# Patient Record
Sex: FEMALE | Race: AMERICAN INDIAN OR ALASKA NATIVE | Employment: OTHER | URBAN - NONMETROPOLITAN AREA
[De-identification: names, ages, dates, MRNs, and addresses within clinical notes are randomized per-mention and may not be internally consistent; named-entity substitution may affect disease eponyms.]

---

## 2017-01-05 ENCOUNTER — HOSPITAL ENCOUNTER (INPATIENT)
Facility: HOSPITAL | Age: 27
LOS: 3 days | Discharge: HOME | DRG: 194 | End: 2017-01-08
Attending: PHYSICIAN ASSISTANT | Admitting: INTERNAL MEDICINE
Payer: MEDICAID

## 2017-01-05 DIAGNOSIS — K82.8 THICKENING OF WALL OF GALLBLADDER: Primary | ICD-10-CM

## 2017-01-05 DIAGNOSIS — J18.9 PNEUMONIA OF RIGHT LOWER LOBE DUE TO INFECTIOUS ORGANISM: ICD-10-CM

## 2017-01-05 PROBLEM — N12 PYELONEPHRITIS: Status: ACTIVE | Noted: 2017-01-05

## 2017-01-05 LAB
ALBUMIN SERPL-MCNC: 2.8 G/DL (ref 3.4–5)
ALBUMIN UR-MCNC: 30 MG/DL
ALP SERPL-CCNC: 134 U/L (ref 40–150)
ALT SERPL W P-5'-P-CCNC: 28 U/L (ref 0–50)
AMPHETAMINES UR QL SCN: ABNORMAL
ANION GAP SERPL CALCULATED.3IONS-SCNC: 11 MMOL/L (ref 3–14)
APPEARANCE UR: ABNORMAL
AST SERPL W P-5'-P-CCNC: 29 U/L (ref 0–45)
BACTERIA #/AREA URNS HPF: ABNORMAL /HPF
BARBITURATES UR QL: ABNORMAL
BASOPHILS # BLD AUTO: 0 10E9/L (ref 0–0.2)
BASOPHILS NFR BLD AUTO: 0.2 %
BENZODIAZ UR QL: ABNORMAL
BILIRUB SERPL-MCNC: 0.7 MG/DL (ref 0.2–1.3)
BILIRUB UR QL STRIP: NEGATIVE
BUN SERPL-MCNC: 10 MG/DL (ref 7–30)
C DIFF TOX B STL QL: NORMAL
CALCIUM SERPL-MCNC: 9.2 MG/DL (ref 8.5–10.1)
CANNABINOIDS UR QL SCN: ABNORMAL
CHLORIDE SERPL-SCNC: 105 MMOL/L (ref 94–109)
CO2 SERPL-SCNC: 22 MMOL/L (ref 20–32)
COCAINE UR QL: ABNORMAL
COLOR UR AUTO: YELLOW
CREAT SERPL-MCNC: 0.51 MG/DL (ref 0.52–1.04)
CRP SERPL-MCNC: 119 MG/L (ref 0–8)
DIFFERENTIAL METHOD BLD: ABNORMAL
EOSINOPHIL # BLD AUTO: 0.2 10E9/L (ref 0–0.7)
EOSINOPHIL NFR BLD AUTO: 1.7 %
ERYTHROCYTE [DISTWIDTH] IN BLOOD BY AUTOMATED COUNT: 14.6 % (ref 10–15)
ETHANOL SERPL-MCNC: <0.01 G/DL
GFR SERPL CREATININE-BSD FRML MDRD: ABNORMAL ML/MIN/1.7M2
GLUCOSE SERPL-MCNC: 90 MG/DL (ref 70–99)
GLUCOSE UR STRIP-MCNC: NEGATIVE MG/DL
HCT VFR BLD AUTO: 40 % (ref 35–47)
HEMOCCULT SP1 STL QL: NEGATIVE
HGB BLD-MCNC: 14.4 G/DL (ref 11.7–15.7)
HGB UR QL STRIP: NEGATIVE
IMM GRANULOCYTES # BLD: 0.1 10E9/L (ref 0–0.4)
IMM GRANULOCYTES NFR BLD: 0.8 %
KETONES UR STRIP-MCNC: 10 MG/DL
LEUKOCYTE ESTERASE UR QL STRIP: ABNORMAL
LIPASE SERPL-CCNC: 91 U/L (ref 73–393)
LYMPHOCYTES # BLD AUTO: 2.2 10E9/L (ref 0.8–5.3)
LYMPHOCYTES NFR BLD AUTO: 18.1 %
MCH RBC QN AUTO: 33 PG (ref 26.5–33)
MCHC RBC AUTO-ENTMCNC: 36 G/DL (ref 31.5–36.5)
MCV RBC AUTO: 92 FL (ref 78–100)
METHADONE UR QL SCN: ABNORMAL
MONOCYTES # BLD AUTO: 0.8 10E9/L (ref 0–1.3)
MONOCYTES NFR BLD AUTO: 6.6 %
MUCOUS THREADS #/AREA URNS LPF: PRESENT /LPF
NEUTROPHILS # BLD AUTO: 8.6 10E9/L (ref 1.6–8.3)
NEUTROPHILS NFR BLD AUTO: 72.6 %
NITRATE UR QL: NEGATIVE
NRBC # BLD AUTO: 0 10*3/UL
NRBC BLD AUTO-RTO: 0 /100
OPIATES UR QL SCN: ABNORMAL
PCP UR QL SCN: ABNORMAL
PH UR STRIP: 7 PH (ref 4.7–8)
PLATELET # BLD AUTO: 363 10E9/L (ref 150–450)
POTASSIUM SERPL-SCNC: 3 MMOL/L (ref 3.4–5.3)
PROT SERPL-MCNC: 6.8 G/DL (ref 6.8–8.8)
RBC # BLD AUTO: 4.36 10E12/L (ref 3.8–5.2)
RBC #/AREA URNS AUTO: 4 /HPF (ref 0–2)
SODIUM SERPL-SCNC: 138 MMOL/L (ref 133–144)
SP GR UR STRIP: 1.02 (ref 1–1.03)
SPECIMEN SOURCE: NORMAL
SQUAMOUS #/AREA URNS AUTO: 6 /HPF (ref 0–1)
URN SPEC COLLECT METH UR: ABNORMAL
UROBILINOGEN UR STRIP-MCNC: NORMAL MG/DL (ref 0–2)
WBC # BLD AUTO: 11.9 10E9/L (ref 4–11)
WBC #/AREA URNS AUTO: 124 /HPF (ref 0–2)
WBC CLUMPS #/AREA URNS HPF: PRESENT /HPF

## 2017-01-05 PROCEDURE — 87899 AGENT NOS ASSAY W/OPTIC: CPT | Performed by: PHYSICIAN ASSISTANT

## 2017-01-05 PROCEDURE — 85025 COMPLETE CBC W/AUTO DIFF WBC: CPT | Performed by: PHYSICIAN ASSISTANT

## 2017-01-05 PROCEDURE — 80053 COMPREHEN METABOLIC PANEL: CPT | Performed by: PHYSICIAN ASSISTANT

## 2017-01-05 PROCEDURE — 87046 STOOL CULTR AEROBIC BACT EA: CPT | Performed by: PHYSICIAN ASSISTANT

## 2017-01-05 PROCEDURE — 25000125 ZZHC RX 250: Performed by: PHYSICIAN ASSISTANT

## 2017-01-05 PROCEDURE — 25000128 H RX IP 250 OP 636: Performed by: PHYSICIAN ASSISTANT

## 2017-01-05 PROCEDURE — 87045 FECES CULTURE AEROBIC BACT: CPT | Performed by: PHYSICIAN ASSISTANT

## 2017-01-05 PROCEDURE — 83735 ASSAY OF MAGNESIUM: CPT | Performed by: HOSPITALIST

## 2017-01-05 PROCEDURE — 83690 ASSAY OF LIPASE: CPT | Performed by: PHYSICIAN ASSISTANT

## 2017-01-05 PROCEDURE — 36415 COLL VENOUS BLD VENIPUNCTURE: CPT | Performed by: PHYSICIAN ASSISTANT

## 2017-01-05 PROCEDURE — 25000125 ZZHC RX 250: Performed by: HOSPITALIST

## 2017-01-05 PROCEDURE — 74022 RADEX COMPL AQT ABD SERIES: CPT | Mod: TC

## 2017-01-05 PROCEDURE — 99285 EMERGENCY DEPT VISIT HI MDM: CPT | Mod: 25

## 2017-01-05 PROCEDURE — 25000132 ZZH RX MED GY IP 250 OP 250 PS 637: Performed by: HOSPITALIST

## 2017-01-05 PROCEDURE — 96365 THER/PROPH/DIAG IV INF INIT: CPT

## 2017-01-05 PROCEDURE — 80320 DRUG SCREEN QUANTALCOHOLS: CPT | Performed by: PHYSICIAN ASSISTANT

## 2017-01-05 PROCEDURE — 96375 TX/PRO/DX INJ NEW DRUG ADDON: CPT

## 2017-01-05 PROCEDURE — 81001 URINALYSIS AUTO W/SCOPE: CPT | Performed by: PHYSICIAN ASSISTANT

## 2017-01-05 PROCEDURE — 87040 BLOOD CULTURE FOR BACTERIA: CPT | Performed by: PHYSICIAN ASSISTANT

## 2017-01-05 PROCEDURE — 82274 ASSAY TEST FOR BLOOD FECAL: CPT | Performed by: PHYSICIAN ASSISTANT

## 2017-01-05 PROCEDURE — 87086 URINE CULTURE/COLONY COUNT: CPT | Performed by: PHYSICIAN ASSISTANT

## 2017-01-05 PROCEDURE — 99285 EMERGENCY DEPT VISIT HI MDM: CPT | Performed by: PHYSICIAN ASSISTANT

## 2017-01-05 PROCEDURE — S0028 INJECTION, FAMOTIDINE, 20 MG: HCPCS | Performed by: PHYSICIAN ASSISTANT

## 2017-01-05 PROCEDURE — 80307 DRUG TEST PRSMV CHEM ANLYZR: CPT | Performed by: PHYSICIAN ASSISTANT

## 2017-01-05 PROCEDURE — 25800025 ZZH RX 258: Performed by: HOSPITALIST

## 2017-01-05 PROCEDURE — 87493 C DIFF AMPLIFIED PROBE: CPT | Performed by: PHYSICIAN ASSISTANT

## 2017-01-05 PROCEDURE — 87015 SPECIMEN INFECT AGNT CONCNTJ: CPT | Performed by: PHYSICIAN ASSISTANT

## 2017-01-05 PROCEDURE — 86140 C-REACTIVE PROTEIN: CPT | Performed by: PHYSICIAN ASSISTANT

## 2017-01-05 PROCEDURE — 96361 HYDRATE IV INFUSION ADD-ON: CPT

## 2017-01-05 PROCEDURE — 12000000 ZZH R&B MED SURG/OB

## 2017-01-05 RX ORDER — CEFTRIAXONE SODIUM 1 G/50ML
1 INJECTION, SOLUTION INTRAVENOUS EVERY 24 HOURS
Status: DISCONTINUED | OUTPATIENT
Start: 2017-01-06 | End: 2017-01-08 | Stop reason: HOSPADM

## 2017-01-05 RX ORDER — HYDROMORPHONE HYDROCHLORIDE 1 MG/ML
0.2 INJECTION, SOLUTION INTRAMUSCULAR; INTRAVENOUS; SUBCUTANEOUS
Status: DISCONTINUED | OUTPATIENT
Start: 2017-01-05 | End: 2017-01-05

## 2017-01-05 RX ORDER — ONDANSETRON 4 MG/1
4 TABLET, ORALLY DISINTEGRATING ORAL EVERY 6 HOURS PRN
Status: DISCONTINUED | OUTPATIENT
Start: 2017-01-05 | End: 2017-01-06

## 2017-01-05 RX ORDER — ONDANSETRON 2 MG/ML
8 INJECTION INTRAMUSCULAR; INTRAVENOUS ONCE
Status: COMPLETED | OUTPATIENT
Start: 2017-01-05 | End: 2017-01-05

## 2017-01-05 RX ORDER — POTASSIUM CHLORIDE 1500 MG/1
20-40 TABLET, EXTENDED RELEASE ORAL
Status: DISCONTINUED | OUTPATIENT
Start: 2017-01-05 | End: 2017-01-08 | Stop reason: HOSPADM

## 2017-01-05 RX ORDER — POTASSIUM CHLORIDE 7.45 MG/ML
10 INJECTION INTRAVENOUS
Status: DISCONTINUED | OUTPATIENT
Start: 2017-01-05 | End: 2017-01-08 | Stop reason: HOSPADM

## 2017-01-05 RX ORDER — DEXTROSE MONOHYDRATE, SODIUM CHLORIDE, AND POTASSIUM CHLORIDE 50; 1.49; 4.5 G/1000ML; G/1000ML; G/1000ML
INJECTION, SOLUTION INTRAVENOUS CONTINUOUS
Status: DISCONTINUED | OUTPATIENT
Start: 2017-01-05 | End: 2017-01-08 | Stop reason: HOSPADM

## 2017-01-05 RX ORDER — ONDANSETRON 2 MG/ML
4 INJECTION INTRAMUSCULAR; INTRAVENOUS EVERY 6 HOURS PRN
Status: DISCONTINUED | OUTPATIENT
Start: 2017-01-05 | End: 2017-01-06

## 2017-01-05 RX ORDER — HYDROMORPHONE HYDROCHLORIDE 1 MG/ML
.3-.5 INJECTION, SOLUTION INTRAMUSCULAR; INTRAVENOUS; SUBCUTANEOUS
Status: DISCONTINUED | OUTPATIENT
Start: 2017-01-05 | End: 2017-01-06

## 2017-01-05 RX ORDER — LIDOCAINE 40 MG/G
CREAM TOPICAL
Status: DISCONTINUED | OUTPATIENT
Start: 2017-01-05 | End: 2017-01-08 | Stop reason: HOSPADM

## 2017-01-05 RX ORDER — CEFTRIAXONE SODIUM 1 G/50ML
1 INJECTION, SOLUTION INTRAVENOUS ONCE
Status: DISCONTINUED | OUTPATIENT
Start: 2017-01-06 | End: 2017-01-05

## 2017-01-05 RX ORDER — MAGNESIUM SULFATE HEPTAHYDRATE 40 MG/ML
4 INJECTION, SOLUTION INTRAVENOUS EVERY 4 HOURS PRN
Status: DISCONTINUED | OUTPATIENT
Start: 2017-01-05 | End: 2017-01-08 | Stop reason: HOSPADM

## 2017-01-05 RX ORDER — DIPHENHYDRAMINE HYDROCHLORIDE 50 MG/ML
50 INJECTION INTRAMUSCULAR; INTRAVENOUS ONCE
Status: COMPLETED | OUTPATIENT
Start: 2017-01-05 | End: 2017-01-05

## 2017-01-05 RX ORDER — CEFTRIAXONE SODIUM 1 G/50ML
1 INJECTION, SOLUTION INTRAVENOUS ONCE
Status: COMPLETED | OUTPATIENT
Start: 2017-01-05 | End: 2017-01-05

## 2017-01-05 RX ORDER — POLYETHYLENE GLYCOL 3350 17 G/17G
17 POWDER, FOR SOLUTION ORAL DAILY PRN
Status: DISCONTINUED | OUTPATIENT
Start: 2017-01-05 | End: 2017-01-08 | Stop reason: HOSPADM

## 2017-01-05 RX ORDER — NALOXONE HYDROCHLORIDE 0.4 MG/ML
.1-.4 INJECTION, SOLUTION INTRAMUSCULAR; INTRAVENOUS; SUBCUTANEOUS
Status: DISCONTINUED | OUTPATIENT
Start: 2017-01-05 | End: 2017-01-08 | Stop reason: HOSPADM

## 2017-01-05 RX ORDER — POTASSIUM CHLORIDE 1.5 G/1.58G
20-40 POWDER, FOR SOLUTION ORAL
Status: DISCONTINUED | OUTPATIENT
Start: 2017-01-05 | End: 2017-01-08 | Stop reason: HOSPADM

## 2017-01-05 RX ORDER — MORPHINE SULFATE 2 MG/ML
4 INJECTION, SOLUTION INTRAMUSCULAR; INTRAVENOUS ONCE
Status: COMPLETED | OUTPATIENT
Start: 2017-01-05 | End: 2017-01-05

## 2017-01-05 RX ORDER — ACETAMINOPHEN 325 MG/1
650 TABLET ORAL EVERY 4 HOURS PRN
Status: DISCONTINUED | OUTPATIENT
Start: 2017-01-05 | End: 2017-01-06

## 2017-01-05 RX ADMIN — MORPHINE SULFATE 4 MG: 2 INJECTION, SOLUTION INTRAMUSCULAR; INTRAVENOUS at 12:24

## 2017-01-05 RX ADMIN — POTASSIUM CHLORIDE 10 MEQ: 14.9 INJECTION, SOLUTION, CONCENTRATE PARENTERAL at 21:11

## 2017-01-05 RX ADMIN — CEFTRIAXONE SODIUM 1 G: 1 INJECTION, SOLUTION INTRAVENOUS at 15:20

## 2017-01-05 RX ADMIN — FAMOTIDINE 20 MG: 10 INJECTION, SOLUTION INTRAVENOUS at 13:42

## 2017-01-05 RX ADMIN — ONDANSETRON 8 MG: 2 INJECTION, SOLUTION INTRAMUSCULAR; INTRAVENOUS at 12:24

## 2017-01-05 RX ADMIN — HYDROMORPHONE HYDROCHLORIDE 0.5 MG: 1 INJECTION, SOLUTION INTRAMUSCULAR; INTRAVENOUS; SUBCUTANEOUS at 23:39

## 2017-01-05 RX ADMIN — ONDANSETRON 4 MG: 2 INJECTION, SOLUTION INTRAMUSCULAR; INTRAVENOUS at 18:22

## 2017-01-05 RX ADMIN — POTASSIUM CHLORIDE 10 MEQ: 14.9 INJECTION, SOLUTION, CONCENTRATE PARENTERAL at 19:55

## 2017-01-05 RX ADMIN — ACETAMINOPHEN 650 MG: 325 TABLET, FILM COATED ORAL at 22:23

## 2017-01-05 RX ADMIN — SODIUM CHLORIDE 1000 ML: 9 INJECTION, SOLUTION INTRAVENOUS at 13:42

## 2017-01-05 RX ADMIN — HYDROMORPHONE HYDROCHLORIDE 0.3 MG: 1 INJECTION, SOLUTION INTRAMUSCULAR; INTRAVENOUS; SUBCUTANEOUS at 18:22

## 2017-01-05 RX ADMIN — AZITHROMYCIN MONOHYDRATE 500 MG: 500 INJECTION, POWDER, LYOPHILIZED, FOR SOLUTION INTRAVENOUS at 16:45

## 2017-01-05 RX ADMIN — POTASSIUM CHLORIDE, DEXTROSE MONOHYDRATE AND SODIUM CHLORIDE: 150; 5; 450 INJECTION, SOLUTION INTRAVENOUS at 19:55

## 2017-01-05 RX ADMIN — ONDANSETRON 4 MG: 2 INJECTION, SOLUTION INTRAMUSCULAR; INTRAVENOUS at 23:41

## 2017-01-05 RX ADMIN — HYDROMORPHONE HYDROCHLORIDE 1 MG: 1 INJECTION, SOLUTION INTRAMUSCULAR; INTRAVENOUS; SUBCUTANEOUS at 15:42

## 2017-01-05 RX ADMIN — POTASSIUM CHLORIDE 10 MEQ: 14.9 INJECTION, SOLUTION, CONCENTRATE PARENTERAL at 22:17

## 2017-01-05 RX ADMIN — POTASSIUM CHLORIDE 10 MEQ: 14.9 INJECTION, SOLUTION, CONCENTRATE PARENTERAL at 18:45

## 2017-01-05 RX ADMIN — SODIUM CHLORIDE 1000 ML: 9 INJECTION, SOLUTION INTRAVENOUS at 12:22

## 2017-01-05 RX ADMIN — DIPHENHYDRAMINE HYDROCHLORIDE 50 MG: 50 INJECTION, SOLUTION INTRAMUSCULAR; INTRAVENOUS at 12:37

## 2017-01-05 RX ADMIN — HYDROMORPHONE HYDROCHLORIDE 0.5 MG: 1 INJECTION, SOLUTION INTRAMUSCULAR; INTRAVENOUS; SUBCUTANEOUS at 20:46

## 2017-01-05 ASSESSMENT — PAIN DESCRIPTION - DESCRIPTORS: DESCRIPTORS: CONSTANT

## 2017-01-05 NOTE — ED NOTES
Up to bathroom to have a bowel movement.  Patient to bathroom via WC.  Back to room.  No new complaints at this time.

## 2017-01-05 NOTE — ED NOTES
"Pt has been medicated with norphine and zofran, mom now states pt may be allergice to morphine \"gets anxiety and agitated\", sats 100% on room air after morphine, family at bedside.  "

## 2017-01-05 NOTE — ED PROVIDER NOTES
History     Chief Complaint   Patient presents with     Nausea & Vomiting     Cough     Abdominal Pain     HPI  Maggie Case is a 26 year old female who presents with abdominal pain, bilateral flank pain, cough, nausea, and vomiting x 4 days. Also, states blood in her stool for the past month. No fevers. Was too weak to drive, so came in by ambulance. She denies fevers.     I have reviewed the Medications, Allergies, Past Medical and Surgical History, and Social History in the Epic system.    Review of Systems   All other systems reviewed and are negative.     .kkpm    Past Medical History:   Past Medical History   Diagnosis Date     Anemia      Vitamin B12 deficiency      Chronic diarrhea      Lactose intolerance      Myalgia      Anxiety      Pulmonary embolism (H) 05/06/16       Past Surgical History   Procedure Laterality Date     Closed reduction, percutaneous pinning lower extremity, combined Right 4/6/2016     Procedure: COMBINED CLOSED REDUCTION, PERCUTANEOUS PINNING LOWER EXTREMITY;  Surgeon: Dexter Jones MD;  Location: [facility]       Social History     Social History     Marital Status: Single     Spouse Name: N/A     Number of Children: N/A     Years of Education: N/A     Occupational History     Not on file.     Social History Main Topics     Smoking status: Current Every Day Smoker -- 0.25 packs/day for 7 years     Types: Cigarettes     Smokeless tobacco: Never Used     Alcohol Use: 0.0 oz/week     0 Standard drinks or equivalent per week      Comment: 1-2/week ,      Drug Use: Yes     Special: Marijuana      Comment: adderall, yest     Sexual Activity:     Partners: Male     Other Topics Concern     Not on file     Social History Narrative       Patient's Medications   New Prescriptions    No medications on file   Previous Medications    No medications on file   Modified Medications    No medications on file   Discontinued Medications    CYANOCOBALAMIN PO    Take 1,000 mcg by mouth daily     ESCITALOPRAM OXALATE PO    Take 10 mg by mouth daily    HYDROCODONE-ACETAMINOPHEN (NORCO) 5-325 MG PER TABLET    Take 1 tablet by mouth every 6 hours as needed for moderate to severe pain    HYDROXYZINE PAMOATE (VISTARIL PO)    Take 25 mg by mouth 3 times daily as needed for itching    MEDROXYPROGESTERONE ACETATE (DEPO-PROVERA IM)    Inject 150 mg into the muscle every 3 months    RIVAROXABAN ANTICOAGULANT (XARELTO) 20 MG TABS TABLET    Take 1 tablet (20 mg) by mouth daily    THIAMINE HCL PO    Take 100 mg by mouth daily    VITAMIN D (ERGOCALCIFEROL) 91980 UNIT CAPSULE    Take 50,000 Units by mouth once a week    VITAMIN D, CHOLECALCIFEROL, PO    Take 1,000 Units by mouth daily    VITAMIN D, CHOLECALCIFEROL, PO    Take 2,000 Units by mouth daily       Allergies: Amoxicillin; Lactose; Naproxen; Nickel; Tramadol; and Sulindac      Physical Exam   BP: 111/75 mmHg  Heart Rate: 76  Temp: 98  F (36.7  C)  Resp: 16  SpO2: 100 % (Simultaneous filing. User may not have seen previous data.)  Physical Exam   Constitutional: She is oriented to person, place, and time. Vital signs are normal. She appears well-developed and well-nourished.  Non-toxic appearance. She does not have a sickly appearance. She does not appear ill. She appears distressed.   HENT:   Head: Normocephalic and atraumatic.   Right Ear: External ear normal.   Left Ear: External ear normal.   Nose: Nose normal.   Mouth/Throat: Oropharynx is clear and moist. No oropharyngeal exudate.   Eyes: Conjunctivae are normal. Pupils are equal, round, and reactive to light. Right eye exhibits no discharge. Left eye exhibits no discharge. No scleral icterus.   Neck: Normal range of motion. Neck supple.   Cardiovascular: Normal rate, regular rhythm, normal heart sounds and intact distal pulses.  Exam reveals no gallop and no friction rub.    No murmur heard.  Pulmonary/Chest: Effort normal and breath sounds normal. No respiratory distress. She has no wheezes. She has no  rales. She exhibits no tenderness.   Abdominal: Soft. Bowel sounds are normal. She exhibits no distension and no mass. There is generalized tenderness. There is CVA tenderness. There is no rebound and no guarding.   Musculoskeletal: Normal range of motion. She exhibits no edema.   Lymphadenopathy:     She has no cervical adenopathy.   Neurological: She is alert and oriented to person, place, and time. No cranial nerve deficit.   Skin: Skin is warm and dry. No rash noted. She is not diaphoretic. No erythema. No pallor.   Psychiatric: Her behavior is normal. Judgment and thought content normal. Her mood appears anxious.   Nursing note and vitals reviewed.      ED Course   Procedures               Labs Ordered and Resulted from Time of ED Arrival Up to the Time of Departure from the ED   CBC WITH PLATELETS DIFFERENTIAL - Abnormal; Notable for the following:     WBC 11.9 (*)     Absolute Neutrophil 8.6 (*)     All other components within normal limits   COMPREHENSIVE METABOLIC PANEL - Abnormal; Notable for the following:     Potassium 3.0 (*)     Creatinine 0.51 (*)     Albumin 2.8 (*)     All other components within normal limits   CRP INFLAMMATION - Abnormal; Notable for the following:     CRP Inflammation 119.0 (*)     All other components within normal limits   UA MACROSCOPIC WITH REFLEX TO MICRO AND CULTURE - Abnormal; Notable for the following:     Ketones Urine 10 (*)     Protein Albumin Urine 30 (*)     Leukocyte Esterase Urine Large (*)     RBC Urine 4 (*)     WBC Urine 124 (*)     WBC Clumps Present (*)     Bacteria Urine Few (*)     Squamous Epithelial /HPF Urine 6 (*)     Mucous Urine Present (*)     All other components within normal limits   DRUG SCREEN URINE (RANGE) - Abnormal; Notable for the following:     Opiates Qualitative Urine   (*)     Value: Positive   Cutoff for a positive opiate is greater than 300 ng/mL. This is an unconfirmed   screening result to be used for medical purposes only.      All  other components within normal limits   LIPASE   ALCOHOL ETHYL   IMMUNOS OCCULT BLOOD   PERIPHERAL IV CATHETER   CLOSTRIDIUM DIFFICILE TOXIN B   STOOL CULTURE SSCE   URINE CULTURE AEROBIC BACTERIAL   BLOOD CULTURE   BLOOD CULTURE       Assessments & Plan (with Medical Decision Making)   Maggie has a dramatic presentation today coming in by ambulance from home with n/v/d, abd pain, and cough x 4 days. She has generalized abd tenderness as well as CVA tenderness. Stated bloody stools x 1 month but stool was negative for blood today and hemoglobin at baseline 14, so doubt this. Labs unremarkable other than slightly increased white count at 11.9 and elevated CRP at 119. UA is positive for infection with large leuk esterase and 124 WBC's. She does have CVA tenderness, so will cover for pyelo with Rocephin. The CXR shows RLL infiltrate consistent with early pneumonia as well. Azithromycin 500mg IV given following blood cultures x 2. She does not appear septic at this time. She has continued to vomit despite anti-emetics given here and would not be able to keep oral antibiotics down at home. She was kindly accepted by Dr. Barba for further care.     I have reviewed the nursing notes.    I have reviewed the findings, diagnosis, plan and need for follow up with the patient.    New Prescriptions    No medications on file       Final diagnoses:   Acute pyelonephritis   Pneumonia of right lower lobe due to infectious organism       1/5/2017   [facility]      Hali Glover PA-C  01/05/17 4387

## 2017-01-05 NOTE — ED NOTES
Pt has been up to br to void, voided scant dark vonnie urine, back to room. States that her pain didn't get better after pepcid. No emesis here.

## 2017-01-05 NOTE — ED NOTES
Nausea vomiting for 4 days unable to keep fluids food down.  Denies fever, reports abdomen is sore. Diarrhea and body aches also reported. Reports SOB and epigastric pains. Reports that she now has some blood in emesis and stool

## 2017-01-05 NOTE — ED NOTES
IV was flushed prior to next bolus starting; patient c/o pain at the site; small area of redness noted; blood return in hub.  Warm pack to IV site, will continue to monitor.

## 2017-01-05 NOTE — IP AVS SNAPSHOT
[facility]    [address] [place] [id number]    Phone:  [phone]    Fax:  [phone]                                       After Visit Summary   1/5/2017    Maggie Case    MRN: [id number]           After Visit Summary Signature Page     I have received my discharge instructions, and my questions have been answered. I have discussed any challenges I see with this plan with the nurse or doctor.    ..........................................................................................................................................  Patient/Patient Representative Signature      ..........................................................................................................................................  Patient Representative Print Name and Relationship to Patient    ..................................................               ................................................  Date                                            Time    ..........................................................................................................................................  Reviewed by Signature/Title    ...................................................              ..............................................  Date                                                            Time

## 2017-01-06 LAB
ANION GAP SERPL CALCULATED.3IONS-SCNC: 10 MMOL/L (ref 3–14)
BASOPHILS # BLD AUTO: 0 10E9/L (ref 0–0.2)
BASOPHILS NFR BLD AUTO: 0.1 %
BUN SERPL-MCNC: 6 MG/DL (ref 7–30)
CALCIUM SERPL-MCNC: 7.4 MG/DL (ref 8.5–10.1)
CHLORIDE SERPL-SCNC: 112 MMOL/L (ref 94–109)
CO2 SERPL-SCNC: 20 MMOL/L (ref 20–32)
CREAT SERPL-MCNC: 0.54 MG/DL (ref 0.52–1.04)
DIFFERENTIAL METHOD BLD: ABNORMAL
EOSINOPHIL # BLD AUTO: 0.4 10E9/L (ref 0–0.7)
EOSINOPHIL NFR BLD AUTO: 3.6 %
ERYTHROCYTE [DISTWIDTH] IN BLOOD BY AUTOMATED COUNT: 14.8 % (ref 10–15)
GFR SERPL CREATININE-BSD FRML MDRD: ABNORMAL ML/MIN/1.7M2
GLUCOSE SERPL-MCNC: 86 MG/DL (ref 70–99)
HCT VFR BLD AUTO: 36.3 % (ref 35–47)
HGB BLD-MCNC: 11.9 G/DL (ref 11.7–15.7)
IMM GRANULOCYTES # BLD: 0.1 10E9/L (ref 0–0.4)
IMM GRANULOCYTES NFR BLD: 0.5 %
LYMPHOCYTES # BLD AUTO: 1.9 10E9/L (ref 0.8–5.3)
LYMPHOCYTES NFR BLD AUTO: 20 %
MAGNESIUM SERPL-MCNC: 2 MG/DL (ref 1.6–2.3)
MCH RBC QN AUTO: 32.6 PG (ref 26.5–33)
MCHC RBC AUTO-ENTMCNC: 32.8 G/DL (ref 31.5–36.5)
MCV RBC AUTO: 100 FL (ref 78–100)
MONOCYTES # BLD AUTO: 1.1 10E9/L (ref 0–1.3)
MONOCYTES NFR BLD AUTO: 11 %
NEUTROPHILS # BLD AUTO: 6.2 10E9/L (ref 1.6–8.3)
NEUTROPHILS NFR BLD AUTO: 64.8 %
NRBC # BLD AUTO: 0 10*3/UL
NRBC BLD AUTO-RTO: 0 /100
PLATELET # BLD AUTO: 292 10E9/L (ref 150–450)
POTASSIUM SERPL-SCNC: 3.5 MMOL/L (ref 3.4–5.3)
POTASSIUM SERPL-SCNC: 3.6 MMOL/L (ref 3.4–5.3)
RBC # BLD AUTO: 3.65 10E12/L (ref 3.8–5.2)
SODIUM SERPL-SCNC: 142 MMOL/L (ref 133–144)
WBC # BLD AUTO: 9.6 10E9/L (ref 4–11)

## 2017-01-06 PROCEDURE — 25000130 H RX MED GY IP 250 OP 259 PS 637: Performed by: INTERNAL MEDICINE

## 2017-01-06 PROCEDURE — 83735 ASSAY OF MAGNESIUM: CPT | Performed by: HOSPITALIST

## 2017-01-06 PROCEDURE — 25000128 H RX IP 250 OP 636: Performed by: INTERNAL MEDICINE

## 2017-01-06 PROCEDURE — 25000132 ZZH RX MED GY IP 250 OP 250 PS 637: Performed by: INTERNAL MEDICINE

## 2017-01-06 PROCEDURE — 85025 COMPLETE CBC W/AUTO DIFF WBC: CPT | Performed by: INTERNAL MEDICINE

## 2017-01-06 PROCEDURE — 25000132 ZZH RX MED GY IP 250 OP 250 PS 637: Performed by: HOSPITALIST

## 2017-01-06 PROCEDURE — 80048 BASIC METABOLIC PNL TOTAL CA: CPT | Performed by: INTERNAL MEDICINE

## 2017-01-06 PROCEDURE — 36415 COLL VENOUS BLD VENIPUNCTURE: CPT | Performed by: HOSPITALIST

## 2017-01-06 PROCEDURE — 25000125 ZZHC RX 250: Performed by: INTERNAL MEDICINE

## 2017-01-06 PROCEDURE — 84132 ASSAY OF SERUM POTASSIUM: CPT | Performed by: HOSPITALIST

## 2017-01-06 PROCEDURE — 25000125 ZZHC RX 250: Performed by: HOSPITALIST

## 2017-01-06 PROCEDURE — 25800025 ZZH RX 258: Performed by: HOSPITALIST

## 2017-01-06 PROCEDURE — 99222 1ST HOSP IP/OBS MODERATE 55: CPT | Performed by: INTERNAL MEDICINE

## 2017-01-06 PROCEDURE — 12000000 ZZH R&B MED SURG/OB

## 2017-01-06 PROCEDURE — 71020 ZZHC CHEST TWO VIEWS, FRONT/LAT: CPT | Mod: TC

## 2017-01-06 RX ORDER — HYDROMORPHONE HYDROCHLORIDE 1 MG/ML
.1-.5 INJECTION, SOLUTION INTRAMUSCULAR; INTRAVENOUS; SUBCUTANEOUS
Status: DISCONTINUED | OUTPATIENT
Start: 2017-01-06 | End: 2017-01-08

## 2017-01-06 RX ORDER — ONDANSETRON 4 MG/1
8 TABLET, ORALLY DISINTEGRATING ORAL EVERY 6 HOURS PRN
Status: DISCONTINUED | OUTPATIENT
Start: 2017-01-06 | End: 2017-01-08 | Stop reason: HOSPADM

## 2017-01-06 RX ORDER — HYDROMORPHONE HYDROCHLORIDE 1 MG/ML
.1-.5 INJECTION, SOLUTION INTRAMUSCULAR; INTRAVENOUS; SUBCUTANEOUS
Status: DISCONTINUED | OUTPATIENT
Start: 2017-01-06 | End: 2017-01-06

## 2017-01-06 RX ORDER — DIPHENHYDRAMINE HCL 25 MG
25 CAPSULE ORAL EVERY 4 HOURS PRN
Status: DISCONTINUED | OUTPATIENT
Start: 2017-01-06 | End: 2017-01-08 | Stop reason: HOSPADM

## 2017-01-06 RX ORDER — ONDANSETRON 2 MG/ML
4 INJECTION INTRAMUSCULAR; INTRAVENOUS EVERY 6 HOURS PRN
Status: DISCONTINUED | OUTPATIENT
Start: 2017-01-06 | End: 2017-01-08 | Stop reason: HOSPADM

## 2017-01-06 RX ORDER — ACETAMINOPHEN 325 MG/1
975 TABLET ORAL EVERY 6 HOURS
Status: DISCONTINUED | OUTPATIENT
Start: 2017-01-06 | End: 2017-01-07

## 2017-01-06 RX ADMIN — Medication 2.5 MG: at 10:46

## 2017-01-06 RX ADMIN — DIPHENHYDRAMINE HYDROCHLORIDE 25 MG: 25 CAPSULE ORAL at 17:17

## 2017-01-06 RX ADMIN — Medication 5 MG: at 15:51

## 2017-01-06 RX ADMIN — POTASSIUM CHLORIDE, DEXTROSE MONOHYDRATE AND SODIUM CHLORIDE: 150; 5; 450 INJECTION, SOLUTION INTRAVENOUS at 18:02

## 2017-01-06 RX ADMIN — Medication 5 MG: at 20:12

## 2017-01-06 RX ADMIN — ACETAMINOPHEN 975 MG: 325 TABLET, FILM COATED ORAL at 11:44

## 2017-01-06 RX ADMIN — ACETAMINOPHEN 650 MG: 325 TABLET, FILM COATED ORAL at 04:04

## 2017-01-06 RX ADMIN — DIPHENHYDRAMINE HYDROCHLORIDE 25 MG: 25 CAPSULE ORAL at 02:01

## 2017-01-06 RX ADMIN — HYDROMORPHONE HYDROCHLORIDE 0.5 MG: 1 INJECTION, SOLUTION INTRAMUSCULAR; INTRAVENOUS; SUBCUTANEOUS at 06:16

## 2017-01-06 RX ADMIN — CEFTRIAXONE SODIUM 1 G: 1 INJECTION, SOLUTION INTRAVENOUS at 15:28

## 2017-01-06 RX ADMIN — AZITHROMYCIN MONOHYDRATE 250 MG: 500 INJECTION, POWDER, LYOPHILIZED, FOR SOLUTION INTRAVENOUS at 17:56

## 2017-01-06 RX ADMIN — HYDROMORPHONE HYDROCHLORIDE 0.5 MG: 1 INJECTION, SOLUTION INTRAMUSCULAR; INTRAVENOUS; SUBCUTANEOUS at 01:37

## 2017-01-06 RX ADMIN — ACETAMINOPHEN 650 MG: 325 TABLET, FILM COATED ORAL at 08:30

## 2017-01-06 RX ADMIN — POTASSIUM CHLORIDE, DEXTROSE MONOHYDRATE AND SODIUM CHLORIDE: 150; 5; 450 INJECTION, SOLUTION INTRAVENOUS at 08:33

## 2017-01-06 RX ADMIN — Medication 2.5 MG: at 11:45

## 2017-01-06 RX ADMIN — ONDANSETRON 4 MG: 2 INJECTION, SOLUTION INTRAMUSCULAR; INTRAVENOUS at 17:17

## 2017-01-06 RX ADMIN — ONDANSETRON 4 MG: 2 INJECTION, SOLUTION INTRAMUSCULAR; INTRAVENOUS at 06:16

## 2017-01-06 RX ADMIN — HYDROMORPHONE HYDROCHLORIDE 0.5 MG: 1 INJECTION, SOLUTION INTRAMUSCULAR; INTRAVENOUS; SUBCUTANEOUS at 08:31

## 2017-01-06 RX ADMIN — ACETAMINOPHEN 975 MG: 325 TABLET, FILM COATED ORAL at 17:59

## 2017-01-06 RX ADMIN — HYDROMORPHONE HYDROCHLORIDE 0.5 MG: 1 INJECTION, SOLUTION INTRAMUSCULAR; INTRAVENOUS; SUBCUTANEOUS at 04:04

## 2017-01-06 ASSESSMENT — PAIN DESCRIPTION - DESCRIPTORS
DESCRIPTORS: CONSTANT

## 2017-01-06 NOTE — PLAN OF CARE
Problem: Goal Outcome Summary  Goal: Goal Outcome Summary  Outcome: No Change  Pt alert and orientated.  Tolerated sips of fluid and few bites food.  zofran given before eating.  Rates pain  9/10-  Dilaudid given.  Voiding ok,  IV infusing .  Pt receiving potassium placement- K+3.0. No emesis/ diarrhea.  Uses call light appropriately.

## 2017-01-06 NOTE — PLAN OF CARE
Problem: Goal Outcome Summary  Goal: Goal Outcome Summary  Outcome: No Change  Pt alert and orientated. Ate fair for meals.   Started oral pain meds.  Rates pain 7/10.     Sleeping now.  Voiding ok.  IV infusing. Up ad shannan.  Uses call light appropriately.    Problem: Pneumonia (Adult)  Goal: Signs and Symptoms of Listed Potential Problems Will be Absent or Manageable (Pneumonia)  Signs and symptoms of listed potential problems will be absent or manageable by discharge/transition of care (reference Pneumonia (Adult) CPG).   Outcome: No Change  On antibiotics. Frequent cough.  Lungs clear. sats 98% ra

## 2017-01-06 NOTE — PLAN OF CARE
Face to face report given with opportunity to observe patient.    Report given TAMMIE Julien   1/6/2017  7:17 AM

## 2017-01-06 NOTE — PLAN OF CARE
[facility] Inpatient Admission Note:    Patient admitted to 3104/3104-1 at approximately 1618 via cart accompanied by transport tech from emergency room . Report received from Nae prescott from ED  in SBAR format at 1607 via telephone. Patient ambulated to bed via self.. Patient is alert and oriented X 3, reports pain; rates at 7 on 0-10 scale.  Patient oriented to room, unit, hourly rounding, and plan of care. Explained admission packet and patient handbook with patient bill of rights brochure. Will continue to monitor and document as needed.     Inpatient Nursing criteria listed below was met:      Health care directives status obtained and documented: Yes      Care Everywhere authorization obtained No      MRSA swab completed for patient 65 years and older: N/A      Patient identifies a surrogate decision maker: Yes If yes, who:mother- jae Contact Information:see facesheet      Core Measure diagnosis present:No. If yes, state diagnosis: na       If initial lactic acid >2.0, repeat lactic acid drawn within one hour of arrival to unit: NA. If no, state reason: na      Vaccination assessment and education completed: Yes   Vaccinations received prior to admission: Pneumovax no  Influenza(seasonal)  NO   Vaccination(s) ordered: patient declines      Clergy visit ordered if patient requests: N/A      Skin issues/needs documented: Yes      Isolation Patient: no Education given, correct sign in place and documentation row added to PCS:  No      Fall Prevention No: Care plan updated, education given and documented, sticker and magnet in place: N/A      Care Plan initiated: Yes      Education Documented (including assessment): Yes      Patient has discharge needs : No If yes, please explain:na

## 2017-01-06 NOTE — PLAN OF CARE
Problem: Patient Goal: Social Work Focus  Goal: 1. Patient Goal: Social Work Focus  Assessment as per flow sheet.     Met with Maggie. At the beginning of this visit, Maggie just woke from a nap and states confused on time of day. As visit progressed, Maggie became clearer.  She is pleasant and cooperative.   She is here with n/v/d, abdominal pain. Cough. She lives in an apartment in [place] with her 8 year old daughter Sharri. Sharri is with her Dad now. Thursday through Sunday is his visitation. She does receive child support, about 372.00 which is divided out in the month. Works at the [employer], is trying to leave this occupation. Hours have been cut (21hours at most). 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 Prefers the school age so she can  PCA hours while the kids are in school.. Does drive, is independent with meals, house keeping, finances. PCP is Dr. Flores. Went to Dr. Cabrera's office off and on for dental care (he has not retired). No eye dr, denies concerns. No HCD, not interested at this time. No , community services/programs. Utilizes [facility]. Is not a [place], does not receive benefits. Feels safe at home. Sleep-no real concerns. Work schedule does affect sleep at times. Katelyn is [employer]. Support system is mom, dad, aunt. Has 4 brothers and 3 sisters. Has a brother and cousin staying at apartment since no one is there. Waiting for PCA paperwork in the mail. States her bud (a male friend) brought in clothes earlier today.   States has court since her landlord states she is late in rent, Maggie states she paid her rent.  Patient Financial met with Maggie earlier today and has HPE. She is interested in financial planning/balancing finances. Does use tobacco 1/2 pack per week. Does drink alcohol on the weekend when Sharri is with her Dad. Denies drug use, (past/current).     Talked about home  care, Maggie is independent     Plan: home when ready. Has transportation home.

## 2017-01-06 NOTE — PLAN OF CARE
Face to face report given with opportunity to observe patient.    Report given to Brandy Staley   1/6/2017  3:49 PM

## 2017-01-06 NOTE — PLAN OF CARE
Problem: Goal Outcome Summary  Goal: Goal Outcome Summary  Outcome: No Change  S/o 7-9/10 pain in and across abdomen.  Using dilaudid q 2 hrs IV.  Intermit nausea. zofran x 2  C/o anxiety from hospitals requested and received benadryl  Scratched herself on her abdomen. Abrasion, bandaid applied.  VSS.        Problem: Pneumonia (Adult)  Goal: Signs and Symptoms of Listed Potential Problems Will be Absent or Manageable (Pneumonia)  Signs and symptoms of listed potential problems will be absent or manageable by discharge/transition of care (reference Pneumonia (Adult) CPG).  Outcome: No Change    01/06/17 0550   Pneumonia   Problems Assessed (Pneumonia) all   Problems Present (Pneumonia) none

## 2017-01-06 NOTE — H&P
Patient seen and evaluated by Dr. Montiel on patient's admission to hospital, but document delayed by physician's illness. This document filed in substitution.    [facility]    History and Physical  Hospitalist       Date of Admission:  1/5/2017  Date of Service (when I saw the patient): 01/06/2017    Assessment and Plan  Maggie Case is a 26 year old woman who presentresented to emergency department yesterday evening with approximately 4 days of nausea and subsequent emesis.  She not been able to keep any liquids down for at least several days and presented to emergency department.  She had diffuse abdominal discomfort.  Evaluation demonstrated slight leukocytosis with WBC 11.9.  She did not demonstrate a fever, however.  She improved symptomatically with intravenous fluid in the emergency department.  History is otherwise significant for pulmonary embolism 12 2015, treated with thrombolysis.    1.  Nausea and vomiting.  Most suspicious of a urinary tract infection rather severe infectious cystitis or pyelonephritis is not absolutely certain. Abdominal imaging nonspecific. She did have marked symptoms of pain and discomfort and mild leukocytosis.  However, in other respects, not as many systemic findings as might be expected in the event of pyelonephritis.  Her nausea and vomiting and has responded quite promptly, more rapidly than would be expected from an affect of antibiotics alone and cannot eliminate a nonspecific gastroenteritis as the source of her symptoms.  In any event, she is responded with usual symptomatic treatment by the evening of hospitalization.  She was able to tolerate small amount of oral intake with some residual nausea.  She has continued to need oral  Zofran.  She is also using relatively frequent hydromorphone.  Will add scheduled acetaminophen and have strongly encouraged oral medication for pain control.  If absolutely necessary, a small prescription for oxycodone or  "hydrocodone could be considered, although  Would prefer pain be under adequate control by the time of discharge that she does not require opiates.  In any event, it appears her symptoms are responding to supportive management.  2.  Urinary tract infection.  Would consider infectious cystitis versus pyelonephritis. Pain and discomfort is out of proportion to her cystitis, although the pain is most referrable to her lower abdomen anteriorly suggesting bladder inflammation.  She does have some back pain, although at least on the morning following admission is quite symmetrical and does not clearly relate to a unilateral kidney inflammation.  In any event, we will continue with antimicrobials.  Most recent urinary tract infection showed relatively pansensitive Escherichia coli.  Given considerations as well for a lower respiratory tract infection, continuing treatment with an oral quinolone would be quite reasonable.  3. Lower respiratory tract infection.  Airspace infiltrate posterior subsegment, right lower lobe. Certainly consistent with a community-acquired pneumonia.  For the time being, continue azithromycin, although appearance does not suggest highly a \"atypical\" organism.  Currently treatment with ceftriaxone reasonable treatment.  Transition to a quinolone for both possible urinary as well as respiratory tract infection would be reasonable time of discharge.  5.  History of pulmonary embolus.  December 2015, she developed pulmonary emboli with relatively large clot burden.  This prompted transfer in treatment at [facility] with thrombolysis.  Source, perhaps related to her use of Depo Provera in the context of tobacco abuse.  She has not undergone evaluation for any thrombophilia, although I am not certain that is mandated.  Since her pulmonary embolism.  Her treatment was some what inconsistent, although she does recall use of enoxaparin and warfarin relatively regularly.  She was admitted here  " "approximately 6 months after her initial diagnosis and treatment and because of concerns with consistent use of warfarin and heparin arrangements prorate for treatment with rivarobaxan.  She recalls that at least on several occasions she had difficulties in keeping appointments and did not get refills of this. She was evaluated by Dr. Donaldson of hematology several months after her initial pulmonary embolism.  He was concerned with her large clot burden and recommended lifelong anticoagulation.  However, she has had difficulties with consistent use either of warfarin or rivarobaxan, which was recommended at the time of admission here in June 2016. Because of this, I would not favor resuming treatment at the present time given her difficulties in consistent use of anticoagulant medication.  There is no strong evidence of any recurrent or persistent thrombosis. In June 2016 a follow-up CT scan of the chest.\"  Angiogram\" protocol did not demonstrate convincing clot burden.  She has not had recurrent symptoms strongly suggesting any anatomic or physiologic abnormality, although intermittently she has noted some dyspnea which has been relatively inconsistent. Certainly her symptoms, however, are not great enough to consider either recurrent acute or chronic pulmonary embolism. Inconsistent use may in fact exacerbate thrombophilia.  Difficult to recommend any follow-up imaging as neither presence or absence of anatomic abnormality would be unlikely to change any decision-making.  Her use of injectable contraceptive in the context of smoking is likely a adequate explanation for her initial clot.    DVT Prophylaxis: Intermittent pneumatic compression  Code Status: Full Code    Disposition: Expected discharge in another 12-24 hours.    Emeterio Garrison    Primary Care Physician  Chapo Flores    Chief Complaint  Nausea and vomiting for 4 days.    History is obtained from the patient    History of Present Illness  Maggie CORMIER" Evie is a 26 year old woman who presented 1/5 afternoon to [facility] with 4 days of nausea and vomiting.  She notes that she first developed nausea and subsequently vomiting to the extent she has not been able to tolerate even a small amount of liquid intake.  She has not noted fevers or chills.  She has noted some lower abdominal pain.  Evaluation in emergency department demonstrated modest pyuria.  She had a scant leukocytosis.  No fever was noted.  Abdominal plain films did not demonstrate obstruction.  Question of mild bowel wall thickening.  She was admitted for further evaluation and management.  She does not recall any other prodromal symptoms.  No sick contacts.  No recent travel. She notes occasional dyspnea and not clearly related to exertion.  She has not had any cough or headache.  No dysuria. No unilateral weakness or paresthesias.  No hemoptysis or hematemesis. No chest discomfort.    Past Medical History   I have reviewed this patient's medical history and updated it with pertinent information if needed.   Past Medical History   Diagnosis Date     Anemia      Vitamin B12 deficiency      Chronic diarrhea      Lactose intolerance      Myalgia      Anxiety      Pulmonary embolism ([facility]) 05/06/16       Past Surgical History  I have reviewed this patient's surgical history and updated it with pertinent information if needed.  Past Surgical History   Procedure Laterality Date     Closed reduction, percutaneous pinning lower extremity, combined Right 4/6/2016     Procedure: COMBINED CLOSED REDUCTION, PERCUTANEOUS PINNING LOWER EXTREMITY;  Surgeon: Dexter Jones MD;  Location: [facility]       Prior to Admission Medications  None     Allergies  Allergies   Allergen Reactions     Amoxicillin Other (See Comments)     Headaches     Lactose      Naproxen Other (See Comments)     Reaction: Headaches     Nickel      Tramadol      Sulindac Rash       Social History  I have reviewed this patient's  social history and updated it with pertinent information if needed. Maggie Case  reports that she has been smoking Cigarettes.  She has a 1.75 pack-year smoking history. She has never used smokeless tobacco. She reports that she drinks alcohol. She reports that she uses illicit drugs (Marijuana).    Family History  I have reviewed this patient's family history and updated it with pertinent information if needed.   No family history on file.    Review of Systems  The 10 point Review of Systems is negative other than noted in the HPI.    Physical Exam  Temp: 98.3  F (36.8  C) Temp src: Tympanic BP: 100/61 mmHg Pulse: 80 Heart Rate: 82 Resp: 18 SpO2: 100 % O2 Device: None (Room air)    Vital Signs with Ranges  Temp:  [98  F (36.7  C)-98.9  F (37.2  C)] 98.3  F (36.8  C)  Pulse:  [65-80] 80  Heart Rate:  [61-82] 82  Resp:  [16-20] 18  BP: ()/(45-75) 100/61 mmHg  SpO2:  [97 %-100 %] 100 %  135 lbs 5.8 oz      Awake, alert, mildly fatigued woman lying in bed on medical wards.  Speech is clear.  Oriented ×3.  Pleasant and interactive.  HEENT: Pupils equal, conjugate. No icterus or nystagmus. Oral mucosa moist. No facial asymmetry.   Neck: Supple, jugular veins 1-2 cmH2O. Trachea midline   Chest: No chest wall movement asymmetry. Aeration preserve to bases. Accessory muscles not in use. Expiratory time not increased. No tidal wheezes. Few rhonchi right lower lobe.  No dullness to percussion.  No egophony.. No discrete crackles.   Cardiac: PMI not displaced. S1, S2 unremarkable. No S3, S4. P2 not accentuated. No murmurs. Carotid upstroke preserved. Carotid amplitude preserved.   Abdomen: Soft. Mild palpation tenderness to lower abdomen centrally.  Percussion tenderness posteriorly at the lumbar spine bilaterally and somewhat diffuse. No anterior abdominal percussion tenderness. No distention. Active bowel sounds in all quadrants. Liver and spleen not increased in size. No bruits, masses, or pulsations.    Extremities: No lower extremity edema. Extremities warm distally. No eccymoses, clubbing.   Neurologic: Mental state above. Motor 5/5 and bilaterally equal. Tone preserved. No fasiculations or tremors. Sensation intact to light touch. DTR 2/4 and bilaterally equal.   Data  Data reviewed today:  I personally reviewed abdominal radiographs an abdominal series obtained in emergency department reviewed.   Nonspecific pattern without suggestion of obstruction. No pneumotosis.  PMI lateral of the chest obtained the morning following admission.  Good inflation.  Posterior RLL airspace infiltrate.  TNo effusion.  Heart size within expected limits.     Recent Labs  Lab 01/06/17  0145 01/05/17  1220   WBC 9.6 11.9*   HGB 11.9 14.4    92    363    138   POTASSIUM 3.5  3.6 3.0*   CHLORIDE 112* 105   CO2 20 22   BUN 6* 10   CR 0.54 0.51*   ANIONGAP 10 11   AVINASH 7.4* 9.2   GLC 86 90   ALBUMIN  --  2.8*   PROTTOTAL  --  6.8   BILITOTAL  --  0.7   ALKPHOS  --  134   ALT  --  28   AST  --  29   LIPASE  --  91       LACTIC ACID   Date Value Ref Range Status   05/06/2016 1.3 0.4 - 2.0 mmol/L Final   12/28/2015 1.3 0.4 - 2.0 mmol/L Final   08/29/2015 0.6 0.4 - 2.0 mmol/L Final       Recent Results (from the past 24 hour(s))   XR Abdomen Series    Narrative    CHEST    FINDINGS:  There is abnormal increase in density in the right lung,  suspicious for pneumonia.  Left lung is clear.  Heart is normal in  size.    IMPRESSION:  RIGHT LUNG OPACITY, SUSPICIOUS FOR PNEUMONIA.  Exam Date: Jan 05, 2017 01:30:00 PM  Author: GEETHA JACOBS  This report is final and signed

## 2017-01-06 NOTE — PLAN OF CARE
Face to face report given with opportunity to observe patient.    Report given to Kellen Staley   1/5/2017  11:16 PM

## 2017-01-07 ENCOUNTER — ANESTHESIA (OUTPATIENT)
Dept: MEDSURG UNIT | Facility: HOSPITAL | Age: 27
DRG: 194 | End: 2017-01-07
Payer: MEDICAID

## 2017-01-07 ENCOUNTER — ANESTHESIA EVENT (OUTPATIENT)
Dept: MEDSURG UNIT | Facility: HOSPITAL | Age: 27
DRG: 194 | End: 2017-01-07
Payer: MEDICAID

## 2017-01-07 LAB
ALBUMIN SERPL-MCNC: 2 G/DL (ref 3.4–5)
ALP SERPL-CCNC: 106 U/L (ref 40–150)
ALT SERPL W P-5'-P-CCNC: 23 U/L (ref 0–50)
ANION GAP SERPL CALCULATED.3IONS-SCNC: 6 MMOL/L (ref 3–14)
AST SERPL W P-5'-P-CCNC: 32 U/L (ref 0–45)
BACTERIA SPEC CULT: NORMAL
BASOPHILS # BLD AUTO: 0 10E9/L (ref 0–0.2)
BASOPHILS NFR BLD AUTO: 0.1 %
BILIRUB SERPL-MCNC: 0.2 MG/DL (ref 0.2–1.3)
BUN SERPL-MCNC: 3 MG/DL (ref 7–30)
CALCIUM SERPL-MCNC: 7.8 MG/DL (ref 8.5–10.1)
CHLORIDE SERPL-SCNC: 112 MMOL/L (ref 94–109)
CO2 SERPL-SCNC: 25 MMOL/L (ref 20–32)
CREAT SERPL-MCNC: 0.54 MG/DL (ref 0.52–1.04)
D DIMER PPP DDU-MCNC: <200 NG/ML D-DU (ref 0–300)
DIFFERENTIAL METHOD BLD: ABNORMAL
E COLI SXT1+2 STL IA: NORMAL
EOSINOPHIL # BLD AUTO: 0.3 10E9/L (ref 0–0.7)
EOSINOPHIL NFR BLD AUTO: 4.2 %
ERYTHROCYTE [DISTWIDTH] IN BLOOD BY AUTOMATED COUNT: 14.4 % (ref 10–15)
GFR SERPL CREATININE-BSD FRML MDRD: ABNORMAL ML/MIN/1.7M2
GLUCOSE SERPL-MCNC: 101 MG/DL (ref 70–99)
HCT VFR BLD AUTO: 31.9 % (ref 35–47)
HGB BLD-MCNC: 10.6 G/DL (ref 11.7–15.7)
IMM GRANULOCYTES # BLD: 0.1 10E9/L (ref 0–0.4)
IMM GRANULOCYTES NFR BLD: 1 %
LYMPHOCYTES # BLD AUTO: 1.6 10E9/L (ref 0.8–5.3)
LYMPHOCYTES NFR BLD AUTO: 23.4 %
MCH RBC QN AUTO: 32.7 PG (ref 26.5–33)
MCHC RBC AUTO-ENTMCNC: 33.2 G/DL (ref 31.5–36.5)
MCV RBC AUTO: 99 FL (ref 78–100)
MICRO REPORT STATUS: NORMAL
MONOCYTES # BLD AUTO: 0.6 10E9/L (ref 0–1.3)
MONOCYTES NFR BLD AUTO: 8.2 %
NEUTROPHILS # BLD AUTO: 4.3 10E9/L (ref 1.6–8.3)
NEUTROPHILS NFR BLD AUTO: 63.1 %
NRBC # BLD AUTO: 0 10*3/UL
NRBC BLD AUTO-RTO: 0 /100
PLATELET # BLD AUTO: 245 10E9/L (ref 150–450)
POTASSIUM SERPL-SCNC: 4 MMOL/L (ref 3.4–5.3)
PROT SERPL-MCNC: 4.9 G/DL (ref 6.8–8.8)
RBC # BLD AUTO: 3.24 10E12/L (ref 3.8–5.2)
SODIUM SERPL-SCNC: 143 MMOL/L (ref 133–144)
SPECIMEN SOURCE: NORMAL
WBC # BLD AUTO: 6.9 10E9/L (ref 4–11)

## 2017-01-07 PROCEDURE — 80053 COMPREHEN METABOLIC PANEL: CPT | Performed by: INTERNAL MEDICINE

## 2017-01-07 PROCEDURE — 85025 COMPLETE CBC W/AUTO DIFF WBC: CPT | Performed by: INTERNAL MEDICINE

## 2017-01-07 PROCEDURE — 85379 FIBRIN DEGRADATION QUANT: CPT | Performed by: INTERNAL MEDICINE

## 2017-01-07 PROCEDURE — 36415 COLL VENOUS BLD VENIPUNCTURE: CPT | Performed by: INTERNAL MEDICINE

## 2017-01-07 PROCEDURE — 12000000 ZZH R&B MED SURG/OB

## 2017-01-07 PROCEDURE — 99232 SBSQ HOSP IP/OBS MODERATE 35: CPT | Performed by: INTERNAL MEDICINE

## 2017-01-07 PROCEDURE — 25800025 ZZH RX 258: Performed by: HOSPITALIST

## 2017-01-07 PROCEDURE — 25000125 ZZHC RX 250: Performed by: INTERNAL MEDICINE

## 2017-01-07 PROCEDURE — 37000011 ZZH ANESTHESIA WARD SERVICE: Performed by: NURSE ANESTHETIST, CERTIFIED REGISTERED

## 2017-01-07 PROCEDURE — 25000130 H RX MED GY IP 250 OP 259 PS 637: Performed by: INTERNAL MEDICINE

## 2017-01-07 PROCEDURE — 25000128 H RX IP 250 OP 636: Performed by: INTERNAL MEDICINE

## 2017-01-07 PROCEDURE — 25000132 ZZH RX MED GY IP 250 OP 250 PS 637: Performed by: INTERNAL MEDICINE

## 2017-01-07 PROCEDURE — 40000275 ZZH STATISTIC RCP TIME EA 10 MIN

## 2017-01-07 PROCEDURE — 25000125 ZZHC RX 250: Performed by: HOSPITALIST

## 2017-01-07 RX ORDER — ACETAMINOPHEN 325 MG/1
650 TABLET ORAL EVERY 6 HOURS
Status: COMPLETED | OUTPATIENT
Start: 2017-01-07 | End: 2017-01-07

## 2017-01-07 RX ORDER — LORAZEPAM 0.5 MG/1
0.5 TABLET ORAL 3 TIMES DAILY PRN
Status: DISCONTINUED | OUTPATIENT
Start: 2017-01-07 | End: 2017-01-08

## 2017-01-07 RX ORDER — GUAIFENESIN/DEXTROMETHORPHAN 100-10MG/5
10 SYRUP ORAL EVERY 4 HOURS PRN
Status: DISCONTINUED | OUTPATIENT
Start: 2017-01-07 | End: 2017-01-08 | Stop reason: HOSPADM

## 2017-01-07 RX ORDER — BENZONATATE 100 MG/1
200 CAPSULE ORAL 3 TIMES DAILY PRN
Status: DISCONTINUED | OUTPATIENT
Start: 2017-01-07 | End: 2017-01-08 | Stop reason: HOSPADM

## 2017-01-07 RX ADMIN — HYDROMORPHONE HYDROCHLORIDE 0.5 MG: 1 INJECTION, SOLUTION INTRAMUSCULAR; INTRAVENOUS; SUBCUTANEOUS at 17:58

## 2017-01-07 RX ADMIN — POTASSIUM CHLORIDE, DEXTROSE MONOHYDRATE AND SODIUM CHLORIDE: 150; 5; 450 INJECTION, SOLUTION INTRAVENOUS at 22:48

## 2017-01-07 RX ADMIN — ONDANSETRON 4 MG: 2 INJECTION, SOLUTION INTRAMUSCULAR; INTRAVENOUS at 12:28

## 2017-01-07 RX ADMIN — ENOXAPARIN SODIUM 40 MG: 40 INJECTION SUBCUTANEOUS at 08:42

## 2017-01-07 RX ADMIN — ONDANSETRON 4 MG: 2 INJECTION, SOLUTION INTRAMUSCULAR; INTRAVENOUS at 05:54

## 2017-01-07 RX ADMIN — DIPHENHYDRAMINE HYDROCHLORIDE 25 MG: 25 CAPSULE ORAL at 00:07

## 2017-01-07 RX ADMIN — ACETAMINOPHEN 650 MG: 325 TABLET, FILM COATED ORAL at 19:41

## 2017-01-07 RX ADMIN — GUAIFENESIN AND DEXTROMETHORPHAN 10 ML: 100; 10 SYRUP ORAL at 04:37

## 2017-01-07 RX ADMIN — BENZONATATE 200 MG: 100 CAPSULE, LIQUID FILLED ORAL at 05:54

## 2017-01-07 RX ADMIN — Medication 5 MG: at 08:40

## 2017-01-07 RX ADMIN — POTASSIUM CHLORIDE, DEXTROSE MONOHYDRATE AND SODIUM CHLORIDE: 150; 5; 450 INJECTION, SOLUTION INTRAVENOUS at 05:01

## 2017-01-07 RX ADMIN — ACETAMINOPHEN 650 MG: 325 TABLET, FILM COATED ORAL at 08:40

## 2017-01-07 RX ADMIN — Medication 5 MG: at 00:07

## 2017-01-07 RX ADMIN — LORAZEPAM 0.5 MG: 0.5 TABLET ORAL at 13:31

## 2017-01-07 RX ADMIN — DIPHENHYDRAMINE HYDROCHLORIDE 25 MG: 25 CAPSULE ORAL at 04:37

## 2017-01-07 RX ADMIN — Medication 5 MG: at 16:54

## 2017-01-07 RX ADMIN — HYDROMORPHONE HYDROCHLORIDE 0.5 MG: 1 INJECTION, SOLUTION INTRAMUSCULAR; INTRAVENOUS; SUBCUTANEOUS at 14:15

## 2017-01-07 RX ADMIN — AZITHROMYCIN MONOHYDRATE 250 MG: 500 INJECTION, POWDER, LYOPHILIZED, FOR SOLUTION INTRAVENOUS at 15:00

## 2017-01-07 RX ADMIN — LORAZEPAM 0.5 MG: 0.5 TABLET ORAL at 19:50

## 2017-01-07 RX ADMIN — CEFTRIAXONE SODIUM 1 G: 1 INJECTION, SOLUTION INTRAVENOUS at 14:23

## 2017-01-07 RX ADMIN — BENZONATATE 200 MG: 100 CAPSULE, LIQUID FILLED ORAL at 21:04

## 2017-01-07 RX ADMIN — ONDANSETRON 4 MG: 2 INJECTION, SOLUTION INTRAMUSCULAR; INTRAVENOUS at 00:05

## 2017-01-07 RX ADMIN — HYDROMORPHONE HYDROCHLORIDE 0.5 MG: 1 INJECTION, SOLUTION INTRAMUSCULAR; INTRAVENOUS; SUBCUTANEOUS at 21:10

## 2017-01-07 RX ADMIN — ACETAMINOPHEN 975 MG: 325 TABLET, FILM COATED ORAL at 00:06

## 2017-01-07 RX ADMIN — Medication 5 MG: at 04:37

## 2017-01-07 RX ADMIN — ACETAMINOPHEN 650 MG: 325 TABLET, FILM COATED ORAL at 13:31

## 2017-01-07 RX ADMIN — Medication 5 MG: at 12:49

## 2017-01-07 ASSESSMENT — PAIN DESCRIPTION - DESCRIPTORS
DESCRIPTORS: SHARP;CONSTANT
DESCRIPTORS: SHARP
DESCRIPTORS: SHARP;CONSTANT;ACHING

## 2017-01-07 ASSESSMENT — ACTIVITIES OF DAILY LIVING (ADL)
FALL_HISTORY_WITHIN_LAST_SIX_MONTHS: NO
RETIRED_EATING: 0-->INDEPENDENT
COGNITION: 0 - NO COGNITION ISSUES REPORTED
DRESS: 0-->INDEPENDENT
RETIRED_COMMUNICATION: 0-->UNDERSTANDS/COMMUNICATES WITHOUT DIFFICULTY
BATHING: 0-->INDEPENDENT
SWALLOWING: 0-->SWALLOWS FOODS/LIQUIDS WITHOUT DIFFICULTY
TOILETING: 0-->INDEPENDENT
AMBULATION: 0-->INDEPENDENT
TRANSFERRING: 0-->INDEPENDENT

## 2017-01-07 NOTE — PLAN OF CARE
Problem: Goal Outcome Summary  Goal: Goal Outcome Summary  Outcome: No Change  A&O. VSS, afebrile. C/o pain in the lungs and mid abdomin throughout this shift, see mar for prn pain medication record with no change until IV dilaudid was given with reduction to 3/10. C/o anxiety with report that benadryl was not affective, order for ativan received and given with relief. BS active x4 with some tenderness on palpation. LS with fine crackles in the bases. Pt using IS as ordered. Lovenox started with education given. Appetite fair with Pt report of emesis with breakfast, unwitnessed by nursing staff, Zofran administered with relief. Anesthia to restart IV. IVF continue with IV Rocephin and Zithromax given this shift. Potassium and magnesium WNL, with no replacement needed. NO falls or injuries this shift, Pt steady on feet and IND in room, call light within reach.        Face to face report given with opportunity to observe patient.    Report given to RITESH Duff   1/7/2017  3:27 PM

## 2017-01-07 NOTE — PLAN OF CARE
IV looks a little rosa - did flush well and pt. Was stated it felt fine - will continue to monitor.

## 2017-01-07 NOTE — PROVIDER NOTIFICATION
Paged Dr. Quan - pt. Has scheduled Tylenol 975 mg ordered for every 6 hours - receiving notice that she is exceeding max dose of 4 grams in 24 hour period - wanting to know if ok to give.

## 2017-01-07 NOTE — PROVIDER NOTIFICATION
MD notified of Pt request for another medication for anxiety as she reports benadryl is nt helping, only making her sleepy. MD to place new order.

## 2017-01-07 NOTE — PLAN OF CARE
Problem: Goal Outcome Summary  Goal: Goal Outcome Summary  Outcome: No Change  Pt. Running a low grade temp at the start of the shift (did have a heated blanket on), afebrile with recheck, VS as charted.  She does rate her abdominal pain/discomfort 8-9/10 and has received angelica x 2, she also received her scheduled Tylenol x 2 - she does state that her pain medications brings her pain down to 3/10.  She did receive Zofran x 2 - states a little nausea, but is tolerating a regular diet.  Her O2 sats are in the upper 90's on RA, lung sounds are clear, she does have a frequent dry NPC - received an order for Robitussin.  She does continue to have IV fluids running at 100 mL/hr, and does continue to receive IV antibiotics.  She has remained free of falls through the night, call light within reach - does make her needs known, up independently in room.

## 2017-01-07 NOTE — PLAN OF CARE
Problem: Goal Outcome Summary  Goal: Goal Outcome Summary  Outcome: No Change  No falls or injuries this shift. Showered this evening with minimal assist. IVF and IV Abtx. Appetite fair. Mod X2 with po eleazar for pain.       Face to face report given with opportunity to observe patient.    Report given to Pooja Livingston   1/6/2017  11:30 PM

## 2017-01-07 NOTE — ANESTHESIA PREPROCEDURE EVALUATION
Anesthesia Plan  Procedure only, no anesthetic delivered             Postoperative Care      Consents                          .

## 2017-01-07 NOTE — PLAN OF CARE
Problem: Patient Goal: Rt Focus  Goal: 1. Patient Goal: RT Focus  [facility] - Respiratory Clinical Assessment    Current Patient History:    Respiratory History: pneumonia    Smoking History: none    Oxygen dependency: No,    Oxygen prescribed: none    1/7/2017 9:09 AM Patient Initial Assessment:     Level of Consciousness: alert , guarded    Skin color: pink    Lung sounds:Clear    Respirations:  Normal with easy respirations and no use of accessory muscles to breathe    Respiratory symptoms: non-productive cough    Cough/Sputum:  dry    Current oxygenation status: 100% on room air

## 2017-01-07 NOTE — PROGRESS NOTES
[facility]  Hospitalist Progress Note          Assessment and Plan:   Maggie Case is a 26 year old woman who presented to the emergency department with approximately 4 days of nausea and subsequent emesis.  History is otherwise significant for pulmonary embolism 12 2015, treated with thrombolysis.    1.  GI:  Nausea and vomiting.  Unclear as to the etiology still to this point.  Appear N/V may have been secondary to other potential illnesses such a UTI, CAP or even Pyelonephritis.  Based upon her recent/repeat CXR it would appear she does have underlying pneumonia and it is quite likely she at least has a UTI.  However, N/V has improved per patient report this morning.      2.  Urinary tract infection vs Pyelonephritis.    Would consider infectious cystitis versus pyelonephritis. Abdominal, and back pain persists.  Urine culture still pending.  Most recent urinary tract infection showed relatively pansensitive Escherichia coli.      3. CAP  RLL PNA evident of CXR.  Will continue with antibiotics including ceftriaxone and azithromycin.  Incentive spirometry also ordered.       4.  History of pulmonary embolus.  December 2015, she developed pulmonary emboli with relatively large clot burden.  This prompted transfer in treatment at [facility] with thrombolysis.  Source, perhaps related to her use of Depo Provera in the context of tobacco abuse.  She has not undergone evaluation for any thrombophilia. Since her pulmonary embolism her treatment was some what inconsistent, although she does recall use of enoxaparin and warfarin relatively regularly.  She was admitted here  approximately 6 months after her initial diagnosis and treatment and because of concerns with consistent use of warfarin and heparin arrangements prorate for treatment with rivarobaxan.  She recalls that at least on several occasions she had difficulties in keeping appointments and did not get refills of this. She was evaluated  by Dr. Donaldson of hematology several months after her initial pulmonary embolism.  He was concerned with her large clot burden and recommended lifelong anticoagulation.  However, she has had difficulties with consistent use either of warfarin or rivarobaxan, which was recommended at the time of admission here in 2016. Because of this, I would not favor resuming treatment at the present time given her difficulties in consistent use of anticoagulant medication.  There is no strong evidence of any recurrent or persistent thrombosis.    DVT Prophylaxis: SQ Lovenox.      Code Status: Full Code    Disposition: Expected discharge in 1-2 days.                Interval History:   Patient continues to describe diffuse abdominal, chest and back pain.  It appears N/V has improved to this point.  No recent significant fevers noted.                Medications:       acetaminophen  650 mg Oral Q6H     azithromycin  250 mg Intravenous Q24H     sodium chloride (PF)  3 mL Intracatheter Q8H     cefTRIAXone  1 g Intravenous Q24H                  Physical Exam:     Filed Vitals:    17 1540 17 2012 17 0007 17 0437   BP: 97/66  133/80 118/72   Pulse: 67      Temp: 98.6  F (37  C) 98.5  F (36.9  C) 99.2  F (37.3  C) 97.2  F (36.2  C)   TempSrc: Tympanic Tympanic Tympanic Tympanic   Resp: 18  18 16   Height:       Weight:       SpO2: 98%  97% 98%         Vital Sign Ranges  Temperature Temp  Av.2  F (36.8  C)  Min: 97.2  F (36.2  C)  Max: 99.2  F (37.3  C)   Blood pressure Systolic (24hrs), Av mmHg, Min:97 mmHg, Max:133 mmHg       Diastolic (24hrs), Av mmHg, Min:58 mmHg, Max:80 mmHg      Pulse Pulse  Av  Min: 67  Max: 67   Respirations Resp  Av.5  Min: 16  Max: 18   Pulse oximetry SpO2  Av.7 %  Min: 97 %  Max: 98 %         Intake/Output Summary (Last 24 hours) at 17 0745  Last data filed at 17 0501   Gross per 24 hour   Intake   2130 ml   Output      0 ml   Net   2130 ml        General:  Alert and Orientated.  NAD but mildly anxious.    Heart:  RRR, S1 S2, No murmurs, no rubs, no gallops.  Resp: CTA in upper fields, diminished in bases bilaterally.    Abd: Soft/NT/ND/Positve BS.  Ext: No edema noted in either lower extremity  Neuro:  No focal Neurologic deficits noted.    Peripheral IV 01/05/17 Right Upper forearm (Active)   Site Assessment WDL 1/7/2017 12:07 AM   Line Status Infusing;Checked every 1-2 hour 1/7/2017 12:07 AM   Phlebitis Scale 0-->no symptoms 1/7/2017 12:07 AM   Infiltration Scale 0 1/7/2017 12:07 AM   Extravasation? No 1/5/2017  1:49 PM   Number of days:2     No line/device             Data:     Lab Results   Component Value Date    WBC 6.9 01/07/2017    HGB 10.6* 01/07/2017    HCT 31.9* 01/07/2017     01/07/2017     01/07/2017    POTASSIUM 4.0 01/07/2017    CHLORIDE 112* 01/07/2017    CO2 25 01/07/2017    BUN 3* 01/07/2017    CR 0.54 01/07/2017    * 01/07/2017    DIMER <200 05/06/2016    NTBNPI 51 05/06/2016    TROPI  05/07/2016     <0.015  The 99th percentile for upper reference range is 0.045 ug/L.  Troponin values in   the range of 0.045 - 0.120 ug/L may be associated with risks of adverse   clinical events.      AST 32 01/07/2017    ALT 23 01/07/2017    ALKPHOS 106 01/07/2017    BILITOTAL 0.2 01/07/2017    INR 1.00 05/08/2016     LACTIC ACID   Date Value Ref Range Status   05/06/2016 1.3 0.4 - 2.0 mmol/L Final   12/28/2015 1.3 0.4 - 2.0 mmol/L Final   08/29/2015 0.6 0.4 - 2.0 mmol/L Final       Joey Quan, DO

## 2017-01-07 NOTE — PLAN OF CARE
Face to face report given with opportunity to observe patient.    Report given to Shivani Livingston   1/7/2017  8:03 AM

## 2017-01-07 NOTE — PROGRESS NOTES
"Pt reports feeling very upset as she stated that \"I heard two nurses talking about me and if they have a problem I wish they'd just tell me to my face.\" She also reported that room service did not deliver her meal this morning and when she called back they stated that the forgot it in their cart. The Pt then stated \"Did those two nurses tell them not to deliver my food to me?\" The Pt was reassured that room service just made a mistake and to reorder as she wanted. Active listening was used to help ensure that Pt felt more comfortable at this time.  "

## 2017-01-07 NOTE — PLAN OF CARE
This morning while giving report to oncoming nurse - pt. Through that we were laughing and making fun of her - requested to talk to supervisor. We were just outside her door - she was in the bathroom. Her IV had gone bad and we were requesting anaesthesia to start (made a comment that she was one out of 3 that would need an IV start, and giggles a little) by no means were we making fun of anyone.  She then would not talk with oncoming nurse and/or myself - supervisor sent to room to talk with pt.

## 2017-01-08 VITALS
SYSTOLIC BLOOD PRESSURE: 135 MMHG | HEART RATE: 76 BPM | OXYGEN SATURATION: 98 % | HEIGHT: 64 IN | TEMPERATURE: 98.1 F | BODY MASS INDEX: 23.11 KG/M2 | DIASTOLIC BLOOD PRESSURE: 88 MMHG | RESPIRATION RATE: 20 BRPM | WEIGHT: 135.36 LBS

## 2017-01-08 LAB
ALBUMIN SERPL-MCNC: 2.3 G/DL (ref 3.4–5)
ALP SERPL-CCNC: 120 U/L (ref 40–150)
ALT SERPL W P-5'-P-CCNC: 24 U/L (ref 0–50)
ANION GAP SERPL CALCULATED.3IONS-SCNC: 8 MMOL/L (ref 3–14)
AST SERPL W P-5'-P-CCNC: 26 U/L (ref 0–45)
BACTERIA SPEC CULT: ABNORMAL
BILIRUB SERPL-MCNC: 0.1 MG/DL (ref 0.2–1.3)
BUN SERPL-MCNC: 4 MG/DL (ref 7–30)
CALCIUM SERPL-MCNC: 8.6 MG/DL (ref 8.5–10.1)
CHLORIDE SERPL-SCNC: 107 MMOL/L (ref 94–109)
CO2 SERPL-SCNC: 27 MMOL/L (ref 20–32)
CREAT SERPL-MCNC: 0.59 MG/DL (ref 0.52–1.04)
GFR SERPL CREATININE-BSD FRML MDRD: ABNORMAL ML/MIN/1.7M2
GLUCOSE SERPL-MCNC: 83 MG/DL (ref 70–99)
HCG UR QL: NEGATIVE
LIPASE SERPL-CCNC: 121 U/L (ref 73–393)
MICRO REPORT STATUS: ABNORMAL
POTASSIUM SERPL-SCNC: 4.2 MMOL/L (ref 3.4–5.3)
PROT SERPL-MCNC: 5.7 G/DL (ref 6.8–8.8)
SODIUM SERPL-SCNC: 142 MMOL/L (ref 133–144)
SPECIMEN SOURCE: ABNORMAL

## 2017-01-08 PROCEDURE — 25000132 ZZH RX MED GY IP 250 OP 250 PS 637: Performed by: INTERNAL MEDICINE

## 2017-01-08 PROCEDURE — 25000125 ZZHC RX 250: Performed by: INTERNAL MEDICINE

## 2017-01-08 PROCEDURE — 83690 ASSAY OF LIPASE: CPT | Performed by: INTERNAL MEDICINE

## 2017-01-08 PROCEDURE — 25800025 ZZH RX 258: Performed by: HOSPITALIST

## 2017-01-08 PROCEDURE — 74177 CT ABD & PELVIS W/CONTRAST: CPT | Mod: TC

## 2017-01-08 PROCEDURE — 36415 COLL VENOUS BLD VENIPUNCTURE: CPT | Performed by: INTERNAL MEDICINE

## 2017-01-08 PROCEDURE — 99238 HOSP IP/OBS DSCHRG MGMT 30/<: CPT | Performed by: INTERNAL MEDICINE

## 2017-01-08 PROCEDURE — 80053 COMPREHEN METABOLIC PANEL: CPT | Performed by: INTERNAL MEDICINE

## 2017-01-08 PROCEDURE — 81025 URINE PREGNANCY TEST: CPT | Performed by: INTERNAL MEDICINE

## 2017-01-08 PROCEDURE — 40000275 ZZH STATISTIC RCP TIME EA 10 MIN

## 2017-01-08 RX ORDER — IOPAMIDOL 612 MG/ML
100 INJECTION, SOLUTION INTRAVASCULAR ONCE
Status: COMPLETED | OUTPATIENT
Start: 2017-01-08 | End: 2017-01-08

## 2017-01-08 RX ORDER — IPRATROPIUM BROMIDE AND ALBUTEROL SULFATE 2.5; .5 MG/3ML; MG/3ML
1 SOLUTION RESPIRATORY (INHALATION) EVERY 6 HOURS PRN
Qty: 360 ML | Refills: 1 | Status: SHIPPED | OUTPATIENT
Start: 2017-01-08 | End: 2017-03-19

## 2017-01-08 RX ORDER — LEVOFLOXACIN 750 MG/1
750 TABLET, FILM COATED ORAL DAILY
Qty: 7 TABLET | Refills: 0 | Status: SHIPPED | OUTPATIENT
Start: 2017-01-08 | End: 2017-03-19

## 2017-01-08 RX ORDER — PROMETHAZINE HYDROCHLORIDE 12.5 MG/1
12.5-25 TABLET ORAL EVERY 6 HOURS PRN
Status: DISCONTINUED | OUTPATIENT
Start: 2017-01-08 | End: 2017-01-08 | Stop reason: HOSPADM

## 2017-01-08 RX ORDER — PROMETHAZINE HYDROCHLORIDE 25 MG/ML
12.5-25 INJECTION, SOLUTION INTRAMUSCULAR; INTRAVENOUS EVERY 6 HOURS PRN
Status: DISCONTINUED | OUTPATIENT
Start: 2017-01-08 | End: 2017-01-08 | Stop reason: HOSPADM

## 2017-01-08 RX ORDER — OXYCODONE AND ACETAMINOPHEN 7.5; 325 MG/1; MG/1
1 TABLET ORAL EVERY 4 HOURS PRN
Status: DISCONTINUED | OUTPATIENT
Start: 2017-01-08 | End: 2017-01-08 | Stop reason: HOSPADM

## 2017-01-08 RX ORDER — OXYCODONE AND ACETAMINOPHEN 7.5; 325 MG/1; MG/1
1 TABLET ORAL EVERY 4 HOURS PRN
Qty: 6 TABLET | Refills: 0 | Status: SHIPPED | OUTPATIENT
Start: 2017-01-08 | End: 2017-01-16

## 2017-01-08 RX ORDER — ONDANSETRON 4 MG/1
4 TABLET, FILM COATED ORAL EVERY 8 HOURS PRN
Qty: 20 TABLET | Refills: 0 | Status: SHIPPED | OUTPATIENT
Start: 2017-01-08 | End: 2017-03-19

## 2017-01-08 RX ADMIN — IOPAMIDOL 100 ML: 612 INJECTION, SOLUTION INTRAVASCULAR at 08:56

## 2017-01-08 RX ADMIN — OXYCODONE HYDROCHLORIDE AND ACETAMINOPHEN 1 TABLET: 7.5; 325 TABLET ORAL at 14:11

## 2017-01-08 RX ADMIN — HYDROMORPHONE HYDROCHLORIDE 0.5 MG: 1 INJECTION, SOLUTION INTRAMUSCULAR; INTRAVENOUS; SUBCUTANEOUS at 04:20

## 2017-01-08 RX ADMIN — Medication 5 MG: at 06:23

## 2017-01-08 RX ADMIN — OXYCODONE HYDROCHLORIDE AND ACETAMINOPHEN 1 TABLET: 7.5; 325 TABLET ORAL at 10:10

## 2017-01-08 RX ADMIN — PROMETHAZINE HYDROCHLORIDE 25 MG: 25 INJECTION INTRAMUSCULAR; INTRAVENOUS at 10:07

## 2017-01-08 RX ADMIN — HYDROMORPHONE HYDROCHLORIDE 0.5 MG: 1 INJECTION, SOLUTION INTRAMUSCULAR; INTRAVENOUS; SUBCUTANEOUS at 00:59

## 2017-01-08 RX ADMIN — POTASSIUM CHLORIDE, DEXTROSE MONOHYDRATE AND SODIUM CHLORIDE: 150; 5; 450 INJECTION, SOLUTION INTRAVENOUS at 07:59

## 2017-01-08 RX ADMIN — ONDANSETRON 4 MG: 2 INJECTION, SOLUTION INTRAMUSCULAR; INTRAVENOUS at 04:20

## 2017-01-08 RX ADMIN — BENZONATATE 200 MG: 100 CAPSULE, LIQUID FILLED ORAL at 00:58

## 2017-01-08 RX ADMIN — ENOXAPARIN SODIUM 40 MG: 40 INJECTION SUBCUTANEOUS at 07:55

## 2017-01-08 RX ADMIN — LORAZEPAM 0.5 MG: 0.5 TABLET ORAL at 06:27

## 2017-01-08 ASSESSMENT — PAIN DESCRIPTION - DESCRIPTORS: DESCRIPTORS: HEADACHE;SHARP

## 2017-01-08 NOTE — PLAN OF CARE
Problem: Goal Outcome Summary  Goal: Goal Outcome Summary  Outcome: No Change  A&O. VSS, afebrile. C/o pain and nausea this shift, administer new ordered Percocet and phenergan with relief and then reoccurrence of pain this afternoon, administered percocet. CT scan preformed this AM. During CT tech reported Pt had emesis , MD aware. LS dim in bases, Pt using IS and encouraged to ambulate in hallway with decline. Ultrasound ordered but Pt was not NPO for 6hrs prior.Order for Pt to have out Pt ultrasound as Pt is being discharged. 1500 IV Zithromax and Rocephin not administered with upcoming discharge and oral ABX ordered for PT.        Face to face report given with opportunity to observe patient.    Report given to RITESH Duff   1/8/2017  3:28 PM

## 2017-01-08 NOTE — PLAN OF CARE
Problem: Goal Outcome Summary  Goal: Goal Outcome Summary  Outcome: No Change  Continues to report pain frequently, in chest, upper abdomen, and low back at times.  Unrelieved by oxycodone, but decreased with dilaudid.  Using her own heating pad on abdomen much of evening.    Face to face report given with opportunity to observe patient.    Report given to tSephany MARTINEZ   1/7/2017  11:25 PM

## 2017-01-08 NOTE — PLAN OF CARE
"Problem: Goal Outcome Summary  Goal: Goal Outcome Summary  Outcome: No Change  /71 mmHg  Pulse 62  Temp(Src) 98.9  F (37.2  C) (Tympanic)  Resp 14  Ht 1.626 m (5' 4\")  Wt 61.4 kg (135 lb 5.8 oz)  BMI 23.22 kg/m2  SpO2 98%     VSS, lungs are clear/diminished.  Encouraged patient to use IS, only able to reach 800, use is fair.  UA collected and  CT left fluids for pt to drink at bedside.  Labs for comprehensive metabolic panel and lipase still pending.        Problem: Pneumonia (Adult)  Goal: Signs and Symptoms of Listed Potential Problems Will be Absent or Manageable (Pneumonia)  Signs and symptoms of listed potential problems will be absent or manageable by discharge/transition of care (reference Pneumonia (Adult) CPG).   Outcome: No Change    01/08/17 0612   Pneumonia   Problems Assessed (Pneumonia) all   Problems Present (Pneumonia) other (see comments)     Patient reports 8-9/10 back/abd/stomach pain, refused to take oxycodone, says \"It doesn't help at all.\"  Gave Dilaudid q 2-3 hours and educated patient on importance of switching to oral pain medications.      Face to face report given with opportunity to observe patient.    Report given to Anne Ruff   1/8/2017  7:00 AM          "

## 2017-01-08 NOTE — DISCHARGE SUMMARY
PRIMARY CARE PHYSICIAN:  Dr. Chapo Flores      DATE OF ADMISSION:  01/05/2017       DATE OF DISCHARGE:  01/08/2017       ADMISSION DIAGNOSIS:  Nausea and vomiting.      DISCHARGE DIAGNOSES:   1.  Right lower lobe pneumonia.   2.  History of pulmonary embolism; however, the patient had a negative D-dimer during this hospital stay.   3.  Nausea and vomiting, resolved.   4.  Right-sided abdominal flank and back pain, likely related to right lower lobe pneumonia.   5.  Possible urinary tract infection; however, this was not supported by urine culture.   6.  Possible gallbladder wall thickening.   7.  Possible right-sided ovarian cyst.   8.  Anxiety.        PENDING TESTS AT TIME OF DISCHARGE:  The patient did have a CT scan today and the official report is still pending; however, I discussed the report directly with the radiologist.  The patient will also have an upcoming abdominal right upper quadrant ultrasound which has been ordered at time of discharge.      HISTORY OF PRESENT ILLNESS:  Please see admission H&P.      PAST MEDICAL HISTORY:  Please see admission H&P.      HOSPITAL COURSE:  Ms. Maggie Case is a 26-year-old female with a history remarkable for pulmonary embolism who presented to the Emergency Department on 01/05/2017 with abdominal discomfort, nausea and vomiting.  Subsequently, she did have imaging and testing done which did reveal some mild leukocytosis and a right lower lobe infiltrate.  She was treated accordingly; however, there was also some question as to a urinary tract infection and pyelonephritis.  Urine culture was sent.  However, at this time the urine culture has returned essentially negative with mixed beatriz.  Over the course of her hospitalization, she was treated with ceftriaxone and azithromycin.  The patient complained of ongoing pain in the abdomen and epigastric region, right flank and back along with the lower chest throughout this hospitalization and based upon the fact that she  had a previous pulmonary embolism and is no longer on anticoagulation, I did discuss the case with the primary care provider.  In doing so, the decision was made to obtain a D-dimer which was negative.  Hence no CT of the chest was necessary.  Nevertheless, the patient continued to have the complaints as stated, hence a CT scan of the abdomen and pelvis with oral and IV contrast was completed.  This CT did reveal the right lower lobe infiltrate.  There was some questionable gallbladder wall thickening with an additional possibility of a small right ovarian cyst rupture.  However, these additional findings were quite questionable in my discussion with the radiologist.      Throughout the course of her hospital stay, the patient remained hemodynamically stable.  The patient remains hemodynamically stable at this time and has not had any fevers to note.  As stated, culture results have been negative to date and her C. diff was also negative.      At this time, the patient is clinically stable and hemodynamically stable.  Labs, cultures and tests show no acute or catastrophic concerns, hence the patient will be discharged home.  In my conversation with the patient at discharge, I informed her that she did have a right lower lobe pneumonia, which would need continued treatment.  She will be prescribed Levaquin at the time of discharge.  In addition, a DuoNeb machine and DuoNeb solution was given to the patient.  Regarding her pain management, a small dose of 6 Percocet were dispensed.  However, I informed the patient I did not feel comfortable dispensing any additional pain medication.  In regard to her anxiety, she did receive some doses of Ativan here; however, I informed the patient that this would have to be discussed with her primary care physician and no additional benzodiazepines would be prescribed at time of discharge.      As stated above, the patient will have an upcoming abdominal ultrasound to look at the  gallbladder.  Additional investigation as to the cyst on the right ovary may be pursued; however, as stated in the discussion with the radiologist, this finding was not terribly significant and was questionable as to whether or not this is related to her pain.      PHYSICAL EXAMINATION AT TIME OF DISCHARGE:   VITAL SIGNS:  Blood pressure is 135/88, respirations 20, O2 sat 99% on room air, pulse 76, temperature 98.1.   GENERAL:  The patient is alert and oriented, somewhat anxious.   HEART:  Regular rate and rhythm.   LUNGS:  Generally clear to auscultation.  She did have some slightly diminished breath sounds in the right lower lobe, possibly secondary to some splinting.   ABDOMEN:  Generally soft.  She did have positive bowel sounds; however, with palpation she did elicit some epigastric discomfort.   EXTREMITIES:  Without any edema.   NEUROLOGIC:  No focal neurologic deficits were noted.      DISCHARGE MEDICATIONS:   1.  Levaquin 750 mg p.o. daily for 7 additional days, which will complete a course of approximately 10 days for this noted pneumonia.   2.  DuoNeb machine with DuoNeb solution.   3.  Percocet 7.5/325 one p.o. q.6 h. p.r.n. pain, 6 were dispensed today.      Incentive spirometry was encouraged at time of discharge q.1 h. while awake.      ADDITIONAL DISCHARGE INSTRUCTIONS:   1.  CODE STATUS:  Full.   2.  Diet:  Regular.   3.  Activity:  As tolerated.      FOLLOWUP APPOINTMENTS AND REFERRALS:   1.  The patient was informed to follow up with her primary care physician early this upcoming week to discuss her symptoms and hospitalization.   2.  The patient should continue the antibiotic as prescribed for 7 additional days.   3.  The patient does have an abdominal ultrasound scheduled for the right upper quadrant to evaluate the questionable gallbladder wall thickening.         SON MCCLOUD DO             D: 01/08/2017 14:24   T: 01/08/2017 15:13   MT: [email or web address]      Name:     RADHANADIR   MRN:       [id number]        Account:        [id number]   :      1990           Admit Date:                                       Discharge Date:       Document: [id number]       cc: Chapo Flores MD

## 2017-01-08 NOTE — PROVIDER NOTIFICATION
MD updated on CT complete and Pt requests on continued N/V and want to try a alternate pain medication as current options not effective

## 2017-01-08 NOTE — PROGRESS NOTES
[facility]  Hospitalist Progress Note          Assessment and Plan:   Maggie Case is a 26 year old woman who presented to the emergency department with approximately 4 days of nausea and subsequent emesis.  History is otherwise significant for pulmonary embolism 12 2015, treated with thrombolysis.    1.  GI:  Nausea and vomiting.  Unclear as to the etiology still to this point.  Appear N/V may have been secondary to other potential illnesses such a UTI, CAP or even Pyelonephritis.  Based upon her recent/repeat CXR it would appear she does have underlying pneumonia and likely UTI.    Based upon her hx of chest abdominal pain yesterday a d-dimer was ordered which was normal.  However this morning ongoing and severe abdominal pain was the nidus to obtain an abdominal CT. This CT report was discussed with the radiologist.  She does have a RLL infiltrate, however there is also question as to some GB wall thickening along with possibility of ruptured ovarian cyst on the right.  We will move forward with an Abdominal/RUQ US as discussed with radiology.        2.  Urinary tract infection vs Pyelonephritis.    Would consider infectious cystitis versus pyelonephritis. Abdominal, and back pain persists.  Urine culture still pending.  Most recent urinary tract infection showed relatively pansensitive Escherichia coli.     3. CAP  RLL PNA evident of CXR and CT.  Will continue with antibiotics including ceftriaxone and azithromycin.  Incentive spirometry also ordered.        4.  History of pulmonary embolus.  December 2015, she developed pulmonary emboli with relatively large clot burden.  This prompted transfer in treatment at [facility] with thrombolysis.  Based upon a normal D-dimer I feel a recurrent PE is quite unlikely at this time.  Will continue with SQ Lovenox for DVT prophylaxis.    DVT Prophylaxis: SQ Lovenox.       Code Status: Full Code    Disposition: Expected discharge in 1-2 days.                 Interval History:   Patient continues to have abdominal, mid epigastric and right flank and back pain.  No profound fevers.  States her pain in 9/10 and reports being intolerant of most PO intake.                Medications:       diatrizoate meglumine-sodium  30 mL Oral Once     enoxaparin  40 mg Subcutaneous Q24H     azithromycin  250 mg Intravenous Q24H     sodium chloride (PF)  3 mL Intracatheter Q8H     cefTRIAXone  1 g Intravenous Q24H                  Physical Exam:     Filed Vitals:    17 1648 17 2100 17 0039 17 0739   BP: 96/54 116/72 122/71 135/88   Pulse:    76   Temp: 99.3  F (37.4  C) 98.9  F (37.2  C) 98.9  F (37.2  C) 98.1  F (36.7  C)   TempSrc: Oral Oral Tympanic Tympanic   Resp: 24  14 20   Height:       Weight:       SpO2: 96%  98% 99%         Vital Sign Ranges  Temperature Temp  Av.8  F (37.1  C)  Min: 98.1  F (36.7  C)  Max: 99.3  F (37.4  C)   Blood pressure Systolic (24hrs), Av mmHg, Min:96 mmHg, Max:135 mmHg       Diastolic (24hrs), Av mmHg, Min:54 mmHg, Max:88 mmHg      Pulse Pulse  Av  Min: 76  Max: 76   Respirations Resp  Av.5  Min: 14  Max: 24   Pulse oximetry SpO2  Av.3 %  Min: 96 %  Max: 100 %         Intake/Output Summary (Last 24 hours) at 17 0949  Last data filed at 17 0935   Gross per 24 hour   Intake   2763 ml   Output    500 ml   Net   2263 ml       General:  Alert and Orientated.  Moderate distress.    Heart:  RRR, S1 S2, No murmurs, no rubs, no gallops.  Resp: CTA bilaterally.  No wheezes, no rhonchi, no crackles.  Abd: Soft, but tender in the epigastrium.  No rebound noted.  BS are present.  + flank and CVA tenderness on right also.    Ext: No edema noted in either lower extremity  Neuro:  No focal Neurologic deficits noted.    Peripheral IV 17 Left Lower forearm (Active)   Site Assessment WDL 2017  7:47 AM   Line Status Infusing;Checked every 1-2 hour 2017  7:47 AM   Phlebitis Scale 0-->no  symptoms 1/8/2017  7:47 AM   Infiltration Scale 0 1/8/2017  7:47 AM   Number of days:1     No line/device             Data:     Lab Results   Component Value Date    WBC 6.9 01/07/2017    HGB 10.6* 01/07/2017    HCT 31.9* 01/07/2017     01/07/2017     01/08/2017    POTASSIUM 4.2 01/08/2017    CHLORIDE 107 01/08/2017    CO2 27 01/08/2017    BUN 4* 01/08/2017    CR 0.59 01/08/2017    GLC 83 01/08/2017    DIMER <200 01/07/2017    NTBNPI 51 05/06/2016    TROPI  05/07/2016     <0.015  The 99th percentile for upper reference range is 0.045 ug/L.  Troponin values in   the range of 0.045 - 0.120 ug/L may be associated with risks of adverse   clinical events.      AST 26 01/08/2017    ALT 24 01/08/2017    ALKPHOS 120 01/08/2017    BILITOTAL 0.1* 01/08/2017    INR 1.00 05/08/2016     LACTIC ACID   Date Value Ref Range Status   05/06/2016 1.3 0.4 - 2.0 mmol/L Final   12/28/2015 1.3 0.4 - 2.0 mmol/L Final   08/29/2015 0.6 0.4 - 2.0 mmol/L Final       Joey Quan, DO

## 2017-01-08 NOTE — DISCHARGE INSTRUCTIONS
Please schedule a follow up appointment with Dr Flores this week at [facility]-  [phone].  Imaging will contact you to schedule an ultrasound, if they do not contact you please call [phone].

## 2017-01-09 NOTE — PLAN OF CARE
Problem: Goal Outcome Summary  Goal: Goal Outcome Summary  Outcome: Adequate for Discharge Date Met:  01/08/17  Patient discharged at 4:30 PM via wheel chair accompanied by significant other and staff. Prescriptions sent to patients preferred pharmacy. All belongings sent with patient.     Discharge instructions reviewed with patient. Listed belongings gathered and returned to patient. done    Patient discharged to home.   Report called to NA    Core Measures and Vaccines  Core Measures applicable during stay: No. If yes, state diagnosis: NA  Pneumonia and Influenza given prior to discharge, if indicated: N/A    Surgical Patient   Surgical Procedures during stay: NA  Did patient receive discharge instruction on wound care and recognition of infection symptoms? N/A    MISC  Follow up appointment made:  No  Home and hospital aquired medications returned to patient: N/A  Patient reports pain was well managed at discharge: decreased today

## 2017-01-10 NOTE — PROGRESS NOTES
Attempted to make follow up phone call with patient. Unable to reach patient at this time. First attempt. Will attempt again.

## 2017-01-11 NOTE — PROGRESS NOTES
Made follow up call to Maggie, she did answer this call. Explained that I am calling to see if she had any questions following her discharge. She states she was very dissatisfied with her care and would like to make a compliant. R/t admission dx and dc dx.     Forwarded this information to Acute Care Manager for further direction.

## 2017-01-12 LAB
BACTERIA SPEC CULT: NORMAL
BACTERIA SPEC CULT: NORMAL
MICRO REPORT STATUS: NORMAL
MICRO REPORT STATUS: NORMAL
SPECIMEN SOURCE: NORMAL
SPECIMEN SOURCE: NORMAL

## 2017-01-16 ENCOUNTER — HOSPITAL ENCOUNTER (EMERGENCY)
Facility: HOSPITAL | Age: 27
Discharge: HOME | End: 2017-01-16
Attending: FAMILY MEDICINE | Admitting: FAMILY MEDICINE
Payer: MEDICAID

## 2017-01-16 VITALS
HEART RATE: 76 BPM | DIASTOLIC BLOOD PRESSURE: 78 MMHG | RESPIRATION RATE: 16 BRPM | OXYGEN SATURATION: 96 % | TEMPERATURE: 99.3 F | SYSTOLIC BLOOD PRESSURE: 123 MMHG

## 2017-01-16 DIAGNOSIS — T50.905A MEDICATION REACTION, INITIAL ENCOUNTER: ICD-10-CM

## 2017-01-16 LAB
ALBUMIN SERPL-MCNC: 3.3 G/DL (ref 3.4–5)
ALP SERPL-CCNC: 130 U/L (ref 40–150)
ALT SERPL W P-5'-P-CCNC: 23 U/L (ref 0–50)
ANION GAP SERPL CALCULATED.3IONS-SCNC: 11 MMOL/L (ref 3–14)
AST SERPL W P-5'-P-CCNC: 31 U/L (ref 0–45)
BASOPHILS # BLD AUTO: 0 10E9/L (ref 0–0.2)
BASOPHILS NFR BLD AUTO: 0.1 %
BILIRUB SERPL-MCNC: 0.5 MG/DL (ref 0.2–1.3)
BUN SERPL-MCNC: 9 MG/DL (ref 7–30)
CALCIUM SERPL-MCNC: 8.9 MG/DL (ref 8.5–10.1)
CHLORIDE SERPL-SCNC: 105 MMOL/L (ref 94–109)
CO2 SERPL-SCNC: 25 MMOL/L (ref 20–32)
CREAT SERPL-MCNC: 0.66 MG/DL (ref 0.52–1.04)
DEPRECATED S PYO AG THROAT QL EIA: NORMAL
DIFFERENTIAL METHOD BLD: ABNORMAL
EOSINOPHIL # BLD AUTO: 0.6 10E9/L (ref 0–0.7)
EOSINOPHIL NFR BLD AUTO: 5.7 %
ERYTHROCYTE [DISTWIDTH] IN BLOOD BY AUTOMATED COUNT: 14.9 % (ref 10–15)
FLUAV+FLUBV AG SPEC QL: NEGATIVE
FLUAV+FLUBV AG SPEC QL: NORMAL
GFR SERPL CREATININE-BSD FRML MDRD: ABNORMAL ML/MIN/1.7M2
GLUCOSE SERPL-MCNC: 96 MG/DL (ref 70–99)
HCT VFR BLD AUTO: 44.9 % (ref 35–47)
HETEROPH AB SER QL: NEGATIVE
HGB BLD-MCNC: 15.1 G/DL (ref 11.7–15.7)
IMM GRANULOCYTES # BLD: 0 10E9/L (ref 0–0.4)
IMM GRANULOCYTES NFR BLD: 0.4 %
LYMPHOCYTES # BLD AUTO: 1 10E9/L (ref 0.8–5.3)
LYMPHOCYTES NFR BLD AUTO: 9.1 %
MCH RBC QN AUTO: 32.3 PG (ref 26.5–33)
MCHC RBC AUTO-ENTMCNC: 33.6 G/DL (ref 31.5–36.5)
MCV RBC AUTO: 96 FL (ref 78–100)
MICRO REPORT STATUS: NORMAL
MONOCYTES # BLD AUTO: 0.5 10E9/L (ref 0–1.3)
MONOCYTES NFR BLD AUTO: 4.2 %
NEUTROPHILS # BLD AUTO: 8.6 10E9/L (ref 1.6–8.3)
NEUTROPHILS NFR BLD AUTO: 80.5 %
NRBC # BLD AUTO: 0 10*3/UL
NRBC BLD AUTO-RTO: 0 /100
PLATELET # BLD AUTO: 687 10E9/L (ref 150–450)
POTASSIUM SERPL-SCNC: 3.7 MMOL/L (ref 3.4–5.3)
PROT SERPL-MCNC: 7 G/DL (ref 6.8–8.8)
RBC # BLD AUTO: 4.68 10E12/L (ref 3.8–5.2)
SODIUM SERPL-SCNC: 141 MMOL/L (ref 133–144)
SPECIMEN SOURCE: NORMAL
SPECIMEN SOURCE: NORMAL
WBC # BLD AUTO: 10.7 10E9/L (ref 4–11)

## 2017-01-16 PROCEDURE — 87880 STREP A ASSAY W/OPTIC: CPT | Performed by: FAMILY MEDICINE

## 2017-01-16 PROCEDURE — 99285 EMERGENCY DEPT VISIT HI MDM: CPT | Performed by: FAMILY MEDICINE

## 2017-01-16 PROCEDURE — 96376 TX/PRO/DX INJ SAME DRUG ADON: CPT | Mod: 59

## 2017-01-16 PROCEDURE — 96374 THER/PROPH/DIAG INJ IV PUSH: CPT | Mod: 59

## 2017-01-16 PROCEDURE — 86308 HETEROPHILE ANTIBODY SCREEN: CPT | Performed by: FAMILY MEDICINE

## 2017-01-16 PROCEDURE — 96375 TX/PRO/DX INJ NEW DRUG ADDON: CPT | Mod: 59

## 2017-01-16 PROCEDURE — 25000125 ZZHC RX 250: Performed by: FAMILY MEDICINE

## 2017-01-16 PROCEDURE — 87804 INFLUENZA ASSAY W/OPTIC: CPT | Performed by: FAMILY MEDICINE

## 2017-01-16 PROCEDURE — 80053 COMPREHEN METABOLIC PANEL: CPT | Performed by: FAMILY MEDICINE

## 2017-01-16 PROCEDURE — 36415 COLL VENOUS BLD VENIPUNCTURE: CPT | Performed by: FAMILY MEDICINE

## 2017-01-16 PROCEDURE — 85025 COMPLETE CBC W/AUTO DIFF WBC: CPT | Performed by: FAMILY MEDICINE

## 2017-01-16 PROCEDURE — 70491 CT SOFT TISSUE NECK W/DYE: CPT | Mod: TC

## 2017-01-16 PROCEDURE — 99285 EMERGENCY DEPT VISIT HI MDM: CPT | Mod: 25

## 2017-01-16 PROCEDURE — 87081 CULTURE SCREEN ONLY: CPT | Performed by: FAMILY MEDICINE

## 2017-01-16 RX ORDER — OXYCODONE HYDROCHLORIDE AND ACETAMINOPHEN 5; 325 MG/1; MG/1
TABLET ORAL
Status: DISCONTINUED
Start: 2017-01-16 | End: 2017-01-17 | Stop reason: HOSPADM

## 2017-01-16 RX ORDER — DIPHENHYDRAMINE HYDROCHLORIDE 50 MG/ML
25 INJECTION INTRAMUSCULAR; INTRAVENOUS ONCE
Status: COMPLETED | OUTPATIENT
Start: 2017-01-16 | End: 2017-01-16

## 2017-01-16 RX ORDER — DIPHENHYDRAMINE HCL 25 MG
CAPSULE ORAL
Status: DISCONTINUED
Start: 2017-01-16 | End: 2017-01-17 | Stop reason: HOSPADM

## 2017-01-16 RX ORDER — IOPAMIDOL 612 MG/ML
100 INJECTION, SOLUTION INTRAVASCULAR ONCE
Status: COMPLETED | OUTPATIENT
Start: 2017-01-16 | End: 2017-01-16

## 2017-01-16 RX ORDER — METHYLPREDNISOLONE SODIUM SUCCINATE 125 MG/2ML
125 INJECTION, POWDER, LYOPHILIZED, FOR SOLUTION INTRAMUSCULAR; INTRAVENOUS ONCE
Status: COMPLETED | OUTPATIENT
Start: 2017-01-16 | End: 2017-01-16

## 2017-01-16 RX ORDER — LORAZEPAM 1 MG/1
TABLET ORAL
Status: DISCONTINUED
Start: 2017-01-16 | End: 2017-01-17 | Stop reason: HOSPADM

## 2017-01-16 RX ORDER — OXYCODONE AND ACETAMINOPHEN 5; 325 MG/1; MG/1
1-2 TABLET ORAL EVERY 4 HOURS PRN
Qty: 15 TABLET | Refills: 0 | Status: SHIPPED | OUTPATIENT
Start: 2017-01-16 | End: 2017-01-19

## 2017-01-16 RX ORDER — HYDROMORPHONE HYDROCHLORIDE 1 MG/ML
0.5 INJECTION, SOLUTION INTRAMUSCULAR; INTRAVENOUS; SUBCUTANEOUS
Status: DISCONTINUED | OUTPATIENT
Start: 2017-01-16 | End: 2017-01-17 | Stop reason: HOSPADM

## 2017-01-16 RX ORDER — LORAZEPAM 1 MG/1
1 TABLET ORAL EVERY 6 HOURS PRN
Qty: 3 TABLET | Refills: 0 | Status: SHIPPED | OUTPATIENT
Start: 2017-01-16 | End: 2017-03-19

## 2017-01-16 RX ADMIN — HYDROMORPHONE HYDROCHLORIDE 0.5 MG: 1 INJECTION, SOLUTION INTRAMUSCULAR; INTRAVENOUS; SUBCUTANEOUS at 20:45

## 2017-01-16 RX ADMIN — DIPHENHYDRAMINE HYDROCHLORIDE 25 MG: 50 INJECTION, SOLUTION INTRAMUSCULAR; INTRAVENOUS at 18:10

## 2017-01-16 RX ADMIN — IOPAMIDOL 100 ML: 612 INJECTION, SOLUTION INTRAVASCULAR at 20:05

## 2017-01-16 RX ADMIN — HYDROMORPHONE HYDROCHLORIDE 0.5 MG: 1 INJECTION, SOLUTION INTRAMUSCULAR; INTRAVENOUS; SUBCUTANEOUS at 18:13

## 2017-01-16 RX ADMIN — METHYLPREDNISOLONE SODIUM SUCCINATE 125 MG: 125 INJECTION, POWDER, FOR SOLUTION INTRAMUSCULAR; INTRAVENOUS at 18:16

## 2017-01-16 NOTE — ED AVS SNAPSHOT
[facility]    [address]    [place] [address]    Phone:  [phone]                                       Maggie Case   MRN: [id number]    Department:  [facility]   Date of Visit:  1/16/2017           After Visit Summary Signature Page     I have received my discharge instructions, and my questions have been answered. I have discussed any challenges I see with this plan with the nurse or doctor.    ..........................................................................................................................................  Patient/Patient Representative Signature      ..........................................................................................................................................  Patient Representative Print Name and Relationship to Patient    ..................................................               ................................................  Date                                            Time    ..........................................................................................................................................  Reviewed by Signature/Title    ...................................................              ..............................................  Date                                                            Time

## 2017-01-16 NOTE — ED AVS SNAPSHOT
[facility]    [address] [place] [address]    Phone:  [phone]                                       Maggie Case   MRN: [id number]    Department:  [facility]   Date of Visit:  1/16/2017           Patient Information     Date Of Birth          1990        Your diagnoses for this visit were:     Medication reaction, initial encounter        You were seen by Estephania Shanks MD.        Discharge Instructions       What to expect when you have contrast    During your exam, we will inject  contrast  into your vein or artery. (Contrast is a clear liquid with iodine in it. It shows up on X-rays.)    You may feel warm or hot. You may have a metal taste in your mouth and a slight upset stomach. You may also feel pressure near the kidneys and bladder. These effects will last about 1 to 3 minutes.    Please tell us if you have:    Sneezing     Itching    Hives     Swelling in the face    A hoarse voice    Breathing problems    Other new symptoms    Serious problems are rare.  They may include:    Irregular heartbeat     Seizures    Kidney failure              Tissue damage    Shock      Death    If you have any problems during the exam, we  will treat them right away.    When you get home    Call your hospital if you have any new symptoms in the next 2 days, like hives or swelling. (Phone numbers are at the bottom of this page.) Or call your family doctor.     If you have wheezing or trouble breathing, call 911.    Self-care  -Drink at least 4 extra glasses of water today.   This reduces the stress on your kidneys.  -Keep taking your regular medicines.    The contrast will pass out of your body in your  Urine(pee). This will happen in the next 24 hours. You  will not feel this. Your urine will not  change color.    If you have kidney problems or take metformin    Drink 4 to 8 large glasses of water for the next  2 days, if you are not on a fluid restriction.    ?If you  take metformin (Glucophage or Glucovance) for diabetes, keep taking it.      ?Your kidney function tests are abnormal.  If you take Metformin, do not take it for 48 hours. Please go to your clinic for a blood test within 3 days after your exam before the restarting this medicine.     (Note to provider:please give patient prescription for lab tests.)    ?Special instructions: Drink at least 4 extra glasses of water today.   This reduces the stress on your kidneys.    I have read and understand the above information.    Patient Sign Here:______________________________________Date:________Time:______    Staff Sign Here:________________________________________Date:_______Time:______      Radiology Departments:     ?[facility]: [phone] ?[facility]: [phone]     ?[place]: [phone] ?[facility]:[phone]      ?[place]: [phone]  ?[facility]: [phone]  ?[facility]:[phone]    ?[facility]:[phone]  ?[facility]:[phone]       Review of your medicines      START taking        Dose / Directions Last dose taken    LORazepam 1 MG tablet   Commonly known as:  ATIVAN   Dose:  1 mg   Quantity:  3 tablet        Take 1 tablet (1 mg) by mouth every 6 hours as needed for anxiety   Refills:  0        oxyCODONE-acetaminophen 5-325 MG per tablet   Commonly known as:  PERCOCET   Dose:  1-2 tablet   Quantity:  15 tablet   Replaces:  oxyCODONE-acetaminophen 7.5-325 MG per tablet        Take 1-2 tablets by mouth every 4 hours as needed for pain   Refills:  0          Our records show that you are taking the medicines listed below. If these are incorrect, please call your family doctor or clinic.        Dose / Directions Last dose taken    ipratropium - albuterol 0.5 mg/2.5 mg/3 mL 0.5-2.5 (3) MG/3ML neb solution   Commonly known as:  DUONEB   Dose:  1 vial   Quantity:  360 mL        Take 1 vial (3 mLs) by nebulization every 6 hours as needed for shortness of breath / dyspnea or wheezing   Refills:  1         levofloxacin 750 MG tablet   Commonly known as:  LEVAQUIN   Dose:  750 mg   Quantity:  7 tablet        Take 1 tablet (750 mg) by mouth daily   Refills:  0        ondansetron 4 MG tablet   Commonly known as:  ZOFRAN   Dose:  4 mg   Quantity:  20 tablet        Take 1 tablet (4 mg) by mouth every 8 hours as needed for nausea   Refills:  0        order for DME   Quantity:  1 Device        Equipment being ordered: DuoNeb Machine with supplies   Refills:  0          STOP taking        Dose Reason for stopping Comments    oxyCODONE-acetaminophen 7.5-325 MG per tablet   Commonly known as:  PERCOCET   Replaced by:  oxyCODONE-acetaminophen 5-325 MG per tablet                      Prescriptions were sent or printed at these locations (2 Prescriptions)                   Other Prescriptions                Printed at Department/Unit printer (2 of 2)         oxyCODONE-acetaminophen (PERCOCET) 5-325 MG per tablet               LORazepam (ATIVAN) 1 MG tablet                Procedures and tests performed during your visit     Beta strep group A culture    CBC with platelets differential    CT Soft Tissue Neck w Contrast    Comprehensive metabolic panel    Influenza A/B antigen    Mononucleosis screen    Rapid strep screen      Orders Needing Specimen Collection     None      Pending Results     Date and Time Order Name Status Description    1/16/2017 2145 Beta strep group A culture In process     1/16/2017 1933 CT Soft Tissue Neck w Contrast In process             Pending Culture Results     Date and Time Order Name Status Description    1/16/2017 2145 Beta strep group A culture In process             Thank you for choosing [facility]       Thank you for choosing [facility] for your care. Our goal is always to provide you with excellent care. Hearing back from our patients is one way we can continue to improve our services. Please take a few minutes to complete the written survey that you may receive in the mail after you visit with us.  "Thank you!        [employer]" Information     [employer] lets you send messages to your doctor, view your test results, renew your prescriptions, schedule appointments and more. To sign up, go to [email or web address] . Click on \"Log in\" on the left side of the screen, which will take you to the Welcome page. Then click on \"Sign up Now\" on the right side of the page.     You will be asked to enter the access code listed below, as well as some personal information. Please follow the directions to create your username and password.     Your access code is: [email or web address]  Expires: 2017  2:47 PM     Your access code will  in 90 days. If you need help or a new code, please call your [facility] clinic or [phone].        Care EveryWhere ID     This is your Care EveryWhere ID. This could be used by other organizations to access your [facility] medical records  [id number]        After Visit Summary       This is your record. Keep this with you and show to your community pharmacist(s) and doctor(s) at your next visit.                  "

## 2017-01-16 NOTE — ED NOTES
To  6. Ambulated independently. VSS. Face swollen/red/itch. States started this am after taking new abx (Levaquin) hospitalized 1 week ago for pneumonia. Call light in reach. IV placed/labs drawn. Call light in reach.

## 2017-01-17 NOTE — ED NOTES
Pt reports face feels on fire and pain is returning. O2 sats at 97%.  Pt has hives on bilateral thighs onset 15 minutes ago.

## 2017-01-17 NOTE — ED PROVIDER NOTES
eMERGENCY dEPARTMENT eNCOUnter        CHIEF COMPLAINT    Chief Complaint   Patient presents with     Oral Swelling     tight throat, swollen throat, facial swelling       HPI    Maggie Case is a 26 year old female who presents with facial swelling, burning and hives which started today.  She was recently started on levaquin for a pneumonia.  She took the first dose yesterday.  Today she started with the hives.  They have been worsening throughout the day and she comes in for evaluation.  No sob, no airway compromise.      REVIEW OF SYSTEMS    Cardiac:  No syncope  Respiratory: no SOB,  no chest tightness  ENT: complains of  throat swelling, no tongue swelling, no voice changes or stridor  GI: No Vomiting  General: No Fever  SKIN: diffuse urticarial rash  All other systems reviewed and are negative.    PAST MEDICAL & SURGICAL HISTORY    Past Medical History   Diagnosis Date     Anemia      Vitamin B12 deficiency      Chronic diarrhea      Lactose intolerance      Myalgia      Anxiety      Pulmonary embolism (H) 05/06/16     Past Surgical History   Procedure Laterality Date     Closed reduction, percutaneous pinning lower extremity, combined Right 4/6/2016     Procedure: COMBINED CLOSED REDUCTION, PERCUTANEOUS PINNING LOWER EXTREMITY;  Surgeon: Dexter Jones MD;  Location: [facility]       CURRENT MEDICATIONS    Current Outpatient Rx   Name  Route  Sig  Dispense  Refill     oxyCODONE-acetaminophen (PERCOCET) 5-325 MG per tablet    Oral    Take 1-2 tablets by mouth every 4 hours as needed for pain    15 tablet    0       LORazepam (ATIVAN) 1 MG tablet    Oral    Take 1 tablet (1 mg) by mouth every 6 hours as needed for anxiety    3 tablet    0       order for DME        Equipment being ordered: DuoNeb Machine with supplies    1 Device    0       ipratropium - albuterol 0.5 mg/2.5 mg/3 mL (DUONEB) 0.5-2.5 (3) MG/3ML neb solution    Nebulization    Take 1 vial (3 mLs) by nebulization every 6 hours as needed for shortness  of breath / dyspnea or wheezing    360 mL    1       levofloxacin (LEVAQUIN) 750 MG tablet    Oral    Take 1 tablet (750 mg) by mouth daily    7 tablet    0       ondansetron (ZOFRAN) 4 MG tablet    Oral    Take 1 tablet (4 mg) by mouth every 8 hours as needed for nausea    20 tablet    0         ALLERGIES    Allergies   Allergen Reactions     Amoxicillin Other (See Comments)     Headaches     Lactose      Levaquin [Levofloxacin] Swelling     Naproxen Other (See Comments)     Reaction: Headaches     Nickel      Tramadol      Sulindac Rash       SOCIAL & FAMILY HISTORY    Social History     Social History     Marital Status: Single     Spouse Name: N/A     Number of Children: N/A     Years of Education: N/A     Social History Main Topics     Smoking status: Current Every Day Smoker -- 0.25 packs/day for 7 years     Types: Cigarettes     Smokeless tobacco: Never Used     Alcohol Use: 0.0 oz/week     0 Standard drinks or equivalent per week      Comment: 1-2/week ,      Drug Use: Yes     Special: Marijuana      Comment: adderall, yest     Sexual Activity:     Partners: Male     Other Topics Concern     Not on file     Social History Narrative     No family history on file.    PHYSICAL EXAM    VITAL SIGNS: /78 mmHg  Pulse 76  Temp(Src) 99.3  F (37.4  C) (Oral)  Resp 16  SpO2 96%   Constitutional:  Well developed, well nourished, no acute distress but tearful.     HENT:  Atraumatic, tenderness of the throat throat, no tongue swelling, no lip swelling  Neck: supple, no JVD, no neck swelling  Respiratory:  Lungs clear, no retractions   Cardiovascular:  regular rate, no murmurs  GI:  Soft, nontender, normal bowel sounds  Musculoskeletal:  No edema, no acute deformities  Integument:  Skin is warm and dry, diffuse urticarial rash   Neurologic:  Alert & oriented, no slurred speech  Psych: Pleasant affect, slightly anxious        RADIOLOGY/PROCEDURES    CT neck negative for soft tissue swelling, no lymph nodes, no  abscess.    ED COURSE & MEDICAL DECISION MAKING      See chart for details of medications given during the ED stay.    Filed Vitals:    01/16/17 1721 01/16/17 1858 01/16/17 1901 01/16/17 2232   BP: 132/83   123/78   Pulse: 105   76   Temp: 99.8  F (37.7  C)  99.3  F (37.4  C)    TempSrc: Tympanic  Oral    Resp: 18   16   SpO2: 100% 97%  96%         FINAL IMPRESSION    1. Medication reaction, initial encounter        PLAN  Have her stop the levaquin.  She is given solumedrol and benadryl here.  No airway compromise.  She continues to complain mostly of burning and itching, particularly in her face.  Discharge on percocet and ativan for sleep, continue on the benadryl.  Return here if any change in her symptoms.    Estephania Shanks MD  01/16/17 0578

## 2017-01-17 NOTE — DISCHARGE INSTRUCTIONS
What to expect when you have contrast    During your exam, we will inject  contrast  into your vein or artery. (Contrast is a clear liquid with iodine in it. It shows up on X-rays.)    You may feel warm or hot. You may have a metal taste in your mouth and a slight upset stomach. You may also feel pressure near the kidneys and bladder. These effects will last about 1 to 3 minutes.    Please tell us if you have:    Sneezing     Itching    Hives     Swelling in the face    A hoarse voice    Breathing problems    Other new symptoms    Serious problems are rare.  They may include:    Irregular heartbeat     Seizures    Kidney failure              Tissue damage    Shock      Death    If you have any problems during the exam, we  will treat them right away.    When you get home    Call your hospital if you have any new symptoms in the next 2 days, like hives or swelling. (Phone numbers are at the bottom of this page.) Or call your family doctor.     If you have wheezing or trouble breathing, call 911.    Self-care  -Drink at least 4 extra glasses of water today.   This reduces the stress on your kidneys.  -Keep taking your regular medicines.    The contrast will pass out of your body in your  Urine(pee). This will happen in the next 24 hours. You  will not feel this. Your urine will not  change color.    If you have kidney problems or take metformin    Drink 4 to 8 large glasses of water for the next  2 days, if you are not on a fluid restriction.    ?If you take metformin (Glucophage or Glucovance) for diabetes, keep taking it.      ?Your kidney function tests are abnormal.  If you take Metformin, do not take it for 48 hours. Please go to your clinic for a blood test within 3 days after your exam before the restarting this medicine.     (Note to provider:please give patient prescription for lab tests.)    ?Special instructions: Drink at least 4 extra glasses of water today.   This reduces the stress on your kidneys.    I  have read and understand the above information.    Patient Sign Here:______________________________________Date:________Time:______    Staff Sign Here:________________________________________Date:_______Time:______      Radiology Departments:     ?[facility]: [phone] ?[facility]: [phone]     ?[facility]: [phone] ?[facility]:[phone]      ?[facility]: [phone]  ?[facility]: [phone]  ?[place]:[phone]    ?[facility]:[phone]  ?[facility]:[phone]

## 2017-01-17 NOTE — ED NOTES
Pt exams done testing done home with re referrals and instructions. Pt given a take home pack of percocet and ativan.

## 2017-01-18 NOTE — PROGRESS NOTES
Quick Note:    CT scan of neck. Impression: patchy areas of increased density in the right apex. Both scarring as well as early pneumonia could produce this appearance. Report faxed to Dr. NOLAN Flores. Follow-up as directed.  ______

## 2017-01-19 ENCOUNTER — HOSPITAL ENCOUNTER (EMERGENCY)
Facility: HOSPITAL | Age: 27
Discharge: HOME | End: 2017-01-19
Attending: NURSE PRACTITIONER | Admitting: NURSE PRACTITIONER
Payer: MEDICAID

## 2017-01-19 VITALS
TEMPERATURE: 98.5 F | SYSTOLIC BLOOD PRESSURE: 118 MMHG | OXYGEN SATURATION: 99 % | DIASTOLIC BLOOD PRESSURE: 80 MMHG | HEART RATE: 85 BPM | RESPIRATION RATE: 18 BRPM

## 2017-01-19 DIAGNOSIS — B37.0 THRUSH: ICD-10-CM

## 2017-01-19 LAB
BACTERIA SPEC CULT: NORMAL
MICRO REPORT STATUS: NORMAL
SPECIMEN SOURCE: NORMAL

## 2017-01-19 PROCEDURE — 99213 OFFICE O/P EST LOW 20 MIN: CPT

## 2017-01-19 PROCEDURE — 99213 OFFICE O/P EST LOW 20 MIN: CPT | Performed by: NURSE PRACTITIONER

## 2017-01-19 RX ORDER — OXYCODONE AND ACETAMINOPHEN 5; 325 MG/1; MG/1
1 TABLET ORAL EVERY 6 HOURS PRN
Qty: 6 TABLET | Refills: 0 | Status: SHIPPED | OUTPATIENT
Start: 2017-01-19 | End: 2017-03-19

## 2017-01-19 RX ORDER — NYSTATIN 100000/ML
500000 SUSPENSION, ORAL (FINAL DOSE FORM) ORAL 4 TIMES DAILY
Qty: 140 ML | Refills: 0 | Status: SHIPPED | OUTPATIENT
Start: 2017-01-19 | End: 2017-01-26

## 2017-01-19 ASSESSMENT — ENCOUNTER SYMPTOMS
ACTIVITY CHANGE: 0
CHILLS: 1
RHINORRHEA: 1
WHEEZING: 0
COUGH: 0
FEVER: 1
STRIDOR: 0
VOMITING: 0
NAUSEA: 0
TROUBLE SWALLOWING: 0
FACIAL SWELLING: 1
SORE THROAT: 1
WOUND: 0
DYSURIA: 0
PSYCHIATRIC NEGATIVE: 1
SHORTNESS OF BREATH: 0
APPETITE CHANGE: 1
SINUS PRESSURE: 0

## 2017-01-19 NOTE — ED PROVIDER NOTES
History     Chief Complaint   Patient presents with     Mouth Lesions     The history is provided by the patient. No  was used.     Maggie Case is a 26 year old female who presents with sores and redness to her mouth. She was started on Levaquin for pneumonia 1-14-17 after a 4 day hospitalization. She had a reaction of facial swelling and was seen in the ER on 1-. She has taken benadryl, ativan, and percocet with relief. She hasn't been able to sleep. The swelling is better since she was seen in the ER, but now she has mouth sores.  Positive for fever, chills, or night sweats. Drinking well. Decreased appetite with mouth pain. Bowel and bladder are working well. She is a tobacco user of 0.5 PPD of cigarettes.       I have reviewed the Medications, Allergies, Past Medical and Surgical History, and Social History in the Epic system.    Review of Systems   Constitutional: Positive for fever, chills and appetite change. Negative for activity change.   HENT: Positive for ear pain, facial swelling, mouth sores, postnasal drip, rhinorrhea and sore throat. Negative for congestion, ear discharge, sinus pressure and trouble swallowing.    Respiratory: Negative for cough, shortness of breath, wheezing and stridor.    Gastrointestinal: Negative for nausea and vomiting.   Genitourinary: Negative for dysuria.   Skin: Positive for rash. Negative for wound.   Psychiatric/Behavioral: Negative.        Physical Exam   BP: 118/80 mmHg  Pulse: 85  Temp: 98.5  F (36.9  C)  Resp: 18  SpO2: 99 %  Physical Exam   Constitutional: She is oriented to person, place, and time. She appears well-developed and well-nourished. No distress.   HENT:   Right Ear: External ear normal.   Left Ear: External ear normal.   Mouth/Throat: No oropharyngeal exudate.   Tongue with redness. White papules to sides of tongue and posterior oropharynx. No uvula swelling. No swelling to lips. Reddened abraised area to posterior side of  lips.     Eyes: Conjunctivae and EOM are normal. Pupils are equal, round, and reactive to light. Right eye exhibits no discharge. Left eye exhibits no discharge.   Neck: Normal range of motion. Neck supple.   Cardiovascular: Normal rate, regular rhythm, normal heart sounds and intact distal pulses.    Pulmonary/Chest: Effort normal. No respiratory distress. She has no wheezes. She has no rales.   Abdominal: Soft. She exhibits no distension.   Musculoskeletal: Normal range of motion.   Lymphadenopathy:     She has no cervical adenopathy.   Neurological: She is alert and oriented to person, place, and time.   Skin: Skin is warm and dry. No rash noted. She is not diaphoretic.   Abrasions to anterior thighs. No open areas or drainage.   Psychiatric: She has a normal mood and affect. Her behavior is normal.   Nursing note and vitals reviewed.      ED Course   Procedures      Assessments & Plan (with Medical Decision Making)     Discussed plan of care. Her airway is patent. No swelling to lips, oropharynx, or uvula. Speech is clear. She is requesting a couple more Percocet for pain until she follows up with PCP. She verbalized understanding on plan of care and will follow up sooner with any increase in symptoms or concerns.     I have reviewed the nursing notes.    I have reviewed the findings, diagnosis, plan and need for follow up with the patient.  Discharged in stable condition.     Discharge Medication List as of 1/19/2017  5:29 PM      START taking these medications    Details   nystatin (MYCOSTATIN) 163412 UNIT/ML suspension Take 5 mLs (500,000 Units) by mouth 4 times daily for 7 daysDisp-140 mL, T-[id number]             Final diagnoses:   Thrush     Use Nystatin swish and swallow 4 times a day. Try to swish in mouth as long as able.   Use tylenol and or ibuprofen for pain control. Use Percocet if tylenol and or ibuprofen is not effective.   Continue benadryl if you are itching.   Try to increase fluid intake.    Follow up with PCP next week.  Return to urgent care or emergency department with any increase in symptoms or concerns.     MARK Pastrana  4:39 PM  January 19, 2017        Rebekah Stanton NP  01/22/17 4834

## 2017-01-19 NOTE — ED NOTES
Pt presents with c/o mouth lesions from an allergic reaction to Levaquin that she was seen for here earlier in the week.

## 2017-01-19 NOTE — ED NOTES
Pt presents with c/o blisters, swelling in the face, and cuts in her mouth. States she is having an allergic reaction to the Levaquin she was taking. Last dose of Levaquin was Juanito 1/15.

## 2017-01-19 NOTE — ED AVS SNAPSHOT
[facility]    [address]    [place] [address]    Phone:  [phone]                                       Maggie Case   MRN: [id number]    Department:  [facility]   Date of Visit:  1/19/2017           Patient Information     Date Of Birth          1990        Your diagnoses for this visit were:     Thrush        You were seen by Rebekah Stanton NP.      Follow-up Information     Follow up with Chapo Flores MD In 1 week.    Specialty:  Family Practice    Why:  For re-evaluation.     Contact information:    [facility] [place]  [address]  [place] [address] [phone]          Follow up with [facility].    Specialty:  EMERGENCY MEDICINE    Why:  As needed, If symptoms worsen    Contact information:    [address]  [place] [address] [phone]    Additional information:    From [facility]: Take [address]. Turn left at [address]. Turn left at the first stoplight on [address]. At the first stop sign, take a right onto [address]. Take a left into the parking lot and continue through until you reach the North enterance of the building.       From [place]: Take [address]. Take the [address] ramp towards [place]. Turn left onto [address]. Take a slight right onto [address]. Turn left at the first stoplight on [address]. At the first stop sign, take a right onto [address]. Take a left into the parking lot and continue through until you reach the North enterance of the building.       From [place]: Take [address]. Take a right at [address]. At the first stop sign, take a right onto [address]. Take a left into the parking lot and continue through until you reach the North enterance of the building.         Discharge Instructions       Use Nystatin swish and swallow 4 times a day. Try to swish in mouth as long as able.   Use  tylenol and or ibuprofen for pain control. Use Percocet if tylenol and or ibuprofen is not effective.   Continue benadryl is you care itching.   Try to increase fluid intake.   Follow up with PCP next week.  Return to urgent care or emergency department with any increase in symptoms or concerns.     Discharge References/Attachments     CANDIDA INFECTION: THRUSH (ENGLISH)         Review of your medicines      START taking        Dose / Directions Last dose taken    nystatin 212756 UNIT/ML suspension   Commonly known as:  MYCOSTATIN   Dose:  390827 Units   Quantity:  140 mL        Take 5 mLs (500,000 Units) by mouth 4 times daily for 7 days   Refills:  0          CONTINUE these medicines which may have CHANGED, or have new prescriptions. If we are uncertain of the size of tablets/capsules you have at home, strength may be listed as something that might have changed.        Dose / Directions Last dose taken    oxyCODONE-acetaminophen 5-325 MG per tablet   Commonly known as:  PERCOCET   Dose:  1 tablet   What changed:    - how much to take  - when to take this   Quantity:  6 tablet        Take 1 tablet by mouth every 6 hours as needed for pain   Refills:  0          Our records show that you are taking the medicines listed below. If these are incorrect, please call your family doctor or clinic.        Dose / Directions Last dose taken    ipratropium - albuterol 0.5 mg/2.5 mg/3 mL 0.5-2.5 (3) MG/3ML neb solution   Commonly known as:  DUONEB   Dose:  1 vial   Quantity:  360 mL        Take 1 vial (3 mLs) by nebulization every 6 hours as needed for shortness of breath / dyspnea or wheezing   Refills:  1        levofloxacin 750 MG tablet   Commonly known as:  LEVAQUIN   Dose:  750 mg   Quantity:  7 tablet        Take 1 tablet (750 mg) by mouth daily   Refills:  0        LORazepam 1 MG tablet   Commonly known as:  ATIVAN   Dose:  1 mg   Quantity:  3 tablet        Take 1 tablet (1 mg) by mouth every 6 hours as needed for  "anxiety   Refills:  0        ondansetron 4 MG tablet   Commonly known as:  ZOFRAN   Dose:  4 mg   Quantity:  20 tablet        Take 1 tablet (4 mg) by mouth every 8 hours as needed for nausea   Refills:  0        order for DME   Quantity:  1 Device        Equipment being ordered: DuoNeb Machine with supplies   Refills:  0                Prescriptions were sent or printed at these locations (2 Prescriptions)                   [id number] - [place], [place] - [address]   [address] , [place] [address]    Telephone:  [phone]   Fax:  [phone]   Hours:                  E-Prescribed (1 of 2)         nystatin (MYCOSTATIN) [id number] UNIT/ML suspension                 Printed at Department/Unit printer (1 of 2)         oxyCODONE-acetaminophen (PERCOCET) 5-325 MG per tablet                Orders Needing Specimen Collection     None      Pending Results     No orders found from 1/18/2017 to 1/20/2017.            Pending Culture Results     No orders found from 1/18/2017 to 1/20/2017.            Thank you for choosing [facility]       Thank you for choosing [facility] for your care. Our goal is always to provide you with excellent care. Hearing back from our patients is one way we can continue to improve our services. Please take a few minutes to complete the written survey that you may receive in the mail after you visit with us. Thank you!        Well Information     Well lets you send messages to your doctor, view your test results, renew your prescriptions, schedule appointments and more. To sign up, go to [email or web address] . Click on \"Log in\" on the left side of the screen, which will take you to the Welcome page. Then click on \"Sign up Now\" on the right side of the page.     You will be asked to enter the access code listed below, as well as some personal information. Please follow the directions to create your username and password.     Your access code is: [email or web address]  Expires: 4/8/2017  2:47 " PM     Your access code will  in 90 days. If you need help or a new code, please call your [place] clinic or [phone].        Care EveryWhere ID     This is your Care EveryWhere ID. This could be used by other organizations to access your [place] medical records  [id number]        After Visit Summary       This is your record. Keep this with you and show to your community pharmacist(s) and doctor(s) at your next visit.

## 2017-01-19 NOTE — ED AVS SNAPSHOT
[facility]    [address]    [place] [address]    Phone:  [phone]                                       Maggie Case   MRN: [id number]    Department:  [facility]   Date of Visit:  1/19/2017           After Visit Summary Signature Page     I have received my discharge instructions, and my questions have been answered. I have discussed any challenges I see with this plan with the nurse or doctor.    ..........................................................................................................................................  Patient/Patient Representative Signature      ..........................................................................................................................................  Patient Representative Print Name and Relationship to Patient    ..................................................               ................................................  Date                                            Time    ..........................................................................................................................................  Reviewed by Signature/Title    ...................................................              ..............................................  Date                                                            Time

## 2017-01-23 NOTE — DISCHARGE INSTRUCTIONS
Use Nystatin swish and swallow 4 times a day. Try to swish in mouth as long as able.   Use tylenol and or ibuprofen for pain control. Use Percocet if tylenol and or ibuprofen is not effective.   Continue benadryl if you care itching.   Try to increase fluid intake.   Follow up with PCP next week.  Return to urgent care or emergency department with any increase in symptoms or concerns.

## 2017-03-19 ENCOUNTER — HOSPITAL ENCOUNTER (EMERGENCY)
Facility: HOSPITAL | Age: 27
Discharge: HOME | End: 2017-03-19
Attending: EMERGENCY MEDICINE | Admitting: EMERGENCY MEDICINE
Payer: MEDICAID

## 2017-03-19 VITALS
DIASTOLIC BLOOD PRESSURE: 59 MMHG | RESPIRATION RATE: 18 BRPM | HEART RATE: 110 BPM | OXYGEN SATURATION: 97 % | TEMPERATURE: 99.6 F | SYSTOLIC BLOOD PRESSURE: 106 MMHG

## 2017-03-19 DIAGNOSIS — R11.10 VOMITING AND DIARRHEA: ICD-10-CM

## 2017-03-19 DIAGNOSIS — A08.4 VIRAL GASTROENTERITIS: ICD-10-CM

## 2017-03-19 DIAGNOSIS — E87.6 HYPOKALEMIA: ICD-10-CM

## 2017-03-19 DIAGNOSIS — R19.7 VOMITING AND DIARRHEA: ICD-10-CM

## 2017-03-19 LAB
ALBUMIN SERPL-MCNC: 3 G/DL (ref 3.4–5)
ALBUMIN UR-MCNC: 30 MG/DL
ALP SERPL-CCNC: 121 U/L (ref 40–150)
ALT SERPL W P-5'-P-CCNC: 47 U/L (ref 0–50)
ANION GAP SERPL CALCULATED.3IONS-SCNC: 10 MMOL/L (ref 3–14)
APPEARANCE UR: ABNORMAL
AST SERPL W P-5'-P-CCNC: 84 U/L (ref 0–45)
BACTERIA #/AREA URNS HPF: ABNORMAL /HPF
BASOPHILS # BLD AUTO: 0 10E9/L (ref 0–0.2)
BASOPHILS NFR BLD AUTO: 0.1 %
BILIRUB SERPL-MCNC: 0.5 MG/DL (ref 0.2–1.3)
BILIRUB UR QL STRIP: NEGATIVE
BUN SERPL-MCNC: 4 MG/DL (ref 7–30)
CALCIUM SERPL-MCNC: 8.3 MG/DL (ref 8.5–10.1)
CHLORIDE SERPL-SCNC: 111 MMOL/L (ref 94–109)
CK SERPL-CCNC: 96 U/L (ref 30–225)
CO2 SERPL-SCNC: 24 MMOL/L (ref 20–32)
COLOR UR AUTO: YELLOW
CREAT SERPL-MCNC: 0.61 MG/DL (ref 0.52–1.04)
CRP SERPL-MCNC: 20.1 MG/L (ref 0–8)
DIFFERENTIAL METHOD BLD: ABNORMAL
EOSINOPHIL # BLD AUTO: 0.2 10E9/L (ref 0–0.7)
EOSINOPHIL NFR BLD AUTO: 2 %
ERYTHROCYTE [DISTWIDTH] IN BLOOD BY AUTOMATED COUNT: 19.1 % (ref 10–15)
ERYTHROCYTE [SEDIMENTATION RATE] IN BLOOD BY WESTERGREN METHOD: 14 MM/H (ref 0–20)
FLUAV+FLUBV AG SPEC QL: NEGATIVE
FLUAV+FLUBV AG SPEC QL: NORMAL
GFR SERPL CREATININE-BSD FRML MDRD: ABNORMAL ML/MIN/1.7M2
GLUCOSE SERPL-MCNC: 106 MG/DL (ref 70–99)
GLUCOSE UR STRIP-MCNC: NEGATIVE MG/DL
HCT VFR BLD AUTO: 38.9 % (ref 35–47)
HGB BLD-MCNC: 13.6 G/DL (ref 11.7–15.7)
HGB UR QL STRIP: ABNORMAL
IMM GRANULOCYTES # BLD: 0.1 10E9/L (ref 0–0.4)
IMM GRANULOCYTES NFR BLD: 0.6 %
KETONES UR STRIP-MCNC: 10 MG/DL
LACTATE SERPL-SCNC: 1.3 MMOL/L (ref 0.4–2)
LEUKOCYTE ESTERASE UR QL STRIP: ABNORMAL
LIPASE SERPL-CCNC: 116 U/L (ref 73–393)
LYMPHOCYTES # BLD AUTO: 0.5 10E9/L (ref 0.8–5.3)
LYMPHOCYTES NFR BLD AUTO: 4.6 %
MAGNESIUM SERPL-MCNC: 1.8 MG/DL (ref 1.6–2.3)
MCH RBC QN AUTO: 34.6 PG (ref 26.5–33)
MCHC RBC AUTO-ENTMCNC: 35 G/DL (ref 31.5–36.5)
MCV RBC AUTO: 99 FL (ref 78–100)
MONOCYTES # BLD AUTO: 0.7 10E9/L (ref 0–1.3)
MONOCYTES NFR BLD AUTO: 7.1 %
MUCOUS THREADS #/AREA URNS LPF: PRESENT /LPF
NEUTROPHILS # BLD AUTO: 8.7 10E9/L (ref 1.6–8.3)
NEUTROPHILS NFR BLD AUTO: 85.6 %
NITRATE UR QL: NEGATIVE
NRBC # BLD AUTO: 0 10*3/UL
NRBC BLD AUTO-RTO: 0 /100
PH UR STRIP: 6 PH (ref 4.7–8)
PLATELET # BLD AUTO: 402 10E9/L (ref 150–450)
POTASSIUM SERPL-SCNC: 3 MMOL/L (ref 3.4–5.3)
PROT SERPL-MCNC: 6.3 G/DL (ref 6.8–8.8)
RBC # BLD AUTO: 3.93 10E12/L (ref 3.8–5.2)
RBC #/AREA URNS AUTO: 3 /HPF (ref 0–2)
SODIUM SERPL-SCNC: 145 MMOL/L (ref 133–144)
SP GR UR STRIP: 1.02 (ref 1–1.03)
SPECIMEN SOURCE: NORMAL
SQUAMOUS #/AREA URNS AUTO: 5 /HPF (ref 0–1)
URN SPEC COLLECT METH UR: ABNORMAL
UROBILINOGEN UR STRIP-MCNC: NORMAL MG/DL (ref 0–2)
WBC # BLD AUTO: 10.1 10E9/L (ref 4–11)
WBC #/AREA URNS AUTO: 21 /HPF (ref 0–2)

## 2017-03-19 PROCEDURE — 85025 COMPLETE CBC W/AUTO DIFF WBC: CPT | Performed by: FAMILY MEDICINE

## 2017-03-19 PROCEDURE — 87040 BLOOD CULTURE FOR BACTERIA: CPT | Performed by: EMERGENCY MEDICINE

## 2017-03-19 PROCEDURE — 83605 ASSAY OF LACTIC ACID: CPT | Performed by: FAMILY MEDICINE

## 2017-03-19 PROCEDURE — 96375 TX/PRO/DX INJ NEW DRUG ADDON: CPT

## 2017-03-19 PROCEDURE — 80053 COMPREHEN METABOLIC PANEL: CPT | Performed by: FAMILY MEDICINE

## 2017-03-19 PROCEDURE — 25000128 H RX IP 250 OP 636: Performed by: FAMILY MEDICINE

## 2017-03-19 PROCEDURE — 25000125 ZZHC RX 250: Performed by: EMERGENCY MEDICINE

## 2017-03-19 PROCEDURE — 25000128 H RX IP 250 OP 636: Performed by: EMERGENCY MEDICINE

## 2017-03-19 PROCEDURE — 82550 ASSAY OF CK (CPK): CPT | Performed by: EMERGENCY MEDICINE

## 2017-03-19 PROCEDURE — 85652 RBC SED RATE AUTOMATED: CPT | Performed by: EMERGENCY MEDICINE

## 2017-03-19 PROCEDURE — 83735 ASSAY OF MAGNESIUM: CPT | Performed by: EMERGENCY MEDICINE

## 2017-03-19 PROCEDURE — 99284 EMERGENCY DEPT VISIT MOD MDM: CPT | Mod: 25

## 2017-03-19 PROCEDURE — 71101 X-RAY EXAM UNILAT RIBS/CHEST: CPT | Mod: TC,LT

## 2017-03-19 PROCEDURE — 86140 C-REACTIVE PROTEIN: CPT | Performed by: EMERGENCY MEDICINE

## 2017-03-19 PROCEDURE — 87804 INFLUENZA ASSAY W/OPTIC: CPT | Performed by: EMERGENCY MEDICINE

## 2017-03-19 PROCEDURE — 83690 ASSAY OF LIPASE: CPT | Performed by: EMERGENCY MEDICINE

## 2017-03-19 PROCEDURE — 96374 THER/PROPH/DIAG INJ IV PUSH: CPT

## 2017-03-19 PROCEDURE — 81001 URINALYSIS AUTO W/SCOPE: CPT | Performed by: EMERGENCY MEDICINE

## 2017-03-19 PROCEDURE — 96361 HYDRATE IV INFUSION ADD-ON: CPT

## 2017-03-19 PROCEDURE — 99284 EMERGENCY DEPT VISIT MOD MDM: CPT | Performed by: EMERGENCY MEDICINE

## 2017-03-19 PROCEDURE — 36415 COLL VENOUS BLD VENIPUNCTURE: CPT | Performed by: FAMILY MEDICINE

## 2017-03-19 RX ORDER — METOCLOPRAMIDE HYDROCHLORIDE 5 MG/ML
10 INJECTION INTRAMUSCULAR; INTRAVENOUS ONCE
Status: COMPLETED | OUTPATIENT
Start: 2017-03-19 | End: 2017-03-19

## 2017-03-19 RX ORDER — HYDROMORPHONE HYDROCHLORIDE 1 MG/ML
0.5 INJECTION, SOLUTION INTRAMUSCULAR; INTRAVENOUS; SUBCUTANEOUS
Status: COMPLETED | OUTPATIENT
Start: 2017-03-19 | End: 2017-03-19

## 2017-03-19 RX ORDER — LIDOCAINE 40 MG/G
CREAM TOPICAL
Status: DISCONTINUED | OUTPATIENT
Start: 2017-03-19 | End: 2017-03-19 | Stop reason: HOSPADM

## 2017-03-19 RX ORDER — DIPHENOXYLATE HCL/ATROPINE 2.5-.025MG
1 TABLET ORAL 4 TIMES DAILY PRN
Qty: 12 TABLET | Refills: 0 | Status: SHIPPED | OUTPATIENT
Start: 2017-03-19 | End: 2017-04-28

## 2017-03-19 RX ORDER — ONDANSETRON 4 MG/1
4 TABLET, ORALLY DISINTEGRATING ORAL EVERY 8 HOURS PRN
Qty: 20 TABLET | Refills: 0 | Status: SHIPPED | OUTPATIENT
Start: 2017-03-19 | End: 2017-03-22

## 2017-03-19 RX ORDER — SODIUM CHLORIDE 9 MG/ML
1000 INJECTION, SOLUTION INTRAVENOUS CONTINUOUS
Status: DISCONTINUED | OUTPATIENT
Start: 2017-03-19 | End: 2017-03-19 | Stop reason: HOSPADM

## 2017-03-19 RX ORDER — DIPHENHYDRAMINE HYDROCHLORIDE 50 MG/ML
25 INJECTION INTRAMUSCULAR; INTRAVENOUS ONCE
Status: COMPLETED | OUTPATIENT
Start: 2017-03-19 | End: 2017-03-19

## 2017-03-19 RX ADMIN — METOCLOPRAMIDE 10 MG: 5 INJECTION, SOLUTION INTRAMUSCULAR; INTRAVENOUS at 15:10

## 2017-03-19 RX ADMIN — POTASSIUM CHLORIDE 10 MEQ: 14.9 INJECTION, SOLUTION, CONCENTRATE PARENTERAL at 15:37

## 2017-03-19 RX ADMIN — HYDROMORPHONE HYDROCHLORIDE 0.5 MG: 1 INJECTION, SOLUTION INTRAMUSCULAR; INTRAVENOUS; SUBCUTANEOUS at 15:14

## 2017-03-19 RX ADMIN — DIPHENHYDRAMINE HYDROCHLORIDE 25 MG: 50 INJECTION, SOLUTION INTRAMUSCULAR; INTRAVENOUS at 15:11

## 2017-03-19 RX ADMIN — POTASSIUM CHLORIDE 10 MEQ: 14.9 INJECTION, SOLUTION, CONCENTRATE PARENTERAL at 16:46

## 2017-03-19 RX ADMIN — SODIUM CHLORIDE 1000 ML: 9 INJECTION, SOLUTION INTRAVENOUS at 14:46

## 2017-03-19 RX ADMIN — SODIUM CHLORIDE 1000 ML: 9 INJECTION, SOLUTION INTRAVENOUS at 16:45

## 2017-03-19 ASSESSMENT — ENCOUNTER SYMPTOMS
MYALGIAS: 1
LIGHT-HEADEDNESS: 1
WEAKNESS: 1
APPETITE CHANGE: 1
FATIGUE: 1
CHILLS: 1
ACTIVITY CHANGE: 1
NAUSEA: 1
SORE THROAT: 1
DIARRHEA: 1
ARTHRALGIAS: 1
COUGH: 1
HEADACHES: 1
CHEST TIGHTNESS: 1
RHINORRHEA: 1
ABDOMINAL PAIN: 1
BACK PAIN: 1
VOICE CHANGE: 1
FEVER: 1

## 2017-03-19 NOTE — ED AVS SNAPSHOT
[facility]    [address]    [place] [address]    Phone:  [phone]                                       Maggie Case   MRN: [id number]    Department:  [facility]   Date of Visit:  3/19/2017           After Visit Summary Signature Page     I have received my discharge instructions, and my questions have been answered. I have discussed any challenges I see with this plan with the nurse or doctor.    ..........................................................................................................................................  Patient/Patient Representative Signature      ..........................................................................................................................................  Patient Representative Print Name and Relationship to Patient    ..................................................               ................................................  Date                                            Time    ..........................................................................................................................................  Reviewed by Signature/Title    ...................................................              ..............................................  Date                                                            Time

## 2017-03-19 NOTE — ED PROVIDER NOTES
History     Chief Complaint   Patient presents with     Nausea, Vomiting, & Diarrhea     X 1 week     HPI  Maggie Case is a 27 year old female with symptoms noted above .  Her whole family had flu symptoms like this but she has not been able to shake the recurring episodes of nausea, abdominal cramping, retching and vomiting, and diarrhea.  Has had no bleeding.  Has been trying to keep down fluids.  Has developed some intense left chest wall pain over her left CVA area seemingly related to the spasmodic cough she has had all week as well.  She continues to have malaise, myalgias, fever, chills, and fatigue.      I have reviewed the Medications, Allergies, Past Medical and Surgical History, and Social History in the Epic system.    Review of Systems   Constitutional: Positive for activity change, appetite change, chills, fatigue and fever.   HENT: Positive for congestion, postnasal drip, rhinorrhea, sore throat and voice change.    Respiratory: Positive for cough and chest tightness.    Cardiovascular: Positive for chest pain.   Gastrointestinal: Positive for abdominal pain, diarrhea and nausea.   Genitourinary: Negative.    Musculoskeletal: Positive for arthralgias, back pain and myalgias.   Skin: Negative.    Neurological: Positive for weakness, light-headedness and headaches.       Physical Exam   BP: 118/78  Pulse: (!) 127  Heart Rate: 95  Temp: 100.4  F (38  C)  Resp: (!) 28  SpO2: 97 %  Physical Exam   Constitutional: She is oriented to person, place, and time. She appears well-developed and well-nourished. She appears distressed.   HENT:   Head: Normocephalic and atraumatic.   Right Ear: External ear normal.   Left Ear: External ear normal.   Mouth/Throat: Oropharynx is clear and moist.   Eyes: Conjunctivae and EOM are normal. Pupils are equal, round, and reactive to light.   Neck: Normal range of motion. Neck supple. No tracheal deviation present. No thyromegaly present.   Cardiovascular: Normal heart  sounds.    tachycardia   Pulmonary/Chest: Effort normal and breath sounds normal. No respiratory distress. She has no wheezes. She has no rales. She exhibits tenderness.   Tender over left flank CVA    Abdominal: Soft. Bowel sounds are normal. She exhibits no distension. There is tenderness. There is no rebound and no guarding.   Deep tenderness in all quadrants with minimal guarding but no rebound.    Musculoskeletal: Normal range of motion. She exhibits tenderness. She exhibits no edema or deformity.   Myalgias especially in low back    Neurological: She is alert and oriented to person, place, and time. She has normal reflexes. She displays normal reflexes. No cranial nerve deficit. Coordination normal.   Skin: Skin is warm and dry. No rash noted. She is not diaphoretic. No erythema.     ED Course     ED Course     Procedures  Critical Care time:  none    Labs Ordered and Resulted from Time of ED Arrival Up to the Time of Departure from the ED   COMPREHENSIVE METABOLIC PANEL - Abnormal; Notable for the following:        Result Value    Sodium 145 (*)     Potassium 3.0 (*)     Chloride 111 (*)     Glucose 106 (*)     Urea Nitrogen 4 (*)     Calcium 8.3 (*)     Albumin 3.0 (*)     Protein Total 6.3 (*)     AST 84 (*)     All other components within normal limits   CBC WITH PLATELETS DIFFERENTIAL - Abnormal; Notable for the following:     MCH 34.6 (*)     RDW 19.1 (*)     Absolute Neutrophil 8.7 (*)     Absolute Lymphocytes 0.5 (*)     All other components within normal limits   CRP INFLAMMATION - Abnormal; Notable for the following:     CRP Inflammation 20.1 (*)     All other components within normal limits   ROUTINE UA WITH MICROSCOPIC - Abnormal; Notable for the following:     Ketones Urine 10 (*)     Blood Urine Trace (*)     Protein Albumin Urine 30 (*)     Leukocyte Esterase Urine Large (*)     WBC Urine 21 (*)     RBC Urine 3 (*)     Bacteria Urine Few (*)     Squamous Epithelial /HPF Urine 5 (*)     Mucous  Urine Present (*)     All other components within normal limits   LACTIC ACID   ERYTHROCYTE SEDIMENTATION RATE AUTO   MAGNESIUM   LIPASE   CK TOTAL   OCCULT BLOOD FECAL HGB IMMUNO   FECAL LACTOFERRIN   PERIPHERAL IV CATHETER   PULSE OXIMETRY NURSING   PERIPHERAL IV CATHETER   ORTHOSTATIC BLOOD PRESSURE AND PULSE   CARDIAC CONTINUOUS MONITORING   NURSING DRAW AND HOLD   NURSING DRAW AND HOLD   NURSING DRAW AND HOLD   INFLUENZA A/B ANTIGEN   BLOOD CULTURE   BLOOD CULTURE   STOOL CULTURE SSCE   CLOSTRIDIUM DIFFICILE TOXIN B     Assessments & Plan (with Medical Decision Making)   Maggie has what seems like classic influenza with prominent persistent gi symptoms and spasmodic cough with various systemic symptoms especially myalgias and fever.   In spite of active wretching, she did not appear dehydrated or toxic.  Labs were drawn and IV placed.   1+L NS bolus/infusion given with dilaudid 0.5mg IV for pain, caqhel15/benadryl 25mg IV for nausea given with fair results.  Labs revealed normal WBC, slightly elevated CRP of 20.1 and contaminated urine.  K+ of 3.0 was replaced with 20meQ K rider.  Rib detail and CXR were negative for infiltrate or fracture.  Given Rx below for symptoms.   I have reviewed the nursing notes.    I have reviewed the findings, diagnosis, plan and need for follow up with the patient.    New Prescriptions    DIPHENOXYLATE-ATROPINE (LOMOTIL) 2.5-0.025 MG PER TABLET    Take 1 tablet by mouth 4 times daily as needed for diarrhea    ONDANSETRON (ZOFRAN ODT) 4 MG ODT TAB    Take 1 tablet (4 mg) by mouth every 8 hours as needed       Final diagnoses:   Viral gastroenteritis   Vomiting and diarrhea   Hypokalemia       3/19/2017   [facility]     Gabriel Whitley MD  03/19/17 8530

## 2017-03-19 NOTE — ED AVS SNAPSHOT
[facility]    [address] [address]    Phone:  [phone]                                       Maggie Case   MRN: [id number]    Department:  [facility]   Date of Visit:  3/19/2017           Patient Information     Date Of Birth          1990        Your diagnoses for this visit were:     Viral gastroenteritis     Vomiting and diarrhea     Hypokalemia        You were seen by Gabriel Whitley MD.      Follow-up Information     Follow up with Chapo Flores MD.    Specialty:  Family Practice    Why:  As needed    Contact information:    [facility]  [address] [place] [address]  [phone]          Follow up with Chapo Flores MD In 3 days.    Specialty:  Family Practice    Contact information:    [facility]  [address] [place] [address]  [phone]          Discharge Instructions         Diet for Vomiting or Diarrhea (Adult)    If your symptoms return or get worse after eating certain foods listed below, you should stop eating them until your symptoms ease and you feel better.  Once the vomiting stops, then follow the steps below.   During the first 12 to 24 hours  During the first 12 to 24 hours, follow this diet:    Beverages. Plain water, sport drinks like electrolyte solutions, soft drinks without caffeine, mineral water (plain or flavored), clear fruit juices, and decaffeinated tea and coffee.    Soups. Clear broth, consommé, and bouillon.    Desserts. Plain gelatin, popsicles, and fruit juice bars. As you feel better, you may add 6 to 8 ounces of yogurt per day. If you have diarrhea, don't have foods or beverages that contain sugar, high-fructose corn syrup, or sugar alcohols.  During the next 24 hours  During the next 24 hours you may add the following to the above:    Hot cereal, plain toast, bread, rolls, and crackers    Plain noodles, rice, mashed potatoes, and chicken noodle or rice soup    Unsweetened  canned fruit (but not pineapple) and bananas  Don't have more than 15 grams of fat a day. Do this by staying away from margarine, butter, oils, mayonnaise, sauces, gravies, fried foods, peanut butter, meat, poultry, and fish.  Don't eat much fiber. Stay away from raw or cooked vegetables, fresh fruits (except bananas), and bran cereals.  Limit how much caffeine and chocolate you have. Do not use any spices or seasonings except salt.  During the next 24 hours  Gradually go back to your normal diet, as you feel better and your symptoms ease.    2104-7430 [employer]. [address] [place] [address], [place] [address]. All rights reserved. This information is not intended as a substitute for professional medical care. Always follow your healthcare professional's instructions.          Treating Diarrhea  Diarrhea occurs when you have loose, watery, or frequent bowel movements. It is a common problem with many causes. Most cases of diarrhea clear up on their own. But certain cases may need treatment. Be sure to see your health care provider if your symptoms do not improve within a few days.    Getting Relief  Treatment of diarrhea depends on its cause. Diarrhea caused by bacterial or parasite infection is often treated with antibiotics. Diarrhea caused by other factors, such as a stomach virus, often improves with simple home treatment. The tips below may also help relieve your symptoms.    Drink plenty of fluids. This helps prevent too much fluid loss (dehydration). Water, clear soups, and electrolyte solutions are good choices. Avoid alcohol, coffee, tea, and milk. These can make symptoms worse.    Suck on ice chips if drinking makes you queasy.    Return to your normal diet slowly. You may want to eat bland foods at first, such as rice and toast. Also, you may need to avoid certain foods for a while, such as dairy products. These can make symptoms worse. Ask your health care provider if there are any other foods  "you should avoid.    If you were prescribed antibiotics, take them as directed.    Do not take anti-diarrhea medications without asking your health care provider first.  Call Your Health Care Provider If You Have:    Fever of 102 F (38.0 C) or higher    Severe pain    Worsening diarrhea or diarrhea for more than 2 days    Bloody vomit or stool    Signs of dehydration (dizziness, dry mouth and tongue, rapid pulse, dark urine)    9856-5330 The [employer]. [address] [place] [address], [place] [address]. All rights reserved. This information is not intended as a substitute for professional medical care. Always follow your healthcare professional's instructions.      Breana,    You seem to have been \"blessed\" in a more powerful way by the influenza-like virus with its primarily vomiting and diarrhea gi effects and your spasmodic cough causing your left flank chest wall pain.  Other than a low potassium which we tried to replace IV along with the 2 liters of IV fluids, your lab tests were overall reassuring.  Hopefully, this will all burn itself off in the next few days. In the meantime, you can use the meds for nausea and vomiting and diarrhea to help out while your system clears the virus.  Get rest and take care of yourself.   Check in with Dr. Flores for a potassium recheck or if you don't seem to be thriving in the next week.  Good luck!       Review of your medicines      START taking        Dose / Directions Last dose taken    diphenoxylate-atropine 2.5-0.025 MG per tablet   Commonly known as:  LOMOTIL   Dose:  1 tablet   Quantity:  12 tablet        Take 1 tablet by mouth 4 times daily as needed for diarrhea   Refills:  0        ondansetron 4 MG ODT tab   Commonly known as:  ZOFRAN ODT   Dose:  4 mg   Quantity:  20 tablet        Take 1 tablet (4 mg) by mouth every 8 hours as needed   Refills:  0                Prescriptions were sent or printed at these locations (2 Prescriptions)                   [id number] - [place], [place] - [address]    [id number] , [place] [address]    Telephone:  [phone]   Fax:  [phone]   Hours:                  Printed at Department/Unit printer (2 of 2)         ondansetron (ZOFRAN ODT) 4 MG ODT tab               diphenoxylate-atropine (LOMOTIL) 2.5-0.025 MG per tablet                Procedures and tests performed during your visit     Procedure/Test Number of Times Performed    Blood culture 2    CBC with platelets differential 1    CK total 1    CRP inflammation 1    Comprehensive metabolic panel 1    Erythrocyte sedimentation rate auto 1    Influenza A/B antigen 1    Lactic acid 1    Lipase 1    Magnesium 1    Orthostatic blood pressure and pulse 1    Peripheral IV catheter 1    Peripheral IV: Standard 1    Pulse oximetry nursing 1    UA with Microscopic 1    XR Ribs & Chest Lt 3v 1      Orders Needing Specimen Collection     Ordered          03/19/17 1458  Occult blood fecal HGB immuno - STAT, Prio: STAT, Needs to be Collected     Scheduled Task Status   03/19/17 1458 Collect Occult blood fecal HGB immuno Open   Order Class:  PCU Collect                03/19/17 1458  Stool culture SSCE - STAT, Prio: STAT, Needs to be Collected     Scheduled Task Status   03/19/17 1458 Collect Stool culture SSCE Open   Order Class:  PCU Collect                03/19/17 1458  Fecal Lactoferrin - STAT, Prio: STAT, Needs to be Collected     Scheduled Task Status   03/19/17 1458 Collect Fecal Lactoferrin Open   Order Class:  PCU Collect                03/19/17 1458  Clostridium difficile toxin B PCR - ROUTINE, Prio: Routine, Needs to be Collected     Scheduled Task Status   03/19/17 1458 Collect Clostridium difficile toxin B PCR Open   Order Class:  PCU Collect                  Pending Results     Date and Time Order Name Status Description    3/19/2017 1511 XR Ribs & Chest Lt 3v In process     3/19/2017 1504 Blood culture In process     3/19/2017 1504 Blood culture In process            "  Pending Culture Results     Date and Time Order Name Status Description    3/19/2017 1504 Blood culture In process     3/19/2017 1504 Blood culture In process             Thank you for choosing [facility]       Thank you for choosing [facility] for your care. Our goal is always to provide you with excellent care. Hearing back from our patients is one way we can continue to improve our services. Please take a few minutes to complete the written survey that you may receive in the mail after you visit with us. Thank you!        [employer] Information     [employer] lets you send messages to your doctor, view your test results, renew your prescriptions, schedule appointments and more. To sign up, go to [email or web address] . Click on \"Log in\" on the left side of the screen, which will take you to the Welcome page. Then click on \"Sign up Now\" on the right side of the page.     You will be asked to enter the access code listed below, as well as some personal information. Please follow the directions to create your username and password.     Your access code is: [email or web address]  Expires: 2017  3:47 PM     Your access code will  in 90 days. If you need help or a new code, please call your [facility] clinic or [phone].        Care EveryWhere ID     This is your Care EveryWhere ID. This could be used by other organizations to access your [facility] medical records  [id number]        After Visit Summary       This is your record. Keep this with you and show to your community pharmacist(s) and doctor(s) at your next visit.                  "

## 2017-03-19 NOTE — DISCHARGE INSTRUCTIONS
Diet for Vomiting or Diarrhea (Adult)    If your symptoms return or get worse after eating certain foods listed below, you should stop eating them until your symptoms ease and you feel better.  Once the vomiting stops, then follow the steps below.   During the first 12 to 24 hours  During the first 12 to 24 hours, follow this diet:    Beverages. Plain water, sport drinks like electrolyte solutions, soft drinks without caffeine, mineral water (plain or flavored), clear fruit juices, and decaffeinated tea and coffee.    Soups. Clear broth, consommé, and bouillon.    Desserts. Plain gelatin, popsicles, and fruit juice bars. As you feel better, you may add 6 to 8 ounces of yogurt per day. If you have diarrhea, don't have foods or beverages that contain sugar, high-fructose corn syrup, or sugar alcohols.  During the next 24 hours  During the next 24 hours you may add the following to the above:    Hot cereal, plain toast, bread, rolls, and crackers    Plain noodles, rice, mashed potatoes, and chicken noodle or rice soup    Unsweetened canned fruit (but not pineapple) and bananas  Don't have more than 15 grams of fat a day. Do this by staying away from margarine, butter, oils, mayonnaise, sauces, gravies, fried foods, peanut butter, meat, poultry, and fish.  Don't eat much fiber. Stay away from raw or cooked vegetables, fresh fruits (except bananas), and bran cereals.  Limit how much caffeine and chocolate you have. Do not use any spices or seasonings except salt.  During the next 24 hours  Gradually go back to your normal diet, as you feel better and your symptoms ease.    2256-3521 The [employer]. [address] [place] [address], [place] [address]. All rights reserved. This information is not intended as a substitute for professional medical care. Always follow your healthcare professional's instructions.          Treating Diarrhea  Diarrhea occurs when you have loose, watery, or frequent bowel movements. It is a  "common problem with many causes. Most cases of diarrhea clear up on their own. But certain cases may need treatment. Be sure to see your health care provider if your symptoms do not improve within a few days.    Getting Relief  Treatment of diarrhea depends on its cause. Diarrhea caused by bacterial or parasite infection is often treated with antibiotics. Diarrhea caused by other factors, such as a stomach virus, often improves with simple home treatment. The tips below may also help relieve your symptoms.    Drink plenty of fluids. This helps prevent too much fluid loss (dehydration). Water, clear soups, and electrolyte solutions are good choices. Avoid alcohol, coffee, tea, and milk. These can make symptoms worse.    Suck on ice chips if drinking makes you queasy.    Return to your normal diet slowly. You may want to eat bland foods at first, such as rice and toast. Also, you may need to avoid certain foods for a while, such as dairy products. These can make symptoms worse. Ask your health care provider if there are any other foods you should avoid.    If you were prescribed antibiotics, take them as directed.    Do not take anti-diarrhea medications without asking your health care provider first.  Call Your Health Care Provider If You Have:    Fever of 102 F (38.0 C) or higher    Severe pain    Worsening diarrhea or diarrhea for more than 2 days    Bloody vomit or stool    Signs of dehydration (dizziness, dry mouth and tongue, rapid pulse, dark urine)    5567-7852 The [employer]. [address] [place], [place] [address]. All rights reserved. This information is not intended as a substitute for professional medical care. Always follow your healthcare professional's instructions.      Breana,    You seem to have been \"blessed\" in a more powerful way by the influenza-like virus with its primarily vomiting and diarrhea gi effects and your spasmodic cough causing your left flank chest wall pain.  Other than " a low potassium which we tried to replace IV along with the 2 liters of IV fluids, your lab tests were overall reassuring.  Hopefully, this will all burn itself off in the next few days. In the meantime, you can use the meds for nausea and vomiting and diarrhea to help out while your system clears the virus.  Get rest and take care of yourself.   Check in with Dr. Flores for a potassium recheck or if you don't seem to be thriving in the next week.  Good luck!

## 2017-03-20 NOTE — PROGRESS NOTES
Chest and rib xray. IMPRESSION: several questionable bilateral lung nodules have increased. Consider f/u CT of chest. Pt advised to f/u. Results faxed to PCP Dr. Flores

## 2017-03-25 LAB
BACTERIA SPEC CULT: NORMAL
BACTERIA SPEC CULT: NORMAL
Lab: NORMAL
Lab: NORMAL
MICRO REPORT STATUS: NORMAL
MICRO REPORT STATUS: NORMAL
SPECIMEN SOURCE: NORMAL
SPECIMEN SOURCE: NORMAL

## 2017-04-28 ENCOUNTER — HOSPITAL ENCOUNTER (INPATIENT)
Facility: HOSPITAL | Age: 27
LOS: 3 days | Discharge: HOME | DRG: 871 | End: 2017-05-01
Admitting: HOSPITALIST
Payer: MEDICAID

## 2017-04-28 DIAGNOSIS — J18.9 COMMUNITY ACQUIRED PNEUMONIA: ICD-10-CM

## 2017-04-28 DIAGNOSIS — R91.8 OPACITY OF LUNG ON IMAGING STUDY: ICD-10-CM

## 2017-04-28 DIAGNOSIS — R07.9 ACUTE CHEST PAIN: Primary | ICD-10-CM

## 2017-04-28 DIAGNOSIS — R06.02 SOB (SHORTNESS OF BREATH): ICD-10-CM

## 2017-04-28 DIAGNOSIS — F17.200 TOBACCO USE DISORDER: ICD-10-CM

## 2017-04-28 DIAGNOSIS — A04.72 C. DIFFICILE COLITIS: ICD-10-CM

## 2017-04-28 DIAGNOSIS — E87.6 HYPOKALEMIA: ICD-10-CM

## 2017-04-28 DIAGNOSIS — F41.1 ANXIETY STATE: ICD-10-CM

## 2017-04-28 DIAGNOSIS — R91.8 LUNG MASS: ICD-10-CM

## 2017-04-28 DIAGNOSIS — J10.1 INFLUENZA B: ICD-10-CM

## 2017-04-28 DIAGNOSIS — M79.10 MUSCLE PAIN: ICD-10-CM

## 2017-04-28 PROBLEM — A41.9 SEPSIS (H): Status: ACTIVE | Noted: 2017-04-28

## 2017-04-28 LAB
ALBUMIN SERPL-MCNC: 2.8 G/DL (ref 3.4–5)
ALBUMIN UR-MCNC: 30 MG/DL
ALP SERPL-CCNC: 101 U/L (ref 40–150)
ALT SERPL W P-5'-P-CCNC: 17 U/L (ref 0–50)
ANION GAP SERPL CALCULATED.3IONS-SCNC: 11 MMOL/L (ref 3–14)
APPEARANCE UR: ABNORMAL
AST SERPL W P-5'-P-CCNC: 22 U/L (ref 0–45)
BACTERIA #/AREA URNS HPF: ABNORMAL /HPF
BASOPHILS # BLD AUTO: 0 10E9/L (ref 0–0.2)
BASOPHILS NFR BLD AUTO: 0.1 %
BILIRUB SERPL-MCNC: 0.3 MG/DL (ref 0.2–1.3)
BILIRUB UR QL STRIP: NEGATIVE
BUN SERPL-MCNC: 7 MG/DL (ref 7–30)
CALCIUM SERPL-MCNC: 8.3 MG/DL (ref 8.5–10.1)
CHLORIDE SERPL-SCNC: 102 MMOL/L (ref 94–109)
CO2 SERPL-SCNC: 26 MMOL/L (ref 20–32)
COLOR UR AUTO: YELLOW
CREAT SERPL-MCNC: 0.49 MG/DL (ref 0.52–1.04)
D DIMER PPP DDU-MCNC: 205 NG/ML D-DU (ref 0–300)
DIFFERENTIAL METHOD BLD: ABNORMAL
EOSINOPHIL # BLD AUTO: 0 10E9/L (ref 0–0.7)
EOSINOPHIL NFR BLD AUTO: 0.5 %
ERYTHROCYTE [DISTWIDTH] IN BLOOD BY AUTOMATED COUNT: 16.1 % (ref 10–15)
FLUAV+FLUBV AG SPEC QL: ABNORMAL
FLUAV+FLUBV AG SPEC QL: NEGATIVE
GFR SERPL CREATININE-BSD FRML MDRD: ABNORMAL ML/MIN/1.7M2
GLUCOSE SERPL-MCNC: 98 MG/DL (ref 70–99)
GLUCOSE UR STRIP-MCNC: NEGATIVE MG/DL
HCG UR QL: NEGATIVE
HCT VFR BLD AUTO: 39.3 % (ref 35–47)
HGB BLD-MCNC: 13.6 G/DL (ref 11.7–15.7)
HGB UR QL STRIP: ABNORMAL
IMM GRANULOCYTES # BLD: 0 10E9/L (ref 0–0.4)
IMM GRANULOCYTES NFR BLD: 0.5 %
KETONES UR STRIP-MCNC: 40 MG/DL
LEUKOCYTE ESTERASE UR QL STRIP: ABNORMAL
LYMPHOCYTES # BLD AUTO: 1.1 10E9/L (ref 0.8–5.3)
LYMPHOCYTES NFR BLD AUTO: 14.5 %
MCH RBC QN AUTO: 32.4 PG (ref 26.5–33)
MCHC RBC AUTO-ENTMCNC: 34.6 G/DL (ref 31.5–36.5)
MCV RBC AUTO: 94 FL (ref 78–100)
MONOCYTES # BLD AUTO: 0.5 10E9/L (ref 0–1.3)
MONOCYTES NFR BLD AUTO: 6.5 %
MUCOUS THREADS #/AREA URNS LPF: PRESENT /LPF
NEUTROPHILS # BLD AUTO: 6.1 10E9/L (ref 1.6–8.3)
NEUTROPHILS NFR BLD AUTO: 77.9 %
NITRATE UR QL: NEGATIVE
NRBC # BLD AUTO: 0 10*3/UL
NRBC BLD AUTO-RTO: 0 /100
PH UR STRIP: 6.5 PH (ref 4.7–8)
PHOSPHATE SERPL-MCNC: 2.6 MG/DL (ref 2.5–4.5)
PLATELET # BLD AUTO: 250 10E9/L (ref 150–450)
POTASSIUM SERPL-SCNC: 2.6 MMOL/L (ref 3.4–5.3)
PROT SERPL-MCNC: 7.3 G/DL (ref 6.8–8.8)
RBC # BLD AUTO: 4.2 10E12/L (ref 3.8–5.2)
RBC #/AREA URNS AUTO: 6 /HPF (ref 0–2)
SODIUM SERPL-SCNC: 139 MMOL/L (ref 133–144)
SP GR UR STRIP: 1.02 (ref 1–1.03)
SPECIMEN SOURCE: ABNORMAL
SQUAMOUS #/AREA URNS AUTO: 2 /HPF (ref 0–1)
URN SPEC COLLECT METH UR: ABNORMAL
UROBILINOGEN UR STRIP-MCNC: NORMAL MG/DL (ref 0–2)
WBC # BLD AUTO: 7.9 10E9/L (ref 4–11)
WBC #/AREA URNS AUTO: 13 /HPF (ref 0–2)

## 2017-04-28 PROCEDURE — 25000132 ZZH RX MED GY IP 250 OP 250 PS 637

## 2017-04-28 PROCEDURE — 87493 C DIFF AMPLIFIED PROBE: CPT | Performed by: HOSPITALIST

## 2017-04-28 PROCEDURE — 96361 HYDRATE IV INFUSION ADD-ON: CPT

## 2017-04-28 PROCEDURE — 81001 URINALYSIS AUTO W/SCOPE: CPT

## 2017-04-28 PROCEDURE — 25000132 ZZH RX MED GY IP 250 OP 250 PS 637: Performed by: HOSPITALIST

## 2017-04-28 PROCEDURE — 25000128 H RX IP 250 OP 636: Performed by: HOSPITALIST

## 2017-04-28 PROCEDURE — 96375 TX/PRO/DX INJ NEW DRUG ADDON: CPT

## 2017-04-28 PROCEDURE — 87086 URINE CULTURE/COLONY COUNT: CPT

## 2017-04-28 PROCEDURE — 25000125 ZZHC RX 250

## 2017-04-28 PROCEDURE — G0378 HOSPITAL OBSERVATION PER HR: HCPCS

## 2017-04-28 PROCEDURE — 99284 EMERGENCY DEPT VISIT MOD MDM: CPT

## 2017-04-28 PROCEDURE — 99222 1ST HOSP IP/OBS MODERATE 55: CPT | Performed by: HOSPITALIST

## 2017-04-28 PROCEDURE — 87040 BLOOD CULTURE FOR BACTERIA: CPT | Performed by: HOSPITALIST

## 2017-04-28 PROCEDURE — 96365 THER/PROPH/DIAG IV INF INIT: CPT

## 2017-04-28 PROCEDURE — 12000000 ZZH R&B MED SURG/OB

## 2017-04-28 PROCEDURE — 25000128 H RX IP 250 OP 636

## 2017-04-28 PROCEDURE — 71020 ZZHC CHEST TWO VIEWS, FRONT/LAT: CPT | Mod: TC

## 2017-04-28 PROCEDURE — 84100 ASSAY OF PHOSPHORUS: CPT | Performed by: HOSPITALIST

## 2017-04-28 PROCEDURE — 80053 COMPREHEN METABOLIC PANEL: CPT

## 2017-04-28 PROCEDURE — 94640 AIRWAY INHALATION TREATMENT: CPT

## 2017-04-28 PROCEDURE — 36415 COLL VENOUS BLD VENIPUNCTURE: CPT | Performed by: HOSPITALIST

## 2017-04-28 PROCEDURE — 87804 INFLUENZA ASSAY W/OPTIC: CPT

## 2017-04-28 PROCEDURE — 85025 COMPLETE CBC W/AUTO DIFF WBC: CPT

## 2017-04-28 PROCEDURE — 81025 URINE PREGNANCY TEST: CPT

## 2017-04-28 PROCEDURE — 85379 FIBRIN DEGRADATION QUANT: CPT

## 2017-04-28 PROCEDURE — 99285 EMERGENCY DEPT VISIT HI MDM: CPT | Mod: 25

## 2017-04-28 PROCEDURE — 36415 COLL VENOUS BLD VENIPUNCTURE: CPT

## 2017-04-28 RX ORDER — NALOXONE HYDROCHLORIDE 0.4 MG/ML
.1-.4 INJECTION, SOLUTION INTRAMUSCULAR; INTRAVENOUS; SUBCUTANEOUS
Status: DISCONTINUED | OUTPATIENT
Start: 2017-04-28 | End: 2017-05-01 | Stop reason: HOSPADM

## 2017-04-28 RX ORDER — ONDANSETRON 2 MG/ML
4 INJECTION INTRAMUSCULAR; INTRAVENOUS EVERY 30 MIN PRN
Status: DISCONTINUED | OUTPATIENT
Start: 2017-04-28 | End: 2017-05-01 | Stop reason: HOSPADM

## 2017-04-28 RX ORDER — MAGNESIUM SULFATE HEPTAHYDRATE 40 MG/ML
4 INJECTION, SOLUTION INTRAVENOUS EVERY 4 HOURS PRN
Status: DISCONTINUED | OUTPATIENT
Start: 2017-04-28 | End: 2017-05-01 | Stop reason: HOSPADM

## 2017-04-28 RX ORDER — LIDOCAINE 40 MG/G
CREAM TOPICAL
Status: DISCONTINUED | OUTPATIENT
Start: 2017-04-28 | End: 2017-05-01 | Stop reason: HOSPADM

## 2017-04-28 RX ORDER — ACETAMINOPHEN 325 MG/1
650 TABLET ORAL EVERY 4 HOURS PRN
Status: DISCONTINUED | OUTPATIENT
Start: 2017-04-28 | End: 2017-05-01 | Stop reason: HOSPADM

## 2017-04-28 RX ORDER — LORAZEPAM 2 MG/ML
1 INJECTION INTRAMUSCULAR EVERY 4 HOURS PRN
Status: DISCONTINUED | OUTPATIENT
Start: 2017-04-28 | End: 2017-04-30

## 2017-04-28 RX ORDER — KETOROLAC TROMETHAMINE 30 MG/ML
30 INJECTION, SOLUTION INTRAMUSCULAR; INTRAVENOUS ONCE
Status: COMPLETED | OUTPATIENT
Start: 2017-04-28 | End: 2017-04-28

## 2017-04-28 RX ORDER — POTASSIUM CHLORIDE 7.45 MG/ML
10 INJECTION INTRAVENOUS
Status: DISCONTINUED | OUTPATIENT
Start: 2017-04-28 | End: 2017-05-01 | Stop reason: HOSPADM

## 2017-04-28 RX ORDER — ONDANSETRON 4 MG/1
4 TABLET, ORALLY DISINTEGRATING ORAL
Status: ON HOLD | COMMUNITY
Start: 2017-03-19 | End: 2017-04-28

## 2017-04-28 RX ORDER — POTASSIUM CHLORIDE 1500 MG/1
20-40 TABLET, EXTENDED RELEASE ORAL
Status: DISCONTINUED | OUTPATIENT
Start: 2017-04-28 | End: 2017-05-01 | Stop reason: HOSPADM

## 2017-04-28 RX ORDER — CEFTRIAXONE SODIUM 1 G/50ML
1 INJECTION, SOLUTION INTRAVENOUS EVERY 24 HOURS
Status: DISCONTINUED | OUTPATIENT
Start: 2017-04-28 | End: 2017-05-01 | Stop reason: HOSPADM

## 2017-04-28 RX ORDER — PROCHLORPERAZINE MALEATE 5 MG
5-10 TABLET ORAL EVERY 6 HOURS PRN
Status: DISCONTINUED | OUTPATIENT
Start: 2017-04-28 | End: 2017-05-01 | Stop reason: HOSPADM

## 2017-04-28 RX ORDER — DEXTROSE MONOHYDRATE, SODIUM CHLORIDE, AND POTASSIUM CHLORIDE 50; 1.49; 4.5 G/1000ML; G/1000ML; G/1000ML
INJECTION, SOLUTION INTRAVENOUS CONTINUOUS
Status: DISCONTINUED | OUTPATIENT
Start: 2017-04-28 | End: 2017-05-01

## 2017-04-28 RX ORDER — POLYETHYLENE GLYCOL 3350 17 G/17G
17 POWDER, FOR SOLUTION ORAL DAILY PRN
Status: DISCONTINUED | OUTPATIENT
Start: 2017-04-28 | End: 2017-04-28

## 2017-04-28 RX ORDER — HYDROMORPHONE HYDROCHLORIDE 1 MG/ML
.3-.5 INJECTION, SOLUTION INTRAMUSCULAR; INTRAVENOUS; SUBCUTANEOUS
Status: DISCONTINUED | OUTPATIENT
Start: 2017-04-28 | End: 2017-04-29

## 2017-04-28 RX ORDER — LORAZEPAM 2 MG/ML
1 INJECTION INTRAMUSCULAR ONCE
Status: COMPLETED | OUTPATIENT
Start: 2017-04-28 | End: 2017-04-28

## 2017-04-28 RX ORDER — IPRATROPIUM BROMIDE AND ALBUTEROL SULFATE 2.5; .5 MG/3ML; MG/3ML
3 SOLUTION RESPIRATORY (INHALATION) ONCE
Status: COMPLETED | OUTPATIENT
Start: 2017-04-28 | End: 2017-04-28

## 2017-04-28 RX ORDER — POTASSIUM CL/LIDO/0.9 % NACL 10MEQ/0.1L
10 INTRAVENOUS SOLUTION, PIGGYBACK (ML) INTRAVENOUS
Status: DISCONTINUED | OUTPATIENT
Start: 2017-04-28 | End: 2017-05-01 | Stop reason: HOSPADM

## 2017-04-28 RX ORDER — POTASSIUM CHLORIDE 1.5 G/1.58G
20-40 POWDER, FOR SOLUTION ORAL
Status: DISCONTINUED | OUTPATIENT
Start: 2017-04-28 | End: 2017-05-01 | Stop reason: HOSPADM

## 2017-04-28 RX ORDER — ONDANSETRON 2 MG/ML
4 INJECTION INTRAMUSCULAR; INTRAVENOUS EVERY 6 HOURS PRN
Status: DISCONTINUED | OUTPATIENT
Start: 2017-04-28 | End: 2017-05-01 | Stop reason: HOSPADM

## 2017-04-28 RX ORDER — IPRATROPIUM BROMIDE AND ALBUTEROL SULFATE 2.5; .5 MG/3ML; MG/3ML
3 SOLUTION RESPIRATORY (INHALATION)
COMMUNITY
Start: 2017-01-11 | End: 2018-02-17

## 2017-04-28 RX ORDER — MEDROXYPROGESTERONE ACETATE 150 MG/ML
150 INJECTION, SUSPENSION INTRAMUSCULAR
COMMUNITY
Start: 2016-05-16 | End: 2017-08-14

## 2017-04-28 RX ORDER — SODIUM CHLORIDE 9 MG/ML
1000 INJECTION, SOLUTION INTRAVENOUS CONTINUOUS
Status: DISCONTINUED | OUTPATIENT
Start: 2017-04-28 | End: 2017-04-30 | Stop reason: ALTCHOICE

## 2017-04-28 RX ORDER — ONDANSETRON 4 MG/1
4 TABLET, ORALLY DISINTEGRATING ORAL EVERY 6 HOURS PRN
Status: DISCONTINUED | OUTPATIENT
Start: 2017-04-28 | End: 2017-05-01 | Stop reason: HOSPADM

## 2017-04-28 RX ORDER — OSELTAMIVIR PHOSPHATE 75 MG/1
75 CAPSULE ORAL 2 TIMES DAILY
Status: DISCONTINUED | OUTPATIENT
Start: 2017-04-28 | End: 2017-05-01 | Stop reason: HOSPADM

## 2017-04-28 RX ORDER — PROCHLORPERAZINE 25 MG
25 SUPPOSITORY, RECTAL RECTAL EVERY 12 HOURS PRN
Status: DISCONTINUED | OUTPATIENT
Start: 2017-04-28 | End: 2017-05-01 | Stop reason: HOSPADM

## 2017-04-28 RX ADMIN — SODIUM CHLORIDE 1000 ML: 9 INJECTION, SOLUTION INTRAVENOUS at 19:32

## 2017-04-28 RX ADMIN — KETOROLAC TROMETHAMINE 30 MG: 30 INJECTION, SOLUTION INTRAMUSCULAR; INTRAVENOUS at 20:26

## 2017-04-28 RX ADMIN — POTASSIUM CHLORIDE 10 MEQ: 14.9 INJECTION, SOLUTION, CONCENTRATE PARENTERAL at 23:58

## 2017-04-28 RX ADMIN — IPRATROPIUM BROMIDE AND ALBUTEROL SULFATE 3 ML: .5; 3 SOLUTION RESPIRATORY (INHALATION) at 19:08

## 2017-04-28 RX ADMIN — OSELTAMIVIR PHOSPHATE 75 MG: 75 CAPSULE ORAL at 23:50

## 2017-04-28 RX ADMIN — HYDROMORPHONE HYDROCHLORIDE 0.5 MG: 1 INJECTION, SOLUTION INTRAMUSCULAR; INTRAVENOUS; SUBCUTANEOUS at 23:50

## 2017-04-28 RX ADMIN — ONDANSETRON 4 MG: 2 INJECTION INTRAMUSCULAR; INTRAVENOUS at 19:33

## 2017-04-28 RX ADMIN — Medication: at 19:29

## 2017-04-28 RX ADMIN — POTASSIUM CHLORIDE 10 MEQ: 14.9 INJECTION, SOLUTION, CONCENTRATE PARENTERAL at 20:51

## 2017-04-28 RX ADMIN — POTASSIUM CHLORIDE 10 MEQ: 14.9 INJECTION, SOLUTION, CONCENTRATE PARENTERAL at 22:36

## 2017-04-28 RX ADMIN — LORAZEPAM 1 MG: 2 INJECTION, SOLUTION INTRAMUSCULAR; INTRAVENOUS at 21:31

## 2017-04-28 ASSESSMENT — ENCOUNTER SYMPTOMS
WHEEZING: 1
DIARRHEA: 1
COUGH: 1
FEVER: 0
MYALGIAS: 1
DIFFICULTY URINATING: 0
ACTIVITY CHANGE: 1
COLOR CHANGE: 0
RHINORRHEA: 1
NECK STIFFNESS: 0
SHORTNESS OF BREATH: 1
SINUS PRESSURE: 1
APPETITE CHANGE: 1
NAUSEA: 1
ABDOMINAL PAIN: 0
VOMITING: 1
EYE REDNESS: 0
CONFUSION: 0
ARTHRALGIAS: 1
FATIGUE: 1
HEADACHES: 0

## 2017-04-28 ASSESSMENT — ACTIVITIES OF DAILY LIVING (ADL)
FALL_HISTORY_WITHIN_LAST_SIX_MONTHS: NO
TRANSFERRING: 0-->INDEPENDENT
AMBULATION: 0-->INDEPENDENT
BATHING: 0-->INDEPENDENT
TOILETING: 0-->INDEPENDENT
RETIRED_EATING: 0-->INDEPENDENT
COGNITION: 0 - NO COGNITION ISSUES REPORTED
RETIRED_COMMUNICATION: 0-->UNDERSTANDS/COMMUNICATES WITHOUT DIFFICULTY
SWALLOWING: 0-->SWALLOWS FOODS/LIQUIDS WITHOUT DIFFICULTY
DRESS: 0-->INDEPENDENT

## 2017-04-28 ASSESSMENT — PAIN DESCRIPTION - DESCRIPTORS: DESCRIPTORS: STABBING;SHOOTING

## 2017-04-28 NOTE — IP AVS SNAPSHOT
[facility]    [address] [place] [address]    Phone:  [phone]    Fax:  [phone]                                       After Visit Summary   4/28/2017    Maggie Case    MRN: [id number]           After Visit Summary Signature Page     I have received my discharge instructions, and my questions have been answered. I have discussed any challenges I see with this plan with the nurse or doctor.    ..........................................................................................................................................  Patient/Patient Representative Signature      ..........................................................................................................................................  Patient Representative Print Name and Relationship to Patient    ..................................................               ................................................  Date                                            Time    ..........................................................................................................................................  Reviewed by Signature/Title    ...................................................              ..............................................  Date                                                            Time

## 2017-04-28 NOTE — IP AVS SNAPSHOT
MRN:[id number]                      After Visit Summary   4/28/2017    Maggie Case    MRN: [id number]           Thank you!     Thank you for choosing [facility] for your care. Our goal is always to provide you with excellent care. Hearing back from our patients is one way we can continue to improve our services. Please take a few minutes to complete the written survey that you may receive in the mail after you visit with us. Thank you!        Patient Information     Date Of Birth          1990        Designated Caregiver       Most Recent Value    Caregiver    Will someone help with your care after discharge? no      About your hospital stay     You were admitted on:  April 28, 2017 You last received care in the:  [facility]    You were discharged on:  May 1, 2017        Reason for your hospital stay       C-diff colitis, Influenza B infection, community acquired pneumonia                  Who to Call     For medical emergencies, please call 911.  For non-urgent questions about your medical care, please call your primary care provider or clinic, [phone]          Attending Provider     Provider Specialty    Yelitza Hampton APRN FNBRENDON Nurse Practitioner - Family    Chester Cool MD Internal Medicine    Kareem Verduzco MD --    Gail Shea MD Internal Medicine    Delmi Nicole, NP Nurse Practitioner       Primary Care Provider Office Phone # Fax #    Chapo SOSA MD Mark [phone]       [address] [address]         When to contact your care team       Call your primary doctor if you have any of the following: worsening diarrhea, nausea/vomiting, blood in stool, shortness of breath, fever.                  After Care Instructions     Activity       Your activity upon discharge: activity as tolerated            Diet       Follow this diet upon discharge: Orders Placed This Encounter      Regular Diet Adult                   Follow-up Appointments     Follow-up and recommended labs and tests        Follow up with primary care provider, Chapo Flores, within 5 days for hospital follow- up.  No follow up labs or test are needed.                  Additional Services     PULMONARY MEDICINE REFERRAL       Your provider has referred you to: [facility] for persistent bilateral pulmonary opacities.    Please be aware that coverage of these services is subject to the terms and limitations of your health insurance plan.  Call member services at your health plan with any benefit or coverage questions.      Please bring the following with you to your appointment:    (1) Any X-Rays, CTs or MRIs which have been performed.  Contact the facility where they were done to arrange for  prior to your scheduled appointment.    (2) List of current medications   (3) This referral request   (4) Any documents/labs given to you for this referral                  Further instructions from your care team       What to expect when you have contrast    During your exam, we will inject  contrast  into your vein or artery. (Contrast is a clear liquid with iodine in it. It shows up on X-rays.)    You may feel warm or hot. You may have a metal taste in your mouth and a slight upset stomach. You may also feel pressure near the kidneys and bladder. These effects will last about 1 to 3 minutes.    Please tell us if you have:    Sneezing     Itching    Hives     Swelling in the face    A hoarse voice    Breathing problems                YOU HAVE A FOLLOW UP APPOINTMENT WITH DR FLORES ON Wednesday MAY 3RD AT NOON AT THE [facility] [phone]        YOU HAVE AN APPOINTMENT WITH DR EVANS AT [facility] ON June 1ST AT 10AM--[phone]      OR  [phone]  LOCATED AT [address]    Other new symptoms    Serious problems are rare.  They may include:     Irregular heartbeat     Seizures    Kidney failure              Tissue damage    Shock      Death    If you have any problems during the exam, we  will treat them right away.    When you get home    Call your hospital if you have any new symptoms in the next 2 days, like hives or swelling. (Phone numbers are at the bottom of this page.) Or call your family doctor.     If you have wheezing or trouble breathing, call 911.    Self-care  -Drink at least 4 extra glasses of water today.   This reduces the stress on your kidneys.  -Keep taking your regular medicines.    The contrast will pass out of your body in your  Urine(pee). This will happen in the next 24 hours. You  will not feel this. Your urine will not  change color.    If you have kidney problems or take metformin    Drink 4 to 8 large glasses of water for the next  2 days, if you are not on a fluid restriction.    ?If you take metformin (Glucophage or Glucovance) for diabetes, keep taking it.      ?Your kidney function tests are abnormal.  If you take Metformin, do not take it for 48 hours. Please go to your clinic for a blood test within 3 days after your exam before the restarting this medicine.     (Note to provider:please give patient prescription for lab tests.)    ?Special instructions: Drink an extra 4 glasses of water to flush out the contrast.     I have read and understand the above information.    Patient Sign Here:______________________________________Date:________Time:______    Staff Sign Here:________________________________________Date:_______Time:______      Radiology Departments:     ?[facility]: [phone] ?[facility]: [phone]     ?[facility]: [phone] ?[facility]:[phone]      ?[facility]: [phone]  ?[facility]: [phone]  ?[facility]:[phone]    ?[facility]:[phone]  ?[facility]:[phone]    Pending Results     Date and Time Order Name Status Description    4/29/2017 1014 Send outs misc test In process      "2017 0939 XR Chest 2 Views Preliminary     2017 2259 Blood culture Preliminary     2017 2259 Blood culture Preliminary     2017 2255 CT Abdomen Pelvis w Contrast Preliminary     2017 1857 XR Chest 2 Views Preliminary             Statement of Approval     Ordered          17 0904  I have reviewed and agree with all the recommendations and orders detailed in this document.  EFFECTIVE NOW     Approved and electronically signed by:  eDlmi Nicole NP             Admission Information     Date & Time Provider Department Dept. Phone    2017 Delmi Nicole NP [facility] [phone]      Your Vitals Were     Blood Pressure Pulse Temperature Respirations Height Weight    118/70 (BP Location: Left arm) 90 98.7  F (37.1  C) (Tympanic) 18 1.626 m (5' 4\") 63.5 kg (139 lb 15.9 oz)    Pulse Oximetry BMI (Body Mass Index)                100% 24.03 kg/m2          MyChart Information     [employer] lets you send messages to your doctor, view your test results, renew your prescriptions, schedule appointments and more. To sign up, go to [email or web address] . Click on \"Log in\" on the left side of the screen, which will take you to the Welcome page. Then click on \"Sign up Now\" on the right side of the page.     You will be asked to enter the access code listed below, as well as some personal information. Please follow the directions to create your username and password.     Your access code is: [email or web address]  Expires: 2017 10:21 PM     Your access code will  in 90 days. If you need help or a new code, please call your [facility] clinic or [phone].        Care EveryWhere ID     This is your Care EveryWhere ID. This could be used by other organizations to access your [facility] medical records  [id number]           Review of your medicines      START taking        Dose / Directions    azithromycin 250 MG tablet   Commonly known as:  ZITHROMAX   Indication:  Community " Acquired Pneumonia        Dose:  250 mg   Start taking on:  5/2/2017   Take 1 tablet (250 mg) by mouth daily for 3 days   Quantity:  3 tablet   Refills:  0       guaiFENesin-dextromethorphan 100-10 MG/5ML syrup   Commonly known as:  ROBITUSSIN DM        Dose:  5 mL   Take 5 mLs by mouth every 4 hours as needed for cough   Quantity:  560 mL   Refills:  0       LORazepam 1 MG tablet   Commonly known as:  ATIVAN   Used for:  Anxiety state        Dose:  1 mg   Take 1 tablet (1 mg) by mouth every 8 hours as needed for anxiety   Quantity:  15 tablet   Refills:  0       nicotine 14 MG/24HR 24 hr patch   Commonly known as:  NICODERM CQ   Used for:  Tobacco use disorder        Dose:  1 patch   Place 1 patch onto the skin daily   Quantity:  14 patch   Refills:  0       oseltamivir 75 MG capsule   Commonly known as:  TAMIFLU   Indication:  Flu        Dose:  75 mg   Take 1 capsule (75 mg) by mouth 2 times daily for 4 doses   Quantity:  4 capsule   Refills:  0       oxyCODONE 10 MG IR tablet   Commonly known as:  ROXICODONE   Used for:  Acute chest pain, SOB (shortness of breath), Anxiety state, Muscle pain        Dose:  10 mg   Take 1 tablet (10 mg) by mouth every 8 hours as needed for moderate to severe pain   Quantity:  10 tablet   Refills:  0       saccharomyces boulardii 250 MG capsule   Commonly known as:  FLORASTOR        Dose:  250 mg   Take 1 capsule (250 mg) by mouth 2 times daily for 14 days   Quantity:  28 capsule   Refills:  0       vancomycin 50 mg/mL Liqd solution   Commonly known as:  VANOCIN   Indication:  Clostridium difficile Bacteria        Dose:  125 mg   Take 2.5 mLs (125 mg) by mouth 4 times daily for 10 days   Quantity:  100 mL   Refills:  0         CONTINUE these medicines which have NOT CHANGED        Dose / Directions    ipratropium - albuterol 0.5 mg/2.5 mg/3 mL 0.5-2.5 (3) MG/3ML neb solution   Commonly known as:  DUONEB        Dose:  3 mL   Inhale 3 mLs into the lungs   Refills:  0        medroxyPROGESTERone 150 MG/ML injection   Commonly known as:  DEPO-PROVERA        Dose:  150 mg   Inject 150 mg into the muscle   Refills:  0            Where to get your medicines      These medications were sent to [facility] - [place], [place] - [address] [place] [address] AT [facility]  [address] [place] [address] [place] [address]     Phone:  [phone]     azithromycin 250 MG tablet    guaiFENesin-dextromethorphan 100-10 MG/5ML syrup    nicotine 14 MG/24HR 24 hr patch    oseltamivir 75 MG capsule    saccharomyces boulardii 250 MG capsule    vancomycin 50 mg/mL Liqd solution         Some of these will need a paper prescription and others can be bought over the counter. Ask your nurse if you have questions.     Bring a paper prescription for each of these medications     LORazepam 1 MG tablet    oxyCODONE 10 MG IR tablet                Protect others around you: Learn how to safely use, store and throw away your medicines at www.disposemymeds.org.             Medication List: This is a list of all your medications and when to take them. Check marks below indicate your daily home schedule. Keep this list as a reference.      Medications           Morning Afternoon Evening Bedtime As Needed    azithromycin 250 MG tablet   Commonly known as:  ZITHROMAX   Take 1 tablet (250 mg) by mouth daily for 3 days   Start taking on:  5/2/2017   Last time this was given:  250 mg on 5/1/2017 10:27 AM                                guaiFENesin-dextromethorphan 100-10 MG/5ML syrup   Commonly known as:  ROBITUSSIN DM   Take 5 mLs by mouth every 4 hours as needed for cough   Last time this was given:  5 mLs on 5/1/2017  3:32 AM                                ipratropium - albuterol 0.5 mg/2.5 mg/3 mL 0.5-2.5 (3) MG/3ML neb solution   Commonly known as:  DUONEB   Inhale 3 mLs into the lungs   Last time this was given:  3 mLs on 4/28/2017  7:08 PM                                LORazepam 1 MG tablet   Commonly known  as:  ATIVAN   Take 1 tablet (1 mg) by mouth every 8 hours as needed for anxiety   Last time this was given:  1 mg on 5/1/2017  9:10 AM                                medroxyPROGESTERone 150 MG/ML injection   Commonly known as:  DEPO-PROVERA   Inject 150 mg into the muscle                                nicotine 14 MG/24HR 24 hr patch   Commonly known as:  NICODERM CQ   Place 1 patch onto the skin daily   Last time this was given:  1 patch on 4/30/2017  5:00 PM                                oseltamivir 75 MG capsule   Commonly known as:  TAMIFLU   Take 1 capsule (75 mg) by mouth 2 times daily for 4 doses   Last time this was given:  75 mg on 5/1/2017  9:11 AM                                oxyCODONE 10 MG IR tablet   Commonly known as:  ROXICODONE   Take 1 tablet (10 mg) by mouth every 8 hours as needed for moderate to severe pain   Last time this was given:  10 mg on 5/1/2017 10:27 AM                                saccharomyces boulardii 250 MG capsule   Commonly known as:  FLORASTOR   Take 1 capsule (250 mg) by mouth 2 times daily for 14 days   Last time this was given:  250 mg on 5/1/2017  9:11 AM                                vancomycin 50 mg/mL Liqd solution   Commonly known as:  VANOCIN   Take 2.5 mLs (125 mg) by mouth 4 times daily for 10 days   Last time this was given:  125 mg on 5/1/2017  9:12 AM                                          More Information        Influenza (Adult)    Influenza is also called the flu. It is a viral illness that affects the air passages of your lungs. It is different from the common cold. The flu can easily be passed from one to person to another. It may be spread through the air by coughing and sneezing. Or it can be spread by touching the sick person and then touching your own eyes, nose, or mouth.  The flu starts 1 to 3 days after you are exposed to the flu virus. It may last for 1 to 2 weeks. You usually don t need to take antibiotics unless you have a complication. This  might be an ear or sinus infection or pneumonia.  Symptoms of the flu may be mild or severe. They can include extreme tiredness (wanting to stay in bed all day), chills, fevers, muscle aches, soreness with eye movement, headache, and a dry, hacking cough.  Home care  Follow these guidelines when caring for yourself at home:    Avoid being around cigarette smoke, whether yours or other people s.    Acetaminophen or ibuprofen will help ease your fever, muscle aches, and headache. Don t give aspirin to anyone younger than 18 who has the flu. Aspirin can harm the liver.    Nausea and loss of appetite are common with the flu. Eat light meals. Drink 6 to 8 glasses of liquids every day. Good choices are water, sport drinks, soft drinks without caffeine, juices, tea, and soup. Extra fluids will also help loosen secretions in your nose and lungs.    Over-the-counter cold medicines will not make the flu go away faster. But the medicines may help with coughing, sore throat, and congestion in your nose and sinuses. Don t use a decongestant if you have high blood pressure.    Stay home until your fever has been gone for at least 24 hours without using medicine to reduce fever.  Follow-up care  Follow up with your health care provider, or as advised, if you are not getting better over the next week.  If you are 65 or older, talk with your provider about getting a pneumococcal vaccine every 5 years. You should also get this vaccine if you have chronic asthma or COPD. All adults should get a flu vaccine every fall. Ask your provider about this.  When to seek medical advice  Call your health care provider right away if any of these occur:    Cough with lots of colored sputum (mucus) or blood in your sputum    Chest pain, shortness of breath, wheezing, or difficulty breathing    Severe headache, or face, neck, or ear pain    New rash with fever    Fever of 101 F (38 C) oral or higher that doesn t get better with fever  medicine    Confusion, behavior change, or seizure    Severe weakness or dizziness    You get a fever or cough after getting better for a few days       1625-6668 [employer]. [address] [place] [address], [place] [address]. All rights reserved. This information is not intended as a substitute for professional medical care. Always follow your healthcare professional's instructions.                Patient Education    Vancomycin Hydrochloride Oral capsule    Vancomycin Hydrochloride Oral solution    Vancomycin Hydrochloride Solution for injection    Vancomycin Hydrochloride, Dextrose Solution for injection  Vancomycin Hydrochloride Oral solution  What is this medicine?  VANCOMYCIN (van koe MYE sin) is a glycopeptide antibiotic. It is used to treat certain kinds of bacterial infections in the bowel. It will not work for colds, flu, or other viral infections.  This medicine may be used for other purposes; ask your health care provider or pharmacist if you have questions.  What should I tell my health care provider before I take this medicine?  They need to know if you have any of these conditions:    dehydration    inflammatory bowel disease    kidney disease    other chronic illness    an unusual or allergic reaction to vancomycin, other medicines, foods, dyes, or preservatives    pregnant or trying to get pregnant    breast-feeding  How should I use this medicine?  Take this medicine by mouth. Follow the directions on the prescription label. Shake well before using. Use a specially marked spoon or dropper to measure each dose. Ask your pharmacist if you do not have one. Household spoons are not accurate. Take your medicine at regular intervals. Do not take your medicine more often than directed. Take all of your medicine as directed even if you think you are better. Do not skip doses or stop your medicine early.  Talk to your pediatrician regarding the use of this medicine in children. Special care may be  needed.  Overdosage: If you think you have taken too much of this medicine contact a poison control center or emergency room at once.  NOTE: This medicine is only for you. Do not share this medicine with others.  What if I miss a dose?  If you miss a dose, take it as soon as you can. If it is almost time for your next dose, take only that dose. Do not take double or extra doses.  What may interact with this medicine?    birth control pills    cholestyramine    colestipol    vancomycin injection  This list may not describe all possible interactions. Give your health care provider a list of all the medicines, herbs, non-prescription drugs, or dietary supplements you use. Also tell them if you smoke, drink alcohol, or use illegal drugs. Some items may interact with your medicine.  What should I watch for while using this medicine?  Tell your doctor or health care professional if your symptoms do not improve or if you get new symptoms.  Avoid taking this medicine within 3 or 4 hours of taking cholestyramine or colestipol.  What side effects may I notice from receiving this medicine?  Side effects that you should report to your doctor or health care professional as soon as possible:    allergic reactions like skin rash, itching or hives, swelling of the face, lips, or tongue    breathing difficulty    change in amount, color of urine    change in hearing    dizziness    fever, infection    redness, blistering, peeling or loosening of the skin, including inside the mouth    unusual bleeding or bruising    unusually weak or tired  Side effects that usually do not require medical attention (report to your doctor or health care professional if they continue or are bothersome):    nausea, vomiting    stomach cramps  This list may not describe all possible side effects. Call your doctor for medical advice about side effects. You may report side effects to FDA at 4-460-FDA-6940.  Where should I keep my medicine?  Keep out of  the reach of children.  After this medicine is mixed by your pharmacist, store it in a refrigerator between 2 and 8 degrees C (36 and 46 degrees F). Do not freeze. Throw away any unused medicine after 14 days.  NOTE:This sheet is a summary. It may not cover all possible information. If you have questions about this medicine, talk to your doctor, pharmacist, or health care provider. Copyright  2016 [employer]                Patient Education    Oseltamivir Phosphate Oral capsule    Oseltamivir Phosphate Oral suspension  Oseltamivir Phosphate Oral capsule  What is this medicine?  OSELTAMIVIR (os el MAC i vir) is an antiviral medicine. It is used to prevent and to treat some kinds of influenza or the flu. It will not work for colds or other viral infections.  This medicine may be used for other purposes; ask your health care provider or pharmacist if you have questions.  What should I tell my health care provider before I take this medicine?  They need to know if you have any of the following conditions:    heart disease    immune system problems    kidney disease    liver disease    lung disease    an unusual or allergic reaction to oseltamivir, other medicines, foods, dyes, or preservatives    pregnant or trying to get pregnant    breast-feeding  How should I use this medicine?  Take this medicine by mouth with a glass of water. Follow the directions on the prescription label. Start this medicine at the first sign of flu symptoms. You can take it with or without food. If it upsets your stomach, take it with food. Take your medicine at regular intervals. Do not take your medicine more often than directed. Take all of your medicine as directed even if you think you are better. Do not skip doses or stop your medicine early.  Talk to your pediatrician regarding the use of this medicine in children. While this drug may be prescribed for children as young as 14 days for selected conditions, precautions do  apply.  Overdosage: If you think you have taken too much of this medicine contact a poison control center or emergency room at once.  NOTE: This medicine is only for you. Do not share this medicine with others.  What if I miss a dose?  If you miss a dose, take it as soon as you remember. If it is almost time for your next dose (within 2 hours), take only that dose. Do not take double or extra doses.  What may interact with this medicine?  Interactions are not expected.  This list may not describe all possible interactions. Give your health care provider a list of all the medicines, herbs, non-prescription drugs, or dietary supplements you use. Also tell them if you smoke, drink alcohol, or use illegal drugs. Some items may interact with your medicine.  What should I watch for while using this medicine?  Visit your doctor or health care professional for regular check ups. Tell your doctor if your symptoms do not start to get better or if they get worse.  If you have the flu, you may be at an increased risk of developing seizures, confusion, or abnormal behavior. This occurs early in the illness, and more frequently in children and teens. These events are not common, but may result in accidental injury to the patient. Families and caregivers of patients should watch for signs of unusual behavior and contact a doctor or health care professional right away if the patient shows signs of unusual behavior.  This medicine is not a substitute for the flu shot. Talk to your doctor each year about an annual flu shot.  What side effects may I notice from receiving this medicine?  Side effects that you should report to your doctor or health care professional as soon as possible:    allergic reactions like skin rash, itching or hives, swelling of the face, lips, or tongue    anxiety, confusion, unusual behavior    breathing problems    hallucination, loss of contact with reality    redness, blistering, peeling or loosening of the  skin, including inside the mouth    seizures  Side effects that usually do not require medical attention (report to your doctor or health care professional if they continue or are bothersome):    cough    diarrhea    dizziness    headache    nausea, vomiting    stomach pain  This list may not describe all possible side effects. Call your doctor for medical advice about side effects. You may report side effects to FDA at 3-609-QLQ-1088.  Where should I keep my medicine?  Keep out of the reach of children.  Store at room temperature between 15 and 30 degrees C (59 and 86 degrees F). Throw away any unused medicine after the expiration date.  NOTE:This sheet is a summary. It may not cover all possible information. If you have questions about this medicine, talk to your doctor, pharmacist, or health care provider. Copyright  2016 [employer]                Patient Education    Lorazepam Oral solution    Lorazepam Oral tablet    Lorazepam Solution for injection  Lorazepam Oral tablet  What is this medicine?  LORAZEPAM (juni A ze franny) is a benzodiazepine. It is used to treat anxiety.  This medicine may be used for other purposes; ask your health care provider or pharmacist if you have questions.  What should I tell my health care provider before I take this medicine?  They need to know if you have any of these conditions:    alcohol or drug abuse problem    bipolar disorder, depression, psychosis or other mental health condition    glaucoma    kidney or liver disease    lung disease or breathing difficulties    myasthenia gravis    Parkinson's disease    seizures or a history of seizures    suicidal thoughts    an unusual or allergic reaction to lorazepam, other benzodiazepines, foods, dyes, or preservatives    pregnant or trying to get pregnant    breast-feeding  How should I use this medicine?  Take this medicine by mouth with a glass of water. Follow the directions on the prescription label. If it upsets your stomach,  take it with food or milk. Take your medicine at regular intervals. Do not take it more often than directed. Do not stop taking except on the advice of your doctor or health care professional.  Talk to your pediatrician regarding the use of this medicine in children. Special care may be needed.  Overdosage: If you think you have taken too much of this medicine contact a poison control center or emergency room at once.  NOTE: This medicine is only for you. Do not share this medicine with others.  What if I miss a dose?  If you miss a dose, take it as soon as you can. If it is almost time for your next dose, take only that dose. Do not take double or extra doses.  What may interact with this medicine?    barbiturate medicines for inducing sleep or treating seizures, like phenobarbital    clozapine    medicines for depression, mental problems or psychiatric disturbances    medicines for sleep    phenytoin    probenecid    theophylline    valproic acid  This list may not describe all possible interactions. Give your health care provider a list of all the medicines, herbs, non-prescription drugs, or dietary supplements you use. Also tell them if you smoke, drink alcohol, or use illegal drugs. Some items may interact with your medicine.  What should I watch for while using this medicine?  Visit your doctor or health care professional for regular checks on your progress. Your body may become dependent on this medicine, ask your doctor or health care professional if you still need to take it. However, if you have been taking this medicine regularly for some time, do not suddenly stop taking it. You must gradually reduce the dose or you may get severe side effects. Ask your doctor or health care professional for advice before increasing or decreasing the dose. Even after you stop taking this medicine it can still affect your body for several days.  You may get drowsy or dizzy. Do not drive, use machinery, or do anything that  needs mental alertness until you know how this medicine affects you. To reduce the risk of dizzy and fainting spells, do not stand or sit up quickly, especially if you are an older patient. Alcohol may increase dizziness and drowsiness. Avoid alcoholic drinks.  Do not treat yourself for coughs, colds or allergies without asking your doctor or health care professional for advice. Some ingredients can increase possible side effects.  What side effects may I notice from receiving this medicine?  Side effects that you should report to your doctor or health care professional as soon as possible:    changes in vision    confusion    depression    mood changes, excitability or aggressive behavior    movement difficulty, staggering or jerky movements    muscle cramps    restlessness    weakness or tiredness  Side effects that usually do not require medical attention (report to your doctor or health care professional if they continue or are bothersome):    constipation or diarrhea    difficulty sleeping, nightmares    dizziness, drowsiness    headache    nausea, vomiting  This list may not describe all possible side effects. Call your doctor for medical advice about side effects. You may report side effects to FDA at 1-903-FDA-5430.  Where should I keep my medicine?  Keep out of the reach of children. This medicine can be abused. Keep your medicine in a safe place to protect it from theft. Do not share this medicine with anyone. Selling or giving away this medicine is dangerous and against the law.  Store at room temperature between 20 and 25 degrees C (68 and 77 degrees F). Protect from light. Keep container tightly closed. Throw away any unused medicine after the expiration date.  NOTE:This sheet is a summary. It may not cover all possible information. If you have questions about this medicine, talk to your doctor, pharmacist, or health care provider. Copyright  2016 [employer]                Patient  Education    Oxycodone Hydrochloride Oral capsule    Oxycodone Hydrochloride Oral solution    Oxycodone Hydrochloride Oral tablet    Oxycodone Hydrochloride Oral tablet [Abuse Deterrent]    Oxycodone Hydrochloride Oral tablet, extended-release  Oxycodone Hydrochloride Oral tablet  What is this medicine?  OXYCODONE (ox i KOE done) is a pain reliever. It is used to treat moderate to severe pain.  This medicine may be used for other purposes; ask your health care provider or pharmacist if you have questions.  What should I tell my health care provider before I take this medicine?  They need to know if you have any of these conditions:    Chris's disease    brain tumor    drug abuse or addiction    head injury    heart disease    if you frequently drink alcohol containing drinks    kidney disease or problems going to the bathroom    liver disease    lung disease, asthma, or breathing problems    mental problems    an unusual or allergic reaction to oxycodone, codeine, hydrocodone, morphine, other medicines, foods, dyes, or preservatives    pregnant or trying to get pregnant    breast-feeding  How should I use this medicine?  Take this medicine by mouth with a glass of water. Follow the directions on the prescription label. You can take it with or without food. If it upsets your stomach, take it with food. Take your medicine at regular intervals. Do not take it more often than directed. Do not stop taking except on your doctor's advice.  Some brands of this medicine, like Oxecta, have special instructions. Ask your doctor or pharmacist if these directions are for you: Do not cut, crush or chew this medicine. Swallow only one tablet at a time. Do not wet, soak, or lick the tablet before you take it.  Talk to your pediatrician regarding the use of this medicine in children. Special care may be needed.  Overdosage: If you think you have taken too much of this medicine contact a poison control center or emergency room at  once.  NOTE: This medicine is only for you. Do not share this medicine with others.  What if I miss a dose?  If you miss a dose, take it as soon as you can. If it is almost time for your next dose, take only that dose. Do not take double or extra doses.  What may interact with this medicine?    alcohol    antihistamines    certain medicines used for nausea like chlorpromazine, droperidol    erythromycin    ketoconazole    medicines for depression, anxiety, or psychotic disturbances    medicines for sleep    muscle relaxants    naloxone    naltrexone    narcotic medicines (opiates) for pain    nilotinib    phenobarbital    phenytoin    rifampin    ritonavir    voriconazole  This list may not describe all possible interactions. Give your health care provider a list of all the medicines, herbs, non-prescription drugs, or dietary supplements you use. Also tell them if you smoke, drink alcohol, or use illegal drugs. Some items may interact with your medicine.  What should I watch for while using this medicine?  Tell your doctor or health care professional if your pain does not go away, if it gets worse, or if you have new or a different type of pain. You may develop tolerance to the medicine. Tolerance means that you will need a higher dose of the medicine for pain relief. Tolerance is normal and is expected if you take this medicine for a long time.  Do not suddenly stop taking your medicine because you may develop a severe reaction. Your body becomes used to the medicine. This does NOT mean you are addicted. Addiction is a behavior related to getting and using a drug for a non-medical reason. If you have pain, you have a medical reason to take pain medicine. Your doctor will tell you how much medicine to take. If your doctor wants you to stop the medicine, the dose will be slowly lowered over time to avoid any side effects.  You may get drowsy or dizzy when you first start taking this medicine or change doses. Do not  drive, use machinery, or do anything that may be dangerous until you know how the medicine affects you. Stand or sit up slowly.  There are different types of narcotic medicines (opiates) for pain. If you take more than one type at the same time, you may have more side effects. Give your health care provider a list of all medicines you use. Your doctor will tell you how much medicine to take. Do not take more medicine than directed. Call emergency for help if you have problems breathing.  This medicine will cause constipation. Try to have a bowel movement at least every 2 to 3 days. If you do not have a bowel movement for 3 days, call your doctor or health care professional.  Your mouth may get dry. Drinking water, chewing sugarless gum, or sucking on hard candy may help. See your dentist every 6 months.  What side effects may I notice from receiving this medicine?  Side effects that you should report to your doctor or health care professional as soon as possible:    allergic reactions like skin rash, itching or hives, swelling of the face, lips, or tongue    breathing problems    confusion    feeling faint or lightheaded, falls    trouble passing urine or change in the amount of urine    unusually weak or tired  Side effects that usually do not require medical attention (report to your doctor or health care professional if they continue or are bothersome):    constipation    dry mouth    itching    nausea, vomiting    upset stomach  This list may not describe all possible side effects. Call your doctor for medical advice about side effects. You may report side effects to FDA at 8-171-FDA-0160.  Where should I keep my medicine?  Keep out of the reach of children. This medicine can be abused. Keep your medicine in a safe place to protect it from theft. Do not share this medicine with anyone. Selling or giving away this medicine is dangerous and against the law.  Store at room temperature between 15 and 30 degrees C  (59 and 86 degrees F). Protect from light. Keep container tightly closed.  This medicine may cause accidental overdose and death if it is taken by other adults, children, or pets. Flush any unused medicine down the toilet to reduce the chance of harm. Do not use the medicine after the expiration date.  NOTE: This sheet is a summary. It may not cover all possible information. If you have questions about this medicine, talk to your doctor, pharmacist, or health care provider.  NOTE:This sheet is a summary. It may not cover all possible information. If you have questions about this medicine, talk to your doctor, pharmacist, or health care provider. Copyright  2016 [employer]                Patient Education    Lactobacillus Acidophilus Oral capsule    Lactobacillus Acidophilus Oral capsule, modified-release    Lactobacillus Acidophilus Oral tablet    Lactobacillus Acidophilus, Lactobacillus Sp. Oral granules    Lactobacillus Acidophilus, Lactobacillus Sporogenes Oral tablet  Lactobacillus Acidophilus Oral tablet  What is this medicine?  LACTOBACILLUS (carolina spragueh MARK uhs) is a supplement. It is used to help the normal balance of bacteria in the colon. This may treat or prevent diarrhea caused by an infection or by antibiotics. The FDA has not approved this supplement for any medical use.  This supplement may be used for other purposes; ask your health care provider or pharmacist if you have questions.  What should I tell my health care provider before I take this medicine?  They need to know if you have any of these conditions:    chronic disease    immune system problems    prosthetic heart valve or valvular heart disease    an unusual or allergic reaction to Lactobacillus, any medicines, lactose or milk, other foods, dyes, or preservatives    pregnant or trying to get pregnant    breast-feeding  How should I use this medicine?  Take this medicine by mouth with a small amount of milk, fruit juice, or water. Follow  the directions on the package labeling, or take as directed by your health care professional. This medicine can be taken with cereal or other food. Do not take this medicine more often than directed.  Contact your pediatrician regarding the use of this medicine in children. Special care may be needed. This medicine is not recommended for children under 3 years old unless prescribed by a doctor.  Overdosage: If you think you have taken too much of this medicine contact a poison control center or emergency room at once.  NOTE: This medicine is only for you. Do not share this medicine with others.  What if I miss a dose?  If you miss a dose, take it as soon as you can. If it is almost time for your next dose, take only that dose. Do not take double or extra doses.  What may interact with this medicine?  Interactions are not expected.  This list may not describe all possible interactions. Give your health care provider a list of all the medicines, herbs, non-prescription drugs, or dietary supplements you use. Also tell them if you smoke, drink alcohol, or use illegal drugs. Some items may interact with your medicine.  What should I watch for while using this medicine?  See your doctor if your symptoms do not get better or if they get worse. Do not take this supplement for more than 2 days or if you have a fever unless your doctor tells you to.  If you have allergies to milk or you are sensitive to lactose, do not use this supplement.  What side effects may I notice from receiving this medicine?  Side effects that you should report to your doctor or health care professional as soon as possible:    allergic reactions like skin rash, itching or hives, swelling of the face, lips, or tongue    breathing problems    severe nausea, vomiting    unusually weak or tired  Side effects that usually do not require medical attention (report to your doctor or health care professional if they continue or are  bothersome):    hiccups    stomach gas  This list may not describe all possible side effects. Call your doctor for medical advice about side effects. You may report side effects to FDA at 9-952-UUC-0929.  Where should I keep my medicine?  Keep out of the reach of children.  Store in the refrigerator or as directed on the package label. Do not freeze. Throw away any unused medicine after the expiration date.  NOTE:This sheet is a summary. It may not cover all possible information. If you have questions about this medicine, talk to your doctor, pharmacist, or health care provider. Copyright  2016 [employer]                Kicking the Smoking Habit  If you smoke, quitting is one of the best changes you can make for your heart. Your risk of heart attack goes down within one day of putting out that last cigarette. As you go longer without smoking, your risk goes down even more. Quitting isn t easy, but millions of people have done it. You can, too. It s never too late to quit.  Getting started  Boost your chances of success by deciding on your  quit plan.  Your health care provider and cardiac rehab team can help you develop this plan. Even if you ve already quit, it s easy to slip back into smoking.  Your plan can help you avoid and recover from relapse.  In any case, start by setting a date to quit within a month, and do it.    Keys to your quit plan    Talk to your health care provider about prescription medications and nicotine replacement products that help stop the urge to smoke.     Join a support group or quit smoking program. Talking with others about the challenges of quitting can help you get through them.    Ask other smokers in your household to quit with you.  Track your triggers  What gives you that  U-xuxo-r-cigarette  feeling? List all the situations that make you want a cigarette. Then think of other ways to deal with these situations. Here are some examples:  Situation How I'll handle it   Finishing  a meal Get up from the table and take a walk   Having an argument Find a quiet place and breathe deeply   Feeling lonely or bored Call a friend to talk   Tips for quitting successfully    List the benefits of quitting such as reducing heart risks and saving money. Keep this list and review it whenever you feel like smoking.    Get support. Let your friends know you may call them to chat when you have an urge to smoke.    If you ve tried to quit before without success, this time avoid the triggers that may cause the relapse.    Make the most of slip-ups. Try to learn from them, and then get back on track.  For family and friends    Be supportive and patient. Quitting smoking can be difficult and stressful.    If you smoke, now s a great time to quit. Even if you don t quit, never smoke around your loved one. Secondhand smoke is dangerous to his or her heart.    [id number] [employer]. [address] [place] [address]. All rights reserved. This information is not intended as a substitute for professional medical care. Always follow your healthcare professional's instructions.              About C. diff Infection  For Patients, Family and Visitors  What is C. diff (clostridium difficile)?  C. diff are germs (bacteria). These germs can live in the guts of healthy people. Antibiotic medicine can change the balance of germs in the gut, causing C. diff infection and diarrhea (dye-sharon-Taylor).  You can also get C. diff infection during a hospital stay, after surgery, or if you have a weak immune system or IBD (inflammatory bowel disease).  What are the symptoms?  If you have these symptoms, your doctor will ask for a stool (poop) sample for testing:    Diarrhea (loose, watery stools)    Belly pain, tenderness and cramping    Fever  How does it spread?  C. diff leaves your body through your stool. You can get infected when :  1. You touch a surface that has this germ, then  2. You touch food or  something else that you put in your mouth.  Here's how you, your family and visitors can help prevent the infection from spreading:     Wash hands often, especially in the hospital, after using the bathroom and before you eat.    Use antibiotics only when you need them. Don't ask for them if your doctor says you have a virus.    If you take antibiotics, follow the directions. Finish all the pills, even if you feel better.    If you have C. diff infection, try to use a separate restroom until you are well.    At home, clean countertops, sinks, faucets, bathroom doorknobs and toilets often. Use warm water with soap or cleaning products with bleach. (Don't use pure bleach. It's too strong.)    In the hospital, your care team should wear gloves and gowns. They should clean their hands before touching you and before leaving the room. If they don't, please remind them.  Hand washing  For this illness, soap and water works better than hand .    Wash hands with warm water and plenty of soap. Wash for 15 to 20 seconds.    Clean under nails, between fingers and up the wrists.    Rinse hands, letting water run down your fingers.    Dry hands well. Use a paper towel to turn off the faucet and open the door.   How is it treated?  Your doctor may change your antibiotics and give you medicine for diarrhea. Don't take other anti-diarrhea medicines. They will make things worse.  You may get extra fluids through an IV (small tube in the arm or hand).  Sometimes, the infection will come back. If you have symptoms, please call your doctor.   For informational purposes only. Not to replace the advice of your health care provider. Copyright   2014 [employer]. All rights reserved. [employer] [id number] - REV 05/16.            Patient Education    Azithromycin Ophthalmic drops, solution    Azithromycin Oral suspension    Azithromycin Oral suspension, extended release    Azithromycin Oral tablet    Azithromycin Solution  for injection  Azithromycin Oral tablet  What is this medicine?  AZITHROMYCIN (az ith kaitlin MYE sin) is a macrolide antibiotic. It is used to treat or prevent certain kinds of bacterial infections. It will not work for colds, flu, or other viral infections.  This medicine may be used for other purposes; ask your health care provider or pharmacist if you have questions.  What should I tell my health care provider before I take this medicine?  They need to know if you have any of these conditions:    kidney disease    liver disease    irregular heartbeat or heart disease    an unusual or allergic reaction to azithromycin, erythromycin, other macrolide antibiotics, foods, dyes, or preservatives    pregnant or trying to get pregnant    breast-feeding  How should I use this medicine?  Take this medicine by mouth with a full glass of water. Follow the directions on the prescription label. The tablets can be taken with food or on an empty stomach. If the medicine upsets your stomach, take it with food. Take your medicine at regular intervals. Do not take your medicine more often than directed. Take all of your medicine as directed even if you think your are better. Do not skip doses or stop your medicine early.  Talk to your pediatrician regarding the use of this medicine in children. Special care may be needed.  Overdosage: If you think you have taken too much of this medicine contact a poison control center or emergency room at once.  NOTE: This medicine is only for you. Do not share this medicine with others.  What if I miss a dose?  If you miss a dose, take it as soon as you can. If it is almost time for your next dose, take only that dose. Do not take double or extra doses.  What may interact with this medicine?  Do not take this medicine with any of the following medications:    lincomycin  This medicine may also interact with the following medications:    amiodarone    antacids    birth control  pills    cyclosporine    digoxin    magnesium    nelfinavir    phenytoin    warfarin  This list may not describe all possible interactions. Give your health care provider a list of all the medicines, herbs, non-prescription drugs, or dietary supplements you use. Also tell them if you smoke, drink alcohol, or use illegal drugs. Some items may interact with your medicine.  What should I watch for while using this medicine?  Tell your doctor or health care professional if your symptoms do not improve.  Do not treat diarrhea with over the counter products. Contact your doctor if you have diarrhea that lasts more than 2 days or if it is severe and watery.  This medicine can make you more sensitive to the sun. Keep out of the sun. If you cannot avoid being in the sun, wear protective clothing and use sunscreen. Do not use sun lamps or tanning beds/booths.  What side effects may I notice from receiving this medicine?  Side effects that you should report to your doctor or health care professional as soon as possible:    allergic reactions like skin rash, itching or hives, swelling of the face, lips, or tongue    confusion, nightmares or hallucinations    dark urine    difficulty breathing    hearing loss    irregular heartbeat or chest pain    pain or difficulty passing urine    redness, blistering, peeling or loosening of the skin, including inside the mouth    white patches or sores in the mouth    yellowing of the eyes or skin  Side effects that usually do not require medical attention (report to your doctor or health care professional if they continue or are bothersome):    diarrhea    dizziness, drowsiness    headache    stomach upset or vomiting    tooth discoloration    vaginal irritation  This list may not describe all possible side effects. Call your doctor for medical advice about side effects. You may report side effects to FDA at 7-415-FDA-5182.  Where should I keep my medicine?  Keep out of the reach of  children.  Store at room temperature between 15 and 30 degrees C (59 and 86 degrees F). Throw away any unused medicine after the expiration date.  NOTE:This sheet is a summary. It may not cover all possible information. If you have questions about this medicine, talk to your doctor, pharmacist, or health care provider. Copyright  2016 [employer]                Patient Education    Dextromethorphan Hydrobromide, Guaifenesin, Phenylephrine Hydrochloride Oral capsule    Dextromethorphan Hydrobromide, Guaifenesin, Phenylephrine Hydrochloride Oral drops, solution    Dextromethorphan Hydrobromide, Guaifenesin, Phenylephrine Hydrochloride Oral solution    Dextromethorphan Hydrobromide, Guaifenesin, Phenylephrine Hydrochloride Oral syrup    Dextromethorphan Hydrobromide, Guaifenesin, Phenylephrine Hydrochloride Oral tablet    Dextromethorphan Hydrobromide, Guaifenesin, Phenylephrine Hydrochloride Oral tablet, biphasic release    Dextromethorphan Hydrobromide, Guaifenesin, Phenylephrine Hydrochloride Oral tablet, extended-release    Dextromethorphan Hydrobromide, Guaifenesin, Phenylephrine Hydrochloride, Tannic Acid Oral suspension    Dextromethorphan Hydrobromide, Guaifenesin, Phenylephrine Tannate Oral tablet, extended-release  Dextromethorphan Hydrobromide, Guaifenesin, Phenylephrine Hydrochloride Oral syrup  What is this medicine?  DEXTROMETHORPHAN, GUAIFENESIN, and PHENYLEPHRINE (dex troe meth OR fan, gwye FEN e sin, fen il EF rin) is a combination of a cough suppressant, an expectorant, and a decongestant. It is used to treat cough and congestion. This medicine will not treat an infection.  This medicine may be used for other purposes; ask your health care provider or pharmacist if you have questions.  What should I tell my health care provider before I take this medicine?  They need to know if you have any of these conditions:    diabetes    enlarged prostrate    glaucoma    heart disease    high blood  pressure    if you have taken an MAOI like Carbex, Eldepryl, Marplan, Nardil, or Parnate in last 14 days    lung or breathing disease, like asthma    thyroid disease    an unusual or allergic reaction to dextromethorphan, guaifenesin, phenylephrine, other medicines, foods, dyes, or preservatives    pregnant or trying to get pregnant    breast-feeding  How should I use this medicine?  Take this medicine by mouth with a full glass of water. Follow the directions on the prescription label. Take with food or milk. Use a specially marked spoon or container to measure your dose. Household spoons are not accurate. Take your medicine at regular intervals. Do not take it more often than directed.  Talk to your pediatrician regarding the use of this medicine in children. While this drug may be prescribed for children as young as 4 years old for selected conditions, precautions do apply.  Patients over 60 years old may have a stronger reaction and need a smaller dose.  Overdosage: If you think you have taken too much of this medicine contact a poison control center or emergency room at once.  NOTE: This medicine is only for you. Do not share this medicine with others.  What if I miss a dose?  If you miss a dose, take it as soon as you can. If it is almost time for your next dose, take only that dose. Do not take double or extra doses.  What may interact with this medicine?  Do not take this medicine with any of the following medications:    ergot alkaloids like dihydroergotamine    MAOIs like Carbex, Eldepryl, Marplan, Nardil, and Parnate    stimulant medicines for attention disorders, weight loss, or to stay awake  This medicine may also interact with the following medications:    alcohol    barbiturates like phenobarbital    certain heart medicines like metoprolol, digoxin, and methyldopa    diuretics    doxazosin    medicines for depression, anxiety, or psychotic disturbances    medicines for sleep    medicines to treat  blood pressure    muscle relaxers    other medicines for cold, cough, or allergy    some medicines used for sleep during surgery    tamsulosin  This list may not describe all possible interactions. Give your health care provider a list of all the medicines, herbs, non-prescription drugs, or dietary supplements you use. Also tell them if you smoke, drink alcohol, or use illegal drugs. Some items may interact with your medicine.  What should I watch for while using this medicine?  Talk to your doctor if your cough lasts for more than 1 week. If you have a high fever, skin rash, lasting headache, or sore throat, see your doctor.  Drink several glasses of water each day. This will help loosen mucus.  You may get drowsy or dizzy. Do not drive, use machinery, or do anything that needs mental alertness until you know how this medicine affects you. Do not stand or sit up quickly, especially if you are an older patient. This reduces the risk of dizzy or fainting spells. Alcohol may interfere with the effect of this medicine. Avoid alcoholic drinks.  What side effects may I notice from receiving this medicine?  Side effects that you should report to your doctor or health care professional as soon as possible:    allergic reactions like skin rash, itching or hives, swelling of the face, lips, or tongue    anxiety, irritable, or excited    breathing problems    confusion    fast, irregular heartbeat    seizures    tremor    unusually weak or tired  Side effects that usually do not require medical attention (report to your doctor or health care professional if they continue or are bothersome):    diarrhea    dizziness    headache    nausea, vomiting    stomach upset    tiredness    trouble sleeping  This list may not describe all possible side effects. Call your doctor for medical advice about side effects. You may report side effects to FDA at 7-558-FDA-2770.  Where should I keep my medicine?  Keep out of the reach of  children.  Store at room temperature between 15 and 30 degrees C (59 and 86 degrees F). Keep bottle tightly closed. Protect from light. Throw away any unused medicine after the expiration date.  NOTE:This sheet is a summary. It may not cover all possible information. If you have questions about this medicine, talk to your doctor, pharmacist, or health care provider. Copyright  2016 [employer]                Patient Education    Nicotine Inhalation vapour, liquid    Nicotine Nasal spray, solution    Nicotine Polacrilex Chewing-gum, medicated    Nicotine Polacrilex Oral lozenge    Nicotine Transdermal patch - 16 hour    Nicotine Transdermal patch - 24 hour  Nicotine Transdermal patch - 24 hour  What is this medicine?  NICOTINE (BRYAN [place] teen) helps people stop smoking. The patches replace the nicotine found in cigarettes and help to decrease withdrawal effects. They are most effective when used in combination with a stop-smoking program.  This medicine may be used for other purposes; ask your health care provider or pharmacist if you have questions.  What should I tell my health care provider before I take this medicine?  They need to know if you have any of these conditions:    diabetes    heart disease, angina, irregular heartbeat or previous heart attack    high blood pressure    lung disease, including asthma    overactive thyroid    pheochromocytoma    seizures or a history of seizures    skin problems, like eczema    stomach problems or ulcers    an unusual or allergic reaction to nicotine, adhesives, other medicines, foods, dyes, or preservatives    pregnant or trying to get pregnant    breast-feeding  How should I use this medicine?  This medicine is for use on the skin. Follow the directions that come with the patches. Find an area of skin on your upper arm, chest, or back that is clean, dry, greaseless, undamaged and hairless. Wash hands with plain soap and water. Do not use anything that contains aloe,  lanolin or glycerin as these may prevent the patch from sticking. Dry thoroughly. Remove the patch from the sealed pouch. Do not try to cut or trim the patch. Using your palm, press the patch firmly in place for 10 seconds to make sure that there is good contact with your skin. After applying the patch, wash your hands. Change the patch every day, keeping to a regular schedule. When you apply a new patch, use a new area of skin. Wait at least 1 week before using the same area again.  Talk to your pediatrician regarding the use of this medicine in children. Special care may be needed.  Overdosage: If you think you have taken too much of this medicine contact a poison control center or emergency room at once.  NOTE: This medicine is only for you. Do not share this medicine with others.  What if I miss a dose?  If you forget to replace a patch, use it as soon as you can. Only use one patch at a time and do not leave on the skin for longer than directed. If a patch falls off, you can replace it, but keep to your schedule and remove the patch at the right time.  What may interact with this medicine?    medicines for asthma    medicines for blood pressure    medicines for mental depression  This list may not describe all possible interactions. Give your health care provider a list of all the medicines, herbs, non-prescription drugs, or dietary supplements you use. Also tell them if you smoke, drink alcohol, or use illegal drugs. Some items may interact with your medicine.  What should I watch for while using this medicine?  You should begin using the nicotine patch the day you stop smoking. It is okay if you do not succeed at your attempt to quit and have a cigarette. You can still continue your quit attempt and keep using the product as directed. Just throw away your cigarettes and get back to your quit plan.  You can keep the patch in place during swimming, bathing, and showering. If your patch falls off during these  activities, replace it.  When you first apply the patch, your skin may itch or burn. This should go away soon. When you remove a patch, the skin may look red, but this should only last for a few days. Call your doctor or health care professional if skin redness does not go away after 4 days, if your skin swells, or if you get a rash.  If you are a diabetic and you quit smoking, the effects of insulin may be increased and you may need to reduce your insulin dose. Check with your doctor or health care professional about how you should adjust your insulin dose.  If you are going to have a magnetic resonance imaging (MRI) procedure, tell your MRI technician if you have this patch on your body. It must be removed before a MRI.  What side effects may I notice from receiving this medicine?  Side effects that you should report to your doctor or health care professional as soon as possible:    allergic reactions like skin rash, itching or hives, swelling of the face, lips, or tongue    breathing problems    changes in hearing    changes in vision    chest pain    cold sweats    confusion    fast, irregular heartbeat    feeling faint or lightheaded, falls    headache    increased saliva    skin redness that lasts more than 4 days    stomach pain    signs and symptoms of nicotine overdose like nausea; vomiting; dizziness; weakness; and rapid heartbeat  Side effects that usually do not require medical attention (report to your doctor or health care professional if they continue or are bothersome):    diarrhea    dry mouth    hiccups    irritability    nervousness or restlessness    trouble sleeping or vivid dreams  This list may not describe all possible side effects. Call your doctor for medical advice about side effects. You may report side effects to FDA at 0-701-FDA-2772.  Where should I keep my medicine?  Keep out of the reach of children.  Store at room temperature between 20 and 25 degrees C (68 and 77 degrees F).  Protect from heat and light. Store in manufacturers packaging until ready to use. Throw away unused medicine after the expiration date. When you remove a patch, fold with sticky sides together; put in an empty opened pouch and throw away.  NOTE:This sheet is a summary. It may not cover all possible information. If you have questions about this medicine, talk to your doctor, pharmacist, or health care provider. Copyright  2016 [employer]                What is Pneumonia?  Pneumonia is a serious lung infection. Many cases of pneumonia are caused by bacteria or viruses. Other less common causes include    Fungi    Chemicals    Gases    You may also get pneumonia after another illness, such as a cold, flu, or bronchitis. Those most at risk include the elderly and people with chronic health problems.  Healthy Lungs    Air travels in and out of the lungs through tubes called airways.    The tubes branch into smaller passages called bronchioles. These end in balloonlike sacs called alveoli.    Blood vessels surrounding the alveoli take oxygen into the bloodstream. At the same time, the alveoli remove carbon dioxide (a waste gas) from the blood. The carbon dioxide is then exhaled.  When You Have Pneumonia      Pneumonia causes the bronchioles and the alveoli to fill with excess mucus and become inflamed.    Your body s response may be to cough. This can help clear out the fluid.    The fluid (or mucus) you cough up may appear green or dark yellow.    The excess mucus may make you feel short of breath.    The inflammation and infection may give you a fever.  What are the Symptoms?  Symptoms of pneumonia can come without warning. At first, you may think you have a cold or flu. But symptoms may get worse quickly, turning into pneumonia. Symyptoms can be different for bacterial and viral pneumonia. Common symptoms may include the following:    Severe cough with green or yellow mucus that doesn't improve or gets  worse    Fever and chills    Nausea, vomiting or diarrhea    Shortness of breath with normal daily activities    Increased heart rate    Chest pain or discomfort when breathing in or coughing    Headache    Excessive sweating and clammy skin    5736-4058 [employer]. [address] [place] [address], [place] [address]. All rights reserved. This information is not intended as a substitute for professional medical care. Always follow your healthcare professional's instructions.                Treating Pneumonia  Pneumonia is an infection of one or both of the lungs. Pneumonia:    Is usually caused by a virus    Can be very serious, especially in infants, young children, and older adults. And also in those with other long-term health problems or weakened immune systems    Is sometimes treated at home and sometimes in the hospital    Antibiotic Medications  Antibiotics may be prescribed or administered for pneumonia caused by bacteria. They may be pills or oral medications, or shots or injections. Or they may be given intravenously (IV) or into the vein. If you are taking oral medications at home:    Fill your prescription and start taking your medication as soon as you can.    You will likely start to feel better in a day or two, but don t stop taking the antibiotic.    Use a pill organizer to help you remember to take your medication.    Let your health care provider know if you have side effects.    Take your medication exactly as directed on the label. Talk to your pharmacist if you have any questions.  Antiviral Medications  Antiviral medication may be prescribed or given for pneumonia cause by a virus. For example, antiviral medication may be prescribed for pneumonia caused by the flu virus. Antibiotics do not work against viruses. If you are taking antiviral medication at home:    Fill your prescription and start taking your medication as soon as you can.    Talk with your provider or pharmacist about  possible side effects.    Take the medication exactly as instructed.  To Relieve Symptoms  There are many medications that can help relieve symptoms of pneumonia. Some are prescription and some are over-the-counter.  Your health care provider may recommend:    Acetaminophen or ibuprofen to lower your fever and to lessen headache or other pain    Cough medication to loosen mucus or to reduce coughing  Make sure you check with your health care provider or pharmacist before taking any over-the-counter medications.  Special Treatments  If you are hospitalized for pneumonia, you may have other therapies, including:    Inhaled medications to help with breathing or chest congestion    Supplemental oxygen to increase low oxygen levels  Drink Fluids and Eat Healthy  You should eat healthy to help your body fight the infection and drink a lot of fluids to replace fluids lost from fever and to help loosen mucus in the chest.    Diet. Make health food choices, including fruits and vegetables, lean meats and other proteins, 100% whole grain and low- or no-fat dairy products.    Fluids. Drinks at least 6-8 tall glasses a day. Water and 100% fruit or vegetable juice are best.  Get Plenty of Rest and Sleep  You may be more tired than usual for a while. It is important to get enough sleep at night. And, it's important to rest during the day. Talk with your health care provider if coughing or other symptoms are interfering with your sleep.  Preventing the Spread of Germs  The best thing you can do to prevent spreading germs is to wash your hands often. You should:    Rub your hand with soap and water for 20 to 30 seconds.    Clean in between your fingers, the backs of your hands, and around your nails.    Dry your hands on a separate towel or use paper towels.  You should also:    Keep alcohol-based hand  nearby.    Make sure you also clean surfaces that you touch. Use a product that kills all types of germs.    Stay away  from others until you are feeling better.  When to Call Your Health Care Provider  Call your health care provider if you have any of these:    Symptoms get worse    Fever continues    Shortness of breath gets worse    Increased mucus or mucus that is darker in color    Coughing gets worse    Lips or fingers are bluish in color    Side effects from your medication     3067-5448 [employer]. [address] [place] [address], [place] [address]. All rights reserved. This information is not intended as a substitute for professional medical care. Always follow your healthcare professional's instructions.                Discharge Instructions for Pneumonia  You have been diagnosed with pneumonia, a serious lung infection. Most cases of pneumonia are caused by bacteria. Pneumonia most often occurs in older adults, young children, and people with chronic health problems.  Home care    Take your medication exactly as directed. Don t skip doses. Continue taking your antibiotics as directed until they are all gone -- even if you start to feel better. This will prevent the pneumonia from coming back.    Drink at least 8 glasses of water daily, unless directed otherwise. This helps to loosen and thin secretions so that you can cough them up.    Use a cool-mist humidifier in your bedroom. Be sure to clean the humidifier daily.    Coughing up mucus is normal. Don t use medications to suppress your cough unless your cough is dry, painful, or interferes with your sleep. You may use an expectorant if ordered by your doctor.    Warm compresses or a heating pad on the lowest setting can be used to relieve chest discomfort. Use several times a day for 15 to 20 minutes at a time. (To prevent injuring your skin, be sure the temperature of the compress or heating pad is warm, not hot.)    Get plenty of rest until your fever, shortness of breath, and chest pain go away.    Plan to get a flu shot every year.    Ask your doctor about  pneumonia vaccinations.     Follow-up care  Make a follow-up appointment as directed by our staff.     When to seek medical care  Call 911 right away if you have any of the following:    Chest pain    Trouble breathing    Blue lips or fingernails  Otherwise, call your doctor if you have any of the following:    Fever above 101.5 F  (38.6 C)    Yellow, green, bloody, or smelly sputum    More than normal mucus production    Vomiting     [id number] [email or web address]. [address] [place], [place] [id number]. All rights reserved. This information is not intended as a substitute for professional medical care. Always follow your healthcare professional's instructions.

## 2017-04-28 NOTE — ED PROVIDER NOTES
"  History     Chief Complaint   Patient presents with     Cough     Since Monday     Nausea, Vomiting, & Diarrhea     Since Monday, \"can't keep fluids down.\"     HPI  Maggie Case is a 27 year old female who presents to ED via private car for evaluation of cough, congestion, nausea, vomiting, diarrhea. Onset of cough and fever 5 days ago. Reports to increasing fatigue, myalgias. N/v/d over the past 2-3 days. Reports multiple liquid stools for the past 1 day. Low grade fever max 100F today. Non productive cough with wheezing. Has been using otc and home nebulizer with minimal improvement.     Past Medical History:   Diagnosis Date     Anemia      Anxiety      Chronic diarrhea      Lactose intolerance      Myalgia      Pulmonary embolism (H) 05/06/16     Vitamin B12 deficiency      Past Surgical History:   Procedure Laterality Date     CLOSED REDUCTION, PERCUTANEOUS PINNING LOWER EXTREMITY, COMBINED Right 4/6/2016    Procedure: COMBINED CLOSED REDUCTION, PERCUTANEOUS PINNING LOWER EXTREMITY;  Surgeon: Dexter Jones MD;  Location: [facility]       Review of Systems   Constitutional: Positive for activity change, appetite change and fatigue. Negative for fever.   HENT: Positive for congestion, rhinorrhea and sinus pressure.    Eyes: Negative for redness.   Respiratory: Positive for cough, shortness of breath and wheezing.    Cardiovascular: Negative for chest pain.   Gastrointestinal: Positive for diarrhea, nausea and vomiting. Negative for abdominal pain.   Genitourinary: Negative for difficulty urinating.   Musculoskeletal: Positive for arthralgias and myalgias. Negative for neck stiffness.   Skin: Negative for color change.   Neurological: Negative for headaches.   Psychiatric/Behavioral: Negative for confusion.       Physical Exam   BP: 118/66  Pulse: 104  Temp: 100.1  F (37.8  C)  Resp: 18  SpO2: 100 %  Physical Exam   Constitutional: She is oriented to person, place, and time. No distress.   HENT:   Head: " Normocephalic and atraumatic.   Eyes: Conjunctivae are normal. Pupils are equal, round, and reactive to light.   Neck: Normal range of motion. Neck supple.   Cardiovascular: Normal rate and regular rhythm.    Pulmonary/Chest: Effort normal. She has wheezes in the right middle field and the left middle field. She has rhonchi in the right middle field and the left middle field.   Abdominal: Soft. Bowel sounds are normal. There is no hepatosplenomegaly. There is generalized tenderness. There is no CVA tenderness.   Musculoskeletal: Normal range of motion.   Neurological: She is alert and oriented to person, place, and time.   Skin: Skin is warm and dry. She is not diaphoretic.       ED Course     Procedures  Labs Ordered and Resulted from Time of ED Arrival Up to the Time of Departure from the ED   UA MACROSCOPIC WITH REFLEX TO MICRO AND CULTURE - Abnormal; Notable for the following:        Result Value    Ketones Urine 40 (*)     Blood Urine Trace (*)     Protein Albumin Urine 30 (*)     Leukocyte Esterase Urine Large (*)     RBC Urine 6 (*)     WBC Urine 13 (*)     Bacteria Urine None (*)     Squamous Epithelial /HPF Urine 2 (*)     Mucous Urine Present (*)     All other components within normal limits   CBC WITH PLATELETS DIFFERENTIAL - Abnormal; Notable for the following:     RDW 16.1 (*)     All other components within normal limits   COMPREHENSIVE METABOLIC PANEL - Abnormal; Notable for the following:     Potassium 2.6 (*)     Creatinine 0.49 (*)     Calcium 8.3 (*)     Albumin 2.8 (*)     All other components within normal limits   INFLUENZA A/B ANTIGEN - Abnormal; Notable for the following:     Influenza B   (*)     Value: Positive   Critical Value called to and read back by  Adolfo Melgar at 1930 on 4/28/17 by NMJ  Test results must be correlated with clinical data. If necessary, results should   be confirmed by a molecular assay or viral culture.      All other components within normal limits   HCG QUALITATIVE  URINE   D-DIMER ([facility])   PHOSPHORUS     PA AND LATERAL RADIOGRAPHS OF CHEST     INDICATION: Cough.     COMPARISON: Today's study is compared to a prior examination which is  dated March 19, 2017 and January 18, 2016.     FINDINGS: The cardiomediastinal silhouette is normal, unchanged. The  abnormal appearance of the lungs persists, with patchy reticular  nodular opacities seen in the lateral upper right lung and left  suprahilar region. The interstitial markings are diffusely coarsened.  No focal consolidation or pneumothorax is seen.     IMPRESSION: MULTIFOCAL CHRONIC PULMONARY OPACITIES RAISE THE  POSSIBILITY OF ATYPICAL INFECTION SUCH AS BLASTOMYCOSIS OR  COCCIDIOMYCOSIS VERSUS INFLAMMATORY CONDITIONS OF THE LUNG. CONSIDER  HIGH RESOLUTION CT OF CHEST. CONSIDER PULMONOLOGY CONSULTATION.  Exam Date: Apr 28, 2017 07:27:41 PM  Author: VIPIN DICK    Assessments & Plan (with Medical Decision Making)   Pt presents with acute on chronic cough, n/v/d 1 wk. T: 100.1 HR: 104. Exam as above.   Duoneb given upon arrival with mild improvement of sx. IV fluids initiated.   Labs obtained, noted as above. K+ 2.6, influenza B positive.   Consulted with Dr. Montiel, will admit for IV hydration, K+ repletion. Pt verbalizes understanding and agrees with plan.  Pt transferred to floor in stable condition via cart.   I have reviewed the nursing notes.    I have reviewed the findings, diagnosis, plan and need for follow up with the patient.      Final diagnoses:   Influenza B   Hypokalemia       4/28/2017   [facility]     Yelitza Hampton APRN [facility]  05/03/17 5947

## 2017-04-29 LAB
ANION GAP SERPL CALCULATED.3IONS-SCNC: 10 MMOL/L (ref 3–14)
BUN SERPL-MCNC: 6 MG/DL (ref 7–30)
C DIFF TOX B STL QL: ABNORMAL
CALCIUM SERPL-MCNC: 7.5 MG/DL (ref 8.5–10.1)
CHLORIDE SERPL-SCNC: 107 MMOL/L (ref 94–109)
CO2 SERPL-SCNC: 23 MMOL/L (ref 20–32)
CREAT SERPL-MCNC: 0.47 MG/DL (ref 0.52–1.04)
ERYTHROCYTE [DISTWIDTH] IN BLOOD BY AUTOMATED COUNT: 16.3 % (ref 10–15)
GFR SERPL CREATININE-BSD FRML MDRD: ABNORMAL ML/MIN/1.7M2
GLUCOSE SERPL-MCNC: 91 MG/DL (ref 70–99)
HCT VFR BLD AUTO: 32.9 % (ref 35–47)
HGB BLD-MCNC: 11 G/DL (ref 11.7–15.7)
LACTATE SERPL-SCNC: 0.7 MMOL/L (ref 0.4–2)
MAGNESIUM SERPL-MCNC: 1.5 MG/DL (ref 1.6–2.3)
MAGNESIUM SERPL-MCNC: 3 MG/DL (ref 1.6–2.3)
MCH RBC QN AUTO: 32.4 PG (ref 26.5–33)
MCHC RBC AUTO-ENTMCNC: 33.4 G/DL (ref 31.5–36.5)
MCV RBC AUTO: 97 FL (ref 78–100)
PLATELET # BLD AUTO: 195 10E9/L (ref 150–450)
POTASSIUM SERPL-SCNC: 3.5 MMOL/L (ref 3.4–5.3)
RBC # BLD AUTO: 3.4 10E12/L (ref 3.8–5.2)
SODIUM SERPL-SCNC: 140 MMOL/L (ref 133–144)
SPECIMEN SOURCE: ABNORMAL
WBC # BLD AUTO: 5 10E9/L (ref 4–11)

## 2017-04-29 PROCEDURE — 25000125 ZZHC RX 250

## 2017-04-29 PROCEDURE — 25000128 H RX IP 250 OP 636: Performed by: HOSPITALIST

## 2017-04-29 PROCEDURE — 74177 CT ABD & PELVIS W/CONTRAST: CPT | Mod: TC

## 2017-04-29 PROCEDURE — 80048 BASIC METABOLIC PNL TOTAL CA: CPT | Performed by: HOSPITALIST

## 2017-04-29 PROCEDURE — 83735 ASSAY OF MAGNESIUM: CPT | Performed by: HOSPITALIST

## 2017-04-29 PROCEDURE — 99232 SBSQ HOSP IP/OBS MODERATE 35: CPT | Performed by: HOSPITALIST

## 2017-04-29 PROCEDURE — 87040 BLOOD CULTURE FOR BACTERIA: CPT | Performed by: HOSPITALIST

## 2017-04-29 PROCEDURE — 85027 COMPLETE CBC AUTOMATED: CPT | Performed by: HOSPITALIST

## 2017-04-29 PROCEDURE — 12000000 ZZH R&B MED SURG/OB

## 2017-04-29 PROCEDURE — 25800025 ZZH RX 258: Performed by: HOSPITALIST

## 2017-04-29 PROCEDURE — 40000275 ZZH STATISTIC RCP TIME EA 10 MIN

## 2017-04-29 PROCEDURE — 25000132 ZZH RX MED GY IP 250 OP 250 PS 637: Performed by: HOSPITALIST

## 2017-04-29 PROCEDURE — 71020 ZZHC CHEST TWO VIEWS, FRONT/LAT: CPT | Mod: TC

## 2017-04-29 PROCEDURE — 36415 COLL VENOUS BLD VENIPUNCTURE: CPT | Performed by: HOSPITALIST

## 2017-04-29 PROCEDURE — 25000125 ZZHC RX 250: Performed by: HOSPITALIST

## 2017-04-29 PROCEDURE — 83605 ASSAY OF LACTIC ACID: CPT | Performed by: HOSPITALIST

## 2017-04-29 RX ORDER — IOPAMIDOL 612 MG/ML
100 INJECTION, SOLUTION INTRAVASCULAR ONCE
Status: COMPLETED | OUTPATIENT
Start: 2017-04-29 | End: 2017-04-29

## 2017-04-29 RX ORDER — IPRATROPIUM BROMIDE AND ALBUTEROL SULFATE 2.5; .5 MG/3ML; MG/3ML
3 SOLUTION RESPIRATORY (INHALATION) EVERY 4 HOURS PRN
Status: DISCONTINUED | OUTPATIENT
Start: 2017-04-29 | End: 2017-05-01 | Stop reason: HOSPADM

## 2017-04-29 RX ORDER — RISPERIDONE 0.25 MG/1
0.25 TABLET ORAL ONCE
Status: COMPLETED | OUTPATIENT
Start: 2017-04-29 | End: 2017-04-29

## 2017-04-29 RX ORDER — SACCHAROMYCES BOULARDII 250 MG
250 CAPSULE ORAL 2 TIMES DAILY
Status: DISCONTINUED | OUTPATIENT
Start: 2017-04-29 | End: 2017-05-01 | Stop reason: HOSPADM

## 2017-04-29 RX ORDER — GUAIFENESIN/DEXTROMETHORPHAN 100-10MG/5
5 SYRUP ORAL EVERY 4 HOURS PRN
Status: DISCONTINUED | OUTPATIENT
Start: 2017-04-29 | End: 2017-05-01 | Stop reason: HOSPADM

## 2017-04-29 RX ORDER — OXYCODONE HYDROCHLORIDE 5 MG/1
10 TABLET ORAL EVERY 4 HOURS PRN
Status: DISCONTINUED | OUTPATIENT
Start: 2017-04-29 | End: 2017-05-01 | Stop reason: HOSPADM

## 2017-04-29 RX ORDER — OXYCODONE HYDROCHLORIDE 5 MG/1
5 TABLET ORAL EVERY 4 HOURS PRN
Status: DISCONTINUED | OUTPATIENT
Start: 2017-04-29 | End: 2017-04-29

## 2017-04-29 RX ORDER — CEFTRIAXONE SODIUM 1 G/50ML
INJECTION, SOLUTION INTRAVENOUS
Status: DISPENSED
Start: 2017-04-29 | End: 2017-04-29

## 2017-04-29 RX ADMIN — HYDROMORPHONE HYDROCHLORIDE 0.5 MG: 1 INJECTION, SOLUTION INTRAMUSCULAR; INTRAVENOUS; SUBCUTANEOUS at 19:56

## 2017-04-29 RX ADMIN — POTASSIUM CHLORIDE, DEXTROSE MONOHYDRATE AND SODIUM CHLORIDE: 150; 5; 450 INJECTION, SOLUTION INTRAVENOUS at 14:24

## 2017-04-29 RX ADMIN — LORAZEPAM 1 MG: 2 INJECTION, SOLUTION INTRAMUSCULAR; INTRAVENOUS at 23:48

## 2017-04-29 RX ADMIN — HYDROMORPHONE HYDROCHLORIDE 0.5 MG: 1 INJECTION, SOLUTION INTRAMUSCULAR; INTRAVENOUS; SUBCUTANEOUS at 08:14

## 2017-04-29 RX ADMIN — ONDANSETRON 4 MG: 4 TABLET, ORALLY DISINTEGRATING ORAL at 12:28

## 2017-04-29 RX ADMIN — HYDROMORPHONE HYDROCHLORIDE 0.5 MG: 1 INJECTION, SOLUTION INTRAMUSCULAR; INTRAVENOUS; SUBCUTANEOUS at 16:54

## 2017-04-29 RX ADMIN — ACETAMINOPHEN 650 MG: 325 TABLET, FILM COATED ORAL at 08:42

## 2017-04-29 RX ADMIN — MAGNESIUM SULFATE IN WATER 4 G: 40 INJECTION, SOLUTION INTRAVENOUS at 13:12

## 2017-04-29 RX ADMIN — POTASSIUM CHLORIDE 10 MEQ: 14.9 INJECTION, SOLUTION, CONCENTRATE PARENTERAL at 01:10

## 2017-04-29 RX ADMIN — IOPAMIDOL 100 ML: 612 INJECTION, SOLUTION INTRAVASCULAR at 00:37

## 2017-04-29 RX ADMIN — LORAZEPAM 1 MG: 2 INJECTION, SOLUTION INTRAMUSCULAR; INTRAVENOUS at 06:36

## 2017-04-29 RX ADMIN — HYDROMORPHONE HYDROCHLORIDE 0.5 MG: 1 INJECTION, SOLUTION INTRAMUSCULAR; INTRAVENOUS; SUBCUTANEOUS at 01:10

## 2017-04-29 RX ADMIN — VANCOMYCIN HYDROCHLORIDE 125 MG: 1 INJECTION, POWDER, LYOPHILIZED, FOR SOLUTION INTRAVENOUS at 08:42

## 2017-04-29 RX ADMIN — VANCOMYCIN HYDROCHLORIDE 125 MG: 1 INJECTION, POWDER, LYOPHILIZED, FOR SOLUTION INTRAVENOUS at 21:26

## 2017-04-29 RX ADMIN — VANCOMYCIN HYDROCHLORIDE 125 MG: 1 INJECTION, POWDER, LYOPHILIZED, FOR SOLUTION INTRAVENOUS at 16:36

## 2017-04-29 RX ADMIN — CEFTRIAXONE SODIUM 1 G: 1 INJECTION, SOLUTION INTRAVENOUS at 23:47

## 2017-04-29 RX ADMIN — HYDROMORPHONE HYDROCHLORIDE 0.5 MG: 1 INJECTION, SOLUTION INTRAMUSCULAR; INTRAVENOUS; SUBCUTANEOUS at 04:05

## 2017-04-29 RX ADMIN — ENOXAPARIN SODIUM 40 MG: 40 INJECTION SUBCUTANEOUS at 11:13

## 2017-04-29 RX ADMIN — LORAZEPAM 1 MG: 2 INJECTION, SOLUTION INTRAMUSCULAR; INTRAVENOUS at 16:53

## 2017-04-29 RX ADMIN — RISPERIDONE 0.25 MG: 0.25 TABLET ORAL at 21:27

## 2017-04-29 RX ADMIN — VANCOMYCIN HYDROCHLORIDE 125 MG: 1 INJECTION, POWDER, LYOPHILIZED, FOR SOLUTION INTRAVENOUS at 13:10

## 2017-04-29 RX ADMIN — CALCIUM GLUCONATE 1 G: 94 INJECTION, SOLUTION INTRAVENOUS at 11:16

## 2017-04-29 RX ADMIN — POTASSIUM CHLORIDE, DEXTROSE MONOHYDRATE AND SODIUM CHLORIDE: 150; 5; 450 INJECTION, SOLUTION INTRAVENOUS at 00:13

## 2017-04-29 RX ADMIN — Medication 250 MG: at 10:18

## 2017-04-29 RX ADMIN — LORAZEPAM 1 MG: 2 INJECTION, SOLUTION INTRAMUSCULAR; INTRAVENOUS at 02:41

## 2017-04-29 RX ADMIN — ONDANSETRON 4 MG: 2 INJECTION, SOLUTION INTRAMUSCULAR; INTRAVENOUS at 05:34

## 2017-04-29 RX ADMIN — HYDROMORPHONE HYDROCHLORIDE 0.5 MG: 1 INJECTION, SOLUTION INTRAMUSCULAR; INTRAVENOUS; SUBCUTANEOUS at 10:18

## 2017-04-29 RX ADMIN — Medication 250 MG: at 21:27

## 2017-04-29 RX ADMIN — OXYCODONE HYDROCHLORIDE 5 MG: 5 TABLET ORAL at 23:48

## 2017-04-29 RX ADMIN — HYDROMORPHONE HYDROCHLORIDE 0.5 MG: 1 INJECTION, SOLUTION INTRAMUSCULAR; INTRAVENOUS; SUBCUTANEOUS at 05:34

## 2017-04-29 RX ADMIN — POTASSIUM CHLORIDE 10 MEQ: 14.9 INJECTION, SOLUTION, CONCENTRATE PARENTERAL at 02:30

## 2017-04-29 RX ADMIN — VANCOMYCIN HYDROCHLORIDE 125 MG: 1 INJECTION, POWDER, LYOPHILIZED, FOR SOLUTION INTRAVENOUS at 03:56

## 2017-04-29 RX ADMIN — POTASSIUM CHLORIDE 10 MEQ: 14.9 INJECTION, SOLUTION, CONCENTRATE PARENTERAL at 03:54

## 2017-04-29 RX ADMIN — GUAIFENESIN AND DEXTROMETHORPHAN 5 ML: 100; 10 SYRUP ORAL at 22:11

## 2017-04-29 RX ADMIN — CEFTRIAXONE SODIUM 1 G: 1 INJECTION, SOLUTION INTRAVENOUS at 01:48

## 2017-04-29 RX ADMIN — OSELTAMIVIR PHOSPHATE 75 MG: 75 CAPSULE ORAL at 21:27

## 2017-04-29 RX ADMIN — HYDROMORPHONE HYDROCHLORIDE 0.5 MG: 1 INJECTION, SOLUTION INTRAMUSCULAR; INTRAVENOUS; SUBCUTANEOUS at 14:22

## 2017-04-29 RX ADMIN — OXYCODONE HYDROCHLORIDE 5 MG: 5 TABLET ORAL at 21:26

## 2017-04-29 RX ADMIN — OSELTAMIVIR PHOSPHATE 75 MG: 75 CAPSULE ORAL at 08:14

## 2017-04-29 ASSESSMENT — PAIN DESCRIPTION - DESCRIPTORS
DESCRIPTORS: SHARP
DESCRIPTORS: ACHING
DESCRIPTORS: ACHING
DESCRIPTORS: SHARP
DESCRIPTORS: ACHING
DESCRIPTORS: ACHING
DESCRIPTORS: ACHING;SHARP;CONSTANT

## 2017-04-29 NOTE — H&P
[facility]    History and Physical  Hospitalist       Date of Admission:  4/28/2017  Date of Service (when I saw the patient):4/28/2017      Assessment & Plan   1. Sepsis (Temp 100.3F; tachycardia), + influenza B  2. Gastroenteritis   3. Hypokalemia  4. Hx of Chronic Diarrhea  5. Hx of B12 def/WHIT  6. Acute abdominal pain   7. Possible UTI    Plan  -admit to inpatient  -CT AP  -tamilfu  -potassium replacement protocol  -clear liquid diet; adat  -lactic acid; blood cx; UCx  -ceftriaxone  -AM labs: CBC, BMP    DVT Prophylaxis: Pneumatic Compression Devices for now pending results of CT AP  Code Status: Full Code    Disposition: Expected discharge in 1-3 days pending workup of abdominal pain    Chester Cool    Primary Care Physician *Chapo Flores    Chief Complaint   vomiting    History is obtained from the patient    History of Present Illness   Maggie Case is a 27 year old female with past medical history noted below. The patient has a chronic history of diarrhea. She has a history of Anxiety, chronic diarrhea, B12 deficiency/Iron deficiency anemia, PE  However starting on Monday the patient developed nausea, vomiting, and worsening of her baseline diarrhea; with multiple liquid stools per day. She has had generalized abdominal pain, cough, weakness and fatigue. She presented to ED today where notable workup included potassium of 2.6 and tested positive for influenza B. She continues to have abdominal pain which she states has worsened since arrival to ED.     Past Medical History    I have reviewed this patient's medical history and updated it with pertinent information if needed.   Past Medical History:   Diagnosis Date     Anemia      Anxiety      Chronic diarrhea      Lactose intolerance      Myalgia      Pulmonary embolism (H) 05/06/16     Vitamin B12 deficiency        Past Surgical History   I have reviewed this patient's surgical history and updated it with pertinent information if  needed.  Past Surgical History:   Procedure Laterality Date     CLOSED REDUCTION, PERCUTANEOUS PINNING LOWER EXTREMITY, COMBINED Right 4/6/2016    Procedure: COMBINED CLOSED REDUCTION, PERCUTANEOUS PINNING LOWER EXTREMITY;  Surgeon: Dexter Jones MD;  Location: [facility]       Prior to Admission Medications   Prior to Admission Medications   Prescriptions Last Dose Informant Patient Reported? Taking?   ipratropium - albuterol 0.5 mg/2.5 mg/3 mL (DUONEB) 0.5-2.5 (3) MG/3ML neb solution 4/28/2017 at Unknown time  Yes Yes   Sig: Inhale 3 mLs into the lungs   medroxyPROGESTERone (DEPO-PROVERA) 150 MG/ML injection Past Week at Unknown time  Yes Yes   Sig: Inject 150 mg into the muscle      Facility-Administered Medications: None     Allergies   Allergies   Allergen Reactions     Amoxicillin Other (See Comments)     Headaches     Lactose      Levaquin [Levofloxacin] Swelling     Naproxen Other (See Comments)     Reaction: Headaches     Nickel      Tramadol      Sulindac Rash       Social History   I have reviewed this patient's social history and updated it with pertinent information if needed. Maggie Case  reports that she has been smoking Cigarettes.  She has a 1.75 pack-year smoking history. She has never used smokeless tobacco. She reports that she drinks alcohol. She reports that she uses illicit drugs, including Marijuana.    Family History   I have reviewed this patient's family history and updated it with pertinent information if needed.   No family history on file.    Review of Systems   10 point ROS is negative except as noted in HPI    Physical Exam   Temp: 100.3  F (37.9  C) Temp src: Tympanic BP: 107/61 Pulse: 90 Heart Rate: 84 Resp: 16 SpO2: 98 % O2 Device: None (Room air)    Vital Signs with Ranges  Temp:  [98.6  F (37  C)-100.3  F (37.9  C)] 100.3  F (37.9  C)  Pulse:  [] 90  Heart Rate:  [] 84  Resp:  [16-20] 16  BP: ()/(57-87) 107/61  SpO2:  [96 %-100 %] 98 %  139 lbs 15.87  oz    Constitutional: appears uncomfortable; tearful  Eyes: EOMI  HEENT: NCAT.   Respiratory: CTAB  Cardiovascular: tachycardic Normal s1. s2  GI: generalized tenderness in all four quadrants no rebound or rigidity  Skin: warm, dry, no rash on face  Musculoskeletal: age appropriate muscle mass  Neurologic: AOX3 cranial nerves intact  Psychiatric: anxious    Data   Data reviewed today:  I personally reviewed today's labs    Recent Labs  Lab 04/28/17  1940   WBC 7.9   HGB 13.6   MCV 94         POTASSIUM 2.6*   CHLORIDE 102   CO2 26   BUN 7   CR 0.49*   ANIONGAP 11   AVINASH 8.3*   GLC 98   ALBUMIN 2.8*   PROTTOTAL 7.3   BILITOTAL 0.3   ALKPHOS 101   ALT 17   AST 22       No results found for this or any previous visit (from the past 24 hour(s)).   Chester Cool MD

## 2017-04-29 NOTE — PLAN OF CARE
"[facility] Observation Note:  Patient is registered to observation and is in 3112/3112-1 at approximately 2220 via cart accompanied by other:staff from emergency room . Report received from Chasity in SBAR format at 2150 via telephone. Patient ambulated to bed via self.. Patient is alert and oriented X 4, reports pain; rates at 7 on 0-10 scale.  Patient oriented to room, unit, hourly rounding, and plan of care. Explained the admission packet including \"What is Observation Status\" and patient handbook with patient bill of rights brochure. Will continue to monitor and document as needed.  Nursing Observation criteria listed below was met:    Health care directives status obtained and documented: Yes    Care Everywhere authorization completed No      MRSA swab completed for patient 65 years and older: N/A      If initial lactic acid >2.0, repeat lactic acid drawn within one hour of arrival to unit: No. If no, state reason: NA      Patient identifies a surrogate decision maker: No If yes, who:NA Contact Information:NA    Vaccination assessment and education completed: Yes   Vaccinations received prior to hospitalization: Pneumovax no  Influenza(seasonal)  NO   Vaccination(s) ordered: patient declines      Skin issues/needs documented:No    Isolation Patient: yes Education given and documented, correct sign in place and documentation row added to PCS:  Yes      Fall Prevention: Observation fall risk completed:  No Education given and documented, sticker and magnet in place: No and N/A      General Care Plan initiated with observation goal(s): Yes    Education (including assessment) Documented: Yes    New medication information given to patient and documented: No    Patient elects to use own medications from home during hospitalization:  No If yes, a MD order was obtained to use Medications from Home:  No and home medications were sent to Pharmacy for verification for use during hospitalization: No    Patient has " discharge needs (If yes, please explain): No    Face to face report given with opportunity to observe patient.    Report given to RITESH Gavin   4/28/2017  11:18 PM

## 2017-04-29 NOTE — PLAN OF CARE
Problem: Goal Outcome Summary  Goal: Goal Outcome Summary  Outcome: No Change  Pt has been medicated with pain medication 3x and Ativan IV x1 this shift. Has denied nausea, no emesis. Passed small amounts liquid stool with cdiff positive- pt made aware and explained to her what thst is. Has taken oral vancomycin x1 and rocephin x1. Received all K+ riders with lab ordered for 0600. Enjoyed and tolerated chicken broth x2 and is presently working on her 3rd. See notes.

## 2017-04-29 NOTE — PHARMACY
[facility]    Pharmacy      Antimicrobial Stewardship Note     Current antimicrobial therapy:  Anti-infectives (Future)    Start     Dose/Rate Route Frequency Ordered Stop    04/29/17 0045  vancomycin (VANOCIN) solution 125 mg      125 mg Oral 4 TIMES DAILY 04/29/17 0037      04/28/17 2315  cefTRIAXone in d5w (ROCEPHIN) intermittent infusion 1 g      1 g  over 30 Minutes Intravenous EVERY 24 HOURS 04/28/17 2310      04/28/17 2300  oseltamivir (TAMIFLU) capsule 75 mg      75 mg Oral 2 TIMES DAILY 04/28/17 2258            Indication: C. Dif, influenza, UTI     Pertinent labs:  Creatinine   Creatinine   Date Value Ref [facility] Status   04/29/2017 0.47 (L) 0.52 - 1.04 mg/dL Final   04/28/2017 0.49 (L) 0.52 - 1.04 mg/dL Final   03/19/2017 0.61 0.52 - 1.04 mg/dL Final     WBC   WBC   Date Value Ref [facility] Status   04/29/2017 5.0 4.0 - 11.0 10e9/L Final   04/28/2017 7.9 4.0 - 11.0 10e9/L Final   03/19/2017 10.1 4.0 - 11.0 10e9/L Final     ANC No components found for: ANC     Culture Results: pending     Recommendations/Interventions:  1. None. Antibiotics appropriate at this time.    Estephania Mosher MARY  April 29, 2017

## 2017-04-29 NOTE — PLAN OF CARE
Problem: Patient Goal: Rt Focus  Goal: 3. Patient Goal: RT Focus  [facility] - Respiratory Clinical Assessment     Current Patient History:    Respiratory History: pneumonia    Smoking History: years of smoking <20    Oxygen dependency: No,    Oxygen prescribed: none     4/29/2017 12:31 PM Patient Initial Assessment:     Level of Consciousness: groggy, cooperative and pleasant    Skin color: pink    Lung sounds :clear    Respirations:  Normal with easy respirations and no use of accessory muscles to breathe    Respiratory symptoms: non-productive cough    Cough/Sputum:  nonproductive    Current oxygenation status: 96% on r/a

## 2017-04-29 NOTE — PLAN OF CARE
Face to face report given with opportunity to observe patient.  Report given to RITESH Tello.    Latonya Etienne  4/29/2017, 7:26 AM

## 2017-04-29 NOTE — PLAN OF CARE
Number 3 rider of Potassium IV up at this this time along with oral Tamiflu and Dilaudid 0.5mg IV c/o pain to abdomen.

## 2017-04-29 NOTE — PLAN OF CARE
Problem: Patient Goal: Social Work Focus  Goal: 1. Patient Goal: Social Work Focus  Assessment completed via flow sheet     GUSTABO reviewed, level: Average     Maggie is sitting up in bed. Her PCP is Dr Flores, her dentist is Dr Cabrera, she does not have an eye doctor. She does not have a POA or healthcare directive but would like information. She has MA, but now other services through [facility] and thinks she has a  by the name of Susan. She uses [facility]. She is not a .     She lives in an apartment with her daughter 8 year old daughter Sharri, states she feels safe and that her home is well maintained. She performs her own self cares, manages her medication and appointment schedule. She has fallen recently and states she is very clumsy. She does her own shopping and food prep, she drives and has a reliable vehicle. She is a PCA at [employer].     She does not have trouble sleeping and does not have any mood concerns. She had seen a counselor at a very young age. She smokes 1/2 pk of cigarettes daily, and does not want to quit. Consumes 8 alcoholic beverages a week. Her kenny is important to her. She worries about finances. She enjoys spending time with family and friends, bowling, game night, movies and shopping. No needs identified at this time, will remain available for discharge planning.

## 2017-04-29 NOTE — ED NOTES
DATE:  4/28/2017   TIME OF RECEIPT FROM LAB:  2020  LAB TEST: Potassium  LAB VALUE:  2.6  RESULTS GIVEN WITH READ-BACK TO (PROVIDER): Yelitza Hampton NP  TIME LAB VALUE REPORTED TO PROVIDER:   2021

## 2017-04-29 NOTE — PLAN OF CARE
Face to face report given with opportunity to observe patient.    Report given to RITESH Metz   4/29/2017  3:24 PM

## 2017-04-29 NOTE — PROVIDER NOTIFICATION
Dr. Cool notified that pt had an episode of emesis s/p administration of oral Vanco. Pt requesting to have in IV, informed her that she needs to have orally to it can go through her gut. Pt insists that she would prefer IV. MD also made aware that pt is requesting to have a regular diet. She continues to have diarrhea however is doing OK with clears.     9:16 AM  04/29/2017   Rebekah Castorena

## 2017-04-29 NOTE — PLAN OF CARE
Problem: Infection, Risk/Actual (Adult)  Goal: Identify Related Risk Factors and Signs and Symptoms  Related risk factors and signs and symptoms are identified upon initiation of Human Response Clinical Practice Guideline (CPG)   Outcome: Improving  Pt c/o right upper quadrant abdominal pain. Medicated with PRN IV Dilaudid & rest promotion. S/P administration of dilaudid pt was very lethargic.Pt did have nausea with emesis x3 this shift (relieved with PRN Zofran). Pt had x1 loose brown stool. Her diet was advanced from clears to regular. Pt up OOB with SBA, tolerates activity well. Calcium and Mg replaced. Mg re-draw scheduled for 1715 replace per protocol.

## 2017-04-29 NOTE — ED NOTES
Patient aware of plan of care with no questions or concerns. Potassium infusion completed. Patient to be on telemetry. Vitals stable prior to transfer.

## 2017-04-29 NOTE — ED NOTES
Pt presents by self with complaint of abdomina pain, nausea/vomiting, and cough. Pt reports symptoms started about one week ago. Today the patient has vomited x3 and reports vomiting anything she tries to eat. Also states she has had diarrhea approx 8-10 times a day for the past several days. Patient states she had blood in her vomit this past Wednesday. Patient has tried a variety of medications for her cough including Allegra. Patient reports feeling fatigued throughout. Patient also reports abdominal pain throughout all four quadrants, currently rating the pain 8/10. Placed in gown. Call light within reach.

## 2017-04-30 LAB
ANION GAP SERPL CALCULATED.3IONS-SCNC: 9 MMOL/L (ref 3–14)
BACTERIA SPEC CULT: ABNORMAL
BUN SERPL-MCNC: 3 MG/DL (ref 7–30)
CALCIUM SERPL-MCNC: 7.9 MG/DL (ref 8.5–10.1)
CHLORIDE SERPL-SCNC: 109 MMOL/L (ref 94–109)
CO2 SERPL-SCNC: 24 MMOL/L (ref 20–32)
CREAT SERPL-MCNC: 0.44 MG/DL (ref 0.52–1.04)
ERYTHROCYTE [DISTWIDTH] IN BLOOD BY AUTOMATED COUNT: 16.2 % (ref 10–15)
GFR SERPL CREATININE-BSD FRML MDRD: ABNORMAL ML/MIN/1.7M2
GLUCOSE SERPL-MCNC: 86 MG/DL (ref 70–99)
HCT VFR BLD AUTO: 30.1 % (ref 35–47)
HGB BLD-MCNC: 10.1 G/DL (ref 11.7–15.7)
MAGNESIUM SERPL-MCNC: 2.4 MG/DL (ref 1.6–2.3)
MCH RBC QN AUTO: 32.7 PG (ref 26.5–33)
MCHC RBC AUTO-ENTMCNC: 33.6 G/DL (ref 31.5–36.5)
MCV RBC AUTO: 97 FL (ref 78–100)
MICRO REPORT STATUS: ABNORMAL
PLATELET # BLD AUTO: 181 10E9/L (ref 150–450)
POTASSIUM SERPL-SCNC: 3.8 MMOL/L (ref 3.4–5.3)
RBC # BLD AUTO: 3.09 10E12/L (ref 3.8–5.2)
SODIUM SERPL-SCNC: 142 MMOL/L (ref 133–144)
SPECIMEN SOURCE: ABNORMAL
WBC # BLD AUTO: 4.4 10E9/L (ref 4–11)

## 2017-04-30 PROCEDURE — 99232 SBSQ HOSP IP/OBS MODERATE 35: CPT | Performed by: HOSPITALIST

## 2017-04-30 PROCEDURE — 83735 ASSAY OF MAGNESIUM: CPT | Performed by: HOSPITALIST

## 2017-04-30 PROCEDURE — 12000000 ZZH R&B MED SURG/OB

## 2017-04-30 PROCEDURE — 25800025 ZZH RX 258: Performed by: HOSPITALIST

## 2017-04-30 PROCEDURE — 25000132 ZZH RX MED GY IP 250 OP 250 PS 637: Performed by: HOSPITALIST

## 2017-04-30 PROCEDURE — 40000275 ZZH STATISTIC RCP TIME EA 10 MIN

## 2017-04-30 PROCEDURE — 25000128 H RX IP 250 OP 636: Performed by: HOSPITALIST

## 2017-04-30 PROCEDURE — 85027 COMPLETE CBC AUTOMATED: CPT | Performed by: HOSPITALIST

## 2017-04-30 PROCEDURE — 80048 BASIC METABOLIC PNL TOTAL CA: CPT | Performed by: HOSPITALIST

## 2017-04-30 PROCEDURE — 36415 COLL VENOUS BLD VENIPUNCTURE: CPT | Performed by: HOSPITALIST

## 2017-04-30 RX ORDER — PANTOPRAZOLE SODIUM 40 MG/1
40 TABLET, DELAYED RELEASE ORAL EVERY MORNING
Status: DISCONTINUED | OUTPATIENT
Start: 2017-04-30 | End: 2017-05-01 | Stop reason: HOSPADM

## 2017-04-30 RX ORDER — NICOTINE 21 MG/24HR
1 PATCH, TRANSDERMAL 24 HOURS TRANSDERMAL DAILY
Status: DISCONTINUED | OUTPATIENT
Start: 2017-04-30 | End: 2017-05-01 | Stop reason: HOSPADM

## 2017-04-30 RX ORDER — RISPERIDONE 0.25 MG/1
0.25 TABLET ORAL ONCE
Status: COMPLETED | OUTPATIENT
Start: 2017-04-30 | End: 2017-04-30

## 2017-04-30 RX ORDER — LORAZEPAM 1 MG/1
1 TABLET ORAL EVERY 4 HOURS PRN
Status: DISCONTINUED | OUTPATIENT
Start: 2017-04-30 | End: 2017-05-01 | Stop reason: HOSPADM

## 2017-04-30 RX ADMIN — OXYCODONE HYDROCHLORIDE 10 MG: 5 TABLET ORAL at 20:36

## 2017-04-30 RX ADMIN — ONDANSETRON 4 MG: 2 INJECTION, SOLUTION INTRAMUSCULAR; INTRAVENOUS at 15:57

## 2017-04-30 RX ADMIN — RISPERIDONE 0.25 MG: 0.25 TABLET ORAL at 15:57

## 2017-04-30 RX ADMIN — AZITHROMYCIN MONOHYDRATE 500 MG: 500 INJECTION, POWDER, LYOPHILIZED, FOR SOLUTION INTRAVENOUS at 10:59

## 2017-04-30 RX ADMIN — Medication 250 MG: at 09:26

## 2017-04-30 RX ADMIN — LORAZEPAM 1 MG: 1 TABLET ORAL at 20:45

## 2017-04-30 RX ADMIN — GUAIFENESIN AND DEXTROMETHORPHAN 5 ML: 100; 10 SYRUP ORAL at 03:51

## 2017-04-30 RX ADMIN — POTASSIUM CHLORIDE, DEXTROSE MONOHYDRATE AND SODIUM CHLORIDE: 150; 5; 450 INJECTION, SOLUTION INTRAVENOUS at 12:15

## 2017-04-30 RX ADMIN — VANCOMYCIN HYDROCHLORIDE 125 MG: 1 INJECTION, POWDER, LYOPHILIZED, FOR SOLUTION INTRAVENOUS at 09:27

## 2017-04-30 RX ADMIN — LORAZEPAM 1 MG: 1 TABLET ORAL at 09:25

## 2017-04-30 RX ADMIN — VANCOMYCIN HYDROCHLORIDE 125 MG: 1 INJECTION, POWDER, LYOPHILIZED, FOR SOLUTION INTRAVENOUS at 17:00

## 2017-04-30 RX ADMIN — POTASSIUM CHLORIDE, DEXTROSE MONOHYDRATE AND SODIUM CHLORIDE: 150; 5; 450 INJECTION, SOLUTION INTRAVENOUS at 22:27

## 2017-04-30 RX ADMIN — POTASSIUM CHLORIDE, DEXTROSE MONOHYDRATE AND SODIUM CHLORIDE: 150; 5; 450 INJECTION, SOLUTION INTRAVENOUS at 01:58

## 2017-04-30 RX ADMIN — PANTOPRAZOLE SODIUM 40 MG: 40 TABLET, DELAYED RELEASE ORAL at 09:26

## 2017-04-30 RX ADMIN — OSELTAMIVIR PHOSPHATE 75 MG: 75 CAPSULE ORAL at 09:26

## 2017-04-30 RX ADMIN — LORAZEPAM 1 MG: 2 INJECTION, SOLUTION INTRAMUSCULAR; INTRAVENOUS at 05:52

## 2017-04-30 RX ADMIN — OSELTAMIVIR PHOSPHATE 75 MG: 75 CAPSULE ORAL at 20:36

## 2017-04-30 RX ADMIN — VANCOMYCIN HYDROCHLORIDE 125 MG: 1 INJECTION, POWDER, LYOPHILIZED, FOR SOLUTION INTRAVENOUS at 14:21

## 2017-04-30 RX ADMIN — OXYCODONE HYDROCHLORIDE 10 MG: 5 TABLET ORAL at 15:57

## 2017-04-30 RX ADMIN — OXYCODONE HYDROCHLORIDE 10 MG: 5 TABLET ORAL at 03:51

## 2017-04-30 RX ADMIN — ACETAMINOPHEN 650 MG: 325 TABLET, FILM COATED ORAL at 09:24

## 2017-04-30 RX ADMIN — VANCOMYCIN HYDROCHLORIDE 125 MG: 1 INJECTION, POWDER, LYOPHILIZED, FOR SOLUTION INTRAVENOUS at 21:59

## 2017-04-30 RX ADMIN — Medication 250 MG: at 20:36

## 2017-04-30 RX ADMIN — ENOXAPARIN SODIUM 40 MG: 40 INJECTION SUBCUTANEOUS at 11:15

## 2017-04-30 RX ADMIN — LORAZEPAM 1 MG: 1 TABLET ORAL at 14:26

## 2017-04-30 RX ADMIN — NICOTINE 1 PATCH: 14 PATCH, EXTENDED RELEASE TRANSDERMAL at 17:00

## 2017-04-30 RX ADMIN — OXYCODONE HYDROCHLORIDE 10 MG: 5 TABLET ORAL at 09:25

## 2017-04-30 ASSESSMENT — PAIN DESCRIPTION - DESCRIPTORS: DESCRIPTORS: BURNING;SHARP

## 2017-04-30 NOTE — PLAN OF CARE
Patient visitor brought in subway for the patient. She appears to eating it with no difficulty or issues. She has no complaints at this time.

## 2017-04-30 NOTE — PROGRESS NOTES
[facility]    Hospitalist Progress Note    Date of Service (when I saw the patient): 04/30/2017    Assessment & Plan   Maggie Case is a 27 year old female who was admitted on 4/28/2017 with nausea vomiting, worsening of her baseline diarrhea was found to have influenza B and C. Difficile colitis.  Her issues are the following:    C. Difficile colitis: The patient is being treated with vancomycin orally.  She continues to have diarrhea.  She will need at least a 10 day course of vancomycin today is day 2/10.  Continue to hydrate with IV fluids.the patient does have chronic diarrhea at baseline.CT scan of the abdomen consistent with diffused colitis.    Influenza B with possible pneumonia: The patient is on Tamiflu date 2/ 5.  She also has atypical infiltrates.  She was started on Rocephin.  I will add azithromycin to cover for atypical superinfection.    Sepsis: Due to C. Difficile and influenza B.  Resolved now.    Hallucinations: Contributed by IV Dilaudid and Ativan given last night.  Hallucinations have resolved with 1 dose of risperidone last night.  Have discontinued IV Dilaudid and IV Ativan.    Hypokalemia: Improved.  Continue on replacement protocol    Anxiety: The patient asks for Ativan.  I have switched it to by mouth.  Discontinue IV Ativan as the patient was hallucinating.    Hypocalcemia/hypomagnesemia: Continue replacement.  Recheck in a.m.    Macrocytic anemia: History of B12 deficiency and iron deficiency anemia.  Restart supplements upon discharge.     DVT Prophylaxis: Enoxaparin (Lovenox) SQ, PPI for GI prophylaxis  Code Status: Full Code    Disposition: Expected discharge in 2-3 days once diarrhea improves.    Discussed with the patient.  Total time spent in patient care was 20 minutes.    Kareem Verduzco    Interval History   The patient continues to have diarrhea 3 times a day.  She also reports diffuse abdominal pain for which she was given IV Dilaudid and IV Ativan for her anxiety.   After taking all this she started hallucinating last night but responded well to risperidone.  This morning she is not hallucinating but looks very anxious.  She also reports off and on retching with vomiting.    -Data reviewed today: I reviewed all new labs and imaging results over the last 24 hours. I personally reviewed today's labs  Physical Exam   Temp: 97.5  F (36.4  C) Temp src: Tympanic BP: 97/65   Heart Rate: 71 Resp: 16 SpO2: 100 % O2 Device: None (Room air)    Vitals:    04/28/17 2232   Weight: 63.5 kg (139 lb 15.9 oz)     Vital Signs with Ranges  Temp:  [97.3  F (36.3  C)-97.6  F (36.4  C)] 97.5  F (36.4  C)  Heart Rate:  [64-77] 71  Resp:  [16-18] 16  BP: ()/(58-67) 97/65  SpO2:  [96 %-100 %] 100 %  I/O last 3 completed shifts:  In: 3511 [P.O.:990; I.V.:2521]  Out: 201 [Urine:100; Emesis/NG output:1; Stool:100]    Peripheral IV 04/28/17 Right Hand (Active)   Site Assessment [facility] 4/30/2017  8:58 AM   Line Status Saline locked;Checked every 1-2 hour 4/30/2017  8:58 AM   Phlebitis Scale 0-->no symptoms 4/30/2017  8:58 AM   Infiltration Scale 0 4/30/2017  8:58 AM   Infiltration Site Treatment Method  None 4/29/2017  8:28 AM   Extravasation? No 4/29/2017  8:28 AM   Number of days:2       Peripheral IV 04/29/17 Left Lower forearm (Active)   Site Assessment [facility] 4/30/2017  8:58 AM   Line Status Infusing;Checked every 1-2 hour 4/30/2017  8:58 AM   Phlebitis Scale 0-->no symptoms 4/30/2017  8:58 AM   Infiltration Scale 0 4/30/2017  8:58 AM   Extravasation? No 4/29/2017  5:03 PM   Number of days:1            Constitutional:alert oriented ×3, looks very anxious  Eyes: EOMI  HEENT: NCAT.   Respiratory: some scattered crackles at the bases  Cardiovascular: RRR Normal s1. s2  GI: generalized tenderness in all four quadrants no rebound or rigidity  Skin: warm, dry, no rash on face  Musculoskeletal: age appropriate muscle mass  Neurologic: AOX3 cranial nerves intact  Psychiatric: anxious    Medications     NaCl        dextrose 5% and 0.45% NaCl + KCl 20 mEq/L 100 mL/hr at 04/30/17 0158       pantoprazole  40 mg Oral QAM     azithromycin  500 mg Intravenous Q24H     vancomycin  125 mg Oral 4x Daily     saccharomyces boulardii  250 mg Oral BID     enoxaparin  40 mg Subcutaneous Q24H     sodium chloride (PF)  3 mL Intracatheter Q8H     oseltamivir  75 mg Oral BID     cefTRIAXone  1 g Intravenous Q24H       Data     Recent Labs  Lab 04/30/17  0527 04/29/17  0618 04/28/17  1940   WBC 4.4 5.0 7.9   HGB 10.1* 11.0* 13.6   MCV 97 97 94    195 250    140 139   POTASSIUM 3.8 3.5 2.6*   CHLORIDE 109 107 102   CO2 24 23 26   BUN 3* 6* 7   CR 0.44* 0.47* 0.49*   ANIONGAP 9 10 11   AVINASH 7.9* 7.5* 8.3*   GLC 86 91 98   ALBUMIN  --   --  2.8*   PROTTOTAL  --   --  7.3   BILITOTAL  --   --  0.3   ALKPHOS  --   --  101   ALT  --   --  17   AST  --   --  22       No results found for this or any previous visit (from the past 24 hour(s)).

## 2017-04-30 NOTE — PLAN OF CARE
"Pt had visitors and one visitor was a child. Pt was informed that due to her dx it may not be a good idea for her to have a child visitor at this time since she does still have a cough and patient informed staff \" It will be fine, she will wear a mask and wont be here for long.\" Instructed child and her parent to wear masks upon entering the room and to wash their hands upon exiting. Assisted visitors with mask placement.   "

## 2017-04-30 NOTE — PLAN OF CARE
"Pt sitting upright in bed with TV on. Food from dinner in front of pt- states she's \" picking at it.\" Falls asleep and nods head back while talking. Slurring her words - slow to answer questions. No hallucinations  "

## 2017-04-30 NOTE — PROVIDER NOTIFICATION
"Notified MD of patient request to \"go outside for a smoke\" MD to place order for nicotine patch. Will continue to monitor.   "

## 2017-04-30 NOTE — PLAN OF CARE
Problem: Goal Outcome Summary  Goal: Goal Outcome Summary  Outcome: No Change  Pt continues to request pain medication on a regular basis. Instructed that Dr ordered only oral pain medication in which she may have q4hr. Tearful with initial assessment but slept somewhat after Ativan administered. Very sleepy and occssionally repeats questions. Nods off when talking. Received robitussiun per request - cough minimal. IVF infusing via LFA lock at 100cc/hr. Denies nausea. No loose stool passed as of yet. Tolerating small bites of food- tolerating liquids well.

## 2017-04-30 NOTE — PLAN OF CARE
Face to face report given with opportunity to observe patient.    Report given to Latonya Harris RN   4/29/2017  11:44 PM

## 2017-04-30 NOTE — PLAN OF CARE
"Problem: Goal Outcome Summary  Goal: Goal Outcome Summary  Outcome: No Change  Pt has been alert and oriented during shift however did have c/o auditory and visual hallucinations during shift ( see previous note ) MD notified. VSS last set of vitals as follows /66  Pulse 90  Temp 97.5  F (36.4  C) (Tympanic)  Resp 16  Ht 1.626 m (5' 4\")  Wt 63.5 kg (139 lb 15.9 oz)  SpO2 100%  BMI 24.03 kg/m2. Isolation precautions continue. She continues to have a cough which she states is productive however there was no sputum assessed by this writer during shift. She has been independent during shift except for after IV pain medication given, at that time bed alarm was alarmed and activated. IVF continues per MAR. She has taken all medications per MAR without incident or issue. She was given multiple pieces of education information on different medications that she has been taking during her hospital stay. ( See education documentation. ) She uses the call light to make her needs known. Pain has been treated with PRN dilaudid per MAR which did provide her with decrease in pain however due to her c/o hallucinations and quickly rebounding pain it was discussed with the patient the possibility of transitioning over to the oral PRN pain medication  That she has ordered. She was in agreement and has stated that the oral roxicodone given at 2126 has decreased her pain. She has been attributing the pain in her abdomen and back to her cough.  She was also given PRN robutussin per MAR for c/o cough. Pt did not receive any electrolyte replacement this shift as order parameters not met. She did not have any c/o nausea during shift. She ate a few bites of her supper and stated that \" it was not good and she couldn't eat it.\" she has been drinking and eating without issue. She has remained free from all falls and/or injuries during shift. Last set of vitals as follows /66  Pulse 90  Temp 97.5  F (36.4  C) (Tympanic)  " "Resp 16  Ht 1.626 m (5' 4\")  Wt 63.5 kg (139 lb 15.9 oz)  SpO2 100%  BMI 24.03 kg/m2.              "

## 2017-04-30 NOTE — PLAN OF CARE
Problem: Patient Goal: Rt Focus  Goal: 3. Patient Goal: RT Focus  Pt declined any neb treatments.  BS clear, sats 96% on room air.

## 2017-04-30 NOTE — PROVIDER NOTIFICATION
"Notified MD that patient is c/o auditory and visual hallucinations after dilaudid administration at 1956. Pt states that she is \" seeing her brother and her daughter in her room and having conversations with them even though they are not there.\" MD to place orders accordingly. Will continue to monitor.   "

## 2017-04-30 NOTE — PLAN OF CARE
Pt requested and received roxicodone 10 mg po and robitussin 5ml for very minimal dry, weak cough.

## 2017-04-30 NOTE — PROVIDER NOTIFICATION
Notified MD of patients c/o cough that is causing her pain in abdomen and back and also keeping her up at night. This cough has been witnessed and MD will place orders accordingly.

## 2017-04-30 NOTE — PLAN OF CARE
"Pt's Mom Shannon called due to Pt's family members and friends calling Sahwna and reporting that the Pt is calling them stating that she is hallucinating. Shawna states that \"the Pt is an alcoholic and seeks pain medications when she is in the hospital.\" Shawna also states that their is a family history of bipolar.  "

## 2017-04-30 NOTE — PLAN OF CARE
Problem: Patient Goal: Social Work Focus  Goal: 1. Patient Goal: Social Work Focus  Met briefly with Maggie,  No needs identified, will remain available for discharge planning.

## 2017-04-30 NOTE — PLAN OF CARE
Spoke with Dr Verduzco regarding pain issue. Dilaudid dc'd with Roxicodone dose increased to 10mg q4hr. Opted to take 1 at this time and wait for regular dose of 10mg when its time. Very tearful and anxious. Sister at bedside. Requested and received Ativan 1mg IV also.

## 2017-04-30 NOTE — PLAN OF CARE
Patient requesting to speak with writer this a.m.  Stopped into room a few times today- patient sleeping soundly. Will be available for any concerns.

## 2017-04-30 NOTE — PLAN OF CARE
Discussed with patient the importance of letting staff know if she has en emesis or BM and not to dispose of either one as staff will need to assess them. Pt in agreement.

## 2017-04-30 NOTE — PLAN OF CARE
Problem: Patient Goal: Rt Focus  Goal: 3. Patient Goal: RT Focus  Sp02 98% on r/a. BS clear. No nebs given this shift. NPC

## 2017-04-30 NOTE — PLAN OF CARE
Face to face report given with opportunity to observe patient.  Report given to Anne AWAD.    Latonya Etienne  4/30/2017, 7:28 AM

## 2017-04-30 NOTE — PLAN OF CARE
Medicated with Ativan 1mg IV per request. Irritated about having bed alarm on- insisted it be turned off. fall alarm presently off. Upset lab just came in and poked her without explanation. Pt reassured at this time. Seems less anxious

## 2017-04-30 NOTE — PLAN OF CARE
Problem: Goal Outcome Summary  Goal: Goal Outcome Summary  Outcome: No Change  A&O. At times Pt statements are illogical and is disoriented to her plan of care. VSS. Afebrile. C/o pain upper abdomin, administered oral Jasmin x1 and Pt requested oral ativan x2 doses this shift with pain decrease. LS diminished with request for Pt to take deep breaths to assess with continued shallow breaths. Infrequent dry cough noted  BS hyperactive. Denies nausea this shift. Requested Pt to notify staff when has BMs with non reported this shift. Large meal orders placed with Pt snacking throughout the shift. IVF continue with IV Zithromcin and oral Vancomycin administered. Pt refuses to have bed alarm on and transfers independently in room. No falls or injuries this shift.        Face to face report given with opportunity to observe patient.     Report given to RITESH Mulligan   4/30/2017  4:44 PM

## 2017-04-30 NOTE — PHARMACY
[facility]    Pharmacy      Antimicrobial Stewardship Note     Current antimicrobial therapy:  Anti-infectives (Future)    Start     Dose/Rate Route Frequency Ordered Stop    04/30/17 1045  azithromycin (ZITHROMAX) 500 mg in NaCl 0.9 % 250 mL intermittent infusion      500 mg  275 mL/hr over 1 Hours Intravenous EVERY 24 HOURS 04/30/17 1015      04/29/17 0045  vancomycin (VANOCIN) solution 125 mg      125 mg Oral 4 TIMES DAILY 04/29/17 0037      04/28/17 2315  cefTRIAXone in d5w (ROCEPHIN) intermittent infusion 1 g      1 g  over 30 Minutes Intravenous EVERY 24 HOURS 04/28/17 2310      04/28/17 2300  oseltamivir (TAMIFLU) capsule 75 mg      75 mg Oral 2 TIMES DAILY 04/28/17 2258            Indication: CAP, UTI, influenza, C.Dif     Pertinent labs:  Creatinine   Creatinine   Date Value Ref Range Status   04/30/2017 0.44 (L) 0.52 - 1.04 mg/dL Final   04/29/2017 0.47 (L) 0.52 - 1.04 mg/dL Final   04/28/2017 0.49 (L) 0.52 - 1.04 mg/dL Final     WBC   WBC   Date Value Ref Range Status   04/30/2017 4.4 4.0 - 11.0 10e9/L Final   04/29/2017 5.0 4.0 - 11.0 10e9/L Final   04/28/2017 7.9 4.0 - 11.0 10e9/L Final     ANC No components found for: ANC     Culture Results: positive influenza and c. diff     Recommendations/Interventions:  1. None      Estephania Mosher RPH  April 30, 2017

## 2017-05-01 VITALS
WEIGHT: 139.99 LBS | SYSTOLIC BLOOD PRESSURE: 118 MMHG | HEART RATE: 90 BPM | OXYGEN SATURATION: 100 % | DIASTOLIC BLOOD PRESSURE: 70 MMHG | BODY MASS INDEX: 23.9 KG/M2 | RESPIRATION RATE: 18 BRPM | HEIGHT: 64 IN | TEMPERATURE: 98.7 F

## 2017-05-01 PROBLEM — A04.72 C. DIFFICILE COLITIS: Status: ACTIVE | Noted: 2017-05-01

## 2017-05-01 PROBLEM — R91.8 OPACITY OF LUNG ON IMAGING STUDY: Chronic | Status: ACTIVE | Noted: 2017-05-01

## 2017-05-01 PROBLEM — J18.9 COMMUNITY ACQUIRED PNEUMONIA: Status: ACTIVE | Noted: 2017-05-01

## 2017-05-01 PROBLEM — J10.1 INFLUENZA B: Status: ACTIVE | Noted: 2017-05-01

## 2017-05-01 LAB
ANION GAP SERPL CALCULATED.3IONS-SCNC: 10 MMOL/L (ref 3–14)
BASOPHILS # BLD AUTO: 0 10E9/L (ref 0–0.2)
BASOPHILS NFR BLD AUTO: 0 %
BUN SERPL-MCNC: <1 MG/DL (ref 7–30)
CALCIUM SERPL-MCNC: 8 MG/DL (ref 8.5–10.1)
CHLORIDE SERPL-SCNC: 109 MMOL/L (ref 94–109)
CO2 SERPL-SCNC: 23 MMOL/L (ref 20–32)
CREAT SERPL-MCNC: 0.45 MG/DL (ref 0.52–1.04)
DIFFERENTIAL METHOD BLD: ABNORMAL
EOSINOPHIL # BLD AUTO: 0.2 10E9/L (ref 0–0.7)
EOSINOPHIL NFR BLD AUTO: 4.8 %
ERYTHROCYTE [DISTWIDTH] IN BLOOD BY AUTOMATED COUNT: 16 % (ref 10–15)
GFR SERPL CREATININE-BSD FRML MDRD: ABNORMAL ML/MIN/1.7M2
GLUCOSE SERPL-MCNC: 89 MG/DL (ref 70–99)
HCT VFR BLD AUTO: 31.9 % (ref 35–47)
HGB BLD-MCNC: 10.9 G/DL (ref 11.7–15.7)
IMM GRANULOCYTES # BLD: 0 10E9/L (ref 0–0.4)
IMM GRANULOCYTES NFR BLD: 0.9 %
LYMPHOCYTES # BLD AUTO: 1.1 10E9/L (ref 0.8–5.3)
LYMPHOCYTES NFR BLD AUTO: 24.1 %
MAGNESIUM SERPL-MCNC: 2 MG/DL (ref 1.6–2.3)
MCH RBC QN AUTO: 32.4 PG (ref 26.5–33)
MCHC RBC AUTO-ENTMCNC: 34.2 G/DL (ref 31.5–36.5)
MCV RBC AUTO: 95 FL (ref 78–100)
MONOCYTES # BLD AUTO: 0.4 10E9/L (ref 0–1.3)
MONOCYTES NFR BLD AUTO: 7.6 %
NEUTROPHILS # BLD AUTO: 2.9 10E9/L (ref 1.6–8.3)
NEUTROPHILS NFR BLD AUTO: 62.6 %
NRBC # BLD AUTO: 0 10*3/UL
NRBC BLD AUTO-RTO: 0 /100
PLATELET # BLD AUTO: 243 10E9/L (ref 150–450)
POTASSIUM SERPL-SCNC: 3.8 MMOL/L (ref 3.4–5.3)
RBC # BLD AUTO: 3.36 10E12/L (ref 3.8–5.2)
SODIUM SERPL-SCNC: 142 MMOL/L (ref 133–144)
WBC # BLD AUTO: 4.6 10E9/L (ref 4–11)

## 2017-05-01 PROCEDURE — 83735 ASSAY OF MAGNESIUM: CPT | Performed by: HOSPITALIST

## 2017-05-01 PROCEDURE — 40000275 ZZH STATISTIC RCP TIME EA 10 MIN

## 2017-05-01 PROCEDURE — 25000128 H RX IP 250 OP 636: Performed by: HOSPITALIST

## 2017-05-01 PROCEDURE — 90732 PPSV23 VACC 2 YRS+ SUBQ/IM: CPT | Performed by: NURSE PRACTITIONER

## 2017-05-01 PROCEDURE — 25000132 ZZH RX MED GY IP 250 OP 250 PS 637: Performed by: HOSPITALIST

## 2017-05-01 PROCEDURE — 36415 COLL VENOUS BLD VENIPUNCTURE: CPT | Performed by: HOSPITALIST

## 2017-05-01 PROCEDURE — 25000128 H RX IP 250 OP 636: Performed by: NURSE PRACTITIONER

## 2017-05-01 PROCEDURE — 25000132 ZZH RX MED GY IP 250 OP 250 PS 637: Performed by: NURSE PRACTITIONER

## 2017-05-01 PROCEDURE — 85025 COMPLETE CBC W/AUTO DIFF WBC: CPT | Performed by: HOSPITALIST

## 2017-05-01 PROCEDURE — 87449 NOS EACH ORGANISM AG IA: CPT | Performed by: NURSE PRACTITIONER

## 2017-05-01 PROCEDURE — 80048 BASIC METABOLIC PNL TOTAL CA: CPT | Performed by: HOSPITALIST

## 2017-05-01 PROCEDURE — 99239 HOSP IP/OBS DSCHRG MGMT >30: CPT | Performed by: NURSE PRACTITIONER

## 2017-05-01 PROCEDURE — 87385 HISTOPLASMA CAPSUL AG IA: CPT | Performed by: NURSE PRACTITIONER

## 2017-05-01 RX ORDER — GUAIFENESIN/DEXTROMETHORPHAN 100-10MG/5
5 SYRUP ORAL EVERY 4 HOURS PRN
Qty: 560 ML | Refills: 0 | Status: SHIPPED | OUTPATIENT
Start: 2017-05-01 | End: 2018-02-17

## 2017-05-01 RX ORDER — NICOTINE 21 MG/24HR
1 PATCH, TRANSDERMAL 24 HOURS TRANSDERMAL DAILY
Qty: 14 PATCH | Refills: 0 | Status: ON HOLD | OUTPATIENT
Start: 2017-05-01 | End: 2018-05-26

## 2017-05-01 RX ORDER — AZITHROMYCIN 250 MG/1
250 TABLET, FILM COATED ORAL DAILY
Status: DISCONTINUED | OUTPATIENT
Start: 2017-05-01 | End: 2017-05-01 | Stop reason: HOSPADM

## 2017-05-01 RX ORDER — AZITHROMYCIN 250 MG/1
250 TABLET, FILM COATED ORAL DAILY
Qty: 3 TABLET | Refills: 0 | Status: SHIPPED | OUTPATIENT
Start: 2017-05-02 | End: 2017-05-05

## 2017-05-01 RX ORDER — OXYCODONE HYDROCHLORIDE 10 MG/1
10 TABLET ORAL EVERY 8 HOURS PRN
Qty: 10 TABLET | Refills: 0 | Status: SHIPPED | OUTPATIENT
Start: 2017-05-01 | End: 2018-02-17

## 2017-05-01 RX ORDER — LORAZEPAM 1 MG/1
1 TABLET ORAL EVERY 8 HOURS PRN
Qty: 15 TABLET | Refills: 0 | Status: ON HOLD | OUTPATIENT
Start: 2017-05-01 | End: 2018-03-01

## 2017-05-01 RX ORDER — OXYCODONE HYDROCHLORIDE 10 MG/1
10 TABLET ORAL EVERY 8 HOURS PRN
Qty: 10 TABLET | Refills: 0 | Status: SHIPPED | OUTPATIENT
Start: 2017-05-01 | End: 2017-05-01

## 2017-05-01 RX ORDER — OSELTAMIVIR PHOSPHATE 75 MG/1
75 CAPSULE ORAL 2 TIMES DAILY
Qty: 4 CAPSULE | Refills: 0 | Status: SHIPPED | OUTPATIENT
Start: 2017-05-01 | End: 2017-05-03

## 2017-05-01 RX ORDER — SACCHAROMYCES BOULARDII 250 MG
250 CAPSULE ORAL 2 TIMES DAILY
Qty: 28 CAPSULE | Refills: 0 | Status: SHIPPED | OUTPATIENT
Start: 2017-05-01 | End: 2017-05-15

## 2017-05-01 RX ADMIN — OXYCODONE HYDROCHLORIDE 10 MG: 5 TABLET ORAL at 05:42

## 2017-05-01 RX ADMIN — OXYCODONE HYDROCHLORIDE 10 MG: 5 TABLET ORAL at 10:27

## 2017-05-01 RX ADMIN — OXYCODONE HYDROCHLORIDE 10 MG: 5 TABLET ORAL at 00:36

## 2017-05-01 RX ADMIN — AZITHROMYCIN 250 MG: 250 TABLET, FILM COATED ORAL at 10:27

## 2017-05-01 RX ADMIN — LORAZEPAM 1 MG: 1 TABLET ORAL at 00:36

## 2017-05-01 RX ADMIN — Medication 250 MG: at 09:11

## 2017-05-01 RX ADMIN — LORAZEPAM 1 MG: 1 TABLET ORAL at 09:10

## 2017-05-01 RX ADMIN — GUAIFENESIN AND DEXTROMETHORPHAN 5 ML: 100; 10 SYRUP ORAL at 03:32

## 2017-05-01 RX ADMIN — PNEUMOCOCCAL VACCINE POLYVALENT 0.5 ML
25; 25; 25; 25; 25; 25; 25; 25; 25; 25; 25; 25; 25; 25; 25; 25; 25; 25; 25; 25; 25; 25; 25 INJECTION, SOLUTION INTRAMUSCULAR; SUBCUTANEOUS at 10:45

## 2017-05-01 RX ADMIN — VANCOMYCIN HYDROCHLORIDE 125 MG: 1 INJECTION, POWDER, LYOPHILIZED, FOR SOLUTION INTRAVENOUS at 09:12

## 2017-05-01 RX ADMIN — CEFTRIAXONE SODIUM 1 G: 1 INJECTION, SOLUTION INTRAVENOUS at 00:36

## 2017-05-01 RX ADMIN — PANTOPRAZOLE SODIUM 40 MG: 40 TABLET, DELAYED RELEASE ORAL at 09:10

## 2017-05-01 RX ADMIN — OSELTAMIVIR PHOSPHATE 75 MG: 75 CAPSULE ORAL at 09:11

## 2017-05-01 NOTE — DISCHARGE SUMMARY
[facility]    Discharge Summary  Hospitalist    Date of Admission:  4/28/2017  Date of Discharge:  5/1/2017  Discharging Provider: Delmi Nicole NP  Date of Service (when I saw the patient): 05/01/17    Discharge Diagnoses   Principal Problem:    C. 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 The patient has a chronic history of diarrhea. She has a history of Anxiety, chronic diarrhea, B12 deficiency/Iron deficiency anemia, PE  However starting on Monday the patient developed nausea, vomiting, and worsening of her baseline diarrhea; with multiple liquid stools per day. She has had generalized abdominal pain, cough, weakness and fatigue. She presented to ED today where notable workup included potassium of 2.6 and tested positive for influenza B. She continues to have abdominal pain which she states has worsened since arrival to ED.     Hospital Course   Maggie Case was admitted on 4/28/2017.  The following problems were addressed during her hospitalization:      C. difficile colitis: Presented with abdominal pain and severe diarrhea. She was positive for C-diff and treatment was started with oral vancomycin. She was also given IVF and antiemetics PRN. She has not had a stool for the lat 16 hours and has not required any anti-emetics during that time either. She reports that overall her abdominal pain has improved though still has some pain across the upper abdomen. From her description and the high location of her pain I think this is likely more muscular pain from coughing than true abdominal pain. She does not have any pain with palpation this morning and bowel sounds are normal, neither hypo  or hyperactive. She is prescribed a further 10 days of oral vancomycin.        Influenza B: Positive for influenza B with severe cough with chest pain with cough and deep breathing. She continues to have pain in her chest with coughing and deep breaths and has been using Oxycodone for this with improvement as well as cold packs. She has not been hypoxic during her stay but has been receiving PRN nebs as she takes these at home as well and feels that they have been helping.       Possible Sepsis (H): Initially presented with fever, mild tachycardia that resolved within several hours. Lactic acid in AM normal, WBC remained normal, no further fevers during her stay.       Community acquired pneumonia: CXR shows developing increased opacity left base on top of previously noted chronic opacities. Possible due to influenza, no sputum culture available.  Started on ceftriaxone and azithromycin was added 4/30. Will discharge to finish zithromax course to cover atypicals. No hypoxia, sats have remained % throughout her stay.       Opacity of lung on imaging study: CT scan shows persistent bilateral opacities as far back at May 2016. 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 Repeat CT scan 5/2016 showed near resolution of all clots but new airspace opacities        Anxiety state: Has been using ativan while here in the hospital and states she has previously been on that at home for severe anxiety but was recently taken off of it. I did give her a short prescription for ativan for home use until she can follow-up with her PCP, Dr. Chapo Flores and she will discuss it with her at that time.        Chest pain,noncardiac: Worse with deep breaths/cough. Cold packs helped minimally. She initially was given IV dilaudid which caused hallucinations that resolved over the next 24 hours. She was then switched to oxycodone with good relief. I gave her a short prescription for this as  well, she states she primarily needs it for bedtime as it hurts worse when laying down and coughing at night.       Tobacco use: Started on nicotine patch. Expresses desire to remain smoke-free, she is given prescription for nicotine patch.       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 Resolved.       Delmi Nicole, CNP    Code Status   Full Code       Primary Care Physician   Chapo Flores    Discharge Disposition   Discharged to home  Condition at discharge: Stable    Consultations This Hospital Stay   None    Time Spent on this Encounter   I, Delmi Nicole, personally saw the patient today and spent greater than 30 minutes discharging this patient.    Discharge Orders     PULMONARY MEDICINE REFERRAL     Reason for your hospital stay   C-diff colitis, Influenza B infection, community acquired pneumonia     Follow-up and recommended labs and tests    Follow up with primary care provider, Chapo Flores, within 5 days for hospital follow- up.  No follow up labs or test are needed.     Activity   Your activity upon discharge: activity as tolerated     When to contact your care team   Call your primary doctor if you have any of the following: worsening diarrhea, nausea/vomiting, blood in stool, shortness of breath, fever.     Full Code     Diet   Follow this diet upon discharge: Orders Placed This Encounter     Regular Diet Adult       Discharge Medications   Current Discharge Medication List      START taking these medications    Details   LORazepam (ATIVAN) 1 MG tablet Take 1 tablet (1 mg) by mouth every 8 hours as needed for anxiety  Qty: 15 tablet, Refills: 0    Associated Diagnoses: Anxiety state      saccharomyces boulardii (FLORASTOR) 250 MG capsule Take 1 capsule (250 mg) by mouth 2 times daily for 14 days  Qty: 28 capsule, Refills: 0    Associated Diagnoses: C. difficile colitis      oseltamivir (TAMIFLU) 75 MG  capsule Take 1 capsule (75 mg) by mouth 2 times daily for 4 doses  Qty: 4 capsule, Refills: 0    Associated Diagnoses: Influenza B      guaiFENesin-dextromethorphan (ROBITUSSIN DM) 100-10 MG/5ML syrup Take 5 mLs by mouth every 4 hours as needed for cough  Qty: 560 mL, Refills: 0    Associated Diagnoses: Influenza B      azithromycin (ZITHROMAX) 250 MG tablet Take 1 tablet (250 mg) by mouth daily for 3 days  Qty: 3 tablet, Refills: 0    Associated Diagnoses: Community acquired pneumonia      vancomycin (VANOCIN) 50 mg/mL LIQD solution Take 2.5 mLs (125 mg) by mouth 4 times daily for 10 days  Qty: 100 mL, Refills: 0    Associated Diagnoses: C. difficile colitis      nicotine (NICODERM CQ) 14 MG/24HR 24 hr patch Place 1 patch onto the skin daily  Qty: 14 patch, Refills: 0    Associated Diagnoses: Tobacco use disorder      oxyCODONE (ROXICODONE) 10 MG IR tablet Take 1 tablet (10 mg) by mouth every 8 hours as needed for moderate to severe pain  Qty: 10 tablet, Refills: 0    Associated Diagnoses: Influenza B; Acute chest pain; SOB (shortness of breath); Anxiety state; Muscle pain         CONTINUE these medications which have NOT CHANGED    Details   ipratropium - albuterol 0.5 mg/2.5 mg/3 mL (DUONEB) 0.5-2.5 (3) MG/3ML neb solution Inhale 3 mLs into the lungs      medroxyPROGESTERone (DEPO-PROVERA) 150 MG/ML injection Inject 150 mg into the muscle           Allergies   Allergies   Allergen Reactions     Amoxicillin Other (See Comments)     Headaches     Lactose      Levaquin [Levofloxacin] Swelling     Naproxen Other (See Comments)     Reaction: Headaches     Nickel      Tramadol      Sulindac Rash     Data   Most Recent 3 CBC's:  Recent Labs   Lab Test  05/01/17   0530  04/30/17   0527  04/29/17   0618   WBC  4.6  4.4  5.0   HGB  10.9*  10.1*  11.0*   MCV  95  97  97   PLT  243  181  195      Most Recent 3 BMP's:  Recent Labs   Lab Test  05/01/17   0530  04/30/17   0527  04/29/17   0618   NA  142  142  140   POTASSIUM   3.8  3.8  3.5   CHLORIDE  109  109  107   CO2  23  24  23   BUN  <1*  3*  6*   CR  0.45*  0.44*  0.47*   ANIONGAP  10  9  10   AVINASH  8.0*  7.9*  7.5*   GLC  89  86  91     Most Recent 2 LFT's:  Recent Labs   Lab Test  04/28/17   1940  03/19/17   1442   AST  22  84*   ALT  17  47   ALKPHOS  101  121   BILITOTAL  0.3  0.5     Most Recent INR's and Anticoagulation Dosing History:  Anticoagulation Dose History     Recent Dosing and Labs Latest Ref Rng & Units 6/17/2013 7/6/2015 8/29/2015 1/4/2016 1/25/2016 5/6/2016 5/8/2016    INR 0.80 - 1.20 1.01 1.11 0.97 1.93(H) 1.10 0.97 1.00        Most Recent 3 Troponin's:  Recent Labs   Lab Test  05/07/16   0439  05/06/16   2109  01/25/16   1230   TROPI  <0.015  The 99th percentile for upper reference range is 0.045 ug/L.  Troponin values in   the range of 0.045 - 0.120 ug/L may be associated with risks of adverse   clinical events.    <0.015  The 99th percentile for upper reference range is 0.045 ug/L.  Troponin values in   the range of 0.045 - 0.120 ug/L may be associated with risks of adverse   clinical events.    <0.015  The 99th percentile for upper reference range is 0.045 ug/L.  Troponin values in   the range of 0.045 - 0.120 ug/L may be associated with risks of adverse   clinical events.       Most Recent Cholesterol Panel:No lab results found.  Most Recent 6 Bacteria Isolates From Any Culture (See EPIC Reports for Culture Details):  Recent Labs   Lab Test  04/29/17   0010  04/28/17   2355  04/28/17   2135  03/19/17   1525  03/19/17   1515  01/16/17   2145   CULT  No growth after 2 days  No growth after 2 days  10,000 to 50,000 colonies/mL Mixed bacterial beatriz No further identification or   sensitivity done  *  No growth after 6 days  No growth after 6 days  No beta hemolytic Streptococcus Group A isolated     Most Recent TSH, T4 and A1c Labs:  Recent Labs   Lab Test  12/26/15   1108   TSH  0.68     Results for orders placed or performed during the hospital encounter of  04/28/17   XR Chest 2 Views    Narrative    PA AND LATERAL RADIOGRAPHS OF CHEST    INDICATION:  Cough.    COMPARISON:  Today's study is compared to a prior examination which is  dated March 19, 2017 and January 18, 2016.    FINDINGS:  The cardiomediastinal silhouette is normal, unchanged.  The  abnormal appearance of the lungs persists, with patchy reticular  nodular opacities seen in the lateral upper right lung and left  suprahilar region.  The interstitial markings are diffusely coarsened.  No focal consolidation or pneumothorax is seen.    IMPRESSION:  MULTIFOCAL CHRONIC PULMONARY OPACITIES RAISE THE  POSSIBILITY OF ATYPICAL INFECTION SUCH AS BLASTOMYCOSIS OR  COCCIDIOMYCOSIS VERSUS INFLAMMATORY CONDITIONS OF THE LUNG.  CONSIDER  HIGH RESOLUTION CT OF CHEST.  CONSIDER PULMONOLOGY CONSULTATION.  Exam Date: Apr 28, 2017 07:27:41 PM  Author: VIPIN DICK  This report is preliminary and transcribed     CT Abdomen Pelvis w Contrast    Narrative    CT ABDOMEN AND PELVIS WITHOUT CONTRAST    INDICATION:  Acute epigastric pain.    COMPARISON:  Today's study is compared to a prior examination which is  dated January 8, 2017.    FINDINGS:  There is patchy ill-defined consolidation with  endobronchial thickening, tree-in-bud opacities, and more nodular  opacities at both lung bases.  A single area of focal bronchiectasis  is seen in the medial right lung base.  The heart size is normal.  No  pericardial or pleural effusion is seen.    There is mild prominence of mucosal fat in the gastric wall, which can  be assocaited with chronic inflammation.   There is loss of normal  haustral markings and increase of mucosal fat throughout the colon,  most severely affecting the right colon.  This process also involves  the rectum and sigmoid colon.  No free air is present.  No free fluid  is seen.    The liver, pancreas, and spleen are within normal limits in  appearance.  No adrenal mass is present.  A single tiny gallstone  is  present.  The appendix is normal in caliber.  A low-density lesion in  the anterior right renal cortex is unchanged in size, but measures  higher than fluid in attenuation.  This previously was fluid-like in  attenuation.  Both interval hemorrhage into a cyst as well as a more  concerning renal lesion are in the differential.   No suspicious  osseous lesion is identified.    IMPRESSION:  1.  NEW EXTENSIVE NODULAR BIBASILAR PULMONARY OPACITIES.  THE PATIENT  HAS HAD MULTIPLE EPISODES OF FOCAL PULMONARY OPACITY OVER THE LAST  YEAR, UNUSUAL IN A YOUNG PATIENT.  AGAIN, ATYPICAL INFECTIOUS  ETIOLOGIES AS WELL AS INFLAMMATORY PULMONARY DISEASE ARE IN THE  DIFFERENTIAL.  CONSIDER PULMONARY CONSULTATION.    Continued on Page 2    IMPRESSION (continued):    2.  FEATURELESS APPEARANCE OF MULTIPLE SEGMENTS OF THE COLON WITH  INCREASED SUBMUCOSAL FAT AND INCREASED INTRACOLONIC FLUID IS  COMPATIBLE WITH COLITIS.  INFECTIOUS AS WELL AS INFLAMMATORY  (ULCERATIVE OR CROHN'S TYPE) COLITIDES ARE IN THE DIFFERENTIAL.  STRONGLY CONSIDER COLONOSCOPY FOR FURTHER ASSESSMENT.    3.  A RIGHT RENAL CORTICAL LESION CURRENTLY MEASURES HIGHER THAN FLUID  IN ATTENUATION, POTENTIALLY DUE TO MOTION ARTIFACT OR INTERVAL  LESIONAL HEMORRHAGE.  CONSIDER SIX MONTH FOLLOW-UP MR OF KIDNEYS.    4.  SINGLE TINY PROBABLE GALLSTONE.  Exam Date: Apr 29, 2017 12:42:33 AM  Author: VIPIN DICK  This report is preliminary and transcribed     XR Chest 2 Views    Narrative    CHEST PA AND LATERAL VIEWS    HISTORY:  Possible pneumonia.    COMPARISON:  Today's study is compared to a prior examination which is  dated April 28, 2017.    FINDINGS:  The cardiomediastinal silhouette remains within normal  limits.  The complex appearance of the lungs persists with unchanged  right lateral upper lobe and left suprahilar opacity.  Ill-defined  opacity at the left lung base has slightly progressed when compared to  the immediate radiographs, correlating to the  findings on the recent  abdomen and pelvis CT.  No effusion or pneumothorax is seen.    IMPRESSION:  COMPLEX APPEARANCE OF THE LUNGS WITH MULTIPLE CHRONIC  OPACITIES.  PATCHY OPACITY AT THE LEFT BASE HAS WORSENED OVER THE PAST  COUPLE OF DAYS, SUGGESTING A POSSIBLE ACUTE INFECTION SUPERIMPOSED ON  THE PATIENT'S UNCERTAIN CHRONIC PULMONARY PROCESS.  AGAIN, FOLLOW UP  WITH PULMONOLOGY IS WARRANTED.  Exam Date: Apr 29, 2017 11:37:28 AM  Author: VIPIN DICK  This report is preliminary and transcribed

## 2017-05-01 NOTE — PLAN OF CARE
Problem: Patient Goal: Rt Focus  Goal: 3. Patient Goal: RT Focus  Pt on room air sats 100%.  BS clear and diminished throughout, no nebs given.

## 2017-05-01 NOTE — PLAN OF CARE
Face to face report given with opportunity to observe patient.  Report given to Jennifer DOWD RN.    Latonya Etienne  5/1/2017, 7:54 AM

## 2017-05-01 NOTE — PLAN OF CARE
Medicated with Ativan 1mg po and roxicodone 10mg po per pt request. Has c/o 9/10 abdomen pain with statement of anxiety. Pt appears very relaxed with flat affect. No anxiety seen, no tearfulness . Denies hallucinations- states they were very scary. States pain is in abdomen but does not flinch with abdomen assessment and palpation with stethoscope.

## 2017-05-01 NOTE — PLAN OF CARE
Problem: Goal Outcome Summary  Goal: Goal Outcome Summary  Outcome: No Change  Pt continues to complain of pain to abdomen with relief from Roxicodone. No further coughing with administration of Robitussin. Continues to whisper when speaking. No BM throughout night. IVF infusing at 100cc/hr. Anxiety controlled with Ativan. VSS. Afebrile. Remains on isolation. Tolerating oral intake.

## 2017-05-01 NOTE — PLAN OF CARE
Medicated again with Roxicodone 10 mg po c/o 8/10 abdomen pain. Up ambulating in room per self. Less whispering done. Seemed a bit more cheerful and talkative. No loose BM

## 2017-05-01 NOTE — PHARMACY
[facility]    Pharmacy    Antimicrobial Stewardship Note     Current antimicrobial therapy:  Anti-infectives (Future)    Start     Dose/Rate Route Frequency Ordered Stop    05/01/17 1000  azithromycin (ZITHROMAX) tablet 250 mg      250 mg Oral DAILY 05/01/17 0748      04/29/17 0045  vancomycin (VANOCIN) solution 125 mg      125 mg Oral 4 TIMES DAILY 04/29/17 0037      04/28/17 2315  cefTRIAXone in d5w (ROCEPHIN) intermittent infusion 1 g      1 g  over 30 Minutes Intravenous EVERY 24 HOURS 04/28/17 2310 04/28/17 2300  oseltamivir (TAMIFLU) capsule 75 mg      75 mg Oral 2 TIMES DAILY 04/28/17 2258            Indication: CAP, C diff, UTI, influenza     Pertinent labs:  Creatinine   Creatinine   Date Value Ref Range Status   05/01/2017 0.45 (L) 0.52 - 1.04 mg/dL Final   04/30/2017 0.44 (L) 0.52 - 1.04 mg/dL Final   04/29/2017 0.47 (L) 0.52 - 1.04 mg/dL Final     WBC   WBC   Date Value Ref Range Status   05/01/2017 4.6 4.0 - 11.0 10e9/L Final   04/30/2017 4.4 4.0 - 11.0 10e9/L Final   04/29/2017 5.0 4.0 - 11.0 10e9/L Final     ANC No components found for: ANC     Culture Results:      Blood culture [[id number]] Collected: 04/28/17 2355       Order Status: Completed Specimen: Blood Updated: 05/01/17 0732        Specimen Description Blood        Special Requests Left Arm        Culture Micro No growth after 2 days        Micro Report Status Pending       Blood culture [[id number]] Collected: 04/29/17 0010       Order Status: Completed Specimen: Blood Updated: 05/01/17 0732        Specimen Description Blood        Special Requests Left Arm        Culture Micro No growth after 2 days        Micro Report Status Pending       Urine Culture Aerobic Bacterial [[id number]] (Abnormal) Collected: 04/28/17 2135       Order Status: Completed Specimen: Urine Updated: 04/30/17 0949        Specimen Description Midstream Urine        Culture Micro -- (A)        10,000 to 50,000 colonies/mL Mixed bacterial beatriz No further  identification or    sensitivity done           Micro Report Status FINAL 04/30/2017       Clostridium difficile toxin B PCR [[id number]] (Abnormal) Collected: 04/28/17 2300       Order Status: Completed Specimen: Stool Updated: 04/29/17 0031        Specimen Description Feces        C Diff Toxin B PCR -- (A)        Positive   Positive: Toxin producing Clostridium difficile target DNA sequences detected,    presumed positive for Clostridium difficile toxin B.    Clostridium difficile (Requires Enteric Isolation)    FDA approved assay performed using [employer] GeneXpert real-time PCR.    Critical Value called to and read back by   BRYLEE STARCH AT 0030 ON 4/29/2017 BY ERW          Influenza A/B antigen [[id number]] (Abnormal) Collected: 04/28/17 1858       Order Status: Completed Specimen: Nasopharyngeal Updated: 04/28/17 1930        Influenza A/B Agn Specimen Nares        Influenza A Negative        Influenza B -- (A)        Positive    Critical Value called to and read back by   Adolfo Melgar at 1930 on 4/28/17 by NMJ   Test results must be correlated with clinical data. If necessary, results should    be confirmed by a molecular assay or viral culture.         Recommendations/Interventions:  1. None at this time    Miranda Parkinson, Pharmacy Intern  May 1, 2017

## 2017-05-01 NOTE — DISCHARGE INSTRUCTIONS
What to expect when you have contrast    During your exam, we will inject  contrast  into your vein or artery. (Contrast is a clear liquid with iodine in it. It shows up on X-rays.)    You may feel warm or hot. You may have a metal taste in your mouth and a slight upset stomach. You may also feel pressure near the kidneys and bladder. These effects will last about 1 to 3 minutes.    Please tell us if you have:    Sneezing     Itching    Hives     Swelling in the face    A hoarse voice    Breathing problems                YOU HAVE A FOLLOW UP APPOINTMENT WITH DR BONILLA ON Wednesday MAY 3RD AT NOON AT THE [facility] [phone]        YOU HAVE AN APPOINTMENT WITH DR EVANS AT [facility] PULMONOLOGY ON June 1ST AT 10AM--[phone]      OR  [phone]  LOCATED AT [address] BU[address]   [address] [facility]    Other new symptoms    Serious problems are rare.  They may include:    Irregular heartbeat     Seizures    Kidney failure              Tissue damage    Shock      Death    If you have any problems during the exam, we  will treat them right away.    When you get home    Call your hospital if you have any new symptoms in the next 2 days, like hives or swelling. (Phone numbers are at the bottom of this page.) Or call your family doctor.     If you have wheezing or trouble breathing, call 911.    Self-care  -Drink at least 4 extra glasses of water today.   This reduces the stress on your kidneys.  -Keep taking your regular medicines.    The contrast will pass out of your body in your  Urine(pee). This will happen in the next 24 hours. You  will not feel this. Your urine will not  change color.    If you have kidney problems or take metformin    Drink 4 to 8 large glasses of water for the next  2 days, if you are not on a fluid restriction.    ?If you take metformin (Glucophage or Glucovance) for diabetes, keep taking it.      ?Your kidney function tests are  abnormal.  If you take Metformin, do not take it for 48 hours. Please go to your clinic for a blood test within 3 days after your exam before the restarting this medicine.     (Note to provider:please give patient prescription for lab tests.)    ?Special instructions: Drink an extra 4 glasses of water to flush out the contrast.     I have read and understand the above information.    Patient Sign Here:______________________________________Date:________Time:______    Staff Sign Here:________________________________________Date:_______Time:______      Radiology Departments:     ?[facility]: [phone] ?[facility]: [phone]     ?[place]: [phone] ?[facility]:[phone]      ?[facility]: [phone]  ?[facility]: [phone]  ?[place]:[phone]    ?[facility]:[phone]  ?[facility]:[phone]

## 2017-05-01 NOTE — PLAN OF CARE
"Pt given PRN anxiety per request and c/o anxiety. Pt states \" no more hallucinations either visual or auditory \"  "

## 2017-05-01 NOTE — PLAN OF CARE
Face to face report given with opportunity to observe patient.    Report given to Latonya Harris RN  4/30/2017  11:48 PM

## 2017-05-01 NOTE — PROGRESS NOTES
Name: Maggie Case    MRN#: [id number]    Reason for Hospitalization: Hypokalemia [E87.6]  Influenza B [J10.1]    Discharge Date: 5/1/2017    Patient / Family response to discharge plan: in agreement    Follow-Up Appt: No future appointments.    Other Providers (Care Coordinator, County Services, PCA services etc): No    Discharge Disposition: home    Nelly Youssef

## 2017-05-01 NOTE — PLAN OF CARE
"Problem: Goal Outcome Summary  Goal: Goal Outcome Summary  Outcome: No Change  Pt has been alert and oriented during shift. Although she did have some confusion about where she was initially but did correct herself and has not demonstrated any confusion since. She has been picking at food throughout the shift and has not had any c/o nausea or episodes of vomiting. She did receive a PRN dose of IV zofran at the beginning of the shift for c/o nausea but has had no c/o since. She ambulated the ashby during shift without incident or issue. She Has had no observed or reported BMs this shift. VSS. She has had no c/o SOB or difficulty breathing. She has had no c/o hallucinations during shift either. IVF continues per MAR. She has had c/o pain 5-8/10 treated with PRN roxicodone per MAR. Medications do provide her with a decrease in pain. She was given a warm pack to hold on her abdomen which also provided her with a decrease in pain. LS were diminished but clear. BS active. She continues to be independent in her room. Has had call light within reach at all times and uses it appropriately to make her needs known. She has remained free from all falls and/or injuries during shift. Will continue to monitor.     Ice packs in use to treat pain which did provide her with more relief than the hot pack. She ate about 1/3 of a footlong sub and continued to \"pick\" at it throughout the shift. Continues to ambulate throughout her room without difficulty, incident or issue.   "

## 2017-05-01 NOTE — PLAN OF CARE
Patient discharged at 11:30 PM via ambulation accompanied by other:self and staff. Prescriptions sent to patients preferred pharmacy. All belongings sent with patient.     Discharge instructions reviewed with pt. Listed belongings gathered and returned to patient. yes    Patient discharged to home.   Report called to n/a    Core Measures and Vaccines  Core Measures applicable during stay: Yes. If yes, state diagnosis: pneumonia  Pneumonia and Influenza given prior to discharge, if indicated: Yes-pneumo    Surgical Patient   Surgical Procedures during stay: no  Did patient receive discharge instruction on wound care and recognition of infection symptoms? N/A    MISC  Follow up appointment made:  Yes  Home and hospital aquired medications returned to patient: N/A  Patient reports pain was well managed at discharge: Yes-per this shift.

## 2017-05-01 NOTE — PLAN OF CARE
Pt requested and received Robitussin 5mL po . Minimal dry cough heard. Pt whispering at this time. States she can't talk loud.

## 2017-05-01 NOTE — PLAN OF CARE
Problem: Goal Outcome Summary  Goal: Goal Outcome Summary  Outcome: Adequate for Discharge Date Met:  05/01/17  VSS, afebrile, sats on RA 99%, no complaints of MALIK, pain 8/10 to mid abdominal area-roxicodone 10 mg rec'd with pain down to 5/10. Ativan 1 mg rec'd for anxiety as well. Up in shower independantly with no increased work of breathing.   Denies having loose stools. Bowels active x 4 quads, denies nausea-appetite fair   Urine collected before discharge for lab.   Discharge instructions discussed in depth including: f/u appt, medications, when to seek medical attn. IV's out with no redness, swelling, or discharge.

## 2017-05-01 NOTE — PLAN OF CARE
"Pt given warm pack to hold on abdm as she did not want to use an icepack at this time. Will continue to monitor and reasses. She also continues to \" pick \" at her subway and L bag of chips that she has at this time stating \" I pick and will keep picking. \" she has had no c/o n&v. Will continue to monitor.   "

## 2017-05-02 ENCOUNTER — TELEPHONE (OUTPATIENT)
Dept: CASE MANAGEMENT | Facility: HOSPITAL | Age: 27
End: 2017-05-02

## 2017-05-02 NOTE — TELEPHONE ENCOUNTER
Maggie Case, was discharged to home on  5/1/17 from [facility]. I spoke today with  Maggie regarding  Her  discharge.   She  indicates she receive(d) sufficient information upon discharge. Medications were reviewed in full on discharge, including: Medications to be started; medications to be continued from preadmission and any side effects. Prescriptions were sent to preferred pharmacy  to be filled. Medications are being taken as prescribed.   She  indicates she has  an appointment scheduled for May 3  and has  transportation available. She was  able to confirm her  appointment time and has  it written down.  She asked if she could drive when taking her ativan and oxycodone. She said she was given no restrictions on driving. I instructed her to take the medication as prescribed , which she said she was. If she was experiencing any side effects as listed on the prescription bottle she should contact her PCP or pharmacist .   Per their request, the following employee (s) can be recognized for their outstanding services delivered:  Everyone did a good job.  Suggestions to improve service: nothing indicated.  She  was informed she  may receive a survey in the mail from the hospital. Asked if she  would kindly complete the survey in order for [facility] to know if services fully met patient needs.

## 2017-05-08 LAB
LAB SCANNED RESULT: NORMAL
LAB SCANNED RESULT: NORMAL

## 2017-05-15 LAB
LOCATION PERFORMED: NORMAL
RESULT: NORMAL
SEND OUTS MISC TEST CODE: NORMAL
SEND OUTS MISC TEST SPECIMEN: NORMAL
TEST NAME: NORMAL

## 2018-01-30 ENCOUNTER — HOSPITAL ENCOUNTER (EMERGENCY)
Facility: HOSPITAL | Age: 28
Discharge: HOME | End: 2018-01-30
Attending: FAMILY MEDICINE | Admitting: FAMILY MEDICINE
Payer: MEDICAID

## 2018-01-30 VITALS
TEMPERATURE: 98.4 F | DIASTOLIC BLOOD PRESSURE: 84 MMHG | WEIGHT: 135 LBS | HEART RATE: 84 BPM | OXYGEN SATURATION: 99 % | RESPIRATION RATE: 16 BRPM | BODY MASS INDEX: 23.17 KG/M2 | SYSTOLIC BLOOD PRESSURE: 113 MMHG

## 2018-01-30 DIAGNOSIS — A08.4 VIRAL GASTROENTERITIS: ICD-10-CM

## 2018-01-30 DIAGNOSIS — E87.6 HYPOKALEMIA: ICD-10-CM

## 2018-01-30 LAB
ALBUMIN SERPL-MCNC: 3.4 G/DL (ref 3.4–5)
ALP SERPL-CCNC: 122 U/L (ref 40–150)
ALT SERPL W P-5'-P-CCNC: 44 U/L (ref 0–50)
ANION GAP SERPL CALCULATED.3IONS-SCNC: 8 MMOL/L (ref 3–14)
AST SERPL W P-5'-P-CCNC: 34 U/L (ref 0–45)
BASOPHILS # BLD AUTO: 0 10E9/L (ref 0–0.2)
BASOPHILS NFR BLD AUTO: 0.1 %
BILIRUB SERPL-MCNC: 0.5 MG/DL (ref 0.2–1.3)
BUN SERPL-MCNC: 10 MG/DL (ref 7–30)
C DIFF TOX B STL QL: NEGATIVE
CALCIUM SERPL-MCNC: 8.6 MG/DL (ref 8.5–10.1)
CHLORIDE SERPL-SCNC: 105 MMOL/L (ref 94–109)
CO2 SERPL-SCNC: 27 MMOL/L (ref 20–32)
CREAT SERPL-MCNC: 0.53 MG/DL (ref 0.52–1.04)
DIFFERENTIAL METHOD BLD: ABNORMAL
EOSINOPHIL # BLD AUTO: 0.6 10E9/L (ref 0–0.7)
EOSINOPHIL NFR BLD AUTO: 5.7 %
ERYTHROCYTE [DISTWIDTH] IN BLOOD BY AUTOMATED COUNT: 19.5 % (ref 10–15)
FLUAV+FLUBV AG SPEC QL: NEGATIVE
FLUAV+FLUBV AG SPEC QL: NEGATIVE
GFR SERPL CREATININE-BSD FRML MDRD: >90 ML/MIN/1.7M2
GLUCOSE SERPL-MCNC: 111 MG/DL (ref 70–99)
HCT VFR BLD AUTO: 40.8 % (ref 35–47)
HGB BLD-MCNC: 13.5 G/DL (ref 11.7–15.7)
IMM GRANULOCYTES # BLD: 0.1 10E9/L (ref 0–0.4)
IMM GRANULOCYTES NFR BLD: 0.8 %
LYMPHOCYTES # BLD AUTO: 2.1 10E9/L (ref 0.8–5.3)
LYMPHOCYTES NFR BLD AUTO: 19.6 %
MCH RBC QN AUTO: 30.1 PG (ref 26.5–33)
MCHC RBC AUTO-ENTMCNC: 33.1 G/DL (ref 31.5–36.5)
MCV RBC AUTO: 91 FL (ref 78–100)
MONOCYTES # BLD AUTO: 0.8 10E9/L (ref 0–1.3)
MONOCYTES NFR BLD AUTO: 8 %
NEUTROPHILS # BLD AUTO: 6.9 10E9/L (ref 1.6–8.3)
NEUTROPHILS NFR BLD AUTO: 65.8 %
NRBC # BLD AUTO: 0 10*3/UL
NRBC BLD AUTO-RTO: 0 /100
PLATELET # BLD AUTO: 397 10E9/L (ref 150–450)
POTASSIUM SERPL-SCNC: 2.9 MMOL/L (ref 3.4–5.3)
PROT SERPL-MCNC: 7.1 G/DL (ref 6.8–8.8)
RBC # BLD AUTO: 4.49 10E12/L (ref 3.8–5.2)
SODIUM SERPL-SCNC: 140 MMOL/L (ref 133–144)
SPECIMEN SOURCE: NORMAL
SPECIMEN SOURCE: NORMAL
WBC # BLD AUTO: 10.5 10E9/L (ref 4–11)

## 2018-01-30 PROCEDURE — 96361 HYDRATE IV INFUSION ADD-ON: CPT

## 2018-01-30 PROCEDURE — 87046 STOOL CULTR AEROBIC BACT EA: CPT | Performed by: FAMILY MEDICINE

## 2018-01-30 PROCEDURE — 99284 EMERGENCY DEPT VISIT MOD MDM: CPT | Mod: 25

## 2018-01-30 PROCEDURE — 87804 INFLUENZA ASSAY W/OPTIC: CPT | Performed by: FAMILY MEDICINE

## 2018-01-30 PROCEDURE — 85025 COMPLETE CBC W/AUTO DIFF WBC: CPT | Performed by: FAMILY MEDICINE

## 2018-01-30 PROCEDURE — 25000128 H RX IP 250 OP 636

## 2018-01-30 PROCEDURE — 25000132 ZZH RX MED GY IP 250 OP 250 PS 637: Performed by: FAMILY MEDICINE

## 2018-01-30 PROCEDURE — 87045 FECES CULTURE AEROBIC BACT: CPT | Performed by: FAMILY MEDICINE

## 2018-01-30 PROCEDURE — 25000128 H RX IP 250 OP 636: Performed by: FAMILY MEDICINE

## 2018-01-30 PROCEDURE — 96375 TX/PRO/DX INJ NEW DRUG ADDON: CPT

## 2018-01-30 PROCEDURE — 99284 EMERGENCY DEPT VISIT MOD MDM: CPT | Performed by: FAMILY MEDICINE

## 2018-01-30 PROCEDURE — 87493 C DIFF AMPLIFIED PROBE: CPT | Performed by: FAMILY MEDICINE

## 2018-01-30 PROCEDURE — 80053 COMPREHEN METABOLIC PANEL: CPT | Performed by: FAMILY MEDICINE

## 2018-01-30 PROCEDURE — 96374 THER/PROPH/DIAG INJ IV PUSH: CPT

## 2018-01-30 PROCEDURE — 36415 COLL VENOUS BLD VENIPUNCTURE: CPT | Performed by: FAMILY MEDICINE

## 2018-01-30 RX ORDER — POTASSIUM CHLORIDE 1500 MG/1
20 TABLET, EXTENDED RELEASE ORAL DAILY
Qty: 10 TABLET | Refills: 1 | Status: SHIPPED | OUTPATIENT
Start: 2018-01-30 | End: 2018-02-17

## 2018-01-30 RX ORDER — PROMETHAZINE HYDROCHLORIDE 25 MG/ML
25 INJECTION, SOLUTION INTRAMUSCULAR; INTRAVENOUS ONCE
Status: COMPLETED | OUTPATIENT
Start: 2018-01-30 | End: 2018-01-30

## 2018-01-30 RX ORDER — ONDANSETRON 2 MG/ML
8 INJECTION INTRAMUSCULAR; INTRAVENOUS ONCE
Status: COMPLETED | OUTPATIENT
Start: 2018-01-30 | End: 2018-01-30

## 2018-01-30 RX ORDER — PROMETHAZINE HYDROCHLORIDE 25 MG/ML
INJECTION, SOLUTION INTRAMUSCULAR; INTRAVENOUS
Status: COMPLETED
Start: 2018-01-30 | End: 2018-01-30

## 2018-01-30 RX ORDER — SODIUM CHLORIDE 9 MG/ML
1000 INJECTION, SOLUTION INTRAVENOUS CONTINUOUS
Status: DISCONTINUED | OUTPATIENT
Start: 2018-01-30 | End: 2018-01-30 | Stop reason: HOSPADM

## 2018-01-30 RX ORDER — PROMETHAZINE HYDROCHLORIDE 25 MG/1
25 TABLET ORAL EVERY 6 HOURS PRN
Qty: 20 TABLET | Refills: 1 | Status: ON HOLD | OUTPATIENT
Start: 2018-01-30 | End: 2018-03-01

## 2018-01-30 RX ORDER — POTASSIUM CHLORIDE 20MEQ/15ML
20 LIQUID (ML) ORAL ONCE
Status: COMPLETED | OUTPATIENT
Start: 2018-01-30 | End: 2018-01-30

## 2018-01-30 RX ADMIN — SODIUM CHLORIDE 1000 ML: 9 INJECTION, SOLUTION INTRAVENOUS at 10:18

## 2018-01-30 RX ADMIN — SODIUM CHLORIDE 1000 ML: 9 INJECTION, SOLUTION INTRAVENOUS at 09:27

## 2018-01-30 RX ADMIN — PROMETHAZINE HYDROCHLORIDE 25 MG: 25 INJECTION INTRAMUSCULAR; INTRAVENOUS at 10:21

## 2018-01-30 RX ADMIN — PROMETHAZINE HYDROCHLORIDE 25 MG: 25 INJECTION, SOLUTION INTRAMUSCULAR; INTRAVENOUS at 10:21

## 2018-01-30 RX ADMIN — POTASSIUM CHLORIDE 20 MEQ: 20 SOLUTION ORAL at 11:20

## 2018-01-30 RX ADMIN — ONDANSETRON 8 MG: 2 INJECTION INTRAMUSCULAR; INTRAVENOUS at 09:27

## 2018-01-30 NOTE — ED NOTES
DATE:  1/30/2018   TIME OF RECEIPT FROM LAB:  0941  LAB TEST:  potassium  LAB VALUE:  2.9  RESULTS GIVEN WITH READ-BACK TO (PROVIDER):  Dr. Shanks    TIME LAB VALUE REPORTED TO PROVIDER:   0918

## 2018-01-30 NOTE — ED PROVIDER NOTES
"eMERGENCY dEPARTMENT eNCOUnter      CHIEF COMPLAINT    Chief Complaint   Patient presents with     Nausea, Vomiting, & Diarrhea     x 2 days     URI     Generalized Weakness       \[facility]\""    Maggie Case is a 27 year old female with anxiety and OCD who presents with a 3 day history of vomiting diarrhea and nausea.  She states she is \"not able to keep anything down\" her chest feels sore from coughing she has not had any fevers.  She does have a history of C. difficile colitis in the past. And has chronic diarrhea.       REVIEW OF SYSTEMS    GI: Patient complains of abdominal pain, 3-4 episodes of  diarrhea  Cardiac: No chest pain or syncope  Pulmonary: No difficulty breathing, she is complaining of a cough  General: No fevers or chills  : No hematuria or dysuria  See HPI for further details.   All other systems reviewed and are negative.    PAST MEDICAL & SURGICAL HISTORY    Past Medical History:   Diagnosis Date     Anemia      Anxiety      Chronic diarrhea      Lactose intolerance      Myalgia      Pulmonary embolism (H) 05/06/16     Vitamin B12 deficiency      Past Surgical History:   Procedure Laterality Date     CLOSED REDUCTION, PERCUTANEOUS PINNING LOWER EXTREMITY, COMBINED Right 4/6/2016    Procedure: COMBINED CLOSED REDUCTION, PERCUTANEOUS PINNING LOWER EXTREMITY;  Surgeon: Dexter Jones MD;  Location: [facility]       CURRENT MEDICATIONS    Current Outpatient Rx   Medication Sig Dispense Refill     promethazine (PHENERGAN) 25 MG tablet Take 1 tablet (25 mg) by mouth every 6 hours as needed for nausea 20 tablet 1     potassium chloride SA (KLOR-CON) 20 MEQ CR tablet Take 1 tablet (20 mEq) by mouth daily 10 tablet 1     LORazepam (ATIVAN) 1 MG tablet Take 1 tablet (1 mg) by mouth every 8 hours as needed for anxiety 15 tablet 0     guaiFENesin-dextromethorphan (ROBITUSSIN DM) 100-10 MG/5ML syrup Take 5 mLs by mouth every 4 hours as needed for cough 560 mL 0     nicotine (NICODERM CQ) 14 MG/24HR 24 hr patch Place " 1 patch onto the skin daily 14 patch 0     oxyCODONE (ROXICODONE) 10 MG IR tablet Take 1 tablet (10 mg) by mouth every 8 hours as needed for moderate to severe pain 10 tablet 0     ipratropium - albuterol 0.5 mg/2.5 mg/3 mL (DUONEB) 0.5-2.5 (3) MG/3ML neb solution Inhale 3 mLs into the lungs         ALLERGIES    Allergies   Allergen Reactions     Amoxicillin Other (See Comments)     Headaches     Lactose      Levaquin [Levofloxacin] Swelling     Naproxen Other (See Comments)     Reaction: Headaches     Nickel      Tramadol      Sulindac Rash       SOCIAL AND FAMILY HISTORY    Social History     Social History     Marital status: Single     Spouse name: N/A     Number of children: N/A     Years of education: N/A     Social History Main Topics     Smoking status: Current Every Day Smoker     Packs/day: 0.25     Years: 7.00     Types: Cigarettes     Smokeless tobacco: Never Used     Alcohol use 0.0 oz/week     0 Standard drinks or equivalent per week      Comment: 1-2/week ,      Drug use: Yes     Special: Marijuana      Comment: adderall, yest     Sexual activity: Yes     Partners: Male     Other Topics Concern     Not on file     Social History Narrative     No family history on file.    PHYSICAL EXAM    VITAL SIGNS: /84  Pulse 84  Temp 98.4  F (36.9  C) (Oral)  Resp 16  Wt 61.2 kg (135 lb)  SpO2 99%  BMI 23.17 kg/m2  Constitutional:  Well developed, well nourished  Eyes:  Sclera nonicteric, conjunctiva moist  HENT:  Atraumatic, nose normal  Neck: Supple, no JVD  Respiratory:  No retractions, no accessory muscle use, normal breath sounds   Cardiovascular:  regular rate, normal rhythm, no murmurs  GI:  Soft, no abdominal tenderness, no guarding, bowel sounds present, no audible bruits or palpable pulsatile masses.  Musculoskeletal:  No edema, no acute deformity   Vascular: DP pulses 2+ equal bilaterally  Integument: No rash, dry skin  Neurologic:  Alert & oriented, no slurred speech  Psychiatric:  Cooperative, pleasant affect    RADIOLOGY/PROCEDURES        ED COURSE & MEDICAL DECISION MAKING    Pertinent Labs & Imaging studies reviewed and interpreted. (See chart for details)     See chart for details of medications given during the ED stay.    Vitals:    01/30/18 0850 01/30/18 1300 01/30/18 1303   BP: 147/92 113/84 113/84   Pulse: 105  84   Resp: 18  16   Temp: 98.5  F (36.9  C)  98.4  F (36.9  C)   TempSrc: Tympanic  Oral   SpO2: 98% 99% 99%   Weight: 61.2 kg (135 lb)             FINAL IMPRESSION    1. Viral gastroenteritis    2. Hypokalemia        PLAN  Recommend symptomatic care with fluids.  She has Zofran at home.  Return here as needed.    (Please note that this note was completed with a voice recognition program.  Every attempt was made to edit the dictations, but inevitably there remain words that are mis-transcribed.)       Estephania Shanks MD  01/30/18 2012

## 2018-01-30 NOTE — ED AVS SNAPSHOT
[facility]    [address]    [place] [address]    Phone:  [phone]                                       Maggie Case   MRN: [id number]    Department:  [facility]   Date of Visit:  1/30/2018           After Visit Summary Signature Page     I have received my discharge instructions, and my questions have been answered. I have discussed any challenges I see with this plan with the nurse or doctor.    ..........................................................................................................................................  Patient/Patient Representative Signature      ..........................................................................................................................................  Patient Representative Print Name and Relationship to Patient    ..................................................               ................................................  Date                                            Time    ..........................................................................................................................................  Reviewed by Signature/Title    ...................................................              ..............................................  Date                                                            Time

## 2018-01-30 NOTE — ED AVS SNAPSHOT
[facility]    [address]    [place] [address]    Phone:  [phone]                                       Maggie Case   MRN: [id number]    Department:  [facility]   Date of Visit:  1/30/2018           Patient Information     Date Of Birth          1990        Your diagnoses for this visit were:     Viral gastroenteritis     Hypokalemia        You were seen by Estephania Shanks MD.         Review of your medicines      START taking        Dose / Directions Last dose taken    potassium chloride SA 20 MEQ CR tablet   Commonly known as:  KLOR-CON   Dose:  20 mEq   Quantity:  10 tablet        Take 1 tablet (20 mEq) by mouth daily   Refills:  1        promethazine 25 MG tablet   Commonly known as:  PHENERGAN   Dose:  25 mg   Quantity:  20 tablet        Take 1 tablet (25 mg) by mouth every 6 hours as needed for nausea   Refills:  1          Our records show that you are taking the medicines listed below. If these are incorrect, please call your family doctor or clinic.        Dose / Directions Last dose taken    guaiFENesin-dextromethorphan 100-10 MG/5ML syrup   Commonly known as:  ROBITUSSIN DM   Dose:  5 mL   Quantity:  560 mL        Take 5 mLs by mouth every 4 hours as needed for cough   Refills:  0        ipratropium - albuterol 0.5 mg/2.5 mg/3 mL 0.5-2.5 (3) MG/3ML neb solution   Commonly known as:  DUONEB   Dose:  3 mL        Inhale 3 mLs into the lungs   Refills:  0        LORazepam 1 MG tablet   Commonly known as:  ATIVAN   Dose:  1 mg   Quantity:  15 tablet        Take 1 tablet (1 mg) by mouth every 8 hours as needed for anxiety   Refills:  0        nicotine 14 MG/24HR 24 hr patch   Commonly known as:  NICODERM CQ   Dose:  1 patch   Quantity:  14 patch        Place 1 patch onto the skin daily   Refills:  0        oxyCODONE IR 10 MG tablet   Commonly known as:  ROXICODONE   Dose:  10 mg   Quantity:  10 tablet        Take 1 tablet (10 mg) by mouth every 8 hours as  "needed for moderate to severe pain   Refills:  0                Prescriptions were sent or printed at these locations (2 Prescriptions)                   [facility] [address] AT [facility]   [address] [place], [place] [address] [address]    Telephone:  [phone]   Fax:  [phone]   Hours:                  E-Prescribed (2 of 2)         promethazine (PHENERGAN) 25 MG tablet               potassium chloride SA (KLOR-CON) 20 MEQ CR tablet                Procedures and tests performed during your visit     CBC with platelets differential    Clostridium difficile toxin B PCR    Comprehensive metabolic panel    Influenza A/B antigen    Stool culture [facility]      Orders Needing Specimen Collection     None      Pending Results     Date and Time Order Name Status Description    1/30/2018 0859 Stool culture [facility] In process             Pending Culture Results     Date and Time Order Name Status Description    1/30/2018 0859 Stool culture [facility] In process             Thank you for choosing [facility]       Thank you for choosing [facility] for your care. Our goal is always to provide you with excellent care. Hearing back from our patients is one way we can continue to improve our services. Please take a few minutes to complete the written survey that you may receive in the mail after you visit with us. Thank you!        [employer] Information     [employer] lets you send messages to your doctor, view your test results, renew your prescriptions, schedule appointments and more. To sign up, go to [email or web address] . Click on \"Log in\" on the left side of the screen, which will take you to the Welcome page. Then click on \"Sign up Now\" on the right side of the page.     You will be asked to enter the access code listed below, as well as some personal information. Please follow the directions to create your username and password.     Your access code is: [email or web address]  Expires: " 2018 12:54 PM     Your access code will  in 90 days. If you need help or a new code, please call your [facility] clinic or [phone].        Care EveryWhere ID     This is your Care EveryWhere ID. This could be used by other organizations to access your [facility] medical records  [id number]        Equal Access to Services     MYLES FLAHERTY : Griselda Mcarthur, chapin brown, lyndsey benoit, justin peres . So [facility] [phone].    ATENCIÓN: Si habla español, tiene a montalvo disposición servicios gratuitos de asistencia lingüística. Llame al [phone].    We comply with applicable federal civil rights laws and [place] laws. We do not discriminate on the basis of race, color, national origin, age, disability, sex, sexual orientation, or gender identity.            After Visit Summary       This is your record. Keep this with you and show to your community pharmacist(s) and doctor(s) at your next visit.

## 2018-02-02 LAB
BACTERIA SPEC CULT: NORMAL
BACTERIA SPEC CULT: NORMAL
E COLI SXT1+2 STL IA: NORMAL
SPECIMEN SOURCE: NORMAL

## 2018-02-03 ENCOUNTER — HOSPITAL ENCOUNTER (EMERGENCY)
Facility: HOSPITAL | Age: 28
End: 2018-02-03

## 2018-02-17 ENCOUNTER — HOSPITAL ENCOUNTER (EMERGENCY)
Facility: HOSPITAL | Age: 28
Discharge: ANOTHER HOSPITAL | End: 2018-02-17
Attending: FAMILY MEDICINE | Admitting: FAMILY MEDICINE
Payer: MEDICAID

## 2018-02-17 ENCOUNTER — APPOINTMENT (OUTPATIENT)
Dept: CT IMAGING | Facility: HOSPITAL | Age: 28
End: 2018-02-17
Attending: FAMILY MEDICINE
Payer: MEDICAID

## 2018-02-17 ENCOUNTER — APPOINTMENT (OUTPATIENT)
Dept: GENERAL RADIOLOGY | Facility: CLINIC | Age: 28
End: 2018-02-17
Attending: PSYCHIATRY & NEUROLOGY
Payer: MEDICAID

## 2018-02-17 ENCOUNTER — HOSPITAL ENCOUNTER (INPATIENT)
Facility: CLINIC | Age: 28
LOS: 12 days | Discharge: NURSING, REHAB OR LONG-TERM CARE | End: 2018-03-01
Attending: PSYCHIATRY & NEUROLOGY | Admitting: INTERNAL MEDICINE
Payer: MEDICAID

## 2018-02-17 ENCOUNTER — APPOINTMENT (OUTPATIENT)
Dept: CT IMAGING | Facility: CLINIC | Age: 28
End: 2018-02-17
Attending: STUDENT IN AN ORGANIZED HEALTH CARE EDUCATION/TRAINING PROGRAM
Payer: MEDICAID

## 2018-02-17 VITALS
OXYGEN SATURATION: 98 % | DIASTOLIC BLOOD PRESSURE: 84 MMHG | RESPIRATION RATE: 18 BRPM | SYSTOLIC BLOOD PRESSURE: 117 MMHG | TEMPERATURE: 98 F | HEART RATE: 71 BPM

## 2018-02-17 DIAGNOSIS — F41.1 ANXIETY STATE: ICD-10-CM

## 2018-02-17 DIAGNOSIS — G47.00 INSOMNIA, UNSPECIFIED TYPE: ICD-10-CM

## 2018-02-17 DIAGNOSIS — D72.829 LEUKOCYTOSIS, UNSPECIFIED TYPE: ICD-10-CM

## 2018-02-17 DIAGNOSIS — I63.411 CEREBRAL INFARCTION DUE TO EMBOLISM OF RIGHT MIDDLE CEREBRAL ARTERY (H): ICD-10-CM

## 2018-02-17 DIAGNOSIS — I63.511 CEREBROVASCULAR ACCIDENT (CVA) DUE TO OCCLUSION OF RIGHT MIDDLE CEREBRAL ARTERY (H): ICD-10-CM

## 2018-02-17 DIAGNOSIS — M79.10 MUSCLE PAIN: Primary | ICD-10-CM

## 2018-02-17 DIAGNOSIS — E87.6 HYPOKALEMIA: ICD-10-CM

## 2018-02-17 DIAGNOSIS — I63.9 ACUTE ISCHEMIC STROKE (H): ICD-10-CM

## 2018-02-17 DIAGNOSIS — R59.0 HILAR LYMPHADENOPATHY: ICD-10-CM

## 2018-02-17 DIAGNOSIS — E56.9 VITAMIN DEFICIENCY: ICD-10-CM

## 2018-02-17 DIAGNOSIS — Z86.711 HISTORY OF PULMONARY EMBOLISM: ICD-10-CM

## 2018-02-17 DIAGNOSIS — Q21.12 PFO (PATENT FORAMEN OVALE): ICD-10-CM

## 2018-02-17 DIAGNOSIS — R11.0 NAUSEA: ICD-10-CM

## 2018-02-17 LAB
ALBUMIN SERPL-MCNC: 3.5 G/DL (ref 3.4–5)
ALP SERPL-CCNC: 120 U/L (ref 40–150)
ALT SERPL W P-5'-P-CCNC: 36 U/L (ref 0–50)
AMPHETAMINES UR QL SCN: NEGATIVE
ANION GAP SERPL CALCULATED.3IONS-SCNC: 13 MMOL/L (ref 3–14)
ANION GAP SERPL CALCULATED.3IONS-SCNC: 15 MMOL/L (ref 3–14)
APTT PPP: 22 SEC (ref 24–37)
AST SERPL W P-5'-P-CCNC: 49 U/L (ref 0–45)
B-HCG SERPL-ACNC: <1 IU/L (ref 0–5)
BARBITURATES UR QL: NEGATIVE
BASOPHILS # BLD AUTO: 0 10E9/L (ref 0–0.2)
BASOPHILS NFR BLD AUTO: 0.2 %
BENZODIAZ UR QL: NEGATIVE
BILIRUB SERPL-MCNC: 0.4 MG/DL (ref 0.2–1.3)
BUN SERPL-MCNC: 7 MG/DL (ref 7–30)
BUN SERPL-MCNC: 8 MG/DL (ref 7–30)
CALCIUM SERPL-MCNC: 8.6 MG/DL (ref 8.5–10.1)
CALCIUM SERPL-MCNC: 9 MG/DL (ref 8.5–10.1)
CANNABINOIDS UR QL SCN: NEGATIVE
CHLORIDE SERPL-SCNC: 102 MMOL/L (ref 94–109)
CHLORIDE SERPL-SCNC: 106 MMOL/L (ref 94–109)
CHOLEST SERPL-MCNC: 98 MG/DL
CO2 SERPL-SCNC: 22 MMOL/L (ref 20–32)
CO2 SERPL-SCNC: 24 MMOL/L (ref 20–32)
COCAINE UR QL: NEGATIVE
CREAT SERPL-MCNC: 0.58 MG/DL (ref 0.52–1.04)
CREAT SERPL-MCNC: 0.7 MG/DL (ref 0.52–1.04)
DIFFERENTIAL METHOD BLD: ABNORMAL
EOSINOPHIL # BLD AUTO: 0 10E9/L (ref 0–0.7)
EOSINOPHIL NFR BLD AUTO: 0.2 %
ERYTHROCYTE [DISTWIDTH] IN BLOOD BY AUTOMATED COUNT: 17.7 % (ref 10–15)
ERYTHROCYTE [DISTWIDTH] IN BLOOD BY AUTOMATED COUNT: 17.9 % (ref 10–15)
ETHANOL SERPL-MCNC: <0.01 G/DL
GFR SERPL CREATININE-BSD FRML MDRD: >90 ML/MIN/1.7M2
GFR SERPL CREATININE-BSD FRML MDRD: >90 ML/MIN/1.7M2
GLUCOSE SERPL-MCNC: 107 MG/DL (ref 70–99)
GLUCOSE SERPL-MCNC: 155 MG/DL (ref 70–99)
HBA1C MFR BLD: 5.8 % (ref 4.3–6)
HCT VFR BLD AUTO: 41.2 % (ref 35–47)
HCT VFR BLD AUTO: 42.4 % (ref 35–47)
HDLC SERPL-MCNC: 38 MG/DL
HGB BLD-MCNC: 13 G/DL (ref 11.7–15.7)
HGB BLD-MCNC: 14.4 G/DL (ref 11.7–15.7)
IMM GRANULOCYTES # BLD: 0.2 10E9/L (ref 0–0.4)
IMM GRANULOCYTES NFR BLD: 1 %
INR PPP: 1.18 (ref 0.8–1.2)
LDLC SERPL CALC-MCNC: 39 MG/DL
LYMPHOCYTES # BLD AUTO: 1.8 10E9/L (ref 0.8–5.3)
LYMPHOCYTES NFR BLD AUTO: 8.4 %
MCH RBC QN AUTO: 29.3 PG (ref 26.5–33)
MCH RBC QN AUTO: 29.9 PG (ref 26.5–33)
MCHC RBC AUTO-ENTMCNC: 31.6 G/DL (ref 31.5–36.5)
MCHC RBC AUTO-ENTMCNC: 34 G/DL (ref 31.5–36.5)
MCV RBC AUTO: 88 FL (ref 78–100)
MCV RBC AUTO: 93 FL (ref 78–100)
METHADONE UR QL SCN: NEGATIVE
MONOCYTES # BLD AUTO: 1.4 10E9/L (ref 0–1.3)
MONOCYTES NFR BLD AUTO: 6.5 %
MRSA DNA SPEC QL NAA+PROBE: NEGATIVE
NEUTROPHILS # BLD AUTO: 17.7 10E9/L (ref 1.6–8.3)
NEUTROPHILS NFR BLD AUTO: 83.7 %
NONHDLC SERPL-MCNC: 60 MG/DL
NRBC # BLD AUTO: 0 10*3/UL
NRBC BLD AUTO-RTO: 0 /100
OPIATES UR QL SCN: NEGATIVE
PCP UR QL SCN: NEGATIVE
PLATELET # BLD AUTO: 645 10E9/L (ref 150–450)
PLATELET # BLD AUTO: 779 10E9/L (ref 150–450)
POTASSIUM SERPL-SCNC: 2.4 MMOL/L (ref 3.4–5.3)
POTASSIUM SERPL-SCNC: 2.7 MMOL/L (ref 3.4–5.3)
PROT SERPL-MCNC: 8.4 G/DL (ref 6.8–8.8)
RBC # BLD AUTO: 4.43 10E12/L (ref 3.8–5.2)
RBC # BLD AUTO: 4.82 10E12/L (ref 3.8–5.2)
SODIUM SERPL-SCNC: 139 MMOL/L (ref 133–144)
SODIUM SERPL-SCNC: 142 MMOL/L (ref 133–144)
SPECIMEN SOURCE: NORMAL
TRIGL SERPL-MCNC: 105 MG/DL
TROPONIN I SERPL-MCNC: <0.015 UG/L (ref 0–0.04)
WBC # BLD AUTO: 17.5 10E9/L (ref 4–11)
WBC # BLD AUTO: 21.2 10E9/L (ref 4–11)

## 2018-02-17 PROCEDURE — 70450 CT HEAD/BRAIN W/O DYE: CPT

## 2018-02-17 PROCEDURE — 36415 COLL VENOUS BLD VENIPUNCTURE: CPT | Performed by: FAMILY MEDICINE

## 2018-02-17 PROCEDURE — 70496 CT ANGIOGRAPHY HEAD: CPT | Mod: TC

## 2018-02-17 PROCEDURE — 70450 CT HEAD/BRAIN W/O DYE: CPT | Mod: TC

## 2018-02-17 PROCEDURE — 20000004 ZZH R&B ICU UMMC

## 2018-02-17 PROCEDURE — 25000132 ZZH RX MED GY IP 250 OP 250 PS 637: Performed by: STUDENT IN AN ORGANIZED HEALTH CARE EDUCATION/TRAINING PROGRAM

## 2018-02-17 PROCEDURE — 80048 BASIC METABOLIC PNL TOTAL CA: CPT | Performed by: INTERNAL MEDICINE

## 2018-02-17 PROCEDURE — 71045 X-RAY EXAM CHEST 1 VIEW: CPT

## 2018-02-17 PROCEDURE — 40000275 ZZH STATISTIC RCP TIME EA 10 MIN

## 2018-02-17 PROCEDURE — 84702 CHORIONIC GONADOTROPIN TEST: CPT | Performed by: FAMILY MEDICINE

## 2018-02-17 PROCEDURE — 99291 CRITICAL CARE FIRST HOUR: CPT | Mod: 25

## 2018-02-17 PROCEDURE — 80320 DRUG SCREEN QUANTALCOHOLS: CPT | Performed by: FAMILY MEDICINE

## 2018-02-17 PROCEDURE — 25000125 ZZHC RX 250: Performed by: STUDENT IN AN ORGANIZED HEALTH CARE EDUCATION/TRAINING PROGRAM

## 2018-02-17 PROCEDURE — 85610 PROTHROMBIN TIME: CPT | Performed by: FAMILY MEDICINE

## 2018-02-17 PROCEDURE — 25000128 H RX IP 250 OP 636: Performed by: FAMILY MEDICINE

## 2018-02-17 PROCEDURE — 87641 MR-STAPH DNA AMP PROBE: CPT | Performed by: PSYCHIATRY & NEUROLOGY

## 2018-02-17 PROCEDURE — 80053 COMPREHEN METABOLIC PANEL: CPT | Performed by: FAMILY MEDICINE

## 2018-02-17 PROCEDURE — G0390 TRAUMA RESPONS W/HOSP CRITI: HCPCS

## 2018-02-17 PROCEDURE — 85027 COMPLETE CBC AUTOMATED: CPT | Performed by: INTERNAL MEDICINE

## 2018-02-17 PROCEDURE — 99292 CRITICAL CARE ADDL 30 MIN: CPT

## 2018-02-17 PROCEDURE — 36415 COLL VENOUS BLD VENIPUNCTURE: CPT | Performed by: STUDENT IN AN ORGANIZED HEALTH CARE EDUCATION/TRAINING PROGRAM

## 2018-02-17 PROCEDURE — 90471 IMMUNIZATION ADMIN: CPT

## 2018-02-17 PROCEDURE — 40000986 XR CHEST 1 VW

## 2018-02-17 PROCEDURE — 99285 EMERGENCY DEPT VISIT HI MDM: CPT | Performed by: FAMILY MEDICINE

## 2018-02-17 PROCEDURE — 25000128 H RX IP 250 OP 636: Performed by: RADIOLOGY

## 2018-02-17 PROCEDURE — 85730 THROMBOPLASTIN TIME PARTIAL: CPT | Performed by: FAMILY MEDICINE

## 2018-02-17 PROCEDURE — 87640 STAPH A DNA AMP PROBE: CPT | Performed by: PSYCHIATRY & NEUROLOGY

## 2018-02-17 PROCEDURE — 72125 CT NECK SPINE W/O DYE: CPT | Mod: TC

## 2018-02-17 PROCEDURE — 85610 PROTHROMBIN TIME: CPT | Performed by: INTERNAL MEDICINE

## 2018-02-17 PROCEDURE — 93005 ELECTROCARDIOGRAM TRACING: CPT

## 2018-02-17 PROCEDURE — 85730 THROMBOPLASTIN TIME PARTIAL: CPT | Performed by: INTERNAL MEDICINE

## 2018-02-17 PROCEDURE — 25000128 H RX IP 250 OP 636: Performed by: STUDENT IN AN ORGANIZED HEALTH CARE EDUCATION/TRAINING PROGRAM

## 2018-02-17 PROCEDURE — 00000146 ZZHCL STATISTIC GLUCOSE BY METER IP

## 2018-02-17 PROCEDURE — 85025 COMPLETE CBC W/AUTO DIFF WBC: CPT | Performed by: FAMILY MEDICINE

## 2018-02-17 PROCEDURE — 90714 TD VACC NO PRESV 7 YRS+ IM: CPT | Performed by: FAMILY MEDICINE

## 2018-02-17 PROCEDURE — 93010 ELECTROCARDIOGRAM REPORT: CPT | Performed by: INTERNAL MEDICINE

## 2018-02-17 PROCEDURE — 83036 HEMOGLOBIN GLYCOSYLATED A1C: CPT | Performed by: INTERNAL MEDICINE

## 2018-02-17 PROCEDURE — 80061 LIPID PANEL: CPT | Performed by: INTERNAL MEDICINE

## 2018-02-17 PROCEDURE — 70486 CT MAXILLOFACIAL W/O DYE: CPT | Mod: TC

## 2018-02-17 PROCEDURE — 80307 DRUG TEST PRSMV CHEM ANLYZR: CPT | Performed by: FAMILY MEDICINE

## 2018-02-17 PROCEDURE — 74177 CT ABD & PELVIS W/CONTRAST: CPT | Mod: TC

## 2018-02-17 PROCEDURE — 84484 ASSAY OF TROPONIN QUANT: CPT | Performed by: FAMILY MEDICINE

## 2018-02-17 PROCEDURE — 87040 BLOOD CULTURE FOR BACTERIA: CPT | Performed by: STUDENT IN AN ORGANIZED HEALTH CARE EDUCATION/TRAINING PROGRAM

## 2018-02-17 RX ORDER — MAGNESIUM SULFATE HEPTAHYDRATE 40 MG/ML
4 INJECTION, SOLUTION INTRAVENOUS EVERY 4 HOURS PRN
Status: DISCONTINUED | OUTPATIENT
Start: 2018-02-17 | End: 2018-03-01 | Stop reason: HOSPADM

## 2018-02-17 RX ORDER — POTASSIUM CL/LIDO/0.9 % NACL 10MEQ/0.1L
10 INTRAVENOUS SOLUTION, PIGGYBACK (ML) INTRAVENOUS
Status: DISCONTINUED | OUTPATIENT
Start: 2018-02-17 | End: 2018-03-01 | Stop reason: HOSPADM

## 2018-02-17 RX ORDER — ONDANSETRON 2 MG/ML
4 INJECTION INTRAMUSCULAR; INTRAVENOUS EVERY 6 HOURS PRN
Status: DISCONTINUED | OUTPATIENT
Start: 2018-02-17 | End: 2018-03-01 | Stop reason: HOSPADM

## 2018-02-17 RX ORDER — 3% SODIUM CHLORIDE 3 G/100ML
INJECTION, SOLUTION INTRAVENOUS CONTINUOUS
Status: DISCONTINUED | OUTPATIENT
Start: 2018-02-17 | End: 2018-02-19

## 2018-02-17 RX ORDER — FOLIC ACID 1 MG/1
1 TABLET ORAL DAILY
Status: DISCONTINUED | OUTPATIENT
Start: 2018-02-18 | End: 2018-03-01 | Stop reason: HOSPADM

## 2018-02-17 RX ORDER — IOPAMIDOL 755 MG/ML
75 INJECTION, SOLUTION INTRAVASCULAR ONCE
Status: COMPLETED | OUTPATIENT
Start: 2018-02-17 | End: 2018-02-17

## 2018-02-17 RX ORDER — POTASSIUM CHLORIDE 29.8 MG/ML
20 INJECTION INTRAVENOUS
Status: DISCONTINUED | OUTPATIENT
Start: 2018-02-17 | End: 2018-03-01 | Stop reason: HOSPADM

## 2018-02-17 RX ORDER — TETANUS AND DIPHTHERIA TOXOIDS ADSORBED 2; 2 [LF]/.5ML; [LF]/.5ML
0.5 INJECTION INTRAMUSCULAR ONCE
Status: COMPLETED | OUTPATIENT
Start: 2018-02-17 | End: 2018-02-17

## 2018-02-17 RX ORDER — ASPIRIN 81 MG/1
81 TABLET, CHEWABLE ORAL DAILY
Status: DISCONTINUED | OUTPATIENT
Start: 2018-02-17 | End: 2018-02-27

## 2018-02-17 RX ORDER — LABETALOL HYDROCHLORIDE 5 MG/ML
5-10 INJECTION, SOLUTION INTRAVENOUS EVERY 10 MIN PRN
Status: DISCONTINUED | OUTPATIENT
Start: 2018-02-17 | End: 2018-02-19

## 2018-02-17 RX ORDER — HYDRALAZINE HYDROCHLORIDE 20 MG/ML
10-20 INJECTION INTRAMUSCULAR; INTRAVENOUS
Status: DISCONTINUED | OUTPATIENT
Start: 2018-02-17 | End: 2018-02-19

## 2018-02-17 RX ORDER — POTASSIUM CHLORIDE 7.45 MG/ML
10 INJECTION INTRAVENOUS CONTINUOUS
Status: DISCONTINUED | OUTPATIENT
Start: 2018-02-17 | End: 2018-02-17 | Stop reason: HOSPADM

## 2018-02-17 RX ORDER — POTASSIUM CHLORIDE 750 MG/1
20-40 TABLET, EXTENDED RELEASE ORAL
Status: DISCONTINUED | OUTPATIENT
Start: 2018-02-17 | End: 2018-03-01 | Stop reason: HOSPADM

## 2018-02-17 RX ORDER — LANOLIN ALCOHOL/MO/W.PET/CERES
100 CREAM (GRAM) TOPICAL DAILY
Status: DISCONTINUED | OUTPATIENT
Start: 2018-02-18 | End: 2018-03-01 | Stop reason: HOSPADM

## 2018-02-17 RX ORDER — NALOXONE HYDROCHLORIDE 0.4 MG/ML
.1-.4 INJECTION, SOLUTION INTRAMUSCULAR; INTRAVENOUS; SUBCUTANEOUS
Status: DISCONTINUED | OUTPATIENT
Start: 2018-02-17 | End: 2018-03-01 | Stop reason: HOSPADM

## 2018-02-17 RX ORDER — LANOLIN ALCOHOL/MO/W.PET/CERES
100 CREAM (GRAM) TOPICAL DAILY
Status: DISCONTINUED | OUTPATIENT
Start: 2018-02-18 | End: 2018-02-17

## 2018-02-17 RX ORDER — POTASSIUM CHLORIDE 7.45 MG/ML
10 INJECTION INTRAVENOUS
Status: DISCONTINUED | OUTPATIENT
Start: 2018-02-17 | End: 2018-03-01 | Stop reason: HOSPADM

## 2018-02-17 RX ORDER — IOPAMIDOL 612 MG/ML
100 INJECTION, SOLUTION INTRAVASCULAR ONCE
Status: COMPLETED | OUTPATIENT
Start: 2018-02-17 | End: 2018-02-17

## 2018-02-17 RX ORDER — THIAMINE HYDROCHLORIDE 100 MG/ML
100 INJECTION, SOLUTION INTRAMUSCULAR; INTRAVENOUS ONCE
Status: COMPLETED | OUTPATIENT
Start: 2018-02-17 | End: 2018-02-17

## 2018-02-17 RX ORDER — POTASSIUM CHLORIDE 1.5 G/1.58G
20-40 POWDER, FOR SOLUTION ORAL
Status: DISCONTINUED | OUTPATIENT
Start: 2018-02-17 | End: 2018-03-01 | Stop reason: HOSPADM

## 2018-02-17 RX ORDER — HYDROMORPHONE HYDROCHLORIDE 2 MG/ML
INJECTION, SOLUTION INTRAMUSCULAR; INTRAVENOUS; SUBCUTANEOUS
Status: DISCONTINUED
Start: 2018-02-17 | End: 2018-02-17 | Stop reason: HOSPADM

## 2018-02-17 RX ORDER — SODIUM CHLORIDE 9 MG/ML
INJECTION, SOLUTION INTRAVENOUS CONTINUOUS
Status: DISCONTINUED | OUTPATIENT
Start: 2018-02-17 | End: 2018-02-23

## 2018-02-17 RX ADMIN — PANTOPRAZOLE SODIUM 40 MG: 40 INJECTION, POWDER, FOR SOLUTION INTRAVENOUS at 17:31

## 2018-02-17 RX ADMIN — THIAMINE HYDROCHLORIDE 100 MG: 100 INJECTION, SOLUTION INTRAMUSCULAR; INTRAVENOUS at 18:44

## 2018-02-17 RX ADMIN — ASPIRIN 81 MG CHEWABLE TABLET 81 MG: 81 TABLET CHEWABLE at 18:44

## 2018-02-17 RX ADMIN — SODIUM CHLORIDE: 9 INJECTION, SOLUTION INTRAVENOUS at 17:31

## 2018-02-17 RX ADMIN — IOPAMIDOL 100 ML: 612 INJECTION, SOLUTION INTRAVENOUS at 11:52

## 2018-02-17 RX ADMIN — IOPAMIDOL 75 ML: 755 INJECTION, SOLUTION INTRAVENOUS at 12:29

## 2018-02-17 RX ADMIN — SODIUM CHLORIDE: 3 INJECTION, SOLUTION INTRAVENOUS at 22:39

## 2018-02-17 RX ADMIN — ONDANSETRON 4 MG: 2 INJECTION INTRAMUSCULAR; INTRAVENOUS at 20:31

## 2018-02-17 RX ADMIN — ACETAMINOPHEN 650 MG: 325 SOLUTION ORAL at 23:08

## 2018-02-17 RX ADMIN — TETANUS AND DIPHTHERIA TOXOIDS ADSORBED 0.5 ML: 2; 2 INJECTION INTRAMUSCULAR at 12:36

## 2018-02-17 RX ADMIN — POTASSIUM CHLORIDE 40 MEQ: 1.5 POWDER, FOR SOLUTION ORAL at 18:44

## 2018-02-17 RX ADMIN — PROCHLORPERAZINE EDISYLATE 5 MG: 5 INJECTION INTRAMUSCULAR; INTRAVENOUS at 22:08

## 2018-02-17 RX ADMIN — POTASSIUM CHLORIDE 40 MEQ: 1.5 POWDER, FOR SOLUTION ORAL at 20:46

## 2018-02-17 ASSESSMENT — PAIN DESCRIPTION - DESCRIPTORS
DESCRIPTORS: HEADACHE
DESCRIPTORS: HEADACHE

## 2018-02-17 NOTE — IP AVS SNAPSHOT
` ` Patient Information     Patient Name Sex     Maggie Case ([id number]) Female 1990       Room Bed    6210 6210-02      Patient Demographics     Address Phone    [address] [facility] [place] [address] [phone] (Home)  [phone] (Mobile) *Preferred*      Patient Ethnicity & Race     Ethnic Group Patient Race    American  or       Emergency Contact(s)     Name Relation Home Work Mobile    Pawan Case Mother [phone]    Dandre Case Father [phone]      Documents on File        Status Date Received Description       Documents for the Patient    Consent for Services - Hospital/Clinic-ESign Received () 13     [facility] Privacy Notice-ESign Received 13     Insurance Card Not Received      Patient ID Received () 13 [place] DL    Consent for EHR Access-Received-ESign Received 13     Consent for EHR Access-Decline-ESign       Consent for Services - Hospital/Clinic-ESign Received () 14     Consent for Services - Hospital/Clinic-ESign Received () 08/29/15     HIM ARANZA Authorization - File Only  () 12/29/15 AUTHORIZATION FOR RELEASE OF PHI    Consent for Services/Privacy Notice - Hospital/Clinic-Esign Received () 16     Privacy Notice - [place] Received 16     Consent for EHR Access       External Medication Information Consent       [facility] Specified Other       Consent for Services/Privacy Notice - Hospital/Clinic Received 17     Patient ID Received 16 [place] DL    HIM ARANZA Authorization  16 SFR    HIM ARANZA Authorization  16 SFR    HIM ARANZA Authorization  17     HIM ARANZA Authorization - File Only  17 AUTHORIZATION FOR RELEASE OF PROTECTED HEALTH INFORMATION    HIM ARANZA Authorization  17     Care Everywhere Prospective Auth Received 18        Documents for the Encounter    CMS IM for Patient Signature       EMS/Ambulance  Record  02/19/18 [place]      Admission Information     Attending Provider Admitting Provider Admission Type Admission Date/Time    Jose Amaya MD Reshi, Aminah Mariano MD Emergency 02/17/18  1433    Discharge Date Hospital Service Auth/Cert Status Service Area     Neuro ICU Incomplete [facility]    Unit Room/Bed Admission Status       UU U6A 6210/6210-02 Admission (Confirmed)       Admission     Complaint    Acute Stroke , Acute ischemic stroke (H), Right MCA Stroke      Hospital Account     Name Acct ID Class Status Primary Coverage    Maggie Case [id number] Inpatient Open MEDICAID [facility] HEALTH CARE            Guarantor Account (for Hospital Account #[id number])     Name Relation to Pt Service Area Active? Acct Type    Maggie Case Self FCS Yes Personal/Family    Address Phone          [address] [place] [address] [phone](H)              Coverage Information (for Hospital Account #[id number])     F/O Payor/Plan Precert #    MEDICAID [facility] HEALTH CARE     Subscriber Subscriber #    Maggie Case [id number]    Address Phone    [address] [address]  [place] [address] [phone]

## 2018-02-17 NOTE — DISCHARGE INSTRUCTIONS
What to expect when you have contrast    During your exam, we will inject  contrast  into your vein or artery. (Contrast is a clear liquid with iodine in it. It shows up on X-rays.)    You may feel warm or hot. You may have a metal taste in your mouth and a slight upset stomach. You may also feel pressure near the kidneys and bladder. These effects will last about 1 to 3 minutes.    Please tell us if you have:    Sneezing     Itching    Hives     Swelling in the face    A hoarse voice    Breathing problems    Other new symptoms    Serious problems are rare.  They may include:    Irregular heartbeat     Seizures    Kidney failure              Tissue damage    Shock      Death    If you have any problems during the exam, we  will treat them right away.    When you get home    Call your hospital if you have any new symptoms in the next 2 days, like hives or swelling. (Phone numbers are at the bottom of this page.) Or call your family doctor.     If you have wheezing or trouble breathing, call 911.    Self-care  -Drink at least 4 extra glasses of water today.   This reduces the stress on your kidneys.  -Keep taking your regular medicines.    The contrast will pass out of your body in your  Urine(pee). This will happen in the next 24 hours. You  will not feel this. Your urine will not  change color.    If you have kidney problems or take metformin    Drink 4 to 8 large glasses of water for the next  2 days, if you are not on a fluid restriction.    ?If you take metformin (Glucophage or Glucovance) for diabetes, keep taking it.      ?Your kidney function tests are abnormal.  If you take Metformin, do not take it for 48 hours. Please go to your clinic for a blood test within 3 days after your exam before the restarting this medicine.     (Note to provider:please give patient prescription for lab tests.)    ?Special instructions: -    I have read and understand the above information.    Patient Sign  Here:______________________________________Date:________Time:______    Staff Sign Here:________________________________________Date:_______Time:______      Radiology Departments:     ?[facility]: [phone] ?[place]: [phone]     ?[place]: [phone] ?[place]:[phone]      ?[place]: [phone]  ?[place]: [phone]  ?[place]:[phone]    ?[facility]:[phone]  ?[facility]:[phone]

## 2018-02-17 NOTE — IP AVS SNAPSHOT
"    UNIT [address] [facility]: [phone]                                              INTERAGENCY TRANSFER FORM - PHYSICIAN ORDERS   2018                    Hospital Admission Date: 2018  NADIR GARCIA   : 1990  Sex: Female        Attending Provider: Jose Amaya MD     Allergies:  Amoxicillin, Levaquin [Levofloxacin], Naproxen, Nickel, Tramadol, Sulindac    Infection:  None   Service:  NEURO ICU    Ht:  1.626 m (5' 4\")   Wt:  68.6 kg (151 lb 3.8 oz)   Admission Wt:  65.8 kg (145 lb 1 oz)    BMI:  25.96 kg/m 2   BSA:  1.76 m 2            Patient PCP Information     Provider PCP Type    Chapo Flores MD General      ED Clinical Impression     Diagnosis Description Comment Added By Time Added    Muscle pain [M79.1] Muscle pain [M79.1]  Yeison Crawley MD 3/1/2018  7:40 AM    Nausea [R11.0] Nausea [R11.0]  Yeison Crawley MD 3/1/2018  7:45 AM    Acute ischemic stroke (H) [I63.9] Acute ischemic stroke (H) [I63.9]  Yeison Crawley MD 3/1/2018  7:47 AM    Anxiety state [F41.1] Anxiety state [F41.1]  Yeison Crawley MD 3/1/2018  7:47 AM    Insomnia, unspecified type [G47.00] Insomnia, unspecified type [G47.00]  Yeison Crawley MD 3/1/2018  7:50 AM    Leukocytosis, unspecified type [D72.829] Leukocytosis, unspecified type [D72.829]  Yeison Crawley MD 3/1/2018 12:06 PM    PFO (patent foramen ovale) [Q21.1] PFO (patent foramen ovale) [Q21.1]  Yeison Crawley MD 3/1/2018 12:07 PM    Hilar lymphadenopathy [R59.0] Hilar lymphadenopathy [R59.0]  Yeison Crawley MD 3/1/2018 12:08 PM    Vitamin deficiency [E56.9] Vitamin deficiency [E56.9]  Yeison Crawley MD 3/1/2018  1:05 PM    Cerebral infarction due to embolism of right middle cerebral artery (H) [I63.411] Cerebral infarction due to embolism of right middle cerebral artery (H) [I63.411]  Yeison Crawley MD 3/1/2018  1:06 PM    History of pulmonary embolism [Z86.711] History of pulmonary embolism [Z86.711]  Misbah, " MD Yeison 3/1/2018  1:08 PM      Hospital Problems as of 3/1/2018              Priority Class Noted POA    Acute ischemic stroke (H) Medium  2/17/2018 Yes      Non-Hospital Problems as of 3/1/2018              Priority Class Noted    Diarrhea Medium  4/17/2008    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    Pain in joint, lower leg Medium  5/1/2009    Cervicalgia Medium  6/16/2009    Low back pain Medium  6/16/2009    Muscle pain Medium  7/30/2009    Anxiety state Medium  3/25/2013    Acute upper GI bleed Medium  6/18/2013    Hypokalemia Medium  6/18/2013    Acute cystitis Medium  6/18/2013    Acute chest pain Medium  5/6/2016    Leukocytosis Medium  5/6/2016    Lung mass Medium  5/6/2016    SOB (shortness of breath) Medium  5/6/2016    Pyelonephritis Medium  1/5/2017    Sepsis (H) Medium  4/28/2017    Influenza B Medium  5/1/2017    Opacity of lung on imaging study Medium  5/1/2017    Community acquired pneumonia Medium  5/1/2017    C. difficile colitis Medium  5/1/2017      Code Status History     Date Active Date Inactive Code Status Order ID Comments User Context    3/1/2018 12:04 PM  Full Code [id number]  Yeison Crawley MD Outpatient    2/17/2018  3:20 PM 3/1/2018 12:04 PM Full Code [id number]  Reinier Shepherd MD Inpatient    5/1/2017  9:02 AM 2/17/2018  3:20 PM Full Code [id number]  Delmi Nicole NP Outpatient    4/28/2017 10:21 PM 5/1/2017  9:02 AM Full Code [id number]  Chester Cool MD Inpatient    1/8/2017  2:15 PM 4/28/2017 10:21 PM Full Code [id number]  Joey Quan DO Outpatient    1/5/2017  5:08 PM 1/8/2017  2:15 PM Full Code [id number]  Chester Cool MD Inpatient    5/8/2016  8:22 AM 1/5/2017  5:08 PM Full Code [id number]  Eleuterio Anderson MD Outpatient    5/6/2016 11:58 PM 5/8/2016  8:22 AM Full Code [id number]  Boo Méndez MD Inpatient    6/17/2013  8:27 PM 6/19/2013 12:05 AM Full Code [id number]  Brandy Livingston RN  Inpatient         Medication Review      START taking        Dose / Directions Comments    acetaminophen 32 mg/mL solution   Commonly known as:  TYLENOL   Used for:  Muscle pain        Dose:  650 mg   20.3 mLs (650 mg) by Per G Tube route every 4 hours as needed for mild pain or fever   Quantity:  400 mL   Refills:  0        enoxaparin 80 MG/0.8ML injection   Commonly known as:  LOVENOX        Dose:  1 mg/kg   Inject 0.69 mLs (69 mg) Subcutaneous every 12 hours   Refills:  0        FLUoxetine 20 MG/5ML solution   Commonly known as:  PROzac        Dose:  20 mg   5 mLs (20 mg) by Per G Tube route daily   Quantity:  150 mL   Refills:  0        folic acid 1 MG tablet   Commonly known as:  FOLVITE   Used for:  Vitamin deficiency        Dose:  1 mg   1 tablet (1 mg) by Per G Tube route daily   Quantity:  30 tablet   Refills:  0        lidocaine 4 % kit   Commonly known as:  LMX4   Used for:  Muscle pain        Apply topically every hour as needed for pain (with VAD insertion or accessing implanted port.)   Refills:  0        multivitamins with minerals Liqd liquid        Dose:  15 mL   15 mLs by Per Feeding Tube route daily   Refills:  0        ondansetron 4 MG tablet   Commonly known as:  ZOFRAN   Used for:  Nausea        Dose:  4-8 mg   1-2 tablets (4-8 mg) by Per Feeding Tube route every 8 hours as needed for nausea   Quantity:  18 tablet   Refills:  0        oxyCODONE 5 MG/5ML solution   Commonly known as:  ROXICODONE   Used for:  Muscle pain        Dose:  5-10 mg   5-10 mLs (5-10 mg) by Per G Tube route every 4 hours as needed for moderate to severe pain   Quantity:  90 mL   Refills:  0        ramelteon 8 MG tablet   Commonly known as:  ROZEREM   Used for:  Insomnia, unspecified type        Dose:  8 mg   1 tablet (8 mg) by Per G Tube route nightly as needed for sleep   Refills:  0        thiamine 100 MG tablet   Used for:  Vitamin deficiency        Dose:  100 mg   1 tablet (100 mg) by Per NG tube route daily    Refills:  0        * Warfarin Therapy Reminder        Dose:  1 each   1 each continuous prn   Refills:  0        * warfarin 5 MG tablet   Commonly known as:  COUMADIN   Used for:  History of pulmonary embolism, Cerebral infarction due to embolism of right middle cerebral artery (H)        Dose:  5 mg   Take 1 tablet (5 mg) by mouth daily   Quantity:  30 tablet   Refills:  0        * Notice:  This list has 2 medication(s) that are the same as other medications prescribed for you. Read the directions carefully, and ask your doctor or other care provider to review them with you.      CONTINUE these medications which may have CHANGED, or have new prescriptions. If we are uncertain of the size of tablets/capsules you have at home, strength may be listed as something that might have changed.        Dose / Directions Comments    promethazine 25 MG tablet   Commonly known as:  PHENERGAN   This may have changed:  how to take this   Used for:  Nausea        Dose:  25 mg   1 tablet (25 mg) by Per G Tube route every 6 hours as needed for nausea   Quantity:  20 tablet   Refills:  1          CONTINUE these medications which have NOT CHANGED        Dose / Directions Comments    BENADRYL PO        Take by mouth At Bedtime   Refills:  0        nicotine 14 MG/24HR 24 hr patch   Commonly known as:  NICODERM CQ   Used for:  Tobacco use disorder        Dose:  1 patch   Place 1 patch onto the skin daily   Quantity:  14 patch   Refills:  0          STOP taking     LORazepam 1 MG tablet   Commonly known as:  ATIVAN                   Summary of Visit     Reason for your hospital stay       Ischemic Stroke             After Care     Activity - Up with nursing assistance       Whenever OOB, must wear helmet.       Advance Diet as Tolerated       Follow this diet upon discharge:      Calorie Counts      Snacks/Supplements Adult: Magic Cup; With Meals      Adult Formula Drip Feeding: Continuous Isosource 1.5; Gastrostomy; Goal Rate: 40;  mL/hr; Medication - Tube Feeding Flush Frequency: At least 15-30 mL water before + after medication administration and with tube clogging; Aspiration precautions      DD 2 Mech Altered Nectar Thickened Liquids (pre-thickened or use instant food thickener)       Discharge Instructions       If questions or problems arise regarding tube function (e.g. leaking, dislodges, etc.) Contact Interventional Radiology department 24 hours a day.    For procedures that were done at the [facility] sites,   8:00-4:30 PM Monday through Friday    Contact:[phone].    For afterhours and weekends call the [facility] main phone line [phone] and ask for the [facility] IR on call physician number.    If DIRECTED by the RADIOLOGIST, related to specific problems with the tube functioning,  go to the Emergency Department.       Discharge Instructions       Patient to make a follow up appointment in IR clinic in 10-14 days for the removal of the retention sutures at the G or GJ tube site. Phone number is [phone] if needed.       Fall precautions           General info for SNF       Length of Stay Estimate: Short Term Care: Estimated # of Days 31-90  Condition at Discharge: Stable  Level of care:skilled   Rehabilitation Potential: Fair  Admission H&P remains valid and up-to-date: Yes  Recent Chemotherapy: N/A  Use Nursing Home Standing Orders: Yes       Mantoux instructions       Give two-step Mantoux (PPD) Per Facility Policy Yes       Wound care (specify)       Site:   PEG tube  Instructions:  Replace surround gauze daily, continue PEG cares per facility protocol.    Site: cranioplasty s/p staples removal;  Instructions: follow for e/o infection (redness, drainage, tenderness)             Referrals     Cardiology Eval Adult Referral       Preferred location:  [facility] [phone]   [email or web address]    Please be aware that coverage of these  services is subject to the terms and limitations of your health insurance plan.  Call member services at your health plan with any benefit or coverage questions.      Please bring the following to your appointment:  Any x-rays, CTs or MRIs which have been performed. Contact the facility where they were done to arrange for  prior to your scheduled appointment.    List of current medications  This referral request   Any documents/labs given to you for this referral       Nutrition Services Adult IP Consult       Reason:  Continued management of tube feedings and assessment of caloric intake / requirement       Occupational Therapy Adult Consult       Evaluate and treat as clinically indicated.    Reason:  Assess and treat. 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  Call member services at your health plan with any benefit or coverage questions.      Please bring the following with you to your appointment:    (1) Any X-Rays, CTs or MRIs which have been performed.  Contact the facility where they were done to arrange for  prior to your scheduled appointment.    (2) List of current medications   (3) This referral request   (4) Any documents/labs given to you for this referral       Physical Therapy Adult Consult       Evaluate and treat as clinically indicated.    Reason:  Assess and treat. 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  Call member services at your health plan with any benefit or coverage questions.      Please bring the following to your appointment:    >>   List of current medications   >>   This referral request   >>   Any documents/labs given to you for this referral           Speech Language Path Adult Consult       Evaluate and treat as clinically indicated.    Reason:  Assess and treat. Hx of RM1 stroke w/ significant L sided weakness, sensory loss, neglect, dysphagia.             Supplies     Pneumatic Compression Device        Bilateral calf. Remove 30 mins BID.             Follow-Up Appointment Instructions     Future Labs/Procedures    Follow Up and recommended labs and tests     Comments:    1. Follow up with NH/ARU physician.  The following labs/tests are recommended: INR/PT + CBC for continued warfarin management. Follow up with PCP as needed / available. Follow up with Facility pharmacy regarding continued titration/warfarin management (INR Goal 2-3).    2. Follow up w/ Pulmonology following a repeat CT chest (~3/26 or after ARU d/c). Appointment related to hx of PEs, b/l lymphadenopathy.    3. Follow up with Hematology following ARU d/c or as soon as possible regarding hypercoagulability and myeloproliferative work up.     4. Follow up CTH on ~3/12, forward images to [facility] and cc Dr. Mesa. Follow up w/. Cranioplasty, tentatively planned for 3/18.    Follow-up with Stroke Clinic in 3 months.   5. Follow up with stroke clinic. You will be contacted to schedule a Stroke Clinic appointment. If you have not been contacted to schedule a stroke follow-up appointment within 7 days of discharge, please contact Stroke Neurology Clinic at [phone], pick option #1.   If you have other stroke related questions, please call [phone], pick option #3, and ask to speak to Bashir Carnes RN  Stroke/Endovascular Care Coordinator.  If you have any urgent stroke related questions after hours, please contact the hospital  at [phone] and ask to be connected to a stroke provider.    6. Follow up with Cardiology following ARU d/c.      Follow-Up Appointment Instructions     Follow Up and recommended labs and tests       1. Follow up with NH/ARU physician.  The following labs/tests are recommended: INR/PT + CBC for continued warfarin management. Follow up with PCP as needed / available. Follow up with Facility pharmacy regarding continued titration/warfarin management (INR Goal 2-3).    2. Follow up w/ Pulmonology following a repeat CT chest (~3/26 or after ARU d/c). Appointment related to hx of PEs, b/l lymphadenopathy.    3. Follow up with Hematology following ARU d/c or as soon as possible regarding hypercoagulability and myeloproliferative work up.     4. Follow up CTH on ~3/12, forward images to [facility] and cc Dr. Mesa. Follow up w/. Cranioplasty, tentatively planned for 3/18.    Follow-up with Stroke Clinic in 3 months.   5. Follow up with stroke clinic. You will be contacted to schedule a Stroke Clinic appointment. If you have not been contacted to schedule a stroke follow-up appointment within 7 days of discharge, please contact Stroke Neurology Clinic at [phone], pick option #1.   If you have other stroke related questions, please call [phone], pick option #3, and ask to speak to Bashir Carnes RN Stroke/Endovascular Care Coordinator.  If you have any urgent stroke related questions after hours, please contact the hospital  at [phone] and ask to be connected to a stroke provider.    6. Follow up with Cardiology following ARU d/c.             Statement of Approval     Ordered          03/01/18 1211  I have reviewed and agree with all the recommendations and orders detailed in this document.  EFFECTIVE NOW     Approved and electronically signed by:  Misbah  MD Yeison

## 2018-02-17 NOTE — H&P
[facility], [place]      Neurology Stroke ICU Admission    Patient Name: Maggie Case  : 1990 MRN#: [id number]    STROKE DATA    Stroke Code:  Stroke code not activated.  Time patient seen:  2018 1530  Last known normal (pt's baseline): unknown    TPA treatment:  Not given due to outside the time window.      Stroke Scales      National Institutes of Health Stroke Scale (at presentation)   NIHSS done at:  time patient seen      Score    Level of consciousness:  (1)   Not alert; arousable w/ minor stim to obey/answer/respond    LOC questions:  (0)   Answers both questions correctly    LOC commands:  (0)   Performs both tasks correctly    Best gaze:  (1)   Partial gaze palsy    Visual:  (2)   Complete hemianopia    Facial palsy:  (2)   Partial paralysis (total/near total of lower face)    Motor arm (left):  (4)   No movement    Motor arm (right):  (0)   No drift    Motor leg (left):  (4)   No movement    Motor leg (right):  (0)   No drift    Limb ataxia:  (0)   Absent    Sensory:  (1)   Mild to moderate sensory loss    Best language:  (0)   Normal- no aphasia    Dysarthria:  (1)   Mild to moderate dysarthria    Extinction and inattention:  (2)   Profound javi-inattention / extinction > one modality        NIHSS Total Score:  18          Dysphagia Screen  Time of screenin2018 1530  Screening results: Failed screening - strict NPO pending SLP evaluation     ASSESSMENT & PLAN BY PROBLEM     #Acute ischemic stroke: R M1 occlusion with well established RMCA infarct.  Likely embolic in etiology. Given age and history of PE concern for underlying coagulopathy.  No prior history of echo so unclear if there is evidence of PFO.  Given size of infarct there is concern for RMCA syndrome. She would be a good candidate given previous level of function.  Will discuss with NSG team.      Acute Ischemic Stroke (without tPA) Plan  - Admit to Neuro ICU  - NSG consulted for evaluation  of decompressive hemicraniectomy  - Neurochecks per protocol  - Permissive HTN; labetalol PRN for SBP > 220  - Euthermia, Euglycemia  - HOB elevated to 45 degrees  - NG tube placement   - Daily aspirin for secondary stroke prevention  - Repeat CT head at 2000  - TTE with Bubble Study  - Telemetry  - Bedside Glucose Monitoring  - A1c, Lipid Panel  - PT/OT/SLP  - PM&R  - Stroke Education  - Depression Screen  - Apnea Screen    During initial physical assessment, the plan of care was discussed and developed with patient.  Plan of care includes: admission to Neuro ICU..    Patient was admitted via transfer from [facility].    The patient will be admitted to the Neuro Critical Care/Stroke team.    Other Medical Problems:    #History of PE: Large clot burden diagnosed in December of 2015. CT chest today at OSH showed no evidence of PE. Previously on xarelto, lovenox, and warfarin however all of which were discontinued due to noncompliance    #History of alcohol abuse: thiamine     #Anxiety: holding PTA ativan    #Leukocytosis: no clear evidence of pneumonia on CT chest.  Patient is afebrile.  Will send sputum culture and UA. Continue to monitor.    #Hypokalemia: noted to be 2.4 at OSH. No potassium given at OSH.   - Electrolyte replacement protocol    Fluids/Electrolytes/Nutrition  0.9% sodium chloride @ 100 mL/hr  Avoid hypotonic fluids.    Nutrition:   Active Diet Order      NPO for Medical/Clinical Reasons Except for: No Exceptions    Prophylaxis            For VTE Prevention:  - pneumatic compression device    For Acid Suppression:  - pantoprazole    Code Status  Full Code    HPI  Maggie Case is a 27 year old female with a history of PE not on AC and alcohol use who was transferred from [facility] with concern for malignant MCA syndrome.  Per chart review the patient was drinking with friends last night and woke up with left sided weakness. She was then brought in by ambulance to OSH.  On arrival BP was noted to be 130/100.  "Head CT was concerning with evidence of dense right MCA sign and well established infarct in the right MCA territory.  Patient transferred to [facility] for further cares.  Patient denied any alcohol or drug use and stated \"I woke up this way.\"  Per mother patient has been struggling with alcoholism for years and has been distant from the family for the last year. Per chart review the patient has a history of PE over a year ago and was previously on AC however the patient was not taking the medication.    Pertinent Past Medical/Surgical History  Past Medical History:   Diagnosis Date     Anemia      Anxiety      Chronic diarrhea      Lactose intolerance      Myalgia      Pulmonary embolism (H) 05/06/16     Vitamin B12 deficiency        Past Surgical History:   Procedure Laterality Date     CLOSED REDUCTION, PERCUTANEOUS PINNING LOWER EXTREMITY, COMBINED Right 4/6/2016    Procedure: COMBINED CLOSED REDUCTION, PERCUTANEOUS PINNING LOWER EXTREMITY;  Surgeon: Dexter Jones MD;  Location: [facility]       Medications: I have reviewed this patient's current medications.    Allergies: All allergies reviewed and addressed.    Family History: I have reviewed this patient's family history.    Social History: Patient denied alcohol or drug use.    Tobacco use: Patient denied tobacco use    ROS:  The 10 point Review of Systems is negative other than noted in the HPI or here.     PHYSICAL EXAMINATION  Vital Signs:  B/P: 130/94,  T: 97.4,  P: Data Unavailable,  R: 18    General:  patient lying in bed without any acute distress    HEENT:  normocephalic/atraumatic  Cardio:  RRR  Pulmonary:  no respiratory distress  Abdomen:  soft  Extremities:  no edema  Skin:  intact, warm/dry     Neurologic  Mental Status:  Drowsy but opening eyes spontaneously. Oriented, following commands, no aphasia. Severe hemineglect noted.  Cranial Nerves:  PERRL, no blink to threat on the left. Eyes deviated to the right. Facial sensation intact and symmetric, " Left facial droop, hearing not formally tested but intact to conversation, moderate dysarthria noted  Motor:  Strength intact in RUE and RLE. No movement in LUE and LLE  Reflexes:  2+ on the right, hyporeflexic on the left, no clonus, toes downgoing  Sensory:  Diminished to light touch in LUE and LLE,   Coordination:  FNF and HS intact without dysmetria on the right  Station/Gait:  unable to test    Labs  Labs and Imaging reviewed and used in developing the plan; pertinent results included.     Lab Results   Component Value Date     (H) 02/17/2018     The patient was discussed with the fellow, Dr. Russell.  The staff is Dr. Chilel.    Reinier Shepherd MD   Neurology PGY2  Pager: [phone]

## 2018-02-17 NOTE — ED NOTES
Patient out to helicopter with this writer and [facility] without complication.  Warm blankets applied. Patient's mother, father, daughter, and several siblings present. All family members aware of patient status and will be traveling to [place] this evening. Phone number and address provided to family. Report called to Philipp at [facility]. Patient's belongings sent with patient's sister and mother.

## 2018-02-17 NOTE — ED PROVIDER NOTES
"eMERGENCY dEPARTMENT eNCOUnter        CHIEF COMPLAINT    Chief Complaint   Patient presents with     Fall     Extremity Weakness       HPI    Maggie Case is a 27 year old female who presents via EMS after she was found on the floor at a friend's house this morning.  History is obtained from EMS, her friend and to some extent the patient.    EMS reports she had been out drinking with friends last night and stopped at another friend's house.  His girlfriend was out of town she asked if she could \"crash there\" she fell asleep in an upstairs bedroom and that is the last time she was seen.  This morning the friend woke up to some thumping noises from upstairs.  He thought it was due to his ferret or his large.  He went upstairs and found the patient lying on the floor by the door.  He called 911.  When EMS arrived the patient was awake.  She was noted to have right eye deviation and was not moving her left side.  She told them she had gotten up to use the bathroom and tripped on a rug and fell to the floor.  She was noted to have bruising to her left cheek and left elbow.  She did not voluntarily use her left side.  The patient has had a difficult past few years with alleged significant drug and alcohol use.  Parents have not seen her in a year.  Her father is a retired [employer] .  She has a 7-year-old child that she has also not seen recently.  The child is in the care of her dad.  The patient had a pulmonary embolism about 18 months ago.  There was noncompliance with Lovenox and Xarelto so that was discontinued about a year ago.  The etiology of that is not immediately clear.    On arrival in the emergency department the patient is C-collared and backboarded.  She is awake with eyes open although has a right eye deviation.  Her gaze does occasionally go to midline but not past that.  Vitals are 130/100, temp 97 8, heart rate 95 normal sinus respiration 16 oxygen saturation 98%.  She complains of pain " in the back of her head.  She can state her name where she is, she is able to describe getting up and falling.  No respiratory compromise    The patient was made a level 2 trauma activation.    REVIEW OF SYSTEMS    Neurologic: Unknown LOC, No hearing loss  Cardiac: No Chest Pain, unknown syncope  Respiratory: No cough or difficulty breathing  GI: No Bloody Stool or Diarrhea  : No Dysuria or Hematuria  General: No Fever  All other systems reviewed and are negative.    PAST MEDICAL & SURGICAL HISTORY    Past Medical History:   Diagnosis Date     Anemia      Anxiety      Chronic diarrhea      Lactose intolerance      Myalgia      Pulmonary embolism (H) 05/06/16     Vitamin B12 deficiency      Past Surgical History:   Procedure Laterality Date     CLOSED REDUCTION, PERCUTANEOUS PINNING LOWER EXTREMITY, COMBINED Right 4/6/2016    Procedure: COMBINED CLOSED REDUCTION, PERCUTANEOUS PINNING LOWER EXTREMITY;  Surgeon: Dexter Jones MD;  Location: [facility]       CURRENT MEDICATIONS    Current Outpatient Rx   Medication Sig Dispense Refill     DiphenhydrAMINE HCl (BENADRYL PO) Take by mouth At Bedtime       promethazine (PHENERGAN) 25 MG tablet Take 1 tablet (25 mg) by mouth every 6 hours as needed for nausea 20 tablet 1     LORazepam (ATIVAN) 1 MG tablet Take 1 tablet (1 mg) by mouth every 8 hours as needed for anxiety 15 tablet 0     nicotine (NICODERM CQ) 14 MG/24HR 24 hr patch Place 1 patch onto the skin daily 14 patch 0       ALLERGIES    Allergies   Allergen Reactions     Amoxicillin Other (See Comments)     Headaches     Lactose      Levaquin [Levofloxacin] Swelling     Naproxen Other (See Comments)     Reaction: Headaches     Nickel      Tramadol      Sulindac Rash       SOCIAL & FAMILY HISTORY    Social History     Social History     Marital status: Single     Spouse name: N/A     Number of children: N/A     Years of education: N/A     Social History Main Topics     Smoking status: Current Every Day Smoker      Packs/day: 0.25     Years: 7.00     Types: Cigarettes     Smokeless tobacco: Never Used     Alcohol use 0.0 oz/week     0 Standard drinks or equivalent per week      Comment: 1-2/week ,      Drug use: Yes     Special: Marijuana      Comment: adderall, yest     Sexual activity: Yes     Partners: Male     Other Topics Concern     Not on file     Social History Narrative     No family history on file.    PHYSICAL EXAM    VITAL SIGNS: /93  Pulse 95  Temp 97.8  F (36.6  C) (Tympanic)  Resp 16  SpO2 95%   Primary survey  Airway is patent breath sounds present bilaterally  Circulation no abdominal pain no active bleeding disability  She has flexion of her left arm, withdrawal to pain of her left leg.  Right eye deviation  Exposure the patient is logrolled there is no deformity step-off or tenderness except some mild tenderness the upper thoracic, not over the vertebral column    Secondary survey:  Constitutional:  Well developed, well nourished,  Eyes: Pupils equally round and react to light, sclera nonicteric, does not cooperate with extraocular movement testing  HENT:  Atrauma she has a fresh bruise over her left cheek, no trismus opens mouth on command no dental malocclusion  Neck: supple, no JVD, noposterior neck tenderness  Respiratory:  Lungs Clear, no retractions   Cardiovascular: Regular rate, no murmurs  GI:  Soft, nontender, normal bowel sounds  Musculoskeletal:  No edema, bruise over her left elbow, flexed or posturing of the left arm left leg withdraws to pain she has clonus of her left ankle left toe is downgoing  Vascular: All pulses are 2+ equal bilaterally  Integument:  Well hydrated, no petechiae   Neurologic:      National Institutes of Health Stroke Scale  Exam Interval: Baseline   Score    Level of consciousness: (0)   Alert, keenly responsive    LOC questions: (0)   Answers both questions correctly    LOC commands: (2)   Performs neither task correctly    Best gaze: (2)   Forced deviation     Visual: (0)   No visual loss    Facial palsy: (1)   Minor paralysis (flat nasolabial fold, smile asymmetry)    Motor arm (left): (4)   No movement    Motor arm (right): (0)   No drift    Motor leg (left): (3)   No effort against gravity    Motor leg (right): (0)   No drift    Limb ataxia: (1)   Present in one limb    Sensory: (2)   Severe to total sensory loss    Best language: (0)   Normal- no aphasia    Dysarthria: (1)   Mild to moderate dysarthria    Extinction and inattention: (2)   Profound javi-inattention / extinction > one modality        Total Score:  18         EKG    QT prlongation at 610    RADIOLOGY  (read by the radiologist)  Results for orders placed or performed during the hospital encounter of 02/17/18   CT Head w/o Contrast    Narrative    EXAM:CT HEAD W/O CONTRAST     CLINICAL HISTORY: Patient Age  27 years Additional clinical info:  trauma;     COMPARISON: 6/18/2016      TECHNIQUE: Axial images acquired without contrast    CONTRAST: None    FINDINGS: Large area of low attenuation consistent with edema in the  right cerebral hemisphere, predominantly in the right middle cerebral  artery distribution but also extending into the posterior aspect of  the frontal lobe, anterior occipital lobe and the temporal lobe.  Increased density of the right middle cerebral artery. Findings are  consistent with right middle cerebral artery thrombosis. There is  effacement of the sulci and gyri of the right cerebral hemisphere.  Partial effacement of the right lateral ventricle. No midline shift.  There is also slightly increased density of the slice portions of the  left middle cerebral artery when compared with 6/18/2016 and the left  MCA is diminutive which could be due to vasospasm. No definite areas  of decreased density in the left cerebral hemisphere.    No fracture is identified. Paranasal sinusitis with fluid and membrane  thickening in all of the paranasal sinuses.           Impression    IMPRESSION:    1. Hyperdense right middle cerebral artery consistent with thrombosis  with a large acute appearing right MCA infarct. Right hemispheric  sulci with effacement but no midline shift and no parenchymal  hemorrhage identified.  2. Paranasal sinusitis.    Findings were discussed with Dr. Shanks at 1152 hours on 9/17/2018    JUSTICE LEPE MD   CT Cervical Spine w/o Contrast    Narrative    EXAM:CT CERVICAL SPINE W/O CONTRAST     CLINICAL HISTORY: Patient Age  27 years Additional clinical info:  trauma;     COMPARISON: CT cervical spine 6/18/2016      TECHNIQUE: Axial images acquired without contrast with coronal and  sagittal two-dimensional reformatted images    CONTRAST: None    FINDINGS: No appreciable change. No fracture nor dislocation  identified in the cervical spine. No appreciable soft tissue swelling  and no hematoma is identified. Severe arthritic changes at the C1-C2  articulation anteriorly resulting in bilateral ankylosis of the  occiput and the anterior aspects of C1 and C2. This could be on a  congenital basis or related to old trauma. Degenerative arthritis in  the C2-C3 facets on the left. Prominent left anterior endplate  osteophyte formation at C4-C5.           Impression    IMPRESSION:   1. No acute findings in the cervical spine.  2. 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 Contrast dose: isovue  300; 100 ml    FINDINGS:    CHEST: New irregular nodules identified in the posterior right lung  apex. Patchy nodular infiltrates in both lower lungs. Some of the  infiltrate was present in the right upper lobe on the prior exam that  is worse today  the infiltrates in the lung bases are improved today  compared with the abdomen CT of 4/29/2017. Improvement in the  bronchial wall thickening since 420 9/2/2017. Stable 1.3 cm area of  cavitation in the right lower lobe posteromedially. Prominent  bilateral hilar lymph nodes increased since 6/18/2016. Pulmonary  findings have an appearance most consistent with acute on chronic  inflammatory etiology and could be due to atypical infection. The  pulmonary arteries are well-opacified and there is no evidence of a  pulmonary embolism..    Pancreatico hepatobiliary: Tiny gallstone. Mild fatty infiltration  liver. The liver, gallbladder, bile ducts, and spleen otherwise appear  normal.    Genitourinary:  Benign cyst right kidney. Otherwise normal Appearance  of the adrenal glands, kidneys, ureters, and bladder. Uterus and  adnexa appear normal.    Gastrointestinal:  Stable low density thickening of the wall of the  stomach and of portions of the bowel including the cecum and lower  descending colon and rectum. Some of the thickening has an appearance  consistent with fatty infiltration, likely due to chronic  inflammation. There is no evidence of obstruction in the esophagus,  stomach, and bowel otherwise appear normal.    Lymph nodes:  No lymphadenopathy.    Vasculature:  Vasculature of the chest, abdomen, and pelvis appear  normal.    Musculoskeletal: No worrisome bone lesions are identified. No fracture  identified.      Impression    IMPRESSION:   1. Patchy nodular infiltrates in both lungs, slightly improved in the  lower lungs since previous abdomen CT 4/29/2017   2. Bilateral hilar adenopathy.  3. Fatty infiltration and wall thickening of the stomach and portions  of the large bowel, likely due to chronic inflammation.  4. Fatty infiltration of the liver.  5. Cholelithiasis.    JUSTICE LEPE MD   CT Maxillofacial w/o Contrast    Narrative    EXAM:CT MAXILLOFACIAL W/O CONTRAST     CLINICAL HISTORY: Patient Age  27  years Additional clinical info:  trauma;     COMPARISON: CT head and CT cervical spine 2/17/2018    TECHNIQUE: Axial images acquired without contrast with coronal and  sagittal two-dimensional reformatted images.    CONTRAST: None    FINDINGS: Please see report for CT head done today. Right hemispheric  infarct with hyperdense right MCA consistent with thrombosis.    Severe paranasal sinusitis with fluid and membrane thickening in all  the paranasal sinuses, most marked involving the maxillary sinuses.  Bilateral infundibula are obstructed. No associated bone destruction.  Ankylosis of the anterior occiput through C2, as demonstrated on CT  cervical spine.           Impression    IMPRESSION: Severe paranasal sinusitis.    JUSTICE LEPE MD   CTA Angiogram Head Neck    Narrative    EXAM:CTA ANGIOGRAM HEAD NECK     CLINICAL HISTORY: Patient Age  27 years Additional clinical info:  Trauma;     COMPARISON: Head CT without contrast 2/17/2018      TECHNIQUE: CT angiographic technique including angiographic and 5  minute delayed images    CONTRAST: isovue 370; 75 ml    FINDINGS: The early angiographic phase shows nonopacification of the  right middle cerebral artery consistent with thrombosis. The delayed  images do show some contrast in the left middle cerebral artery and  more distal MCA distribution which could be due to collateral flow.  Again seen is a acute hemispheric infarct of the right middle cerebral  artery distribution with a large area of low attenuation in the right  MCA distribution and associated sulcal effacement and effacement of  the right lateral ventricle. No midline shift. No hemorrhage  identified. No mass identified.    The left posterior cerebral circulation appears intact.    In the neck, there is a long segment of a filling defect on the medial  side of the right carotid bulb and right internal carotid artery which  results in at least a 50% diameter reduction. This is best seen on the  coronal  reformatted images, images #103 through 109. This involves a  segment measuring approximately 2 cm in length.    The bilateral common carotid arteries, vertebral arteries, left  internal and external carotid arteries, and right external carotid  artery are patent. No arterial beading identified. No aneurysm  identified.    Marked paranasal sinusitis. Ankylosis of the occiput through C2.           Impression    IMPRESSION:   1. Occluded right middle cerebral artery at its origin consistent with  thrombosis.  2. At least 50% diameter reduction of the right carotid bulb and 2 cm  of the proximal right internal carotid artery caused by thrombosis or  large irregular plaque.  3. Large acute appearing right middle cerebral artery infarct, as  described on the unenhanced CT.   4. Other incidental findings as described.    Findings were discussed with Dr. Shanks at 1240 hours on 2/17/2018    JUSTICE LEPE MD         ED COURSE & MEDICAL DECISION MAKING    Pertinent Labs & Imaging studies reviewed and interpreted. (See chart for details)    See chart for details of medications given during the ED stay.    Vitals:    02/17/18 1115 02/17/18 1137 02/17/18 1138   BP: 130/100 125/93    Pulse: 95     Resp: 16     Temp: 97.8  F (36.6  C)     TempSrc: Tympanic     SpO2: 98%  95%           FINAL IMPRESSION    1. Cerebrovascular accident (CVA) due to occlusion of right middle cerebral artery (H)    2. Hypokalemia        PLAN  Our initial concern with this patient with evidence of fall and bruising was that she had a traumatic head bleed.  CT returned positive for findings of ischemic MCA infarct.  CT chest abdomen pelvis and C-spine were negative.  Given that finding I contacted Dr. Martines of the [facility] stroke service.  We did not have a last known well time beyond the patient is going to bed last night.  There was also evidence of an extensive infarct that was already well established and we felt TPA not indicated.   The patient has findings of cerebral edema, is obviously a very young age and etiology of stroke is unclear.  Transfer via air medical due to the patient's unstable condition was recommended aeromedical was activated and while waiting for their arrival she was sent for a CT angiogram.  This confirmed right MCA occlusion.  She also had plaque at the carotid bulb.\  The patient can continue to maintain her airway normally.  She had a low potassium which she has had before and this was replaced.  Law enforcement had contacted her family who is here and met with her.        (Please note that this note was completed with a voice recognition program.  Every attempt was made to edit the dictations, but inevitably there remain words that are mis-transcribed.)       Estephania Shanks MD  02/17/18 [id number]

## 2018-02-17 NOTE — LETTER
"Transition Communication Hand-off for Care Transitions to Next Level of Care Provider    Name: Maggie Case  MRN #: [id number]  Primary Care Provider: Chapo Flores     Primary Clinic: [facility] [address] [place] [id number]     Reason for Hospitalization:  Acute ischemic right MCA stroke (H) [I63.511]  Acute ischemic stroke (H) [I63.9]  Admit Date/Time: 2/17/2018  2:33 PM  Discharge Date: 3/1/18  Payor Source: Pay[place]: [place] / [place] / Product Type: Medicaid /              Reason for Communication Hand-off Referral:     Discharge Plan:     Social Work Services Discharge Note     Patient Name:  Maggie Case     Anticipated Discharge Date:  3/1/18     Discharge Disposition:   [facility] ([phone])     Following MD:  Facility Assignment     Pre-Admission Screening (PAS) online form has been completed.  The Level of Care (LOC) is:  Determined  Confirmation Code is:  Not required as pt is being admitted to ARU.        Additional Services/Equipment Arranged:  Dr. Crawley has confirmed readiness for discharge.  Admissions (Miriam and Yadira) have confirmed acceptance for admit to [facility] and receipt of insurance authorization.   has arranged for [employer] ([place] [phone]) to provide stretcher transport at 1pm.   has completed a \"Physicians Certification for Transportation\" form.       Patient / Family response to discharge plan:  Pt and mother voice understanding of the discharge plan and agreement with the discharge plan.     Persons notified of above discharge plan:  Pt, mother, Dr. Crawley and 6A nursing.     Staff Discharge Instructions:  Please fax discharge orders and signed hard scripts for any controlled substances (SW will complete this task).  Please print a packet and send with patient.      CTS Handoff completed:  YES     Medicare Notice of Rights provided to the patient/family:  NO, as per Admissions Facesheet, pt is " not on medicare.        RE Butler  Social Work, 6A  Phone:  [phone]  Pager:  [phone]  3/1/2018

## 2018-02-17 NOTE — CONSULTS
"[facility]    NEUROSURGERY CONSULTATION NOTE    This consultation was requested by Dr. Chilel from the Neurocritical Care service.    Reason for Consultation: Right MCA stroke    HPI: Maggie Case is a 27 year old female with a history of  anxiety, anemia, alcoholism and a pulmonary embolism (2015 Discontinued anti-coagulation 1/2017 after inconsistent use and lack of persistent thrombosis seen on CT). She presents to the [facility] ICU as a direct transfer from [facility] after sustaining a right middle cerebral artery stroke. She was found down this morning by her friends after \"crashing\" at their house last night after a night of drinking. CT/CTA demonstrates a large right MCA stroke.     PAST MEDICAL HISTORY:   Past Medical History:   Diagnosis Date     Anemia      Anxiety      Chronic diarrhea      Lactose intolerance      Myalgia      Pulmonary embolism (H) 05/06/16     Vitamin B12 deficiency      PAST SURGICAL HISTORY:   Past Surgical History:   Procedure Laterality Date     CLOSED REDUCTION, PERCUTANEOUS PINNING LOWER EXTREMITY, COMBINED Right 4/6/2016    Procedure: COMBINED CLOSED REDUCTION, PERCUTANEOUS PINNING LOWER EXTREMITY;  Surgeon: Dexter Jones MD;  Location: [facility]     FAMILY HISTORY: No family history on file.    SOCIAL HISTORY:   Social History   Substance Use Topics     Smoking status: Current Every Day Smoker     Packs/day: 0.25     Years: 7.00     Types: Cigarettes     Smokeless tobacco: Never Used     Alcohol use 0.0 oz/week     0 Standard drinks or equivalent per week      Comment: 1-2/week ,      Allergies:  Allergies   Allergen Reactions     Amoxicillin Other (See Comments)     Headaches     Lactose      Levaquin [Levofloxacin] Swelling     Naproxen Other (See Comments)     Reaction: Headaches     Nickel      Tramadol      Sulindac Rash     PE:  Blood pressure (!) 131/95, temperature 97.6  F (36.4  C), temperature source Oral, resp. rate 18, weight " 65.8 kg (145 lb 1 oz), SpO2 99 %.    NEUROLOGIC:  -- Drowsy but will open eyes and answer questions;oriented x 3  -- Follows commands on the right hemibody.   -- Speech dysarthric but not aphasic.  -- Left hemineglect.  Cranial Nerves:  -- PERRL 3-2mm bilat and brisk, extraocular movements intact  -- Left facial droop.   -- hearing grossly intact bilat    Motor:  Normal bulk / tone     Delt Bi Tri FE IP Quad Hamst TibAnt EHL Gastroc    C5 C6 C7 C8/T1 L2 L3 L4-S1 L4 L5 S1   R 5 5 5 5 5 5 5 5 5 5   L 0 0 0 0 0 0 0 0 0 0     Sensory:  No sensation to light touch on left hemibody.     ASSESSMENT: 27 year old female with an acute stroke with concern for possible impending malignant right MCA syndrome.     The following conditions are also present, complicating the patient's current management, as described in the Assessment and Plan:   brain compression and stroke, further characterized by CT findings    RECOMMENDATIONS:  - No neurosurgical intervention indicated at this time   - Requires ICU level observation at this time. Frequent neurochecks  - Repeat head CT   - Avoid sedating medications that would alter a neurological examination  - NPO  - Needs central venous access    The patient was discussed with the neurosurgery chief resident and Dr. Walden, neurosurgery attending, who agrees with the above outlined plan.    Contact the neurosurgery resident on call with questions.    Dial * * *[phone]: Enter [phone] when prompted.   Awais Torres MD, PhD  Neurosurgery PGY-3

## 2018-02-17 NOTE — IP AVS SNAPSHOT
"    UNIT [address]A [facility] BANK: [phone]                                              INTERAGENCY TRANSFER FORM - LAB / IMAGING / EKG / EMG RESULTS   2018                    Hospital Admission Date: 2018  NADIR GARCIA   : 1990  Sex: Female        Attending Provider: Jose Amaya MD     Allergies:  Amoxicillin, Levaquin [Levofloxacin], Naproxen, Nickel, Tramadol, Sulindac    Infection:  None   Service:  NEURO ICU    Ht:  1.626 m (5' 4\")   Wt:  68.6 kg (151 lb 3.8 oz)   Admission Wt:  65.8 kg (145 lb 1 oz)    BMI:  25.96 kg/m 2   BSA:  1.76 m 2            Patient PCP Information     Provider PCP Type    Chapo Flores MD General         Lab Results - 3 Days      INR [[id number]]  Resulted: 18 1119, Result status: Final result    Ordering provider: Jose Amaya MD  18 0922 Resulting lab: [facility]    Specimen Information    Type Source Collected On   Blood  18 1051          Components       Value Reference Range Flag Lab   INR 0.96 0.86 - 1.14  51            CBC with platelets [[id number]] (Abnormal)  Resulted: 18 0830, Result status: Final result    Ordering provider: Yeison Crawley MD  18 0724 Resulting lab: [facility]    Specimen Information    Type Source Collected On   Blood  18 0805          Components       Value Reference Range Flag Lab   WBC 16.2 4.0 - 11.0 10e9/L H 51   RBC Count 3.09 3.8 - 5.2 10e12/L L 51   Hemoglobin 9.0 11.7 - 15.7 g/dL L 51   Hematocrit 29.1 35.0 - 47.0 % L 51   MCV 94 78 - 100 fl  51   MCH 29.1 26.5 - 33.0 pg  51   MCHC 30.9 31.5 - 36.5 g/dL L 51   RDW 16.9 10.0 - 15.0 % H 51   Platelet Count 672 150 - 450 10e9/L H 51            Heparin Xa (10a) Level [[id number]]  Resulted: 18 0720, Result status: Final result    Ordering provider: Jose Amaya MD  18 0000 Resulting lab: [facility] [place]    Specimen Information    Type Source Collected On   Blood  18 0626          Components       Value Reference Range Flag Lab   Heparin 10A Level 0.18 IU/mL  51   Comment:         Therapeutic Range:    UFH:   0.15-0.35 IU/mL for low             intensity dosing           0.30-0.70 IU/mL for high             intensity dosing for             DVT and PE    Enoxaparin: If administered             once daily with dose             1.5mg/k.0-2.0 IU/mL                If administered             twice daily with dose             1mg/k.60-1.0 IU/mL              Heparin Xa (10a) Level [[id number]]  Resulted: 18 1650, Result status: Final result    Ordering provider: Jose Amaya MD  18 1043 Resulting lab: [facility]    Specimen Information    Type Source Collected On   Blood  18 1611          Components       Value Reference Range Flag Lab   Heparin 10A Level 0.16 IU/mL  51   Comment:         Therapeutic Range:    UFH:   0.15-0.35 IU/mL for low             intensity dosing           0.30-0.70 IU/mL for high             intensity dosing for             DVT and PE    Enoxaparin: If administered             once daily with dose             1.5mg/k.0-2.0 IU/mL                If administered             twice daily with dose             1mg/k.60-1.0 IU/mL              Histoplasma Capsulatum Agn Non Blood [[id number]]  Resulted: 18 1031, Result status: Final result    Ordering provider: Reinier Shepherd MD  18 1826 Resulting lab: [facility]    Specimen Information    Type Source Collected On   Urine  18 1354          Components       Value Reference Range Flag Lab   Lab Scanned Result HISTOPLASMA AGN NONBLD-Scanned               Blastomyces Agn Quant EIA Non Blood [[id number]]  Resulted: 18 1030, Result status: Final result    Ordering provider: Reinier Shepherd MD  1833 Resulting lab: [facility]     Specimen Information    Type Source Collected On   Urine  02/23/18 1354          Components       Value Reference Range Flag Lab   Lab Scanned Result BLASTOMYCES AG NON BL-Scanned               CBC with platelets [[id number]] (Abnormal)  Resulted: 02/28/18 0854, Result status: Final result    Ordering provider: Jud Carrasco APRN CNP  02/28/18 0818 Resulting lab: [facility]    Specimen Information    Type Source Collected On   Blood  02/28/18 0828          Components       Value Reference Range Flag Lab   WBC 13.4 4.0 - 11.0 10e9/L H 51   RBC Count 2.97 3.8 - 5.2 10e12/L L 51   Hemoglobin 8.7 11.7 - 15.7 g/dL L 51   Hematocrit 28.2 35.0 - 47.0 % L 51   MCV 95 78 - 100 fl  51   MCH 29.3 26.5 - 33.0 pg  51   MCHC 30.9 31.5 - 36.5 g/dL L 51   RDW 17.0 10.0 - 15.0 % H 51   Platelet Count 562 150 - 450 10e9/L H 51            Glucose by meter [[id number]] (Abnormal)  Resulted: 02/28/18 0813, Result status: Final result    Ordering provider: Estephania Shanks MD  02/17/18 1103 Resulting lab: POINT OF CARE TEST, GLUCOSE    Specimen Information    Type Source Collected On     02/17/18 1103          Components       Value Reference Range Flag Lab   Glucose 150 70 - 99 mg/dL H 170            Factor 2 and 5 mutation analysis [[id number]]  Resulted: 02/27/18 1534, Result status: Final result    Ordering provider: Ramona Shepherd MD  02/19/18 1437 Resulting lab: [facility]    Specimen Information    Type Source Collected On   Blood  02/19/18 1722          Components       Value Reference Range Flag Lab   Copath Report --      Result:         Patient Name: NADIR GARCIA  MR#: [id number]  Specimen #: [id number]  Collected: 2/19/2018 17:22  Received: 2/20/2018 09:07  Reported: 2/27/2018 15:32  Ordering Phy(s): RAMONA SHEPHERD  [facility](s): [facility]    For improved result formatting, select 'View Enhanced Report  Format' under   Linked Documents section.  _________________________________________    TEST(S) REQUESTED:  Factor 5 Leiden and Factor 2 by PCR    SPECIMEN DESCRIPTION:  Blood    METHODOLOGY:   The regions of genomic DNA containing the L1864A Factor 5   gene mutation (Factor V Leiden) and  the Factor 2(Prothrombin X31189R) gene mutation were simultaneously   amplified using the polymerase chain  reaction.  The amplified products were digested with restriction   endonuclease TaqI and products were analyzed  by gel electrophoresis.    RESULTS:    Factor V 1691G>A (Leiden)  RESULTS:  Mutation analyzed:     1691G>A  Factor V 1691G>A (Leiden)  Interpretatio n:      ABSENT  Factor V 1691G>A (Leiden) mutation  genotype:      G/G    FACTOR 2/PROTHROMBIN RESULTS:  Mutation analyzed:     91995Y>A  Factor 2 Mutation Interpretation:      ABSENT  Factor 2 Mutation genotype:      G/G    INTERPRETATION:  The patient is negative for the Factor V 1691G>A (Leiden) and negative for   the Factor 2 mutation.    COMMENTS:  If a patient is the recipient of an allogeneic bone marrow transplant,   this test must be done on a  pre-transplant sample or buccal swab.  A previous allogeneic bone marrow   transplant will interfere with test  results.  Call the [facility]([phone]) for   instructions on sample collection for these  patients.    This test was developed and its performance characteristics determined by   the [facility],  [facility]. It has not been cleared or   approved by the FDA. The laboratory is  regulated under CLIA as qualified to perform high-complexity testing. This   test  is used for clinical purposes.  It should not be regarded as investigational or for research.    A resident/fellow in an accredited training program was involved in the   selection of testing, review of  laboratory data, and/or interpretation of this case.  I, as the senior   physician,  attest that I: (i) confirmed  appropriate testing, (ii) examined the relevant raw data for the   specimen(s); and (iii) rendered or confirmed  the interpretation(s).    Electronically Signed Out By:  Reji Skinner MD    CPT Codes:  A: 17345-D7RRRG, 48754-G3YKKQ, -OCHTXM(2)    TESTING LAB LOCATION:  [address] [address] [phone]    COLLECTION SITE:  Client:  [facility]  Location:  [facility] (B)              Creatinine [[id number]] (Abnormal)  Resulted: 02/27/18 0829, Result status: Final result    Ordering provider: Jose Amaya MD  02/26/18 2200 Resulting lab: [facility]    Specimen Information    Type Source Collected On   Blood  02/27/18 0743          Components       Value Reference Range Flag Lab   Creatinine 0.42 0.52 - 1.04 mg/dL L 51   GFR Estimate >90 >60 mL/min/1.7m2  51   Comment:  Non  GFR Calc   GFR Estimate If Black >90 >60 mL/min/1.7m2  51   Comment:   GFR Calc            CBC with platelets [[id number]] (Abnormal)  Resulted: 02/27/18 0805, Result status: Final result    Ordering provider: Jose Amaya MD  02/27/18 0743 Resulting lab: [facility]    Specimen Information    Type Source Collected On     02/27/18 0743          Components       Value Reference Range Flag Lab   WBC 14.6 4.0 - 11.0 10e9/L H 51   RBC Count 3.04 3.8 - 5.2 10e12/L L 51   Hemoglobin 8.8 11.7 - 15.7 g/dL L 51   Hematocrit 28.5 35.0 - 47.0 % L 51   MCV 94 78 - 100 fl  51   MCH 28.9 26.5 - 33.0 pg  51   MCHC 30.9 31.5 - 36.5 g/dL L 51   RDW 17.6 10.0 - 15.0 % H 51   Platelet Count 566 150 - 450 10e9/L H 51            Bone Culture Aerobic Bacterial [[id number]]  Resulted: 02/27/18 0702, Result status: Final result    Ordering provider: Emeterio Mesa MD  02/18/18 6793  Resulting lab: [facility]    Specimen Information    Type Source Collected On   Bone Head 02/18/18 2104          Components       Value Reference Range Flag Lab   Specimen Description Bone RIGHT SKULL FLAP      Culture Micro No growth   225            Next Generation Sequencing Oncology: Myeloproliferative Neoplasm Panel [[id number]]  Resulted: 02/26/18 1314, Result status: In process    Ordering provider: Chintan Patrick MD  02/21/18 1803 Resulting lab: [facility]    Specimen Information    Type Source Collected On   Blood  02/23/18 1639            CBC with platelets differential [[id number]] (Abnormal)  Resulted: 02/26/18 1137, Result status: Final result    Ordering provider: Rebekah Russell MD  02/26/18 0000 Resulting lab: [facility]    Specimen Information    Type Source Collected On   Blood  02/26/18 1037          Components       Value Reference Range Flag Lab   WBC 20.6 4.0 - 11.0 10e9/L H 51   RBC Count 3.09 3.8 - 5.2 10e12/L L 51   Hemoglobin 9.1 11.7 - 15.7 g/dL L 51   Hematocrit 28.8 35.0 - 47.0 % L 51   MCV 93 78 - 100 fl  51   MCH 29.4 26.5 - 33.0 pg  51   MCHC 31.6 31.5 - 36.5 g/dL  51   RDW 17.8 10.0 - 15.0 % H 51   Platelet Count 495 150 - 450 10e9/L H 51   Diff Method Manual Differential   51   % Neutrophils 83.5 %  51   % Lymphocytes 8.7 %  51   % Monocytes 2.6 %  51   % Eosinophils 4.3 %  51   % Basophils 0.0 %  51   % Metamyelocytes 0.9 %  51   Absolute Neutrophil 17.2 1.6 - 8.3 10e9/L H 51   Absolute Lymphocytes 1.8 0.8 - 5.3 10e9/L  51   Absolute Monocytes 0.5 0.0 - 1.3 10e9/L  51   Absolute Eosinophils 0.9 0.0 - 0.7 10e9/L H 51   Absolute Basophils 0.0 0.0 - 0.2 10e9/L  51   Absolute Metamyelocytes 0.2 0 10e9/L H 51   Anisocytosis Slight   51   Polychromasia Slight   51   Microcytes Present   51   Platelet Estimate Confirming automated cell count   51            CRP inflammation [[id number]] (Abnormal)  Resulted: 02/26/18 1108,  Result status: Final result    Ordering provider: Jose Amaya MD  02/26/18 0000 Resulting lab: [facility]    Specimen Information    Type Source Collected On   Blood  02/26/18 1037          Components       Value Reference Range Flag Lab   CRP Inflammation 140.0 0.0 - 8.0 mg/L H 51            Basic metabolic panel [[id number]] (Abnormal)  Resulted: 02/26/18 1108, Result status: Final result    Ordering provider: Rebekah Russell MD  02/26/18 0000 Resulting lab: [facility]    Specimen Information    Type Source Collected On   Blood  02/26/18 1037          Components       Value Reference Range Flag Lab   Sodium 140 133 - 144 mmol/L  51   Potassium 4.0 3.4 - 5.3 mmol/L  51   Chloride 107 94 - 109 mmol/L  51   Carbon Dioxide 24 20 - 32 mmol/L  51   Anion Gap 9 3 - 14 mmol/L  51   Glucose 85 70 - 99 mg/dL  51   Urea Nitrogen 9 7 - 30 mg/dL  51   Creatinine 0.35 0.52 - 1.04 mg/dL L 51   GFR Estimate >90 >60 mL/min/1.7m2  51   Comment:  Non  GFR Calc   GFR Estimate If Black >90 >60 mL/min/1.7m2  51   Comment:  African American GFR Calc   Calcium 8.3 8.5 - 10.1 mg/dL L 51            HCG quantitative pregnancy [[id number]]  Resulted: 02/26/18 1108, Result status: Final result    Ordering provider: Jose Amaya MD  02/26/18 1037 Resulting lab: [facility]    Specimen Information    Type Source Collected On     02/26/18 1037          Components       Value Reference Range Flag Lab   HCG Quantitative Serum <1 0 - 5 IU/L  51            Lactic acid level STAT for sepsis protocol [[id number]]  Resulted: 02/26/18 1053, Result status: Final result    Ordering provider: Jose Amaya MD  02/26/18 1002 Resulting lab: [facility]    Specimen Information    Type Source Collected On   Blood  02/26/18 1037          Components       Value Reference Range  Flag Lab   Lactate for Sepsis Protocol 0.6 0.4 - 1.9 mmol/L  51            HCG quantitative pregnancy [[id number]]  Resulted: 02/26/18 1045, Result status: In process    Ordering provider: Jorden Khan PA-C  02/26/18 1003 Resulting lab: [facility]    Specimen Information    Type Source Collected On   Blood  02/26/18 1037            Beta 2 Glycoprotein 1 Antibody IgG [[id number]]  Resulted: 02/26/18 1019, Result status: Final result    Ordering provider: Reinier Shepherd MD  02/19/18 1437 Resulting lab: [facility]    Specimen Information    Type Source Collected On   Blood  02/19/18 1722          Components       Value Reference Range Flag Lab   Beta 2 Glycoprotein 1 Antibody IgG <0.6 <7 U/mL  51   Comment:  Negative            Beta 2 Glycoprotein 1 Antibody IgM [[id number]]  Resulted: 02/26/18 1019, Result status: Final result    Ordering provider: Reinier Shepherd MD  02/19/18 1437 Resulting lab: [facility]    Specimen Information    Type Source Collected On   Blood  02/19/18 1722          Components       Value Reference Range Flag Lab   Beta 2 Glycoprotein 1 Antibody IgM <0.9 <7 U/mL  51   Comment:  Negative            Testing Performed By     Lab - Abbreviation Name Director Address Valid Date Range    45 - HOQ276 [facility] Unknown Unknown 01/28/02 0000 - Present    51 - Unknown [facility] Unknown [address] [address] 12/31/14 1010 - Present    88 - Unknown [facility] Unknown Unknown 10/30/02 0000 - Present    170 - Unknown POINT OF CARE TEST, GLUCOSE Unknown Unknown 10/31/11 1114 - Present    225 - Unknown [facility] Unknown [address] [facility] [address] 12/19/14 0954 - Present            Unresulted Labs (24h ago through future)    Start       Ordered    03/02/18 0600  INR  (warfarin (COUMADIN) Pharmacy Consult-INITIAL ORDER)  DAILY,    "Routine      03/01/18 0917    02/27/18 0400  Creatinine  (Pharmacological Prophylaxis - enoxaparin (LOVENOX) *Use only if creatinine clearance is greater than 30 mL/min)  EVERY THREE DAYS,   Routine     Comments:  Last Lab Result: Creatinine (mg/dL)       Date                     Value                 02/21/2018               0.37 (L)         ----------    02/24/18 2220 02/27/18 0400  Platelet count  (Pharmacological Prophylaxis - enoxaparin (LOVENOX) *Use only if creatinine clearance is greater than 30 mL/min)  EVERY THREE DAYS,   Routine     Comments:  If no result is listed, this lab has not been done the past 365 days. LATEST LAB RESULT: Platelet Count (10e9/L)       Date                     Value                 02/22/2018               294              ----------    02/24/18 2220 02/26/18 0600  CRP inflammation  EVERY MONDAY,   Routine     Comments:  Every Monday while on enteral tube feeding.    02/24/18 2220    Unscheduled  Magnesium  (Magnesium Replacement -  Adult - \"Standard\" - Replacement for all levels less than 1.6 mg/dL )  CONDITIONAL (SPECIFY),   Routine     Comments:  Obtain Magnesium Level for these conditions:  *IF no magnesium result within 24 hrs before initiation of order set, draw magnesium level with next lab collect.    *2 HOURS AFTER last magnesium replacement dose when magnesium replacement given for level less than 1.6   *Next morning after magnesium dose.     Repeat Magnesium Replacement if necessary.    02/24/18 2220    Unscheduled  Phosphorus  (POTASSIUM Phosphate - \"Standard\" - Replacement for levels less than or equal to 2.4 mg/dL )  CONDITIONAL (SPECIFY),   Routine     Comments:  Obtain Phosphorus Level for these conditions:  *IF no phosphorus result within 24 hrs before initiation of order set, draw phosphorus level with next lab collect.    *2 HOURS AFTER last phosphorus replacement dose for levels less than 2.0.  *Next morning after phosphorus dose.     Repeat Phosphorus " "Replacement if necessary.    02/24/18 2220    Unscheduled  Potassium  (Potassium Replacement - \"Standard\" - For K levels less than 3.4 mmol/L - UU,UR,UA,RH,SH,PH,WY )  CONDITIONAL (SPECIFY),   Routine     Comments:  Obtain Potassium Level for these conditions:  *IF no potassium result within 24 hours before initiation of order set, draw potassium level with next lab collect.    *2 HOURS AFTER last IV potassium replacement dose and 4 hours after an oral replacement dose.  *Next morning after potassium dose.     Repeat Potassium Replacement if necessary.    02/24/18 2220         Imaging Results - 3 Days      CT Head w/o Contrast [[id number]]  Resulted: 03/01/18 0742, Result status: Final result    Ordering provider: Reinier Shepherd MD  03/01/18 0008 Resulted by: Awais Fontenot MD Rivard, Marcel Helorian, MD    Performed: 03/01/18 0542 - 03/01/18 0554 Resulting lab: RADIOLOGY RESULTS    Narrative:       CT HEAD W/O CONTRAST 3/1/2018 5:54 AM    Provided History: Follow-up large infarct.    Comparison: MRI from 2/26/2018, CT 2/28/2018, 2/22/2018..    Technique: Using multidetector thin collimation helical acquisition  technique, axial, coronal and sagittal CT images from the skull base  to the vertex were obtained without intravenous contrast.     Findings:    There are postsurgical changes of right hemicraniectomy.  Redemonstration of constellation of findings secondary to the known  right MCA and LISSETTE infarcts. Stable severe cerebral edema throughout  the right frontal, parietal, and temporal lobes. The edematous right  cerebral parenchyma continues to herniate through the craniectomy  defect. No significant change in the small volume of extra-axial  hemorrhage underlying dura as well as the small to moderate amount of  subarachnoid hemorrhage layering within the sulci of the right frontal  lobe. There is currently no midline shift. Stable effacement of the  right lateral ventricle. Stable trace " scattered pneumocephalus. The  left cerebral hemisphere is within normal limits. No acute loss of  gray-white differentiation on the left. Basilar cisterns are patent.  Posterior fossa is within normal limits.    Stable scattered paranasal sinus mucosal thickening. Mastoid air cells  are clear.       Impression:       Impression:   1. No significant change in the severe cerebral edema associated with  the large distribution right MCA and LISSETTE infarcts.  2. Stable associated subdural and subarachnoid hemorrhage underlying  the craniectomy site.    I have personally reviewed the examination and initial interpretation  and I agree with the findings.    AWAIS FONTENOT MD      CT Head w/o Contrast [[id number]]  Resulted: 02/28/18 0748, Result status: Final result    Ordering provider: Saroj Russell MD  02/28/18 0008 Resulted by: Awais Fontenot MD Rivard, Marcel Helorian, MD    Performed: 02/28/18 0608 - 02/28/18 0622 Resulting lab: RADIOLOGY RESULTS    Narrative:       CT HEAD W/O CONTRAST 2/28/2018 6:22 AM    Provided History: Rule out hemorrhagic transformation;     Comparison: MRI from 2/26/2018, CT 2/22/2018..    Technique: Using multidetector thin collimation helical acquisition  technique, axial, coronal and sagittal CT images from the skull base  to the vertex were obtained without intravenous contrast.     Findings:    There are postsurgical changes of right hemicraniectomy. There is  severe cerebral edema throughout the right frontal, parietal, and  temporal lobes secondary to the known large right MCA infarct. Small  amount of extra-axial hemorrhage underlying dura. Additionally, there  is a small amount of subarachnoid hemorrhage layering within the sulci  of the right frontal lobe. There has been interval resolution of the  midline shift. There is persistent effacement of the right lateral  ventricle. Resolution of the right subfalcine herniation. The left  cerebral hemisphere is within normal limits. No  acute loss of  gray-white differentiation on the left. Basilar cisterns are patent.  Posterior fossa is within normal limits.    Right maxillary sinus, right sphenoid sinus, right frontal sinus, and  ethmoid air cell mucosal thickening. Mastoid air cells and left  frontal sinuses are clear.       Impression:       Impression:   1. Continued evolution of the large right cerebral hemisphere  infarction. There is a small amount of extra-axial hemorrhage  underlying the craniectomy site, which is not clearly demonstrated on  the prior examination.  2. Resolution in the right to left midline shift and subfalcine  herniation.  3. Improvement in the right lateral ventricular effacement.    I have personally reviewed the examination and initial interpretation  and I agree with the findings.    AWAIS FONTENOT MD      MR Brain w/o & w Contrast [[id number]]  Resulted: 02/26/18 1542, Result status: Final result    Ordering provider: Awais Torres MD  02/26/18 0553 Resulted by: wAais Fontenot MD Robinson, John Michael, MD    Performed: 02/26/18 1344 - 02/26/18 1418 Resulting lab: RADIOLOGY RESULTS    Narrative:       Brain MRI without and with contrast    History: Febrile with leukocytosis 1 week following hemicrani; .    Comparison: CT 2/22/2018    Technique: Axial FLAIR,  T1-weighted, and susceptibility images were  obtained without intravenous contrast. Following intravenous  gadolinium-based contrast administration, axial T2-weighted,  diffusion, FLAIR, and axial and coronal T1-weighted images were  obtained.     Dose: 7.5 ml Gadavist injected    Findings:   Postsurgical changes of right hemicraniectomy. Redemonstration of  large right MCA infarct with restricted diffusion in the right  frontal, parietal, and temporal lobes, with associated moderate  cortical, subcortical, and intravascular enhancement. Notably, there  is no leptomeningeal enhancement in other areas of the brain away from  the infarct.    There is  restricted diffusion in the splenic corpus callosum,  presumably a reversible splenial lesion.    No intra or extra-axial fluid collections. Minimal leftward midline  shift. Mucosal thickening in the right maxillary sinus. Mucosal  thickening of the ethmoid air cells and sphenoid sinus. Effusion in  the right mastoid air cells.      Impression:       Impression:  Expected evolution of large right cerebral hemisphere infarct, with  extensive enhancement in the infarcted territory, with decreased mass  effect, midline shift, and uncal herniation. Although cerebritis could  have a similar appearance, this is felt most likely related to infarct  rather than infection.    I have personally reviewed the examination and initial interpretation  and I agree with the findings.    LACY LERMA MD      IR Gastrostomy Tube Percutaneous Plcmnt [[id number]]  Resulted: 02/26/18 1112, Result status: Final result    Ordering provider: Jorden Khan PA-C  02/23/18 0756 Resulted by: Ori Vega MD Hakkola, Jacob M, MD    Performed: 02/26/18 0815 - 02/26/18 0917 Resulting lab: RADIOLOGY RESULTS    Narrative:       Procedures 2/26/2018:  Image guided gastrostomy tube placement.    History: Stroke, dysphasia. Percutaneous gastrostomy tube requested.    Comparison: CT abdomen pelvis 2/17/2018    Staff: Ori Vega MD    Resident: Richmond Kapoor MD    Medications:   1. 100 mcg Fentanyl  2. 2 mg Versed  3. 1 mg Glucagon    Moderate sedation administered by the IR nurse at the supervision of  the attending. Vital signs and oxygenation continuously monitored. The  patient remained stable throughout the procedure.    Sedation time: 30 minutes    Fluoroscopy time: 2.9 minutes    Contrast: No IV contrast was given for this procedure.    Findings/procedure:     Prior to the procedure, both verbal and written informed consent  obtained from the patient. Patient placed supine on the fluoroscopy  table. Nasogastric tube  placed by IR nurse.     The left upper quadrant prepped and draped. Time out performed.  Limited abdominal ultrasound performed to evaluate the liver margins.  1% lidocaine was used for local anesthesia. Stomach inflated with air  through the nasogastric tube. Gastropexy performed with two T-tacs.  Gastrostomy made between the T-tacs with a needle. Needle removed over  guidewire. Tract dilated with the telescopic dilator and 22F peel-away  sheath advanced over guidewire. 18F gastrostomy tube placed through  the peel-away sheath over the guidewire into the stomach. Gastrostomy  balloon inflated. Position documented with contrast. Guidewire  removed. T-tacs and gastrostomy tube secured. Sterile dressing  applied. Gastrostomy tube to gravity drainage. No immediate  complication.      Impression:       Impression:  1. Uncomplicated image guided placement of percutaneous gastrostomy  tube.     Plan:  Nothing by mouth and gravity drainage for 4 hours. T-tac removal in 10  days.    I, MYLES SIMEON MD, attest that I was present in the procedure room  for the entire procedure.         I have personally reviewed the examination and initial interpretation  and I agree with the findings.    MYLES SIMEON MD      Testing Performed By     Lab - Abbreviation Name Director Address Valid Date Range    104 - Rad Rslts RADIOLOGY RESULTS Unknown Unknown 02/16/05 1553 - Present               ECG/EMG Results      Echo Complete Bubble [[id number]]  Resulted: 02/18/18 0924, Result status: Edited Result - FINAL    Ordering provider: Ramona Shepherd MD  02/17/18 1535 Resulted by: Ab Salinas MD    Performed: 02/18/18 1011 - 02/18/18 1011 Resulting lab: RADIOLOGY RESULTS    Narrative:       [id number]  [facility]  [id number]  [id number]^JOSEPH^RAMONA^VIPIN           [facility],[place]  Echocardiography Laboratory  [address] [place] [id number]     Name: NADIR GARCIA  MRN:  [id number]  : 1990  Study Date: 2018 09:24 AM  Age: 27 yrs  Gender: Female  Patient Location: [facility]  Reason For Study: Cerebrovascular Incident  Ordering Physician: ARMONA RUIZ  Performed By: Isabelle Paulino RDCS     BSA: 1.7 m2  Height: 64 in  Weight: 145 lb  BP: 130/94 mmHg  _____________________________________________________________________________  __        Procedure  Bubble Echocardiogram with two-dimensional, color and spectral Doppler  performed.  _____________________________________________________________________________  __        Interpretation Summary  Left ventricular function, chamber size, wall motion, and wall thickness are  normal.The EF is 55-60%.  Right ventricular function, chamber size, wall motion, and thickness are  normal.  A patent foramen ovale was demonstrated by agitated saline bubble study .  The inferior vena cava was normal in size with preserved respiratory  variability.  No pericardial effusion is present.  _____________________________________________________________________________  __        Left Ventricle  Left ventricular function, chamber size, wall motion, and wall thickness are  normal.The EF is 55-60%. Left ventricular diastolic function is normal.     Right Ventricle  Right ventricular function, chamber size, wall motion, and thickness are  normal.     Atria  Both atria appear normal. The patent foramen ovale was demonstrated by  agitated saline bubble study .        Mitral Valve  The mitral valve is normal.     Aortic Valve  Aortic valve is normal in structure and function.     Tricuspid Valve  The tricuspid valve is normal. The peak velocity of the tricuspid regurgitant  jet is not obtainable.     Pulmonic Valve  The pulmonic valve is normal.     Vessels  The thoracic aorta is normal. The inferior vena cava was normal in size with  preserved respiratory variability.     Pericardium  No pericardial effusion is present.        Compared to Previous  Study  Previous study not available for comparison.  _____________________________________________________________________________  __     MMode/2D Measurements & Calculations  IVSd: 0.65 cm  LVIDd: 4.7 cm  LVIDs: 2.9 cm  LVPWd: 0.71 cm  FS: 38.5 %  EDV(Teich): 102.4 ml  ESV(Teich): 31.9 ml  LV mass(C)d: 99.6 grams  LV mass(C)dI: 58.4 grams/m2  asc Aorta Diam: 2.8 cm  LVOT diam: 2.1 cm  LVOT area: 3.5 cm2  LA Volume (BP): 41.3 ml  LA Volume Index (BP): 24.2 ml/m2  RWT: 0.30           Doppler Measurements & Calculations  MV E max aleksandr: 76.4 cm/sec  MV A max aleksandr: 65.8 cm/sec  MV E/A: 1.2  MV dec slope: 404.0 cm/sec2  E/E' av.2  Lateral E/e': 5.6  Medial E/e': 6.7     _____________________________________________________________________________  __           Report approved by: Amber Carter 2018 11:50 AM       1    Type Source Collected On     18 0924          View Image (below)        Echocardiogram Complete [[id number]]  Resulted: 18 1011, Result status: In process    Ordering provider: Reinier Shepherd MD  18 1535 Performed: 18 1011 - 18 1011    Resulting lab: [facility]                   Encounter-Level Documents:     There are no encounter-level documents.      Order-Level Documents:     There are no order-level documents.

## 2018-02-17 NOTE — IP AVS SNAPSHOT
` `     UNIT [address] [facility]: [phone]                 INTERAGENCY TRANSFER FORM - NOTES (H&P, Discharge Summary, Consults, Procedures, Therapies)   2018                    Hospital Admission Date: 2018  NADIR GARCIA   : 1990  Sex: Female        Patient PCP Information     Provider PCP Type    Chapo Flores MD General         History & Physicals      H&P by Reinier Shepherd MD at 2018  3:50 PM     Author:  Reinier Shepherd MD Service:  Neuro ICU Author Type:  Resident    Filed:  2018  4:39 PM Date of Service:  2018  3:50 PM Creation Time:  2018  3:50 PM    Status:  Attested :  Reinier Shepherd MD (Resident)    Cosigner:  Aminah Chilel MD at 2018  6:30 PM        Attestation signed by Aminah Chilel MD at 2018  6:30 PM        Attending Attestation:                                                   Date Patient seen: 2018    This is a young female who was with her boyfriend yesterday (16th night) but was noted to not move her left side and brought in to hospital. CT confirms clinical suspiction of large right MCA stroke.   Patient is outside of tPA window or embolectomy.   Patient has history of PE's and was supposed to be on anticoagulation that she was not taking.   We can not pursue anticoagulation at this point as there is significant risk of he hemorrhage.   - workup in progress per stroke team.   - Would argue for early DC at this point ---- in conversation with NSG.     Total time spent in direct patient care at the bedside was 55. Over 50% of the time was spend in coordination of the care.     Patient was discussed during our bedside rounds.   I reviewed patients labs, Xrays, Scans.   I examined the patient with the team and plan of care, as documented above, was developed on our Bedside rounds.  Patients family was updated.   Collaborating teams were updated.    García Chilel MD  Neuro Critical  Care  [phone]                                  [facility], [place]      Neurology Stroke[JR1.1] ICU[JR1.2] Admission    Patient Name: Maggie Case  : 1990 MRN#: [id number]    STROKE DATA    Stroke Code:  Stroke code not activated.  Time patient seen:  2018 1530  Last known normal (pt's baseline): unknown    TPA treatment:  Not given due to outside the time window.      Stroke Scales      National Institutes of Health Stroke Scale (at presentation)   NIHSS done at:  time patient seen      Score    Level of consciousness:  (1)   Not alert; arousable w/ minor stim to obey/answer/respond    LOC questions:  (0)   Answers both questions correctly    LOC commands:  (0)   Performs both tasks correctly    Best gaze:  (1)   Partial gaze palsy    Visual:  (2)   Complete hemianopia    Facial palsy:  (2)   Partial paralysis (total/near total of lower face)    Motor arm (left):  (4)   No movement    Motor arm (right):  (0)   No drift    Motor leg (left):  (4)   No movement    Motor leg (right):  (0)   No drift    Limb ataxia:  (0)   Absent    Sensory:  (1)   Mild to moderate sensory loss    Best language:  (0)   Normal- no aphasia    Dysarthria:  (1)   Mild to moderate dysarthria    Extinction and inattention:  (2)   Profound javi-inattention / extinction > one modality        NIHSS Total Score:  18          Dysphagia Screen  Time of screening:[JR1.1] 2018[JR1.3] 1530  Screening results: Failed screening - strict NPO pending SLP evaluation     ASSESSMENT & PLAN BY PROBLEM     #Acute ischemic stroke: R M1 occlusion with well established[JR1.1] RMCA infarct.  Likely embolic in etiology. Given age and history of PE concern for underlying coagulopathy.  No prior history of echo so unclear if there is evidence of PFO.  Given size of infarct there is concern for RMCA syndrome. She would be a good candidate given previous level of function.  Will discuss with NSG team.[JR1.2]       Acute Ischemic Stroke (without tPA) Plan  - Admit to Neuro ICU  - NSG consulted for evaluation of decompressive hemicraniectomy  - Neurochecks per protocol  - Permissive HTN; labetalol PRN for SBP > 220  - Euthermia, Euglycemia  - HOB elevated to 45 degrees  - NG tube placement   - Daily aspirin for secondary stroke prevention  - Repeat CT head at 2000  - TTE with Bubble Study  - Telemetry  - Bedside Glucose Monitoring  - A1c, Lipid Panel  - PT/OT/SLP  - PM&R  - Stroke Education  - Depression Screen  - Apnea Screen    During initial physical assessment, the plan of care was discussed and developed with patient.  Plan of care includes: admission to Neuro ICU..    Patient was admitted via transfer from [place].    The patient will be admitted to the Neuro Critical Care/Stroke team.    Other Medical Problems:[JR1.1]    #History of PE: Large clot burden diagnosed in December of 2015. CT chest today at OSH showed no evidence of PE. Previously on xarelto, lovenox, and warfarin however all of which were discontinued due to noncompliance    #History of alcohol abuse: thiamine     #Anxiety: holding PTA ativan    #Leukocytosis: no clear evidence of pneumonia on CT chest.  Patient is afebrile.  Will send sputum culture and UA. Continue to monitor.    #Hypokalemia: noted to be 2.4 at OSH. No potassium given at OSH.   - Electrolyte replacement protocol[JR1.2]    Fluids/Electrolytes/Nutrition  0.9% sodium chloride @ 100 mL/hr  Avoid hypotonic fluids.    Nutrition:   Active Diet Order      NPO for Medical/Clinical Reasons Except for: No Exceptions    Prophylaxis            For VTE Prevention:  - pneumatic compression device    For Acid Suppression:  - pantoprazole    Code Status  Full Code    HPI  Maggie Case is a 27 year old female with a history of PE not on AC and alcohol use who was transferred from [facility] with concern for malignant MCA syndrome.  Per chart review the patient was drinking with friends last night and woke up  "with left sided weakness. She was then brought in by ambulance to OSH.  On arrival BP was noted to be 130/100. Head CT was concerning with evidence of dense right MCA sign and well established infarct in the right MCA territory.  Patient transferred to [facility] for further cares.  Patient denied any alcohol or drug use and stated \"I woke up this way.\"  Per mother patient has been struggling with alcoholism for years and has been distant from the family for the last year. Per chart review the patient has a history of PE over a year ago and was previously on AC however the patient was not taking the medication.    Pertinent Past Medical/Surgical History  Past Medical History:   Diagnosis Date     Anemia      Anxiety      Chronic diarrhea      Lactose intolerance      Myalgia      Pulmonary embolism ([facility]) 05/06/16     Vitamin B12 deficiency        Past Surgical History:   Procedure Laterality Date     CLOSED REDUCTION, PERCUTANEOUS PINNING LOWER EXTREMITY, COMBINED Right 4/6/2016    Procedure: COMBINED CLOSED REDUCTION, PERCUTANEOUS PINNING LOWER EXTREMITY;  Surgeon: Dexter Jones MD;  Location: [facility]       Medications: I have reviewed this patient's current medications.    Allergies: All allergies reviewed and addressed.    Family History: I have reviewed this patient's family history.    Social History: Patient denied alcohol or drug use.    Tobacco use: Patient denied tobacco use    ROS:  The 10 point Review of Systems is negative other than noted in the HPI or here.     PHYSICAL EXAMINATION  Vital Signs:  B/P: 130/94,  T: 97.4,  P: Data Unavailable,  R: 18    General:  patient lying in bed without any acute distress    HEENT:  normocephalic/atraumatic  Cardio:  RRR  Pulmonary:  no respiratory distress  Abdomen:  soft  Extremities:  no edema  Skin:  intact, warm/dry     Neurologic  Mental Status:  Drowsy but opening eyes spontaneously. Oriented, following commands, no aphasia. Severe hemineglect noted.  Cranial Nerves: "  PERRL, no blink to threat on the left. Eyes deviated to the right. Facial sensation intact and symmetric, Left facial droop, hearing not formally tested but intact to conversation, moderate dysarthria noted  Motor:  Strength intact in RUE and RLE. No movement in LUE and LLE  Reflexes:  2+ on the right, hyporeflexic on the left, no clonus, toes downgoing  Sensory:  Diminished to light touch in LUE and LLE,   Coordination:  FNF and HS intact without dysmetria on the right  Station/Gait:  unable to test    Labs  Labs and Imaging reviewed and used in developing the plan; pertinent results included.     Lab Results   Component Value Date     (H) 02/17/2018     The patient was discussed with the fellow, Dr. Russell.  The staff is Dr. Chilel.    Reinier Shepherd MD   Neurology PGY2  Pager: [phone][JR1.1]       Revision History        User Key Date/Time User Provider Type Action    > JR1.2 2/17/2018  4:39 PM Reinier Shepherd MD Resident Sign     JR1.3 2/17/2018  3:51 PM Reinier Shepherd MD Resident      JR1.1 2/17/2018  3:50 PM Reinier Shepherd MD Resident                      Discharge Summaries      Discharge Summaries by Yeison Crawley MD at 3/1/2018 11:26 AM     Author:  Yeison Crawley MD Service:  Neuro ICU Author Type:  Resident    Filed:  3/1/2018 12:42 PM Date of Service:  3/1/2018 11:26 AM Creation Time:  2/27/2018 10:31 PM    Status:  Cosign Needed :  Yeison Crawley MD (Resident)    Cosign Required:  Yes             [facility], [place]    Neurology Stroke Discharge Summary    Date of Admission: 2/17/2018  Date of Discharge:[KN1.1] 03/01/2018[KN1.2]    Disposition: Discharged to ARU  Primary Care Physician: Chaop Flores      Admission Diagnosis:   L sided weakness    Discharge Diagnosis:   Ischemic Stroke due to undetermined etiology, thought to be 2/2 hypercoagulopath[KN1.1]ic state[KN1.3]    Problem Leading to  "Hospitalization (from [facility]):   Per H&P 2/17:  Maggie Case is a 27 year old female with a history of PE not on AC and alcohol use who was transferred from OSH with concern for malignant MCA syndrome.  Per chart review the patient was drinking with friends last night and woke up with left sided weakness. She was then brought in by ambulance to OSH.  On arrival BP was noted to be 130/100. Head CT was concerning with evidence of dense right MCA sign and well established infarct in the right MCA territory.  Patient transferred to [facility] for further cares.  Patient denied any alcohol or drug use and stated \"I woke up this way.\"  Per mother patient has been struggling with alcoholism for years and has been distant from the family for the last year. Per chart review the patient has a history of PE over a year ago and was previously on AC however the patient was not taking the medication.    Please see H&P dated 2/17/2018 for further details about presentation.    Brief Hospital Course:   Patient presented from OSH w/ malignant MCA syndrome.  Found to have RM[KN1.1]1[KN1.4] occlusion.  IV tPA was not given due to being outside the time window.  S/p[KN1.1] decompressive javi[KN1.4]craniectomy[KN1.1] 2/18. F/u MRI on 2/26 w/ expected evolutional changes. Started on ASA 81mg upon admission, then transitioned to[KN1.4] heparin until therapeutic and upon unchanged head ct, changed to lovenox + warfarin[KN1.5] prior to d/c. Started on Fluoxetine as well. Patient has had persistent L hemiplegia, hemisensory loss and neglect[KN1.4], field cut[KN1.5].[KN1.4]     Work-up as stated below under Pertinent Investigations.    Etiology is thought to be 2/2 hypercoagulable state in the setting of previous miscarriages, PE + PFO.[KN1.1]  No DVT found in US of upper or lower extremity. Portions of Hypercoag w/u pending.[KN1.4]    Rehab evaluation:[KN1.1] OT, PT and SLP[KN1.4].     Smoking Cessation: patient is not a smoker    BP Long-term " Goal: 140/90 or less    Antithrombotic/Anticoagulant Agent: started on aspirin 81mg qday initially, followed by initiation of heparin gtt 2/27, transitioned to enoxaparin[KN1.1]  + warfarin[KN1.5] prior to d/c[KN1.1], Goal INR 2-3[KN1.2].     Statins: Statin contraindicated because LDL 39       Hgb A1C Goal: < 7.0    Complications: Pneumonia, UTI, b/l pulmonary hilar lymphadenopathy/RLL cavitation, acute blood loss anemia[KN1.1] following craniectomy[KN1.4],[KN1.1] persistent leukocytosis,[KN1.4] dysphagia s/p PEG 2/26 + ongoing tube feeds, iatrogenic hypernatremia.[KN1.1]     P[KN1.4]roblems addressed during this hospitalization:[KN1.1]  #RM1 occlusion with malignant MCA syndrome s/p decompressive hemicraniectomy POD#9  Likely Embolic given her history PE, setting of PFO. Concern for hypercoag and Myeloproliferative (JAK2, CALR, MPL), workup pendin[KN1.4],[KN1.2]g  Heme consulted, appreciate[KN1.4]d[KN1.2] recs. Repeat MRI 2/26 w/ expected evolution, less likely e/o infection.[KN1.4]  - started on aspirin, transitioned to heparin, became therapeutic w/ stable head CT; transitioned to lovenox + warfarin on day of d/c.  Goal INR 2-3.[KN1.2]   - Staples[KN1.4] removed[KN1.5]. Planning for cranioplasty ~3/18. Follow up in clinic with[KN1.4] a head ct and  preferably followed by an appointment w/[KN1.2] Dr. Mesa[KN1.4] (per [facility], appt may not required  if images can be forwarded). 1 week[KN1.2] week prior to cranioplasty. Head CT prior to appointment.   - Fluoxetine 20mg daily  - Hypercoag pending[KN1.4] (N[KN1.5]e[KN1.2]xt Gen, F2 + F5 mutation analysis)[KN1.5]  - SBP <200  - PT/OT/SLP   - SHAKIRA and PHQ9 screen[KN1.4]ed (see below)[KN1.5]  - f/u w/ Stroke Clinic, Cardiology + Hematology[KN1.2]      #RLL cavitation  #Bilateral hilar lymphadenopathy: differential includes infectious vs inflammatory etiology. Pulmonology consulted, appreciate recs; possibility for sarcoid vs. pna, tx'd for PNA w/ MSSA + H.flu s/p  ceftriaxone day 7/7.   HIV nonreactive.[KN1.4] Stable, w/o respiratory distress on room air.[KN1.2]  - Histo and blasto ab pending  - Repeat CT chest in 4 weeks (~ 3/17). Will assess at that time for BAL/lymph node biopsy.      #Pneuomia: Haemopholus influenza and MSSA. Repeat 2/25 procal 0.23. Completed 7/7d Ceftriaxone on 2/26.[KN1.4]  #Mild Thrombocytosis[KN1.6]  #Leukocytosis:   Variable leukocytosis on this admission, trending down from 2/16 20 ->14[KN1.4] back to 16 at day of d/c[KN1.5].[KN1.4] Mildly elevated platelets, asx.[KN1.2] UA wnl, CRP elevated but lactate wnl + hds / afebrile. Discussed w/ Pulm + Hem/Onc, possibly related to underlying process (myeloprolif / sarcoid) vs.[KN1.4] +/- t[KN1.6]reated pna/UTI + stress leukocytosis. Repeat MRI w/ expected evolution of R cerebral hemisphere infarct rather than infection.   - Completed Ceftriaxone 7 day course on 2/26  -[KN1.4] F/u w/ pulmonology + hematology following d/c[KN1.5]      #Acute blood loss anemia: stable ~8[KN1.4]+[KN1.5]. Acute drop post surgery[KN1.4] r[KN1.6]equired 2 units of pRBCs[KN1.4], stable since[KN1.5].  - Transfuse Hgb <7      #Concern for hypercoagulability[KN1.4]  #Concern for myeloproliferative disorder  Persistent leukocytosis (discussed above), mild platelet elevation[KN1.5]  - Work-up pending[KN1.4] (Factor 5 + 2 mut analysis/finalization, Next Gen sequencing)[KN1.6]  - Hem/onc consulted[KN1.4], to f/u as outpatient[KN1.6]     [KN1.4]  #PFO: found to have on echocardiogram  - F/u w/ cardiology regarding further management[KN1.2]    #UTI: culture positive for E coli. Sensitive to ceftriaxone.   - Completed ceftriaxone course (day 7 of 7, 2/26)   - Repeat UA unremarkable      #Dysphagia[KN1.4], s/p PEG 2/26[KN1.6]: tf.[KN1.4] DD[KN1.6]2 w/ thickened liquids  #Diet:[KN1.2]   - Daily speech/swallow evals[KN1.4]   Per Dietary recs:[KN1.2]    [KN1.4]- Transition pt to standard regimen with fiber in preparation for d/c/long-term EN  needs. Recommend continuous regimen until PO intake improves.   - Initiate Isosource 1.5 @ goal 40 ml/hr (960 ml/day) to provide 1440 kcals (26 kcal/kg/day), 65 g PRO (1.2 g/kg/day), 730 ml free H2O, 169 g CHO and 14 g Fiber daily.  - Adjust accordingly to PO intakes. Reorder kaylyn counts (should pt remain inpatient)[KN1.2]    #Hypernatremia, resolved - Iatrogenic from hypertonic saline.       #History of[KN1.4] possible[KN1.6] alcohol abuse:   - continue thiamine and folate.      PPX:    DVT prophylaxis: SCDs, lovenox daily[KN1.4], warfarin[KN1.6]    GI: not indicated    Diet: TF[KN1.4], dd[KN1.6]2[KN1.2] w/[KN1.6] thickened liquids[KN1.2]    Nausea: compazine + zofran prn[KN1.4] while inpatient, continue zofran and pta phenergan[KN1.6] at d/c[KN1.2]    Pain: oxycodone[KN1.4] + tylenol[KN1.6] prn  Code Status: Full  Lines: PEG,[KN1.4] PIV to be removed[KN1.6]    PERTINENT INVESTIGATIONS    Labs  Lipid Panel:   Recent Labs   Lab Test  02/17/18   1752   CHOL  98   HDL  38*   LDL  39   TRIG  105     A1C:   Lab Results   Component Value Date    A1C 5.8 02/17/2018     INR:[KN1.1]     Recent Labs  Lab 03/01/18  1051   INR 0.96[KN1.2]      Coag Panel / Hypercoag Workup:[KN1.1] Labs sent and pending[KN1.4]  Pending test results:[KN1.1]    - Next gen sequencing[KN1.4], F 5 + 2 mut sequencing[KN1.2]    Echo:[KN1.1] TTE  Left ventricular function, chamber size, wall motion, and wall thickness are  normal.The EF is 55-60%.  Right ventricular function, chamber size, wall motion, and thickness are  normal.  A patent foramen ovale was demonstrated by agitated saline bubble study .  The inferior vena cava was normal in size with preserved respiratory  variability.  No pericardial effusion is present.[KN1.4]    Imaging:[KN1.1]   Results for orders placed or performed during the hospital encounter of 02/17/18   CT Head w/o Contrast    Addendum: 2/18/2018    There is loss of gray-white of the parasagittal superior right frontal  region  which likely is a component of the right anterior cerebral  artery territory.    EL SCHROEDER MD      Narrative    CT HEAD W/O CONTRAST 2/17/2018 8:17 PM    Provided History: R MCA stroke;     Comparison: CT 2/17/2018.    Technique: Using multidetector thin collimation helical acquisition  technique, axial, coronal and sagittal CT images from the skull base  to the vertex were obtained without intravenous contrast.     Findings:    Remonstration of findings of right MCA stroke with loss of gray-white  differentiation in the right frontal, parietal, and temporal lobes.  Edema in the right cerebral hemisphere with sulci effacement. The  right uncus is medially deviated and has mild mass effect on the right  cerebral peduncle. There is mild right to left midline shift at 2 mm.  No intracranial hemorrhage. The ventricles are proportionate to the  cerebral sulci. The basal cisterns are patent.    Mucosal thickening and fluid levels in the maxillary sinuses, ethmoid  air cells, sphenoid sinuses, and frontal sinuses . The mastoid air  cells are clear.       Impression    Impression:  Further evolution of right MCA stroke with loss of gray-white  differentiation and effacement of sulci in the right frontal,  parietal, and temporal lobes. Mild right-to-left midline shift at 2  mm. Medial deviation of the right uncus without rachel herniation..    I have personally reviewed the examination and initial interpretation  and I agree with the findings.    EL SCHROEDER MD   XR Chest Port 1 View    Narrative    XR CHEST PORT 1 VW  2/17/2018 6:45 PM      HISTORY: Central line confirmation and NG placement;     COMPARISON: Earlier 2/17/2018    FINDINGS: Enteric tube sidehole projects over the body of the stomach.  Contrast within the renal collecting systems. No pneumothorax or  pleural effusion. Cardiac silhouette is not enlarged. Left upper  extremity Central venous catheter tip projects over the mid SVC. No  focal airspace  opacities. No acute osseous abnormalities.      Impression    IMPRESSION:   1. Left upper extremity approach central venous catheter tip projects  over the mid SVC.  2. Enteric tube sidehole projects over the body of the stomach.  3. Contrast within the renal collecting systems, concerning for  delayed nephrogram/renal dysfunction.    I have personally reviewed the examination and initial interpretation  and I agree with the findings.    JOSE RIOS MD   XR Chest 1 View    Narrative    XR CHEST 1 VW  2/17/2018 10:38 PM      HISTORY: Line placement;     COMPARISON: Chest x-ray and CT from earlier today.    FINDINGS: Left subclavian central line tip at the low SVC. Gastric  tube tip and sidehole project over the stomach. Cardiac silhouette is  within normal limits. No pneumothorax or pneumothorax. Low lung  volumes. Subtle nodular opacities in the right lung apex and bilateral  lung bases, as seen on 2/17/2018 CT.       Impression    IMPRESSION:  1. Left subclavian central line tip at the lower SVC.   2. Subtle bibasilar and right apical opacities, as demonstrated on  2/17/2018 CT.    I have personally reviewed the examination and initial interpretation  and I agree with the findings.    GEORGIANA LUO MD   CT Head w/o Contrast    Addendum: 2/18/2018    There is hypodense involvement of the parasagittal superior right  frontal region which likely is a component of the right anterior  cerebral artery territory.    EL SCHROEDER MD      Narrative    CT HEAD W/O CONTRAST 2/18/2018 6:41 AM    Provided History: R MCA stroke;     Comparison: CT 2/17/2018.    Technique: Using multidetector thin collimation helical acquisition  technique, axial, coronal and sagittal CT images from the skull base  to the vertex were obtained without intravenous contrast.     Findings:    Further evolution of right MCA stroke with loss of gray-white  differentiation in the right frontal, parietal, and temporal lobes.  Edema in the right  cerebral hemisphere with sulcal effacement which is  increased. The right uncus is medially deviated and has mild mass  effect on the right cerebral peduncle. There is mild right to left  midline shift at 2 mm. No intracranial hemorrhage. The ventricles are  proportionate to the cerebral sulci. The basal cisterns are patent.    Mucosal thickening and fluid levels in the maxillary sinuses, ethmoid  air cells, sphenoid sinuses, and frontal sinuses . The mastoid air  cells are clear.       Impression    Impression:  Further evolution of right MCA infarct in the right frontal, parietal,  and temporal lobes and right insula. Mild right-to-left midline shift  at 2 mm. Medial deviation of the right uncus without rachel herniation.    I have personally reviewed the examination and initial interpretation  and I agree with the findings.    EL SCHROEDER MD   CT Head w/o Contrast    Narrative    CT HEAD W/O CONTRAST 2/18/2018 5:13 PM    Provided History: patient with large R MCA stroke and decreased level  of arousal; evaluate for interval change in edema;     Comparison: 2/18/2018.    Technique: Using multidetector thin collimation helical acquisition  technique, axial, coronal and sagittal CT images from the skull base  to the vertex were obtained without intravenous contrast.     Findings:  Relatively stable findings of right MCA stroke with loss of  gray-white differentiation in the right frontal, parietal, temporal  lobes. Stable hypodensity in the parasagittal right frontal lobe which  likely is the anterior cerebral artery territory. Edema in the right  cerebral hemisphere continues to cause sulcal effacement and  compression of the right lateral ventricles. Minimal 2 mm right to  left midline shift is similar to prior. No intracranial hemorrhage.  The basal cisterns remain patent. There is medial deviation of the  right uncus without rachel herniation.    Near-total opacification of bilateral maxillary and sphenoid  sinuses  and ethmoid air cells. Increased layering fluid in the frontal  sinuses. The mastoid air cells are clear.       Impression    Impression:   Stable findings of right MCA and likely component of right LISSETTE stroke  involving the right cerebral hemisphere with adjacent edema. Stable  minimal 2 mm right-to-left midline shift.    I have personally reviewed the examination and initial interpretation  and I agree with the findings.    EL SCHROEDER MD   US Lower Extremity Venous Duplex Bilateral    Narrative    EXAMINATION: DOPPLER VENOUS ULTRASOUND OF BILATERAL LOWER EXTREMITIES,  2/19/2018 9:05 AM     COMPARISON: None.    History: History for DVT in patient with PFO and large stroke, history  of PE.     TECHNIQUE:  Gray-scale evaluation with compression, spectral flow and  color Doppler assessment of the deep venous system of both legs from  groin to knee, and then at the ankles.    FINDINGS:  In both lower extremities, the common femoral, femoral, popliteal and  posterior tibial veins demonstrate normal compressibility and blood  flow.      Impression    IMPRESSION:  No evidence of deep venous thrombosis in either lower extremity.    I have personally reviewed the examination and initial interpretation  and I agree with the findings.    NGUYEN PATINO MD   US Upper Extremity Venous Duplex Bilateral Port    Narrative    EXAMINATION: DOPPLER VENOUS ULTRASOUND OF BILATERAL UPPER EXTREMTIES,  2/19/2018 9:05 AM     COMPARISON: None.    History: evaluate for DVT in patient with PFO and large stroke and  history of PE     TECHNIQUE:  Gray-scale evaluation with compression, spectral flow, and  color Doppler assessment of the deep venous system of both upper  extremities.    FINDINGS:  The left cephalic vein near a peripheral line in the region of the  antecubital fossa demonstrates no compressibility. The right cephalic  vein near peripheral line in the region of the antecubital fossa  demonstrates no  compressibility.    Normal blood flow and waveforms are demonstrated in the internal  jugular, innominate, subclavian, and axillary veins bilaterally. There  is normal compressibility of the brachial and basilic veins  bilaterally.      Impression    IMPRESSION:  1.  No evidence of deep venous thrombosis in either upper extremity.  2.  Peripheral venous thrombus in the left and right cephalic veins in  the region of the antecubital fossa bilaterally.    I have personally reviewed the examination and initial interpretation  and I agree with the findings.    NGUYEN PATINO MD   CT Head w/o Contrast    Narrative    CT HEAD W/O CONTRAST 2/19/2018 3:45 AM    History: R stroke s/p hemicraniectomy;     Comparison: CT head dated 2/18/2018.    Technique: Using multidetector thin collimation helical acquisition  technique, axial, coronal and sagittal CT images from the skull base  to the vertex were obtained without intravenous contrast.     Findings:    Postoperative changes of right hemicraniectomy with overlying  subcutaneous emphysema and skin staples. Relatively stable right MCA  stroke with hypodensity in the right frontal, parietal and temporal  lobes. No significant change in hypoattenuation in the right superior  frontal gyrus. No intracranial hemorrhage or midline shift. Persistent  right lateral ventricle effacement. The basal cisterns are patent.    Continued opacification of paranasal sinuses. The mastoid air cells  are clear.       Impression    Impression:  1.  Postoperative changes of right hemicraniectomy with overlying  subcutaneous emphysema and skin staples.   2.  Relatively stable large hypoattenuating area involving right  frontal, parietal and temporal lobes status post right MCA stroke.  Unchanged right anterior cerebral artery territory infarction. No  evidence for hemorrhagic transformation.    I have personally reviewed the examination and initial interpretation  and I agree with the  findings.    EL SCHROEDER MD   CT Head w/o Contrast    Narrative    CT HEAD W/O CONTRAST 2/20/2018 4:58 AM    History: F/U Right MCA Territory Infarction; F/U Cerebral Edema;     Comparison: CT head dated 2/19/2018.    Technique: Using multidetector thin collimation helical acquisition  technique, axial, coronal and sagittal CT images from the skull base  to the vertex were obtained without intravenous contrast.     Findings:    Postoperative changes of right hemicraniectomy with overlying  subcutaneous emphysema in the skin staples. Redemonstration of large  hypoattenuating area involving the right frontal, parietal and  temporal lobes. There is a slightly increased anterior horn effacement  of the left lateral ventricle. There is right to left subfalcine  herniation and medial deviation of the right uncus. No new infarction  or hemorrhagic transformation identified. The basal cisterns are  patent.    Continued opacification of the paranasal sinuses. The mastoid air  cells are clear.       Impression    Impression:   1.  Stable postoperative changes of right hemicraniectomy with  improved subcutaneous emphysema.  2.  2. Persistent mass effect with right to left subfalcine herniation  and medial deviation of the right uncus.    I have personally reviewed the examination and initial interpretation  and I agree with the findings.    EL SCHROEDER MD   XR Chest Port 1 View    Narrative    Exam:  Chest X-ray 2/21/2018 11:11 AM    History: pneumonia;     Comparison: Chest x-ray 2/17/2018, CT 2/17/2018    Findings: Portable AP radiograph of the chest. Left upper extremity  PICC with tip projecting over the mid SVC. Cardiac silhouette is  normal in size. Pulmonary vasculature is distinct. No pleural  effusions or pneumothorax. Subtle bibasilar and right upper apical  opacities, as demonstrated on prior radiograph and CT 2/17/2018. The  visualized upper abdomen appears unremarkable.       Impression    Impression:    Subtle bibasilar and right apical opacities, as demonstrated on CT  2/17/2018. Findings may represent infection.    I have personally reviewed the examination and initial interpretation  and I agree with the findings.    ARRON LACEY MD   CT Head w/o Contrast    Narrative    CT HEAD W/O CONTRAST 2/22/2018 4:21 AM    History: interval scan     Comparison: CT head dated 2/20/2018.    Technique: Using multidetector thin collimation helical acquisition  technique, axial, coronal and sagittal CT images from the skull base  to the vertex were obtained without intravenous contrast.     Findings:    Postoperative changes of right hemicraniectomy with slightly decreased  overlying subcutaneous emphysema in the scalp. Increased large  confluent region of hypoattenuation in the right frontal, parietal and  temporal lobes. Slight increase in effacement of the anterior horn of  the left lateral ventricle. Unchanged 5 mm of right-to-left midline  shift. No change in mild left subfalcine herniation. Unchanged medial  deviation of the right uncus. No new infarct or hemorrhagic  transformation identified. The basal cisterns are patent.    Continued opacification of the paranasal sinuses. The mastoid air  cells are clear.       Impression    Impression:   1.  Expected interval evolution of right LISSETTE and MCA distribution  infarcts with worsening edema. Unchanged subfalcine herniation and  medial deviation of the right uncus.  2.  Postoperative changes of right hemicraniectomy.        I have personally reviewed the examination and initial interpretation  and I agree with the findings.    ANGELA GOODSON MD   IR Gastrostomy Tube Percutaneous Plcmnt    Narrative    Procedures 2/26/2018:  Image guided gastrostomy tube placement.    History: Stroke, dysphasia. Percutaneous gastrostomy tube requested.    Comparison: CT abdomen pelvis 2/17/2018    Staff: Ori Vega MD    Resident: Richmond Kapoor MD    Medications:   1. 100 mcg  Fentanyl  2. 2 mg Versed  3. 1 mg Glucagon    Moderate sedation administered by the IR nurse at the supervision of  the attending. Vital signs and oxygenation continuously monitored. The  patient remained stable throughout the procedure.    Sedation time: 30 minutes    Fluoroscopy time: 2.9 minutes    Contrast: No IV contrast was given for this procedure.    Findings/procedure:     Prior to the procedure, both verbal and written informed consent  obtained from the patient. Patient placed supine on the fluoroscopy  table. Nasogastric tube placed by IR nurse.     The left upper quadrant prepped and draped. Time out performed.  Limited abdominal ultrasound performed to evaluate the liver margins.  1% lidocaine was used for local anesthesia. Stomach inflated with air  through the nasogastric tube. Gastropexy performed with two T-tacs.  Gastrostomy made between the T-tacs with a needle. Needle removed over  guidewire. Tract dilated with the telescopic dilator and 22F peel-away  sheath advanced over guidewire. 18F gastrostomy tube placed through  the peel-away sheath over the guidewire into the stomach. Gastrostomy  balloon inflated. Position documented with contrast. Guidewire  removed. T-tacs and gastrostomy tube secured. Sterile dressing  applied. Gastrostomy tube to gravity drainage. No immediate  complication.      Impression    Impression:  1. Uncomplicated image guided placement of percutaneous gastrostomy  tube.     Plan:  Nothing by mouth and gravity drainage for 4 hours. T-tac removal in 10  days.    IMYLES MD, attest that I was present in the procedure room  for the entire procedure.         I have personally reviewed the examination and initial interpretation  and I agree with the findings.    MYLES SIMEON MD   XR Chest Port 1 View    Narrative    Exam:  Chest X-ray 2/25/2018 1:30 PM    History: dysphagia with impaired swallow and up trending leukocytosis;  known h. influenzae in sputum.  Evaluating for worsening PNA;     Comparison: Chest x-ray 2/20/2018    Findings: Portable AP upright radiograph the chest. NG/OG tube with  sidehole projecting over the stomach, and the tip projecting beyond  the field-of-view. Interval removal of the left upper extremity PICC.  Trachea is midline. The cardiomediastinal silhouette is normal in  size. The pulmonary vasculature is distinct. No pleural effusion or  pneumothorax. Again noted subtle right upper/apical opacities and  subtle bibasilar opacities, unchanged. The upper abdomen appears  unremarkable.       Impression    Impression:   1. NG/OG with the sidehole projecting over the stomach, and the tip  projecting beyond the field-of-view.  2. Stable subtle right upper/apical bibasilar opacities better  characterized on CT exam 2/17/2018, could be related to infection.    I have personally reviewed the examination and initial interpretation  and I agree with the findings.    ROBERT ZIMMER MD   XR Abdomen Port 1 View    Narrative    XR ABDOMEN PORT 1 VW  2/25/2018 6:44 PM    History:  confirm feeding tube placement; .     Comparison: CT cap dated 2/17/2018    Findings:   Portable supine AP chest radiograph. Gastric tube with its tip and  side-port projects in the stomach. Nonobstructive bowel gas pattern.  No acute osseous abnormality.      Impression    IMPRESSION:  Gastric tube with the tip and the sidehole projects in the stomach.    I have personally reviewed the examination and initial interpretation  and I agree with the findings.    MELY AG MD   MR Brain w/o & w Contrast    Narrative    Brain MRI without and with contrast    History: Febrile with leukocytosis 1 week following hemicrani; .    Comparison: CT 2/22/2018    Technique: Axial FLAIR,  T1-weighted, and susceptibility images were  obtained without intravenous contrast. Following intravenous  gadolinium-based contrast administration, axial T2-weighted,  diffusion, FLAIR, and axial  and coronal T1-weighted images were  obtained.     Dose: 7.5 ml Gadavist injected    Findings:   Postsurgical changes of right hemicraniectomy. Redemonstration of  large right MCA infarct with restricted diffusion in the right  frontal, parietal, and temporal lobes, with associated moderate  cortical, subcortical, and intravascular enhancement. Notably, there  is no leptomeningeal enhancement in other areas of the brain away from  the infarct.    There is restricted diffusion in the splenic corpus callosum,  presumably a reversible splenial lesion.    No intra or extra-axial fluid collections. Minimal leftward midline  shift. Mucosal thickening in the right maxillary sinus. Mucosal  thickening of the ethmoid air cells and sphenoid sinus. Effusion in  the right mastoid air cells.      Impression    Impression:  Expected evolution of large right cerebral hemisphere infarct, with  extensive enhancement in the infarcted territory, with decreased mass  effect, midline shift, and uncal herniation. Although cerebritis could  have a similar appearance, this is felt most likely related to infarct  rather than infection.    I have personally reviewed the examination and initial interpretation  and I agree with the findings.    LACY LERMA MD[KN1.7]       Endovascular procedure:[KN1.1] None[KN1.4]     Cardiac Monitoring: Patient had > 24 hrs of cardiac monitor while in hospital.    Findings:[KN1.1] No atrial fibrillatio[KN1.4]n found[KN1.8].[KN1.4]     Sleep Apnea Screen:[KN1.1]   Questions/Answers      1. Prior to your stroke, have you been told that you snore? Yes.    2. Prior to your stroke, have you been told that you struggle to breath while you are sleeping? Yes.    3. Prior to your stroke, do you feel tired and sleepy even after getting a normal night of sleep? Yes.[KN1.9]    Sleep Apnea Screen Findings:[KN1.1] Patient has 2 or more symptoms of sleep apnea.  Outpatient sleep study is recommended.[KN1.9]    PHQ-9  Depression Screen Score:[KN1.1] 6[KN1.9]    Education discussed with:[KN1.1] patient and family[KN1.4] on[KN1.1] medical management, smoking cessation plan and follow-up recommendations/plan[KN1.4].    During daily rounds, the plan of care[KN1.1] was discussed and developed with patient and family.  Plan of care includes:    Recommendations:  1. Nutrition: Isosource 1.5 @ goal 45 ml/hr (1080 ml/day) to provide 1620 kcals (29 kcal/kg/day), 73 g PRO (1.3 g/kg/day), 821 ml free H2O, 190 g CHO and 16 g Fiber daily. Magic cup w/ meals. Nicole Hernandez ([id number])    2. Pulmonology: repeat CT Chest in 4wks (after out of acute rehab); if LAD still present, outpatient  Pulmonary clinic f/u to discuss BAL/biopsy.[KN1.4]    3. Follow up with Hematology[KN1.9]/Oncology[KN1.2] as soon as possible regarding hypercoagulability and myeloproliferative work up.[KN1.9]     4. NSU: Planning for cranioplasty ~3/18. Follow up[KN1.4] w/ head CT 1 week[KN1.2] prior to[KN1.4] tentative[KN1.2] cranioplasty[KN1.4] (aka ~3/12) prior to cranioplasty[KN1.2] ([KN1.4]preferable/if able to meet[KN1.2] in clinic with Dr. Mesa[KN1.4] after head CT, not necessarily required as long as images can be pushed forward and as long as she come for her scheduled operation, per NSU)[KN1.2]  . Head CT prior to appointment.    5. OT / PT : ARU[KN1.4], continued therapies[KN1.2]    6. SLP: Dysphagia Diet 2 +[KN1.4] thickened/nectar liquids    7.  Follow up w/ cardiology  Regarding PFO in the setting of ischemic stroke.    8. Follow up with Neurology/Stroke Clinic at next possible appointment.[KN1.2]    PHYSICAL EXAMINATION  Vital Signs:  B/P: 107/62, T: 98.8, P: 82, R: 18    General:[KN1.1]  patient lying in bed without any acute distress[KN1.9]     HEENT:[KN1.1]  S/p cranioplasty, staples removed[KN1.9]  Cardio:[KN1.1]  RRR[KN1.9]  Pulmonary:[KN1.1]  no respiratory distress, CTAB[KN1.9]  Abdomen:[KN1.1]  soft, non-tender, non-distended, bowel sounds present, PEG  tube present (surrounding skin healthy)[KN1.9]  Extremities:[KN1.1]  no edema[KN1.9]  Skin:[KN1.1]  intact, warm/dry[KN1.9]     Neurologic  Mental Status:[KN1.1]  fully alert, attentive and oriented[KN1.9] (thought it was Feb instead of March 1st)[KN1.5], follows commands, speech clear[KN1.9]  Cranial Nerves:[KN1.1]  L visual field cut[KN1.9] in[KN1.5] b/l[KN1.9] eyes[KN1.5], PERRL, R sided gaze preference,[KN1.9] mild disconjugate gaze,[KN1.5] able to abduct/cross midline/EOMI upon examination, facial sensation[KN1.9] reportedly intact / neglects L side on b/l testing,[KN1.5] L facial droop, not able to be fully overcome w/ testing.  Hearing not formally tested but intact to conversation, minimal dysar[KN1.9]t[KN1.5]hria[KN1.9],[KN1.5] shoulder shrug 0/5 L[KN1.9], 4+ to[KN1.5] 5/5 R, tongue protrusion mildly L deviated.[KN1.9]  Motor:[KN1.1]  0/5 in ANAY+LE. 4+/5 on RU+LE. Rigidity on LUE. Increased tone in LUE.[KN1.5]  Reflexes:[KN1.1] : 2/5 in R: P, A, brisk in R B + BR; hyperreflexic at Left B, BR, P, A, Left babinski upgoing, clonus at L > R ankle.[KN1.5]  Sensory:[KN1.1]  L javi neglect / hemisensory loss; acknowledges noxious stimuli in L upper + lower extremity but does not w/d. Intact to light touch on R upper + lower extremity.[KN1.5]  Coordination:[KN1.1] FNF intact to RUE, able to perform R HS; unable to assess on L side[KN1.5]  Station/Gait:[KN1.1]  unable to test[KN1.5]    National Institutes of Health Stroke Scale (on day of discharge)  NIHSS Total Score:[KN1.1] 18     Score    Level of consciousness: (0)   Alert, keenly responsive    LOC questions: (1)   Answers one question correctly    LOC commands: (0)   Performs both tasks correctly    Best gaze: (1)   Partial gaze palsy    Visual: (2)   Complete hemianopia    Facial palsy: (2)   Partial paralysis (total/near total of lower face)    Motor arm (left): (4)   No movement    Motor arm (right): (0)   No drift    Motor leg (left): (4)   No movement     Motor leg (right): (0)   No movement    Limb ataxia: (0)   Absent    Sensory: (1)   Mild to moderate sensory loss    Best language: (0)   Normal- no aphasia    Dysarthria: (1)   Mild to moderate dysarthria    Extinction and inattention: (2)   Profound javi-inattention / extinction > one modality        Total Score:  18[KN1.5]       Modified Walsh Scale (on day of discharge):[KN1.1] 4-Moderately severe disability; unable to walk without assistance and unable to attend to own bodily[KN1.3]    Medications[KN1.1]    Current Discharge Medication List      START taking these medications    Details   oxyCODONE (ROXICODONE) 5 MG/5ML solution 5-10 mLs (5-10 mg) by Per G Tube route every 4 hours as needed for moderate to severe pain  Qty: 90 mL, Refills: 0    Associated Diagnoses: Muscle pain      acetaminophen (TYLENOL) 32 mg/mL solution 20.3 mLs (650 mg) by Per G Tube route every 4 hours as needed for mild pain or fever  Qty: 400 mL    Associated Diagnoses: Muscle pain      FLUoxetine (PROZAC) 20 MG/5ML solution 5 mLs (20 mg) by Per G Tube route daily  Qty: 150 mL    Associated Diagnoses: Acute ischemic stroke (H)      lidocaine (LMX4) 4 % kit Apply topically every hour as needed for pain (with VAD insertion or accessing implanted port.)    Associated Diagnoses: Muscle pain      folic acid (FOLVITE) 1 MG tablet 1 tablet (1 mg) by Per G Tube route daily  Qty: 30 tablet    Associated Diagnoses: Acute ischemic stroke (H)      ramelteon (ROZEREM) 8 MG tablet 1 tablet (8 mg) by Per G Tube route nightly as needed for sleep    Associated Diagnoses: Insomnia, unspecified type      multivitamins with minerals (CERTAVITE/CEROVITE) LIQD liquid 15 mLs by Per Feeding Tube route daily    Associated Diagnoses: Acute ischemic stroke (H)      thiamine 100 MG tablet 1 tablet (100 mg) by Per NG tube route daily    Associated Diagnoses: Acute ischemic stroke (H)      ondansetron (ZOFRAN) 4 MG tablet 1-2 tablets (4-8 mg) by Per Feeding Tube  route every 8 hours as needed for nausea  Qty: 18 tablet    Associated Diagnoses: Nausea      enoxaparin (LOVENOX) 80 MG/0.8ML injection Inject 0.69 mLs (69 mg) Subcutaneous every 12 hours    Associated Diagnoses: Acute ischemic stroke (H)      warfarin (COUMADIN) 5 MG tablet Take 1 tablet (5 mg) by mouth daily  Qty: 30 tablet    Associated Diagnoses: Acute ischemic stroke (H)      Warfarin Therapy Reminder 1 each continuous prn    Associated Diagnoses: Acute ischemic stroke (H)         CONTINUE these medications which have CHANGED    Details   promethazine (PHENERGAN) 25 MG tablet 1 tablet (25 mg) by Per G Tube route every 6 hours as needed for nausea  Qty: 20 tablet, Refills: 1    Associated Diagnoses: Nausea      LORazepam (ATIVAN) 1 MG tablet 1 tablet (1 mg) by Per OG Tube route every 8 hours as needed for anxiety  Qty: 15 tablet, Refills: 0    Associated Diagnoses: Anxiety state         CONTINUE these medications which have NOT CHANGED    Details   DiphenhydrAMINE HCl (BENADRYL PO) Take by mouth At Bedtime      nicotine (NICODERM CQ) 14 MG/24HR 24 hr patch Place 1 patch onto the skin daily  Qty: 14 patch, Refills: 0    Associated Diagnoses: Tobacco use disorder[KN1.2]             Additional recommendations and follow up:[KN1.1]      Onc/Heme Adult Referral     Cardiology Eval Adult Referral     PULMONARY MEDICINE REFERRAL     SLEEP EVALUATION & MANAGEMENT REFERRAL - ADULT -[facility]  [phone] (Age 2 and up)     Discharge Instructions   If questions or problems arise regarding tube function (e.g. leaking, dislodges, etc.) Contact Interventional Radiology department 24 hours a day.    For procedures that were done at the [facility] sites,   8:00-4:30 PM Monday through Friday    Contact:[phone].    For afterhours and weekends call the [facility] main phone line [phone] and ask for the [facility] IR on call physician number.    If  DIRECTED by the RADIOLOGIST, related to specific problems with the tube functioning,  go to the Emergency Department.     Discharge Instructions   Patient to make a follow up appointment in IR clinic in 10-14 days for the removal of the retention sutures at the G or GJ tube site. Phone number is [phone] if needed.     General info for SNF   Length of Stay Estimate: Short Term Care: Estimated # of Days 31-90  Condition at Discharge: Stable  Level of care:skilled   Rehabilitation Potential: Fair  Admission H&P remains valid and up-to-date: Yes  Recent Chemotherapy: N/A  Use Nursing Home Standing Orders: Yes     Mantoux instructions   Give two-step Mantoux (PPD) Per Facility Policy Yes     Reason for your hospital stay   Ischemic Stroke     Wound care (specify)   Site:   PEG tube  Instructions:  Replace surround gauze daily, continue PEG cares per facility protocol.    Site: cranioplasty s/p staples removal;  Instructions: follow for e/o infection (redness, drainage, tenderness)     Follow Up and recommended labs and tests   1. Follow up with NH/ARU physician.  The following labs/tests are recommended: INR/PT + CBC for continued warfarin management. Follow up with PCP as needed / available. Follow up with Facility pharmacy regarding continued titration/warfarin management (INR Goal 2-3).    2. Follow up w/ Pulmonology in ~3wks following a repeat CT chest (~3/26). Appointment related to hx of PEs, b/l lymphadenopathy.    3. Follow up with Hematology regarding in 2 weeks or as soon as possible regarding hypercoagulability and myeloproliferative work up.     4. Follow up with Neurosurgery (Dr. Mesa) ~3/12 or roughly one week prior to planned cranioplasty (tentatively planned for 3/18). Please schedule head CT prior to Neurosurgery clinic appointment with Dr. Mesa.    Follow-up with Stroke Clinic in 3 months.   5. Follow up with stroke clinic. You will be contacted to schedule a Stroke Clinic appointment. If you  have not been contacted to schedule a stroke follow-up appointment within 7 days of discharge, please contact Stroke Neurology Clinic at [phone], pick option #1.   If you have other stroke related questions, please call [phone], pick option #3, and ask to speak to Bashir Carnes RN Stroke/Endovascular Care Coordinator.  If you have any urgent stroke related questions after hours, please contact the hospital  at [phone] and ask to be connected to a stroke provider.    6. Follow up with Cardiology regarding PFO.      Activity - Up with nursing assistance   Whenever OOB, must wear helmet.     Full Code     Nutrition Services Adult IP Consult   Reason:  Continued management of tube feedings and assessment of caloric intake / requirement     Physical Therapy Adult Consult   Evaluate and treat as clinically indicated.    Reason:  Assess and treat. Hx of RM1 stroke w/ significant L sided weakness, sensory loss, neglect     Occupational Therapy Adult Consult   Evaluate and treat as clinically indicated.    Reason:  Assess and treat. Hx of RM1 stroke w/ significant L sided weakness, sensory loss, neglect     Speech Language Path Adult Consult   Evaluate and treat as clinically indicated.    Reason:  Assess and treat. Hx of RM1 stroke w/ significant L sided weakness, sensory loss, neglect, dysphagia.     Fall precautions     Pneumatic Compression Device    Bilateral calf. Remove 30 mins BID.     Advance Diet as Tolerated   Follow this diet upon discharge:     Calorie Counts     Snacks/Supplements Adult: Magic Cup; With Meals     Adult Formula Drip Feeding: Continuous Isosource 1.5; Gastrostomy; Goal Rate: 40; mL/hr; Medication - Tube Feeding Flush Frequency: At least 15-30 mL water before + after medication administration and with tube clogging; Aspiration precautions     DD 2 Mech Altered Nectar Thickened Liquids (pre-thickened or use instant food thickener)[KN1.2]       Patient was seen and discussed  with the Attending, Dr. Annette Evans.    Yeison Crawley  Pager: [phone][KN1.1]       Revision History        User Key Date/Time User Provider Type Action    > KN1.8 3/1/2018 12:42 PM Yeison Crawley MD Resident Sign     [N/A] 3/1/2018 12:36 PM Yeison Crawley MD Resident Incomplete Revision     KN1.2 3/1/2018 12:35 PM Yeison Crawley MD Resident Sign     KN1.6 3/1/2018 11:37 AM Yeison Crawley MD Resident Share     KN1.5 3/1/2018 11:22 AM Yeison Crawley MD Resident Share     KN1.9 3/1/2018 11:02 AM Yeison Crawley MD Resident Share     [N/A] 3/1/2018  9:01 AM Yeison Crawley MD Resident Share     KN1.3 3/1/2018  8:54 AM Yeison Crawley MD Resident Share     KN1.7 2/27/2018 11:05 PM Yeison Crawley MD Resident      KN1.4 2/27/2018 10:46 PM Yeison Crawley MD Resident      KN1.1 2/27/2018 10:31 PM Yeison Crawley MD Resident                      Consult Notes      Consults by Jorden Khan PA-C at 2/22/2018  3:50 PM     Author:  Jorden Khan PA-C Service:  Interventional Radiology Author Type:  Physician Assistant    Filed:  2/23/2018  7:52 AM Date of Service:  2/22/2018  3:50 PM Creation Time:  2/22/2018  3:40 PM    Status:  Signed :  Jorden Khan PA-C (Physician Assistant)     Consult Orders:    1. Interventional Radiology Adult/Peds IP Consult: Patient to be seen: Routine within 24 hours; Call back #: [phone]; PEG tube placement. Planning for 2/26 if possible. [[id number]] ordered by Reinier Shepherd MD at 02/22/18 1520                  A collaboration between [facility] and [facility]  Experts in minimally invasive, targeted treatments performed using imaging guidance    Interventional Radiology Consult Service Note    Patient Name:  Maggie Case   YOB: 1990  Medical Record Number (MRN):  [id number]  Age:  27 year old female  Patient location / Unit:   [id number]    Requested IR consult:  Interventional Radiology Adult/Peds IP Consult: Patient to be seen: Routine within 24 hours; Call back #: [phone]; PEG tube placement. Planning for 2/26 if possible. [[id number]]   Authorizing Provider Encounter Provider   Reinier Shepherd MD - [id number]      Order Questions   Question Answer Comment   Patient to be seen Routine within 24 hours    Call back # [phone]    Reason for Consult PEG tube placement. Planning for 2/26 if possible.    Consultant may enter orders Yes    Is the patient on an anticoagulant ? No    Is the patient currently NPO ? No    Which provider care team? INTERVENTIONAL RADIOLOGY ([facility])      Indication / Associated Diagnosis:[CG1.1]  Hx stroke; dysphagia[CG1.2]    Complete Blood Count:  Lab Results   Component Value Date     02/22/2018       Coagulation:  Lab Results   Component Value Date    INR 1.13 02/22/2018       -----    Associated Imaging:  CT 2/17/18        -----    PLAN:     Request, patient data, and imaging reviewed.[CG1.1]     Patient will be placed on the IR schedule Monday 2/26/18 for a Placement of a percutaneous gastrostomy tube.    Pre-procedure hematology and coagulation labs per IR protocols are WNL for procedure.    Pre-procedure for NPO status, skin site cleansing scrubs and Pre-op IV antibiotics have been entered.    Medications to be held include: Lovenox    Consent will be done prior to procedure.    You may contact the IR charge RN at [phone] for an estimated time of procedure for this inpatient.       [phone] (IR RN control desk)  [phone] ([facility] IR call pager)    MANINDER Devries, PA-C  Physician Assistant - Certified  Interventional Radiology[CG1.2]    -----    Patient Data:[CG1.1]    Past Medical History:   Diagnosis Date     Anemia      Anxiety      Chronic diarrhea      Lactose intolerance      Myalgia      Pulmonary embolism (H) 05/06/16     Vitamin B12 deficiency           Patient Active Problem List    Diagnosis Date Noted     Acute ischemic stroke (H) 02/17/2018     Priority: Medium     Influenza B 05/01/2017     Priority: Medium     Opacity of lung on imaging study 05/01/2017     Priority: Medium     Community acquired pneumonia 05/01/2017     Priority: Medium     C. difficile colitis 05/01/2017     Priority: Medium     Sepsis (H) 04/28/2017     Priority: Medium     Pyelonephritis 01/05/2017     Priority: Medium     Acute chest pain 05/06/2016     Priority: Medium     Leukocytosis 05/06/2016     Priority: Medium     Lung mass 05/06/2016     Priority: Medium     SOB (shortness of breath) 05/06/2016     Priority: Medium     Acute upper GI bleed 06/18/2013     Priority: Medium     Hypokalemia 06/18/2013     Priority: Medium     Acute cystitis 06/18/2013     Priority: Medium     Anxiety state 03/25/2013     Priority: Medium     Muscle pain 07/30/2009     Priority: Medium     Overview:   IMO Update 10/11       Cervicalgia 06/16/2009     Priority: Medium     Overview:   IMO Update 10/11       Low back pain 06/16/2009     Priority: Medium     Overview:   IMO Update 10/11       Pain in joint, lower leg 05/01/2009     Priority: Medium     Diarrhea 04/17/2008     Priority: Medium     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     Priority: Medium         Prescription Medications as of 2/22/2018             DiphenhydrAMINE HCl (BENADRYL PO) Take by mouth At Bedtime    promethazine (PHENERGAN) 25 MG tablet Take 1 tablet (25 mg) by mouth every 6 hours as needed for nausea    LORazepam (ATIVAN) 1 MG tablet Take 1 tablet (1 mg) by mouth every 8 hours as needed for anxiety    nicotine (NICODERM CQ) 14 MG/24HR 24 hr patch Place 1 patch onto the skin daily      Facility Administered Medications as of 2/22/2018             FLUoxetine (PROzac) solution 20 mg 5 mLs (20 mg) by Oral or Feeding Tube route daily    hydrALAZINE (APRESOLINE) injection 10-20 mg  Inject 0.5-1 mLs (10-20 mg) into the vein every hour as needed for high blood pressure (SBP >200)    labetalol (NORMODYNE/TRANDATE) injection 5-10 mg Inject 1-2 mLs (5-10 mg) into the vein every 10 minutes as needed for high blood pressure (SBP goal < 200)    sodium chloride 3% injection Inject into the vein continuous    enoxaparin (LOVENOX) injection 40 mg Inject 0.4 mLs (40 mg) Subcutaneous every 24 hours    vancomycin (VANCOCIN) 1,500 mg in sodium chloride 0.9 % 250 mL intermittent infusion (Discontinued) Inject 1,500 mg into the vein every 12 hours    acetaminophen (TYLENOL) tablet 650 mg 2 tablets (650 mg) by Per NG tube route every 4 hours as needed for mild pain or fever    cefTRIAXone (ROCEPHIN) 2 g in 20 mL SWFI Premix Syringe Inject 2 g into the vein every 24 hours    oxyCODONE IR (ROXICODONE) tablet 5-10 mg Take 1-2 tablets (5-10 mg) by mouth every 4 hours as needed for moderate to severe pain    dextrose 10 % 1,000 mL infusion Inject into the vein continuous prn (Hypoglycemia prevention)    multivitamins with minerals (CERTAVITE/CEROVITE) liquid 15 mL 15 mLs by Per Feeding Tube route daily    hydrALAZINE (APRESOLINE) injection 10-20 mg (Discontinued) Inject 0.5-1 mLs (10-20 mg) into the vein every hour as needed for high blood pressure (SBP goal < 140)    labetalol (NORMODYNE/TRANDATE) injection 5-10 mg (Discontinued) Inject 1-2 mLs (5-10 mg) into the vein every 10 minutes as needed for high blood pressure (SBP goal < 140)    sodium chloride 0.9% infusion Inject into the vein continuous    naloxone (NARCAN) injection 0.1-0.4 mg Inject 0.25-1 mLs (0.1-0.4 mg) into the vein every 2 minutes as needed for opioid reversal    potassium chloride SA (K-DUR/KLOR-CON M) CR tablet 20-40 mEq Take 2-4 tablets (20-40 mEq) by mouth every 2 hours as needed for potassium supplementation    potassium chloride (KLOR-CON) Packet 20-40 mEq 20-40 mEq by Oral or Feeding Tube route every 2 hours as needed for potassium  supplementation    potassium chloride 10 mEq in 100 mL sterile water intermittent infusion (premix) Inject 100 mLs (10 mEq) into the vein every hour as needed for potassium supplementation    potassium chloride 10 mEq in 100 mL intermittent infusion with 10 mg lidocaine Inject 100 mLs (10 mEq) into the vein every hour as needed for potassium supplementation    potassium chloride 20 mEq in 50 mL intermittent infusion Inject 50 mLs (20 mEq) into the vein every hour as needed for potassium supplementation    magnesium sulfate 4 g in 100 mL sterile water (premade) Inject 100 mLs (4 g) into the vein every 4 hours as needed for magnesium supplementation    potassium phosphate 15 mmol in sodium chloride 0.9 % 250 mL intermittent infusion Inject 15 mmol into the vein daily as needed for phosphorous supplementation    potassium phosphate 20 mmol in sodium chloride 0.9 % 500 mL intermittent infusion Inject 20 mmol into the vein every 6 hours as needed for phosphorous supplementation    potassium phosphate 20 mmol in sodium chloride 0.9 % 250 mL intermittent infusion Inject 20 mmol into the vein every 6 hours as needed for phosphorous supplementation    potassium phosphate 25 mmol in sodium chloride 0.9 % 500 mL intermittent infusion Inject 25 mmol into the vein every 8 hours as needed for phosphorous supplementation    aspirin chewable tablet 81 mg 1 tablet (81 mg) by Per G Tube route daily    ondansetron (ZOFRAN) injection 4 mg Inject 2 mLs (4 mg) into the vein every 6 hours as needed for nausea or vomiting    folic acid (FOLVITE) tablet 1 mg 1 tablet (1 mg) by Per NG tube route daily    thiamine tablet 100 mg 1 tablet (100 mg) by Per NG tube route daily            Allergies   Allergen Reactions     Amoxicillin Other (See Comments)     Headaches     Lactose      Levaquin [Levofloxacin] Swelling     Naproxen Other (See Comments)     Reaction: Headaches     Nickel      Tramadol      Sulindac Rash[CG1.3]         Complete Blood  "Count:  Lab Results   Component Value Date     02/22/2018       Coagulation:  Lab Results   Component Value Date    INR 1.13 02/22/2018       Vital Signs:[CG1.1]  /75  Temp 98.7  F (37.1  C) (Oral)  Resp 16  Ht 1.626 m (5' 4\")  Wt 70.7 kg (155 lb 13.8 oz)  SpO2 98%  BMI 26.75 kg/m2[CG1.3]       Revision History        User Key Date/Time User Provider Type Action    > CG1.2 2/23/2018  7:52 AM Jorden Khan PA-C Physician Assistant Sign     CG1.3 2/22/2018  3:41 PM Jorden Khan PA-C Physician Assistant      CG1.1 2/22/2018  3:40 PM Jorden Khan PA-C Physician Assistant             Consults by Ibis Pop MD at 2/21/2018 12:48 PM     Author:  Ibis Pop MD Service:  Pulmonology Author Type:  Fellow    Filed:  2/21/2018  6:11 PM Date of Service:  2/21/2018 12:48 PM Creation Time:  2/21/2018 12:48 PM    Status:  Attested :  Ibis Pop MD (Fellow)    Cosigner:  Hardik Palacios MD at 2/22/2018  6:28 AM         Consult Orders:    1. Pulmonary General Adult IP Consult: Patient to be seen: Routine within 24 hrs; Call back #: [phone]; findings on CT chest and possible risk factors for subacute pulmonary disease.; Consultant may enter orders: No [[id number]] ordered by Jud Carrasco APRN CNP at 02/21/18 1222           Attestation signed by Hardik Palacios MD at 2/22/2018  6:28 AM        Attestation:  Physician Attestation   IHardik, saw this patient with the resident and agree with the resident s findings and plan of care as documented in the resident s note.      I personally reviewed vital signs, medications, labs and imaging.    Key findings:   Feeling ill with the \"flu\" 3-4 weeks prior to presentation  History of large PE  Recently discovered PFO on ECHO    Chest CT was reviewed, along with older films.  She has slightly increased bilateral hilar lymphadenopathy. Right larger than left. She has a small, " stable appearing cavitary lesion in RLL.  There are mild ground glass and tree in bud changes scattered around lungs.      The question is whether this stroke and lymphadenopathy could be explained by a systemic illness.      Sarcoidosis seems like a very likely reason for her lymphadenopathy.  She has a family history of it, and it has been present for 2 years at least.  It would be rare for sarcoid to cause a vasculitis with a resultant stroke. After speaking with the primary team, it sound like the leading thought is that this is an embolic event from a clot traveling through a PFO.     I think if we diagnosed her with sarcoid limited to her lungs, treatment would not be warranted at this time. She has a lot of rehab after her stroke and steroids would not help with that process. She will need follow up imaging with potential bronchoscopy in the future as mentioned below.      Will check for fungal serologies to round out differential. Please check ANCA      Hardik Palacios  Date of Service (when I saw the patient): 2/21/18                               [facility]  Pulmonary Consult     Patient:  Maggie Case, Date of birth 1990, Medical record number [id number]  Date of Visit:  02/21/2018    Reason for Consult: Abnormal Chest CT         Assessment and Recommendations:     Ms. Case is a 26 yo female with history of anxiety and PE (in 2015, discontinued anticoagulation 1/2017) who presented to [facility] ICU as a direct transfer from [facility] after being found down and found to have a R MCA stroke with malignant MCA syndrome on 2/17/18 s/p R hemicraniotomy on 2/18. Pulmonary consulted for abnormal Chest CT.    CT Chest on admission shows bilateral hilar adenopathy and bilateral patchy nodular infiltrates. In comparison to[JW1.1] CT chest from 6/2016, bilateral hilar lymph nodes have increased in size. Differential for bilateral hilar adenopathy with patchy infiltrates  include sarcoidosis, fungal infection, or resolving viral infection given recent URI symptoms. Low suspicion for Tb given lack of risk factors and pattern of infiltrates on CT. Patient is currently afebrile and saturating well on room air and there is no urgent need for definitive diagnosis. At this point, feel that patient's rehabilitation from her stroke is more important and would like to avoid further invasive procedures (ie bronchoscopy) in setting of a hemicraniotomy.[JW1.2]    Do note that patient is growing staph and Hflu in her sputum culture from ET tube on 2/19. Unsure if this was acquired after intubation vs present on admission when CT Chest was obtained (and how this relates to infiltrates/LAD). Agree with current course of antibiotics.[JW1.3]    --Recommend obtaining fungal antibodies looking for histoplasmosis/blastomyces  --Obtain HIV if not recently sent  --Will monitor patient's clinical course. If continues to improve, recommend repeat CT Chest in 4 weeks to assess for interval change of infiltrates/lymph nodes and decide need for BAL/lymph node biopsy, this can be done as an outpatient.[JW1.2]    Thank you very much for this consultation. Pulmonary will[JW1.1] follow peripherally.[JW1.2]    Patient staffed with [JW1.1] Suzanne.[JW1.2]    Ibis Pop  Pulmonary and Critical Care Fellow  [id number]        History of Present Illness     Ms. Case is a 28 yo female with history of anxiety and PE (in 2015, discontinued anticoagulation 1/2017) who presented to [facility] ICU as a direct transfer from [facility] after being found down and found to have a R MCA stroke with malignant MCA syndrome on 2/17/18. Pulmonary consulted for abnormal Chest CT.[JW1.1] History obtained from patient's family due to patient somnolence.[JW1.2]    Patient underwent R hemicraniotomy on 2/18/18. Etiology of stroke not entirely clear, embolic in setting of PFO vs thrombotic, undergoing antiphospholipid antibody syndrome  workup under direction of heme/onc. Hospital course complicated by anemia of unclear etiology without evidence of active bleed or hemolysis. On admission, as part of a trauma workup, CT CAP was completed that showed bilateral hilar adenopathy and bilateral patchy infiltrates.[JW1.1]     Per family, patient has had asthma since childhood. Over the last 4 weeks, had complained of emesis, diarrhea, fever, cough, and congestion. Aunt saw patient last week and symptoms were improving.[JW1.2]    Review of Systems:[JW1.1]  Unable to complete due to patient's mental status.[JW1.2]    Past Medical History:   Diagnosis Date     Anemia      Anxiety      Chronic diarrhea      Lactose intolerance      Myalgia      Pulmonary embolism (H) 05/06/16     Vitamin B12 deficiency        Past Surgical History:   Procedure Laterality Date     CLOSED REDUCTION, PERCUTANEOUS PINNING LOWER EXTREMITY, COMBINED Right 4/6/2016    Procedure: COMBINED CLOSED REDUCTION, PERCUTANEOUS PINNING LOWER EXTREMITY;  Surgeon: Dexter Jones MD;  Location: [facility]     CRANIECTOMY Right 2/18/2018    Procedure: CRANIECTOMY;  RIGHT HEMICRANIECTOMY;  Surgeon: Emeterio Mesa MD;  Location:  OR     Family History: Mother with[JW1.1] cardiac[JW1.2] sarcoidosis, uncle with Crohn's disease.[JW1.1]    Social History: Per family, patient is an active smoker. Unsure amount of alcohol use. Previously worked as PCA and . Born in [place]. No travel outside of [place].[JW1.2]         Current Medications & Allergies:       vancomycin (VANCOCIN) IV  1,500 mg Intravenous Q12H     cefTRIAXone  2 g Intravenous Q24H     multivitamins with minerals  15 mL Per Feeding Tube Daily     aspirin  81 mg Per G Tube Daily     folic acid  1 mg Per NG tube Daily     vitamin  B-1  100 mg Per NG tube Daily       Infusions/Drips:    sodium chloride 3%       IV fluid REPLACEMENT ONLY       sodium chloride 100 mL/hr at 02/21/18 0800       Allergies   Allergen Reactions     Amoxicillin  Other (See Comments)     Headaches     Lactose      Levaquin [Levofloxacin] Swelling     Naproxen Other (See Comments)     Reaction: Headaches     Nickel      Tramadol      Sulindac Rash            Physical Exam:   Ranges for vital signs:  Temp:  [98.7  F (37.1  C)-99.1  F (37.3  C)] 98.7  F (37.1  C)  Heart Rate:  [60-86] 69  Resp:  [16] 16  BP: (101-135)/(69-96) 122/74  SpO2:  [99 %-100 %] 100 %  Vitals:    02/17/18 1433 02/20/18 0200 02/21/18 0600   Weight: 65.8 kg (145 lb 1 oz) 71.2 kg (156 lb 15.5 oz) 70.7 kg (155 lb 13.8 oz)       Physical Examination:  GENERAL:[JW1.1]  Sitting up in bed, NAD[JW1.2]  HEAD:[JW1.1]  Head in soft helmet.[JW1.2]  EYES:  Eyes[JW1.1] closed[JW1.2]  LUNGS:  Clear to auscultation bilaterally.   CARDIOVASCULAR:  Regular rate and rhythm   ABDOMEN:  Soft, nontender, nondistended  NEUROLOGIC:[JW1.1]  Somnolent, does not arouse to voice (just received sedating medication)[JW1.2]         Laboratory Data:     Metabolic Studies       Recent Labs   Lab Test  02/21/18   0958  02/21/18   0538  02/21/18   0330   02/20/18   0556   02/19/18   0948   02/17/18   1752   04/29/17   0618   03/19/17   1442   11/06/16   0046   NA  154*   --   158*   < >  158*   < >  164*   < >  142   < >  140   < >  145*   < >  145*   POTASSIUM  4.2  3.3*   --    < >  2.9*   --   3.5   < >  2.7*   < >  3.5   < >  3.0*   < >  3.2*   CHLORIDE   --    --    --    --   132*   --   135*   < >  106   < >  107   < >  111*   < >  111*   CO2   --    --    --    --   20   --   21   < >  22   < >  23   < >  24   < >  24   ANIONGAP   --    --    --    --   7   --   8   < >  15*   < >  10   < >  10   < >  10   BUN   --    --    --    --   11   --   10   < >  7   < >  6*   < >  4*   < >  5*   CR   --    --    --    --   0.52   --   0.59   < >  0.58   < >  0.47*   < >  0.61   < >  0.69   GFRESTIMATED   --    --    --    --   >90   --   >90   < >  >90   < >  >90  Non  GFR Calc     < >  >90  Non  GFR Calc      < >  >90  Non  GFR Calc     GLC   --    --    --    --   137*   --   123*   < >  107*   < >  91   < >  106*   < >  100*   A1C   --    --    --    --    --    --    --    --   5.8   --    --    --    --    --    --    AVINASH   --    --    --    --   7.6*   --   7.3*   < >  8.6   < >  7.5*   < >  8.3*   < >  7.7*   PHOS   --    --   3.7   < >   --    < >   --    < >   --    --    --    < >   --    --    --    MAG   --    --   1.9   < >   --    < >   --    < >   --    < >   --    < >  1.8   < >   --    LACT   --    --    --    --    --    --    --    --    --    --   0.7   --   1.3   --    --    CKT   --    --    --    --    --    --    --    --    --    --    --    --   96   --   85    < > = values in this interval not displayed.     Hematology Studies      Recent Labs   Lab Test  02/21/18   0330  02/20/18 2024 02/20/18   1143  02/20/18   0556  02/20/18   0150   02/19/18   1715   02/17/18   1107   WBC  15.9*  16.8*  17.9*  17.6*  18.3*   --   16.6*   < >  21.2*   ANEU   --    --   13.0*   --    --    --    --    --   17.7*   ALYM   --    --   1.9   --    --    --    --    --   1.8   JOHNNY   --    --   1.8*   --    --    --    --    --   1.4*   AEOS   --    --   0.0   --    --    --    --    --   0.0   HGB  8.7*  8.9*  9.5*  9.6*  9.6*   < >  5.7*   < >  14.4   HCT  26.9*  27.5*  29.5*  30.3*  30.1*   < >  18.7*   < >  42.4   PLT  265  274  286  286  307   --   411   < >  779*    < > = values in this interval not displayed.       Clotting Studies    Recent Labs   Lab Test  02/20/18   1143  02/17/18   1752  02/17/18   1107  05/08/16   0435   INR  1.20*  1.01  1.18  1.00   PTT  30  <20*  22*   --      Microbiology:  Last 6 Culture results with specimen source  Culture Micro   Date Value Ref Range Status   02/19/2018 Light growth  Normal beatriz    Preliminary   02/19/2018 (A)  Preliminary    Heavy growth  Staphylococcus aureus  Susceptibility testing in progress     02/19/2018 (A)  Preliminary    Heavy  growth  Haemophilus influenzae  Beta lactamase negative  Beta-lactamase negative Haemophilus influenzae are usually susceptible to ampicillin,   amoxacillin/clavulanic acid, levofloxacin, and 3rd generation cephalosporins, such as   ceftriaxone.     02/19/2018 No growth after 2 days  Preliminary   02/19/2018 No growth after 2 days  Preliminary   02/19/2018 >100,000 colonies/mL  Escherichia coli   (A)  Final    Specimen Description   Date Value Ref Range Status   02/19/2018 Sputum Endotracheal  Final   02/19/2018 Sputum Endotracheal  Final   02/19/2018 Blood Right Hand  Final   02/19/2018 Blood Blue port  Final   02/19/2018 Catheterized Urine  Final   02/18/2018 Bone RIGHT SKULL FLAP  Final        Imaging:  Recent Results (from the past 48 hour(s))   CT Head w/o Contrast    Narrative    CT HEAD W/O CONTRAST 2/20/2018 4:58 AM    History: F/U Right MCA Territory Infarction; F/U Cerebral Edema;     Comparison: CT head dated 2/19/2018.    Technique: Using multidetector thin collimation helical acquisition  technique, axial, coronal and sagittal CT images from the skull base  to the vertex were obtained without intravenous contrast.     Findings:    Postoperative changes of right hemicraniectomy with overlying  subcutaneous emphysema in the skin staples. Redemonstration of large  hypoattenuating area involving the right frontal, parietal and  temporal lobes. There is a slightly increased anterior horn effacement  of the left lateral ventricle. There is right to left subfalcine  herniation and medial deviation of the right uncus. No new infarction  or hemorrhagic transformation identified. The basal cisterns are  patent.    Continued opacification of the paranasal sinuses. The mastoid air  cells are clear.       Impression    Impression:   1.  Stable postoperative changes of right hemicraniectomy with  improved subcutaneous emphysema.  2.  2. Persistent mass effect with right to left subfalcine herniation  and medial  deviation of the right uncus.    I have personally reviewed the examination and initial interpretation  and I agree with the findings.    EL SCHROEDER MD[JW1.1]          Revision History        User Key Date/Time User Provider Type Action    > JW1.3 2/21/2018  6:11 PM Ibis Pop MD Fellow Sign     JW1.2 2/21/2018  5:35 PM Ibis Pop MD Fellow      JW1.1 2/21/2018 12:48 PM Ibis Pop MD Fellow             Consults by Nora Blue MD at 2/20/2018  9:58 AM     Author:  Nora Blue MD Service:  Physical Medicine and Rehabilitation Author Type:  Physician    Filed:  2/20/2018  3:44 PM Date of Service:  2/20/2018  9:58 AM Creation Time:  2/20/2018  9:58 AM    Status:  Signed :  Nora Blue MD (Physician)         [facility]   PM&R CONSULT    Consulting Provider:[LM1.1] Dr Covarrubias[FI1.1]   Reason for Consult: Assessment of rehabilitation   Location of Patient:[LM1.1] 4 ICU[FI1.2]   Date of Encounter: 2/20/2018   Date of Admission: 2/17/2018        ASSESSMENT/PLAN:    Ms. Maggie Case is a Right handed  27 year old yo female PMH anxiety, anemia, pulmonary embolism (in 2015 dc'ed anticoagulation 1/2017), alcohol use disorder who presents with a right MCA stroke with resultant deficits in mobility, LUE/LLE strength, decreased endurance, fatigue, impaired processing, impaired LUE/LLE function cognition, impaired LUE/LLE coordination, impaired mobility, impaired ADL/IADLs.   Patient's rehabilitation potential is high. Her recovery and prognosis continues to be good given age and etiology of stroke. We anticipate with the resolution of the cerebral edema that she will have better awareness and motor recovery. We will continue to follow, but preliminarily we believe she is an appropriate acute inpatient rehabilitation candidate.[LM1.1]      Attending's note   Thank you for allowing us to participate in the care of this patient.    We were asked to see this patient by Dr Covarrubias to evaluate rehabilitation needs.   Patient has been seen, examined and evaluated by me independently. I reviewed today's vitals signs, lab values, imaging, medications, and current issues including medical, functional and social history significant to this admission.      I agree with the above note which reflects my direct input and interpretation of progress.     Primary issues/problems today are as follows[FI1.1]  Maggie Case[FI1.3] is a young[FI1.1] 27 year old[FI1.3] female, right handed who presents with left sided weakness, LUE flaccid and LLE with antigravity strength, hemineglect, left facial drrop, dysarthria, NG tube Maria catheter, secondary to right MCA stroke with RM1 occlusion with malignant MCA syndrome s/p decompressive hemicraniectomy, likely Embolic given her history of PE in the setting of PFO.   She is sitting in the chair today during my encounter, with a soft naheed.   She is able to communicate despite the dysarthria   She tires easily.   She follows commands consistently   Per my review of the chart, she is starting to be more awake and alert    While she has support in her mother and sister, she has been staying with various friends. 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 She has 9 years old daughter.   Not clear about her discharge disposition.     Given her exam and trajectory likely will be appropriate for a ARU setting on discharge. We will continue to follow along.        My floor time today for this patient;  70 minutes, more than 50% of which was spent in coordination of care and counseling.     Nora Blue MD[FI1.1]          HPI:[LM1.1]    Maggie Case 27 year old female[LM1.2] with a PMH anxiety, anemia, pulmonary embolism (2015 discontinued anticoagulation 1/2017 after inconsistent use and lack of thrombus on CT)   Presented to the [facility] ICU as adirect transfer from [facility] after sustaining a right MCA stroke 2/17. This  stroke occurred after alcohol consumption the prior night.  She underwent right hemicraniectomy on 2/18. Imaging has revealed a evolving right MCA infarct; 2/18 TTE showed a PFO on bubble study; 2/19 CTH showed decompressive hemicraniectomy with right lateral ventricle filling and follow 2/20 CTH showed stable interval changes.      PREVIOUS LEVEL OF FUNCTION   Previously independent for all ADL/IADLs, mobility and cognition.      CURRENT FUNCTION   OT: Requiring mod Assit for log rolling, total depedence for transfer requiring ceiling lift, requiring max assist. Requires max assist of 1-2 for supine to sit transfer. Total assist for donning helmet and socks.  PT: Total assist for transfer, total assist for gait, decreased endurance.  SLP: pending evaluation, currently receiving nutrition through tubefeeds, impaired cognition and processing.    LIVING SITUATION/SUPPORT  Patient has a 10 y/o daughter and has shared custody. Pt is noted to have a strained relationship with her family and ex-boyfriend (daughter's father). Living situation is uncertain whether with family or alone in mobile home. Noted to have alcohol dependence.    SOCIAL HISTORY  Social History     Social History     Marital status: Single     Spouse name: N/A     Number of children: N/A     Years of education: N/A     Social History Main Topics     Smoking status: Current Every Day Smoker     Packs/day: 0.25     Years: 7.00     Types: Cigarettes     Smokeless tobacco: Never Used     Alcohol use 0.0 oz/week     0 Standard drinks or equivalent per week      Comment: 1-2/week ,      Drug use: Yes     Special: Marijuana      Comment: adderall, yest     Sexual activity: Yes     Partners: Male     Other Topics Concern     Not on file     Social History Narrative       Past Medical History:[LM1.1]  Past Medical History:   Diagnosis Date     Anemia      Anxiety      Chronic diarrhea      Lactose intolerance      Myalgia      Pulmonary embolism (H) 05/06/16      Vitamin B12 deficiency[LM1.2]            Current Medications:[LM1.1]  Current Facility-Administered Medications   Medication     lidocaine (PF) (XYLOCAINE) 1 % injection 100 mg     oxyCODONE IR (ROXICODONE) tablet 5-10 mg     hydrALAZINE (APRESOLINE) injection 10-20 mg     labetalol (NORMODYNE/TRANDATE) injection 5-10 mg     sulfamethoxazole-trimethoprim (BACTRIM DS/SEPTRA DS) 800-160 MG per tablet 1 tablet     dextrose 10 % 1,000 mL infusion     multivitamins with minerals (CERTAVITE/CEROVITE) liquid 15 mL     sodium chloride 0.9% infusion     naloxone (NARCAN) injection 0.1-0.4 mg     potassium chloride SA (K-DUR/KLOR-CON M) CR tablet 20-40 mEq     potassium chloride (KLOR-CON) Packet 20-40 mEq     potassium chloride 10 mEq in 100 mL sterile water intermittent infusion (premix)     potassium chloride 10 mEq in 100 mL intermittent infusion with 10 mg lidocaine     potassium chloride 20 mEq in 50 mL intermittent infusion     magnesium sulfate 4 g in 100 mL sterile water (premade)     potassium phosphate 15 mmol in sodium chloride 0.9 % 250 mL intermittent infusion     potassium phosphate 20 mmol in sodium chloride 0.9 % 500 mL intermittent infusion     potassium phosphate 20 mmol in sodium chloride 0.9 % 250 mL intermittent infusion     potassium phosphate 25 mmol in sodium chloride 0.9 % 500 mL intermittent infusion     aspirin chewable tablet 81 mg     ondansetron (ZOFRAN) injection 4 mg     folic acid (FOLVITE) tablet 1 mg     thiamine tablet 100 mg     acetaminophen (TYLENOL) solution 650 mg[LM1.2]         Review of Systems:  Patient is unable to answer review of systems questions during examination.          Labs[LM1.1]   Lab Results   Component Value Date    WBC 17.6 (H) 02/20/2018    HGB 9.6 (L) 02/20/2018    HCT 30.3 (L) 02/20/2018    MCV 92 02/20/2018     02/20/2018     Lab Results   Component Value Date     (H) 02/20/2018    POTASSIUM 2.9 (L) 02/20/2018    CHLORIDE 132 (H) 02/20/2018     CO2 20 02/20/2018     (H) 02/20/2018     Lab Results   Component Value Date    GFRESTIMATED >90 02/20/2018    GFRESTBLACK >90 02/20/2018     Lab Results   Component Value Date    AST 49 (H) 02/17/2018    ALT 36 02/17/2018    ALKPHOS 120 02/17/2018    BILITOTAL 0.4 02/17/2018     Lab Results   Component Value Date    INR 1.01 02/17/2018     Lab Results   Component Value Date    BUN 11 02/20/2018    CR 0.52 02/20/2018[LM1.2]         ON EXAMINATION:[LM1.1]  Vitals:    02/20/18 0600 02/20/18 0700 02/20/18 0800 02/20/18 0900   BP: 130/88 127/85 133/85    BP Location: Left arm Left arm     Resp:   16    Temp:   97.4  F (36.3  C)    TempSrc:   Axillary    SpO2: 99% 99% 100% 99%   Weight:[LM1.2]           Physical Exam:[LM1.1]  Blood pressure 133/85, temperature 97.4  F (36.3  C), temperature source Axillary, resp. rate 16, weight 71.2 kg (156 lb 15.5 oz), SpO2 99 %.[LM1.2]    GEN: seated comfortably in chair, tired  Speech is one word, hypophonic, using right hand to signal yes an no  Comprehension is variable depending on alertness  HEENT: NCAT  RESPIRATORY: nonlabored breathing on room air  CARDIAC: perfusing all extremities  MSK: full active and passive ROM at all major joints of the bilaterally upper and lower extremities    ABD: soft, non tender  NEURO:   CRANIAL NERVES: left facial droop, right gaze preference, PERRL, dysarthria,    Sensation: intact on right side   Strength: 4/5 RUE/RLE, 3/5 LLE, 0/5 LUE, (tested in elbow flexion/extension, wrist extension, long finger flexion, finger abduction, hip flexion, knee extension, ankle dorsiflexion, plantarflexion)   Gait: deferred   Cognition: delayed processing, answers yes/no questions  SKIN: no rashes or lesions noted.  Patient has central line.   EXT: edema bilaterally, no ulcers.   PSYCH: flat affect.      Thank you for the consult. Please call for any questions. Pager number [phone]     Javi Franco      Total of 70 min spent in this encounter, more  than 50% in counseling and coordination.   Patient discussed with attending Dr. Blue.[LM1.1]     Revision History        User Key Date/Time User Provider Type Action    > FI1.3 2018  3:44 PM Nora Blue MD Physician Sign     FI1.1 2018  3:34 PM Nora Blue MD Physician Incomplete Revision     FI1.2 2018  3:32 PM Nora Blue MD Physician      LM1.2 2018 10:27 AM Javi Franco MD Resident Sign     LM1.1 2018  9:58 AM Javi Franco MD Resident             Consults by Keely Maloney MSW at 2018  2:27 PM     Author:  Keely Maloney MSW Service:  Social Work Author Type:      Filed:  2018  3:03 PM Date of Service:  2018  2:27 PM Creation Time:  2018  2:27 PM    Status:  Signed :  Keely Maloney MSW ()     Consult Orders:    1. Social Work IP Consult [[id number]] ordered by Aminah Chilel MD at 18 0640                Social Work: Assessment with Discharge Plan    Patient Name:  Maggie Case  :  1990  Age:  27 year old  MRN:  [id number]  Risk/Complexity Score:  Filed Complexity Screen Score: 6  Completed assessment with:  Pt's mother, sister, and aunt; chart review    Presenting Information   Reason for Referral:  Stroke consult  Date of Intake:  2018  Referral Source:  Physician  Decision Maker:  Pt, at baseline  Alternate Decision Maker:  Per [facility] policy, Pt's mother and father are surrogate decision-makers  Health Care Directive:  Provided education  Living Situation:  Pt has been staying with various friends and does not have her own home. Pt's 8 y/o daughter has been with Pt, but also has care from her father and Pt's parents.  Previous Functional Status:  Independent; Pt has been independent with ambulation and self-care. 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 Per family, Pt is an alcoholic and uses medications inappropriately  for many years.  Patient and family understanding of hospitalization:  Pt's family is aware of Pt's stroke and is concerned about her recovery. Pt's family is a bit overwhelmed with information. Writer provided support and encouraged them to take their time to process everything and ask for information as needed.   Cultural/Language/Spiritual Considerations:  Pt's primary language is English.  Adjustment to Illness:  Pt is starting to be more alert but is not yet fully aware of her situation.    Physical Health  Reason for Admission:  No diagnosis found.  Services Needed/Recommended:  Other:  Rehab will likely be required, pending further workup.    Mental Health/Chemical Dependency  Diagnosis:  Pt has a history of substance use disorder and major depression.  Support/Services in Place:  Per mother, Pt's PCP diagnosed depression some years ago, but Pt refused the recommended medications. She has also refused recommendations for CD treatment.  Services Needed/Recommended:  CD treatment, counseling, medications, support.    Support System  Significant relationship at present time:  Daughter, parents, siblings, aunt  Family of origin is available for support:  Pt has strained relationships with her family, however they are supportive and at bedside at this time.  Other support available:  Limited  Gaps in support system:  Pt has been living with friends whom her family describes as alcoholics and states that they encourage Pt's habits.  Patient is caregiver to:  Child:  Pt is the primary  of her 10 y/o daughter, Ruby.     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  Provided education on discharge plan:  YES  Patient agreeable to discharge plan:  Pt's family is overwhelmed at this time with Pt's current situation, but understands that she will likely require some level of rehab.  A list of Medicare Certified Facilities was provided to the patient and/or family to encourage patient choice. Patient's choices for facility are:  N/A; level of rehab not determined.  Will NH provide Skilled rehabilitation or complex medical:  N/A  General information regarding anticipated insurance coverage and possible out of pocket cost was discussed. Patient and patient's family are aware patient may incur the cost of transportation to the facility, pending insurance payment: Pt's family is not certain if Pt has insurance, but her aunt thought she had [employer]. Writer spoke to Registration who confirmed that Pt does have a policy and will update the record.  Barriers to discharge:  Medical clearance, discharge disposition.    Discharge Recommendations   Anticipated Disposition:  pending further workup; will likely require rehab.  Transportation Needs:  Other:  pending needs at time of transport  Name of Transportation Company and Phone:  N/A    Additional comments   Writer attempted to meet with Pt at bedside, however she was sleeping. Pt's mother, Pawan, sister, Fernando, and aunt, Maryjo, were at bedside. Per family, Pt had been more alert earlier in the day but seemed tired after her OT session. Writer accompanied Pt's family to the visitor lounge while Pt was being moved back to bed by the RN. Pt's family was emotional as they discussed Pt's history. They stated that Pt had stopped talking to her parents one year ago when she got angry with them, which is a pattern she has repeated through her life. Per family, Pt has been a heavy drinker for many years and also takes prescription  medications inappropriately, often seeking medications for a high.     Pt had moved out of her parents house, with her daughter, one year ago and has not had stable housing since. Per family, she has been living with a couple of friends who are also alcoholic and has isolated herself from family and other friends. Pt's daughter is in school and spends weekends with her father. She also spends time with Pt's parents. Per family, Pt's daughter's father has a good relationship with Pt's family and Pt's family is supportive of his desire to obtain custody of Pt's daughter. They do not wish to pursue legal action at this time, as Pt's daughter is doing well and they do not wish to cause Pt to panic, especially while she is sick. Pt has been agreeable to having her daughter's father and other family in her daughter's life so far.     At this time, the family is hoping that Pt will recover to participate in rehab and that she will then agree to CD treatment. Pt's mother is worried that Pt will return to her former habits, but is trying to stay positive and supportive. SW offered to remain available and informed family that SW and CCRN will be present on each unit to work with Pt and family toward discharge planning.      Keely Maloney [facility]  ICU   Pager: [phone][KB1.1]     Revision History        User Key Date/Time User Provider Type Action    > KB1.1 2/20/2018  3:03 PM Keely Maloney, MSW  Sign            Consults by Anay Rico MD at 2/19/2018  3:15 PM     Author:  Anay Rico MD Service:  Hem/Onc Author Type:  Physician    Filed:  2/19/2018 11:20 PM Date of Service:  2/19/2018  3:15 PM Creation Time:  2/19/2018  6:41 PM    Status:  Signed :  Anay Rico MD (Physician)     Consult Orders:    1. Hematology IP Consult: Patient to be seen: Routine - within 24 hours; Call back #: [phone]; History of PE and now admtted for R MCA stroke at young age;  Consultant may enter orders: Yes [[id number]] ordered by Reinier Shepherd MD at 02/19/18 1438                   [facility] Hematology-Oncology New Consult          Maggie Case MRN# [id number]   Age: 27 year old YOB: 1990   Date of Admission: 2/17/2018     Reason for consult:[AK1.1] Hx of PE and new thrombotic Stroke, Thrombophilia work up  Requested by:[AK1.2] Reinier Shepherd MD[AK1.3]         Assessment and Recommendations:     Assessment:[AK1.1]  #Hx of unprovoked PE[AK1.3] not on AC[AK1.4]  #Rt MCA stroke from s/p Craniotomy POD#2  #PFO with no DVT[AK1.3]  #Normocytic Anemia  -Acute    27-year-old female with a history of unprovoked PE in December 2015 not on anticoagulation since June 2016, presented with new onset right MCA stroke.  CT scan showing occlusion of the right middle cerebral artery and rt right anterior cerebral artery territory infarction consistent with a thrombotic stroke, and 50% stenosis of the proximal right internal carotid artery.  She is now status post right-sided javi-craniectomy. Stroke workup revealing PFO, with negative Doppler of all 4 extremities.  Urine toxicology was negative. she has no family history of clots and no history of pregnancy related complications including recurrent abortions or preeclampsia.  The distribution of the stroke is more consistent with a thrombotic etiology and is less likely to be embolic origin from VTE (through PFO).    Antiphospholipid antibody syndrome would probably be at the top of differential diagnosis in this young patient with a history of unprovoked PE and a new stroke.  Other risk factors for clotting include active smoking. Ultimately the patient will require lifelong anticoagulation when safe to start.  Her admission Hb was 13 and last Hb was 7 today. EBL in surgery per notes was 500 ml. Unclear why her Hb dropped 6 gm, some amount of hemodilution may also be responsible. No evidence of active  bleeding.[AK1.4]       Recommendations:[AK1.1]  -Start DVT prophylaxis with Lovenox 40 mg daily when safe  -[AK1.3]Send[AK1.4] Antiphospholipid Antibody syndrome screening[AK1.3] labs[AK1.4]- Anti-lupus anticoagulant, Anti-B2 glycoprotein and Anti-Cardiolipin Abs (IgG and IgM)  -Do not recommend testing for thrombophilia (Factor 5, Prothrombin gene mut, Protein C def, Protein S def, AT III-as these are risk factors for VTE and not arterial clots[AK1.3])  -Continue ASA and general stroke management[AK1.4]        Pt seen and discussed with [AK1.1] Jacky[AK1.3] who agrees with the plan.    Please don't hesitate to call us with any questions    Chintan Patrick MD  Hematology Oncology fellow  [phone][AK1.1]    Attending Note:  I have reviewed the patient chart, and interviewed and examined the patient.  I agree with the assessment and plan. The appearance on Ct angiogram are of thrombosis or large plaque at the R carotid bulb a dn 2 cm proximal proxima R internal carotid artery. This makes embolic phenomenon less likely. In a young person with h/o PE and also a stroke, antiphospholipid is the first thing that comes to mind. This is important to assess for, because would warrant anticoagulation rather than aspirin, and also possible immunosuppression. It is also interesting that on the CAP CT she had on presentation to [facility] ED, there are cavitary lung lesions. She may have a chronic inflammatory state that is placing her at risk for thrombosis. When she is much more stable, evaluation of this should be pursued.   Anay Rico MD  Hematology[YD1.1]        HPI:[AK1.1]     Maggie Case is a 27 year old female with a Hx of PE (?unprovoked) with reported poor compliance of Coumadin and self discontinuation[AK1.2].[YD1.1]  Histo[AK1.2]ry of[YD1.1] alcohol[AK1.2] abuse[YD1.1] who was transferred from[AK1.2] [facility][YD1.1] with concern for malignant MCA syndrome.  Per chart review the patient was drinking with  friends[AK1.2] Friday night and was flund lying on the floor and apparent[AK1.4] with left sided weakness. She was then brought in by ambulance to[AK1.2] [facility][AK1.4].[AK1.2]  Initial[AK1.4]  Head CT was concerning with evidence of dense right MCA territory[AK1.2] stroke[AK1.4].  Patient transferred to [facility] for further cares.[AK1.2] Most Hx obtained from chart review and from family.[AK1.4]  Per mother patient has been struggling with alcoholism for years and has been distant from the family for the last year.[AK1.2]   Relevant Hematology Hx as below:[AK1.4]  12/25-Large PE on rt PA and lt lingular aretery,[AK1.1] unclear if[AK1.4] hypercoaguable work up[AK1.1] done (in [place])[AK1.4]; started on Lovenox and bridged with coumadin, poor compliance with coumadin.  05/2016- CT angio for chest pain and SOB showed near resolution of rt sided clot; due to poor compliance was asked to switch to Rivaroxiban  06/2016-[AK1.1] Not sure if she ever started[AK1.4] Rivaroxiban[AK1.1]     The family denies any hx of abortions, preeclampsia during the pregnancy. Mother does endorse some easy skin bruising. She is also an active smoker with 1/2 to 1 PPD for last 10-12 yrs. She lives with roommate, has a 9 yr child with ex boyfriend. She used to work as .    Hospital course: Pt was admitted Saturday. She underwent rt hemicraniectomy on Sunday due to worsening edema and midline shift. She was on hypertonics for a while. Stroke work up done showed PFO and no DVT in all 4 extremities. Normal LDL, normal Urine tox screen. Ct angio showed thrombosis in anterior circulation with involvement of rt MCA and LISSETTE territory.[AK1.4]       Review of system:[AK1.1] Could not be obtained as pt was lethargic[AK1.4]         Past Medical History:[AK1.1]     Past Medical History:   Diagnosis Date     Anemia      Anxiety      Chronic diarrhea      Lactose intolerance      Myalgia      Pulmonary embolism (H) 05/06/16     Vitamin B12  deficiency[AK1.5]              Past Surgical History:[AK1.1]     Past Surgical History:   Procedure Laterality Date     CLOSED REDUCTION, PERCUTANEOUS PINNING LOWER EXTREMITY, COMBINED Right 4/6/2016    Procedure: COMBINED CLOSED REDUCTION, PERCUTANEOUS PINNING LOWER EXTREMITY;  Surgeon: Dexter Jones MD;  Location: [facility]     CRANIECTOMY Right 2/18/2018    Procedure: CRANIECTOMY;  RIGHT HEMICRANIECTOMY;  Surgeon: Emeterio Mesa MD;  Location: [facility] OR[AK1.5]             Social History:[AK1.1]     Social History     Social History     Marital status: Single     Spouse name: N/A     Number of children: N/A     Years of education: N/A     Occupational History     Not on file.     Social History Main Topics     Smoking status: Current Every Day Smoker     Packs/day: 0.25     Years: 7.00     Types: Cigarettes     Smokeless tobacco: Never Used     Alcohol use 0.0 oz/week     0 Standard drinks or equivalent per week      Comment: 1-2/week ,      Drug use: Yes     Special: Marijuana      Comment: adderall, yest     Sexual activity: Yes     Partners: Male     Other Topics Concern     Not on file     Social History Narrative[AK1.5]   Per family: previously heavy smoker, cut down slightly, 0.5-1 PPD for last 12-15 yrs  Heavy drinker previously, alcoholic,2-3 drinks per day minimum, trying to cut back recently    Has 9 yr old kid, seperated form [AK1.2]          Family History:[AK1.1]   No family history on file.[AK1.5]   Mom: CAD and MV replacement  Maternal Aunt: stroke  GM: Diabetes  No Hx of cancer or VTE in family[AK1.2]          Allergies:   .[AK1.1]   Allergies   Allergen Reactions     Amoxicillin Other (See Comments)     Headaches     Lactose      Levaquin [Levofloxacin] Swelling     Naproxen Other (See Comments)     Reaction: Headaches     Nickel      Tramadol      Sulindac Rash[AK1.5]             Medications:[AK1.1]     Current Facility-Administered Medications   Medication     HYDROmorphone (PF)  "(DILAUDID) injection 0.2-0.4 mg     oxyCODONE IR (ROXICODONE) tablet 5-10 mg     hydrALAZINE (APRESOLINE) injection 10-20 mg     labetalol (NORMODYNE/TRANDATE) injection 5-10 mg     sulfamethoxazole-trimethoprim (BACTRIM DS/SEPTRA DS) 800-160 MG per tablet 1 tablet     aspirin chewable tablet 81 mg     ondansetron (ZOFRAN) injection 4 mg     folic acid (FOLVITE) tablet 1 mg     thiamine tablet 100 mg     acetaminophen (TYLENOL) solution 650 mg[AK1.5]           Vital signs:[AK1.1]  Temp: 99.8  F (37.7  C) Temp src: Axillary BP: 120/80   Heart Rate: 89 Resp: 18 SpO2: 100 % O2 Device: None (Room air) Oxygen Delivery: 2 LPM   Weight: 65.8 kg (145 lb 1 oz)  Estimated body mass index is 24.9 kg/(m^2) as calculated from the following:    Height as of 4/28/17: 1.626 m (5' 4\").    Weight as of this encounter: 65.8 kg (145 lb 1 oz).[AK1.5]    Physical exam:    Gen:[AK1.1] Pt is lethargic and not responds to any commands.[AK1.4]  HEENT: Mucous Membrane Moist, no oral lesions, no pallor, icterus[AK1.1]. Large bandage on head, with drain, scant serosanguinous fluid.[YD1.1]   Neck: supple,   Lymph Nodes: no cervical,LAD   CVS: Regular Rate Rhythm, no murmurs  Resp: CTA, no added sounds  GI: Soft, non-tender, no Hepatospleenomegaly  Extremities: no edema[AK1.1]   Skin: has bruises in the lt leg and thighs, old[AK1.4]  Neuro:[AK1.1] Moves rt extremity spontaneously, but no purposeful movements. PEERLA[AK1.4]         Data:   The labs and imaging were reviewed.      .[AK1.1]  Recent Results (from the past 24 hour(s))[AK1.5]       Lab Results   Component Value Date    WBC 17.4 02/19/2018     Lab Results   Component Value Date    RBC 2.36 02/19/2018     Lab Results   Component Value Date    HGB 7.0 02/19/2018     Lab Results   Component Value Date    HCT 22.8 02/19/2018     No components found for: MCT  Lab Results   Component Value Date    MCV 97 02/19/2018     Lab Results   Component Value Date    MCH 29.7 02/19/2018     Lab Results "   Component Value Date    MCHC 30.7 02/19/2018     Lab Results   Component Value Date    RDW 19.6 02/19/2018     Lab Results   Component Value Date     02/19/2018         Last Basic Metabolic Panel:  Lab Results   Component Value Date     02/19/2018      Lab Results   Component Value Date    POTASSIUM 3.5 02/19/2018     Lab Results   Component Value Date    CHLORIDE 135 02/19/2018     Lab Results   Component Value Date    AVINASH 7.3 02/19/2018     Lab Results   Component Value Date    CO2 21 02/19/2018     Lab Results   Component Value Date    BUN 10 02/19/2018     Lab Results   Component Value Date    CR 0.59 02/19/2018     Lab Results   Component Value Date     02/19/2018[AK1.1]   EXAM:CTA ANGIOGRAM HEAD NECK      CLINICAL HISTORY: Patient Age  27 years Additional clinical info:  Trauma;      COMPARISON: Head CT without contrast 2/17/2018       TECHNIQUE: CT angiographic technique including angiographic and 5  minute delayed images     CONTRAST: isovue 370; 75 ml     FINDINGS: The early angiographic phase shows nonopacification of the  right middle cerebral artery consistent with thrombosis. The delayed  images do show some contrast in the left middle cerebral artery and  more distal MCA distribution which could be due to collateral flow.  Again seen is a acute hemispheric infarct of the right middle cerebral  artery distribution with a large area of low attenuation in the right  MCA distribution and associated sulcal effacement and effacement of  the right lateral ventricle. No midline shift. No hemorrhage  identified. No mass identified.     The left posterior cerebral circulation appears intact.     In the neck, there is a long segment of a filling defect on the medial  side of the right carotid bulb and right internal carotid artery which  results in at least a 50% diameter reduction. This is best seen on the  coronal reformatted images, images #103 through 109. This involves a  segment  measuring approximately 2 cm in length.     The bilateral common carotid arteries, vertebral arteries, left  internal and external carotid arteries, and right external carotid  artery are patent. No arterial beading identified. No aneurysm  identified.     Marked paranasal sinusitis. Ankylosis of the occiput through C2.             IMPRESSION:   1. Occluded right middle cerebral artery at its origin consistent with  thrombosis.  2. At least 50% diameter reduction of the right carotid bulb and 2 cm  of the proximal right internal carotid artery caused by thrombosis or  large irregular plaque.  3. Large acute appearing right middle cerebral artery infarct, as  described on the unenhanced CT.   4. Other incidental findings as described.     Findings were discussed with Dr. Shanks at 1240 hours on 2/17/2018     JUSTICE LEPE MD[YD1.1]      Results for orders placed or performed during the hospital encounter of 02/17/18   CT Head w/o Contrast    Addendum: 2/18/2018    There is loss of gray-white of the parasagittal superior right frontal  region which likely is a component of the right anterior cerebral  artery territory.    EL SCHROEDER MD      Narrative    CT HEAD W/O CONTRAST 2/17/2018 8:17 PM    Provided History: R MCA stroke;     Comparison: CT 2/17/2018.    Technique: Using multidetector thin collimation helical acquisition  technique, axial, coronal and sagittal CT images from the skull base  to the vertex were obtained without intravenous contrast.     Findings:    Remonstration of findings of right MCA stroke with loss of gray-white  differentiation in the right frontal, parietal, and temporal lobes.  Edema in the right cerebral hemisphere with sulci effacement. The  right uncus is medially deviated and has mild mass effect on the right  cerebral peduncle. There is mild right to left midline shift at 2 mm.  No intracranial hemorrhage. The ventricles are proportionate to the  cerebral sulci. The basal cisterns  are patent.    Mucosal thickening and fluid levels in the maxillary sinuses, ethmoid  air cells, sphenoid sinuses, and frontal sinuses . The mastoid air  cells are clear.       Impression    Impression:  Further evolution of right MCA stroke with loss of gray-white  differentiation and effacement of sulci in the right frontal,  parietal, and temporal lobes. Mild right-to-left midline shift at 2  mm. Medial deviation of the right uncus without rachel herniation..    I have personally reviewed the examination and initial interpretation  and I agree with the findings.    EL SCHROEDER MD   XR Chest Port 1 View    Narrative    XR CHEST PORT 1 VW  2/17/2018 6:45 PM      HISTORY: Central line confirmation and NG placement;     COMPARISON: Earlier 2/17/2018    FINDINGS: Enteric tube sidehole projects over the body of the stomach.  Contrast within the renal collecting systems. No pneumothorax or  pleural effusion. Cardiac silhouette is not enlarged. Left upper  extremity Central venous catheter tip projects over the mid SVC. No  focal airspace opacities. No acute osseous abnormalities.      Impression    IMPRESSION:   1. Left upper extremity approach central venous catheter tip projects  over the mid SVC.  2. Enteric tube sidehole projects over the body of the stomach.  3. Contrast within the renal collecting systems, concerning for  delayed nephrogram/renal dysfunction.    I have personally reviewed the examination and initial interpretation  and I agree with the findings.    JOSE RIOS MD   XR Chest 1 View    Narrative    XR CHEST 1 VW  2/17/2018 10:38 PM      HISTORY: Line placement;     COMPARISON: Chest x-ray and CT from earlier today.    FINDINGS: Left subclavian central line tip at the low SVC. Gastric  tube tip and sidehole project over the stomach. Cardiac silhouette is  within normal limits. No pneumothorax or pneumothorax. Low lung  volumes. Subtle nodular opacities in the right lung apex and bilateral  lung  bases, as seen on 2/17/2018 CT.       Impression    IMPRESSION:  1. Left subclavian central line tip at the lower SVC.   2. Subtle bibasilar and right apical opacities, as demonstrated on  2/17/2018 CT.    I have personally reviewed the examination and initial interpretation  and I agree with the findings.    GEORGIANA LUO MD   CT Head w/o Contrast    Addendum: 2/18/2018    There is hypodense involvement of the parasagittal superior right  frontal region which likely is a component of the right anterior  cerebral artery territory.    EL SCHROEDER MD      Narrative    CT HEAD W/O CONTRAST 2/18/2018 6:41 AM    Provided History: R MCA stroke;     Comparison: CT 2/17/2018.    Technique: Using multidetector thin collimation helical acquisition  technique, axial, coronal and sagittal CT images from the skull base  to the vertex were obtained without intravenous contrast.     Findings:    Further evolution of right MCA stroke with loss of gray-white  differentiation in the right frontal, parietal, and temporal lobes.  Edema in the right cerebral hemisphere with sulcal effacement which is  increased. The right uncus is medially deviated and has mild mass  effect on the right cerebral peduncle. There is mild right to left  midline shift at 2 mm. No intracranial hemorrhage. The ventricles are  proportionate to the cerebral sulci. The basal cisterns are patent.    Mucosal thickening and fluid levels in the maxillary sinuses, ethmoid  air cells, sphenoid sinuses, and frontal sinuses . The mastoid air  cells are clear.       Impression    Impression:  Further evolution of right MCA infarct in the right frontal, parietal,  and temporal lobes and right insula. Mild right-to-left midline shift  at 2 mm. Medial deviation of the right uncus without rachel herniation.    I have personally reviewed the examination and initial interpretation  and I agree with the findings.    EL SCHROEDER MD   CT Head w/o Contrast    Narrative     CT HEAD W/O CONTRAST 2/18/2018 5:13 PM    Provided History: patient with large R MCA stroke and decreased level  of arousal; evaluate for interval change in edema;     Comparison: 2/18/2018.    Technique: Using multidetector thin collimation helical acquisition  technique, axial, coronal and sagittal CT images from the skull base  to the vertex were obtained without intravenous contrast.     Findings:  Relatively stable findings of right MCA stroke with loss of  gray-white differentiation in the right frontal, parietal, temporal  lobes. Stable hypodensity in the parasagittal right frontal lobe which  likely is the anterior cerebral artery territory. Edema in the right  cerebral hemisphere continues to cause sulcal effacement and  compression of the right lateral ventricles. Minimal 2 mm right to  left midline shift is similar to prior. No intracranial hemorrhage.  The basal cisterns remain patent. There is medial deviation of the  right uncus without rachel herniation.    Near-total opacification of bilateral maxillary and sphenoid sinuses  and ethmoid air cells. Increased layering fluid in the frontal  sinuses. The mastoid air cells are clear.       Impression    Impression:   Stable findings of right MCA and likely component of right LISSETTE stroke  involving the right cerebral hemisphere with adjacent edema. Stable  minimal 2 mm right-to-left midline shift.    I have personally reviewed the examination and initial interpretation  and I agree with the findings.    EL SCHROEDER MD   US Lower Extremity Venous Duplex Bilateral    Narrative    EXAMINATION: DOPPLER VENOUS ULTRASOUND OF BILATERAL LOWER EXTREMITIES,  2/19/2018 9:05 AM     COMPARISON: None.    History: History for DVT in patient with PFO and large stroke, history  of PE.     TECHNIQUE:  Gray-scale evaluation with compression, spectral flow and  color Doppler assessment of the deep venous system of both legs from  groin to knee, and then at the  ankles.    FINDINGS:  In both lower extremities, the common femoral, femoral, popliteal and  posterior tibial veins demonstrate normal compressibility and blood  flow.      Impression    IMPRESSION:  No evidence of deep venous thrombosis in either lower extremity.    I have personally reviewed the examination and initial interpretation  and I agree with the findings.    NGUYEN PATINO MD   US Upper Extremity Venous Duplex Bilateral Port    Narrative    EXAMINATION: DOPPLER VENOUS ULTRASOUND OF BILATERAL UPPER EXTREMTIES,  2/19/2018 9:05 AM     COMPARISON: None.    History: evaluate for DVT in patient with PFO and large stroke and  history of PE     TECHNIQUE:  Gray-scale evaluation with compression, spectral flow, and  color Doppler assessment of the deep venous system of both upper  extremities.    FINDINGS:  The left cephalic vein near a peripheral line in the region of the  antecubital fossa demonstrates no compressibility. The right cephalic  vein near peripheral line in the region of the antecubital fossa  demonstrates no compressibility.    Normal blood flow and waveforms are demonstrated in the internal  jugular, innominate, subclavian, and axillary veins bilaterally. There  is normal compressibility of the brachial and basilic veins  bilaterally.      Impression    IMPRESSION:  1.  No evidence of deep venous thrombosis in either upper extremity.  2.  Peripheral venous thrombus in the left and right cephalic veins in  the region of the antecubital fossa bilaterally.    I have personally reviewed the examination and initial interpretation  and I agree with the findings.    NGUYEN PATINO MD   CT Head w/o Contrast    Narrative    CT HEAD W/O CONTRAST 2/19/2018 3:45 AM    History: R stroke s/p hemicraniectomy;     Comparison: CT head dated 2/18/2018.    Technique: Using multidetector thin collimation helical acquisition  technique, axial, coronal and sagittal CT images from the skull base  to the  vertex were obtained without intravenous contrast.     Findings:    Postoperative changes of right hemicraniectomy with overlying  subcutaneous emphysema and skin staples. Relatively stable right MCA  stroke with hypodensity in the right frontal, parietal and temporal  lobes. No significant change in hypoattenuation in the right superior  frontal gyrus. No intracranial hemorrhage or midline shift. Persistent  right lateral ventricle effacement. The basal cisterns are patent.    Continued opacification of paranasal sinuses. The mastoid air cells  are clear.       Impression    Impression:  1.  Postoperative changes of right hemicraniectomy with overlying  subcutaneous emphysema and skin staples.   2.  Relatively stable large hypoattenuating area involving right  frontal, parietal and temporal lobes status post right MCA stroke.  Unchanged right anterior cerebral artery territory infarction. No  evidence for hemorrhagic transformation.    I have personally reviewed the examination and initial interpretation  and I agree with the findings.    EL SCHROEDER MD[AK1.1]         Chintan Patrick MD[AK1.5]          Revision History        User Key Date/Time User Provider Type Action    > YD1.1 2/19/2018 11:20 PM Anay Rico MD Physician Sign     AK1.4 2/19/2018  7:51 PM Chintan Patrick MD Fellow Sign     AK1.3 2/19/2018  6:41 PM Chintan Patrick MD Fellow      AK1.2 2/19/2018  4:38 PM Chintan Patrick MD Fellow      AK1.5 2/19/2018  3:15 PM Chintan Patrick MD Fellow      AK1.1 2/19/2018  3:14 PM Chintan Patrick MD Fellow             Consults by Nora Blue MD at 2/19/2018  8:31 AM     Author:  Nora Blue MD Service:  Physical Medicine and Rehabilitation Author Type:  Physician    Filed:  2/19/2018  8:31 AM Date of Service:  2/19/2018  8:31 AM Creation Time:  2/19/2018  8:27 AM    Status:  Signed :  Nora Blue MD (Physician)             Chart  "reviewed[FI1.1]   Maggie Case[FI1.2] is a 24 years old who presents with RMCA leading to left hemiparesis, left facial weakness, and dysarthria who is s/p a craniotomy yesterday.   Diagnosed with PFO     She is currently somnolent but arousal.   Initiate PT/OT   She is NPO for now   PM&R will follow along   Thank you for consulting the PM&R Department.   For any questions, please feel free to page me at [phone]       Nora Blue MD   Department of PM&R[FI1.1]         Revision History        User Key Date/Time User Provider Type Action    > FI1.2 2/19/2018  8:31 AM Nora Blue MD Physician Sign     FI1.1 2/19/2018  8:27 AM Nora Blue MD Physician             Consults by Awais Torres MD at 2/17/2018  4:58 PM     Author:  Awais Torres MD Service:  Neurosurgery Author Type:  Resident    Filed:  2/17/2018 10:51 PM Date of Service:  2/17/2018  4:58 PM Creation Time:  2/17/2018  4:57 PM    Status:  Cosign Needed :  Awais Torres MD (Resident)    Cosign Required:  Yes             [facility]    NEUROSURGERY CONSULTATION NOTE    This consultation was requested by Dr. Chilel from the Neurocritical Care service.    Reason for Consultation: Right MCA stroke    HPI: Maggie Case is a 27 year old female with a history of  anxiety, anemia, alcoholism and a pulmonary embolism (2015 Discontinued anti-coagulation 1/2017 after inconsistent use and lack of persistent thrombosis seen on CT). 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 She was found down this morning by her friends after \"crashing\" at their house last night after a night of drinking. CT/CTA demonstrates a large right MCA stroke.     PAST MEDICAL HISTORY:[DF1.1]   Past Medical History:   Diagnosis Date     Anemia      Anxiety      Chronic diarrhea      Lactose intolerance      Myalgia  "     Pulmonary embolism (H) 05/06/16     Vitamin B12 deficiency[DF1.2]      PAST SURGICAL HISTORY:[DF1.1]   Past Surgical History:   Procedure Laterality Date     CLOSED REDUCTION, PERCUTANEOUS PINNING LOWER EXTREMITY, COMBINED Right 4/6/2016    Procedure: COMBINED CLOSED REDUCTION, PERCUTANEOUS PINNING LOWER EXTREMITY;  Surgeon: Dexter Jones MD;  Location: [facility][DF1.2]     FAMILY HISTORY:[DF1.1] No family history on file.[DF1.2]    SOCIAL HISTORY:[DF1.1]   Social History   Substance Use Topics     Smoking status: Current Every Day Smoker     Packs/day: 0.25     Years: 7.00     Types: Cigarettes     Smokeless tobacco: Never Used     Alcohol use 0.0 oz/week     0 Standard drinks or equivalent per week      Comment: 1-2/week ,[DF1.2]      Allergies:[DF1.1]  Allergies   Allergen Reactions     Amoxicillin Other (See Comments)     Headaches     Lactose      Levaquin [Levofloxacin] Swelling     Naproxen Other (See Comments)     Reaction: Headaches     Nickel      Tramadol      Sulindac Rash[DF1.2]     PE:[DF1.1]  Blood pressure (!) 131/95, temperature 97.6  F (36.4  C), temperature source Oral, resp. rate 18, weight 65.8 kg (145 lb 1 oz), SpO2 99 %.[DF1.2]    NEUROLOGIC:  -- Drowsy but will open eyes and answer questions;oriented x 3  -- Follows commands on the right hemibody.   -- Speech dysarthric but not aphasic.  --[DF1.1] Left[DF1.3] hemineglect.  Cranial Nerves:  -- PERRL 3-2mm bilat and brisk, extraocular movements intact  -- Left facial droop.   -- hearing grossly intact bilat    Motor:  Normal bulk / tone     Delt Bi Tri FE IP Quad Hamst TibAnt EHL Gastroc    C5 C6 C7 C8/T1 L2 L3 L4-S1 L4 L5 S1   R 5 5 5 5 5 5 5 5 5 5   L 0 0 0 0 0 0 0 0 0 0     Sensory:  No sensation to light touch on left hemibody.     ASSESSMENT: 27 year old female with an acute stroke with concern for possible impending malignant right MCA syndrome.     The following conditions are also present, complicating the patient's current management,  as described in the Assessment and Plan:   brain compression and stroke, further characterized by CT findings    RECOMMENDATIONS:  - No neurosurgical intervention indicated at this time   - Requires ICU level observation at this time. Frequent neurochecks  - Repeat head CT   - Avoid sedating medications that would alter a neurological examination  - NPO  - Needs central venous access    The patient was discussed with the neurosurgery chief resident and Dr. Walden, neurosurgery attending, who agrees with the above outlined plan.    Contact the neurosurgery resident on call with questions.    Dial * * *[phone]: Enter [phone] when prompted.   Awais Torres MD, PhD  Neurosurgery PGY-3[DF1.1]                                                       Revision History        User Key Date/Time User Provider Type Action    > DF1.3 2/17/2018 10:51 PM Awais Torres MD Resident Sign     DF1.2 2/17/2018  5:51 PM Awais Torres MD Resident Sign     DF1.1 2/17/2018  4:57 PM Awais Torres MD Resident                      Progress Notes - Physician (Notes from 02/26/18 through 03/01/18)      Progress Notes by Rachel Tavarez BSW at 3/1/2018  9:53 AM     Author:  Rachel Tavarez BSW Service:  (none) Author Type:      Filed:  3/1/2018  9:58 AM Date of Service:  3/1/2018  9:53 AM Creation Time:  3/1/2018  9:53 AM    Status:  Signed :  Rachel Tavarez BSW ()         Social Work Services Discharge Note      Patient Name:  Maggie Case     Anticipated Discharge Date:  3/1/18    Discharge Disposition:   [facility] ([phone])    Following MD:  Facility Assignment     Pre-Admission Screening (PAS) online form has been completed.  The Level of Care (LOC) is:  Determined  Confirmation Code is:  Not required as pt is being admitted to ARU.       Additional Services/Equipment Arranged:  Dr. Crawley has confirmed readiness for discharge.  Admissions (Miriam and Yadira) have  "confirmed acceptance for admit to [facility] and receipt of insurance authorization.  HERNAN has arranged for [employer] (Rebekah [phone]) to provide stretcher transport at 1pm.  HERNAN has completed a \"Physicians Certification for Transportation\" form.       Patient / Family response to discharge plan:  Pt and mother voice understanding of the discharge plan and agreement with the discharge plan.     Persons notified of above discharge plan:  Pt, mother, Dr. Crawley and 6A nursing.    Staff Discharge Instructions:  Please fax discharge orders and signed hard scripts for any controlled substances (HERNAN will complete this task).  Please print a packet and send with patient.     CTS Handoff completed:  YES    Medicare Notice of Rights provided to the patient/family:  NO, as per Admissions Facesheet, pt is not on medicare.      RE Butler  Social Work, 6A  Phone:  [phone]  Pager:  [phone]  3/1/2018[TK1.1]           Revision History        User Key Date/Time User Provider Type Action    > TK1.1 3/1/2018  9:58 AM Rachel Tavarez BSW  Sign            Progress Notes by Florinda Parmar RD at 3/1/2018  9:13 AM     Author:  Florinda Parmar RD Service:  Nutrition Author Type:  Registered Dietitian    Filed:  3/1/2018  9:23 AM Date of Service:  3/1/2018  9:13 AM Creation Time:  3/1/2018  9:13 AM    Status:  Addendum :  Florinda Parmar RD (Registered Dietitian)         CLINICAL NUTRITION SERVICES - BRIEF NOTE    Nutrition Prescription    RECOMMENDATIONS FOR MDs/PROVIDERS TO ORDER:  - Total daily fluids/adjustments per MD. TF regimen alone does not meet fluid needs.[TS1.1] Total fluids with current flushes and TF free water = 910 ml/day. Pt requires additional ~500 ml/day to meet low end fluid needs either by flush or oral PO intakes.[TS1.2]    Recommendations already ordered by Registered Dietitian (RD):  - Transition pt to standard regimen with fiber in preparation for " d/c/long-term EN needs. Recommend continuous regimen until PO intake improves.   - Initiate Isosource 1.5 @ goal 40 ml/hr (960 ml/day) to provide 1440 kcals (26 kcal/kg/day), 65 g PRO (1.2 g/kg/day), 730 ml free H2O, 169 g CHO and 14 g Fiber daily.  - Adjust accordingly to PO intakes. Reorder kaylyn counts (should pt remain inpatient)    Future/Additional Recommendations:  - PO/supp adequacy and EN adjustments (kaylyn counts ordered 3/1)  - tolerance to new regimen      Nutrition Progress Note - f/u for progress towards previous nutrition POC (see previous 2/26 reassessment for details)     Florinda Parmar RD, LD, [facility]  Neuro ICU  Pager: [phone][TS1.1]       Revision History        User Key Date/Time User Provider Type Action    > TS1.2 3/1/2018  9:23 AM Florinda Parmar RD Registered Dietitian Addend     TS1.1 3/1/2018  9:19 AM Florinda Parmar RD Registered Dietitian Sign            Progress Notes by Maria G Mills at 3/1/2018  8:00 AM     Author:  Maria G Mills Service:  Nutrition Author Type:  Nutrition Associate    Filed:  3/1/2018  8:01 AM Date of Service:  3/1/2018  8:00 AM Creation Time:  3/1/2018  8:00 AM    Status:  Signed :  Maria G Mills (Nutrition Associate)         Calorie Counts  Intake recorded for: 2/28  Kcals: 234  Protein: 6g  # Meals Recorded: 100% orange juice, 25% scrambled eggs with cheese, apple juice   # Supplements Recorded: 0[LJ1.1]     Revision History        User Key Date/Time User Provider Type Action    > LJ1.1 3/1/2018  8:01 AM Maria G Mills Nutrition Associate Sign            Progress Notes by Yeison Crawley MD at 2/28/2018  6:05 AM     Author:  Yeison Crawley MD Service:  Neuro ICU Author Type:  Resident    Filed:  2/28/2018  3:36 PM Date of Service:  2/28/2018  6:05 AM Creation Time:  2/28/2018  6:05 AM    Status:  Attested :  Yeison Crawley MD (Resident)    Cosigner:  Annette Evans MD at 2/28/2018  4:59 PM        Attestation signed  by Annette Evans MD at 2/28/2018  4:59 PM        Attestation:  Physician Attestation   I, Annette Evans, saw this patient with the resident and agree with the resident s findings and plan of care as documented in the resident s note.      I personally reviewed vital signs, medications, labs and imaging.    Key findings: Will start low dose no bolus heparin gtt. CTH in the AM after being therapeutic.     Annette Evans  Date of Service (when I saw the patient): 02/28/18                               Stroke Progress Note[KN1.1]  02/28/2018[KN1.2]    ON events:[KN1.1] ON lethargic, HA, abd pain improved w/ PRN tylenol, oxy + ice packs. Incontinent of urine. Diet changed to DD3 w/ Near thick.[KN1.3] Continued head pain headache, nausea. Starting heparin gtt today.    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 difficile colitis     Acute ischemic stroke (H)[KN1.2]       Current Medications:[KN1.1]    influenza quadrivalent (PF) vacc age 3 yrs and older  0.5 mL Intramuscular Prior to discharge     sodium chloride (PF)  3 mL Intracatheter Q8H     FLUoxetine  20 mg Oral or Feeding Tube Daily     multivitamins with minerals  15 mL Per Feeding Tube Daily     folic acid  1 mg Per NG tube Daily     vitamin  B-1  100 mg Per NG tube Daily[KN1.2]       PRN Medications:[KN1.1]  - MEDICATION INSTRUCTIONS -, HOLD MEDICATION, midazolam, prochlorperazine, ramelteon, oxyCODONE, acetaminophen, lidocaine (buffered or not buffered), lidocaine 4%, sodium chloride (PF),  "hydrALAZINE, labetalol, IV fluid REPLACEMENT ONLY, naloxone, potassium chloride, potassium chloride, potassium chloride, potassium chloride with lidocaine, potassium chloride, magnesium sulfate, potassium phosphate (KPHOS) in D5W IV, potassium phosphate (KPHOS) in D5W IV, potassium phosphate (KPHOS) in D5W IV, potassium phosphate (KPHOS) in D5W IV, ondansetron[KN1.2]    Infusions:[KN1.1]    HEParin 850 Units/hr (02/28/18 1013)     - MEDICATION INSTRUCTIONS -       IV fluid REPLACEMENT ONLY[KN1.2]         Objective findings:[KN1.1]  /70  Pulse 82  Temp 98.4  F (36.9  C) (Oral)  Resp 16  Ht 1.626 m (5' 4\")  Wt 70 kg (154 lb 5.2 oz)  SpO2 98%  BMI 26.49 kg/m2[KN1.2]    Intake/Output:    Intake/Output Summary (Last 24 hours) at 02/28/18 1459  Last data filed at 02/28/18 1400   Gross per 24 hour   Intake             1550 ml   Output                0 ml   Net             1550 ml       Physical Examination:   Vitals:  B/P: 104/70, T: 98.4, P: 82, R: 16  General:  Flat affect.  HEENT:  NC/AT, no icterus, op pink and moist, no ear or nose drainage.   Cardiac:  RRR, no m/r/g  Chest:  Breathing unlabored b/l  Abdomen:  S/NT/ND, PEG w/o significant surrounding erythema or purulent drainage  Extremities:  No LE swelling.    Skin:  No rash or lesion.      Neuro:[KN1.1]  Mental status: awake, alert, oriented to person, place, time. Follows commands on R side. No e/o aphasia.  Cranial nerves: PERRL, R sided gaze preference, EOMI/able to cross midline. L lower facial droop. Hearing intact to conversation. Dysarthric.  Motor: 0/5 in ANAY+LE. 4+/5 on RU+LE. Rigidity on[KN1.3] L[KN1.1]UE.   Reflexes: 2/5 in R: B,BR, P, A; hyperreflexic at[KN1.3] Left[KN1.1] B, BR, P, A,[KN1.3] Left[KN1.1] babinski upgoing, clonus at[KN1.3] L >[KN1.1] R ankle[KN1.3].[KN1.1]  Sensory: L javi neglect / hemisensory loss; acknowledges noxious stimuli in[KN1.3] L[KN1.1] upper + lower extremity[KN1.3] but does not w/d[KN1.1]. Intact to light touch " on R upper + lower extremity.  Coordination: FNF intact to RUE, unable to assess on L[KN1.3]    Labs/Studies:  Recent Labs   Lab Test  02/28/18   0828  02/27/18   0743  02/26/18   1037  02/25/18   0758   02/24/18   0542   02/22/18   0344   02/20/18   0556   NA   --    --   140  143   --   144   < >  153*   < >  158*   POTASSIUM   --    --   4.0  3.5   --    --    --   3.5   < >  2.9*   CHLORIDE   --    --   107  111*   --    --    --    --    --   132*   CO2   --    --   24  25   --    --    --    --    --   20   ANIONGAP   --    --   9  7   --    --    --    --    --   7   GLC   --    --   85  100*   --    --    --    --    --   137*   BUN   --    --   9  13   --    --    --    --    --   11   CR   --   0.42*  0.35*  0.35*   --   0.35*   --    --    < >  0.52   AVINASH   --    --   8.3*  8.1*   --    --    --    --    --   7.6*   WBC  13.4*  14.6*  20.6*  19.9*   < >   --    < >   --    < >  17.6*   RBC  2.97*  3.04*  3.09*  2.83*   < >   --    < >   --    < >  3.28*   HGB  8.7*  8.8*  9.1*  8.4*   < >   --    < >   --    < >  9.6*   PLT  562*  566*  495*  382   < >  332   < >   --    < >  286    < > = values in this interval not displayed.       Recent Labs   Lab Test  02/22/18   1027  02/20/18   1143  02/17/18   1752  02/17/18   1107   INR  1.13  1.20*  Canceled, Test credited  1.18   PTT   --   30  Canceled, Test credited  22*[KN1.1]       No lab results found in last 7 days.[KN1.2]     I: CT[KN1.3] w/ evolution of Large R cerebral hemisphere infarction, resolution of R-L midline shift + subfalcine herniation    ==========================================================[KN1.1]    ASSESSMENT/PLAN:   Maggie Case is a 27 year old w/ h/o PE (noncompl w.AC), previous miscarriages and alcohol abuse who was admitted 2/17/2018 with RM1 occlusion with malignant MCA syndrome s/p decompressive hemicraniectomy POD#[KN1.3]10[KN1.1], s/p PEG 2/26.      Plan:  #RM1 occlusion with malignant MCA syndrome s/p decompressive  hemicraniectomy POD#9  Likely Embolic given her history PE, setting of PFO. Concern for hypercoag and Myeloproliferative (JAK2, CALR, MPL), workup pending  Heme consulted, appreciate recs. Repeat MRI 2/26 w/ expected evolution, less likely e/o infection.  -[KN1.3] d/c'd ASA, started heparin gtt w/ plan to recheck head CT when therapeutic, followed by transitioning to lovenox + warfarin  - St[KN1.1]aples[KN1.3] removed per NSU[KN1.1]. Planning for cranioplasty ~3/18. Follow up in clinic with Dr. Mesa 1 week prior to cranioplasty. Head CT prior to appointment.  - Fluoxetine 20mg daily  - Hypercoag pending[KN1.3] (Factor 2 Prothrombin mut analysis, FVL mut analysis)[KN1.1]  - SBP <200  - PT/OT/SLP   - SHAKIRA and PHQ9 screen      #RLL cavitation  #Bilateral hilar lymphadenopathy: differential includes infectious vs inflammatory etiology. Pulmonology consulted, appreciate recs; possibility for sarcoid vs. pna, tx'd for PNA w/ MSSA + H.flu s/p ceftriaxone day 7/7.   HIV nonreactive.   - Histo and blasto ab pending  - Repeat CT chest in 4 weeks (~ 3/17). Will assess at that time for BAL/lymph node biopsy.      #Pneuomia: Haemopholus influenza and MSSA. Repeat 2/25 procal 0.23. Completed 7/7d Ceftriaxone on 2/26.[KN1.3]  #Mildly elevated platelets[KN1.1]  #Leukocytosis: Variable leukocytosis on this admission, trending down from 2/[KN1.3]26[KN1.1] 20 ->[KN1.3] 2/27 14 -> 13 2/28 . U[KN1.1]A wnl, CRP elevated but lactate wnl + hds / afebrile. Discussed w/ Pulm + Hem/Onc, possibly related to underlying process (myeloprolif / sarcoid) vs. Treated pna/UTI + stress leukocytosis. Repeat MRI w/ expected evolution of R cerebral hemisphere infarct rather than infection.   - Completed Ceftriaxone 7 day course on 2/26  - Sputum culture + gram stain, pending order  - Continue to trend leukocytosis[KN1.3] while Inpatient[KN1.1]    #Acute blood loss anemia: stable ~8. Acute drop post surgery. Required 2 units of pRBCs[KN1.3] post-op,  stable since[KN1.1].  - Transfuse Hgb <7      #Concern for hypercoagulability  -[KN1.3] Complete[KN1.1] Work-up pending  - Hem/onc consulted       #UTI: culture positive for E coli. Sensitive to ceftriaxone.   - Completed ceftriaxone course (day 7 of 7, 2/26)   - Repeat UA unremarkable      #Dysphagia[KN1.3]:[KN1.1] tf[KN1.3], DD3 w/ nectar thick liquids + nutrition support.[KN1.1] s/p PEG 2/26, Dietician following, appreciat[KN1.3]e[KN1.1] recs.   - Daily speech/swallow evals   - s/p PEG 2/26, continuing TF      #Hypernatremia, resolved - Iatrogenic from hypertonic saline.       #History of alcohol abuse:   - continue thiamine and folate.      PPX:    DVT prophylaxis: SCDs, lovenox daily    GI: not indicated    Diet: TF[KN1.3], DD3 w/ nectar thick[KN1.1]    Renal: Electrolyte replacement    Nausea: compazine + zofran prn    Pain: oxycodone[KN1.3] + tylenol[KN1.1] prn  Code Status: Full  Lines: PEG, PIV  D/c: pending placement     =========================================================     This patient was seen & discussed with my attending, Dr. Annette Evans who agrees with my assessment and plan.      Dylan Crawley   Neurology  [phone][KN1.3]           Revision History        User Key Date/Time User Provider Type Action    > KN1.2 2/28/2018  3:36 PM Yeison Crawley MD Resident Sign     KN1.1 2/28/2018  2:51 PM Yeison Crawley MD Resident      KN1.3 2/28/2018  6:05 AM Yeison Crawley MD Resident             Progress Notes by Rachel Tavarez BSW at 2/28/2018  3:08 PM     Author:  Rachel Tavarez BSW Service:  (none) Author Type:      Filed:  2/28/2018  3:13 PM Date of Service:  2/28/2018  3:08 PM Creation Time:  2/28/2018  3:08 PM    Status:  Signed :  Rachel Tavarez BSW ()         Social Work Services Progress Note    Hospital Day: 12  Date of Initial Social Work Evaluation:  2/20/18  Collaborated with:  Admissions (Miriam) at [facility] Jason Brenner, pt, parents  and Dr. Crawley    Data:  Discharge is anticipated tomorrow if pt's INR is therapeutic (per Dr. Crawley).  It is anticipated that pt will have a cranioplasty on 3.18/18 and pt must wear her naheed when she is up.  Per Dr. Crawley, it is likely that pt will be medically ready for discharge tomorrow.    Intervention:  Spoke with Dr. Crawley.  Relayed above information to Brittaney Gomez at [facility] admissions (Miriam).  Per Miriam, pt is appropriate for admit and she anticipates bed availability on 3/1/18.  Miriam will seek insurance authorization.  SW updated pt and parents.    Assessment:  Pt engaged in conversation.   Pt and parents voice understanding of the discharge plan and agreement with the discharge plan.    Plan:    Anticipated Disposition:  Acute Rehab placement    Barriers to d/c plan:  Awaiting therapeutic INR    Follow Up:  SW will continue to follow for discharge planning.    RE Butler  Social Work, 6A  Phone:  [phone]  Pager:  [phone]  2/28/2018[TK1.1]           Revision History        User Key Date/Time User Provider Type Action    > TK1.1 2/28/2018  3:13 PM Rachel Tavarez BSW  Sign            Progress Notes by Florinda Parmar RD at 2/28/2018  8:53 AM     Author:  Florinda Parmar RD Service:  Nutrition Author Type:  Registered Dietitian    Filed:  2/28/2018  8:55 AM Date of Service:  2/28/2018  8:53 AM Creation Time:  2/28/2018  8:53 AM    Status:  Signed :  Florinda Parmar RD (Registered Dietitian)         CLINICAL NUTRITION SERVICES - BRIEF NOTE    Nutrition Prescription    RECOMMENDATIONS FOR MDs/PROVIDERS TO ORDER:  - continue EN in conjunction with oral intakes; pending intakes, can consider nocturnal regimen vs bolus with oral PO    Recommendations already ordered by Registered Dietitian (RD):  - ordered Magic Cup TID with meals   - Calorie counts ordered by provider--will follow.     Future/Additional Recommendations:  - PO/supp adequacy (kaylyn  counts ordered 2/28)     Nutrition Progress Note - f/u for progress towards previous nutrition POC (see previous 2/26 reassessment for details)     Florinda Parmar RD, LD, [facility]  Neuro ICU  Pager: [phone][TS1.1]       Revision History        User Key Date/Time User Provider Type Action    > TS1.1 2/28/2018  8:55 AM Florinda Parmar RD Registered Dietitian Sign            Progress Notes by Yeison Crawley MD at 2/27/2018  7:02 AM     Author:  Yeison Crawley MD Service:  Neuro ICU Author Type:  Resident    Filed:  2/27/2018 10:46 PM Date of Service:  2/27/2018  7:02 AM Creation Time:  2/27/2018  7:02 AM    Status:  Attested :  Yeison Crawley MD (Resident)    Cosigner:  Annette Evans MD at 2/28/2018  7:55 AM        Attestation signed by Annette Evans MD at 2/28/2018  7:55 AM        Attestation:  Physician Attestation   I, Annette Evans, saw this patient with the resident and agree with the resident s findings and plan of care as documented in the resident s note.      I personally reviewed vital signs, medications, labs and imaging.    Key findings: Rt MCA stroke with M1 occlusion, s/p craniectomy. MRI showed Rt MCA/LISSETTE territory infarct. Etiology of her infarct is presumed embolic in setting of previous PEs, pending hypercoagulable work up. Will start on low dose low goal heparin gtt. Will obtain CTH once therapeutic and transition to warfarin.    Annette Evans  Date of Service (when I saw the patient): 2/27/18                               Stroke Progress Note  02/27/2018    ON events: NAEO, incontinent b/b but also using bedpan[KN1.1] - says this is due to incontinence and not being able to get the bed pan in time. Reports continued headache / head pain at craniotomy site, similar to previous. 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 difficile colitis     Acute ischemic stroke (H)       Current Medications:    clindamycin  900 mg Intravenous Pre-Op/Pre-procedure x 1 dose     iohexol  50 mL Oral Once     influenza quadrivalent (PF) vacc age 3 yrs and older  0.5 mL Intramuscular Prior to discharge     sodium chloride (PF)  3 mL Intracatheter Q8H     FLUoxetine  20 mg Oral or Feeding Tube Daily     enoxaparin  40 mg Subcutaneous Q24H     multivitamins with minerals  15 mL Per Feeding Tube Daily     aspirin  81 mg Per G Tube Daily     folic acid  1 mg Per NG tube Daily     vitamin  B-1  100 mg Per NG tube Daily       PRN Medications:  - MEDICATION INSTRUCTIONS -, HOLD MEDICATION, midazolam, HOLD MEDICATION, prochlorperazine, ramelteon, oxyCODONE, acetaminophen, lidocaine (buffered or not buffered), lidocaine 4%, sodium chloride (PF), hydrALAZINE, labetalol, IV fluid REPLACEMENT ONLY, naloxone, potassium chloride, potassium chloride, potassium chloride, potassium chloride with lidocaine, potassium chloride, magnesium sulfate, potassium phosphate (KPHOS) in D5W IV, potassium phosphate (KPHOS) in D5W IV, potassium phosphate (KPHOS) in D5W IV, potassium phosphate (KPHOS) in D5W IV, ondansetron    Infusions:    - MEDICATION INSTRUCTIONS -       IV fluid REPLACEMENT ONLY         Objective findings:  /65 (BP Location: Right arm)  Pulse 78  Temp 98.7  F (37.1  C) (Oral)  Resp 16  Ht 1.626 m (5' 4\")  Wt 70 kg (154 lb 5.2 oz)  SpO2 98%  BMI 26.49 kg/m2    Intake/Output:    Intake/Output Summary (Last 24 hours) at 02/27/18 0702  Last data filed at 02/27/18 0600   Gross per 24 hour "   Intake              880 ml   Output               20 ml   Net              860 ml       Physical Examination:   Vitals:  B/P: 104/65, T: 98.7, P: 78, R: 16 --> avg  General:[KN1.1]  Sad, flat[KN1.2]  HEENT:[KN1.1]  S/p R craniotomy[KN1.2], no icterus, op pink and moist, no ear or nose drainage.   Cardiac:  RRR, no m/r/g  Chest:[KN1.1]  Diminished b/l[KN1.2]  Abdomen:[KN1.1]  PEG in place[KN1.2]  Extremities:  No LE swelling.    Skin:  No rash or lesion.      Neuro Exam:[KN1.1]  Mental status: awake, alert, oriented to person, place, time. Follows commands on R side. No e/o aphasia.  Cranial nerves: PERRL, R sided gaze preference, EOMI/able to cross midline. L lower facial droop. Hearing intact to conversation. Dysarthric.0/5 L shoulder shrug.   Motor: 0/5 in ANAY+LE. 4+ to 5/5 on RU+LE. Does not w/d to pain but acknowledges noxious stimuli. Rigidity on RUE.   Reflexes: 2/5 in R: B,BR, P, A; hyperreflexic at R B, BR, P, A, R babinski upgoing, clonus at R ankle  Sensory: L javi neglect / hemisensory loss; acknowledges noxious stimuli in R upper + lower extremity. Intact to light touch on R upper + lower extremity.  Coordination: FNF intact to RUE, unable to assess on L[KN1.2]    Labs/Studies:  Recent Labs   Lab Test  02/26/18   1037  02/25/18   0758  02/24/18   0544  02/24/18   0542   02/22/18   0344   02/20/18   0556   NA  140  143   --   144   < >  153*   < >  158*   POTASSIUM  4.0  3.5   --    --    --   3.5   < >  2.9*   CHLORIDE  107  111*   --    --    --    --    --   132*   CO2  24  25   --    --    --    --    --   20   ANIONGAP  9  7   --    --    --    --    --   7   GLC  85  100*   --    --    --    --    --   137*   BUN  9  13   --    --    --    --    --   11   CR  0.35*  0.35*   --   0.35*   --    --    < >  0.52   AVINASH  8.3*  8.1*   --    --    --    --    --   7.6*   WBC  20.6*  19.9*  18.6*   --    < >   --    < >  17.6*   RBC  3.09*  2.83*  2.92*   --    < >   --    < >  3.28*   HGB  9.1*  8.4*  8.7*    --    < >   --    < >  9.6*   PLT  495*  382  340  332   < >   --    < >  286    < > = values in this interval not displayed.       Recent Labs   Lab Test  02/22/18   1027  02/20/18   1143  02/17/18   1752  02/17/18   1107   INR  1.13  1.20*  Canceled, Test credited  1.18   PTT   --   30  Canceled, Test credited  22*[KN1.1]     WBC: 20.6 -> 14.6  Hgb 8.8  2/26 LActate 0.6[KN1.2]    ==========================================================[KN1.1]    ASSESSMENT/PLAN:   Maggie Case is a 27 year old w/ h/o PE (noncompl w.AC), previous miscarriages and alcohol abuse [KN1.2]who was[KN1.3] admitted 2/17/2018 with RM1 occlusion with malignant MCA syndrome s/p decompressive hemicraniectomy POD#[KN1.2]9[KN1.4], s/p PEG 2/26.      Plan:  #RM1 occlusion with malignant MCA syndrome s/p decompressive hemicraniectomy POD#9  Likely Embolic given her history PE, setting of PFO. Concern for hypercoag and Myeloproliferative (JAK2, CALR, MPL), workup pending  Heme consulted, appreciate recs. Repeat MRI 2/26 w/ expected evolution, less likely e/o infection.  - Aspirin for stroke prophylaxis for now, will[KN1.2] discuss AC today[KN1.3]  - Staples to remain in for 2 weeks (3/4). Planning for cranioplasty ~3/18. Follow up in clinic with Dr. Mesa 1 week prior to cranioplasty. Head CT prior to appointment.  - Fluoxetine 20mg daily  - Hypercoag pending  - SBP <200  - PT/OT/SLP   - SHAKIRA and PHQ9 screen      #RLL cavitation  #Bilateral hilar lymphadenopathy: differential includes infectious vs inflammatory etiology. Pulmonology consulted, appreciate recs; possibility for sarcoid vs. pna, tx'd for PNA w/ MSSA + H.flu s/p ceftriaxone day 7/7.   HIV nonreactive.   - Histo and blasto ab pending  - Repeat CT chest in 4 weeks (~ 3/17). Will assess at that time for BAL/lymph node biopsy.     #Pneuomia: Haemopholus influenza and MSSA. Repeat 2/25 procal 0.23. Completed 7/7d Ceftriaxone on 2/26.  #Leukocytosis: Variable leukocytosis on this  admission, trending down from 2/16 20 ->14 . UA wnl, CRP elevated but lactate wnl + hds / afebrile. Discussed w/ Pulm + Hem/Onc, possibly related to underlying process (myeloprolif / sarcoid) vs. Treated pna/UTI + stress leukocytosis. Repeat MRI w/ expected evolution of R cerebral hemisphere infarct rather than infection.   -[KN1.2] Completed[KN1.3] Ceftriaxone[KN1.2] 7 day course on[KN1.3] 2/26  - Sputum culture + gram stain,[KN1.2] pending order[KN1.3]  -[KN1.2] C[KN1.3]ontinue to trend leukocytosis      #Acute blood loss anemia: stable ~8. Acute drop post surgery. Required 2 units of pRBCs.  - Transfuse Hgb <7     #Concern for hypercoagulability  - Work-up pending  - Hem/onc consulted      #UTI: culture positive for E coli. Sensitive to ceftriaxone.   - Completed ceftriaxone[KN1.2] course[KN1.3] (day 7 of 7, 2/26)   - Repeat UA unremarkable     #Dysphagia: NPO currently, tf.   #Diet: s/p PEG 2/26, Dietician following, appreciat recs.   - Daily speech/swallow evals   - s/p PEG 2/26, continuing TF     #Hypernatremia, resolved - Iatrogenic from hypertonic saline.      #History of alcohol abuse:   - continue thiamine and folate.      PPX:    DVT prophylaxis: SCDs, lovenox daily    GI: not indicated    Diet: TF    Renal: Electrolyte replacement    Nausea: compazine + zofran prn    Pain: oxycodone prn  Code Status: Full  Lines: PEG, PIV[KN1.2]  D/c: pending placement[KN1.3]    =========================================================    This patient was seen & discussed with my attending, [KN1.1] Annette Evans[KN1.2] who agrees with my assessment and plan.     Dylan Crawley   Neurology  [phone][KN1.1]           Revision History        User Key Date/Time User Provider Type Action    > KN1.4 2/27/2018 10:46 PM Yeison Crawley MD Resident Sign     KN1.3 2/27/2018  1:01 PM Yeison Crawley MD Resident Sign     KN1.2 2/27/2018 10:25 AM Yeison Crawley MD Resident      KN1.1 2/27/2018  7:02 AM Yeison Crawley,  MD Resident             Progress Notes by Rachel Tavarez BSW at 2/27/2018  2:50 PM     Author:  Rachel Tavarez BSW Service:  (none) Author Type:      Filed:  2/27/2018  3:01 PM Date of Service:  2/27/2018  2:50 PM Creation Time:  2/27/2018  2:50 PM    Status:  Signed :  Rachel Tavarez BSW ()         Social Work Services Progress Note    Hospital Day: 11  Date of Initial Social Work Evaluation:  2/20/18  Collaborated with:  Pt's parents, ARU Admissions Coordinator[TK1.1], Dr. Crawley[TK1.2]    Data:  Per Neurology,[TK1.1]  Pt is medically ready for discharge.  An acute rehab stay is recommended.  Pt was referred to [facility] on 2/26/18.[TK1.2]    Intervention:[TK1.1]  Spoke with Neurology (Dr. Crawley).   Met with pt and parents (pt did not offer communication) to discuss discharge planning.  Parents are aware of ARU recommendation.   Per discussion, [facility] is not their first choice.  Parents preferences include (listed in order of preference):  1.  [facility] [place]  [facility].  [facility] [place]  3.  [facility] [place]  Per discussion, parents indicate that pt will discharge to their home upon completion of a ARU stay.  HERNAN phoned Admissions at [facility] (Latasha [phone]).  Per Latasha, she will not know until tomorrow whether or not they will have openings on 2/28/18.  Latasha will assess for both [place] and [place] locations.  HERNAN phoned [facility] ([phone]) and left a message for Admissions to call.  HERNAN updated pt's parents.[TK1.2]    Assessment:[TK1.1]  Pt's parents support pursuit of ARU placement.   Pt's parents are visiting pt today at the hospital[TK1.2]    Plan:    Anticipated Disposition:[TK1.1]  Acute rehab placement[TK1.2]    Barriers to d/c plan:[TK1.1]  Secure placement[TK1.2]    Follow Up:[TK1.1]  HERNAN will follow for discharge planning.    RE Butler  Social Work, 6A  Phone:  [phone]  Pager:   [phone]  2/27/2018[TK1.2]           Revision History        User Key Date/Time User Provider Type Action    > TK1.2 2/27/2018  3:01 PM Rachel Tavarez BSW  Sign     TK1.1 2/27/2018  2:50 PM Rachel Tavarez BSW              Progress Notes by Yeison Crawley MD at 2/26/2018 11:46 AM     Author:  Yeison Crawley MD Service:  Neuro ICU Author Type:  Resident    Filed:  2/26/2018  7:18 PM Date of Service:  2/26/2018 11:46 AM Creation Time:  2/26/2018 11:46 AM    Status:  Attested :  Yeison Crawley MD (Resident)    Cosigner:  Annette Evans MD at 2/26/2018 11:06 PM        Attestation signed by Annette Evans MD at 2/26/2018 11:06 PM        Attestation:  Physician Attestation   I, Annette Evans, saw this patient with the resident and agree with the resident s findings and plan of care as documented in the resident s note.      I personally reviewed vital signs, medications, labs and imaging.    Key findings: Rt MCA stroke with Rt M1 occlusion s/p decompressive crani. PEG tube placed today. Will ask hematology to comment on persistent leukocytosis. Continue Aspirin. Pending ARU    Annette Evans  Date of Service (when I saw the patient): 02/26/18                               Stroke Progress Note[KN1.1]  02/26/2018[KN1.2]    ON event[KN1.1]s: Sepsis protocol activated, normal lactate/procal, hds, unchanged/persistently elevated WBC. s/p PEG placement this morning, NG removed. Later this morning, reports head pain at sight of craniotomy, mild bilateral frontal headache. 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 difficile colitis     Acute ischemic stroke (H)[KN1.2]       Current Medications:[KN1.1]    clindamycin  900 mg Intravenous Pre-Op/Pre-procedure x 1 dose     iohexol  50 mL Oral Once     [START ON 2/27/2018] influenza quadrivalent (PF) vacc age 3 yrs and older  0.5 mL Intramuscular Prior to discharge     sodium chloride (PF)  3 mL Intracatheter Q8H     FLUoxetine  20 mg Oral or Feeding Tube Daily     enoxaparin  40 mg Subcutaneous Q24H     cefTRIAXone  2 g Intravenous Q24H     multivitamins with minerals  15 mL Per Feeding Tube Daily     aspirin  81 mg Per G Tube Daily     folic acid  1 mg Per NG tube Daily     vitamin  B-1  100 mg Per NG tube Daily[KN1.2]       PRN Medications:[KN1.1]  - MEDICATION INSTRUCTIONS -, HOLD MEDICATION, midazolam, flumazenil, fentaNYL, naloxone, HOLD MEDICATION, acetaminophen, oxyCODONE, lidocaine (buffered or not buffered), lidocaine 4%, sodium chloride (PF), hydrALAZINE, labetalol, oxyCODONE IR, IV fluid REPLACEMENT ONLY, naloxone, potassium chloride, potassium chloride, potassium chloride, potassium chloride with lidocaine, potassium chloride, magnesium sulfate, potassium phosphate (KPHOS) in D5W IV, potassium phosphate (KPHOS) in D5W IV, potassium phosphate (KPHOS) in D5W IV, potassium phosphate (KPHOS) in D5W IV, ondansetron[KN1.2]    Infusions:[KN1.1]    - MEDICATION INSTRUCTIONS -       IV fluid REPLACEMENT ONLY[KN1.2]         Objective findings:[KN1.1]  /72  Pulse 75  Temp 98.6  F (37  C) (Oral)  Resp 18  Ht 1.626 m (5' 4\")  Wt 70 kg (154 lb 5.2 oz)  SpO2 98%  BMI 26.49 kg/m2[KN1.2]    Intake/Output:    Intake/Output Summary (Last 24 hours) at 02/26/18 1147  Last data filed at 02/25/18 2100   Gross per 24 hour   Intake              790 ml   Output                0 ml   Net              790 ml       Physical Examination: "   Vitals:  B/P: 114/72, T: 98.6, P: 75, R: 18  General:[KN1.1]  Lethargic, depressed/ somnolent, decreased participation.[KN1.3]   HEENT:  NC/AT, no icterus, op pink and moist, no ear or nose drainage.   Cardiac:  RRR, no m/r/g  Chest:[KN1.1]  Breathing not labored, diminished b/l[KN1.3]  Abdomen:  S/NT/ND  Extremities:  No LE swelling.    Skin:  No rash or lesion.      Neuro Exam:  Mental status:[KN1.1] awake, alert, oriented to person, place, time. Follows commands on R side. No e/o aphasia but decreased participation in exam.[KN1.3]   Cranial nerves:[KN1.1] PERRL, EOMI. L facial droop. Hearing intact to conversation. Dysarthric.[KN1.3]   Motor:[KN1.1] 0/5 in ANAY+LE. 4+ to 5/5 on RU+LE. Does not w/d to pain but acknowledges noxious stimuli. Rigidity on RUE.[KN1.3]   Reflexes:[KN1.1] 2/5 at R B,BR, P, A; hyperreflexic at R B, BR, P, A, R babinski upgoing, clonus at R ankle[KN1.3]  Sensory:[KN1.1] L javi neglect / hemisensory loss; acknowledges noxious stimuli in R upper + lower extremity. Intact to light touch on R upper + lower extremity.[KN1.3]  Coordination:[KN1.1] FNF intact to RUE, unable to assess on L[KN1.3]    Labs/Studies:  Recent Labs   Lab Test  02/26/18   1037  02/25/18   0758  02/24/18   0544  02/24/18   0542   02/22/18   0344   02/20/18   0556   NA  140  143   --   144   < >  153*   < >  158*   POTASSIUM  4.0  3.5   --    --    --   3.5   < >  2.9*   CHLORIDE  107  111*   --    --    --    --    --   132*   CO2  24  25   --    --    --    --    --   20   ANIONGAP  9  7   --    --    --    --    --   7   GLC  85  100*   --    --    --    --    --   137*   BUN  9  13   --    --    --    --    --   11   CR  0.35*  0.35*   --   0.35*   --    --    < >  0.52   AVINASH  8.3*  8.1*   --    --    --    --    --   7.6*   WBC  20.6*  19.9*  18.6*   --    < >   --    < >  17.6*   RBC  3.09*  2.83*  2.92*   --    < >   --    < >  3.28*   HGB  9.1*  8.4*  8.7*   --    < >   --    < >  9.6*   PLT  495*  382  340  593    < >   --    < >  286    < > = values in this interval not displayed.       Recent Labs   Lab Test  02/22/18   1027  02/20/18   1143  02/17/18   1752  02/17/18   1107   INR  1.13  1.20*  Canceled, Test credited  1.18   PTT   --   30  Canceled, Test credited  22*[KN1.1]       No lab results found in last 7 days.[KN1.2]    ==========================================================    ASSESSMENT/PLAN:[KN1.1]   Maggie Case is a 27 year old h/o PE and alcohol abuse admitted 2/17/2018 with RM1 occlusion with malignant MCA syndrome s/p decompressive hemicraniectomy POD#8, s/p PEG 2/26.      Plan:  #RM1 occlusion with malignant MCA syndrome s/p decompressive hemicraniectomy POD#8  Likely Embolic given her history PE in the setting of PFO. Hypercoag and Myeloproliferative (JAK2, CALR, MPL) workup pending  Heme consulted, appreciate recs. MRI 2/26 w/ expected evolution, less likely e/o infection.  - Aspirin for stroke prophylaxis for now, will eventually need to be anticoagulated  - Staples to remain in for 2 weeks. Planning for cranioplasty ~3/18. Follow up in clinic with Dr. Mesa 1 week prior to cranioplasty. Head CT prior to appointment.  - Fluoxetine 20mg daily  - Discontinued hypertonic saline 2/23  - SBP <200  - PT/OT/SLP   - SHAKIRA and PHQ9 screen     #RLL cavitation  #Bilateral hilar lymphadenopathy: differential includes infectious vs inflammatory etiology. Pulmonology consulted, appreciate recs; possibility for sarcoid vs. pna.  HIV nonreactive.   - Histo and blasto ab pending  - Repeat CT chest in 4 weeks. Will assess at that time for BAL/lymph node biopsy.    #Pneuomia: Haemopholus influenza and MSSA. Repeat 2/25 procal 0.23.  #Leukocytosis: Variable leukocytosis on this admission with trend up/stable in the past few days. UA wnl, CRP elevated but lactate wnl + hds / afebrile. Discussed w/ Pulm + Hem/Onc, possibly related to underlying process (myeloprolif / sarcoid) vs. Treated pna/UTI + stress leukocytosis.  Repeat MRI w/ expected evolution of R cerebral hemisphere infarct rather than infection.   - Ceftriaxone, 7/7 today  - Sputum culture + gram stain  - CBC tomorrow, trend leukocytosis     #Acute blood loss anemia: stable ~8. Acute drop post surgery. Required 2 units of pRBCs.  - Transfuse Hgb <7    #Concern for hypercoagulability  - Work-up pending, see above  - Hem/onc consulted     #UTI: culture positive for E coli. Sensitive to ceftriaxone.   - Continue ceftriaxone (day 7 of 7)   - Repeat UA unremarkable    #Dysphagia:   - Daily speech/swallow evals   - s/p PEG 2/26, continuing TF    #Hypernatremia, resolved - Iatrogenic from hypertonic saline.     #History of alcohol abuse:   - continue thiamine and folate.      PPX:    DVT prophylaxis: SCDs, lovenox daily    GI: not indicated    Renal: Electrolyte replacement    Nausea: compazine + zofran prn    Pain: oxycodone prn  Code Status: Full  Lines: PEG, PIV[KN1.3]    =========================================================    This patient was seen & discussed with my attending, [KN1.1] Annette Evans[KN1.3], who agrees with my assessment and plan.     Dylan Misbah   Neurology  [phone][KN1.1]           Revision History        User Key Date/Time User Provider Type Action    > KN1.3 2/26/2018  7:18 PM Yeison Crawley MD Resident Sign     KN1.2 2/26/2018 11:47 AM Yeison Crawley MD Resident      KN1.1 2/26/2018 11:46 AM Yeison Crawley MD Resident             Progress Notes by Caroline Lizama RN at 2/26/2018  3:34 PM     Author:  Caroline Lizama RN Service:  Care Coordinator Author Type:  Care Coordinator    Filed:  2/26/2018  4:15 PM Date of Service:  2/26/2018  3:34 PM Creation Time:  2/26/2018  3:34 PM    Status:  Signed :  Caroline Lizama RN (Care Coordinator)           Care Coordinator Progress Note     Admission Date/Time:  2/17/2018  Attending MD:  Dr Jose Amaya     Data  Chart reviewed. Discussed pt with interdisciplinary team.  "Per 6A Discharge Rounds report plan is g-tube placement today; MRI; may be ready for DC on Wed, 2-28. Pt transferred from ICU to  on 2-25.   Speech Therapy recommending NPO with tube feeding; may need g-tube.   PT recommending ARU on 2-24; very fatigued; following 50% commands.    OT recommending ARU on 2-25.     Currently on IV Ceftriaxone every 24 hour for pneumonia; Lovenox subq every 24 hours.    Consulting Services:  Neurosurgery; Heme/Onc; PM&R; Pulmonary.     Patient was admitted for:  Large right MCA stroke.   Pt was found down at home; brought by EMS to [facility] ER. Found to have CVA due to occlusion of right middle cerebral artery. Transferred to [facility] for further evaluation & treatment.     Per Neuro Stroke progress note on 2-25-18:   \"Problem List  1. 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 History of PE  3. History of alcohol abuse  4. Pneumonia  5. RLL cavitation  6. Bilateral hilar lymphadenopathy\"  ______________________________________________     Procedure 2-18-18:  Right decompressive hemicraniectomy. (malignant cerebral edema due to right MCA infarction).   Procedure 2-26-18: G-tube placed.     Past history includes:  PE; alcohol abuse; anemia; anxiety; chronic diarrhea; lactose intolerance; myalgia; vitamin B12 deficiency.    Coordination of Care and Referrals  Chart reviewed. Discussed with PT; they weren't able to see pt today; she was at procedures; they will see her tomorrow. Discussed possible barriers: pt's limited participation, home situation and need to address caregiver support.       Assessment  Pt s/p stroke; will need inpt rehab after discharge.       Plan  Anticipated Discharge Date:  When medically stable.   Anticipated Discharge Plan:   SW will follow for inpatient rehab placement.          Mirian Lizama RN Care Coordinator  Unit [address], [facility][SJ1.1]               Revision History        User Key Date/Time User Provider Type Action    " > SJ1.1 2/26/2018  4:15 PM Caroline Lizmaa RN Care Coordinator Sign            Progress Notes by Carmen Mitchell MSW at 2/26/2018  1:25 PM     Author:  Carmen Mitchell MSW Service:  (none) Author Type:      Filed:  2/26/2018  4:00 PM Date of Service:  2/26/2018  1:25 PM Creation Time:  2/26/2018  1:25 PM    Status:  Signed :  Carmen Mitchell MSW ()         Social Work Services Progress Note[BZ1.1]    Hospital Day: 10[BZ1.2]  Date of Initial Social Work Evaluation:    Collaborated with:[BZ1.1]  [facility][BZ1[facility]    Data:[BZ1.1]  SW spoke with  ARU liaison today and ensured patient is on the list for admission, anticipate medical clearance for DC on Wednesday.[BZ1.3]     Mom Channon called and asked questions about ARU, stating she has no transportation[BZ1.1] in the [facility], and would not be comfortable driving anyhow, she also states she[BZ1.3] will also need to stay at a hotel[BZ1.1] in [facility] while Maggie is in ARU. She is OK with this plan, SW addressed her many questions and assured her of SW continuation at ARU for further planning from ARU to potential TCU or TBD. She was appreciative, sw provided 6A SW number incase further questions arise.[BZ1.3]     Plan:    Anticipated Disposition:[BZ1.1]  Facility:   ARU[BZ1.3]    Barriers to d/c plan:[BZ1.1]  Medical clearance[BZ1.3]    Follow Up:[BZ1.1]  sw continuing to follow[BZ1.3]    KYARA Khan  5B  (Medical/Surgical)  Phone: [phone]  Pager: [phone][BZ1.1]        Revision History        User Key Date/Time User Provider Type Action    > BZ1.3 2/26/2018  4:00 PM Carmen Mitchell MSW  Sign     BZ1.2 2/26/2018  1:29 PM Carmen Mitchell MSW       BZ1.1 2/26/2018  1:25 PM Carmen Mitchell MSW              Progress Notes by Nicole Hernandez RD at 2/26/2018 11:19 AM     Author:  Nicole Hernandez RD Service:  Nutrition Author Type:  Registered  Dietitian    Filed:  2/26/2018 11:45 AM Date of Service:  2/26/2018 11:19 AM Creation Time:  2/26/2018 11:19 AM    Status:  Signed :  Nicole Hernandez RD (Registered Dietitian)         CLINICAL NUTRITION SERVICES - REASSESSMENT NOTE     Nutrition Prescription    RECOMMENDATIONS FOR MDs/PROVIDERS TO ORDER:  None at this time    Malnutrition Status:    Patient does not meet criteria necessary for diagnosing malnutrition    Recommendations already ordered by Registered Dietitian (RD):  -Modify TF order to reflect new enteral access    Future/Additional Recommendations:  1. Once diet advanced >CLD, re-order Magic Cup with meals    2. Once diet advanced >FLD, order calorie counts to assess ability to wean EN    3. Continue to monitor CRP and medical status for ability to switch pt to maintenance formula. Once appropriate, would recommend: Isosource 1.5 @ goal 45 ml/hr (1080 ml/day) to provide 1620 kcals (29 kcal/kg/day), 73 g PRO (1.3 g/kg/day), 821 ml free H2O, 190 g CHO and 16 g Fiber daily.     EVALUATION OF THE PROGRESS TOWARD GOALS   Diet: NPO for procedure but previously on nectar thickened full liquid diet    Nutrition Support: 2/19 - ___ : Impact Peptide @ goal 40 ml/hr (960 ml/day) to provide 1440 kcals (26 kcal/kg/day), 90 g PRO (1.6 g/kg/day), 739 ml free H2O, 61 g Fat (50% from MCTs), 134 g CHO and no Fiber daily.    Intake:   -EN: 6 day TF average infusion = 800 mL, 83% of goal rate to provide 1195 kcal (21 kcal/kg) and 75 g PRO (1.3 g/kg)  -PO: Pt has had minimal PO intake.     NEW FINDINGS   GI: PEG placed today. Pt with minimal PO intake over the past week. Once diet re-advanced, will restart Magic Cup supplements. Pt's last BM on 2/23    Labs: CRP (2/26) = 140 (H)    Weight: Today's weight of 70 kg is up from lowest admit wt of 65.8 kg on 2/17    MALNUTRITION  % Intake: Decreased intake does not meet criteria  % Weight Loss: None noted  Subcutaneous Fat Loss: None observed  Muscle Loss: None  observed  Fluid Accumulation/Edema: None noted  Malnutrition Diagnosis: Patient does not meet two of the above criteria necessary for diagnosing malnutrition    Previous Goals   Total avg nutritional intake to meet a minimum of 25 kcal/kg and 1.2 g PRO/kg daily (per dosing wt 56 kg).  Evaluation: Met for PRO not for kcal    Previous Nutrition Diagnosis  Inadequate oral intake related to dysphagia as evidenced by need to start enteral feeds today (2/19) per SLP recommendations.   Evaluation: No change    CURRENT NUTRITION DIAGNOSIS  Inadequate oral intake related to dysphagia and poor appetite as evidenced by minimal PO intake over the past week on nectar thick FLD, dependent on TF to meet estimated energy and protein needs      INTERVENTIONS  Implementation  Collaboration with other providers - Neuro rounds  Enteral Nutrition - Modify order to reflect new enteral access    Goals  Total avg nutritional intake to meet a minimum of 25 kcal/kg and 1.2 g PRO/kg daily (per dosing wt 56 kg).    Monitoring/Evaluation  Progress toward goals will be monitored and evaluated per protocol.    Nicole Hernandez RDN, KHADIJAH  Pgr: [id number][CM1.1]     Revision History        User Key Date/Time User Provider Type Action    > CM1.1 2/26/2018 11:45 AM Nicole Hernandez RD Registered Dietitian Sign            Progress Notes by Silver Cervantes RN at 2/26/2018  9:06 AM     Author:  Silver Cervantes RN Service:  (none) Author Type:  Registered Nurse    Filed:  2/26/2018  9:08 AM Date of Service:  2/26/2018  9:06 AM Creation Time:  2/26/2018  9:06 AM    Status:  Signed :  Silver Cervantes RN (Registered Nurse)         Patient Name: Maggie Case  Medical Record Number: [id number]  Today's Date: 2/26/2018    Procedure: Gastrostomy tube placement  Proceduralist: Dr. Vega    Sedation start time: 0840  Sedation end time: 0910  Sedation medications administered: 2 mg Versed, 100 mcg Fent   Total sedation time: 30 min    Procedure start time:  0840  Puncture time: 0845  Procedure end time: 0910    Report given to: Angy AWAD      Other Notes: Pt arrived to IR room 1 from . Pt denies any questions or concerns regarding procedure. Pt positioned supine and monitored per protocol. Pt tolerated procedure without any noted complications. Pt transferred back to .[JH1.1]     Revision History        User Key Date/Time User Provider Type Action    > JH1.1 2/26/2018  9:08 AM Silver Cervantes, RN Registered Nurse Sign            Progress Notes by Silver Kapoor MD at 2/26/2018  8:31 AM     Author:  Silver Kapoor MD Service:  Interventional Radiology Author Type:  Resident    Filed:  2/26/2018  8:32 AM Date of Service:  2/26/2018  8:31 AM Creation Time:  2/26/2018  8:31 AM    Status:  Signed :  Silver Kapoor MD (Resident)         Interventional Radiology Pre-Procedure Sedation Assessment   Time of Assessment: 8:31 AM    Expected Level: Moderate Sedation    Indication: Sedation is required for the following type of Procedure: G tube    Sedation and procedural consent: Risks, benefits and alternatives were discussed with Patient    PO Intake: Appropriately NPO for procedure    ASA Class: Class 3 - SEVERE SYSTEMIC DISEASE, DEFINITE FUNCTIONAL LIMITATIONS.    Mallampati: Grade 3:  Soft palate visible, posterior pharyngeal wall not visible    Lungs: Lungs Clear with good breath sounds bilaterally    Heart: Normal heart sounds and rate    History and physical reviewed and no updates needed. I have reviewed the lab findings, diagnostic data, medications, and the plan for sedation. I have determined this patient to be an appropriate candidate for the planned sedation and procedure and have reassessed the patient IMMEDIATELY PRIOR to sedation and procedure.    Silver Kapoor MD[JH1.1]         Revision History        User Key Date/Time User Provider Type Action    > JH1.1 2/26/2018  8:32 AM Silver Kapoor MD Resident Sign            Progress Notes by  Awais Torres MD at 2/26/2018  5:53 AM     Author:  Awais Torres MD Service:  Neurosurgery Author Type:  Resident    Filed:  2/26/2018  5:54 AM Date of Service:  2/26/2018  5:53 AM Creation Time:  2/26/2018  5:53 AM    Status:  Signed :  Awais Torres MD (Resident)         Brief Neurosurgery Note:     Ordered MRI brain with and without contrast given continued leukocytosis and febrile episodes with recent surgery.     Neurosurgery team will follow-up on imaging results.     Contact the neurosurgery resident on call with questions.    Dial * * *[phone]: Enter [phone] when prompted.   Awais Torres MD, PhD  Neurosurgery PGY-3[DF1.1]         Revision History        User Key Date/Time User Provider Type Action    > DF1.1 2/26/2018  5:54 AM Awais Torres MD Resident Sign                     Procedure Notes      Procedures by Silver Kapoor MD at 2/26/2018  9:30 AM     Author:  Silver Kapoor MD Service:  Interventional Radiology Author Type:  Resident    Filed:  2/26/2018  9:31 AM Date of Service:  2/26/2018  9:30 AM Creation Time:  2/26/2018  9:27 AM    Status:  Signed :  Silver Kapoor MD (Resident)         Interventional Radiology Brief Post Procedure Note    Procedure: IR GASTROSTOMY TUBE PERCUTANEOUS PLCMNT    Proceduralist: Ori Vega MD    Assistant: Radiology Resident Physician, Richmond Kapoor MD    Time Out: Prior to the start of the procedure and with procedural staff participation, I verbally confirmed the patient s identity using two indicators, relevant allergies, that the procedure was appropriate and matched the consent or emergent situation, and that the correct equipment/implants were available. Immediately prior to starting the procedure I conducted the Time Out with the procedural staff and re-confirmed the patient s name, procedure, and site/side. (The Joint Commission universal protocol was followed.)  Yes    Medications   Medication Event Details Admin User Admin  Time   iodixanol (VISIPAQUE 320) injection 150 mL Medication Given Dose: 15 mL; Route: Tube; Scheduled Time:  8:30 AM José Luis Ratliff, BOLA 2/26/2018  9:18 AM       Sedation: IR Nurse Monitored Care   Post Procedure Summary:  Prior to the start of the procedure and with procedural staff participation, I verbally confirmed the patient s identity using two indicators, relevant allergies, that the procedure was appropriate and matched the consent or emergent situation, and that the correct equipment/implants were available. Immediately prior to starting the procedure I conducted the Time Out with the procedural staff and re-confirmed the patient s name, procedure, and site/side. (The Joint Commission universal protocol was followed.)  Yes       Sedatives: Fentanyl and Midazolam (Versed)    Vital signs, airway and pulse oximetry were monitored and remained stable throughout the procedure and sedation was maintained until the procedure was complete.  The patient was monitored by staff until sedation discharge criteria were met.    Patient tolerance: Patient tolerated the procedure well with no immediate complications.    Time of sedation in minutes: 30 Minutes minutes from beginning to end of physician one to one monitoring.          Findings: 18 F G tube placed.    Estimated Blood Loss: Minimal    Fluoroscopy Time: 2.9 minute(s)    SPECIMENS: None    Complications: 1. None     Condition: Stable    Plan: NPO and tube to gravity drainage for 4 hours.    Comments: See dictated procedure note for full details.    Silver Kapoor MD[JH1.1]         Revision History        User Key Date/Time User Provider Type Action    > JH1.1 2/26/2018  9:31 AM Silver Kapoor MD Resident Sign            Procedures by Awais Torres MD at 2/17/2018  5:52 PM     Author:  Awais Torres MD Service:  Neurosurgery Author Type:  Resident    Filed:  2/17/2018  5:53 PM Date of Service:  2/17/2018  5:52 PM Creation Time:  2/17/2018   5:52 PM    Status:  Signed :  Awais Torres MD (Resident)         [facility]  Neurosurgery Procedure Note    Name of Procedure: Left-Sided Subclavian Central Line Placement    Date of Procedure: February 17, 2018    Description of Procedure  Consent for the procedure was obtained from the patient and her mother over the phone after its risks and benefits were thoroughly explained. The patient was placed in the supine position with her head in neutral position. A shoulder roll was placed vertically between her shoulder blades and the bed was positioned in slight Trendelenburg. The site for insertion of the central line catheter was marked 1 cm caudad to the clavicle at roughly two-thirds of its length lateral from the sternal notch. SHe was then prepped and draped in the usual sterile fashion and 5 mL of 1% Lidocaine was infiltrated into skin and subcutaneous tissue.    An 18-gauge needle, attached to a 5 mL syringe, was then penetrated at the marking site and advanced through the patient's skin and subcutaneous tissue while being directed medially towards the sternal notch. The syringe was continuously aspirated as the needle was advanced until entry into the vein was marked by a flash of blood. Using the Seldinger technique, a guidewire was advanced throughout the needle/syringe until ectopy was observed on the cardiac monitor. The guidewire was then secured with a fingertip at the skin as the needle and syringe were removed over it. Using a scalpel, a small incision was then made in the skin along the guidewire at a point just adjacent to the entry site. A silastic dilator was then passed over the guidewire and advanced fully through the subcutaneous tissue. The dilator was subsequently removed and a triple-lumen catheter was threaded over the guidewire. The guidewire was then removed.    At this point, all three ports of the central line catheter were drawing well and  flushing easily with sterile saline. A Biopatch was placed at the skin entry site and the triple-lumen catheter was secured in place using a 3-0 silk suture, after which a sterile dressing was applied. There were not any immediate complications.    A post-procedure chest x-ray was obtained and it showed that the catheter tip was in the correct position and that there was not a pneumothorax on the side of the central line placement.    Contact the neurosurgery resident on call with questions.    Dial * * *140: Enter 1057 when prompted.   Awais Torres MD, PhD  Neurosurgery PGY-3[DF1.1]           Revision History        User Key Date/Time User Provider Type Action    > DF1.1 2/17/2018  5:53 PM Awais Torres MD Resident Sign                  Progress Notes - Therapies (Notes from 02/26/18 through 03/01/18)     No notes of this type exist for this encounter.

## 2018-02-17 NOTE — PROGRESS NOTES
Called to ED for Level II trauma. Sp02 98% on r/a. Placed pt on 02 @ 2 lpm per protocol. RR 16. Pt vomiting upon arrival. Pt was orally sxn's for small amount of clear secretions. No further resp interventions required at this time. Excused by RN.

## 2018-02-17 NOTE — PROCEDURES
[facility]  Neurosurgery Procedure Note    Name of Procedure: Left-Sided Subclavian Central Line Placement    Date of Procedure: February 17, 2018    Description of Procedure  Consent for the procedure was obtained from the patient and her mother over the phone after its risks and benefits were thoroughly explained. The patient was placed in the supine position with her head in neutral position. A shoulder roll was placed vertically between her shoulder blades and the bed was positioned in slight Trendelenburg. The site for insertion of the central line catheter was marked 1 cm caudad to the clavicle at roughly two-thirds of its length lateral from the sternal notch. SHe was then prepped and draped in the usual sterile fashion and 5 mL of 1% Lidocaine was infiltrated into skin and subcutaneous tissue.    An 18-gauge needle, attached to a 5 mL syringe, was then penetrated at the marking site and advanced through the patient's skin and subcutaneous tissue while being directed medially towards the sternal notch. The syringe was continuously aspirated as the needle was advanced until entry into the vein was marked by a flash of blood. Using the Seldinger technique, a guidewire was advanced throughout the needle/syringe until ectopy was observed on the cardiac monitor. The guidewire was then secured with a fingertip at the skin as the needle and syringe were removed over it. Using a scalpel, a small incision was then made in the skin along the guidewire at a point just adjacent to the entry site. A silastic dilator was then passed over the guidewire and advanced fully through the subcutaneous tissue. The dilator was subsequently removed and a triple-lumen catheter was threaded over the guidewire. The guidewire was then removed.    At this point, all three ports of the central line catheter were drawing well and flushing easily with sterile saline. A Biopatch was placed at the skin entry site  and the triple-lumen catheter was secured in place using a 3-0 silk suture, after which a sterile dressing was applied. There were not any immediate complications.    A post-procedure chest x-ray was obtained and it showed that the catheter tip was in the correct position and that there was not a pneumothorax on the side of the central line placement.    Contact the neurosurgery resident on call with questions.    Dial * * *807: Enter [id number] when prompted.   Awais Torres MD, PhD  Neurosurgery PGY-3

## 2018-02-17 NOTE — ED NOTES
This writer and ROSEANNA Cooper down to CT for CTA Head. Patient attached to  monitor. Remains A&O x4, following commands.

## 2018-02-17 NOTE — ED NOTES
2mg of Dilaudid sent with Anne from [facility] per Dr. Shanks. Written prescription given to Anne. This writer pulled 2mg syringe from [facility].

## 2018-02-17 NOTE — ED NOTES
Bed: ED09a  Expected date: 2/17/18  Expected time:   Means of arrival: Ambulance  Comments:  Trauma

## 2018-02-17 NOTE — ED NOTES
Departure delayed several minutes, awaiting patient's 9 year old daughter to arrive. OK'd by Anne with flight crew.

## 2018-02-17 NOTE — IP AVS SNAPSHOT
` `     [address] [facility] BANK: [phone]            Medication Administration Report for Maggie Case as of 03/01/18 1308   Legend:    Given Hold Not Given Due Canceled Entry Other Actions    Time Time (Time) Time  Time-Action       Inactive    Active    Linked        Medications 02/23/18 02/24/18 02/25/18 02/26/18 02/27/18 02/28/18 03/01/18    acetaminophen (TYLENOL) solution 650 mg  Dose: 650 mg  Freq: EVERY 4 HOURS PRN Route: PER NG TUBE  PRN Reasons: mild pain,fever  Start: 02/25/18 0058   Admin Instructions: .  Maximum acetaminophen dose from all sources= 75 mg/kg/day not to exceed 4 grams/day.       0612 (650 mg)-Given       1035 (650 mg)-Given       1446 (650 mg)-Given        1447 (650 mg)-Given       1830 (650 mg)-Given       2241 (650 mg)-Given        0355 (650 mg)-Given       0804 (650 mg)-Given       1128 (650 mg)-Given       1651 (650 mg)-Given       2124 (650 mg)-Given        0238 (650 mg)-Given       0632 (650 mg)-Given       1020 (650 mg)-Given       1413 (650 mg)-Given       1801 (650 mg)-Given       2208 (650 mg)-Given        0410 (650 mg)-Given       0814 (650 mg)-Given           dextrose 10 % 1,000 mL infusion  Freq: CONTINUOUS PRN Route: IV  PRN Comment: Hypoglycemia prevention  Start: 02/19/18 1419   Admin Instructions: For Hypoglycemia Prevention for patients on long-acting subcutaneous basal insulin (Glargine, Detemir, NPH) or continuous insulin infusion. Whenever nutrition support is held or interrupted:   1) Infuse IV D10W at nutrition support rate  2) Notify provider for further instructions               enoxaparin (LOVENOX) injection 70 mg  Dose: 1 mg/kg  Weight Dosing Info: 68.6 kg  Freq: EVERY 12 HOURS Route: SC  Start: 03/01/18 1130          1238 (70 mg)-Given       [ ] 2330           FLUoxetine (PROzac) solution 20 mg  Dose: 20 mg  Freq: DAILY Route: ORAL OR FEED  Start: 02/22/18 1400    0840 (20 mg)-Given        0930 (20 mg)-Given        0834 (20 mg)-Given        3531 (20  mg)-Given        0804 (20 mg)-Given        0816 (20 mg)-Given        0814 (20 mg)-Given           folic acid (FOLVITE) tablet 1 mg  Dose: 1 mg  Freq: DAILY Route: PER NG TUBE  Start: 02/18/18 0800    0834 (1 mg)-Given        0929 (1 mg)-Given        0834 (1 mg)-Given        1448 (1 mg)-Given        0805 (1 mg)-Given        0816 (1 mg)-Given        0813 (1 mg)-Given           HOLD: enoxaparin (LOVENOX) pre-procedure INPATIENT  Freq: HOLD Route: XX  Start: 02/26/18 1003   Admin Instructions: Q12H and Q24H Dosing:  Hold enoxaparin (LOVENOX) dose the AM of procedure    In the event a Q12H enoxaparin (LOVENOX) patient is given a dose the evening before the procedure, you may proceed with intervention provided the patient is currently an inpatient AND a radiologist has given approval    Order specific questions:  Medication(s) to hold: enoxaparin (LOVENOX)  Parameter for hold (doses,days,conditions) : Hold  before Procedure or Test  Hours or days to hold med before/after procedure/surgery See Admin Instructions                hydrALAZINE (APRESOLINE) injection 10-20 mg  Dose: 10-20 mg  Freq: EVERY 1 HOUR PRN Route: IV  PRN Reason: high blood pressure  PRN Comment: SBP >200  Start: 02/22/18 1514   Admin Instructions: <br>If heart rate greater than or equal to 60 bpm use labetalol first, if heart rate is less than 60 bpm use hydralazine first<br><br>  For ordered doses up to 40 mg, give IV Push undiluted over 1 minute.               influenza quadrivalent (PF) vacc age 3 yrs and older (FLUZONE or Flulaval) injection 0.5 mL  Dose: 0.5 mL  Freq: PRIOR TO DISCHARGE Route: IM  Start: 02/27/18 1000   Admin Instructions: Administer when afebrile (less than 100.4 F) x 24 hours, or Prior to Discharge. If not administering when scheduled , change the due time by following the instructions in the reference link below. If patient refuses vaccine, chart as Vaccine Refused.          (1020)-Vaccine Refused            labetalol  "(NORMODYNE/TRANDATE) injection 5-10 mg  Dose: 5-10 mg  Freq: EVERY 10 MIN PRN Route: IV  PRN Reason: high blood pressure  PRN Comment: SBP goal < 200  Start: 02/22/18 1514   Admin Instructions:   If heart rate greater than or equal to 60 bpm use labetalol first, if heart rate is less than 60 bpm use hydralazine first      For ordered doses up to 80 mg, give IV Push undiluted. Give each 20 mg over 2 minutes.               lidocaine (LMX4) kit  Freq: EVERY 1 HOUR PRN Route: Top  PRN Reason: pain  PRN Comment: with VAD insertion or accessing implanted port.  Start: 02/23/18 2031   Admin Instructions: Do NOT give if patient has a history of allergy to any local anesthetic or any \"korey\" product.   Apply 30 minutes prior to VAD insertion or port access.  MAX Dose:  2.5 g (  of 5 g tube)               lidocaine 1 % 1 mL  Dose: 1 mL  Freq: EVERY 1 HOUR PRN Route: OTHER  PRN Comment: mild pain with VAD insertion or accessing implanted port  Start: 02/23/18 2031   Admin Instructions: Do NOT give if patient has a history of allergy to any local anesthetic or any \"korey\" product. MAX dose 1 mL subcutaneous OR intradermal in divided doses.               magnesium sulfate 4 g in 100 mL sterile water (premade)  Dose: 4 g  Freq: EVERY 4 HOURS PRN Route: IV  PRN Reason: magnesium supplementation  Start: 02/17/18 1517   Admin Instructions: For serum Mg++ less than 1.6 mg/dL  Give 4 g and recheck magnesium level 2 hours after dose, and next AM.               May take regular AM medications except those listed below  Freq: CONTINUOUS PRN Route: XX  Start: 02/26/18 1003   Admin Instructions: May take regular AM medications except those listed below               midazolam (VERSED) injection 0.5-1 mg  Dose: 0.5-1 mg  Freq: EVERY 4 MIN PRN Route: IV  PRN Reason: sedation  PRN Comment: If inadequate response may repeat 0.5 mg IV slowly every 4 minutes PRN sedation until desired response; when verbally requested by provider.  Start: " 02/26/18 0831   Admin Instructions: Doses can be exceeded under direct oversight of patient by physician.  For ordered doses up to 2.5 mg give IV Push slowly titrated over a minimum of 2 minutes. Dilute each 1mg in 4mL of NS.        0930 (2 mg)-Given              multivitamins with minerals (CERTAVITE/CEROVITE) liquid 15 mL  Dose: 15 mL  Freq: DAILY Route: PER FEEDING   Start: 02/19/18 1430    0833 (15 mL)-Given        0929 (15 mL)-Given        0834 (15 mL)-Given        1448 (15 mL)-Given        0804 (15 mL)-Given        0816 (15 mL)-Given        0814 (15 mL)-Given           naloxone (NARCAN) injection 0.1-0.4 mg  Dose: 0.1-0.4 mg  Freq: EVERY 2 MIN PRN Route: IV  PRN Reason: opioid reversal  Start: 02/17/18 1516   Admin Instructions: For respiratory rate LESS than or EQUAL to 8.  Partial reversal dose:  0.1 mg titrated q 2 minutes for Analgesia Side Effects Monitoring Sedation Level of 3 (frequently drowsy, arousable, drifts to sleep during conversation).Full reversal dose:  0.4 mg bolus for Analgesia Side Effects Monitoring Sedation Level of 4 (somnolent, minimal or no response to stimulation).  For ordered doses up to 2mg give IVP. Give each 0.4mg over 15 seconds in emergency situations. For non-emergent situations further dilute in 9mL of NS to facilitate titration of response.               ondansetron (ZOFRAN) injection 4 mg  Dose: 4 mg  Freq: EVERY 6 HOURS PRN Route: IV  PRN Reasons: nausea,vomiting  Start: 02/17/18 2023   Admin Instructions: Irritant. For ordered doses up to 4 mg, give IV Push undiluted over 2-5 minutes.        1147 (4 mg)-Given        0819 (4 mg)-Given        0623 (4 mg)-Given        0414 (4 mg)-Given           oxyCODONE (ROXICODONE) solution 5-10 mg  Dose: 5-10 mg  Freq: EVERY 4 HOURS PRN Route: PO  PRN Reason: moderate to severe pain  Start: 02/26/18 2119       2241 (10 mg)-Given        0355 (10 mg)-Given       0804 (5 mg)-Given       1128 (5 mg)-Given       1419 (5 mg)-Given       1829  (5 mg)-Given       2242 (5 mg)-Given        0238 (5 mg)-Given       0632 (5 mg)-Given       1020 (5 mg)-Given       1413 (5 mg)-Given       1801 (5 mg)-Given       2209 (5 mg)-Given        0410 (5 mg)-Given       0814 (5 mg)-Given       1234 (10 mg)-Given           potassium chloride (KLOR-CON) Packet 20-40 mEq  Dose: 20-40 mEq  Freq: EVERY 2 HOURS PRN Route: ORAL OR FEED  PRN Reason: potassium supplementation  Start: 02/17/18 1517   Admin Instructions: Use if unable to tolerate tablets.  If Serum K+ 3.0-3.3, dose = 60 mEq po total dose (40 mEq x1 followed in 2 hours by 20 mEq x1). Recheck K+ level 4 hours after dose and the next AM.  If Serum K+ 2.5-2.9, dose = 80 mEq po total dose (40 mEq Q2H x2). Recheck K+ level 4 hours after dose and the next AM.  If Serum K+ less than 2.5, See IV order.  Dissolve packet contents in 4-8 ounces of cold water or juice.               potassium chloride 10 mEq in 100 mL intermittent infusion with 10 mg lidocaine  Dose: 10 mEq  Freq: EVERY 1 HOUR PRN Route: IV  PRN Reason: potassium supplementation  Start: 02/17/18 1517   Admin Instructions: Infuse via PERIPHERAL LINE. Use potassium with lidocaine for pain with peripheral administration.  If Serum K+ 3.0-3.3, dose = 10 mEq/hr x4 doses (40 mEq IV total dose). Recheck K+ level 2 hours after dose and the next AM.  If Serum K+ less than 3.0, dose = 10 mEq/hr x6 doses (60 mEq IV total dose). Recheck K+ level 2 hours after dose and the next AM.               potassium chloride 10 mEq in 100 mL sterile water intermittent infusion (premix)  Dose: 10 mEq  Freq: EVERY 1 HOUR PRN Route: IV  PRN Reason: potassium supplementation  Start: 02/17/18 1517   Admin Instructions: Infuse via PERIPHERAL LINE or CENTRAL LINE. Use for central line replacement if patient weight less than 65 kg, if patient is on TPN with high potassium content or if unit does not stock 20 mEq bags.   If Serum K+ 3.0-3.3, dose = 10 mEq/hr x4 doses (40 mEq IV total dose).  Recheck K+ level 2 hours after dose and the next AM.   If Serum K+ less than 3.0, dose = 10 mEq/hr x6 doses (60 mEq IV total dose). Recheck K+ level 2 hours after dose and the next AM.               potassium chloride 20 mEq in 50 mL intermittent infusion  Dose: 20 mEq  Freq: EVERY 1 HOUR PRN Route: IV  PRN Reason: potassium supplementation  Start: 02/17/18 1517   Admin Instructions: Infuse via CENTRAL LINE Only. May need EKG if less than 65 kg or on TPN - Max rate is 0.3 mEq/kg/hr for patients not on EKG monitoring.   If Serum K+ 3.0-3.3, dose = 20 mEq/hr x2 doses (40 mEq IV total dose). Recheck K+ level 2 hours after dose and the next AM.  If Serum K+ less than 3.0, dose = 20 mEq/hr x3 doses (60 mEq IV total dose). Recheck K+ level 2 hours after dose and the next AM.               potassium chloride SA (K-DUR/KLOR-CON M) CR tablet 20-40 mEq  Dose: 20-40 mEq  Freq: EVERY 2 HOURS PRN Route: PO  PRN Reason: potassium supplementation  Start: 02/17/18 1517   Admin Instructions: Use if able to take PO.   If Serum K+ 3.0-3.3, dose = 60 mEq po total dose (40 mEq x1 followed in 2 hours by 20 mEq x1). Recheck K+ level 4 hours after dose and the next AM.  If Serum K+ 2.5-2.9, dose = 80 mEq po total dose (40 mEq Q2H x2). Recheck K+ level 4 hours after dose and the next AM.  If Serum K+ less than 2.5, See IV order.  DO NOT CRUSH.               potassium phosphate 15 mmol in sodium chloride 0.9 % 250 mL intermittent infusion  Dose: 15 mmol  Freq: DAILY PRN Route: IV  PRN Reason: phosphorous supplementation  Start: 02/17/18 1517   Admin Instructions: For serum phosphorus level 2-2.4  Do not infuse Phosphorus in the same line as TPN.   Give 15 mmol and recheck phosphorus level next AM.               potassium phosphate 20 mmol in sodium chloride 0.9 % 250 mL intermittent infusion  Dose: 20 mmol  Freq: EVERY 6 HOURS PRN Route: IV  PRN Reason: phosphorous supplementation  Start: 02/17/18 1517   Admin Instructions: For serum  phosphorus level 1.1-1.9  For CENTRAL Line ONLY  Do not infuse Phosphorus in the same line as TPN.   Give 20 mmol and recheck phosphorus level 2 hours after last dose and next AM.               potassium phosphate 20 mmol in sodium chloride 0.9 % 500 mL intermittent infusion  Dose: 20 mmol  Freq: EVERY 6 HOURS PRN Route: IV  PRN Reason: phosphorous supplementation  Start: 02/17/18 1517   Admin Instructions: For serum phosphorus level 1.1-1.9  For Peripheral Line  Do not infuse Phosphorus in the same line as TPN.   Give 20 mmol and recheck phosphorus level 2 hours after last dose and next AM.               potassium phosphate 25 mmol in sodium chloride 0.9 % 500 mL intermittent infusion  Dose: 25 mmol  Freq: EVERY 8 HOURS PRN Route: IV  PRN Reason: phosphorous supplementation  Start: 02/17/18 1517   Admin Instructions: For serum phosphorus level less than 1.1  Do not infuse Phosphorus in the same line as TPN.   Give 25 mmol and recheck phosphorus level 2 hours after last dose and next AM.               prochlorperazine (COMPAZINE) injection 5-10 mg  Dose: 5-10 mg  Freq: EVERY 6 HOURS PRN Route: IV  PRN Reasons: nausea,vomiting  Start: 02/26/18 1151   Admin Instructions: For ordered doses up to 10 mg, give IV Push undiluted. Each 5mg over 1 minute.           0942 (5 mg)-Given           ramelteon (ROZEREM) tablet 8 mg  Dose: 8 mg  Freq: AT BEDTIME PRN Route: PO  PRN Reason: sleep  Start: 02/26/18 2119        2246 (8 mg)-Given             Reason statin medication order not selected  Freq: DOES NOT GO TO MAR Route: OTHER  PRN Reason: other  Start: 03/01/18 1210   Admin Instructions: LDL below target range    Order specific questions:  Reason not prescribed: documented in admin instructions                sodium chloride (PF) 0.9% PF flush 3 mL  Dose: 3 mL  Freq: EVERY 8 HOURS Route: IK  Start: 02/23/18 2045   Admin Instructions: And Q1H PRN, to lock peripheral IV dormant line.             0450 (3 mL)-Given       1242 (3  mL)-Given       2058 (3 mL)-Given        0614 (3 mL)-Given       (1400)-Not Given       2055 (3 mL)-Given        0025 (3 mL)-Given       1449 (3 mL)-Given       1546 (3 mL)-Given        0036 (3 mL)-Given       0805 (3 mL)-Given       1651 (3 mL)-Given        0245 (3 mL)-Given       0820 (3 mL)-Given       (1802)-Not Given        (0040)-Not Given       (0805)-Not Given       [ ] 1600           sodium chloride (PF) 0.9% PF flush 3 mL  Dose: 3 mL  Freq: EVERY 1 HOUR PRN Route: IK  PRN Reason: line flush  PRN Comment: for peripheral IV flush post IV meds  Start: 02/23/18 2031              thiamine tablet 100 mg  Dose: 100 mg  Freq: DAILY Route: PER NG TUBE  Start: 02/18/18 0800    0834 (100 mg)-Given        0930 (100 mg)-Given        0834 (100 mg)-Given        1448 (100 mg)-Given        0805 (100 mg)-Given        0816 (100 mg)-Given        0814 (100 mg)-Given           Warfarin Therapy Reminder (Check START DATE - warfarin may be starting in the FUTURE)  Dose: 1 each  Freq: CONTINUOUS PRN Route: XX  Start: 03/01/18 0916   Admin Instructions: *Note to reorder warfarin daily*  Pharmacy Warfarin Dosing Service  Patient is on Warfarin Therapy - check for daily order              Future Medications  Medications 02/23/18 02/24/18 02/25/18 02/26/18 02/27/18 02/28/18 03/01/18       warfarin (COUMADIN) tablet 5 mg  Dose: 5 mg  Freq: ONCE AT 6PM Route: PO  Start: 03/01/18 1800          [ ] 1800          Completed Medications  Medications 02/23/18 02/24/18 02/25/18 02/26/18 02/27/18 02/28/18 03/01/18         Dose: 2 g  Freq: EVERY 24 HOURS Route: IV  Indications of Use: COMMUNITY ACQUIRED PNEUMONIA,HEALTHCARE-ASSOCIATED PNEUMONIA  Start: 02/20/18 1345   End: 02/26/18 1716   Admin Instructions: Give IVP over 3 minutes     1352 (2 g)-Given        1459 (2 g)-Given        1706 (2 g)-Given        1713 (2 g)-Given                Dose: 7.5 mL  Freq: ONCE Route: IV  Start: 02/26/18 1430   End: 02/26/18 1420   Admin Instructions: Supplied  by, and administered by MRI.        1420 (7.5 mL)-Given             Discontinued Medications  Medications 02/23/18 02/24/18 02/25/18 02/26/18 02/27/18 02/28/18 03/01/18         Dose: 81 mg  Freq: DAILY Route: PER G TUBE  Start: 02/17/18 1545   End: 02/27/18 1426    0834 (81 mg)-Given        0929 (81 mg)-Given        0834 (81 mg)-Given        1448 (81 mg)-Given        0805 (81 mg)-Given       1426-Med Discontinued           Dose: 900 mg  Freq: PRE-OP/PRE-PROCEDURE Route: IV  Indications of Use: PERIOPERATIVE PHARMACOPROPHYLAXIS  Start: 02/26/18 1003   End: 02/27/18 1037   Admin Instructions: Give dose within 1 hour PRIOR to procedure.         1037-Med Discontinued           Dose: 40 mg  Freq: EVERY 24 HOURS Route: SC  Start: 02/21/18 1430   End: 02/28/18 0828   Admin Instructions: HOLD if platelet count falls below 50% of baseline or less than 100,000/ L and notify provider.     1347 (40 mg)-Given        1459 (40 mg)-Given        1439 (40 mg)-Given        1448 (40 mg)-Given        1419 (40 mg)-Given        0828-Med Discontinued          Rate: 0-35 mL/hr Dose: 0-3500 Units/hr  Freq: CONTINUOUS Route: IV  Last Dose: Stopped (03/01/18 0920)  Start: 02/28/18 0830   End: 03/01/18 0906   Admin Instructions: Starting infusion Rate = 850 Units/hr (actual weight) (12 units/kg/hr).  Low Intensity Heparin Treatment.  GOAL: Heparin Xa (10a) LEVEL = 0.15-0.35 (PTT = 45-65 seconds).  Max 1000 units/hr if patient getting TNK and greater than 70 kg.  Beginning with the Heparin Xa (10a) Level collected 6 hours after starting heparin, adjust heparin according to guidelines.    Release Heparin Xa (10a) Level order for blood draw 6 hours after start of infusion and 6 hours after any dose change.    If Xa (10a) less than 0.1 see bolus orders and INCREASE by 300 units/hr     If Xa (10a) 0.1 - 0.14 see bolus orders and INCREASE by 150 units/hr     If Xa (10a) 0.15 - 0.35 then  NO CHANGE in rate    if Xa (10a) 0.36 - 0.48 then REDUCE by 150  units/hr     If Xa (10a) 0.49 - 0.66 then HOLD 30 mins and REDUCE by 200 units/hr     If Xa (10a) 0.67 - 1.31 then HOLD 60 mins and REDUCE by 300 units/hr     If Xa (10a) greater than 1.31 then HOLD 60 mins and REDUCE by 350 units/hr          1013 (850 Units/hr)-New Bag       1713 (850 Units/hr)-Rate/Dose Verify [C]        0040 (850 Units/hr)-Rate/Dose Verify       0906-Med Discontinued  0920-Stopped             Rate: 0-35 mL/hr Dose: 0-3500 Units/hr  Freq: CONTINUOUS Route: IV  Start: 02/27/18 1430   End: 02/27/18 1426   Admin Instructions: Starting infusion Rate = 850 Units/hr (actual weight) (12 units/kg/hr).  Low Intensity Heparin Treatment.  GOAL: Heparin Xa (10a) LEVEL = 0.15-0.35 (PTT = 45-65 seconds).  Max 1000 units/hr if patient getting TNK and greater than 70 kg.  Beginning with the Heparin Xa (10a) Level collected 6 hours after starting heparin, adjust heparin according to guidelines.    Release Heparin Xa (10a) Level order for blood draw 6 hours after start of infusion and 6 hours after any dose change.    If Xa (10a) less than 0.1 see bolus orders and INCREASE by 300 units/hr     If Xa (10a) 0.1 - 0.14 see bolus orders and INCREASE by 150 units/hr     If Xa (10a) 0.15 - 0.35 then  NO CHANGE in rate    if Xa (10a) 0.36 - 0.48 then REDUCE by 150 units/hr     If Xa (10a) 0.49 - 0.66 then HOLD 30 mins and REDUCE by 200 units/hr     If Xa (10a) 0.67 - 1.31 then HOLD 60 mins and REDUCE by 300 units/hr     If Xa (10a) greater than 1.31 then HOLD 60 mins and REDUCE by 350 units/hr         1426-Med Discontinued           Rate: 0-35 mL/hr Dose: 0-3500 Units/hr  Freq: CONTINUOUS Route: IV  Start: 02/27/18 1430   End: 02/27/18 1426   Admin Instructions: Starting infusion Rate = 850 Units/hr (actual weight) (12 units/kg/hr).  Low Intensity Heparin Treatment.  GOAL: Heparin Xa (10a) LEVEL = 0.15-0.35 (PTT = 45-65 seconds).  Max 1000 units/hr if patient getting TNK and greater than 70 kg.  Beginning with the Heparin  Xa (10a) Level collected 6 hours after starting heparin, adjust heparin according to guidelines.    Release Heparin Xa (10a) Level order for blood draw 6 hours after start of infusion and 6 hours after any dose change.    If Xa (10a) less than 0.1 see bolus orders and INCREASE by 300 units/hr     If Xa (10a) 0.1 - 0.14 see bolus orders and INCREASE by 150 units/hr     If Xa (10a) 0.15 - 0.35 then  NO CHANGE in rate    if Xa (10a) 0.36 - 0.48 then REDUCE by 150 units/hr     If Xa (10a) 0.49 - 0.66 then HOLD 30 mins and REDUCE by 200 units/hr     If Xa (10a) 0.67 - 1.31 then HOLD 60 mins and REDUCE by 300 units/hr     If Xa (10a) greater than 1.31 then HOLD 60 mins and REDUCE by 350 units/hr         1426-Med Discontinued           Freq: EVERY 6 HOURS PRN Route: IV  PRN Comment: to reach target Heparin Xa (10a) goal. GOAL: Heparin Xa (10a) LEVEL = 0.15-0.35 (PTT = 45-65 seconds).  Start: 02/27/18 1424   End: 02/27/18 1426   Admin Instructions: Beginning with the Heparin Xa (10a) level collected 6 hours AFTER starting heparin:  If Heparin Xa (10a) less than 0.1, then bolus dose = 3,500 Units (actual weight) (50 Units/kg x 70 kg (actual weight))    If Heparin Xa (10a) 0.1 to 0.14, then bolus dose = 2,100 Units (actual weight) (30 Units/kg x 70 kg (actual weight)).  Nurse to administer dose from existing infusion. If no infusion bag or syringe for this order is available, contact pharmacist to re-enter medication order.         1426-Med Discontinued           Freq: EVERY 6 HOURS PRN Route: IV  PRN Comment: to reach target Heparin Xa (10a) goal. GOAL: Heparin Xa (10a) LEVEL = 0.15-0.35 (PTT = 45-65 seconds).  Start: 02/27/18 1416   End: 02/27/18 1426   Admin Instructions: Beginning with the Heparin Xa (10a) level collected 6 hours AFTER starting heparin:  If Heparin Xa (10a) less than 0.1, then bolus dose = 3,500 Units (actual weight) (50 Units/kg x 70 kg (actual weight))    If Heparin Xa (10a) 0.1 to 0.14, then bolus dose  = 2,100 Units (actual weight) (30 Units/kg x 70 kg (actual weight)).  Nurse to administer dose from existing infusion. If no infusion bag or syringe for this order is available, contact pharmacist to re-enter medication order.         1426-Med Discontinued           Freq: HOLD Route: XX  Start: 02/26/18 1002   End: 02/28/18 1001   Admin Instructions: Hold metfomin (GLUCOPHAGE, GLUMETZA, FORTAMET, RIOMET) and metformin containing medications: alogliptin/metformin (KAZANO), glipizide/metformin (METAGLIP), glyburide/metformin (GLUCOVANCE), rosiglitatzone/metformin (AVANDAMET), dapagliflozin/metformin (XIGDUO XR), sitagliptin/metformin (JANUMET, JANUMET XR), linagliptin/metformin (JENTADUETO), repaglinidine/metformin (PRANDIMET), saxagliptin/metformin (KOMBIGLYZE XR), canagliflozin/metformin (INVOKAMET), pioglitazone/metformin (ACTOPLUS MET, ACTOPLUS MET XR).  If patient was on one of these medications pre-procedure, continue to HOLD for 48 hours post-procedure. Reinstitute metformin and metformin containing medications only after renal function has been re-evaluated and found to be normal.    Order specific questions:  Medication(s) to hold: Hold Metformin and metformin containing medications if patient received IV contrast  Parameter for hold (doses,days,conditions) : Other (see admin instructions)  Hours or days to hold med before/after procedure/surgery OTHER (for 48 hours post procedure)           1001-Med Discontinued          Dose: 50 mL  Freq: ONCE Route: PO  Start: 02/26/18 1015   End: 02/27/18 2325   Admin Instructions: Mix 50 mL of oral iohexol (OMNIPAQUE) with 600 mL of water.  Give evening before the procedure.  IF the oral contrast is not available, please obtain from CT at [phone].                2325-Med Discontinued           Dose: 5-10 mg  Freq: EVERY 4 HOURS PRN Route: PO  PRN Reason: moderate to severe pain  Start: 02/25/18 0141   End: 02/26/18 2116      0612 (5 mg)-Given       1035 (5  mg)-Given       1446 (5 mg)-Given       1900 (5 mg)-Given        0010 (5 mg)-Given       1447 (5 mg)-Given       1830 (10 mg)-Given       2116-Med Discontinued            Dose: 5-10 mg  Freq: EVERY 4 HOURS PRN Route: PO  PRN Reason: moderate to severe pain  Start: 02/19/18 0139   End: 02/26/18 2119    0207 (5 mg)-Given       0550 (5 mg)-Given       1007 (5 mg)-Given       1257 (5 mg)-Given       1627 (5 mg)-Given       1735 (5 mg)-Given       2037 (5 mg)-Given        0211 (5 mg)-Given       0933 (10 mg)-Given       1242 (10 mg)-Given       1845 (5 mg)-Given       2312 (5 mg)-Given        0114 (5 mg)-Given        2119-Med Discontinued       Medications 02/23/18 02/24/18 02/25/18 02/26/18 02/27/18 02/28/18 03/01/18

## 2018-02-17 NOTE — ED NOTES
[facility] at bedside to assume care. Dr. Shanks at bedside and updating RITESH Rodríguez with flight crew.

## 2018-02-17 NOTE — ED NOTES
DATE:  2/17/2018   TIME OF RECEIPT FROM LAB:  1159  LAB TEST:  Potassium   LAB VALUE:  2.4  RESULTS GIVEN WITH READ-BACK TO (PROVIDER):  Estephania Shanks MD  TIME LAB VALUE REPORTED TO PROVIDER:   2837

## 2018-02-18 ENCOUNTER — APPOINTMENT (OUTPATIENT)
Dept: CARDIOLOGY | Facility: CLINIC | Age: 28
End: 2018-02-18
Attending: STUDENT IN AN ORGANIZED HEALTH CARE EDUCATION/TRAINING PROGRAM
Payer: MEDICAID

## 2018-02-18 ENCOUNTER — APPOINTMENT (OUTPATIENT)
Dept: CT IMAGING | Facility: CLINIC | Age: 28
End: 2018-02-18
Attending: PSYCHIATRY & NEUROLOGY
Payer: MEDICAID

## 2018-02-18 ENCOUNTER — ANESTHESIA EVENT (OUTPATIENT)
Dept: SURGERY | Facility: CLINIC | Age: 28
End: 2018-02-18
Payer: MEDICAID

## 2018-02-18 ENCOUNTER — APPOINTMENT (OUTPATIENT)
Dept: CT IMAGING | Facility: CLINIC | Age: 28
End: 2018-02-18
Attending: STUDENT IN AN ORGANIZED HEALTH CARE EDUCATION/TRAINING PROGRAM
Payer: MEDICAID

## 2018-02-18 ENCOUNTER — ANESTHESIA (OUTPATIENT)
Dept: SURGERY | Facility: CLINIC | Age: 28
End: 2018-02-18
Payer: MEDICAID

## 2018-02-18 ENCOUNTER — APPOINTMENT (OUTPATIENT)
Dept: SPEECH THERAPY | Facility: CLINIC | Age: 28
End: 2018-02-18
Attending: INTERNAL MEDICINE
Payer: MEDICAID

## 2018-02-18 LAB
ANION GAP SERPL CALCULATED.3IONS-SCNC: 8 MMOL/L (ref 3–14)
ANION GAP SERPL CALCULATED.3IONS-SCNC: NORMAL MMOL/L (ref 6–17)
BASE DEFICIT BLDA-SCNC: 4.8 MMOL/L
BASE DEFICIT BLDA-SCNC: 5.9 MMOL/L
BUN SERPL-MCNC: 9 MG/DL (ref 7–30)
BUN SERPL-MCNC: NORMAL MG/DL (ref 7–30)
CA-I BLD-MCNC: 4.7 MG/DL (ref 4.4–5.2)
CA-I BLD-MCNC: 4.7 MG/DL (ref 4.4–5.2)
CALCIUM SERPL-MCNC: 8.2 MG/DL (ref 8.5–10.1)
CALCIUM SERPL-MCNC: NORMAL MG/DL (ref 8.5–10.1)
CHLORIDE SERPL-SCNC: 123 MMOL/L (ref 94–109)
CHLORIDE SERPL-SCNC: NORMAL MMOL/L (ref 94–109)
CO2 SERPL-SCNC: 23 MMOL/L (ref 20–32)
CO2 SERPL-SCNC: NORMAL MMOL/L (ref 20–32)
CREAT SERPL-MCNC: 0.63 MG/DL (ref 0.52–1.04)
CREAT SERPL-MCNC: NORMAL MG/DL (ref 0.52–1.04)
ERYTHROCYTE [DISTWIDTH] IN BLOOD BY AUTOMATED COUNT: 18.4 % (ref 10–15)
ERYTHROCYTE [DISTWIDTH] IN BLOOD BY AUTOMATED COUNT: NORMAL % (ref 10–15)
GFR SERPL CREATININE-BSD FRML MDRD: >90 ML/MIN/1.7M2
GFR SERPL CREATININE-BSD FRML MDRD: NORMAL ML/MIN/1.7M2
GLUCOSE BLD-MCNC: 112 MG/DL (ref 70–99)
GLUCOSE BLD-MCNC: 113 MG/DL (ref 70–99)
GLUCOSE SERPL-MCNC: 115 MG/DL (ref 70–99)
GLUCOSE SERPL-MCNC: NORMAL MG/DL (ref 70–99)
HCO3 BLD-SCNC: 19 MMOL/L (ref 21–28)
HCO3 BLD-SCNC: 19 MMOL/L (ref 21–28)
HCT VFR BLD AUTO: 33.7 % (ref 35–47)
HCT VFR BLD AUTO: NORMAL % (ref 35–47)
HGB BLD-MCNC: 10.4 G/DL (ref 11.7–15.7)
HGB BLD-MCNC: 8.7 G/DL (ref 11.7–15.7)
HGB BLD-MCNC: 9.3 G/DL (ref 11.7–15.7)
HGB BLD-MCNC: NORMAL G/DL (ref 11.7–15.7)
MCH RBC QN AUTO: 29.1 PG (ref 26.5–33)
MCH RBC QN AUTO: NORMAL PG (ref 26.5–33)
MCHC RBC AUTO-ENTMCNC: 30.9 G/DL (ref 31.5–36.5)
MCHC RBC AUTO-ENTMCNC: NORMAL G/DL (ref 31.5–36.5)
MCV RBC AUTO: 94 FL (ref 78–100)
MCV RBC AUTO: NORMAL FL (ref 78–100)
O2/TOTAL GAS SETTING VFR VENT: ABNORMAL %
O2/TOTAL GAS SETTING VFR VENT: ABNORMAL %
PCO2 BLD: 30 MM HG (ref 35–45)
PCO2 BLD: 33 MM HG (ref 35–45)
PH BLD: 7.37 PH (ref 7.35–7.45)
PH BLD: 7.41 PH (ref 7.35–7.45)
PLATELET # BLD AUTO: 413 10E9/L (ref 150–450)
PLATELET # BLD AUTO: NORMAL 10E9/L (ref 150–450)
PO2 BLD: 216 MM HG (ref 80–105)
PO2 BLD: 230 MM HG (ref 80–105)
POTASSIUM BLD-SCNC: 3 MMOL/L (ref 3.4–5.3)
POTASSIUM BLD-SCNC: 3.2 MMOL/L (ref 3.4–5.3)
POTASSIUM SERPL-SCNC: 3 MMOL/L (ref 3.4–5.3)
POTASSIUM SERPL-SCNC: 3.9 MMOL/L (ref 3.4–5.3)
POTASSIUM SERPL-SCNC: NORMAL MMOL/L (ref 3.4–5.3)
RBC # BLD AUTO: 3.57 10E12/L (ref 3.8–5.2)
RBC # BLD AUTO: NORMAL 10E12/L (ref 3.8–5.2)
SODIUM BLD-SCNC: 157 MMOL/L (ref 133–144)
SODIUM BLD-SCNC: 158 MMOL/L (ref 133–144)
SODIUM SERPL-SCNC: 154 MMOL/L (ref 133–144)
SODIUM SERPL-SCNC: 154 MMOL/L (ref 133–144)
SODIUM SERPL-SCNC: 158 MMOL/L (ref 133–144)
SODIUM SERPL-SCNC: NORMAL MMOL/L (ref 133–144)
WBC # BLD AUTO: 12 10E9/L (ref 4–11)
WBC # BLD AUTO: NORMAL 10E9/L (ref 4–11)

## 2018-02-18 PROCEDURE — 27210794 ZZH OR GENERAL SUPPLY STERILE: Performed by: NEUROLOGICAL SURGERY

## 2018-02-18 PROCEDURE — 25000128 H RX IP 250 OP 636: Performed by: STUDENT IN AN ORGANIZED HEALTH CARE EDUCATION/TRAINING PROGRAM

## 2018-02-18 PROCEDURE — 82330 ASSAY OF CALCIUM: CPT | Performed by: INTERNAL MEDICINE

## 2018-02-18 PROCEDURE — 71000016 ZZH RECOVERY PHASE 1 LEVEL 3 FIRST HR: Performed by: NEUROLOGICAL SURGERY

## 2018-02-18 PROCEDURE — 25000128 H RX IP 250 OP 636: Performed by: NURSE ANESTHETIST, CERTIFIED REGISTERED

## 2018-02-18 PROCEDURE — 25000132 ZZH RX MED GY IP 250 OP 250 PS 637: Performed by: STUDENT IN AN ORGANIZED HEALTH CARE EDUCATION/TRAINING PROGRAM

## 2018-02-18 PROCEDURE — 25000565 ZZH ISOFLURANE, EA 15 MIN: Performed by: NEUROLOGICAL SURGERY

## 2018-02-18 PROCEDURE — 25000125 ZZHC RX 250: Performed by: NURSE ANESTHETIST, CERTIFIED REGISTERED

## 2018-02-18 PROCEDURE — 20000004 ZZH R&B ICU UMMC

## 2018-02-18 PROCEDURE — 36000068 ZZH SURGERY LEVEL 5 1ST 30 MIN - UMMC: Performed by: NEUROLOGICAL SURGERY

## 2018-02-18 PROCEDURE — 92526 ORAL FUNCTION THERAPY: CPT | Mod: GN

## 2018-02-18 PROCEDURE — 80048 BASIC METABOLIC PNL TOTAL CA: CPT | Performed by: NEUROLOGICAL SURGERY

## 2018-02-18 PROCEDURE — 36000070 ZZH SURGERY LEVEL 5 EA 15 ADDTL MIN - UMMC: Performed by: NEUROLOGICAL SURGERY

## 2018-02-18 PROCEDURE — 93306 TTE W/DOPPLER COMPLETE: CPT | Mod: 26 | Performed by: INTERNAL MEDICINE

## 2018-02-18 PROCEDURE — 37000008 ZZH ANESTHESIA TECHNICAL FEE, 1ST 30 MIN: Performed by: NEUROLOGICAL SURGERY

## 2018-02-18 PROCEDURE — 82803 BLOOD GASES ANY COMBINATION: CPT | Performed by: INTERNAL MEDICINE

## 2018-02-18 PROCEDURE — 92610 EVALUATE SWALLOWING FUNCTION: CPT | Mod: GN

## 2018-02-18 PROCEDURE — 27210995 ZZH RX 272: Performed by: NEUROLOGICAL SURGERY

## 2018-02-18 PROCEDURE — 25000125 ZZHC RX 250: Performed by: NEUROLOGICAL SURGERY

## 2018-02-18 PROCEDURE — 70450 CT HEAD/BRAIN W/O DYE: CPT | Mod: 77

## 2018-02-18 PROCEDURE — 85027 COMPLETE CBC AUTOMATED: CPT | Performed by: NEUROLOGICAL SURGERY

## 2018-02-18 PROCEDURE — C1762 CONN TISS, HUMAN(INC FASCIA): HCPCS | Performed by: NEUROLOGICAL SURGERY

## 2018-02-18 PROCEDURE — 25000125 ZZHC RX 250: Performed by: STUDENT IN AN ORGANIZED HEALTH CARE EDUCATION/TRAINING PROGRAM

## 2018-02-18 PROCEDURE — C1713 ANCHOR/SCREW BN/BN,TIS/BN: HCPCS | Performed by: NEUROLOGICAL SURGERY

## 2018-02-18 PROCEDURE — 40000225 ZZH STATISTIC SLP WARD VISIT

## 2018-02-18 PROCEDURE — 40000264 ECHO COMPLETE BUBBLE

## 2018-02-18 PROCEDURE — 25000132 ZZH RX MED GY IP 250 OP 250 PS 637: Performed by: NURSE ANESTHETIST, CERTIFIED REGISTERED

## 2018-02-18 PROCEDURE — 82947 ASSAY GLUCOSE BLOOD QUANT: CPT | Performed by: INTERNAL MEDICINE

## 2018-02-18 PROCEDURE — 00N00ZZ RELEASE BRAIN, OPEN APPROACH: ICD-10-PCS | Performed by: NEUROLOGICAL SURGERY

## 2018-02-18 PROCEDURE — C9399 UNCLASSIFIED DRUGS OR BIOLOG: HCPCS | Performed by: NURSE ANESTHETIST, CERTIFIED REGISTERED

## 2018-02-18 PROCEDURE — 71000017 ZZH RECOVERY PHASE 1 LEVEL 3 EA ADDTL HR: Performed by: NEUROLOGICAL SURGERY

## 2018-02-18 PROCEDURE — 84132 ASSAY OF SERUM POTASSIUM: CPT | Performed by: INTERNAL MEDICINE

## 2018-02-18 PROCEDURE — 84295 ASSAY OF SERUM SODIUM: CPT | Performed by: INTERNAL MEDICINE

## 2018-02-18 PROCEDURE — 37000009 ZZH ANESTHESIA TECHNICAL FEE, EACH ADDTL 15 MIN: Performed by: NEUROLOGICAL SURGERY

## 2018-02-18 PROCEDURE — 87070 CULTURE OTHR SPECIMN AEROBIC: CPT | Performed by: NEUROLOGICAL SURGERY

## 2018-02-18 PROCEDURE — 70450 CT HEAD/BRAIN W/O DYE: CPT

## 2018-02-18 DEVICE — IMP PLATE SYN BOX LOW PROFILE 04H TI 421.511: Type: IMPLANTABLE DEVICE | Site: SKULL | Status: FUNCTIONAL

## 2018-02-18 DEVICE — GRAFT DUREPAIR DURA MATRIX 12.5X10CM (4X5") 62110: Type: IMPLANTABLE DEVICE | Site: BRAIN | Status: FUNCTIONAL

## 2018-02-18 DEVICE — IMP SCR SYN MATRIX LOW PRO 1.5X04MM SELF DRILL 04.503.104.01: Type: IMPLANTABLE DEVICE | Site: SKULL | Status: FUNCTIONAL

## 2018-02-18 RX ORDER — GLYCOPYRROLATE 0.2 MG/ML
INJECTION, SOLUTION INTRAMUSCULAR; INTRAVENOUS PRN
Status: DISCONTINUED | OUTPATIENT
Start: 2018-02-18 | End: 2018-02-18

## 2018-02-18 RX ORDER — LABETALOL HYDROCHLORIDE 5 MG/ML
INJECTION, SOLUTION INTRAVENOUS PRN
Status: DISCONTINUED | OUTPATIENT
Start: 2018-02-18 | End: 2018-02-18

## 2018-02-18 RX ORDER — SODIUM CHLORIDE, SODIUM LACTATE, POTASSIUM CHLORIDE, CALCIUM CHLORIDE 600; 310; 30; 20 MG/100ML; MG/100ML; MG/100ML; MG/100ML
INJECTION, SOLUTION INTRAVENOUS CONTINUOUS
Status: DISCONTINUED | OUTPATIENT
Start: 2018-02-19 | End: 2018-02-19 | Stop reason: HOSPADM

## 2018-02-18 RX ORDER — ONDANSETRON 2 MG/ML
4 INJECTION INTRAMUSCULAR; INTRAVENOUS EVERY 30 MIN PRN
Status: DISCONTINUED | OUTPATIENT
Start: 2018-02-18 | End: 2018-02-19 | Stop reason: HOSPADM

## 2018-02-18 RX ORDER — BUPIVACAINE HYDROCHLORIDE AND EPINEPHRINE 2.5; 5 MG/ML; UG/ML
INJECTION, SOLUTION EPIDURAL; INFILTRATION; INTRACAUDAL; PERINEURAL PRN
Status: DISCONTINUED | OUTPATIENT
Start: 2018-02-18 | End: 2018-02-19 | Stop reason: HOSPADM

## 2018-02-18 RX ORDER — ONDANSETRON 4 MG/1
4 TABLET, ORALLY DISINTEGRATING ORAL EVERY 30 MIN PRN
Status: DISCONTINUED | OUTPATIENT
Start: 2018-02-18 | End: 2018-02-19 | Stop reason: HOSPADM

## 2018-02-18 RX ORDER — MANNITOL 20 G/100ML
65 INJECTION, SOLUTION INTRAVENOUS ONCE
Status: DISCONTINUED | OUTPATIENT
Start: 2018-02-18 | End: 2018-02-18

## 2018-02-18 RX ORDER — DEXAMETHASONE SODIUM PHOSPHATE 4 MG/ML
INJECTION, SOLUTION INTRA-ARTICULAR; INTRALESIONAL; INTRAMUSCULAR; INTRAVENOUS; SOFT TISSUE PRN
Status: DISCONTINUED | OUTPATIENT
Start: 2018-02-18 | End: 2018-02-18

## 2018-02-18 RX ORDER — SODIUM CHLORIDE, SODIUM LACTATE, POTASSIUM CHLORIDE, CALCIUM CHLORIDE 600; 310; 30; 20 MG/100ML; MG/100ML; MG/100ML; MG/100ML
INJECTION, SOLUTION INTRAVENOUS CONTINUOUS PRN
Status: DISCONTINUED | OUTPATIENT
Start: 2018-02-18 | End: 2018-02-18

## 2018-02-18 RX ORDER — SODIUM CHLORIDE 9 MG/ML
INJECTION, SOLUTION INTRAVENOUS CONTINUOUS PRN
Status: DISCONTINUED | OUTPATIENT
Start: 2018-02-18 | End: 2018-02-18

## 2018-02-18 RX ORDER — LIDOCAINE HYDROCHLORIDE 20 MG/ML
INJECTION, SOLUTION INFILTRATION; PERINEURAL PRN
Status: DISCONTINUED | OUTPATIENT
Start: 2018-02-18 | End: 2018-02-18

## 2018-02-18 RX ORDER — FENTANYL CITRATE 50 UG/ML
INJECTION, SOLUTION INTRAMUSCULAR; INTRAVENOUS PRN
Status: DISCONTINUED | OUTPATIENT
Start: 2018-02-18 | End: 2018-02-18

## 2018-02-18 RX ORDER — PROPOFOL 10 MG/ML
INJECTION, EMULSION INTRAVENOUS PRN
Status: DISCONTINUED | OUTPATIENT
Start: 2018-02-18 | End: 2018-02-18

## 2018-02-18 RX ORDER — CLINDAMYCIN PHOSPHATE 900 MG/50ML
900 INJECTION, SOLUTION INTRAVENOUS
Status: DISCONTINUED | OUTPATIENT
Start: 2018-02-18 | End: 2018-02-18 | Stop reason: HOSPADM

## 2018-02-18 RX ORDER — MEPERIDINE HYDROCHLORIDE 50 MG/ML
12.5 INJECTION INTRAMUSCULAR; INTRAVENOUS; SUBCUTANEOUS EVERY 5 MIN PRN
Status: DISCONTINUED | OUTPATIENT
Start: 2018-02-18 | End: 2018-02-19 | Stop reason: HOSPADM

## 2018-02-18 RX ORDER — ONDANSETRON 2 MG/ML
INJECTION INTRAMUSCULAR; INTRAVENOUS PRN
Status: DISCONTINUED | OUTPATIENT
Start: 2018-02-18 | End: 2018-02-18

## 2018-02-18 RX ORDER — FENTANYL CITRATE 50 UG/ML
25-50 INJECTION, SOLUTION INTRAMUSCULAR; INTRAVENOUS
Status: DISCONTINUED | OUTPATIENT
Start: 2018-02-18 | End: 2018-02-19 | Stop reason: HOSPADM

## 2018-02-18 RX ORDER — HEPARIN SODIUM 5000 [USP'U]/.5ML
5000 INJECTION, SOLUTION INTRAVENOUS; SUBCUTANEOUS EVERY 8 HOURS
Status: DISCONTINUED | OUTPATIENT
Start: 2018-02-18 | End: 2018-02-19

## 2018-02-18 RX ORDER — CLINDAMYCIN PHOSPHATE 900 MG/50ML
900 INJECTION, SOLUTION INTRAVENOUS SEE ADMIN INSTRUCTIONS
Status: DISCONTINUED | OUTPATIENT
Start: 2018-02-18 | End: 2018-02-18 | Stop reason: HOSPADM

## 2018-02-18 RX ADMIN — DEXAMETHASONE SODIUM PHOSPHATE 10 MG: 4 INJECTION, SOLUTION INTRA-ARTICULAR; INTRALESIONAL; INTRAMUSCULAR; INTRAVENOUS; SOFT TISSUE at 20:33

## 2018-02-18 RX ADMIN — PANTOPRAZOLE SODIUM 40 MG: 40 INJECTION, POWDER, FOR SOLUTION INTRAVENOUS at 07:48

## 2018-02-18 RX ADMIN — ROCURONIUM BROMIDE 70 MG: 10 INJECTION INTRAVENOUS at 20:04

## 2018-02-18 RX ADMIN — LABETALOL HYDROCHLORIDE 5 MG: 5 INJECTION INTRAVENOUS at 22:32

## 2018-02-18 RX ADMIN — ACETAMINOPHEN 650 MG: 325 SOLUTION ORAL at 15:40

## 2018-02-18 RX ADMIN — ONDANSETRON 4 MG: 2 INJECTION INTRAMUSCULAR; INTRAVENOUS at 03:09

## 2018-02-18 RX ADMIN — CLINDAMYCIN PHOSPHATE 900 MG: 18 INJECTION, SOLUTION INTRAVENOUS at 20:10

## 2018-02-18 RX ADMIN — Medication 100 MG: at 07:49

## 2018-02-18 RX ADMIN — ACETAMINOPHEN 650 MG: 325 SOLUTION ORAL at 03:09

## 2018-02-18 RX ADMIN — FENTANYL CITRATE 75 MCG: 50 INJECTION, SOLUTION INTRAMUSCULAR; INTRAVENOUS at 20:02

## 2018-02-18 RX ADMIN — ONDANSETRON 4 MG: 2 INJECTION INTRAMUSCULAR; INTRAVENOUS at 22:03

## 2018-02-18 RX ADMIN — POTASSIUM CHLORIDE 20 MEQ: 1.5 POWDER, FOR SOLUTION ORAL at 04:35

## 2018-02-18 RX ADMIN — FENTANYL CITRATE 75 MCG: 50 INJECTION, SOLUTION INTRAMUSCULAR; INTRAVENOUS at 20:19

## 2018-02-18 RX ADMIN — LABETALOL HYDROCHLORIDE 10 MG: 5 INJECTION INTRAVENOUS at 23:04

## 2018-02-18 RX ADMIN — PROPOFOL 40 MG: 10 INJECTION, EMULSION INTRAVENOUS at 20:28

## 2018-02-18 RX ADMIN — ASPIRIN 81 MG CHEWABLE TABLET 81 MG: 81 TABLET CHEWABLE at 07:49

## 2018-02-18 RX ADMIN — PHENYLEPHRINE HYDROCHLORIDE 50 MCG: 10 INJECTION, SOLUTION INTRAMUSCULAR; INTRAVENOUS; SUBCUTANEOUS at 21:46

## 2018-02-18 RX ADMIN — PROPOFOL 30 MG: 10 INJECTION, EMULSION INTRAVENOUS at 22:41

## 2018-02-18 RX ADMIN — ROCURONIUM BROMIDE 20 MG: 10 INJECTION INTRAVENOUS at 20:40

## 2018-02-18 RX ADMIN — ACETAMINOPHEN 650 MG: 325 SOLUTION ORAL at 09:09

## 2018-02-18 RX ADMIN — FOLIC ACID 1 MG: 1 TABLET ORAL at 07:49

## 2018-02-18 RX ADMIN — PROPOFOL 50 MG: 10 INJECTION, EMULSION INTRAVENOUS at 20:05

## 2018-02-18 RX ADMIN — HEPARIN SODIUM 5000 UNITS: 5000 INJECTION, SOLUTION INTRAVENOUS; SUBCUTANEOUS at 12:44

## 2018-02-18 RX ADMIN — SODIUM CHLORIDE: 9 INJECTION, SOLUTION INTRAVENOUS at 19:45

## 2018-02-18 RX ADMIN — GLYCOPYRROLATE 0.3 MG: 0.2 INJECTION, SOLUTION INTRAMUSCULAR; INTRAVENOUS at 20:03

## 2018-02-18 RX ADMIN — SUGAMMADEX 140 MG: 100 INJECTION, SOLUTION INTRAVENOUS at 22:52

## 2018-02-18 RX ADMIN — LIDOCAINE HYDROCHLORIDE 80 MG: 20 INJECTION, SOLUTION INFILTRATION; PERINEURAL at 20:04

## 2018-02-18 RX ADMIN — SODIUM CHLORIDE, POTASSIUM CHLORIDE, SODIUM LACTATE AND CALCIUM CHLORIDE: 600; 310; 30; 20 INJECTION, SOLUTION INTRAVENOUS at 19:45

## 2018-02-18 RX ADMIN — FENTANYL CITRATE 100 MCG: 50 INJECTION, SOLUTION INTRAMUSCULAR; INTRAVENOUS at 20:47

## 2018-02-18 RX ADMIN — PROPOFOL 50 MG: 10 INJECTION, EMULSION INTRAVENOUS at 20:20

## 2018-02-18 RX ADMIN — LABETALOL HYDROCHLORIDE 10 MG: 5 INJECTION INTRAVENOUS at 22:56

## 2018-02-18 RX ADMIN — PROPOFOL 150 MG: 10 INJECTION, EMULSION INTRAVENOUS at 20:04

## 2018-02-18 RX ADMIN — ONDANSETRON 4 MG: 2 INJECTION INTRAMUSCULAR; INTRAVENOUS at 09:09

## 2018-02-18 RX ADMIN — POLYETHYLENE GLYCOL 400 AND PROPYLENE GLYCOL 2 DROP: 4; 3 SOLUTION/ DROPS OPHTHALMIC at 20:07

## 2018-02-18 RX ADMIN — POTASSIUM CHLORIDE 40 MEQ: 1.5 POWDER, FOR SOLUTION ORAL at 02:38

## 2018-02-18 RX ADMIN — ONDANSETRON 4 MG: 2 INJECTION INTRAMUSCULAR; INTRAVENOUS at 17:38

## 2018-02-18 RX ADMIN — SODIUM CHLORIDE: 9 INJECTION, SOLUTION INTRAVENOUS at 00:32

## 2018-02-18 ASSESSMENT — PAIN DESCRIPTION - DESCRIPTORS
DESCRIPTORS: HEADACHE

## 2018-02-18 NOTE — PLAN OF CARE
Problem: Patient Care Overview  Goal: Plan of Care/Patient Progress Review  Discharge Planner SLP   Patient plan for discharge: patient didn't state  Current status: Clinical dysphagia examination completed per MD order. The patient currently presents with severe oropharyngeal dysphagia marked by significant left sided weakness, reduced/incomplete laryngeal elevation, and positive signs/symptoms of aspiration with minimal PO trials. The patient was somewhat lethargic during this exam but was oriented and followed simple commands. Recommend NPO with TF placement. SLP will follow daily with the goal of PO readiness.  Barriers to return to prior living situation: severe dysphagia with inability to safely swallow PO diet, left weakness  Recommendations for discharge: ARU  Rationale for recommendations: will require extensive SLP intervention as swallowing function is well below baseline. Will likely also require speech-language intervention.       Entered by: Yojana Sampson 02/18/2018 8:53 AM

## 2018-02-18 NOTE — PROGRESS NOTES
"   02/18/18 0800   General Information   Onset Date 02/17/18   Start of Care Date 02/18/18   Referring Physician Reinier Shepherd MD   Patient Profile Review/OT: Additional Occupational Profile Info See Profile for full history and prior level of function   Patient/Family Goals Statement patient hoping to drink h20   Swallowing Evaluation Bedside swallow evaluation   Behaviorial Observations Lethargic;Other (Comment)  (oriented)   Mode of current nutrition NPO   Comments Maggie Case is a 27 year old female with a history of  anxiety, anemia, a pulmonary embolism (2015 Discontinued anti-coagulation 1/2017 after inconsistent use and lack of thrombus seen on CT). She presents to the [facility] ICU as a direct transfer from 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 She was found down this morning by her friends after \"crashing\" at their house last night after a night of drinking. CT/CTA demonstrates a large right MCA stroke.  SLP swallow eval completd at 7:45 AM on 2/18/18.   Clinical Swallow Evaluation   Oral Musculature anomalies present;other (see comments)  (left sided weakness)   Structural Abnormalities none present   Dentition present and adequate   Mucosal Quality adequate   Mandibular Strength and Mobility impaired   Oral Labial Strength and Mobility impaired retraction;impaired seal;impaired pursing;impaired coordination  (left sided weakness)   Lingual Strength and Mobility impaired protrusion;impaired anterior elevation;impaired left lateral movement;impaired right lateral movement;impaired coordination  (left sided weakness)   Laryngeal Function Cough;Throat clear;Voicing initiated  (unable to initiate volitional swallow)   Oral Musculature Comments left sided facial weakness   Clinical Swallow Eval: Thin Liquid Texture Trial   Mode of Presentation, Thin Liquids other (see comments)  (oral swab presented by SLP)   Oral Phase of Swallow other (see comments)  (at times needed " verbal cue to open/close jaw)   Oral Residue left lip drooling   Pharyngeal Phase of Swallow impaired;coughing/choking;reduction in laryngeal movement;throat clearing   Diagnostic Statement High aspiration risk. Laryngeal elevation noted to be weak and inconsistent (incomplete x1). Significant cough episodes present with minimal h20 on swab. Advanced trials not appropriate to serve.   Esophageal Phase of Swallow   Patient reports or presents with symptoms of esophageal dysphagia Other (Comments)  (not currently known, no hx per pt/sister)   General Therapy Interventions   Planned Therapy Interventions Dysphagia Treatment   Dysphagia treatment Oropharyngeal exercise training;Instruction of safe swallow strategies;Modified diet education   Swallow Eval: Clinical Impressions   Skilled Criteria for Therapy Intervention Skilled criteria met.  Treatment indicated.   Functional Assessment Scale (FAS) 1   Treatment Diagnosis severe oropharyngeal dysphagia   Diet texture recommendations NPO   Demonstrates Need for Referral to Another Service dietitian;occupational therapy;physical therapy;social work   Therapy Frequency daily   Predicted Duration of Therapy Intervention (days/wks) 4 weeks   Anticipated Discharge Disposition inpatient rehabilitation facility   Risks and Benefits of Treatment have been explained. Yes   Patient, family and/or staff in agreement with Plan of Care Yes   Clinical Impression Comments Clinical dysphagia examination completed per MD order. The patient currently presents with severe oropharyngeal dysphagia marked by significant left sided weakness, reduced/incomplete laryngeal elevation, and positive signs/symptoms of aspiration with minimal PO trials. The patient was somewhat lethargic during this exam but was oriented and followed simple commands. Recommend NPO with TF placement. SLP will follow daily with the goal of PO readiness.   Total Evaluation Time   Total Evaluation Time (Minutes) 12

## 2018-02-18 NOTE — PROGRESS NOTES
"Name: Maggie Case                                                 Age/Sex: 27 F  Rm: [id number]  MRN:  [id number]  Staff: I[facility]  Date of Admission: 2/17  Primary Service: NCC  Consulting Services: NRSG    HPI: Maggie Case is a 27 year old female with a history of  anxiety, anemia, a pulmonary embolism (2015 Discontinued anti-coagulation 1/2017 after inconsistent use and lack of thrombus seen on CT). 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 She was found down this morning by her friends after \"crashing\" at their house last night after a night of drinking. CT/CTA demonstrates a large right MCA stroke.     Procedures/Events:  2/17: Central line placed. Repeat HCT - unchanged. NCC started 3% @ 30.     Examination:  2/18: Drowsy but will open eyes. Oriented x3. FC on R. Left hemineglect, left facial droop. Mild dysarthria. Nauseous    Assessment/Plan of care:  NEURO:   - Neuro Examination Q 1 HR  - Na goal 155. 3% going at 30 cc/hr. Q 4 hour sodium checks.   - SBP < 140  - Ok for ASA.  - Swallow eval with SLP.     Contact the neurosurgery resident on call with questions.    Dial * * [phone]: Enter [phone] when prompted.   Awais Torres MD, PhD  Neurosurgery PGY-3          "

## 2018-02-18 NOTE — PROGRESS NOTES
Neuroscience Intensive Care Progress Note    2018      24 hour events:  Worsening repeat CT    24 Hour Vital Signs Summary:  Temperatures:  Current - Temp: 97.7  F (36.5  C); Max - Temp  Av.7  F (36.5  C)  Min: 97.4  F (36.3  C)  Max: 98  F (36.7  C)  Respiration range: Resp  Av.8  Min: 16  Max: 20  Pulse range: Pulse  Av.2  Min: 71  Max: 104  Blood pressure range: Systolic (24hrs), Av , Min:101 , Max:141   ; Diastolic (24hrs), Av, Min:67, Max:114    Pulse oximetry range: SpO2  Av.4 %  Min: 95 %  Max: 100 %    Ventilator Settings  Resp: 16      Intake/Output Summary (Last 24 hours) at 18 0728  Last data filed at 18 0400   Gross per 24 hour   Intake          1208.83 ml   Output                0 ml   Net          1208.83 ml       BP - Mean:  [] 78    Current Medications:    pantoprazole (PROTONIX) IV  40 mg Intravenous Daily     aspirin  81 mg Per G Tube Daily     folic acid  1 mg Per NG tube Daily     vitamin  B-1  100 mg Per NG tube Daily       PRN Medications:  naloxone, potassium chloride, potassium chloride, potassium chloride, potassium chloride with lidocaine, potassium chloride, magnesium sulfate, potassium phosphate (KPHOS) in D5W IV, potassium phosphate (KPHOS) in D5W IV, potassium phosphate (KPHOS) in D5W IV, potassium phosphate (KPHOS) in D5W IV, hydrALAZINE, labetalol, ondansetron, acetaminophen    Infusions:    sodium chloride 100 mL/hr at 18 0400     sodium chloride 3% 10 mL/hr at 18 0643       Allergies   Allergen Reactions     Amoxicillin Other (See Comments)     Headaches     Lactose      Levaquin [Levofloxacin] Swelling     Naproxen Other (See Comments)     Reaction: Headaches     Nickel      Tramadol      Sulindac Rash       Physical Examination:  /67 (BP Location: Left arm)  Temp 97.7  F (36.5  C) (Oral)  Resp 16  Wt 65.8 kg (145 lb 1 oz)  SpO2 99%  BMI 24.9 kg/m2  Mildly lethargic, Right gaze preference, follows commands,  PERRLA, Left facial droop, dysarthria, Left hemiplegia, left jvai neglect, left hemisensory loss. No dysmetria note.       Labs/Studies:  Recent Labs   Lab Test  02/18/18   0545  02/18/18   0434  02/18/18   0104  02/17/18   1752   NA  154*  Canceled, Test credited   --   142   POTASSIUM  3.9  Canceled, Test credited  3.0*  2.7*   CHLORIDE  123*  Canceled, Test credited   --   106   CO2  23  Canceled, Test credited   --   22   ANIONGAP  8  Canceled, Test credited   --   15*   GLC  115*  Canceled, Test credited   --   107*   BUN  9  Canceled, Test credited   --   7   CR  0.63  Canceled, Test credited   --   0.58   AVINASH  8.2*  Canceled, Test credited   --   8.6   WBC  12.0*  Canceled, Test credited   --   17.5*   RBC  3.57*  Canceled, Test credited   --   4.43   HGB  10.4*  Canceled, Test credited   --   13.0   PLT  413  Canceled, Test credited   --   645*       Recent Labs   Lab Test  02/17/18   1752  02/17/18   1107  05/08/16   0435   06/17/13   1658   INR  1.01  1.18  1.00   < >  1.01   PTT  <20*  22*   --    --   24    < > = values in this interval not displayed.         No lab results found in last 7 days.        Imaging:  reviewed  Assessment/Plan    Plan  Neuro:  Rt MCA Stroke - Unclear etiology, Likely Embolic given her history of PE. There is Right cervical ICA stenosis likely favoring thrombus than atherosclerosis.  Hypertonic Saline - Goal Na 155  Neurosurgery on board for possible Craniotomy  Aspirin for stroke prophylaxis for now, will eventually need to be anticoagulated      Resp:  Protecting airway  No active issue    CV:  Will need KARLI  -SBP < 180 given size of infarct    Renal:  No active issue  I/o  Electrolyte replacement    Endo:    EVENS  euglycemic    Heme:  No    GI:  NG tube    ID:  Leucocytosis improved  Blood cultures given hx of drug use      PPX:    DVT prophylaxis: SCDs, Hep SC     GI: PPI  Code Status: Full  Left Subclavian 2/17      Dispo: ICU level of care    Plan discussed with   Getachew Russell  Fellow

## 2018-02-18 NOTE — PLAN OF CARE
Problem: Patient Care Overview  Goal: Plan of Care/Patient Progress Review  PT evaluation cx due to bedrest orders.

## 2018-02-18 NOTE — PLAN OF CARE
Problem: Patient Care Overview  Goal: Plan of Care/Patient Progress Review  Outcome: No Change  Neuro checks being complete q 1 hr. No acute change in neuro exam, see flowsheet for documentation. Head CT complete at 2000 and 0600. Worsening signs of cerebral edema on CT. Sodium 3% 30 ml/hr started at approx 2300, switched to 10 ml/hr for drastic increase in sodium level from 142 to 154. Sodium levels being trended q 6 hrs. Patient c/o HA and nausea frequently during night, education provided to family and patient. Zofran ordered and given x2, compazine given x1, and tylenol given x2 without improvement. Patient remains NPO. Had 2 large loose BM's and incontinent of urine frequently. Potassium 2.4 last hs, replacement protocol in place, gave 80 mEq and rechecked K+ at 0100. Recheck was 3.0. 60 mEq given per protocol. Recheck 3.9. Plan to monitor neuro status and notify MD of changes

## 2018-02-19 ENCOUNTER — APPOINTMENT (OUTPATIENT)
Dept: ULTRASOUND IMAGING | Facility: CLINIC | Age: 28
End: 2018-02-19
Attending: STUDENT IN AN ORGANIZED HEALTH CARE EDUCATION/TRAINING PROGRAM
Payer: MEDICAID

## 2018-02-19 ENCOUNTER — APPOINTMENT (OUTPATIENT)
Dept: CT IMAGING | Facility: CLINIC | Age: 28
End: 2018-02-19
Attending: STUDENT IN AN ORGANIZED HEALTH CARE EDUCATION/TRAINING PROGRAM
Payer: MEDICAID

## 2018-02-19 ENCOUNTER — APPOINTMENT (OUTPATIENT)
Dept: PHYSICAL THERAPY | Facility: CLINIC | Age: 28
End: 2018-02-19
Attending: INTERNAL MEDICINE
Payer: MEDICAID

## 2018-02-19 ENCOUNTER — APPOINTMENT (OUTPATIENT)
Dept: OCCUPATIONAL THERAPY | Facility: CLINIC | Age: 28
End: 2018-02-19
Attending: INTERNAL MEDICINE
Payer: MEDICAID

## 2018-02-19 LAB
ABO + RH BLD: NORMAL
ABO + RH BLD: NORMAL
ALBUMIN UR-MCNC: 30 MG/DL
ANION GAP SERPL CALCULATED.3IONS-SCNC: 8 MMOL/L (ref 3–14)
APPEARANCE UR: CLEAR
BACTERIA #/AREA URNS HPF: ABNORMAL /HPF
BASE DEFICIT BLDA-SCNC: 6.8 MMOL/L
BILIRUB UR QL STRIP: NEGATIVE
BLD GP AB SCN SERPL QL: NORMAL
BLD PROD TYP BPU: NORMAL
BLD UNIT ID BPU: 0
BLD UNIT ID BPU: 0
BLOOD BANK CMNT PATIENT-IMP: NORMAL
BLOOD PRODUCT CODE: NORMAL
BLOOD PRODUCT CODE: NORMAL
BPU ID: NORMAL
BPU ID: NORMAL
BUN SERPL-MCNC: 10 MG/DL (ref 7–30)
CA-I BLD-MCNC: 4.7 MG/DL (ref 4.4–5.2)
CALCIUM SERPL-MCNC: 7.3 MG/DL (ref 8.5–10.1)
CHLORIDE SERPL-SCNC: 135 MMOL/L (ref 94–109)
CO2 SERPL-SCNC: 21 MMOL/L (ref 20–32)
COLOR UR AUTO: YELLOW
CREAT SERPL-MCNC: 0.59 MG/DL (ref 0.52–1.04)
CRP SERPL-MCNC: 85 MG/L (ref 0–8)
ERYTHROCYTE [DISTWIDTH] IN BLOOD BY AUTOMATED COUNT: 19.2 % (ref 10–15)
ERYTHROCYTE [DISTWIDTH] IN BLOOD BY AUTOMATED COUNT: 19.6 % (ref 10–15)
ERYTHROCYTE [DISTWIDTH] IN BLOOD BY AUTOMATED COUNT: 19.9 % (ref 10–15)
GFR SERPL CREATININE-BSD FRML MDRD: >90 ML/MIN/1.7M2
GLUCOSE BLD-MCNC: 156 MG/DL (ref 70–99)
GLUCOSE BLDC GLUCOMTR-MCNC: 117 MG/DL (ref 70–99)
GLUCOSE SERPL-MCNC: 123 MG/DL (ref 70–99)
GLUCOSE UR STRIP-MCNC: NEGATIVE MG/DL
GRAM STN SPEC: NORMAL
HCO3 BLD-SCNC: 18 MMOL/L (ref 21–28)
HCT VFR BLD AUTO: 18.7 % (ref 35–47)
HCT VFR BLD AUTO: 20.6 % (ref 35–47)
HCT VFR BLD AUTO: 22.8 % (ref 35–47)
HCT VFR BLD AUTO: 24.9 % (ref 35–47)
HGB BLD-MCNC: 5.7 G/DL (ref 11.7–15.7)
HGB BLD-MCNC: 6.4 G/DL (ref 11.7–15.7)
HGB BLD-MCNC: 7 G/DL (ref 11.7–15.7)
HGB BLD-MCNC: 7.6 G/DL (ref 11.7–15.7)
HGB BLD-MCNC: 8.5 G/DL (ref 11.7–15.7)
HGB UR QL STRIP: ABNORMAL
KETONES UR STRIP-MCNC: NEGATIVE MG/DL
LEUKOCYTE ESTERASE UR QL STRIP: ABNORMAL
MAGNESIUM SERPL-MCNC: 2 MG/DL (ref 1.6–2.3)
MCH RBC QN AUTO: 29.3 PG (ref 26.5–33)
MCH RBC QN AUTO: 29.4 PG (ref 26.5–33)
MCH RBC QN AUTO: 29.7 PG (ref 26.5–33)
MCHC RBC AUTO-ENTMCNC: 30.5 G/DL (ref 31.5–36.5)
MCHC RBC AUTO-ENTMCNC: 30.5 G/DL (ref 31.5–36.5)
MCHC RBC AUTO-ENTMCNC: 30.7 G/DL (ref 31.5–36.5)
MCV RBC AUTO: 96 FL (ref 78–100)
MCV RBC AUTO: 96 FL (ref 78–100)
MCV RBC AUTO: 97 FL (ref 78–100)
MUCOUS THREADS #/AREA URNS LPF: PRESENT /LPF
NITRATE UR QL: POSITIVE
NUM BPU REQUESTED: 2
O2/TOTAL GAS SETTING VFR VENT: 40 %
PCO2 BLD: 34 MM HG (ref 35–45)
PH BLD: 7.34 PH (ref 7.35–7.45)
PH UR STRIP: 6 PH (ref 5–7)
PHOSPHATE SERPL-MCNC: 3.2 MG/DL (ref 2.5–4.5)
PLATELET # BLD AUTO: 411 10E9/L (ref 150–450)
PLATELET # BLD AUTO: 426 10E9/L (ref 150–450)
PLATELET # BLD AUTO: 440 10E9/L (ref 150–450)
PO2 BLD: 194 MM HG (ref 80–105)
POTASSIUM BLD-SCNC: 3.2 MMOL/L (ref 3.4–5.3)
POTASSIUM SERPL-SCNC: 3.5 MMOL/L (ref 3.4–5.3)
POTASSIUM SERPL-SCNC: 3.7 MMOL/L (ref 3.4–5.3)
RBC # BLD AUTO: 1.94 10E12/L (ref 3.8–5.2)
RBC # BLD AUTO: 2.36 10E12/L (ref 3.8–5.2)
RBC # BLD AUTO: 2.59 10E12/L (ref 3.8–5.2)
RBC #/AREA URNS AUTO: 6 /HPF (ref 0–2)
SODIUM BLD-SCNC: 158 MMOL/L (ref 133–144)
SODIUM SERPL-SCNC: 160 MMOL/L (ref 133–144)
SODIUM SERPL-SCNC: 160 MMOL/L (ref 133–144)
SODIUM SERPL-SCNC: 162 MMOL/L (ref 133–144)
SODIUM SERPL-SCNC: 163 MMOL/L (ref 133–144)
SODIUM SERPL-SCNC: 164 MMOL/L (ref 133–144)
SOURCE: ABNORMAL
SP GR UR STRIP: 1.02 (ref 1–1.03)
SPECIMEN EXP DATE BLD: NORMAL
SPECIMEN SOURCE: NORMAL
SQUAMOUS #/AREA URNS AUTO: <1 /HPF (ref 0–1)
TRANS CELLS #/AREA URNS HPF: <1 /HPF (ref 0–1)
TRANSFUSION STATUS PATIENT QL: NORMAL
UROBILINOGEN UR STRIP-MCNC: NORMAL MG/DL (ref 0–2)
WBC # BLD AUTO: 16.6 10E9/L (ref 4–11)
WBC # BLD AUTO: 17.4 10E9/L (ref 4–11)
WBC # BLD AUTO: 20.2 10E9/L (ref 4–11)
WBC #/AREA URNS AUTO: 72 /HPF (ref 0–2)

## 2018-02-19 PROCEDURE — 87186 SC STD MICRODIL/AGAR DIL: CPT | Performed by: INTERNAL MEDICINE

## 2018-02-19 PROCEDURE — 86146 BETA-2 GLYCOPROTEIN ANTIBODY: CPT | Performed by: STUDENT IN AN ORGANIZED HEALTH CARE EDUCATION/TRAINING PROGRAM

## 2018-02-19 PROCEDURE — 87040 BLOOD CULTURE FOR BACTERIA: CPT | Performed by: PHYSICAL MEDICINE & REHABILITATION

## 2018-02-19 PROCEDURE — 97530 THERAPEUTIC ACTIVITIES: CPT | Mod: GP

## 2018-02-19 PROCEDURE — 93970 EXTREMITY STUDY: CPT

## 2018-02-19 PROCEDURE — 87205 SMEAR GRAM STAIN: CPT | Performed by: STUDENT IN AN ORGANIZED HEALTH CARE EDUCATION/TRAINING PROGRAM

## 2018-02-19 PROCEDURE — 20000004 ZZH R&B ICU UMMC

## 2018-02-19 PROCEDURE — 84295 ASSAY OF SERUM SODIUM: CPT | Performed by: STUDENT IN AN ORGANIZED HEALTH CARE EDUCATION/TRAINING PROGRAM

## 2018-02-19 PROCEDURE — 87186 SC STD MICRODIL/AGAR DIL: CPT | Performed by: STUDENT IN AN ORGANIZED HEALTH CARE EDUCATION/TRAINING PROGRAM

## 2018-02-19 PROCEDURE — 86850 RBC ANTIBODY SCREEN: CPT | Performed by: STUDENT IN AN ORGANIZED HEALTH CARE EDUCATION/TRAINING PROGRAM

## 2018-02-19 PROCEDURE — 87077 CULTURE AEROBIC IDENTIFY: CPT | Performed by: STUDENT IN AN ORGANIZED HEALTH CARE EDUCATION/TRAINING PROGRAM

## 2018-02-19 PROCEDURE — 80048 BASIC METABOLIC PNL TOTAL CA: CPT | Performed by: INTERNAL MEDICINE

## 2018-02-19 PROCEDURE — 87040 BLOOD CULTURE FOR BACTERIA: CPT | Performed by: NURSE PRACTITIONER

## 2018-02-19 PROCEDURE — 83735 ASSAY OF MAGNESIUM: CPT | Performed by: STUDENT IN AN ORGANIZED HEALTH CARE EDUCATION/TRAINING PROGRAM

## 2018-02-19 PROCEDURE — 87070 CULTURE OTHR SPECIMN AEROBIC: CPT | Performed by: STUDENT IN AN ORGANIZED HEALTH CARE EDUCATION/TRAINING PROGRAM

## 2018-02-19 PROCEDURE — 40000133 ZZH STATISTIC OT WARD VISIT

## 2018-02-19 PROCEDURE — 85027 COMPLETE CBC AUTOMATED: CPT | Performed by: STUDENT IN AN ORGANIZED HEALTH CARE EDUCATION/TRAINING PROGRAM

## 2018-02-19 PROCEDURE — 86140 C-REACTIVE PROTEIN: CPT | Performed by: STUDENT IN AN ORGANIZED HEALTH CARE EDUCATION/TRAINING PROGRAM

## 2018-02-19 PROCEDURE — 82947 ASSAY GLUCOSE BLOOD QUANT: CPT | Performed by: INTERNAL MEDICINE

## 2018-02-19 PROCEDURE — 82330 ASSAY OF CALCIUM: CPT | Performed by: INTERNAL MEDICINE

## 2018-02-19 PROCEDURE — 00000401 ZZHCL STATISTIC THROMBIN TIME NC: Performed by: STUDENT IN AN ORGANIZED HEALTH CARE EDUCATION/TRAINING PROGRAM

## 2018-02-19 PROCEDURE — 86147 CARDIOLIPIN ANTIBODY EA IG: CPT | Performed by: STUDENT IN AN ORGANIZED HEALTH CARE EDUCATION/TRAINING PROGRAM

## 2018-02-19 PROCEDURE — 85303 CLOT INHIBIT PROT C ACTIVITY: CPT | Performed by: STUDENT IN AN ORGANIZED HEALTH CARE EDUCATION/TRAINING PROGRAM

## 2018-02-19 PROCEDURE — 97535 SELF CARE MNGMENT TRAINING: CPT | Mod: GO

## 2018-02-19 PROCEDURE — 25000125 ZZHC RX 250: Performed by: STUDENT IN AN ORGANIZED HEALTH CARE EDUCATION/TRAINING PROGRAM

## 2018-02-19 PROCEDURE — 97162 PT EVAL MOD COMPLEX 30 MIN: CPT | Mod: GP

## 2018-02-19 PROCEDURE — 86900 BLOOD TYPING SEROLOGIC ABO: CPT | Performed by: STUDENT IN AN ORGANIZED HEALTH CARE EDUCATION/TRAINING PROGRAM

## 2018-02-19 PROCEDURE — 84132 ASSAY OF SERUM POTASSIUM: CPT | Performed by: STUDENT IN AN ORGANIZED HEALTH CARE EDUCATION/TRAINING PROGRAM

## 2018-02-19 PROCEDURE — 25000128 H RX IP 250 OP 636: Performed by: STUDENT IN AN ORGANIZED HEALTH CARE EDUCATION/TRAINING PROGRAM

## 2018-02-19 PROCEDURE — 99223 1ST HOSP IP/OBS HIGH 75: CPT | Mod: GC | Performed by: INTERNAL MEDICINE

## 2018-02-19 PROCEDURE — 85613 RUSSELL VIPER VENOM DILUTED: CPT | Performed by: STUDENT IN AN ORGANIZED HEALTH CARE EDUCATION/TRAINING PROGRAM

## 2018-02-19 PROCEDURE — P9016 RBC LEUKOCYTES REDUCED: HCPCS | Performed by: STUDENT IN AN ORGANIZED HEALTH CARE EDUCATION/TRAINING PROGRAM

## 2018-02-19 PROCEDURE — 25000132 ZZH RX MED GY IP 250 OP 250 PS 637: Performed by: STUDENT IN AN ORGANIZED HEALTH CARE EDUCATION/TRAINING PROGRAM

## 2018-02-19 PROCEDURE — 00000167 ZZHCL STATISTIC INR NC: Performed by: STUDENT IN AN ORGANIZED HEALTH CARE EDUCATION/TRAINING PROGRAM

## 2018-02-19 PROCEDURE — 84100 ASSAY OF PHOSPHORUS: CPT | Performed by: STUDENT IN AN ORGANIZED HEALTH CARE EDUCATION/TRAINING PROGRAM

## 2018-02-19 PROCEDURE — 97165 OT EVAL LOW COMPLEX 30 MIN: CPT | Mod: GO

## 2018-02-19 PROCEDURE — 36415 COLL VENOUS BLD VENIPUNCTURE: CPT | Performed by: PHYSICAL MEDICINE & REHABILITATION

## 2018-02-19 PROCEDURE — 81001 URINALYSIS AUTO W/SCOPE: CPT | Performed by: STUDENT IN AN ORGANIZED HEALTH CARE EDUCATION/TRAINING PROGRAM

## 2018-02-19 PROCEDURE — 82803 BLOOD GASES ANY COMBINATION: CPT | Performed by: INTERNAL MEDICINE

## 2018-02-19 PROCEDURE — 25000132 ZZH RX MED GY IP 250 OP 250 PS 637: Performed by: NURSE PRACTITIONER

## 2018-02-19 PROCEDURE — 87086 URINE CULTURE/COLONY COUNT: CPT | Performed by: INTERNAL MEDICINE

## 2018-02-19 PROCEDURE — 85730 THROMBOPLASTIN TIME PARTIAL: CPT | Performed by: STUDENT IN AN ORGANIZED HEALTH CARE EDUCATION/TRAINING PROGRAM

## 2018-02-19 PROCEDURE — 81241 F5 GENE: CPT | Performed by: STUDENT IN AN ORGANIZED HEALTH CARE EDUCATION/TRAINING PROGRAM

## 2018-02-19 PROCEDURE — 27210437 ZZH NUTRITION PRODUCT SEMIELEM INTERMED LITER

## 2018-02-19 PROCEDURE — 97530 THERAPEUTIC ACTIVITIES: CPT | Mod: GO

## 2018-02-19 PROCEDURE — 00000146 ZZHCL STATISTIC GLUCOSE BY METER IP

## 2018-02-19 PROCEDURE — 93970 EXTREMITY STUDY: CPT | Mod: XS

## 2018-02-19 PROCEDURE — 85014 HEMATOCRIT: CPT | Performed by: NURSE PRACTITIONER

## 2018-02-19 PROCEDURE — 86901 BLOOD TYPING SEROLOGIC RH(D): CPT | Performed by: STUDENT IN AN ORGANIZED HEALTH CARE EDUCATION/TRAINING PROGRAM

## 2018-02-19 PROCEDURE — 84295 ASSAY OF SERUM SODIUM: CPT | Performed by: INTERNAL MEDICINE

## 2018-02-19 PROCEDURE — 87088 URINE BACTERIA CULTURE: CPT | Performed by: INTERNAL MEDICINE

## 2018-02-19 PROCEDURE — 40000193 ZZH STATISTIC PT WARD VISIT

## 2018-02-19 PROCEDURE — 87185 SC STD ENZYME DETCJ PER NZM: CPT | Performed by: STUDENT IN AN ORGANIZED HEALTH CARE EDUCATION/TRAINING PROGRAM

## 2018-02-19 PROCEDURE — 86923 COMPATIBILITY TEST ELECTRIC: CPT | Performed by: STUDENT IN AN ORGANIZED HEALTH CARE EDUCATION/TRAINING PROGRAM

## 2018-02-19 PROCEDURE — 70450 CT HEAD/BRAIN W/O DYE: CPT

## 2018-02-19 PROCEDURE — 81240 F2 GENE: CPT | Performed by: STUDENT IN AN ORGANIZED HEALTH CARE EDUCATION/TRAINING PROGRAM

## 2018-02-19 PROCEDURE — 85306 CLOT INHIBIT PROT S FREE: CPT | Performed by: STUDENT IN AN ORGANIZED HEALTH CARE EDUCATION/TRAINING PROGRAM

## 2018-02-19 PROCEDURE — 85300 ANTITHROMBIN III ACTIVITY: CPT | Performed by: STUDENT IN AN ORGANIZED HEALTH CARE EDUCATION/TRAINING PROGRAM

## 2018-02-19 PROCEDURE — 84132 ASSAY OF SERUM POTASSIUM: CPT | Performed by: INTERNAL MEDICINE

## 2018-02-19 PROCEDURE — 85018 HEMOGLOBIN: CPT | Performed by: NURSE PRACTITIONER

## 2018-02-19 RX ORDER — NALOXONE HYDROCHLORIDE 0.4 MG/ML
.1-.4 INJECTION, SOLUTION INTRAMUSCULAR; INTRAVENOUS; SUBCUTANEOUS
Status: DISCONTINUED | OUTPATIENT
Start: 2018-02-19 | End: 2018-02-19

## 2018-02-19 RX ORDER — LABETALOL HYDROCHLORIDE 5 MG/ML
5-10 INJECTION, SOLUTION INTRAVENOUS EVERY 10 MIN PRN
Status: DISCONTINUED | OUTPATIENT
Start: 2018-02-19 | End: 2018-02-22

## 2018-02-19 RX ORDER — OXYCODONE HYDROCHLORIDE 5 MG/1
5-10 TABLET ORAL EVERY 4 HOURS PRN
Status: DISCONTINUED | OUTPATIENT
Start: 2018-02-19 | End: 2018-02-26

## 2018-02-19 RX ORDER — HYDRALAZINE HYDROCHLORIDE 20 MG/ML
10-20 INJECTION INTRAMUSCULAR; INTRAVENOUS
Status: DISCONTINUED | OUTPATIENT
Start: 2018-02-19 | End: 2018-02-19

## 2018-02-19 RX ORDER — HYDRALAZINE HYDROCHLORIDE 20 MG/ML
10-20 INJECTION INTRAMUSCULAR; INTRAVENOUS
Status: DISCONTINUED | OUTPATIENT
Start: 2018-02-19 | End: 2018-02-22

## 2018-02-19 RX ORDER — HYDROMORPHONE HYDROCHLORIDE 1 MG/ML
.2-.4 INJECTION, SOLUTION INTRAMUSCULAR; INTRAVENOUS; SUBCUTANEOUS
Status: ACTIVE | OUTPATIENT
Start: 2018-02-19 | End: 2018-02-20

## 2018-02-19 RX ORDER — LABETALOL HYDROCHLORIDE 5 MG/ML
5-10 INJECTION, SOLUTION INTRAVENOUS EVERY 10 MIN PRN
Status: DISCONTINUED | OUTPATIENT
Start: 2018-02-19 | End: 2018-02-19

## 2018-02-19 RX ORDER — SULFAMETHOXAZOLE/TRIMETHOPRIM 800-160 MG
1 TABLET ORAL 2 TIMES DAILY
Status: DISCONTINUED | OUTPATIENT
Start: 2018-02-19 | End: 2018-02-20

## 2018-02-19 RX ADMIN — PANTOPRAZOLE SODIUM 40 MG: 40 INJECTION, POWDER, FOR SOLUTION INTRAVENOUS at 07:39

## 2018-02-19 RX ADMIN — SODIUM CHLORIDE: 9 INJECTION, SOLUTION INTRAVENOUS at 16:15

## 2018-02-19 RX ADMIN — SODIUM CHLORIDE: 9 INJECTION, SOLUTION INTRAVENOUS at 17:08

## 2018-02-19 RX ADMIN — SODIUM CHLORIDE 500 ML: 9 INJECTION, SOLUTION INTRAVENOUS at 12:43

## 2018-02-19 RX ADMIN — SODIUM CHLORIDE: 9 INJECTION, SOLUTION INTRAVENOUS at 06:20

## 2018-02-19 RX ADMIN — SULFAMETHOXAZOLE AND TRIMETHOPRIM 1 TABLET: 800; 160 TABLET ORAL at 20:51

## 2018-02-19 RX ADMIN — SULFAMETHOXAZOLE AND TRIMETHOPRIM 1 TABLET: 800; 160 TABLET ORAL at 14:51

## 2018-02-19 RX ADMIN — FOLIC ACID 1 MG: 1 TABLET ORAL at 07:39

## 2018-02-19 RX ADMIN — SODIUM CHLORIDE: 3 INJECTION, SOLUTION INTRAVENOUS at 04:09

## 2018-02-19 RX ADMIN — MULTIVITAMIN 15 ML: LIQUID ORAL at 17:08

## 2018-02-19 RX ADMIN — Medication 100 MG: at 07:39

## 2018-02-19 RX ADMIN — ASPIRIN 81 MG CHEWABLE TABLET 81 MG: 81 TABLET CHEWABLE at 07:39

## 2018-02-19 RX ADMIN — ACETAMINOPHEN 650 MG: 325 SOLUTION ORAL at 14:51

## 2018-02-19 RX ADMIN — ACETAMINOPHEN 650 MG: 325 SOLUTION ORAL at 07:39

## 2018-02-19 ASSESSMENT — PAIN DESCRIPTION - DESCRIPTORS: DESCRIPTORS: HEADACHE

## 2018-02-19 NOTE — ANESTHESIA CARE TRANSFER NOTE
Patient: Maggie Case    Procedure(s):  RIGHT HEMICRANIECTOMY - Wound Class: I-Clean    Diagnosis: Right MCA Stroke  Diagnosis Additional Information: No value filed.    Anesthesia Type:   General, ETT     Note:  Airway :ETT and Ventilator  Patient transferred to:PACU  Comments: Anesthesia Care Transfer Note    Patient: Maggie Case    Transferred to: PACU    Patient vital signs: stable    Airway: ETT placed on vent. PS 14. CPAP 5    Monitors placed. VSS. PIVa, manasa, Central line patent. Pt breathing TV of greater than 650 with PS, not following commands. Report given and care transferred to RN.     Michelle Clayton CRNA   2/18/2018  Handoff Report: Identifed the Patient, Identified the Reponsible Provider, Reviewed the pertinent medical history, Discussed the surgical course, Reviewed Intra-OP anesthesia mangement and issues during anesthesia, Set expectations for post-procedure period and Allowed opportunity for questions and acknowledgement of understanding      Vitals: (Last set prior to Anesthesia Care Transfer)    CRNA VITALS  2/18/2018 2250 - 2/18/2018 2332      2/18/2018             Pulse: 100    ART BP: 108/60    ART Mean: 80    Ht Rate: 101    SpO2: 100 %    Resp Rate (observed): 28    EKG: Sinus rhythm                Electronically Signed By: WOO Braun CRNA  February 18, 2018  11:32 PM

## 2018-02-19 NOTE — PLAN OF CARE
Problem: Patient Care Overview  Goal: Plan of Care/Patient Progress Review  Outcome: No Change  Q1H checks in ICU-no acute changes, patient more somnolent waking up from anesthesia.  Complains of headache-prns available.  SBP goal <160-no prns needed, but available, HR 90s.  Weaned off oxygen onto RA with good productive cough.  NPO with NG clamped for meds, active bowel sounds with large bowel movements during the day.  Maria inserted upon arrival from OR with adequate output.  Skin intact with new VIKRAM drain and no bone flap on right side following hemicraniectomy decompression.  Arterial line inserted in OR, 2 PIVs, triple lumen subclavian line, 3% @10, 0.9% @100.  Will continue to monitor neuro status and update MDs with any acute changes/concerns.

## 2018-02-19 NOTE — ANESTHESIA PREPROCEDURE EVALUATION
Anesthesia Evaluation     .             ROS/MED HX    ENT/Pulmonary:  - neg pulmonary ROS     Neurologic:     (+)migraines, CVA date: 2/17/2018 with deficits- left facial droop, left sided weakness,     Cardiovascular:  - neg cardiovascular ROS   (+) ----. : . . . :. . Previous cardiac testing Echodate:results:Interpretation Summary  Left ventricular function, chamber size, wall motion, and wall thickness are  normal.The EF is 55-60%.  Right ventricular function, chamber size, wall motion, and thickness are  normal.  A patent foramen ovale was demonstrated by agitated saline bubble study .  The inferior vena cava was normal in size with preserved respiratory  variability.  No pericardial effusion is presentdate: results: date: results: date: results:          METS/Exercise Tolerance:  >4 METS   Hematologic:         Musculoskeletal:         GI/Hepatic:         Renal/Genitourinary:     (+) Other Renal/ Genitourinary, on hypertonic saline therapy with goal of Na 155      Endo:         Psychiatric:  - neg psychiatric ROS       Infectious Disease:         Malignancy:         Other:                     Physical Exam  Normal systems: pulmonary    Airway   Mallampati: III  Neck ROM: limited    Dental     Cardiovascular   Rhythm and rate: regular      Pulmonary     Other findings: Patient arousable, opens mouth on command but very limited opening.  Does not follow other commands  Right subclavian triple lumen cathter                Anesthesia Plan      History & Physical Review  History and physical reviewed and following examination; no interval change.    ASA Status:  4 emergent.    NPO Status:  Waived due to emergency    Plan for General and ETT with Intravenous induction. Maintenance will be Inhalation.    PONV prophylaxis:  Ondansetron (or other 5HT-3) and Dexamethasone or Solumedrol  Additional equipment: Arterial Line and Videolaryngoscope I have examined the patient and reviewed the medical record    Emergently to OR  with neurologic deterioration    Plan GA with ETT, arterial line.  Care given to avoid air entrainment  Given PFO.    Perry Gastelum MD           Postoperative Care  Postoperative pain management:  IV analgesics.      Consents  Anesthetic plan, risks, benefits and alternatives discussed with:  Patient..

## 2018-02-19 NOTE — PHARMACY-CONSULT NOTE
Pharmacy Tube Feeding Consult    Medication reviewed for administration by feeding tube and for potential food/drug interactions.    Recommendation: No changes are needed at this time.     Pharmacy will continue to follow as new medications are ordered.    Kellen Wagoner, IsraD

## 2018-02-19 NOTE — BRIEF OP NOTE
[facility], [place]    Brief Operative Note    Pre-operative diagnosis: Right MCA Stroke  Post-operative diagnosis Right MCA Stroke  Procedure: Procedure(s):  RIGHT HEMICRANIECTOMY - Wound Class: I-Clean  Surgeon: Surgeon(s) and Role:     * Emeterio Mesa MD - Primary     * Guilherme Hall MD - Resident - Assisting     * Chintan Daniel MD - Resident - Assisting  Anesthesia: General   Estimated blood loss: 500 mL  Drains:  Subgaleal Samuel-Epps  Specimens:   ID Type Source Tests Collected by Time Destination   1 : right skull bone flap Bone Head BONE CULTURE AEROBIC BACTERIAL Chintan Daniel MD 2/18/2018  9:04 PM      Findings:   White appearance to brain.  Complications: None.  Implants: None.      -----  Chintan Daniel MD  PGY-7, Neurosurgery    Please dial [phone] and enter job code [phone] to reach the neurosurgery team.

## 2018-02-19 NOTE — PLAN OF CARE
Problem: Patient Care Overview  Goal: Plan of Care/Patient Progress Review  Discharge Planner OT   Patient plan for discharge: Not discussed with pt this date.   Current status: OT eval completed, treatment initiated.  Pt with flat affect, lethargic/somnolent, requiring significant encouragement to verbally respond to questions/commands.  Pt requires max AX1-2 for supine to sit transfer, max A to maintain static unsupported sit at EOB due to L lean.  Total A for donning helmet and B socks. Pt dependently lifted from bed to chair via ceiling lift.  Limited engagement in ROM/strengthening exercises once up in chair - AAROM to all extremities and L UE joint compression performed as proprioception activity. AVSS on RA.   Barriers to return to prior living situation: Weakness, poor coordination, L side hemiparesis, dependent for transfers  Recommendations for discharge: Rehab   Rationale for recommendations: To maximize safety and IND with I/ADLs and functional mobility prior to return home.        Entered by: Geoff Gross 02/19/2018 1:23 PM

## 2018-02-19 NOTE — PLAN OF CARE
Problem: Patient Care Overview  Goal: Plan of Care/Patient Progress Review  PT 4A: Limited evaluation completed, treatment initiated    Discharge Planner PT   Patient plan for discharge: Unknown  Current status: Low arousal and engagement during PT session.  Reluctant to open eyes, not vocalizing, following <25% commands.  Difficult to distinguish true cog deficits vs sleep-deprivation vs delirium vs med side effects.    Barriers to return to prior living situation: Profound L-sided weakness, impaired communication, high caregiver demands  Recommendations for discharge: ARU, or TCU  Rationale for recommendations: Pt is significantly below baseline for all mobility and ADL, however per family report pt is not coming from a reliable or stable social situation and it is unclear how much family involvement the patient will accept during her rehab process       Entered by: Darcy Almaraz 02/19/2018 3:54 PM

## 2018-02-19 NOTE — ANESTHESIA POSTPROCEDURE EVALUATION
Patient: Maggie Case    Procedure(s):  RIGHT HEMICRANIECTOMY - Wound Class: I-Clean    Diagnosis:Right MCA Stroke  Diagnosis Additional Information: No value filed.    Anesthesia Type:  General, ETT    Note:  Anesthesia Post Evaluation    Patient location during evaluation: PACU  Patient participation: Unable to participate in evaluation secondary to underlying medical condition  Level of consciousness: responsive to noxious stimuli  Pain management: adequate  Airway patency: patent  Cardiovascular status: acceptable  Respiratory status: acceptable  Hydration status: acceptable  PONV: none       Comments: Patient not following commands to open eyes or move right arm (she did this slowly prior to surgery)  Surgery is aware  Patient does not have airway protection at this time, will not extubate.        Last vitals:  Vitals:    02/18/18 2324 02/18/18 2330 02/18/18 2345   BP: 92/70 106/70 109/73   Resp: 10 12 12   Temp: 36.5  C (97.7  F)     SpO2: 100% 100% 100%         Electronically Signed By: Perry Gastelum MD  February 19, 2018  12:05 AM

## 2018-02-19 NOTE — PLAN OF CARE
Problem: Patient Care Overview  Goal: Plan of Care/Patient Progress Review  SLP session cancelled: the patient is more somnolent s/p hemicraniectomy. Will reschedule tx for tomorrow.

## 2018-02-19 NOTE — PROGRESS NOTES
" 02/19/18 1150   Quick Adds   Type of Visit Initial Occupational Therapy Evaluation   Living Environment   Lives With alone   Living Arrangements other (see comments)  (mobile home)   Living Environment Comment Pt offers limited information regarding home set up.  Brother present during evaluation but states that he is very \"distant\" to pt, states that he does not know where pt lives.  Pt offers contradicting information throughout session; therefore, would like to verify home set up and PLOF with more reliable source.  Initially, pt states that she lives with a \"friend.\" When later questioned, pt states that she lives \"alone.\"  Pt states that there are steps to enter her mobile home, but unable to state how many.    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  Pt reports being previously IND with all I/ADLs and functional mobility.  Pt does not like to excercise, but enjoys watching TV and reading. Pt's brother present during eval and states that pt and her ex-boyfriend have a 7 year old child together that pt splits time visiting.     Functional Level Prior   Ambulation 0-->independent   Transferring 0-->independent   Toileting 0-->independent   Bathing 0-->independent   Dressing 0-->independent   Eating 0-->independent   Communication 0-->understands/communicates without difficulty   Swallowing 0-->swallows foods/liquids without difficulty   Cognition 0 - no cognition issues reported   Fall history within last six months no   Which of the above functional risks had a recent onset or change? ambulation;transferring;toileting;bathing;dressing;cognition;communication/speech   Prior Functional Level Comment Pt reports being previously IND with all I/ADLs and functional mobility, states that she was previously working full time as a PCA " "for her aunt.        Present no   Language English    General Information   Onset of Illness/Injury or Date of Surgery - Date 02/17/18   Referring Physician Reinier Shepherd MD   Patient/Family Goals Statement Not stated    Additional Occupational Profile Info/Pertinent History of Current Problem 27 year old female with a history of  anxiety, anemia, a pulmonary embolism (2015 Discontinued anti-coagulation 1/2017 after inconsistent use and lack of thrombus seen on CT). She presents to the [facility] ICU as a direct transfer from [facility] after sustaining a right middle cerebral artery stroke. She was found down this morning by her friends after \"crashing\" at their house last night after a night of drinking. CT/CTA demonstrates a large right MCA stroke.    Precautions/Limitations fall precautions;other (see comments)  (Craniotomy precautions)   General Observations Pt resting in bed upon arrival, presents with flat affect.  Requires encouragement to engage in conversation and respond to questions.     General Info Comments Activity: per verbal order from MD, pt my be up with assist while wearing AFO to L LE and helmet    Cognitive Status Examination   Orientation person;place   Level of Consciousness lethargic/somnolent   Able to Follow Commands mild impairment   Cognitive Comment Cognition was difficult to assess due to pt's unresponsiveness to questions/commands.  Pt's brother states that pt is typically very stubborn which is why she is chosing to not respond to questions/commands.     Visual Perception   Visual Perception Comments Difficult to assess due to pt not following commands to follow finger or vocalize symptoms.  Does state that she feels that her vision is \"blurry.\"    Sensory Examination   Sensory Comments Pt reports numbness of L UE/LE. Touch localization not intact, but difficult to assess due to pt's poor command following skills.    Integumentary/Edema "   Integumentary/Edema Comments L LE edematous    Strength   Strength Comments Grossly decreased.  Pt presents with L side hemiparesis.  L UE/LE 1/5 strength grossly.  R UE 2/5 strength grossly, and R LE 3+/5 grossly.    Hand Strength   Hand Strength Comments Trace finger flexion in L hand, minimal  strength in R hand.    Coordination   Coordination Comments Difficult to assess    Mobility   Bed Mobility Comments Max AX1-2 for supine to sit    Transfer Skill: Bed to Chair/Chair to Bed   Level of Fleming: Bed to Chair dependent (less than 25% patients effort)   Physical Assist/Nonphysical Assist: Bed to Chair 2 persons   Assistive Device - Transfer Skill Bed to Chair Chair to Bed Rehab Eval other (see comments)  (ceiling lift )   Transfer Skill: Sit to Stand   Level of Fleming: Sit/Stand unable to perform   Transfer Skill: Toilet Transfer   Level of Fleming: Toilet dependent (less than 25% patients effort)   Lower Body Dressing   Level of Fleming: Dress Lower Body dependent (less than 25% patients effort)   Physical Assist/Nonphysical Assist: Dress Lower Body 1 person assist  (for donning B socks )   Instrumental Activities of Daily Living (IADL)   Previous Responsibilities meal prep;laundry;housekeeping;medication management;finances;driving;work;shopping   IADL Comments Per pt, prior to admission pt was IND with all cares.  Would like to verify with more reliable source when able   Activities of Daily Living Analysis   Impairments Contributing to Impaired Activities of Daily Living balance impaired;cognition impaired;coordination impaired;flexibility decreased;fear and anxiety;motor control impaired;post surgical precautions;pain;postural control impaired;ROM decreased;sensation decreased;sensory feedback impaired;strength decreased   General Therapy Interventions   Planned Therapy Interventions ADL retraining;IADL retraining;balance training;bed mobility training;cognition;fine motor  "coordination training;motor coordination training;joint mobilization;neuromuscular re-education;ROM;strengthening;stretching;transfer training;visual perception;home program guidelines;progressive activity/exercise;risk factor education   Clinical Impression   Criteria for Skilled Therapeutic Interventions Met yes, treatment indicated   OT Diagnosis Decreased ADL IND    Influenced by the following impairments L hemiparesis, vision deficits, cognition, coordination    Assessment of Occupational Performance 5 or more Performance Deficits   Identified Performance Deficits Dressing, bathing, toileting, groomin/hygiene, transfers, ambulation, home management    Clinical Decision Making (Complexity) Low complexity   Therapy Frequency daily   Predicted Duration of Therapy Intervention (days/wks) 2 weeks    Anticipated Equipment Needs at Discharge (TBD )   Anticipated Discharge Disposition Acute Rehabilitation Facility   Risks and Benefits of Treatment have been explained. Yes   Patient, Family & other staff in agreement with plan of care Yes   [facility] TM \"6 Clicks\"   2016, Trustees of [facility], under license to [employer].  All rights reserved.   6 Clicks Short Forms Daily Activity Inpatient Short Form   [facility]  \"6 Clicks\" Daily Activity Inpatient Short Form   1. Putting on and taking off regular lower body clothing? 1 - Total   2. Bathing (including washing, rinsing, drying)? 1 - Total   3. Toileting, which includes using toilet, bedpan or urinal? 1 - Total   4. Putting on and taking off regular upper body clothing? 1 - Total   5. Taking care of personal grooming such as brushing teeth? 1 - Total   6. Eating meals? 1 - Total   Daily Activity Raw Score (Score out of 24.Lower scores equate to lower levels of function) 6   Total Evaluation Time   Total Evaluation Time (Minutes) 5     "

## 2018-02-19 NOTE — OP NOTE
Procedure Date: 02/18/2018      DATE OF OPERATION:  02/18/2018      PREOPERATIVE DIAGNOSES:     1.  Right hemispheric middle cerebral artery infarction.   2.  Malignant cerebral edema secondary to the above.      POSTOPERATIVE DIAGNOSES:     1.  Right hemispheric middle cerebral artery infarction.   2.  Malignant cerebral edema secondary to the above.      OPERATION PERFORMED:  Right decompressive hemicraniectomy.      ATTENDING SURGEON:  Emeterio Mesa MD      RESIDENT SURGEONS:   1.  Chintan Daniel MD   2.  Guilherme Hall MD    3.  Silas Riley MD      ANESTHESIA:  General endotracheal.      ESTIMATED BLOOD LOSS: 500 mL.      DRAINS:  Subgaleal Samuel-Epps drain.      INDICATIONS FOR OPERATION:  Ms. Maggie Case is a 27-year-old female who was found yesterday morning by her boyfriend to have left-sided hemiplegia with a facial droop.  She was brought into the Emergency Department and a head CT revealed that she had a large right hemispheric middle cerebral artery infarction.  Over the course of the last 24 hours, she developed worsening edema and her clinical exam decompensated  She was therefore brought to the operating theater emergently for the above-named operation  Consent for the above-named operation was obtained from the patient's family after the risks, benefits and alternatives were thoroughly explained.      DESCRIPTION OF OPERATION:  The patient was brought to the operating theater and transferred on the operating table in supine position.  General endotracheal anesthesia was induced and all appropriate IV access was obtained.  The bed was turned 180 degrees and the patient's head secured in the Elder frame via 3 points of fixation.  This was then secured to the operating table via its adapter with the patient's head turned to the left, so the right calvarium was facing up.  A ? incision was then marked and infiltrated with 10 mL 1% lidocaine with 1:100,000 epinephrine.  The surgical site was then  prepped and draped in a standard sterile fashion.  Perioperative antibiotics were administered and sequential compression devices were applied.      After conducting the appropriate timeout, a #10 blade scalpel was used to make a skin incision.  Subcutaneous dissection was carried out using monopolar cautery.  A Kerlix along with fish hooks were used to maintain retraction.  We used a #7 cutting bur to make bur holes around the periphery of our planned craniotomy site.  A dental instrument was then used to strip the dura from underneath the bur holes and then the B1 with footplate was used to turn the craniotomy flap.  The underlying dura appeared to be relatively tense, but manageable.  We then used the #7 cutting bur to drill down the floors of the anterior middle fossa.  Once we had adequate bony decompression, we opened the dura in a stellate fashion in order to allow for decompression of the brain.  The brain itself had a whitish appearance to it, consistent with lack of perfusion.  It was not herniating out over the bony defect, though.  We laid down a large piece of Durepair in an inlay fashion and then laid the leaflets of the dura over top of it.  With the decompression complete, we turned our attention to closing.      Hemostasis was achieved using bipolar cautery and FloSeal.  The myocutaneous flap was brought down.  The galea was reapproximated using 2-0 Vicryl suture in an inverted interrupted fashion.  The skin was closed with staples.  The drapes were then taken down and the patient was taken out of Elder frame, after which she was extubated and taken to the postanesthesia care unit for ongoing recovery.      Instrument, needle and sponge counts were correct x 2 at the end of the operation.      Dr. Messi Pacheco, neurosurgery staff, was present and scrubbed for all critical portions of the operation and immediately available for its entirety.         MESSI PACHECO MD       As dictated by LUZMARIA  MD AMERICO            D: 2018   T: 2018   MT: PASTOR      Name:     NADIR GARCIA   MRN:      [id number]        Account:        [id number]   :      1990           Procedure Date: 2018      Document: [id number]

## 2018-02-19 NOTE — OR NURSING
Specimen from Right skull bone flap sent to microbiology lab for culture. Bone flap then placed in nacl/Bacitracin solution on the field. Bone flap was prepared for storage in OR bone freezer and passed off field then placed into OR bone freezer at the end of the case

## 2018-02-19 NOTE — PROGRESS NOTES
CLINICAL NUTRITION SERVICES - ASSESSMENT NOTE     Nutrition Prescription    RECOMMENDATIONS FOR MDs/PROVIDERS TO ORDER:  - MD to manage additional water flushes     Malnutrition Status:    Patient does not meet two of the above criteria necessary for diagnosing malnutrition but is at risk for malnutrition.    Recommendations already ordered by Registered Dietitian (RD):  Impact Peptide @ goal 40 ml/hr (960 ml/day) to provide 1440 kcals (26 kcal/kg/day), 90 g PRO (1.6 g/kg/day), 739 ml free H2O, 61 g Fat (50% from MCTs), 134 g CHO and no Fiber daily.    - Begin TF @10 mL/hr and adv by 5 mL q8h until @ goal of 40 mL/hr. **Pt is high risk for refeeding given alcoholic and suspected little intake prior to admission.  - Only adv if K+/mg++ WNL and Phos >1.9 to goal  - Monitor K+/Mg++/Phos daily with TF start and advancement to goal infusion to evaluate for refeeding risk, replace per protocol.   - Order free water flushes 30 mL q 4 hrs to tube patency. Additional fluids and/or adjustments per MD.  - Order multivitamin/mineral (15 ml/day via FT) to help ensure micronutrient needs being met with suspected hypermetabolic demands and potential interruptions to TF infusions.    - Ordered baseline acute phase PRO (CRP) and recheck every Monday to evaluate trend with duration/volumes of immune-modulating TF received and ongoing approp of specialized TF therapy vs. approp to change to maintenance-type formula.    Future/Additional Recommendations:  - Monitor tolerance to TF start  - Replace electrolytes per protocol - monitor for refeeding syndrome, as pt is at risk.      REASON FOR ASSESSMENT  Maggie Case is a/an 27 year old female assessed by the dietitian for Provider Order - Registered Dietitian to Assess and Order TF per Medical Nutrition Therapy Protocol    NUTRITION HISTORY  - Pt evaluated by SLP 2/18 and recommend pt NPO with TF.   - CT chest and abdomen 2/17: Fatty infiltration and wall thickening of the stomach and  "portions of the large bowel, likely due to chronic inflammation.   - Chest Xray 2/17: Enteric tube sidehole projects over the body of the stomach  - Per family members, pt does not eat healthy (lots of pizza, chips, grab-and-go items) and sometimes will go days without eating d/t her alcoholism. Pt was feeling sick PTA, however family does not know for how long or how she ate during that time period.    CURRENT NUTRITION ORDERS  Diet: None    LABS  Na 164 (H)    MEDICATIONS  Folic Acid  Thiamine    ANTHROPOMETRICS  Height: 5' 3\" (Per Care Everywhere office visit 7/7/17)  Most Recent Weight: 65.8 kg (145 lb 1 oz)    IBW: 52.3 kg  BMI: Normal BMI  Weight History:   Wt Readings from Last 10 Encounters:   02/17/18 65.8 kg (145 lb 1 oz)   01/30/18 61.2 kg (135 lb)   04/28/17 63.5 kg (139 lb 15.9 oz)   01/05/17 61.4 kg (135 lb 5.8 oz)   05/08/16 65.4 kg (144 lb 2.9 oz)   01/25/16 51.7 kg (114 lb)   12/28/15 53.5 kg (118 lb)   06/18/13 63.2 kg (139 lb 5.3 oz)     Dosing Weight: 56 kg (adjusted) - based on lowest documented wt so far this adm (65.8 kg on 2/17 - 125% of IBW) and IBW of 52.3 kg.    ASSESSED NUTRITION NEEDS  Estimated Energy Needs: 4029-0024 kcals/day (25 - 30 kcals/kg)  Justification: Maintenance  Estimated Protein Needs: 67-84 grams protein/day (1.2 - 1.5 grams of pro/kg)  Justification: Hypercatabolism with acute illness (stroke) and Maintenance  Estimated Fluid Needs: (1 mL/kcal)   Justification: Per provider pending fluid status    PHYSICAL FINDINGS  See malnutrition section below.    MALNUTRITION  % Intake: Decreased intake does not meet criteria - family unsure of exactly how much pt eating at home over previous months.  % Weight Loss: None noted  Subcutaneous Fat Loss: None observed  Muscle Loss: None observed  Fluid Accumulation/Edema: None noted  Malnutrition Diagnosis: Patient does not meet two of the above criteria necessary for diagnosing malnutrition but is at risk for malnutrition.    NUTRITION " DIAGNOSIS  Inadequate oral intake related to dysphagia as evidenced by need to start enteral feeds today (2/19) per SLP recommendations.     INTERVENTIONS  Implementation  Nutrition Education: Spoke with family regarding role of RD in nutrition POC.  Collaboration with other providers - Team agrees with plan to start TF per rounds  Enteral Nutrition - Initiate     Goals  Total avg nutritional intake to meet a minimum of 25 kcal/kg and 1.2 g PRO/kg daily (per dosing wt 56 kg).     Monitoring/Evaluation  Progress toward goals will be monitored and evaluated per protocol.    Salima Mittal RD, LD  pgr [phone]

## 2018-02-19 NOTE — PROGRESS NOTES
Neuroscience Intensive Care Progress Note    2018    Maggie Case is a 27 year old year old female admitted on 2018 with 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    Problem List  1. RM1 occlusion with malignant MCA syndrome s/p hemicraniectomy POD#1  2. History of PE  3. History of alcohol abuse    24 hour events: hemicraniectomy with 500ml of blood loss    24 Hour Vital Signs Summary:  Temperatures:  Current - Temp: 99  F (37.2  C); Max - Temp  Av.2  F (36.8  C)  Min: 97.7  F (36.5  C)  Max: 99  F (37.2  C)  Respiration range: Resp  Avg: 15.8  Min: 10  Max: 20  Pulse range: No Data Recorded  Blood pressure range: Systolic (24hrs), Av , Min:92 , Max:127   ; Diastolic (24hrs), Av, Min:69, Max:83    Pulse oximetry range: SpO2  Av.4 %  Min: 96 %  Max: 100 %    Ventilator Settings  FiO2 (%): 40 %  Resp: 18    Intake/Output Summary (Last 24 hours) at 18 0710  Last data filed at 18 0700   Gross per 24 hour   Intake          3184.83 ml   Output              905 ml   Net          2279.83 ml       Arterial Line BP: (115-133)/(65-74) 130/72  MAP:  [84 mmHg-97 mmHg] 96 mmHg  BP - Mean:  [79-98] 84    Current Medications:    pantoprazole (PROTONIX) IV  40 mg Intravenous Daily     aspirin  81 mg Per G Tube Daily     folic acid  1 mg Per NG tube Daily     vitamin  B-1  100 mg Per NG tube Daily       PRN Medications:  hydrALAZINE, labetalol, HYDROmorphone, oxyCODONE IR, naloxone, potassium chloride, potassium chloride, potassium chloride, potassium chloride with lidocaine, potassium chloride, magnesium sulfate, potassium phosphate (KPHOS) in D5W IV, potassium phosphate (KPHOS) in D5W IV, potassium phosphate (KPHOS) in D5W IV, potassium phosphate (KPHOS) in D5W IV, ondansetron, acetaminophen    Infusions:    sodium chloride 100 mL/hr at 18 0620     sodium chloride 3% 5 mL/hr at 18 0636       Allergies   Allergen Reactions      Amoxicillin Other (See Comments)     Headaches     Lactose      Levaquin [Levofloxacin] Swelling     Naproxen Other (See Comments)     Reaction: Headaches     Nickel      Tramadol      Sulindac Rash       Physical Examination:  /70  Temp 99  F (37.2  C) (Axillary)  Resp 18  Wt 65.8 kg (145 lb 1 oz)  SpO2 99%  BMI 24.9 kg/m2  Lying in bed and in NAD. RRR. No respiratory distress. Drowsy, oriented to person and place,  follows commands, right gaze preference, PERRL, Left facial droop, dysarthria, Left hemiplegia, left javi neglect, left hemisensory loss.      Labs/Studies:  Recent Labs   Lab Test  02/19/18   0411  02/19/18   0000  02/18/18   2159  02/18/18   2133   02/18/18   0545  02/18/18   0434   02/17/18   1752   NA  160*  158*  158*  157*   < >  154*  Canceled, Test credited   --   142   POTASSIUM  3.7  3.2*  3.0*  3.2*   --   3.9  Canceled, Test credited   < >  2.7*   CHLORIDE   --    --    --    --    --   123*  Canceled, Test credited   --   106   CO2   --    --    --    --    --   23  Canceled, Test credited   --   22   ANIONGAP   --    --    --    --    --   8  Canceled, Test credited   --   15*   GLC   --   156*  113*  112*   --   115*  Canceled, Test credited   --   107*   BUN   --    --    --    --    --   9  Canceled, Test credited   --   7   CR   --    --    --    --    --   0.63  Canceled, Test credited   --   0.58   AVINASH   --    --    --    --    --   8.2*  Canceled, Test credited   --   8.6   WBC   --    --    --    --    --   12.0*  Canceled, Test credited   --   17.5*   RBC   --    --    --    --    --   3.57*  Canceled, Test credited   --   4.43   HGB   --   8.5*  8.7*  9.3*   --   10.4*  Canceled, Test credited   --   13.0   PLT   --    --    --    --    --   413  Canceled, Test credited   --   645*    < > = values in this interval not displayed.       Recent Labs   Lab Test  02/17/18   1752  02/17/18   1107  05/08/16   0435   06/17/13   1658   INR  1.01  1.18  1.00   < >  1.01   PTT   <20*  22*   --    --   24    < > = values in this interval not displayed.         Recent Labs  Lab 02/19/18  0000 02/18/18  2159 02/18/18  2133   PH 7.34* 7.37 7.41   PCO2 34* 33* 30*   PO2 194* 230* 216*   HCO3 18* 19* 19*   O2PER 40.0 OR 20 FIO2=60% OR 20  FIO2=60%     Imaging:    UE and LE dopplers: No evidence of DVT    Assessment/Plan  Maggie Case is a 27 year old h/o PE and alcohol abuse admitted 2/17/2018 with 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 Hypercoagulable workup pending.  Heme consulted.  - Aspirin for stroke prophylaxis for now, will eventually need to be anticoagulated  - Hypertonic Saline - Goal Na 155     Resp:  Protecting airway  No active issues     CV:  -SBP < 160      Renal:  Electrolyte replacement    #Hypernatremia: on hypertonic saline as above. Goal >155     Endo: Goal euglycemia     Heme:    #Acute blood loss anemia: acute drop post surgery. Continue to monitor closely.  - Transfuse Hgb <7     GI:  #Dysphagia: initiate TF today     ID:  #Leucocytosis: likely due to stress demargination after surgery  - Blood cultures NGTD     PPX:    DVT prophylaxis: SCDs, likely start DVT ppx tomorrow     GI: PPI  Code Status: Full  Lines: Left Subclavian 2/17     Dispo: ICU level of care    Patient seen and discussed with Dr. Jose Luis Shepherd MD  Neurology PGY2

## 2018-02-19 NOTE — CONSULTS
Chart reviewed   Maggie Case is a 24 years old who presents with RMCA leading to left hemiparesis, left facial weakness, and dysarthria who is s/p a craniotomy yesterday.   Diagnosed with PFO     She is currently somnolent but arousal.   Initiate PT/OT   She is NPO for now   PM&R will follow along   Thank you for consulting the PM&R Department.   For any questions, please feel free to page me at [phone]       Nora Blue MD   Department of PM&R

## 2018-02-19 NOTE — ANESTHESIA POSTPROCEDURE EVALUATION
Patient: Maggie Case    Procedure(s):  RIGHT HEMICRANIECTOMY - Wound Class: I-Clean    Diagnosis:Right MCA Stroke  Diagnosis Additional Information: No value filed.    Anesthesia Type:  General, ETT    Note:  Anesthesia Post Evaluation    Patient location during evaluation: PACU  Patient participation: Other/plan (See Comments)  Level of consciousness: lethargic  Pain management: adequate  Airway patency: patent  Cardiovascular status: acceptable  Respiratory status: acceptable  Hydration status: acceptable  PONV: none       Comments: Patient now following commands of neurosurgeon.  Extubated and maintaining airway        Last vitals:  Vitals:    02/18/18 2324 02/18/18 2330 02/18/18 2345   BP: 92/70 106/70 109/73   Resp: 10 12 12   Temp: 36.5  C (97.7  F)     SpO2: 100% 100% 100%         Electronically Signed By: Perry Gastelum MD  February 19, 2018  12:26 AM

## 2018-02-19 NOTE — PROGRESS NOTES
Update:    Nursing and family reported increased somnolence. Repeat CT shows minimal space with some changes suggestive of contralateral PCA injury.     Our recommendation is to pursue DC.     NSG aware. Case discussed with Dr. Walden.     [facility]  Neuro ICU Attending  [phone]

## 2018-02-19 NOTE — ANESTHESIA PROCEDURE NOTES
Arterial Line Procedure Note  Staff:     Anesthesiologist:  PHILOMENA BUSH  Location: In OR After Induction  Procedure Start/Stop Times:     patient identified, IV checked, risks and benefits discussed, informed consent and pre-op evaluation      Correct Patient: Yes      Correct Position: Yes      Correct Site: Yes      Correct Procedure: Yes      Correct Laterality:  N/A    Site Marked:  N/A  Line Placement:     Procedure:  Arterial Line    Insertion Site:  Radial    Insertion laterality:  Right    Skin Prep: Chloraprep      Patient Prep: patient draped, mask, sterile gloves and hat      Local skin infiltration:  None    Ultrasound Guided?: No      Catheter size:  20 gauge, 12 cm    Cath secured with: suture      Dressing:  Tegaderm    Complications:  None obvious    Arterial waveform: Yes      IBP within 10% of NIBP: Yes    Assessment/Narrative:      One pass with TWN

## 2018-02-19 NOTE — PROGRESS NOTES
"Name: Maggie Case                                   Age/Sex: 27 F  Rm: [id number]  MRN:  [id number]  Staff: Iram  Date of Admission:   Primary Service: NCC  Consulting Services: NRSG    HPI: Maggie Case is a 27 year old female with a history of  anxiety, anemia, a pulmonary embolism ( Discontinued anti-coagulation 2017 after inconsistent use and lack of thrombus seen on CT). 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 She was found down this morning by her friends after \"crashing\" at their house last night after a night of drinking. CT/CTA demonstrates a large right MCA stroke.     Procedures/Events:  : Central line placed. Repeat HCT - unchanged. NCC started 3% @ 30.   : Right hemicraniectomy. Transferred back to .     Examination:  : Drowsy but will open eyes. Oriented x3. FC on R. Left hemineglect, left facial droop. Mild dysarthria.   : Drowsy. Nauseous. Otherwise unchanged.   : Somnolent, arousable with stimuli. Oriented x 2 (knows [place], but not hospital name). FC on right. Left hemiparesis w/ neglect. Left facial droop. Mild dysarthria.    Imagin/18: evolution of R MCA infarct.   : ECHO: LVEF and R ventricle normal. No wall motion abnormalities. PFO demonstrated by bubble study. IVC normal in size.   : CTH: Decompressive hemicraniectomy. Filling of right lateral ventricle.    Assessment/Plan of care: Admitted with R MCA stroke (stroke day #3) and POD#1 of right hemicraniectomy. Found to have a PFO.     NEURO:   - Post-Operative Pain Control  - Neuro Examination Q 1 HR  - SPB < 160 mmg Hg  - Ok to continue ASA  - Epidural drain    Contact the neurosurgery resident on call with questions.    Dial * * *[phone]: Enter [phone] when prompted.   Awais Torres MD, PhD  Neurosurgery PGY-3      "

## 2018-02-19 NOTE — OR NURSING
Pt arrived to PACU 2325 intubated.  Pt placed on PS of 14/7.  Pt spontaneously breathing without difficulty.  Pt not following commands at this time.  Dr. Gastelum made aware of last lab results, order for ABG. ABG drawn per verbal order from Dr. Gsatelum.  0000 Dr. Hall at bedside to assess pt.  Dr. Gastelum at bedside also.  Pt able to follow commands on right side with vigorous stimulation per Dr. Hall.  Pt extubated to 5L- Oxi plus.  Pt moving right side spontaneously, not always to command, pupils reactive, Dr. Hall aware of neuro checks.  Pt to have CT in AM.  Handoff report given to 4A RN Miriam.  Pt transferred via bed by float RN/NST.  Dr. Gastelum aware that ABG result pending.  Lab called again to get ABG results.

## 2018-02-20 ENCOUNTER — APPOINTMENT (OUTPATIENT)
Dept: PHYSICAL THERAPY | Facility: CLINIC | Age: 28
End: 2018-02-20
Attending: PSYCHIATRY & NEUROLOGY
Payer: MEDICAID

## 2018-02-20 ENCOUNTER — APPOINTMENT (OUTPATIENT)
Dept: CT IMAGING | Facility: CLINIC | Age: 28
End: 2018-02-20
Attending: NURSE PRACTITIONER
Payer: MEDICAID

## 2018-02-20 ENCOUNTER — APPOINTMENT (OUTPATIENT)
Dept: OCCUPATIONAL THERAPY | Facility: CLINIC | Age: 28
End: 2018-02-20
Attending: PSYCHIATRY & NEUROLOGY
Payer: MEDICAID

## 2018-02-20 ENCOUNTER — APPOINTMENT (OUTPATIENT)
Dept: SPEECH THERAPY | Facility: CLINIC | Age: 28
End: 2018-02-20
Attending: PSYCHIATRY & NEUROLOGY
Payer: MEDICAID

## 2018-02-20 LAB
ALBUMIN SERPL-MCNC: 1.8 G/DL (ref 3.4–5)
ALP SERPL-CCNC: 77 U/L (ref 40–150)
ALT SERPL W P-5'-P-CCNC: 24 U/L (ref 0–50)
ANION GAP SERPL CALCULATED.3IONS-SCNC: 7 MMOL/L (ref 3–14)
APTT PPP: 30 SEC (ref 22–37)
AST SERPL W P-5'-P-CCNC: 61 U/L (ref 0–45)
AT III ACT/NOR PPP CHRO: 95 % (ref 85–135)
BACTERIA SPEC CULT: ABNORMAL
BASOPHILS # BLD AUTO: 0 10E9/L (ref 0–0.2)
BASOPHILS NFR BLD AUTO: 0.2 %
BILIRUB DIRECT SERPL-MCNC: <0.1 MG/DL (ref 0–0.2)
BILIRUB SERPL-MCNC: 0.2 MG/DL (ref 0.2–1.3)
BUN SERPL-MCNC: 11 MG/DL (ref 7–30)
CALCIUM SERPL-MCNC: 7.6 MG/DL (ref 8.5–10.1)
CARDIOLIPIN ANTIBODY IGG: <1.6 GPL-U/ML (ref 0–19.9)
CARDIOLIPIN ANTIBODY IGM: <0.2 MPL-U/ML (ref 0–19.9)
CHLORIDE SERPL-SCNC: 132 MMOL/L (ref 94–109)
CO2 SERPL-SCNC: 20 MMOL/L (ref 20–32)
CREAT SERPL-MCNC: 0.52 MG/DL (ref 0.52–1.04)
DAT POLY-SP REAG RBC QL: NORMAL
DIFFERENTIAL METHOD BLD: ABNORMAL
EOSINOPHIL # BLD AUTO: 0 10E9/L (ref 0–0.7)
EOSINOPHIL NFR BLD AUTO: 0.2 %
ERYTHROCYTE [DISTWIDTH] IN BLOOD BY AUTOMATED COUNT: 17.5 % (ref 10–15)
ERYTHROCYTE [DISTWIDTH] IN BLOOD BY AUTOMATED COUNT: 17.8 % (ref 10–15)
ERYTHROCYTE [DISTWIDTH] IN BLOOD BY AUTOMATED COUNT: 18 % (ref 10–15)
ERYTHROCYTE [DISTWIDTH] IN BLOOD BY AUTOMATED COUNT: 18.3 % (ref 10–15)
FIBRINOGEN PPP-MCNC: 544 MG/DL (ref 200–420)
GFR SERPL CREATININE-BSD FRML MDRD: >90 ML/MIN/1.7M2
GLUCOSE SERPL-MCNC: 137 MG/DL (ref 70–99)
HAPTOGLOB SERPL-MCNC: 220 MG/DL (ref 15–200)
HCT VFR BLD AUTO: 27.5 % (ref 35–47)
HCT VFR BLD AUTO: 29.5 % (ref 35–47)
HCT VFR BLD AUTO: 30.1 % (ref 35–47)
HCT VFR BLD AUTO: 30.3 % (ref 35–47)
HGB BLD-MCNC: 8.9 G/DL (ref 11.7–15.7)
HGB BLD-MCNC: 9.5 G/DL (ref 11.7–15.7)
HGB BLD-MCNC: 9.6 G/DL (ref 11.7–15.7)
HGB BLD-MCNC: 9.6 G/DL (ref 11.7–15.7)
IMM GRANULOCYTES # BLD: 0.6 10E9/L (ref 0–0.4)
IMM GRANULOCYTES NFR BLD: 3.5 %
INR PPP: 1.2 (ref 0.86–1.14)
LDH SERPL L TO P-CCNC: 307 U/L (ref 81–234)
LYMPHOCYTES # BLD AUTO: 1.9 10E9/L (ref 0.8–5.3)
LYMPHOCYTES NFR BLD AUTO: 11.1 %
MAGNESIUM SERPL-MCNC: 2 MG/DL (ref 1.6–2.3)
MAGNESIUM SERPL-MCNC: 2.2 MG/DL (ref 1.6–2.3)
MCH RBC QN AUTO: 29.3 PG (ref 26.5–33)
MCH RBC QN AUTO: 29.3 PG (ref 26.5–33)
MCH RBC QN AUTO: 29.6 PG (ref 26.5–33)
MCH RBC QN AUTO: 29.7 PG (ref 26.5–33)
MCHC RBC AUTO-ENTMCNC: 31.7 G/DL (ref 31.5–36.5)
MCHC RBC AUTO-ENTMCNC: 31.9 G/DL (ref 31.5–36.5)
MCHC RBC AUTO-ENTMCNC: 32.2 G/DL (ref 31.5–36.5)
MCHC RBC AUTO-ENTMCNC: 32.4 G/DL (ref 31.5–36.5)
MCV RBC AUTO: 92 FL (ref 78–100)
MONOCYTES # BLD AUTO: 1.8 10E9/L (ref 0–1.3)
MONOCYTES NFR BLD AUTO: 10.4 %
NEUTROPHILS # BLD AUTO: 13 10E9/L (ref 1.6–8.3)
NEUTROPHILS NFR BLD AUTO: 74.6 %
PHOSPHATE SERPL-MCNC: 2.3 MG/DL (ref 2.5–4.5)
PHOSPHATE SERPL-MCNC: 3.2 MG/DL (ref 2.5–4.5)
PLATELET # BLD AUTO: 274 10E9/L (ref 150–450)
PLATELET # BLD AUTO: 286 10E9/L (ref 150–450)
PLATELET # BLD AUTO: 286 10E9/L (ref 150–450)
PLATELET # BLD AUTO: 307 10E9/L (ref 150–450)
POTASSIUM SERPL-SCNC: 2.9 MMOL/L (ref 3.4–5.3)
POTASSIUM SERPL-SCNC: 3.9 MMOL/L (ref 3.4–5.3)
PROT SERPL-MCNC: 5.1 G/DL (ref 6.8–8.8)
RBC # BLD AUTO: 3 10E12/L (ref 3.8–5.2)
RBC # BLD AUTO: 3.21 10E12/L (ref 3.8–5.2)
RBC # BLD AUTO: 3.28 10E12/L (ref 3.8–5.2)
RBC # BLD AUTO: 3.28 10E12/L (ref 3.8–5.2)
RETICS # AUTO: 45.6 10E9/L (ref 25–95)
RETICS/RBC NFR AUTO: 1.4 % (ref 0.5–2)
SODIUM SERPL-SCNC: 157 MMOL/L (ref 133–144)
SODIUM SERPL-SCNC: 158 MMOL/L (ref 133–144)
SODIUM SERPL-SCNC: 159 MMOL/L (ref 133–144)
SODIUM SERPL-SCNC: 162 MMOL/L (ref 133–144)
SODIUM SERPL-SCNC: 162 MMOL/L (ref 133–144)
SPECIMEN SOURCE: ABNORMAL
WBC # BLD AUTO: 16.8 10E9/L (ref 4–11)
WBC # BLD AUTO: 17.6 10E9/L (ref 4–11)
WBC # BLD AUTO: 17.9 10E9/L (ref 4–11)
WBC # BLD AUTO: 18.3 10E9/L (ref 4–11)

## 2018-02-20 PROCEDURE — 84295 ASSAY OF SERUM SODIUM: CPT | Performed by: STUDENT IN AN ORGANIZED HEALTH CARE EDUCATION/TRAINING PROGRAM

## 2018-02-20 PROCEDURE — 25000128 H RX IP 250 OP 636: Performed by: STUDENT IN AN ORGANIZED HEALTH CARE EDUCATION/TRAINING PROGRAM

## 2018-02-20 PROCEDURE — 83010 ASSAY OF HAPTOGLOBIN QUANT: CPT | Performed by: STUDENT IN AN ORGANIZED HEALTH CARE EDUCATION/TRAINING PROGRAM

## 2018-02-20 PROCEDURE — 86880 COOMBS TEST DIRECT: CPT | Performed by: STUDENT IN AN ORGANIZED HEALTH CARE EDUCATION/TRAINING PROGRAM

## 2018-02-20 PROCEDURE — 99232 SBSQ HOSP IP/OBS MODERATE 35: CPT | Mod: GC | Performed by: INTERNAL MEDICINE

## 2018-02-20 PROCEDURE — 40000014 ZZH STATISTIC ARTERIAL MONITORING DAILY

## 2018-02-20 PROCEDURE — 84132 ASSAY OF SERUM POTASSIUM: CPT | Performed by: STUDENT IN AN ORGANIZED HEALTH CARE EDUCATION/TRAINING PROGRAM

## 2018-02-20 PROCEDURE — 25000132 ZZH RX MED GY IP 250 OP 250 PS 637: Performed by: PSYCHIATRY & NEUROLOGY

## 2018-02-20 PROCEDURE — 20000004 ZZH R&B ICU UMMC

## 2018-02-20 PROCEDURE — 85384 FIBRINOGEN ACTIVITY: CPT | Performed by: STUDENT IN AN ORGANIZED HEALTH CARE EDUCATION/TRAINING PROGRAM

## 2018-02-20 PROCEDURE — 27210995 ZZH RX 272: Performed by: PSYCHIATRY & NEUROLOGY

## 2018-02-20 PROCEDURE — 40000225 ZZH STATISTIC SLP WARD VISIT

## 2018-02-20 PROCEDURE — 85027 COMPLETE CBC AUTOMATED: CPT | Performed by: STUDENT IN AN ORGANIZED HEALTH CARE EDUCATION/TRAINING PROGRAM

## 2018-02-20 PROCEDURE — 97530 THERAPEUTIC ACTIVITIES: CPT | Mod: GP

## 2018-02-20 PROCEDURE — 70450 CT HEAD/BRAIN W/O DYE: CPT

## 2018-02-20 PROCEDURE — 25000132 ZZH RX MED GY IP 250 OP 250 PS 637: Performed by: STUDENT IN AN ORGANIZED HEALTH CARE EDUCATION/TRAINING PROGRAM

## 2018-02-20 PROCEDURE — 40000611 ZZHCL STATISTIC MORPHOLOGY W/INTERP HEMEPATH TC 85060: Performed by: INTERNAL MEDICINE

## 2018-02-20 PROCEDURE — 25000125 ZZHC RX 250: Performed by: STUDENT IN AN ORGANIZED HEALTH CARE EDUCATION/TRAINING PROGRAM

## 2018-02-20 PROCEDURE — 85004 AUTOMATED DIFF WBC COUNT: CPT | Performed by: STUDENT IN AN ORGANIZED HEALTH CARE EDUCATION/TRAINING PROGRAM

## 2018-02-20 PROCEDURE — 83735 ASSAY OF MAGNESIUM: CPT | Performed by: STUDENT IN AN ORGANIZED HEALTH CARE EDUCATION/TRAINING PROGRAM

## 2018-02-20 PROCEDURE — 25000128 H RX IP 250 OP 636: Performed by: PSYCHIATRY & NEUROLOGY

## 2018-02-20 PROCEDURE — 92526 ORAL FUNCTION THERAPY: CPT | Mod: GN

## 2018-02-20 PROCEDURE — 40000275 ZZH STATISTIC RCP TIME EA 10 MIN

## 2018-02-20 PROCEDURE — 83615 LACTATE (LD) (LDH) ENZYME: CPT | Performed by: STUDENT IN AN ORGANIZED HEALTH CARE EDUCATION/TRAINING PROGRAM

## 2018-02-20 PROCEDURE — 85045 AUTOMATED RETICULOCYTE COUNT: CPT | Performed by: STUDENT IN AN ORGANIZED HEALTH CARE EDUCATION/TRAINING PROGRAM

## 2018-02-20 PROCEDURE — 27210437 ZZH NUTRITION PRODUCT SEMIELEM INTERMED LITER

## 2018-02-20 PROCEDURE — 97535 SELF CARE MNGMENT TRAINING: CPT | Mod: GO

## 2018-02-20 PROCEDURE — 40000193 ZZH STATISTIC PT WARD VISIT

## 2018-02-20 PROCEDURE — 85610 PROTHROMBIN TIME: CPT | Performed by: STUDENT IN AN ORGANIZED HEALTH CARE EDUCATION/TRAINING PROGRAM

## 2018-02-20 PROCEDURE — 80048 BASIC METABOLIC PNL TOTAL CA: CPT | Performed by: STUDENT IN AN ORGANIZED HEALTH CARE EDUCATION/TRAINING PROGRAM

## 2018-02-20 PROCEDURE — 97112 NEUROMUSCULAR REEDUCATION: CPT | Mod: GP

## 2018-02-20 PROCEDURE — 25000132 ZZH RX MED GY IP 250 OP 250 PS 637: Performed by: NURSE PRACTITIONER

## 2018-02-20 PROCEDURE — 84100 ASSAY OF PHOSPHORUS: CPT | Performed by: STUDENT IN AN ORGANIZED HEALTH CARE EDUCATION/TRAINING PROGRAM

## 2018-02-20 PROCEDURE — 85730 THROMBOPLASTIN TIME PARTIAL: CPT | Performed by: STUDENT IN AN ORGANIZED HEALTH CARE EDUCATION/TRAINING PROGRAM

## 2018-02-20 PROCEDURE — 97530 THERAPEUTIC ACTIVITIES: CPT | Mod: GO

## 2018-02-20 PROCEDURE — 40000133 ZZH STATISTIC OT WARD VISIT

## 2018-02-20 PROCEDURE — 80076 HEPATIC FUNCTION PANEL: CPT | Performed by: STUDENT IN AN ORGANIZED HEALTH CARE EDUCATION/TRAINING PROGRAM

## 2018-02-20 RX ORDER — LIDOCAINE HYDROCHLORIDE 10 MG/ML
10 INJECTION, SOLUTION EPIDURAL; INFILTRATION; INTRACAUDAL; PERINEURAL ONCE
Status: COMPLETED | OUTPATIENT
Start: 2018-02-20 | End: 2018-02-20

## 2018-02-20 RX ORDER — SODIUM CHLORIDE 9 MG/ML
INJECTION, SOLUTION INTRAVENOUS
Status: DISCONTINUED
Start: 2018-02-20 | End: 2018-02-24 | Stop reason: HOSPADM

## 2018-02-20 RX ORDER — ACETAMINOPHEN 325 MG/1
650 TABLET ORAL EVERY 4 HOURS PRN
Status: DISCONTINUED | OUTPATIENT
Start: 2018-02-20 | End: 2018-02-25

## 2018-02-20 RX ADMIN — ACETAMINOPHEN 650 MG: 325 SOLUTION ORAL at 05:05

## 2018-02-20 RX ADMIN — ACETAMINOPHEN 650 MG: 325 SOLUTION ORAL at 00:51

## 2018-02-20 RX ADMIN — MULTIVITAMIN 15 ML: LIQUID ORAL at 08:36

## 2018-02-20 RX ADMIN — SODIUM CHLORIDE: 9 INJECTION, SOLUTION INTRAVENOUS at 02:46

## 2018-02-20 RX ADMIN — Medication 100 MG: at 08:35

## 2018-02-20 RX ADMIN — LIDOCAINE HYDROCHLORIDE 100 MG: 10 INJECTION, SOLUTION EPIDURAL; INFILTRATION; INTRACAUDAL; PERINEURAL at 10:03

## 2018-02-20 RX ADMIN — CEFTRIAXONE SODIUM 2 G: 10 INJECTION, POWDER, FOR SOLUTION INTRAVENOUS at 14:46

## 2018-02-20 RX ADMIN — SODIUM CHLORIDE: 9 INJECTION, SOLUTION INTRAVENOUS at 13:37

## 2018-02-20 RX ADMIN — ASPIRIN 81 MG CHEWABLE TABLET 81 MG: 81 TABLET CHEWABLE at 08:35

## 2018-02-20 RX ADMIN — FOLIC ACID 1 MG: 1 TABLET ORAL at 08:35

## 2018-02-20 RX ADMIN — ACETAMINOPHEN 650 MG: 325 TABLET, FILM COATED ORAL at 18:24

## 2018-02-20 RX ADMIN — ACETAMINOPHEN 650 MG: 325 TABLET, FILM COATED ORAL at 14:46

## 2018-02-20 RX ADMIN — POTASSIUM CHLORIDE 40 MEQ: 1.5 POWDER, FOR SOLUTION ORAL at 08:44

## 2018-02-20 RX ADMIN — POTASSIUM PHOSPHATE, MONOBASIC AND POTASSIUM PHOSPHATE, DIBASIC 15 MMOL: 224; 236 INJECTION, SOLUTION INTRAVENOUS at 16:32

## 2018-02-20 RX ADMIN — SULFAMETHOXAZOLE AND TRIMETHOPRIM 1 TABLET: 800; 160 TABLET ORAL at 08:35

## 2018-02-20 RX ADMIN — ACETAMINOPHEN 650 MG: 325 SOLUTION ORAL at 09:30

## 2018-02-20 RX ADMIN — ACETAMINOPHEN 650 MG: 325 TABLET, FILM COATED ORAL at 22:18

## 2018-02-20 RX ADMIN — SODIUM CHLORIDE: 9 INJECTION, SOLUTION INTRAVENOUS at 23:05

## 2018-02-20 RX ADMIN — POTASSIUM CHLORIDE 40 MEQ: 1.5 POWDER, FOR SOLUTION ORAL at 06:52

## 2018-02-20 NOTE — PROGRESS NOTES
S: Order received to fit patient with a Helmet as ordered by .  O/G: Protection of the bone flap  A:  Patient's head was measures and fit with size large soft collar.  The fit of the helmet is adequate.  P: contact orthotics if any issues arise

## 2018-02-20 NOTE — PLAN OF CARE
Problem: Patient Care Overview  Goal: Plan of Care/Patient Progress Review  PT 4A Discharge Planner PT   Patient plan for discharge: rehab  Current status: Overall more alert today, but participation was low 2/2 high fatigue as PT session was late in day.  Will try early afternoon 2/21 to minimize this effect.  Total A rolling.  PROM and neurofacilitation techniques to LLE with no activation noted.  Poor visual attention to L.  Barriers to return to prior living situation: Significant caregiver dependence for al lmobility, ADL  Recommendations for discharge: ARU  Rationale for recommendations: Family appears to engaged in pt's cares and rehab projection.  Pt has significant deficits, and has good potential given age.        Entered by: Darcy Almaraz 02/20/2018 5:03 PM

## 2018-02-20 NOTE — PROGRESS NOTES
"Hematology-Oncology Brief progress note    Subjective:    Reviewed chart and overnight events. Pt anemic to 5.7, rcd 2 units PRBC with appropriate rise in Hb to 9.5. Plts stable and WBC stable at 18. No obvious GI bleed or  bleed.     Objective:    Vital signs:  Temp: 97.4  F (36.3  C) Temp src: Axillary BP: (!) 134/91   Heart Rate: 73 Resp: 16 SpO2: 99 % O2 Device: None (Room air) Oxygen Delivery: 2 LPM   Weight: 71.2 kg (156 lb 15.5 oz)  Estimated body mass index is 26.94 kg/(m^2) as calculated from the following:    Height as of 4/28/17: 1.626 m (5' 4\").    Weight as of this encounter: 71.2 kg (156 lb 15.5 oz).    Exam    Gen: Pt is more awake and easily arousable.   HEENT: Mucous Membrane Moist, no oral lesions, no pallor, icterus. Craniectomy incision looks healthy. Face puffy expecially R periorbital region.   Neck: supple,   Lymph Nodes: no cervical,LAD   CVS: Regular Rate Rhythm, no murmurs  Resp: CTA, no added sounds  GI: Soft, non-tender, no Hepatospleenomegaly  Extremities: no edema   Skin: has bruises in the lt leg and thighs 1-2 cm diameter, and some on rt hand, no bruise in back   Neuro: Responds to simple commands. Able to mve Rt UE and LE purposefully. No movements in either extremity on lt side.      Data    Reviewed blood smear- rare echinocytes, possible rare spherocytes, no schistocytes. Occasional neutrophils have apoptotic nuclei, some left shift. Normal platelets.     Lab Results   Component Value Date    WBC 17.9 02/20/2018     Lab Results   Component Value Date    RBC 3.21 02/20/2018     Lab Results   Component Value Date    HGB 9.5 02/20/2018     Lab Results   Component Value Date    HCT 29.5 02/20/2018     Lab Results   Component Value Date    MCV 92 02/20/2018     Lab Results   Component Value Date    MCH 29.6 02/20/2018     Lab Results   Component Value Date    MCHC 32.2 02/20/2018     Lab Results   Component Value Date    RDW 18.0 02/20/2018     Lab Results   Component Value Date    "  02/20/2018     Last Basic Metabolic Panel:  Lab Results   Component Value Date     02/20/2018      Lab Results   Component Value Date    POTASSIUM 3.9 02/20/2018     Lab Results   Component Value Date    CHLORIDE 132 02/20/2018     Lab Results   Component Value Date    AVINASH 7.6 02/20/2018     Lab Results   Component Value Date    CO2 20 02/20/2018     Lab Results   Component Value Date    BUN 11 02/20/2018     Lab Results   Component Value Date    CR 0.52 02/20/2018     Lab Results   Component Value Date     02/20/2018        A/P:    #Hx of unprovoked PE not on AC  #Rt MCA stroke from s/p Craniotomy POD#4  #PFO with no DVT  #Normocytic Anemia  -Acute-unclear cause  #Pulmonary nodules, rt lung cavitation and bilateral hilar LN- infection vs inflammation     27-year-old female with a history of unprovoked PE in December 2015, on short term AC until June 2016, presented with new onset right MCA stroke.  CT scan showing occlusion of the right middle cerebral artery and rt right anterior cerebral artery territory infarction consistent with a thrombotic stroke, and 50% stenosis of the proximal right internal carotid artery.  She is now status post right-sided javi-craniectomy. Stroke workup revealing PFO, with negative Doppler of all 4 extremities.  Urine toxicology was negative. she has no family history of clots and no history of pregnancy related complications including recurrent abortions or preeclampsia.  The distribution of the stroke is more consistent with a thrombotic etiology and seems less likely to be embolic origin from VTE (through PFO). Antiphospholipid antibody syndrome would probably be at the top of differential diagnosis in this young patient with a history of unprovoked PE and a new stroke.  Other risk factors for clotting include active smoking. Ultimately the patient will require lifelong anticoagulation when safe to start.    Her admission Hb was 13 and dropped to 5.7, s/p 2  units Trx, last Hb was 9.5 today. EBL in surgery per notes was 500 ml. Unclear why her Hb dropped 6 gm, some amount of hemodilution may also be responsible. No evidence of active bleeding in GI or  tract. Hemolysis labs mostly neg except fro LDH which is non specific (FAUSTO neg, LDH slightly high 307, normal bili, INR 1.2, PTT 30, Fibrinigen is >500, hapto is normal) We reviewed the smear, which showed some spherocytes, rare schistocytes, some liz cells. Her retic count is low for the degree of anemia and therefore may represent sub optimal BM response from the stress or early in the course of response.     CT CAP on 1/17 w contrast showed new irregular nodules in the posterior right lung apex, patchy nodular infiltrates in both lower lungs. Stable 1.3 cm area of cavitation in the right lower lobe. Prominent bilateral hilar lymph nodes increased since 6/18/2016. Pulmonary findings have an appearance most consistent with acute on chronicinflammatory etiology and could be due to atypical infection.        Recommendations:  -f/u peripheral smear,  -Start DVT prophylaxis with Lovenox 40 mg daily when safe (after hemolysis work up)  -F/u Antiphospholipid Antibody syndrome screening labs- Anti-lupus anticoagulant, Anti-B2 glycoprotein (Anti-Cardiolipin Abs resulted neg)  -Do not recommend testing for thrombophilia (Factor 5, Prothrombin gene mut, Protein C def, Protein S def, AT III-as these are risk factors for VTE and not arterial clots)  -Continue ASA and general stroke management  -Pulmonology consult for peristent lung nodules and small cavitary lesion when pt is more stable (?vasculitis vs infection)           Pt seen and discussed with Dr. Rico who agrees with the plan.     Please don't hesitate to call us with any questions     Chintan Patrick MD  Hematology Oncology fellow  [phone]    Attending Note:  I have reviewed the patient chart, and interviewed and examined the patient.  I agree with the assessment and  plan. No clear cause for such large stroke. Await lupus anticoagulant test results. Not clear why there has been such a drop in hgb, no overt sign of bleeding. LD is mildly elevated, but nothing else suggests hemolysis.  No new receommndations at [facility] time.  Anay Rico MD  Hematology

## 2018-02-20 NOTE — CONSULTS
[facility] Hematology-Oncology New Consult          Maggie Case MRN# [id number]   Age: 27 year old YOB: 1990   Date of Admission: 2/17/2018     Reason for consult: Hx of PE and new thrombotic Stroke, Thrombophilia work up  Requested by: Reinier Shepherd MD         Assessment and Recommendations:     Assessment:  #Hx of unprovoked PE not on AC  #Rt MCA stroke from s/p Craniotomy POD#2  #PFO with no DVT  #Normocytic Anemia  -Acute    27-year-old female with a history of unprovoked PE in December 2015 not on anticoagulation since June 2016, presented with new onset right MCA stroke.  CT scan showing occlusion of the right middle cerebral artery and rt right anterior cerebral artery territory infarction consistent with a thrombotic stroke, and 50% stenosis of the proximal right internal carotid artery.  She is now status post right-sided javi-craniectomy. Stroke workup revealing PFO, with negative Doppler of all 4 extremities.  Urine toxicology was negative. she has no family history of clots and no history of pregnancy related complications including recurrent abortions or preeclampsia.  The distribution of the stroke is more consistent with a thrombotic etiology and is less likely to be embolic origin from VTE (through PFO).    Antiphospholipid antibody syndrome would probably be at the top of differential diagnosis in this young patient with a history of unprovoked PE and a new stroke.  Other risk factors for clotting include active smoking. Ultimately the patient will require lifelong anticoagulation when safe to start.  Her admission Hb was 13 and last Hb was 7 today. EBL in surgery per notes was 500 ml. Unclear why her Hb dropped 6 gm, some amount of hemodilution may also be responsible. No evidence of active bleeding.       Recommendations:  -Start DVT prophylaxis with Lovenox 40 mg daily when safe  -Send Antiphospholipid Antibody syndrome screening labs- Anti-lupus anticoagulant,  Anti-B2 glycoprotein and Anti-Cardiolipin Abs (IgG and IgM)  -Do not recommend testing for thrombophilia (Factor 5, Prothrombin gene mut, Protein C def, Protein S def, AT III-as these are risk factors for VTE and not arterial clots)  -Continue ASA and general stroke management        Pt seen and discussed with Dr. Rico who agrees with the plan.    Please don't hesitate to call us with any questions    Chintan Patrick MD  Hematology Oncology fellow  [phone]    Attending Note:  I have reviewed the patient chart, and interviewed and examined the patient.  I agree with the assessment and plan. The appearance on Ct angiogram are of thrombosis or large plaque at the R carotid bulb a dn 2 cm proximal proxima R internal carotid artery. This makes embolic phenomenon less likely. In a young person with h/o PE and also a stroke, antiphospholipid is the first thing that comes to mind. This is important to assess for, because would warrant anticoagulation rather than aspirin, and also possible immunosuppression. It is also interesting that on the CAP CT she had on presentation to [facility] ED, there are cavitary lung lesions. She may have a chronic inflammatory state that is placing her at risk for thrombosis. When she is much more stable, evaluation of this should be pursued.   Anay Rico MD  Hematology        HPI:     Maggie Case is a 27 year old female with a Hx of PE (?unprovoked) with reported poor compliance of Coumadin and self discontinuation.  History of alcohol abuse who was transferred from [facility] with concern for malignant MCA syndrome.  Per chart review the patient was drinking with friends Friday night and was flund lying on the floor and apparent with left sided weakness. She was then brought in by ambulance to [facility].  Initial  Head CT was concerning with evidence of dense right MCA territory stroke.  Patient transferred to [facility] for further cares. Most Hx obtained from chart review and from  family.  Per mother patient has been struggling with alcoholism for years and has been distant from the family for the last year.   Relevant Hematology Hx as below:  12/25-Large PE on rt PA and lt lingular aretery, unclear if hypercoaguable work up done (in [place]); started on Lovenox and bridged with coumadin, poor compliance with coumadin.  05/2016- CT angio for chest pain and SOB showed near resolution of rt sided clot; due to poor compliance was asked to switch to Rivaroxiban  06/2016- Not sure if she ever started Rivaroxiban     The family denies any hx of abortions, preeclampsia during the pregnancy. Mother does endorse some easy skin bruising. She is also an active smoker with 1/2 to 1 PPD for last 10-12 yrs. She lives with roommate, has a 9 yr child with ex boyfriend. She used to work as .    Hospital course: Pt was admitted Saturday. She underwent rt hemicraniectomy on Sunday due to worsening edema and midline shift. She was on hypertonics for a while. Stroke work up done showed PFO and no DVT in all 4 extremities. Normal LDL, normal Urine tox screen. Ct angio showed thrombosis in anterior circulation with involvement of rt MCA and LISSETTE territory.       Review of system: Could not be obtained as pt was lethargic         Past Medical History:     Past Medical History:   Diagnosis Date     Anemia      Anxiety      Chronic diarrhea      Lactose intolerance      Myalgia      Pulmonary embolism (H) 05/06/16     Vitamin B12 deficiency              Past Surgical History:     Past Surgical History:   Procedure Laterality Date     CLOSED REDUCTION, PERCUTANEOUS PINNING LOWER EXTREMITY, COMBINED Right 4/6/2016    Procedure: COMBINED CLOSED REDUCTION, PERCUTANEOUS PINNING LOWER EXTREMITY;  Surgeon: Dexter Jones MD;  Location: [facility]     CRANIECTOMY Right 2/18/2018    Procedure: CRANIECTOMY;  RIGHT HEMICRANIECTOMY;  Surgeon: Emeterio Mesa MD;  Location: [facility] OR             Social History:     Social  History     Social History     Marital status: Single     Spouse name: N/A     Number of children: N/A     Years of education: N/A     Occupational History     Not on file.     Social History Main Topics     Smoking status: Current Every Day Smoker     Packs/day: 0.25     Years: 7.00     Types: Cigarettes     Smokeless tobacco: Never Used     Alcohol use 0.0 oz/week     0 Standard drinks or equivalent per week      Comment: 1-2/week ,      Drug use: Yes     Special: Marijuana      Comment: adderall, yest     Sexual activity: Yes     Partners: Male     Other Topics Concern     Not on file     Social History Narrative   Per family: previously heavy smoker, cut down slightly, 0.5-1 PPD for last 12-15 yrs  Heavy drinker previously, alcoholic,2-3 drinks per day minimum, trying to cut back recently    Has 9 yr old kid, seperated form           Family History:   No family history on file.   Mom: CAD and MV replacement  Maternal Aunt: stroke  GM: Diabetes  No Hx of cancer or VTE in family          Allergies:   .   Allergies   Allergen Reactions     Amoxicillin Other (See Comments)     Headaches     Lactose      Levaquin [Levofloxacin] Swelling     Naproxen Other (See Comments)     Reaction: Headaches     Nickel      Tramadol      Sulindac Rash             Medications:     Current Facility-Administered Medications   Medication     HYDROmorphone (PF) (DILAUDID) injection 0.2-0.4 mg     oxyCODONE IR (ROXICODONE) tablet 5-10 mg     hydrALAZINE (APRESOLINE) injection 10-20 mg     labetalol (NORMODYNE/TRANDATE) injection 5-10 mg     sulfamethoxazole-trimethoprim (BACTRIM DS/SEPTRA DS) 800-160 MG per tablet 1 tablet     aspirin chewable tablet 81 mg     ondansetron (ZOFRAN) injection 4 mg     folic acid (FOLVITE) tablet 1 mg     thiamine tablet 100 mg     acetaminophen (TYLENOL) solution 650 mg           Vital signs:  Temp: 99.8  F (37.7  C) Temp src: Axillary BP: 120/80   Heart Rate: 89 Resp: 18 SpO2: 100 % O2 Device: None  "(Room air) Oxygen Delivery: 2 LPM   Weight: 65.8 kg (145 lb 1 oz)  Estimated body mass index is 24.9 kg/(m^2) as calculated from the following:    Height as of 4/28/17: 1.626 m (5' 4\").    Weight as of this encounter: 65.8 kg (145 lb 1 oz).    Physical exam:    Gen: Pt is lethargic and not responds to any commands.  HEENT: Mucous Membrane Moist, no oral lesions, no pallor, icterus. Large bandage on head, with drain, scant serosanguinous fluid.   Neck: supple,   Lymph Nodes: no cervical,LAD   CVS: Regular Rate Rhythm, no murmurs  Resp: CTA, no added sounds  GI: Soft, non-tender, no Hepatospleenomegaly  Extremities: no edema   Skin: has bruises in the lt leg and thighs, old  Neuro: Moves rt extremity spontaneously, but no purposeful movements. PEERLA         Data:   The labs and imaging were reviewed.      .  Recent Results (from the past 24 hour(s))       Lab Results   Component Value Date    WBC 17.4 02/19/2018     Lab Results   Component Value Date    RBC 2.36 02/19/2018     Lab Results   Component Value Date    HGB 7.0 02/19/2018     Lab Results   Component Value Date    HCT 22.8 02/19/2018     No components found for: MCT  Lab Results   Component Value Date    MCV 97 02/19/2018     Lab Results   Component Value Date    MCH 29.7 02/19/2018     Lab Results   Component Value Date    MCHC 30.7 02/19/2018     Lab Results   Component Value Date    RDW 19.6 02/19/2018     Lab Results   Component Value Date     02/19/2018         Last Basic Metabolic Panel:  Lab Results   Component Value Date     02/19/2018      Lab Results   Component Value Date    POTASSIUM 3.5 02/19/2018     Lab Results   Component Value Date    CHLORIDE 135 02/19/2018     Lab Results   Component Value Date    AVINASH 7.3 02/19/2018     Lab Results   Component Value Date    CO2 21 02/19/2018     Lab Results   Component Value Date    BUN 10 02/19/2018     Lab Results   Component Value Date    CR 0.59 02/19/2018     Lab Results   Component " Value Date     02/19/2018   EXAM:CTA ANGIOGRAM HEAD NECK      CLINICAL HISTORY: Patient Age  27 years Additional clinical info:  Trauma;      COMPARISON: Head CT without contrast 2/17/2018       TECHNIQUE: CT angiographic technique including angiographic and 5  minute delayed images     CONTRAST: isovue 370; 75 ml     FINDINGS: The early angiographic phase shows nonopacification of the  right middle cerebral artery consistent with thrombosis. The delayed  images do show some contrast in the left middle cerebral artery and  more distal MCA distribution which could be due to collateral flow.  Again seen is a acute hemispheric infarct of the right middle cerebral  artery distribution with a large area of low attenuation in the right  MCA distribution and associated sulcal effacement and effacement of  the right lateral ventricle. No midline shift. No hemorrhage  identified. No mass identified.     The left posterior cerebral circulation appears intact.     In the neck, there is a long segment of a filling defect on the medial  side of the right carotid bulb and right internal carotid artery which  results in at least a 50% diameter reduction. This is best seen on the  coronal reformatted images, images #103 through 109. This involves a  segment measuring approximately 2 cm in length.     The bilateral common carotid arteries, vertebral arteries, left  internal and external carotid arteries, and right external carotid  artery are patent. No arterial beading identified. No aneurysm  identified.     Marked paranasal sinusitis. Ankylosis of the occiput through C2.             IMPRESSION:   1. Occluded right middle cerebral artery at its origin consistent with  thrombosis.  2. At least 50% diameter reduction of the right carotid bulb and 2 cm  of the proximal right internal carotid artery caused by thrombosis or  large irregular plaque.  3. Large acute appearing right middle cerebral artery infarct, as  described on  the unenhanced CT.   4. Other incidental findings as described.     Findings were discussed with Dr. Shanks at 1240 hours on 2/17/2018     JUSTICE LEPE MD      Results for orders placed or performed during the hospital encounter of 02/17/18   CT Head w/o Contrast    Addendum: 2/18/2018    There is loss of gray-white of the parasagittal superior right frontal  region which likely is a component of the right anterior cerebral  artery territory.    EL SCHROEDER MD      Narrative    CT HEAD W/O CONTRAST 2/17/2018 8:17 PM    Provided History: R MCA stroke;     Comparison: CT 2/17/2018.    Technique: Using multidetector thin collimation helical acquisition  technique, axial, coronal and sagittal CT images from the skull base  to the vertex were obtained without intravenous contrast.     Findings:    Remonstration of findings of right MCA stroke with loss of gray-white  differentiation in the right frontal, parietal, and temporal lobes.  Edema in the right cerebral hemisphere with sulci effacement. The  right uncus is medially deviated and has mild mass effect on the right  cerebral peduncle. There is mild right to left midline shift at 2 mm.  No intracranial hemorrhage. The ventricles are proportionate to the  cerebral sulci. The basal cisterns are patent.    Mucosal thickening and fluid levels in the maxillary sinuses, ethmoid  air cells, sphenoid sinuses, and frontal sinuses . The mastoid air  cells are clear.       Impression    Impression:  Further evolution of right MCA stroke with loss of gray-white  differentiation and effacement of sulci in the right frontal,  parietal, and temporal lobes. Mild right-to-left midline shift at 2  mm. Medial deviation of the right uncus without rachel herniation..    I have personally reviewed the examination and initial interpretation  and I agree with the findings.    EL SCHROEDER MD   XR Chest Port 1 View    Narrative    XR CHEST PORT 1 VW  2/17/2018 6:45 PM      HISTORY:  Central line confirmation and NG placement;     COMPARISON: Earlier 2/17/2018    FINDINGS: Enteric tube sidehole projects over the body of the stomach.  Contrast within the renal collecting systems. No pneumothorax or  pleural effusion. Cardiac silhouette is not enlarged. Left upper  extremity Central venous catheter tip projects over the mid SVC. No  focal airspace opacities. No acute osseous abnormalities.      Impression    IMPRESSION:   1. Left upper extremity approach central venous catheter tip projects  over the mid SVC.  2. Enteric tube sidehole projects over the body of the stomach.  3. Contrast within the renal collecting systems, concerning for  delayed nephrogram/renal dysfunction.    I have personally reviewed the examination and initial interpretation  and I agree with the findings.    JOSE RIOS MD   XR Chest 1 View    Narrative    XR CHEST 1 VW  2/17/2018 10:38 PM      HISTORY: Line placement;     COMPARISON: Chest x-ray and CT from earlier today.    FINDINGS: Left subclavian central line tip at the low SVC. Gastric  tube tip and sidehole project over the stomach. Cardiac silhouette is  within normal limits. No pneumothorax or pneumothorax. Low lung  volumes. Subtle nodular opacities in the right lung apex and bilateral  lung bases, as seen on 2/17/2018 CT.       Impression    IMPRESSION:  1. Left subclavian central line tip at the lower SVC.   2. Subtle bibasilar and right apical opacities, as demonstrated on  2/17/2018 CT.    I have personally reviewed the examination and initial interpretation  and I agree with the findings.    GEORGIANA LUO MD   CT Head w/o Contrast    Addendum: 2/18/2018    There is hypodense involvement of the parasagittal superior right  frontal region which likely is a component of the right anterior  cerebral artery territory.    EL SCHROEDER MD      Narrative    CT HEAD W/O CONTRAST 2/18/2018 6:41 AM    Provided History: R MCA stroke;     Comparison: CT  2/17/2018.    Technique: Using multidetector thin collimation helical acquisition  technique, axial, coronal and sagittal CT images from the skull base  to the vertex were obtained without intravenous contrast.     Findings:    Further evolution of right MCA stroke with loss of gray-white  differentiation in the right frontal, parietal, and temporal lobes.  Edema in the right cerebral hemisphere with sulcal effacement which is  increased. The right uncus is medially deviated and has mild mass  effect on the right cerebral peduncle. There is mild right to left  midline shift at 2 mm. No intracranial hemorrhage. The ventricles are  proportionate to the cerebral sulci. The basal cisterns are patent.    Mucosal thickening and fluid levels in the maxillary sinuses, ethmoid  air cells, sphenoid sinuses, and frontal sinuses . The mastoid air  cells are clear.       Impression    Impression:  Further evolution of right MCA infarct in the right frontal, parietal,  and temporal lobes and right insula. Mild right-to-left midline shift  at 2 mm. Medial deviation of the right uncus without rachel herniation.    I have personally reviewed the examination and initial interpretation  and I agree with the findings.    EL SCHROEDER MD   CT Head w/o Contrast    Narrative    CT HEAD W/O CONTRAST 2/18/2018 5:13 PM    Provided History: patient with large R MCA stroke and decreased level  of arousal; evaluate for interval change in edema;     Comparison: 2/18/2018.    Technique: Using multidetector thin collimation helical acquisition  technique, axial, coronal and sagittal CT images from the skull base  to the vertex were obtained without intravenous contrast.     Findings:  Relatively stable findings of right MCA stroke with loss of  gray-white differentiation in the right frontal, parietal, temporal  lobes. Stable hypodensity in the parasagittal right frontal lobe which  likely is the anterior cerebral artery territory. Edema in the  right  cerebral hemisphere continues to cause sulcal effacement and  compression of the right lateral ventricles. Minimal 2 mm right to  left midline shift is similar to prior. No intracranial hemorrhage.  The basal cisterns remain patent. There is medial deviation of the  right uncus without rachel herniation.    Near-total opacification of bilateral maxillary and sphenoid sinuses  and ethmoid air cells. Increased layering fluid in the frontal  sinuses. The mastoid air cells are clear.       Impression    Impression:   Stable findings of right MCA and likely component of right LISSETTE stroke  involving the right cerebral hemisphere with adjacent edema. Stable  minimal 2 mm right-to-left midline shift.    I have personally reviewed the examination and initial interpretation  and I agree with the findings.    EL SCHROEDER MD   US Lower Extremity Venous Duplex Bilateral    Narrative    EXAMINATION: DOPPLER VENOUS ULTRASOUND OF BILATERAL LOWER EXTREMITIES,  2/19/2018 9:05 AM     COMPARISON: None.    History: History for DVT in patient with PFO and large stroke, history  of PE.     TECHNIQUE:  Gray-scale evaluation with compression, spectral flow and  color Doppler assessment of the deep venous system of both legs from  groin to knee, and then at the ankles.    FINDINGS:  In both lower extremities, the common femoral, femoral, popliteal and  posterior tibial veins demonstrate normal compressibility and blood  flow.      Impression    IMPRESSION:  No evidence of deep venous thrombosis in either lower extremity.    I have personally reviewed the examination and initial interpretation  and I agree with the findings.    NUGYEN PATINO MD   US Upper Extremity Venous Duplex Bilateral Port    Narrative    EXAMINATION: DOPPLER VENOUS ULTRASOUND OF BILATERAL UPPER EXTREMTIES,  2/19/2018 9:05 AM     COMPARISON: None.    History: evaluate for DVT in patient with PFO and large stroke and  history of PE     TECHNIQUE:  Gray-scale  evaluation with compression, spectral flow, and  color Doppler assessment of the deep venous system of both upper  extremities.    FINDINGS:  The left cephalic vein near a peripheral line in the region of the  antecubital fossa demonstrates no compressibility. The right cephalic  vein near peripheral line in the region of the antecubital fossa  demonstrates no compressibility.    Normal blood flow and waveforms are demonstrated in the internal  jugular, innominate, subclavian, and axillary veins bilaterally. There  is normal compressibility of the brachial and basilic veins  bilaterally.      Impression    IMPRESSION:  1.  No evidence of deep venous thrombosis in either upper extremity.  2.  Peripheral venous thrombus in the left and right cephalic veins in  the region of the antecubital fossa bilaterally.    I have personally reviewed the examination and initial interpretation  and I agree with the findings.    NGUYEN PATINO MD   CT Head w/o Contrast    Narrative    CT HEAD W/O CONTRAST 2/19/2018 3:45 AM    History: R stroke s/p hemicraniectomy;     Comparison: CT head dated 2/18/2018.    Technique: Using multidetector thin collimation helical acquisition  technique, axial, coronal and sagittal CT images from the skull base  to the vertex were obtained without intravenous contrast.     Findings:    Postoperative changes of right hemicraniectomy with overlying  subcutaneous emphysema and skin staples. Relatively stable right MCA  stroke with hypodensity in the right frontal, parietal and temporal  lobes. No significant change in hypoattenuation in the right superior  frontal gyrus. No intracranial hemorrhage or midline shift. Persistent  right lateral ventricle effacement. The basal cisterns are patent.    Continued opacification of paranasal sinuses. The mastoid air cells  are clear.       Impression    Impression:  1.  Postoperative changes of right hemicraniectomy with overlying  subcutaneous emphysema and  skin staples.   2.  Relatively stable large hypoattenuating area involving right  frontal, parietal and temporal lobes status post right MCA stroke.  Unchanged right anterior cerebral artery territory infarction. No  evidence for hemorrhagic transformation.    I have personally reviewed the examination and initial interpretation  and I agree with the findings.    MD Chintan SANTA MD

## 2018-02-20 NOTE — PLAN OF CARE
Neuro: Q1 Hr neuros. Patient uncooperative for most neuro exams during the night. Right side of face swollen. Pupils equal and reactive. Right eye swollen, has difficulty opening. Left eye opens spontaneously. No visual field cuts noted. Facial sensation is TERESO. Able to grit teeth. L side facial droop. Patient stated name, that she was in the hospital and the year with family encouragement and a separate time with MD encouragement. All other neuro exams patient has refused to talk, only nods head yes. Follows commands. Moves right arm and right leg freely. Left arm and left leg withdraw to pain. Pulses palpable. See previous note and charting for more details.    Cardiac: Sinus rhythm/sinus tach. SBP between 120-130 systolic. Afebrile. 2 units PRBC given for HGB <7. Right radial arterial line in place, dampened wave form. Does not have blood return. Neuro Crit notified, stated to leave art line in.     Pulmonary: Lungs clear on room air. Patient able to cough and deep breathe independently. Has productive cough. NT suction X1. Refused further suctioning.     GI/: Tube feeds through NG tube. Feeds currently at 15 with standard flushes. Able to advance by 5 mL/hr at 0900. Advancement schedule Q8 hrs. Maria in place with adequate output. Bowel sounds active with no BM reported as of 0530.    Bottom is intact. Right head incisions are approximated. Head wrap dressing was removed earlier in shift by MD.   Right head VIKRAM drain in place. Bright red drainage, minimal output.     Plan to continue to assess neurologic status.

## 2018-02-20 NOTE — PLAN OF CARE
Problem: Patient Care Overview  Goal: Plan of Care/Patient Progress Review    Neuro:pt more awake today; oriented x3; calm and cooperative with nursing therapy assessments; conversing with family;  L facial droop; slurred speech; R eye is swollen shut, unable to assess; left eye with deviated gaze to the R; follows commands on R side and withdraws on L side prn Tylenol given X2 for c/o headache    CV: NSR; SBP<140; afebrile    Pulm: weak cough,  Deep oral suctioned X3 with thick, herminio/tan colored secretions; on RA and maintaining SPO2> 95%    GI/: frequent large watery stool, has had 4 BM so far today, rectal pouch in place; Neuro crit updated, will continue monitor for now for additional stool; TF advanced to 20ml/hr. 30ml Q 4 hr FWF; sylvester with adequate UOP    Labs: replaced K+, recheck was 3.9; Phos 2.3 this afternoon, replacement currently infusing; Hgb and Na+ stable at 9.5 and 158  Respectively    Skin: head incision intact with sutures and MACIE, no bleeding noted; VIKRAM drain removed; face is swollen with R>L

## 2018-02-20 NOTE — PROGRESS NOTES
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     Problem List  1. 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 History of PE  3. History of alcohol abuse    24 hour events: 2U PRBC overnight    24 Hour Vital Signs Summary:  Temperatures:  Current - Temp: 99.3  F (37.4  C); Max - Temp  Av.2  F (37.9  C)  Min: 99.2  F (37.3  C)  Max: 100.8  F (38.2  C)  Respiration range: Resp  Av  Min: 12  Max: 18  Pulse range: No Data Recorded  Blood pressure range: Systolic (24hrs), Av , Min:110 , Max:131   ; Diastolic (24hrs), Av, Min:63, Max:91    Pulse oximetry range: SpO2  Av.6 %  Min: 98 %  Max: 100 %    Ventilator Settings  Resp: 12    Intake/Output Summary (Last 24 hours) at 18 0728  Last data filed at 18 0700   Gross per 24 hour   Intake             3884 ml   Output             1221 ml   Net             2663 ml     VIKRAM drain: 56ml    Arterial Line BP: ()/() 156/124  MAP:  [85 mmHg-198 mmHg] 137 mmHg  BP - Mean:  [] 100    Current Medications:    sulfamethoxazole-trimethoprim  1 tablet Oral BID     multivitamins with minerals  15 mL Per Feeding Tube Daily     aspirin  81 mg Per G Tube Daily     folic acid  1 mg Per NG tube Daily     vitamin  B-1  100 mg Per NG tube Daily     PRN Medications:  oxyCODONE IR, hydrALAZINE, labetalol, IV fluid REPLACEMENT ONLY, naloxone, potassium chloride, potassium chloride, potassium chloride, potassium chloride with lidocaine, potassium chloride, magnesium sulfate, potassium phosphate (KPHOS) in D5W IV, potassium phosphate (KPHOS) in D5W IV, potassium phosphate (KPHOS) in D5W IV, potassium phosphate (KPHOS) in D5W IV, ondansetron, acetaminophen    Infusions:    IV fluid REPLACEMENT ONLY       sodium chloride 100 mL/hr at 18 2138      Allergies   Allergen Reactions     Amoxicillin Other (See Comments)     Headaches     Lactose      Levaquin [Levofloxacin] Swelling     Naproxen Other (See Comments)     Reaction: Headaches     Nickel      Tramadol      Sulindac Rash     Physical Examination:  /85 (BP Location: Left arm)  Temp 99.3  F (37.4  C) (Axillary)  Resp 12  Wt 71.2 kg (156 lb 15.5 oz)  SpO2 99%  BMI 26.94 kg/m2  Gen:Sitting upright in chair and in NAD  HEENT: Right periorbital edema   Card:RRR  Resp:No respiratory distress.   Neuro: Drowsy, not opening eyes spontaneously.  Follows commands, right gaze preference, PERRL, Left facial droop, dysarthria, Left hemiplegia, left javi neglect, left hemisensory loss. Clonus noted bilaterally.    Labs/Studies:  Recent Labs   Lab Test  02/20/18   0556  02/20/18   0324  02/20/18   0150   02/19/18   1819   02/19/18   1715   02/19/18   0948  02/19/18   0411  02/19/18   0000   02/18/18   0545   NA  158*  162*  162*   < >   --    < >   --    < >  164*  160*  158*   < >  154*   POTASSIUM  2.9*   --    --    --    --    --    --    --   3.5  3.7  3.2*   < >  3.9   CHLORIDE  132*   --    --    --    --    --    --    --   135*   --    --    --   123*   CO2  20   --    --    --    --    --    --    --   21   --    --    --   23   ANIONGAP  7   --    --    --    --    --    --    --   8   --    --    --   8   GLC  137*   --    --    --    --    --    --    --   123*   --   156*   < >  115*   BUN  11   --    --    --    --    --    --    --   10   --    --    --   9   CR  0.52   --    --    --    --    --    --    --   0.59   --    --    --   0.63   AVINASH  7.6*   --    --    --    --    --    --    --   7.3*   --    --    --   8.2*   WBC  17.6*   --   18.3*   --    --    --   16.6*   < >   --   20.2*   --    --   12.0*   RBC  3.28*   --   3.28*   --    --    --   1.94*   < >   --   2.59*   --    --   3.57*   HGB  9.6*   --   9.6*   --   6.4*   --   5.7*   < >   --   7.6*  8.5*   < >  10.4*   PLT  286    --   307   --    --    --   411   < >   --   440   --    --   413    < > = values in this interval not displayed.     Recent Labs   Lab Test  02/17/18   1752  02/17/18   1107  05/08/16   0435   06/17/13   1658   INR  1.01  1.18  1.00   < >  1.01   PTT  <20*  22*   --    --   24    < > = values in this interval not displayed.       Recent Labs  Lab 02/19/18  0000 02/18/18  2159 02/18/18  2133   PH 7.34* 7.37 7.41   PCO2 34* 33* 30*   PO2 194* 230* 216*   HCO3 18* 19* 19*   O2PER 40.0 OR 20 FIO2=60% OR 20  FIO2=60%     Imaging:    CT head w/o: 1.  Stable postoperative changes of right hemicraniectomy with improved subcutaneous emphysema.  2. Persistent mass effect with right to left subfalcine herniation and medial deviation of the right uncus.    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 Hypercoagulable workup pending.  Heme consulted, appreciate recs.  - Aspirin for stroke prophylaxis for now, will eventually need to be anticoagulated  - Goal Na 155      Resp:  Protecting airway  No active issues      CV:  -SBP < 160       Renal:  Electrolyte replacement     #Hypernatremia: Iatrogenic. Goal >155      Endo: Goal euglycemia      Heme:     #Acute blood loss anemia: stable. Acute drop post surgery. Required 2 U overnight. 1 BM overnight that was not concerning for GI bleed. Unclear etiology at this time.   - Hemolytic workup pending.  - Transfuse Hgb <7      GI:  #Dysphagia: TF at goal      ID:  #Concern for UTI: leukocyte esterase with positive nitrites. Culture pending.  - Bactrim for 3 days      PPX:    DVT prophylaxis: SCDs, Consider DVT ppx    GI: not indicated  Code Status: Full  Lines: Left Subclavian 2/17, NG,  PIV      Dispo: ICU level of care. Possibly transfer to the floor tomorrow     Patient seen and discussed with Dr. Jose Luis Shepherd MD  Neurology PGY2

## 2018-02-20 NOTE — CONSULTS
[facility]   PM&R CONSULT    Consulting Provider: Dr Covarrubias   Reason for Consult: Assessment of rehabilitation   Location of Patient: 4 ICU   Date of Encounter: 2/20/2018   Date of Admission: 2/17/2018        ASSESSMENT/PLAN:    Ms. Maggie Case is a Right handed  27 year old yo female PMH anxiety, anemia, pulmonary embolism (in 2015 dc'ed anticoagulation 1/2017), alcohol use disorder who presents with a right MCA stroke with resultant deficits in mobility, LUE/LLE strength, decreased endurance, fatigue, impaired processing, impaired LUE/LLE function cognition, impaired LUE/LLE coordination, impaired mobility, impaired ADL/IADLs.   Patient's rehabilitation potential is high. Her recovery and prognosis continues to be good given age and etiology of stroke. We anticipate with the resolution of the cerebral edema that she will have better awareness and motor recovery. We will continue to follow, but preliminarily we believe she is an appropriate acute inpatient rehabilitation candidate.      Attending's note   Thank you for allowing us to participate in the care of this patient.   We were asked to see this patient by Dr Covarrubias to evaluate rehabilitation needs.   Patient has been seen, examined and evaluated by me independently. I reviewed today's vitals signs, lab values, imaging, medications, and current issues including medical, functional and social history significant to this admission.      I agree with the above note which reflects my direct input and interpretation of progress.     Primary issues/problems today are as follows  Maggie Case is a young 27 year old female, right handed who presents with left sided weakness, LUE flaccid and LLE with antigravity strength, hemineglect, left facial drrop, dysarthria, NG tube Maria catheter, secondary to right MCA stroke with RM1 occlusion with malignant MCA syndrome s/p decompressive hemicraniectomy, likely Embolic given  her history of PE in the setting of PFO.   She is sitting in the chair today during my encounter, with a soft naheed.   She is able to communicate despite the dysarthria   She tires easily.   She follows commands consistently   Per my review of the chart, she is starting to be more awake and alert    While she has support in her mother and sister, she has been staying with various friends. 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 She has 9 years old daughter.   Not clear about her discharge disposition.     Given her exam and trajectory likely will be appropriate for a ARU setting on discharge. We will continue to follow along.        My floor time today for this patient;  70 minutes, more than 50% of which was spent in coordination of care and counseling.     Nora Blue MD          HPI:    Maggie Laurenman 27 year old female with a PMH anxiety, anemia, pulmonary embolism (2015 discontinued anticoagulation 1/2017 after inconsistent use and lack of thrombus on CT)   Presented to the [facility] ICU as adirect transfer from [facility] after sustaining a right MCA stroke 2/17. This stroke occurred after alcohol consumption the prior night.  She underwent right hemicraniectomy on 2/18. Imaging has revealed a evolving right MCA infarct; 2/18 TTE showed a PFO on bubble study; 2/19 CTH showed decompressive hemicraniectomy with right lateral ventricle filling and follow 2/20 CTH showed stable interval changes.      PREVIOUS LEVEL OF FUNCTION   Previously independent for all ADL/IADLs, mobility and cognition.      CURRENT FUNCTION   OT: Requiring mod Assit for log rolling, total depedence for transfer requiring ceiling lift, requiring max assist. Requires max assist of 1-2 for supine to sit transfer. Total assist for donning helmet and socks.  PT: Total assist for transfer, total assist for gait, decreased endurance.  SLP: pending evaluation, currently receiving nutrition through tubefeeds, impaired cognition and processing.    LIVING  SITUATION/SUPPORT  Patient has a 8 y/o daughter and has shared custody. Pt is noted to have a strained relationship with her family and ex-boyfriend (daughter's father). Living situation is uncertain whether with family or alone in mobile home. Noted to have alcohol dependence.    SOCIAL HISTORY  Social History     Social History     Marital status: Single     Spouse name: N/A     Number of children: N/A     Years of education: N/A     Social History Main Topics     Smoking status: Current Every Day Smoker     Packs/day: 0.25     Years: 7.00     Types: Cigarettes     Smokeless tobacco: Never Used     Alcohol use 0.0 oz/week     0 Standard drinks or equivalent per week      Comment: 1-2/week ,      Drug use: Yes     Special: Marijuana      Comment: adderall, yest     Sexual activity: Yes     Partners: Male     Other Topics Concern     Not on file     Social History Narrative       Past Medical History:  Past Medical History:   Diagnosis Date     Anemia      Anxiety      Chronic diarrhea      Lactose intolerance      Myalgia      Pulmonary embolism (H) 05/06/16     Vitamin B12 deficiency            Current Medications:  Current Facility-Administered Medications   Medication     lidocaine (PF) (XYLOCAINE) 1 % injection 100 mg     oxyCODONE IR (ROXICODONE) tablet 5-10 mg     hydrALAZINE (APRESOLINE) injection 10-20 mg     labetalol (NORMODYNE/TRANDATE) injection 5-10 mg     sulfamethoxazole-trimethoprim (BACTRIM DS/SEPTRA DS) 800-160 MG per tablet 1 tablet     dextrose 10 % 1,000 mL infusion     multivitamins with minerals (CERTAVITE/CEROVITE) liquid 15 mL     sodium chloride 0.9% infusion     naloxone (NARCAN) injection 0.1-0.4 mg     potassium chloride SA (K-DUR/KLOR-CON M) CR tablet 20-40 mEq     potassium chloride (KLOR-CON) Packet 20-40 mEq     potassium chloride 10 mEq in 100 mL sterile water intermittent infusion (premix)     potassium chloride 10 mEq in 100 mL intermittent infusion with 10 mg lidocaine      potassium chloride 20 mEq in 50 mL intermittent infusion     magnesium sulfate 4 g in 100 mL sterile water (premade)     potassium phosphate 15 mmol in sodium chloride 0.9 % 250 mL intermittent infusion     potassium phosphate 20 mmol in sodium chloride 0.9 % 500 mL intermittent infusion     potassium phosphate 20 mmol in sodium chloride 0.9 % 250 mL intermittent infusion     potassium phosphate 25 mmol in sodium chloride 0.9 % 500 mL intermittent infusion     aspirin chewable tablet 81 mg     ondansetron (ZOFRAN) injection 4 mg     folic acid (FOLVITE) tablet 1 mg     thiamine tablet 100 mg     acetaminophen (TYLENOL) solution 650 mg         Review of Systems:  Patient is unable to answer review of systems questions during examination.          Labs   Lab Results   Component Value Date    WBC 17.6 (H) 02/20/2018    HGB 9.6 (L) 02/20/2018    HCT 30.3 (L) 02/20/2018    MCV 92 02/20/2018     02/20/2018     Lab Results   Component Value Date     (H) 02/20/2018    POTASSIUM 2.9 (L) 02/20/2018    CHLORIDE 132 (H) 02/20/2018    CO2 20 02/20/2018     (H) 02/20/2018     Lab Results   Component Value Date    GFRESTIMATED >90 02/20/2018    GFRESTBLACK >90 02/20/2018     Lab Results   Component Value Date    AST 49 (H) 02/17/2018    ALT 36 02/17/2018    ALKPHOS 120 02/17/2018    BILITOTAL 0.4 02/17/2018     Lab Results   Component Value Date    INR 1.01 02/17/2018     Lab Results   Component Value Date    BUN 11 02/20/2018    CR 0.52 02/20/2018         ON EXAMINATION:  Vitals:    02/20/18 0600 02/20/18 0700 02/20/18 0800 02/20/18 0900   BP: 130/88 127/85 133/85    BP Location: Left arm Left arm     Resp:   16    Temp:   97.4  F (36.3  C)    TempSrc:   Axillary    SpO2: 99% 99% 100% 99%   Weight:           Physical Exam:  Blood pressure 133/85, temperature 97.4  F (36.3  C), temperature source Axillary, resp. rate 16, weight 71.2 kg (156 lb 15.5 oz), SpO2 99 %.    GEN: seated comfortably in chair,  tired  Speech is one word, hypophonic, using right hand to signal yes an no  Comprehension is variable depending on alertness  HEENT: NCAT  RESPIRATORY: nonlabored breathing on room air  CARDIAC: perfusing all extremities  MSK: full active and passive ROM at all major joints of the bilaterally upper and lower extremities    ABD: soft, non tender  NEURO:   CRANIAL NERVES: left facial droop, right gaze preference, PERRL, dysarthria,    Sensation: intact on right side   Strength: 4/5 RUE/RLE, 3/5 LLE, 0/5 LUE, (tested in elbow flexion/extension, wrist extension, long finger flexion, finger abduction, hip flexion, knee extension, ankle dorsiflexion, plantarflexion)   Gait: deferred   Cognition: delayed processing, answers yes/no questions  SKIN: no rashes or lesions noted.  Patient has central line.   EXT: edema bilaterally, no ulcers.   PSYCH: flat affect.      Thank you for the consult. Please call for any questions. Pager number [phone]     Javi Franco      Total of 70 min spent in this encounter, more than 50% in counseling and coordination.   Patient discussed with attending Dr. Blue.

## 2018-02-20 NOTE — PLAN OF CARE
Problem: Patient Care Overview  Goal: Plan of Care/Patient Progress Review  Discharge Planner OT   Patient plan for discharge: Not discussed with pt this date.   Current status: Pt much more participatory in therapy this date compared to previous OT session.  However, does continue to present with flat affect and offer minimal response to questions.  Mod A for B log rolling following log roll technique, dependently lifted to chair via ceiling lift.  To facilitate neuro re-ed, B UE engagement in tasks, strength, and activity tolerance, pt engages in 3 ADLs while seated in chair requiring max A due to L side hemiparesis.   Barriers to return to prior living situation: L side hemiparesis, vision deficits, weakness, dependent with transfers and heavy A for ADLs.   Recommendations for discharge: Rehab   Rationale for recommendations: To maximize safety and IND with I/ADLs and functional mobility prior to return home.        Entered by: Geoff Gross 02/20/2018 9:47 AM

## 2018-02-20 NOTE — PROGRESS NOTES
"Name: Maggie Case                                   Age/Sex: 27 F  Rm: [id number]  MRN:  [id number]  Staff: TARA  Date of Admission:   Primary Service: NCC  Consulting Services: NRSG    HPI: Maggie Case is a 27 year old female with a history of  anxiety, anemia, a pulmonary embolism ( Discontinued anti-coagulation 2017 after inconsistent use and lack of thrombus seen on CT). She presents to the [facility] ICU as a direct transfer from [facility] after sustaining a right middle cerebral artery stroke. She was found down this morning by her friends after \"crashing\" at their house last night after a night of drinking. CT/CTA demonstrates a large right MCA stroke.     Procedures:  : Central line placed. Repeat HCT - unchanged. NCC started 3% @ 30.   : Right hemicraniectomy. Transferred back to .  : Given 2 units of pRBCs.      Examination:  : Drowsy but will open eyes. Oriented x3. FC on R. Left hemineglect, left facial droop. Mild dysarthria.   : Drowsy. Nauseous. Otherwise unchanged.   : Somnolent, arousable with stimuli. Oriented x 2 (knows [place], but not hospital name). FC on right. Left hemiparesis w/ neglect. Left facial droop. Mild dysarthria.    Imagin/18: Evolution of R MCA infarct.   : ECHO: LVEF and R ventricle normal. No wall motion abnormalities. PFO demonstrated by bubble study. IVC normal in size.   : CTH: Decompressive hemicraniectomy. Filling of right lateral ventricle.  : Stable interval Head CT.     Assessment/Plan of care: Admitted with R MCA stroke (stroke day #4) and POD#2 of right hemicraniectomy. Found to have a PFO.     NEURO:   - Repeat CT Head 2018 - stable.   - Neuro Examination Q 1 HR  - SPB < 160 mmg Hg  - Continue Aspirin 81 mg QD  - Remove VIKRAM drain today.     Contact the neurosurgery resident on call with questions.    Dial * * [phone]: Enter [phone] when prompted.   Awais Torres MD, PhD  Neurosurgery PGY-3      "

## 2018-02-20 NOTE — PLAN OF CARE
Problem: Patient Care Overview  Goal: Plan of Care/Patient Progress Review  Discharge Planner SLP   Patient plan for discharge: not stated  Current status: The patient was seen for dysphagia f/u this afternoon. She was very withdrawn, but nodded yes to participate.  Pharyngeal swallow is very weak, with immediate coughing episodes following oral cares with nectar thick h20 on the swab. SLP provided max cues to initiate volitional swallows. Unclear if the patient was unable to complete or was refusing. Recommend NPO with TF. May wish to consider G tube given severity of deficits.  Barriers to return to prior living situation: medical complexity, not able to safely swallow, requires TF  Recommendations for discharge: ARU  Rationale for recommendations: well below baseline swallowing function       Entered by: Yojana Sampson 02/20/2018 4:00 PM

## 2018-02-20 NOTE — PLAN OF CARE
Problem: Patient Care Overview  Goal: Plan of Care/Patient Progress Review  Pt continues to be somnolent, arouses to voice with repeated stimulation, oriented X4 but sometimes uncooperative with assessment; prn Tylenol given X2  for c/o pain;  L side neglect, spont moves R side; denies nausea/SOB; on RA and maintaining Sats>92%; infrequent non-productive cough but with coarse lung sounds on upper lobes; oral suctioned once moderate amount of  blood tinged secretions, sputum cx collected; T..7, blood culture and UC collected;  Started on po antibiotics;  Oliguric this morning, received 500cc NS bolus; trending HGB, last check was 5.7, STAT recheck just collected; critical Na+ 164 this morning, 3% NS gtt d/c, Q 4 hr Na checks, last one was  162;  Pt spent about 7 hrs up in chair, helmet was on at all time; VIKRAM drain  With small amount of bright red output; head wrap with dried drainage; TF  Started at 10ml/hr and to be advanced Q 8 hrs by 10;  Art line positional with dampened waveform and no blood return, Neuro Crit aware of this and will leave line in place for now;  Family at bedside; Q 1 neuro checks, monitor for signs of bleeding, continue with POC.    UPDATE: HGB recheck was 6.4,  2 UPRBC ordered and released and will transfuse once available from Blood Bank;

## 2018-02-20 NOTE — CONSULTS
Social Work: Assessment with Discharge Plan    Patient Name:  Maggie Case  :  1990  Age:  27 year old  MRN:  [id number]  Risk/Complexity Score:  Filed Complexity Screen Score: 6  Completed assessment with:  Pt's mother, sister, and aunt; chart review    Presenting Information   Reason for Referral:  Stroke consult  Date of Intake:  2018  Referral Source:  Physician  Decision Maker:  Pt, at baseline  Alternate Decision Maker:  Per [facility] policy, Pt's mother and father are surrogate decision-makers  Health Care Directive:  Provided education  Living Situation:  Pt has been staying with various friends and does not have her own home. Pt's 8 y/o daughter has been with Pt, but also has care from her father and Pt's parents.  Previous Functional Status:  Independent; Pt has been independent with ambulation and self-care. 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 Per family, Pt is an alcoholic and uses medications inappropriately for many years.  Patient and family understanding of hospitalization:  Pt's family is aware of Pt's stroke and is concerned about her recovery. Pt's family is a bit overwhelmed with information. Writer provided support and encouraged them to take their time to process everything and ask for information as needed.   Cultural/Language/Spiritual Considerations:  Pt's primary language is English.  Adjustment to Illness:  Pt is starting to be more alert but is not yet fully aware of her situation.    Physical Health  Reason for Admission:  No diagnosis found.  Services Needed/Recommended:  Other:  Rehab will likely be required, pending further workup.    Mental Health/Chemical Dependency  Diagnosis:  Pt has a history of substance use disorder and major depression.  Support/Services in Place:  Per mother, Pt's PCP diagnosed depression some years ago, but Pt refused the recommended medications. She has also refused recommendations for CD treatment.  Services  Needed/Recommended:  CD treatment, counseling, medications, support.    Support System  Significant relationship at present time:  Daughter, parents, siblings, aunt  Family of origin is available for support:  Pt has strained relationships with her family, however they are supportive and at bedside at this time.  Other support available:  Limited  Gaps in support system:  Pt has been living with friends whom her family describes as alcoholics and states that they encourage Pt's habits.  Patient is caregiver to:  Child:  Pt is the primary  of her 8 y/o daughter, Ruby.     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  Provided education on discharge plan:  YES  Patient agreeable to discharge plan:  Pt's family is overwhelmed at this time with Pt's current situation, but understands that she will likely require some level of rehab.  A list of Medicare Certified Facilities was provided to the patient and/or family to encourage patient choice. Patient's choices for facility are:  N/A; level of rehab not determined.  Will NH provide Skilled rehabilitation or complex medical:  N/A  General information regarding anticipated insurance coverage and possible out of pocket cost was discussed. Patient and patient's family are aware patient may incur the cost of transportation to the facility, pending insurance payment: Pt's family is not certain if Pt has insurance, but her aunt thought she had [employer]. Writer spoke to Registration who confirmed that Pt does have a  policy and will update the record.  Barriers to discharge:  Medical clearance, discharge disposition.    Discharge Recommendations   Anticipated Disposition:  pending further workup; will likely require rehab.  Transportation Needs:  Other:  pending needs at time of transport  Name of Transportation Company and Phone:  N/A    Additional comments   Writer attempted to meet with Pt at bedside, however she was sleeping. Pt's mother, Pawan, sister, Fernando, and aunt, Maryjo, were at bedside. Per family, Pt had been more alert earlier in the day but seemed tired after her OT session. Writer accompanied Pt's family to the visitor scotty while Pt was being moved back to bed by the RN. Pt's family was emotional as they discussed Pt's history. They stated that Pt had stopped talking to her parents one year ago when she got angry with them, which is a pattern she has repeated through her life. Per family, Pt has been a heavy drinker for many years and also takes prescription medications inappropriately, often seeking medications for a high.     Pt had moved out of her parents house, with her daughter, one year ago and has not had stable housing since. Per family, she has been living with a couple of friends who are also alcoholic and has isolated herself from family and other friends. Pt's daughter is in school and spends weekends with her father. She also spends time with Pt's parents. Per family, Pt's daughter's father has a good relationship with Pt's family and Pt's family is supportive of his desire to obtain custody of Pt's daughter. They do not wish to pursue legal action at this time, as Pt's daughter is doing well and they do not wish to cause Pt to panic, especially while she is sick. Pt has been agreeable to having her daughter's father and other family in her daughter's life so far.     At this time, the family is hoping that Pt will recover to participate in rehab and that she will then agree to CD treatment.  Pt's mother is worried that Pt will return to her former habits, but is trying to stay positive and supportive. SW offered to remain available and informed family that SW and CCRN will be present on each unit to work with Pt and family toward discharge planning.      Keely Maloney, [facility]  ICU   Pager: [phone]

## 2018-02-21 ENCOUNTER — APPOINTMENT (OUTPATIENT)
Dept: GENERAL RADIOLOGY | Facility: CLINIC | Age: 28
End: 2018-02-21
Attending: STUDENT IN AN ORGANIZED HEALTH CARE EDUCATION/TRAINING PROGRAM
Payer: MEDICAID

## 2018-02-21 ENCOUNTER — APPOINTMENT (OUTPATIENT)
Dept: SPEECH THERAPY | Facility: CLINIC | Age: 28
End: 2018-02-21
Attending: PSYCHIATRY & NEUROLOGY
Payer: MEDICAID

## 2018-02-21 ENCOUNTER — APPOINTMENT (OUTPATIENT)
Dept: PHYSICAL THERAPY | Facility: CLINIC | Age: 28
End: 2018-02-21
Attending: PSYCHIATRY & NEUROLOGY
Payer: MEDICAID

## 2018-02-21 ENCOUNTER — APPOINTMENT (OUTPATIENT)
Dept: OCCUPATIONAL THERAPY | Facility: CLINIC | Age: 28
End: 2018-02-21
Attending: PSYCHIATRY & NEUROLOGY
Payer: MEDICAID

## 2018-02-21 LAB
APTT PPP: NORMAL SEC (ref 22–37)
BACTERIA SPEC CULT: ABNORMAL
COPATH REPORT: NORMAL
CREAT SERPL-MCNC: 0.37 MG/DL (ref 0.52–1.04)
ERYTHROCYTE [DISTWIDTH] IN BLOOD BY AUTOMATED COUNT: 18.2 % (ref 10–15)
GFR SERPL CREATININE-BSD FRML MDRD: >90 ML/MIN/1.7M2
HCT VFR BLD AUTO: 26.9 % (ref 35–47)
HGB BLD-MCNC: 8.7 G/DL (ref 11.7–15.7)
INR PPP: NORMAL (ref 0.86–1.14)
MAGNESIUM SERPL-MCNC: 1.9 MG/DL (ref 1.6–2.3)
MCH RBC QN AUTO: 29.4 PG (ref 26.5–33)
MCHC RBC AUTO-ENTMCNC: 32.3 G/DL (ref 31.5–36.5)
MCV RBC AUTO: 91 FL (ref 78–100)
PHOSPHATE SERPL-MCNC: 3.7 MG/DL (ref 2.5–4.5)
PLATELET # BLD AUTO: 263 10E9/L (ref 150–450)
PLATELET # BLD AUTO: 265 10E9/L (ref 150–450)
POTASSIUM BLD-SCNC: 3.3 MMOL/L (ref 3.4–5.3)
POTASSIUM SERPL-SCNC: 4.2 MMOL/L (ref 3.4–5.3)
RBC # BLD AUTO: 2.96 10E12/L (ref 3.8–5.2)
SODIUM SERPL-SCNC: 153 MMOL/L (ref 133–144)
SODIUM SERPL-SCNC: 154 MMOL/L (ref 133–144)
SODIUM SERPL-SCNC: 154 MMOL/L (ref 133–144)
SODIUM SERPL-SCNC: 158 MMOL/L (ref 133–144)
SPECIMEN SOURCE: ABNORMAL
WBC # BLD AUTO: 15.9 10E9/L (ref 4–11)

## 2018-02-21 PROCEDURE — 20000004 ZZH R&B ICU UMMC

## 2018-02-21 PROCEDURE — 97530 THERAPEUTIC ACTIVITIES: CPT | Mod: GP

## 2018-02-21 PROCEDURE — 84132 ASSAY OF SERUM POTASSIUM: CPT | Performed by: PSYCHIATRY & NEUROLOGY

## 2018-02-21 PROCEDURE — 40000193 ZZH STATISTIC PT WARD VISIT

## 2018-02-21 PROCEDURE — 25000128 H RX IP 250 OP 636: Performed by: STUDENT IN AN ORGANIZED HEALTH CARE EDUCATION/TRAINING PROGRAM

## 2018-02-21 PROCEDURE — 25000132 ZZH RX MED GY IP 250 OP 250 PS 637: Performed by: STUDENT IN AN ORGANIZED HEALTH CARE EDUCATION/TRAINING PROGRAM

## 2018-02-21 PROCEDURE — 40000225 ZZH STATISTIC SLP WARD VISIT: Performed by: SPEECH-LANGUAGE PATHOLOGIST

## 2018-02-21 PROCEDURE — 97535 SELF CARE MNGMENT TRAINING: CPT | Mod: GO

## 2018-02-21 PROCEDURE — 92526 ORAL FUNCTION THERAPY: CPT | Mod: GN | Performed by: SPEECH-LANGUAGE PATHOLOGIST

## 2018-02-21 PROCEDURE — 71045 X-RAY EXAM CHEST 1 VIEW: CPT

## 2018-02-21 PROCEDURE — 25000128 H RX IP 250 OP 636: Performed by: PSYCHIATRY & NEUROLOGY

## 2018-02-21 PROCEDURE — 82565 ASSAY OF CREATININE: CPT | Performed by: STUDENT IN AN ORGANIZED HEALTH CARE EDUCATION/TRAINING PROGRAM

## 2018-02-21 PROCEDURE — 83735 ASSAY OF MAGNESIUM: CPT | Performed by: STUDENT IN AN ORGANIZED HEALTH CARE EDUCATION/TRAINING PROGRAM

## 2018-02-21 PROCEDURE — 27210995 ZZH RX 272: Performed by: PSYCHIATRY & NEUROLOGY

## 2018-02-21 PROCEDURE — 25000125 ZZHC RX 250: Performed by: NURSE PRACTITIONER

## 2018-02-21 PROCEDURE — 85049 AUTOMATED PLATELET COUNT: CPT | Performed by: STUDENT IN AN ORGANIZED HEALTH CARE EDUCATION/TRAINING PROGRAM

## 2018-02-21 PROCEDURE — 84132 ASSAY OF SERUM POTASSIUM: CPT | Performed by: STUDENT IN AN ORGANIZED HEALTH CARE EDUCATION/TRAINING PROGRAM

## 2018-02-21 PROCEDURE — 40000133 ZZH STATISTIC OT WARD VISIT

## 2018-02-21 PROCEDURE — 85027 COMPLETE CBC AUTOMATED: CPT | Performed by: STUDENT IN AN ORGANIZED HEALTH CARE EDUCATION/TRAINING PROGRAM

## 2018-02-21 PROCEDURE — 84295 ASSAY OF SERUM SODIUM: CPT | Performed by: STUDENT IN AN ORGANIZED HEALTH CARE EDUCATION/TRAINING PROGRAM

## 2018-02-21 PROCEDURE — 27210437 ZZH NUTRITION PRODUCT SEMIELEM INTERMED LITER

## 2018-02-21 PROCEDURE — 84100 ASSAY OF PHOSPHORUS: CPT | Performed by: STUDENT IN AN ORGANIZED HEALTH CARE EDUCATION/TRAINING PROGRAM

## 2018-02-21 PROCEDURE — 25000132 ZZH RX MED GY IP 250 OP 250 PS 637: Performed by: PSYCHIATRY & NEUROLOGY

## 2018-02-21 RX ORDER — 3% SODIUM CHLORIDE 3 G/100ML
INJECTION, SOLUTION INTRAVENOUS CONTINUOUS
Status: DISCONTINUED | OUTPATIENT
Start: 2018-02-21 | End: 2018-02-23

## 2018-02-21 RX ADMIN — OXYCODONE HYDROCHLORIDE 5 MG: 5 TABLET ORAL at 13:00

## 2018-02-21 RX ADMIN — ACETAMINOPHEN 650 MG: 325 TABLET, FILM COATED ORAL at 23:54

## 2018-02-21 RX ADMIN — SODIUM CHLORIDE: 9 INJECTION, SOLUTION INTRAVENOUS at 14:17

## 2018-02-21 RX ADMIN — ENOXAPARIN SODIUM 40 MG: 40 INJECTION SUBCUTANEOUS at 15:50

## 2018-02-21 RX ADMIN — POTASSIUM CHLORIDE 40 MEQ: 1.5 POWDER, FOR SOLUTION ORAL at 06:01

## 2018-02-21 RX ADMIN — MULTIVITAMIN 15 ML: LIQUID ORAL at 07:34

## 2018-02-21 RX ADMIN — OXYCODONE HYDROCHLORIDE 5 MG: 5 TABLET ORAL at 20:07

## 2018-02-21 RX ADMIN — SODIUM CHLORIDE: 9 INJECTION, SOLUTION INTRAVENOUS at 03:37

## 2018-02-21 RX ADMIN — ACETAMINOPHEN 650 MG: 325 TABLET, FILM COATED ORAL at 18:40

## 2018-02-21 RX ADMIN — VANCOMYCIN HYDROCHLORIDE 1500 MG: 10 INJECTION, POWDER, LYOPHILIZED, FOR SOLUTION INTRAVENOUS at 14:33

## 2018-02-21 RX ADMIN — Medication 100 MG: at 07:33

## 2018-02-21 RX ADMIN — ASPIRIN 81 MG CHEWABLE TABLET 81 MG: 81 TABLET CHEWABLE at 07:33

## 2018-02-21 RX ADMIN — ACETAMINOPHEN 650 MG: 325 TABLET, FILM COATED ORAL at 12:32

## 2018-02-21 RX ADMIN — ACETAMINOPHEN 650 MG: 325 TABLET, FILM COATED ORAL at 07:33

## 2018-02-21 RX ADMIN — POTASSIUM CHLORIDE 20 MEQ: 1.5 POWDER, FOR SOLUTION ORAL at 07:42

## 2018-02-21 RX ADMIN — ACETAMINOPHEN 650 MG: 325 TABLET, FILM COATED ORAL at 03:04

## 2018-02-21 RX ADMIN — CEFTRIAXONE SODIUM 2 G: 10 INJECTION, POWDER, FOR SOLUTION INTRAVENOUS at 13:01

## 2018-02-21 RX ADMIN — SODIUM CHLORIDE: 3 INJECTION, SOLUTION INTRAVENOUS at 14:14

## 2018-02-21 RX ADMIN — SODIUM CHLORIDE: 9 INJECTION, SOLUTION INTRAVENOUS at 23:54

## 2018-02-21 RX ADMIN — FOLIC ACID 1 MG: 1 TABLET ORAL at 07:33

## 2018-02-21 ASSESSMENT — PAIN DESCRIPTION - DESCRIPTORS
DESCRIPTORS: ACHING;CONSTANT
DESCRIPTORS: ACHING;CONSTANT

## 2018-02-21 NOTE — PROGRESS NOTES
Hematology-Oncology Brief progress note     A/P:    #Hx of unprovoked PE not on AC  #Rt MCA stroke from s/p Craniotomy POD#4  #PFO with no DVT  #Normocytic Anemia  -Acute-unclear cause  #Pulmonary nodules, rt lung cavitation and bilateral hilar LN- infection vs inflammation     27-year-old female with a history of unprovoked PE in December 2015, on short term AC until June 2016, presented with new onset right MCA stroke.  CT scan showing occlusion of the right middle cerebral artery and rt right anterior cerebral artery territory infarction consistent with a thrombotic stroke, and 50% stenosis of the proximal right internal carotid artery.  She is now status post right-sided javi-craniectomy. Stroke workup revealing PFO, with negative Doppler of all 4 extremities.  Urine toxicology was negative. she has no family history of clots and no history of pregnancy related complications including recurrent abortions or preeclampsia.  The distribution of the stroke is more consistent with a thrombotic etiology and seems less likely to be embolic origin from VTE (through PFO). Antiphospholipid antibody syndrome would probably be at the top of differential diagnosis in this young patient with a history of unprovoked PE and a new stroke.  Other risk factors for clotting include active smoking. Ultimately the patient will require lifelong anticoagulation when safe to start.    Her admission Hb was 13 and dropped to 5.7, s/p 2 units Trx, last Hb was 9.5 today. EBL in surgery per notes was 500 ml. Unclear why her Hb dropped 6 gm, some amount of hemodilution may also be responsible. No evidence of active bleeding in GI or  tract. Hemolysis labs mostly neg except fro LDH which is non specific (FAUSTO neg, LDH slightly high 307, normal bili, INR 1.2, PTT 30, Fibrinigen is >500, hapto is normal) We reviewed the smear, which showed some spherocytes, rare schistocytes, some liz cells. Her retic count is low for the degree of anemia and  therefore may represent sub optimal BM response from the stress or early in the course of response.     CT CAP on 1/17 w contrast showed new irregular nodules in the posterior right lung apex, patchy nodular infiltrates in both lower lungs. Stable 1.3 cm area of cavitation in the right lower lobe. Prominent bilateral hilar lymph nodes increased since 6/18/2016. Pulmonary findings have an appearance most consistent with acute on chronicinflammatory etiology and could be due to atypical infection.        Recommendations:  -Send Myeloproliferative panel (JAK2, CALR, MPL)-ordered for you  -send B12 and folate levels   -Start DVT prophylaxis with Lovenox 40 mg daily when safe   -F/u Antiphospholipid Antibody syndrome screening labs- Anti-lupus anticoagulant, Anti-B2 glycoprotein (Anti-Cardiolipin Abs resulted neg)  -Do not recommend testing for thrombophilia (Factor 5, Prothrombin gene mut, Protein C def, Protein S def, AT III-as these are risk factors for VTE and not arterial clots)  -Continue ASA and general stroke management  -Pulmonology consult for peristent lung nodules and small cavitary lesion when pt is more stable (?vasculitis vs infection)    Plan discussed with Dr. Aviles.    Chintan Patrick MD  Hematology Oncology fellow  [phone]

## 2018-02-21 NOTE — PHARMACY-VANCOMYCIN DOSING SERVICE
"Pharmacy Vancomycin Consult Initial Note    Date of Service 2018  Patient's  1990  27 year old, female    Indication: Aspiration Pneumonia, Sputum Cx with staph aureus    Current estimated CrCl = Estimated Creatinine Clearance: 154 mL/min (based on Cr of 0.52).    Creatinine for last 3 days  2018:  9:48 AM Creatinine 0.59 mg/dL  2018:  5:56 AM Creatinine 0.52 mg/dL    Recent vancomycin level(s) for last 3 days  No results found for requested labs within last 72 hours.    Body mass index is 26.75 kg/(m^2).  Height is 5' 4\".   Wt Readings from Last 2 Encounters:   18 70.7 kg (155 lb 13.8 oz)   18 61.2 kg (135 lb)         Vancomycin IV Administrations (past 72 hours)      No vancomycin orders with administrations in past 72 hours.              Nephrotoxins and other renal medications (Future)    Start     Dose/Rate Route Frequency Ordered Stop    18 1400  vancomycin (VANCOCIN) 1,500 mg in sodium chloride 0.9 % 250 mL intermittent infusion      1,500 mg  over 90 Minutes Intravenous EVERY 12 HOURS 18 1233          Contrast Orders - past 72 hours     None          Plan:  1.  Start vancomycin 1500 mg IV q12h (21 mg/kg).  2.  Goal Trough Level: 15-20 mg/L   3.  Pharmacy will check trough levels as appropriate in 1-3 Days.    4.  Serum creatinine levels will be ordered daily for the first week of therapy and at least twice weekly for subsequent weeks.    5.  Warren method utilized to dose vancomycin therapy: Method 2    Radha Wright, PharmD  2018              "

## 2018-02-21 NOTE — PLAN OF CARE
Problem: Patient Care Overview  Goal: Plan of Care/Patient Progress Review  Discharge Planner SLP   Patient plan for discharge: unknown  Current status: Pt seen to f/u for swallowing.  Able to participate better today in therapy. Pt continues to demonstrate outward s/sx of aspiration with moisture from toothette and small trials of nectar thick liquids via cup.  Pt tolerated nectar thick liquids via spoon given in 1/2 tsp amounts w/o outward s/sx of aspiration.  At this time recommend initiating PO diet with Nectar thick clear liquids to be given via spoon only in 1/2 tsp amounts only when fully awake and upright.  ST will plan to follow daily for PO tolerance and ability to safely advance diet.   Barriers to return to prior living situation: motivation, overall deficits  Recommendations for discharge: Inpt rehab  Rationale for recommendations: Pt will need ongoing swallow tx at discharge as pt with ongoing dysphagia        Entered by: Kristin Prado 02/21/2018 9:35 AM

## 2018-02-21 NOTE — PLAN OF CARE
Problem: Patient Care Overview  Goal: Plan of Care/Patient Progress Review  PT 4A Discharge Planner PT   Patient plan for discharge: Rehab  Current status: Participation remains very low thus far, appears behavioral vs true low arousal.  Interacting minimally with PT throughout, L eye mostly closed (R eye swollen shut).  Sat EOB x5mins with SBA-mod A, fair trunk activation noted.  Lift bed<chair.  Barriers to return to prior living situation: Significant weakness, dependence for mobility and ADL  Recommendations for discharge: ARU.  She may benefit from behavior/mental health intervention even before getting to ARU, as voluntary low participation in early mobility will likely lengthen her rehab stay.  Rationale for recommendations: To maximize (I) functional mobility and ADL       Entered by: Darcy Almaraz 02/21/2018 2:11 PM

## 2018-02-21 NOTE — PLAN OF CARE
Neuro: Q1 hr neuros. Oriented X4. Sometimes lethargic. Patient is cooperative with neuro checks. Right side of face swollen. Right eye swollen shut. Patient states that she can feel nurse touching both sides of face equally. Left eye pupil is equal and reactive. Pupil deviated to the right. Has difficulty crossing midline. Able to grit teeth. L side facial droop. Right  is moderate and right leg strength/ push pull is moderate. Absent movement on left arm and leg.     Cardiac: Sinus rhythm. SBP within desired range. Afebrile. Pulses palpable.     Pulmonary: Lungs clear on room air. Productive cough.    GI/: Tube feeds through NG tube. Current rate is 30. Can advance by 5 mL at 1200. Standard flushes. Replacing electrolytes prn. Two bowel movements overnight. Catheter in place. Adequate urine output.    Skin: Bottom is intact. Right head incision is approximated.     Pain: Patient c/o right leg pain and right head pain. Tylenol given Q4 hrs prn.    Plan to continue to assess neurologic status.

## 2018-02-21 NOTE — CONSULTS
[facility]  Pulmonary Consult     Patient:  Maggie Case, Date of birth 1990, Medical record number [id number]  Date of Visit:  02/21/2018    Reason for Consult: Abnormal Chest CT         Assessment and Recommendations:     Ms. Case is a 28 yo female with history of anxiety and PE (in 2015, discontinued anticoagulation 1/2017) who presented to [facility] Neuro ICU as a direct transfer from [facility] after being found down and found to have a R MCA stroke with malignant MCA syndrome on 2/17/18 s/p R hemicraniotomy on 2/18. Pulmonary consulted for abnormal Chest CT.    CT Chest on admission shows bilateral hilar adenopathy and bilateral patchy nodular infiltrates. In comparison to CT chest from 6/2016, bilateral hilar lymph nodes have increased in size. Differential for bilateral hilar adenopathy with patchy infiltrates include sarcoidosis, fungal infection, or resolving viral infection given recent URI symptoms. Low suspicion for Tb given lack of risk factors and pattern of infiltrates on CT. Patient is currently afebrile and saturating well on room air and there is no urgent need for definitive diagnosis. At this point, feel that patient's rehabilitation from her stroke is more important and would like to avoid further invasive procedures (ie bronchoscopy) in setting of a hemicraniotomy.    Do note that patient is growing staph and Hflu in her sputum culture from ET tube on 2/19. Unsure if this was acquired after intubation vs present on admission when CT Chest was obtained (and how this relates to infiltrates/LAD). Agree with current course of antibiotics.    --Recommend obtaining fungal antibodies looking for histoplasmosis/blastomyces  --Obtain HIV if not recently sent  --Will monitor patient's clinical course. If continues to improve, recommend repeat CT Chest in 4 weeks to assess for interval change of infiltrates/lymph nodes and decide need for BAL/lymph node biopsy, this can  be done as an outpatient.    Thank you very much for this consultation. Pulmonary will follow peripherally.    Patient staffed with Dr. Palacios.    Ibis Pop  Pulmonary and Critical Care Fellow  [id number]        History of Present Illness     Ms. Case is a 26 yo female with history of anxiety and PE (in 2015, discontinued anticoagulation 1/2017) who presented to [facility] Neuro ICU as a direct transfer from [facility] after being found down and found to have a R MCA stroke with malignant MCA syndrome on 2/17/18. Pulmonary consulted for abnormal Chest CT. History obtained from patient's family due to patient somnolence.    Patient underwent R hemicraniotomy on 2/18/18. Etiology of stroke not entirely clear, embolic in setting of PFO vs thrombotic, undergoing antiphospholipid antibody syndrome workup under direction of heme/onc. Hospital course complicated by anemia of unclear etiology without evidence of active bleed or hemolysis. On admission, as part of a trauma workup, CT CAP was completed that showed bilateral hilar adenopathy and bilateral patchy infiltrates.     Per family, patient has had asthma since childhood. Over the last 4 weeks, had complained of emesis, diarrhea, fever, cough, and congestion. Aunt saw patient last week and symptoms were improving.    Review of Systems:  Unable to complete due to patient's mental status.    Past Medical History:   Diagnosis Date     Anemia      Anxiety      Chronic diarrhea      Lactose intolerance      Myalgia      Pulmonary embolism (H) 05/06/16     Vitamin B12 deficiency        Past Surgical History:   Procedure Laterality Date     CLOSED REDUCTION, PERCUTANEOUS PINNING LOWER EXTREMITY, COMBINED Right 4/6/2016    Procedure: COMBINED CLOSED REDUCTION, PERCUTANEOUS PINNING LOWER EXTREMITY;  Surgeon: Dexter Jones MD;  Location: [facility]     CRANIECTOMY Right 2/18/2018    Procedure: CRANIECTOMY;  RIGHT HEMICRANIECTOMY;  Surgeon: Emeterio Mesa MD;  Location:  OR      Family History: Mother with cardiac sarcoidosis, uncle with Crohn's disease.    Social History: Per family, patient is an active smoker. Unsure amount of alcohol use. Previously worked as PCA and . Born in [place]. No travel outside of [place].         Current Medications & Allergies:       vancomycin (VANCOCIN) IV  1,500 mg Intravenous Q12H     cefTRIAXone  2 g Intravenous Q24H     multivitamins with minerals  15 mL Per Feeding Tube Daily     aspirin  81 mg Per G Tube Daily     folic acid  1 mg Per NG tube Daily     vitamin  B-1  100 mg Per NG tube Daily       Infusions/Drips:    sodium chloride 3%       IV fluid REPLACEMENT ONLY       sodium chloride 100 mL/hr at 02/21/18 0800       Allergies   Allergen Reactions     Amoxicillin Other (See Comments)     Headaches     Lactose      Levaquin [Levofloxacin] Swelling     Naproxen Other (See Comments)     Reaction: Headaches     Nickel      Tramadol      Sulindac Rash            Physical Exam:   Ranges for vital signs:  Temp:  [98.7  F (37.1  C)-99.1  F (37.3  C)] 98.7  F (37.1  C)  Heart Rate:  [60-86] 69  Resp:  [16] 16  BP: (101-135)/(69-96) 122/74  SpO2:  [99 %-100 %] 100 %  Vitals:    02/17/18 1433 02/20/18 0200 02/21/18 0600   Weight: 65.8 kg (145 lb 1 oz) 71.2 kg (156 lb 15.5 oz) 70.7 kg (155 lb 13.8 oz)       Physical Examination:  GENERAL:  Sitting up in bed, NAD  HEAD:  Head in soft helmet.  EYES:  Eyes closed  LUNGS:  Clear to auscultation bilaterally.   CARDIOVASCULAR:  Regular rate and rhythm   ABDOMEN:  Soft, nontender, nondistended  NEUROLOGIC:  Somnolent, does not arouse to voice (just received sedating medication)         Laboratory Data:     Metabolic Studies       Recent Labs   Lab Test  02/21/18   0958  02/21/18   0538  02/21/18   0330   02/20/18   0556   02/19/18   0948   02/17/18   1752   04/29/17   0618   03/19/17   1442   11/06/16   0046   NA  154*   --   158*   < >  158*   < >  164*   < >  142   < >  140   < >  145*   < >  145*   POTASSIUM   4.2  3.3*   --    < >  2.9*   --   3.5   < >  2.7*   < >  3.5   < >  3.0*   < >  3.2*   CHLORIDE   --    --    --    --   132*   --   135*   < >  106   < >  107   < >  111*   < >  111*   CO2   --    --    --    --   20   --   21   < >  22   < >  23   < >  24   < >  24   ANIONGAP   --    --    --    --   7   --   8   < >  15*   < >  10   < >  10   < >  10   BUN   --    --    --    --   11   --   10   < >  7   < >  6*   < >  4*   < >  5*   CR   --    --    --    --   0.52   --   0.59   < >  0.58   < >  0.47*   < >  0.61   < >  0.69   GFRESTIMATED   --    --    --    --   >90   --   >90   < >  >90   < >  >90  Non  GFR Calc     < >  >90  Non  GFR Calc     < >  >90  Non  GFR Calc     GLC   --    --    --    --   137*   --   123*   < >  107*   < >  91   < >  106*   < >  100*   A1C   --    --    --    --    --    --    --    --   5.8   --    --    --    --    --    --    AVINASH   --    --    --    --   7.6*   --   7.3*   < >  8.6   < >  7.5*   < >  8.3*   < >  7.7*   PHOS   --    --   3.7   < >   --    < >   --    < >   --    --    --    < >   --    --    --    MAG   --    --   1.9   < >   --    < >   --    < >   --    < >   --    < >  1.8   < >   --    LACT   --    --    --    --    --    --    --    --    --    --   0.7   --   1.3   --    --    CKT   --    --    --    --    --    --    --    --    --    --    --    --   96   --   85    < > = values in this interval not displayed.     Hematology Studies      Recent Labs   Lab Test  02/21/18   0330 02/20/18 2024 02/20/18   1143  02/20/18   0556  02/20/18   0150   02/19/18   1715   02/17/18   1107   WBC  15.9*  16.8*  17.9*  17.6*  18.3*   --   16.6*   < >  21.2*   ANEU   --    --   13.0*   --    --    --    --    --   17.7*   ALYM   --    --   1.9   --    --    --    --    --   1.8   JOHNNY   --    --   1.8*   --    --    --    --    --   1.4*   AEOS   --    --   0.0   --    --    --    --    --   0.0   HGB  8.7*  8.9*  9.5*   9.6*  9.6*   < >  5.7*   < >  14.4   HCT  26.9*  27.5*  29.5*  30.3*  30.1*   < >  18.7*   < >  42.4   PLT  265  274  286  286  307   --   411   < >  779*    < > = values in this interval not displayed.       Clotting Studies    Recent Labs   Lab Test  02/20/18   1143  02/17/18   1752  02/17/18   1107  05/08/16   0435   INR  1.20*  1.01  1.18  1.00   PTT  30  <20*  22*   --      Microbiology:  Last 6 Culture results with specimen source  Culture Micro   Date Value Ref Range Status   02/19/2018 Light growth  Normal beatriz    Preliminary   02/19/2018 (A)  Preliminary    Heavy growth  Staphylococcus aureus  Susceptibility testing in progress     02/19/2018 (A)  Preliminary    Heavy growth  Haemophilus influenzae  Beta lactamase negative  Beta-lactamase negative Haemophilus influenzae are usually susceptible to ampicillin,   amoxacillin/clavulanic acid, levofloxacin, and 3rd generation cephalosporins, such as   ceftriaxone.     02/19/2018 No growth after 2 days  Preliminary   02/19/2018 No growth after 2 days  Preliminary   02/19/2018 >100,000 colonies/mL  Escherichia coli   (A)  Final    Specimen Description   Date Value Ref Range Status   02/19/2018 Sputum Endotracheal  Final   02/19/2018 Sputum Endotracheal  Final   02/19/2018 Blood Right Hand  Final   02/19/2018 Blood Blue port  Final   02/19/2018 Catheterized Urine  Final   02/18/2018 Bone RIGHT SKULL FLAP  Final        Imaging:  Recent Results (from the past 48 hour(s))   CT Head w/o Contrast    Narrative    CT HEAD W/O CONTRAST 2/20/2018 4:58 AM    History: F/U Right MCA Territory Infarction; F/U Cerebral Edema;     Comparison: CT head dated 2/19/2018.    Technique: Using multidetector thin collimation helical acquisition  technique, axial, coronal and sagittal CT images from the skull base  to the vertex were obtained without intravenous contrast.     Findings:    Postoperative changes of right hemicraniectomy with overlying  subcutaneous emphysema in the skin  staples. Redemonstration of large  hypoattenuating area involving the right frontal, parietal and  temporal lobes. There is a slightly increased anterior horn effacement  of the left lateral ventricle. There is right to left subfalcine  herniation and medial deviation of the right uncus. No new infarction  or hemorrhagic transformation identified. The basal cisterns are  patent.    Continued opacification of the paranasal sinuses. The mastoid air  cells are clear.       Impression    Impression:   1.  Stable postoperative changes of right hemicraniectomy with  improved subcutaneous emphysema.  2.  2. Persistent mass effect with right to left subfalcine herniation  and medial deviation of the right uncus.    I have personally reviewed the examination and initial interpretation  and I agree with the findings.    EL SCHROEDER MD

## 2018-02-21 NOTE — PROGRESS NOTES
CLINICAL NUTRITION SERVICES - BRIEF NOTE    Nutrition Prescription    RECOMMENDATIONS FOR MDs/PROVIDERS TO ORDER:  - ADAT per SLP recommendations for safe PO  - Recommend ongoing EN via NGT until pt able to tolerate adequate PO as evidenced by 3-day calorie count data.     Recommendations already ordered by Registered Dietitian (RD):  - Continue EN as ordered   - Ordered oral nutrition supplements with meals   - Will order kaylyn counts as appropriate/once diet less restrictive     Future/Additional Recommendations:  - SLP and diet  - PO/supp adequacy vs ongoing EN (order kaylyn counts once appro)     Nutrition Progress Note - f/u for progress towards previous nutrition POC (see previous 2/19 reassessment for details)     Florinda Parmar, JOEY, LD, [facility]  Neuro ICU  Pager: [phone]

## 2018-02-21 NOTE — PLAN OF CARE
Problem: Patient Care Overview  Goal: Plan of Care/Patient Progress Review  Discharge Planner OT   Patient plan for discharge: Not discussed with pt this date.   Current status: Pt requires significant encouragement to participate in therapy session, tends to keep eyes closed and not interact with OT - when MD arrived, pt more interactive and answers orientation questions appropriately. Max A for B log rolling for placement of sling, then pt dependently transferred from bed to chair.  Upon sitting up in chair, pt incontinent of urine requiring additional lift for clean-up and placement of brief.  OT instructed pt in self-ROM of L UE using R hand at wrist, performs X10 reps of elbow flexion (no active movement noted in L UE/LE).  Pt applies lotion to L UE using R hand and min A as activity to increase tactile and proprioceptive feedback to brain for neuro re-ed. VSS on RA.   Barriers to return to prior living situation: L side hemiparesis, tolerance for activity, vision deficits, dependent for transfers and heavy assist for ADLs  Recommendations for discharge: Rehab  Rationale for recommendations: To maximize safety and IND with I/ADLs and functional mobility prior to return home.        Entered by: Geoff Gross 02/21/2018 8:51 AM

## 2018-02-21 NOTE — PROGRESS NOTES
Neuroscience Intensive Care Progress Note    2018    Maggie Case is a 27 year old year old female admitted on 2018 with RM1 occlusion with malignant MCA syndrome s/p decompressive hemicraniectomy POD#3.      Problem List  1. RM1 occlusion with malignant MCA syndrome s/p hemicraniectomy POD#3  2. History of PE  3. History of alcohol abuse  4. Pneumonia  5. RLL cavitation  6. Bilateral hilar lymphadenopathy     24 hour events: No acute events    24 Hour Vital Signs Summary:  Temperatures:  Current - Temp: 98.7  F (37.1  C); Max - Temp  Av.4  F (36.9  C)  Min: 97.4  F (36.3  C)  Max: 99.1  F (37.3  C)  Respiration range: Resp  Av  Min: 16  Max: 16  Pulse range: No Data Recorded  Blood pressure range: Systolic (24hrs), Av , Min:101 , Max:135   ; Diastolic (24hrs), Av, Min:69, Max:99    Pulse oximetry range: SpO2  Av.5 %  Min: 99 %  Max: 100 %    Ventilator Settings  Resp: 16    Intake/Output Summary (Last 24 hours) at 18 0731  Last data filed at 18 0700   Gross per 24 hour   Intake             3260 ml   Output             1057 ml   Net             2203 ml       Arterial Line BP: (126)/(102) 126/102  MAP:  [108 mmHg] 108 mmHg  BP - Mean:  [] 107    Current Medications:    cefTRIAXone  2 g Intravenous Q24H     sodium chloride         multivitamins with minerals  15 mL Per Feeding Tube Daily     aspirin  81 mg Per G Tube Daily     folic acid  1 mg Per NG tube Daily     vitamin  B-1  100 mg Per NG tube Daily       PRN Medications:  acetaminophen, oxyCODONE IR, hydrALAZINE, labetalol, IV fluid REPLACEMENT ONLY, naloxone, potassium chloride, potassium chloride, potassium chloride, potassium chloride with lidocaine, potassium chloride, magnesium sulfate, potassium phosphate (KPHOS) in D5W IV, potassium phosphate (KPHOS) in D5W IV, potassium phosphate (KPHOS) in D5W IV, potassium phosphate (KPHOS) in D5W IV, ondansetron    Infusions:    IV fluid REPLACEMENT ONLY        sodium chloride 100 mL/hr at 02/21/18 0337       Allergies   Allergen Reactions     Amoxicillin Other (See Comments)     Headaches     Lactose      Levaquin [Levofloxacin] Swelling     Naproxen Other (See Comments)     Reaction: Headaches     Nickel      Tramadol      Sulindac Rash     Physical Examination:  /87 (BP Location: Left arm)  Temp 98.7  F (37.1  C) (Axillary)  Resp 16  Wt 70.7 kg (155 lb 13.8 oz)  SpO2 100%  BMI 26.75 kg/m2  Gen: Sitting upright in chair and in NAD  HEENT: Right periorbital edema   Card:RRR  Resp:No respiratory distress.   Neuro: Awake, alert, oriented to person, place, and time. Following commands. No evidence of aphasia. PERRL, Right gaze preference however able to cross midline. Left facial droop. Dysarthria noted. Left hemiplegia, left javi neglect, left hemisensory loss.     Labs/Studies:  Recent Labs   Lab Test  02/21/18   0538  02/21/18   0330  02/20/18   2223  02/20/18 2024  02/20/18   1639  02/20/18   1143   02/20/18   0556   02/19/18   0948   02/19/18   0000   02/18/18   0545   NA   --   158*  157*   --   158*  158*   < >  158*   < >  164*   < >  158*   < >  154*   POTASSIUM  3.3*   --    --    --    --   3.9   --   2.9*   --   3.5   < >  3.2*   < >  3.9   CHLORIDE   --    --    --    --    --    --    --   132*   --   135*   --    --    --   123*   CO2   --    --    --    --    --    --    --   20   --   21   --    --    --   23   ANIONGAP   --    --    --    --    --    --    --   7   --   8   --    --    --   8   GLC   --    --    --    --    --    --    --   137*   --   123*   --   156*   < >  115*   BUN   --    --    --    --    --    --    --   11   --   10   --    --    --   9   CR   --    --    --    --    --    --    --   0.52   --   0.59   --    --    --   0.63   AVINASH   --    --    --    --    --    --    --   7.6*   --   7.3*   --    --    --   8.2*   WBC   --   15.9*   --   16.8*   --   17.9*   --   17.6*   < >   --    < >   --    --   12.0*   RBC   --    2.96*   --   3.00*   --   3.21*   --   3.28*   < >   --    < >   --    --   3.57*   HGB   --   8.7*   --   8.9*   --   9.5*   --   9.6*   < >   --    < >  8.5*   < >  10.4*   PLT   --   265   --   274   --   286   --   286   < >   --    < >   --    --   413    < > = values in this interval not displayed.     Recent Labs   Lab Test  02/20/18   1143  02/17/18   1752  02/17/18   1107   INR  1.20*  1.01  1.18   PTT  30  <20*  22*       Recent Labs  Lab 02/19/18  0000 02/18/18  2159 02/18/18  2133   PH 7.34* 7.37 7.41   PCO2 34* 33* 30*   PO2 194* 230* 216*   HCO3 18* 19* 19*   O2PER 40.0 OR 20 FIO2=60% OR 20  FIO2=60%     Imaging:    No new imaging today    Assessment/Plan  Maggie Case is a 27 year old h/o PE and alcohol abuse admitted 2/17/2018 with RM1 occlusion with malignant MCA syndrome s/p decompressive hemicraniectomy POD#3.      Plan  Neuro:  #RM1 occlusion with malignant MCA syndrome s/p decompressive hemicraniectomy POD#3  Likely Embolic given her history PE in the setting of PFO. Hypercoagulable workup pending.  Heme consulted, appreciate recs.  - Aspirin for stroke prophylaxis for now, will eventually need to be anticoagulated  - Hypertonic saline @10ml/hrGoal Na 155       Resp:  Protecting airway    #RLL cavitation  #Bilateral hilar lymphadenopathy: differential includes infectious vs inflammatory etiology  - Pulmonology consult      CV:  -SBP < 160       Renal:  Electrolyte replacement      #Hypernatremia: Iatrogenic. Goal >155      Endo: Goal euglycemia      Heme:      #Acute blood loss anemia: stable. Acute drop post surgery. Required 2 U overnight. 1 BM overnight that was not concerning for GI bleed. Unclear etiology at this time.   - Transfuse Hgb <7      GI:  #Dysphagia: TF at goal. Nectar thick liquid diet.      ID:  #Concern for UTI: leukocyte esterase with positive nitrites  - Continue ceftriaxone    #Pneuomia: Haemopholus influenza and staph aureus on sputum. Sensitivities pending.  - Continue  Ceftriaxone and vancomycin      PPX:    DVT prophylaxis: SCDs, lovenox daily    GI: not indicated  Code Status: Full  Lines: Left Subclavian 2/17, NG, PIV      Dispo: ICU level of care. Possibly transfer to the floor tomorrow.      Patient seen and discussed with Dr. Jose Luis Shepherd MD  Neurology PGY2
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
## 2018-02-22 ENCOUNTER — APPOINTMENT (OUTPATIENT)
Dept: OCCUPATIONAL THERAPY | Facility: CLINIC | Age: 28
End: 2018-02-22
Attending: PSYCHIATRY & NEUROLOGY
Payer: MEDICAID

## 2018-02-22 ENCOUNTER — APPOINTMENT (OUTPATIENT)
Dept: PHYSICAL THERAPY | Facility: CLINIC | Age: 28
End: 2018-02-22
Attending: PSYCHIATRY & NEUROLOGY
Payer: MEDICAID

## 2018-02-22 ENCOUNTER — APPOINTMENT (OUTPATIENT)
Dept: SPEECH THERAPY | Facility: CLINIC | Age: 28
End: 2018-02-22
Attending: PSYCHIATRY & NEUROLOGY
Payer: MEDICAID

## 2018-02-22 ENCOUNTER — APPOINTMENT (OUTPATIENT)
Dept: CT IMAGING | Facility: CLINIC | Age: 28
End: 2018-02-22
Attending: PSYCHIATRY & NEUROLOGY
Payer: MEDICAID

## 2018-02-22 LAB
ERYTHROCYTE [DISTWIDTH] IN BLOOD BY AUTOMATED COUNT: 17.8 % (ref 10–15)
HCT VFR BLD AUTO: 26.1 % (ref 35–47)
HGB BLD-MCNC: 8.5 G/DL (ref 11.7–15.7)
HIV 1+2 AB+HIV1 P24 AG SERPL QL IA: NONREACTIVE
INR PPP: 1.13 (ref 0.86–1.14)
LA PPP-IMP: NEGATIVE
MAGNESIUM SERPL-MCNC: 1.8 MG/DL (ref 1.6–2.3)
MCH RBC QN AUTO: 29.4 PG (ref 26.5–33)
MCHC RBC AUTO-ENTMCNC: 32.6 G/DL (ref 31.5–36.5)
MCV RBC AUTO: 90 FL (ref 78–100)
PHOSPHATE SERPL-MCNC: 3.5 MG/DL (ref 2.5–4.5)
PLATELET # BLD AUTO: 294 10E9/L (ref 150–450)
POTASSIUM SERPL-SCNC: 3.5 MMOL/L (ref 3.4–5.3)
RBC # BLD AUTO: 2.89 10E12/L (ref 3.8–5.2)
SODIUM SERPL-SCNC: 148 MMOL/L (ref 133–144)
SODIUM SERPL-SCNC: 150 MMOL/L (ref 133–144)
SODIUM SERPL-SCNC: 150 MMOL/L (ref 133–144)
SODIUM SERPL-SCNC: 153 MMOL/L (ref 133–144)
WBC # BLD AUTO: 15.9 10E9/L (ref 4–11)

## 2018-02-22 PROCEDURE — 40000193 ZZH STATISTIC PT WARD VISIT: Performed by: PHYSICAL THERAPIST

## 2018-02-22 PROCEDURE — 25000132 ZZH RX MED GY IP 250 OP 250 PS 637: Performed by: PSYCHIATRY & NEUROLOGY

## 2018-02-22 PROCEDURE — 40000225 ZZH STATISTIC SLP WARD VISIT: Performed by: SPEECH-LANGUAGE PATHOLOGIST

## 2018-02-22 PROCEDURE — 27210995 ZZH RX 272: Performed by: PSYCHIATRY & NEUROLOGY

## 2018-02-22 PROCEDURE — 84100 ASSAY OF PHOSPHORUS: CPT | Performed by: STUDENT IN AN ORGANIZED HEALTH CARE EDUCATION/TRAINING PROGRAM

## 2018-02-22 PROCEDURE — 25000128 H RX IP 250 OP 636: Performed by: PSYCHIATRY & NEUROLOGY

## 2018-02-22 PROCEDURE — 85610 PROTHROMBIN TIME: CPT | Performed by: STUDENT IN AN ORGANIZED HEALTH CARE EDUCATION/TRAINING PROGRAM

## 2018-02-22 PROCEDURE — 25000125 ZZHC RX 250: Performed by: STUDENT IN AN ORGANIZED HEALTH CARE EDUCATION/TRAINING PROGRAM

## 2018-02-22 PROCEDURE — 25000132 ZZH RX MED GY IP 250 OP 250 PS 637: Performed by: STUDENT IN AN ORGANIZED HEALTH CARE EDUCATION/TRAINING PROGRAM

## 2018-02-22 PROCEDURE — 97530 THERAPEUTIC ACTIVITIES: CPT | Mod: GP | Performed by: PHYSICAL THERAPIST

## 2018-02-22 PROCEDURE — 40000133 ZZH STATISTIC OT WARD VISIT

## 2018-02-22 PROCEDURE — 84295 ASSAY OF SERUM SODIUM: CPT | Performed by: STUDENT IN AN ORGANIZED HEALTH CARE EDUCATION/TRAINING PROGRAM

## 2018-02-22 PROCEDURE — 25000128 H RX IP 250 OP 636: Performed by: STUDENT IN AN ORGANIZED HEALTH CARE EDUCATION/TRAINING PROGRAM

## 2018-02-22 PROCEDURE — 27210437 ZZH NUTRITION PRODUCT SEMIELEM INTERMED LITER

## 2018-02-22 PROCEDURE — 85027 COMPLETE CBC AUTOMATED: CPT | Performed by: STUDENT IN AN ORGANIZED HEALTH CARE EDUCATION/TRAINING PROGRAM

## 2018-02-22 PROCEDURE — 83735 ASSAY OF MAGNESIUM: CPT | Performed by: STUDENT IN AN ORGANIZED HEALTH CARE EDUCATION/TRAINING PROGRAM

## 2018-02-22 PROCEDURE — 36592 COLLECT BLOOD FROM PICC: CPT | Performed by: STUDENT IN AN ORGANIZED HEALTH CARE EDUCATION/TRAINING PROGRAM

## 2018-02-22 PROCEDURE — 84132 ASSAY OF SERUM POTASSIUM: CPT | Performed by: STUDENT IN AN ORGANIZED HEALTH CARE EDUCATION/TRAINING PROGRAM

## 2018-02-22 PROCEDURE — 20000004 ZZH R&B ICU UMMC

## 2018-02-22 PROCEDURE — 97530 THERAPEUTIC ACTIVITIES: CPT | Mod: GO

## 2018-02-22 PROCEDURE — 97535 SELF CARE MNGMENT TRAINING: CPT | Mod: GO

## 2018-02-22 PROCEDURE — 70450 CT HEAD/BRAIN W/O DYE: CPT

## 2018-02-22 PROCEDURE — 92526 ORAL FUNCTION THERAPY: CPT | Mod: GN | Performed by: SPEECH-LANGUAGE PATHOLOGIST

## 2018-02-22 PROCEDURE — 87389 HIV-1 AG W/HIV-1&-2 AB AG IA: CPT | Performed by: STUDENT IN AN ORGANIZED HEALTH CARE EDUCATION/TRAINING PROGRAM

## 2018-02-22 RX ORDER — FLUOXETINE 20 MG/5ML
20 SOLUTION ORAL DAILY
Status: DISCONTINUED | OUTPATIENT
Start: 2018-02-22 | End: 2018-03-01 | Stop reason: HOSPADM

## 2018-02-22 RX ORDER — HYDRALAZINE HYDROCHLORIDE 20 MG/ML
10-20 INJECTION INTRAMUSCULAR; INTRAVENOUS
Status: DISCONTINUED | OUTPATIENT
Start: 2018-02-22 | End: 2018-03-01 | Stop reason: HOSPADM

## 2018-02-22 RX ORDER — LABETALOL HYDROCHLORIDE 5 MG/ML
5-10 INJECTION, SOLUTION INTRAVENOUS EVERY 10 MIN PRN
Status: DISCONTINUED | OUTPATIENT
Start: 2018-02-22 | End: 2018-03-01 | Stop reason: HOSPADM

## 2018-02-22 RX ADMIN — CEFTRIAXONE SODIUM 2 G: 10 INJECTION, POWDER, FOR SOLUTION INTRAVENOUS at 14:10

## 2018-02-22 RX ADMIN — ACETAMINOPHEN 650 MG: 325 TABLET, FILM COATED ORAL at 10:24

## 2018-02-22 RX ADMIN — SODIUM CHLORIDE: 9 INJECTION, SOLUTION INTRAVENOUS at 12:08

## 2018-02-22 RX ADMIN — SODIUM CHLORIDE: 9 INJECTION, SOLUTION INTRAVENOUS at 22:44

## 2018-02-22 RX ADMIN — FOLIC ACID 1 MG: 1 TABLET ORAL at 08:33

## 2018-02-22 RX ADMIN — OXYCODONE HYDROCHLORIDE 5 MG: 5 TABLET ORAL at 03:48

## 2018-02-22 RX ADMIN — FLUOXETINE HYDROCHLORIDE 20 MG: 20 LIQUID ORAL at 14:10

## 2018-02-22 RX ADMIN — ENOXAPARIN SODIUM 40 MG: 40 INJECTION SUBCUTANEOUS at 14:10

## 2018-02-22 RX ADMIN — ACETAMINOPHEN 650 MG: 325 TABLET, FILM COATED ORAL at 13:51

## 2018-02-22 RX ADMIN — ACETAMINOPHEN 650 MG: 325 TABLET, FILM COATED ORAL at 18:43

## 2018-02-22 RX ADMIN — OXYCODONE HYDROCHLORIDE 5 MG: 5 TABLET ORAL at 18:44

## 2018-02-22 RX ADMIN — SODIUM CHLORIDE: 3 INJECTION, SOLUTION INTRAVENOUS at 22:44

## 2018-02-22 RX ADMIN — VANCOMYCIN HYDROCHLORIDE 1500 MG: 10 INJECTION, POWDER, LYOPHILIZED, FOR SOLUTION INTRAVENOUS at 02:38

## 2018-02-22 RX ADMIN — OXYCODONE HYDROCHLORIDE 5 MG: 5 TABLET ORAL at 13:51

## 2018-02-22 RX ADMIN — ASPIRIN 81 MG CHEWABLE TABLET 81 MG: 81 TABLET CHEWABLE at 08:33

## 2018-02-22 RX ADMIN — OXYCODONE HYDROCHLORIDE 5 MG: 5 TABLET ORAL at 22:44

## 2018-02-22 RX ADMIN — MULTIVITAMIN 15 ML: LIQUID ORAL at 08:33

## 2018-02-22 RX ADMIN — Medication 100 MG: at 08:33

## 2018-02-22 RX ADMIN — OXYCODONE HYDROCHLORIDE 5 MG: 5 TABLET ORAL at 10:24

## 2018-02-22 RX ADMIN — ACETAMINOPHEN 650 MG: 325 TABLET, FILM COATED ORAL at 22:44

## 2018-02-22 ASSESSMENT — PAIN DESCRIPTION - DESCRIPTORS
DESCRIPTORS: ACHING;CONSTANT
DESCRIPTORS: ACHING;CONSTANT
DESCRIPTORS: ACHING
DESCRIPTORS: ACHING;CONSTANT
DESCRIPTORS: ACHING;CONSTANT
DESCRIPTORS: ACHING

## 2018-02-22 NOTE — PLAN OF CARE
Problem: Patient Care Overview  Goal: Plan of Care/Patient Progress Review  Outcome: Improving  Pt admitted with R MCA stroke with malignant MCA syndrome on 2/17/18 s/p R hemicraniotomy on 2/18. A+O x4. Has L sided hemiplegia. PERRL. Sating upper 90's on RA. SBP maintained <140. NSR. Tolerating TF well @ 40 cc/hr. Had small loose stool. Incontinent of bladder x2. Oxycodone and tylenol given for headache with optimal relief. R head incision is well approximated. 3% NS infusing @ 15 cc/hr. Last Na+ level 153. Plan: see flow sheet for detailed assessments and interventions, continue to support POC.

## 2018-02-22 NOTE — PLAN OF CARE
Problem: Patient Care Overview  Goal: Plan of Care/Patient Progress Review       Neuro: pt with intermittent lethargy but easily arouses to voice; oriented x3; calm and cooperative with nursing cares; R facial swelling subsided and  Able to open R eye; PERRL; deviated gaze to the right; L facial droop;  L side neglect/weakness but withdraws to pain. Head helmet on at all times when out of bed.  Prn Oxycodone and tylenol given for c/o headache.    CV: NSR; SBP<140; afebrile     Pulm: weak cough,  on RA and maintaining SPO2> 95%     GI/: rectal pouch with watery stool, pouch does leak at times; TF advanced  And at goal at 40ml/hr. 30ml Q 4 hr FWF; incontinent of urine.    Labs: replaced K+, recheck was 4.2; Na+ 153,  restarted on 3% NS  Gtt at 15ml     Skin: head incision intact with sutures and MACIE, no bleeding noted

## 2018-02-22 NOTE — PLAN OF CARE
Problem: Patient Care Overview  Goal: Plan of Care/Patient Progress Review  Discharge Planner SLP   Patient plan for discharge: Unknown  Current status: Recommend nectar thick full liquid diet via spoon only. NO straws. Pt requires 1:1 assist with PO intake. Pt to be seated upright, take small bites/sips, and pace self with PO intake. Pt at risk of aspiration due to weakness, limited insight into deficits, and incoordination of oral musculature.   Barriers to return to prior living situation: Limited deficit awareness, weakness, TF  Recommendations for discharge: TCU/ARU  Rationale for recommendations: Anticipate ongoing need for ST       Entered by: Rachel Rayo 02/22/2018 3:26 PM

## 2018-02-22 NOTE — PROGRESS NOTES
Neuroscience Intensive Care Progress Note    2018    Maggie Case is a 27 year old year old female admitted on 2018 with RM1 occlusion with malignant MCA syndrome s/p decompressive hemicraniectomy POD#4.      Problem List  1. RM1 occlusion with malignant MCA syndrome s/p hemicraniectomy POD#4  2. History of PE  3. History of alcohol abuse  4. Pneumonia  5. RLL cavitation  6. Bilateral hilar lymphadenopathy      24 hour events: No acute events    24 Hour Vital Signs Summary:  Temperatures:  Current - Temp: 99.6  F (37.6  C); Max - Temp  Av.1  F (37.3  C)  Min: 98.7  F (37.1  C)  Max: 99.6  F (37.6  C)  Respiration range: Resp  Avg: 15.5  Min: 14  Max: 16  Pulse range: No Data Recorded  Blood pressure range: Systolic (24hrs), Av , Min:101 , Max:132   ; Diastolic (24hrs), Av, Min:58, Max:86    Pulse oximetry range: SpO2  Av.4 %  Min: 98 %  Max: 100 %    Ventilator Settings  Resp: 16    Intake/Output Summary (Last 24 hours) at 18 0752  Last data filed at 18 0600   Gross per 24 hour   Intake             2895 ml   Output              300 ml   Net             2595 ml       BP - Mean:  [] 84    Current Medications:    FLUoxetine  20 mg Oral or Feeding Tube Daily     enoxaparin  40 mg Subcutaneous Q24H     cefTRIAXone  2 g Intravenous Q24H     multivitamins with minerals  15 mL Per Feeding Tube Daily     aspirin  81 mg Per G Tube Daily     folic acid  1 mg Per NG tube Daily     vitamin  B-1  100 mg Per NG tube Daily       PRN Medications:  acetaminophen, oxyCODONE IR, hydrALAZINE, labetalol, IV fluid REPLACEMENT ONLY, naloxone, potassium chloride, potassium chloride, potassium chloride, potassium chloride with lidocaine, potassium chloride, magnesium sulfate, potassium phosphate (KPHOS) in D5W IV, potassium phosphate (KPHOS) in D5W IV, potassium phosphate (KPHOS) in D5W IV, potassium phosphate (KPHOS) in D5W IV, ondansetron    Infusions:    sodium chloride 3% 20 mL/hr at  "02/22/18 1121     IV fluid REPLACEMENT ONLY       sodium chloride 100 mL/hr at 02/22/18 1208       Allergies   Allergen Reactions     Amoxicillin Other (See Comments)     Headaches     Lactose      Levaquin [Levofloxacin] Swelling     Naproxen Other (See Comments)     Reaction: Headaches     Nickel      Tramadol      Sulindac Rash     Physical Examination:  /62  Temp 98.7  F (37.1  C) (Oral)  Resp 16  Ht 1.626 m (5' 4\")  Wt 70.7 kg (155 lb 13.8 oz)  SpO2 99%  BMI 26.75 kg/m2     Gen: Sitting upright in chair and in NAD  HEENT: Right periorbital edema   Card:RRR  Resp:No respiratory distress.   Neuro: Awake, alert, oriented to person, place, and time. Following commands. No evidence of aphasia. PERRL, Right gaze preference however able to cross midline. Left facial droop. Dysarthria noted. Left hemiplegia, left javi neglect, left hemisensory loss.      Labs/Studies:  Recent Labs   Lab Test  02/22/18   0344  02/21/18   2113  02/21/18   1557  02/21/18   1443  02/21/18   0958  02/21/18   0538  02/21/18   0330   02/20/18   2024   02/20/18   1143   02/20/18   0556   02/19/18   0948   02/19/18   0000   02/18/18   0545   NA  153*  154*  153*   --   154*   --   158*   < >   --    < >  158*   < >  158*   < >  164*   < >  158*   < >  154*   POTASSIUM  3.5   --    --    --   4.2  3.3*   --    --    --    --   3.9   --   2.9*   --   3.5   < >  3.2*   < >  3.9   CHLORIDE   --    --    --    --    --    --    --    --    --    --    --    --   132*   --   135*   --    --    --   123*   CO2   --    --    --    --    --    --    --    --    --    --    --    --   20   --   21   --    --    --   23   ANIONGAP   --    --    --    --    --    --    --    --    --    --    --    --   7   --   8   --    --    --   8   GLC   --    --    --    --    --    --    --    --    --    --    --    --   137*   --   123*   --   156*   < >  115*   BUN   --    --    --    --    --    --    --    --    --    --    --    --   11   --   10   " --    --    --   9   CR   --    --    --   0.37*   --    --    --    --    --    --    --    --   0.52   --   0.59   --    --    --   0.63   AVINASH   --    --    --    --    --    --    --    --    --    --    --    --   7.6*   --   7.3*   --    --    --   8.2*   WBC   --    --    --    --    --    --   15.9*   --   16.8*   --   17.9*   --   17.6*   < >   --    < >   --    --   12.0*   RBC   --    --    --    --    --    --   2.96*   --   3.00*   --   3.21*   --   3.28*   < >   --    < >   --    --   3.57*   HGB   --    --    --    --    --    --   8.7*   --   8.9*   --   9.5*   --   9.6*   < >   --    < >  8.5*   < >  10.4*   PLT   --    --    --   263   --    --   265   --   274   --   286   --   286   < >   --    < >   --    --   413    < > = values in this interval not displayed.     Recent Labs   Lab Test  02/20/18   1143  02/17/18   1752  02/17/18   1107   INR  1.20*  Canceled, Test credited  1.18   PTT  30  Canceled, Test credited  22*       Recent Labs  Lab 02/19/18  0000 02/18/18  2159 02/18/18  2133   PH 7.34* 7.37 7.41   PCO2 34* 33* 30*   PO2 194* 230* 216*   HCO3 18* 19* 19*   O2PER 40.0 OR 20 FIO2=60% OR 20  FIO2=60%     Imaging:    CT head: 1.  Expected interval evolution of right LISSETTE and MCA distribution infarcts with worsening edema. Unchanged subfalcine herniation and medial deviation of the right uncus. 2.  Postoperative changes of right hemicraniectomy.    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 Hypercoagulable and Myeloproliferative (JAK2, CALR, MPL) workup pending.  Heme consulted, appreciate recs.  - Aspirin for stroke prophylaxis for now, will eventually need to be anticoagulated  - Staples to  remain in for 2 weeks. Planning for cranioplasty approximately 3/18. Follow up in clinic with Dr. Mesa 1 week prior to cranioplasty. Head CT prior to appointment.  - Hypertonic saline @20ml/hr. Goal Na 155.   - Fluoxetine 20mg daily  - PT/OT/SLP   - SHAKIRA and PHQ9 screen       Resp:  Protecting airway     #RLL cavitation  #Bilateral hilar lymphadenopathy: differential includes infectious vs inflammatory etiology  - Pulmonology consult, appreciate recs  - HIV, Histo and blasto ab pending  - Repeat CT chest in 4 weeks. Will assess at that time for BAL/lymph node biopsy.      CV:  -SBP < 200       Renal:  Electrolyte replacement      #Hypernatremia: Iatrogenic. Goal >155      Endo: Goal euglycemia      Heme:      #Acute blood loss anemia: stable. Acute drop post surgery. Required 2 U overnight. 1 BM overnight that was not concerning for GI bleed. Unclear etiology at this time.   - Transfuse Hgb <7      GI:  #Dysphagia: TF at goal. Nectar thick liquid diet.  - Plan for PEG 2/26      ID:  #Concern for UTI: leukocyte esterase with positive nitrites  - Continue ceftriaxone     #Pneuomia: Haemopholus influenza and MSSA.  - Continue Ceftriaxone for 7 days  - Discontinue vancomycin       PPX:    DVT prophylaxis: SCDs, lovenox daily    GI: not indicated  Code Status: Full  Lines: Left Subclavian 2/17, NG, PIV      Dispo: ICU level of care.      Patient seen and discussed with Dr. Jose Luis Shepherd MD  Neurology PGY2

## 2018-02-22 NOTE — PLAN OF CARE
Problem: Patient Care Overview  Goal: Plan of Care/Patient Progress Review  OT/4AB - Discharge Planner OT   Patient plan for discharge: not discussed this session  Current status: Despite extended time required to wake pt, pt eventually interacts with TH answering questions regarding family appropriately. Set up assist only for facial grooming while supine in bed. Facilitated LUE sponge bath to increase sensory input into affected side with VCs required. Pt endorses being able to feel washcloth on affected side, unable to squeeze TH hand with L hand. Mod A to facilitate bilateral rolling for sling placement. Pt demos ability to maintain side lying mod I with use of bed rail.  Pt tolerates dependent lift to chair well with AVSS working to increase OOB activity and promote pulmonary health. Pt on RA during session, spO2 98%, /62, HR 63 bpm.   Barriers to return to prior living situation: strength, activity tolerance, fatigue  Recommendations for discharge: inpatient rehab  Rationale for recommendations: To maximize safety and IND with I/ADLs and functional mobility prior to return home.        Entered by: Anen Valdez 02/22/2018 3:02 PM

## 2018-02-22 NOTE — CONSULTS
A collaboration between [facility] and [facility]  Experts in minimally invasive, targeted treatments performed using imaging guidance    Interventional Radiology Consult Service Note    Patient Name:  Maggie Case   YOB: 1990  Medical Record Number (MRN):  [id number]  Age:  27 year old female  Patient location / Unit:  [id number]    Requested IR consult:  Interventional Radiology Adult/Peds IP Consult: Patient to be seen: Routine within 24 hours; Call back #: [phone]; PEG tube placement. Planning for 2/26 if possible. [[id number]]   Authorizing Provider Encounter Provider   Reinier Shepherd MD - [id number]      Order Questions   Question Answer Comment   Patient to be seen Routine within 24 hours    Call back # [phone]    Reason for Consult PEG tube placement. Planning for 2/26 if possible.    Consultant may enter orders Yes    Is the patient on an anticoagulant ? No    Is the patient currently NPO ? No    Which provider care team? INTERVENTIONAL RADIOLOGY ([facility])      Indication / Associated Diagnosis:  Hx stroke; dysphagia    Complete Blood Count:  Lab Results   Component Value Date     02/22/2018       Coagulation:  Lab Results   Component Value Date    INR 1.13 02/22/2018       -----    Associated Imaging:  CT 2/17/18        -----    PLAN:     Request, patient data, and imaging reviewed.     Patient will be placed on the IR schedule Monday 2/26/18 for a Placement of a percutaneous gastrostomy tube.    Pre-procedure hematology and coagulation labs per IR protocols are WNL for procedure.    Pre-procedure for NPO status, skin site cleansing scrubs and Pre-op IV antibiotics have been entered.    Medications to be held include: Lovenox    Consent will be done prior to procedure.    You may contact the IR charge RN at [phone] for an estimated time of procedure for this inpatient.       [phone] (IR RN control  desk)  [phone] ([place] IR call pager)    Jorden Khan MPAS, PA-C  Physician Assistant - Certified  Interventional Radiology    -----    Patient Data:    Past Medical History:   Diagnosis Date     Anemia      Anxiety      Chronic diarrhea      Lactose intolerance      Myalgia      Pulmonary embolism (H) 05/06/16     Vitamin B12 deficiency          Patient Active Problem List    Diagnosis Date Noted     Acute ischemic stroke (H) 02/17/2018     Priority: Medium     Influenza B 05/01/2017     Priority: Medium     Opacity of lung on imaging study 05/01/2017     Priority: Medium     Community acquired pneumonia 05/01/2017     Priority: Medium     C. difficile colitis 05/01/2017     Priority: Medium     Sepsis (H) 04/28/2017     Priority: Medium     Pyelonephritis 01/05/2017     Priority: Medium     Acute chest pain 05/06/2016     Priority: Medium     Leukocytosis 05/06/2016     Priority: Medium     Lung mass 05/06/2016     Priority: Medium     SOB (shortness of breath) 05/06/2016     Priority: Medium     Acute upper GI bleed 06/18/2013     Priority: Medium     Hypokalemia 06/18/2013     Priority: Medium     Acute cystitis 06/18/2013     Priority: Medium     Anxiety state 03/25/2013     Priority: Medium     Muscle pain 07/30/2009     Priority: Medium     Overview:   IMO Update 10/11       Cervicalgia 06/16/2009     Priority: Medium     Overview:   IMO Update 10/11       Low back pain 06/16/2009     Priority: Medium     Overview:   IMO Update 10/11       Pain in joint, lower leg 05/01/2009     Priority: Medium     Diarrhea 04/17/2008     Priority: Medium     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     Priority: Medium         Prescription Medications as of 2/22/2018             DiphenhydrAMINE HCl (BENADRYL PO) Take by mouth At Bedtime    promethazine (PHENERGAN) 25 MG tablet Take 1 tablet (25 mg) by mouth every 6 hours as needed for nausea    LORazepam (ATIVAN) 1 MG  tablet Take 1 tablet (1 mg) by mouth every 8 hours as needed for anxiety    nicotine (NICODERM CQ) 14 MG/24HR 24 hr patch Place 1 patch onto the skin daily      Facility Administered Medications as of 2/22/2018             FLUoxetine (PROzac) solution 20 mg 5 mLs (20 mg) by Oral or Feeding Tube route daily    hydrALAZINE (APRESOLINE) injection 10-20 mg Inject 0.5-1 mLs (10-20 mg) into the vein every hour as needed for high blood pressure (SBP >200)    labetalol (NORMODYNE/TRANDATE) injection 5-10 mg Inject 1-2 mLs (5-10 mg) into the vein every 10 minutes as needed for high blood pressure (SBP goal < 200)    sodium chloride 3% injection Inject into the vein continuous    enoxaparin (LOVENOX) injection 40 mg Inject 0.4 mLs (40 mg) Subcutaneous every 24 hours    vancomycin (VANCOCIN) 1,500 mg in sodium chloride 0.9 % 250 mL intermittent infusion (Discontinued) Inject 1,500 mg into the vein every 12 hours    acetaminophen (TYLENOL) tablet 650 mg 2 tablets (650 mg) by Per NG tube route every 4 hours as needed for mild pain or fever    cefTRIAXone (ROCEPHIN) 2 g in 20 mL SWFI Premix Syringe Inject 2 g into the vein every 24 hours    oxyCODONE IR (ROXICODONE) tablet 5-10 mg Take 1-2 tablets (5-10 mg) by mouth every 4 hours as needed for moderate to severe pain    dextrose 10 % 1,000 mL infusion Inject into the vein continuous prn (Hypoglycemia prevention)    multivitamins with minerals (CERTAVITE/CEROVITE) liquid 15 mL 15 mLs by Per Feeding Tube route daily    hydrALAZINE (APRESOLINE) injection 10-20 mg (Discontinued) Inject 0.5-1 mLs (10-20 mg) into the vein every hour as needed for high blood pressure (SBP goal < 140)    labetalol (NORMODYNE/TRANDATE) injection 5-10 mg (Discontinued) Inject 1-2 mLs (5-10 mg) into the vein every 10 minutes as needed for high blood pressure (SBP goal < 140)    sodium chloride 0.9% infusion Inject into the vein continuous    naloxone (NARCAN) injection 0.1-0.4 mg Inject 0.25-1 mLs (0.1-0.4  mg) into the vein every 2 minutes as needed for opioid reversal    potassium chloride SA (K-DUR/KLOR-CON M) CR tablet 20-40 mEq Take 2-4 tablets (20-40 mEq) by mouth every 2 hours as needed for potassium supplementation    potassium chloride (KLOR-CON) Packet 20-40 mEq 20-40 mEq by Oral or Feeding Tube route every 2 hours as needed for potassium supplementation    potassium chloride 10 mEq in 100 mL sterile water intermittent infusion (premix) Inject 100 mLs (10 mEq) into the vein every hour as needed for potassium supplementation    potassium chloride 10 mEq in 100 mL intermittent infusion with 10 mg lidocaine Inject 100 mLs (10 mEq) into the vein every hour as needed for potassium supplementation    potassium chloride 20 mEq in 50 mL intermittent infusion Inject 50 mLs (20 mEq) into the vein every hour as needed for potassium supplementation    magnesium sulfate 4 g in 100 mL sterile water (premade) Inject 100 mLs (4 g) into the vein every 4 hours as needed for magnesium supplementation    potassium phosphate 15 mmol in sodium chloride 0.9 % 250 mL intermittent infusion Inject 15 mmol into the vein daily as needed for phosphorous supplementation    potassium phosphate 20 mmol in sodium chloride 0.9 % 500 mL intermittent infusion Inject 20 mmol into the vein every 6 hours as needed for phosphorous supplementation    potassium phosphate 20 mmol in sodium chloride 0.9 % 250 mL intermittent infusion Inject 20 mmol into the vein every 6 hours as needed for phosphorous supplementation    potassium phosphate 25 mmol in sodium chloride 0.9 % 500 mL intermittent infusion Inject 25 mmol into the vein every 8 hours as needed for phosphorous supplementation    aspirin chewable tablet 81 mg 1 tablet (81 mg) by Per G Tube route daily    ondansetron (ZOFRAN) injection 4 mg Inject 2 mLs (4 mg) into the vein every 6 hours as needed for nausea or vomiting    folic acid (FOLVITE) tablet 1 mg 1 tablet (1 mg) by Per NG tube route  "daily    thiamine tablet 100 mg 1 tablet (100 mg) by Per NG tube route daily            Allergies   Allergen Reactions     Amoxicillin Other (See Comments)     Headaches     Lactose      Levaquin [Levofloxacin] Swelling     Naproxen Other (See Comments)     Reaction: Headaches     Nickel      Tramadol      Sulindac Rash         Complete Blood Count:  Lab Results   Component Value Date     02/22/2018       Coagulation:  Lab Results   Component Value Date    INR 1.13 02/22/2018       Vital Signs:  /75  Temp 98.7  F (37.1  C) (Oral)  Resp 16  Ht 1.626 m (5' 4\")  Wt 70.7 kg (155 lb 13.8 oz)  SpO2 98%  BMI 26.75 kg/m2    "

## 2018-02-22 NOTE — PROGRESS NOTES
Drain removed. No further surgical needs. Can shower and get wound wet POD#3. No scrubbing incision when washing or drying. Staples to remain in for 2 weeks. Please contact Neurosurgery if she is still here for removal, otherwise rehab can remove the staples.    Planning for cranioplasty approximately 3/18. The patient to follow up in clinic with Dr. Mesa 1 week prior to cranioplasty. She will require a head CT prior to her clinic appointment.    Please notify Neurosurgery of any changes in the patient's neuro exam. Feel free to contact the service with any questions or concerns.    Neurosurgery signing off.      Contact the neurosurgery resident on call with questions.    Dial * * [phone]: Enter job code [email or web address] when prompted.    Guilherme Hall MD  Neurosurgery PGY2

## 2018-02-23 ENCOUNTER — APPOINTMENT (OUTPATIENT)
Dept: SPEECH THERAPY | Facility: CLINIC | Age: 28
End: 2018-02-23
Attending: PSYCHIATRY & NEUROLOGY
Payer: MEDICAID

## 2018-02-23 LAB
ABO + RH BLD: NORMAL
ABO + RH BLD: NORMAL
BACTERIA SPEC CULT: NO GROWTH
BACTERIA SPEC CULT: NO GROWTH
BACTERIA SPEC CULT: NORMAL
BLD GP AB SCN SERPL QL: NORMAL
BLOOD BANK CMNT PATIENT-IMP: NORMAL
GRAM STN SPEC: NORMAL
PROT C ACT/NOR PPP CHRO: 85 % (ref 70–170)
PROT S FREE AG ACT/NOR PPP IA: 89 % (ref 55–125)
SODIUM SERPL-SCNC: 146 MMOL/L (ref 133–144)
SODIUM SERPL-SCNC: 149 MMOL/L (ref 133–144)
SODIUM SERPL-SCNC: 150 MMOL/L (ref 133–144)
SPECIMEN EXP DATE BLD: NORMAL
SPECIMEN SOURCE: NORMAL

## 2018-02-23 PROCEDURE — 25000128 H RX IP 250 OP 636: Performed by: PSYCHIATRY & NEUROLOGY

## 2018-02-23 PROCEDURE — 25000128 H RX IP 250 OP 636: Performed by: STUDENT IN AN ORGANIZED HEALTH CARE EDUCATION/TRAINING PROGRAM

## 2018-02-23 PROCEDURE — 25000132 ZZH RX MED GY IP 250 OP 250 PS 637: Performed by: STUDENT IN AN ORGANIZED HEALTH CARE EDUCATION/TRAINING PROGRAM

## 2018-02-23 PROCEDURE — 81219 CALR GENE COM VARIANTS: CPT | Performed by: STUDENT IN AN ORGANIZED HEALTH CARE EDUCATION/TRAINING PROGRAM

## 2018-02-23 PROCEDURE — 40000225 ZZH STATISTIC SLP WARD VISIT: Performed by: SPEECH-LANGUAGE PATHOLOGIST

## 2018-02-23 PROCEDURE — 86850 RBC ANTIBODY SCREEN: CPT | Performed by: PSYCHIATRY & NEUROLOGY

## 2018-02-23 PROCEDURE — 87385 HISTOPLASMA CAPSUL AG IA: CPT | Performed by: STUDENT IN AN ORGANIZED HEALTH CARE EDUCATION/TRAINING PROGRAM

## 2018-02-23 PROCEDURE — 27210995 ZZH RX 272: Performed by: PSYCHIATRY & NEUROLOGY

## 2018-02-23 PROCEDURE — 86850 RBC ANTIBODY SCREEN: CPT | Performed by: STUDENT IN AN ORGANIZED HEALTH CARE EDUCATION/TRAINING PROGRAM

## 2018-02-23 PROCEDURE — 20000004 ZZH R&B ICU UMMC

## 2018-02-23 PROCEDURE — 40000141 ZZH STATISTIC PERIPHERAL IV START W/O US GUIDANCE

## 2018-02-23 PROCEDURE — 86901 BLOOD TYPING SEROLOGIC RH(D): CPT | Performed by: PSYCHIATRY & NEUROLOGY

## 2018-02-23 PROCEDURE — 81403 MOPATH PROCEDURE LEVEL 4: CPT | Performed by: STUDENT IN AN ORGANIZED HEALTH CARE EDUCATION/TRAINING PROGRAM

## 2018-02-23 PROCEDURE — 87449 NOS EACH ORGANISM AG IA: CPT | Performed by: STUDENT IN AN ORGANIZED HEALTH CARE EDUCATION/TRAINING PROGRAM

## 2018-02-23 PROCEDURE — 84295 ASSAY OF SERUM SODIUM: CPT | Performed by: STUDENT IN AN ORGANIZED HEALTH CARE EDUCATION/TRAINING PROGRAM

## 2018-02-23 PROCEDURE — 92526 ORAL FUNCTION THERAPY: CPT | Mod: GN | Performed by: SPEECH-LANGUAGE PATHOLOGIST

## 2018-02-23 PROCEDURE — 86900 BLOOD TYPING SEROLOGIC ABO: CPT | Performed by: STUDENT IN AN ORGANIZED HEALTH CARE EDUCATION/TRAINING PROGRAM

## 2018-02-23 PROCEDURE — 86901 BLOOD TYPING SEROLOGIC RH(D): CPT | Performed by: STUDENT IN AN ORGANIZED HEALTH CARE EDUCATION/TRAINING PROGRAM

## 2018-02-23 PROCEDURE — 25000132 ZZH RX MED GY IP 250 OP 250 PS 637: Performed by: PSYCHIATRY & NEUROLOGY

## 2018-02-23 PROCEDURE — 86900 BLOOD TYPING SEROLOGIC ABO: CPT | Performed by: PSYCHIATRY & NEUROLOGY

## 2018-02-23 RX ORDER — LIDOCAINE 40 MG/G
CREAM TOPICAL
Status: DISCONTINUED | OUTPATIENT
Start: 2018-02-23 | End: 2018-03-01 | Stop reason: HOSPADM

## 2018-02-23 RX ADMIN — FLUOXETINE HYDROCHLORIDE 20 MG: 20 LIQUID ORAL at 08:40

## 2018-02-23 RX ADMIN — ACETAMINOPHEN 650 MG: 325 TABLET, FILM COATED ORAL at 08:34

## 2018-02-23 RX ADMIN — OXYCODONE HYDROCHLORIDE 5 MG: 5 TABLET ORAL at 20:37

## 2018-02-23 RX ADMIN — ASPIRIN 81 MG CHEWABLE TABLET 81 MG: 81 TABLET CHEWABLE at 08:34

## 2018-02-23 RX ADMIN — OXYCODONE HYDROCHLORIDE 5 MG: 5 TABLET ORAL at 10:07

## 2018-02-23 RX ADMIN — ACETAMINOPHEN 650 MG: 325 TABLET, FILM COATED ORAL at 23:37

## 2018-02-23 RX ADMIN — ENOXAPARIN SODIUM 40 MG: 40 INJECTION SUBCUTANEOUS at 13:47

## 2018-02-23 RX ADMIN — FOLIC ACID 1 MG: 1 TABLET ORAL at 08:34

## 2018-02-23 RX ADMIN — SODIUM CHLORIDE: 9 INJECTION, SOLUTION INTRAVENOUS at 09:00

## 2018-02-23 RX ADMIN — ACETAMINOPHEN 650 MG: 325 TABLET, FILM COATED ORAL at 02:07

## 2018-02-23 RX ADMIN — CEFTRIAXONE SODIUM 2 G: 10 INJECTION, POWDER, FOR SOLUTION INTRAVENOUS at 13:52

## 2018-02-23 RX ADMIN — ACETAMINOPHEN 650 MG: 325 TABLET, FILM COATED ORAL at 19:48

## 2018-02-23 RX ADMIN — OXYCODONE HYDROCHLORIDE 5 MG: 5 TABLET ORAL at 12:57

## 2018-02-23 RX ADMIN — OXYCODONE HYDROCHLORIDE 5 MG: 5 TABLET ORAL at 16:27

## 2018-02-23 RX ADMIN — OXYCODONE HYDROCHLORIDE 5 MG: 5 TABLET ORAL at 02:07

## 2018-02-23 RX ADMIN — Medication 100 MG: at 08:34

## 2018-02-23 RX ADMIN — MULTIVITAMIN 15 ML: LIQUID ORAL at 08:33

## 2018-02-23 RX ADMIN — OXYCODONE HYDROCHLORIDE 5 MG: 5 TABLET ORAL at 17:35

## 2018-02-23 RX ADMIN — ACETAMINOPHEN 650 MG: 325 TABLET, FILM COATED ORAL at 13:57

## 2018-02-23 RX ADMIN — OXYCODONE HYDROCHLORIDE 5 MG: 5 TABLET ORAL at 05:50

## 2018-02-23 ASSESSMENT — PAIN DESCRIPTION - DESCRIPTORS
DESCRIPTORS: ACHING

## 2018-02-23 NOTE — PROGRESS NOTES
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      Problem List  1. 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 History of PE  3. History of alcohol abuse  4. Pneumonia  5. RLL cavitation  6. Bilateral hilar lymphadenopathy      24 hour events: No acute events    24 Hour Vital Signs Summary:  Temperatures:  Current - Temp: 98.9  F (37.2  C); Max - Temp  Av.8  F (37.1  C)  Min: 98.6  F (37  C)  Max: 98.9  F (37.2  C)  Respiration range: Resp  Av.4  Min: 16  Max: 20  Pulse range: No Data Recorded  Blood pressure range: Systolic (24hrs), Av , Min:105 , Max:126   ; Diastolic (24hrs), Av, Min:62, Max:88    Pulse oximetry range: SpO2  Av.3 %  Min: 97 %  Max: 99 %    Ventilator Settings  Resp: 18    Intake/Output Summary (Last 24 hours) at 18 0807  Last data filed at 18 0700   Gross per 24 hour   Intake          4109.08 ml   Output                0 ml   Net          4109.08 ml       BP - Mean:  [] 94    Current Medications:    FLUoxetine  20 mg Oral or Feeding Tube Daily     enoxaparin  40 mg Subcutaneous Q24H     cefTRIAXone  2 g Intravenous Q24H     multivitamins with minerals  15 mL Per Feeding Tube Daily     aspirin  81 mg Per G Tube Daily     folic acid  1 mg Per NG tube Daily     vitamin  B-1  100 mg Per NG tube Daily       PRN Medications:  hydrALAZINE, labetalol, acetaminophen, oxyCODONE IR, IV fluid REPLACEMENT ONLY, naloxone, potassium chloride, potassium chloride, potassium chloride, potassium chloride with lidocaine, potassium chloride, magnesium sulfate, potassium phosphate (KPHOS) in D5W IV, potassium phosphate (KPHOS) in D5W IV, potassium phosphate (KPHOS) in D5W IV, potassium phosphate (KPHOS) in D5W IV, ondansetron    Infusions:    sodium chloride 3% 30 mL/hr at  "02/22/18 2244     IV fluid REPLACEMENT ONLY       sodium chloride 100 mL/hr at 02/22/18 2244       Allergies   Allergen Reactions     Amoxicillin Other (See Comments)     Headaches     Lactose      Levaquin [Levofloxacin] Swelling     Naproxen Other (See Comments)     Reaction: Headaches     Nickel      Tramadol      Sulindac Rash     Physical Examination:  /75  Temp 98.9  F (37.2  C) (Axillary)  Resp 18  Ht 1.626 m (5' 4\")  Wt 73.2 kg (161 lb 6 oz)  SpO2 97%  BMI 27.7 kg/m2  Gen: Sitting upright in chair and in NAD  HEENT: Right periorbital edema   Card:RRR  Resp:No respiratory distress.   Neuro: Awake, alert, oriented to person, place, and time. Following commands. No evidence of aphasia. PERRL, Right gaze preference however able to cross midline. Left facial droop. Dysarthria noted. Left hemiplegia, left javi neglect, left hemisensory loss.     Labs/Studies:  Recent Labs   Lab Test  02/23/18   0530  02/22/18   2300  02/22/18   1623  02/22/18   1027  02/22/18   0344   02/21/18   1443  02/21/18   0958  02/21/18   0538  02/21/18   0330   02/20/18   2024   02/20/18   0556   02/19/18   0948   02/19/18   0000   02/18/18   0545   NA  150*  150*  148*  150*  153*   < >   --   154*   --   158*   < >   --    < >  158*   < >  164*   < >  158*   < >  154*   POTASSIUM   --    --    --    --   3.5   --    --   4.2  3.3*   --    --    --    < >  2.9*   --   3.5   < >  3.2*   < >  3.9   CHLORIDE   --    --    --    --    --    --    --    --    --    --    --    --    --   132*   --   135*   --    --    --   123*   CO2   --    --    --    --    --    --    --    --    --    --    --    --    --   20   --   21   --    --    --   23   ANIONGAP   --    --    --    --    --    --    --    --    --    --    --    --    --   7   --   8   --    --    --   8   GLC   --    --    --    --    --    --    --    --    --    --    --    --    --   137*   --   123*   --   156*   < >  115*   BUN   --    --    --    --    --    --    " --    --    --    --    --    --    --   11   --   10   --    --    --   9   CR   --    --    --    --    --    --   0.37*   --    --    --    --    --    --   0.52   --   0.59   --    --    --   0.63   AVINASH   --    --    --    --    --    --    --    --    --    --    --    --    --   7.6*   --   7.3*   --    --    --   8.2*   WBC   --    --    --   15.9*   --    --    --    --    --   15.9*   --   16.8*   < >  17.6*   < >   --    < >   --    --   12.0*   RBC   --    --    --   2.89*   --    --    --    --    --   2.96*   --   3.00*   < >  3.28*   < >   --    < >   --    --   3.57*   HGB   --    --    --   8.5*   --    --    --    --    --   8.7*   --   8.9*   < >  9.6*   < >   --    < >  8.5*   < >  10.4*   PLT   --    --    --   294   --    --   263   --    --   265   --   274   < >  286   < >   --    < >   --    --   413    < > = values in this interval not displayed.     Recent Labs   Lab Test  02/22/18   1027  02/20/18   1143  02/17/18   1752  02/17/18   1107   INR  1.13  1.20*  Canceled, Test credited  1.18   PTT   --   30  Canceled, Test credited  22*       Recent Labs  Lab 02/19/18  0000 02/18/18  2159 02/18/18  2133   PH 7.34* 7.37 7.41   PCO2 34* 33* 30*   PO2 194* 230* 216*   HCO3 18* 19* 19*   O2PER 40.0 OR 20 FIO2=60% OR 20  FIO2=60%     Imaging: No new imaging today    Assessment/Plan  Maggie Case is a 27 year old h/o PE and alcohol abuse admitted 2/17/2018 with RM1 occlusion with malignant MCA syndrome s/p decompressive hemicraniectomy POD#5.      Plan  Neuro:  #RM1 occlusion with malignant MCA syndrome s/p decompressive hemicraniectomy POD#5  Likely Embolic given her history PE in the setting of PFO. Hypercoagulable and Myeloproliferative (JAK2, CALR, MPL) workup pending.  Heme consulted, appreciate recs.  - Aspirin for stroke prophylaxis for now, will eventually need to be anticoagulated  - Staples to remain in for 2 weeks. Planning for cranioplasty approximately 3/18. Follow up in clinic with  Dr. Mesa 1 week prior to cranioplasty. Head CT prior to appointment.  - Stop hypertonic saline  - Fluoxetine 20mg daily  - PT/OT/SLP   - SHAKIRA and PHQ9 screen      Resp:  Protecting airway      #RLL cavitation  #Bilateral hilar lymphadenopathy: differential includes infectious vs inflammatory etiology  - Pulmonology consult, appreciate recs  - HIV, Histo and blasto ab pending  - Repeat CT chest in 4 weeks. Will assess at that time for BAL/lymph node biopsy.      CV:  -SBP < 200       Renal:  Electrolyte replacement      #Hypernatremia: Iatrogenic from hypertonic saline      Endo: Goal euglycemia      Heme:      #Acute blood loss anemia: stable. Acute drop post surgery. Required 2 U overnight. 1 BM overnight that was not concerning for GI bleed. Unclear etiology at this time.   - Transfuse Hgb <7      GI:  #Dysphagia: TF at goal. Nectar thick liquid diet.  - Plan for PEG 2/26      ID:  #UTI: culture positive for E coli. Sensitive to ceftriaxone  - Continue ceftriaxone      #Pneuomia: Haemopholus influenza and MSSA.  - Continue Ceftriaxone for 7 days      PPX:    DVT prophylaxis: SCDs, lovenox daily    GI: not indicated  Code Status: Full  Lines: Left Subclavian 2/17, NG, PIV      Dispo: Transfer to       Patient seen and discussed with Dr. Jose Luis Shepherd MD  Neurology PGY2

## 2018-02-23 NOTE — PLAN OF CARE
Problem: Patient Care Overview  Goal: Plan of Care/Patient Progress Review  OT 4A Cx Pt declined 2/2 fatigue, was up in chair a long time.

## 2018-02-23 NOTE — PLAN OF CARE
Problem: Patient Care Overview  Goal: Plan of Care/Patient Progress Review  Discharge Planner SLP   Patient plan for discharge: Unknown  Current status: Recommend cautiously continue nectar thick full liquid diet by spoon only when patient is awake. NO STRAWS. Pt needs 1:1 supervision/assist with PO intake. Pt to be seated upright, take small bites/sips, and pace self. Pt at high risk of aspiration due to AUGUSTINA, limited deficit awareness, and weakness.   Barriers to return to prior living situation: Weakness, deficit awareness, AUGUSTINA, TF  Recommendations for discharge: Inpatient rehab  Rationale for recommendations: Anticipate ongoing need for ST       Entered by: Rachel Rayo 02/23/2018 12:13 PM

## 2018-02-23 NOTE — PLAN OF CARE
Problem: Patient Care Overview  Goal: Plan of Care/Patient Progress Review  Outcome: Improving  Patient is hemodynamically stable with no acute changes in neuro status. Patient was given oxycodone and acetaminophen for right sided head pain at incision sites which was effective. Patient was able to spoon feed herself some nectar thickened liquids and did well. Sleeping between cares. Continue to monitor and treat per plan of care.

## 2018-02-23 NOTE — PLAN OF CARE
Problem: Patient Care Overview  Goal: Plan of Care/Patient Progress Review  Outcome: No Change  D: Right MCA stroke from fall with right hemicraniotomy, pneumonia, concern for UTI  A/I:   Neuro: A&O x4. Lethargic. No movement, sensation LLE, some tingling in LUE. Left sided visual neglect. Garbled speech at times. PRN oxycodone and tylenol for headache, minimal relief of pain per pt. Report.   Cardiac: Goal of systolic <140. -120s/70s. HR trending in the 60s.  Resp: coughing. C/o sore throat.98% on RA.  GI: fecal incontinence. watery diarrhea 1x. NG tube feeding at 40 (goal), tolerating. Nectar thick liquids.   : urinary incontinence. Urgency.  Skin: Bruising. Incision on right side head.   LDAs: left CVC triple lumen.  mL/hr.  P: move to 6A when bed becomes available. Cranioplasty scheduled for 3/18.

## 2018-02-23 NOTE — PLAN OF CARE
Problem: Patient Care Overview  Goal: Plan of Care/Patient Progress Review  PT / 4AB - Cancel. Pt returning to bed from chair on PT arrival, schedule did not allow for check back.  Re-scheduled per POC.

## 2018-02-24 ENCOUNTER — APPOINTMENT (OUTPATIENT)
Dept: OCCUPATIONAL THERAPY | Facility: CLINIC | Age: 28
End: 2018-02-24
Attending: PSYCHIATRY & NEUROLOGY
Payer: MEDICAID

## 2018-02-24 ENCOUNTER — APPOINTMENT (OUTPATIENT)
Dept: SPEECH THERAPY | Facility: CLINIC | Age: 28
End: 2018-02-24
Attending: PSYCHIATRY & NEUROLOGY
Payer: MEDICAID

## 2018-02-24 ENCOUNTER — APPOINTMENT (OUTPATIENT)
Dept: PHYSICAL THERAPY | Facility: CLINIC | Age: 28
End: 2018-02-24
Attending: PSYCHIATRY & NEUROLOGY
Payer: MEDICAID

## 2018-02-24 LAB
CREAT SERPL-MCNC: 0.35 MG/DL (ref 0.52–1.04)
ERYTHROCYTE [DISTWIDTH] IN BLOOD BY AUTOMATED COUNT: 17.3 % (ref 10–15)
GFR SERPL CREATININE-BSD FRML MDRD: >90 ML/MIN/1.7M2
GLUCOSE BLDC GLUCOMTR-MCNC: 97 MG/DL (ref 70–99)
GLUCOSE BLDC GLUCOMTR-MCNC: 98 MG/DL (ref 70–99)
HCT VFR BLD AUTO: 27.6 % (ref 35–47)
HGB BLD-MCNC: 8.7 G/DL (ref 11.7–15.7)
MCH RBC QN AUTO: 29.8 PG (ref 26.5–33)
MCHC RBC AUTO-ENTMCNC: 31.5 G/DL (ref 31.5–36.5)
MCV RBC AUTO: 95 FL (ref 78–100)
PLATELET # BLD AUTO: 332 10E9/L (ref 150–450)
PLATELET # BLD AUTO: 340 10E9/L (ref 150–450)
RBC # BLD AUTO: 2.92 10E12/L (ref 3.8–5.2)
SODIUM SERPL-SCNC: 144 MMOL/L (ref 133–144)
WBC # BLD AUTO: 18.6 10E9/L (ref 4–11)

## 2018-02-24 PROCEDURE — 97110 THERAPEUTIC EXERCISES: CPT | Mod: GO | Performed by: OCCUPATIONAL THERAPIST

## 2018-02-24 PROCEDURE — 40000193 ZZH STATISTIC PT WARD VISIT

## 2018-02-24 PROCEDURE — 40000225 ZZH STATISTIC SLP WARD VISIT

## 2018-02-24 PROCEDURE — 27210437 ZZH NUTRITION PRODUCT SEMIELEM INTERMED LITER

## 2018-02-24 PROCEDURE — 85027 COMPLETE CBC AUTOMATED: CPT | Performed by: STUDENT IN AN ORGANIZED HEALTH CARE EDUCATION/TRAINING PROGRAM

## 2018-02-24 PROCEDURE — 25000132 ZZH RX MED GY IP 250 OP 250 PS 637: Performed by: STUDENT IN AN ORGANIZED HEALTH CARE EDUCATION/TRAINING PROGRAM

## 2018-02-24 PROCEDURE — 27210995 ZZH RX 272: Performed by: PSYCHIATRY & NEUROLOGY

## 2018-02-24 PROCEDURE — 25000128 H RX IP 250 OP 636: Performed by: PSYCHIATRY & NEUROLOGY

## 2018-02-24 PROCEDURE — 82565 ASSAY OF CREATININE: CPT | Performed by: STUDENT IN AN ORGANIZED HEALTH CARE EDUCATION/TRAINING PROGRAM

## 2018-02-24 PROCEDURE — 40000133 ZZH STATISTIC OT WARD VISIT: Performed by: OCCUPATIONAL THERAPIST

## 2018-02-24 PROCEDURE — 92526 ORAL FUNCTION THERAPY: CPT | Mod: GN

## 2018-02-24 PROCEDURE — 97530 THERAPEUTIC ACTIVITIES: CPT | Mod: GP

## 2018-02-24 PROCEDURE — 97112 NEUROMUSCULAR REEDUCATION: CPT | Mod: GP

## 2018-02-24 PROCEDURE — 85049 AUTOMATED PLATELET COUNT: CPT | Performed by: STUDENT IN AN ORGANIZED HEALTH CARE EDUCATION/TRAINING PROGRAM

## 2018-02-24 PROCEDURE — 36415 COLL VENOUS BLD VENIPUNCTURE: CPT | Performed by: STUDENT IN AN ORGANIZED HEALTH CARE EDUCATION/TRAINING PROGRAM

## 2018-02-24 PROCEDURE — 97530 THERAPEUTIC ACTIVITIES: CPT | Mod: GO | Performed by: OCCUPATIONAL THERAPIST

## 2018-02-24 PROCEDURE — 25000128 H RX IP 250 OP 636: Performed by: STUDENT IN AN ORGANIZED HEALTH CARE EDUCATION/TRAINING PROGRAM

## 2018-02-24 PROCEDURE — 25000132 ZZH RX MED GY IP 250 OP 250 PS 637: Performed by: PSYCHIATRY & NEUROLOGY

## 2018-02-24 PROCEDURE — 00000146 ZZHCL STATISTIC GLUCOSE BY METER IP

## 2018-02-24 PROCEDURE — 84295 ASSAY OF SERUM SODIUM: CPT | Performed by: STUDENT IN AN ORGANIZED HEALTH CARE EDUCATION/TRAINING PROGRAM

## 2018-02-24 PROCEDURE — 12000008 ZZH R&B INTERMEDIATE UMMC

## 2018-02-24 RX ADMIN — FLUOXETINE HYDROCHLORIDE 20 MG: 20 LIQUID ORAL at 09:30

## 2018-02-24 RX ADMIN — OXYCODONE HYDROCHLORIDE 5 MG: 5 TABLET ORAL at 23:12

## 2018-02-24 RX ADMIN — ACETAMINOPHEN 650 MG: 325 TABLET, FILM COATED ORAL at 12:42

## 2018-02-24 RX ADMIN — OXYCODONE HYDROCHLORIDE 5 MG: 5 TABLET ORAL at 02:11

## 2018-02-24 RX ADMIN — ENOXAPARIN SODIUM 40 MG: 40 INJECTION SUBCUTANEOUS at 14:59

## 2018-02-24 RX ADMIN — ASPIRIN 81 MG CHEWABLE TABLET 81 MG: 81 TABLET CHEWABLE at 09:29

## 2018-02-24 RX ADMIN — Medication 100 MG: at 09:30

## 2018-02-24 RX ADMIN — OXYCODONE HYDROCHLORIDE 10 MG: 5 TABLET ORAL at 09:33

## 2018-02-24 RX ADMIN — ACETAMINOPHEN 650 MG: 325 TABLET, FILM COATED ORAL at 09:30

## 2018-02-24 RX ADMIN — MULTIVITAMIN 15 ML: LIQUID ORAL at 09:29

## 2018-02-24 RX ADMIN — FOLIC ACID 1 MG: 1 TABLET ORAL at 09:29

## 2018-02-24 RX ADMIN — OXYCODONE HYDROCHLORIDE 5 MG: 5 TABLET ORAL at 18:45

## 2018-02-24 RX ADMIN — OXYCODONE HYDROCHLORIDE 10 MG: 5 TABLET ORAL at 12:42

## 2018-02-24 RX ADMIN — ACETAMINOPHEN 650 MG: 325 TABLET, FILM COATED ORAL at 05:03

## 2018-02-24 RX ADMIN — CEFTRIAXONE SODIUM 2 G: 10 INJECTION, POWDER, FOR SOLUTION INTRAVENOUS at 14:59

## 2018-02-24 RX ADMIN — ACETAMINOPHEN 650 MG: 325 TABLET, FILM COATED ORAL at 17:18

## 2018-02-24 ASSESSMENT — PAIN DESCRIPTION - DESCRIPTORS
DESCRIPTORS: ACHING
DESCRIPTORS: ACHING;CONSTANT
DESCRIPTORS: HEADACHE
DESCRIPTORS: CONSTANT;CRUSHING
DESCRIPTORS: ACHING

## 2018-02-24 NOTE — PLAN OF CARE
Problem: Patient Care Overview  Goal: Plan of Care/Patient Progress Review  Discharge Planner SLP   Patient plan for discharge: Unknown  Current status: Recommend NPO with TF for all nutrition/hydration/medication. Pt demonstrating reduced swallow function and high risk of aspiration d/t reduced AUGUSTINA, weakness, limited deficit awareness.   Barriers to return to prior living situation: Weakness, reduced AUGUSTINA  Recommendations for discharge: ARU  Rationale for recommendations: Anticipate ongoing need for ST       Entered by: Rachel Rayo 02/24/2018 10:21 AM

## 2018-02-24 NOTE — PLAN OF CARE
Problem: Patient Care Overview  Goal: Plan of Care/Patient Progress Review  Neuro:  Pt a/o x4.  Forgetful at times.  Moderate strength on the right side, no contraction on left.  PERRL.  Left sided neglect.  See flowsheets for further details.  CV:  Hemodynamically stable.  Afebrile.    Resp:  RA.  LS clear.  Coughing up small amounts of secretions.  GI/:  TF at goal of 40 ml/hr.  Taking sips of nectar thick liquids.  Incontinent of urine.   Pain:  C/o head pain frequently.  Oxycodone given prn.  Resting in between doses.  Skin:  Right head incision MACIE.      Plan:  Will continue to monitor and notify MD of changes in condition.

## 2018-02-24 NOTE — PLAN OF CARE
Problem: Patient Care Overview  Goal: Plan of Care/Patient Progress Review  Outcome: No Change  D/I/A: Report given to Alma on 6A. Family at bedside and belongings sent with patient. Did call [facility] and update about patient being more lethargic today, drooling more. Has received a total of 20mg PO oxy throughout the day. Per Neurology, no more narcs to be given today. Proceed with Tylenol only. Tmax at 99.4, HR 60s-70s, orders to keep sbp <140, on RA. Purewick catheter in place and draining to -20sxn at wall. Brief on as well to keep dry. Helmet on at all times when out of bed. R side bone flap missing. Completely out on the L side, sensation intact, but tingling and numbness. Does withdraw to pain, but no spontaneous movement on the L. R PIV SL. TF via NG at goal of 40cc/hr with standard flushes. Does use yaunker on own. Per speech, keep patient NPO until tomorrow, they will reassess. Able to have swabs when dry. Contrast sent up with patient and to be given Sunday night prior to PEG placement on Monday in OR. It is a scheduled procedure. STML, L eye neglect, but A/O x4.   P: Transferred up to 6A, room 10-1. Family with and bringing belongings. Notify Neurology for questions.

## 2018-02-24 NOTE — PLAN OF CARE
Problem: Patient Care Overview  Goal: Plan of Care/Patient Progress Review  Outcome: No Change  D+pt awake alert and oriented x4. Heart rate in 60s no ectopy,-130s. Pt able to follow commands, No movement on  Left upper and left lower extremities.Speech garbled at times.Incontinent of urine, pure wick cath was applied .Pt OOB to commode w/ 2 assist at 0200.No bowel movement.Pt medicated w/ oxicodone 5 mg via NGT w/ some relief     Afebrile,Vitals stable.Head staples intact incision dry  P=Pt has transfer orders for 6A/Continue to closely monitor

## 2018-02-24 NOTE — PLAN OF CARE
Problem: Patient Care Overview  Goal: Plan of Care/Patient Progress Review    Discharge Planner OT   Patient plan for discharge: not stated  Current status: Supine>sit with lift, tolerating 5+ min sitting EOB, needing min/mod A to maintain unsupported sitting, but able to maintain midline with CGA using RUE for balance. No movement in LUE to command, but a couple instances of minimal spontaneous proximal L UE movement. Orienting to L-visual field with head turn.   Barriers to return to prior living situation: decreased ADL independence and activity tolerance  Recommendations for discharge: ARU  Rationale for recommendations: increase functional endurance and ADL independence       Entered by: Stuart Kearns 02/24/2018 10:05 AM

## 2018-02-24 NOTE — PLAN OF CARE
Problem: Patient Care Overview  Goal: Plan of Care/Patient Progress Review  PT 4A Discharge Planner PT   Patient plan for discharge: Rehab  Current status: Very fatigued at time of attempt, session was short.  Following 50% commands.  Worked on postural re-ed and head positioning in sitting.  Dependent lift for transfers.  Barriers to return to prior living situation: Significant caregiver dependence  Recommendations for discharge: ARU  Rationale for recommendations: To max (I) functional mobility       Entered by: Darcy Almaraz 02/24/2018 4:35 PM

## 2018-02-24 NOTE — PROGRESS NOTES
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      Problem List  1. RM1 occlusion with malignant MCA syndrome s/p hemicraniectomy POD#6  2. History of PE  3. History of alcohol abuse  4. Pneumonia  5. RLL cavitation  6. Bilateral hilar lymphadenopathy      24 hour events: No acute events    24 Hour Vital Signs Summary:  Temperatures:  Current - Temp: 99.4  F (37.4  C); Max - Temp  Av.1  F (37.3  C)  Min: 98.8  F (37.1  C)  Max: 99.4  F (37.4  C)  Respiration range: Resp  Av  Min: 12  Max: 20  Pulse range: No Data Recorded  Blood pressure range: Systolic (24hrs), Av , Min:104 , Max:137   ; Diastolic (24hrs), Av, Min:60, Max:98    Pulse oximetry range: SpO2  Av %  Min: 94 %  Max: 99 %    Ventilator Settings  Resp: 14    Intake/Output Summary (Last 24 hours) at 18 0745  Last data filed at 18 0600   Gross per 24 hour   Intake             3070 ml   Output              900 ml   Net             2170 ml     BP - Mean:  [] 99    Current Medications:    sodium chloride (PF)  3 mL Intracatheter Q8H     FLUoxetine  20 mg Oral or Feeding Tube Daily     enoxaparin  40 mg Subcutaneous Q24H     cefTRIAXone  2 g Intravenous Q24H     multivitamins with minerals  15 mL Per Feeding Tube Daily     aspirin  81 mg Per G Tube Daily     folic acid  1 mg Per NG tube Daily     vitamin  B-1  100 mg Per NG tube Daily     PRN Medications:  lidocaine (buffered or not buffered), lidocaine 4%, sodium chloride (PF), hydrALAZINE, labetalol, acetaminophen, oxyCODONE IR, IV fluid REPLACEMENT ONLY, naloxone, potassium chloride, potassium chloride, potassium chloride, potassium chloride with lidocaine, potassium chloride, magnesium sulfate, potassium phosphate (KPHOS) in D5W IV, potassium phosphate (KPHOS) in D5W IV, potassium phosphate (KPHOS)  "in D5W IV, potassium phosphate (KPHOS) in D5W IV, ondansetron    Infusions:    IV fluid REPLACEMENT ONLY         Allergies   Allergen Reactions     Amoxicillin Other (See Comments)     Headaches     Lactose      Levaquin [Levofloxacin] Swelling     Naproxen Other (See Comments)     Reaction: Headaches     Nickel      Tramadol      Sulindac Rash     Physical Examination:  /89 (BP Location: Right arm)  Temp 99.4  F (37.4  C) (Oral)  Resp 14  Ht 1.626 m (5' 4\")  Wt 71 kg (156 lb 8.4 oz)  SpO2 98%  BMI 26.87 kg/m2     Gen: Sitting upright in bed and in NAD  HEENT: Right periorbital edema (improving)   Card:RRR  Resp:No respiratory distress.   Abd: nondistended  Extremities: no edema    Neuro: Awake, alert, oriented to person, place, and time. Following commands. No evidence of aphasia. PERRL, Right gaze preference however able to cross midline. Left facial droop. Dysarthria noted. Left hemiplegia, left javi neglect, left hemisensory loss (improving).      Labs/Studies:  Recent Labs   Lab Test  02/24/18   0542  02/23/18   1639  02/23/18   1005   02/22/18   1027  02/22/18   0344   02/21/18   1443  02/21/18   0958  02/21/18   0538  02/21/18   0330   02/20/18   2024   02/20/18   0556   02/19/18   0948   02/19/18   0000   02/18/18   0545   NA  144  146*  149*   < >  150*  153*   < >   --   154*   --   158*   < >   --    < >  158*   < >  164*   < >  158*   < >  154*   POTASSIUM   --    --    --    --    --   3.5   --    --   4.2  3.3*   --    --    --    < >  2.9*   --   3.5   < >  3.2*   < >  3.9   CHLORIDE   --    --    --    --    --    --    --    --    --    --    --    --    --    --   132*   --   135*   --    --    --   123*   CO2   --    --    --    --    --    --    --    --    --    --    --    --    --    --   20 --   21   --    --    --   23   ANIONGAP   --    --    --    --    --    --    --    --    --    --    --    --    --    --   7   --   8   --    --    --   8   GLC   --    --    --    --    " --    --    --    --    --    --    --    --    --    --   137*   --   123*   --   156*   < >  115*   BUN   --    --    --    --    --    --    --    --    --    --    --    --    --    --   11   --   10   --    --    --   9   CR  0.35*   --    --    --    --    --    --   0.37*   --    --    --    --    --    --   0.52   --   0.59   --    --    --   0.63   AVINASH   --    --    --    --    --    --    --    --    --    --    --    --    --    --   7.6*   --   7.3*   --    --    --   8.2*   WBC   --    --    --    --   15.9*   --    --    --    --    --   15.9*   --   16.8*   < >  17.6*   < >   --    < >   --    --   12.0*   RBC   --    --    --    --   2.89*   --    --    --    --    --   2.96*   --   3.00*   < >  3.28*   < >   --    < >   --    --   3.57*   HGB   --    --    --    --   8.5*   --    --    --    --    --   8.7*   --   8.9*   < >  9.6*   < >   --    < >  8.5*   < >  10.4*   PLT  332   --    --    --   294   --    --   263   --    --   265   --   274   < >  286   < >   --    < >   --    --   413    < > = values in this interval not displayed.     Recent Labs   Lab Test  02/22/18   1027  02/20/18   1143  02/17/18   1752  02/17/18   1107   INR  1.13  1.20*  Canceled, Test credited  1.18   PTT   --   30  Canceled, Test credited  22*       Recent Labs  Lab 02/19/18  0000 02/18/18  2159 02/18/18  2133   PH 7.34* 7.37 7.41   PCO2 34* 33* 30*   PO2 194* 230* 216*   HCO3 18* 19* 19*   O2PER 40.0 OR 20 FIO2=60% OR 20  FIO2=60%     Imaging: No new imaging today     Assessment/Plan  Maggie Case is a 27 year old h/o PE and alcohol abuse admitted 2/17/2018 with RM1 occlusion with malignant MCA syndrome s/p decompressive hemicraniectomy POD#6.      Plan  Neuro:  #RM1 occlusion with malignant MCA syndrome s/p decompressive hemicraniectomy POD#6  Likely Embolic given her history PE in the setting of PFO. Hypercoagulable and Myeloproliferative (JAK2, CALR, MPL) workup pending.  Heme consulted, appreciate recs.  -  Aspirin for stroke prophylaxis for now, will eventually need to be anticoagulated  - Staples to remain in for 2 weeks. Planning for cranioplasty approximately 3/18. Follow up in clinic with Dr. Mesa 1 week prior to cranioplasty. Head CT prior to appointment.  - Discontinued hypertonic saline 2/23  - Fluoxetine 20mg daily  - PT/OT/SLP   - SHAKIRA and PHQ9 screen      #History of alcohol abuse: continue thiamine and folate    Resp:  Protecting airway      #RLL cavitation  #Bilateral hilar lymphadenopathy: differential includes infectious vs inflammatory etiology  - Pulmonology consult, appreciate recs  - HIV, Histo and blasto ab pending  - Repeat CT chest in 4 weeks. Will assess at that time for BAL/lymph node biopsy.      CV:  -SBP < 200       Renal:  Electrolyte replacement      #Hypernatremia:resolved.      Endo: Goal euglycemia      Heme:      #Acute blood loss anemia: stable. Acute drop post surgery. Required 2 Units of PRBCs.   - Transfuse Hgb <7      GI:  #Dysphagia: TF at goal. Nectar thick liquid diet. Daily SLP evaluation.  - Plan for PEG 2/26      ID:  #UTI: culture positive for E coli. Sensitive to ceftriaxone  - Continue ceftriaxone      #Pneuomia: Haemopholus influenza and MSSA.  - Continue Ceftriaxone for 7 days      PPX:    DVT prophylaxis: SCDs, lovenox daily    GI: not indicated  Code Status: Full  Lines: NG, PIV      Dispo: Transfer to . Therapy recommending ARU.      Patient seen and discussed with Dr. Jose uLis Shepherd MD  Neurology PGY2

## 2018-02-25 ENCOUNTER — APPOINTMENT (OUTPATIENT)
Dept: SPEECH THERAPY | Facility: CLINIC | Age: 28
End: 2018-02-25
Attending: PSYCHIATRY & NEUROLOGY
Payer: MEDICAID

## 2018-02-25 ENCOUNTER — APPOINTMENT (OUTPATIENT)
Dept: GENERAL RADIOLOGY | Facility: CLINIC | Age: 28
End: 2018-02-25
Attending: STUDENT IN AN ORGANIZED HEALTH CARE EDUCATION/TRAINING PROGRAM
Payer: MEDICAID

## 2018-02-25 ENCOUNTER — APPOINTMENT (OUTPATIENT)
Dept: GENERAL RADIOLOGY | Facility: CLINIC | Age: 28
End: 2018-02-25
Attending: PSYCHIATRY & NEUROLOGY
Payer: MEDICAID

## 2018-02-25 ENCOUNTER — APPOINTMENT (OUTPATIENT)
Dept: OCCUPATIONAL THERAPY | Facility: CLINIC | Age: 28
End: 2018-02-25
Attending: PSYCHIATRY & NEUROLOGY
Payer: MEDICAID

## 2018-02-25 LAB
ALBUMIN UR-MCNC: NEGATIVE MG/DL
AMORPH CRY #/AREA URNS HPF: ABNORMAL /HPF
ANION GAP SERPL CALCULATED.3IONS-SCNC: 7 MMOL/L (ref 3–14)
APPEARANCE UR: ABNORMAL
BACTERIA SPEC CULT: NO GROWTH
BACTERIA SPEC CULT: NO GROWTH
BILIRUB UR QL STRIP: NEGATIVE
BUN SERPL-MCNC: 13 MG/DL (ref 7–30)
CALCIUM SERPL-MCNC: 8.1 MG/DL (ref 8.5–10.1)
CHLORIDE SERPL-SCNC: 111 MMOL/L (ref 94–109)
CO2 SERPL-SCNC: 25 MMOL/L (ref 20–32)
COLOR UR AUTO: ABNORMAL
CREAT SERPL-MCNC: 0.35 MG/DL (ref 0.52–1.04)
ERYTHROCYTE [DISTWIDTH] IN BLOOD BY AUTOMATED COUNT: 17.6 % (ref 10–15)
GFR SERPL CREATININE-BSD FRML MDRD: >90 ML/MIN/1.7M2
GLUCOSE SERPL-MCNC: 100 MG/DL (ref 70–99)
GLUCOSE UR STRIP-MCNC: NEGATIVE MG/DL
HCT VFR BLD AUTO: 26.4 % (ref 35–47)
HGB BLD-MCNC: 8.4 G/DL (ref 11.7–15.7)
HGB UR QL STRIP: NEGATIVE
KETONES UR STRIP-MCNC: NEGATIVE MG/DL
LACTATE BLD-SCNC: 1 MMOL/L (ref 0.4–1.9)
LEUKOCYTE ESTERASE UR QL STRIP: NEGATIVE
MCH RBC QN AUTO: 29.7 PG (ref 26.5–33)
MCHC RBC AUTO-ENTMCNC: 31.8 G/DL (ref 31.5–36.5)
MCV RBC AUTO: 93 FL (ref 78–100)
MUCOUS THREADS #/AREA URNS LPF: PRESENT /LPF
NITRATE UR QL: NEGATIVE
PH UR STRIP: 7.5 PH (ref 5–7)
PLATELET # BLD AUTO: 382 10E9/L (ref 150–450)
POTASSIUM SERPL-SCNC: 3.5 MMOL/L (ref 3.4–5.3)
PROCALCITONIN SERPL-MCNC: 0.23 NG/ML
RBC # BLD AUTO: 2.83 10E12/L (ref 3.8–5.2)
RBC #/AREA URNS AUTO: 0 /HPF (ref 0–2)
SODIUM SERPL-SCNC: 143 MMOL/L (ref 133–144)
SOURCE: ABNORMAL
SP GR UR STRIP: 1.01 (ref 1–1.03)
SPECIMEN SOURCE: NORMAL
SPECIMEN SOURCE: NORMAL
SQUAMOUS #/AREA URNS AUTO: 1 /HPF (ref 0–1)
UROBILINOGEN UR STRIP-MCNC: NORMAL MG/DL (ref 0–2)
WBC # BLD AUTO: 19.9 10E9/L (ref 4–11)
WBC #/AREA URNS AUTO: <1 /HPF (ref 0–2)
YEAST #/AREA URNS HPF: ABNORMAL /HPF

## 2018-02-25 PROCEDURE — 84145 PROCALCITONIN (PCT): CPT | Performed by: STUDENT IN AN ORGANIZED HEALTH CARE EDUCATION/TRAINING PROGRAM

## 2018-02-25 PROCEDURE — 80048 BASIC METABOLIC PNL TOTAL CA: CPT | Performed by: PSYCHIATRY & NEUROLOGY

## 2018-02-25 PROCEDURE — 40000141 ZZH STATISTIC PERIPHERAL IV START W/O US GUIDANCE

## 2018-02-25 PROCEDURE — 27210995 ZZH RX 272: Performed by: PSYCHIATRY & NEUROLOGY

## 2018-02-25 PROCEDURE — 25000132 ZZH RX MED GY IP 250 OP 250 PS 637: Performed by: STUDENT IN AN ORGANIZED HEALTH CARE EDUCATION/TRAINING PROGRAM

## 2018-02-25 PROCEDURE — 25000128 H RX IP 250 OP 636: Performed by: STUDENT IN AN ORGANIZED HEALTH CARE EDUCATION/TRAINING PROGRAM

## 2018-02-25 PROCEDURE — 25000128 H RX IP 250 OP 636: Performed by: PSYCHIATRY & NEUROLOGY

## 2018-02-25 PROCEDURE — 36415 COLL VENOUS BLD VENIPUNCTURE: CPT | Performed by: PSYCHIATRY & NEUROLOGY

## 2018-02-25 PROCEDURE — 12000003 ZZH R&B CRITICAL UMMC

## 2018-02-25 PROCEDURE — 97535 SELF CARE MNGMENT TRAINING: CPT | Mod: GO

## 2018-02-25 PROCEDURE — 97530 THERAPEUTIC ACTIVITIES: CPT | Mod: GO

## 2018-02-25 PROCEDURE — 25000132 ZZH RX MED GY IP 250 OP 250 PS 637: Performed by: PSYCHIATRY & NEUROLOGY

## 2018-02-25 PROCEDURE — 71045 X-RAY EXAM CHEST 1 VIEW: CPT

## 2018-02-25 PROCEDURE — 27210437 ZZH NUTRITION PRODUCT SEMIELEM INTERMED LITER

## 2018-02-25 PROCEDURE — 85027 COMPLETE CBC AUTOMATED: CPT | Performed by: PSYCHIATRY & NEUROLOGY

## 2018-02-25 PROCEDURE — 40000986 XR ABDOMEN PORT 1 VW

## 2018-02-25 PROCEDURE — 92526 ORAL FUNCTION THERAPY: CPT | Mod: GN | Performed by: SPEECH-LANGUAGE PATHOLOGIST

## 2018-02-25 PROCEDURE — 40000133 ZZH STATISTIC OT WARD VISIT

## 2018-02-25 PROCEDURE — 81001 URINALYSIS AUTO W/SCOPE: CPT | Performed by: STUDENT IN AN ORGANIZED HEALTH CARE EDUCATION/TRAINING PROGRAM

## 2018-02-25 PROCEDURE — 40000225 ZZH STATISTIC SLP WARD VISIT: Performed by: SPEECH-LANGUAGE PATHOLOGIST

## 2018-02-25 PROCEDURE — 83605 ASSAY OF LACTIC ACID: CPT | Performed by: PSYCHIATRY & NEUROLOGY

## 2018-02-25 RX ORDER — OXYCODONE HCL 5 MG/5 ML
5-10 SOLUTION, ORAL ORAL EVERY 4 HOURS PRN
Status: DISCONTINUED | OUTPATIENT
Start: 2018-02-25 | End: 2018-02-26

## 2018-02-25 RX ADMIN — OXYCODONE HYDROCHLORIDE 5 MG: 5 SOLUTION ORAL at 10:35

## 2018-02-25 RX ADMIN — OXYCODONE HYDROCHLORIDE 5 MG: 5 SOLUTION ORAL at 06:12

## 2018-02-25 RX ADMIN — ACETAMINOPHEN 650 MG: 325 SOLUTION ORAL at 06:12

## 2018-02-25 RX ADMIN — FLUOXETINE HYDROCHLORIDE 20 MG: 20 LIQUID ORAL at 08:34

## 2018-02-25 RX ADMIN — ASPIRIN 81 MG CHEWABLE TABLET 81 MG: 81 TABLET CHEWABLE at 08:34

## 2018-02-25 RX ADMIN — ACETAMINOPHEN 650 MG: 325 SOLUTION ORAL at 14:46

## 2018-02-25 RX ADMIN — OXYCODONE HYDROCHLORIDE 5 MG: 5 SOLUTION ORAL at 19:00

## 2018-02-25 RX ADMIN — Medication 100 MG: at 08:34

## 2018-02-25 RX ADMIN — MULTIVITAMIN 15 ML: LIQUID ORAL at 08:34

## 2018-02-25 RX ADMIN — OXYCODONE HYDROCHLORIDE 5 MG: 5 TABLET ORAL at 01:14

## 2018-02-25 RX ADMIN — ENOXAPARIN SODIUM 40 MG: 40 INJECTION SUBCUTANEOUS at 14:39

## 2018-02-25 RX ADMIN — ACETAMINOPHEN 650 MG: 325 SOLUTION ORAL at 10:35

## 2018-02-25 RX ADMIN — CEFTRIAXONE SODIUM 2 G: 10 INJECTION, POWDER, FOR SOLUTION INTRAVENOUS at 17:06

## 2018-02-25 RX ADMIN — FOLIC ACID 1 MG: 1 TABLET ORAL at 08:34

## 2018-02-25 RX ADMIN — OXYCODONE HYDROCHLORIDE 5 MG: 5 SOLUTION ORAL at 14:46

## 2018-02-25 ASSESSMENT — PAIN DESCRIPTION - DESCRIPTORS
DESCRIPTORS: ACHING
DESCRIPTORS: ACHING
DESCRIPTORS: HEADACHE;SHARP
DESCRIPTORS: HEADACHE
DESCRIPTORS: HEADACHE;SHARP

## 2018-02-25 ASSESSMENT — VISUAL ACUITY
OU: NORMAL ACUITY

## 2018-02-25 NOTE — PROGRESS NOTES
Neuro Stroke Progress Note  2018    Maggie Case is a 27 year old year old female admitted on 2018 with RM1 occlusion with malignant MCA syndrome s/p decompressive hemicraniectomy POD#7.    Problem List  1. 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 History of PE  3. History of alcohol abuse  4. Pneumonia  5. RLL cavitation  6. Bilateral hilar lymphadenopathy    24 hour events:  No significant events overnight. Intermittent cough, not reliably using suction during my exam. Agreeable to PEG placement and tells me that NG had to be replaced, which was very unpleasant. Continues to have fluctuating participating in therapies and I encouraged her to work with them as much as possible. Positive headache without significant improvement or worsening.     24 Hour Vital Signs Summary:  Temperatures:  Current - Temp: 96.8  F (36  C); Max - Temp  Av.1  F (36.7  C)  Min: 96.7  F (35.9  C)  Max: 99.2  F (37.3  C)  Respiration range: Resp  Av.1  Min: 14  Max: 26  Pulse range: No Data Recorded  Blood pressure range: Systolic (24hrs), Av , Min:98 , Max:122   ; Diastolic (24hrs), Av, Min:51, Max:99    Pulse oximetry range: SpO2  Av.3 %  Min: 96 %  Max: 99 %    Ventilator Settings  Resp: 14    Intake/Output Summary (Last 24 hours) at 18 0756  Last data filed at 18 0700   Gross per 24 hour   Intake             1425 ml   Output              400 ml   Net             1025 ml       BP - Mean:  [] 80    Current Medications:    sodium chloride (PF)  3 mL Intracatheter Q8H     FLUoxetine  20 mg Oral or Feeding Tube Daily     enoxaparin  40 mg Subcutaneous Q24H     cefTRIAXone  2 g Intravenous Q24H     multivitamins with minerals  15 mL Per Feeding Tube Daily     aspirin  81 mg Per G Tube Daily     folic acid  1 mg Per NG tube Daily     vitamin  B-1  100 mg Per NG tube Daily       PRN Medications:  acetaminophen, oxyCODONE, lidocaine (buffered or not  "buffered), lidocaine 4%, sodium chloride (PF), hydrALAZINE, labetalol, oxyCODONE IR, IV fluid REPLACEMENT ONLY, naloxone, potassium chloride, potassium chloride, potassium chloride, potassium chloride with lidocaine, potassium chloride, magnesium sulfate, potassium phosphate (KPHOS) in D5W IV, potassium phosphate (KPHOS) in D5W IV, potassium phosphate (KPHOS) in D5W IV, potassium phosphate (KPHOS) in D5W IV, ondansetron    Infusions:    IV fluid REPLACEMENT ONLY         Allergies   Allergen Reactions     Amoxicillin Other (See Comments)     Headaches     Lactose      Levaquin [Levofloxacin] Swelling     Naproxen Other (See Comments)     Reaction: Headaches     Nickel      Tramadol      Sulindac Rash       Physical Examination:  /72 (BP Location: Right arm)  Temp 96.8  F (36  C) (Axillary)  Resp 14  Ht 1.626 m (5' 4\")  Wt 71 kg (156 lb 8.4 oz)  SpO2 98%  BMI 26.87 kg/m2  Gen: Lying in bed no acute distress.   HEENT: Right periorbital edema   Card:RRR  Resp: Ronchus/rattling breath sounds bilateral anterior auscultations R > L.   Neuro: Awake, alert, oriented to person, place, and time. Following commands. No evidence of aphasia. PERRL, Right gaze preference however able to cross midline. Left facial droop. Dysarthria noted. Left hemiplegia, left javi neglect, left hemisensory loss.     Labs/Studies:  Recent Labs   Lab Test  02/24/18   0544  02/24/18   0542  02/23/18   1639  02/23/18   1005   02/22/18   1027  02/22/18   0344   02/21/18   1443  02/21/18   0958  02/21/18   0538  02/21/18   0330   02/20/18   0556   02/19/18   0948   02/19/18   0000   02/18/18   0545   NA   --   144  146*  149*   < >  150*  153*   < >   --   154*   --   158*   < >  158*   < >  164*   < >  158*   < >  154*   POTASSIUM   --    --    --    --    --    --   3.5   --    --   4.2  3.3*   --    < >  2.9*   --   3.5   < >  3.2*   < >  3.9   CHLORIDE   --    --    --    --    --    --    --    --    --    --    --    --    --   132*   " --   135*   --    --    --   123*   CO2   --    --    --    --    --    --    --    --    --    --    --    --    --   20   --   21   --    --    --   23   ANIONGAP   --    --    --    --    --    --    --    --    --    --    --    --    --   7   --   8   --    --    --   8   GLC   --    --    --    --    --    --    --    --    --    --    --    --    --   137*   --   123*   --   156*   < >  115*   BUN   --    --    --    --    --    --    --    --    --    --    --    --    --   11   --   10   --    --    --   9   CR   --   0.35*   --    --    --    --    --    --   0.37*   --    --    --    --   0.52   --   0.59   --    --    --   0.63   AVINASH   --    --    --    --    --    --    --    --    --    --    --    --    --   7.6*   --   7.3*   --    --    --   8.2*   WBC  18.6*   --    --    --    --   15.9*   --    --    --    --    --   15.9*   < >  17.6*   < >   --    < >   --    --   12.0*   RBC  2.92*   --    --    --    --   2.89*   --    --    --    --    --   2.96*   < >  3.28*   < >   --    < >   --    --   3.57*   HGB  8.7*   --    --    --    --   8.5*   --    --    --    --    --   8.7*   < >  9.6*   < >   --    < >  8.5*   < >  10.4*   PLT  340  332   --    --    --   294   --    --   263   --    --   265   < >  286   < >   --    < >   --    --   413    < > = values in this interval not displayed.       Recent Labs   Lab Test  02/22/18   1027  02/20/18   1143  02/17/18   1752  02/17/18   1107   INR  1.13  1.20*  Canceled, Test credited  1.18   PTT   --   30  Canceled, Test credited  22*     CRP 85.0 (2/19)  HIV non-reactive  Protein S/C - WNL  Lupus anticoagulant - Negative   Cardiolipin - WNL  Antithrombin III - WNL     Pending:   Next gen panel   Factor 2&5   Factor 2 prothrombin   Beta2 glycoprotein    No new imaging.     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      Plan  Neuro:  #RM1 occlusion with malignant MCA syndrome s/p decompressive hemicraniectomy POD#7  Likely Embolic given her history PE in the setting of PFO. Hypercoagulable and Myeloproliferative (JAK2, CALR, MPL) workup pending with those items returned negative/WNL for now (see above).  Heme consulted, appreciate recs.  - Aspirin for stroke prophylaxis for now, will eventually need to be anticoagulated  - Staples to remain in for 2 weeks. Planning for cranioplasty approximately 3/18. Follow up in clinic with Dr. Mesa 1 week prior to cranioplasty. Head CT prior to appointment.  - Fluoxetine 20mg daily  - Discontinue cardiac monitoring  - Discontinued hypertonic saline 2/23  - PT/OT/SLP   - SHAKIRA and PHQ9 screen    #History of alcohol abuse: continue thiamine and folate.    Resp:  Protecting airway      #RLL cavitation  #Bilateral hilar lymphadenopathy: differential includes infectious vs inflammatory etiology  - Pulmonology consult, appreciate recs  - HIV nonreactive  - Histo and blasto ab pending  - Repeat CT chest in 4 weeks. Will assess at that time for BAL/lymph node biopsy.      CV:  -SBP < 200       Renal:  Electrolyte replacement      #Hypernatremia, resolved - Iatrogenic from hypertonic saline.       Endo: Goal euglycemia      Heme:  #Acute blood loss anemia: stable. Acute drop post surgery. Required 2 units of pRBCs.  - Transfuse Hgb <7  # Concern for hypercoagulability  - Work-up pending, see above  - Hem/onc consulted       GI:  #Dysphagia: TF at goal. Nectar thick liquid diet.  - Daily speech/swallow evals   - Plan for PEG 2/26      ID:  #Leukocytosis:   Variable leukocytosis on this admission with trend up in the past 3 days.   Looking at sources UA, PNA and craniectomy  - Details below   - Neurosurgery to look at craniectomy incision  - Will monitor for now      #UTI: culture positive for E coli. Sensitive to ceftriaxone  - Continue ceftriaxone (day 6 of 7)   - Resend UA       #Pneuomia:  Haemopholus influenza and MSSA.   Lungs sound rhonchus on exam today. Patient endorses feeling febrile/chills off and on including yesterday. Objectively afebrile with Tmax 99.2, but rising leukocytosis 15.9 -> 18.6 -> 19.9.   - Continue Ceftriaxone for 7 days (day 6 of 7)   - Repeat CXR - Looks stable, awaiting final read   - Check procal 0.23   - Sputum culture   - CBC tomorrow, trend leukocytosis        PPX:    DVT prophylaxis: SCDs, lovenox daily    GI: not indicated  Code Status: Full  Lines: NG, PIV    Patient seen and discussed with Dr. Jose Luis Russell   Neurology PGY-1  Pager [phone]

## 2018-02-25 NOTE — PLAN OF CARE
Problem: Patient Care Overview  Goal: Plan of Care/Patient Progress Review  Patient was received from unit 4A at 1700.Patient alert,oriented x4,flat affect,speech garbled,makes need known.Left side weakness.Complained of right side headache,prn tylenol given but patient said tylenol did not help.Prn oxycodone 5 mg x2 given,pt reported headache was better controlled with oxycodone,craniotomy incision open to air,staples intact.. Peptide 1.5  feeding running  at 40 ml/hr via NGT,pt tolerated feeding.Lungs diminished at bases,productive cough,uses younker to clear secretions.Incontinent of urine,florentino care done after each incontinent episode,no bowel movement this evening..Patient for PEG tube placement on Monday contrast to be given Sunday night,contrast medication is in patient's medication bin. .

## 2018-02-25 NOTE — PLAN OF CARE
Problem: Patient Care Overview  Goal: Plan of Care/Patient Progress Review  PT 6A: Cx, pt initially declining PT but agreeable with encouragement. However pt with sudden need for bedpan use and further nursing cares; therapist lacking further time available to return and see pt per end of day/schedule constraints. Visit rescheduled for tomorrow.

## 2018-02-25 NOTE — PLAN OF CARE
Problem: Patient Care Overview  Goal: Plan of Care/Patient Progress Review  Outcome: No Change  AVSS on RA. A+OX4. Left facial droop, eye neglect, numbness/tingling, L side hemiparesis. Tylenol and oxycodone for incisional pain with relief. Denies nausea. NPO. TF 40mL/hr via NG, residuals checked q4h. Craniotomy incision with staples, c/d/i. PIV SL'd. Productive cough, younker in reach for secretions. Incontinent of urine, florentino cares done prn. Passing gas, no BM this shift. Pneumo boots on. Declined IS use this shift. Up with assist of 2 and mechanical lift, needs to wear helmet when OOB. Turn/repo q2-3h. Bruises noted all over patient's body, particularly on forearms and inner thighs.    Plan: PEG placement on Monday. Will need to have contrast Sunday night, it is in patient's med bin.

## 2018-02-25 NOTE — PLAN OF CARE
Problem: Patient Care Overview  Goal: Plan of Care/Patient Progress Review  Discharge Planner OT   Patient plan for discharge: none stated  Current status: Lift transfer bed<>chair. Extended time and cuing for oral cares from seated. Provided neuro-francisco ROM to affected UE from seated. Pt lethargic and with flat affect during session. Mother present and encouraging.    Barriers to return to prior living situation: hemiparesis, level of assist, medical status  Recommendations for discharge: ARU  Rationale for recommendations: to progress back to PLOF       Entered by: Tete Lyons 02/25/2018 2:17 PM

## 2018-02-25 NOTE — PROGRESS NOTES
Brief Pulmonary Progress Note    Chart reviewed. Patient has moved out of ICU to the floor with continued rehab with PT/OT/speech. Plan for PEG on Monday and eventual plans to discharge to ARU. Neuro exam appears stable from Neuro ICU notes and still with significant left sided deficits. Histo/blasto Ab pending at this time.   --Recommend repeat CT Chest in 4 weeks (after patient is out of acute rehab). If LAD still present, then outpatient pulmonary clinic followup to discuss BAL/biopsy.    Pulmonary will sign off. Please page if questions.    Ibis Pop  Pulmonary and Critical Care Fellow  [phone]

## 2018-02-25 NOTE — PLAN OF CARE
Problem: Stroke (Ischemic) (Adult)  Goal: Signs and Symptoms of Listed Potential Problems Will be Absent, Minimized or Managed (Stroke)  Signs and symptoms of listed potential problems will be absent, minimized or managed by discharge/transition of care (reference Stroke (Ischemic) (Adult) CPG).   Outcome: No Change  VSS. Pt triggered sepsis protocol, lactic acid came back at 1.0. Tylenol/oxycodone given x1 for head pain. Neuros unchanged; Left facial droop, numbness LUE/LLE. LUE/LLE 0/5, RUE RLE 4/5. Orientated x4. Per pt mom pt seems less interactive today that yesterday, MDs are aware and rounded on pt. Head incision MACIE, staples intact. Wears helmet when oob. NPO.TF at goal of 40 ml/hr, tolerating well. Plan is to get PEG tube tomorrow, contrast in med bin for tonight. Inc of urine x 3 but also used bedpan. No BM today. Up with lift, otherwise repositioned every 2 hours. Sat in recliner for 2 hours. Did IS with pt. MDs aware of bruises throughout body, MD thinks its from trying to wake pt up in ICU. Mother at bedside and helpful with cares. On list to see if MDs can change IV antibiotic to down NG per family request. **needs UA and Sputum culture**

## 2018-02-25 NOTE — PLAN OF CARE
Problem: SLP General Care Plan  Goal: SLP target date for goal attainment  Discharge Planner SLP   Patient plan for discharge: Pt did not state.  Current status: Pt demonstrates decreased ability to manage her oral secretions this date with increased lethargy. Recommend NPO with alternative methods of nutrition/hydration/medication. Pt is scheduled for PEG tomorrow which will be beneficial for pt. SLP to follow for po readiness and ongoing therapy.  Barriers to return to prior living situation: dysphagia and risks associated with dysphagia  Recommendations for discharge: inpatient rehab  Rationale for recommendations: Pt would benefit from continued SLP services following discharge to maximize swallow function.        Entered by: Anne Cisse 02/25/2018 1:28 PM

## 2018-02-26 ENCOUNTER — APPOINTMENT (OUTPATIENT)
Dept: MRI IMAGING | Facility: CLINIC | Age: 28
End: 2018-02-26
Attending: PSYCHIATRY & NEUROLOGY
Payer: MEDICAID

## 2018-02-26 ENCOUNTER — APPOINTMENT (OUTPATIENT)
Dept: INTERVENTIONAL RADIOLOGY/VASCULAR | Facility: CLINIC | Age: 28
End: 2018-02-26
Attending: PHYSICIAN ASSISTANT
Payer: MEDICAID

## 2018-02-26 LAB
ANION GAP SERPL CALCULATED.3IONS-SCNC: 9 MMOL/L (ref 3–14)
ANISOCYTOSIS BLD QL SMEAR: SLIGHT
B-HCG SERPL-ACNC: <1 IU/L (ref 0–5)
B2 GLYCOPROT1 IGG SERPL IA-ACNC: <0.6 U/ML
B2 GLYCOPROT1 IGM SERPL IA-ACNC: <0.9 U/ML
BASOPHILS # BLD AUTO: 0 10E9/L (ref 0–0.2)
BASOPHILS NFR BLD AUTO: 0 %
BUN SERPL-MCNC: 9 MG/DL (ref 7–30)
CALCIUM SERPL-MCNC: 8.3 MG/DL (ref 8.5–10.1)
CHLORIDE SERPL-SCNC: 107 MMOL/L (ref 94–109)
CO2 SERPL-SCNC: 24 MMOL/L (ref 20–32)
CREAT SERPL-MCNC: 0.35 MG/DL (ref 0.52–1.04)
CRP SERPL-MCNC: 140 MG/L (ref 0–8)
DIFFERENTIAL METHOD BLD: ABNORMAL
EOSINOPHIL # BLD AUTO: 0.9 10E9/L (ref 0–0.7)
EOSINOPHIL NFR BLD AUTO: 4.3 %
ERYTHROCYTE [DISTWIDTH] IN BLOOD BY AUTOMATED COUNT: 17.8 % (ref 10–15)
GFR SERPL CREATININE-BSD FRML MDRD: >90 ML/MIN/1.7M2
GLUCOSE SERPL-MCNC: 85 MG/DL (ref 70–99)
HCT VFR BLD AUTO: 28.8 % (ref 35–47)
HGB BLD-MCNC: 9.1 G/DL (ref 11.7–15.7)
LACTATE BLD-SCNC: 0.6 MMOL/L (ref 0.4–1.9)
LYMPHOCYTES # BLD AUTO: 1.8 10E9/L (ref 0.8–5.3)
LYMPHOCYTES NFR BLD AUTO: 8.7 %
MCH RBC QN AUTO: 29.4 PG (ref 26.5–33)
MCHC RBC AUTO-ENTMCNC: 31.6 G/DL (ref 31.5–36.5)
MCV RBC AUTO: 93 FL (ref 78–100)
METAMYELOCYTES # BLD: 0.2 10E9/L
METAMYELOCYTES NFR BLD MANUAL: 0.9 %
MICROCYTES BLD QL SMEAR: PRESENT
MONOCYTES # BLD AUTO: 0.5 10E9/L (ref 0–1.3)
MONOCYTES NFR BLD AUTO: 2.6 %
NEUTROPHILS # BLD AUTO: 17.2 10E9/L (ref 1.6–8.3)
NEUTROPHILS NFR BLD AUTO: 83.5 %
PLATELET # BLD AUTO: 495 10E9/L (ref 150–450)
PLATELET # BLD EST: ABNORMAL 10*3/UL
POLYCHROMASIA BLD QL SMEAR: SLIGHT
POTASSIUM SERPL-SCNC: 4 MMOL/L (ref 3.4–5.3)
RBC # BLD AUTO: 3.09 10E12/L (ref 3.8–5.2)
SODIUM SERPL-SCNC: 140 MMOL/L (ref 133–144)
WBC # BLD AUTO: 20.6 10E9/L (ref 4–11)

## 2018-02-26 PROCEDURE — 25000132 ZZH RX MED GY IP 250 OP 250 PS 637: Performed by: STUDENT IN AN ORGANIZED HEALTH CARE EDUCATION/TRAINING PROGRAM

## 2018-02-26 PROCEDURE — 25000128 H RX IP 250 OP 636: Performed by: PSYCHIATRY & NEUROLOGY

## 2018-02-26 PROCEDURE — 25000128 H RX IP 250 OP 636: Performed by: RADIOLOGY

## 2018-02-26 PROCEDURE — 40000141 ZZH STATISTIC PERIPHERAL IV START W/O US GUIDANCE

## 2018-02-26 PROCEDURE — 12000008 ZZH R&B INTERMEDIATE UMMC

## 2018-02-26 PROCEDURE — 27210916 ZZ H TUBE GASTRO CR5

## 2018-02-26 PROCEDURE — 36415 COLL VENOUS BLD VENIPUNCTURE: CPT | Performed by: PSYCHIATRY & NEUROLOGY

## 2018-02-26 PROCEDURE — 27210732 ZZH ACCESSORY CR1

## 2018-02-26 PROCEDURE — A9585 GADOBUTROL INJECTION: HCPCS | Performed by: PSYCHIATRY & NEUROLOGY

## 2018-02-26 PROCEDURE — 99153 MOD SED SAME PHYS/QHP EA: CPT

## 2018-02-26 PROCEDURE — 27210735 ZZH ACCESSORY CR12

## 2018-02-26 PROCEDURE — 70553 MRI BRAIN STEM W/O & W/DYE: CPT

## 2018-02-26 PROCEDURE — 27210905 ZZH KIT CR7

## 2018-02-26 PROCEDURE — 25000132 ZZH RX MED GY IP 250 OP 250 PS 637: Performed by: PSYCHIATRY & NEUROLOGY

## 2018-02-26 PROCEDURE — C1769 GUIDE WIRE: HCPCS

## 2018-02-26 PROCEDURE — 99152 MOD SED SAME PHYS/QHP 5/>YRS: CPT

## 2018-02-26 PROCEDURE — 85025 COMPLETE CBC W/AUTO DIFF WBC: CPT | Performed by: PSYCHIATRY & NEUROLOGY

## 2018-02-26 PROCEDURE — 83605 ASSAY OF LACTIC ACID: CPT | Performed by: PSYCHIATRY & NEUROLOGY

## 2018-02-26 PROCEDURE — 80048 BASIC METABOLIC PNL TOTAL CA: CPT | Performed by: PSYCHIATRY & NEUROLOGY

## 2018-02-26 PROCEDURE — 27210995 ZZH RX 272: Performed by: PSYCHIATRY & NEUROLOGY

## 2018-02-26 PROCEDURE — 25000128 H RX IP 250 OP 636: Performed by: STUDENT IN AN ORGANIZED HEALTH CARE EDUCATION/TRAINING PROGRAM

## 2018-02-26 PROCEDURE — 49440 PLACE GASTROSTOMY TUBE PERC: CPT

## 2018-02-26 PROCEDURE — 86140 C-REACTIVE PROTEIN: CPT | Performed by: PSYCHIATRY & NEUROLOGY

## 2018-02-26 PROCEDURE — 0DH63UZ INSERTION OF FEEDING DEVICE INTO STOMACH, PERCUTANEOUS APPROACH: ICD-10-PCS | Performed by: RADIOLOGY

## 2018-02-26 PROCEDURE — 27210437 ZZH NUTRITION PRODUCT SEMIELEM INTERMED LITER

## 2018-02-26 PROCEDURE — 25000125 ZZHC RX 250: Performed by: RADIOLOGY

## 2018-02-26 PROCEDURE — 84702 CHORIONIC GONADOTROPIN TEST: CPT | Performed by: PSYCHIATRY & NEUROLOGY

## 2018-02-26 RX ORDER — CLINDAMYCIN PHOSPHATE 900 MG/50ML
900 INJECTION, SOLUTION INTRAVENOUS
Status: DISCONTINUED | OUTPATIENT
Start: 2018-02-26 | End: 2018-02-27

## 2018-02-26 RX ORDER — NALOXONE HYDROCHLORIDE 0.4 MG/ML
.1-.4 INJECTION, SOLUTION INTRAMUSCULAR; INTRAVENOUS; SUBCUTANEOUS
Status: DISCONTINUED | OUTPATIENT
Start: 2018-02-26 | End: 2018-02-26

## 2018-02-26 RX ORDER — FLUMAZENIL 0.1 MG/ML
0.2 INJECTION, SOLUTION INTRAVENOUS
Status: DISCONTINUED | OUTPATIENT
Start: 2018-02-26 | End: 2018-02-26

## 2018-02-26 RX ORDER — IODIXANOL 320 MG/ML
150 INJECTION, SOLUTION INTRAVASCULAR ONCE
Status: COMPLETED | OUTPATIENT
Start: 2018-02-26 | End: 2018-02-26

## 2018-02-26 RX ORDER — GADOBUTROL 604.72 MG/ML
7.5 INJECTION INTRAVENOUS ONCE
Status: COMPLETED | OUTPATIENT
Start: 2018-02-26 | End: 2018-02-26

## 2018-02-26 RX ORDER — FENTANYL CITRATE 50 UG/ML
25-50 INJECTION, SOLUTION INTRAMUSCULAR; INTRAVENOUS EVERY 5 MIN PRN
Status: DISCONTINUED | OUTPATIENT
Start: 2018-02-26 | End: 2018-02-26

## 2018-02-26 RX ORDER — RAMELTEON 8 MG/1
8 TABLET ORAL
Status: DISCONTINUED | OUTPATIENT
Start: 2018-02-26 | End: 2018-03-01 | Stop reason: HOSPADM

## 2018-02-26 RX ORDER — OXYCODONE HCL 5 MG/5 ML
5-10 SOLUTION, ORAL ORAL EVERY 4 HOURS PRN
Status: DISCONTINUED | OUTPATIENT
Start: 2018-02-26 | End: 2018-03-01 | Stop reason: HOSPADM

## 2018-02-26 RX ADMIN — ASPIRIN 81 MG CHEWABLE TABLET 81 MG: 81 TABLET CHEWABLE at 14:48

## 2018-02-26 RX ADMIN — OXYCODONE HYDROCHLORIDE 10 MG: 5 SOLUTION ORAL at 18:30

## 2018-02-26 RX ADMIN — CEFTRIAXONE SODIUM 2 G: 10 INJECTION, POWDER, FOR SOLUTION INTRAVENOUS at 17:13

## 2018-02-26 RX ADMIN — OXYCODONE HYDROCHLORIDE 5 MG: 5 SOLUTION ORAL at 00:10

## 2018-02-26 RX ADMIN — FLUOXETINE HYDROCHLORIDE 20 MG: 20 LIQUID ORAL at 14:51

## 2018-02-26 RX ADMIN — GLUCAGON HYDROCHLORIDE 1 MG: 1 INJECTION, POWDER, FOR SOLUTION INTRAMUSCULAR; INTRAVENOUS; SUBCUTANEOUS at 09:29

## 2018-02-26 RX ADMIN — ACETAMINOPHEN 650 MG: 325 SOLUTION ORAL at 18:30

## 2018-02-26 RX ADMIN — MIDAZOLAM 2 MG: 1 INJECTION INTRAMUSCULAR; INTRAVENOUS at 09:30

## 2018-02-26 RX ADMIN — OXYCODONE HYDROCHLORIDE 10 MG: 5 SOLUTION ORAL at 22:41

## 2018-02-26 RX ADMIN — ACETAMINOPHEN 650 MG: 325 SOLUTION ORAL at 14:47

## 2018-02-26 RX ADMIN — FENTANYL CITRATE 100 MCG: 50 INJECTION, SOLUTION INTRAMUSCULAR; INTRAVENOUS at 09:30

## 2018-02-26 RX ADMIN — MULTIVITAMIN 15 ML: LIQUID ORAL at 14:48

## 2018-02-26 RX ADMIN — Medication 100 MG: at 14:48

## 2018-02-26 RX ADMIN — OXYCODONE HYDROCHLORIDE 5 MG: 5 SOLUTION ORAL at 14:47

## 2018-02-26 RX ADMIN — ONDANSETRON 4 MG: 2 INJECTION INTRAMUSCULAR; INTRAVENOUS at 11:47

## 2018-02-26 RX ADMIN — IODIXANOL 15 ML: 320 INJECTION, SOLUTION INTRAVASCULAR at 09:18

## 2018-02-26 RX ADMIN — ENOXAPARIN SODIUM 40 MG: 40 INJECTION SUBCUTANEOUS at 14:48

## 2018-02-26 RX ADMIN — FOLIC ACID 1 MG: 1 TABLET ORAL at 14:48

## 2018-02-26 RX ADMIN — LIDOCAINE HYDROCHLORIDE 10 ML: 10 INJECTION, SOLUTION EPIDURAL; INFILTRATION; INTRACAUDAL; PERINEURAL at 09:42

## 2018-02-26 RX ADMIN — GADOBUTROL 7.5 ML: 604.72 INJECTION INTRAVENOUS at 14:20

## 2018-02-26 RX ADMIN — ACETAMINOPHEN 650 MG: 325 SOLUTION ORAL at 22:41

## 2018-02-26 ASSESSMENT — PAIN DESCRIPTION - DESCRIPTORS
DESCRIPTORS: ACHING
DESCRIPTORS: ACHING

## 2018-02-26 ASSESSMENT — VISUAL ACUITY
OU: NORMAL ACUITY

## 2018-02-26 NOTE — PLAN OF CARE
Problem: Patient Care Overview  Goal: Plan of Care/Patient Progress Review  Outcome: No Change  VSS. A&Ox4, forgetful at times. Left sided hemiparesis, facial droop. Pain managed with PRN oxycodone. Head incision staples intact, MACIE, CDI. NG clamped overnight. NPO. Plan for PEG tube placement today. Productive cough, using yankauer for suction. Incontinent of urine. Repositioning with 2 assist q2-3hrs. Pt resting comfortably. Continue POC.

## 2018-02-26 NOTE — PROGRESS NOTES
Brief Neurosurgery Note:     Ordered MRI brain with and without contrast given continued leukocytosis and febrile episodes with recent surgery.     Neurosurgery team will follow-up on imaging results.     Contact the neurosurgery resident on call with questions.    Dial * * *[phone]: Enter [phone] when prompted.   Awais Torres MD, PhD  Neurosurgery PGY-3

## 2018-02-26 NOTE — PLAN OF CARE
Problem: Patient Care Overview  Goal: Plan of Care/Patient Progress Review  PT 6A:  Cancel.  At time of attempt, pt at MRI.  Unable to check back.  Will reschedule tomorrow.

## 2018-02-26 NOTE — PROGRESS NOTES
Social Work Services Progress Note    Hospital Day: 10  Date of Initial Social Work Evaluation:    Collaborated with:  Mother Pawan,  ARU    Data:  SW spoke with  ARU liaison today and ensured patient is on the list for admission, anticipate medical clearance for DC on Wednesday.     Mom Channon called and asked questions about ARU, stating she has no transportation in the [facility], and would not be comfortable driving anyhow, she also states she will also need to stay at a hotel in [facility] while Maggie is in ARU. She is OK with this plan, SW addressed her many questions and assured her of SW continuation at ARU for further planning from ARU to potential TCU or TBD. She was appreciative, sw provided 6A SW number incase further questions arise.     Plan:    Anticipated Disposition:  Facility:  [facility]    Barriers to d/c plan:  Medical clearance    Follow Up:  sw continuing to follow    Carmen ANDERS MSW  5B  (Medical/Surgical)  Phone: [phone]  Pager: [phone]

## 2018-02-26 NOTE — PROGRESS NOTES
CLINICAL NUTRITION SERVICES - REASSESSMENT NOTE     Nutrition Prescription    RECOMMENDATIONS FOR MDs/PROVIDERS TO ORDER:  None at this time    Malnutrition Status:    Patient does not meet criteria necessary for diagnosing malnutrition    Recommendations already ordered by Registered Dietitian (RD):  -Modify TF order to reflect new enteral access    Future/Additional Recommendations:  1. Once diet advanced >CLD, re-order Magic Cup with meals    2. Once diet advanced >FLD, order calorie counts to assess ability to wean EN    3. Continue to monitor CRP and medical status for ability to switch pt to maintenance formula. Once appropriate, would recommend: Isosource 1.5 @ goal 45 ml/hr (1080 ml/day) to provide 1620 kcals (29 kcal/kg/day), 73 g PRO (1.3 g/kg/day), 821 ml free H2O, 190 g CHO and 16 g Fiber daily.     EVALUATION OF THE PROGRESS TOWARD GOALS   Diet: NPO for procedure but previously on nectar thickened full liquid diet    Nutrition Support: 2/19 - ___ : Impact Peptide @ goal 40 ml/hr (960 ml/day) to provide 1440 kcals (26 kcal/kg/day), 90 g PRO (1.6 g/kg/day), 739 ml free H2O, 61 g Fat (50% from MCTs), 134 g CHO and no Fiber daily.    Intake:   -EN: 6 day TF average infusion = 800 mL, 83% of goal rate to provide 1195 kcal (21 kcal/kg) and 75 g PRO (1.3 g/kg)  -PO: Pt has had minimal PO intake.     NEW FINDINGS   GI: PEG placed today. Pt with minimal PO intake over the past week. Once diet re-advanced, will restart Magic Cup supplements. Pt's last BM on 2/23    Labs: CRP (2/26) = 140 (H)    Weight: Today's weight of 70 kg is up from lowest admit wt of 65.8 kg on 2/17    MALNUTRITION  % Intake: Decreased intake does not meet criteria  % Weight Loss: None noted  Subcutaneous Fat Loss: None observed  Muscle Loss: None observed  Fluid Accumulation/Edema: None noted  Malnutrition Diagnosis: Patient does not meet two of the above criteria necessary for diagnosing malnutrition    Previous Goals   Total avg  nutritional intake to meet a minimum of 25 kcal/kg and 1.2 g PRO/kg daily (per dosing wt 56 kg).  Evaluation: Met for PRO not for kcal    Previous Nutrition Diagnosis  Inadequate oral intake related to dysphagia as evidenced by need to start enteral feeds today (2/19) per SLP recommendations.   Evaluation: No change    CURRENT NUTRITION DIAGNOSIS  Inadequate oral intake related to dysphagia and poor appetite as evidenced by minimal PO intake over the past week on nectar thick FLD, dependent on TF to meet estimated energy and protein needs      INTERVENTIONS  Implementation  Collaboration with other providers - Neuro rounds  Enteral Nutrition - Modify order to reflect new enteral access    Goals  Total avg nutritional intake to meet a minimum of 25 kcal/kg and 1.2 g PRO/kg daily (per dosing wt 56 kg).    Monitoring/Evaluation  Progress toward goals will be monitored and evaluated per protocol.    Nicole Hernandez RDN, LD  Pgr: [phone]

## 2018-02-26 NOTE — PROGRESS NOTES
Interventional Radiology Pre-Procedure Sedation Assessment   Time of Assessment: 8:31 AM    Expected Level: Moderate Sedation    Indication: Sedation is required for the following type of Procedure: G tube    Sedation and procedural consent: Risks, benefits and alternatives were discussed with Patient    PO Intake: Appropriately NPO for procedure    ASA Class: Class 3 - SEVERE SYSTEMIC DISEASE, DEFINITE FUNCTIONAL LIMITATIONS.    Mallampati: Grade 3:  Soft palate visible, posterior pharyngeal wall not visible    Lungs: Lungs Clear with good breath sounds bilaterally    Heart: Normal heart sounds and rate    History and physical reviewed and no updates needed. I have reviewed the lab findings, diagnostic data, medications, and the plan for sedation. I have determined this patient to be an appropriate candidate for the planned sedation and procedure and have reassessed the patient IMMEDIATELY PRIOR to sedation and procedure.    Silver Kapoor MD

## 2018-02-26 NOTE — PROGRESS NOTES
Patient Name: Maggie Case  Medical Record Number: [id number]  Today's Date: 2/26/2018    Procedure: Gastrostomy tube placement  Proceduralist: Dr. Vega    Sedation start time: 0840  Sedation end time: 0910  Sedation medications administered: 2 mg Versed, 100 mcg Fent   Total sedation time: 30 min    Procedure start time: 0840  Puncture time: 0845  Procedure end time: 0910    Report given to: Angy AWAD      Other Notes: Pt arrived to IR room 1 from . Pt denies any questions or concerns regarding procedure. Pt positioned supine and monitored per protocol. Pt tolerated procedure without any noted complications. Pt transferred back to 6A.

## 2018-02-26 NOTE — PROGRESS NOTES
"  Care Coordinator Progress Note     Admission Date/Time:  2/17/2018  Attending MD:  Dr Jose Amaya     Data  Chart reviewed. Discussed pt with interdisciplinary team. Per 6A Discharge Rounds report plan is g-tube placement today; MRI; may be ready for DC on Wed, 2-28. Pt transferred from ICU to  on 2-25.   Speech Therapy recommending NPO with tube feeding; may need g-tube.   PT recommending ARU on 2-24; very fatigued; following 50% commands.    OT recommending ARU on 2-25.     Currently on IV Ceftriaxone every 24 hour for pneumonia; Lovenox subq every 24 hours.    Consulting Services:  Neurosurgery; Heme/Onc; PM&R; Pulmonary.     Patient was admitted for:  Large right MCA stroke.   Pt was found down at home; brought by EMS to [facility] ER. Found to have CVA due to occlusion of right middle cerebral artery. Transferred to [facility] for further evaluation & treatment.     Per Neuro Stroke progress note on 2-25-18:   \"Problem List  1. 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 History of PE  3. History of alcohol abuse  4. Pneumonia  5. RLL cavitation  6. Bilateral hilar lymphadenopathy\"  ______________________________________________     Procedure 2-18-18:  Right decompressive hemicraniectomy. (malignant cerebral edema due to right MCA infarction).   Procedure 2-26-18: G-tube placed.     Past history includes:  PE; alcohol abuse; anemia; anxiety; chronic diarrhea; lactose intolerance; myalgia; vitamin B12 deficiency.    Coordination of Care and Referrals  Chart reviewed. Discussed with PT; they weren't able to see pt today; she was at procedures; they will see her tomorrow. Discussed possible barriers: pt's limited participation, home situation and need to address caregiver support.       Assessment  Pt s/p stroke; will need inpt rehab after discharge.       Plan  Anticipated Discharge Date:  When medically stable.   Anticipated Discharge Plan:   SW will follow for inpatient rehab " placement.          Mirian Lizama, RN Care Coordinator  Unit 6A, [facility]

## 2018-02-26 NOTE — PROGRESS NOTES
Stroke Progress Note  02/26/2018    ON events: Sepsis protocol activated, normal lactate/procal, hds, unchanged/persistently elevated WBC. s/p PEG placement this morning, NG removed. Later this morning, reports head pain at sight of craniotomy, mild bilateral frontal headache. Mild nausea.     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 difficile colitis     Acute ischemic stroke (H)       Current Medications:    clindamycin  900 mg Intravenous Pre-Op/Pre-procedure x 1 dose     iohexol  50 mL Oral Once     [START ON 2/27/2018] influenza quadrivalent (PF) vacc age 3 yrs and older  0.5 mL Intramuscular Prior to discharge     sodium chloride (PF)  3 mL Intracatheter Q8H     FLUoxetine  20 mg Oral or Feeding Tube Daily     enoxaparin  40 mg Subcutaneous Q24H     cefTRIAXone  2 g Intravenous Q24H     multivitamins with minerals  15 mL Per Feeding Tube Daily     aspirin  81 mg Per G Tube Daily     folic acid  1 mg Per NG tube Daily     vitamin  B-1  100 mg Per NG tube Daily       PRN Medications:  - MEDICATION INSTRUCTIONS -, HOLD MEDICATION, midazolam, flumazenil, fentaNYL, naloxone, HOLD MEDICATION, acetaminophen, oxyCODONE, lidocaine (buffered or not buffered), lidocaine 4%, sodium chloride (PF), hydrALAZINE, labetalol, oxyCODONE IR, IV fluid REPLACEMENT ONLY, naloxone, potassium chloride, potassium chloride, potassium chloride, potassium chloride with lidocaine, potassium chloride, magnesium sulfate, potassium phosphate (KPHOS) in D5W IV, potassium phosphate  "(KPHOS) in D5W IV, potassium phosphate (KPHOS) in D5W IV, potassium phosphate (KPHOS) in D5W IV, ondansetron    Infusions:    - MEDICATION INSTRUCTIONS -       IV fluid REPLACEMENT ONLY         Objective findings:  /72  Pulse 75  Temp 98.6  F (37  C) (Oral)  Resp 18  Ht 1.626 m (5' 4\")  Wt 70 kg (154 lb 5.2 oz)  SpO2 98%  BMI 26.49 kg/m2    Intake/Output:    Intake/Output Summary (Last 24 hours) at 02/26/18 1147  Last data filed at 02/25/18 2100   Gross per 24 hour   Intake              790 ml   Output                0 ml   Net              790 ml       Physical Examination:   Vitals:  B/P: 114/72, T: 98.6, P: 75, R: 18  General:  Lethargic, depressed/ somnolent, decreased participation.   HEENT:  NC/AT, no icterus, op pink and moist, no ear or nose drainage.   Cardiac:  RRR, no m/r/g  Chest:  Breathing not labored, diminished b/l  Abdomen:  S/NT/ND  Extremities:  No LE swelling.    Skin:  No rash or lesion.      Neuro Exam:  Mental status: awake, alert, oriented to person, place, time. Follows commands on R side. No e/o aphasia but decreased participation in exam.   Cranial nerves: PERRL, EOMI. L facial droop. Hearing intact to conversation. Dysarthric.   Motor: 0/5 in ANAY+LE. 4+ to 5/5 on RU+LE. Does not w/d to pain but acknowledges noxious stimuli. Rigidity on RUE.   Reflexes: 2/5 at R B,BR, P, A; hyperreflexic at R B, BR, P, A, R babinski upgoing, clonus at R ankle  Sensory: L javi neglect / hemisensory loss; acknowledges noxious stimuli in R upper + lower extremity. Intact to light touch on R upper + lower extremity.  Coordination: FNF intact to RUE, unable to assess on L    Labs/Studies:  Recent Labs   Lab Test  02/26/18   1037  02/25/18   0758  02/24/18   0544  02/24/18   0542   02/22/18   0344   02/20/18   0556   NA  140  143   --   144   < >  153*   < >  158*   POTASSIUM  4.0  3.5   --    --    --   3.5   < >  2.9*   CHLORIDE  107  111*   --    --    --    --    --   132*   CO2  24  25   --    " --    --    --    --   20   ANIONGAP  9  7   --    --    --    --    --   7   GLC  85  100*   --    --    --    --    --   137*   BUN  9  13   --    --    --    --    --   11   CR  0.35*  0.35*   --   0.35*   --    --    < >  0.52   AVINASH  8.3*  8.1*   --    --    --    --    --   7.6*   WBC  20.6*  19.9*  18.6*   --    < >   --    < >  17.6*   RBC  3.09*  2.83*  2.92*   --    < >   --    < >  3.28*   HGB  9.1*  8.4*  8.7*   --    < >   --    < >  9.6*   PLT  495*  382  340  332   < >   --    < >  286    < > = values in this interval not displayed.       Recent Labs   Lab Test  02/22/18   1027  02/20/18   1143  02/17/18   1752  02/17/18   1107   INR  1.13  1.20*  Canceled, Test credited  1.18   PTT   --   30  Canceled, Test credited  22*       No lab results found in last 7 days.    ==========================================================    ASSESSMENT/PLAN:   Maggie Case is a 27 year old h/o PE and alcohol abuse admitted 2/17/2018 with RM1 occlusion with malignant MCA syndrome s/p decompressive hemicraniectomy POD#8, s/p PEG 2/26.      Plan:  #RM1 occlusion with malignant MCA syndrome s/p decompressive hemicraniectomy POD#8  Likely Embolic given her history PE in the setting of PFO. Hypercoag and Myeloproliferative (JAK2, CALR, MPL) workup pending  Heme consulted, appreciate recs. MRI 2/26 w/ expected evolution, less likely e/o infection.  - Aspirin for stroke prophylaxis for now, will eventually need to be anticoagulated  - Staples to remain in for 2 weeks. Planning for cranioplasty ~3/18. Follow up in clinic with Dr. Mesa 1 week prior to cranioplasty. Head CT prior to appointment.  - Fluoxetine 20mg daily  - Discontinued hypertonic saline 2/23  - SBP <200  - PT/OT/SLP   - SHAKIRA and PHQ9 screen     #RLL cavitation  #Bilateral hilar lymphadenopathy: differential includes infectious vs inflammatory etiology. Pulmonology consulted, appreciate recs; possibility for sarcoid vs. pna.  HIV nonreactive.   - Histo and  blasto ab pending  - Repeat CT chest in 4 weeks. Will assess at that time for BAL/lymph node biopsy.    #Pneuomia: Haemopholus influenza and MSSA. Repeat 2/25 procal 0.23.  #Leukocytosis: Variable leukocytosis on this admission with trend up/stable in the past few days. UA wnl, CRP elevated but lactate wnl + hds / afebrile. Discussed w/ Pulm + Hem/Onc, possibly related to underlying process (myeloprolif / sarcoid) vs. Treated pna/UTI + stress leukocytosis. Repeat MRI w/ expected evolution of R cerebral hemisphere infarct rather than infection.   - Ceftriaxone, 7/7 today  - Sputum culture + gram stain  - CBC tomorrow, trend leukocytosis     #Acute blood loss anemia: stable ~8. Acute drop post surgery. Required 2 units of pRBCs.  - Transfuse Hgb <7    #Concern for hypercoagulability  - Work-up pending, see above  - Hem/onc consulted     #UTI: culture positive for E coli. Sensitive to ceftriaxone.   - Continue ceftriaxone (day 7 of 7)   - Repeat UA unremarkable    #Dysphagia:   - Daily speech/swallow evals   - s/p PEG 2/26, continuing TF    #Hypernatremia, resolved - Iatrogenic from hypertonic saline.     #History of alcohol abuse:   - continue thiamine and folate.      PPX:    DVT prophylaxis: SCDs, lovenox daily    GI: not indicated    Renal: Electrolyte replacement    Nausea: compazine + zofran prn    Pain: oxycodone prn  Code Status: Full  Lines: PEG, PIV    =========================================================    This patient was seen & discussed with my attending, Dr. Annette Evans, who agrees with my assessment and plan.     Dylan Crawley   Neurology  [phone]

## 2018-02-26 NOTE — PROCEDURES
Interventional Radiology Brief Post Procedure Note    Procedure: IR GASTROSTOMY TUBE PERCUTANEOUS PLCMNT    Proceduralist: Ori Vega MD    Assistant: Radiology Resident Physician, Richmond Kapoor MD    Time Out: Prior to the start of the procedure and with procedural staff participation, I verbally confirmed the patient s identity using two indicators, relevant allergies, that the procedure was appropriate and matched the consent or emergent situation, and that the correct equipment/implants were available. Immediately prior to starting the procedure I conducted the Time Out with the procedural staff and re-confirmed the patient s name, procedure, and site/side. (The Joint Commission universal protocol was followed.)  Yes    Medications   Medication Event Details Admin User Admin Time   iodixanol (VISIPAQUE 320) injection 150 mL Medication Given Dose: 15 mL; Route: Tube; Scheduled Time:  8:30 AM José Luis Ratliff ARRT 2/26/2018  9:18 AM       Sedation: IR Nurse Monitored Care   Post Procedure Summary:  Prior to the start of the procedure and with procedural staff participation, I verbally confirmed the patient s identity using two indicators, relevant allergies, that the procedure was appropriate and matched the consent or emergent situation, and that the correct equipment/implants were available. Immediately prior to starting the procedure I conducted the Time Out with the procedural staff and re-confirmed the patient s name, procedure, and site/side. (The Joint Commission universal protocol was followed.)  Yes       Sedatives: Fentanyl and Midazolam (Versed)    Vital signs, airway and pulse oximetry were monitored and remained stable throughout the procedure and sedation was maintained until the procedure was complete.  The patient was monitored by staff until sedation discharge criteria were met.    Patient tolerance: Patient tolerated the procedure well with no immediate complications.    Time of sedation  in minutes: 30 Minutes minutes from beginning to end of physician one to one monitoring.          Findings: 18 F G tube placed.    Estimated Blood Loss: Minimal    Fluoroscopy Time: 2.9 minute(s)    SPECIMENS: None    Complications: 1. None     Condition: Stable    Plan: NPO and tube to gravity drainage for 4 hours.    Comments: See dictated procedure note for full details.    Silver Kapoor MD

## 2018-02-26 NOTE — PLAN OF CARE
Problem: Stroke (Ischemic) (Adult)  Goal: Signs and Symptoms of Listed Potential Problems Will be Absent, Minimized or Managed (Stroke)  Signs and symptoms of listed potential problems will be absent, minimized or managed by discharge/transition of care (reference Stroke (Ischemic) (Adult) CPG).   Outcome: Improving  VSS. Pt triggered sepsis protocol, lactic acid came back at 0.6. C/O pain in stomach, pt repositioned. Neuros unchanged; Left facial droop, numbness LUE/LLE. LUE/LLE 0/5, RUE RLE 4/5. Orientated x4. Head incision MACIE, staples intact (some edema noted). Wears helmet when oob. NPO. PEG was placed today, will be to gravity drainage until 1400. TF will be started later this afternoon. Inc of urine x 2 but also used bedpan/commode. Had large loose bm today. Up with lift, otherwise repositioned every 2 hours. Pt is currently at MRI. Mother at beside, SW aware that pt and mom would like to talk to her.

## 2018-02-26 NOTE — PLAN OF CARE
Problem: Patient Care Overview  Goal: Plan of Care/Patient Progress Review  Outcome: No Change  Afebrile,vitals stable,oriented x4,forgetful  to situation at times.LUE,LLE flaccid,numbness present,moves RUE and RLE on command.Left facial droop,See flow sheet for neuro documentation.NGT was accidentally pulled out by patient .New NGT  for medications  was placed, placement confirmed with X-ray.Headache comfortably managed with prn oxycodone 5 mg  Via NGT.Cath urine sample sent for UA.result pending. NPO, received contrast via NGT at 2100 in preparation for PEG tube placement tomorrow.Head incision open to air,staples intact,head incision assessed by on call neuro surgery tonight.Turned and repositioned every 2 hrs.

## 2018-02-27 ENCOUNTER — APPOINTMENT (OUTPATIENT)
Dept: OCCUPATIONAL THERAPY | Facility: CLINIC | Age: 28
End: 2018-02-27
Attending: PSYCHIATRY & NEUROLOGY
Payer: MEDICAID

## 2018-02-27 ENCOUNTER — APPOINTMENT (OUTPATIENT)
Dept: PHYSICAL THERAPY | Facility: CLINIC | Age: 28
End: 2018-02-27
Attending: PSYCHIATRY & NEUROLOGY
Payer: MEDICAID

## 2018-02-27 ENCOUNTER — APPOINTMENT (OUTPATIENT)
Dept: SPEECH THERAPY | Facility: CLINIC | Age: 28
End: 2018-02-27
Attending: PSYCHIATRY & NEUROLOGY
Payer: MEDICAID

## 2018-02-27 LAB
BACTERIA SPEC CULT: NO GROWTH
COPATH REPORT: NORMAL
CREAT SERPL-MCNC: 0.42 MG/DL (ref 0.52–1.04)
ERYTHROCYTE [DISTWIDTH] IN BLOOD BY AUTOMATED COUNT: 17.6 % (ref 10–15)
GFR SERPL CREATININE-BSD FRML MDRD: >90 ML/MIN/1.7M2
HCT VFR BLD AUTO: 28.5 % (ref 35–47)
HGB BLD-MCNC: 8.8 G/DL (ref 11.7–15.7)
MCH RBC QN AUTO: 28.9 PG (ref 26.5–33)
MCHC RBC AUTO-ENTMCNC: 30.9 G/DL (ref 31.5–36.5)
MCV RBC AUTO: 94 FL (ref 78–100)
PLATELET # BLD AUTO: 566 10E9/L (ref 150–450)
RBC # BLD AUTO: 3.04 10E12/L (ref 3.8–5.2)
SPECIMEN SOURCE: NORMAL
WBC # BLD AUTO: 14.6 10E9/L (ref 4–11)

## 2018-02-27 PROCEDURE — 92526 ORAL FUNCTION THERAPY: CPT | Mod: GN

## 2018-02-27 PROCEDURE — 97530 THERAPEUTIC ACTIVITIES: CPT | Mod: GP

## 2018-02-27 PROCEDURE — 36415 COLL VENOUS BLD VENIPUNCTURE: CPT | Performed by: PSYCHIATRY & NEUROLOGY

## 2018-02-27 PROCEDURE — 85027 COMPLETE CBC AUTOMATED: CPT | Performed by: PSYCHIATRY & NEUROLOGY

## 2018-02-27 PROCEDURE — 40000133 ZZH STATISTIC OT WARD VISIT: Performed by: OCCUPATIONAL THERAPIST

## 2018-02-27 PROCEDURE — 97110 THERAPEUTIC EXERCISES: CPT | Mod: GP

## 2018-02-27 PROCEDURE — 25000132 ZZH RX MED GY IP 250 OP 250 PS 637: Performed by: STUDENT IN AN ORGANIZED HEALTH CARE EDUCATION/TRAINING PROGRAM

## 2018-02-27 PROCEDURE — 40000193 ZZH STATISTIC PT WARD VISIT

## 2018-02-27 PROCEDURE — 25000128 H RX IP 250 OP 636: Performed by: STUDENT IN AN ORGANIZED HEALTH CARE EDUCATION/TRAINING PROGRAM

## 2018-02-27 PROCEDURE — 25000132 ZZH RX MED GY IP 250 OP 250 PS 637: Performed by: PSYCHIATRY & NEUROLOGY

## 2018-02-27 PROCEDURE — 27210437 ZZH NUTRITION PRODUCT SEMIELEM INTERMED LITER

## 2018-02-27 PROCEDURE — 12000008 ZZH R&B INTERMEDIATE UMMC

## 2018-02-27 PROCEDURE — 40000225 ZZH STATISTIC SLP WARD VISIT

## 2018-02-27 PROCEDURE — 97530 THERAPEUTIC ACTIVITIES: CPT | Mod: GO | Performed by: OCCUPATIONAL THERAPIST

## 2018-02-27 PROCEDURE — 82565 ASSAY OF CREATININE: CPT | Performed by: PSYCHIATRY & NEUROLOGY

## 2018-02-27 PROCEDURE — 97110 THERAPEUTIC EXERCISES: CPT | Mod: GO | Performed by: OCCUPATIONAL THERAPIST

## 2018-02-27 PROCEDURE — 97535 SELF CARE MNGMENT TRAINING: CPT | Mod: GO | Performed by: OCCUPATIONAL THERAPIST

## 2018-02-27 RX ORDER — HEPARIN SODIUM 10000 [USP'U]/100ML
0-3500 INJECTION, SOLUTION INTRAVENOUS CONTINUOUS
Status: DISCONTINUED | OUTPATIENT
Start: 2018-02-27 | End: 2018-02-27

## 2018-02-27 RX ADMIN — ACETAMINOPHEN 650 MG: 325 SOLUTION ORAL at 03:55

## 2018-02-27 RX ADMIN — OXYCODONE HYDROCHLORIDE 5 MG: 5 SOLUTION ORAL at 22:42

## 2018-02-27 RX ADMIN — MULTIVITAMIN 15 ML: LIQUID ORAL at 08:04

## 2018-02-27 RX ADMIN — RAMELTEON 8 MG: 8 TABLET, FILM COATED ORAL at 22:46

## 2018-02-27 RX ADMIN — ONDANSETRON 4 MG: 2 INJECTION INTRAMUSCULAR; INTRAVENOUS at 08:19

## 2018-02-27 RX ADMIN — Medication 100 MG: at 08:05

## 2018-02-27 RX ADMIN — ENOXAPARIN SODIUM 40 MG: 40 INJECTION SUBCUTANEOUS at 14:19

## 2018-02-27 RX ADMIN — OXYCODONE HYDROCHLORIDE 5 MG: 5 SOLUTION ORAL at 11:28

## 2018-02-27 RX ADMIN — OXYCODONE HYDROCHLORIDE 10 MG: 5 SOLUTION ORAL at 03:55

## 2018-02-27 RX ADMIN — OXYCODONE HYDROCHLORIDE 5 MG: 5 SOLUTION ORAL at 08:04

## 2018-02-27 RX ADMIN — ASPIRIN 81 MG CHEWABLE TABLET 81 MG: 81 TABLET CHEWABLE at 08:05

## 2018-02-27 RX ADMIN — OXYCODONE HYDROCHLORIDE 5 MG: 5 SOLUTION ORAL at 14:19

## 2018-02-27 RX ADMIN — OXYCODONE HYDROCHLORIDE 5 MG: 5 SOLUTION ORAL at 18:29

## 2018-02-27 RX ADMIN — ACETAMINOPHEN 650 MG: 325 SOLUTION ORAL at 16:51

## 2018-02-27 RX ADMIN — FOLIC ACID 1 MG: 1 TABLET ORAL at 08:05

## 2018-02-27 RX ADMIN — ACETAMINOPHEN 650 MG: 325 SOLUTION ORAL at 21:24

## 2018-02-27 RX ADMIN — ACETAMINOPHEN 650 MG: 325 SOLUTION ORAL at 11:28

## 2018-02-27 RX ADMIN — FLUOXETINE HYDROCHLORIDE 20 MG: 20 LIQUID ORAL at 08:04

## 2018-02-27 RX ADMIN — ACETAMINOPHEN 650 MG: 325 SOLUTION ORAL at 08:04

## 2018-02-27 ASSESSMENT — VISUAL ACUITY
OU: NORMAL ACUITY

## 2018-02-27 ASSESSMENT — PAIN DESCRIPTION - DESCRIPTORS
DESCRIPTORS: ACHING

## 2018-02-27 NOTE — PLAN OF CARE
Problem: Patient Care Overview  Goal: Plan of Care/Patient Progress Review  Discharge Planner SLP   Patient plan for discharge: rehab  Current status: The patient was seen for tx while sitting up in the chair. Somewhat resistive at times, but overall much more interactive and motivated today. Recommend the patient initiate a modified diet for safer swallowing: dysphagia diet 2 and nectar thick liquids. Recommend small bites/sips, slow pacing, and alternated consistencies. The patient should check the left side of her oral cavity for residue before taking another bite/sip.  Barriers to return to prior living situation: dysphagia, left weakness/neglect  Recommendations for discharge: ARU  Rationale for recommendations: intensive SLP tx to return to baseline swallowing function       Entered by: Yojana Sampson 02/27/2018 12:18 PM

## 2018-02-27 NOTE — PLAN OF CARE
Problem: Patient Care Overview  Goal: Plan of Care/Patient Progress Review  Discharge Planner OT  6A  Patient plan for discharge: ARU (verónica Gomez)   Current status: Pt with good participation today, alert and awake. Pt able to look to the clock to ID time of day.  Pt slow to respond but able to speak clearly  Barriers to return to prior living situation: weakness, vision, cognition  Recommendations for discharge: ARU  Rationale for recommendations: Pt presents with new deficits including :Right gaze preference however able to cross midline (with many cues). Dysarthria noted. Left hemiplegia, left javi neglect, left hemisensory loss leading to decreased function and safety. Pt unable to complete ADLs, bed mobility or transfers. Needs to achieve  Mod IND/SBA with walker and stairs to return home with assist from family. Has a 8yo daughter with shared custody, parents are present and involved. Pt will benefit from intensive, interdisciplinary ARU to address these deficits and to provide caregiver training to facilitate a safe dc to home.            Entered by: Sharron Salomon 02/27/2018 11:56 AM

## 2018-02-27 NOTE — PLAN OF CARE
Problem: Patient Care Overview  Goal: Plan of Care/Patient Progress Review  Discharge Planner PT   Patient plan for discharge: rehab  Current status: Moveo squats x6 sets up to 20 deg (~50% of body weight) x10 reps.  L side is not assisting with squats.  Poor control and strength in LLE.  Very distractible and showing gradually more attention to L side but continues to prefer R side.   Barriers to return to prior living situation: medical status   Recommendations for discharge: ARU  Rationale for recommendations: Would benefit from ARU stay in order to maximize functional mobility and L sided attention.  Is currently far below baseline with good familial support.        Entered by: Silver Carlos 02/27/2018 4:21 PM

## 2018-02-27 NOTE — PLAN OF CARE
Problem: Stroke (Ischemic) (Adult)  Goal: Signs and Symptoms of Listed Potential Problems Will be Absent, Minimized or Managed (Stroke)  Signs and symptoms of listed potential problems will be absent, minimized or managed by discharge/transition of care (reference Stroke (Ischemic) (Adult) CPG).   POD #8 decompression hemicrani for RM1 occlusion; R MCA stroke. VSS. A&O x4. Neuro unchanged: L facial droop, L hemiplegia, numbness to L side, RUE and RLE 4/5. Pt c/o HA and ABD pain partially controlled with PRN Tylenol, Oxy and ice packs. Head incision MACIE; stapled and edematous. Up with 2 and lift. T&R Q2. Tried to get up to chair but pt in too much pain and refused. NPO; PEG running TF at goal of 40 mL/hr. Incontinent of urine. CCM; NSR. Mother and father at bedside most of shift and very supportive. SW to see family tomorrow AM. Continue to monitor and follow current POC.

## 2018-02-27 NOTE — PROGRESS NOTES
Social Work Services Progress Note    Hospital Day: 11  Date of Initial Social Work Evaluation:  2/20/18  Collaborated with:  Pt's parents, ARU Admissions Coordinator, Dr. Crawley    Data:  Per Neurology,  Pt is medically ready for discharge.  An acute rehab stay is recommended.  Pt was referred to [facility] on 2/26/18.    Intervention:  Spoke with Neurology (Dr. Crawley).   Met with pt and parents (pt did not offer communication) to discuss discharge planning.  Parents are aware of ARU recommendation.   Per discussion, [facility] is not their first choice.  [facility] preferences [facility]listed in order of preference):  [facility] [place]  [facility] [place]  3.  [facility].  Per discussion, parents indicate that pt will discharge to their home upon completion of a ARU stay.  HERNAN phoned Admissions at [facility] (Latasha [phone]).  Per Latasha, she will not know until tomorrow whether or not they will have openings on 2/28/18.  Latasha will assess for both [facility] and [place] locations.  HERNAN phoned [facility] ([phone]) and left a message for Admissions to call.  HERNAN updated pt's parents.    Assessment:  Pt's parents support pursuit of ARU placement.   Pt's parents are visiting pt today at the hospital    Plan:    Anticipated Disposition:  Acute rehab placement    Barriers to d/c plan:  Secure placement    Follow Up:  HERNAN will follow for discharge planning.    RE Butler  Social Work, 6A  Phone:  [phone]  Pager:  [phone]  2/27/2018

## 2018-02-27 NOTE — PLAN OF CARE
Problem: Patient Care Overview  Goal: Plan of Care/Patient Progress Review  Outcome: Improving  Admit for right MCA stoke, POD #9 decompression javi crani. Head staples MACIE, no new drainage. VSS. Neuros unchanged, left sided hemiplegia. LUE/LLE numbness. Slight left sided facial droop. Right side 4/5. Garbled speech, flat affect. Alert and oriented x4, forgetful, disoriented to exact hospital name and location. Cardiac monitoring NSR. PEG TF goal 40ml/hr, NPO. Incontinent bowel/bladder. Turn and reposition q2h. PIV SL. Up 2/lift. Family would like to see SW today 2/27. Continue to monitor.

## 2018-02-27 NOTE — PLAN OF CARE
Problem: Stroke (Ischemic) (Adult)  Goal: Signs and Symptoms of Listed Potential Problems Will be Absent, Minimized or Managed (Stroke)  Signs and symptoms of listed potential problems will be absent, minimized or managed by discharge/transition of care (reference Stroke (Ischemic) (Adult) CPG).   Outcome: Improving  VSS. C/O pain in stomach/head, pt repositioned and given ice packs. Oxycodone and tylenol also given with good relief. Neuros unchanged; Left facial droop, numbness LUE/LLE. LUE/LLE 0/5, RUE RLE 4/5. Orientated x4. Pt very cooperative with therapies today, was also more engaged with RN. Head incision MACIE, staples intact (some edema noted). Wears helmet when oob. Was advanced to DD2 diet with thick liquids, did well with apple juice. PEG tube at goal of 40 ml/hr. Inc of urine x 2, used bedpan once. Up with lift, otherwise repositioned every 2 hours. Sat in recliner this am. Possible DC in next few days, HERNAN met with pt and family.

## 2018-02-27 NOTE — PROGRESS NOTES
Stroke Progress Note  02/27/2018    ON events: NAEO, incontinent b/b but also using bedpan - says this is due to incontinence and not being able to get the bed pan in time. Reports continued headache / head pain at craniotomy site, similar to previous. Continued mild nausea, tolerating tf.     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 difficile colitis     Acute ischemic stroke (H)       Current Medications:    clindamycin  900 mg Intravenous Pre-Op/Pre-procedure x 1 dose     iohexol  50 mL Oral Once     influenza quadrivalent (PF) vacc age 3 yrs and older  0.5 mL Intramuscular Prior to discharge     sodium chloride (PF)  3 mL Intracatheter Q8H     FLUoxetine  20 mg Oral or Feeding Tube Daily     enoxaparin  40 mg Subcutaneous Q24H     multivitamins with minerals  15 mL Per Feeding Tube Daily     aspirin  81 mg Per G Tube Daily     folic acid  1 mg Per NG tube Daily     vitamin  B-1  100 mg Per NG tube Daily       PRN Medications:  - MEDICATION INSTRUCTIONS -, HOLD MEDICATION, midazolam, HOLD MEDICATION, prochlorperazine, ramelteon, oxyCODONE, acetaminophen, lidocaine (buffered or not buffered), lidocaine 4%, sodium chloride (PF), hydrALAZINE, labetalol, IV fluid REPLACEMENT ONLY, naloxone, potassium chloride, potassium chloride, potassium chloride, potassium chloride with lidocaine, potassium chloride, magnesium sulfate, potassium phosphate (KPHOS) in D5W IV, potassium phosphate (KPHOS) in D5W IV, potassium phosphate (KPHOS) in D5W IV, potassium phosphate  "(KPHOS) in D5W IV, ondansetron    Infusions:    - MEDICATION INSTRUCTIONS -       IV fluid REPLACEMENT ONLY         Objective findings:  /65 (BP Location: Right arm)  Pulse 78  Temp 98.7  F (37.1  C) (Oral)  Resp 16  Ht 1.626 m (5' 4\")  Wt 70 kg (154 lb 5.2 oz)  SpO2 98%  BMI 26.49 kg/m2    Intake/Output:    Intake/Output Summary (Last 24 hours) at 02/27/18 0702  Last data filed at 02/27/18 0600   Gross per 24 hour   Intake              880 ml   Output               20 ml   Net              860 ml       Physical Examination:   Vitals:  B/P: 104/65, T: 98.7, P: 78, R: 16 --> avg  General:  Sad, flat  HEENT:  S/p R craniotomy, no icterus, op pink and moist, no ear or nose drainage.   Cardiac:  RRR, no m/r/g  Chest:  Diminished b/l  Abdomen:  PEG in place  Extremities:  No LE swelling.    Skin:  No rash or lesion.      Neuro Exam:  Mental status: awake, alert, oriented to person, place, time. Follows commands on R side. No e/o aphasia.  Cranial nerves: PERRL, R sided gaze preference, EOMI/able to cross midline. L lower facial droop. Hearing intact to conversation. Dysarthric.0/5 L shoulder shrug.   Motor: 0/5 in ANAY+LE. 4+ to 5/5 on RU+LE. Does not w/d to pain but acknowledges noxious stimuli. Rigidity on RUE.   Reflexes: 2/5 in R: B,BR, P, A; hyperreflexic at R B, BR, P, A, R babinski upgoing, clonus at R ankle  Sensory: L javi neglect / hemisensory loss; acknowledges noxious stimuli in R upper + lower extremity. Intact to light touch on R upper + lower extremity.  Coordination: FNF intact to RUE, unable to assess on L    Labs/Studies:  Recent Labs   Lab Test  02/26/18   1037  02/25/18   0758  02/24/18   0544  02/24/18   0542   02/22/18   0344   02/20/18   0556   NA  140  143   --   144   < >  153*   < >  158*   POTASSIUM  4.0  3.5   --    --    --   3.5   < >  2.9*   CHLORIDE  107  111*   --    --    --    --    --   132*   CO2  24  25   --    --    --    --    --   20   ANIONGAP  9  7   --    --    --    " --    --   7   GLC  85  100*   --    --    --    --    --   137*   BUN  9  13   --    --    --    --    --   11   CR  0.35*  0.35*   --   0.35*   --    --    < >  0.52   AVINASH  8.3*  8.1*   --    --    --    --    --   7.6*   WBC  20.6*  19.9*  18.6*   --    < >   --    < >  17.6*   RBC  3.09*  2.83*  2.92*   --    < >   --    < >  3.28*   HGB  9.1*  8.4*  8.7*   --    < >   --    < >  9.6*   PLT  495*  382  340  332   < >   --    < >  286    < > = values in this interval not displayed.       Recent Labs   Lab Test  02/22/18   1027  02/20/18   1143  02/17/18   1752  02/17/18   1107   INR  1.13  1.20*  Canceled, Test credited  1.18   PTT   --   30  Canceled, Test credited  22*     WBC: 20.6 -> 14.6  Hgb 8.8  2/26 LActate 0.6    ==========================================================    ASSESSMENT/PLAN:   Maggie Case is a 27 year old w/ h/o PE (noncompl w.AC), previous miscarriages and alcohol abuse who was admitted 2/17/2018 with RM1 occlusion with malignant MCA syndrome s/p decompressive hemicraniectomy POD#9, s/p PEG 2/26.      Plan:  #RM1 occlusion with malignant MCA syndrome s/p decompressive hemicraniectomy POD#9  Likely Embolic given her history PE, setting of PFO. Concern for hypercoag and Myeloproliferative (JAK2, CALR, MPL), workup pending  Heme consulted, appreciate recs. Repeat MRI 2/26 w/ expected evolution, less likely e/o infection.  - Aspirin for stroke prophylaxis for now, will discuss AC today  - Staples to remain in for 2 weeks (3/4). Planning for cranioplasty ~3/18. Follow up in clinic with Dr. Mesa 1 week prior to cranioplasty. Head CT prior to appointment.  - Fluoxetine 20mg daily  - Hypercoag pending  - SBP <200  - PT/OT/SLP   - SHAKIRA and PHQ9 screen      #RLL cavitation  #Bilateral hilar lymphadenopathy: differential includes infectious vs inflammatory etiology. Pulmonology consulted, appreciate recs; possibility for sarcoid vs. pna, tx'd for PNA w/ MSSA + H.flu s/p ceftriaxone day 7/7.    HIV nonreactive.   - Histo and blasto ab pending  - Repeat CT chest in 4 weeks (~ 3/17). Will assess at that time for BAL/lymph node biopsy.     #Pneuomia: Haemopholus influenza and MSSA. Repeat 2/25 procal 0.23. Completed 7/7d Ceftriaxone on 2/26.  #Leukocytosis: Variable leukocytosis on this admission, trending down from 2/16 20 ->14 . UA wnl, CRP elevated but lactate wnl + hds / afebrile. Discussed w/ Pulm + Hem/Onc, possibly related to underlying process (myeloprolif / sarcoid) vs. Treated pna/UTI + stress leukocytosis. Repeat MRI w/ expected evolution of R cerebral hemisphere infarct rather than infection.   - Completed Ceftriaxone 7 day course on 2/26  - Sputum culture + gram stain, pending order  - Continue to trend leukocytosis      #Acute blood loss anemia: stable ~8. Acute drop post surgery. Required 2 units of pRBCs.  - Transfuse Hgb <7     #Concern for hypercoagulability  - Work-up pending  - Hem/onc consulted      #UTI: culture positive for E coli. Sensitive to ceftriaxone.   - Completed ceftriaxone course (day 7 of 7, 2/26)   - Repeat UA unremarkable     #Dysphagia: NPO currently, tf.   #Diet: s/p PEG 2/26, Dietician following, laure miller.   - Daily speech/swallow evals   - s/p PEG 2/26, continuing TF     #Hypernatremia, resolved - Iatrogenic from hypertonic saline.      #History of alcohol abuse:   - continue thiamine and folate.      PPX:    DVT prophylaxis: SCDs, lovenox daily    GI: not indicated    Diet: TF    Renal: Electrolyte replacement    Nausea: compazine + zofran prn    Pain: oxycodone prn  Code Status: Full  Lines: PEG, PIV  D/c: pending placement    =========================================================    This patient was seen & discussed with my attending, Dr. Annette Evans who agrees with my assessment and plan.     Dylan Crawley   Neurology  [phone]

## 2018-02-28 ENCOUNTER — APPOINTMENT (OUTPATIENT)
Dept: CT IMAGING | Facility: CLINIC | Age: 28
End: 2018-02-28
Attending: PSYCHIATRY & NEUROLOGY
Payer: MEDICAID

## 2018-02-28 ENCOUNTER — APPOINTMENT (OUTPATIENT)
Dept: PHYSICAL THERAPY | Facility: CLINIC | Age: 28
End: 2018-02-28
Attending: PSYCHIATRY & NEUROLOGY
Payer: MEDICAID

## 2018-02-28 ENCOUNTER — APPOINTMENT (OUTPATIENT)
Dept: SPEECH THERAPY | Facility: CLINIC | Age: 28
End: 2018-02-28
Attending: PSYCHIATRY & NEUROLOGY
Payer: MEDICAID

## 2018-02-28 LAB
ERYTHROCYTE [DISTWIDTH] IN BLOOD BY AUTOMATED COUNT: 17 % (ref 10–15)
GLUCOSE BLDC GLUCOMTR-MCNC: 150 MG/DL (ref 70–99)
HCT VFR BLD AUTO: 28.2 % (ref 35–47)
HGB BLD-MCNC: 8.7 G/DL (ref 11.7–15.7)
LAB SCANNED RESULT: NORMAL
LAB SCANNED RESULT: NORMAL
LMWH PPP CHRO-ACNC: 0.16 IU/ML
MCH RBC QN AUTO: 29.3 PG (ref 26.5–33)
MCHC RBC AUTO-ENTMCNC: 30.9 G/DL (ref 31.5–36.5)
MCV RBC AUTO: 95 FL (ref 78–100)
PLATELET # BLD AUTO: 562 10E9/L (ref 150–450)
RBC # BLD AUTO: 2.97 10E12/L (ref 3.8–5.2)
WBC # BLD AUTO: 13.4 10E9/L (ref 4–11)

## 2018-02-28 PROCEDURE — 25000128 H RX IP 250 OP 636: Performed by: NURSE PRACTITIONER

## 2018-02-28 PROCEDURE — 12000008 ZZH R&B INTERMEDIATE UMMC

## 2018-02-28 PROCEDURE — 25000132 ZZH RX MED GY IP 250 OP 250 PS 637: Performed by: PSYCHIATRY & NEUROLOGY

## 2018-02-28 PROCEDURE — 25000132 ZZH RX MED GY IP 250 OP 250 PS 637: Performed by: STUDENT IN AN ORGANIZED HEALTH CARE EDUCATION/TRAINING PROGRAM

## 2018-02-28 PROCEDURE — 70450 CT HEAD/BRAIN W/O DYE: CPT

## 2018-02-28 PROCEDURE — 97110 THERAPEUTIC EXERCISES: CPT | Mod: GP

## 2018-02-28 PROCEDURE — 40000225 ZZH STATISTIC SLP WARD VISIT: Performed by: SPEECH-LANGUAGE PATHOLOGIST

## 2018-02-28 PROCEDURE — 97530 THERAPEUTIC ACTIVITIES: CPT | Mod: GP

## 2018-02-28 PROCEDURE — 85027 COMPLETE CBC AUTOMATED: CPT | Performed by: NURSE PRACTITIONER

## 2018-02-28 PROCEDURE — 36415 COLL VENOUS BLD VENIPUNCTURE: CPT | Performed by: PSYCHIATRY & NEUROLOGY

## 2018-02-28 PROCEDURE — 27210437 ZZH NUTRITION PRODUCT SEMIELEM INTERMED LITER

## 2018-02-28 PROCEDURE — 92526 ORAL FUNCTION THERAPY: CPT | Mod: GN | Performed by: SPEECH-LANGUAGE PATHOLOGIST

## 2018-02-28 PROCEDURE — 85520 HEPARIN ASSAY: CPT | Performed by: PSYCHIATRY & NEUROLOGY

## 2018-02-28 PROCEDURE — 40000193 ZZH STATISTIC PT WARD VISIT

## 2018-02-28 PROCEDURE — 36415 COLL VENOUS BLD VENIPUNCTURE: CPT | Performed by: NURSE PRACTITIONER

## 2018-02-28 PROCEDURE — 25000128 H RX IP 250 OP 636: Performed by: STUDENT IN AN ORGANIZED HEALTH CARE EDUCATION/TRAINING PROGRAM

## 2018-02-28 PROCEDURE — 97112 NEUROMUSCULAR REEDUCATION: CPT | Mod: GP

## 2018-02-28 RX ORDER — HEPARIN SODIUM 10000 [USP'U]/100ML
0-3500 INJECTION, SOLUTION INTRAVENOUS CONTINUOUS
Status: DISCONTINUED | OUTPATIENT
Start: 2018-02-28 | End: 2018-03-01 | Stop reason: ALTCHOICE

## 2018-02-28 RX ADMIN — ACETAMINOPHEN 650 MG: 325 SOLUTION ORAL at 18:01

## 2018-02-28 RX ADMIN — ACETAMINOPHEN 650 MG: 325 SOLUTION ORAL at 22:08

## 2018-02-28 RX ADMIN — OXYCODONE HYDROCHLORIDE 5 MG: 5 SOLUTION ORAL at 06:32

## 2018-02-28 RX ADMIN — OXYCODONE HYDROCHLORIDE 5 MG: 5 SOLUTION ORAL at 22:09

## 2018-02-28 RX ADMIN — Medication 100 MG: at 08:16

## 2018-02-28 RX ADMIN — FOLIC ACID 1 MG: 1 TABLET ORAL at 08:16

## 2018-02-28 RX ADMIN — ACETAMINOPHEN 650 MG: 325 SOLUTION ORAL at 06:32

## 2018-02-28 RX ADMIN — HEPARIN SODIUM 850 UNITS/HR: 10000 INJECTION, SOLUTION INTRAVENOUS at 10:13

## 2018-02-28 RX ADMIN — OXYCODONE HYDROCHLORIDE 5 MG: 5 SOLUTION ORAL at 18:01

## 2018-02-28 RX ADMIN — OXYCODONE HYDROCHLORIDE 5 MG: 5 SOLUTION ORAL at 02:38

## 2018-02-28 RX ADMIN — OXYCODONE HYDROCHLORIDE 5 MG: 5 SOLUTION ORAL at 10:20

## 2018-02-28 RX ADMIN — ONDANSETRON 4 MG: 2 INJECTION INTRAMUSCULAR; INTRAVENOUS at 06:23

## 2018-02-28 RX ADMIN — ACETAMINOPHEN 650 MG: 325 SOLUTION ORAL at 10:20

## 2018-02-28 RX ADMIN — ACETAMINOPHEN 650 MG: 325 SOLUTION ORAL at 14:13

## 2018-02-28 RX ADMIN — FLUOXETINE HYDROCHLORIDE 20 MG: 20 LIQUID ORAL at 08:16

## 2018-02-28 RX ADMIN — MULTIVITAMIN 15 ML: LIQUID ORAL at 08:16

## 2018-02-28 RX ADMIN — ACETAMINOPHEN 650 MG: 325 SOLUTION ORAL at 02:38

## 2018-02-28 RX ADMIN — OXYCODONE HYDROCHLORIDE 5 MG: 5 SOLUTION ORAL at 14:13

## 2018-02-28 ASSESSMENT — VISUAL ACUITY
OU: NORMAL ACUITY

## 2018-02-28 NOTE — PROGRESS NOTES
Social Work Services Progress Note    Hospital Day: 12  Date of Initial Social Work Evaluation:  2/20/18  Collaborated with:  Admissions (Miriam) at [facility] Jason Brenner, pt, parents and Dr. Crawley    Data:  Discharge is anticipated tomorrow if pt's INR is therapeutic (per Dr. Crawley).  It is anticipated that pt will have a cranioplasty on 3.18/18 and pt must wear her naheed when she is up.  Per Dr. Crawley, it is likely that pt will be medically ready for discharge tomorrow.    Intervention:  Spoke with Dr. Crawley.  Relayed above information to Brittaney Gomez at [facility] admissions (Miriam).  Per Miriam, pt is appropriate for admit and she anticipates bed availability on 3/1/18.  Miriam will seek insurance authorization.  SW updated pt and parents.    Assessment:  Pt engaged in conversation.   Pt and parents voice understanding of the discharge plan and agreement with the discharge plan.    Plan:    Anticipated Disposition:  Acute Rehab placement    Barriers to d/c plan:  Awaiting therapeutic INR    Follow Up:  SW will continue to follow for discharge planning.    RE Butler  Social Work, 6A  Phone:  [phone]  Pager:  [phone]  2/28/2018

## 2018-02-28 NOTE — PLAN OF CARE
Problem: Patient Care Overview  Goal: Plan of Care/Patient Progress Review  Discharge Planner SLP   Patient plan for discharge: inpt rehab  Current status: Pt continues to make good improvements.  Tolerating current diet well w/o outward s/sx of aspiration. Did trial small amounts of thin liquids today w/ taking small sips w/o outward s/sx of aspiration. Pt has high risk for aspiration of thin liquids at this time and will need to demonstrate consistency of tolerance prior to adv diet.  Recommend continue with dysphagia diet level 2 with nectar thick liquids.  ST to f/u for diet tolerance and ability to safely adv diet as tolerated.   Barriers to return to prior living situation: level of care  Recommendations for discharge: ARU  Rationale for recommendations: Pt will need ongoing swallow tx and initiate cognitive therapy upon transfer to inpt rehab       Entered by: Kristin Prado 02/28/2018 3:15 PM

## 2018-02-28 NOTE — PROGRESS NOTES
Stroke Progress Note  02/28/2018    ON events: ON lethargic, HA, abd pain improved w/ PRN tylenol, oxy + ice packs. Incontinent of urine. Diet changed to DD3 w/ Near thick. Continued head pain headache, nausea. Starting heparin gtt today.    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 difficile colitis     Acute ischemic stroke (H)       Current Medications:    influenza quadrivalent (PF) vacc age 3 yrs and older  0.5 mL Intramuscular Prior to discharge     sodium chloride (PF)  3 mL Intracatheter Q8H     FLUoxetine  20 mg Oral or Feeding Tube Daily     multivitamins with minerals  15 mL Per Feeding Tube Daily     folic acid  1 mg Per NG tube Daily     vitamin  B-1  100 mg Per NG tube Daily       PRN Medications:  - MEDICATION INSTRUCTIONS -, HOLD MEDICATION, midazolam, prochlorperazine, ramelteon, oxyCODONE, acetaminophen, lidocaine (buffered or not buffered), lidocaine 4%, sodium chloride (PF), hydrALAZINE, labetalol, IV fluid REPLACEMENT ONLY, naloxone, potassium chloride, potassium chloride, potassium chloride, potassium chloride with lidocaine, potassium chloride, magnesium sulfate, potassium phosphate (KPHOS) in D5W IV, potassium phosphate (KPHOS) in D5W IV, potassium phosphate (KPHOS) in D5W IV, potassium phosphate (KPHOS) in D5W IV, ondansetron    Infusions:    HEParin 850 Units/hr (02/28/18 1013)     - MEDICATION INSTRUCTIONS -       IV fluid REPLACEMENT ONLY         Objective findings:  /70  Pulse 82  Temp 98.4  F (36.9  C) (Oral)  Resp 16  Ht  "1.626 m (5' 4\")  Wt 70 kg (154 lb 5.2 oz)  SpO2 98%  BMI 26.49 kg/m2    Intake/Output:    Intake/Output Summary (Last 24 hours) at 02/28/18 1459  Last data filed at 02/28/18 1400   Gross per 24 hour   Intake             1550 ml   Output                0 ml   Net             1550 ml       Physical Examination:   Vitals:  B/P: 104/70, T: 98.4, P: 82, R: 16  General:  Flat affect.  HEENT:  NC/AT, no icterus, op pink and moist, no ear or nose drainage.   Cardiac:  RRR, no m/r/g  Chest:  Breathing unlabored b/l  Abdomen:  S/NT/ND, PEG w/o significant surrounding erythema or purulent drainage  Extremities:  No LE swelling.    Skin:  No rash or lesion.      Neuro:  Mental status: awake, alert, oriented to person, place, time. Follows commands on R side. No e/o aphasia.  Cranial nerves: PERRL, R sided gaze preference, EOMI/able to cross midline. L lower facial droop. Hearing intact to conversation. Dysarthric.  Motor: 0/5 in ANAY+LE. 4+/5 on RU+LE. Rigidity on LUE.   Reflexes: 2/5 in R: B,BR, P, A; hyperreflexic at Left B, BR, P, A, Left babinski upgoing, clonus at L > R ankle.  Sensory: L javi neglect / hemisensory loss; acknowledges noxious stimuli in L upper + lower extremity but does not w/d. Intact to light touch on R upper + lower extremity.  Coordination: FNF intact to RUE, unable to assess on L    Labs/Studies:  Recent Labs   Lab Test  02/28/18   0828  02/27/18   0743  02/26/18   1037  02/25/18   0758   02/24/18   0542   02/22/18   0344   02/20/18   0556   NA   --    --   140  143   --   144   < >  153*   < >  158*   POTASSIUM   --    --   4.0  3.5   --    --    --   3.5   < >  2.9*   CHLORIDE   --    --   107  111*   --    --    --    --    --   132*   CO2   --    --   24  25   --    --    --    --    --   20   ANIONGAP   --    --   9  7   --    --    --    --    --   7   GLC   --    --   85  100*   --    --    --    --    --   137*   BUN   --    --   9  13   --    --    --    --    --   11   CR   --   0.42*  " 0.35*  0.35*   --   0.35*   --    --    < >  0.52   AVINASH   --    --   8.3*  8.1*   --    --    --    --    --   7.6*   WBC  13.4*  14.6*  20.6*  19.9*   < >   --    < >   --    < >  17.6*   RBC  2.97*  3.04*  3.09*  2.83*   < >   --    < >   --    < >  3.28*   HGB  8.7*  8.8*  9.1*  8.4*   < >   --    < >   --    < >  9.6*   PLT  562*  566*  495*  382   < >  332   < >   --    < >  286    < > = values in this interval not displayed.       Recent Labs   Lab Test  02/22/18   1027  02/20/18   1143  02/17/18   1752  02/17/18   1107   INR  1.13  1.20*  Canceled, Test credited  1.18   PTT   --   30  Canceled, Test credited  22*       No lab results found in last 7 days.     I: CTH w/ evolution of Large R cerebral hemisphere infarction, resolution of R-L midline shift + subfalcine herniation    ==========================================================    ASSESSMENT/PLAN:   Maggie Case is a 27 year old w/ h/o PE (noncompl w.AC), previous miscarriages and alcohol abuse who was admitted 2/17/2018 with RM1 occlusion with malignant MCA syndrome s/p decompressive hemicraniectomy POD#10, s/p PEG 2/26.      Plan:  #RM1 occlusion with malignant MCA syndrome s/p decompressive hemicraniectomy POD#9  Likely Embolic given her history PE, setting of PFO. Concern for hypercoag and Myeloproliferative (JAK2, CALR, MPL), workup pending  Heme consulted, appreciate recs. Repeat MRI 2/26 w/ expected evolution, less likely e/o infection.  - d/c'd ASA, started heparin gtt w/ plan to recheck head CT when therapeutic, followed by transitioning to lovenox + warfarin  - Staples removed per [facility]. Planning for cranioplasty ~3/18. Follow up in clinic with Dr. Mesa 1 week prior to cranioplasty. Head CT prior to appointment.  - Fluoxetine 20mg daily  - Hypercoag pending (Factor 2 Prothrombin mut analysis, FVL mut analysis)  - SBP <200  - PT/OT/SLP   - SHAKIRA and PHQ9 screen      #RLL cavitation  #Bilateral hilar lymphadenopathy: differential includes  infectious vs inflammatory etiology. Pulmonology consulted, appreciate recs; possibility for sarcoid vs. pna, tx'd for PNA w/ MSSA + H.flu s/p ceftriaxone day 7/7.   HIV nonreactive.   - Histo and blasto ab pending  - Repeat CT chest in 4 weeks (~ 3/17). Will assess at that time for BAL/lymph node biopsy.      #Pneuomia: Haemopholus influenza and MSSA. Repeat 2/25 procal 0.23. Completed 7/7d Ceftriaxone on 2/26.  #Mildly elevated platelets  #Leukocytosis: Variable leukocytosis on this admission, trending down from 2/26 20 -> 2/27 14 -> 13 2/28 . UA wnl, CRP elevated but lactate wnl + hds / afebrile. Discussed w/ Pulm + Hem/Onc, possibly related to underlying process (myeloprolif / sarcoid) vs. Treated pna/UTI + stress leukocytosis. Repeat MRI w/ expected evolution of R cerebral hemisphere infarct rather than infection.   - Completed Ceftriaxone 7 day course on 2/26  - Sputum culture + gram stain, pending order  - Continue to trend leukocytosis while Inpatient    #Acute blood loss anemia: stable ~8. Acute drop post surgery. Required 2 units of pRBCs post-op, stable since.  - Transfuse Hgb <7      #Concern for hypercoagulability  - Complete Work-up pending  - Hem/onc consulted       #UTI: culture positive for E coli. Sensitive to ceftriaxone.   - Completed ceftriaxone course (day 7 of 7, 2/26)   - Repeat UA unremarkable      #Dysphagia: tf, DD3 w/ nectar thick liquids + nutrition support. s/p PEG 2/26, Dietician following, appreciate recs.   - Daily speech/swallow evals   - s/p PEG 2/26, continuing TF      #Hypernatremia, resolved - Iatrogenic from hypertonic saline.       #History of alcohol abuse:   - continue thiamine and folate.      PPX:    DVT prophylaxis: SCDs, lovenox daily    GI: not indicated    Diet: TF, DD3 w/ nectar thick    Renal: Electrolyte replacement    Nausea: compazine + zofran prn    Pain: oxycodone + tylenol prn  Code Status: Full  Lines: PEG, PIV  D/c: pending  placement     =========================================================     This patient was seen & discussed with my attending, Dr. Annette Evans who agrees with my assessment and plan.      Dylan Crawley   Neurology  [phone]

## 2018-02-28 NOTE — DISCHARGE SUMMARY
"[facility], [place]    Neurology Stroke Discharge Summary    Date of Admission: 2/17/2018  Date of Discharge: 03/01/2018    Disposition: Discharged to ARU  Primary Care Physician: Chapo Florse      Admission Diagnosis:   L sided weakness    Discharge Diagnosis:   Ischemic Stroke due to undetermined etiology, thought to be 2/2 hypercoagulopathic state    Problem Leading to Hospitalization (from \[facility] [place] [facility]\""):   Per H&P 2/17:  Maggie Case is a 27 year old female with a history of PE not on AC and alcohol use who was transferred from OSH with concern for malignant MCA syndrome.  Per chart review the patient was drinking with friends last night and woke up with left sided weakness. She was then brought in by ambulance to OSH.  On arrival BP was noted to be 130/100. Head CT was concerning with evidence of dense right MCA sign and well established infarct in the right MCA territory.  Patient transferred to [facility] for further cares.  Patient denied any alcohol or drug use and stated \"I woke up this way.\"  Per mother patient has been struggling with alcoholism for years and has been distant from the family for the last year. Per chart review the patient has a history of PE over a year ago and was previously on AC however the patient was not taking the medication.    Please see H&P dated 2/17/2018 for further details about presentation.    Brief Hospital Course:   Patient presented from OSH w/ malignant MCA syndrome.  Found to have RM1 occlusion.  IV tPA was not given due to being outside the time window.  S/p decompressive hemicraniectomy 2/18. F/u MRI on 2/26 w/ expected evolutional changes. Started on ASA 81mg upon admission, then transitioned to heparin until therapeutic and upon unchanged head ct, changed to lovenox + warfarin prior to d/c. Started on Fluoxetine as well. Patient has had persistent L hemiplegia, hemisensory loss and neglect, field cut.     Work-up as stated below under Pertinent " Investigations.    Etiology is thought to be 2/2 hypercoagulable state in the setting of previous miscarriages, PE + PFO.  No DVT found in US of upper or lower extremity. Portions of Hypercoag w/u pending.    Rehab evaluation: OT, PT and SLP.     Smoking Cessation: patient is not a smoker    BP Long-term Goal: 140/90 or less    Antithrombotic/Anticoagulant Agent: started on aspirin 81mg qday initially, followed by initiation of heparin gtt 2/27, transitioned to enoxaparin  + warfarin prior to d/c, Goal INR 2-3.     Statins: Statin contraindicated because LDL 39       Hgb A1C Goal: < 7.0    Complications: Pneumonia, UTI, b/l pulmonary hilar lymphadenopathy/RLL cavitation, acute blood loss anemia following craniectomy, persistent leukocytosis, dysphagia s/p PEG 2/26 + ongoing tube feeds, iatrogenic hypernatremia.     Problems addressed during this hospitalization:  #RM1 occlusion with malignant MCA syndrome s/p decompressive hemicraniectomy POD#9  Likely Embolic given her history PE, setting of PFO. Concern for hypercoag and Myeloproliferative (JAK2, CALR, MPL), workup pendin,g  Heme consulted, appreciated recs. Repeat MRI 2/26 w/ expected evolution, less likely e/o infection.  - started on aspirin, transitioned to heparin, became therapeutic w/ stable head CT; transitioned to lovenox + warfarin on day of d/c.  Goal INR 2-3.   - Staples removed. Planning for cranioplasty ~3/18. Follow up in clinic with a head ct and  preferably followed by an appointment w/ Dr. Mesa (per [facility], appt may not required  if images can be forwarded). 1 week week prior to cranioplasty. Head CT prior to appointment.   - Fluoxetine 20mg daily  - Hypercoag pending (Next Gen, F2 + F5 mutation analysis)  - SBP <200  - PT/OT/SLP   - SHAKIRA and PHQ9 screened (see below)  - f/u w/ Stroke Clinic, Cardiology + Hematology      #RLL cavitation  #Bilateral hilar lymphadenopathy: differential includes infectious vs inflammatory etiology. Pulmonology  consulted, appreciate recs; possibility for sarcoid vs. pna, tx'd for PNA w/ MSSA + H.flu s/p ceftriaxone day 7/7.   HIV nonreactive. Stable, w/o respiratory distress on room air.  - Histo and blasto ab pending  - Repeat CT chest in 4 weeks (~ 3/17). Will assess at that time for BAL/lymph node biopsy.      #Pneuomia: Haemopholus influenza and MSSA. Repeat 2/25 procal 0.23. Completed 7/7d Ceftriaxone on 2/26.  #Mild Thrombocytosis  #Leukocytosis:   Variable leukocytosis on this admission, trending down from 2/16 20 ->14 back to 16 at day of d/c. Mildly elevated platelets, asx. UA wnl, CRP elevated but lactate wnl + hds / afebrile. Discussed w/ Pulm + Hem/Onc, possibly related to underlying process (myeloprolif / sarcoid) vs. +/- treated pna/UTI + stress leukocytosis. Repeat MRI w/ expected evolution of R cerebral hemisphere infarct rather than infection.   - Completed Ceftriaxone 7 day course on 2/26  - F/u w/ pulmonology + hematology following d/c      #Acute blood loss anemia: stable ~8+. Acute drop post surgery required 2 units of pRBCs, stable since.  - Transfuse Hgb <7      #Concern for hypercoagulability  #Concern for myeloproliferative disorder  Persistent leukocytosis (discussed above), mild platelet elevation  - Work-up pending (Factor 5 + 2 mut analysis/finalization, Next Gen sequencing)  - Hem/onc consulted, to f/u as outpatient       #PFO: found to have on echocardiogram  - F/u w/ cardiology regarding further management    #UTI: culture positive for E coli. Sensitive to ceftriaxone.   - Completed ceftriaxone course (day 7 of 7, 2/26)   - Repeat UA unremarkable      #Dysphagia, s/p PEG 2/26: tf. DD2 w/ thickened liquids  #Diet:   - Daily speech/swallow evals   Per Dietary recs:    - Transition pt to standard regimen with fiber in preparation for d/c/long-term EN needs. Recommend continuous regimen until PO intake improves.   - Initiate Isosource 1.5 @ goal 40 ml/hr (960 ml/day) to provide 1440 kcals (26  kcal/kg/day), 65 g PRO (1.2 g/kg/day), 730 ml free H2O, 169 g CHO and 14 g Fiber daily.  - Adjust accordingly to PO intakes. Reorder kaylyn counts (should pt remain inpatient)    #Hypernatremia, resolved - Iatrogenic from hypertonic saline.       #History of possible alcohol abuse:   - continue thiamine and folate.      PPX:    DVT prophylaxis: SCDs, lovenox daily, warfarin    GI: not indicated    Diet: TF, dd2 w/ thickened liquids    Nausea: compazine + zofran prn while inpatient, continue zofran and pta phenergan at d/c    Pain: oxycodone + tylenol prn  Code Status: Full  Lines: PEG, PIV to be removed    PERTINENT INVESTIGATIONS    Labs  Lipid Panel:   Recent Labs   Lab Test  02/17/18   1752   CHOL  98   HDL  38*   LDL  39   TRIG  105     A1C:   Lab Results   Component Value Date    A1C 5.8 02/17/2018     INR:     Recent Labs  Lab 03/01/18  1051   INR 0.96      Coag Panel / Hypercoag Workup: Labs sent and pending  Pending test results:    - Next gen sequencing, F 5 + 2 mut sequencing    Echo: TTE  Left ventricular function, chamber size, wall motion, and wall thickness are  normal.The EF is 55-60%.  Right ventricular function, chamber size, wall motion, and thickness are  normal.  A patent foramen ovale was demonstrated by agitated saline bubble study .  The inferior vena cava was normal in size with preserved respiratory  variability.  No pericardial effusion is present.    Imaging:   Results for orders placed or performed during the hospital encounter of 02/17/18   CT Head w/o Contrast    Addendum: 2/18/2018    There is loss of gray-white of the parasagittal superior right frontal  region which likely is a component of the right anterior cerebral  artery territory.    EL SCHROEDER MD      Narrative    CT HEAD W/O CONTRAST 2/17/2018 8:17 PM    Provided History: R MCA stroke;     Comparison: CT 2/17/2018.    Technique: Using multidetector thin collimation helical acquisition  technique, axial, coronal and sagittal  CT images from the skull base  to the vertex were obtained without intravenous contrast.     Findings:    Remonstration of findings of right MCA stroke with loss of gray-white  differentiation in the right frontal, parietal, and temporal lobes.  Edema in the right cerebral hemisphere with sulci effacement. The  right uncus is medially deviated and has mild mass effect on the right  cerebral peduncle. There is mild right to left midline shift at 2 mm.  No intracranial hemorrhage. The ventricles are proportionate to the  cerebral sulci. The basal cisterns are patent.    Mucosal thickening and fluid levels in the maxillary sinuses, ethmoid  air cells, sphenoid sinuses, and frontal sinuses . The mastoid air  cells are clear.       Impression    Impression:  Further evolution of right MCA stroke with loss of gray-white  differentiation and effacement of sulci in the right frontal,  parietal, and temporal lobes. Mild right-to-left midline shift at 2  mm. Medial deviation of the right uncus without rachel herniation..    I have personally reviewed the examination and initial interpretation  and I agree with the findings.    EL SCHROEDER MD   XR Chest Port 1 View    Narrative    XR CHEST PORT 1 VW  2/17/2018 6:45 PM      HISTORY: Central line confirmation and NG placement;     COMPARISON: Earlier 2/17/2018    FINDINGS: Enteric tube sidehole projects over the body of the stomach.  Contrast within the renal collecting systems. No pneumothorax or  pleural effusion. Cardiac silhouette is not enlarged. Left upper  extremity Central venous catheter tip projects over the mid SVC. No  focal airspace opacities. No acute osseous abnormalities.      Impression    IMPRESSION:   1. Left upper extremity approach central venous catheter tip projects  over the mid SVC.  2. Enteric tube sidehole projects over the body of the stomach.  3. Contrast within the renal collecting systems, concerning for  delayed nephrogram/renal  dysfunction.    I have personally reviewed the examination and initial interpretation  and I agree with the findings.    JOSE RIOS MD   XR Chest 1 View    Narrative    XR CHEST 1 VW  2/17/2018 10:38 PM      HISTORY: Line placement;     COMPARISON: Chest x-ray and CT from earlier today.    FINDINGS: Left subclavian central line tip at the low SVC. Gastric  tube tip and sidehole project over the stomach. Cardiac silhouette is  within normal limits. No pneumothorax or pneumothorax. Low lung  volumes. Subtle nodular opacities in the right lung apex and bilateral  lung bases, as seen on 2/17/2018 CT.       Impression    IMPRESSION:  1. Left subclavian central line tip at the lower SVC.   2. Subtle bibasilar and right apical opacities, as demonstrated on  2/17/2018 CT.    I have personally reviewed the examination and initial interpretation  and I agree with the findings.    GEORGIANA LUO MD   CT Head w/o Contrast    Addendum: 2/18/2018    There is hypodense involvement of the parasagittal superior right  frontal region which likely is a component of the right anterior  cerebral artery territory.    EL SCHROEDER MD      Narrative    CT HEAD W/O CONTRAST 2/18/2018 6:41 AM    Provided History: R MCA stroke;     Comparison: CT 2/17/2018.    Technique: Using multidetector thin collimation helical acquisition  technique, axial, coronal and sagittal CT images from the skull base  to the vertex were obtained without intravenous contrast.     Findings:    Further evolution of right MCA stroke with loss of gray-white  differentiation in the right frontal, parietal, and temporal lobes.  Edema in the right cerebral hemisphere with sulcal effacement which is  increased. The right uncus is medially deviated and has mild mass  effect on the right cerebral peduncle. There is mild right to left  midline shift at 2 mm. No intracranial hemorrhage. The ventricles are  proportionate to the cerebral sulci. The basal cisterns are  patent.    Mucosal thickening and fluid levels in the maxillary sinuses, ethmoid  air cells, sphenoid sinuses, and frontal sinuses . The mastoid air  cells are clear.       Impression    Impression:  Further evolution of right MCA infarct in the right frontal, parietal,  and temporal lobes and right insula. Mild right-to-left midline shift  at 2 mm. Medial deviation of the right uncus without rachel herniation.    I have personally reviewed the examination and initial interpretation  and I agree with the findings.    EL SCHROEDER MD   CT Head w/o Contrast    Narrative    CT HEAD W/O CONTRAST 2/18/2018 5:13 PM    Provided History: patient with large R MCA stroke and decreased level  of arousal; evaluate for interval change in edema;     Comparison: 2/18/2018.    Technique: Using multidetector thin collimation helical acquisition  technique, axial, coronal and sagittal CT images from the skull base  to the vertex were obtained without intravenous contrast.     Findings:  Relatively stable findings of right MCA stroke with loss of  gray-white differentiation in the right frontal, parietal, temporal  lobes. Stable hypodensity in the parasagittal right frontal lobe which  likely is the anterior cerebral artery territory. Edema in the right  cerebral hemisphere continues to cause sulcal effacement and  compression of the right lateral ventricles. Minimal 2 mm right to  left midline shift is similar to prior. No intracranial hemorrhage.  The basal cisterns remain patent. There is medial deviation of the  right uncus without rachel herniation.    Near-total opacification of bilateral maxillary and sphenoid sinuses  and ethmoid air cells. Increased layering fluid in the frontal  sinuses. The mastoid air cells are clear.       Impression    Impression:   Stable findings of right MCA and likely component of right LISSETTE stroke  involving the right cerebral hemisphere with adjacent edema. Stable  minimal 2 mm right-to-left midline  shift.    I have personally reviewed the examination and initial interpretation  and I agree with the findings.    EL SCHROEDER MD   US Lower Extremity Venous Duplex Bilateral    Narrative    EXAMINATION: DOPPLER VENOUS ULTRASOUND OF BILATERAL LOWER EXTREMITIES,  2/19/2018 9:05 AM     COMPARISON: None.    History: History for DVT in patient with PFO and large stroke, history  of PE.     TECHNIQUE:  Gray-scale evaluation with compression, spectral flow and  color Doppler assessment of the deep venous system of both legs from  groin to knee, and then at the ankles.    FINDINGS:  In both lower extremities, the common femoral, femoral, popliteal and  posterior tibial veins demonstrate normal compressibility and blood  flow.      Impression    IMPRESSION:  No evidence of deep venous thrombosis in either lower extremity.    I have personally reviewed the examination and initial interpretation  and I agree with the findings.    NGUYEN PATINO MD   US Upper Extremity Venous Duplex Bilateral Port    Narrative    EXAMINATION: DOPPLER VENOUS ULTRASOUND OF BILATERAL UPPER EXTREMTIES,  2/19/2018 9:05 AM     COMPARISON: None.    History: evaluate for DVT in patient with PFO and large stroke and  history of PE     TECHNIQUE:  Gray-scale evaluation with compression, spectral flow, and  color Doppler assessment of the deep venous system of both upper  extremities.    FINDINGS:  The left cephalic vein near a peripheral line in the region of the  antecubital fossa demonstrates no compressibility. The right cephalic  vein near peripheral line in the region of the antecubital fossa  demonstrates no compressibility.    Normal blood flow and waveforms are demonstrated in the internal  jugular, innominate, subclavian, and axillary veins bilaterally. There  is normal compressibility of the brachial and basilic veins  bilaterally.      Impression    IMPRESSION:  1.  No evidence of deep venous thrombosis in either upper extremity.  2.   Peripheral venous thrombus in the left and right cephalic veins in  the region of the antecubital fossa bilaterally.    I have personally reviewed the examination and initial interpretation  and I agree with the findings.    NGUYEN PATINO MD   CT Head w/o Contrast    Narrative    CT HEAD W/O CONTRAST 2/19/2018 3:45 AM    History: R stroke s/p hemicraniectomy;     Comparison: CT head dated 2/18/2018.    Technique: Using multidetector thin collimation helical acquisition  technique, axial, coronal and sagittal CT images from the skull base  to the vertex were obtained without intravenous contrast.     Findings:    Postoperative changes of right hemicraniectomy with overlying  subcutaneous emphysema and skin staples. Relatively stable right MCA  stroke with hypodensity in the right frontal, parietal and temporal  lobes. No significant change in hypoattenuation in the right superior  frontal gyrus. No intracranial hemorrhage or midline shift. Persistent  right lateral ventricle effacement. The basal cisterns are patent.    Continued opacification of paranasal sinuses. The mastoid air cells  are clear.       Impression    Impression:  1.  Postoperative changes of right hemicraniectomy with overlying  subcutaneous emphysema and skin staples.   2.  Relatively stable large hypoattenuating area involving right  frontal, parietal and temporal lobes status post right MCA stroke.  Unchanged right anterior cerebral artery territory infarction. No  evidence for hemorrhagic transformation.    I have personally reviewed the examination and initial interpretation  and I agree with the findings.    EL SCHROEDER MD   CT Head w/o Contrast    Narrative    CT HEAD W/O CONTRAST 2/20/2018 4:58 AM    History: F/U Right MCA Territory Infarction; F/U Cerebral Edema;     Comparison: CT head dated 2/19/2018.    Technique: Using multidetector thin collimation helical acquisition  technique, axial, coronal and sagittal CT images from the  skull base  to the vertex were obtained without intravenous contrast.     Findings:    Postoperative changes of right hemicraniectomy with overlying  subcutaneous emphysema in the skin staples. Redemonstration of large  hypoattenuating area involving the right frontal, parietal and  temporal lobes. There is a slightly increased anterior horn effacement  of the left lateral ventricle. There is right to left subfalcine  herniation and medial deviation of the right uncus. No new infarction  or hemorrhagic transformation identified. The basal cisterns are  patent.    Continued opacification of the paranasal sinuses. The mastoid air  cells are clear.       Impression    Impression:   1.  Stable postoperative changes of right hemicraniectomy with  improved subcutaneous emphysema.  2.  2. Persistent mass effect with right to left subfalcine herniation  and medial deviation of the right uncus.    I have personally reviewed the examination and initial interpretation  and I agree with the findings.    EL SCHROEDER MD   XR Chest Port 1 View    Narrative    Exam:  Chest X-ray 2/21/2018 11:11 AM    History: pneumonia;     Comparison: Chest x-ray 2/17/2018, CT 2/17/2018    Findings: Portable AP radiograph of the chest. Left upper extremity  PICC with tip projecting over the mid SVC. Cardiac silhouette is  normal in size. Pulmonary vasculature is distinct. No pleural  effusions or pneumothorax. Subtle bibasilar and right upper apical  opacities, as demonstrated on prior radiograph and CT 2/17/2018. The  visualized upper abdomen appears unremarkable.       Impression    Impression:   Subtle bibasilar and right apical opacities, as demonstrated on CT  2/17/2018. Findings may represent infection.    I have personally reviewed the examination and initial interpretation  and I agree with the findings.    ARRON LACEY MD   CT Head w/o Contrast    Narrative    CT HEAD W/O CONTRAST 2/22/2018 4:21 AM    History: interval scan      Comparison: CT head dated 2/20/2018.    Technique: Using multidetector thin collimation helical acquisition  technique, axial, coronal and sagittal CT images from the skull base  to the vertex were obtained without intravenous contrast.     Findings:    Postoperative changes of right hemicraniectomy with slightly decreased  overlying subcutaneous emphysema in the scalp. Increased large  confluent region of hypoattenuation in the right frontal, parietal and  temporal lobes. Slight increase in effacement of the anterior horn of  the left lateral ventricle. Unchanged 5 mm of right-to-left midline  shift. No change in mild left subfalcine herniation. Unchanged medial  deviation of the right uncus. No new infarct or hemorrhagic  transformation identified. The basal cisterns are patent.    Continued opacification of the paranasal sinuses. The mastoid air  cells are clear.       Impression    Impression:   1.  Expected interval evolution of right LISSETTE and MCA distribution  infarcts with worsening edema. Unchanged subfalcine herniation and  medial deviation of the right uncus.  2.  Postoperative changes of right hemicraniectomy.        I have personally reviewed the examination and initial interpretation  and I agree with the findings.    ANGELA GOODSON MD   IR Gastrostomy Tube Percutaneous Plcmnt    Narrative    Procedures 2/26/2018:  Image guided gastrostomy tube placement.    History: Stroke, dysphasia. Percutaneous gastrostomy tube requested.    Comparison: CT abdomen pelvis 2/17/2018    Staff: Ori Vega MD    Resident: Richmond Kapoor MD    Medications:   1. 100 mcg Fentanyl  2. 2 mg Versed  3. 1 mg Glucagon    Moderate sedation administered by the IR nurse at the supervision of  the attending. Vital signs and oxygenation continuously monitored. The  patient remained stable throughout the procedure.    Sedation time: 30 minutes    Fluoroscopy time: 2.9 minutes    Contrast: No IV contrast was given for this  procedure.    Findings/procedure:     Prior to the procedure, both verbal and written informed consent  obtained from the patient. Patient placed supine on the fluoroscopy  table. Nasogastric tube placed by IR nurse.     The left upper quadrant prepped and draped. Time out performed.  Limited abdominal ultrasound performed to evaluate the liver margins.  1% lidocaine was used for local anesthesia. Stomach inflated with air  through the nasogastric tube. Gastropexy performed with two T-tacs.  Gastrostomy made between the T-tacs with a needle. Needle removed over  guidewire. Tract dilated with the telescopic dilator and 22F peel-away  sheath advanced over guidewire. 18F gastrostomy tube placed through  the peel-away sheath over the guidewire into the stomach. Gastrostomy  balloon inflated. Position documented with contrast. Guidewire  removed. T-tacs and gastrostomy tube secured. Sterile dressing  applied. Gastrostomy tube to gravity drainage. No immediate  complication.      Impression    Impression:  1. Uncomplicated image guided placement of percutaneous gastrostomy  tube.     Plan:  Nothing by mouth and gravity drainage for 4 hours. T-tac removal in 10  days.    I, MYLES SIMEON MD, attest that I was present in the procedure room  for the entire procedure.         I have personally reviewed the examination and initial interpretation  and I agree with the findings.    MYLES SIMEON MD   XR Chest Port 1 View    Narrative    Exam:  Chest X-ray 2/25/2018 1:30 PM    History: dysphagia with impaired swallow and up trending leukocytosis;  known h. influenzae in sputum. Evaluating for worsening PNA;     Comparison: Chest x-ray 2/20/2018    Findings: Portable AP upright radiograph the chest. NG/OG tube with  sidehole projecting over the stomach, and the tip projecting beyond  the field-of-view. Interval removal of the left upper extremity PICC.  Trachea is midline. The cardiomediastinal silhouette is normal in  size.  The pulmonary vasculature is distinct. No pleural effusion or  pneumothorax. Again noted subtle right upper/apical opacities and  subtle bibasilar opacities, unchanged. The upper abdomen appears  unremarkable.       Impression    Impression:   1. NG/OG with the sidehole projecting over the stomach, and the tip  projecting beyond the field-of-view.  2. Stable subtle right upper/apical bibasilar opacities better  characterized on CT exam 2/17/2018, could be related to infection.    I have personally reviewed the examination and initial interpretation  and I agree with the findings.    ROBERT ZIMMER MD   XR Abdomen Port 1 View    Narrative    XR ABDOMEN PORT 1 VW  2/25/2018 6:44 PM    History:  confirm feeding tube placement; .     Comparison: CT cap dated 2/17/2018    Findings:   Portable supine AP chest radiograph. Gastric tube with its tip and  side-port projects in the stomach. Nonobstructive bowel gas pattern.  No acute osseous abnormality.      Impression    IMPRESSION:  Gastric tube with the tip and the sidehole projects in the stomach.    I have personally reviewed the examination and initial interpretation  and I agree with the findings.    MELY AG MD   MR Brain w/o & w Contrast    Narrative    Brain MRI without and with contrast    History: Febrile with leukocytosis 1 week following hemicrani; .    Comparison: CT 2/22/2018    Technique: Axial FLAIR,  T1-weighted, and susceptibility images were  obtained without intravenous contrast. Following intravenous  gadolinium-based contrast administration, axial T2-weighted,  diffusion, FLAIR, and axial and coronal T1-weighted images were  obtained.     Dose: 7.5 ml Gadavist injected    Findings:   Postsurgical changes of right hemicraniectomy. Redemonstration of  large right MCA infarct with restricted diffusion in the right  frontal, parietal, and temporal lobes, with associated moderate  cortical, subcortical, and intravascular enhancement.  Notably, there  is no leptomeningeal enhancement in other areas of the brain away from  the infarct.    There is restricted diffusion in the splenic corpus callosum,  presumably a reversible splenial lesion.    No intra or extra-axial fluid collections. Minimal leftward midline  shift. Mucosal thickening in the right maxillary sinus. Mucosal  thickening of the ethmoid air cells and sphenoid sinus. Effusion in  the right mastoid air cells.      Impression    Impression:  Expected evolution of large right cerebral hemisphere infarct, with  extensive enhancement in the infarcted territory, with decreased mass  effect, midline shift, and uncal herniation. Although cerebritis could  have a similar appearance, this is felt most likely related to infarct  rather than infection.    I have personally reviewed the examination and initial interpretation  and I agree with the findings.    LACY LERMA MD       Endovascular procedure: None     Cardiac Monitoring: Patient had > 24 hrs of cardiac monitor while in hospital.    Findings: No atrial fibrillation found.     Sleep Apnea Screen:   Questions/Answers      1. Prior to your stroke, have you been told that you snore? Yes.    2. Prior to your stroke, have you been told that you struggle to breath while you are sleeping? Yes.    3. Prior to your stroke, do you feel tired and sleepy even after getting a normal night of sleep? Yes.    Sleep Apnea Screen Findings: Patient has 2 or more symptoms of sleep apnea.  Outpatient sleep study is recommended.    PHQ-9 Depression Screen Score: 6    Education discussed with: patient and family on medical management, smoking cessation plan and follow-up recommendations/plan.    During daily rounds, the plan of care was discussed and developed with patient and family.  Plan of care includes:    Recommendations:  1. Nutrition: Isosource 1.5 @ goal 45 ml/hr (1080 ml/day) to provide 1620 kcals (29 kcal/kg/day), 73 g PRO (1.3 g/kg/day), 821 ml  free H2O, 190 g CHO and 16 g Fiber daily. Magic cup w/ meals. Nicole Hernandez ([id number])    2. Pulmonology: repeat CT Chest in 4wks (after out of acute rehab); if LAD still present, outpatient  Pulmonary clinic f/u to discuss BAL/biopsy.    3. Follow up with Hematology/Oncology as soon as possible regarding hypercoagulability and myeloproliferative work up.     4. NSU: Planning for cranioplasty ~3/18. Follow up w/ head CT 1 week prior to tentative cranioplasty (aka ~3/12) prior to cranioplasty (preferable/if able to meet in clinic with Dr. Mesa after head CT, not necessarily required as long as images can be pushed forward and as long as she come for her scheduled operation, per NSU)  . Head CT prior to appointment.    5. OT / PT : ARU, continued therapies    6. SLP: Dysphagia Diet 2 + thickened/nectar liquids    7.  Follow up w/ cardiology  Regarding PFO in the setting of ischemic stroke.    8. Follow up with Neurology/Stroke Clinic at next possible appointment.    PHYSICAL EXAMINATION  Vital Signs:  B/P: 107/62, T: 98.8, P: 82, R: 18    General:  patient lying in bed without any acute distress     HEENT:  S/p cranioplasty, staples removed  Cardio:  RRR  Pulmonary:  no respiratory distress, CTAB  Abdomen:  soft, non-tender, non-distended, bowel sounds present, PEG tube present (surrounding skin healthy)  Extremities:  no edema  Skin:  intact, warm/dry     Neurologic  Mental Status:  fully alert, attentive and oriented (thought it was Feb instead of March 1st), follows commands, speech clear  Cranial Nerves:  L visual field cut in b/l eyes, PERRL, R sided gaze preference, mild disconjugate gaze, able to abduct/cross midline/EOMI upon examination, facial sensation reportedly intact / neglects L side on b/l testing, L facial droop, not able to be fully overcome w/ testing.  Hearing not formally tested but intact to conversation, minimal dysarthria, shoulder shrug 0/5 L, 4+ to 5/5 R, tongue protrusion mildly L  deviated.  Motor:  0/5 in ANAY+LE. 4+/5 on RU+LE. Rigidity on LUE. Increased tone in LUE.  Reflexes: : 2/5 in R: P, A, brisk in R B + BR; hyperreflexic at Left B, BR, P, A, Left babinski upgoing, clonus at L > R ankle.  Sensory:  L javi neglect / hemisensory loss; acknowledges noxious stimuli in L upper + lower extremity but does not w/d. Intact to light touch on R upper + lower extremity.  Coordination: FNF intact to RUE, able to perform R HS; unable to assess on L side  Station/Gait:  unable to test    National Institutes of Health Stroke Scale (on day of discharge)  NIHSS Total Score: 18     Score    Level of consciousness: (0)   Alert, keenly responsive    LOC questions: (1)   Answers one question correctly    LOC commands: (0)   Performs both tasks correctly    Best gaze: (1)   Partial gaze palsy    Visual: (2)   Complete hemianopia    Facial palsy: (2)   Partial paralysis (total/near total of lower face)    Motor arm (left): (4)   No movement    Motor arm (right): (0)   No drift    Motor leg (left): (4)   No movement    Motor leg (right): (0)   No movement    Limb ataxia: (0)   Absent    Sensory: (1)   Mild to moderate sensory loss    Best language: (0)   Normal- no aphasia    Dysarthria: (1)   Mild to moderate dysarthria    Extinction and inattention: (2)   Profound javi-inattention / extinction > one modality        Total Score:  18       Modified [place] Scale (on day of discharge): 4-Moderately severe disability; unable to walk without assistance and unable to attend to own bodily    Medications    Current Discharge Medication List      START taking these medications    Details   oxyCODONE (ROXICODONE) 5 MG/5ML solution 5-10 mLs (5-10 mg) by Per G Tube route every 4 hours as needed for moderate to severe pain  Qty: 90 mL, Refills: 0    Associated Diagnoses: Muscle pain      acetaminophen (TYLENOL) 32 mg/mL solution 20.3 mLs (650 mg) by Per G Tube route every 4 hours as needed for mild pain or fever  Qty: 400  mL    Associated Diagnoses: Muscle pain      FLUoxetine (PROZAC) 20 MG/5ML solution 5 mLs (20 mg) by Per G Tube route daily  Qty: 150 mL    Associated Diagnoses: Acute ischemic stroke (H)      lidocaine (LMX4) 4 % kit Apply topically every hour as needed for pain (with VAD insertion or accessing implanted port.)    Associated Diagnoses: Muscle pain      folic acid (FOLVITE) 1 MG tablet 1 tablet (1 mg) by Per G Tube route daily  Qty: 30 tablet    Associated Diagnoses: Acute ischemic stroke (H)      ramelteon (ROZEREM) 8 MG tablet 1 tablet (8 mg) by Per G Tube route nightly as needed for sleep    Associated Diagnoses: Insomnia, unspecified type      multivitamins with minerals (CERTAVITE/CEROVITE) LIQD liquid 15 mLs by Per Feeding Tube route daily    Associated Diagnoses: Acute ischemic stroke (H)      thiamine 100 MG tablet 1 tablet (100 mg) by Per NG tube route daily    Associated Diagnoses: Acute ischemic stroke (H)      ondansetron (ZOFRAN) 4 MG tablet 1-2 tablets (4-8 mg) by Per Feeding Tube route every 8 hours as needed for nausea  Qty: 18 tablet    Associated Diagnoses: Nausea      enoxaparin (LOVENOX) 80 MG/0.8ML injection Inject 0.69 mLs (69 mg) Subcutaneous every 12 hours    Associated Diagnoses: Acute ischemic stroke (H)      warfarin (COUMADIN) 5 MG tablet Take 1 tablet (5 mg) by mouth daily  Qty: 30 tablet    Associated Diagnoses: Acute ischemic stroke (H)      Warfarin Therapy Reminder 1 each continuous prn    Associated Diagnoses: Acute ischemic stroke (H)         CONTINUE these medications which have CHANGED    Details   promethazine (PHENERGAN) 25 MG tablet 1 tablet (25 mg) by Per G Tube route every 6 hours as needed for nausea  Qty: 20 tablet, Refills: 1    Associated Diagnoses: Nausea      LORazepam (ATIVAN) 1 MG tablet 1 tablet (1 mg) by Per OG Tube route every 8 hours as needed for anxiety  Qty: 15 tablet, Refills: 0    Associated Diagnoses: Anxiety state         CONTINUE these medications which  have NOT CHANGED    Details   DiphenhydrAMINE HCl (BENADRYL PO) Take by mouth At Bedtime      nicotine (NICODERM CQ) 14 MG/24HR 24 hr patch Place 1 patch onto the skin daily  Qty: 14 patch, Refills: 0    Associated Diagnoses: Tobacco use disorder             Additional recommendations and follow up:      Onc/Heme Adult Referral     Cardiology Eval Adult Referral     PULMONARY MEDICINE REFERRAL     SLEEP EVALUATION & MANAGEMENT REFERRAL - ADULT -[facility]  [phone] (Age 2 and up)     Discharge Instructions   If questions or problems arise regarding tube function (e.g. leaking, dislodges, etc.) Contact Interventional Radiology department 24 hours a day.    For procedures that were done at the [facility] sites,   8:00-4:30 PM Monday through Friday    Contact:[phone].    For afterhours and weekends call the [place] main phone line [phone] and ask for the [place] IR on call physician number.    If DIRECTED by the RADIOLOGIST, related to specific problems with the tube functioning,  go to the Emergency Department.     Discharge Instructions   Patient to make a follow up appointment in IR clinic in 10-14 days for the removal of the retention sutures at the G or GJ tube site. Phone number is [phone] if needed.     General info for SNF   Length of Stay Estimate: Short Term Care: Estimated # of Days 31-90  Condition at Discharge: Stable  Level of care:skilled   Rehabilitation Potential: Fair  Admission H&P remains valid and up-to-date: Yes  Recent Chemotherapy: N/A  Use Nursing Home Standing Orders: Yes     Mantoux instructions   Give two-step Mantoux (PPD) Per Facility Policy Yes     Reason for your hospital stay   Ischemic Stroke     Wound care (specify)   Site:   PEG tube  Instructions:  Replace surround gauze daily, continue PEG cares per facility protocol.    Site: cranioplasty s/p staples removal;  Instructions: follow for e/o  infection (redness, drainage, tenderness)     Follow Up and recommended labs and tests   1. Follow up with NH/ARU physician.  The following labs/tests are recommended: INR/PT + CBC for continued warfarin management. Follow up with PCP as needed / available. Follow up with Facility pharmacy regarding continued titration/warfarin management (INR Goal 2-3).    2. Follow up w/ Pulmonology in ~3wks following a repeat CT chest (~3/26). Appointment related to hx of PEs, b/l lymphadenopathy.    3. Follow up with Hematology regarding in 2 weeks or as soon as possible regarding hypercoagulability and myeloproliferative work up.     4. Follow up with Neurosurgery (Dr. Mesa) ~3/12 or roughly one week prior to planned cranioplasty (tentatively planned for 3/18). Please schedule head CT prior to Neurosurgery clinic appointment with Dr. Mesa.    Follow-up with Stroke Clinic in 3 months.   5. Follow up with stroke clinic. You will be contacted to schedule a Stroke Clinic appointment. If you have not been contacted to schedule a stroke follow-up appointment within 7 days of discharge, please contact Stroke Neurology Clinic at [phone], pick option #1.   If you have other stroke related questions, please call [phone], pick option #3, and ask to speak to Bashir Carnes RN Stroke/Endovascular Care Coordinator.  If you have any urgent stroke related questions after hours, please contact the hospital  at [phone] and ask to be connected to a stroke provider.    6. Follow up with Cardiology regarding PFO.      Activity - Up with nursing assistance   Whenever OOB, must wear helmet.     Full Code     Nutrition Services Adult IP Consult   Reason:  Continued management of tube feedings and assessment of caloric intake / requirement     Physical Therapy Adult Consult   Evaluate and treat as clinically indicated.    Reason:  Assess and treat. Hx of RM1 stroke w/ significant L sided weakness, sensory loss, neglect      Occupational Therapy Adult Consult   Evaluate and treat as clinically indicated.    Reason:  Assess and treat. Hx of RM1 stroke w/ significant L sided weakness, sensory loss, neglect     Speech Language Path Adult Consult   Evaluate and treat as clinically indicated.    Reason:  Assess and treat. Hx of RM1 stroke w/ significant L sided weakness, sensory loss, neglect, dysphagia.     Fall precautions     Pneumatic Compression Device    Bilateral calf. Remove 30 mins BID.     Advance Diet as Tolerated   Follow this diet upon discharge:     Calorie Counts     Snacks/Supplements Adult: Magic Cup; With Meals     Adult Formula Drip Feeding: Continuous Isosource 1.5; Gastrostomy; Goal Rate: 40; mL/hr; Medication - Tube Feeding Flush Frequency: At least 15-30 mL water before + after medication administration and with tube clogging; Aspiration precautions     DD 2 Mech Altered Nectar Thickened Liquids (pre-thickened or use instant food thickener)       Patient was seen and discussed with the Attending, Dr. Annette Evans.    Yeison Crawley  Pager: [phone]

## 2018-02-28 NOTE — PLAN OF CARE
Problem: Patient Care Overview  Goal: Plan of Care/Patient Progress Review  Pt POD#10 decompression hemicrani on R side, R side bone flap absent, vs ex hypotensive @ times, neuros include: d/o time, flat affect, alert/lethargic, bilateral periorbital swelling w/improvement, L-side hemiplegia, R-side 4/5, L-side decreased sensation w/numbness, pt denies tingling, L droop. PIV running heparin gtt @ 8.5ml/hr, DD2 w/nectar thickened liquids, on kaylyn-count, poor PO intake, and continuous TF @ 40ml/hr via PEG w/good tolerance. Up AO2 w/lift and helmet when OOB, on CCM w/NSR. Pt incontinent of bladder, no BM this shift, worked w/therapies, sat in chair this AM w/good tolerance. Family @ bedside and supportive, would like to speak to MDs, and MDs were paged, RN provided updates as they come through, Hep10A draw ordered for 1600, pain meds provided for R-sided head pain, w/moderate relief. Continue to monitor per MD orders.

## 2018-02-28 NOTE — PLAN OF CARE
Problem: Patient Care Overview  Goal: Plan of Care/Patient Progress Review  Discharge Planner PT   Patient plan for discharge: rehab  Current status: Moveo squats at 15 deg, 1x5 and 1x10 reps. Unable to activate LLE musculature for assist. Attempted to faciliate LLE mvt by moving RLE followed by PROM of LLE into hip/knee extension/flexion  More attentive to L side. Session limited pt fatigue and pain.   Barriers to return to prior living situation: Medical status, level of A  Recommendations for discharge: ARU  Rationale for recommendations: Pt significantly below baseline, currently unable to mobilize due to lack of LUE/LLE muscle activation. Would benefit from intensive therapy to regain function. Good familial support system.        Entered by: Rosalind Haas 02/28/2018 10:17 AM

## 2018-02-28 NOTE — PLAN OF CARE
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 POD #10 decompressive hemicraniectomy. VSS. Pt lethargic overnight, arouses to voice. Oriented x4, intermittent confusion/forgetfulness. Neuro unchanged; L facial droop, L hemiplegia, numbness to L side, L neglect, L field cut, nystagmus to L eye and R side 4/5. R head incision MACIE and intact with staples; moderate edema noted. Pt c/o HA, controlled with PRN Tylenol and Oxycodone.  TF infusing at 40 ml/hr via PEG. DD2 with nectar thick liquids. Cecil counts start today. Incontinent of bowel and bladder. PIV SL. Up with 2 and lift. Helmet on when OOB. Turned and repositioned q2 hrs. Continue to monitor and follow current POC.

## 2018-02-28 NOTE — PLAN OF CARE
Problem: Stroke (Ischemic) (Adult)  Goal: Signs and Symptoms of Listed Potential Problems Will be Absent, Minimized or Managed (Stroke)  Signs and symptoms of listed potential problems will be absent, minimized or managed by discharge/transition of care (reference Stroke (Ischemic) (Adult) CPG).   POD #9 decompression hemicrani for R M1 occlusion with malignant MCA syndrome. VSS. A&O x4. Neuro unchanged: L facial droop, L hemiplegia, numbness to L side, RUE and RLE 4/5. Pt c/o HA and ABD pain controlled with PRN Tylenol, Oxy and ice packs. Head incision MACIE; stapled and edematous. Up with 2 and lift. T&R Q2. DD2 diet with nectar thick liquids; PEG running TF at goal of 40 mL/hr. Poor oral intake this shift. Incontinent of urine. Mother and father at bedside most of shift and very supportive. Continue to monitor and follow current POC.

## 2018-02-28 NOTE — PROGRESS NOTES
CLINICAL NUTRITION SERVICES - BRIEF NOTE    Nutrition Prescription    RECOMMENDATIONS FOR MDs/PROVIDERS TO ORDER:  - continue EN in conjunction with oral intakes; pending intakes, can consider nocturnal regimen vs bolus with oral PO    Recommendations already ordered by Registered Dietitian (RD):  - ordered Magic Cup TID with meals   - Calorie counts ordered by provider--will follow.     Future/Additional Recommendations:  - PO/supp adequacy (kaylyn counts ordered 2/28)     Nutrition Progress Note - f/u for progress towards previous nutrition POC (see previous 2/26 reassessment for details)     Florinda Parmar RD, LD, [facility]  Neuro ICU  Pager: [phone]

## 2018-02-28 NOTE — PLAN OF CARE
Problem: Patient Care Overview  Goal: Plan of Care/Patient Progress Review  OT 6A:  Attempted to see pt this pm however pt was working with other providers and unable to check back.  Cx and reschedule for 3/1.

## 2018-03-01 ENCOUNTER — APPOINTMENT (OUTPATIENT)
Dept: CT IMAGING | Facility: CLINIC | Age: 28
End: 2018-03-01
Attending: STUDENT IN AN ORGANIZED HEALTH CARE EDUCATION/TRAINING PROGRAM
Payer: MEDICAID

## 2018-03-01 VITALS
OXYGEN SATURATION: 97 % | DIASTOLIC BLOOD PRESSURE: 74 MMHG | SYSTOLIC BLOOD PRESSURE: 114 MMHG | HEIGHT: 64 IN | WEIGHT: 151.24 LBS | TEMPERATURE: 98.6 F | RESPIRATION RATE: 16 BRPM | BODY MASS INDEX: 25.82 KG/M2 | HEART RATE: 86 BPM

## 2018-03-01 LAB
ERYTHROCYTE [DISTWIDTH] IN BLOOD BY AUTOMATED COUNT: 16.9 % (ref 10–15)
HCT VFR BLD AUTO: 29.1 % (ref 35–47)
HGB BLD-MCNC: 9 G/DL (ref 11.7–15.7)
INR PPP: 0.96 (ref 0.86–1.14)
LMWH PPP CHRO-ACNC: 0.18 IU/ML
MCH RBC QN AUTO: 29.1 PG (ref 26.5–33)
MCHC RBC AUTO-ENTMCNC: 30.9 G/DL (ref 31.5–36.5)
MCV RBC AUTO: 94 FL (ref 78–100)
PLATELET # BLD AUTO: 672 10E9/L (ref 150–450)
RBC # BLD AUTO: 3.09 10E12/L (ref 3.8–5.2)
WBC # BLD AUTO: 16.2 10E9/L (ref 4–11)

## 2018-03-01 PROCEDURE — 25000128 H RX IP 250 OP 636: Performed by: STUDENT IN AN ORGANIZED HEALTH CARE EDUCATION/TRAINING PROGRAM

## 2018-03-01 PROCEDURE — 85520 HEPARIN ASSAY: CPT | Performed by: PSYCHIATRY & NEUROLOGY

## 2018-03-01 PROCEDURE — 27210429 ZZH NUTRITION PRODUCT INTERMEDIATE LITER

## 2018-03-01 PROCEDURE — 25000132 ZZH RX MED GY IP 250 OP 250 PS 637: Performed by: STUDENT IN AN ORGANIZED HEALTH CARE EDUCATION/TRAINING PROGRAM

## 2018-03-01 PROCEDURE — 27210437 ZZH NUTRITION PRODUCT SEMIELEM INTERMED LITER

## 2018-03-01 PROCEDURE — 70450 CT HEAD/BRAIN W/O DYE: CPT

## 2018-03-01 PROCEDURE — 85610 PROTHROMBIN TIME: CPT | Performed by: PSYCHIATRY & NEUROLOGY

## 2018-03-01 PROCEDURE — 25000132 ZZH RX MED GY IP 250 OP 250 PS 637: Performed by: PSYCHIATRY & NEUROLOGY

## 2018-03-01 PROCEDURE — 36415 COLL VENOUS BLD VENIPUNCTURE: CPT | Performed by: PSYCHIATRY & NEUROLOGY

## 2018-03-01 PROCEDURE — 85027 COMPLETE CBC AUTOMATED: CPT | Performed by: STUDENT IN AN ORGANIZED HEALTH CARE EDUCATION/TRAINING PROGRAM

## 2018-03-01 PROCEDURE — 36415 COLL VENOUS BLD VENIPUNCTURE: CPT | Performed by: STUDENT IN AN ORGANIZED HEALTH CARE EDUCATION/TRAINING PROGRAM

## 2018-03-01 RX ORDER — WARFARIN SODIUM 5 MG/1
5 TABLET ORAL DAILY
Qty: 30 TABLET | Status: ON HOLD | DISCHARGE
Start: 2018-03-01 | End: 2018-05-26

## 2018-03-01 RX ORDER — LORAZEPAM 1 MG/1
1 TABLET ORAL EVERY 8 HOURS PRN
Qty: 15 TABLET | Refills: 0 | Status: SHIPPED | OUTPATIENT
Start: 2018-03-01 | End: 2018-03-01

## 2018-03-01 RX ORDER — LIDOCAINE 40 MG/G
CREAM TOPICAL
DISCHARGE
Start: 2018-03-01 | End: 2018-06-24

## 2018-03-01 RX ORDER — LANOLIN ALCOHOL/MO/W.PET/CERES
100 CREAM (GRAM) TOPICAL DAILY
DISCHARGE
Start: 2018-03-01 | End: 2018-04-17

## 2018-03-01 RX ORDER — FLUOXETINE 20 MG/5ML
20 SOLUTION ORAL DAILY
Qty: 150 ML | DISCHARGE
Start: 2018-03-01 | End: 2018-06-24

## 2018-03-01 RX ORDER — PROMETHAZINE HYDROCHLORIDE 25 MG/1
25 TABLET ORAL EVERY 6 HOURS PRN
Qty: 20 TABLET | Refills: 1 | DISCHARGE
Start: 2018-03-01 | End: 2018-04-17

## 2018-03-01 RX ORDER — FOLIC ACID 1 MG/1
1 TABLET ORAL DAILY
Qty: 30 TABLET | DISCHARGE
Start: 2018-03-01 | End: 2018-03-01

## 2018-03-01 RX ORDER — FOLIC ACID 1 MG/1
1 TABLET ORAL DAILY
Qty: 30 TABLET | DISCHARGE
Start: 2018-03-01 | End: 2018-04-17

## 2018-03-01 RX ORDER — OXYCODONE HCL 5 MG/5 ML
5-10 SOLUTION, ORAL ORAL EVERY 4 HOURS PRN
Qty: 90 ML | Refills: 0 | Status: SHIPPED | DISCHARGE
Start: 2018-03-01 | End: 2018-03-01

## 2018-03-01 RX ORDER — ONDANSETRON 4 MG/1
4-8 TABLET, FILM COATED ORAL EVERY 8 HOURS PRN
Qty: 18 TABLET | Status: ON HOLD | DISCHARGE
Start: 2018-03-01 | End: 2018-07-15

## 2018-03-01 RX ORDER — LANOLIN ALCOHOL/MO/W.PET/CERES
100 CREAM (GRAM) TOPICAL DAILY
DISCHARGE
Start: 2018-03-01 | End: 2018-03-01

## 2018-03-01 RX ORDER — WARFARIN SODIUM 5 MG/1
5 TABLET ORAL
Status: DISCONTINUED | OUTPATIENT
Start: 2018-03-01 | End: 2018-03-01 | Stop reason: HOSPADM

## 2018-03-01 RX ORDER — RAMELTEON 8 MG/1
8 TABLET ORAL
DISCHARGE
Start: 2018-03-01 | End: 2018-04-17

## 2018-03-01 RX ORDER — OXYCODONE HCL 5 MG/5 ML
5-10 SOLUTION, ORAL ORAL EVERY 4 HOURS PRN
Qty: 90 ML | Refills: 0 | Status: SHIPPED | OUTPATIENT
Start: 2018-03-01 | End: 2018-05-07

## 2018-03-01 RX ORDER — WARFARIN SODIUM 5 MG/1
5 TABLET ORAL DAILY
Qty: 30 TABLET | DISCHARGE
Start: 2018-03-01 | End: 2018-03-01

## 2018-03-01 RX ADMIN — ONDANSETRON 4 MG: 2 INJECTION INTRAMUSCULAR; INTRAVENOUS at 04:14

## 2018-03-01 RX ADMIN — Medication 100 MG: at 08:14

## 2018-03-01 RX ADMIN — ENOXAPARIN SODIUM 70 MG: 80 INJECTION SUBCUTANEOUS at 12:38

## 2018-03-01 RX ADMIN — OXYCODONE HYDROCHLORIDE 5 MG: 5 SOLUTION ORAL at 04:10

## 2018-03-01 RX ADMIN — PROCHLORPERAZINE EDISYLATE 5 MG: 5 INJECTION INTRAMUSCULAR; INTRAVENOUS at 09:42

## 2018-03-01 RX ADMIN — OXYCODONE HYDROCHLORIDE 10 MG: 5 SOLUTION ORAL at 12:34

## 2018-03-01 RX ADMIN — ACETAMINOPHEN 650 MG: 325 SOLUTION ORAL at 08:14

## 2018-03-01 RX ADMIN — MULTIVITAMIN 15 ML: LIQUID ORAL at 08:14

## 2018-03-01 RX ADMIN — ACETAMINOPHEN 650 MG: 325 SOLUTION ORAL at 04:10

## 2018-03-01 RX ADMIN — FOLIC ACID 1 MG: 1 TABLET ORAL at 08:13

## 2018-03-01 RX ADMIN — OXYCODONE HYDROCHLORIDE 5 MG: 5 SOLUTION ORAL at 08:14

## 2018-03-01 RX ADMIN — FLUOXETINE HYDROCHLORIDE 20 MG: 20 LIQUID ORAL at 08:14

## 2018-03-01 ASSESSMENT — VISUAL ACUITY
OU: NORMAL ACUITY

## 2018-03-01 ASSESSMENT — PATIENT HEALTH QUESTIONNAIRE - PHQ9: SUM OF ALL RESPONSES TO PHQ QUESTIONS 1-9: 6

## 2018-03-01 NOTE — PROGRESS NOTES
CLINICAL NUTRITION SERVICES - BRIEF NOTE    Nutrition Prescription    RECOMMENDATIONS FOR MDs/PROVIDERS TO ORDER:  - Total daily fluids/adjustments per MD. TF regimen alone does not meet fluid needs. Total fluids with current flushes and TF free water = 910 ml/day. Pt requires additional ~500 ml/day to meet low end fluid needs either by flush or oral PO intakes.    Recommendations already ordered by Registered Dietitian (RD):  - Transition pt to standard regimen with fiber in preparation for d/c/long-term EN needs. Recommend continuous regimen until PO intake improves.   - Initiate Isosource 1.5 @ goal 40 ml/hr (960 ml/day) to provide 1440 kcals (26 kcal/kg/day), 65 g PRO (1.2 g/kg/day), 730 ml free H2O, 169 g CHO and 14 g Fiber daily.  - Adjust accordingly to PO intakes. Reorder kaylyn counts (should pt remain inpatient)    Future/Additional Recommendations:  - PO/supp adequacy and EN adjustments (kaylyn counts ordered 3/1)  - tolerance to new regimen      Nutrition Progress Note - f/u for progress towards previous nutrition POC (see previous 2/26 reassessment for details)     Florinda Parmar, JOEY, LD, [facility]  Neuro ICU  Pager: [phone]

## 2018-03-01 NOTE — PLAN OF CARE
Problem: Patient Care Overview  Goal: Plan of Care/Patient Progress Review  Occupational Therapy Discharge Summary    Reason for therapy discharge:    Discharged to acute rehabilitation facility.    Progress towards therapy goal(s). See goals on Care Plan in [facility] electronic health record for goal details.  Goals not met.  Barriers to achieving goals:   limited tolerance for therapy and discharge from facility.    Therapy recommendation(s):    Continued therapy is recommended.  Rationale/Recommendations:  Pt presents with new deficits including :Right gaze preference however able to cross midline (with many cues). Dysarthria noted. Left hemiplegia, left javi neglect, left hemisensory loss leading to decreased function and safety. Pt unable to complete ADLs, bed mobility or transfers. Needs to achieve  Mod IND/SBA with walker and stairs to return home with assist from family. Has a 8yo daughter with shared custody, parents are present and involved. Pt will benefit from intensive, interdisciplinary ARU to address these deficits and to provide caregiver training to facilitate a safe dc to home. .

## 2018-03-01 NOTE — PLAN OF CARE
Problem: Patient Care Overview  Goal: Plan of Care/Patient Progress Review  SLP: Session cancelled due to patient sleeping and did not open eyes despite max encouragement. RN notified. Recommend continued SLP at discharge to acute rehab.

## 2018-03-01 NOTE — PLAN OF CARE
Problem: Patient Care Overview  Goal: Plan of Care/Patient Progress Review  Problem: Patient Care Overview  Goal: Plan of Care/Patient Progress Review  Discharge Planner PT   Patient plan for discharge: rehab  Current status: Moveo squats at 15 deg, 1x5 and 1x10 reps. Unable to activate LLE musculature for assist. Attempted to faciliate LLE mvt by moving RLE followed by PROM of LLE into hip/knee extension/flexion  More attentive to L side. Session limited pt fatigue and pain.   Barriers to return to prior living situation: Medical status, level of A  Recommendations for discharge: ARU  Rationale for recommendations: Pt significantly below baseline, currently unable to mobilize due to lack of LUE/LLE muscle activation. Would benefit from intensive therapy to regain function. Good familial support system.        Entered by: Silver Carlos 02/28/2018 9:56 PM

## 2018-03-01 NOTE — PROGRESS NOTES
Calorie Counts  Intake recorded for: 2/28  Kcals: 234  Protein: 6g  # Meals Recorded: 100% orange juice, 25% scrambled eggs with cheese, apple juice   # Supplements Recorded: 0

## 2018-03-01 NOTE — PROGRESS NOTES
Brief note:    Pt being discharged to ARU today for rehab. We have a suspicion of Myeloproliferative disorder (MPD). The blood test result is still pending (may take few more days to be resulted). We will arrange for follow up with Dr. Rico as an outpatient. This appt is not urgent and can be arranged after discharge from ARU.     Chintan Patrick MD  Hematology Oncology fellow  [phone]

## 2018-03-01 NOTE — PLAN OF CARE
Problem: Patient Care Overview  Goal: Plan of Care/Patient Progress Review  Physical Therapy Discharge Summary    Reason for therapy discharge:    Discharged to acute rehabilitation facility.    Progress towards therapy goal(s). See goals on Care Plan in [facility] electronic health record for goal details.  Goals not met.  Barriers to achieving goals:   discharge from facility.    Therapy recommendation(s):    Continued therapy is recommended.  Rationale/Recommendations:  for further progression of strength and activity tolerance to maximize indep functional mobility. .

## 2018-03-01 NOTE — PLAN OF CARE
Problem: Patient Care Overview  Goal: Plan of Care/Patient Progress Review  Outcome: Adequate for Discharge Date Met: 03/01/18  Pt POD#11 R-side decompression hemicrani for RM1 occlusion, vss, neuros include: waxes and wanes w/orientation, orientated x4 during assessments, slightly lethargic, intermittently confused, L neglect, L field cut, L hemiplegia, L nystagmus, decreased sensation and numbness of L-side of body, denies tingling, R-side of body is 4/5, slightly garbled speech. R bone flap removed, AO2 w/lift and helmet when OOB, incision MACIE, staples removed yesterday, on DD2 w/nectar thickened liquid, kaylyn count, w/poor PO intake and good TF tolerance (TF running @ 40ml/hr). Heparin gtt was d/c this morning, PRN pain meds provided round the clock w/good tolerance, pt is intermittently continent/incontinent w/bowel and bladder, uses bed-pan or commode. PIV removed per d/c orders, on CCM w/NSR. Phone report provided to [facility] RN, family gathered pt's belongings as well as some went w/pt and EMTs.

## 2018-03-01 NOTE — PLAN OF CARE
Problem: Patient Care Overview  Goal: Plan of Care/Patient Progress Review  Outcome: No Change  Pt on 6A s/p RM1 occlusion with malignant MCA syndrome . POD #11 decompressive hemicraniectomy. VSS. Pt lethargic overnight, arouses to voice. Oriented to self, year and hospital with choice at midnight. Oriented x4 at 0400. Intermittent confusion/forgetfulness. Neuros unchanged; L facial droop, L hemiplegia, decreased sensation to L side, L neglect, L field cut, nystagmus to L eye and R side 4/5. R head incision MACIE and approximated; moderate edema noted. Pt c/o HA, controlled with PRN Tylenol and Oxycodone.  TF infusing at 40 ml/hr via PEG. DD2 with nectar thick liquids. Cecil counts continued. Incontinent of bladder x1, voided and had BMx1 on commode. PIV SL. Up with 2 and lift. Helmet on when OOB. Turned and repositioned q2 hrs. Continue to monitor and follow current POC.

## 2018-03-01 NOTE — PROGRESS NOTES
"Social Work Services Discharge Note      Patient Name:  Maggie Case     Anticipated Discharge Date:  3/1/18    Discharge Disposition:   [facility] ([phone])    Following MD:  Facility Assignment     Pre-Admission Screening (PAS) online form has been completed.  The Level of Care (LOC) is:  Determined  Confirmation Code is:  Not required as pt is being admitted to ARU.       Additional Services/Equipment Arranged:  Dr. Crawley has confirmed readiness for discharge.  Admissions (Miriam and Yadira) have confirmed acceptance for admit to [facility] and receipt of insurance authorization.   has arranged for [employer] ([employer] [phone]) to provide stretcher transport at 1pm.  SW has completed a \"Physicians Certification for Transportation\" form.       Patient / Family response to discharge plan:  Pt and mother voice understanding of the discharge plan and agreement with the discharge plan.     Persons notified of above discharge plan:  Pt, mother, Dr. Crawley and 6A nursing.    Staff Discharge Instructions:  Please fax discharge orders and signed hard scripts for any controlled substances (SW will complete this task).  Please print a packet and send with patient.     CTS Handoff completed:  YES    Medicare Notice of Rights provided to the patient/family:  NO, as per Admissions Facesheet, pt is not on medicare.      RE Butler  Social Work, 6A  Phone:  [phone]  Pager:  [phone]  3/1/2018        "

## 2018-03-01 NOTE — PLAN OF CARE
Problem: Patient Care Overview  Goal: Plan of Care/Patient Progress Review  Outcome: No Change  POD#10 post Decompression crani, (no bone flap on R) side of head- helmet when OOB), L)slt  Facial droop, L)sided hemiplegia, L neglect, L field cut, nystagmus to L eye, R)side 4/5, head edema and periorbital edema noted. Pt states decreased sensation on LUE /LLE, pt incontinent of urine x1 attends on, was continent of BM liquid stool on bedpan. DD2 w/nectar thick liquids- pt declined dinner, TF cont. @ 40mls /hr, Hep 10  0.16 so no change 850 mls/hr, congested cough enc. IS/DB. Repositioned q2 hrs w/ asst of 2. Pt c/o HA and L) shoulder discomfort oxy and tyl. (And ice pack to shoulder-helpful) given. (note pt request to be woken up and offered analgesic whenever available).On [facility] w/NSR. MD came to answer questioned at bedside. Plan: head CT 3/1

## 2018-03-02 ENCOUNTER — CARE COORDINATION (OUTPATIENT)
Dept: CARE COORDINATION | Facility: CLINIC | Age: 28
End: 2018-03-02

## 2018-03-02 NOTE — PLAN OF CARE
Problem: Patient Care Overview  Goal: Plan of Care/Patient Progress Review  Speech Language Therapy Discharge Summary    Reason for therapy discharge:    Discharged to acute rehabilitation facility.    Progress towards therapy goal(s). See goals on Care Plan in [facility] electronic health record for goal details.  Goals partially met.  Barriers to achieving goals:   discharge from facility.    Therapy recommendation(s):    Continued therapy is recommended.  Rationale/Recommendations:  see below.   SLP note from 2-: Pt continues to make good improvements.  Tolerating current diet well w/o outward s/sx of aspiration. Did trial small amounts of thin liquids today w/ taking small sips w/o outward s/sx of aspiration. Pt has high risk for aspiration of thin liquids at this time and will need to demonstrate consistency of tolerance prior to adv diet.  Recommend continue with dysphagia diet level 2 with nectar thick liquids.  ST to f/u for diet tolerance and ability to safely adv diet as tolerated.

## 2018-03-06 LAB — COPATH REPORT: NORMAL

## 2018-03-08 DIAGNOSIS — I63.9 STROKE (H): Primary | ICD-10-CM

## 2018-03-13 ENCOUNTER — OFFICE VISIT (OUTPATIENT)
Dept: NEUROSURGERY | Facility: CLINIC | Age: 28
End: 2018-03-13
Payer: COMMERCIAL

## 2018-03-13 VITALS — DIASTOLIC BLOOD PRESSURE: 68 MMHG | HEIGHT: 64 IN | SYSTOLIC BLOOD PRESSURE: 99 MMHG | HEART RATE: 93 BPM

## 2018-03-13 DIAGNOSIS — I63.9 CEREBROVASCULAR ACCIDENT (CVA), UNSPECIFIED MECHANISM (H): Primary | ICD-10-CM

## 2018-03-13 RX ORDER — PROMETHAZINE HYDROCHLORIDE 25 MG/1
25 TABLET ORAL
COMMUNITY
Start: 2018-01-30 | End: 2018-04-17

## 2018-03-13 RX ORDER — POTASSIUM CHLORIDE 1500 MG/1
20 TABLET, EXTENDED RELEASE ORAL
COMMUNITY
Start: 2018-01-30 | End: 2018-04-17

## 2018-03-13 ASSESSMENT — ENCOUNTER SYMPTOMS
WEIGHT GAIN: 0
EXERCISE INTOLERANCE: 1
BACK PAIN: 1
PARALYSIS: 0
SINUS CONGESTION: 1
MYALGIAS: 1
FLANK PAIN: 0
FATIGUE: 1
TREMORS: 0
MUSCLE CRAMPS: 1
DECREASED APPETITE: 1
HYPOTENSION: 1
SPUTUM PRODUCTION: 0
NECK PAIN: 1
SLEEP DISTURBANCES DUE TO BREATHING: 1
DIARRHEA: 1
POOR WOUND HEALING: 0
WEIGHT LOSS: 1
ARTHRALGIAS: 1
ORTHOPNEA: 0
VOMITING: 0
EYE IRRITATION: 1
SNORES LOUDLY: 0
MEMORY LOSS: 1
POLYDIPSIA: 1
HEMOPTYSIS: 0
COUGH DISTURBING SLEEP: 1
RECTAL PAIN: 0
INCREASED ENERGY: 1
DECREASED LIBIDO: 0
FEVER: 1
STIFFNESS: 1
NERVOUS/ANXIOUS: 1
JAUNDICE: 0
EYE REDNESS: 0
TINGLING: 1
NUMBNESS: 1
DISTURBANCES IN COORDINATION: 0
SKIN CHANGES: 0
ALTERED TEMPERATURE REGULATION: 1
NIGHT SWEATS: 1
EYE WATERING: 1
SORE THROAT: 0
DYSURIA: 1
MUSCLE WEAKNESS: 1
HYPERTENSION: 0
LIGHT-HEADEDNESS: 1
DOUBLE VISION: 0
NAUSEA: 1
NECK MASS: 0
HALLUCINATIONS: 1
BLOOD IN STOOL: 0
DYSPNEA ON EXERTION: 0
BOWEL INCONTINENCE: 0
WEAKNESS: 1
POLYPHAGIA: 1
SYNCOPE: 1
HOARSE VOICE: 1
LOSS OF CONSCIOUSNESS: 0
CHILLS: 1
HOT FLASHES: 1
LEG PAIN: 1
ABDOMINAL PAIN: 1
TASTE DISTURBANCE: 1
DEPRESSION: 1
BLOATING: 1
JOINT SWELLING: 1
EYE PAIN: 0
HEARTBURN: 0
NAIL CHANGES: 0
SPEECH CHANGE: 1
SINUS PAIN: 1
HEMATURIA: 1
POSTURAL DYSPNEA: 0
CONSTIPATION: 0
INSOMNIA: 1
DIZZINESS: 1
SEIZURES: 0
HEADACHES: 1
TROUBLE SWALLOWING: 0
PALPITATIONS: 0
DECREASED CONCENTRATION: 1
PANIC: 1
WHEEZING: 0
COUGH: 1
SHORTNESS OF BREATH: 1
DIFFICULTY URINATING: 0
SMELL DISTURBANCE: 1

## 2018-03-13 NOTE — MR AVS SNAPSHOT
"              After Visit Summary   3/13/2018    Maggie Case    MRN: [id number]           Patient Information     Date Of Birth          1990        Visit Information        Provider Department      3/13/2018 4:50 PM Emeterio Mesa MD [facility] Neurosurgery         Follow-ups after your visit        Follow-up notes from your care team     Return in about 6 weeks (around 2018).      Your next 10 appointments already scheduled     Mar 23, 2018  2:00 PM CDT   (Arrive by 1:45 PM)   Return Visit with Anay Rico MD   [facility] ([facility])    [address] [place] [address]  [address]  [facility] [address] [phone]              Who to contact     Please call your clinic at [phone] to:    Ask questions about your health    Make or cancel appointments    Discuss your medicines    Learn about your test results    Speak to your doctor            Additional Information About Your Visit        MyChart Information     [employer] is an electronic gateway that provides easy, online access to your medical records. With [employer], you can request a clinic appointment, read your test results, renew a prescription or communicate with your care team.     To sign up for [employer] visit the website at [email or web address]   You will be asked to enter the access code listed below, as well as some personal information. Please follow the directions to create your username and password.     Your access code is: [email or web address]  Expires: 2018  1:54 PM     Your access code will  in 90 days. If you need help or a new code, please contact your [facility] or call [phone] for assistance.        Care EveryWhere ID     This is your Care EveryWhere ID. This could be used by other organizations to access your [facility] medical records  [id number]        Your Vitals Were     Pulse Height                93 1.626 m (5' 4\")        "    Blood Pressure from Last 3 Encounters:   03/13/18 99/68   03/01/18 114/74   02/17/18 117/84    Weight from Last 3 Encounters:   03/01/18 68.6 kg (151 lb 3.8 oz)   01/30/18 61.2 kg (135 lb)   04/28/17 63.5 kg (139 lb 15.9 oz)              Today, you had the following     No orders found for display       Primary Care Provider Office Phone # Fax #    Chapo IAN Flores [phone]       [facility] [place] [address] [place] [address]  [place] [address]        Equal Access to Services     [facility]: Hadii jovany champagne hadasho Sopia, waaxda lujimiadaha, qaybta kaalmada adeemmayada, justin peres . So [facility] [phone].    ATENCIÓN: Si habla español, tiene a montalvo disposición servicios gratuitos de asistencia lingüística. [facility] [phone].    We comply with applicable federal civil rights laws and [place] laws. We do not discriminate on the basis of race, color, national origin, age, disability, sex, sexual orientation, or gender identity.            Thank you!     Thank you for choosing [facility]  for your care. Our goal is always to provide you with excellent care. Hearing back from our patients is one way we can continue to improve our services. Please take a few minutes to complete the written survey that you may receive in the mail after your visit with us. Thank you!             Your Updated Medication List - Protect others around you: Learn how to safely use, store and throw away your medicines at www.disposemymeds.org.          This list is accurate as of 3/13/18  6:13 PM.  Always use your most recent med list.                   Brand Name Dispense Instructions for use Diagnosis    acetaminophen 32 mg/mL solution    TYLENOL    400 mL    20.3 mLs (650 mg) by Per G Tube route every 4 hours as needed for mild pain or fever    Muscle pain       BENADRYL PO      Take by mouth At Bedtime        enoxaparin 80 MG/0.8ML injection    LOVENOX     Inject 0.69 mLs (69 mg)  Subcutaneous every 12 hours    Acute ischemic stroke (H)       FLUoxetine 20 MG/5ML solution    PROzac    150 mL    5 mLs (20 mg) by Per G Tube route daily    Acute ischemic stroke (H)       folic acid 1 MG tablet    FOLVITE    30 tablet    1 tablet (1 mg) by Per G Tube route daily    Acute ischemic stroke (H), Vitamin deficiency       lidocaine 4 % kit    LMX4     Apply topically every hour as needed for pain (with VAD insertion or accessing implanted port.)    Muscle pain       MELATONIN PO      Take 5 mg by mouth        multivitamins with minerals Liqd liquid      15 mLs by Per Feeding Tube route daily    Acute ischemic stroke (H)       nicotine 14 MG/24HR 24 hr patch    NICODERM CQ    14 patch    Place 1 patch onto the skin daily    Tobacco use disorder       ondansetron 4 MG tablet    ZOFRAN    18 tablet    1-2 tablets (4-8 mg) by Per Feeding Tube route every 8 hours as needed for nausea    Nausea       oxyCODONE 5 MG/5ML solution    ROXICODONE    90 mL    5-10 mLs (5-10 mg) by Per G Tube route every 4 hours as needed for moderate to severe pain    Muscle pain       potassium chloride SA 20 MEQ CR tablet    K-DUR/KLOR-CON M     Take 20 mEq by mouth        * promethazine 25 MG tablet    PHENERGAN     Take 25 mg by mouth        * promethazine 25 MG tablet    PHENERGAN    20 tablet    1 tablet (25 mg) by Per G Tube route every 6 hours as needed for nausea    Nausea       ramelteon 8 MG tablet    ROZEREM     1 tablet (8 mg) by Per G Tube route nightly as needed for sleep    Insomnia, unspecified type       thiamine 100 MG tablet      1 tablet (100 mg) by Per NG tube route daily    Vitamin deficiency       * Warfarin Therapy Reminder      1 each continuous prn    Acute ischemic stroke (H)       * warfarin 5 MG tablet    COUMADIN    30 tablet    Take 1 tablet (5 mg) by mouth daily    History of pulmonary embolism, Cerebral infarction due to embolism of right middle cerebral artery (H)       * Notice:  This list has 4  medication(s) that are the same as other medications prescribed for you. Read the directions carefully, and ask your doctor or other care provider to review them with you.

## 2018-03-13 NOTE — LETTER
3/13/2018       RE: Maggie Case  [address] [facility] [address]     Dear Colleague,    Thank you for referring your patient, Maggie Case, to the [facility] NEUROSURGERY at [facility]. Please see a copy of my visit note below.    Service Date: 03/13/2018      Chapo Flores MD      RE:  Maggie Evie      Dear Dr. Flores:      I had the pleasure to see Ms. Case today in the Neurosurgery Clinic for followup on a large right MCA stroke.  As you may know, this is a 28-year-old pleasant lady who presented to us in early February with a massive right MCA stroke.  She underwent a right-sided hemicraniectomy on 02/18/2018 and had a prolonged hospitalization.  She was also found to have hypercoagulability and so she was started on Lovenox and switched to warfarin.  The patient was discharged to rehab and has been improving gradually.  She presents today for followup to discuss the timing for a cranioplasty.  The patient reports no new neurological deficits.  She still has weakness and a significant hemiparesis on the left arm and leg as well as a left facial droop.  She is able to communicate and can talk with no language deficits.  She is quite distressed with her condition and has been having significant family and personal issues and this has caused her a lot of depression.  She got  with her  and her daughter is not living with her and she was recently started on antidepressant therapy.      PHYSICAL EXAMINATION:  The patient is sitting comfortably in the exam chair.  She is alert, oriented and in no acute distress.  Cranial nerves reveal a left facial droop.  Extraocular muscles are intact.  Her tongue is midline.  In terms of motor strength, she is full strength in the right arm and leg; however, she is a hemiplegic on the left side.  She is also hyperreflexic on the left biceps and knee.  She has normal reflexes on the right biceps and knee.   Sensation is intact to light touch throughout upper and lower extremities bilaterally.      I reviewed her CT scan of the head today from 2018 and this reveals postsurgical changes of the previous right hemicraniectomy, however there is still significant brain outward herniation and swelling.        ASSESSMENT:  I spent approximately 30 minutes with the patient and her mother, with the majority of the time spent in consultation and treatment plan.  We discussed that it is still early to consider cranioplasty  especially there is still quite a lot of brain edema left.  I would like to wait several weeks to allow more time for the brain to get back into position.  I also described the nature of the surgery, its risks and benefits.  In the meantime, we will have her come back in 6 weeks with a repeat head CT for followup.      Thank you for the opportunity to participate in the care of Ms. Garcia.  Please do not hesitate to contact the Neurosurgery Clinic for any questions or concerns.   Dictated by Abilio Crawford MD, Neurosurgery Resident.         MESSI PACHECO MD            D: 2018   T: 2018   MT: ROSALINA      Name:     NADIR GARCIA   MRN:      [id number]        Account:      [id number]   :      1990           Service Date: 2018      Document: [id number]

## 2018-03-14 NOTE — PROGRESS NOTES
Service Date: 03/13/2018      Chapo Flores MD      RE:  Maggie Laurenman      Dear Dr. Flores:      I had the pleasure to see Ms. Case today in the Neurosurgery Clinic for followup on a large right MCA stroke. As you may know, this is a 28-year-old pleasant lady who presented to us in early February with a massive right MCA stroke. She underwent a right-sided hemicraniectomy on 02/18/2018 and had a prolonged hospitalization. She was also found to have hypercoagulability and so she was started on Lovenox and switched to warfarin. The patient was discharged to rehab and has been improving gradually. She presents today for followup to discuss the timing for a cranioplasty.  The patient reports no new neurological deficits.  She still has weakness and a significant hemiparesis on the left arm and leg as well as a left facial droop.  She is able to communicate and can talk with no language deficits.  She is quite distressed with her condition and has been having family and personal issues and this has caused her a lot of depression.  She got  with her  and her daughter is not living with her and she was recently started on antidepressant therapy.    The patient is sitting comfortably in the exam chair. She is alert, oriented and in no acute distress. Cranial nerves reveal a left facial droop.  Extraocular muscles are intact.  Her tongue is midline.  In terms of motor strength, she is full strength in the right arm and leg; however, she is a hemiplegic on the left side. She is also hyperreflexic on the left biceps and knee.  She has normal reflexes on the right biceps and knee.  Sensation is intact to light touch throughout upper and lower extremities bilaterally.     I reviewed her CT scan of the head today from 03/11/2018 and this reveals postsurgical changes of the previous right hemicraniectomy, however there is still significant brain outward herniation and swelling.       I spent approximately 30  minutes with the patient and her mother, with the majority of the time spent in consultation and treatment plan.  We discussed that it is still early to consider cranioplasty especially with the amount of brain edema.  I would like to wait several weeks to allow more time for the brain to get back into position.  I also described the nature of the surgery, its risks and benefits.  In the meantime, we will have her come back in 6 weeks with a repeat head CT for followup.     Thank you for the opportunity to participate in the care of Ms. Garcia. Please do not hesitate to contact the Neurosurgery clinic for any questions or concerns.        MESSI PACHECO MD       As dictated by RASTA JAY MD           D: 2018   T: 2018   MT: ROSALINA      Name:     NADIR GARCIA   MRN:      [id number]        Account:      [id number]   :      1990           Service Date: 2018      Document: [id number]

## 2018-04-17 ENCOUNTER — APPOINTMENT (OUTPATIENT)
Dept: GENERAL RADIOLOGY | Facility: HOSPITAL | Age: 28
End: 2018-04-17
Attending: PHYSICIAN ASSISTANT
Payer: COMMERCIAL

## 2018-04-17 ENCOUNTER — HOSPITAL ENCOUNTER (EMERGENCY)
Facility: HOSPITAL | Age: 28
Discharge: NURSING, REHAB OR LONG-TERM CARE | End: 2018-04-17
Attending: PHYSICIAN ASSISTANT | Admitting: PHYSICIAN ASSISTANT
Payer: COMMERCIAL

## 2018-04-17 VITALS
RESPIRATION RATE: 16 BRPM | TEMPERATURE: 98.3 F | DIASTOLIC BLOOD PRESSURE: 96 MMHG | HEART RATE: 71 BPM | OXYGEN SATURATION: 98 % | SYSTOLIC BLOOD PRESSURE: 132 MMHG

## 2018-04-17 DIAGNOSIS — R07.89 CHEST WALL PAIN: ICD-10-CM

## 2018-04-17 LAB
ALBUMIN SERPL-MCNC: 3.1 G/DL (ref 3.4–5)
ALP SERPL-CCNC: 92 U/L (ref 40–150)
ALT SERPL W P-5'-P-CCNC: 19 U/L (ref 0–50)
ANION GAP SERPL CALCULATED.3IONS-SCNC: 6 MMOL/L (ref 3–14)
AST SERPL W P-5'-P-CCNC: 21 U/L (ref 0–45)
BASOPHILS # BLD AUTO: 0 10E9/L (ref 0–0.2)
BASOPHILS NFR BLD AUTO: 0.1 %
BILIRUB SERPL-MCNC: 0.1 MG/DL (ref 0.2–1.3)
BUN SERPL-MCNC: 7 MG/DL (ref 7–30)
CALCIUM SERPL-MCNC: 8.6 MG/DL (ref 8.5–10.1)
CHLORIDE SERPL-SCNC: 106 MMOL/L (ref 94–109)
CO2 SERPL-SCNC: 27 MMOL/L (ref 20–32)
CREAT SERPL-MCNC: 0.5 MG/DL (ref 0.52–1.04)
D DIMER PPP DDU-MCNC: <200 NG/ML D-DU (ref 0–300)
DIFFERENTIAL METHOD BLD: ABNORMAL
EOSINOPHIL # BLD AUTO: 0.8 10E9/L (ref 0–0.7)
EOSINOPHIL NFR BLD AUTO: 9.1 %
ERYTHROCYTE [DISTWIDTH] IN BLOOD BY AUTOMATED COUNT: 15.6 % (ref 10–15)
GFR SERPL CREATININE-BSD FRML MDRD: >90 ML/MIN/1.7M2
GLUCOSE SERPL-MCNC: 96 MG/DL (ref 70–99)
HCT VFR BLD AUTO: 30.6 % (ref 35–47)
HGB BLD-MCNC: 9.5 G/DL (ref 11.7–15.7)
IMM GRANULOCYTES # BLD: 0 10E9/L (ref 0–0.4)
IMM GRANULOCYTES NFR BLD: 0.2 %
INR PPP: 4.08 (ref 0.8–1.2)
LYMPHOCYTES # BLD AUTO: 1.4 10E9/L (ref 0.8–5.3)
LYMPHOCYTES NFR BLD AUTO: 16.3 %
MCH RBC QN AUTO: 24.1 PG (ref 26.5–33)
MCHC RBC AUTO-ENTMCNC: 31 G/DL (ref 31.5–36.5)
MCV RBC AUTO: 78 FL (ref 78–100)
MONOCYTES # BLD AUTO: 0.8 10E9/L (ref 0–1.3)
MONOCYTES NFR BLD AUTO: 9.4 %
NEUTROPHILS # BLD AUTO: 5.4 10E9/L (ref 1.6–8.3)
NEUTROPHILS NFR BLD AUTO: 64.9 %
NRBC # BLD AUTO: 0 10*3/UL
NRBC BLD AUTO-RTO: 0 /100
PLATELET # BLD AUTO: 449 10E9/L (ref 150–450)
POTASSIUM SERPL-SCNC: 3.1 MMOL/L (ref 3.4–5.3)
PROT SERPL-MCNC: 6.8 G/DL (ref 6.8–8.8)
RBC # BLD AUTO: 3.94 10E12/L (ref 3.8–5.2)
SODIUM SERPL-SCNC: 139 MMOL/L (ref 133–144)
TROPONIN I SERPL-MCNC: <0.015 UG/L (ref 0–0.04)
WBC # BLD AUTO: 8.4 10E9/L (ref 4–11)

## 2018-04-17 PROCEDURE — 85610 PROTHROMBIN TIME: CPT | Performed by: PHYSICIAN ASSISTANT

## 2018-04-17 PROCEDURE — 36415 COLL VENOUS BLD VENIPUNCTURE: CPT | Performed by: PHYSICIAN ASSISTANT

## 2018-04-17 PROCEDURE — 93010 ELECTROCARDIOGRAM REPORT: CPT | Performed by: INTERNAL MEDICINE

## 2018-04-17 PROCEDURE — 93005 ELECTROCARDIOGRAM TRACING: CPT

## 2018-04-17 PROCEDURE — 80053 COMPREHEN METABOLIC PANEL: CPT | Performed by: PHYSICIAN ASSISTANT

## 2018-04-17 PROCEDURE — 96374 THER/PROPH/DIAG INJ IV PUSH: CPT

## 2018-04-17 PROCEDURE — 99285 EMERGENCY DEPT VISIT HI MDM: CPT | Performed by: PHYSICIAN ASSISTANT

## 2018-04-17 PROCEDURE — 25000128 H RX IP 250 OP 636: Performed by: PHYSICIAN ASSISTANT

## 2018-04-17 PROCEDURE — 85025 COMPLETE CBC W/AUTO DIFF WBC: CPT | Performed by: PHYSICIAN ASSISTANT

## 2018-04-17 PROCEDURE — 71045 X-RAY EXAM CHEST 1 VIEW: CPT | Mod: TC

## 2018-04-17 PROCEDURE — 96376 TX/PRO/DX INJ SAME DRUG ADON: CPT

## 2018-04-17 PROCEDURE — 25000132 ZZH RX MED GY IP 250 OP 250 PS 637: Performed by: PHYSICIAN ASSISTANT

## 2018-04-17 PROCEDURE — 99285 EMERGENCY DEPT VISIT HI MDM: CPT | Mod: 25

## 2018-04-17 PROCEDURE — 96375 TX/PRO/DX INJ NEW DRUG ADDON: CPT

## 2018-04-17 PROCEDURE — 84484 ASSAY OF TROPONIN QUANT: CPT | Performed by: PHYSICIAN ASSISTANT

## 2018-04-17 PROCEDURE — 85379 FIBRIN DEGRADATION QUANT: CPT | Performed by: PHYSICIAN ASSISTANT

## 2018-04-17 RX ORDER — POTASSIUM CHLORIDE 1500 MG/1
40 TABLET, EXTENDED RELEASE ORAL ONCE
Status: COMPLETED | OUTPATIENT
Start: 2018-04-17 | End: 2018-04-17

## 2018-04-17 RX ORDER — ONDANSETRON 2 MG/ML
4 INJECTION INTRAMUSCULAR; INTRAVENOUS ONCE
Status: COMPLETED | OUTPATIENT
Start: 2018-04-17 | End: 2018-04-17

## 2018-04-17 RX ORDER — TRAZODONE HYDROCHLORIDE 100 MG/1
100 TABLET ORAL AT BEDTIME
COMMUNITY
End: 2020-03-06

## 2018-04-17 RX ORDER — MORPHINE SULFATE 4 MG/ML
4 INJECTION, SOLUTION INTRAMUSCULAR; INTRAVENOUS ONCE
Status: COMPLETED | OUTPATIENT
Start: 2018-04-17 | End: 2018-04-17

## 2018-04-17 RX ORDER — GUAIFENESIN AND DEXTROMETHORPHAN HYDROBROMIDE 100; 10 MG/5ML; MG/5ML
5 SOLUTION ORAL EVERY 4 HOURS PRN
COMMUNITY
End: 2018-09-19

## 2018-04-17 RX ADMIN — MORPHINE SULFATE 4 MG: 4 INJECTION, SOLUTION INTRAMUSCULAR; INTRAVENOUS at 18:35

## 2018-04-17 RX ADMIN — MORPHINE SULFATE 4 MG: 4 INJECTION, SOLUTION INTRAMUSCULAR; INTRAVENOUS at 19:34

## 2018-04-17 RX ADMIN — ONDANSETRON 4 MG: 2 INJECTION INTRAMUSCULAR; INTRAVENOUS at 19:30

## 2018-04-17 RX ADMIN — ONDANSETRON 4 MG: 2 INJECTION INTRAMUSCULAR; INTRAVENOUS at 18:35

## 2018-04-17 RX ADMIN — POTASSIUM CHLORIDE 40 MEQ: 1500 TABLET, EXTENDED RELEASE ORAL at 19:08

## 2018-04-17 ASSESSMENT — ENCOUNTER SYMPTOMS
STRIDOR: 0
SORE THROAT: 0
CHEST TIGHTNESS: 0
NEUROLOGICAL NEGATIVE: 1
CHILLS: 0
WHEEZING: 0
NECK STIFFNESS: 0
COUGH: 0
SHORTNESS OF BREATH: 0
FEVER: 0
PALPITATIONS: 0
BACK PAIN: 0
ABDOMINAL PAIN: 0
VOMITING: 0
NECK PAIN: 0
NAUSEA: 0

## 2018-04-17 NOTE — ED AVS SNAPSHOT
[facility]    [address] [place] [address]    Phone:  [phone]                                       Maggie Case   MRN: [id number]    Department:  [facility]   Date of Visit:  4/17/2018           Patient Information     Date Of Birth          1990        Your diagnoses for this visit were:     Chest wall pain        You were seen by Hali Glover PA-C.      Follow-up Information     Follow up with Chapo Flores MD In 3 days.    Specialty:  Family Practice    Contact information:    [facility]  [address] [place], [place] [address] [address] [phone]          Follow up with [facility].    Specialty:  EMERGENCY MEDICINE    Why:  If symptoms worsen    Contact information:    [address] [place] [address] [phone]    Additional information:    From [facility]: Take [address]. Turn left at [address]. Turn left at the first stoplight on [address]. At the first stop sign, take a right onto [address]. Take a left into the parking lot and continue through until you reach the North enterance of the building.       From [place]: Take [address]. Take the [address] ramp towards [place]. Turn left onto [address]. Take a slight right onto [address]. Turn left at the first stoplight on [address]. At the first stop sign, take a right onto [address]. Take a left into the parking lot and continue through until you reach the North enterance of the building.       From [place]: Take [address]. Take a right at [address]. At the first stop sign, take a right onto [address]. Take a left into the parking lot and continue through until you reach the North enterance of the building.         Discharge Instructions       Hold tonight's dose of Coumadin as your INR was elevated at 4.0. Dr. Flores will direct you further tomorrow about dosing.  Please return here with any new or worsening symptoms.     Chest Wall Pain: Costochondritis    The chest pain that you have had today is caused by costochondritis. This condition is caused by an inflammation of the cartilage joining your ribs to your breastbone. It is not caused by heart or lung problems. Your healthcare team has made sure that the chest pain you feel is not from a life threatening cause of chest pain such as heart attack, collapsed lung, blood clot in the lung, tear in the aorta, or esophageal rupture. The inflammation may have been brought on by a blow to the chest, lifting heavy objects, intense exercise, or an illness that made you cough and sneeze a lot. It often occurs during times of emotional stress. It can be painful, but it is not dangerous. It usually goes away in 1 to 2 weeks. But it may happen again. Rarely, a more serious condition may cause symptoms similar to costochondritis. That s why it s important to watch for the warning signs listed below.  Home care  Follow these guidelines when caring for yourself at home:    If you feel that emotional stress is a cause of your condition, try to figure out the sources of that stress. It may not be obvious. Learn ways to deal with the stress in your life. This can include regular exercise, muscle relaxation, meditation, or simply taking time out for yourself.    You may use acetaminophen, ibuprofen, or naproxen to control pain, unless another pain medicine was prescribed. If you have liver or kidney disease or ever had a stomach ulcer, talk with your healthcare provider before using these medicines.    You can also help ease pain by using a hot, wet compress or heating pad. Use this with or without a medicated skin cream that helps relieves pain.    Do stretching exercise as advised by your provider.    Take any prescribed medicines as directed.  Follow-up care  Follow up with your healthcare provider, or as advised, if you do not start to get  better in the next 2 days.  When to seek medical advice  Call your healthcare provider right away if any of these occur:    A change in the type of pain. Call if it feels different, becomes more serious, lasts longer, or spreads into your shoulder, arm, neck, jaw, or back.    Shortness of breath or pain gets worse when you breathe    Weakness, dizziness, or fainting    Cough with dark-colored sputum (phlegm) or blood    Abdominal pain    Dark red or black stools    Fever of 100.4 F (38 C) or higher, or as directed by your healthcare provider  Date Last Reviewed: 12/1/2016 2000-2017 [email or web address]. [address] [place] [address]. All rights reserved. This information is not intended as a substitute for professional medical care. Always follow your healthcare professional's instructions.          Your next 10 appointments already scheduled     May 08, 2018  9:00 AM CDT   CT HEAD W/O CONTRAST with UCCT1   [facility] ([facility])    [address] [place] [address] [phone]           Please bring any scans or X-rays taken at other hospitals, if similar tests were done. Also bring a list of your medicines, including vitamins, minerals and over-the-counter drugs. It is safest to leave personal items at home.  Be sure to tell your doctor:   If you have any allergies.   If there s any chance you are pregnant.   If you are breastfeeding.  You do not need to do anything special to prepare for this exam.  Please wear loose clothing, such as a sweat suit or jogging clothes. Avoid snaps, zippers and other metal. We may ask you to undress and put on a hospital gown.            May 08, 2018  9:50 AM CDT   (Arrive by 9:35 AM)   Return Visit with Emeterio Mesa MD   [facility] ([facility])    [address] [place] [address] [phone]                 Review of  your medicines      Our records show that you are taking the medicines listed below. If these are incorrect, please call your family doctor or clinic.        Dose / Directions Last dose taken    acetaminophen 32 mg/mL solution   Commonly known as:  TYLENOL   Dose:  650 mg   Quantity:  400 mL        20.3 mLs (650 mg) by Per G Tube route every 4 hours as needed for mild pain or fever   Refills:  0        BENADRYL PO        Take by mouth At Bedtime   Refills:  0        enoxaparin 80 MG/0.8ML injection   Commonly known as:  LOVENOX   Dose:  1 mg/kg        Inject 0.69 mLs (69 mg) Subcutaneous every 12 hours   Refills:  0        FLUoxetine 20 MG/5ML solution   Commonly known as:  PROzac   Dose:  20 mg   Quantity:  150 mL        5 mLs (20 mg) by Per G Tube route daily   Refills:  0        folic acid 1 MG tablet   Commonly known as:  FOLVITE   Dose:  1 mg   Quantity:  30 tablet        1 tablet (1 mg) by Per G Tube route daily   Refills:  0        lidocaine 4 % kit   Commonly known as:  LMX4        Apply topically every hour as needed for pain (with VAD insertion or accessing implanted port.)   Refills:  0        LORAZEPAM PO   Dose:  0.5 mg        Take 0.5 mg by mouth every 8 hours as needed for anxiety   Refills:  0        MELATONIN PO   Dose:  5 mg        Take 5 mg by mouth   Refills:  0        multivitamins with minerals Liqd liquid   Dose:  15 mL        15 mLs by Per Feeding Tube route daily   Refills:  0        nicotine 14 MG/24HR 24 hr patch   Commonly known as:  NICODERM CQ   Dose:  1 patch   Quantity:  14 patch        Place 1 patch onto the skin daily   Refills:  0        ondansetron 4 MG tablet   Commonly known as:  ZOFRAN   Dose:  4-8 mg   Quantity:  18 tablet        1-2 tablets (4-8 mg) by Per Feeding Tube route every 8 hours as needed for nausea   Refills:  0        oxyCODONE 5 MG/5ML solution   Commonly known as:  ROXICODONE   Dose:  5-10 mg   Quantity:  90 mL        5-10 mLs (5-10 mg) by Per G Tube route every  4 hours as needed for moderate to severe pain   Refills:  0        potassium chloride SA 20 MEQ CR tablet   Commonly known as:  K-DUR/KLOR-CON M   Dose:  20 mEq        Take 20 mEq by mouth   Refills:  0        * promethazine 25 MG tablet   Commonly known as:  PHENERGAN   Dose:  25 mg        Take 25 mg by mouth   Refills:  0        * promethazine 25 MG tablet   Commonly known as:  PHENERGAN   Dose:  25 mg   Quantity:  20 tablet        1 tablet (25 mg) by Per G Tube route every 6 hours as needed for nausea   Refills:  1        ramelteon 8 MG tablet   Commonly known as:  ROZEREM   Dose:  8 mg        1 tablet (8 mg) by Per G Tube route nightly as needed for sleep   Refills:  0        thiamine 100 MG tablet   Dose:  100 mg        1 tablet (100 mg) by Per NG tube route daily   Refills:  0        * COUMADIN PO   Dose:  3 mg        Take 3 mg by mouth At Bedtime Tufes, Wed, Thur,Fri, Sat, Sun   Refills:  0        * Warfarin Therapy Reminder   Dose:  1 each        1 each continuous prn   Refills:  0        * warfarin 5 MG tablet   Commonly known as:  COUMADIN   Dose:  5 mg   Quantity:  30 tablet        Take 1 tablet (5 mg) by mouth daily   Refills:  0        * Notice:  This list has 5 medication(s) that are the same as other medications prescribed for you. Read the directions carefully, and ask your doctor or other care provider to review them with you.            Procedures and tests performed during your visit     CBC with platelets differential    Cardiac Continuous Monitoring    Comprehensive metabolic panel    D-Dimer ([facility])    INR    Peripheral IV: Standard    Pulse oximetry nursing    Review medications with patient    Troponin I    XR Chest Port 1 View      Orders Needing Specimen Collection     None      Pending Results     No orders found from 4/15/2018 to 4/18/2018.            Pending Culture Results     No orders found from 4/15/2018 to 4/18/2018.            Thank you for choosing [facility]       Thank you for  "choosing [facility] for your care. Our goal is always to provide you with excellent care. Hearing back from our patients is one way we can continue to improve our services. Please take a few minutes to complete the written survey that you may receive in the mail after you visit with us. Thank you!        [employer] Information     [employer] lets you send messages to your doctor, view your test results, renew your prescriptions, schedule appointments and more. To sign up, go to [email or web address] . Click on \"Log in\" on the left side of the screen, which will take you to the Welcome page. Then click on \"Sign up Now\" on the right side of the page.     You will be asked to enter the access code listed below, as well as some personal information. Please follow the directions to create your username and password.     Your access code is: [email or web address]  Expires: 2018  1:54 PM     Your access code will  in 90 days. If you need help or a new code, please call your [facility] clinic or [phone].        Care EveryWhere ID     This is your Care EveryWhere ID. This could be used by other organizations to access your [facility] medical records  [id number]        Equal Access to Services     MYLES FLAHERTY : Griselda Mcarthur, wamusa brown, qaybta kaalmada kaycee, justin astorga. So [facility] [phone].    ATENCIÓN: Si habla español, tiene a montalvo disposición servicios gratuitos de asistencia lingüística. Llame al [phone].    We comply with applicable federal civil rights laws and [place] laws. We do not discriminate on the basis of race, color, national origin, age, disability, sex, sexual orientation, or gender identity.            After Visit Summary       This is your record. Keep this with you and show to your community pharmacist(s) and doctor(s) at your next visit.                  "

## 2018-04-17 NOTE — ED NOTES
"Pt arrives to ER via [facility] with report of \"lung pain\" that started today.  Pt report cough, nonproductive.  Pt assisted to cot with assist of staff. Pt requesting father to room.  Dayana HENSON at bedside.  Pt placed on monitors.    "

## 2018-04-17 NOTE — ED AVS SNAPSHOT
[facility]    [address] [address]    Phone:  [phone]                                       Maggie Case   MRN: [id number]    Department:  [facility]   Date of Visit:  4/17/2018           After Visit Summary Signature Page     I have received my discharge instructions, and my questions have been answered. I have discussed any challenges I see with this plan with the nurse or doctor.    ..........................................................................................................................................  Patient/Patient Representative Signature      ..........................................................................................................................................  Patient Representative Print Name and Relationship to Patient    ..................................................               ................................................  Date                                            Time    ..........................................................................................................................................  Reviewed by Signature/Title    ...................................................              ..............................................  Date                                                            Time

## 2018-04-17 NOTE — ED NOTES
Presents to the ED via ambulance with c/o chest discomfortt - states to PA feels like when she had PEs in the past.  CVA 2/16/18.   Report from [facility] state pt may be drug seeking as she is being weaned off oxycodone by dr Flores.  Assessment is complete.  Monitors on pt.  EKG done.  Call light is given.

## 2018-04-18 ENCOUNTER — HOSPITAL ENCOUNTER (EMERGENCY)
Facility: HOSPITAL | Age: 28
End: 2018-04-18

## 2018-04-18 NOTE — ED NOTES
Face to face report given with opportunity to observe patient.    Report given to Urszula OLIVAS RN.    Irma Mendoza  4/17/2018, 7:01 PM

## 2018-04-18 NOTE — ED PROVIDER NOTES
History     Chief Complaint   Patient presents with     Chest Pain     discomfort.  [facility] reports they think pt is drug seeking.      HPI  Maggie Case is a 28 year old female with h/o a blood clotting disorder, PE's, and ischemic stroke resulting in left sided paralysis, who presents from the nursing home with left sided CP since this morning. Pain is worse with deep breaths and palpation to the area. She is on coumadin. Denies dyspnea. Denies cough or fevers/chills.     Problem List:    Patient Active Problem List    Diagnosis Date Noted     Acute ischemic stroke (H) 02/17/2018     Priority: Medium     Influenza B 05/01/2017     Priority: Medium     Opacity of lung on imaging study 05/01/2017     Priority: Medium     Community acquired pneumonia 05/01/2017     Priority: Medium     C. difficile colitis 05/01/2017     Priority: Medium     Sepsis (H) 04/28/2017     Priority: Medium     Pyelonephritis 01/05/2017     Priority: Medium     Acute chest pain 05/06/2016     Priority: Medium     Leukocytosis 05/06/2016     Priority: Medium     Lung mass 05/06/2016     Priority: Medium     SOB (shortness of breath) 05/06/2016     Priority: Medium     Acute upper GI bleed 06/18/2013     Priority: Medium     Hypokalemia 06/18/2013     Priority: Medium     Acute cystitis 06/18/2013     Priority: Medium     Anxiety state 03/25/2013     Priority: Medium     Muscle pain 07/30/2009     Priority: Medium     Overview:   IMO Update 10/11       Cervicalgia 06/16/2009     Priority: Medium     Overview:   IMO Update 10/11       Low back pain 06/16/2009     Priority: Medium     Overview:   IMO Update 10/11       Pain in joint, lower leg 05/01/2009     Priority: Medium     Diarrhea 04/17/2008     Priority: Medium     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     Priority: Medium        Past Medical History:    Past Medical History:   Diagnosis Date     Anemia      Anxiety      Chronic diarrhea       Lactose intolerance      Myalgia      Pulmonary embolism (H) 05/06/16     Vitamin B12 deficiency        Past Surgical History:    Past Surgical History:   Procedure Laterality Date     CLOSED REDUCTION, PERCUTANEOUS PINNING LOWER EXTREMITY, COMBINED Right 4/6/2016    Procedure: COMBINED CLOSED REDUCTION, PERCUTANEOUS PINNING LOWER EXTREMITY;  Surgeon: Dexter Jones MD;  Location: [facility]     CRANIECTOMY Right 2/18/2018    Procedure: CRANIECTOMY;  RIGHT HEMICRANIECTOMY;  Surgeon: Emeterio Mesa MD;  Location:  OR       Family History:    No family history on file.    Social History:  Marital Status:  Single [1]  Social History   Substance Use Topics     Smoking status: Current Every Day Smoker     Packs/day: 0.25     Years: 7.00     Types: Cigarettes     Smokeless tobacco: Never Used     Alcohol use 0.0 oz/week     0 Standard drinks or equivalent per week      Comment: 1-2/week ,         Medications:      Warfarin Sodium (COUMADIN PO)   GABAPENTIN PO   GABAPENTIN PO   TRAZODONE HCL PO   MELATONIN PO   FLUoxetine (PROZAC) 20 MG/5ML solution   warfarin (COUMADIN) 5 MG tablet   nicotine (NICODERM CQ) 14 MG/24HR 24 hr patch   LORAZEPAM PO   Dextromethorphan-guaiFENesin  MG/5ML syrup   acetaminophen (TYLENOL) 32 mg/mL solution   lidocaine (LMX4) 4 % kit   multivitamins with minerals (CERTAVITE/CEROVITE) LIQD liquid   ondansetron (ZOFRAN) 4 MG tablet   Warfarin Therapy Reminder   oxyCODONE (ROXICODONE) 5 MG/5ML solution         Review of Systems   Constitutional: Negative for chills and fever.   HENT: Negative for congestion and sore throat.    Respiratory: Negative for cough, chest tightness, shortness of breath, wheezing and stridor.    Cardiovascular: Positive for chest pain. Negative for palpitations and leg swelling.   Gastrointestinal: Negative for abdominal pain, nausea and vomiting.   Genitourinary: Negative.    Musculoskeletal: Negative for back pain, neck pain and neck stiffness.   Skin: Negative.     Neurological: Negative.    All other systems reviewed and are negative.      Physical Exam   BP: 115/75  Pulse: 71  Heart Rate: 81  Temp: 98.3  F (36.8  C)  Resp: 24  SpO2: 98 %      Physical Exam   Constitutional: She is oriented to person, place, and time. She appears well-developed and well-nourished. No distress.   HENT:   Head: Normocephalic and atraumatic.   Right Ear: External ear normal.   Left Ear: External ear normal.   Nose: Nose normal.   Mouth/Throat: Oropharynx is clear and moist. No oropharyngeal exudate.   Eyes: Conjunctivae and EOM are normal. Pupils are equal, round, and reactive to light. Right eye exhibits no discharge. Left eye exhibits no discharge. No scleral icterus.   Neck: Normal range of motion. Neck supple.   Cardiovascular: Normal rate, regular rhythm, normal heart sounds and intact distal pulses.  Exam reveals no gallop and no friction rub.    No murmur heard.  Pulmonary/Chest: Effort normal and breath sounds normal. No respiratory distress. She has no wheezes. She has no rales. She exhibits tenderness.       Abdominal: Soft. Bowel sounds are normal. There is no tenderness.   Musculoskeletal: She exhibits no edema.   Lymphadenopathy:     She has no cervical adenopathy.   Neurological: She is alert and oriented to person, place, and time. No cranial nerve deficit. Coordination normal.   Skin: Skin is warm and dry. No rash noted. She is not diaphoretic. No erythema. No pallor.   Psychiatric: She has a normal mood and affect. Her behavior is normal. Judgment and thought content normal.   Nursing note and vitals reviewed.      ED Course     ED Course     Procedures          EKG: NSR without acute ST-T changes.        Results for orders placed or performed during the hospital encounter of 04/17/18 (from the past 24 hour(s))   CBC with platelets differential   Result Value Ref Range    WBC 8.4 4.0 - 11.0 10e9/L    RBC Count 3.94 3.8 - 5.2 10e12/L    Hemoglobin 9.5 (L) 11.7 - 15.7 g/dL     Hematocrit 30.6 (L) 35.0 - 47.0 %    MCV 78 78 - 100 fl    MCH 24.1 (L) 26.5 - 33.0 pg    MCHC 31.0 (L) 31.5 - 36.5 g/dL    RDW 15.6 (H) 10.0 - 15.0 %    Platelet Count 449 150 - 450 10e9/L    Diff Method Automated Method     % Neutrophils 64.9 %    % Lymphocytes 16.3 %    % Monocytes 9.4 %    % Eosinophils 9.1 %    % Basophils 0.1 %    % Immature Granulocytes 0.2 %    Nucleated RBCs 0 0 /100    Absolute Neutrophil 5.4 1.6 - 8.3 10e9/L    Absolute Lymphocytes 1.4 0.8 - 5.3 10e9/L    Absolute Monocytes 0.8 0.0 - 1.3 10e9/L    Absolute Eosinophils 0.8 (H) 0.0 - 0.7 10e9/L    Absolute Basophils 0.0 0.0 - 0.2 10e9/L    Abs Immature Granulocytes 0.0 0 - 0.4 10e9/L    Absolute Nucleated RBC 0.0    Comprehensive metabolic panel   Result Value Ref Range    Sodium 139 133 - 144 mmol/L    Potassium 3.1 (L) 3.4 - 5.3 mmol/L    Chloride 106 94 - 109 mmol/L    Carbon Dioxide 27 20 - 32 mmol/L    Anion Gap 6 3 - 14 mmol/L    Glucose 96 70 - 99 mg/dL    Urea Nitrogen 7 7 - 30 mg/dL    Creatinine 0.50 (L) 0.52 - 1.04 mg/dL    GFR Estimate >90 >60 mL/min/1.7m2    GFR Estimate If Black >90 >60 mL/min/1.7m2    Calcium 8.6 8.5 - 10.1 mg/dL    Bilirubin Total 0.1 (L) 0.2 - 1.3 mg/dL    Albumin 3.1 (L) 3.4 - 5.0 g/dL    Protein Total 6.8 6.8 - 8.8 g/dL    Alkaline Phosphatase 92 40 - 150 U/L    ALT 19 0 - 50 U/L    AST 21 0 - 45 U/L   D-Dimer ([facility])   Result Value Ref Range    D-Dimer ng/mL <200 0 - 300 ng/ml D-DU   Troponin I   Result Value Ref Range    Troponin I ES <0.015 0.000 - 0.045 ug/L   INR   Result Value Ref Range    INR 4.08 (H) 0.80 - 1.20   XR Chest Port 1 View    Narrative    PROCEDURE:  XR CHEST PORT 1 VW    HISTORY: Chest pain; . .    COMPARISON:  2/25/2018, 2/17/2018    FINDINGS:    The cardiomediastinal contours are stable.  Linear and nodular opacity within the right upper lobe is chronic,  slightly improved when compared to the 2016 study on the prior CT  chest. No new focal opacity is identified. No effusion or  pneumothorax  is seen.      Impression    IMPRESSION:  Chronic linear and nodular right upper lung opacity. No  evidence of an acute cardiopulmonary process.      VIPIN DICK MD       Medications   morphine (PF) injection 4 mg (4 mg Intravenous Given 4/17/18 1835)   ondansetron (ZOFRAN) injection 4 mg (4 mg Intravenous Given 4/17/18 1835)   potassium chloride SA (K-DUR/KLOR-CON M) CR tablet 40 mEq (40 mEq Oral Given 4/17/18 1908)   morphine (PF) injection 4 mg (4 mg Intravenous Given 4/17/18 1934)   ondansetron (ZOFRAN) injection 4 mg (4 mg Intravenous Given 4/17/18 1930)       Assessments & Plan (with Medical Decision Making)   Findings consistent with costochondritis. Cardiac work up normal. D-dimer WNL. CXR clear. She has chest wall tenderness on exam. Pain improved following Morphine 4mg IV. She was hypokalemic so was given Kdur 40 meq PO in ED. Her INR was elevated at 4.0 today, so suggested holding tonight's dose. I inbox messaged Dr. Flores so she is aware and may direct further doses based on repeat INR. She was discharged back to the nursing home in good condition following.     Plan: Hold tonight's dose of Coumadin as your INR was elevated at 4.0. Dr. Flores will direct you further tomorrow about dosing. Please return here with any new or worsening symptoms.     I have reviewed the nursing notes.    I have reviewed the findings, diagnosis, plan and need for follow up with the patient.    New Prescriptions    No medications on file       Final diagnoses:   Chest wall pain       4/17/2018   [facility]     Hali Glover PA-C  04/17/18 [id number]

## 2018-04-18 NOTE — DISCHARGE INSTRUCTIONS
Hold tonight's dose of Coumadin as your INR was elevated at 4.0. Dr. Flores will direct you further tomorrow about dosing. Please return here with any new or worsening symptoms.     Chest Wall Pain: Costochondritis    The chest pain that you have had today is caused by costochondritis. This condition is caused by an inflammation of the cartilage joining your ribs to your breastbone. It is not caused by heart or lung problems. Your healthcare team has made sure that the chest pain you feel is not from a life threatening cause of chest pain such as heart attack, collapsed lung, blood clot in the lung, tear in the aorta, or esophageal rupture. The inflammation may have been brought on by a blow to the chest, lifting heavy objects, intense exercise, or an illness that made you cough and sneeze a lot. It often occurs during times of emotional stress. It can be painful, but it is not dangerous. It usually goes away in 1 to 2 weeks. But it may happen again. Rarely, a more serious condition may cause symptoms similar to costochondritis. That s why it s important to watch for the warning signs listed below.  Home care  Follow these guidelines when caring for yourself at home:    If you feel that emotional stress is a cause of your condition, try to figure out the sources of that stress. It may not be obvious. Learn ways to deal with the stress in your life. This can include regular exercise, muscle relaxation, meditation, or simply taking time out for yourself.    You may use acetaminophen, ibuprofen, or naproxen to control pain, unless another pain medicine was prescribed. If you have liver or kidney disease or ever had a stomach ulcer, talk with your healthcare provider before using these medicines.    You can also help ease pain by using a hot, wet compress or heating pad. Use this with or without a medicated skin cream that helps relieves pain.    Do stretching exercise as advised by your provider.    Take any  prescribed medicines as directed.  Follow-up care  Follow up with your healthcare provider, or as advised, if you do not start to get better in the next 2 days.  When to seek medical advice  Call your healthcare provider right away if any of these occur:    A change in the type of pain. Call if it feels different, becomes more serious, lasts longer, or spreads into your shoulder, arm, neck, jaw, or back.    Shortness of breath or pain gets worse when you breathe    Weakness, dizziness, or fainting    Cough with dark-colored sputum (phlegm) or blood    Abdominal pain    Dark red or black stools    Fever of 100.4 F (38 C) or higher, or as directed by your healthcare provider  Date Last Reviewed: 12/1/2016 2000-2017 [employer]. [address] [place] [address], [place] [address]. All rights reserved. This information is not intended as a substitute for professional medical care. Always follow your healthcare professional's instructions.

## 2018-04-18 NOTE — ED NOTES
"Unable to use the bedpan, \"sometimes it is hard for me to go since my stroke, I will try the toilet at the nursing home with a lift devise.  I want to go back to the nursing home.  Chitra care given and clean brief applied. \"  "

## 2018-04-23 ENCOUNTER — ANESTHESIA EVENT (OUTPATIENT)
Dept: EMERGENCY MEDICINE | Facility: HOSPITAL | Age: 28
End: 2018-04-23
Payer: COMMERCIAL

## 2018-04-23 ENCOUNTER — APPOINTMENT (OUTPATIENT)
Dept: CT IMAGING | Facility: HOSPITAL | Age: 28
End: 2018-04-23
Attending: EMERGENCY MEDICINE
Payer: COMMERCIAL

## 2018-04-23 ENCOUNTER — ANESTHESIA (OUTPATIENT)
Dept: EMERGENCY MEDICINE | Facility: HOSPITAL | Age: 28
End: 2018-04-23
Payer: COMMERCIAL

## 2018-04-23 ENCOUNTER — HOSPITAL ENCOUNTER (EMERGENCY)
Facility: HOSPITAL | Age: 28
Discharge: NURSING, REHAB OR LONG-TERM CARE | End: 2018-04-23
Attending: EMERGENCY MEDICINE | Admitting: EMERGENCY MEDICINE
Payer: COMMERCIAL

## 2018-04-23 ENCOUNTER — APPOINTMENT (OUTPATIENT)
Dept: GENERAL RADIOLOGY | Facility: HOSPITAL | Age: 28
End: 2018-04-23
Attending: EMERGENCY MEDICINE
Payer: COMMERCIAL

## 2018-04-23 VITALS
RESPIRATION RATE: 16 BRPM | OXYGEN SATURATION: 98 % | HEART RATE: 73 BPM | DIASTOLIC BLOOD PRESSURE: 79 MMHG | TEMPERATURE: 99.4 F | SYSTOLIC BLOOD PRESSURE: 119 MMHG

## 2018-04-23 DIAGNOSIS — R07.1 PAINFUL RESPIRATION: ICD-10-CM

## 2018-04-23 DIAGNOSIS — R07.89 CHEST WALL PAIN: Primary | ICD-10-CM

## 2018-04-23 DIAGNOSIS — Z79.01 ANTICOAGULATION ADEQUATE: ICD-10-CM

## 2018-04-23 LAB
ANION GAP SERPL CALCULATED.3IONS-SCNC: 11 MMOL/L (ref 3–14)
BASOPHILS # BLD AUTO: 0 10E9/L (ref 0–0.2)
BASOPHILS NFR BLD AUTO: 0.1 %
BUN SERPL-MCNC: 6 MG/DL (ref 7–30)
CALCIUM SERPL-MCNC: 8.9 MG/DL (ref 8.5–10.1)
CHLORIDE SERPL-SCNC: 105 MMOL/L (ref 94–109)
CO2 SERPL-SCNC: 24 MMOL/L (ref 20–32)
CREAT SERPL-MCNC: 0.52 MG/DL (ref 0.52–1.04)
D DIMER PPP DDU-MCNC: <200 NG/ML D-DU (ref 0–300)
DIFFERENTIAL METHOD BLD: ABNORMAL
EOSINOPHIL # BLD AUTO: 0.6 10E9/L (ref 0–0.7)
EOSINOPHIL NFR BLD AUTO: 7.4 %
ERYTHROCYTE [DISTWIDTH] IN BLOOD BY AUTOMATED COUNT: 16.2 % (ref 10–15)
GFR SERPL CREATININE-BSD FRML MDRD: >90 ML/MIN/1.7M2
GLUCOSE SERPL-MCNC: 90 MG/DL (ref 70–99)
HCT VFR BLD AUTO: 32.7 % (ref 35–47)
HGB BLD-MCNC: 10.1 G/DL (ref 11.7–15.7)
IMM GRANULOCYTES # BLD: 0 10E9/L (ref 0–0.4)
IMM GRANULOCYTES NFR BLD: 0.3 %
INR PPP: 2.2 (ref 0.8–1.2)
LYMPHOCYTES # BLD AUTO: 1.8 10E9/L (ref 0.8–5.3)
LYMPHOCYTES NFR BLD AUTO: 22.7 %
MCH RBC QN AUTO: 23.9 PG (ref 26.5–33)
MCHC RBC AUTO-ENTMCNC: 30.9 G/DL (ref 31.5–36.5)
MCV RBC AUTO: 77 FL (ref 78–100)
MONOCYTES # BLD AUTO: 0.6 10E9/L (ref 0–1.3)
MONOCYTES NFR BLD AUTO: 7.4 %
NEUTROPHILS # BLD AUTO: 4.8 10E9/L (ref 1.6–8.3)
NEUTROPHILS NFR BLD AUTO: 62.1 %
NRBC # BLD AUTO: 0 10*3/UL
NRBC BLD AUTO-RTO: 0 /100
PLATELET # BLD AUTO: 463 10E9/L (ref 150–450)
POTASSIUM SERPL-SCNC: 3.2 MMOL/L (ref 3.4–5.3)
RBC # BLD AUTO: 4.23 10E12/L (ref 3.8–5.2)
SODIUM SERPL-SCNC: 140 MMOL/L (ref 133–144)
TROPONIN I SERPL-MCNC: <0.015 UG/L (ref 0–0.04)
WBC # BLD AUTO: 7.8 10E9/L (ref 4–11)

## 2018-04-23 PROCEDURE — 93005 ELECTROCARDIOGRAM TRACING: CPT

## 2018-04-23 PROCEDURE — 25000132 ZZH RX MED GY IP 250 OP 250 PS 637: Performed by: EMERGENCY MEDICINE

## 2018-04-23 PROCEDURE — 96376 TX/PRO/DX INJ SAME DRUG ADON: CPT

## 2018-04-23 PROCEDURE — 96375 TX/PRO/DX INJ NEW DRUG ADDON: CPT

## 2018-04-23 PROCEDURE — 37000011 ZZH ANESTHESIA WARD SERVICE: Performed by: NURSE ANESTHETIST, CERTIFIED REGISTERED

## 2018-04-23 PROCEDURE — 99285 EMERGENCY DEPT VISIT HI MDM: CPT | Mod: 25

## 2018-04-23 PROCEDURE — 71275 CT ANGIOGRAPHY CHEST: CPT | Mod: TC

## 2018-04-23 PROCEDURE — 96374 THER/PROPH/DIAG INJ IV PUSH: CPT

## 2018-04-23 PROCEDURE — 80048 BASIC METABOLIC PNL TOTAL CA: CPT | Performed by: EMERGENCY MEDICINE

## 2018-04-23 PROCEDURE — 36415 COLL VENOUS BLD VENIPUNCTURE: CPT | Performed by: EMERGENCY MEDICINE

## 2018-04-23 PROCEDURE — 99285 EMERGENCY DEPT VISIT HI MDM: CPT | Performed by: EMERGENCY MEDICINE

## 2018-04-23 PROCEDURE — 25000128 H RX IP 250 OP 636: Performed by: EMERGENCY MEDICINE

## 2018-04-23 PROCEDURE — 85025 COMPLETE CBC W/AUTO DIFF WBC: CPT | Performed by: EMERGENCY MEDICINE

## 2018-04-23 PROCEDURE — 85379 FIBRIN DEGRADATION QUANT: CPT | Performed by: EMERGENCY MEDICINE

## 2018-04-23 PROCEDURE — 84484 ASSAY OF TROPONIN QUANT: CPT | Performed by: EMERGENCY MEDICINE

## 2018-04-23 PROCEDURE — 71045 X-RAY EXAM CHEST 1 VIEW: CPT | Mod: TC

## 2018-04-23 PROCEDURE — 96361 HYDRATE IV INFUSION ADD-ON: CPT

## 2018-04-23 PROCEDURE — 93010 ELECTROCARDIOGRAM REPORT: CPT | Performed by: INTERNAL MEDICINE

## 2018-04-23 PROCEDURE — 85610 PROTHROMBIN TIME: CPT | Performed by: EMERGENCY MEDICINE

## 2018-04-23 RX ORDER — ONDANSETRON 2 MG/ML
8 INJECTION INTRAMUSCULAR; INTRAVENOUS ONCE
Status: COMPLETED | OUTPATIENT
Start: 2018-04-23 | End: 2018-04-23

## 2018-04-23 RX ORDER — POTASSIUM CHLORIDE 1500 MG/1
20 TABLET, EXTENDED RELEASE ORAL ONCE
Status: COMPLETED | OUTPATIENT
Start: 2018-04-23 | End: 2018-04-23

## 2018-04-23 RX ORDER — SODIUM CHLORIDE 9 MG/ML
INJECTION, SOLUTION INTRAVENOUS CONTINUOUS
Status: DISCONTINUED | OUTPATIENT
Start: 2018-04-23 | End: 2018-04-23 | Stop reason: HOSPADM

## 2018-04-23 RX ORDER — ONDANSETRON 2 MG/ML
4 INJECTION INTRAMUSCULAR; INTRAVENOUS
Status: COMPLETED | OUTPATIENT
Start: 2018-04-23 | End: 2018-04-23

## 2018-04-23 RX ORDER — OXYCODONE AND ACETAMINOPHEN 5; 325 MG/1; MG/1
TABLET ORAL EVERY 6 HOURS PRN
Qty: 12 TABLET | Refills: 0 | OUTPATIENT
Start: 2018-04-23 | End: 2024-08-08

## 2018-04-23 RX ORDER — LIDOCAINE 40 MG/G
CREAM TOPICAL
Status: DISCONTINUED | OUTPATIENT
Start: 2018-04-23 | End: 2018-04-23 | Stop reason: HOSPADM

## 2018-04-23 RX ORDER — HYDROMORPHONE HYDROCHLORIDE 1 MG/ML
0.5 INJECTION, SOLUTION INTRAMUSCULAR; INTRAVENOUS; SUBCUTANEOUS
Status: COMPLETED | OUTPATIENT
Start: 2018-04-23 | End: 2018-04-23

## 2018-04-23 RX ORDER — ACETAMINOPHEN 325 MG/1
650 TABLET ORAL ONCE
Status: COMPLETED | OUTPATIENT
Start: 2018-04-23 | End: 2018-04-23

## 2018-04-23 RX ORDER — IOPAMIDOL 755 MG/ML
75 INJECTION, SOLUTION INTRAVASCULAR ONCE
Status: COMPLETED | OUTPATIENT
Start: 2018-04-23 | End: 2018-04-23

## 2018-04-23 RX ADMIN — POTASSIUM CHLORIDE 20 MEQ: 1500 TABLET, EXTENDED RELEASE ORAL at 19:23

## 2018-04-23 RX ADMIN — ONDANSETRON 8 MG: 2 INJECTION INTRAMUSCULAR; INTRAVENOUS at 19:32

## 2018-04-23 RX ADMIN — HYDROMORPHONE HYDROCHLORIDE 0.5 MG: 1 INJECTION, SOLUTION INTRAMUSCULAR; INTRAVENOUS; SUBCUTANEOUS at 19:45

## 2018-04-23 RX ADMIN — SODIUM CHLORIDE: 9 INJECTION, SOLUTION INTRAVENOUS at 17:55

## 2018-04-23 RX ADMIN — HYDROMORPHONE HYDROCHLORIDE 0.5 MG: 1 INJECTION, SOLUTION INTRAMUSCULAR; INTRAVENOUS; SUBCUTANEOUS at 19:23

## 2018-04-23 RX ADMIN — ACETAMINOPHEN 650 MG: 325 TABLET, FILM COATED ORAL at 21:19

## 2018-04-23 RX ADMIN — ONDANSETRON 4 MG: 2 INJECTION INTRAMUSCULAR; INTRAVENOUS at 17:56

## 2018-04-23 RX ADMIN — HYDROMORPHONE HYDROCHLORIDE 0.5 MG: 1 INJECTION, SOLUTION INTRAMUSCULAR; INTRAVENOUS; SUBCUTANEOUS at 17:59

## 2018-04-23 RX ADMIN — IOPAMIDOL 75 ML: 755 INJECTION, SOLUTION INTRAVENOUS at 20:10

## 2018-04-23 ASSESSMENT — ENCOUNTER SYMPTOMS
SHORTNESS OF BREATH: 1
ACTIVITY CHANGE: 1
NAUSEA: 1
CHEST TIGHTNESS: 1
MYALGIAS: 1
APPETITE CHANGE: 1
EYES NEGATIVE: 1
WEAKNESS: 1
BACK PAIN: 1

## 2018-04-23 NOTE — ED AVS SNAPSHOT
[facility]    [address] [address]    Phone:  [phone]                                       Maggie Case   MRN: [id number]    Department:  [facility]   Date of Visit:  4/23/2018           After Visit Summary Signature Page     I have received my discharge instructions, and my questions have been answered. I have discussed any challenges I see with this plan with the nurse or doctor.    ..........................................................................................................................................  Patient/Patient Representative Signature      ..........................................................................................................................................  Patient Representative Print Name and Relationship to Patient    ..................................................               ................................................  Date                                            Time    ..........................................................................................................................................  Reviewed by Signature/Title    ...................................................              ..............................................  Date                                                            Time

## 2018-04-23 NOTE — ED PROVIDER NOTES
History     Chief Complaint   Patient presents with     Chest Pain     Right-sided X 1 hour. 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  Currently therapeutically anticoagulated on coumadin and currently getting rehabilitated at [facility].  She was seen 5 days ago with left sided chest pain evaluated as negative for PE but now developed generalized right sided pleuritic chest pain in the last few hours.  She denies fever, chills, cough, or dyspnea but feels severe radiating right sided pain.  Has roxicodone solution, gabapentin, and apparently prn tylenol for pain at the facility but these did not help so transferred over.     Problem List:    Patient Active Problem List    Diagnosis Date Noted     Acute ischemic stroke (H) 02/17/2018     Priority: Medium     Influenza B 05/01/2017     Priority: Medium     Opacity of lung on imaging study 05/01/2017     Priority: Medium     Community acquired pneumonia 05/01/2017     Priority: Medium     C. difficile colitis 05/01/2017     Priority: Medium     Sepsis (H) 04/28/2017     Priority: Medium     Pyelonephritis 01/05/2017     Priority: Medium     Acute chest pain 05/06/2016     Priority: Medium     Leukocytosis 05/06/2016     Priority: Medium     Lung mass 05/06/2016     Priority: Medium     SOB (shortness of breath) 05/06/2016     Priority: Medium     Acute upper GI bleed 06/18/2013     Priority: Medium     Hypokalemia 06/18/2013     Priority: Medium     Acute cystitis 06/18/2013     Priority: Medium     Anxiety state 03/25/2013     Priority: Medium     Muscle pain 07/30/2009     Priority: Medium     Overview:   IMO Update 10/11       Cervicalgia 06/16/2009     Priority: Medium     Overview:   IMO Update 10/11       Low back pain 06/16/2009     Priority: Medium     Overview:   IMO Update 10/11        Pain in joint, lower leg 05/01/2009     Priority: Medium     Diarrhea 04/17/2008     Priority: Medium     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     Priority: Medium        Past Medical History:    Past Medical History:   Diagnosis Date     Anemia      Anxiety      Chronic diarrhea      Lactose intolerance      Myalgia      Pulmonary embolism (H) 05/06/16     Vitamin B12 deficiency        Past Surgical History:    Past Surgical History:   Procedure Laterality Date     CLOSED REDUCTION, PERCUTANEOUS PINNING LOWER EXTREMITY, COMBINED Right 4/6/2016    Procedure: COMBINED CLOSED REDUCTION, PERCUTANEOUS PINNING LOWER EXTREMITY;  Surgeon: Dexter Jones MD;  Location: [facility]     CRANIECTOMY Right 2/18/2018    Procedure: CRANIECTOMY;  RIGHT HEMICRANIECTOMY;  Surgeon: Emeterio Mesa MD;  Location:  OR       Family History:    No family history on file.    Social History:  Marital Status:  Single [1]  Social History   Substance Use Topics     Smoking status: Current Every Day Smoker     Packs/day: 0.25     Years: 7.00     Types: Cigarettes     Smokeless tobacco: Never Used     Alcohol use 0.0 oz/week     0 Standard drinks or equivalent per week      Comment: 1-2/week ,         Medications:      acetaminophen (TYLENOL) 32 mg/mL solution   Dextromethorphan-guaiFENesin  MG/5ML syrup   FLUoxetine (PROZAC) 20 MG/5ML solution   GABAPENTIN PO   GABAPENTIN PO   lidocaine (LMX4) 4 % kit   LORAZEPAM PO   MELATONIN PO   multivitamins with minerals (CERTAVITE/CEROVITE) LIQD liquid   nicotine (NICODERM CQ) 14 MG/24HR 24 hr patch   ondansetron (ZOFRAN) 4 MG tablet   oxyCODONE (ROXICODONE) 5 MG/5ML solution   TRAZODONE HCL PO   warfarin (COUMADIN) 5 MG tablet   Warfarin Sodium (COUMADIN PO)   Warfarin Therapy Reminder         Review of Systems   Constitutional: Positive for activity change and appetite change.   HENT: Negative.    Eyes: Negative.    Respiratory: Positive for chest  tightness and shortness of breath.    Cardiovascular: Positive for chest pain.   Gastrointestinal: Positive for nausea.   Genitourinary: Negative.    Musculoskeletal: Positive for back pain and myalgias.   Skin: Negative.    Neurological: Positive for weakness.     Physical Exam   BP: 106/72  Pulse: 80  Heart Rate: 81  Temp: 99.4  F (37.4  C)  Resp: 16  SpO2: 99 %    Physical Exam   Constitutional: She is oriented to person, place, and time. She appears well-developed and well-nourished. She appears distressed.   Pleasant talkative cognitively intact woman with collapsed right hemicraniotomy defect with obvious right sided chest pain.     HENT:   Head: Normocephalic and atraumatic.   Eyes: Conjunctivae and EOM are normal. Pupils are equal, round, and reactive to light.   Neck: Normal range of motion. Neck supple. No thyromegaly present.   Cardiovascular: Normal rate and regular rhythm.    Pulmonary/Chest: Breath sounds normal. She is in respiratory distress. She has no wheezes. She has no rales. She exhibits tenderness.   Right hemithorax is tender to palpation throughout.    Abdominal: Soft. Bowel sounds are normal. She exhibits no distension and no mass. There is tenderness. There is no rebound and no guarding.   RCM tenderness   Musculoskeletal: Normal range of motion.   Left sided spasticity and clonus with near hemiplegic weakness   Neurological: She is alert and oriented to person, place, and time. She displays abnormal reflex. A cranial nerve deficit is present. She exhibits normal muscle tone. Coordination abnormal.   Skin: Skin is warm and dry. She is not diaphoretic.     ED Course     ED Course     Procedures  ECG  Normal sinus rhythm, T wave abnormality suggestive of anterior ischemia, prolonged QT with QTc of 466ms    Critical Care time:  none      Results for orders placed or performed during the hospital encounter of 04/23/18 (from the past 24 hour(s))   CBC with platelets differential   Result Value  Ref Range    WBC 7.8 4.0 - 11.0 10e9/L    RBC Count 4.23 3.8 - 5.2 10e12/L    Hemoglobin 10.1 (L) 11.7 - 15.7 g/dL    Hematocrit 32.7 (L) 35.0 - 47.0 %    MCV 77 (L) 78 - 100 fl    MCH 23.9 (L) 26.5 - 33.0 pg    MCHC 30.9 (L) 31.5 - 36.5 g/dL    RDW 16.2 (H) 10.0 - 15.0 %    Platelet Count 463 (H) 150 - 450 10e9/L    Diff Method Automated Method     % Neutrophils 62.1 %    % Lymphocytes 22.7 %    % Monocytes 7.4 %    % Eosinophils 7.4 %    % Basophils 0.1 %    % Immature Granulocytes 0.3 %    Nucleated RBCs 0 0 /100    Absolute Neutrophil 4.8 1.6 - 8.3 10e9/L    Absolute Lymphocytes 1.8 0.8 - 5.3 10e9/L    Absolute Monocytes 0.6 0.0 - 1.3 10e9/L    Absolute Eosinophils 0.6 0.0 - 0.7 10e9/L    Absolute Basophils 0.0 0.0 - 0.2 10e9/L    Abs Immature Granulocytes 0.0 0 - 0.4 10e9/L    Absolute Nucleated RBC 0.0    D-Dimer ([facility])   Result Value Ref Range    D-Dimer ng/mL <200 0 - 300 ng/ml D-DU   INR   Result Value Ref Range    INR 2.20 (H) 0.80 - 1.20   Basic metabolic panel   Result Value Ref Range    Sodium 140 133 - 144 mmol/L    Potassium 3.2 (L) 3.4 - 5.3 mmol/L    Chloride 105 94 - 109 mmol/L    Carbon Dioxide 24 20 - 32 mmol/L    Anion Gap 11 3 - 14 mmol/L    Glucose 90 70 - 99 mg/dL    Urea Nitrogen 6 (L) 7 - 30 mg/dL    Creatinine 0.52 0.52 - 1.04 mg/dL    GFR Estimate >90 >60 mL/min/1.7m2    GFR Estimate If Black >90 >60 mL/min/1.7m2    Calcium 8.9 8.5 - 10.1 mg/dL   Troponin I   Result Value Ref Range    Troponin I ES <0.015 0.000 - 0.045 ug/L   XR Chest Port 1 View    Narrative    Procedure:XR CHEST PORT 1 VW    Clinical history:Female, 28 years, right sided pleurisy;     Technique: Single view was obtained.    Comparison: 4/17/2018    Findings: The cardiac silhouette is normal. The pulmonary vasculature  is normal.    The lungs demonstrate a subtle area of increased density at the left  lung base. Bony structures are unremarkable.      Impression    Impression:  1.   No apparent abnormality that would  account for the patient's  right-sided discomfort.    2.  Subtle left basilar pneumonia suggested. There is increasing  density seen in the periphery.     3.  Dedicated two view evaluation of the chest may be helpful in  distinguishing whether or not this is a pneumonia versus soft tissue  attenuation.    ANNE PETERSON MD       Medications   lidocaine 1 % 1 mL (not administered)   lidocaine (LMX4) kit (not administered)   sodium chloride (PF) 0.9% PF flush 3 mL (not administered)   sodium chloride (PF) 0.9% PF flush 3 mL (3 mLs Intracatheter Given by Other 4/23/18 1755)   sodium chloride 0.9% infusion ( Intravenous New Bag 4/23/18 1755)   HYDROmorphone (PF) (DILAUDID) injection 0.5 mg (0.5 mg Intravenous Given 4/23/18 1923)   iopamidol (ISOVUE-370) solution 75 mL (not administered)   sodium chloride (PF) 0.9% PF flush 50 mL (not administered)   ondansetron (ZOFRAN) injection 4 mg (4 mg Intravenous Given 4/23/18 1756)   potassium chloride SA (K-DUR/KLOR-CON M) CR tablet 20 mEq (20 mEq Oral Given 4/23/18 1923)   ondansetron (ZOFRAN) injection 8 mg (8 mg Intravenous Given 4/23/18 1932)       Assessments & Plan (with Medical Decision Making)   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  Even though INR is therapeutic and d-dimer is low, she was seen for same type of left sided chest pain one week ago with same negative workup.  This is distressing but because of her atypical hx, it was felt that pulm CTA was necessary to make sure another PE was not missed.  IV had been placed and labs drawn and CXR obtained.  No pneumonia, PX, or obvious lab metrics.  Dilaudid 0.5mg aliquots given x 2 with zofran and Kdur for hypokalemia.  Dr. Chin advised at shift change and will assume care.    I have reviewed the nursing notes.    I have reviewed the findings, diagnosis, plan and need for follow up with the patient.    New Prescriptions    No medications on file       Final  diagnoses:   Painful respiration   Chest wall pain   Anticoagulation adequate       4/23/2018   [facility]     Gabriel Whitley MD  04/23/18 [id number]

## 2018-04-23 NOTE — ED NOTES
"Patient presents from [facility] via [facility] with report of one hour of right sided chest pain, worse with a deep breath. The patient reports she had left sided chest pain last week and this pain is \"worse\" than that. Fever reported today of 100.0 from the nursing home. Incontinent x 1 last night per nursing home report which is \"unusual\" for the patient. The nursing home reported the patient is being weaned off the oxycodone at this time.  "

## 2018-04-23 NOTE — ED AVS SNAPSHOT
[facility]    [address] [place] [address]    Phone:  [phone]                                       Maggie Case   MRN: [id number]    Department:  [facility]   Date of Visit:  4/23/2018           Patient Information     Date Of Birth          1990        Your diagnoses for this visit were:     Painful respiration     Chest wall pain     Anticoagulation adequate        You were seen by Gabriel Whitley MD.      Follow-up Information     Follow up with Chapo Flores MD In 2 days.    Specialty:  Family Practice    Why:  Continuity of care    Contact information:      [address] [place] [id number]  [phone]        Discharge References/Attachments     CHEST WALL PAIN, COSTOCHONDRITIS (ENGLISH)      Your next 10 appointments already scheduled     May 08, 2018  9:00 AM CDT   CT HEAD W/O CONTRAST with UCCT1   [facility] ([facility])    [address] [address] [phone]           Please bring any scans or X-rays taken at other hospitals, if similar tests were done. Also bring a list of your medicines, including vitamins, minerals and over-the-counter drugs. It is safest to leave personal items at home.  Be sure to tell your doctor:   If you have any allergies.   If there s any chance you are pregnant.   If you are breastfeeding.  You do not need to do anything special to prepare for this exam.  Please wear loose clothing, such as a sweat suit or jogging clothes. Avoid snaps, zippers and other metal. We may ask you to undress and put on a hospital gown.            May 08, 2018  9:50 AM CDT   (Arrive by 9:35 AM)   Return Visit with Emeterio Mesa MD   [facility] ([facility])    [address] [facility]  [address] [facility] [address] [phone]                 Review of your medicines      Our records show that  you are taking the medicines listed below. If these are incorrect, please call your family doctor or clinic.        Dose / Directions Last dose taken    acetaminophen 32 mg/mL solution   Commonly known as:  TYLENOL   Dose:  650 mg   Quantity:  400 mL        20.3 mLs (650 mg) by Per G Tube route every 4 hours as needed for mild pain or fever   Refills:  0        Dextromethorphan-guaiFENesin  MG/5ML syrup   Dose:  5 mL        Take 5 mLs by mouth every 4 hours as needed for cough   Refills:  0        FLUoxetine 20 MG/5ML solution   Commonly known as:  PROzac   Dose:  20 mg   Quantity:  150 mL        5 mLs (20 mg) by Per G Tube route daily   Refills:  0        * GABAPENTIN PO   Dose:  200 mg        Take 200 mg by mouth 2 times daily   Refills:  0        * GABAPENTIN PO   Dose:  300 mg        Take 300 mg by mouth At Bedtime   Refills:  0        lidocaine 4 % kit   Commonly known as:  LMX4        Apply topically every hour as needed for pain (with VAD insertion or accessing implanted port.)   Refills:  0        LORAZEPAM PO   Dose:  0.5 mg        Take 0.5 mg by mouth every 8 hours as needed for anxiety   Refills:  0        MELATONIN PO   Dose:  5 mg        Take 5 mg by mouth   Refills:  0        multivitamins with minerals Liqd liquid   Dose:  15 mL        15 mLs by Per Feeding Tube route daily   Refills:  0        nicotine 14 MG/24HR 24 hr patch   Commonly known as:  NICODERM CQ   Dose:  1 patch   Quantity:  14 patch        Place 1 patch onto the skin daily   Refills:  0        ondansetron 4 MG tablet   Commonly known as:  ZOFRAN   Dose:  4-8 mg   Quantity:  18 tablet        1-2 tablets (4-8 mg) by Per Feeding Tube route every 8 hours as needed for nausea   Refills:  0        oxyCODONE 5 MG/5ML solution   Commonly known as:  ROXICODONE   Dose:  5-10 mg   Quantity:  90 mL        5-10 mLs (5-10 mg) by Per G Tube route every 4 hours as needed for moderate to severe pain   Refills:  0        TRAZODONE HCL PO   Dose:   "100 mg        Take 100 mg by mouth At Bedtime   Refills:  0        * COUMADIN PO   Dose:  3 mg        Take 3 mg by mouth At Bedtime Tufes, Wed, Thur,Fri, Sat, Sun   Refills:  0        * Warfarin Therapy Reminder   Dose:  1 each        1 each continuous prn   Refills:  0        * warfarin 5 MG tablet   Commonly known as:  COUMADIN   Dose:  5 mg   Quantity:  30 tablet        Take 1 tablet (5 mg) by mouth daily   Refills:  0        * Notice:  This list has 5 medication(s) that are the same as other medications prescribed for you. Read the directions carefully, and ask your doctor or other care provider to review them with you.            Procedures and tests performed during your visit     Basic metabolic panel    CBC with platelets differential    CTA Angiogram Chest w/o & w Contrast    D-Dimer ([facility])    EKG 12 lead    INR    Peripheral IV catheter    Troponin I    XR Chest Port 1 View      Orders Needing Specimen Collection     None      Pending Results     No orders found from 4/21/2018 to 4/24/2018.            Pending Culture Results     No orders found from 4/21/2018 to 4/24/2018.            Thank you for choosing [facility]       Thank you for choosing [facility] for your care. Our goal is always to provide you with excellent care. Hearing back from our patients is one way we can continue to improve our services. Please take a few minutes to complete the written survey that you may receive in the mail after you visit with us. Thank you!        [employer] Information     [employer] lets you send messages to your doctor, view your test results, renew your prescriptions, schedule appointments and more. To sign up, go to [email or web address] . Click on \"Log in\" on the left side of the screen, which will take you to the Welcome page. Then click on \"Sign up Now\" on the right side of the page.     You will be asked to enter the access code listed below, as well as some personal information. Please follow the directions to " create your username and password.     Your access code is: [email or web address]  Expires: 2018  1:54 PM     Your access code will  in 90 days. If you need help or a new code, please call your [facility] clinic or [phone].        Care EveryWhere ID     This is your Care EveryWhere ID. This could be used by other organizations to access your [facility] medical records  [id number]        Equal Access to Services     MYLES FLAHERTY : Hadii aad ku hadasho Soshonali, waaxda luqadaha, qaybta kaalmada adeegyada, justin peres . So [facility] [phone].    ATENCIÓN: Si habla español, tiene a montalvo disposición servicios gratuitos de asistencia lingüística. Llame al [phone].    We comply with applicable federal civil rights laws and [place] laws. We do not discriminate on the basis of race, color, national origin, age, disability, sex, sexual orientation, or gender identity.            After Visit Summary       This is your record. Keep this with you and show to your community pharmacist(s) and doctor(s) at your next visit.

## 2018-04-24 NOTE — ANESTHESIA POSTPROCEDURE EVALUATION
Patient: Maggie Case    * No procedures listed *    Diagnosis:* No pre-op diagnosis entered *  Diagnosis Additional Information: No value filed.    Anesthesia Type:  No value filed.    Note:  Anesthesia Post Evaluation    Last vitals:  Vitals:    04/23/18 1924 04/23/18 1930 04/23/18 1940   BP: 110/66 108/69    Pulse:   76   Resp:   16   Temp:      SpO2:   98%         Electronically Signed By: WOO Amaya CRNA  April 23, 2018  8:02 PM

## 2018-04-24 NOTE — ED NOTES
Patient discharged via wheelchair to [facility] with [facility]. Patient verbalized understanding of discharge instructions to follow up with her primary care for any pain control medications or muscle relaxants which she requested. The patient was assisted to the wheelchair with two staff assisting, unable to bear weight with the left sided paralysis. Hand off report to RITESH Welch at [facility] given. Patient voided on the commode prior to departure.

## 2018-04-24 NOTE — ED NOTES
Pt declining to go home at this time. Pt requesting additional pain medication and medication to have at home. Pt informed that the provider is not ordering anything more. Pt upset and stating she is not leaving. House supervisor to speak with pt.

## 2018-04-24 NOTE — ED PROVIDER NOTES
Patient handed over from Dr. Metzger at shift change at 8 PM, please see his notes  Diagnoses:  Chest wall pain (costochondritis)  Patient presented to the emergency department with complaints of chest pain.  Evaluated by Dr. Whitley and several laboratory tests done were normal including therapeutic INR.  Normal chest x-ray..  CT scan of the abdomen was negative for any thromboembolic phenomenon or acute consolidation.  Patient received multiple doses of IV Dilaudid for pain control.  Patient is currently on Coumadin for prior pulmonary embolism.  Patient is now being discharged home.  She is already on Percocets at the nursing home for chronic pain.  May use OTC Tylenol as needed for pain in addition to the Percocets.  Not advisable to use NSAIDs because patient is already on Coumadin.  Subsequently discharged back to the nursing home.  Patient was not happy at the time of discharge because she wanted more Dilaudid despite receiving 3 doses of IV Dilaudid and the last dose was given less than 1 hour ago.  Advised that more Dilaudid because respiratory failure and not a good idea to continue giving her Dilaudid.     Julian Chin MD  04/23/18 5445

## 2018-04-24 NOTE — PHARMACY
Accessed the patient's chart to complete the weekly pharmacy controlled substance audit.  Miranda Parkinson, Pharm.D.

## 2018-05-07 ENCOUNTER — HOSPITAL ENCOUNTER (EMERGENCY)
Facility: HOSPITAL | Age: 28
Discharge: NURSING, REHAB OR LONG-TERM CARE | End: 2018-05-07
Attending: INTERNAL MEDICINE | Admitting: INTERNAL MEDICINE
Payer: COMMERCIAL

## 2018-05-07 ENCOUNTER — APPOINTMENT (OUTPATIENT)
Dept: CT IMAGING | Facility: HOSPITAL | Age: 28
End: 2018-05-07
Attending: INTERNAL MEDICINE
Payer: COMMERCIAL

## 2018-05-07 VITALS
SYSTOLIC BLOOD PRESSURE: 99 MMHG | DIASTOLIC BLOOD PRESSURE: 66 MMHG | OXYGEN SATURATION: 98 % | RESPIRATION RATE: 14 BRPM | TEMPERATURE: 97.5 F | HEART RATE: 68 BPM

## 2018-05-07 DIAGNOSIS — G44.219 EPISODIC TENSION-TYPE HEADACHE, NOT INTRACTABLE: ICD-10-CM

## 2018-05-07 PROCEDURE — 96372 THER/PROPH/DIAG INJ SC/IM: CPT

## 2018-05-07 PROCEDURE — 99284 EMERGENCY DEPT VISIT MOD MDM: CPT | Performed by: INTERNAL MEDICINE

## 2018-05-07 PROCEDURE — 25000128 H RX IP 250 OP 636: Performed by: INTERNAL MEDICINE

## 2018-05-07 PROCEDURE — 70450 CT HEAD/BRAIN W/O DYE: CPT | Mod: TC

## 2018-05-07 PROCEDURE — 99284 EMERGENCY DEPT VISIT MOD MDM: CPT | Mod: 25

## 2018-05-07 RX ORDER — DIPHENHYDRAMINE HYDROCHLORIDE 50 MG/ML
50 INJECTION INTRAMUSCULAR; INTRAVENOUS ONCE
Status: COMPLETED | OUTPATIENT
Start: 2018-05-07 | End: 2018-05-07

## 2018-05-07 RX ORDER — FERROUS SULFATE 325(65) MG
TABLET ORAL
COMMUNITY
End: 2020-03-05

## 2018-05-07 RX ORDER — LANOLIN ALCOHOL/MO/W.PET/CERES
1000 CREAM (GRAM) TOPICAL DAILY
COMMUNITY
End: 2020-03-05

## 2018-05-07 RX ADMIN — PROCHLORPERAZINE EDISYLATE 10 MG: 5 INJECTION INTRAMUSCULAR; INTRAVENOUS at 21:29

## 2018-05-07 RX ADMIN — DIPHENHYDRAMINE HYDROCHLORIDE 50 MG: 50 INJECTION, SOLUTION INTRAMUSCULAR; INTRAVENOUS at 21:25

## 2018-05-07 NOTE — ED AVS SNAPSHOT
[facility]    [address] [place] [address]    Phone:  [phone]                                       Maggie Case   MRN: [id number]    Department:  [facility]   Date of Visit:  5/7/2018           Patient Information     Date Of Birth          1990        Your diagnoses for this visit were:     Episodic tension-type headache, not intractable        You were seen by Ramin Gentile MD.      Follow-up Information     Schedule an appointment as soon as possible for a visit with Chapo Flores MD.    Specialty:  Family Practice    Contact information:    [facility]  [address] [place] [address]  [phone]          Discharge Instructions          * HEADACHE [unspecified]    The cause of your headache today is not clear, but it does not appear to be the sign of any serious illness.  Under stress, some people tense the muscles of their shoulder, neck and scalp without knowing it. If this condition lasts long enough, a TENSION HEADACHE can occur.  A MIGRAINE HEADACHE is caused by changes in blood flow to the brain. It can be mild or severe. A migraine attack may be triggered by emotional stress, hormone changes during the menstrual cycle, oral contraceptives, alcohol use, certain foods containing tyramine, eye strain, weather changes, missing meals, lack of sleep or oversleeping.  Other causes of headache include a viral illness, sinus, ear or throat infection, dental pain and TMJ (jaw joint) pain.  HOME CARE:      If you were given pain medicine for this headache, do not drive yourself home. Arrange for a ride, instead. When you get home, try to sleep. You should feel much better when you wake up.    If you are having nausea or vomiting, follow a light diet until your headache is relieved.    If you have a migraine type headache, use sunglasses when in the daylight or around bright indoor lighting until symptoms improve. Bright glaring light can  worsen this kind of headache.  FOLLOW UP with your doctor if the headache is not better within the next 24 hours. If you have frequent headaches you should discuss a treatment plan with your primary care doctor. By being aware of the earliest signs of headache, and starting treatment right away, you may be able to stop the pain yourself.  GET PROMPT MEDICAL ATTENTION if any of the following occur:    Worsening of your head pain or no improvement within 24 hours    Repeated vomiting (unable to keep liquids down)    Fever over 101 F (38.3 C)    Stiff neck    Extreme drowsiness, confusion or fainting    Weakness of an arm or leg or one side of the face    Difficulty with speech or vision    [id number] [employer]. [address] [place] [address], [place] [address]. All rights reserved. This information is not intended as a substitute for professional medical care. Always follow your healthcare professional's instructions.  This information has been modified by your health care provider with permission from the publisher.      Your next 10 appointments already scheduled     May 08, 2018  9:00 AM CDT   CT HEAD W/O CONTRAST with UCCT1   [facility] [place] ([facility])    [address] [place] [address] [phone]           Please bring any scans or X-rays taken at other hospitals, if similar tests were done. Also bring a list of your medicines, including vitamins, minerals and over-the-counter drugs. It is safest to leave personal items at home.  Be sure to tell your doctor:   If you have any allergies.   If there s any chance you are pregnant.   If you are breastfeeding.  You do not need to do anything special to prepare for this exam.  Please wear loose clothing, such as a sweat suit or jogging clothes. Avoid snaps, zippers and other metal. We may ask you to undress and put on a hospital gown.            May 08, 2018  9:50 AM CDT   (Arrive by 9:35 AM)    Return Visit with Emeterio Mesa MD   [facility] ([facility])    [address] [facility]  3rd Floor  [facility] [address] [phone]                 Review of your medicines      Our records show that you are taking the medicines listed below. If these are incorrect, please call your family doctor or clinic.        Dose / Directions Last dose taken    acetaminophen 32 mg/mL solution   Commonly known as:  TYLENOL   Dose:  650 mg   Quantity:  400 mL        20.3 mLs (650 mg) by Per G Tube route every 4 hours as needed for mild pain or fever   Refills:  0        cyanocobalamin 1000 MCG tablet   Commonly known as:  vitamin  B-12   Dose:  1000 mcg        Take 1,000 mcg by mouth daily   Refills:  0        Dextromethorphan-guaiFENesin  MG/5ML syrup   Dose:  5 mL        Take 5 mLs by mouth every 4 hours as needed for cough   Refills:  0        ferrous sulfate 325 (65 Fe) MG tablet   Commonly known as:  IRON        Take by mouth daily (with breakfast)   Refills:  0        FLUoxetine 20 MG/5ML solution   Commonly known as:  PROzac   Dose:  20 mg   Quantity:  150 mL        5 mLs (20 mg) by Per G Tube route daily   Refills:  0        * GABAPENTIN PO   Dose:  200 mg        Take 200 mg by mouth 2 times daily   Refills:  0        * GABAPENTIN PO   Dose:  300 mg        Take 300 mg by mouth At Bedtime   Refills:  0        HYDROXYZINE HCL PO   Dose:  25 mg        Take 25 mg by mouth every 6 hours as needed for itching   Refills:  0        lidocaine 4 % kit   Commonly known as:  LMX4        Apply topically every hour as needed for pain (with VAD insertion or accessing implanted port.)   Refills:  0        MELATONIN PO   Dose:  5 mg        Take 5 mg by mouth   Refills:  0        nicotine 14 MG/24HR 24 hr patch   Commonly known as:  NICODERM CQ   Dose:  1 patch   Quantity:  14 patch        Place 1 patch onto the skin daily   Refills:  0        ondansetron 4 MG tablet   Commonly  "known as:  ZOFRAN   Dose:  4-8 mg   Quantity:  18 tablet        1-2 tablets (4-8 mg) by Per Feeding Tube route every 8 hours as needed for nausea   Refills:  0        TRAZODONE HCL PO   Dose:  100 mg        Take 100 mg by mouth At Bedtime   Refills:  0        * COUMADIN PO   Dose:  3 mg        Take 3 mg by mouth At Bedtime Tufes, Wed, Thur,Fri, Sat, Sun   Refills:  0        * Warfarin Therapy Reminder   Dose:  1 each        1 each continuous prn   Refills:  0        * warfarin 5 MG tablet   Commonly known as:  COUMADIN   Dose:  5 mg   Quantity:  30 tablet        Take 1 tablet (5 mg) by mouth daily   Refills:  0        * Notice:  This list has 5 medication(s) that are the same as other medications prescribed for you. Read the directions carefully, and ask your doctor or other care provider to review them with you.            Procedures and tests performed during your visit     CT Head w/o Contrast      Orders Needing Specimen Collection     None      Pending Results     Date and Time Order Name Status Description    5/7/2018 2119 CT Head w/o Contrast In process             Pending Culture Results     No orders found from 5/5/2018 to 5/8/2018.            Thank you for choosing [facility]       Thank you for choosing [facility] for your care. Our goal is always to provide you with excellent care. Hearing back from our patients is one way we can continue to improve our services. Please take a few minutes to complete the written survey that you may receive in the mail after you visit with us. Thank you!        [employer] Information     [employer] lets you send messages to your doctor, view your test results, renew your prescriptions, schedule appointments and more. To sign up, go to [email or web address] . Click on \"Log in\" on the left side of the screen, which will take you to the Welcome page. Then click on \"Sign up Now\" on the right side of the page.     You will be asked to enter the access code listed below, as well as " some personal information. Please follow the directions to create your username and password.     Your access code is: [email or web address]  Expires: 2018 10:33 PM     Your access code will  in 90 days. If you need help or a new code, please call your [place] clinic or [phone].        Care EveryWhere ID     This is your Care EveryWhere ID. This could be used by other organizations to access your [facility] [place] medical records  [id number]        Equal Access to Services     [facility] : Griselda pugh Sopia, waaxnadir luqadaha, qaybta kaalmanadir benoit, justin peres . So [facility] [phone].    ATENCIÓN: Si habla español, tiene a montalvo disposición servicios gratuitos de asistencia lingüística. Llame al [phone].    We comply with applicable federal civil rights laws and [place] laws. We do not discriminate on the basis of race, color, national origin, age, disability, sex, sexual orientation, or gender identity.            After Visit Summary       This is your record. Keep this with you and show to your community pharmacist(s) and doctor(s) at your next visit.

## 2018-05-07 NOTE — ED AVS SNAPSHOT
[facility]    [address] [address]    Phone:  [phone]                                       Maggie Case   MRN: [id number]    Department:  [facility]   Date of Visit:  5/7/2018           After Visit Summary Signature Page     I have received my discharge instructions, and my questions have been answered. I have discussed any challenges I see with this plan with the nurse or doctor.    ..........................................................................................................................................  Patient/Patient Representative Signature      ..........................................................................................................................................  Patient Representative Print Name and Relationship to Patient    ..................................................               ................................................  Date                                            Time    ..........................................................................................................................................  Reviewed by Signature/Title    ...................................................              ..............................................  Date                                                            Time

## 2018-05-08 DIAGNOSIS — I63.9 CVA (CEREBRAL VASCULAR ACCIDENT) (H): Primary | ICD-10-CM

## 2018-05-08 DIAGNOSIS — I63.9 STROKE (CEREBRUM) (H): ICD-10-CM

## 2018-05-08 ASSESSMENT — ENCOUNTER SYMPTOMS
FLANK PAIN: 0
COUGH: 0
SHORTNESS OF BREATH: 0
HEMATURIA: 0
NECK PAIN: 0
NUMBNESS: 0
VOICE CHANGE: 0
CONFUSION: 0
VOMITING: 0
LIGHT-HEADEDNESS: 0
MYALGIAS: 0
COLOR CHANGE: 0
ABDOMINAL DISTENTION: 0
ABDOMINAL PAIN: 0
FEVER: 0
ARTHRALGIAS: 0
WHEEZING: 0
DIAPHORESIS: 0
DIZZINESS: 0
NAUSEA: 1
DYSURIA: 0
WOUND: 0
CHEST TIGHTNESS: 0
CHILLS: 0
HEADACHES: 1
ANAL BLEEDING: 0
BACK PAIN: 0
PALPITATIONS: 0
NECK STIFFNESS: 0
BLOOD IN STOOL: 0

## 2018-05-08 NOTE — ED PROVIDER NOTES
History     Chief Complaint   Patient presents with     Headache     started about 4:30pm. denies nausea or vomiting. denies any visual changes. d/t have a CT scan tommorrow. pt is hoping to have a CT scan of her head tonight.      Patient is a 28 year old female presenting with headaches. The history is provided by the patient.   Headache   Pain location:  Generalized  Quality:  Stabbing  Severity currently:  8/10  Onset quality:  Gradual  Progression:  Worsening  Chronicity:  New  Associated symptoms: nausea    Associated symptoms: no abdominal pain, no back pain, no cough, no dizziness, no fever, no myalgias, no neck pain, no neck stiffness, no numbness and no vomiting          Problem List:    Patient Active Problem List    Diagnosis Date Noted     Acute ischemic stroke (H) 02/17/2018     Priority: Medium     Influenza B 05/01/2017     Priority: Medium     Opacity of lung on imaging study 05/01/2017     Priority: Medium     Community acquired pneumonia 05/01/2017     Priority: Medium     C. difficile colitis 05/01/2017     Priority: Medium     Sepsis (H) 04/28/2017     Priority: Medium     Pyelonephritis 01/05/2017     Priority: Medium     Acute chest pain 05/06/2016     Priority: Medium     Leukocytosis 05/06/2016     Priority: Medium     Lung mass 05/06/2016     Priority: Medium     SOB (shortness of breath) 05/06/2016     Priority: Medium     Acute upper GI bleed 06/18/2013     Priority: Medium     Hypokalemia 06/18/2013     Priority: Medium     Acute cystitis 06/18/2013     Priority: Medium     Anxiety state 03/25/2013     Priority: Medium     Muscle pain 07/30/2009     Priority: Medium     Overview:   IMO Update 10/11       Cervicalgia 06/16/2009     Priority: Medium     Overview:   IMO Update 10/11       Low back pain 06/16/2009     Priority: Medium     Overview:   IMO Update 10/11       Pain in joint, lower leg 05/01/2009     Priority: Medium     Diarrhea 04/17/2008     Priority: Medium     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     Priority: Medium        Past Medical History:    Past Medical History:   Diagnosis Date     Anemia      Anxiety      Chronic diarrhea      Lactose intolerance      Myalgia      Pulmonary embolism (H) 05/06/16     Vitamin B12 deficiency        Past Surgical History:    Past Surgical History:   Procedure Laterality Date     CLOSED REDUCTION, PERCUTANEOUS PINNING LOWER EXTREMITY, COMBINED Right 4/6/2016    Procedure: COMBINED CLOSED REDUCTION, PERCUTANEOUS PINNING LOWER EXTREMITY;  Surgeon: Dexter Jones MD;  Location: [facility]     CRANIECTOMY Right 2/18/2018    Procedure: CRANIECTOMY;  RIGHT HEMICRANIECTOMY;  Surgeon: Emeterio Mesa MD;  Location:  OR       Family History:    No family history on file.    Social History:  Marital Status:  Single [1]  Social History   Substance Use Topics     Smoking status: Current Every Day Smoker     Packs/day: 0.25     Years: 7.00     Types: Cigarettes     Smokeless tobacco: Never Used     Alcohol use 0.0 oz/week     0 Standard drinks or equivalent per week      Comment: 1-2/week ,         Medications:      acetaminophen (TYLENOL) 32 mg/mL solution   cyanocobalamin (VITAMIN  B-12) 1000 MCG tablet   ferrous sulfate (IRON) 325 (65 Fe) MG tablet   FLUoxetine (PROZAC) 20 MG/5ML solution   GABAPENTIN PO   GABAPENTIN PO   HYDROXYZINE HCL PO   MELATONIN PO   nicotine (NICODERM CQ) 14 MG/24HR 24 hr patch   TRAZODONE HCL PO   Warfarin Sodium (COUMADIN PO)   Dextromethorphan-guaiFENesin  MG/5ML syrup   lidocaine (LMX4) 4 % kit   ondansetron (ZOFRAN) 4 MG tablet   warfarin (COUMADIN) 5 MG tablet   Warfarin Therapy Reminder         Review of Systems   Constitutional: Negative for chills, diaphoresis and fever.   HENT: Negative for voice change.    Eyes: Negative for visual disturbance.   Respiratory: Negative for cough, chest tightness, shortness of breath and wheezing.    Cardiovascular: Negative for chest  pain, palpitations and leg swelling.   Gastrointestinal: Positive for nausea. Negative for abdominal distention, abdominal pain, anal bleeding, blood in stool and vomiting.   Genitourinary: Negative for decreased urine volume, dysuria, flank pain and hematuria.   Musculoskeletal: Negative for arthralgias, back pain, gait problem, myalgias, neck pain and neck stiffness.   Skin: Negative for color change, pallor, rash and wound.   Neurological: Positive for headaches. Negative for dizziness, syncope, light-headedness and numbness.   Psychiatric/Behavioral: Negative for confusion and suicidal ideas.       Physical Exam   BP: 101/65  Pulse: 68  Heart Rate: 73  Temp: 98.8  F (37.1  C)  Resp: 18  SpO2: 98 %      Physical Exam   Constitutional: She is oriented to person, place, and time. She appears well-developed and well-nourished.   HENT:   Head: Normocephalic and atraumatic.   Mouth/Throat: No oropharyngeal exudate.   Eyes: Conjunctivae are normal. Pupils are equal, round, and reactive to light.   Neck: Normal range of motion. Neck supple. No JVD present. No tracheal deviation present. No thyromegaly present.   Cardiovascular: Normal rate, regular rhythm, normal heart sounds and intact distal pulses.  Exam reveals no gallop and no friction rub.    No murmur heard.  Pulmonary/Chest: Effort normal and breath sounds normal. No stridor. No respiratory distress. She has no wheezes. She has no rales. She exhibits no tenderness.   Abdominal: Soft. Bowel sounds are normal. She exhibits no distension and no mass. There is no tenderness. There is no rebound and no guarding.   Musculoskeletal: Normal range of motion. She exhibits no edema or tenderness.   Lymphadenopathy:     She has no cervical adenopathy.   Neurological: She is alert and oriented to person, place, and time.   Skin: Skin is warm and dry. No rash noted. No erythema. No pallor.   Psychiatric: Her behavior is normal.   Nursing note and vitals reviewed.      ED  Course     ED Course     Procedures                   No results found for this or any previous visit (from the past 24 hour(s)).    Medications   prochlorperazine (COMPAZINE) injection 10 mg (10 mg Intramuscular Given 5/7/18 2129)   diphenhydrAMINE (BENADRYL) injection 50 mg (50 mg Intramuscular Given 5/7/18 2125)       Assessments & Plan (with Medical Decision Making)   Headache, hx of stroke  CT head: negative  After receiving compazine + benedryl, headache resolved  Dc back to NH  I have reviewed the nursing notes.    I have reviewed the findings, diagnosis, plan and need for follow up with the patient.      Discharge Medication List as of 5/7/2018 10:33 PM          Final diagnoses:   Episodic tension-type headache, not intractable       5/7/2018   [facility]     Ramin Gentile MD  05/08/18 0604

## 2018-05-08 NOTE — DISCHARGE INSTRUCTIONS
* HEADACHE [unspecified]    The cause of your headache today is not clear, but it does not appear to be the sign of any serious illness.  Under stress, some people tense the muscles of their shoulder, neck and scalp without knowing it. If this condition lasts long enough, a TENSION HEADACHE can occur.  A MIGRAINE HEADACHE is caused by changes in blood flow to the brain. It can be mild or severe. A migraine attack may be triggered by emotional stress, hormone changes during the menstrual cycle, oral contraceptives, alcohol use, certain foods containing tyramine, eye strain, weather changes, missing meals, lack of sleep or oversleeping.  Other causes of headache include a viral illness, sinus, ear or throat infection, dental pain and TMJ (jaw joint) pain.  HOME CARE:      If you were given pain medicine for this headache, do not drive yourself home. Arrange for a ride, instead. When you get home, try to sleep. You should feel much better when you wake up.    If you are having nausea or vomiting, follow a light diet until your headache is relieved.    If you have a migraine type headache, use sunglasses when in the daylight or around bright indoor lighting until symptoms improve. Bright glaring light can worsen this kind of headache.  FOLLOW UP with your doctor if the headache is not better within the next 24 hours. If you have frequent headaches you should discuss a treatment plan with your primary care doctor. By being aware of the earliest signs of headache, and starting treatment right away, you may be able to stop the pain yourself.  GET PROMPT MEDICAL ATTENTION if any of the following occur:    Worsening of your head pain or no improvement within 24 hours    Repeated vomiting (unable to keep liquids down)    Fever over 101 F (38.3 C)    Stiff neck    Extreme drowsiness, confusion or fainting    Weakness of an arm or leg or one side of the face    Difficulty with speech or vision    [id number] The [employer]  [employer]. [address] [place] [address]. All rights reserved. This information is not intended as a substitute for professional medical care. Always follow your healthcare professional's instructions.  This information has been modified by your health care provider with permission from the publisher.

## 2018-05-08 NOTE — ED NOTES
Nurse to nurse report given to Refugio at [facility]. Awaiting healthline transport.

## 2018-05-23 ENCOUNTER — TELEPHONE (OUTPATIENT)
Dept: NEUROSURGERY | Facility: CLINIC | Age: 28
End: 2018-05-23

## 2018-05-23 ENCOUNTER — ANESTHESIA EVENT (OUTPATIENT)
Dept: SURGERY | Facility: CLINIC | Age: 28
DRG: 516 | End: 2018-05-23
Payer: COMMERCIAL

## 2018-05-23 ASSESSMENT — LIFESTYLE VARIABLES: TOBACCO_USE: 1

## 2018-05-23 NOTE — TELEPHONE ENCOUNTER
Outbound call to patient verifying medication.  Patient states stopped Warfarin 5/17, leaving at 130am for ride to hospital for procedure in the morning by Dr. Mesa, states ready:)

## 2018-05-23 NOTE — ANESTHESIA PREPROCEDURE EVALUATION
Anesthesia Evaluation     . Pt has had prior anesthetic. Type: General    History of anesthetic complications    Difficult mask, difficult intubation      ROS/MED HX    ENT/Pulmonary: Comment: 02/18/18; 2005; Mask Ventilation: Difficult (Two handed mask with OA.); Ease of Intubation: Difficult (CMAC 3 GIII view, esophageal intubation by ANA CRISTINA Montes De Oca 2 attempt by Dr. Gastelum. Unable to visualize. CMAC 3by Dr. Gastelum bougie passed. 7.0 passed over ETT.); Airway Size: 7;  Cuffed;  Oral;  Blade Type: C-Mac;  Blade Size: 3;  Place by: Dr. Gastelum;  Insertion Attempts: 3;  Secured at (cm)to lip: 21 cm;  Breath Sounds: Equal, clear and bilateral;  End Tidal CO2: Present;  Dentition: Intact, Unchanged (Poor dentition);  Grade View of Cords: 3;  Airway Adjuncts:  C-Mac    Pulmonary nodules with regular follow up    (+)tobacco use, , . .    Neurologic:     (+)CVA date: 2/17/2018 with deficits- Massive R MCA CVA with resulting hemiparesis L arm, leg; L facial droop, other neuro R hemicraniectomy     Cardiovascular: Comment: PFO    (+) ----. Taking blood thinners Pt has received instructions: Instructions Given to patient: Hold warfarin for procedure. . . :. .       METS/Exercise Tolerance:     Hematologic: Comments: Hypercoagulability suspected 2/2 myeloproliferative disease    (+) History of blood clots pt is anticoagulated, -      Musculoskeletal:  - neg musculoskeletal ROS       GI/Hepatic: Comment: H/o occult GI bleeding, workup inconclusive     (+) GERD       Renal/Genitourinary:  - ROS Renal section negative       Endo:  - neg endo ROS       Psychiatric:     (+) psychiatric history anxiety and depression      Infectious Disease:  - neg infectious disease ROS       Malignancy:      - no malignancy   Other:    (+) other significant disability                                   Anesthesia Plan      History & Physical Review  History and physical reviewed and following examination; no interval change.    ASA Status:  3 .         Plan for General and ETT with Intravenous and Propofol induction. Maintenance will be Balanced.    PONV prophylaxis:  Ondansetron (or other 5HT-3) and Dexamethasone or Solumedrol  Additional equipment: 2nd IV, Arterial Line, Videolaryngoscope and Fiberoptic bronchoscope         29 yo F presenting for R cranioplasty.  Had massive R ischemic MCA CVA on 2/17/2018 2/2 PE in setting of PFO, now s/p R hemicraniectomy.  Previously with h/o PE noncompliant with anticoagulation.  Hypercoagulability presumed to be 2/2 undiagnosed myeloproliferative disease.  Currently compliant with warfarin, being held for procedure.  Notable anesthetic history of difficult mask, difficult intubation.  May require awake FOI.        Postoperative Care  Postoperative pain management:  Multi-modal analgesia.      Consents  Anesthetic plan, risks, benefits and alternatives discussed with:  Patient and Parent (Mother and/or Father).  Use of blood products discussed: No .   .                          .

## 2018-05-23 NOTE — TELEPHONE ENCOUNTER
Received a call from preop nursing that pt is scheduled for cranioplasty with Dr. Mesa but has not stopped her coumadin. I notified Dr. Mesa's RN care coordinator and she will f/u with Dr. Mesa and patient.

## 2018-05-24 ENCOUNTER — HOSPITAL ENCOUNTER (INPATIENT)
Facility: CLINIC | Age: 28
LOS: 2 days | Discharge: NURSING, REHAB OR LONG-TERM CARE | DRG: 516 | End: 2018-05-26
Attending: NEUROLOGICAL SURGERY | Admitting: NEUROLOGICAL SURGERY
Payer: COMMERCIAL

## 2018-05-24 ENCOUNTER — ANESTHESIA (OUTPATIENT)
Dept: SURGERY | Facility: CLINIC | Age: 28
DRG: 516 | End: 2018-05-24
Payer: COMMERCIAL

## 2018-05-24 DIAGNOSIS — I63.411 CEREBRAL INFARCTION DUE TO EMBOLISM OF RIGHT MIDDLE CEREBRAL ARTERY (H): ICD-10-CM

## 2018-05-24 DIAGNOSIS — Z86.711 HISTORY OF PULMONARY EMBOLISM: ICD-10-CM

## 2018-05-24 DIAGNOSIS — Z98.890 HISTORY OF CRANIOPLASTY: Primary | ICD-10-CM

## 2018-05-24 DIAGNOSIS — I63.9 ACUTE ISCHEMIC STROKE (H): ICD-10-CM

## 2018-05-24 LAB
ABO + RH BLD: NORMAL
ABO + RH BLD: NORMAL
BLD GP AB SCN SERPL QL: NORMAL
BLOOD BANK CMNT PATIENT-IMP: NORMAL
GLUCOSE BLDC GLUCOMTR-MCNC: 102 MG/DL (ref 70–99)
HGB BLD-MCNC: 11.5 G/DL (ref 11.7–15.7)
INR PPP: 1.14 (ref 0.86–1.14)
POTASSIUM SERPL-SCNC: 3.3 MMOL/L (ref 3.4–5.3)
SPECIMEN EXP DATE BLD: NORMAL

## 2018-05-24 PROCEDURE — 25000128 H RX IP 250 OP 636: Performed by: STUDENT IN AN ORGANIZED HEALTH CARE EDUCATION/TRAINING PROGRAM

## 2018-05-24 PROCEDURE — 37000008 ZZH ANESTHESIA TECHNICAL FEE, 1ST 30 MIN: Performed by: NEUROLOGICAL SURGERY

## 2018-05-24 PROCEDURE — 25000132 ZZH RX MED GY IP 250 OP 250 PS 637: Performed by: STUDENT IN AN ORGANIZED HEALTH CARE EDUCATION/TRAINING PROGRAM

## 2018-05-24 PROCEDURE — 86901 BLOOD TYPING SEROLOGIC RH(D): CPT | Performed by: ANESTHESIOLOGY

## 2018-05-24 PROCEDURE — 25000128 H RX IP 250 OP 636

## 2018-05-24 PROCEDURE — 71000015 ZZH RECOVERY PHASE 1 LEVEL 2 EA ADDTL HR: Performed by: NEUROLOGICAL SURGERY

## 2018-05-24 PROCEDURE — 84132 ASSAY OF SERUM POTASSIUM: CPT | Performed by: ANESTHESIOLOGY

## 2018-05-24 PROCEDURE — 36000070 ZZH SURGERY LEVEL 5 EA 15 ADDTL MIN - UMMC: Performed by: NEUROLOGICAL SURGERY

## 2018-05-24 PROCEDURE — 40000171 ZZH STATISTIC PRE-PROCEDURE ASSESSMENT III: Performed by: NEUROLOGICAL SURGERY

## 2018-05-24 PROCEDURE — 85018 HEMOGLOBIN: CPT | Performed by: ANESTHESIOLOGY

## 2018-05-24 PROCEDURE — 86850 RBC ANTIBODY SCREEN: CPT | Performed by: ANESTHESIOLOGY

## 2018-05-24 PROCEDURE — 37000009 ZZH ANESTHESIA TECHNICAL FEE, EACH ADDTL 15 MIN: Performed by: NEUROLOGICAL SURGERY

## 2018-05-24 PROCEDURE — C1713 ANCHOR/SCREW BN/BN,TIS/BN: HCPCS | Performed by: NEUROLOGICAL SURGERY

## 2018-05-24 PROCEDURE — C9399 UNCLASSIFIED DRUGS OR BIOLOG: HCPCS | Performed by: STUDENT IN AN ORGANIZED HEALTH CARE EDUCATION/TRAINING PROGRAM

## 2018-05-24 PROCEDURE — 25000125 ZZHC RX 250: Performed by: STUDENT IN AN ORGANIZED HEALTH CARE EDUCATION/TRAINING PROGRAM

## 2018-05-24 PROCEDURE — 36415 COLL VENOUS BLD VENIPUNCTURE: CPT | Performed by: ANESTHESIOLOGY

## 2018-05-24 PROCEDURE — 40000014 ZZH STATISTIC ARTERIAL MONITORING DAILY

## 2018-05-24 PROCEDURE — 0NS004Z REPOSITION SKULL WITH INTERNAL FIXATION DEVICE, OPEN APPROACH: ICD-10-PCS | Performed by: NEUROLOGICAL SURGERY

## 2018-05-24 PROCEDURE — 25000128 H RX IP 250 OP 636: Performed by: NEUROLOGICAL SURGERY

## 2018-05-24 PROCEDURE — 25000125 ZZHC RX 250: Performed by: NEUROLOGICAL SURGERY

## 2018-05-24 PROCEDURE — 36000068 ZZH SURGERY LEVEL 5 1ST 30 MIN - UMMC: Performed by: NEUROLOGICAL SURGERY

## 2018-05-24 PROCEDURE — 40000275 ZZH STATISTIC RCP TIME EA 10 MIN

## 2018-05-24 PROCEDURE — 27210995 ZZH RX 272: Performed by: NEUROLOGICAL SURGERY

## 2018-05-24 PROCEDURE — C1762 CONN TISS, HUMAN(INC FASCIA): HCPCS | Performed by: NEUROLOGICAL SURGERY

## 2018-05-24 PROCEDURE — 00000146 ZZHCL STATISTIC GLUCOSE BY METER IP

## 2018-05-24 PROCEDURE — 27810169 ZZH OR IMPLANT GENERAL: Performed by: NEUROLOGICAL SURGERY

## 2018-05-24 PROCEDURE — 25000566 ZZH SEVOFLURANE, EA 15 MIN: Performed by: NEUROLOGICAL SURGERY

## 2018-05-24 PROCEDURE — 25000131 ZZH RX MED GY IP 250 OP 636 PS 637: Performed by: STUDENT IN AN ORGANIZED HEALTH CARE EDUCATION/TRAINING PROGRAM

## 2018-05-24 PROCEDURE — 27210794 ZZH OR GENERAL SUPPLY STERILE: Performed by: NEUROLOGICAL SURGERY

## 2018-05-24 PROCEDURE — 71000014 ZZH RECOVERY PHASE 1 LEVEL 2 FIRST HR: Performed by: NEUROLOGICAL SURGERY

## 2018-05-24 PROCEDURE — 86900 BLOOD TYPING SEROLOGIC ABO: CPT | Performed by: ANESTHESIOLOGY

## 2018-05-24 PROCEDURE — 85610 PROTHROMBIN TIME: CPT | Performed by: ANESTHESIOLOGY

## 2018-05-24 PROCEDURE — 12000008 ZZH R&B INTERMEDIATE UMMC

## 2018-05-24 DEVICE — IMP PLATE SYN STRUT LOW PROFILE 6H TI 421.522: Type: IMPLANTABLE DEVICE | Site: CRANIAL | Status: FUNCTIONAL

## 2018-05-24 DEVICE — IMP SCR SYN MATRIX LOW PRO 1.5X04MM SELF DRILL 04.503.104.01: Type: IMPLANTABLE DEVICE | Site: CRANIAL | Status: FUNCTIONAL

## 2018-05-24 DEVICE — GRAFT BONE PUTTY DBX 05ML 038050: Type: IMPLANTABLE DEVICE | Site: CRANIAL | Status: FUNCTIONAL

## 2018-05-24 DEVICE — IMP BUR HOLE COVER 24MM LOW PROFILE TI 421.528: Type: IMPLANTABLE DEVICE | Site: CRANIAL | Status: FUNCTIONAL

## 2018-05-24 RX ORDER — ACETAMINOPHEN 325 MG/1
650 TABLET ORAL EVERY 4 HOURS PRN
Status: DISCONTINUED | OUTPATIENT
Start: 2018-05-27 | End: 2018-05-26 | Stop reason: HOSPADM

## 2018-05-24 RX ORDER — HYDRALAZINE HYDROCHLORIDE 20 MG/ML
10-20 INJECTION INTRAMUSCULAR; INTRAVENOUS EVERY 30 MIN PRN
Status: DISCONTINUED | OUTPATIENT
Start: 2018-05-24 | End: 2018-05-26 | Stop reason: HOSPADM

## 2018-05-24 RX ORDER — ONDANSETRON 4 MG/1
4 TABLET, ORALLY DISINTEGRATING ORAL EVERY 30 MIN PRN
Status: DISCONTINUED | OUTPATIENT
Start: 2018-05-24 | End: 2018-05-24 | Stop reason: HOSPADM

## 2018-05-24 RX ORDER — HYDROMORPHONE HYDROCHLORIDE 1 MG/ML
.3-.5 INJECTION, SOLUTION INTRAMUSCULAR; INTRAVENOUS; SUBCUTANEOUS EVERY 5 MIN PRN
Status: DISCONTINUED | OUTPATIENT
Start: 2018-05-24 | End: 2018-05-24 | Stop reason: HOSPADM

## 2018-05-24 RX ORDER — LABETALOL HYDROCHLORIDE 5 MG/ML
10-40 INJECTION, SOLUTION INTRAVENOUS EVERY 10 MIN PRN
Status: DISCONTINUED | OUTPATIENT
Start: 2018-05-24 | End: 2018-05-26 | Stop reason: HOSPADM

## 2018-05-24 RX ORDER — LIDOCAINE 40 MG/G
CREAM TOPICAL
Status: DISCONTINUED | OUTPATIENT
Start: 2018-05-24 | End: 2018-05-24 | Stop reason: HOSPADM

## 2018-05-24 RX ORDER — HYDROMORPHONE HYDROCHLORIDE 1 MG/ML
.5-1 INJECTION, SOLUTION INTRAMUSCULAR; INTRAVENOUS; SUBCUTANEOUS
Status: DISCONTINUED | OUTPATIENT
Start: 2018-05-24 | End: 2018-05-24

## 2018-05-24 RX ORDER — ONDANSETRON 2 MG/ML
4 INJECTION INTRAMUSCULAR; INTRAVENOUS EVERY 30 MIN PRN
Status: DISCONTINUED | OUTPATIENT
Start: 2018-05-24 | End: 2018-05-24 | Stop reason: HOSPADM

## 2018-05-24 RX ORDER — ACETAMINOPHEN 325 MG/1
975 TABLET ORAL ONCE
Status: DISCONTINUED | OUTPATIENT
Start: 2018-05-24 | End: 2018-05-24 | Stop reason: HOSPADM

## 2018-05-24 RX ORDER — DEXAMETHASONE SODIUM PHOSPHATE 4 MG/ML
INJECTION, SOLUTION INTRA-ARTICULAR; INTRALESIONAL; INTRAMUSCULAR; INTRAVENOUS; SOFT TISSUE PRN
Status: DISCONTINUED | OUTPATIENT
Start: 2018-05-24 | End: 2018-05-24

## 2018-05-24 RX ORDER — ONDANSETRON 4 MG/1
4-8 TABLET, ORALLY DISINTEGRATING ORAL EVERY 6 HOURS PRN
Status: DISCONTINUED | OUTPATIENT
Start: 2018-05-24 | End: 2018-05-26 | Stop reason: HOSPADM

## 2018-05-24 RX ORDER — CLINDAMYCIN PHOSPHATE 900 MG/50ML
900 INJECTION, SOLUTION INTRAVENOUS
Status: DISCONTINUED | OUTPATIENT
Start: 2018-05-24 | End: 2018-05-24 | Stop reason: HOSPADM

## 2018-05-24 RX ORDER — POTASSIUM CHLORIDE 750 MG/1
20-40 TABLET, EXTENDED RELEASE ORAL
Status: DISCONTINUED | OUTPATIENT
Start: 2018-05-24 | End: 2018-05-26 | Stop reason: HOSPADM

## 2018-05-24 RX ORDER — LIDOCAINE 40 MG/G
CREAM TOPICAL
Status: DISCONTINUED | OUTPATIENT
Start: 2018-05-24 | End: 2018-05-26 | Stop reason: HOSPADM

## 2018-05-24 RX ORDER — POTASSIUM CHLORIDE 29.8 MG/ML
20 INJECTION INTRAVENOUS
Status: DISCONTINUED | OUTPATIENT
Start: 2018-05-24 | End: 2018-05-26 | Stop reason: HOSPADM

## 2018-05-24 RX ORDER — EPHEDRINE SULFATE 50 MG/ML
INJECTION, SOLUTION INTRAMUSCULAR; INTRAVENOUS; SUBCUTANEOUS PRN
Status: DISCONTINUED | OUTPATIENT
Start: 2018-05-24 | End: 2018-05-24

## 2018-05-24 RX ORDER — CLINDAMYCIN PHOSPHATE 900 MG/50ML
900 INJECTION, SOLUTION INTRAVENOUS SEE ADMIN INSTRUCTIONS
Status: DISCONTINUED | OUTPATIENT
Start: 2018-05-24 | End: 2018-05-24 | Stop reason: HOSPADM

## 2018-05-24 RX ORDER — SODIUM CHLORIDE 9 MG/ML
INJECTION, SOLUTION INTRAVENOUS CONTINUOUS
Status: DISPENSED | OUTPATIENT
Start: 2018-05-24 | End: 2018-05-25

## 2018-05-24 RX ORDER — SODIUM CHLORIDE, SODIUM LACTATE, POTASSIUM CHLORIDE, CALCIUM CHLORIDE 600; 310; 30; 20 MG/100ML; MG/100ML; MG/100ML; MG/100ML
INJECTION, SOLUTION INTRAVENOUS CONTINUOUS
Status: DISCONTINUED | OUTPATIENT
Start: 2018-05-24 | End: 2018-05-24 | Stop reason: HOSPADM

## 2018-05-24 RX ORDER — FENTANYL CITRATE 50 UG/ML
25-50 INJECTION, SOLUTION INTRAMUSCULAR; INTRAVENOUS
Status: DISCONTINUED | OUTPATIENT
Start: 2018-05-24 | End: 2018-05-24 | Stop reason: HOSPADM

## 2018-05-24 RX ORDER — GABAPENTIN 100 MG/1
200 CAPSULE ORAL 2 TIMES DAILY
Status: DISCONTINUED | OUTPATIENT
Start: 2018-05-24 | End: 2018-05-26 | Stop reason: HOSPADM

## 2018-05-24 RX ORDER — OXYCODONE HYDROCHLORIDE 10 MG/1
10 TABLET ORAL
Status: DISCONTINUED | OUTPATIENT
Start: 2018-05-24 | End: 2018-05-26 | Stop reason: HOSPADM

## 2018-05-24 RX ORDER — POTASSIUM CHLORIDE 1.5 G/1.58G
20-40 POWDER, FOR SOLUTION ORAL
Status: DISCONTINUED | OUTPATIENT
Start: 2018-05-24 | End: 2018-05-26 | Stop reason: HOSPADM

## 2018-05-24 RX ORDER — PROPOFOL 10 MG/ML
INJECTION, EMULSION INTRAVENOUS PRN
Status: DISCONTINUED | OUTPATIENT
Start: 2018-05-24 | End: 2018-05-24

## 2018-05-24 RX ORDER — POTASSIUM CL/LIDO/0.9 % NACL 10MEQ/0.1L
10 INTRAVENOUS SOLUTION, PIGGYBACK (ML) INTRAVENOUS
Status: DISCONTINUED | OUTPATIENT
Start: 2018-05-24 | End: 2018-05-26 | Stop reason: HOSPADM

## 2018-05-24 RX ORDER — HYDROMORPHONE HYDROCHLORIDE 1 MG/ML
INJECTION, SOLUTION INTRAMUSCULAR; INTRAVENOUS; SUBCUTANEOUS
Status: COMPLETED
Start: 2018-05-24 | End: 2018-05-24

## 2018-05-24 RX ORDER — ONDANSETRON 2 MG/ML
4-8 INJECTION INTRAMUSCULAR; INTRAVENOUS EVERY 6 HOURS PRN
Status: DISCONTINUED | OUTPATIENT
Start: 2018-05-24 | End: 2018-05-26 | Stop reason: HOSPADM

## 2018-05-24 RX ORDER — NALOXONE HYDROCHLORIDE 0.4 MG/ML
.1-.4 INJECTION, SOLUTION INTRAMUSCULAR; INTRAVENOUS; SUBCUTANEOUS
Status: DISCONTINUED | OUTPATIENT
Start: 2018-05-24 | End: 2018-05-24

## 2018-05-24 RX ORDER — ACETAMINOPHEN 325 MG/1
975 TABLET ORAL EVERY 8 HOURS
Status: DISCONTINUED | OUTPATIENT
Start: 2018-05-24 | End: 2018-05-26 | Stop reason: HOSPADM

## 2018-05-24 RX ORDER — HYDROMORPHONE HYDROCHLORIDE 1 MG/ML
.3-.5 INJECTION, SOLUTION INTRAMUSCULAR; INTRAVENOUS; SUBCUTANEOUS
Status: DISCONTINUED | OUTPATIENT
Start: 2018-05-24 | End: 2018-05-24

## 2018-05-24 RX ORDER — AMOXICILLIN 250 MG
2 CAPSULE ORAL 2 TIMES DAILY
Status: DISCONTINUED | OUTPATIENT
Start: 2018-05-24 | End: 2018-05-26 | Stop reason: HOSPADM

## 2018-05-24 RX ORDER — HYDROMORPHONE HYDROCHLORIDE 1 MG/ML
.5-1 INJECTION, SOLUTION INTRAMUSCULAR; INTRAVENOUS; SUBCUTANEOUS
Status: DISPENSED | OUTPATIENT
Start: 2018-05-24 | End: 2018-05-25

## 2018-05-24 RX ORDER — SODIUM CHLORIDE, SODIUM LACTATE, POTASSIUM CHLORIDE, CALCIUM CHLORIDE 600; 310; 30; 20 MG/100ML; MG/100ML; MG/100ML; MG/100ML
500 INJECTION, SOLUTION INTRAVENOUS CONTINUOUS
Status: DISCONTINUED | OUTPATIENT
Start: 2018-05-24 | End: 2018-05-24 | Stop reason: HOSPADM

## 2018-05-24 RX ORDER — GABAPENTIN 300 MG/1
300 CAPSULE ORAL AT BEDTIME
Status: DISCONTINUED | OUTPATIENT
Start: 2018-05-24 | End: 2018-05-26 | Stop reason: HOSPADM

## 2018-05-24 RX ORDER — FLUOXETINE 20 MG/5ML
30 SOLUTION ORAL DAILY
Status: DISCONTINUED | OUTPATIENT
Start: 2018-05-24 | End: 2018-05-26 | Stop reason: HOSPADM

## 2018-05-24 RX ORDER — NALOXONE HYDROCHLORIDE 0.4 MG/ML
.1-.4 INJECTION, SOLUTION INTRAMUSCULAR; INTRAVENOUS; SUBCUTANEOUS
Status: DISCONTINUED | OUTPATIENT
Start: 2018-05-24 | End: 2018-05-26 | Stop reason: HOSPADM

## 2018-05-24 RX ORDER — SODIUM CHLORIDE 9 MG/ML
INJECTION, SOLUTION INTRAVENOUS CONTINUOUS PRN
Status: DISCONTINUED | OUTPATIENT
Start: 2018-05-24 | End: 2018-05-24

## 2018-05-24 RX ORDER — NALOXONE HYDROCHLORIDE 0.4 MG/ML
.1-.4 INJECTION, SOLUTION INTRAMUSCULAR; INTRAVENOUS; SUBCUTANEOUS
Status: DISCONTINUED | OUTPATIENT
Start: 2018-05-24 | End: 2018-05-24 | Stop reason: HOSPADM

## 2018-05-24 RX ORDER — POTASSIUM CHLORIDE 7.45 MG/ML
10 INJECTION INTRAVENOUS
Status: DISCONTINUED | OUTPATIENT
Start: 2018-05-24 | End: 2018-05-26 | Stop reason: HOSPADM

## 2018-05-24 RX ORDER — LIDOCAINE HYDROCHLORIDE 20 MG/ML
INJECTION, SOLUTION INFILTRATION; PERINEURAL PRN
Status: DISCONTINUED | OUTPATIENT
Start: 2018-05-24 | End: 2018-05-24

## 2018-05-24 RX ORDER — OXYCODONE HYDROCHLORIDE 5 MG/1
5-10 TABLET ORAL
Status: DISCONTINUED | OUTPATIENT
Start: 2018-05-24 | End: 2018-05-24

## 2018-05-24 RX ORDER — FERROUS SULFATE 325(65) MG
325 TABLET ORAL DAILY
Status: DISCONTINUED | OUTPATIENT
Start: 2018-05-24 | End: 2018-05-26 | Stop reason: HOSPADM

## 2018-05-24 RX ORDER — GLYCOPYRROLATE 0.2 MG/ML
INJECTION, SOLUTION INTRAMUSCULAR; INTRAVENOUS PRN
Status: DISCONTINUED | OUTPATIENT
Start: 2018-05-24 | End: 2018-05-24

## 2018-05-24 RX ORDER — CALCIUM CARBONATE 500 MG/1
1000 TABLET, CHEWABLE ORAL 4 TIMES DAILY PRN
Status: DISCONTINUED | OUTPATIENT
Start: 2018-05-24 | End: 2018-05-26 | Stop reason: HOSPADM

## 2018-05-24 RX ORDER — FENTANYL CITRATE 50 UG/ML
INJECTION, SOLUTION INTRAMUSCULAR; INTRAVENOUS PRN
Status: DISCONTINUED | OUTPATIENT
Start: 2018-05-24 | End: 2018-05-24

## 2018-05-24 RX ORDER — LABETALOL HYDROCHLORIDE 5 MG/ML
INJECTION, SOLUTION INTRAVENOUS PRN
Status: DISCONTINUED | OUTPATIENT
Start: 2018-05-24 | End: 2018-05-24

## 2018-05-24 RX ORDER — ONDANSETRON 2 MG/ML
INJECTION INTRAMUSCULAR; INTRAVENOUS PRN
Status: DISCONTINUED | OUTPATIENT
Start: 2018-05-24 | End: 2018-05-24

## 2018-05-24 RX ORDER — BUPIVACAINE HYDROCHLORIDE AND EPINEPHRINE 5; 5 MG/ML; UG/ML
INJECTION, SOLUTION PERINEURAL PRN
Status: DISCONTINUED | OUTPATIENT
Start: 2018-05-24 | End: 2018-05-24 | Stop reason: HOSPADM

## 2018-05-24 RX ORDER — ESMOLOL HYDROCHLORIDE 10 MG/ML
INJECTION INTRAVENOUS PRN
Status: DISCONTINUED | OUTPATIENT
Start: 2018-05-24 | End: 2018-05-24

## 2018-05-24 RX ORDER — AMOXICILLIN 250 MG
1 CAPSULE ORAL 2 TIMES DAILY
Status: DISCONTINUED | OUTPATIENT
Start: 2018-05-24 | End: 2018-05-26 | Stop reason: HOSPADM

## 2018-05-24 RX ORDER — HYDROXYZINE HYDROCHLORIDE 25 MG/1
25 TABLET, FILM COATED ORAL EVERY 6 HOURS PRN
Status: DISCONTINUED | OUTPATIENT
Start: 2018-05-24 | End: 2018-05-26 | Stop reason: HOSPADM

## 2018-05-24 RX ORDER — MAGNESIUM SULFATE HEPTAHYDRATE 40 MG/ML
4 INJECTION, SOLUTION INTRAVENOUS EVERY 4 HOURS PRN
Status: DISCONTINUED | OUTPATIENT
Start: 2018-05-24 | End: 2018-05-26 | Stop reason: HOSPADM

## 2018-05-24 RX ORDER — TRAZODONE HYDROCHLORIDE 100 MG/1
100 TABLET ORAL AT BEDTIME
Status: DISCONTINUED | OUTPATIENT
Start: 2018-05-24 | End: 2018-05-26 | Stop reason: HOSPADM

## 2018-05-24 RX ADMIN — SODIUM CHLORIDE: 9 INJECTION, SOLUTION INTRAVENOUS at 20:04

## 2018-05-24 RX ADMIN — SODIUM CHLORIDE: 9 INJECTION, SOLUTION INTRAVENOUS at 11:48

## 2018-05-24 RX ADMIN — HYDROMORPHONE HYDROCHLORIDE 0.5 MG: 1 INJECTION, SOLUTION INTRAMUSCULAR; INTRAVENOUS; SUBCUTANEOUS at 15:08

## 2018-05-24 RX ADMIN — PHENYLEPHRINE HYDROCHLORIDE 50 MCG: 10 INJECTION, SOLUTION INTRAMUSCULAR; INTRAVENOUS; SUBCUTANEOUS at 09:31

## 2018-05-24 RX ADMIN — ACETAMINOPHEN 975 MG: 325 TABLET, FILM COATED ORAL at 16:07

## 2018-05-24 RX ADMIN — PHENYLEPHRINE HYDROCHLORIDE 50 MCG: 10 INJECTION, SOLUTION INTRAMUSCULAR; INTRAVENOUS; SUBCUTANEOUS at 09:16

## 2018-05-24 RX ADMIN — HYDROMORPHONE HYDROCHLORIDE 0.5 MG: 1 INJECTION, SOLUTION INTRAMUSCULAR; INTRAVENOUS; SUBCUTANEOUS at 13:21

## 2018-05-24 RX ADMIN — OXYCODONE HYDROCHLORIDE 10 MG: 5 TABLET ORAL at 20:57

## 2018-05-24 RX ADMIN — SENNOSIDES AND DOCUSATE SODIUM 2 TABLET: 8.6; 5 TABLET ORAL at 20:03

## 2018-05-24 RX ADMIN — GABAPENTIN 200 MG: 100 CAPSULE ORAL at 20:03

## 2018-05-24 RX ADMIN — FENTANYL CITRATE 50 MCG: 50 INJECTION INTRAMUSCULAR; INTRAVENOUS at 12:40

## 2018-05-24 RX ADMIN — PROPOFOL 50 MG: 10 INJECTION, EMULSION INTRAVENOUS at 08:29

## 2018-05-24 RX ADMIN — FENTANYL CITRATE 50 MCG: 50 INJECTION INTRAMUSCULAR; INTRAVENOUS at 11:41

## 2018-05-24 RX ADMIN — PROPOFOL 100 MG: 10 INJECTION, EMULSION INTRAVENOUS at 07:52

## 2018-05-24 RX ADMIN — FENTANYL CITRATE 50 MCG: 50 INJECTION, SOLUTION INTRAMUSCULAR; INTRAVENOUS at 08:59

## 2018-05-24 RX ADMIN — FENTANYL CITRATE 100 MCG: 50 INJECTION, SOLUTION INTRAMUSCULAR; INTRAVENOUS at 08:10

## 2018-05-24 RX ADMIN — DEXAMETHASONE SODIUM PHOSPHATE 4 MG: 4 INJECTION, SOLUTION INTRA-ARTICULAR; INTRALESIONAL; INTRAMUSCULAR; INTRAVENOUS; SOFT TISSUE at 08:30

## 2018-05-24 RX ADMIN — PHENYLEPHRINE HYDROCHLORIDE 50 MCG: 10 INJECTION, SOLUTION INTRAMUSCULAR; INTRAVENOUS; SUBCUTANEOUS at 10:16

## 2018-05-24 RX ADMIN — PHENYLEPHRINE HYDROCHLORIDE 50 MCG: 10 INJECTION, SOLUTION INTRAMUSCULAR; INTRAVENOUS; SUBCUTANEOUS at 09:27

## 2018-05-24 RX ADMIN — POTASSIUM CHLORIDE 40 MEQ: 750 TABLET, EXTENDED RELEASE ORAL at 21:20

## 2018-05-24 RX ADMIN — PHENYLEPHRINE HYDROCHLORIDE 50 MCG: 10 INJECTION, SOLUTION INTRAMUSCULAR; INTRAVENOUS; SUBCUTANEOUS at 09:23

## 2018-05-24 RX ADMIN — TRAZODONE HYDROCHLORIDE 100 MG: 100 TABLET ORAL at 22:41

## 2018-05-24 RX ADMIN — ACETAMINOPHEN 975 MG: 325 TABLET, FILM COATED ORAL at 20:22

## 2018-05-24 RX ADMIN — SODIUM CHLORIDE: 9 INJECTION, SOLUTION INTRAVENOUS at 07:30

## 2018-05-24 RX ADMIN — Medication 5 MG: at 22:05

## 2018-05-24 RX ADMIN — CLINDAMYCIN PHOSPHATE 900 MG: 18 INJECTION, SOLUTION INTRAVENOUS at 08:25

## 2018-05-24 RX ADMIN — FENTANYL CITRATE 150 MCG: 50 INJECTION, SOLUTION INTRAMUSCULAR; INTRAVENOUS at 07:52

## 2018-05-24 RX ADMIN — ROCURONIUM BROMIDE 5 MG: 10 INJECTION INTRAVENOUS at 10:54

## 2018-05-24 RX ADMIN — Medication 7.5 MG: at 08:05

## 2018-05-24 RX ADMIN — SODIUM CHLORIDE: 9 INJECTION, SOLUTION INTRAVENOUS at 10:18

## 2018-05-24 RX ADMIN — ROCURONIUM BROMIDE 20 MG: 10 INJECTION INTRAVENOUS at 08:54

## 2018-05-24 RX ADMIN — ONDANSETRON 4 MG: 2 INJECTION INTRAMUSCULAR; INTRAVENOUS at 14:27

## 2018-05-24 RX ADMIN — HYDROMORPHONE HYDROCHLORIDE 0.3 MG: 1 INJECTION, SOLUTION INTRAMUSCULAR; INTRAVENOUS; SUBCUTANEOUS at 20:07

## 2018-05-24 RX ADMIN — PHENYLEPHRINE HYDROCHLORIDE 50 MCG: 10 INJECTION, SOLUTION INTRAMUSCULAR; INTRAVENOUS; SUBCUTANEOUS at 09:35

## 2018-05-24 RX ADMIN — HYDROMORPHONE HYDROCHLORIDE 0.2 MG: 1 INJECTION, SOLUTION INTRAMUSCULAR; INTRAVENOUS; SUBCUTANEOUS at 10:44

## 2018-05-24 RX ADMIN — LABETALOL HYDROCHLORIDE 25 MG: 5 INJECTION INTRAVENOUS at 11:08

## 2018-05-24 RX ADMIN — ESMOLOL HYDROCHLORIDE 10 MG: 10 INJECTION, SOLUTION INTRAVENOUS at 09:23

## 2018-05-24 RX ADMIN — FLUOXETINE HYDROCHLORIDE 30 MG: 20 SOLUTION ORAL at 16:07

## 2018-05-24 RX ADMIN — ESMOLOL HYDROCHLORIDE 10 MG: 10 INJECTION, SOLUTION INTRAVENOUS at 09:30

## 2018-05-24 RX ADMIN — PHENYLEPHRINE HYDROCHLORIDE 50 MCG: 10 INJECTION, SOLUTION INTRAMUSCULAR; INTRAVENOUS; SUBCUTANEOUS at 09:47

## 2018-05-24 RX ADMIN — SUGAMMADEX 200 MG: 100 INJECTION, SOLUTION INTRAVENOUS at 11:00

## 2018-05-24 RX ADMIN — ONDANSETRON 4 MG: 2 INJECTION INTRAMUSCULAR; INTRAVENOUS at 10:34

## 2018-05-24 RX ADMIN — PHENYLEPHRINE HYDROCHLORIDE 50 MCG: 10 INJECTION, SOLUTION INTRAMUSCULAR; INTRAVENOUS; SUBCUTANEOUS at 09:10

## 2018-05-24 RX ADMIN — ROCURONIUM BROMIDE 50 MG: 10 INJECTION INTRAVENOUS at 07:54

## 2018-05-24 RX ADMIN — Medication 5 MG: at 09:04

## 2018-05-24 RX ADMIN — PHENYLEPHRINE HYDROCHLORIDE 50 MCG: 10 INJECTION, SOLUTION INTRAMUSCULAR; INTRAVENOUS; SUBCUTANEOUS at 09:56

## 2018-05-24 RX ADMIN — PHENYLEPHRINE HYDROCHLORIDE 50 MCG: 10 INJECTION, SOLUTION INTRAMUSCULAR; INTRAVENOUS; SUBCUTANEOUS at 10:14

## 2018-05-24 RX ADMIN — HYDROXYZINE HYDROCHLORIDE 25 MG: 25 TABLET ORAL at 20:03

## 2018-05-24 RX ADMIN — GABAPENTIN 300 MG: 300 CAPSULE ORAL at 22:06

## 2018-05-24 RX ADMIN — FENTANYL CITRATE 25 MCG: 50 INJECTION, SOLUTION INTRAMUSCULAR; INTRAVENOUS at 11:23

## 2018-05-24 RX ADMIN — FENTANYL CITRATE 50 MCG: 50 INJECTION, SOLUTION INTRAMUSCULAR; INTRAVENOUS at 09:30

## 2018-05-24 RX ADMIN — HYDROMORPHONE HYDROCHLORIDE 0.5 MG: 1 INJECTION, SOLUTION INTRAMUSCULAR; INTRAVENOUS; SUBCUTANEOUS at 22:05

## 2018-05-24 RX ADMIN — REMIFENTANIL HYDROCHLORIDE 0.08 MCG/KG/MIN: 1 INJECTION, POWDER, LYOPHILIZED, FOR SOLUTION INTRAVENOUS at 08:50

## 2018-05-24 RX ADMIN — GLYCOPYRROLATE 0.2 MG: 0.2 INJECTION, SOLUTION INTRAMUSCULAR; INTRAVENOUS at 09:00

## 2018-05-24 RX ADMIN — LIDOCAINE HYDROCHLORIDE 100 MG: 20 INJECTION, SOLUTION INFILTRATION; PERINEURAL at 07:52

## 2018-05-24 RX ADMIN — OXYCODONE HYDROCHLORIDE 10 MG: 5 TABLET ORAL at 14:27

## 2018-05-24 RX ADMIN — POTASSIUM CHLORIDE 20 MEQ: 750 TABLET, EXTENDED RELEASE ORAL at 23:37

## 2018-05-24 RX ADMIN — OXYCODONE HYDROCHLORIDE 10 MG: 5 TABLET ORAL at 17:24

## 2018-05-24 RX ADMIN — ESMOLOL HYDROCHLORIDE 10 MG: 10 INJECTION, SOLUTION INTRAVENOUS at 09:35

## 2018-05-24 RX ADMIN — FERROUS SULFATE TAB 325 MG (65 MG ELEMENTAL FE) 325 MG: 325 (65 FE) TAB at 16:07

## 2018-05-24 RX ADMIN — HYDROMORPHONE HYDROCHLORIDE 0.5 MG: 1 INJECTION, SOLUTION INTRAMUSCULAR; INTRAVENOUS; SUBCUTANEOUS at 18:16

## 2018-05-24 ASSESSMENT — PAIN DESCRIPTION - DESCRIPTORS
DESCRIPTORS: ACHING
DESCRIPTORS: SORE

## 2018-05-24 NOTE — ANESTHESIA POSTPROCEDURE EVALUATION
Patient: Maggie Case    Procedure(s):  Right Cranioplasty  - Wound Class: I-Clean    Diagnosis:Cerebral Vascular Accident  Diagnosis Additional Information: No value filed.    Anesthesia Type:  General, ETT    Note:  Anesthesia Post Evaluation    Patient location during evaluation: PACU  Patient participation: Able to fully participate in evaluation  Level of consciousness: awake and alert  Pain management: adequate (Still c/o pain - but then falls asleep.  )  Airway patency: patent  Cardiovascular status: acceptable  Respiratory status: acceptable  Hydration status: acceptable  PONV: none     Anesthetic complications: None          Last vitals:  Vitals:    05/24/18 1130 05/24/18 1145 05/24/18 1200   BP: (!) 129/91  124/85   Resp: 18 16 18   Temp:   36.6  C (97.9  F)   SpO2: 100% 99% 100%         Electronically Signed By: José Luis Mckeon MD  May 24, 2018  12:26 PM

## 2018-05-24 NOTE — IP AVS SNAPSHOT
` ` Patient Information     Patient Name Sex     Maggie Case ([id number]) Female 1990       Room Bed    6213 6213-02      Patient Demographics     Address Phone    [address] [place] [address] [phone] (Home)  [phone] (Mobile) *Preferred*      Patient Ethnicity & Race     Ethnic Group Patient Race    American  or       Emergency Contact(s)     Name Relation Home Work Mobile    Dandre Case Father [phone]    Pawan Case Mother [phone]      Documents on File        Status Date Received Description       Documents for the Patient    Consent for Services - Hospital/Clinic-ESign Received () 13     [place] Privacy Notice-ESign Received 13     Insurance Card Not Received  NA 18    Patient ID Received () 13 [place]    Consent for EHR Access-Received-ESign Received 13     Consent for EHR Access-Decline-ESign       Consent for Services - Hospital/Clinic-ESign Received () 14     Consent for Services - Hospital/Clinic-ESign Received () 08/29/15     HIM ARANZA Authorization - File Only  () 12/29/15 AUTHORIZATION FOR RELEASE OF PHI    Consent for Services/Privacy Notice - Hospital/Clinic-Esign Received () 16     Privacy Notice - [place] Received 16     Consent for EHR Access       External Medication Information Consent       [facility] Specified Other       Consent for Services/Privacy Notice - Hospital/Clinic Received () 17     Patient ID Received 16 [place]    HIM ARANZA Authorization  16 [facility]    HIM ARANZA Authorization  16 [facility]    HIM ARANZA Authorization  17     HIM ARANZA Authorization - File Only  17 AUTHORIZATION FOR RELEASE OF PROTECTED HEALTH INFORMATION    HIM ARANZA Authorization  17     Care Everywhere Prospective Auth Received 18     Consent for Services/Privacy Notice - Hospital/Clinic Received  18     HIM ARANZA Authorization  18 PATIENT    HIM ARANZA Authorization  18 NADIR GARCIA/S    Consent for Services - Hospital and Clinic Received 18     HIE Auth Received 18     HIM ARANZA Authorization  18 SFR    Advance Directives and Living Will Received 18 POLST 2018    Patient Photo   Photo of Patient    CE Prospective Auth Received () 18 Authorization Document from Social Security Administration    System-Retrieved CE Auth Form Received () 18 External Authorization Form from Social Security Administration       Documents for the Encounter    H/P - History and Physical  18 ESSENTIA 18    H/P - History and Physical  18 ESSENTIA 18    CMS IM for Patient Signature         Admission Information     Attending Provider Admitting Provider Admission Type Admission Date/Time    Emeterio Mesa MD [place], Emeterio Rudd MD Elective 18  0513    Discharge Date Hospital Service Auth/Cert Status Service Area     Neurosurgery [facility]    Unit Room/Bed Admission Status       UU U6A 6213/6213-02 Admission (Confirmed)       Admission     Complaint    Cerebral Vascular Accident, History of cranioplasty      Hospital Account     Name Acct ID Class Status Primary Coverage    Nadir Garcia [id number] Inpatient Open [facility] KEDAR NAVA            Guarantor Account (for Hospital Account #[id number])     Name Relation to Pt Service Area Active? Acct Type    Nadir Garcia Self FCS Yes Personal/Family    Address Phone          [address] [place] [address] [phone](H)              Coverage Information (for Hospital Account #[id number])     F/O Payor/Plan Precert #    KEDAR/KEDAR NAVA     Subscriber Subscriber #    Nadir Garcia [id number]    Address Phone    [address]  [place] [address] [phone]

## 2018-05-24 NOTE — ANESTHESIA PROCEDURE NOTES
Arterial Line Procedure Note  Staff:     Anesthesiologist:  ANNE KELLY    Resident/CRNA:  DIALLO ONTIVEROS    Arterial line performed by resident/CRNA in presence of a teaching physician    Location: In OR After Induction  Procedure Start/Stop Times:     patient identified, IV checked, risks and benefits discussed, informed consent, monitors and equipment checked, pre-op evaluation and at physician/surgeon's request      Correct Patient: Yes      Correct Position: Yes      Correct Site: Yes      Correct Procedure: Yes      Correct Laterality:  N/A    Site Marked:  N/A  Line Placement:     Procedure:  Arterial Line    Insertion Site:  Radial    Insertion laterality:  Left    Skin Prep: Chloraprep      Patient Prep: patient draped, mask, sterile gloves, sterile gown, hat and hand hygiene      Local skin infiltration:  None    Ultrasound Guided?: No      Catheter size:  20 gauge, 12 cm    Dressing:  Tegaderm    Complications:  None obvious    Arterial waveform: Yes      IBP within 10% of NIBP: Yes

## 2018-05-24 NOTE — IP AVS SNAPSHOT
Maggie Case #[id number] (CSN: [id number])  (28 year old F)  (Adm: 18)     [id number]               [address] [facility]: [phone]            Patient Demographics     Patient Name Sex          Age SSN Address Phone    Maggie Case Female 1990 (28 year old) [id number]  [place] [address] [phone] (Home)  [phone] (Mobile) *Preferred*      Emergency Contact(s)     Name Relation Home Work Mobile    Dandre Case Father [phone]    Pawan Case Mother [phone]      Admission Information     Attending Provider Admitting Provider Admission Type Admission Date/Time    Emeterio Mesa MD Grande, Andrew Walker, MD Elective 18  0513    Discharge Date Hospital Service Auth/Cert Status Service Area     Neurosurgery [facility]    Unit Room/Bed Admission Status        U6 6213/6213-02 Admission (Confirmed)       Admission     Complaint    Cerebral Vascular Accident, History of cranioplasty      Hospital Account     Name Acct ID Class Status Primary Coverage    [facility]agg[facility] [id number] [facility]            Guarantor Account (for Hospital Account #[id number])     Name Relation to Pt Service Area Active? Acct Type    Maggie Case Self FCS Yes Personal/Family    Address Phone          [address] [place] [phone](H)              Coverage Information (for Hospital Account #[id number])     F/O Payor/Plan Precert #    KEDAR/KEDAR NAVA     Subscriber Subscriber #    Maggie Case [id number]    Address Phone    [address]  [place] [address] [phone]                                                INTERAGENCY TRANSFER FORM - PHYSICIAN ORDERS   2018                       [address] [facility]: [phone]            Attending Provider: Emeterio Mesa MD     Allergies:  Amoxicillin, Levaquin [Levofloxacin],  "Naproxen, Nickel, Tramadol, Sulindac    Infection:  None   Service:  NEUROSURGERY    Ht:  1.6 m (5' 3\")   Wt:  61.8 kg (136 lb 3.9 oz)   Admission Wt:  61.8 kg (136 lb 3.9 oz)    BMI:  24.13 kg/m 2   BSA:  1.66 m 2            ED Clinical Impression     Diagnosis Description Comment Added By Time Added    History of cranioplasty [Z98.890] History of cranioplasty [Z98.890]  Abilio Crawford MD 5/25/2018  1:12 PM    History of pulmonary embolism [Z86.711] History of pulmonary embolism [Z86.711]  Dexter Vergara MD 5/26/2018  6:01 AM    Cerebral infarction due to embolism of right middle cerebral artery (H) [I63.411] Cerebral infarction due to embolism of right middle cerebral artery (H) [I63.411]  Dexter Vergara MD 5/26/2018  6:01 AM    Acute ischemic stroke (H) [I63.9] Acute ischemic stroke (H) [I63.9]  Dexter Vergara MD 5/26/2018  6:02 AM      Hospital Problems as of 5/26/2018              Priority Class Noted POA    History of cranioplasty Medium  5/24/2018 Yes      Non-Hospital Problems as of 5/26/2018              Priority Class Noted    Diarrhea Medium  4/17/2008    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    Pain in joint, lower leg Medium  5/1/2009    Cervicalgia Medium  6/16/2009    Low back pain Medium  6/16/2009    Muscle pain Medium  7/30/2009    Anxiety state Medium  3/25/2013    Acute upper GI bleed Medium  6/18/2013    Hypokalemia Medium  6/18/2013    Acute cystitis Medium  6/18/2013    Acute chest pain Medium  5/6/2016    Leukocytosis Medium  5/6/2016    Lung mass Medium  5/6/2016    SOB (shortness of breath) Medium  5/6/2016    Pyelonephritis Medium  1/5/2017    Sepsis (H) Medium  4/28/2017    Influenza B Medium  5/1/2017    Opacity of lung on imaging study Medium  5/1/2017    Community acquired pneumonia Medium  5/1/2017    C. difficile colitis Medium  5/1/2017    Acute ischemic stroke (H) Medium  2/17/2018      Code Status History "     Date Active Date Inactive Code Status Order ID Comments User Context    5/26/2018  6:08 AM  Full Code [id number]  Dexter Vergara MD Outpatient    3/1/2018 12:04 PM 5/26/2018  6:08 AM Full Code [id number]  Yeison Crawley MD Outpatient    2/17/2018  3:20 PM 3/1/2018 12:04 PM Full Code [id number]  Reinier Shepherd MD Inpatient    5/1/2017  9:02 AM 2/17/2018  3:20 PM Full Code [id number]  Delmi Nicole NP Outpatient    4/28/2017 10:21 PM 5/1/2017  9:02 AM Full Code [id number]  Chester Cool MD Inpatient    1/8/2017  2:15 PM 4/28/2017 10:21 PM Full Code [id number]  Joey Quan DO Outpatient    1/5/2017  5:08 PM 1/8/2017  2:15 PM Full Code [id number]  Chester Cool MD Inpatient    5/8/2016  8:22 AM 1/5/2017  5:08 PM Full Code [id number]  Eleuterio Anderson MD Outpatient    5/6/2016 11:58 PM 5/8/2016  8:22 AM Full Code [id number]  Boo Méndez MD Inpatient    6/17/2013  8:27 PM 6/19/2013 12:05 AM Full Code [id number]  Brandy Livingston RN Inpatient      Current Code Status     Date Active Code Status Order ID Comments User Context       Prior      Summary of Visit     Reason for your hospital stay       Replacement of skull flap.                Medication Review      START taking        Dose / Directions Comments    oxyCODONE IR 10 MG tablet   Commonly known as:  ROXICODONE        Dose:  10 mg   Take 1 tablet (10 mg) by mouth every 3 hours as needed for moderate to severe pain   Quantity:  40 tablet   Refills:  0        senna-docusate 8.6-50 MG per tablet   Commonly known as:  SENOKOT-S;PERICOLACE        Dose:  1 tablet   Take 1 tablet by mouth 2 times daily   Quantity:  100 tablet   Refills:  0          CONTINUE these medications which may have CHANGED, or have new prescriptions. If we are uncertain of the size of tablets/capsules you have at home, strength may be listed as something that might have changed.        Dose / Directions Comments    acetaminophen 32 mg/mL solution    Commonly known as:  TYLENOL   This may have changed:  how to take this   Used for:  Muscle pain        Dose:  650 mg   20.3 mLs (650 mg) by Per G Tube route every 4 hours as needed for mild pain or fever   Quantity:  400 mL   Refills:  0        FLUoxetine 20 MG/5ML solution   Commonly known as:  PROzac   This may have changed:    - how much to take  - how to take this   Used for:  Acute ischemic stroke (H)        Dose:  20 mg   5 mLs (20 mg) by Per G Tube route daily   Quantity:  150 mL   Refills:  0        * warfarin 5 MG tablet   Commonly known as:  COUMADIN   This may have changed:  These instructions start on 6/7/2018. If you are unsure what to do until then, ask your doctor or other care provider.   Used for:  History of pulmonary embolism, Cerebral infarction due to embolism of right middle cerebral artery (H)        Dose:  5 mg   Start taking on:  6/7/2018   Take 1 tablet (5 mg) by mouth daily   Quantity:  30 tablet   Refills:  0        * warfarin 5 MG tablet   Commonly known as:  COUMADIN   This may have changed:    - medication strength  - how much to take  - These instructions start on 6/7/2018. If you are unsure what to do until then, ask your doctor or other care provider.        Dose:  5 mg   Start taking on:  6/7/2018   Take 1 tablet (5 mg) by mouth At Bedtime Tufes, Wed, Thur,Fri, Sat, Sun   Quantity:  30 tablet   Refills:  0        * Warfarin Therapy Reminder   This may have changed:  These instructions start on 6/7/2018. If you are unsure what to do until then, ask your doctor or other care provider.   Used for:  Acute ischemic stroke (H)        Dose:  1 each   Start taking on:  6/7/2018   1 each continuous prn   Quantity:  30 each   Refills:  0        * Notice:  This list has 3 medication(s) that are the same as other medications prescribed for you. Read the directions carefully, and ask your doctor or other care provider to review them with you.      CONTINUE these medications which have NOT  CHANGED        Dose / Directions Comments    cyanocobalamin 1000 MCG tablet   Commonly known as:  vitamin  B-12        Dose:  1000 mcg   Take 1,000 mcg by mouth daily   Refills:  0        Dextromethorphan-guaiFENesin  MG/5ML syrup        Dose:  5 mL   Take 5 mLs by mouth every 4 hours as needed for cough   Refills:  0        ferrous sulfate 325 (65 Fe) MG tablet   Commonly known as:  IRON        Take by mouth daily (with breakfast)   Refills:  0        * GABAPENTIN PO        Dose:  200 mg   Take 200 mg by mouth 2 times daily   Refills:  0        * GABAPENTIN PO        Dose:  300 mg   Take 300 mg by mouth At Bedtime   Refills:  0        HYDROXYZINE HCL PO        Dose:  25 mg   Take 25 mg by mouth every 6 hours as needed for itching   Refills:  0        lidocaine 4 % kit   Commonly known as:  LMX4   Used for:  Muscle pain        Apply topically every hour as needed for pain (with VAD insertion or accessing implanted port.)   Refills:  0        MELATONIN PO        Dose:  5 mg   Take 5 mg by mouth   Refills:  0        ondansetron 4 MG tablet   Commonly known as:  ZOFRAN   Used for:  Nausea        Dose:  4-8 mg   1-2 tablets (4-8 mg) by Per Feeding Tube route every 8 hours as needed for nausea   Quantity:  18 tablet   Refills:  0        TRAZODONE HCL PO        Dose:  100 mg   Take 100 mg by mouth At Bedtime   Refills:  0        * Notice:  This list has 2 medication(s) that are the same as other medications prescribed for you. Read the directions carefully, and ask your doctor or other care provider to review them with you.      STOP taking     nicotine 14 MG/24HR 24 hr patch   Commonly known as:  NICODERM CQ                   After Care     Activity - Up with nursing assistance           Advance Diet as Tolerated       Follow this diet upon discharge: Orders Placed This Encounter      Advance Diet as Tolerated: Regular Diet Adult       General info for SNF       Length of Stay Estimate: Long Term Care  Condition at  "Discharge: Improving  Level of care:board and care  Rehabilitation Potential: Good  Admission H&P remains valid and up-to-date: Yes  Recent Chemotherapy: N/A  Use Nursing Home Standing Orders: Yes       Mantoux instructions       Give two-step Mantoux (PPD) Per Facility Policy Yes       Wound care (specify)       Site:   Right scalp  Instructions:  Leave open to air, follow up as indicated for suture removal             Follow-Up Appointment Instructions     Follow Up ([facility])       Follow up with Danielle Strange at (location with clinic name or city) [facility] clinic, within 2 weeks for suture removal and wound check.    Follow up in cardiology clinic as scheduled.        Appointments on [place] and/or [facility] (with [facility] or [facility] provider or service). Call [phone] if you haven't heard regarding these appointments within 7 days of discharge.             Statement of Approval     Ordered          05/26/18 0611  I have reviewed and agree with all the recommendations and orders detailed in this document.  EFFECTIVE NOW     Approved and electronically signed by:  Dexter Vergara MD                                                 INTERAGENCY TRANSFER FORM - NURSING   5/24/2018                       [address] [facility] BANK: [phone]            Attending Provider: Emeterio Mesa MD     Allergies:  Amoxicillin, Levaquin [Levofloxacin], Naproxen, Nickel, Tramadol, Sulindac    Infection:  None   Service:  NEUROSURGERY    Ht:  1.6 m (5' 3\")   Wt:  61.8 kg (136 lb 3.9 oz)   Admission Wt:  61.8 kg (136 lb 3.9 oz)    BMI:  24.13 kg/m 2   BSA:  1.66 m 2            Advance Directives        Scanned docmt in ACP Activity?           No scanned doc        Immunizations     Name Date      HepB 09/22/03     Influenza (IIV3) PF 10/13/08     Pneumococcal 23 valent 05/01/17     TD (ADULT, 7+) 09/22/03       ASSESSMENT     Discharge Profile Flowsheet     EXPECTED DISCHARGE     Patient's " "communication style  spoken language (English or Bilingual) 05/23/18 1102    Expected Discharge Date  05/28/18 05/25/18 0832   FINAL RESOURCES      DISCHARGE NEEDS ASSESSMENT     PAS Number  -- (n/a) 04/29/17 1821    Concerns To Be Addressed  financial/insurance concerns 01/06/17 1558   SKIN      Concerns Comments  Currently working with Sandra Robertsonb for additional supports 05/09/16 1158   Inspection of bony prominences  Full 05/26/18 0300    Equipment Currently Used at Home  cane, quad;walker, javi 05/25/18 1614   Full except areas not inspected   Scapula, left;Scapula, right;Spine;Hip, left;Hip, right;Sacrum;Coccyx;Buttock, right;Buttock, left 05/25/18 1017    Transportation Available  family or friend will provide 05/25/18 1532   Inspection under devices  Full 05/26/18 0300    Key Recommendations  F/U with PCP 04/29/17 1821   Not Inspected under devices  IV/art line hub 05/24/18 2129    [facility] 04/29[facility]17 1821   Skin WDL  ex 05/26/18 0300    Primary Care MD Name  Dr Flores 04/29/17 1821   Skin Integrity  abrasion(s);bruise(s);drain/device(s);incision(s);scar(s) 05/26/18 0300    GASTROINTESTINAL (ADULT,PEDIATRIC,OB)     SAFETY      GI WDL  WDL 05/26/18 0300   Safety WDL  WDL 05/26/18 0300    Last Bowel Movement  05/23/18 05/25/18 1017   Safety Equipment  oxygen flowmeter;manual resusciator with appropriate mask;suction regulator;suction equipment 05/25/18 1017    Passing flatus  yes 05/25/18 1934   All Alarms  alarm(s) activated and audible 05/26/18 0338    COMMUNICATION ASSESSMENT                        Assessment WDL (Within Defined Limits) Definitions           Safety WDL     Effective: 09/28/15    Row Information: <b>WDL Definition:</b> Bed in low position, wheels locked; call light in reach; upper side rails up x 2; ID band on<br> <font color=\"gray\"><i>Item=AS safety wdl>>List=AS safety wdl>>Version=F14</i></font>      Skin WDL     Effective: 09/28/15    Row Information: " "<b>WDL Definition:</b> Warm; dry; intact; elastic; without discoloration; pressure points without redness<br> <font color=\"gray\"><i>Item=AS skin wdl>>List=AS skin wdl>>Version=F14</i></font>      Vitals     Vital Signs Flowsheet     VITAL SIGNS     Functioning  can do most things, but pain gets in the way of some 05/26/18 0255    Temp  99.3  F (37.4  C) 05/26/18 0748   Sleep  normal sleep 05/26/18 0255    Temp src  Axillary 05/26/18 0748   ANALGESIA SIDE EFFECTS MONITORING      Resp  16 05/26/18 0748   Side Effects Monitoring: Respiratory Quality  R 05/26/18 0632    Pulse  97 05/26/18 0457   Side Effects Monitoring: Respiratory Depth  N 05/26/18 0632    Heart Rate  94 05/26/18 0748   Side Effects Monitoring: Sedation Level  1 05/26/18 0632    Pulse/Heart Rate Source  Monitor 05/26/18 0748   HEIGHT AND WEIGHT      BP  100/64 05/26/18 0748   Height  1.6 m (5' 3\") 05/24/18 0618    BP Location  Left arm 05/26/18 0748   Weight  61.8 kg (136 lb 3.9 oz) 05/24/18 0558    OXYGEN THERAPY     BSA (Calculated - sq m)  1.66 05/24/18 0618    SpO2  95 % 05/26/18 0748   BMI (Calculated)  24.19 05/24/18 0618    O2 Device  None (Room air) 05/26/18 0748   POSITIONING      RESPIRATORY MONITORING     Body Position  independently positioning 05/25/18 1934    Respiratory Monitoring (EtCO2)  44 mmHg 05/25/18 0743   Head of Bed (HOB)  HOB at 30 degrees 05/25/18 1934    Integrated Pulmonary Index (IPI)  7 05/25/18 0743   Positioning/Transfer Devices  pillows;in use 05/25/18 1512    PAIN/COMFORT     DAILY CARE      Patient Currently in Pain  yes 05/26/18 0632   Activity Management  ambulated to bathroom 05/25/18 1512    Preferred Pain Scale  CAPA (Clinically Aligned Pain Assessment) ([facility] and [facility] Adults Only) 05/26/18 0632   Activity Assistance Provided  assistance, 2 people 05/25/18 1512    0-10 Pain Scale  7 05/24/18 2122   ECG      Pain Location  Head 05/26/18 0632   ECG Rhythm  Normal sinus rhythm 05/24/18 2020    Pain " Orientation  Anterior 05/26/18 0632   Ectopy  None 05/24/18 2020    Pain Descriptors  Aching 05/26/18 0632   Lead Monitored  Lead II;V 1 05/24/18 2020    Pain Management Interventions  analgesia administered 05/26/18 0632   Equipment  electrodes changed 05/24/18 1440    Pain Intervention(s)  Medication (See eMAR) 05/26/18 0632   Rhythm Comment  T Wave Inversion 05/24/18 2020    Response to Interventions  Decrease in pain 05/26/18 0632   GIL COMA SCALE      CLINICALLY ALIGNED PAIN ASSESSMENT (CAPA) ([facility], [facility] AND [facility] ADULTS ONLY)     Best Eye Response  4-->(E4) spontaneous 05/26/18 0255    Comfort  tolerable with discomfort 05/26/18 0255   Best Motor Response  6-->(M6) obeys commands 05/26/18 0255    Change in Pain  about the same 05/26/18 0255   Best Verbal Response  5-->(V5) oriented 05/26/18 0255    Pain Control  partially effective 05/26/18 0255   Gil Coma Scale Score  15 05/26/18 0255            Patient Lines/Drains/Airways Status    Active LINES/DRAINS/AIRWAYS     Name: Placement date: Placement time: Site: Days: Last dressing change:    Closed/Suction Drain 1 Right Scalp Bulb 10 Polish 05/24/18   1024   Scalp   1     Peripheral IV 05/24/18 Right Hand 05/24/18   0730   Hand   2     Incision/Surgical Site 02/18/18 Right Head 02/18/18   2122    96     Incision/Surgical Site 05/24/18 Right Head 05/24/18   1021    1             Patient Lines/Drains/Airways Status    Active PICC/CVC     None            Intake/Output Detail Report     Date Intake     Output     Net    Shift P.O. I.V. IV Piggyback Total Urine Drains Blood Total       Day 05/25/18 0000 - 05/25/18 0659 -- -- -- -- 800 75 -- 875 -875    Nguyen 05/25/18 0700 - 05/25/18 1459 120 -- -- 120 0 10 -- 10 110    Noc 05/25/18 1500 - 05/25/18 2359 240 -- -- 240 -- 30 -- 30 210    Day 05/26/18 0000 - 05/26/18 0659 -- -- -- -- 300 17.5 -- 317.5 -317.5    Nguyen 05/26/18 0700 - 05/26/18 1459 -- -- -- -- -- -- -- -- 0      Last Void/BM       Most Recent  Value    Urine Occurrence 2 at 05/25/2018 1907    Stool Occurrence 0 at 05/25/2018 1511      Case Management/Discharge Planning     Case Management/Discharge Planning Flowsheet     REFERRAL INFORMATION     Concerns Comments  Currently working with Sanrda Alatorre for additional supports 05/09/16 1158    Arrived From  home or self-care 04/29/17 1821   DISCHARGE PLANNING      [facility]  [facility] 04/29/17 1821   Transportation Available  family or friend will provide 05/25/18 1532    Primary Care MD Name  Dr Flores 04/29/17 1821   Key Recommendations  F/U with PCP 04/29/17 1821    LIVING ENVIRONMENT     FINAL RESOURCES      Lives With  facility resident 05/25/18 1532   Equipment Currently Used at Home  cane, quad;walker, javi 05/25/18 1614    Living Arrangements  extended care facility 05/25/18 1532   PAS Number  -- (n/a) 04/29/17 1821    COPING/STRESS     ABUSE RISK SCREEN      Major Change/Loss/Stressor  surgery/procedure 05/23/18 1102   QUESTION TO PATIENT:  Has a member of your family or a partner(now or in the past) intimidated, hurt, manipulated, or controlled you in any way?  no 05/24/18 0622    EXPECTED DISCHARGE     QUESTION TO PATIENT: Do you feel safe going back to the place where you are living?  yes 05/24/18 0622    Expected Discharge Date  05/28/18 05/25/18 0832   OBSERVATION: Is there reason to believe there has been maltreatment of a vulnerable adult (ie. Physical/Sexual/Emotional abuse, self neglect, lack of adequate food, shelter, medical care, or financial exploitation)?  no 05/24/18 0622    ASSESSMENT/CONCERNS TO BE ADDRESSED     OTHER      Concerns To Be Addressed  financial/insurance concerns 01/06/17 1558   Are you depressed or being treated for depression?  Yes 05/25/18 1031                  [address] [facility] BANK: [phone]            Medication Administration Report for Maggie Case as of 05/26/18 0842   Legend:    Given Hold Not Given Due Canceled Entry Other  Actions    Time Time (Time) Time  Time-Action       Inactive    Active    Linked        Medications 05/20/18 05/21/18 05/22/18 05/23/18 05/24/18 05/25/18 05/26/18    acetaminophen (TYLENOL) tablet 975 mg  Dose: 975 mg  Freq: EVERY 8 HOURS Route: PO  Start: 05/24/18 1315   End: 05/27/18 1559   Admin Instructions: Do not use if patient has an active opioid/acetaminophen combined analgesic product ordered for pain.  Maximum acetaminophen dose from all sources = 75 mg/kg/day not to exceed 4 grams/day.    Admin. Amount: 3 tablet (3 × 325 mg tablet)  Last Admin: 05/26/18 0829  Dispense Loc: [facility] [address]S [address]  Administrations Remaining: 3         1607 (975 mg)-Given       2022 (975 mg)-Given        0422 (975 mg)-Given       1437 (975 mg)-Given        0013 (975 mg)-Given       0829 (975 mg)-Given       [ ] 1600           calcium carbonate (TUMS) chewable tablet 1,000 mg  Dose: 1,000 mg  Freq: 4 TIMES DAILY PRN Route: PO  PRN Reason: heartburn  Start: 05/24/18 1312   Admin. Amount: 2 tablet (2 × 500 mg tablet)  Dispense Loc: [facility] ADS [address]A               ferrous sulfate (IRON) tablet 325 mg  Dose: 325 mg  Freq: DAILY Route: PO  Start: 05/24/18 1315   Admin Instructions: Absorbed best on an empty stomach. If stomach upset occurs, can take with meals.    Admin. Amount: 1 tablet (1 × 325 mg tablet)  Last Admin: 05/26/18 0829  Dispense Loc: [facility] ADS [facility]A         1607 (325 mg)-Given        0800 (325 mg)-Given        0829 (325 mg)-Given           FLUoxetine (PROzac) solution 30 mg  Dose: 30 mg  Freq: DAILY Route: PO  Start: 05/24/18 1315   Admin. Amount: 30 mg = 7.5 mL Conc: 4 mg/mL  Last Admin: 05/26/18 0832  Dispense Loc: [facility]  Volume: 7.5 mL         1607 (30 mg)-Given        0800 (30 mg)-Given        0832 (30 mg)-Given           gabapentin (NEURONTIN) capsule 200 mg  Dose: 200 mg  Freq: 2 TIMES DAILY Route: PO  Start: 05/24/18 2000   Admin. Amount: 2 capsule (2 × 100 mg capsule)  Last Admin: 05/26/18  0831  Dispense Loc: [facility] ADS [facility]A         2003 (200 mg)-Given        0759 (200 mg)-Given       1941 (200 mg)-Given        0831 (200 mg)-Given       [ ] 2000           gabapentin (NEURONTIN) capsule 300 mg  Dose: 300 mg  Freq: AT BEDTIME Route: PO  Start: 05/24/18 2200   Admin. Amount: 1 capsule (1 × 300 mg capsule)  Last Admin: 05/26/18 0014  Dispense Loc: [facility] ADS [facility]A         2206 (300 mg)-Given         0014 (300 mg)-Given       [ ] 2200           hydrALAZINE (APRESOLINE) injection 10-20 mg  Dose: 10-20 mg  Freq: EVERY 30 MIN PRN Route: IV  PRN Reason: high blood pressure  Start: 05/24/18 1312   Admin Instructions: IF Heart Rate less than 60 initiate hydrALAZINE (APRESOLINE) for hypertension. For Systolic Blood Pressure greater than 140 mmHg. Give 10 mg, wait 30 minutes. If not effective then repeat 10 mg. Wait 30 minutes. If not effective then give 20 mg. If still not effective then start niCARdipine (CARDENE) IV infusion IF ORDERED. Notify provider within 1 hour if Blood Pressure parameters are not met.  For ordered doses up to 40 mg, give IV Push undiluted over 1 minute.    Admin. Amount: 10-20 mg = 0.5-1 mL Conc: 20 mg/mL  Dispense Loc: [facility] ADS 6A  Infused Over: 1 Minutes  Volume: 1 mL                 Dose: 0.5-1 mg  Freq: EVERY 2 HOURS PRN Route: IV  PRN Reasons: severe pain,other  PRN Comment: pain control or improvement in physical function. Hold dose for analgesic side effects.  Start: 05/24/18 2327   End: 05/25/18 0926   Admin Instructions: Start at the lowest dose.  May adjust dose by 0.1 mg every 2 hours as needed.  Hold dose for analgesic side effects.  Notify provider to assess for uncontrolled pain or analgesic side effects.   Hold while on IV PCA or with regular IV opioid dosing.  For ordered doses up to 4 mg give IV Push undiluted. Administer each 2mg over 2-5 minutes.    Admin. Amount: 0.5-1 mg  Last Admin: 05/25/18 0916  Dispense Loc: [facility] ADS 6A   Current Line: Peripheral IV 05/24/18  "Right Hand         0430 (0.5 mg)-Given       0700 (0.5 mg)-Given       0916 (0.5 mg)-Given       0926-Med Discontinued        hydrOXYzine (ATARAX) tablet 25 mg  Dose: 25 mg  Freq: EVERY 6 HOURS PRN Route: PO  PRN Reason: itching  Start: 05/24/18 1313   Admin. Amount: 1 tablet (1 × 25 mg tablet)  Last Admin: 05/25/18 1941  Dispense Loc: [facility] ADS 6A         2003 (25 mg)-Given        0807 (25 mg)-Given       1941 (25 mg)-Given            labetalol (NORMODYNE/TRANDATE) injection 10-40 mg  Dose: 10-40 mg  Freq: EVERY 10 MIN PRN Route: IV  PRN Reason: high blood pressure  Start: 05/24/18 1312   Admin Instructions: IF Heart Rate 60 beats per minute or greater initiate labetalol (NORMODYNE,TRANDATE) for hypertension. For Systolic Blood Pressure greater than 140 mmHg. Hold if Heart Rate less than 60 beats per minute. Give dose over 1-2 minutes. Increase or repeat the dose if Blood Pressure goal not met. Give 10 mg, wait 10 minutes.  If not effective then give 20 mg. Wait 10 minutes.  If not effective then give 40 mg. If still not effective then start niCARdipine (CARDENE) IV infusion IF ORDERED. Notify provider within 1 hour if Blood Pressure parameters are not met.  For ordered doses up to 80 mg, give IV Push undiluted. Give each 20 mg over 2 minutes.    Admin. Amount: 10-40 mg = 2-8 mL Conc: 5 mg/mL  Dispense Loc: [facility]  Infused Over: 2-8 Minutes  Volume: 8 mL               lidocaine (LMX4) kit  Freq: EVERY 1 HOUR PRN Route: Top  PRN Reason: pain  PRN Comment: with VAD insertion or accessing implanted port.  Start: 05/24/18 1312   Admin Instructions: Do NOT give if patient has a history of allergy to any local anesthetic or any \"korey\" product.   Apply 30 minutes prior to VAD insertion or port access.  MAX Dose:  2.5 g (  of 5 g tube)    Dispense Loc: [facility] ADS 6A               lidocaine 1 % 1 mL  Dose: 1 mL  Freq: EVERY 1 HOUR PRN Route: OTHER  PRN Comment: mild pain with VAD insertion or accessing " "implanted port  Start: 05/24/18 1312   Admin Instructions: Do NOT give if patient has a history of allergy to any local anesthetic or any \"korey\" product. MAX dose 1 mL subcutaneous OR intradermal in divided doses.    Admin. Amount: 1 mL  Dispense Loc: [facility] ADS 6A  Volume: 2 mL               magnesium sulfate 4 g in 100 mL sterile water (premade)  Dose: 4 g  Freq: EVERY 4 HOURS PRN Route: IV  PRN Reason: magnesium supplementation  Start: 05/24/18 1313   Admin Instructions: For serum Mg++ less than 1.6 mg/dL  Give 4 g and recheck magnesium level 2 hours after dose, and next AM.    Admin. Amount: 4 g = 100 mL Conc: 4 g/100 mL  Dispense Loc: [facility]  Infused Over: 120 Minutes  Volume: 100 mL               melatonin tablet 5 mg  Dose: 5 mg  Freq: AT BEDTIME Route: PO  Start: 05/24/18 2200   Admin. Amount: 1 tablet (1 × 5 mg tablet)  Last Admin: 05/24/18 2205  Dispense Loc: [facility] ADS 6A         2205 (5 mg)-Given         (0253)-Not Given [C]       [ ] 2200           naloxone (NARCAN) injection 0.1-0.4 mg  Dose: 0.1-0.4 mg  Freq: EVERY 2 MIN PRN Route: IV  PRN Reason: opioid reversal  Start: 05/24/18 1312   Admin Instructions: For respiratory rate LESS than or EQUAL to 8.  Partial reversal dose:  0.1 mg titrated q 2 minutes for Analgesia Side Effects Monitoring Sedation Level of 3 (frequently drowsy, arousable, drifts to sleep during conversation).Full reversal dose:  0.4 mg bolus for Analgesia Side Effects Monitoring Sedation Level of 4 (somnolent, minimal or no response to stimulation).  For ordered doses up to 2mg give IVP. Give each 0.4mg over 15 seconds in emergency situations. For non-emergent situations further dilute in 9mL of NS to facilitate titration of response.    Admin. Amount: 0.1-0.4 mg = 0.25-1 mL Conc: 0.4 mg/mL  Dispense Loc: [facility] ADS 6A  Volume: 1 mL               ondansetron (ZOFRAN-ODT) ODT tab 4-8 mg  Dose: 4-8 mg  Freq: EVERY 6 HOURS PRN Route: PO  PRN Reasons: " nausea,vomiting  Start: 05/24/18 1312   Admin Instructions: This is Step 1 of nausea and vomiting management.  If nausea not resolved in 15 minutes, go to Step 2 prochlorperazine (COMPAZINE). Do not push through foil backing. Peel back foil and gently remove. Place on tongue immediately. Administration with liquid unnecessary  With dry hands, peel back foil backing and gently remove tablet; do not push oral disintegrating tablet through foil backing; administer immediately on tongue and oral disintegrating tablet dissolves in seconds; then swallow with saliva; liquid not required.    Admin. Amount: 1-2 tablet (1-2 × 4 mg tablet)  Dispense Loc: [facility] ADS 6A                     Or  ondansetron (ZOFRAN) injection 4-8 mg  Dose: 4-8 mg  Freq: EVERY 6 HOURS PRN Route: IV  PRN Reasons: nausea,vomiting  Start: 05/24/18 1312   Admin Instructions: This is Step 1 of nausea and vomiting management.  If nausea not resolved in 15 minutes, go to Step 2 prochlorperazine (COMPAZINE).  Irritant. For ordered doses up to 4 mg, give IV Push undiluted over 2-5 minutes.    Admin. Amount: 4-8 mg = 2-4 mL Conc: 4 mg/2 mL  Last Admin: 05/24/18 1427  Dispense Loc: [facility] ADS 6A  Infused Over: 2-5 Minutes  Volume: 4 mL   Current Line: Peripheral IV 05/24/18 Right Hand        1427 (4 mg)-Given             oxyCODONE IR (ROXICODONE) tablet 10 mg  Dose: 10 mg  Freq: EVERY 3 HOURS PRN Route: PO  PRN Reason: moderate to severe pain  Start: 05/24/18 2327   Admin. Amount: 1 tablet (1 × 10 mg tablet)  Last Admin: 05/26/18 0829  Dispense Loc: [facility] ADS 6A                                             1841 (10 mg)-Given               0031 (10 mg)-Given       0515 (10 mg)-Given       0829 (10 mg)-Given          Or  oxyCODONE IR (ROXICODONE) tablet 15 mg  Dose: 15 mg  Freq: EVERY 3 HOURS PRN Route: PO  PRN Reason: moderate to severe pain  Start: 05/24/18 2327   Admin. Amount: 1 tablet (1 × 5 mg tablet) + 1 tablet (1 × 10 mg tablet)  Last Admin: 05/25/18  1438  Dispense Loc: [facility] ADS 6A   Mixture Administration Information:   Medication Type Amount   oxyCODONE IR 5 MG TABS Medications 5 mg   oxyCODONE IR 10 MG TABS Medications 10 mg                  (0036)-Not Given       0348 (15 mg)-Given       0759 (15 mg)-Given       1110 (15 mg)-Given       1438 (15 mg)-Given              (2218)-Not Given [C]                                 potassium chloride (KLOR-CON) Packet 20-40 mEq  Dose: 20-40 mEq  Freq: EVERY 2 HOURS PRN Route: ORAL OR FEED  PRN Reason: potassium supplementation  Start: 05/24/18 1313   Admin Instructions: Use if unable to tolerate tablets.  If Serum K+ 3.0-3.3, dose = 60 mEq po total dose (40 mEq x1 followed in 2 hours by 20 mEq x1). Recheck K+ level 4 hours after dose and the next AM.  If Serum K+ 2.5-2.9, dose = 80 mEq po total dose (40 mEq Q2H x2). Recheck K+ level 4 hours after dose and the next AM.  If Serum K+ less than 2.5, See IV order.  Dissolve packet contents in 4-8 ounces of cold water or juice.    Admin. Amount: 20-40 mEq  Dispense Loc: [facility] ADS 6A         (2058)-Not Given             potassium chloride 10 mEq in 100 mL intermittent infusion with 10 mg lidocaine  Dose: 10 mEq  Freq: EVERY 1 HOUR PRN Route: IV  PRN Reason: potassium supplementation  Start: 05/24/18 1313   Admin Instructions: Infuse via PERIPHERAL LINE. Use potassium with lidocaine for pain with peripheral administration.  If Serum K+ 3.0-3.3, dose = 10 mEq/hr x4 doses (40 mEq IV total dose). Recheck K+ level 2 hours after dose and the next AM.  If Serum K+ less than 3.0, dose = 10 mEq/hr x6 doses (60 mEq IV total dose). Recheck K+ level 2 hours after dose and the next AM.    Admin. Amount: 10 mEq = 100 mL Conc: 10 mEq/100 mL  Dispense Loc: [facility]  Infused Over: 1 Hours  Volume: 100 mL               potassium chloride 10 mEq in 100 mL sterile water intermittent infusion (premix)  Dose: 10 mEq  Freq: EVERY 1 HOUR PRN Route: IV  PRN Reason: potassium  supplementation  Start: 05/24/18 1313   Admin Instructions: Infuse via PERIPHERAL LINE or CENTRAL LINE. Use for central line replacement if patient weight less than 65 kg, if patient is on TPN with high potassium content or if unit does not stock 20 mEq bags.   If Serum K+ 3.0-3.3, dose = 10 mEq/hr x4 doses (40 mEq IV total dose). Recheck K+ level 2 hours after dose and the next AM.   If Serum K+ less than 3.0, dose = 10 mEq/hr x6 doses (60 mEq IV total dose). Recheck K+ level 2 hours after dose and the next AM.    Admin. Amount: 10 mEq = 100 mL Conc: 10 mEq/100 mL  Dispense Loc: [facility] ADS 6A  Infused Over: 60 Minutes  Volume: 100 mL               potassium chloride 20 mEq in 50 mL intermittent infusion  Dose: 20 mEq  Freq: EVERY 1 HOUR PRN Route: IV  PRN Reason: potassium supplementation  Start: 05/24/18 1313   Admin Instructions: Infuse via CENTRAL LINE Only. May need EKG if less than 65 kg or on TPN - Max rate is 0.3 mEq/kg/hr for patients not on EKG monitoring.   If Serum K+ 3.0-3.3, dose = 20 mEq/hr x2 doses (40 mEq IV total dose). Recheck K+ level 2 hours after dose and the next AM.  If Serum K+ less than 3.0, dose = 20 mEq/hr x3 doses (60 mEq IV total dose). Recheck K+ level 2 hours after dose and the next AM.    Admin. Amount: 20 mEq = 50 mL Conc: 20 mEq/50 mL  Dispense Loc: [facility]  Volume: 50 mL               potassium chloride SA (K-DUR/KLOR-CON M) CR tablet 20-40 mEq  Dose: 20-40 mEq  Freq: EVERY 2 HOURS PRN Route: PO  PRN Reason: potassium supplementation  Start: 05/24/18 1313   Admin Instructions: Use if able to take PO.   If Serum K+ 3.0-3.3, dose = 60 mEq po total dose (40 mEq x1 followed in 2 hours by 20 mEq x1). Recheck K+ level 4 hours after dose and the next AM.  If Serum K+ 2.5-2.9, dose = 80 mEq po total dose (40 mEq Q2H x2). Recheck K+ level 4 hours after dose and the next AM.  If Serum K+ less than 2.5, See IV order.  DO NOT CRUSH.    Admin. Amount: 2-4 tablet (2-4 × 10 mEq  tablet)  Last Admin: 05/26/18 0831  Dispense Loc: [facility] ADS 6A         2120 (40 mEq)-Given       2337 (20 mEq)-Given         0831 (20 mEq)-Given           potassium phosphate 15 mmol in D5W 250 mL intermittent infusion  Dose: 15 mmol  Freq: DAILY PRN Route: IV  PRN Reason: phosphorous supplementation  Start: 05/24/18 1313   Admin Instructions: For serum phosphorus level 2-2.4  Do not infuse Phosphorus in the same line as TPN.   Give 15 mmol and recheck phosphorus level next AM.  Each mmol of phosphate provides 1.47 mEq of Potassium. Multiply the patient's phosphate dose by 1.47 to determine the amount of potassium in this dose.    Admin. Amount: 15 mmol  Dispense Loc: [facility]  Infused Over: 4 Hours  Volume: 250 mL   Mixture Administration Information:   Medication Type Amount   potassium phosphate 3 MMOLE/ML SOLN Medications 15 mmol   D5W 5 % SOLN Base 250 mL                       potassium phosphate 20 mmol in D5W 250 mL intermittent infusion  Dose: 20 mmol  Freq: EVERY 6 HOURS PRN Route: IV  PRN Reason: phosphorous supplementation  Start: 05/24/18 1313   Admin Instructions: For serum phosphorus level 1.1-1.9  For CENTRAL Line ONLY  Do not infuse Phosphorus in the same line as TPN.   Give 20 mmol and recheck phosphorus level 2 hours after last dose and next AM.  Each mmol of phosphate provides 1.47 mEq of Potassium. Multiply the patient's phosphate dose by 1.47 to determine the amount of potassium in this dose.    Admin. Amount: 20 mmol  Dispense Loc: [facility]  Infused Over: 4 Hours  Volume: 250 mL   Mixture Administration Information:   Medication Type Amount   potassium phosphate 3 MMOLE/ML SOLN Medications 20 mmol   D5W 5 % SOLN Base 250 mL                       potassium phosphate 20 mmol in D5W 500 mL intermittent infusion  Dose: 20 mmol  Freq: EVERY 6 HOURS PRN Route: IV  PRN Reason: phosphorous supplementation  Start: 05/24/18 1313   Admin Instructions: For serum phosphorus level  1.1-1.9  For Peripheral Line  Do not infuse Phosphorus in the same line as TPN.   Give 20 mmol and recheck phosphorus level 2 hours after last dose and next AM.  Each mmol of phosphate provides 1.47 mEq of Potassium. Multiply the patient's phosphate dose by 1.47 to determine the amount of potassium in this dose.    Admin. Amount: 20 mmol  Dispense Loc: [facility]  Infused Over: 4 Hours  Volume: 500 mL   Mixture Administration Information:   Medication Type Amount   potassium phosphate 3 MMOLE/ML SOLN Medications 20 mmol   D5W 5 % SOLN Base 500 mL                       potassium phosphate 25 mmol in D5W 500 mL intermittent infusion  Dose: 25 mmol  Freq: EVERY 8 HOURS PRN Route: IV  PRN Reason: phosphorous supplementation  Start: 05/24/18 1313   Admin Instructions: For serum phosphorus level less than 1.1  Do not infuse Phosphorus in the same line as TPN.   Give 25 mmol and recheck phosphorus level 2 hours after last dose and next AM.  Each mmol of phosphate provides 1.47 mEq of Potassium. Multiply the patient's phosphate dose by 1.47 to determine the amount of potassium in this dose.    Admin. Amount: 25 mmol  Dispense Loc: [facility]  Infused Over: 6 Hours  Volume: 500 mL   Mixture Administration Information:   Medication Type Amount   potassium phosphate 3 MMOLE/ML SOLN Medications 25 mmol   D5W 5 % SOLN Base 500 mL                       senna-docusate (SENOKOT-S;PERICOLACE) 8.6-50 MG per tablet 1 tablet  Dose: 1 tablet  Freq: 2 TIMES DAILY Route: PO  Start: 05/24/18 1312   Admin Instructions: If no bowel movement in 24 hours, increase to 2 tablets PO.  Hold for loose stools.    Admin. Amount: 1 tablet  Dispense Loc: [facility] ADS 6A                                       [ ] 2000          Or  senna-docusate (SENOKOT-S;PERICOLACE) 8.6-50 MG per tablet 2 tablet  Dose: 2 tablet  Freq: 2 TIMES DAILY Route: PO  Start: 05/24/18 1312   Admin Instructions: Hold for loose stools.    Admin. Amount: 2  tablet  Last Admin: 05/26/18 0831  Dispense Loc: [facility] ADS 6A         2003 (2 tablet)-Given        0800 (2 tablet)-Given       1941 (2 tablet)-Given        0831 (2 tablet)-Given       [ ] 2000           sodium chloride (PF) 0.9% PF flush 3 mL  Dose: 3 mL  Freq: EVERY 8 HOURS Route: IK  Start: 05/24/18 1315   Admin Instructions: And Q1H PRN, to lock peripheral IV dormant line.    Admin. Amount: 3 mL  Last Admin: 05/25/18 1943  Dispense Loc: [facility] Floor Stock  Volume: 3 mL   Current Line: Peripheral IV 05/24/18 Right Hand        (1719)-Not Given       (2051)-Not Given        0422 (3 mL)-Given       (1330)-Not Given       1943 (3 mL)-Given       (2235)-Not Given        (0516)-Not Given       [ ] 1315       [ ] 2115           sodium chloride (PF) 0.9% PF flush 3 mL  Dose: 3 mL  Freq: EVERY 1 HOUR PRN Route: IK  PRN Reason: line flush  PRN Comment: for peripheral IV flush post IV meds  Start: 05/24/18 1312   Admin. Amount: 3 mL  Dispense Loc: [facility] Floor Stock  Volume: 3 mL               traZODone (DESYREL) tablet 100 mg  Dose: 100 mg  Freq: AT BEDTIME Route: PO  Start: 05/24/18 2230   Admin. Amount: 1 tablet (1 × 100 mg tablet)  Last Admin: 05/26/18 0014  Dispense Loc: [facility] ADS 6A         2241 (100 mg)-Given         0014 (100 mg)-Given       [ ] 2200          Future Medications  Medications 05/20/18 05/21/18 05/22/18 05/23/18 05/24/18 05/25/18 05/26/18       acetaminophen (TYLENOL) tablet 650 mg  Dose: 650 mg  Freq: EVERY 4 HOURS PRN Route: PO  PRN Reason: other  PRN Comment: multimodal surgical pain management along with NSAIDS and opioid medication as indicated based on pain control and physical function.  Start: 05/27/18 0000   Admin Instructions: May give first dose 4 hours after last scheduled dose of acetaminophen  Maximum acetaminophen dose from all sources = 75 mg/kg/day not to exceed 4 grams/day.    Admin. Amount: 2 tablet (2 × 325 mg tablet)  Dispense Loc: [facility] ADS 6A              Discontinued  Medications  Medications 18         Dose: 975 mg  Freq: ONCE Route: PO  Start: 18   End: 18   Admin Instructions: Maximum acetaminophen dose from all sources = 75 mg/kg/day not to exceed 4 grams/day.    Admin. Amount: 3 tablet (3 × 325 mg tablet)  Dispense Loc: [facility] ADS 3C  Administrations Remainin  POC: Pre-procedure                1121-Med Discontinued           Freq: PRN  Start: 18   End: 18 130   Last Admin: 18  Volume: 1,000 mL  POC: Intra-procedure   Mixture Administration Information:   Medication Type Amount   bacitracin 40835 units SOLR Medications 50,000 Units   lactated ringers SOLN Base 1,000 mL                 0955 (200 mL)-Given       1305-Med Discontinued           Freq: PRN  Start: 18   End: 18   Last Admin: 18  POC: Intra-procedure         0912 (10 mL)-Given       1305-Med Discontinued           Dose: 900 mg  Freq: SEE ADMIN INSTRUCTIONS Route: IV  Indications of Use: PERIOPERATIVE PHARMACOPROPHYLAXIS  Start: 18   End: 18   Admin Instructions: Intra-Op Dose.  Give every 6 hours while patient in surgery, starting 6 hours after pre-op dose.  DO NOT GIVE intra-op dose if CrCl less than 10 mL/min (on dialysis).  If CrCL less than 50 mL/min, double the time interval between doses.    Admin. Amount: 900 mg = 50 mL Conc: 900 mg/50 mL  Last Admin: 18  Dispense Loc: [facility] ADS 3C  Infused Over: 60 Minutes  Volume: 50 mL  POC: Pre-procedure         0825 (900 mg)-Given       1121-Med Discontinued           Dose: 900 mg  Freq: PRE-OP/PRE-PROCEDURE Route: IV  Indications of Use: PERIOPERATIVE PHARMACOPROPHYLAXIS  Start: 18   End: 18   Admin Instructions: Give first dose within 1 hour PRIOR to incision.    Admin. Amount: 900 mg = 50 mL Conc: 900 mg/50 mL  Dispense Loc: [facility] ADS 3C  Infused Over: 60  Minutes  Administrations Remainin  Volume: 50 mL  POC: Pre-procedure         1121-Med Discontinued           Dose: 25-50 mcg  Freq: EVERY 2 MIN PRN Route: IV  PRN Reason: other  PRN Comment: acute pain  Start: 18 1009   End: 18 1305   Admin Instructions: MAX cumulative dose = 250 mcg.   Use fentaNYL (SUBLIMAZE) initially, as a short acting agent for acute pain control. If insufficient, or a longer acting agent is needed, begin morphine or HYDROmorphone (DILAUDID) if ordered.  For ordered doses up to 100 mcg give IV Push undiluted over a minimum of 3-5 minutes.    Admin. Amount: 25-50 mcg = 0.5-1 mL Conc: 50 mcg/mL  Last Admin: 18 1240  Dispense Loc: [facility] ADS OR BLOCK RM  Volume: 2 mL  POC: PACU   Current Line: Peripheral IV 18 Right Hand        1141 (50 mcg)-Given       1240 (50 mcg)-Given       1305-Med Discontinued           Freq: PRN  Start: 18 1000   End: 18 1305   Last Admin: 18 1000  POC: Intra-procedure         1000 (1 each)-Given [C]       1305-Med Discontinued           Dose: 0.3-0.5 mg  Freq: EVERY 6 HOURS PRN Route: IV  PRN Reasons: severe pain,other  PRN Comment: pain control or improvement in physical function. Hold dose for analgesic side effects.  Start: 18 1055   End: 18 1916   Admin Instructions: Start at the lowest dose.  May adjust dose by 0.1 mg every 2 hours as needed.  Hold dose for analgesic side effects.  Notify provider to assess for uncontrolled pain or analgesic side effects.   Hold while on IV PCA or with regular IV opioid dosing.  For ordered doses up to 4 mg give IV Push undiluted. Administer each 2mg over 2-5 minutes.    Admin. Amount: 0.3-0.5 mg  Last Admin: 18 1617  Dispense Loc: [facility] ADS 6A          1617 (0.5 mg)-Given       -Med Discontinued          Dose: 0.3-0.5 mg  Freq: EVERY 2 HOURS PRN Route: IV  PRN Reasons: severe pain,other  PRN Comment: pain control or improvement in physical function. Hold dose for  analgesic side effects.  Start: 05/24/18 1424   End: 05/24/18 2328   Admin Instructions: Start at the lowest dose.  May adjust dose by 0.1 mg every 2 hours as needed.  Hold dose for analgesic side effects.  Notify provider to assess for uncontrolled pain or analgesic side effects.   Hold while on IV PCA or with regular IV opioid dosing.  For ordered doses up to 4 mg give IV Push undiluted. Administer each 2mg over 2-5 minutes.    Admin. Amount: 0.3-0.5 mg  Last Admin: 05/24/18 2205  Dispense Loc: [facility] ADS 6A   Current Line: Peripheral IV 05/24/18 Right Hand        1508 (0.5 mg)-Given       1816 (0.5 mg)-Given       2007 (0.3 mg)-Given       2205 (0.5 mg)-Given       2328-Med Discontinued           Dose: 0.3-0.5 mg  Freq: EVERY 2 HOURS PRN Route: IV  PRN Reasons: severe pain,other  PRN Comment: pain control or improvement in physical function. Hold dose for analgesic side effects.  Start: 05/24/18 1312   End: 05/24/18 1421   Admin Instructions: Start at the lowest dose.  May adjust dose by 0.1 mg every 2 hours as needed.  Hold dose for analgesic side effects.  Notify provider to assess for uncontrolled pain or analgesic side effects.   Hold while on IV PCA or with regular IV opioid dosing.  For ordered doses up to 4 mg give IV Push undiluted. Administer each 2mg over 2-5 minutes.    Admin. Amount: 0.3-0.5 mg  Last Admin: 05/24/18 1321  Dispense Loc: [facility] ADS 4AB1  POC: Post-procedure         1321 (0.5 mg)-Given       1421-Med Discontinued           Dose: 0.3-0.5 mg  Freq: EVERY 5 MIN PRN Route: IV  PRN Reason: other  PRN Comment: acute pain.  May administer if Respiratory Rate is greater than 10  Start: 05/24/18 1009   End: 05/24/18 1305   Admin Instructions: Max cumulative dose = 2 mg  If fentaNYL (SUBLIMAZE) is also ordered, use HYDROmorphone (DILAUDID) if pain control insufficient with fentaNYL (SUBLIMAZE) or a longer acting agent is needed.  For ordered doses up to 4 mg give IV Push undiluted. Administer  "each 2mg over 2-5 minutes.    Admin. Amount: 0.3-0.5 mg  Dispense Loc: [facility] ADS OR BLOCK RM  POC: PACU         1305-Med Discontinued           Dose: 0.5-1 mg  Freq: EVERY 2 HOURS PRN Route: IV  PRN Reasons: severe pain,other  PRN Comment: pain control or improvement in physical function. Hold dose for analgesic side effects.  Start: 05/24/18 1421   End: 05/24/18 1424   Admin Instructions: Start at the lowest dose.  May adjust dose by 0.1 mg every 2 hours as needed.  Hold dose for analgesic side effects.  Notify provider to assess for uncontrolled pain or analgesic side effects.   Hold while on IV PCA or with regular IV opioid dosing.  For ordered doses up to 4 mg give IV Push undiluted. Administer each 2mg over 2-5 minutes.    Admin. Amount: 0.5-1 mg  POC: Post-procedure         1424-Med Discontinued           Rate: 25 mL/hr Dose: 500 mL  Freq: CONTINUOUS Route: IV  Start: 05/24/18 0600   End: 05/24/18 1121   Admin Instructions: IF patient NOT on dialysis.    Admin. Amount: 500 mL  Dispense Loc: [facility] Floor Stock  Volume: 1,000 mL  POC: Pre-procedure                1121-Med Discontinued           Rate: 100 mL/hr   Freq: CONTINUOUS Route: IV  Start: 05/24/18 1015   End: 05/24/18 1305   Admin Instructions: Continue until IV catheter is weaned    Dispense Loc: [facility] Floor Stock  Volume: 1,000 mL  POC: PACU                1305-Med Discontinued           Freq: EVERY 1 HOUR PRN Route: Top  PRN Reason: pain  PRN Comment: with VAD insertion or accessing implanted port.  Start: 05/24/18 0548   End: 05/24/18 1121   Admin Instructions: Do NOT give if patient has a history of allergy to any local anesthetic or any \"korey\" product.   Apply 30 minutes prior to VAD insertion or port access.  MAX Dose:  2.5 g (  of 5 g tube)    Dispense Loc: [facility] ADS 3C  POC: Pre-procedure         1121-Med Discontinued           Dose: 1 mL  Freq: EVERY 1 HOUR PRN Route: OTHER  PRN Comment: mild pain with VAD insertion or accessing " "implanted port  Start: 05/24/18 0548   End: 05/24/18 1121   Admin Instructions: Do NOT give if patient has a history of allergy to any local anesthetic or any \"korey\" product. MAX dose 1 mL subcutaneous OR intradermal in divided doses.    Admin. Amount: 1 mL  Dispense Loc: [facility] ADS 3C  Volume: 2 mL  POC: Pre-procedure         1121-Med Discontinued           Dose: 0.1-0.4 mg  Freq: EVERY 2 MIN PRN Route: IV  PRN Reason: opioid reversal  Start: 05/24/18 0615   End: 05/24/18 1305   Admin Instructions: For respiratory rate LESS than or EQUAL to 8.  Partial reversal dose:  0.1 mg titrated q 2 minutes for Analgesia Side Effects Monitoring Sedation Level of 3 (frequently drowsy, arousable, drifts to sleep during conversation).Full reversal dose:  0.4 mg bolus for Analgesia Side Effects Monitoring Sedation Level of 4 (somnolent, minimal or no response to stimulation).  For ordered doses up to 2mg give IVP. Give each 0.4mg over 15 seconds in emergency situations. For non-emergent situations further dilute in 9mL of NS to facilitate titration of response.    Admin. Amount: 0.1-0.4 mg = 0.25-1 mL Conc: 0.4 mg/mL  Dispense Loc: [facility] ADS 3C  Volume: 1 mL  POC: Intra-procedure         1305-Med Discontinued           Dose: 0.1-0.4 mg  Freq: EVERY 2 MIN PRN Route: IV  PRN Reason: opioid reversal  Start: 05/24/18 1009   End: 05/24/18 1313   Admin Instructions: For apnea or imminent respiratory arrest: give 0.4 mg IV undiluted Q 2 minutes PRN until desired degree of reversal is obtained, stop opioid and notify provider. Continue monitoring until discharge criteria are met for a minimum of 2 hours.  For severe sedation, decrease in respiratory depth, quality or respiratory rate less than 8: give 0.1 mg IV Q 2 minutes x 3 doses, stop opioid and notify provider.  Try to minimize reversal of analgesia especially in end-of-life patients  For ordered doses up to 2mg give IVP. Give each 0.4mg over 15 seconds in emergency situations. " For non-emergent situations further dilute in 9mL of NS to facilitate titration of response.    Admin. Amount: 0.1-0.4 mg = 0.25-1 mL Conc: 0.4 mg/mL  Dispense Loc: [facility] ADS 4AB1  Volume: 1 mL  POC: Post-procedure         1313-Med Discontinued           Dose: 4 mg  Freq: EVERY 30 MIN PRN Route: PO  PRN Reason: nausea  Start: 18   End: 18   Admin Instructions: MAX total dose = 8 mg, including OR dosing. If not resolved in 15 minutes, then go to step 2 [prochlorperazine (COMPAZINE), if ordered].  With dry hands, peel back foil backing and gently remove tablet; do not push oral disintegrating tablet through foil backing; administer immediately on tongue and oral disintegrating tablet dissolves in seconds; then swallow with saliva; liquid not required.    Admin. Amount: 1 tablet (1 × 4 mg tablet)  Dispense Loc: [facility]  Administrations Remainin  POC: PACU         1305-Med Discontinued        Or    Dose: 4 mg  Freq: EVERY 30 MIN PRN Route: IV  PRN Reason: nausea  Start: 18   End: 18   Admin Instructions: MAX total dose = 8 mg, including OR dosing. If not resolved in 15 minutes, then go to step 2 [prochlorperazine (COMPAZINE), if ordered].  Irritant. For ordered doses up to 4 mg, give IV Push undiluted over 2-5 minutes.    Admin. Amount: 4 mg = 2 mL Conc: 4 mg/2 mL  Dispense Loc: [facility] ADS OR BLOCK RM  Infused Over: 2-5 Minutes  Administrations Remainin  Volume: 2 mL  POC: PACU         1305-Med Discontinued           Dose: 5-10 mg  Freq: EVERY 3 HOURS PRN Route: PO  PRN Reason: other  PRN Comment: pain control or improvement in physical function. Hold dose for analgesic side effects.  Start: 18   End: 18   Admin Instructions: Start with the lowest dose.    May adjust dose by 5 mg every 3 hours as needed.  Notify provider to assess for uncontrolled pain or analgesic side effects. Hold while on PCA or with regular IV opioid dosing.  Maximum total is 80 mg in 24 hours.    Admin. Amount: 1-2 tablet (1-2 × 5 mg tablet)  Last Admin: 05/24/18 2057  Dispense Loc: [facility] ADS 6A         1427 (10 mg)-Given       1724 (10 mg)-Given       2057 (10 mg)-Given       2328-Med Discontinued           Dose: 10 mg  Freq: EVERY 6 HOURS PRN Route: IV  PRN Reasons: nausea,vomiting  Start: 05/24/18 1009   End: 05/24/18 1305   Admin Instructions: This is Step 2 of the nausea and vomiting protocol.   If nausea not resolved in 15 minutes, give metoclopramide (REGLAN) if ordered (step 3 of nausea and vomiting protocol)  For ordered doses up to 10 mg, give IV Push undiluted. Each 5mg over 1 minute.    Admin. Amount: 10 mg = 2 mL Conc: 5 mg/mL  Dispense Loc: [facility]  Infused Over: 1-2 Minutes  Volume: 2 mL  POC: PACU         1305-Med Discontinued           Rate: 3.7-7.4 mL/hr Dose: 0.05-0.1 mcg/kg/min  Weight Dosing Info: 61.8 kg  Freq: CONTINUOUS Route: IV  Last Dose: Stopped (05/24/18 1105)  Start: 05/24/18 0615   End: 05/24/18 1305   Admin Instructions: Provider to titrate    Admin. Amount: 3.09-6.18 mcg/min  Last Admin: 05/24/18 1057  Dispense Loc: [facility]  Volume: 20 mL  POC: Intra-procedure   Mixture Administration Information:   Medication Type Amount   remifentanil SOLR Medications 1 mg   sodium chloride 0.9 % SOLN Base 20 mL                 0850 (0.08 mcg/kg/min)-New Bag       0936 (0.1 mcg/kg/min)-Rate/Dose Change       1017 (0.05 mcg/kg/min)-Rate/Dose Change       1057 (0.1 mcg/kg/min)-Rate/Dose Change       1105-Stopped       1305-Med Discontinued           Dose: 3 mL  Freq: EVERY 8 HOURS Route: IK  Start: 05/24/18 0600   End: 05/24/18 1121   Admin Instructions: And Q1H PRN, to lock peripheral IV dormant line.    Admin. Amount: 3 mL  Dispense Loc: [facility] Floor Stock  Volume: 3 mL  POC: Pre-procedure                1121-Med Discontinued           Dose: 3 mL  Freq: EVERY 1 HOUR PRN Route: IK  PRN Reason: line flush  PRN Comment: for  peripheral IV flush post IV meds  Start: 05/24/18 0548   End: 05/24/18 1121   Admin. Amount: 3 mL  Dispense Loc: [facility] Floor Stock  Volume: 3 mL  POC: Pre-procedure         1121-Med Discontinued           Rate: 100 mL/hr   Freq: CONTINUOUS Route: IV  Start: 05/24/18 1300   End: 05/25/18 0059   Last Admin: 05/24/18 2158  Dispense Loc: [facility] Floor Stock  Volume: 1,000 mL   Current Line: Peripheral IV 05/24/18 Right Hand        1148 ( )-New Bag       1315 ( )-Rate/Dose Verify       1600 ( )-Rate/Dose Verify       1900 ( )-Rate/Dose Verify       2004 ( )-New Bag       2158 ( )-Rate/Dose Verify        0059-Med Discontinued          Freq: PRN  Start: 05/24/18 0912   End: 05/24/18 1305   Last Admin: 05/24/18 0912  POC: Intra-procedure         0912 (5,000 Units)-Given [C]       1305-Med Discontinued      Medications 05/20/18 05/21/18 05/22/18 05/23/18 05/24/18 05/25/18 05/26/18               INTERAGENCY TRANSFER FORM - NOTES (H&P, Discharge Summary, Consults, Procedures, Therapies)   5/24/2018                       [address] [facility] BANK: [phone]               History & Physicals      H&P signed by Marito Rosas at 5/21/2018  3:51 PM      Author:  Marito Rosas Service:  (none) Author Type:  Physician    Filed:  5/21/2018  3:51 PM Date of Service:  5/21/2018  3:51 PM Creation Time:  5/21/2018  3:51 PM    Status:  Signed :  Marito Rosas (Physician)     Alison on 5/21/2018  3:51 PM by Alison Provider : JAMMIE 5/17/18 1          Revision History        User Key Date/Time User Provider Type Action    > [N/A] 5/21/2018  3:51 PM Scan, Provider Physician Sign            H&P signed by Marito Rosas at 5/18/2018 11:30 AM      Author:  Marito Rosas Service:  (none) Author Type:  Physician    Filed:  5/18/2018 11:30 AM Date of Service:  5/18/2018 11:30 AM Creation Time:  5/18/2018 11:30 AM    Status:  Signed :  Alison Provider (Physician)     Scan on 5/18/2018 11:30 AM by Alison Provider : JAMMIE 5/17/18 1           Revision History        User Key Date/Time User Provider Type Action    > [N/A] 5/18/2018 11:30 AM Scan, Provider Physician Sign                     Discharge Summaries      Discharge Summaries by Dexter Vergara MD at 5/26/2018  5:51 AM     Author:  Dexter Vergara MD Service:  Neurosurgery Author Type:  Resident    Filed:  5/26/2018  6:12 AM Date of Service:  5/26/2018  5:51 AM Creation Time:  5/26/2018  5:51 AM    Status:  Cosign Needed :  Dexter Vergara MD (Resident)    Cosign Required:  Yes             [facility] Discharge Summary and Instructions    Maggie Case MRN# [id number]   Age: 28 year old YOB: 1990     Date of Admission:  5/24/2018  Date of Discharge::[SC1.1]  5/26/2018[SC1.2]  Admitting Physician:  Emeterio Mesa MD  Discharge Physician:  Emeterio Mesa MD          Admission Diagnoses:   History of cranioplasty [Z98.890]          Discharge Diagnosis:   History of cranioplasty [Z98.890]          Procedures:   5/24/2018  Right cranioplasty            Brief History of Illness:   Ms. Case is a 28 year old female with a history of drug and alcohol abuse who presented with right M1 occlusion 2/17/2018.  She underwent decompressive craniectomy 2/18/18 with Dr. Mesa.  She presents for cranioplasty.           Hospital Course:   Patient underwent above-mentioned procedure on 5/24/2018. The operation was uncomplicated and she was admitted to the surgical ICU for routine post operative cares, several hours after she was doing well and was transferred to the neurosurgical floor. On post operative day 2, she was ambulating, voiding without a sylvester, eating a regular diet, pain was well controlled and therefore she was discharged back to the TCU that she came.  Will plan to resume warfarin in 2 weeks.        Exam on Discharge  General: Awake;  Alert, In No Acute Distress  Pulm: Breathing Comfortably on room air  Mental status: Oriented  x 3  Cranial Nerves: Cranial Nerves II-XII Grossly Intact Bilaterally except left facial droop (baseline)  Strength: 5/5 on the right, 2/5 left deltoid and throughout left lower extremity, otherwise 0/5 in left upper extremity  Pronator Drift: Absent  Sensory: Intact to Light Touch  Reflexes: No Hyperreflexia, Estrada s or Clonus Present; Toes Down-Going Bilaterally  INCISION: c/d/i          Discharge Medications:[SC1.1]     Current Discharge Medication List      START taking these medications    Details   oxyCODONE IR (ROXICODONE) 10 MG tablet Take 1 tablet (10 mg) by mouth every 3 hours as needed for moderate to severe pain  Qty: 40 tablet, Refills: 0    Associated Diagnoses: History of cranioplasty      senna-docusate (SENOKOT-S;PERICOLACE) 8.6-50 MG per tablet Take 1 tablet by mouth 2 times daily  Qty: 100 tablet, Refills: 0    Associated Diagnoses: History of cranioplasty         CONTINUE these medications which have CHANGED    Details   !! warfarin (COUMADIN) 5 MG tablet Take 1 tablet (5 mg) by mouth daily  Qty: 30 tablet, Refills: 0    Associated Diagnoses: History of pulmonary embolism; Cerebral infarction due to embolism of right middle cerebral artery (H)      !! warfarin (COUMADIN) 5 MG tablet Take 1 tablet (5 mg) by mouth At Bedtime Tufes, Wed, Thur,Fri, Sat, Sun  Qty: 30 tablet, Refills: 0    Associated Diagnoses: History of cranioplasty      Warfarin Therapy Reminder 1 each continuous prn  Qty: 30 each, Refills: 0    Associated Diagnoses: Acute ischemic stroke (H)       !! - Potential duplicate medications found. Please discuss with provider.      CONTINUE these medications which have NOT CHANGED    Details   acetaminophen (TYLENOL) 32 mg/mL solution 20.3 mLs (650 mg) by Per G Tube route every 4 hours as needed for mild pain or fever  Qty: 400 mL    Associated Diagnoses: Muscle pain      cyanocobalamin (VITAMIN  B-12) 1000 MCG tablet Take 1,000 mcg by mouth daily      ferrous sulfate (IRON) 325 (65 Fe)  MG tablet Take by mouth daily (with breakfast)      FLUoxetine (PROZAC) 20 MG/5ML solution 5 mLs (20 mg) by Per G Tube route daily  Qty: 150 mL    Associated Diagnoses: Acute ischemic stroke (H)      !! GABAPENTIN PO Take 200 mg by mouth 2 times daily      !! GABAPENTIN PO Take 300 mg by mouth At Bedtime      HYDROXYZINE HCL PO Take 25 mg by mouth every 6 hours as needed for itching      MELATONIN PO Take 5 mg by mouth      ondansetron (ZOFRAN) 4 MG tablet 1-2 tablets (4-8 mg) by Per Feeding Tube route every 8 hours as needed for nausea  Qty: 18 tablet    Associated Diagnoses: Nausea      TRAZODONE HCL PO Take 100 mg by mouth At Bedtime      Dextromethorphan-guaiFENesin  MG/5ML syrup Take 5 mLs by mouth every 4 hours as needed for cough      lidocaine (LMX4) 4 % kit Apply topically every hour as needed for pain (with VAD insertion or accessing implanted port.)    Associated Diagnoses: Muscle pain       !! - Potential duplicate medications found. Please discuss with provider.      STOP taking these medications       nicotine (NICODERM CQ) 14 MG/24HR 24 hr patch Comments:   Reason for Stopping:[SC1.2]                       Discharge Instructions and Follow-Up:   Discharge diet: Regular   Discharge activity: You may advance activity as tolerated. No strenuous exercise or heavy lifting greater than 10 lbs for 4 weeks or until seen and cleared in clinic.   Discharge follow-up: Follow-up with Danielle Strange in 2 weeks for suture removal and wound check    Follow-up with Dr. Mesa in 1 month       Follow-up in Cardiology clinic for evaluation for possible PFO closure.       Wound care: Ok to shower,however no scrubbing of the wound and no soaking of the wound, meaning no bathtubs or swimming pools. Pat dry only. Leave wound open to air.  Sutures are not absorbable and need to be removed in 2 weeks.     Please call if you have:  1. increased pain, redness, drainage, swelling at your incision  2. fevers > 101.5 F  degrees  3. with any questions or concerns.  You may reach the Neurosurgery clinic at [phone] during regular work hours. ER at [phone].    and ask for the Neurosurgery Resident on call at [phone], during off hours or weekends.         Discharge Disposition:[SC1.1]   Discharged to home[SC1.3]             Revision History        User Key Date/Time User Provider Type Action    > SC1.2 5/26/2018  6:12 AM Dexter Vergara MD Resident Sign     SC1.3 5/26/2018  6:11 AM Dexter Vergara MD Resident      SC1.1 5/26/2018  5:51 AM Dexter Vergara MD Resident                      Consult Notes      Consults by Phyllis Weiner MD at 5/25/2018  2:28 PM     Author:  Phyllis Weiner MD Service:  General Medicine Author Type:  Resident    Filed:  5/25/2018  4:39 PM Date of Service:  5/25/2018  2:28 PM Creation Time:  5/25/2018  2:06 PM    Status:  Attested :  Phyllis Weiner MD (Resident)    Cosigner:  Jana Lopez MD at 5/25/2018  5:26 PM         Consult Orders:    1. Cardiology General Adult IP Consult: Patient to be seen: Routine within 24 hrs; Call back #: Neurosurgery; address the role of PFO closure in 29 yo patient with recent ischemic stroke concerning for hypercoagulable stage; Consultant may enter orde... [[id number]] ordered by Phyllis Weiner MD at 05/25/18 1130           Attestation signed by Jana Lopez MD at 5/25/2018  5:26 PM        Attestation:  Patient seen and examined with Dr. Weiner. The history and physical findings are accurate as recorded. Cardiology consulted for possible PFO closure.    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 She is admitted for cranioplasty. Echo 2/17/18 positive bubble study with valsalva consistent with PFO. Coagulopathy evaluations had been negative. She is not very compliant with warfarin. Asked by neurology to consider PFT closure given  significant cryptogenic embolic stroke.     All labs, imaging studies, ECG and telemetry data have been reviewed personally. Specifically, EKG 18 shows sinus rhythm with anterior T wave inversions. Echo images reviewed, normal LV/RV, positive bubble study with valsalva.    Assessment/recommendations:  Large right MCA stroke requiring craniotomy for edema, PFO may present conduit for parodoxical emboli in this young woman with h/o DVT/PE and negative coagulopathy work up. Will set up outpatient appointment in PFO clinic for evaluation of possible PFO closure.      The assessment and plans outlined reflect our joint decision making.    Jana Lopez MD  Cardiology                                          Cardiology Consult                                                               May 25, 2018  Maggie Case MRN: [id number]  Age: 28 year old, : 1990        Reason for consult:[PU1.1]      Address the role of PFO closure[PU1.2]        Assessment and Recommendation:[PU1.1]     28-year-old lady wit ha history of[PU1.2] unprovoked PE in 2015, on short term AC until[PU1.1] 2016, large ischemic[PU1.2] right MCA stroke[PU1.1] in 2018 s/p R hemicraniectomy and s/p cranioplasty (2018). Cardiology was consulted to address the role of PFO closure.[PU1.2]    Recommendations:  -[PU1.3] According to the[PU1.4] RoPE[PU1.2] (Risk of Paradoxical Embolism)[PU1.3] score[PU1.2] which guides statistical relatedness of PFO to stroke etiology in patients with cryptogenic stroke. Her score[PU1.4] is 7[PU1.2],[PU1.4] correlated with 72% chance that stroke is due to PFO and 6% risk of 2 year recurrence of stroke/TIA[PU1.2]. Due to this risk, we would defer to the structural team to assess the need of PFO closure. She will be seen in the Cardiology Clinic. We will arrange the appointment for her.   - We recommend keeping the patient on anticoagulation given her history of PE and large ischemic stroke.   -  Patient also needs outpatient follow up with hematology team to further address the etiology of her hypercoagulable state.[PU1.3]     Patient discussed with staff attending, [PU1.1] Jessica[PU1.2] and the note reflects our joint plan. Thank you for consulting the cardiovascular services at the [facility]. Please do not hesitate to call with questions or concerns.     Phyllis Weiner MD  PGY-1 Internal Medicine  Pager [phone]        History of Present Illness:[PU1.1]     28-year-old lady with a history of[PU1.2] alcoholism,[PU1.3] unprovoked PE in December 2015, on short term AC until[PU1.1] 06/2016, large[PU1.2] right MCA stroke[PU1.1] in 02/2018. CT head that time showed[PU1.2] occlusion of the right middle cerebral artery and rt right anterior cerebral artery territory infarction[PU1.1]. She underwent R hemicraniectomy to allow her brain expansion[PU1.2] (02/2018).[PU1.3]  [PU1.1]Other stroke work up[PU1.2] at that time[PU1.3] revealed[PU1.2] 50% stenosis of the proximal right internal carotid artery[PU1.1],[PU1.2] PFO[PU1.1] on echo[PU1.2] with negative Doppler of all 4 extremities.[PU1.1] Heme/onc was consulted during that hospitalization suggesting t[PU1.2]he distribution of the stroke[PU1.1] wa[PU1.3]s more consistent with a thrombotic etiology and less likely to be embolic from VTE through PFO[PU1.1].[PU1.2]     Other w[PU1.3]ork-up[PU1.2] for[PU1.4] hypercoagulable state as below:[PU1.3]  - Arterial clot: negative Lupus anticoagulant/beta 2 glycoprotein 1 antibody/JAK2,CALR, or MPL mutation   - Venous clot: negative Factor 2/factor 5 mutation. Normal protein C, protein S, AT III level    She is now admitted for cranioplasty which was done on 5/24.[PU1.2]     This has been her only stroke.[PU1.4] She did smoke after the stroke, last time was on 5/19. 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 difficile colitis     Acute ischemic stroke (H)     History of cranioplasty[PU1.5]         Past Surgical History:[PU1.1]      Past Surgical History:   Procedure Laterality Date     CLOSED REDUCTION, PERCUTANEOUS PINNING LOWER EXTREMITY, COMBINED Right 4/6/2016    Procedure: COMBINED CLOSED REDUCTION, PERCUTANEOUS PINNING LOWER EXTREMITY;  Surgeon: Dexter Jones MD;  Location: [facility]     COLONOSCOPY       CRANIECTOMY Right 2/18/2018    Procedure: CRANIECTOMY;  RIGHT HEMICRANIECTOMY;  Surgeon: Emeterio Mesa MD;  Location: [facility] OR     CRANIOPLASTY Right 5/24/2018    Procedure: CRANIOPLASTY;  Right Cranioplasty ;  Surgeon: Emeterio Mesa MD;  Location: [facility] OR     UPPER GI ENDOSCOPY[PU1.5]           Social History:[PU1.1]     Social History     Social History     Marital status: Single     Spouse name: N/A     Number of children: N/A     Years of education: N/A     Occupational History     Not on file.     Social History Main Topics     Smoking status: Current Every Day Smoker     Packs/day: 0.25     Years: 7.00     Types: Cigarettes     Smokeless tobacco: Never Used     Alcohol use 0.0 oz/week     0 Standard drinks or equivalent per week      Comment: 1-2/week ,      Drug use: Yes     Special: Marijuana      Comment: adderall, yest     Sexual activity: Yes     Partners: Male     Other Topics Concern     Not on file     Social History Narrative[PU1.5]         Family History:[PU1.1]     History reviewed. No pertinent  family history.[PU1.5]      Allergies:[PU1.1]     Allergies   Allergen Reactions     Amoxicillin Other (See Comments)     Headaches     Levaquin [Levofloxacin] Swelling     Naproxen Other (See Comments)     Reaction: Headaches     Nickel      Tramadol      Sulindac Rash[PU1.5]         Medications:[PU1.1]       No current facility-administered medications on file prior to encounter.   Current Outpatient Prescriptions on File Prior to Encounter:  acetaminophen (TYLENOL) 32 mg/mL solution 20.3 mLs (650 mg) by Per G Tube route every 4 hours as needed for mild pain or fever (Patient taking differently: Take 650 mg by mouth every 4 hours as needed for mild pain or fever )   cyanocobalamin (VITAMIN  B-12) 1000 MCG tablet Take 1,000 mcg by mouth daily   ferrous sulfate (IRON) 325 (65 Fe) MG tablet Take by mouth daily (with breakfast)   FLUoxetine (PROZAC) 20 MG/5ML solution 5 mLs (20 mg) by Per G Tube route daily (Patient taking differently: Take 30 mg by mouth daily )   GABAPENTIN PO Take 200 mg by mouth 2 times daily   GABAPENTIN PO Take 300 mg by mouth At Bedtime   HYDROXYZINE HCL PO Take 25 mg by mouth every 6 hours as needed for itching   MELATONIN PO Take 5 mg by mouth   nicotine (NICODERM CQ) 14 MG/24HR 24 hr patch Place 1 patch onto the skin daily   ondansetron (ZOFRAN) 4 MG tablet 1-2 tablets (4-8 mg) by Per Feeding Tube route every 8 hours as needed for nausea   TRAZODONE HCL PO Take 100 mg by mouth At Bedtime   warfarin (COUMADIN) 5 MG tablet Take 1 tablet (5 mg) by mouth daily (Patient taking differently: Take 4.5 mg by mouth At Bedtime Mon)   Warfarin Sodium (COUMADIN PO) Take 3 mg by mouth At Bedtime Tufes, Wed, Thur,Fri, Sat, Sun   Warfarin Therapy Reminder 1 each continuous prn   Dextromethorphan-guaiFENesin  MG/5ML syrup Take 5 mLs by mouth every 4 hours as needed for cough   lidocaine (LMX4) 4 % kit Apply topically every hour as needed for pain (with VAD insertion or accessing implanted port.)[PU1.5]            Physical Exam:     B/P: 95/62, T: 96.4, P: Data Unavailable, R: 16[PU1.1]    Wt Readings from Last 4 Encounters:   05/24/18 61.8 kg (136 lb 3.9 oz)   03/01/18 68.6 kg (151 lb 3.8 oz)   01/30/18 61.2 kg (135 lb)   04/28/17 63.5 kg (139 lb 15.9 oz)[PU1.5]     Intake/Output Summary (Last 24 hours) at 05/25/18 1407  Last data filed at 05/25/18 1000   Gross per 24 hour   Intake              920 ml   Output             1275 ml   Net             -355 ml     Gen:[PU1.1] lying supine on bed, in pain[PU1.4]  HEENT:[PU1.1] s/p cranioplasty, bandage on the R sided head[PU1.4], drain in place, L facial droop[PU1.3]  PULM/THORAX: Clear to auscultation bilaterally, no rales/rhonchi/wheezes  CV: RRR, S1 and S2 appreciated, no extra heart sounds, murmurs or rub auscultated. No JVD  ABD:[PU1.1] soft, not tender, no palpable mass[PU1.4]  EXT:[PU1.1] No peripheral edema[PU1.3]  NEURO:[PU1.1] AAOx3, motor 1/5 at L UE and L LE[PU1.4]        Data:     Labs Reviewed on Admission    Troponin[PU1.1] Lab Results   Component Value Date    TROPI <0.015 04/23/2018    TROPI <0.015 04/17/2018    TROPI <0.015 02/17/2018    TROPI  05/07/2016     <0.015  The 99th percentile for upper reference range is 0.045 ug/L.  Troponin values in   the range of 0.045 - 0.120 ug/L may be associated with risks of adverse   clinical events.      TROPI  05/06/2016     <0.015  The 99th percentile for upper reference range is 0.045 ug/L.  Troponin values in   the range of 0.045 - 0.120 ug/L may be associated with risks of adverse   clinical events.[PU1.5]       BMP[PU1.1]  Recent Labs  Lab 05/25/18 0429 05/24/18 0619     --    POTASSIUM 4.0 3.3*   CHLORIDE 109  --    AVINASH 8.2*  --    CO2 23  --    BUN 4*  --    CR 0.42*  --    *  --[PU1.5]      CBC[PU1.1]  Recent Labs  Lab 05/25/18 0429 05/24/18 0619   WBC 11.2*  --    RBC 3.71*  --    HGB 9.1* 11.5*   HCT 31.1*  --    MCV 84  --    MCH 24.5*  --    MCHC 29.3*  --    RDW 20.7*  --       --[PU1.5]      INR[PU1.1]  Recent Labs  Lab 05/24/18  0619   INR 1.14[PU1.5]      Hepatic Panel   Lab Results   Component Value Date    AST 21 04/17/2018     Lab Results   Component Value Date    ALT 19 04/17/2018     No results found for: BILICONJ   Lab Results   Component Value Date    BILITOTAL 0.1 04/17/2018     Lab Results   Component Value Date    ALBUMIN 3.1 04/17/2018     Lab Results   Component Value Date    PROTTOTAL 6.8 04/17/2018      Lab Results   Component Value Date    ALKPHOS 92 04/17/2018         Most Recent Imaging:     Echo[PU1.1] (2/17/2017)[PU1.4]  -[PU1.3] Left ventricular function, chamber size, wall motion, and wall thickness are normal.The EF is 55-60%.[PU1.4]  -[PU1.3] Right ventricular function, chamber size, wall motion, and thickness are normal.[PU1.4]  -[PU1.3] A patent foramen ovale was demonstrated by agitated saline bubble study .[PU1.4]  -[PU1.3] The inferior vena cava was normal in size with preserved respiratory variability.[PU1.4]  -[PU1.3] No pericardial effusion is present.[PU1.4]                   Revision History        User Key Date/Time User Provider Type Action    > PU1.3 5/25/2018  4:39 PM Phyllis Weiner MD Resident Sign     PU1.4 5/25/2018  3:20 PM Phyllis Weiner MD Resident      PU1.2 5/25/2018  2:52 PM Phyllis Weiner MD Resident      PU1.5 5/25/2018  2:07 PM Phyllis Weiner MD Resident      PU1.1 5/25/2018  2:06 PM Phyllis Weiner MD Resident                      Progress Notes - Physician (Notes for yesterday and today)      Progress Notes by Baljit Gabriel MD at 5/25/2018  7:30 AM     Author:  Baljit Gabriel MD Service:  Anesthesiology Author Type:  Resident    Filed:  5/25/2018  7:34 AM Date of Service:  5/25/2018  7:30 AM Creation Time:  5/25/2018  7:30 AM    Status:  Signed :  Baljit Gabriel MD (Resident)         Post Operative Day #1 - Anesthesiology Assessment    Patient able to fully cooperative with exam/questioning:  Yes  Nausea/Vomiting: no nausea and no vomiting  Emergence agitation: No  Dental: Teeth intact  Post operative pain management: complains of significant post op pain with intermittent minimal relief with dilaudid and oxycodone.  Requests morphine.    Intraoperative recall No  Sore throat: Yes: mild, resolving  Nerve injury: No  Cognition concerns: No  Other comments: Spoke at length about difficult airway.  All questions and concerns addressed.  Patient given difficult airway letter.       Baljit Gabriel MD  Anesthesiology, PGY2  [phone][LD1.1]       Revision History        User Key Date/Time User Provider Type Action    > LD1.1 5/25/2018  7:34 AM Baljit Gabriel MD Resident Sign            Progress Notes by Baljit Gabriel MD at 5/25/2018  6:59 AM     Author:  Baljit Gabriel MD Service:  Anesthesiology Author Type:  Resident    Filed:  5/25/2018  7:06 AM Date of Service:  5/25/2018  6:59 AM Creation Time:  5/25/2018  6:59 AM    Status:  Signed :  Baljit Gabriel MD (Resident)         May 25, 2018    Dear Maggie Case,     This letter is about your surgeries on 2/18/2018 and 5/24/2018 for which you received general anesthesia at the [facility].  The goal of this letter is to give you information about endotracheal intubation of the difficult airway and what this means for you should you need surgery again in the future.      With general anesthesia, after you are asleep, a breathing tube, known as an endotracheal tube, is placed in your windpipe to help you breath during surgery.  Occasionally, the placement of this breathing tube is difficult.  This is known as a difficult airway.  In your case, an alternative method of placeing the breathing tube was required as it was unable to be placed in the usual manner.  You have a difficult airway.      Having a difficult airway does not mean that you cannot have surgery, in fact, it is rarely a problem if anticipated before undergoing  anesthesia.  For this reason, it is important that you inform your anesthesiologist about having a difficult airway before any future surgeries.  They will know what this means for getting the breathing tube in place comfortably and safely.  I would recommend showing them this letter.      02/18/18; 2005; Mask Ventilation: Difficult (Two handed mask with OA.); Ease of Intubation: Difficult (CMAC 3 GIII view, esophageal intubation by ANA CRISTINA Montes De Oca 2 attempt by Dr. Gastelum. Unable to visualize. CMAC 3by Dr. Gastelum bougie passed. 7.0 passed over ETT.); Airway Size: 7;  Cuffed;  Oral;  Blade Type: C-Mac;  Blade Size: 3;  Place by: Dr. Gastelum;  Insertion Attempts: 3;  Secured at (cm)to lip: 21 cm;  Breath Sounds: Equal, clear and bilateral;  End Tidal CO2: Present;  Dentition: Intact, Unchanged;  Grade View of Cords: 3;  Airway Adjuncts:  C-Mac    05/24/18; 0757; Mask Ventilation: Easy (Easy one hand mask without oral airway); Ease of Intubation: Difficult (Jaw lift required for insertion of D blade, indirect G2A view of cords however required multiple repositioning of ETT stylet to secure airway); Airway Size: 7;  Cuffed;  Oral;  Blade Type: C-Mac;  Blade Size: Other (comment) (D);  Insertion Attempts: 1;  Secured at (cm)to lip: 21 cm;  Breath Sounds: Equal, clear and bilateral;  End Tidal CO2: Present;  Dentition: Lips/Oral mucosa injury (Small laceration to R upper lip);  Grade View of Cords: 2 (2A indirect)    I wish you a speedy recovery!    Baljit Gabriel MD  Resident Physician   Department of Anesthesiology  [facility][LD1.1]       Revision History        User Key Date/Time User Provider Type Action    > LD1.1 5/25/2018  7:06 AM Baljit Gabriel MD Resident Sign                  Procedure Notes     No notes of this type exist for this encounter.         Progress Notes - Therapies (Notes from 05/23/18 through 05/26/18)      Progress Notes by Nahed Crowder, PT at 5/25/2018  4:19 PM      Author:  Nahed Crowder PT Service:  (none) Author Type:  Physical Therapist    Filed:  5/25/2018  4:19 PM Date of Service:  5/25/2018  4:19 PM Creation Time:  5/25/2018  4:19 PM    Status:  Signed :  Nahed Crowder PT (Physical Therapist)          05/25/18 1500   Quick Adds   Type of Visit Initial PT Evaluation  (Nahed Crowder PT, DPT)       Present no   Language english    Living Environment   Lives With facility resident   Living Arrangements extended care facility   Number of Stairs to Enter Home 0   Number of Stairs Within Home 0   Transportation Available family or friend will provide   Living Environment Comment Pt was living in extended care, per [facility] facility working on transfer to group home. no concerns with home set up.   Self-Care   Dominant Hand right   Usual Activity Tolerance good   Current Activity Tolerance poor   Regular Exercise no   Equipment Currently Used at Home cane, quad;walker, javi   Activity/Exercise/Self-Care Comment Pt reports receiving assitance with ADLs at facility    Functional Level Prior   Ambulation 1-->assistive equipment   Transferring 1-->assistive equipment   Toileting 3-->assistive equipment and person   Bathing 3-->assistive equipment and person   Dressing 2-->assistive person   Eating 0-->independent   Communication 0-->understands/communicates without difficulty   Swallowing 0-->swallows foods/liquids without difficulty   Cognition 0 - no cognition issues reported   Fall history within last six months no   Which of the above functional risks had a recent onset or change? ambulation;transferring   Prior Functional Level Comment Reports utilizing cane for mobility,   General Information   Onset of Illness/Injury or Date of Surgery - Date 05/24/18   Referring Physician Dexter Vergara MD   Patient/Family Goals Statement none stated   Pertinent History of Current Problem (include personal factors and/or comorbidities that impact the POC)  "28 yr old female with recent hx of stoke. Currently in for cranioplasty.    Precautions/Limitations fall precautions;other (see comments)  (crani precautions )   General Info Comments activity: up with A    Cognitive Status Examination   Orientation orientation to person, place and time   Level of Consciousness alert   Follows Commands and Answers Questions 100% of the time   Personal Safety and Judgment impaired   Memory intact   Cognitive Comment Pt with tangential conversation, easily distractable    Pain Assessment   Patient Currently in Pain Yes, see Vital Sign flowsheet   Integumentary/Edema   Integumentary/Edema no deficits were identifed   Posture    Posture Forward head position   Range of Motion (ROM)   ROM Comment WFL  throughout - PROM to L side    Strength   Strength Comments dt prior CVA, <3/5 L UE elbow flex/ext, shoulder flex/ext/abd/add, wrist flex/ext, digit flex/ext, LE 3/5 knee flex/ext; R UE/LE grossly 4/5    Bed Mobility   Bed Mobility Comments HOB elevated, use of R UE, \"walks\" LE to EOB    Transfer Skills   Transfer Comments STS with mod A from elevated height    Gait   Gait Comments donning AFO, with FWW, mod A, dec gait speed, heavy reliance on FWW, dec step length    Balance   Balance Comments not formally assessed, pt with numerous LOB during ambualtion requiring inc A to re-establish and prevent faLLS    Sensory Examination   Sensory Perception no deficits were identified   Coordination   Coordination no deficits were identified   Coordination Comments difficult to assess dt hemiparesis    Muscle Tone   Muscle Tone no deficits were identified   General Therapy Interventions   Planned Therapy Interventions bed mobility training;balance training;gait training;transfer training;strengthening;progressive activity/exercise;home program guidelines   Clinical Impression   Criteria for Skilled Therapeutic Intervention yes, treatment indicated   PT Diagnosis dec functional mobilit y   Influenced " "by the following impairments PMH, deconditioning, pain, fatigue   Functional limitations due to impairments bed mobility, gait, transfesr, balance, endurance    Clinical Presentation Evolving/Changing   Clinical Presentation Rationale PMH, clinical judgement, current mobility    Clinical Decision Making (Complexity) Low complexity   Therapy Frequency` 5 times/week   Predicted Duration of Therapy Intervention (days/wks) 6/1/18   Anticipated Equipment Needs at Discharge (TBD)   Anticipated Discharge Disposition Transitional Care Facility   Risk & Benefits of therapy have been explained Yes   Patient, Family & other staff in agreement with plan of care Yes   Clinical Impression Comments PT marco completed, tx indicated    [facility] AM-PAC TM \"6 Clicks\"   2016, Trustees of [facility], under license to [employer].  All rights reserved.   6 Clicks Short Forms Basic Mobility Inpatient Short Form   [facility] AM-PAC  \"6 Clicks\" V.2 Basic Mobility Inpatient Short Form   1. Turning from your back to your side while in a flat bed without using bedrails? 3 - A Little   2. Moving from lying on your back to sitting on the side of a flat bed without using bedrails? 2 - A Lot   3. Moving to and from a bed to a chair (including a wheelchair)? 3 - A Little   4. Standing up from a chair using your arms (e.g., wheelchair, or bedside chair)? 3 - A Little   5. To walk in hospital room? 2 - A Lot   6. Climbing 3-5 steps with a railing? 1 - Total   Basic Mobility Raw Score (Score out of 24.Lower scores equate to lower levels of function) 14   Total Evaluation Time   Total Evaluation Time (Minutes) 6[EW1.1]        Revision History        User Key Date/Time User Provider Type Action    > EW1.1 5/25/2018  4:19 PM Nahed Crowder, PT Physical Therapist Sign            Progress Notes by Sharron Salomon OT at 5/25/2018  3:33 PM     Author:  Sharron Salomon OT Service:  (none) Author Type:  Occupational Therapist    " Filed:  5/25/2018  3:33 PM Date of Service:  5/25/2018  3:33 PM Creation Time:  5/25/2018  3:33 PM    Status:  Signed :  Sharron Salomon OT (Occupational Therapist)          05/25/18 1000   Quick Adds   Type of Visit Initial Occupational Therapy Evaluation   Living Environment   Lives With facility resident   Living Arrangements extended care facility   Home Accessibility no concerns   Number of Stairs to Enter Home 0   Number of Stairs Within Home 0   Transportation Available family or friend will provide;agency transportation   Living Environment Comment pt reports will be going to group home after D/C from [facility]   Self-Care   Dominant Hand right   Usual Activity Tolerance good   Current Activity Tolerance poor   Regular Exercise no   Equipment Currently Used at Home cane, quad;walker, javi   Functional Level Prior   Ambulation 3-->assistive equipment and person   Transferring 3-->assistive equipment and person   Toileting 3-->assistive equipment and person   Bathing 3-->assistive equipment and person   Dressing 2-->assistive person   Eating 0-->independent   Communication 0-->understands/communicates without difficulty   Swallowing 0-->swallows foods/liquids without difficulty   Cognition 0 - no cognition issues reported   Fall history within last six months no   Which of the above functional risks had a recent onset or change? 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    General Information   Onset of Illness/Injury or Date of Surgery - Date 05/24/18   Referring Physician Dr. Dexter Vergara   Patient/Family Goals Statement Return home to child    Additional Occupational Profile Info/Pertinent History of Current Problem 28 yr old female with recent hx of stoke. Currently in for cranioplasty.    Precautions/Limitations fall  precautions  (Crani precautions)   General Observations L UE/LE effected d/t stroke   General Info Comments Up with Assist    Cognitive Status Examination   Orientation orientation to person, place and time;person;place   Level of Consciousness alert   Visual Perception   Visual Perception No deficits were identified   Sensory Examination   Sensory Quick Adds No deficits were identified   Pain Assessment   Patient Currently in Pain Yes, see Vital Sign flowsheet   Integumentary/Edema   Integumentary/Edema no deficits were identifed   Posture   Posture not impaired   Range of Motion (ROM)   ROM Comment L UE completely impaired    Strength   Strength Comments R UE 5/5, L UE completely impaired   Hand Strength   Hand Strength Comments R hand 5/5, L hand 0/5   Muscle Tone Assessment   Muscle Tone Comments Not assessed this date   Coordination   Upper Extremity Coordination Left UE impaired   Functional Limitations Decreased speed;Fine motor ADL performance impaired;Impaired ability to perform bilateral tasks;Object transport impaired;Reach to targets impaired   Coordination Comments pt demo's 1 handed techinque for a funcitonal tasks   Mobility   Bed Mobility Bed mobility skill: Scooting/Bridging   Bed Mobility Skill: Scooting/Bridging   Level of [place]: Scooting/Bridging moderate assist (50% patients effort)   Physical Assist/Nonphysical Assist: Scooting/Bridging 2 persons   Assistive Device: Scooting/Bridging bedrail   Transfer Skill: Sit to Stand   Level of [place]: Sit/Stand minimum assist (75% patients effort)   Physical Assist/Nonphysical Assist: Sit/Stand 2 persons   Transfer Skill: Sit to Stand full weight-bearing   Assistive Device for Transfer: Sit/Stand standard walker   Transfer Skill: Toilet Transfer   Level of [place]: Toilet moderate assist (50% patients effort)   Physical Assist/Nonphysical Assist: Toilet 2 persons   Weight-Bearing Restrictions: Toilet full weight-bearing   Assistive  Device standard walker   Balance   Balance Comments pt nervous when ambulating, required Ax2 for steady gait   Bathing   Level of [place] minimum assist (75% patients effort)   Physical Assist/Nonphysical Assist 1 person assist;set-up required   Upper Body Dressing   Level of [place]: Dress Upper Body moderate assist (50% patients effort)   Physical Assist/Nonphysical Assist: Dress Upper Body 1 person assist   Lower Body Dressing   Level of [place]: Dress Lower Body maximum assist (25% patients effort)   Physical Assist/Nonphysical Assist: Dress Lower Body 1 person assist;set-up required   Toileting   Level of [place]: Toilet stand-by assist   Physical Assist/Nonphysical Assist: Toilet 1 person assist   Assistive Device grab bars   Grooming   Level of [place]: Grooming minimum assist (75% patients effort)   Physical Assist/Nonphysical Assist: Grooming 1 person assist   Instrumental Activities of Daily Living (IADL)   IADL Comments pt IND at baseline prior to stroke. currently at facility    Activities of Daily Living Analysis   Impairments Contributing to Impaired Activities of Daily Living balance impaired;fear and anxiety;coordination impaired;pain;post surgical precautions;ROM decreased;strength decreased   General Therapy Interventions   Planned Therapy Interventions ADL retraining;IADL retraining;ROM;strengthening   Clinical Impression   Criteria for Skilled Therapeutic Interventions Met yes, treatment indicated   OT Diagnosis Decreased IND in ADLs   Influenced by the following impairments Stroke, cranioplasty    Assessment of Occupational Performance 3-5 Performance Deficits   Identified Performance Deficits Dressing, bathing, grooming/hygiene    Clinical Decision Making (Complexity) Low complexity   Therapy Frequency daily   Predicted Duration of Therapy Intervention (days/wks) 1 week    Anticipated Discharge Disposition Other (see comments)  (Previous Nursing home )   Risks and  "Benefits of Treatment have been explained. Yes   Patient, Family & other staff in agreement with plan of care Yes   [facility] AM-PAC TM \"6 Clicks\"   2016, Trustees of [facility], under license to [employer].  All rights reserved.   6 Clicks Short Forms Daily Activity Inpatient Short Form   [facility] AM-PAC  \"6 Clicks\" Daily Activity Inpatient Short Form   1. Putting on and taking off regular lower body clothing? 2 - A Lot   2. Bathing (including washing, rinsing, drying)? 3 - A Little   3. Toileting, which includes using toilet, bedpan or urinal? 3 - A Little   4. Putting on and taking off regular upper body clothing? 2 - A Lot   5. Taking care of personal grooming such as brushing teeth? 3 - A Little   6. Eating meals? 4 - None   Daily Activity Raw Score (Score out of 24.Lower scores equate to lower levels of function) 17   Total Evaluation Time   Total Evaluation Time (Minutes) 10[LC1.1]        Revision History        User Key Date/Time User Provider Type Action    > LC1.1 5/25/2018  3:33 PM Sharron Salomon, OT Occupational Therapist Sign                                                      INTERAGENCY TRANSFER FORM - LAB / IMAGING / EKG / EMG RESULTS   5/24/2018                       [address] [facility] BANK: [phone]            Unresulted Labs (24h ago through future)    Start       Ordered    Unscheduled  Specific gravity urine  CONDITIONAL (SPECIFY),   Routine     Comments:  IF urine output greater than or equal to 300 mL for 3 consecutive hours, obtain urine specific gravity and immediately Notify Provider.    05/24/18 1312    Unscheduled  Potassium  (Potassium Replacement - \"Standard\" - For K levels less than 3.4 mmol/L - UU,UR,UA,RH,SH,PH,WY )  CONDITIONAL (SPECIFY),   Routine     Comments:  Obtain Potassium Level for these conditions:  *IF no potassium result within 24 hours before initiation of order set, draw potassium level with next lab collect.    *2 HOURS AFTER last IV " "potassium replacement dose and 4 hours after an oral replacement dose.  *Next morning after potassium dose.     Repeat Potassium Replacement if necessary.    05/24/18 1313    Unscheduled  Magnesium  (Magnesium Replacement -  Adult - \"Standard\" - Replacement for all levels less than 1.6 mg/dL )  CONDITIONAL (SPECIFY),   Routine     Comments:  Obtain Magnesium Level for these conditions:  *IF no magnesium result within 24 hrs before initiation of order set, draw magnesium level with next lab collect.    *2 HOURS AFTER last magnesium replacement dose when magnesium replacement given for level less than 1.6   *Next morning after magnesium dose.     Repeat Magnesium Replacement if necessary.    05/24/18 1313    Unscheduled  Phosphorus  (POTASSIUM Phosphate - \"Standard\" - Replacement for levels less than or equal to 2.4 mg/dL )  CONDITIONAL (SPECIFY),   Routine     Comments:  Obtain Phosphorus Level for these conditions:  *IF no phosphorus result within 24 hrs before initiation of order set, draw phosphorus level with next lab collect.    *2 HOURS AFTER last phosphorus replacement dose for levels less than 2.0.  *Next morning after phosphorus dose.     Repeat Phosphorus Replacement if necessary.    05/24/18 1313         Lab Results - 3 Days      Glucose by meter [[id number]] (Abnormal)  Resulted: 05/25/18 0708, Result status: Final result    Ordering provider: Emeterio Mesa MD  05/25/18 0656 Resulting lab: POINT OF CARE TEST, GLUCOSE    Specimen Information    Type Source Collected On     05/25/18 0656          Components       Value Reference Range Flag Lab   Glucose 106 70 - 99 mg/dL H 170            Basic metabolic panel [[id number]] (Abnormal)  Resulted: 05/25/18 0521, Result status: Final result    Ordering provider: Dexter Vergara MD  05/24/18 2200 Resulting lab: [facility]    Specimen Information    Type Source Collected On   Blood  05/25/18 042          Components  "      Value Reference Range Flag Lab   Sodium 140 133 - 144 mmol/L  51   Potassium 4.0 3.4 - 5.3 mmol/L  51   Chloride 109 94 - 109 mmol/L  51   Carbon Dioxide 23 20 - 32 mmol/L  51   Anion Gap 9 3 - 14 mmol/L  51   Glucose 116 70 - 99 mg/dL H 51   Urea Nitrogen 4 7 - 30 mg/dL L 51   Creatinine 0.42 0.52 - 1.04 mg/dL L 51   GFR Estimate >90 >60 mL/min/1.7m2  51   Comment:  Non  GFR Calc   GFR Estimate If Black >90 >60 mL/min/1.7m2  51   Comment:  African American GFR Calc   Calcium 8.2 8.5 - 10.1 mg/dL L 51            CBC with platelets [[id number]] (Abnormal)  Resulted: 05/25/18 0507, Result status: Final result    Ordering provider: Dexter Vergara MD  05/24/18 2200 Resulting lab: [facility]    Specimen Information    Type Source Collected On   Blood  05/25/18 0429          Components       Value Reference Range Flag Lab   WBC 11.2 4.0 - 11.0 10e9/L H 51   RBC Count 3.71 3.8 - 5.2 10e12/L L 51   Hemoglobin 9.1 11.7 - 15.7 g/dL L 51   Hematocrit 31.1 35.0 - 47.0 % L 51   MCV 84 78 - 100 fl  51   MCH 24.5 26.5 - 33.0 pg L 51   MCHC 29.3 31.5 - 36.5 g/dL L 51   RDW 20.7 10.0 - 15.0 % H 51   Platelet Count 291 150 - 450 10e9/L  51            Glucose by meter [[id number]] (Abnormal)  Resulted: 05/24/18 0655, Result status: Final result    Ordering provider: Emeterio Mesa MD  05/24/18 0559 Resulting lab: POINT OF CARE TEST, GLUCOSE    Specimen Information    Type Source Collected On     05/24/18 0559          Components       Value Reference Range Flag Lab   Glucose 102 70 - 99 mg/dL H 170            Potassium [[id number]] (Abnormal)  Resulted: 05/24/18 0646, Result status: Final result    Ordering provider: José Luis Mckeon MD  05/24/18 0550 Resulting lab: [facility]    Specimen Information    Type Source Collected On   Blood  05/24/18 0619          Components       Value Reference Range Flag Lab   Potassium 3.3 3.4 -  5.3 mmol/L L 51            INR [[id number]]  Resulted: 05/24/18 0638, Result status: Final result    Ordering provider: José Luis Mckeon MD  05/24/18 0550 Resulting lab: [facility]    Specimen Information    Type Source Collected On   Blood  05/24/18 0619          Components       Value Reference Range Flag Lab   INR 1.14 0.86 - 1.14  51            Hemoglobin [[id number]] (Abnormal)  Resulted: 05/24/18 0632, Result status: Final result    Ordering provider: José Luis Mckeon MD  05/24/18 0550 Resulting lab: [facility]    Specimen Information    Type Source Collected On   Blood  05/24/18 0619          Components       Value Reference Range Flag Lab   Hemoglobin 11.5 11.7 - 15.7 g/dL L 51            Testing Performed By     Lab - Abbreviation Name Director Address Valid Date Range    51 - Unknown [facility] Unknown [address] [facility] [address] 12/31/14 1010 - Present    170 - Unknown POINT OF CARE TEST, GLUCOSE Unknown Unknown 10/31/11 1114 - Present               Imaging Results - 3 Days      CT Head w/o Contrast [[id number]]  Resulted: 05/25/18 0801, Result status: Final result    Ordering provider: Dexter Vergara MD  05/25/18 0006 Resulted by: Jeramie Mathis MD Crawford, Amanda M, MD    Performed: 05/25/18 0438 - 05/25/18 0455 Resulting lab: RADIOLOGY RESULTS    Narrative:       CT HEAD W/O CONTRAST 5/25/2018 4:55 AM    Provided History: Status post cranioplasty    Comparison: CT head 5/7/2018.    Technique: Using multidetector thin collimation helical acquisition  technique, axial, coronal and sagittal CT images from the skull base  to the vertex were obtained without intravenous contrast.     Findings:    New post surgical changes of right hemispheric cranioplasty with thin  (5 mm thickness) extra axial fluid collections adjacent to the bony  defect. Right scalp subcutaneous drain  without definite residual fluid  collection.    Stable chronic right middle cerebral and anterior cerebral artery  territory infarcts with associated dystrophic calcification and ex  vacuo dilatation of the right lateral ventricle and approximately 6 mm  rightward midline shift. No new loss of gray-white matter  differentiation. No intracranial hemorrhage or mass effect.    Paranasal sinuses and mastoid air cells are clear.      Impression:       Impression:   1. Expected post surgical changes of right hemispheric cranioplasty  within extra axial fluid collection subjacent to the craniotomy  defect.  2. Stable chronic right middle cerebral and anterior cerebral artery  territory infarcts.  I have personally reviewed the examination and initial interpretation  and I agree with the findings.    TYLER QUIROGA MD      Testing Performed By     Lab - Abbreviation Name Director Address Valid Date Range    104 - Rad Rslts RADIOLOGY RESULTS Unknown Unknown 02/16/05 1553 - Present            Encounter-Level Documents:     There are no encounter-level documents.      Order-Level Documents:     There are no order-level documents.

## 2018-05-24 NOTE — LETTER
Transition Communication Hand-off for Care Transitions to Next Level of Care Provider    Name: Maggie Case  : 1990  MRN #: [id number]  Primary Care Provider: Chapo Flores     Primary Clinic: [facility] [place] [address] [place] [address]  [place] [address]     Reason for Hospitalization:  History of cranioplasty [Z98.890]  Admit Date/Time: 2018  5:13 AM  Discharge Date: 18  Payor Source: Payor: [facility] / Plan: [employer] [place] / Product Type: HMO /              Reason for Communication Hand-off Referral:     Discharge Plan:    Social Work Services Discharge Note     Patient Name:  Maggie Case     Anticipated Discharge Date:  18     [facility]charge [facility]   [facility] ([phone])     Following MD:  Facility assignment     Pre-Admission Screening (PAS) online form has been completed.  The Level of Care (LOC) is:  Determined  Confirmation Code is:  Not required as pt is returning to the SNF of origin and bed was held.        Additional Services/Equipment Arranged:  HERNAN confirmed readiness for discharge with Lacey Segura NP Neuro Surgery.  SW confirmed acceptance for return admit to [facility] with Admissions (Shilpa).  Shilpa states that pt is a short term rehab pt, has a bed hold and they are working on placing pt in an Adult [facility] upon completion of her rehab stay.   Pt requested that HERNAN utilize [facility] ([phone]) for her return transport.  SW phoned [facility] and spoke with Patrick who indicates that they are not available to provide transport on 18.  Thus, HERNAN arranged for [facility] (Paolo [phone]) to provide w/c transport on 18 at 9:30am.  SW updated pt and parents.     Patient / Family response to discharge plan:  Pt and parents voice understanding of the discharge plan and agreement with the discharge plan.       Persons notified of above discharge plan:  Pt, parents, 6A nursing and Lacey Segura NP     Staff Discharge Instructions:  Please fax  discharge orders and signed hard scripts for any controlled substances.  Please print a packet and send with patient.      CTS Handoff completed:  YES     Medicare Notice of Rights provided to the patient/family:  NO, as per Admissions Facesheet, pt is not on Medicare.     RE Butler  Social Work, 6A  Phone:  [phone]  Pager:  [phone]  5/25/2018

## 2018-05-24 NOTE — ANESTHESIA CARE TRANSFER NOTE
Patient: Maggie CORMIER Evie    Procedure(s):  Right Cranioplasty  - Wound Class: I-Clean    Diagnosis: Cerebral Vascular Accident  Diagnosis Additional Information: No value filed.    Anesthesia Type:   General, ETT     Note:  Airway :Face Mask  Patient transferred to:PACU  Comments: Transferred to PACU, report given to RN.  VSS.  Headache.  Improving with additional analgesia.  Handoff Report: Identifed the Patient, Identified the Reponsible Provider, Reviewed the pertinent medical history, Discussed the surgical course, Reviewed Intra-OP anesthesia mangement and issues during anesthesia, Set expectations for post-procedure period and Allowed opportunity for questions and acknowledgement of understanding      Vitals: (Last set prior to Anesthesia Care Transfer)    CRNA VITALS  5/24/2018 1047 - 5/24/2018 1127      5/24/2018             Pulse: 97    ART BP: 124/61    ART Mean: 93    SpO2: 99 %                Electronically Signed By: Baljit Gabriel MD  May 24, 2018  11:27 AM

## 2018-05-24 NOTE — BRIEF OP NOTE
[facility], [place]    Brief Operative Note    Pre-operative diagnosis: Cerebral Vascular Accident  Post-operative diagnosis Cerebral Vascular Accident  Procedure: Procedure(s):  Right Cranioplasty  - Wound Class: I-Clean  Surgeon: Surgeon(s) and Role:     * Emeterio Mesa MD - Primary     * Nevin Thompson MD - Resident - Assisting  Anesthesia: General   Estimated blood loss: 100 ml  Drains:VIKRAM ftsin  Specimens: * No specimens in log *  Findings:   None.  Complications: None.  Implants: Native skull, right sided bone flap.

## 2018-05-24 NOTE — OR NURSING
Pt resides at [facility] since march/2018.  Phone number [phone].  Pt here with mom and dad today.

## 2018-05-24 NOTE — PLAN OF CARE
Problem: Patient Care Overview  Goal: Plan of Care/Patient Progress Review  Outcome: No Change  D/I/A: Pt on 4A Neuro/Surgical ICU s/p native skull R sided bone flap implant.  PMH includes: severe R carotid stroke 2/2 PE in PFO setting, myeloproliferative disease, GERD, anxiety, depression, GI bleed, ETOH/amphetamine use, pulmonary nodules, smoker.    Neuro: A/O x4.  +1/5 LUE, +2/5 LLE.  +4 RUE/RLE.  Spasms at night to LLE.  L facial droop.    CV: NSR, inverted T-wave.  BP WNL.  A-line removed from L radial wrist, covered with pressure dressing.    Resp: Refusing EtCO2 monitoring.  LS clear, room air.    GI: Tolerating regular diet with adequate PO.    : Maria in place with adequate UOP.    Skin: Crani incisions covered with primapore, reinforced x1.  VIKRAM to bulb suction, clarified with NSG.  Notified of large OP in 2 hrs, 78cc sanguinous.    Gtts: NS at 100/hr.   Plan: Transfer to  when bed available.

## 2018-05-25 ENCOUNTER — APPOINTMENT (OUTPATIENT)
Dept: PHYSICAL THERAPY | Facility: CLINIC | Age: 28
DRG: 516 | End: 2018-05-25
Attending: NEUROLOGICAL SURGERY
Payer: COMMERCIAL

## 2018-05-25 ENCOUNTER — APPOINTMENT (OUTPATIENT)
Dept: OCCUPATIONAL THERAPY | Facility: CLINIC | Age: 28
DRG: 516 | End: 2018-05-25
Attending: NEUROLOGICAL SURGERY
Payer: COMMERCIAL

## 2018-05-25 ENCOUNTER — APPOINTMENT (OUTPATIENT)
Dept: CT IMAGING | Facility: CLINIC | Age: 28
DRG: 516 | End: 2018-05-25
Attending: NEUROLOGICAL SURGERY
Payer: COMMERCIAL

## 2018-05-25 LAB
ANION GAP SERPL CALCULATED.3IONS-SCNC: 9 MMOL/L (ref 3–14)
BUN SERPL-MCNC: 4 MG/DL (ref 7–30)
CALCIUM SERPL-MCNC: 8.2 MG/DL (ref 8.5–10.1)
CHLORIDE SERPL-SCNC: 109 MMOL/L (ref 94–109)
CO2 SERPL-SCNC: 23 MMOL/L (ref 20–32)
CREAT SERPL-MCNC: 0.42 MG/DL (ref 0.52–1.04)
ERYTHROCYTE [DISTWIDTH] IN BLOOD BY AUTOMATED COUNT: 20.7 % (ref 10–15)
GFR SERPL CREATININE-BSD FRML MDRD: >90 ML/MIN/1.7M2
GLUCOSE BLDC GLUCOMTR-MCNC: 106 MG/DL (ref 70–99)
GLUCOSE SERPL-MCNC: 116 MG/DL (ref 70–99)
HCT VFR BLD AUTO: 31.1 % (ref 35–47)
HGB BLD-MCNC: 9.1 G/DL (ref 11.7–15.7)
MCH RBC QN AUTO: 24.5 PG (ref 26.5–33)
MCHC RBC AUTO-ENTMCNC: 29.3 G/DL (ref 31.5–36.5)
MCV RBC AUTO: 84 FL (ref 78–100)
PLATELET # BLD AUTO: 291 10E9/L (ref 150–450)
POTASSIUM SERPL-SCNC: 4 MMOL/L (ref 3.4–5.3)
RBC # BLD AUTO: 3.71 10E12/L (ref 3.8–5.2)
SODIUM SERPL-SCNC: 140 MMOL/L (ref 133–144)
WBC # BLD AUTO: 11.2 10E9/L (ref 4–11)

## 2018-05-25 PROCEDURE — 97535 SELF CARE MNGMENT TRAINING: CPT | Mod: GO | Performed by: OCCUPATIONAL THERAPIST

## 2018-05-25 PROCEDURE — 25000132 ZZH RX MED GY IP 250 OP 250 PS 637: Performed by: STUDENT IN AN ORGANIZED HEALTH CARE EDUCATION/TRAINING PROGRAM

## 2018-05-25 PROCEDURE — 97165 OT EVAL LOW COMPLEX 30 MIN: CPT | Mod: GO | Performed by: OCCUPATIONAL THERAPIST

## 2018-05-25 PROCEDURE — 70450 CT HEAD/BRAIN W/O DYE: CPT

## 2018-05-25 PROCEDURE — 97530 THERAPEUTIC ACTIVITIES: CPT | Mod: GP

## 2018-05-25 PROCEDURE — 85027 COMPLETE CBC AUTOMATED: CPT | Performed by: STUDENT IN AN ORGANIZED HEALTH CARE EDUCATION/TRAINING PROGRAM

## 2018-05-25 PROCEDURE — 40000193 ZZH STATISTIC PT WARD VISIT

## 2018-05-25 PROCEDURE — 40000133 ZZH STATISTIC OT WARD VISIT: Performed by: OCCUPATIONAL THERAPIST

## 2018-05-25 PROCEDURE — 97161 PT EVAL LOW COMPLEX 20 MIN: CPT | Mod: GP

## 2018-05-25 PROCEDURE — 12000008 ZZH R&B INTERMEDIATE UMMC

## 2018-05-25 PROCEDURE — 36415 COLL VENOUS BLD VENIPUNCTURE: CPT | Performed by: STUDENT IN AN ORGANIZED HEALTH CARE EDUCATION/TRAINING PROGRAM

## 2018-05-25 PROCEDURE — 80048 BASIC METABOLIC PNL TOTAL CA: CPT | Performed by: STUDENT IN AN ORGANIZED HEALTH CARE EDUCATION/TRAINING PROGRAM

## 2018-05-25 PROCEDURE — 25000128 H RX IP 250 OP 636: Performed by: STUDENT IN AN ORGANIZED HEALTH CARE EDUCATION/TRAINING PROGRAM

## 2018-05-25 PROCEDURE — 97116 GAIT TRAINING THERAPY: CPT | Mod: GP

## 2018-05-25 PROCEDURE — 00000146 ZZHCL STATISTIC GLUCOSE BY METER IP

## 2018-05-25 PROCEDURE — 25000128 H RX IP 250 OP 636: Performed by: NURSE PRACTITIONER

## 2018-05-25 RX ORDER — OXYCODONE HYDROCHLORIDE 10 MG/1
10 TABLET ORAL EVERY 4 HOURS PRN
Qty: 80 TABLET | Refills: 0 | Status: CANCELLED | OUTPATIENT
Start: 2018-05-25

## 2018-05-25 RX ORDER — HYDROMORPHONE HYDROCHLORIDE 1 MG/ML
.3-.5 INJECTION, SOLUTION INTRAMUSCULAR; INTRAVENOUS; SUBCUTANEOUS EVERY 6 HOURS PRN
Status: DISCONTINUED | OUTPATIENT
Start: 2018-05-25 | End: 2018-05-25

## 2018-05-25 RX ADMIN — GABAPENTIN 200 MG: 100 CAPSULE ORAL at 07:59

## 2018-05-25 RX ADMIN — HYDROXYZINE HYDROCHLORIDE 25 MG: 25 TABLET ORAL at 08:07

## 2018-05-25 RX ADMIN — HYDROXYZINE HYDROCHLORIDE 25 MG: 25 TABLET ORAL at 19:41

## 2018-05-25 RX ADMIN — OXYCODONE HYDROCHLORIDE 15 MG: 5 TABLET ORAL at 07:59

## 2018-05-25 RX ADMIN — OXYCODONE HYDROCHLORIDE 10 MG: 10 TABLET ORAL at 18:41

## 2018-05-25 RX ADMIN — OXYCODONE HYDROCHLORIDE 15 MG: 5 TABLET ORAL at 11:10

## 2018-05-25 RX ADMIN — ACETAMINOPHEN 975 MG: 325 TABLET, FILM COATED ORAL at 14:37

## 2018-05-25 RX ADMIN — OXYCODONE HYDROCHLORIDE 15 MG: 5 TABLET ORAL at 14:38

## 2018-05-25 RX ADMIN — FLUOXETINE HYDROCHLORIDE 30 MG: 20 SOLUTION ORAL at 08:00

## 2018-05-25 RX ADMIN — SENNOSIDES AND DOCUSATE SODIUM 2 TABLET: 8.6; 5 TABLET ORAL at 08:00

## 2018-05-25 RX ADMIN — HYDROMORPHONE HYDROCHLORIDE 0.5 MG: 1 INJECTION, SOLUTION INTRAMUSCULAR; INTRAVENOUS; SUBCUTANEOUS at 09:16

## 2018-05-25 RX ADMIN — OXYCODONE HYDROCHLORIDE 15 MG: 5 TABLET ORAL at 03:48

## 2018-05-25 RX ADMIN — HYDROMORPHONE HYDROCHLORIDE 0.5 MG: 1 INJECTION, SOLUTION INTRAMUSCULAR; INTRAVENOUS; SUBCUTANEOUS at 07:00

## 2018-05-25 RX ADMIN — ACETAMINOPHEN 975 MG: 325 TABLET, FILM COATED ORAL at 04:22

## 2018-05-25 RX ADMIN — HYDROMORPHONE HYDROCHLORIDE 0.5 MG: 1 INJECTION, SOLUTION INTRAMUSCULAR; INTRAVENOUS; SUBCUTANEOUS at 04:30

## 2018-05-25 RX ADMIN — SENNOSIDES AND DOCUSATE SODIUM 2 TABLET: 8.6; 5 TABLET ORAL at 19:41

## 2018-05-25 RX ADMIN — GABAPENTIN 200 MG: 100 CAPSULE ORAL at 19:41

## 2018-05-25 RX ADMIN — HYDROMORPHONE HYDROCHLORIDE 0.5 MG: 1 INJECTION, SOLUTION INTRAMUSCULAR; INTRAVENOUS; SUBCUTANEOUS at 16:17

## 2018-05-25 RX ADMIN — FERROUS SULFATE TAB 325 MG (65 MG ELEMENTAL FE) 325 MG: 325 (65 FE) TAB at 08:00

## 2018-05-25 NOTE — PLAN OF CARE
"Problem: Patient Care Overview  Goal: Plan of Care/Patient Progress Review    BP 95/62 (BP Location: Left arm)  Temp 96.4  F (35.8  C) (Oral)  Resp 16  Ht 1.6 m (5' 3\")  Wt 61.8 kg (136 lb 3.9 oz)  LMP 05/24/2010  SpO2 94%  BMI 24.13 kg/m2     1019-3760: A&Ox4 except lethargic this afternoon. VSS on RA except soft BPs, capno WDL, removed this afternoon. POD #2 of crani, primapore head dressing CDI, VIKRAM draining serosang output, plan to be pulled out tomorrow prior to D/C. Neuros: left facial droop from prior stroke, LUE 1/5, LLE 2/5, RUE 4/5, RLE 4/5, numbness to LUE & LLE. Head pain managed with scheduled Tylenol, PRN Oxy, & IV dilaudid. PRN atarax given for itching. Denies nausea, chest pain, or difficulty breathing. K+ stable this morning at 4.0. Right PIV SL'd. Tolerating regular diet & PO fluids. Maria removed around 0700, BS at 1200 for 92 mL, no void since. No BM on this shift, scheduled stool softeners given this morning. Worked with OT this morning, up with assist x2. Plan to D/C back to facility tomorrow morning, transportation through [facility] set up for 0930, pt aware. Continue to monitor & follow POC.      Addendum at 1515: Pt up with assist x2 with walker & gait belt to bathroom, voided but missed the hat.   "

## 2018-05-25 NOTE — PROGRESS NOTES
May 25, 2018    Dear Maggie Case,     This letter is about your surgeries on 2/18/2018 and 5/24/2018 for which you received general anesthesia at the [facility].  The goal of this letter is to give you information about endotracheal intubation of the difficult airway and what this means for you should you need surgery again in the future.      With general anesthesia, after you are asleep, a breathing tube, known as an endotracheal tube, is placed in your windpipe to help you breath during surgery.  Occasionally, the placement of this breathing tube is difficult.  This is known as a difficult airway.  In your case, an alternative method of placeing the breathing tube was required as it was unable to be placed in the usual manner.  You have a difficult airway.      Having a difficult airway does not mean that you cannot have surgery, in fact, it is rarely a problem if anticipated before undergoing anesthesia.  For this reason, it is important that you inform your anesthesiologist about having a difficult airway before any future surgeries.  They will know what this means for getting the breathing tube in place comfortably and safely.  I would recommend showing them this letter.      02/18/18; 2005; Mask Ventilation: Difficult (Two handed mask with OA.); Ease of Intubation: Difficult (CMAC 3 GIII view, esophageal intubation by ANA CRISTINA Montes De Oca 2 attempt by Dr. Gastelum. Unable to visualize. CMAC 3by Dr. Gastelum bougie passed. 7.0 passed over ETT.); Airway Size: 7;  Cuffed;  Oral;  Blade Type: C-Mac;  Blade Size: 3;  Place by: Dr. Gastelum;  Insertion Attempts: 3;  Secured at (cm)to lip: 21 cm;  Breath Sounds: Equal, clear and bilateral;  End Tidal CO2: Present;  Dentition: Intact, Unchanged;  Grade View of Cords: 3;  Airway Adjuncts:  C-Mac    05/24/18; 0757; Mask Ventilation: Easy (Easy one hand mask without oral airway); Ease of Intubation: Difficult (Jaw lift required for insertion of D blade, indirect G2A  view of cords however required multiple repositioning of ETT stylet to secure airway); Airway Size: 7;  Cuffed;  Oral;  Blade Type: C-Mac;  Blade Size: Other (comment) (D);  Insertion Attempts: 1;  Secured at (cm)to lip: 21 cm;  Breath Sounds: Equal, clear and bilateral;  End Tidal CO2: Present;  Dentition: Lips/Oral mucosa injury (Small laceration to R upper lip);  Grade View of Cords: 2 (2A indirect)    I wish you a speedy recovery!    Baljit Gabriel MD  Resident Physician   Department of Anesthesiology  [facility]

## 2018-05-25 NOTE — PROGRESS NOTES
Neurosurgery Progress Note    S: Incisional pain.    O:  Exam:  General: Awake;  Alert, In No Acute Distress  Pulm: Breathing Comfortably on room air  Mental status: Oriented x 3  Cranial Nerves: Cranial Nerves II-XII Grossly Intact Bilaterally except left facial droop (baseline)  Strength: 5/5 on the right, 2/5 left deltoid and throughout left lower extremity, otherwise 0/5 in left upper extremity  Pronator Drift: Absent  Sensory: Intact to Light Touch  Reflexes: No Hyperreflexia, Estrada s or Clonus Present; Toes Down-Going Bilaterally  INCISION: right temporal incision with primapore dressing in place     A: Doing well post-operatively.     P:  Operation: Status post right cranioplasty; Sutures out: 6/7  Drains: subgaleal VIKRAM drain, record output   Imaging: CT head: completed  Pain: pain controlled with IV and PO medication   Blood pressure goals: SBP < 140   Diet: advance diet as tolerated   Hematological goals: Platelets > 100k, INR < 1.5, Hemoglobin > 8   PT/OT: pending  DVT prophylaxis: Sequential compression devices  Ulcer prophylaxis: none indicated  DISPO:  Anticipate D/C Home 5/26  Barriers: Surgical drain, evaluation by therapies, voiding without a Maria, tolerating PO diet, pain controlled on PO medications    -----------------------------------  Abilio Crawford MD, MS  Neurosurgery PGY-1

## 2018-05-25 NOTE — CONSULTS
Cardiology Consult                                                               May 25, 2018  Maggie Case MRN: [id number]  Age: 28 year old, : 1990        Reason for consult:      Address the role of PFO closure        Assessment and Recommendation:     28-year-old lady wit ha history of unprovoked PE in 2015, on short term AC until 2016, large ischemic right MCA stroke in 2018 s/p R hemicraniectomy and s/p cranioplasty (2018). Cardiology was consulted to address the role of PFO closure.    Recommendations:  - According to the RoPE (Risk of Paradoxical Embolism) score which guides statistical relatedness of PFO to stroke etiology in patients with cryptogenic stroke. Her score is 7, correlated with 72% chance that stroke is due to PFO and 6% risk of 2 year recurrence of stroke/TIA. Due to this risk, we would defer to the structural team to assess the need of PFO closure. She will be seen in the Cardiology Clinic. We will arrange the appointment for her.   - We recommend keeping the patient on anticoagulation given her history of PE and large ischemic stroke.   - Patient also needs outpatient follow up with hematology team to further address the etiology of her hypercoagulable state.     Patient discussed with staff attending, Dr. Lopez and the note reflects our joint plan. Thank you for consulting the cardiovascular services at the [facility]. Please do not hesitate to call with questions or concerns.     Phyllis Weiner MD  PGY-1 Internal Medicine  Pager [phone]        History of Present Illness:     28-year-old lady with a history of alcoholism, unprovoked PE in 2015, on short term AC until 2016, large right MCA stroke in 2018. CT head that time showed occlusion of the right middle cerebral artery and rt right anterior cerebral artery territory infarction. She underwent R hemicraniectomy to allow her brain expansion  (02/2018).  Other stroke work up at that time revealed 50% stenosis of the proximal right internal carotid artery, PFO on echo with negative Doppler of all 4 extremities. Heme/onc was consulted during that hospitalization suggesting the distribution of the stroke was more consistent with a thrombotic etiology and less likely to be embolic from VTE through PFO.     Other work-up for hypercoagulable state as below:  - Arterial clot: negative Lupus anticoagulant/beta 2 glycoprotein 1 antibody/JAK2,CALR, or MPL mutation   - Venous clot: negative Factor 2/factor 5 mutation. Normal protein C, protein S, AT III level    She is now admitted for cranioplasty which was done on 5/24.     This has been her only stroke. She did smoke after the stroke, last time was on 5/19. She is now motivated to quit smoking and take a better care of herself.         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 difficile colitis     Acute ischemic stroke (H)     History of cranioplasty         Past Surgical History:      Past Surgical History:   Procedure Laterality Date     CLOSED REDUCTION, PERCUTANEOUS PINNING LOWER EXTREMITY, COMBINED Right 4/6/2016    Procedure: COMBINED CLOSED REDUCTION, PERCUTANEOUS PINNING LOWER EXTREMITY;  Surgeon: Dexter Jones MD;  Location: [facility]     COLONOSCOPY       CRANIECTOMY Right 2/18/2018    Procedure: CRANIECTOMY;  RIGHT HEMICRANIECTOMY;  Surgeon: Emeterio Mesa MD;  Location: [facility] OR     CRANIOPLASTY Right 5/24/2018    Procedure:  CRANIOPLASTY;  Right Cranioplasty ;  Surgeon: Emeterio Mesa MD;  Location: [facility] OR     UPPER GI ENDOSCOPY           Social History:     Social History     Social History     Marital status: Single     Spouse name: N/A     Number of children: N/A     Years of education: N/A     Occupational History     Not on file.     Social History Main Topics     Smoking status: Current Every Day Smoker     Packs/day: 0.25     Years: 7.00     Types: Cigarettes     Smokeless tobacco: Never Used     Alcohol use 0.0 oz/week     0 Standard drinks or equivalent per week      Comment: 1-2/week ,      Drug use: Yes     Special: Marijuana      Comment: adderall, yest     Sexual activity: Yes     Partners: Male     Other Topics Concern     Not on file     Social History Narrative         Family History:     History reviewed. No pertinent family history.      Allergies:     Allergies   Allergen Reactions     Amoxicillin Other (See Comments)     Headaches     Levaquin [Levofloxacin] Swelling     Naproxen Other (See Comments)     Reaction: Headaches     Nickel      Tramadol      Sulindac Rash         Medications:       No current facility-administered medications on file prior to encounter.   Current Outpatient Prescriptions on File Prior to Encounter:  acetaminophen (TYLENOL) 32 mg/mL solution 20.3 mLs (650 mg) by Per G Tube route every 4 hours as needed for mild pain or fever (Patient taking differently: Take 650 mg by mouth every 4 hours as needed for mild pain or fever )   cyanocobalamin (VITAMIN  B-12) 1000 MCG tablet Take 1,000 mcg by mouth daily   ferrous sulfate (IRON) 325 (65 Fe) MG tablet Take by mouth daily (with breakfast)   FLUoxetine (PROZAC) 20 MG/5ML solution 5 mLs (20 mg) by Per G Tube route daily (Patient taking differently: Take 30 mg by mouth daily )   GABAPENTIN PO Take 200 mg by mouth 2 times daily   GABAPENTIN PO Take 300 mg by mouth At Bedtime   HYDROXYZINE HCL PO Take 25 mg by mouth every 6 hours as needed for  itching   MELATONIN PO Take 5 mg by mouth   nicotine (NICODERM CQ) 14 MG/24HR 24 hr patch Place 1 patch onto the skin daily   ondansetron (ZOFRAN) 4 MG tablet 1-2 tablets (4-8 mg) by Per Feeding Tube route every 8 hours as needed for nausea   TRAZODONE HCL PO Take 100 mg by mouth At Bedtime   warfarin (COUMADIN) 5 MG tablet Take 1 tablet (5 mg) by mouth daily (Patient taking differently: Take 4.5 mg by mouth At Bedtime Mon)   Warfarin Sodium (COUMADIN PO) Take 3 mg by mouth At Bedtime Tufes, Wed, Thur,Fri, Sat, Sun   Warfarin Therapy Reminder 1 each continuous prn   Dextromethorphan-guaiFENesin  MG/5ML syrup Take 5 mLs by mouth every 4 hours as needed for cough   lidocaine (LMX4) 4 % kit Apply topically every hour as needed for pain (with VAD insertion or accessing implanted port.)           Physical Exam:     B/P: 95/62, T: 96.4, P: Data Unavailable, R: 16    Wt Readings from Last 4 Encounters:   05/24/18 61.8 kg (136 lb 3.9 oz)   03/01/18 68.6 kg (151 lb 3.8 oz)   01/30/18 61.2 kg (135 lb)   04/28/17 63.5 kg (139 lb 15.9 oz)     Intake/Output Summary (Last 24 hours) at 05/25/18 1407  Last data filed at 05/25/18 1000   Gross per 24 hour   Intake              920 ml   Output             1275 ml   Net             -355 ml     Gen: lying supine on bed, in pain  HEENT: s/p cranioplasty, bandage on the R sided head, drain in place, L facial droop  PULM/THORAX: Clear to auscultation bilaterally, no rales/rhonchi/wheezes  CV: RRR, S1 and S2 appreciated, no extra heart sounds, murmurs or rub auscultated. No JVD  ABD: soft, not tender, no palpable mass  EXT: No peripheral edema  NEURO: AAOx3, motor 1/5 at L UE and L LE        Data:     Labs Reviewed on Admission    Troponin Lab Results   Component Value Date    TROPI <0.015 04/23/2018    TROPI <0.015 04/17/2018    TROPI <0.015 02/17/2018    TROPI  05/07/2016     <0.015  The 99th percentile for upper reference range is 0.045 ug/L.  Troponin values in   the range of  0.045 - 0.120 ug/L may be associated with risks of adverse   clinical events.      TROPI  05/06/2016     <0.015  The 99th percentile for upper reference range is 0.045 ug/L.  Troponin values in   the range of 0.045 - 0.120 ug/L may be associated with risks of adverse   clinical events.       BMP  Recent Labs  Lab 05/25/18 0429 05/24/18 0619     --    POTASSIUM 4.0 3.3*   CHLORIDE 109  --    AVINASH 8.2*  --    CO2 23  --    BUN 4*  --    CR 0.42*  --    *  --      CBC  Recent Labs  Lab 05/25/18 0429 05/24/18 0619   WBC 11.2*  --    RBC 3.71*  --    HGB 9.1* 11.5*   HCT 31.1*  --    MCV 84  --    MCH 24.5*  --    MCHC 29.3*  --    RDW 20.7*  --      --      INR  Recent Labs  Lab 05/24/18 0619   INR 1.14      Hepatic Panel   Lab Results   Component Value Date    AST 21 04/17/2018     Lab Results   Component Value Date    ALT 19 04/17/2018     No results found for: BILICONJ   Lab Results   Component Value Date    BILITOTAL 0.1 04/17/2018     Lab Results   Component Value Date    ALBUMIN 3.1 04/17/2018     Lab Results   Component Value Date    PROTTOTAL 6.8 04/17/2018      Lab Results   Component Value Date    ALKPHOS 92 04/17/2018         Most Recent Imaging:     Echo (2/17/2017)  - Left ventricular function, chamber size, wall motion, and wall thickness are normal.The EF is 55-60%.  - Right ventricular function, chamber size, wall motion, and thickness are normal.  - A patent foramen ovale was demonstrated by agitated saline bubble study .  - The inferior vena cava was normal in size with preserved respiratory variability.  - No pericardial effusion is present.

## 2018-05-25 NOTE — PLAN OF CARE
Problem: Patient Care Overview  Goal: Plan of Care/Patient Progress Review  Discharge Planner PT   Patient plan for discharge: return to facility   Current status: PT eval completed, tx indicated. Pt easily distractible. Performed bed mobility and supine>sit with HOB elevated and SBA-min A. STS with FWW and min-mod A from elevated bed height. Pt ambulated 2 x ~15' with FWW and mod A, ataxic foot placement, numerous LOB requiring inc A to prevent falls. Mod A for toilet transfer. Total A for pericares and min A for standing balance.   Rec staff perform SPT to/from [facility] with A x 2. With AFO and shoes donned.     Barriers to return to prior living situation: medical needs   Recommendations for discharge: return to facility with PT/OT, pt appears to be below baseline for functional mobility and would benefit from continued therapy to safely progress indep mobility.   Rationale for recommendations: see above.        Entered by: Nahed Crowder 05/25/2018 4:26 PM

## 2018-05-25 NOTE — PROGRESS NOTES
05/25/18 1500   Quick Adds   Type of Visit Initial PT Evaluation  (Nahed Crowder, PT, DPT)       Present no   Language english    Living Environment   Lives With facility resident   Living Arrangements extended care facility   Number of Stairs to Enter Home 0   Number of Stairs Within Home 0   Transportation Available family or friend will provide   Living Environment Comment Pt was living in extended care, per [facility] facility working on transfer to group home. no concerns with home set up.   Self-Care   Dominant Hand right   Usual Activity Tolerance good   Current Activity Tolerance poor   Regular Exercise no   Equipment Currently Used at Home cane, quad;walker, javi   Activity/Exercise/Self-Care Comment Pt reports receiving assitance with ADLs at facility    Functional Level Prior   Ambulation 1-->assistive equipment   Transferring 1-->assistive equipment   Toileting 3-->assistive equipment and person   Bathing 3-->assistive equipment and person   Dressing 2-->assistive person   Eating 0-->independent   Communication 0-->understands/communicates without difficulty   Swallowing 0-->swallows foods/liquids without difficulty   Cognition 0 - no cognition issues reported   Fall history within last six months no   Which of the above functional risks had a recent onset or change? ambulation;transferring   Prior Functional Level Comment Reports utilizing cane for mobility,   General Information   Onset of Illness/Injury or Date of Surgery - Date 05/24/18   Referring Physician Dexter Vergara MD   Patient/Family Goals Statement none stated   Pertinent History of Current Problem (include personal factors and/or comorbidities that impact the POC) 28 yr old female with recent hx of stoke. Currently in for cranioplasty.    Precautions/Limitations fall precautions;other (see comments)  (crani precautions )   General Info Comments activity: up with A    Cognitive Status Examination   Orientation  "orientation to person, place and time   Level of Consciousness alert   Follows Commands and Answers Questions 100% of the time   Personal Safety and Judgment impaired   Memory intact   Cognitive Comment Pt with tangential conversation, easily distractable    Pain Assessment   Patient Currently in Pain Yes, see Vital Sign flowsheet   Integumentary/Edema   Integumentary/Edema no deficits were identifed   Posture    Posture Forward head position   Range of Motion (ROM)   ROM Comment WFL  throughout - PROM to L side    Strength   Strength Comments dt prior CVA, <3/5 L UE elbow flex/ext, shoulder flex/ext/abd/add, wrist flex/ext, digit flex/ext, LE 3/5 knee flex/ext; R UE/LE grossly 4/5    Bed Mobility   Bed Mobility Comments HOB elevated, use of R UE, \"walks\" LE to EOB    Transfer Skills   Transfer Comments STS with mod A from elevated height    Gait   Gait Comments donning AFO, with FWW, mod A, dec gait speed, heavy reliance on FWW, dec step length    Balance   Balance Comments not formally assessed, pt with numerous LOB during ambualtion requiring inc A to re-establish and prevent faLLS    Sensory Examination   Sensory Perception no deficits were identified   Coordination   Coordination no deficits were identified   Coordination Comments difficult to assess dt hemiparesis    Muscle Tone   Muscle Tone no deficits were identified   General Therapy Interventions   Planned Therapy Interventions bed mobility training;balance training;gait training;transfer training;strengthening;progressive activity/exercise;home program guidelines   Clinical Impression   Criteria for Skilled Therapeutic Intervention yes, treatment indicated   PT Diagnosis dec functional mobilit y   Influenced by the following impairments PMH, deconditioning, pain, fatigue   Functional limitations due to impairments bed mobility, gait, transfesr, balance, endurance    Clinical Presentation Evolving/Changing   Clinical Presentation Rationale PMH, clinical " "judgement, current mobility    Clinical Decision Making (Complexity) Low complexity   Therapy Frequency` 5 times/week   Predicted Duration of Therapy Intervention (days/wks) 6/1/18   Anticipated Equipment Needs at Discharge (TBD)   Anticipated Discharge Disposition Transitional Care Facility   Risk & Benefits of therapy have been explained Yes   Patient, Family & other staff in agreement with plan of care Yes   Clinical Impression Comments PT eval completed, tx indicated    [facility] AM-PAC TM \"6 Clicks\"   2016, Trustees of [facility], under license to [employer].  All rights reserved.   6 Clicks Short Forms Basic Mobility Inpatient Short Form   [facility] AM-PAC  \"6 Clicks\" V.2 Basic Mobility Inpatient Short Form   1. Turning from your back to your side while in a flat bed without using bedrails? 3 - A Little   2. Moving from lying on your back to sitting on the side of a flat bed without using bedrails? 2 - A Lot   3. Moving to and from a bed to a chair (including a wheelchair)? 3 - A Little   4. Standing up from a chair using your arms (e.g., wheelchair, or bedside chair)? 3 - A Little   5. To walk in hospital room? 2 - A Lot   6. Climbing 3-5 steps with a railing? 1 - Total   Basic Mobility Raw Score (Score out of 24.Lower scores equate to lower levels of function) 14   Total Evaluation Time   Total Evaluation Time (Minutes) 6     "

## 2018-05-25 NOTE — PROGRESS NOTES
D: Bone flap replacement  I/A: Pt A&Ox4, able to make needs known.  LUE 2/5, LLE 2/5, and left sided facial droop @ baseline.  DANIEL.  Sats >90% on RA.  Pt frequently removes EtCO2. VIKRAM drain to bulb suction with serosang drainage.  PRN atarax for anxiety and dilaudid for pain.   P; Continue to monitor and follow plan of care.  Notify team of changes in medical status.  Transferred to , report given to RITESH Galarza.

## 2018-05-25 NOTE — PROGRESS NOTES
Post Operative Day #1 - Anesthesiology Assessment    Patient able to fully cooperative with exam/questioning: Yes  Nausea/Vomiting: no nausea and no vomiting  Emergence agitation: No  Dental: Teeth intact  Post operative pain management: complains of significant post op pain with intermittent minimal relief with dilaudid and oxycodone.  Requests morphine.    Intraoperative recall No  Sore throat: Yes: mild, resolving  Nerve injury: No  Cognition concerns: No  Other comments: Spoke at length about difficult airway.  All questions and concerns addressed.  Patient given difficult airway letter.       Baljit Gabriel MD  Anesthesiology, PGY2  [phone]

## 2018-05-25 NOTE — PLAN OF CARE
Problem: Patient Care Overview  Goal: Plan of Care/Patient Progress Review  Discharge Planner OT   Patient plan for discharge: Return to Nursing facility   Current status:  Facilitated bed mobility from supine <> EOB with MIN A x2. Pt completed STS with MOD A x 2 and FWW for balance. Pt completed functional mobility to bathroom with MIN-MOD A x2 for balance. Pt completed toilet tx with MIN A x2 with grab bar use.  Pt demo's slow gait with small steps. Pt benefited from [place] of L hand on FWW and VC to postioning and actively move L foot. Pt completed florentino cares with SBA. Facilitated sponge bath while seated on toilet with SBA. Facilitated UE/LE dressing within precautions. Pt required MAX A for LE dressing, and MOD A for UE.  Barriers to return to prior living situation: Medical needs, Level of A   Recommendations for discharge: Return to previous TCU  Rationale for recommendations: pt would benefit from intensive therapy to increase IND in ADLs/IADLs.        Entered by: Aditi Mcnair 05/25/2018 12:35 PM

## 2018-05-25 NOTE — PROGRESS NOTES
Social Work Services Progress Note    Hospital Day: 2  Date of Initial Social Work Evaluation:  Not completed as pt admitted from a nursing home  Collaborated with:  Chart review    Data:  Per chart review, pt was residing at [facility] prior to hospitalization.    Intervention:  SW phoned [facility] ([phone]) and left a message for Admissions (Shilpa at ext 5) to call in regards to pt's bed hold status and whether or not pt is a short term or long term resident.    Assessment: Pt was residing at [facility] prior to hospitalization.    Plan:    Anticipated Disposition:  Return to [facility]    Barriers to d/c plan:  Medical readiness    Follow Up:  HERNAN will follow.    RE Butler  Social Work, 6A  Phone:  [phone]  Pager:  [phone]  5/25/2018

## 2018-05-25 NOTE — PLAN OF CARE
Problem: Craniotomy/Craniectomy/Cranioplasty (Adult)  Goal: Signs and Symptoms of Listed Potential Problems Will be Absent, Minimized or Managed (Craniotomy/Craniectomy/Cranioplasty)  Signs and symptoms of listed potential problems will be absent, minimized or managed by discharge/transition of care (reference Craniotomy/Craniectomy/Cranioplasty (Adult) CPG).   Outcome: No Change  Patient transferred from  at 2045. POD1 s/p R cranioplasty. VSS ex soft BP s and patient requiring 1L o2 intermittently, removed at 0500, sats in mid 90's. Capnography in place, patient removes often, IPI 9-10, EtCo2 41-44. Patient c/o head pain relieved with Tylenol, IV Dilaudid, and Oxycodone. Atarax for itching. Neuros include: L facial droop at baseline, LUE 1/5, LLE 2/5, RUE and RLE 4/5, numbness of LUE and LLE. R head incision with primipore dressings, CDI. Head VIKRAM to bulb suction with moderate amount of serosang drainage, (MD sen aware). PIV saline locked. Regular diet. Maria removed at 0700. Head CT completed this AM. Up with A1-2. Continue to monitor and follow POC.

## 2018-05-25 NOTE — PROGRESS NOTES
05/25/18 1000   Quick Adds   Type of Visit Initial Occupational Therapy Evaluation   Living Environment   Lives With facility resident   Living Arrangements extended care facility   Home Accessibility no concerns   Number of Stairs to Enter Home 0   Number of Stairs Within Home 0   Transportation Available family or friend will provide;agency transportation   Living Environment Comment pt reports will be going to group home after D/C from TCU   Self-Care   Dominant Hand right   Usual Activity Tolerance good   Current Activity Tolerance poor   Regular Exercise no   Equipment Currently Used at Home cane, quad;walker, javi   Functional Level Prior   Ambulation 3-->assistive equipment and person   Transferring 3-->assistive equipment and person   Toileting 3-->assistive equipment and person   Bathing 3-->assistive equipment and person   Dressing 2-->assistive person   Eating 0-->independent   Communication 0-->understands/communicates without difficulty   Swallowing 0-->swallows foods/liquids without difficulty   Cognition 0 - no cognition issues reported   Fall history within last six months no   Which of the above functional risks had a recent onset or change? 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    General Information   Onset of Illness/Injury or Date of Surgery - Date 05/24/18   Referring Physician Dr. Dexter Vergara   Patient/Family Goals Statement Return home to child    Additional Occupational Profile Info/Pertinent History of Current Problem 28 yr old female with recent hx of stoke. Currently in for cranioplasty.    Precautions/Limitations fall precautions  (Crani precautions)   General Observations L UE/LE effected d/t stroke   General Info Comments Up with Assist    Cognitive Status Examination   Orientation orientation  to person, place and time;person;place   Level of Consciousness alert   Visual Perception   Visual Perception No deficits were identified   Sensory Examination   Sensory Quick Adds No deficits were identified   Pain Assessment   Patient Currently in Pain Yes, see Vital Sign flowsheet   Integumentary/Edema   Integumentary/Edema no deficits were identifed   Posture   Posture not impaired   Range of Motion (ROM)   ROM Comment L UE completely impaired    Strength   Strength Comments R UE 5/5, L UE completely impaired   Hand Strength   Hand Strength Comments R hand 5/5, L hand 0/5   Muscle Tone Assessment   Muscle Tone Comments Not assessed this date   Coordination   Upper Extremity Coordination Left UE impaired   Functional Limitations Decreased speed;Fine motor ADL performance impaired;Impaired ability to perform bilateral tasks;Object transport impaired;Reach to targets impaired   Coordination Comments pt demo's 1 handed techinque for a funcitonal tasks   Mobility   Bed Mobility Bed mobility skill: Scooting/Bridging   Bed Mobility Skill: Scooting/Bridging   Level of [place]: Scooting/Bridging moderate assist (50% patients effort)   Physical Assist/Nonphysical Assist: Scooting/Bridging 2 persons   Assistive Device: Scooting/Bridging bedrail   Transfer Skill: Sit to Stand   Level of [place]: Sit/Stand minimum assist (75% patients effort)   Physical Assist/Nonphysical Assist: Sit/Stand 2 persons   Transfer Skill: Sit to Stand full weight-bearing   Assistive Device for Transfer: Sit/Stand standard walker   Transfer Skill: Toilet Transfer   Level of [place]: Toilet moderate assist (50% patients effort)   Physical Assist/Nonphysical Assist: Toilet 2 persons   Weight-Bearing Restrictions: Toilet full weight-bearing   Assistive Device standard walker   Balance   Balance Comments pt nervous when ambulating, required Ax2 for steady gait   Bathing   Level of [place] minimum assist (75% patients effort)  "  Physical Assist/Nonphysical Assist 1 person assist;set-up required   Upper Body Dressing   Level of [place]: Dress Upper Body moderate assist (50% patients effort)   Physical Assist/Nonphysical Assist: Dress Upper Body 1 person assist   Lower Body Dressing   Level of [place]: Dress Lower Body maximum assist (25% patients effort)   Physical Assist/Nonphysical Assist: Dress Lower Body 1 person assist;set-up required   Toileting   Level of [place]: Toilet stand-by assist   Physical Assist/Nonphysical Assist: Toilet 1 person assist   Assistive Device grab bars   Grooming   Level of [place]: Grooming minimum assist (75% patients effort)   Physical Assist/Nonphysical Assist: Grooming 1 person assist   Instrumental Activities of Daily Living (IADL)   IADL Comments pt IND at baseline prior to stroke. currently at facility    Activities of Daily Living Analysis   Impairments Contributing to Impaired Activities of Daily Living balance impaired;fear and anxiety;coordination impaired;pain;post surgical precautions;ROM decreased;strength decreased   General Therapy Interventions   Planned Therapy Interventions ADL retraining;IADL retraining;ROM;strengthening   Clinical Impression   Criteria for Skilled Therapeutic Interventions Met yes, treatment indicated   OT Diagnosis Decreased IND in ADLs   Influenced by the following impairments Stroke, cranioplasty    Assessment of Occupational Performance 3-5 Performance Deficits   Identified Performance Deficits Dressing, bathing, grooming/hygiene    Clinical Decision Making (Complexity) Low complexity   Therapy Frequency daily   Predicted Duration of Therapy Intervention (days/wks) 1 week    Anticipated Discharge Disposition Other (see comments)  (Previous Nursing home )   Risks and Benefits of Treatment have been explained. Yes   Patient, Family & other staff in agreement with plan of care Yes   [facility] AM-PAC TM \"6 Clicks\"   2016, Trustees of [place] " "[place], under license to [employer].  All rights reserved.   6 Clicks Short Forms Daily Activity Inpatient Short Form   [facility] AM-PAC  \"6 Clicks\" Daily Activity Inpatient Short Form   1. Putting on and taking off regular lower body clothing? 2 - A Lot   2. Bathing (including washing, rinsing, drying)? 3 - A Little   3. Toileting, which includes using toilet, bedpan or urinal? 3 - A Little   4. Putting on and taking off regular upper body clothing? 2 - A Lot   5. Taking care of personal grooming such as brushing teeth? 3 - A Little   6. Eating meals? 4 - None   Daily Activity Raw Score (Score out of 24.Lower scores equate to lower levels of function) 17   Total Evaluation Time   Total Evaluation Time (Minutes) 10     "

## 2018-05-25 NOTE — PROGRESS NOTES
Social Work Services Discharge Note      Patient Name:  Maggie Case     Anticipated Discharge Date:  5/26/18    Discharge Disposition:   [facility] ([phone])    Following MD:  Facility assignment     Pre-Admission Screening (PAS) online form has been completed.  The Level of Care (LOC) is:  Determined  Confirmation Code is:  Not required as pt is returning to the SNF of origin and bed was held.       Additional Services/Equipment Arranged:  SW confirmed readiness for discharge with Lacey Segura NP Neuro Surgery.  SW confirmed acceptance for return admit to [facility] with Admissions (Shilpa).  Shilpa states that pt is a short term rehab pt, has a bed hold and they are working on placing pt in an [facility] upon completion of her rehab stay.   Pt requested that SW utilize [facility] ([phone]) for her return transport.  SW phoned [facility] and spoke with Patrick who indicates that they are not available to provide transport on 5/26/18.  Thus, SW arranged for [facility] (Paolo [phone]) to provide w/c transport on 5/26/18 at 9:30am.  SW updated pt and parents.     Patient / Family response to discharge plan:  Pt and parents voice understanding of the discharge plan and agreement with the discharge plan.       Persons notified of above discharge plan:  Pt, parents, 6A nursing and Lacey Segura NP    Staff Discharge Instructions:  Please fax discharge orders and signed hard scripts for any controlled substances.  Please print a packet and send with patient.     CTS Handoff completed:  YES    Medicare Notice of Rights provided to the patient/family:  NO, as per Admissions Facesheet, pt is not on Medicare.    RE Butler  Social Work, 6A  Phone:  [phone]  Pager:  [phone]  5/25/2018

## 2018-05-26 VITALS
TEMPERATURE: 99.3 F | OXYGEN SATURATION: 95 % | BODY MASS INDEX: 24.14 KG/M2 | DIASTOLIC BLOOD PRESSURE: 64 MMHG | WEIGHT: 136.24 LBS | HEIGHT: 63 IN | SYSTOLIC BLOOD PRESSURE: 100 MMHG | HEART RATE: 97 BPM | RESPIRATION RATE: 16 BRPM

## 2018-05-26 PROCEDURE — 25000132 ZZH RX MED GY IP 250 OP 250 PS 637: Performed by: STUDENT IN AN ORGANIZED HEALTH CARE EDUCATION/TRAINING PROGRAM

## 2018-05-26 RX ORDER — WARFARIN SODIUM 5 MG/1
5 TABLET ORAL AT BEDTIME
Qty: 30 TABLET | Refills: 0 | Status: SHIPPED | OUTPATIENT
Start: 2018-06-07 | End: 2018-06-24

## 2018-05-26 RX ORDER — WARFARIN SODIUM 5 MG/1
5 TABLET ORAL DAILY
Qty: 30 TABLET | Refills: 0 | Status: SHIPPED | OUTPATIENT
Start: 2018-06-07 | End: 2018-07-13

## 2018-05-26 RX ORDER — OXYCODONE HYDROCHLORIDE 10 MG/1
10 TABLET ORAL
Qty: 40 TABLET | Refills: 0 | Status: SHIPPED | OUTPATIENT
Start: 2018-05-26 | End: 2018-07-13

## 2018-05-26 RX ORDER — AMOXICILLIN 250 MG
1 CAPSULE ORAL 2 TIMES DAILY
Qty: 100 TABLET | Refills: 0 | Status: SHIPPED | OUTPATIENT
Start: 2018-05-26 | End: 2018-06-24

## 2018-05-26 RX ADMIN — ACETAMINOPHEN 975 MG: 325 TABLET, FILM COATED ORAL at 00:13

## 2018-05-26 RX ADMIN — GABAPENTIN 200 MG: 100 CAPSULE ORAL at 08:31

## 2018-05-26 RX ADMIN — ACETAMINOPHEN 975 MG: 325 TABLET, FILM COATED ORAL at 08:29

## 2018-05-26 RX ADMIN — FERROUS SULFATE TAB 325 MG (65 MG ELEMENTAL FE) 325 MG: 325 (65 FE) TAB at 08:29

## 2018-05-26 RX ADMIN — OXYCODONE HYDROCHLORIDE 10 MG: 10 TABLET ORAL at 00:31

## 2018-05-26 RX ADMIN — SENNOSIDES AND DOCUSATE SODIUM 2 TABLET: 8.6; 5 TABLET ORAL at 08:31

## 2018-05-26 RX ADMIN — GABAPENTIN 300 MG: 300 CAPSULE ORAL at 00:14

## 2018-05-26 RX ADMIN — TRAZODONE HYDROCHLORIDE 100 MG: 100 TABLET ORAL at 00:14

## 2018-05-26 RX ADMIN — FLUOXETINE HYDROCHLORIDE 30 MG: 20 SOLUTION ORAL at 08:32

## 2018-05-26 RX ADMIN — POTASSIUM CHLORIDE 20 MEQ: 750 TABLET, EXTENDED RELEASE ORAL at 08:31

## 2018-05-26 RX ADMIN — OXYCODONE HYDROCHLORIDE 10 MG: 10 TABLET ORAL at 08:29

## 2018-05-26 RX ADMIN — OXYCODONE HYDROCHLORIDE 10 MG: 10 TABLET ORAL at 05:15

## 2018-05-26 NOTE — PLAN OF CARE
Problem: Craniotomy/Craniectomy/Cranioplasty (Adult)  Goal: Signs and Symptoms of Listed Potential Problems Will be Absent, Minimized or Managed (Craniotomy/Craniectomy/Cranioplasty)  Signs and symptoms of listed potential problems will be absent, minimized or managed by discharge/transition of care (reference Craniotomy/Craniectomy/Cranioplasty (Adult) CPG).   Alert and oriented x4, with intermittent confusion about time of day and forgetfulness. Pt made repetitive requests. Up with 2 assist for pivot transfers. Reported pain in head, given oxycodone x1 and dilaudid x1. Pt reported minimal relief. Denied nausea, tolerated diet. Voiding without difficulty. VSS, except for elevated temperature (100), next shift alerted to watch closely. VIKRAM patent, moderate amount of serousanguinous drainage. Dressing intact with dried drainage.

## 2018-05-26 NOTE — PROGRESS NOTES
HERNAN faxed DC orders to TCU at 8:45 am today, planned discharge transport at 9:30 am via [facility] to [facility], in [facility].     Carmen ANDERS, MSW  5B  (Medical/Surgical)  Phone: [phone]  Pager: [phone]

## 2018-05-26 NOTE — PROGRESS NOTES
VIKRAM drain removed, cathter tip intact.  Defect closed with single nylon stitch.     Dexter Vergara MD, PhD  Neurosurgery PGY-2

## 2018-05-26 NOTE — PLAN OF CARE
Problem: Patient Care Overview  Goal: Plan of Care/Patient Progress Review  Outcome: No Change  AVSS. Neuros unchanged: lethargic at times (held prn Oxycodone 15mg because pt requested it but then fell asleep when going to take it; Melatonin also held), forgetful, left facial droop from prior stroke, LUE 1/5, LLE 2/5, RUE 4/5, RLE 4/5, and numbness to LUE & LLE. Pt slept well; through the night. VIKRAM to scalp to bulb sxn (verified with MD on previous shifts)   plan for removal this AM with serosang. Drainage. Crani incision intact with sutures   localized edema around right eye and right side of face   Ice packs and laying off that side helpful. Pt at beginning of shift stating poor pain control and upset about IV being discontinued    rounded and discussed with pt; RN needed to remind pt x2 that IV meds were discontinued in order to control pain with PO Oxycodone which lasts longer and needs to be off of IV meds to get out of hospital. Pain moderately controlled with Oxycodone 10mg PRN q 3 hours; pt had atarax x1 but was lethargic after. Needs trays ordered d/t forgetful   had low PO at dinner. VDSP on bedside commode with nikki AxChase. BARBRA SL. Plan is for discharge via [facility] via W/C at 0930 to [facility] ([phone]). Will continue to monitor and follow with POC.

## 2018-05-26 NOTE — PLAN OF CARE
Problem: Patient Care Overview  Goal: Plan of Care/Patient Progress Review  Occupational Therapy Discharge Summary    Reason for therapy discharge:    Discharged to transitional care facility.    Progress towards therapy goal(s). See goals on Care Plan in [facility] electronic health record for goal details.  Goals not met.  Barriers to achieving goals:   discharge from facility.    Therapy recommendation(s):    Continued therapy is recommended.  Rationale/Recommendations:  rec TCU to improve IND with ADLs and maximize independence. .

## 2018-05-26 NOTE — DISCHARGE SUMMARY
[facility] Discharge Summary and Instructions    Maggie Case MRN# [id number]   Age: 28 year old YOB: 1990     Date of Admission:  5/24/2018  Date of Discharge::  5/26/2018  Admitting Physician:  Emeterio Mesa MD  Discharge Physician:  Emeterio Mesa MD          Admission Diagnoses:   History of cranioplasty [Z98.890]          Discharge Diagnosis:   History of cranioplasty [Z98.890]          Procedures:   5/24/2018  Right cranioplasty            Brief History of Illness:   Ms. Case is a 28 year old female with a history of drug and alcohol abuse who presented with right M1 occlusion 2/17/2018.  She underwent decompressive craniectomy 2/18/18 with Dr. Mesa.  She presents for cranioplasty.           Hospital Course:   Patient underwent above-mentioned procedure on 5/24/2018. The operation was uncomplicated and she was admitted to the surgical ICU for routine post operative cares, several hours after she was doing well and was transferred to the neurosurgical floor. On post operative day 2, she was ambulating, voiding without a sylvester, eating a regular diet, pain was well controlled and therefore she was discharged back to the TCU that she came.  Will plan to resume warfarin in 2 weeks.        Exam on Discharge  General: Awake;  Alert, In No Acute Distress  Pulm: Breathing Comfortably on room air  Mental status: Oriented x 3  Cranial Nerves: Cranial Nerves II-XII Grossly Intact Bilaterally except left facial droop (baseline)  Strength: 5/5 on the right, 2/5 left deltoid and throughout left lower extremity, otherwise 0/5 in left upper extremity  Pronator Drift: Absent  Sensory: Intact to Light Touch  Reflexes: No Hyperreflexia, Estrada s or Clonus Present; Toes Down-Going Bilaterally  INCISION: c/d/i          Discharge Medications:     Current Discharge Medication List      START taking these medications    Details   oxyCODONE IR (ROXICODONE) 10 MG tablet Take 1 tablet (10 mg)  by mouth every 3 hours as needed for moderate to severe pain  Qty: 40 tablet, Refills: 0    Associated Diagnoses: History of cranioplasty      senna-docusate (SENOKOT-S;PERICOLACE) 8.6-50 MG per tablet Take 1 tablet by mouth 2 times daily  Qty: 100 tablet, Refills: 0    Associated Diagnoses: History of cranioplasty         CONTINUE these medications which have CHANGED    Details   !! warfarin (COUMADIN) 5 MG tablet Take 1 tablet (5 mg) by mouth daily  Qty: 30 tablet, Refills: 0    Associated Diagnoses: History of pulmonary embolism; Cerebral infarction due to embolism of right middle cerebral artery (H)      !! warfarin (COUMADIN) 5 MG tablet Take 1 tablet (5 mg) by mouth At Bedtime Tufes, Wed, Thur,Fri, Sat, Sun  Qty: 30 tablet, Refills: 0    Associated Diagnoses: History of cranioplasty      Warfarin Therapy Reminder 1 each continuous prn  Qty: 30 each, Refills: 0    Associated Diagnoses: Acute ischemic stroke (H)       !! - Potential duplicate medications found. Please discuss with provider.      CONTINUE these medications which have NOT CHANGED    Details   acetaminophen (TYLENOL) 32 mg/mL solution 20.3 mLs (650 mg) by Per G Tube route every 4 hours as needed for mild pain or fever  Qty: 400 mL    Associated Diagnoses: Muscle pain      cyanocobalamin (VITAMIN  B-12) 1000 MCG tablet Take 1,000 mcg by mouth daily      ferrous sulfate (IRON) 325 (65 Fe) MG tablet Take by mouth daily (with breakfast)      FLUoxetine (PROZAC) 20 MG/5ML solution 5 mLs (20 mg) by Per G Tube route daily  Qty: 150 mL    Associated Diagnoses: Acute ischemic stroke (H)      !! GABAPENTIN PO Take 200 mg by mouth 2 times daily      !! GABAPENTIN PO Take 300 mg by mouth At Bedtime      HYDROXYZINE HCL PO Take 25 mg by mouth every 6 hours as needed for itching      MELATONIN PO Take 5 mg by mouth      ondansetron (ZOFRAN) 4 MG tablet 1-2 tablets (4-8 mg) by Per Feeding Tube route every 8 hours as needed for nausea  Qty: 18 tablet    Associated  Diagnoses: Nausea      TRAZODONE HCL PO Take 100 mg by mouth At Bedtime      Dextromethorphan-guaiFENesin  MG/5ML syrup Take 5 mLs by mouth every 4 hours as needed for cough      lidocaine (LMX4) 4 % kit Apply topically every hour as needed for pain (with VAD insertion or accessing implanted port.)    Associated Diagnoses: Muscle pain       !! - Potential duplicate medications found. Please discuss with provider.      STOP taking these medications       nicotine (NICODERM CQ) 14 MG/24HR 24 hr patch Comments:   Reason for Stopping:                       Discharge Instructions and Follow-Up:   Discharge diet: Regular   Discharge activity: You may advance activity as tolerated. No strenuous exercise or heavy lifting greater than 10 lbs for 4 weeks or until seen and cleared in clinic.   Discharge follow-up: Follow-up with Danielle Strange in 2 weeks for suture removal and wound check    Follow-up with Dr. Mesa in 1 month       Follow-up in Cardiology clinic for evaluation for possible PFO closure.       Wound care: Ok to shower,however no scrubbing of the wound and no soaking of the wound, meaning no bathtubs or swimming pools. Pat dry only. Leave wound open to air.  Sutures are not absorbable and need to be removed in 2 weeks.     Please call if you have:  1. increased pain, redness, drainage, swelling at your incision  2. fevers > 101.5 F degrees  3. with any questions or concerns.  You may reach the Neurosurgery clinic at [phone] during regular work hours. ER at [phone].    and ask for the Neurosurgery Resident on call at [phone], during off hours or weekends.         Discharge Disposition:   Discharged to home

## 2018-05-26 NOTE — PLAN OF CARE
Problem: Patient Care Overview  Goal: Plan of Care/Patient Progress Review  Outcome: No Change  Pt sleepy but oriented this am. VSS, ate 75% breakfast. PIX x1 removed. D/C'd with belongings including her 2 cell phones. Reports pain in scalp incision, received Oxycodone at 0830. Neuros unchanged- see previous note. Wearing pull-up, voided on BSC prior to d/c. Head VIKRAM removed by MD this am.   Left via w/c with [facility] transport at 0945.

## 2018-05-27 NOTE — PLAN OF CARE
Problem: Patient Care Overview  Goal: Plan of Care/Patient Progress Review  Physical Therapy Discharge Summary    Reason for therapy discharge:    Discharged to transitional care facility.    Progress towards therapy goal(s). See goals on Care Plan in [facility] electronic health record for goal details.  Goals not met.  Barriers to achieving goals:   discharge from facility.    Therapy recommendation(s):    Continued therapy is recommended.  Rationale/Recommendations:  safe progression of indep functional mobility.

## 2018-05-28 NOTE — OP NOTE
Procedure Date: 05/24/2018      PREOPERATIVE DIAGNOSIS:  Cerebrovascular accident, status post craniectomy.      POSTOPERATIVE DIAGNOSIS:  Cerebrovascular accident, status post craniectomy.      PROCEDURE PERFORMED:  Right cranioplasty.      ATTENDING SURGEON:  Emeterio Mesa MD      RESIDENT SURGEON:  Nevin Thompson MD      ANESTHESIA:  General.      ESTIMATED BLOOD LOSS:  100 mL.      INDICATIONS FOR PROCEDURE:  Ms. Case is a pleasant 28-year-old female who presented to us initially in 02/2018 with a large right MCA stroke which required an emergent right-sided hemicraniectomy on 02/18/2018 followed by a prolonged hospitalization and recovery.  She still has a left facial droop and significant hemiparesis on the left side with her arm and her leg; however, cognitively, she is doing quite well.  She had significant edema still seen on CT imaging back in March and therefore we had decided to wait to place the bone flap back on at a later time.  We since had a repeat scan which shows that she would now be a good cranioplasty candidate as the swelling has improved.  We discussed the risks, benefits and alternatives with her in great detail, and after this point, she elected to proceed with the above-named surgery.      DESCRIPTION OF PROCEDURE:  After consent was obtained, the patient was brought to the operating theater where general endotracheal anesthesia then ensued.  The patient was moved to the operating table in the supine position and all pressure points were appropriately padded.  The patient's head was placed in a Elder with Elder pins and head mohan and affixed to the bed.  The previous incision was then marked and the head was then prepped and draped in the usual sterile fashion.      After a timeout, a #10 blade was used to carefully incise the scalp through the previous incision.  Hemostasis was achieved using a combination of bipolar cautery where necessary and Eva clips at the skin edges.   Once the skin was incised down to bone, we then utilized monopolar cautery to carefully retract back the skin flap, taking great care to prevent any contact with the underlying brain parenchyma or dura.  The temporalis muscle was then also  away from the dura using monopolar cautery on the coagulation mode and all these were retracted back with fishhooks.  Hemostasis was achieved with a combination of bipolar and monopolar cautery and once the bone edges were cleaned and  from surrounding soft tissue, we then took our native bone flap that had been stored sterilely and placed this at the craniectomy defect.  We confirmed that it fit well with the craniectomy defect that we had exposed and we then proceeded to attach this to the native skull using titanium plates and screws.  Once we were satisfied with this, we then turned our attention towards closing.  We did place a subgaleal drain and fastened this with a 3-0 nylon suture and lastly used 2-0 Vicryl pop-off sutures in a simple interrupted fashion to close the galea followed by 3-0 nylon to close the skin in a simple running fashion.  We cleaned the area with wet-to-dry sponges followed by ChloraPrep and then covered the incision with Primapore dressing.      All sponge counts and needle counts were correct x 2 at the end of the procedure.        Dr. Pacheco was immediately present and available for the entirety of the procedure.         MESSI PACHECO MD       As dictated by MISSY GALEANA MD            D: 2018   T: 2018   MT: NATALY      Name:     NADIR GARCIA   MRN:      [id number]        Account:        [id number]   :      1990           Procedure Date: 2018      Document: [id number]

## 2018-06-15 ENCOUNTER — OFFICE VISIT (OUTPATIENT)
Dept: NEUROSURGERY | Facility: CLINIC | Age: 28
End: 2018-06-15
Payer: COMMERCIAL

## 2018-06-15 VITALS
WEIGHT: 140 LBS | DIASTOLIC BLOOD PRESSURE: 78 MMHG | BODY MASS INDEX: 24.8 KG/M2 | HEIGHT: 63 IN | SYSTOLIC BLOOD PRESSURE: 113 MMHG | HEART RATE: 105 BPM

## 2018-06-15 DIAGNOSIS — Z98.890 HISTORY OF CRANIOPLASTY: ICD-10-CM

## 2018-06-15 DIAGNOSIS — Z91.199 NO-SHOW FOR APPOINTMENT: Primary | ICD-10-CM

## 2018-06-15 DIAGNOSIS — I63.9 CEREBROVASCULAR ACCIDENT (CVA), UNSPECIFIED MECHANISM (H): ICD-10-CM

## 2018-06-15 DIAGNOSIS — Z98.890 POST-OPERATIVE STATE: ICD-10-CM

## 2018-06-15 ASSESSMENT — PAIN SCALES - GENERAL: PAINLEVEL: EXTREME PAIN (9)

## 2018-06-15 NOTE — LETTER
"6/15/2018       RE: Maggie Case  [address]  [facility] [address]     Dear Colleague,    Thank you for referring your patient, Maggie Case, to the [facility] NEUROSURGERY at [facility]. Please see a copy of my visit note below.        NEUROSURGERY WOUND CHECK  Roxi 15, 2018    PROCEDURE:05/24/2018 Donna Mesa  DIAGNOSIS:  Cerebrovascular accident, status post craniectomy.       PROCEDURE PERFORMED:  Right cranioplasty.     INDICATIONS FOR PROCEDURE:  Ms. Case is a pleasant 28-year-old female who presented to us initially in 02/2018 with a large right MCA stroke which required an emergent right-sided hemicraniectomy on 02/18/2018 followed by a prolonged hospitalization and recovery.  She still has a left facial droop and significant hemiparesis on the left side with her arm and her leg; however, cognitively, she is doing quite well.  She had significant edema still seen on CT imaging back in March and therefore we had decided to wait to place the bone flap back on at a later time.  We since had a repeat scan which shows that she would now be a good cranioplasty candidate as the swelling has improved.     HPI:  Maggie Case is a pleasant 28 year old female now 3 weeks status post the above procedure.  The procedure itself was without incident.  She recovered well and was discharged to TCU in good condition.  Since then she is doing okay.  She is a little anxious about suture removal today but otherwise doing well.  Her mother has been trying to get her follow-up appointments with the various clinics arranged but has not been too successful thus far.  We can work on that today.  She presents for routine post op visit    EXAM:  /78 (BP Location: Right arm, Patient Position: Sitting, Cuff Size: Adult Regular)  Pulse 105  Ht 1.6 m (5' 3\")  Wt 63.5 kg (140 lb)  BMI 24.8 kg/m2     Patient arrived more than 45 minutes late to today's visit.  Well developed " well nourished female found seated comfortably in wheelchair.  No apparent distress. She is unaccompanied.  A&O X3.  Mood and affect WNL. Language and fund of knowledge intact.    She has a nicely healing incision in places.  I prepped the wound with chloroprep and cleanly removed nylon sutures at the crown without difficulty.  Anteriorly and posteriorly she has a lot of scabbing, and it looks as though the wound edges are perhaps even a bit necrotic.  She has only been allowed to use dry shampoo, so it is not particularly clean.    ASSESSMENT/PLAN  1. No-show for appointment    2. Cerebrovascular accident (CVA), unspecified mechanism (H)    3. History of cranioplasty    4. Post-operative state      Doing well now 2 weeks sp cranioplasty.   Wound looks great at the crown, it is a little less well healed in other areas .  I removed about a third of the sutures overall.  I am going to have them use real water and shampoo on her scalp minimally twice to 3 times a week and have her return in a week and try again.  I am not going to take out the sutures until I am quite sure we have got good approximation and healing.    Continue activity restrictions until 1 month post op.  May resume warfarin at 2 weeks postop.  That is about now.  Follow up:  Dr. Mesa in 1 month, hematology 2-4 weeks, cardiology 2-4 weeks I made requests for all those appointments today.     It has been a pleasure looking after this very nice patient. We appreciate your confidence in the [facility], Department of Neurosurgery.    Danielle Strange PA-C  [facility]  Department of Neurosurgery  Phone: [phone]  Fax: [phone]

## 2018-06-15 NOTE — MR AVS SNAPSHOT
After Visit Summary   6/15/2018    Maggie Case    MRN: [id number]           Patient Information     Date Of Birth          1990        Visit Information        Provider Department      6/15/2018 1:00 PM Danielle Strange PA-C [facility] Neurosurgery        Today's Diagnoses     No-show for appointment    -  1    Cerebrovascular accident (CVA), unspecified mechanism (H)        History of cranioplasty        Post-operative state           Follow-ups after your visit        Your next 10 appointments already scheduled     Jun 22, 2018 11:30 AM CDT   (Arrive by 11:15 AM)   Return Visit with Danielle Strange PA-C   [facility] Neurosurgery ([facility])    [address]  3rd Floor  [facility] [id number] [phone]            Jul 09, 2018  1:00 PM CDT   (Arrive by 12:45 PM)   New Patient Visit with Teresa Aviles MD   [facility] ([facility])    [address]  [address]  [facility] [facility] [id number] [phone]            Jul 18, 2018  3:00 PM CDT   (Arrive by 2:45 PM)   Return Visit with Emeterio Mesa MD   [facility] Neurosurgery ([facility])    [address]  3rd Floor  [facility] [facility] [id number] [phone]            Jul 23, 2018  5:00 PM CDT   (Arrive by 4:45 PM)   New Patient Visit with Taiwo Farr MD   [facility] ([facility])    [address] [place] [address]  [address]  [facility] [id number] [phone]              Who to contact     Please call your clinic at [phone] to:    Ask questions about your health    Make or cancel appointments    Discuss your medicines    Learn about your test results    Speak to your doctor            Additional Information About Your Visit        Cellu[employer] Information     Cell[employer] is an electronic gateway that provides easy, online access to your medical records. With Cellufun, you can request a clinic  "appointment, read your test results, renew a prescription or communicate with your care team.     To sign up for Excep Appst visit the website at [email or web address]   You will be asked to enter the access code listed below, as well as some personal information. Please follow the directions to create your username and password.     Your access code is: [email or web address]  Expires: 2018 10:33 PM     Your access code will  in 90 days. If you need help or a new code, please contact your [facility] or call [phone] for assistance.        Care EveryWhere ID     This is your Care EveryWhere ID. This could be used by other organizations to access your [facility] medical records  [id number]        Your Vitals Were     Pulse Height BMI (Body Mass Index)             105 1.6 m (5' 3\") 24.8 kg/m2          Blood Pressure from Last 3 Encounters:   06/15/18 113/78   18 100/64   18 99/66    Weight from Last 3 Encounters:   06/15/18 63.5 kg (140 lb)   18 61.8 kg (136 lb 3.9 oz)   18 68.6 kg (151 lb 3.8 oz)              Today, you had the following     No orders found for display         Today's Medication Changes          These changes are accurate as of 6/15/18  2:50 PM.  If you have any questions, ask your nurse or doctor.               These medicines have changed or have updated prescriptions.        Dose/Directions    acetaminophen 32 mg/mL solution   Commonly known as:  TYLENOL   This may have changed:  how to take this   Used for:  Muscle pain        Dose:  650 mg   20.3 mLs (650 mg) by Per G Tube route every 4 hours as needed for mild pain or fever   Quantity:  400 mL   Refills:  0       FLUoxetine 20 MG/5ML solution   Commonly known as:  PROzac   This may have changed:    - how much to take  - how to take this   Used for:  Acute ischemic stroke (H)        Dose:  20 mg   5 mLs (20 mg) by Per G Tube route daily   Quantity:  150 mL   Refills:  0                " Primary Care Provider Office Phone # Fax #    Chapo IAN Flores [phone]       [facility] [place] [address]  [place] [address]        Equal Access to Services     MYLES FLAHERTY : Griselda champagne jose albertoo Sopia, waaxda luqadaha, qaybta kaalmada kaycee, justin hendricks yeniferemma julien laSaritaeder astorga. So [facility] [phone].    ATENCIÓN: Si habla español, tiene a montalvo disposición servicios gratuitos de asistencia lingüística. Llame al [phone].    We comply with applicable federal civil rights laws and [place] laws. We do not discriminate on the basis of race, color, national origin, age, disability, sex, sexual orientation, or gender identity.            Thank you!     Thank you for choosing [facility]  for your care. Our goal is always to provide you with excellent care. Hearing back from our patients is one way we can continue to improve our services. Please take a few minutes to complete the written survey that you may receive in the mail after your visit with us. Thank you!             Your Updated Medication List - Protect others around you: Learn how to safely use, store and throw away your medicines at www.disposemymeds.org.          This list is accurate as of 6/15/18  2:50 PM.  Always use your most recent med list.                   Brand Name Dispense Instructions for use Diagnosis    acetaminophen 32 mg/mL solution    TYLENOL    400 mL    20.3 mLs (650 mg) by Per G Tube route every 4 hours as needed for mild pain or fever    Muscle pain       cyanocobalamin 1000 MCG tablet    vitamin  B-12     Take 1,000 mcg by mouth daily        Dextromethorphan-guaiFENesin  MG/5ML syrup      Take 5 mLs by mouth every 4 hours as needed for cough        ferrous sulfate 325 (65 Fe) MG tablet    IRON     Take by mouth daily (with breakfast)        FLUoxetine 20 MG/5ML solution    PROzac    150 mL    5 mLs (20 mg) by Per G Tube route daily    Acute ischemic stroke (H)       *  GABAPENTIN PO      Take 200 mg by mouth 2 times daily        * GABAPENTIN PO      Take 300 mg by mouth At Bedtime        HYDROXYZINE HCL PO      Take 25 mg by mouth every 6 hours as needed for itching        lidocaine 4 % kit    LMX4     Apply topically every hour as needed for pain (with VAD insertion or accessing implanted port.)    Muscle pain       MELATONIN PO      Take 5 mg by mouth        ondansetron 4 MG tablet    ZOFRAN    18 tablet    1-2 tablets (4-8 mg) by Per Feeding Tube route every 8 hours as needed for nausea    Nausea       oxyCODONE IR 10 MG tablet    ROXICODONE    40 tablet    Take 1 tablet (10 mg) by mouth every 3 hours as needed for moderate to severe pain    History of cranioplasty       senna-docusate 8.6-50 MG per tablet    SENOKOT-S;PERICOLACE    100 tablet    Take 1 tablet by mouth 2 times daily    History of cranioplasty       TRAZODONE HCL PO      Take 100 mg by mouth At Bedtime        * warfarin 5 MG tablet    COUMADIN    30 tablet    Take 1 tablet (5 mg) by mouth daily    History of pulmonary embolism, Cerebral infarction due to embolism of right middle cerebral artery (H)       * warfarin 5 MG tablet    COUMADIN    30 tablet    Take 1 tablet (5 mg) by mouth At Bedtime Tufes, Wed, Thur,Fri, Sat, Sun    History of cranioplasty       * Warfarin Therapy Reminder     30 each    1 each continuous prn    Acute ischemic stroke (H)       * Notice:  This list has 5 medication(s) that are the same as other medications prescribed for you. Read the directions carefully, and ask your doctor or other care provider to review them with you.

## 2018-06-15 NOTE — PROGRESS NOTES
"    NEUROSURGERY WOUND CHECK  Roxi 15, 2018    PROCEDURE:05/24/2018 Donna Mesa  DIAGNOSIS:  Cerebrovascular accident, status post craniectomy.       PROCEDURE PERFORMED:  Right cranioplasty.     INDICATIONS FOR PROCEDURE:  Ms. Case is a pleasant 28-year-old female who presented to us initially in 02/2018 with a large right MCA stroke which required an emergent right-sided hemicraniectomy on 02/18/2018 followed by a prolonged hospitalization and recovery.  She still has a left facial droop and significant hemiparesis on the left side with her arm and her leg; however, cognitively, she is doing quite well.  She had significant edema still seen on CT imaging back in March and therefore we had decided to wait to place the bone flap back on at a later time.  We since had a repeat scan which shows that she would now be a good cranioplasty candidate as the swelling has improved.     HPI:  Maggie Case is a pleasant 28 year old female now 3 weeks status post the above procedure.  The procedure itself was without incident.  She recovered well and was discharged to TCU in good condition.  Since then she is doing okay.  She is a little anxious about suture removal today but otherwise doing well.  Her mother has been trying to get her follow-up appointments with the various clinics arranged but has not been too successful thus far.  We can work on that today.  She presents for routine post op visit    EXAM:  /78 (BP Location: Right arm, Patient Position: Sitting, Cuff Size: Adult Regular)  Pulse 105  Ht 1.6 m (5' 3\")  Wt 63.5 kg (140 lb)  BMI 24.8 kg/m2     Patient arrived more than 45 minutes late to today's visit.  Well developed well nourished female found seated comfortably in wheelchair.  No apparent distress. She is unaccompanied.  A&O X3.  Mood and affect WNL. Language and fund of knowledge intact.    She has a nicely healing incision in places.  I prepped the wound with chloroprep and cleanly removed " nylon sutures at the crown without difficulty.  Anteriorly and posteriorly she has a lot of scabbing, and it looks as though the wound edges are perhaps even a bit necrotic.  She has only been allowed to use dry shampoo, so it is not particularly clean.    ASSESSMENT/PLAN  1. No-show for appointment    2. Cerebrovascular accident (CVA), unspecified mechanism (H)    3. History of cranioplasty    4. Post-operative state      Doing well now 2 weeks sp cranioplasty.   Wound looks great at the crown, it is a little less well healed in other areas .  I removed about a third of the sutures overall.  I am going to have them use real water and shampoo on her scalp minimally twice to 3 times a week and have her return in a week and try again.  I am not going to take out the sutures until I am quite sure we have got good approximation and healing.    Continue activity restrictions until 1 month post op.  May resume warfarin at 2 weeks postop.  That is about now.  Follow up:  Dr. Mesa in 1 month, hematology 2-4 weeks, cardiology 2-4 weeks I made requests for all those appointments today.     It has been a pleasure looking after this very nice patient. We appreciate your confidence in the [facility], Department of Neurosurgery.    Danielle Strange PA-C  [facility]  Department of Neurosurgery  Phone: [phone]  Fax: [phone]      This note was generated using voice recognition software. While edited for content some inaccurate phrasing may be found.

## 2018-06-22 ENCOUNTER — OFFICE VISIT (OUTPATIENT)
Dept: NEUROSURGERY | Facility: CLINIC | Age: 28
End: 2018-06-22
Payer: COMMERCIAL

## 2018-06-22 VITALS
DIASTOLIC BLOOD PRESSURE: 74 MMHG | HEIGHT: 63 IN | SYSTOLIC BLOOD PRESSURE: 114 MMHG | WEIGHT: 139.99 LBS | BODY MASS INDEX: 24.8 KG/M2 | HEART RATE: 86 BPM

## 2018-06-22 DIAGNOSIS — Z98.890 HISTORY OF CRANIOPLASTY: Primary | ICD-10-CM

## 2018-06-22 DIAGNOSIS — Z98.890 POST-OPERATIVE STATE: ICD-10-CM

## 2018-06-22 ASSESSMENT — PAIN SCALES - GENERAL: PAINLEVEL: EXTREME PAIN (9)

## 2018-06-22 NOTE — PROGRESS NOTES
"    NEUROSURGERY WOUND CHECK  June 22, 2018    PROCEDURE:  05/24/2018 Donna Mesa  DIAGNOSIS:  Cerebrovascular accident, status post craniectomy.       PROCEDURE PERFORMED:  Right cranioplasty.     INDICATIONS FOR PROCEDURE:  Ms. Case is a pleasant 28-year-old female who presented to us initially in 02/2018 with a large right MCA stroke which required an emergent right-sided hemicraniectomy on 02/18/2018 followed by a prolonged hospitalization and recovery.  She still has a left facial droop and significant hemiparesis on the left side with her arm and her leg; however, cognitively, she is doing quite well.  She had significant edema still seen on CT imaging back in March and therefore we had decided to wait to place the bone flap back on at a later time.  We since had a repeat scan which shows that she would now be a good cranioplasty candidate as the swelling has improved.     HPI:  Maggie Case is a pleasant 28 year old female now 3 weeks status post the above procedure.  The procedure itself was without incident.  She recovered well and was discharged to TCU in good condition.  Since then she is doing okay.  She is a little anxious about suture removal  but otherwise doing well.  She presents for routine post op visit    EXAM:  /74  Pulse 86  Ht 1.6 m (5' 3\")  Wt 63.5 kg (139 lb 15.9 oz)  BMI 24.8 kg/m2     Patient arrived more than 45 minutes late to today's visit.  Well developed well nourished female found seated comfortably in wheelchair.  No apparent distress. She is unaccompanied.  A&O X3.  Mood and affect WNL. Language and fund of knowledge intact.  Wound looks much better today, I removed the remainder of the sutures.  I applied a small amount of bacitracin at 2 small areas that were irritated from suture removal.  She may shampoo this out at next shampoo.     ASSESSMENT/PLAN  1. History of cranioplasty    2. Post-operative state      Doing well now 3 weeks sp cranioplasty.  Wound very " nicely healed now.  She may continue using shampoo as she is currently doing.  She may begin immersing her head in swimming pool or bathtub etc. when all scabbing is gone.    Continue activity restrictions until 1 month post op.  Follow up:  Dr. Mesa in 1 month postop, hematology 2-4 weeks postop, cardiology 2-4 weeks postop I made requests for all those appointments at last visit.     It has been a pleasure looking after this very nice patient. We appreciate your confidence in the [facility], Department of Neurosurgery.    Danielle Strange PA-C  [facility]  Department of Neurosurgery  Phone: [phone]  Fax: [phone]      This note was generated using voice recognition software. While edited for content some inaccurate phrasing may be found.

## 2018-06-22 NOTE — MR AVS SNAPSHOT
"              After Visit Summary   2018    Maggie Case    MRN: [id number]           Patient Information     Date Of Birth          1990        Visit Information        Provider Department      2018 11:30 AM Danielle Strange PA-C [facility] Neurosurgery        Today's Diagnoses     History of cranioplasty    -  1    Post-operative state           Follow-ups after your visit        Your next 10 appointments already scheduled     2018  3:00 PM CDT   (Arrive by 2:45 PM)   Return Visit with MD NARINDER Larson [facility] Neurosurgery ([facility] [place])    [address] [place] [address] [phone]              Who to contact     Please call your clinic at [phone] to:    Ask questions about your health    Make or cancel appointments    Discuss your medicines    Learn about your test results    Speak to your doctor            Additional Information About Your Visit        MyChart Information     [employer] is an electronic gateway that provides easy, online access to your medical records. With [employer], you can request a clinic appointment, read your test results, renew a prescription or communicate with your care team.     To sign up for [employer] visit the website at [email or web address]   You will be asked to enter the access code listed below, as well as some personal information. Please follow the directions to create your username and password.     Your access code is: [email or web address]  Expires: 2018 10:33 PM     Your access code will  in 90 days. If you need help or a new code, please contact your [facility] or call [phone] for assistance.        Care EveryWhere ID     This is your Care EveryWhere ID. This could be used by other organizations to access your [facility] medical records  [id number]        Your Vitals Were     Pulse Height BMI (Body Mass Index)             86 1.6 m (5' 3\") " 24.8 kg/m2          Blood Pressure from Last 3 Encounters:   06/22/18 114/74   06/15/18 113/78   05/26/18 100/64    Weight from Last 3 Encounters:   06/22/18 63.5 kg (139 lb 15.9 oz)   06/15/18 63.5 kg (140 lb)   05/24/18 61.8 kg (136 lb 3.9 oz)              Today, you had the following     No orders found for display         Today's Medication Changes          These changes are accurate as of 6/22/18  2:46 PM.  If you have any questions, ask your nurse or doctor.               These medicines have changed or have updated prescriptions.        Dose/Directions    acetaminophen 32 mg/mL solution   Commonly known as:  TYLENOL   This may have changed:  how to take this   Used for:  Muscle pain        Dose:  650 mg   20.3 mLs (650 mg) by Per G Tube route every 4 hours as needed for mild pain or fever   Quantity:  400 mL   Refills:  0       FLUoxetine 20 MG/5ML solution   Commonly known as:  PROzac   This may have changed:    - how much to take  - how to take this   Used for:  Acute ischemic stroke (H)        Dose:  20 mg   5 mLs (20 mg) by Per G Tube route daily   Quantity:  150 mL   Refills:  0                Primary Care Provider Office Phone # Fax #    Chapo Flores [phone]       [facility] [address] [place] [id number]        Equal Access to Services     MYLES FLAHERTY AH: Hadii jovany champagne hadmariamo Sopia, waaxda luqadaha, qaybta kaalmajustin edwards. So [facility] [phone].    ATENCIÓN: Si habla español, tiene a montalvo disposición servicios gratuitos de asistencia lingüística. Sd al [phone].    We comply with applicable federal civil rights laws and [place] laws. We do not discriminate on the basis of race, color, national origin, age, disability, sex, sexual orientation, or gender identity.            Thank you!     Thank you for choosing [facility]  for your care. Our goal is always to provide you with excellent care.  Hearing back from our patients is one way we can continue to improve our services. Please take a few minutes to complete the written survey that you may receive in the mail after your visit with us. Thank you!             Your Updated Medication List - Protect others around you: Learn how to safely use, store and throw away your medicines at www.disposemymeds.org.          This list is accurate as of 6/22/18  2:46 PM.  Always use your most recent med list.                   Brand Name Dispense Instructions for use Diagnosis    acetaminophen 32 mg/mL solution    TYLENOL    400 mL    20.3 mLs (650 mg) by Per G Tube route every 4 hours as needed for mild pain or fever    Muscle pain       cyanocobalamin 1000 MCG tablet    vitamin  B-12     Take 1,000 mcg by mouth daily        Dextromethorphan-guaiFENesin  MG/5ML syrup      Take 5 mLs by mouth every 4 hours as needed for cough        ferrous sulfate 325 (65 Fe) MG tablet    IRON     Take by mouth daily (with breakfast)        FLUoxetine 20 MG/5ML solution    PROzac    150 mL    5 mLs (20 mg) by Per G Tube route daily    Acute ischemic stroke (H)       * GABAPENTIN PO      Take 200 mg by mouth 2 times daily        * GABAPENTIN PO      Take 300 mg by mouth At Bedtime        HYDROXYZINE HCL PO      Take 25 mg by mouth every 6 hours as needed for itching        lidocaine 4 % kit    LMX4     Apply topically every hour as needed for pain (with VAD insertion or accessing implanted port.)    Muscle pain       MELATONIN PO      Take 5 mg by mouth        ondansetron 4 MG tablet    ZOFRAN    18 tablet    1-2 tablets (4-8 mg) by Per Feeding Tube route every 8 hours as needed for nausea    Nausea       oxyCODONE IR 10 MG tablet    ROXICODONE    40 tablet    Take 1 tablet (10 mg) by mouth every 3 hours as needed for moderate to severe pain    History of cranioplasty       senna-docusate 8.6-50 MG per tablet    SENOKOT-S;PERICOLACE    100 tablet    Take 1 tablet by mouth 2 times  daily    History of cranioplasty       TRAZODONE HCL PO      Take 100 mg by mouth At Bedtime        * warfarin 5 MG tablet    COUMADIN    30 tablet    Take 1 tablet (5 mg) by mouth daily    History of pulmonary embolism, Cerebral infarction due to embolism of right middle cerebral artery (H)       * warfarin 5 MG tablet    COUMADIN    30 tablet    Take 1 tablet (5 mg) by mouth At Bedtime Tufes, Wed, Thur,Fri, Sat, Sun    History of cranioplasty       * Warfarin Therapy Reminder     30 each    1 each continuous prn    Acute ischemic stroke (H)       * Notice:  This list has 5 medication(s) that are the same as other medications prescribed for you. Read the directions carefully, and ask your doctor or other care provider to review them with you.

## 2018-06-22 NOTE — LETTER
"6/22/2018       RE: Maggie Case  [address]  [facility] [address]     Dear Colleague,    Thank you for referring your patient, Maggie Case, to the [facility] NEUROSURGERY at [facility]. Please see a copy of my visit note below.        NEUROSURGERY WOUND CHECK  June 22, 2018    PROCEDURE:  05/24/2018 Donna Mesa  DIAGNOSIS:  Cerebrovascular accident, status post craniectomy.       PROCEDURE PERFORMED:  Right cranioplasty.     INDICATIONS FOR PROCEDURE:  Ms. Case is a pleasant 28-year-old female who presented to us initially in 02/2018 with a large right MCA stroke which required an emergent right-sided hemicraniectomy on 02/18/2018 followed by a prolonged hospitalization and recovery.  She still has a left facial droop and significant hemiparesis on the left side with her arm and her leg; however, cognitively, she is doing quite well.  She had significant edema still seen on CT imaging back in March and therefore we had decided to wait to place the bone flap back on at a later time.  We since had a repeat scan which shows that she would now be a good cranioplasty candidate as the swelling has improved.     HPI:  Maggie Case is a pleasant 28 year old female now 3 weeks status post the above procedure.  The procedure itself was without incident.  She recovered well and was discharged to TCU in good condition.  Since then she is doing okay.  She is a little anxious about suture removal  but otherwise doing well.  She presents for routine post op visit    EXAM:  /74  Pulse 86  Ht 1.6 m (5' 3\")  Wt 63.5 kg (139 lb 15.9 oz)  BMI 24.8 kg/m2     Patient arrived more than 45 minutes late to today's visit.  Well developed well nourished female found seated comfortably in wheelchair.  No apparent distress. She is unaccompanied.  A&O X3.  Mood and affect WNL. Language and fund of knowledge intact.  Wound looks much better today, I removed the remainder of the " sutures.  I applied a small amount of bacitracin at 2 small areas that were irritated from suture removal.  She may shampoo this out at next shampoo.     ASSESSMENT/PLAN  1. History of cranioplasty    2. Post-operative state      Doing well now 3 weeks sp cranioplasty.  Wound very nicely healed now.  She may continue using shampoo as she is currently doing.  She may begin immersing her head in swimming pool or bathtub etc. when all scabbing is gone.    Continue activity restrictions until 1 month post op.  Follow up:  Dr. Mesa in 1 month postop, hematology 2-4 weeks postop, cardiology 2-4 weeks postop I made requests for all those appointments at last visit.     It has been a pleasure looking after this very nice patient. We appreciate your confidence in the [facility], Department of Neurosurgery.    Danielle Strange PA-C  [facility]  Department of Neurosurgery  Phone: [phone]  Fax: [phone]      This note was generated using voice recognition software. While edited for content some inaccurate phrasing may be found.

## 2018-06-24 ENCOUNTER — HOSPITAL ENCOUNTER (EMERGENCY)
Facility: HOSPITAL | Age: 28
Discharge: HOME | End: 2018-06-24
Attending: EMERGENCY MEDICINE | Admitting: EMERGENCY MEDICINE
Payer: COMMERCIAL

## 2018-06-24 VITALS
OXYGEN SATURATION: 98 % | WEIGHT: 140 LBS | BODY MASS INDEX: 24.8 KG/M2 | DIASTOLIC BLOOD PRESSURE: 65 MMHG | SYSTOLIC BLOOD PRESSURE: 97 MMHG | TEMPERATURE: 98.5 F | HEIGHT: 63 IN

## 2018-06-24 DIAGNOSIS — R79.1 SUPRATHERAPEUTIC INR: Primary | ICD-10-CM

## 2018-06-24 LAB — INR PPP: 8.43 (ref 0.8–1.2)

## 2018-06-24 PROCEDURE — 25000132 ZZH RX MED GY IP 250 OP 250 PS 637

## 2018-06-24 PROCEDURE — 36415 COLL VENOUS BLD VENIPUNCTURE: CPT | Performed by: EMERGENCY MEDICINE

## 2018-06-24 PROCEDURE — 99283 EMERGENCY DEPT VISIT LOW MDM: CPT

## 2018-06-24 PROCEDURE — 85610 PROTHROMBIN TIME: CPT | Performed by: EMERGENCY MEDICINE

## 2018-06-24 PROCEDURE — 99283 EMERGENCY DEPT VISIT LOW MDM: CPT | Mod: Z6 | Performed by: EMERGENCY MEDICINE

## 2018-06-24 RX ORDER — PHYTONADIONE 5 MG/1
5 TABLET ORAL ONCE
Status: DISCONTINUED | OUTPATIENT
Start: 2018-06-24 | End: 2018-06-24 | Stop reason: HOSPADM

## 2018-06-24 RX ORDER — DULOXETIN HYDROCHLORIDE 60 MG/1
90 CAPSULE, DELAYED RELEASE ORAL DAILY
COMMUNITY
End: 2020-03-06

## 2018-06-24 RX ORDER — LOPERAMIDE HCL 2 MG
4 CAPSULE ORAL ONCE
COMMUNITY
End: 2020-03-06

## 2018-06-24 RX ORDER — GABAPENTIN 300 MG/1
900 CAPSULE ORAL 3 TIMES DAILY
COMMUNITY
End: 2020-03-06

## 2018-06-24 RX ADMIN — Medication 5 MG: at 04:23

## 2018-06-24 NOTE — ED NOTES
Patient states that she went out tonight with a friend and she did have some alcohol to drink.  She states when she got back to the facility, she went back to her room and shortly after that her nurse brought in her medication and said they were going to check her INR. They did check it and she states that they told her her level was 8.  Patient was then transferred here for her abnormal lab.  Upon arrival here, patient is alert and oriented. Provider comes into room and lets patient know that we are going to draw lab to check her INR and that he will be back in once the result is back.

## 2018-06-24 NOTE — ED NOTES
Report is called to Mayra  At [facility].  I told her that we did an INR and the result and that we were giving her PO medication.  She then states that the primary may want toxicology results and I told her that we only did an INR.  Provider notified about a toxicology screen and he states that she is discharged and that we are not running a toxicology screen.

## 2018-06-24 NOTE — ED AVS SNAPSHOT
[facility]    [address] [place] [address]    [place] [address]    Phone:  [phone]                                       Maggie Case   MRN: [id number]    Department:  [facility]   Date of Visit:  6/24/2018           Patient Information     Date Of Birth          1990        Your diagnoses for this visit were:     Supratherapeutic INR        You were seen by Julian Chin MD.      Follow-up Information     Follow up with Chapo Flores MD In 1 day.    Specialty:  Family Practice    Why:  Continuity of care, Re-evaluation    Contact information:    [facility]  [address] [place], [place] [address] [address]  [phone]          Discharge Instructions         First Aid: Bleeding  External bleeding occurs when the body s protective skin is broken. In severe cases, blood loss may place the victim s life in danger. Direct pressure and elevation usually stops bleeding, even the rush of blood from an artery.  Call 911 if you can t stop the bleeding or the victim shows signs of shock.                Step 1. Apply direct pressure    Put on gloves or use other protection to avoid contact with the victim s blood.    Press directly on the wound with a clean gauze patch or cloth.    Keep the pressure constant for at least 5 minutes. This allows the blood to clot (thicken), preventing additional bleeding.    Don't lift the bandage to check on the bleeding until at least 5 minutes have passed.  Step 2. Elevate the injury    Raise the wound above heart level, if possible, to reduce blood flow to the injury.    If a bone is broken, immobilize the joints above and below the break before elevating the wound.  Pinch off nosebleeds  Reassure the person with the nosebleed and tell him or her to do the following:    Pinch the nostrils below the bone.    Tip the person's head slightly forward and sit quietly, maintaining pressure on the nose for at least 5 minutes.    Don't tilt the person's head  backward. This may cause blood to run backward into the person's mouth and throat.    Don't destroy the clot by blowing or rubbing nose after the bleeding stops.  Bleeding: How much is too much?  The victim s age, body size, and overall health all help determine when bleeding becomes serious. Concentrate on the victim s condition and appearance--not on the amount of blood lost. Watch the victim for signs of shock. If any shock symptoms appear, blood loss is a threat to life.  Signs to watch for:    Pale, clammy skin    Racing pulse    Confusion or unconsciousness    Pulsing, spurting bleeding   Date Last Reviewed: 12/1/2016 2000-2017 [employer]. [address] [place] [address], [place] [address]. All rights reserved. This information is not intended as a substitute for professional medical care. Always follow your healthcare professional's instructions.          Your next 10 appointments already scheduled     Jul 18, 2018  3:00 PM CDT   (Arrive by 2:45 PM)   Return Visit with Emeterio Mesa MD   [facility] ([facility])    [address] [place] [address] [phone]                 Review of your medicines      Our records show that you are taking the medicines listed below. If these are incorrect, please call your family doctor or clinic.        Dose / Directions Last dose taken    acetaminophen 32 mg/mL solution   Commonly known as:  TYLENOL   Dose:  650 mg   Quantity:  400 mL        20.3 mLs (650 mg) by Per G Tube route every 4 hours as needed for mild pain or fever   Refills:  0        bismuth subsalicylate 262 MG/15ML suspension   Commonly known as:  PEPTO BISMOL   Dose:  15 mL        Take 15 mLs by mouth every 6 hours as needed for indigestion   Refills:  0        cyanocobalamin 1000 MCG tablet   Commonly known as:  vitamin  B-12   Dose:  1000 mcg        Take 1,000 mcg by mouth daily   Refills:  0         Dextromethorphan-guaiFENesin  MG/5ML syrup   Dose:  5 mL        Take 5 mLs by mouth every 4 hours as needed for cough   Refills:  0        DULOXETINE HCL PO   Dose:  60 mg        Take 60 mg by mouth daily   Refills:  0        ferrous sulfate 325 (65 Fe) MG tablet   Commonly known as:  IRON        Take by mouth daily (with breakfast)   Refills:  0        * GABAPENTIN PO   Dose:  600 mg        Take 600 mg by mouth At Bedtime   Refills:  0        * GABAPENTIN PO   Dose:  300 mg        Take 300 mg by mouth 2 times daily 300mg AM and Noon   Refills:  0        HYDROXYZINE HCL PO   Dose:  25 mg        Take 25 mg by mouth every 6 hours as needed for itching   Refills:  0        loperamide 2 MG capsule   Commonly known as:  IMODIUM   Dose:  2 mg        Take 2 mg by mouth 4 times daily as needed for diarrhea   Refills:  0        nicotine 7 MG/24HR 24 hr patch   Commonly known as:  NICODERM CQ   Dose:  1 patch        Place 1 patch onto the skin every 24 hours   Refills:  0        ondansetron 4 MG tablet   Commonly known as:  ZOFRAN   Dose:  4-8 mg   Quantity:  18 tablet        1-2 tablets (4-8 mg) by Per Feeding Tube route every 8 hours as needed for nausea   Refills:  0        oxyCODONE IR 10 MG tablet   Commonly known as:  ROXICODONE   Dose:  10 mg   Quantity:  40 tablet        Take 1 tablet (10 mg) by mouth every 3 hours as needed for moderate to severe pain   Refills:  0        TRAZODONE HCL PO   Dose:  100 mg        Take 100 mg by mouth At Bedtime   Refills:  0        * warfarin 5 MG tablet   Commonly known as:  COUMADIN   Dose:  5 mg   Quantity:  30 tablet        Take 1 tablet (5 mg) by mouth daily   Refills:  0        * Warfarin Therapy Reminder   Dose:  1 each   Quantity:  30 each        1 each continuous prn   Refills:  0        * Notice:  This list has 4 medication(s) that are the same as other medications prescribed for you. Read the directions carefully, and ask your doctor or other care provider to review  "them with you.            Procedures and tests performed during your visit     INR      Orders Needing Specimen Collection     None      Pending Results     No orders found from 2018 to 2018.            Pending Culture Results     No orders found from 2018 to 2018.            Thank you for choosing [facility]       Thank you for choosing [facility] for your care. Our goal is always to provide you with excellent care. Hearing back from our patients is one way we can continue to improve our services. Please take a few minutes to complete the written survey that you may receive in the mail after you visit with us. Thank you!        [employer] Information     [employer] lets you send messages to your doctor, view your test results, renew your prescriptions, schedule appointments and more. To sign up, go to [email or web address] . Click on \"Log in\" on the left side of the screen, which will take you to the Welcome page. Then click on \"Sign up Now\" on the right side of the page.     You will be asked to enter the access code listed below, as well as some personal information. Please follow the directions to create your username and password.     Your access code is: [email or web address]  Expires: 2018 10:33 PM     Your access code will  in 90 days. If you need help or a new code, please call your [facility] clinic or [phone].        Care EveryWhere ID     This is your Care EveryWhere ID. This could be used by other organizations to access your [facility] medical records  [id number]        Equal Access to Services     MYLES FLAHERTY : Hadtennille pugh Sopia, waaxda ludaysi, qaybta kaalmada justin benoit . So [facility] [phone].    ATENCIÓN: Si habla español, tiene a montalvo disposición servicios gratuitos de asistencia lingüística. Llame al [phone].    We comply with applicable federal civil rights laws and [place] laws. We do not discriminate on the basis of " race, color, national origin, age, disability, sex, sexual orientation, or gender identity.            After Visit Summary       This is your record. Keep this with you and show to your community pharmacist(s) and doctor(s) at your next visit.

## 2018-06-24 NOTE — ED AVS SNAPSHOT
[facility]    [address]    [place] [address]    Phone:  [phone]                                       Maggie Case   MRN: [id number]    Department:  [facility]   Date of Visit:  6/24/2018           After Visit Summary Signature Page     I have received my discharge instructions, and my questions have been answered. I have discussed any challenges I see with this plan with the nurse or doctor.    ..........................................................................................................................................  Patient/Patient Representative Signature      ..........................................................................................................................................  Patient Representative Print Name and Relationship to Patient    ..................................................               ................................................  Date                                            Time    ..........................................................................................................................................  Reviewed by Signature/Title    ...................................................              ..............................................  Date                                                            Time

## 2018-06-24 NOTE — ED NOTES
DATE:  6/24/2018   TIME OF RECEIPT FROM LAB:  0400  LAB TEST:  INR  LAB VALUE:  8.43  RESULTS GIVEN WITH READ-BACK TO (PROVIDER):  Dr. Chin  TIME LAB VALUE REPORTED TO PROVIDER:   040

## 2018-06-24 NOTE — DISCHARGE INSTRUCTIONS
First Aid: Bleeding  External bleeding occurs when the body s protective skin is broken. In severe cases, blood loss may place the victim s life in danger. Direct pressure and elevation usually stops bleeding, even the rush of blood from an artery.  Call 911 if you can t stop the bleeding or the victim shows signs of shock.                Step 1. Apply direct pressure    Put on gloves or use other protection to avoid contact with the victim s blood.    Press directly on the wound with a clean gauze patch or cloth.    Keep the pressure constant for at least 5 minutes. This allows the blood to clot (thicken), preventing additional bleeding.    Don't lift the bandage to check on the bleeding until at least 5 minutes have passed.  Step 2. Elevate the injury    Raise the wound above heart level, if possible, to reduce blood flow to the injury.    If a bone is broken, immobilize the joints above and below the break before elevating the wound.  Pinch off nosebleeds  Reassure the person with the nosebleed and tell him or her to do the following:    Pinch the nostrils below the bone.    Tip the person's head slightly forward and sit quietly, maintaining pressure on the nose for at least 5 minutes.    Don't tilt the person's head backward. This may cause blood to run backward into the person's mouth and throat.    Don't destroy the clot by blowing or rubbing nose after the bleeding stops.  Bleeding: How much is too much?  The victim s age, body size, and overall health all help determine when bleeding becomes serious. Concentrate on the victim s condition and appearance--not on the amount of blood lost. Watch the victim for signs of shock. If any shock symptoms appear, blood loss is a threat to life.  Signs to watch for:    Pale, clammy skin    Racing pulse    Confusion or unconsciousness    Pulsing, spurting bleeding   Date Last Reviewed: 12/1/2016 2000-2017 [employer]. [address] [facility], [place]  [address]. All rights reserved. This information is not intended as a substitute for professional medical care. Always follow your healthcare professional's instructions.

## 2018-06-24 NOTE — ED NOTES
Discharge instructions gone over with patient and she states understanding. Patient is then assisted from bed to wheelchair. Paperwork is given to [employer]  and he then takes her from room to van.

## 2018-06-24 NOTE — ED PROVIDER NOTES
History     Chief Complaint   Patient presents with     Abnormal Labs     per [facility], INR >8, pt is on blood thinners and has been drinking alcohol     HPI  Maggie Case is a 28 year old female who presents to the emergency department from nursing home.  Patient had just come from drinking out of the facility and her INR was checked and was found to be of 8.  They decided to call an ambulance and bring her to the emergency department.  Patient denies any pains anywhere in the body and is not bleeding.  Denies any other concerns.    Problem List:    Patient Active Problem List    Diagnosis Date Noted     History of cranioplasty 05/24/2018     Priority: Medium     Acute ischemic stroke (H) 02/17/2018     Priority: Medium     Influenza B 05/01/2017     Priority: Medium     Opacity of lung on imaging study 05/01/2017     Priority: Medium     Community acquired pneumonia 05/01/2017     Priority: Medium     C. difficile colitis 05/01/2017     Priority: Medium     Sepsis (H) 04/28/2017     Priority: Medium     Pyelonephritis 01/05/2017     Priority: Medium     Acute chest pain 05/06/2016     Priority: Medium     Leukocytosis 05/06/2016     Priority: Medium     Lung mass 05/06/2016     Priority: Medium     SOB (shortness of breath) 05/06/2016     Priority: Medium     Acute upper GI bleed 06/18/2013     Priority: Medium     Hypokalemia 06/18/2013     Priority: Medium     Acute cystitis 06/18/2013     Priority: Medium     Anxiety state 03/25/2013     Priority: Medium     Muscle pain 07/30/2009     Priority: Medium     Overview:   IMO Update 10/11       Cervicalgia 06/16/2009     Priority: Medium     Overview:   IMO Update 10/11       Low back pain 06/16/2009     Priority: Medium     Overview:   IMO Update 10/11       Pain in joint, lower leg 05/01/2009     Priority: Medium     Diarrhea 04/17/2008     Priority: Medium     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     Priority: Medium         Past Medical History:    Past Medical History:   Diagnosis Date     Anemia      Anxiety      Chemical dependency (H)      Chronic diarrhea      Lactose intolerance      Myalgia      OCD (obsessive compulsive disorder)      Pulmonary embolism (H) 05/06/16     Stroke (H) 02/2018     Vitamin B12 deficiency        Past Surgical History:    Past Surgical History:   Procedure Laterality Date     CLOSED REDUCTION, PERCUTANEOUS PINNING LOWER EXTREMITY, COMBINED Right 4/6/2016    Procedure: COMBINED CLOSED REDUCTION, PERCUTANEOUS PINNING LOWER EXTREMITY;  Surgeon: Dexter Jones MD;  [facility]     COLONOSCOPY       CRANIECTOMY Right 2/18/2018    Procedure: CRANIECTOMY;  RIGHT HEMICRANIECTOMY;  Surgeon: Emeterio Mesa MD;  Location: [facility]     CRANIOPLASTY Right 5/24/2018    Procedure: CRANIOPLASTY;  Right Cranioplasty ;  Surgeon: Emeterio Mesa MD;  Location: [facility] OR     UPPER GI ENDOSCOPY         Family History:    Family History   Problem Relation Age of Onset     Depression Mother      Migraines Maternal Half-Sister        Social History:  Marital Status:  Single [1]  Social History   Substance Use Topics     Smoking status: Current Every Day Smoker     Packs/day: 0.25     Years: 7.00     Types: Cigarettes     Smokeless tobacco: Never Used     Alcohol use 0.0 oz/week     0 Standard drinks or equivalent per week      Comment: 1-2/week ,         Medications:      acetaminophen (TYLENOL) 32 mg/mL solution   bismuth subsalicylate (PEPTO BISMOL) 262 MG/15ML suspension   cyanocobalamin (VITAMIN  B-12) 1000 MCG tablet   Dextromethorphan-guaiFENesin  MG/5ML syrup   DULOXETINE HCL PO   ferrous sulfate (IRON) 325 (65 Fe) MG tablet   GABAPENTIN PO   GABAPENTIN PO   HYDROXYZINE HCL PO   loperamide (IMODIUM) 2 MG capsule   nicotine (NICODERM CQ) 7 MG/24HR 24 hr patch   ondansetron (ZOFRAN) 4 MG tablet   oxyCODONE IR (ROXICODONE) 10 MG tablet   TRAZODONE HCL PO   warfarin (COUMADIN) 5 MG tablet   Warfarin  "Therapy Reminder   [DISCONTINUED] GABAPENTIN PO   [DISCONTINUED] warfarin (COUMADIN) 5 MG tablet         Review of Systems   All other systems reviewed and are negative.      Physical Exam   BP: 97/65  Heart Rate: 93  Temp: 98.5  F (36.9  C)  Height: 160 cm (5' 3\")  Weight: 63.5 kg (140 lb)  SpO2: 99 %      Physical Exam   Constitutional: She is oriented to person, place, and time. She appears well-developed and well-nourished. No distress.   HENT:   Head: Normocephalic and atraumatic.   Eyes: Pupils are equal, round, and reactive to light.   Cardiovascular: Normal rate, regular rhythm and normal heart sounds.    Pulmonary/Chest: Effort normal and breath sounds normal. No respiratory distress.   Abdominal: Soft. Bowel sounds are normal. She exhibits no distension. There is no tenderness.   Neurological: She is alert and oriented to person, place, and time.   Skin: She is not diaphoretic.   Nursing note and vitals reviewed.      ED Course     ED Course     Procedures      Results for orders placed or performed during the hospital encounter of 06/24/18 (from the past 24 hour(s))   INR   Result Value Ref Range    INR 8.43 (HH) 0.80 - 1.20       Medications   phytonadione (K1-1000) 1 mg CAPS (5 mg  Given 6/24/18 0423)       Assessments & Plan (with Medical Decision Making)   Supratherapeutic INR: Supratherapeutic INR without bleeding.  INR was 8.4 and patient had been drinking some alcohol tonight.  Patient was given a dose of vitamin K 5 mg orally and subsequently discharged home.  Strongly advised not to drink alcohol while she is on treatment with Coumadin.  Advised not to take Coumadin today or tomorrow until INR is rechecked on Monday subsequently advised by the primary care physician or the clinic that manages her Coumadin.  Subsequently discharged back to the nursing home.  I have reviewed the nursing notes.    I have reviewed the findings, diagnosis, plan and need for follow up with the patient.    Discharge " Medication List as of 6/24/2018  4:32 AM          Final diagnoses:   Supratherapeutic INR       6/24/2018   [facility]     Julian Chin MD  06/24/18 2020

## 2018-07-09 ENCOUNTER — TRANSFERRED RECORDS (OUTPATIENT)
Dept: HEALTH INFORMATION MANAGEMENT | Facility: CLINIC | Age: 28
End: 2018-07-09

## 2018-07-09 ENCOUNTER — MEDICAL CORRESPONDENCE (OUTPATIENT)
Dept: HEALTH INFORMATION MANAGEMENT | Facility: CLINIC | Age: 28
End: 2018-07-09

## 2018-07-09 ENCOUNTER — MEDICAL CORRESPONDENCE (OUTPATIENT)
Dept: CARDIOLOGY | Facility: CLINIC | Age: 28
End: 2018-07-09
Payer: COMMERCIAL

## 2018-07-09 PROCEDURE — 99205 OFFICE O/P NEW HI 60 MIN: CPT | Mod: ZP | Performed by: INTERNAL MEDICINE

## 2018-07-13 ENCOUNTER — HOSPITAL ENCOUNTER (EMERGENCY)
Facility: HOSPITAL | Age: 28
Discharge: LEFT BEFORE DISCHARGE | End: 2018-07-13
Attending: FAMILY MEDICINE | Admitting: FAMILY MEDICINE
Payer: COMMERCIAL

## 2018-07-13 VITALS
TEMPERATURE: 98.6 F | RESPIRATION RATE: 18 BRPM | DIASTOLIC BLOOD PRESSURE: 62 MMHG | SYSTOLIC BLOOD PRESSURE: 98 MMHG | OXYGEN SATURATION: 95 %

## 2018-07-13 DIAGNOSIS — E87.6 HYPOKALEMIA: ICD-10-CM

## 2018-07-13 DIAGNOSIS — F41.9 ANXIETY: ICD-10-CM

## 2018-07-13 LAB
ALBUMIN SERPL-MCNC: 2.6 G/DL (ref 3.4–5)
ALP SERPL-CCNC: 135 U/L (ref 40–150)
ALT SERPL W P-5'-P-CCNC: 19 U/L (ref 0–50)
ANION GAP SERPL CALCULATED.3IONS-SCNC: 8 MMOL/L (ref 3–14)
AST SERPL W P-5'-P-CCNC: 41 U/L (ref 0–45)
BASOPHILS # BLD AUTO: 0 10E9/L (ref 0–0.2)
BASOPHILS NFR BLD AUTO: 0.1 %
BILIRUB SERPL-MCNC: 0.4 MG/DL (ref 0.2–1.3)
BUN SERPL-MCNC: 6 MG/DL (ref 7–30)
CALCIUM SERPL-MCNC: 8.5 MG/DL (ref 8.5–10.1)
CHLORIDE SERPL-SCNC: 93 MMOL/L (ref 94–109)
CO2 SERPL-SCNC: 38 MMOL/L (ref 20–32)
CREAT SERPL-MCNC: 0.45 MG/DL (ref 0.52–1.04)
DIFFERENTIAL METHOD BLD: ABNORMAL
EOSINOPHIL # BLD AUTO: 0.3 10E9/L (ref 0–0.7)
EOSINOPHIL NFR BLD AUTO: 4.8 %
ERYTHROCYTE [DISTWIDTH] IN BLOOD BY AUTOMATED COUNT: 18.1 % (ref 10–15)
ETHANOL SERPL-MCNC: <0.01 G/DL
GFR SERPL CREATININE-BSD FRML MDRD: >90 ML/MIN/1.7M2
GLUCOSE SERPL-MCNC: 95 MG/DL (ref 70–99)
HCT VFR BLD AUTO: 41.8 % (ref 35–47)
HGB BLD-MCNC: 13.7 G/DL (ref 11.7–15.7)
IMM GRANULOCYTES # BLD: 0 10E9/L (ref 0–0.4)
IMM GRANULOCYTES NFR BLD: 0.3 %
LYMPHOCYTES # BLD AUTO: 1.5 10E9/L (ref 0.8–5.3)
LYMPHOCYTES NFR BLD AUTO: 21.3 %
MCH RBC QN AUTO: 27.3 PG (ref 26.5–33)
MCHC RBC AUTO-ENTMCNC: 32.8 G/DL (ref 31.5–36.5)
MCV RBC AUTO: 83 FL (ref 78–100)
MONOCYTES # BLD AUTO: 0.6 10E9/L (ref 0–1.3)
MONOCYTES NFR BLD AUTO: 8.9 %
NEUTROPHILS # BLD AUTO: 4.5 10E9/L (ref 1.6–8.3)
NEUTROPHILS NFR BLD AUTO: 64.6 %
NRBC # BLD AUTO: 0 10*3/UL
NRBC BLD AUTO-RTO: 0 /100
PLATELET # BLD AUTO: 407 10E9/L (ref 150–450)
POTASSIUM SERPL-SCNC: 2.4 MMOL/L (ref 3.4–5.3)
PROT SERPL-MCNC: 5.9 G/DL (ref 6.8–8.8)
RBC # BLD AUTO: 5.01 10E12/L (ref 3.8–5.2)
SODIUM SERPL-SCNC: 139 MMOL/L (ref 133–144)
WBC # BLD AUTO: 6.9 10E9/L (ref 4–11)

## 2018-07-13 PROCEDURE — 25000128 H RX IP 250 OP 636: Performed by: FAMILY MEDICINE

## 2018-07-13 PROCEDURE — 80053 COMPREHEN METABOLIC PANEL: CPT | Performed by: FAMILY MEDICINE

## 2018-07-13 PROCEDURE — 96365 THER/PROPH/DIAG IV INF INIT: CPT

## 2018-07-13 PROCEDURE — 25000132 ZZH RX MED GY IP 250 OP 250 PS 637: Performed by: FAMILY MEDICINE

## 2018-07-13 PROCEDURE — 36415 COLL VENOUS BLD VENIPUNCTURE: CPT | Performed by: FAMILY MEDICINE

## 2018-07-13 PROCEDURE — 80320 DRUG SCREEN QUANTALCOHOLS: CPT | Performed by: FAMILY MEDICINE

## 2018-07-13 PROCEDURE — 96366 THER/PROPH/DIAG IV INF ADDON: CPT

## 2018-07-13 PROCEDURE — 99284 EMERGENCY DEPT VISIT MOD MDM: CPT | Mod: Z6 | Performed by: FAMILY MEDICINE

## 2018-07-13 PROCEDURE — 85025 COMPLETE CBC W/AUTO DIFF WBC: CPT | Performed by: FAMILY MEDICINE

## 2018-07-13 PROCEDURE — 96375 TX/PRO/DX INJ NEW DRUG ADDON: CPT

## 2018-07-13 PROCEDURE — 99284 EMERGENCY DEPT VISIT MOD MDM: CPT | Mod: 25

## 2018-07-13 PROCEDURE — 25000128 H RX IP 250 OP 636

## 2018-07-13 RX ORDER — POTASSIUM CL/LIDO/0.9 % NACL 10MEQ/0.1L
10 INTRAVENOUS SOLUTION, PIGGYBACK (ML) INTRAVENOUS
Status: DISCONTINUED | OUTPATIENT
Start: 2018-07-13 | End: 2018-07-13 | Stop reason: HOSPADM

## 2018-07-13 RX ORDER — LORAZEPAM 2 MG/ML
1 INJECTION INTRAMUSCULAR ONCE
Status: COMPLETED | OUTPATIENT
Start: 2018-07-13 | End: 2018-07-13

## 2018-07-13 RX ORDER — POTASSIUM CHLORIDE 1.5 G/1.58G
20 POWDER, FOR SOLUTION ORAL ONCE
Status: DISCONTINUED | OUTPATIENT
Start: 2018-07-13 | End: 2018-07-13 | Stop reason: HOSPADM

## 2018-07-13 RX ORDER — POTASSIUM CHLORIDE 7.45 MG/ML
10 INJECTION INTRAVENOUS CONTINUOUS
Status: DISCONTINUED | OUTPATIENT
Start: 2018-07-13 | End: 2018-07-13 | Stop reason: HOSPADM

## 2018-07-13 RX ORDER — POTASSIUM CL/LIDO/0.9 % NACL 10MEQ/0.1L
INTRAVENOUS SOLUTION, PIGGYBACK (ML) INTRAVENOUS
Status: COMPLETED
Start: 2018-07-13 | End: 2018-07-13

## 2018-07-13 RX ORDER — ONDANSETRON 2 MG/ML
8 INJECTION INTRAMUSCULAR; INTRAVENOUS ONCE
Status: COMPLETED | OUTPATIENT
Start: 2018-07-13 | End: 2018-07-13

## 2018-07-13 RX ORDER — SODIUM CHLORIDE 9 MG/ML
1000 INJECTION, SOLUTION INTRAVENOUS CONTINUOUS
Status: DISCONTINUED | OUTPATIENT
Start: 2018-07-13 | End: 2018-07-13 | Stop reason: HOSPADM

## 2018-07-13 RX ORDER — LORAZEPAM 2 MG/ML
INJECTION INTRAMUSCULAR
Status: COMPLETED
Start: 2018-07-13 | End: 2018-07-13

## 2018-07-13 RX ORDER — DIPHENOXYLATE HCL/ATROPINE 2.5-.025MG
1 TABLET ORAL 4 TIMES DAILY PRN
Status: DISCONTINUED | OUTPATIENT
Start: 2018-07-13 | End: 2018-07-13 | Stop reason: HOSPADM

## 2018-07-13 RX ADMIN — DIPHENOXYLATE HYDROCHLORIDE AND ATROPINE SULFATE 1 TABLET: 2.5; .025 TABLET ORAL at 19:41

## 2018-07-13 RX ADMIN — LORAZEPAM 1 MG: 2 INJECTION INTRAMUSCULAR at 18:26

## 2018-07-13 RX ADMIN — SODIUM CHLORIDE 1000 ML: 9 INJECTION, SOLUTION INTRAVENOUS at 15:28

## 2018-07-13 RX ADMIN — Medication 10 MEQ: at 15:37

## 2018-07-13 RX ADMIN — ONDANSETRON 8 MG: 2 INJECTION, SOLUTION INTRAMUSCULAR; INTRAVENOUS at 15:28

## 2018-07-13 RX ADMIN — Medication 10 MEQ: at 18:23

## 2018-07-13 RX ADMIN — LORAZEPAM 1 MG: 2 INJECTION, SOLUTION INTRAMUSCULAR; INTRAVENOUS at 18:26

## 2018-07-13 NOTE — ED NOTES
"Patient arrives via [facility] for evaluation of nausea/vomiting and anxiety. Patient comes from [facility].  Reporting increased anxiety over the past couple days, states \"My mind just keeps running.\" Denies suicidal or homicidal ideation. Reporting neck and left shoulder pain, 9/10. Onset of nausea and vomiting approximately 1 week ago. Denies abdominal pain. IV placed and call light within reach.   "

## 2018-07-13 NOTE — ED NOTES
DATE:  7/13/2018   TIME OF RECEIPT FROM LAB:  1876  LAB TEST:  Potassium  LAB VALUE:  2.4  RESULTS GIVEN WITH READ-BACK TO (PROVIDER):  Dr. Shanks  TIME LAB VALUE REPORTED TO PROVIDER:   3329

## 2018-07-13 NOTE — PROGRESS NOTES
I did reach out to [facility] and did speak with Davina.  Patient has been at [facility] since 3-28.  She was scheduled to go to a Rule 25 but averted this appointment and had the  take her elsewhere.    Rule 25 completed this week.  She is on the list for a group home in [place] for TBI.  Contact made with Dr Flores who share that she made an appointment with Josselyn EDWARDS at the clinic who placed her on abilify.  She believes that a more intensive plan for mental health care would be of benefit for her.  Will call [facility] to see if she would qualify for this program.

## 2018-07-13 NOTE — ED NOTES
Patient remains on the BSC.  Patient requesting to have anaesthesia to remove her IV because it hurts too much.  Patient informed that they do not remove IV's and that I would.  IV was removed.  Warm pack on for comfort.  Patient remains on the BSC per request.

## 2018-07-13 NOTE — ED NOTES
Patient requesting anesthesia be called for IV placement.  Explained to patient we are unable to call anesthesia without at least trying.  Patient is agreeable to trying IV if medications for pain and anxiety are ordered.  MD stated she told patient she would be order medication for anxiety but at this time she would not order pain medications.  Nilda ordered ativan and went to start IV but patient would like to wait until her friend/support person Tiffanie Canela is here.  Let patient know to put call light when friend is here.  Primary nurse Chasity updated.

## 2018-07-13 NOTE — ED PROVIDER NOTES
"eMERGENCY dEPARTMENT eNCOUnter        CHIEF COMPLAINT    Chief Complaint   Patient presents with     Nausea & Vomiting     Onset 1 week ago.      Anxiety       HPI    Maggie Case is a 28 year old female who presents with nausea vomiting and anxiety for the last 4 days, possibly longer..  CRI has a very tragic history.  She was diagnosed several years ago with a hypercoagulable state after developing massive pulmonary emboli.  She has a history of amphetamine and polysubstance abuse.  She did not take her Coumadin regularly.  Approximately 6 months ago she presented to the emergency department with a massive middle cerebral artery thrombotic stroke.  She was transferred to the [facility] and required an emergent craniotomy.  She was discharged to [facility] where she has been.  She is here today because of anxiety and \"I just cannot catch a break\" 1 of the nursing staff had seen her at a local bar she was quite intoxicated and required help cleaning up from stool and urine.  She saw her primary physician 2 days ago there is not a mention of vomiting and diarrhea.  When questioned further she states she saw a counselor yesterday and was told \"that I should get on something for anxiety.\"    REVIEW OF SYSTEMS    Psychiatric: No Auditory hallucinations or homicidal Ideations  Respiratory: No difficulty breathing or new cough  General: No fevers or chills  Neurologic: No Headache or syncope  GI: She claims she has constant nausea and some  vomiting.  : No dysuria or hematuria  See HPI for further details.  All other systems reviewed and are negative.    PAST MEDICAL & SURGICALHISTORY    Past Medical History:   Diagnosis Date     Anemia      Anxiety      Chemical dependency (H)      Chronic diarrhea      Lactose intolerance      Myalgia      OCD (obsessive compulsive disorder)      Pulmonary embolism (H) 05/06/16     Stroke (H) 02/2018     Vitamin B12 deficiency      Past Surgical History:   Procedure " Laterality Date     CLOSED REDUCTION, PERCUTANEOUS PINNING LOWER EXTREMITY, COMBINED Right 4/6/2016    Procedure: COMBINED CLOSED REDUCTION, PERCUTANEOUS PINNING LOWER EXTREMITY;  Surgeon: Dexter Jones MD;  Location: [facility]     COLONOSCOPY       CRANIECTOMY Right 2/18/2018    Procedure: CRANIECTOMY;  RIGHT HEMICRANIECTOMY;  Surgeon: Emeterio Mesa MD;  [facility]: [facility]     CRANIOPLASTY Right 5/24/2018    Procedure: CRANIOPLASTY;  Right Cranioplasty ;  Surgeon: Emetreio Mesa MD;  Location: [facility]     UPPER GI ENDOSCOPY         CURRENT MEDICATIONS    Current Outpatient Rx   Medication Sig Dispense Refill     acetaminophen (TYLENOL) 32 mg/mL solution 20.3 mLs (650 mg) by Per G Tube route every 4 hours as needed for mild pain or fever (Patient taking differently: Take 650 mg by mouth every 4 hours as needed for mild pain or fever ) 400 mL      cyanocobalamin (VITAMIN  B-12) 1000 MCG tablet Take 1,000 mcg by mouth daily       Dextromethorphan-guaiFENesin  MG/5ML syrup Take 5 mLs by mouth every 4 hours as needed for cough       DULOXETINE HCL PO Take 60 mg by mouth daily       ferrous sulfate (IRON) 325 (65 Fe) MG tablet Take by mouth daily (with breakfast)       GABAPENTIN PO Take 300 mg by mouth 2 times daily 300mg AM and Noon       GABAPENTIN PO Take 600 mg by mouth At Bedtime        loperamide (IMODIUM) 2 MG capsule Take 2 mg by mouth 4 times daily as needed for diarrhea       nicotine (NICODERM CQ) 7 MG/24HR 24 hr patch Place 1 patch onto the skin every 24 hours       ondansetron (ZOFRAN) 4 MG tablet 1-2 tablets (4-8 mg) by Per Feeding Tube route every 8 hours as needed for nausea (Patient taking differently: Take 4 mg by mouth every 8 hours as needed for nausea ) 18 tablet      Rivaroxaban (XARELTO PO)        TRAZODONE HCL PO Take 100 mg by mouth At Bedtime       bismuth subsalicylate (PEPTO BISMOL) 262 MG/15ML suspension Take 15 mLs by mouth every 6 hours as needed for indigestion        Warfarin Therapy Reminder 1 each continuous prn 30 each 0       ALLERGIES    Allergies   Allergen Reactions     Amoxicillin Other (See Comments)     Headaches     Levaquin [Levofloxacin] Swelling     Naproxen Other (See Comments)     Reaction: Headaches     Nickel      Tramadol      Sulindac Rash       SOCIAL & FAMILYHISTORY    Social History     Social History     Marital status: Single     Spouse name: N/A     Number of children: N/A     Years of education: N/A     Social History Main Topics     Smoking status: Current Every Day Smoker     Packs/day: 0.25     Years: 7.00     Types: Cigarettes     Smokeless tobacco: Never Used     Alcohol use 0.0 oz/week     0 Standard drinks or equivalent per week      Comment: 1-2/week ,      Drug use: Yes     Special: Marijuana, Other      Comment: adderall, yest     Sexual activity: Not Currently     Partners: Female     Other Topics Concern     Not on file     Social History Narrative     Family History   Problem Relation Age of Onset     Depression Mother      Migraines Maternal Half-Sister        PHYSICAL EXAM    VITAL SIGNS: BP 98/62  Temp 98.6  F (37  C)  Resp 18  SpO2 95%  Constitutional: Disheveled female.  She is speaking clearly she makes good eye contact.  Eyes:  PERRL, conjunctiva normal   HENT:  Atraumatic, external ears normal, nose normal   Neck: supple, No JVD  Respiratory:  No respiratory distress, normal breath sounds  Cardiovascular:  Normal rate, normal rhythm, no murmurs  GI:  Soft, nondistended, normal bowel sounds, nontender  Musculoskeletal:  No edema, no acute deformities   Integument:  Well hydrated   Neurologic:  Awake alert and oriented, some slurred speech which is her baseline.  She cannot  move her left arm, some movement of her left leg.    Psychiatric: Tearful and anxious    ED COURSE & MEDICAL DECISION MAKING    Pertinent Labs & Imaging studies reviewed and interpreted. (See chart for details)    Vitals:    07/13/18 1438 07/13/18 1439   BP:  98/62 98/62   Resp:  18   Temp:  98.6  F (37  C)   SpO2: 95% 95%         FINAL IMPRESSION    1. Anxiety    2. Hypokalemia      Plan: Very challenging encounter.  Maggie  presented with complaints of nausea and vomiting.  By reports she is out drinking frequently with friends who picked her up from the nursing home.  I spoke with Dr. Flores.  She is considered competent to make her own medical decisions.  She initially refused an IV then wanted ,one,  then did not, she then only wanted an IV if anesthesia started it She has a potassium of 2.4 this is discussed with her and she is advised this  can be life-threatening she was given oral potassium which she spit out,gave her two  IV riders, she refused a recheck of the potassium.  She wants something for pain and anxiety.  She leaves A and is discharged back to her nursing home.      (Please note that this note was completed with a voice recognition program.  Every attempt was made to edit the dictations, but inevitably there remain words that are mis-transcribed.)       Estephania Shanks MD  07/14/18 1946

## 2018-07-14 NOTE — ED NOTES
Spoke with Miriam at [facility], updated on patient status and aware patient is coming back to facility

## 2018-07-14 NOTE — ED NOTES
"Patient declining to have potassium re-checked, Dr. Shanks aware. Patient aware of risks of hypokalemia. Patient stating \"I just want to go home.\" AMA form signed. [facility] transporting patient back to [facility].   "

## 2018-07-15 ENCOUNTER — HOSPITAL ENCOUNTER (INPATIENT)
Facility: HOSPITAL | Age: 28
LOS: 3 days | Discharge: NURSING, REHAB OR LONG-TERM CARE | DRG: 372 | End: 2018-07-18
Attending: PHYSICIAN ASSISTANT | Admitting: INTERNAL MEDICINE
Payer: COMMERCIAL

## 2018-07-15 ENCOUNTER — APPOINTMENT (OUTPATIENT)
Dept: CT IMAGING | Facility: HOSPITAL | Age: 28
DRG: 372 | End: 2018-07-15
Attending: PHYSICIAN ASSISTANT
Payer: COMMERCIAL

## 2018-07-15 DIAGNOSIS — A04.72 C. DIFFICILE COLITIS: ICD-10-CM

## 2018-07-15 DIAGNOSIS — M54.2 CERVICALGIA: Primary | Chronic | ICD-10-CM

## 2018-07-15 DIAGNOSIS — N20.1 CALCULUS OF URETER: ICD-10-CM

## 2018-07-15 DIAGNOSIS — E87.6 HYPOKALEMIA: ICD-10-CM

## 2018-07-15 PROBLEM — G81.94 LEFT HEMIPARESIS (H): Status: ACTIVE | Noted: 2018-07-15

## 2018-07-15 PROBLEM — N21.9 CALCULUS OF LOWER URINARY TRACT: Status: ACTIVE | Noted: 2018-07-15

## 2018-07-15 LAB
ALBUMIN SERPL-MCNC: 2.6 G/DL (ref 3.4–5)
ALBUMIN UR-MCNC: NEGATIVE MG/DL
ALP SERPL-CCNC: 120 U/L (ref 40–150)
ALT SERPL W P-5'-P-CCNC: 19 U/L (ref 0–50)
ANION GAP SERPL CALCULATED.3IONS-SCNC: 10 MMOL/L (ref 3–14)
APPEARANCE UR: CLEAR
AST SERPL W P-5'-P-CCNC: 41 U/L (ref 0–45)
BACTERIA #/AREA URNS HPF: ABNORMAL /HPF
BASOPHILS # BLD AUTO: 0 10E9/L (ref 0–0.2)
BASOPHILS NFR BLD AUTO: 0.2 %
BILIRUB SERPL-MCNC: 0.3 MG/DL (ref 0.2–1.3)
BILIRUB UR QL STRIP: NEGATIVE
BUN SERPL-MCNC: 7 MG/DL (ref 7–30)
CALCIUM SERPL-MCNC: 8.3 MG/DL (ref 8.5–10.1)
CHLORIDE SERPL-SCNC: 98 MMOL/L (ref 94–109)
CHLORIDE UR-SCNC: 37 MMOL/L
CO2 SERPL-SCNC: 34 MMOL/L (ref 20–32)
COLOR UR AUTO: ABNORMAL
CREAT SERPL-MCNC: 0.46 MG/DL (ref 0.52–1.04)
CRP SERPL-MCNC: 9.5 MG/L (ref 0–8)
DIFFERENTIAL METHOD BLD: ABNORMAL
EOSINOPHIL # BLD AUTO: 0.2 10E9/L (ref 0–0.7)
EOSINOPHIL NFR BLD AUTO: 3 %
ERYTHROCYTE [DISTWIDTH] IN BLOOD BY AUTOMATED COUNT: 17.7 % (ref 10–15)
GFR SERPL CREATININE-BSD FRML MDRD: >90 ML/MIN/1.7M2
GLUCOSE SERPL-MCNC: 92 MG/DL (ref 70–99)
GLUCOSE UR STRIP-MCNC: NEGATIVE MG/DL
HCT VFR BLD AUTO: 38.2 % (ref 35–47)
HGB BLD-MCNC: 12.8 G/DL (ref 11.7–15.7)
HGB UR QL STRIP: ABNORMAL
IMM GRANULOCYTES # BLD: 0 10E9/L (ref 0–0.4)
IMM GRANULOCYTES NFR BLD: 0.3 %
INR PPP: 1.04 (ref 0.8–1.2)
KETONES UR STRIP-MCNC: NEGATIVE MG/DL
LEUKOCYTE ESTERASE UR QL STRIP: ABNORMAL
LIPASE SERPL-CCNC: 283 U/L (ref 73–393)
LYMPHOCYTES # BLD AUTO: 1.4 10E9/L (ref 0.8–5.3)
LYMPHOCYTES NFR BLD AUTO: 22.9 %
MAGNESIUM SERPL-MCNC: 2.3 MG/DL (ref 1.6–2.3)
MCH RBC QN AUTO: 28.3 PG (ref 26.5–33)
MCHC RBC AUTO-ENTMCNC: 33.5 G/DL (ref 31.5–36.5)
MCV RBC AUTO: 85 FL (ref 78–100)
MONOCYTES # BLD AUTO: 0.4 10E9/L (ref 0–1.3)
MONOCYTES NFR BLD AUTO: 6.6 %
NEUTROPHILS # BLD AUTO: 4.1 10E9/L (ref 1.6–8.3)
NEUTROPHILS NFR BLD AUTO: 67 %
NITRATE UR QL: NEGATIVE
NRBC # BLD AUTO: 0 10*3/UL
NRBC BLD AUTO-RTO: 0 /100
OSMOLALITY SERPL: 287 MMOL/KG (ref 280–295)
OSMOLALITY UR: 175 MMOL/KG (ref 100–1200)
PH UR STRIP: 8 PH (ref 4.7–8)
PLATELET # BLD AUTO: 321 10E9/L (ref 150–450)
POTASSIUM SERPL-SCNC: 2.2 MMOL/L (ref 3.4–5.3)
POTASSIUM UR-SCNC: <1 MMOL/L
PROT SERPL-MCNC: 5.6 G/DL (ref 6.8–8.8)
RBC # BLD AUTO: 4.52 10E12/L (ref 3.8–5.2)
RBC #/AREA URNS AUTO: <1 /HPF (ref 0–2)
SODIUM SERPL-SCNC: 142 MMOL/L (ref 133–144)
SODIUM UR-SCNC: 47 MMOL/L
SOURCE: ABNORMAL
SP GR UR STRIP: 1.02 (ref 1–1.03)
UROBILINOGEN UR STRIP-MCNC: NORMAL MG/DL (ref 0–2)
WBC # BLD AUTO: 6.1 10E9/L (ref 4–11)
WBC #/AREA URNS AUTO: 3 /HPF (ref 0–5)

## 2018-07-15 PROCEDURE — 81001 URINALYSIS AUTO W/SCOPE: CPT | Performed by: PHYSICIAN ASSISTANT

## 2018-07-15 PROCEDURE — 25000128 H RX IP 250 OP 636: Performed by: PHYSICIAN ASSISTANT

## 2018-07-15 PROCEDURE — 25800025 ZZH RX 258: Performed by: INTERNAL MEDICINE

## 2018-07-15 PROCEDURE — 12000000 ZZH R&B MED SURG/OB

## 2018-07-15 PROCEDURE — 93005 ELECTROCARDIOGRAM TRACING: CPT

## 2018-07-15 PROCEDURE — 93010 ELECTROCARDIOGRAM REPORT: CPT | Performed by: INTERNAL MEDICINE

## 2018-07-15 PROCEDURE — 99285 EMERGENCY DEPT VISIT HI MDM: CPT | Mod: 25

## 2018-07-15 PROCEDURE — 87045 FECES CULTURE AEROBIC BACT: CPT | Performed by: PHYSICIAN ASSISTANT

## 2018-07-15 PROCEDURE — 84300 ASSAY OF URINE SODIUM: CPT | Performed by: INTERNAL MEDICINE

## 2018-07-15 PROCEDURE — 83935 ASSAY OF URINE OSMOLALITY: CPT | Performed by: INTERNAL MEDICINE

## 2018-07-15 PROCEDURE — 96361 HYDRATE IV INFUSION ADD-ON: CPT

## 2018-07-15 PROCEDURE — 25800025 ZZH RX 258

## 2018-07-15 PROCEDURE — 87493 C DIFF AMPLIFIED PROBE: CPT | Performed by: PHYSICIAN ASSISTANT

## 2018-07-15 PROCEDURE — 85025 COMPLETE CBC W/AUTO DIFF WBC: CPT | Performed by: PHYSICIAN ASSISTANT

## 2018-07-15 PROCEDURE — 87046 STOOL CULTR AEROBIC BACT EA: CPT | Performed by: PHYSICIAN ASSISTANT

## 2018-07-15 PROCEDURE — 99284 EMERGENCY DEPT VISIT MOD MDM: CPT | Mod: Z6 | Performed by: PHYSICIAN ASSISTANT

## 2018-07-15 PROCEDURE — 96375 TX/PRO/DX INJ NEW DRUG ADDON: CPT

## 2018-07-15 PROCEDURE — 74177 CT ABD & PELVIS W/CONTRAST: CPT | Mod: TC

## 2018-07-15 PROCEDURE — 83735 ASSAY OF MAGNESIUM: CPT | Performed by: INTERNAL MEDICINE

## 2018-07-15 PROCEDURE — 83930 ASSAY OF BLOOD OSMOLALITY: CPT | Performed by: INTERNAL MEDICINE

## 2018-07-15 PROCEDURE — 25000128 H RX IP 250 OP 636: Performed by: INTERNAL MEDICINE

## 2018-07-15 PROCEDURE — 86140 C-REACTIVE PROTEIN: CPT | Performed by: PHYSICIAN ASSISTANT

## 2018-07-15 PROCEDURE — 82436 ASSAY OF URINE CHLORIDE: CPT | Performed by: INTERNAL MEDICINE

## 2018-07-15 PROCEDURE — 80053 COMPREHEN METABOLIC PANEL: CPT | Performed by: PHYSICIAN ASSISTANT

## 2018-07-15 PROCEDURE — 85610 PROTHROMBIN TIME: CPT | Performed by: PHYSICIAN ASSISTANT

## 2018-07-15 PROCEDURE — 96374 THER/PROPH/DIAG INJ IV PUSH: CPT

## 2018-07-15 PROCEDURE — 83690 ASSAY OF LIPASE: CPT | Performed by: PHYSICIAN ASSISTANT

## 2018-07-15 PROCEDURE — 99223 1ST HOSP IP/OBS HIGH 75: CPT | Performed by: INTERNAL MEDICINE

## 2018-07-15 PROCEDURE — 25000132 ZZH RX MED GY IP 250 OP 250 PS 637: Performed by: INTERNAL MEDICINE

## 2018-07-15 PROCEDURE — 84133 ASSAY OF URINE POTASSIUM: CPT | Performed by: INTERNAL MEDICINE

## 2018-07-15 PROCEDURE — 25000125 ZZHC RX 250: Performed by: INTERNAL MEDICINE

## 2018-07-15 RX ORDER — RIVAROXABAN 20 MG/1
20 TABLET, FILM COATED ORAL EVERY MORNING
Refills: 11 | COMMUNITY
Start: 2018-07-05

## 2018-07-15 RX ORDER — DIPHENHYDRAMINE HYDROCHLORIDE 50 MG/ML
25 INJECTION INTRAMUSCULAR; INTRAVENOUS ONCE
Status: COMPLETED | OUTPATIENT
Start: 2018-07-15 | End: 2018-07-15

## 2018-07-15 RX ORDER — POTASSIUM CHLORIDE 1500 MG/1
20-40 TABLET, EXTENDED RELEASE ORAL
Status: DISCONTINUED | OUTPATIENT
Start: 2018-07-15 | End: 2018-07-18 | Stop reason: HOSPADM

## 2018-07-15 RX ORDER — POTASSIUM CHLORIDE 7.45 MG/ML
10 INJECTION INTRAVENOUS
Status: DISCONTINUED | OUTPATIENT
Start: 2018-07-15 | End: 2018-07-18 | Stop reason: HOSPADM

## 2018-07-15 RX ORDER — KETOROLAC TROMETHAMINE 30 MG/ML
30 INJECTION, SOLUTION INTRAMUSCULAR; INTRAVENOUS ONCE
Status: COMPLETED | OUTPATIENT
Start: 2018-07-15 | End: 2018-07-15

## 2018-07-15 RX ORDER — HYDROXYZINE HYDROCHLORIDE 25 MG/1
25 TABLET, FILM COATED ORAL EVERY 6 HOURS PRN
Refills: 11 | COMMUNITY
Start: 2018-06-11 | End: 2018-07-30

## 2018-07-15 RX ORDER — OXYCODONE HYDROCHLORIDE 10 MG/1
10 TABLET ORAL
Refills: 0 | Status: ON HOLD | COMMUNITY
Start: 2018-05-26 | End: 2018-07-18

## 2018-07-15 RX ORDER — IOPAMIDOL 612 MG/ML
100 INJECTION, SOLUTION INTRAVASCULAR ONCE
Status: COMPLETED | OUTPATIENT
Start: 2018-07-15 | End: 2018-07-15

## 2018-07-15 RX ORDER — NALOXONE HYDROCHLORIDE 0.4 MG/ML
.1-.4 INJECTION, SOLUTION INTRAMUSCULAR; INTRAVENOUS; SUBCUTANEOUS
Status: DISCONTINUED | OUTPATIENT
Start: 2018-07-15 | End: 2018-07-18 | Stop reason: HOSPADM

## 2018-07-15 RX ORDER — ONDANSETRON 2 MG/ML
4 INJECTION INTRAMUSCULAR; INTRAVENOUS EVERY 6 HOURS PRN
Status: DISCONTINUED | OUTPATIENT
Start: 2018-07-15 | End: 2018-07-18 | Stop reason: HOSPADM

## 2018-07-15 RX ORDER — SODIUM CHLORIDE AND POTASSIUM CHLORIDE 150; 900 MG/100ML; MG/100ML
INJECTION, SOLUTION INTRAVENOUS CONTINUOUS
Status: DISCONTINUED | OUTPATIENT
Start: 2018-07-15 | End: 2018-07-15

## 2018-07-15 RX ORDER — DEXTROSE MONOHYDRATE, SODIUM CHLORIDE, AND POTASSIUM CHLORIDE 50; 1.49; 4.5 G/1000ML; G/1000ML; G/1000ML
INJECTION, SOLUTION INTRAVENOUS
Status: COMPLETED
Start: 2018-07-15 | End: 2018-07-15

## 2018-07-15 RX ORDER — DEXTROSE MONOHYDRATE, SODIUM CHLORIDE, AND POTASSIUM CHLORIDE 50; 1.49; 4.5 G/1000ML; G/1000ML; G/1000ML
INJECTION, SOLUTION INTRAVENOUS CONTINUOUS
Status: DISCONTINUED | OUTPATIENT
Start: 2018-07-15 | End: 2018-07-18 | Stop reason: HOSPADM

## 2018-07-15 RX ORDER — NICOTINE 21 MG/24HR
1 PATCH, TRANSDERMAL 24 HOURS TRANSDERMAL DAILY
Status: DISCONTINUED | OUTPATIENT
Start: 2018-07-15 | End: 2018-07-18 | Stop reason: HOSPADM

## 2018-07-15 RX ORDER — MAGNESIUM SULFATE HEPTAHYDRATE 40 MG/ML
4 INJECTION, SOLUTION INTRAVENOUS EVERY 4 HOURS PRN
Status: DISCONTINUED | OUTPATIENT
Start: 2018-07-15 | End: 2018-07-18 | Stop reason: HOSPADM

## 2018-07-15 RX ORDER — POTASSIUM CL/LIDO/0.9 % NACL 10MEQ/0.1L
INTRAVENOUS SOLUTION, PIGGYBACK (ML) INTRAVENOUS
Status: DISPENSED
Start: 2018-07-15 | End: 2018-07-16

## 2018-07-15 RX ORDER — KETOROLAC TROMETHAMINE 30 MG/ML
30 INJECTION, SOLUTION INTRAMUSCULAR; INTRAVENOUS EVERY 6 HOURS PRN
Status: DISCONTINUED | OUTPATIENT
Start: 2018-07-15 | End: 2018-07-18 | Stop reason: HOSPADM

## 2018-07-15 RX ORDER — ONDANSETRON 2 MG/ML
4 INJECTION INTRAMUSCULAR; INTRAVENOUS ONCE
Status: COMPLETED | OUTPATIENT
Start: 2018-07-15 | End: 2018-07-15

## 2018-07-15 RX ORDER — POTASSIUM CL/LIDO/0.9 % NACL 10MEQ/0.1L
10 INTRAVENOUS SOLUTION, PIGGYBACK (ML) INTRAVENOUS
Status: DISCONTINUED | OUTPATIENT
Start: 2018-07-15 | End: 2018-07-18 | Stop reason: HOSPADM

## 2018-07-15 RX ORDER — ARIPIPRAZOLE 5 MG/1
5 TABLET ORAL AT BEDTIME
COMMUNITY
End: 2018-07-30

## 2018-07-15 RX ORDER — MAGNESIUM SULFATE HEPTAHYDRATE 40 MG/ML
2 INJECTION, SOLUTION INTRAVENOUS DAILY PRN
Status: DISCONTINUED | OUTPATIENT
Start: 2018-07-15 | End: 2018-07-18 | Stop reason: HOSPADM

## 2018-07-15 RX ORDER — POTASSIUM CHLORIDE 1.5 G/1.58G
20-40 POWDER, FOR SOLUTION ORAL
Status: DISCONTINUED | OUTPATIENT
Start: 2018-07-15 | End: 2018-07-18 | Stop reason: HOSPADM

## 2018-07-15 RX ORDER — HYDROMORPHONE HYDROCHLORIDE 1 MG/ML
0.5 INJECTION, SOLUTION INTRAMUSCULAR; INTRAVENOUS; SUBCUTANEOUS ONCE
Status: DISCONTINUED | OUTPATIENT
Start: 2018-07-15 | End: 2018-07-15

## 2018-07-15 RX ORDER — ONDANSETRON 4 MG/1
4 TABLET, ORALLY DISINTEGRATING ORAL EVERY 6 HOURS PRN
Status: DISCONTINUED | OUTPATIENT
Start: 2018-07-15 | End: 2018-07-18 | Stop reason: HOSPADM

## 2018-07-15 RX ORDER — TAMSULOSIN HYDROCHLORIDE 0.4 MG/1
0.4 CAPSULE ORAL DAILY
Status: DISCONTINUED | OUTPATIENT
Start: 2018-07-15 | End: 2018-07-18 | Stop reason: HOSPADM

## 2018-07-15 RX ADMIN — POTASSIUM CHLORIDE, DEXTROSE MONOHYDRATE AND SODIUM CHLORIDE 1000 ML: 150; 5; 450 INJECTION, SOLUTION INTRAVENOUS at 20:55

## 2018-07-15 RX ADMIN — DIPHENHYDRAMINE HYDROCHLORIDE 25 MG: 50 INJECTION, SOLUTION INTRAMUSCULAR; INTRAVENOUS at 14:44

## 2018-07-15 RX ADMIN — TAMSULOSIN HYDROCHLORIDE 0.4 MG: 0.4 CAPSULE ORAL at 20:52

## 2018-07-15 RX ADMIN — Medication 10 MEQ: at 22:42

## 2018-07-15 RX ADMIN — METRONIDAZOLE 500 MG: 500 INJECTION, SOLUTION INTRAVENOUS at 20:22

## 2018-07-15 RX ADMIN — KETOROLAC TROMETHAMINE 30 MG: 30 INJECTION, SOLUTION INTRAMUSCULAR at 22:52

## 2018-07-15 RX ADMIN — Medication 10 MEQ: at 21:36

## 2018-07-15 RX ADMIN — RIFAXIMIN 550 MG: 550 TABLET ORAL at 20:52

## 2018-07-15 RX ADMIN — IOPAMIDOL 100 ML: 612 INJECTION, SOLUTION INTRAVENOUS at 16:08

## 2018-07-15 RX ADMIN — ONDANSETRON 4 MG: 2 INJECTION, SOLUTION INTRAMUSCULAR; INTRAVENOUS at 14:37

## 2018-07-15 RX ADMIN — POTASSIUM CHLORIDE, DEXTROSE MONOHYDRATE AND SODIUM CHLORIDE: 150; 5; 450 INJECTION, SOLUTION INTRAVENOUS at 20:55

## 2018-07-15 RX ADMIN — POTASSIUM CHLORIDE AND SODIUM CHLORIDE: 900; 150 INJECTION, SOLUTION INTRAVENOUS at 15:57

## 2018-07-15 RX ADMIN — SODIUM CHLORIDE 1000 ML: 9 INJECTION, SOLUTION INTRAVENOUS at 14:36

## 2018-07-15 RX ADMIN — KETOROLAC TROMETHAMINE 30 MG: 30 INJECTION, SOLUTION INTRAMUSCULAR at 16:58

## 2018-07-15 RX ADMIN — NICOTINE 1 PATCH: 21 PATCH, EXTENDED RELEASE TRANSDERMAL at 20:51

## 2018-07-15 ASSESSMENT — ENCOUNTER SYMPTOMS
FREQUENCY: 0
DIARRHEA: 1
NAUSEA: 1
BACK PAIN: 1
CHILLS: 0
ABDOMINAL PAIN: 1
HEMATURIA: 0
VOMITING: 0
CARDIOVASCULAR NEGATIVE: 1
DIFFICULTY URINATING: 0
FEVER: 0
DYSURIA: 0
RESPIRATORY NEGATIVE: 1
CONSTITUTIONAL NEGATIVE: 1
FLANK PAIN: 0

## 2018-07-15 NOTE — IP AVS SNAPSHOT
[facility]    [address] [place] [id number]    Phone:  [phone]    Fax:  [phone]                                       After Visit Summary   7/15/2018    Maggie Case    MRN: [id number]           After Visit Summary Signature Page     I have received my discharge instructions, and my questions have been answered. I have discussed any challenges I see with this plan with the nurse or doctor.    ..........................................................................................................................................  Patient/Patient Representative Signature      ..........................................................................................................................................  Patient Representative Print Name and Relationship to Patient    ..................................................               ................................................  Date                                            Time    ..........................................................................................................................................  Reviewed by Signature/Title    ...................................................              ..............................................  Date                                                            Time

## 2018-07-15 NOTE — H&P
[facility]    History and Physical  Hospitalist       Date of Admission:  7/15/2018    Assessment & Plan   Maggie Case is a 28 year old female who presents with nausea vomiting and diarrhea and was admitted for hypokalemia treatment. She also diagnosed with ureteral stone on admission    Principal Problem:    Hypokalemia    Assessment: chronic (chronic less likely to cause serious cardiac arrhythmias, Diff GI loss, renal loss Bartter's, Gittelman's, Liddle's syndromes (she also has metabolic alkalosis) but most likely GI loss (Colitis on CT)    Plan: Replace IV and PO (I was told that we have limited IV supply. Not sure how she will tolerate PO due to N/V). Will try both ways.   Check Mg.  She is not hypertensive.   Check serum Osm, urine lytes, urine osm  Check urine K excretion. Consider transtubular K gradient (TTKG)     Metabolic alkalosis  - could be volume contraction due to diarrhea  -- complete GI w/u (in search for upset stomach, diarrhea)  - IVF    Colitis:   - will start rifaximin and flagyl (allergic to fluoroquinolones)   - stool cx pending (ordered by ED)    Active Problems:    History of cranioplasty    Assessment: chronic condition    Plan: no intervention required      Calculus of lower urinary tract    Assessment: at [facility], 3 mm only    Plan: will pass, hydrate, flomax      History of CVA (cerebrovascular accident)    Assessment: remote 2.2018 due to ASD and hypercoaguable state    Plan: she is on chronic anticoagulation with xarelto (warfarin dc'd 7.10.2018 - was difficult to have steady normal values due to interaction with alcoho.       Left hemiparesis (H)    Assessment: chronic     Plan: will ask PT to see her      Chronic anticoagulation    Assessment: xarelto. She has had extensive hypercoagulability w/u at  - -ve     Plan: continue xarelto    Nicotine dependence:  -will offer nicotine patch    Possible severe depression:   - I would recommend psychiatric evaluation (consult)  during this admission (she may leave AMA as she did before). Severe depression documented in her PCP note.   - will continue duloxetin and trazodone     # Pain Assessment:  Current Pain Score 7/15/2018   Patient currently in pain? -   Pain score (0-10) 9   Pain location -   Pain descriptors -   CPOT pain score -   - Maggie is experiencing pain due to colitis and nephrolithiasis. Pain management was discussed and the plan was created in a collaborative fashion.  Maggie's response to the current recommendations: mixed response  -We will try Toradol first, if not enough we will give small doses of opiates.    DVT Prophylaxis: pt already on xarelto  Code Status: Full Code    Disposition: Expected discharge in 2-3  days once low K corrected and cause found (may need GI w/u).    Abrahan Robles MD      Primary Care Physician   Chapo Flores    Chief Complaint   Nausea vomiting diarrhea, right-sided back pain.    Reason for admission: Colitis, dehydration, hypokalemia,    History is obtained from the patient, electronic health record and emergency department physician.  History of present illness limited due to patient non-willingness to cooperate and answer questions.  Most information obtained from medical records.    History of Present Illness   Maggie Case is a 28 year old woman with past medical history significant for hypercoagulable state with negative hypercoagulability work, atrial septal defect DVT large PE cardioembolic paradoxical stroke with left hemiplegia noncompliance with anticoagulation when being on warfarin(switched to Xarelto July 10 this year) who was treated yesterday in the emergency room left [facility] comes back to emergency room with back pain.  Emergency room workup revealed 3 mm ureterovesical junction left-sided stone with mild hydronephrosis, colitis ascending and transverse colon, and hypokalemia with increased total CO2 on chemistry.  CRP is 9.5.  White blood cell count normal.  Urgency  during treatment: Given normal saline bolus, stool sent for ova parasites and white blood cells and cultures.  Patient admits abdominal pain which she describes as constant started several days ago associated with nausea vomiting diarrhea.  Pain does not radiate associated with vomiting.  She vomits immediately after she eats undigested food.  She also admits feeling weak denies fever chills cough dysuria palpitation diplopia or dysarthria or dysphagia rash focal weakness except the old left hemiplegia.  She also complains left knee pain.  Hospitalist was called to admit    Past Medical History    I have reviewed this patient's medical history and updated it with pertinent information if needed.   Past Medical History:   Diagnosis Date     Anemia      Anxiety      Chemical dependency (H)      Chronic diarrhea      Lactose intolerance      Myalgia      OCD (obsessive compulsive disorder)      Pulmonary embolism (H) 05/06/16     Stroke (H) 02/2018     Vitamin B12 deficiency        Past Surgical History   I have reviewed this patient's surgical history and updated it with pertinent information if needed.  Past Surgical History:   Procedure Laterality Date     CLOSED REDUCTION, PERCUTANEOUS PINNING LOWER EXTREMITY, COMBINED Right 4/6/2016    Procedure: COMBINED CLOSED REDUCTION, PERCUTANEOUS PINNING LOWER EXTREMITY;  Surgeon: Dexter Jones MD;  Location: [facility]     COLONOSCOPY       CRANIECTOMY Right 2/18/2018    Procedure: CRANIECTOMY;  RIGHT HEMICRANIECTOMY;  Surgeon: Emeterio Mesa MD;  Location: [facility]     CRANIOPLASTY Right 5/24/2018    Procedure: CRANIOPLASTY;  Right Cranioplasty ;  Surgeon: Emeterio Mesa MD;  Location: [facility]     UPPER GI ENDOSCOPY         Prior to Admission Medications   Prior to Admission Medications   Prescriptions Last Dose Informant Patient Reported? Taking?   DULOXETINE HCL PO 7/14/2018 at Unknown time  Yes Yes   Sig: Take 60 mg by mouth daily   Dextromethorphan-guaiFENesin   MG/5ML syrup 2018 at Unknown time  Yes Yes   Sig: Take 5 mLs by mouth every 4 hours as needed for cough   GABAPENTIN PO Past Week at Unknown time  Yes Yes   Sig: Take 600 mg by mouth At Bedtime    GABAPENTIN PO Past Week at Unknown time  Yes Yes   Sig: Take 300 mg by mouth 2 times daily 300mg AM and Noon   Rivaroxaban (XARELTO PO) Past Week at Unknown time  Yes Yes   TRAZODONE HCL PO 2018 at Unknown time  Yes Yes   Sig: Take 100 mg by mouth At Bedtime   Warfarin Therapy Reminder Unknown at Unknown time  No No   Si each continuous prn   acetaminophen (TYLENOL) 32 mg/mL solution Unknown at Unknown time  No No   Si.3 mLs (650 mg) by Per G Tube route every 4 hours as needed for mild pain or fever   Patient taking differently: Take 650 mg by mouth every 4 hours as needed for mild pain or fever    bismuth subsalicylate (PEPTO BISMOL) 262 MG/15ML suspension 2018 at Unknown time  Yes Yes   Sig: Take 15 mLs by mouth every 6 hours as needed for indigestion   cyanocobalamin (VITAMIN  B-12) 1000 MCG tablet 2018 at Unknown time  Yes Yes   Sig: Take 1,000 mcg by mouth daily   ferrous sulfate (IRON) 325 (65 Fe) MG tablet 2018 at Unknown time  Yes Yes   Sig: Take by mouth daily (with breakfast)   loperamide (IMODIUM) 2 MG capsule 2018 at Unknown time  Yes Yes   Sig: Take 2 mg by mouth 4 times daily as needed for diarrhea   nicotine (NICODERM CQ) 7 MG/24HR 24 hr patch 2018 at Unknown time  Yes Yes   Sig: Place 1 patch onto the skin every 24 hours   ondansetron (ZOFRAN) 4 MG tablet Unknown at Unknown time  No No   Si-2 tablets (4-8 mg) by Per Feeding Tube route every 8 hours as needed for nausea   Patient taking differently: Take 4 mg by mouth every 8 hours as needed for nausea       Facility-Administered Medications: None     Allergies   Allergies   Allergen Reactions     Amoxicillin Other (See Comments)     Headaches     Levaquin [Levofloxacin] Swelling     Naproxen Other (See  Comments)     Reaction: Headaches     Nickel      Tramadol      Sulindac Rash       Social History   I have reviewed this patient's social history and updated it with pertinent information if needed. Maggie Case  reports that she has quit smoking. Her smoking use included Cigarettes. She has a 1.75 pack-year smoking history. She has never used smokeless tobacco. She reports that she drinks alcohol. She reports that she uses illicit drugs, including Marijuana and Other.    Family History   I have reviewed this patient's family history and updated it with pertinent information if needed.   Family History   Problem Relation Age of Onset     Depression Mother      Migraines Maternal Half-Sister        Review of Systems   As per history of present illness 14 points review of system obtained    Physical Exam   Temp: 98.5  F (36.9  C) Temp src: Oral BP: 104/66 Pulse: 76 Heart Rate: 74 Resp: 20 SpO2: 96 % O2 Device: None (Room air)    Vital Signs with Ranges  Temp:  [98  F (36.7  C)-98.5  F (36.9  C)] 98.5  F (36.9  C)  Pulse:  [66-76] 76  Heart Rate:  [70-74] 74  Resp:  [16-20] 20  BP: ()/(66-73) 104/66  SpO2:  [96 %-100 %] 96 %  0 lbs 0 oz    Physical exam:  General: Young woman not in acute distress lays in bed with close eyes and responds to voice commands.  Flat affect.  HEENT: Postcraniotomy changes.  Extraocular movement intact.  Mucous membranes moist.  Neck supple no bruits no lymphadenopathy  Cardiac regular rhythm and rate no murmurs gallops or rubs  Pulmonary: Clear vesicular breath sounds bilateral anterior and posterior  GI: Abdomen soft bowel sounds present, tender diffusely, no rebound no guarding.  No pulsatile masses.   bladder not palpable, no CVA tenderness.  Neurologic left hemiplegia more significant left upper extremity than left lower extremity, myoclonus and Babinski left lower extremity.    Psychiatric exam: Very flat affect denies homicidal suicidal ideation admits to being sad and  depressed.  Skin: Warm dry no rash.      Data   Data reviewed today:  I personally reviewed the abdominal CT image(s) showing 3 mm left ureterovesical junction stone mild hydronephrosis, ascending and transverse colitis.   EKG was not done in the emergency room we will get one when she is admitted    Recent Labs  Lab 07/15/18  1435 07/13/18  1459   WBC 6.1 6.9   HGB 12.8 13.7   MCV 85 83    407   INR 1.04  --     139   POTASSIUM 2.2* 2.4*   CHLORIDE 98 93*   CO2 34* 38*   BUN 7 6*   CR 0.46* 0.45*   ANIONGAP 10 8   AVINASH 8.3* 8.5   GLC 92 95   ALBUMIN 2.6* 2.6*   PROTTOTAL 5.6* 5.9*   BILITOTAL 0.3 0.4   ALKPHOS 120 135   ALT 19 19   AST 41 41   LIPASE 283  --        Recent Results (from the past 24 hour(s))   CT Abdomen Pelvis w Contrast    Narrative    EXAMINATION: CT ABDOMEN PELVIS W CONTRAST, 7/15/2018 4:23 PM    TECHNIQUE:  Helical CT images from the lung bases through the  symphysis pubis were obtained  with IV contrast. Contrast dose: Isovue  300, 100ml    COMPARISON: none    HISTORY: abdominal pain, diarrhea;     FINDINGS:    There is dependent atelectasis at the lung bases.    The liver is free of masses or biliary ductal enlargement. No  calcified gallstones are seen.    The the spleen and pancreas appear normal.    The adrenal glands are normal.    There is a delayed enhancement of the left kidney. There is dilation  of the left renal collecting system. There is a 3 mm in diameter  calculus at the ureterovesical junction on the left. There is a 4 mm  diameter calculus in the upper pole of the right kidney. A right renal  cyst is seen.    The periaortic lymph nodes are normal in caliber.    There is some bowel wall thickening seen in the ascending and  transverse colon consistent with colitis.    In the pelvis the bladder and rectum appear normal.    The regional skeleton is intact      Impression    IMPRESSION:   1. 3 mm distal left ureteric calculus at the ureterovesical junction  with the  mild left-sided hydronephrosis.  2. Colitis in the ascending and transverse colon     GEETHA JACOBS MD

## 2018-07-15 NOTE — ED NOTES
Pt questioning why we are giving her benadryl and not pain medications. Explained to pt we are giving her benadryl for her c/o itching and feeling like she has bug bites all over. Pt continues to c/o 10/10 tailbone and 10/10 LLQ pain. PA notified.

## 2018-07-15 NOTE — ED PROVIDER NOTES
"  History     Chief Complaint   Patient presents with     Nausea, Vomiting, & Diarrhea     HPI  Maggie Case is a 28 year old female with Hx CVA, polysubstance abuse who present via EMS from [facility] due to abdominal and back pain. Pain is all over but most intense \"in the tailbone\" and LLQ. Pt is requesting something for nausea, pain and sensation of crawling skin. She was evaluated here 2 days ago and left AMA with a potassium of 2.4. She denies fevers. She reports Hx kidney stones, no hematuria.     Problem List:    Patient Active Problem List    Diagnosis Date Noted     History of cranioplasty 05/24/2018     Priority: Medium     Acute ischemic stroke (H) 02/17/2018     Priority: Medium     Influenza B 05/01/2017     Priority: Medium     Opacity of lung on imaging study 05/01/2017     Priority: Medium     Community acquired pneumonia 05/01/2017     Priority: Medium     C. difficile colitis 05/01/2017     Priority: Medium     Sepsis (H) 04/28/2017     Priority: Medium     Pyelonephritis 01/05/2017     Priority: Medium     Acute chest pain 05/06/2016     Priority: Medium     Leukocytosis 05/06/2016     Priority: Medium     Lung mass 05/06/2016     Priority: Medium     SOB (shortness of breath) 05/06/2016     Priority: Medium     Acute upper GI bleed 06/18/2013     Priority: Medium     Hypokalemia 06/18/2013     Priority: Medium     Acute cystitis 06/18/2013     Priority: Medium     Anxiety state 03/25/2013     Priority: Medium     Muscle pain 07/30/2009     Priority: Medium     Overview:   IMO Update 10/11       Cervicalgia 06/16/2009     Priority: Medium     Overview:   IMO Update 10/11       Low back pain 06/16/2009     Priority: Medium     Overview:   IMO Update 10/11       Pain in joint, lower leg 05/01/2009     Priority: Medium     Diarrhea 04/17/2008     Priority: Medium     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     Priority: Medium        Past Medical History:    Past " Medical History:   Diagnosis Date     Anemia      Anxiety      Chemical dependency (H)      Chronic diarrhea      Lactose intolerance      Myalgia      OCD (obsessive compulsive disorder)      Pulmonary embolism (H) 05/06/16     Stroke (H) 02/2018     Vitamin B12 deficiency        Past Surgical History:    Past Surgical History:   Procedure Laterality Date     CLOSED REDUCTION, PERCUTANEOUS PINNING LOWER EXTREMITY, COMBINED Right 4/6/2016    Procedure: COMBINED CLOSED REDUCTION, PERCUTANEOUS PINNING LOWER EXTREMITY;  Surgeon: Dexter Jones MD;  Location: [facility]     COLONOSCOPY       CRANIECTOMY Right 2/18/2018    Procedure: CRANIECTOMY;  RIGHT HEMICRANIECTOMY;  Surgeon: Emeterio Mesa MD;  Location: [facility]     CRANIOPLASTY Right 5/24/2018    Procedure: CRANIOPLASTY;  Right Cranioplasty ;  Surgeon: Emeterio Mesa MD;  Location: [facility]     UPPER GI ENDOSCOPY         Family History:    Family History   Problem Relation Age of Onset     Depression Mother      Migraines Maternal Half-Sister        Social History:  Marital Status:  Single [1]  Social History   Substance Use Topics     Smoking status: Former Smoker     Packs/day: 0.25     Years: 7.00     Types: Cigarettes     Smokeless tobacco: Never Used      Comment: quit 7/12/18     Alcohol use 0.0 oz/week     0 Standard drinks or equivalent per week      Comment: last drink 3 days ago        Medications:      bismuth subsalicylate (PEPTO BISMOL) 262 MG/15ML suspension   cyanocobalamin (VITAMIN  B-12) 1000 MCG tablet   Dextromethorphan-guaiFENesin  MG/5ML syrup   DULOXETINE HCL PO   ferrous sulfate (IRON) 325 (65 Fe) MG tablet   GABAPENTIN PO   GABAPENTIN PO   loperamide (IMODIUM) 2 MG capsule   nicotine (NICODERM CQ) 7 MG/24HR 24 hr patch   Rivaroxaban (XARELTO PO)   TRAZODONE HCL PO   acetaminophen (TYLENOL) 32 mg/mL solution   ondansetron (ZOFRAN) 4 MG tablet   Warfarin Therapy Reminder         Review of Systems   Constitutional: Negative.   Negative for chills and fever.   Respiratory: Negative.    Cardiovascular: Negative.    Gastrointestinal: Positive for abdominal pain, diarrhea and nausea. Negative for vomiting.   Genitourinary: Negative for difficulty urinating, dysuria, flank pain, frequency, hematuria, pelvic pain and urgency.   Musculoskeletal: Positive for back pain.   Skin: Negative.  Negative for rash.       Physical Exam   BP: 90/72  Pulse: 66  Temp: 98  F (36.7  C)  Resp: 16  SpO2: 97 %      Physical Exam   Constitutional: She is oriented to person, place, and time. She appears well-developed and well-nourished. No distress.   HENT:   Mouth/Throat: Oropharynx is clear and moist.   Eyes: Conjunctivae are normal.   Cardiovascular: Normal rate and normal heart sounds.    Pulmonary/Chest: Effort normal and breath sounds normal.   Abdominal: Soft. Bowel sounds are normal. There is generalized tenderness.   Neurological: She is alert and oriented to person, place, and time.   Skin: Skin is warm and dry.   Psychiatric:   Somewhat blunt   Nursing note and vitals reviewed.      ED Course     ED Course     Procedures            EKG Interpretation:      Interpreted by Venkat Aguilar  Time reviewed: 1715  Symptoms at time of EKG: hypokalemia, pain   Rhythm: normal sinus   Rate: 68  Axis: Normal  Ectopy: none  Conduction: right bundle branch block (incomplete)  ST Segments/ T Waves: T wave flattening aVL, V1 and V2  Q Waves: none  Comparison to prior: Grossly unchanged from April    Clinical Impression: non-specific EKG      Results for orders placed or performed during the hospital encounter of 07/15/18 (from the past 24 hour(s))   CBC with platelets differential   Result Value Ref Range    WBC 6.1 4.0 - 11.0 10e9/L    RBC Count 4.52 3.8 - 5.2 10e12/L    Hemoglobin 12.8 11.7 - 15.7 g/dL    Hematocrit 38.2 35.0 - 47.0 %    MCV 85 78 - 100 fl    MCH 28.3 26.5 - 33.0 pg    MCHC 33.5 31.5 - 36.5 g/dL    RDW 17.7 (H) 10.0 - 15.0 %    Platelet Count 321  150 - 450 10e9/L    Diff Method Automated Method     % Neutrophils 67.0 %    % Lymphocytes 22.9 %    % Monocytes 6.6 %    % Eosinophils 3.0 %    % Basophils 0.2 %    % Immature Granulocytes 0.3 %    Nucleated RBCs 0 0 /100    Absolute Neutrophil 4.1 1.6 - 8.3 10e9/L    Absolute Lymphocytes 1.4 0.8 - 5.3 10e9/L    Absolute Monocytes 0.4 0.0 - 1.3 10e9/L    Absolute Eosinophils 0.2 0.0 - 0.7 10e9/L    Absolute Basophils 0.0 0.0 - 0.2 10e9/L    Abs Immature Granulocytes 0.0 0 - 0.4 10e9/L    Absolute Nucleated RBC 0.0    Comprehensive metabolic panel   Result Value Ref Range    Sodium 142 133 - 144 mmol/L    Potassium 2.2 (LL) 3.4 - 5.3 mmol/L    Chloride 98 94 - 109 mmol/L    Carbon Dioxide 34 (H) 20 - 32 mmol/L    Anion Gap 10 3 - 14 mmol/L    Glucose 92 70 - 99 mg/dL    Urea Nitrogen 7 7 - 30 mg/dL    Creatinine 0.46 (L) 0.52 - 1.04 mg/dL    GFR Estimate >90 >60 mL/min/1.7m2    GFR Estimate If Black >90 >60 mL/min/1.7m2    Calcium 8.3 (L) 8.5 - 10.1 mg/dL    Bilirubin Total 0.3 0.2 - 1.3 mg/dL    Albumin 2.6 (L) 3.4 - 5.0 g/dL    Protein Total 5.6 (L) 6.8 - 8.8 g/dL    Alkaline Phosphatase 120 40 - 150 U/L    ALT 19 0 - 50 U/L    AST 41 0 - 45 U/L   CRP inflammation   Result Value Ref Range    CRP Inflammation 9.5 (H) 0.0 - 8.0 mg/L   Lipase   Result Value Ref Range    Lipase 283 73 - 393 U/L   INR   Result Value Ref Range    INR 1.04 0.80 - 1.20   CT Abdomen Pelvis w Contrast    Narrative    EXAMINATION: CT ABDOMEN PELVIS W CONTRAST, 7/15/2018 4:23 PM    TECHNIQUE:  Helical CT images from the lung bases through the  symphysis pubis were obtained  with IV contrast. Contrast dose: Isovue  300, 100ml    COMPARISON: none    HISTORY: abdominal pain, diarrhea;     FINDINGS:    There is dependent atelectasis at the lung bases.    The liver is free of masses or biliary ductal enlargement. No  calcified gallstones are seen.    The the spleen and pancreas appear normal.    The adrenal glands are normal.    There is a delayed  enhancement of the left kidney. There is dilation  of the left renal collecting system. There is a 3 mm in diameter  calculus at the ureterovesical junction on the left. There is a 4 mm  diameter calculus in the upper pole of the right kidney. A right renal  cyst is seen.    The periaortic lymph nodes are normal in caliber.    There is some bowel wall thickening seen in the ascending and  transverse colon consistent with colitis.    In the pelvis the bladder and rectum appear normal.    The regional skeleton is intact      Impression    IMPRESSION:   1. 3 mm distal left ureteric calculus at the ureterovesical junction  with the mild left-sided hydronephrosis.  2. Colitis in the ascending and transverse colon     GEETHA JACOBS MD   UA reflex to Microscopic and Culture   Result Value Ref Range    Color Urine Straw     Appearance Urine Clear     Glucose Urine Negative NEG^Negative mg/dL    Bilirubin Urine Negative NEG^Negative    Ketones Urine Negative NEG^Negative mg/dL    Specific Gravity Urine 1.019 1.003 - 1.035    Blood Urine Trace (A) NEG^Negative    pH Urine 8.0 4.7 - 8.0 pH    Protein Albumin Urine Negative NEG^Negative mg/dL    Urobilinogen mg/dL Normal 0.0 - 2.0 mg/dL    Nitrite Urine Negative NEG^Negative    Leukocyte Esterase Urine Trace (A) NEG^Negative    Source Midstream Urine     RBC Urine <1 0 - 2 /HPF    WBC Urine 3 0 - 5 /HPF    Bacteria Urine None (A) NEG^Negative /HPF       Medications   0.9% sodium chloride + KCl 20 mEq/L infusion ( Intravenous New Bag 7/15/18 1557)   0.9% sodium chloride BOLUS (0 mLs Intravenous Stopped 7/15/18 1556)   ondansetron (ZOFRAN) injection 4 mg (4 mg Intravenous Given 7/15/18 1437)   diphenhydrAMINE (BENADRYL) injection 25 mg (25 mg Intravenous Given 7/15/18 1444)   sodium chloride (PF) 0.9% PF flush 60 mL (60 mLs Intravenous Given 7/15/18 1608)   iopamidol (ISOVUE-300) IV solution 61% 100 mL (100 mLs Intravenous Given 7/15/18 1608)   ketorolac (TORADOL) injection 30  mg (30 mg Intravenous Given 7/15/18 7456)       Assessments & Plan (with Medical Decision Making)     I have reviewed the nursing notes.  I have reviewed the findings, diagnosis, plan and need for follow up with the patient.    New Prescriptions    No medications on file       Final diagnoses:   Hypokalemia   Calculus of ureter   Maggie returns to the ED today with hypokalemia and left ureteral stone. Pt given IVF including 20KCl while waiting for CT. Benadryl for skin itching, zofran for nausea. I did not start with dilauded per her request and started with toradol as she does have a 3mm stone left ureter. I spoke with Dr Wagner, urology, and he suggests treating expectantly and strain urine. I then spoke with our hospitalist, Dr Robles who graciously accepts care on the floor. INR is pending prior to admission.     7/15/2018   [facility]     Venkat Aguilar PA  07/15/18 [id number]

## 2018-07-15 NOTE — ED NOTES
Pt yelling from the room and is done using the bedpan. UA at bedside doing EKG and obtaining urine sample and cleaning up pts urine in the bed.

## 2018-07-15 NOTE — IP AVS SNAPSHOT
Maggie Case #[id number] (CSN: [id number])  (28 year old F)  (Adm: 07/15/18)     [id number]               [facility]: [phone]            Patient Demographics     Patient Name Sex          Age SSN Address Phone    Maggie Case Female 1990 (28 year old) [id number]  [place] [address] [phone] (Home)  [phone] (Mobile) *Preferred*      Emergency Contact(s)     Name Relation Home Work Mobile    Dandre Case Father [phone]    Pawan Case Mother [phone]      Admission Information     Attending Provider Admitting Provider Admission Type Admission Date/Time    Eleuterio Anderson MD Urbonas, Arvydas, MD Emergency 07/15/18  1330    Discharge Date Hospital Service Auth/Cert Status Service Area     General Medicine Incomplete [facility]    Unit Room/Bed Admission Status       [facility] -1 Admission (Confirmed)       Admission     Complaint    Hypokalemia      Hospital Account     Name Acct ID Class Status Primary Coverage    Maggie Case [id number] Inpatient Open [facility]            Guarantor Account (for Hospital Account #[id number])     Name Relation to Pt Service Area Active? Acct Type    Maggie Case Self [facility] Yes Personal/Family    Address Phone          [address]  [place] [address] [phone](H)              Coverage Information (for Hospital Account #[id number])     F/O Payor/Plan Precert #    KEDAR/KEDAR NAVA     Subscriber Subscriber #    Maggie Case [id number]    Address Phone    [address]  [place] [address] [phone]                                                INTERAGENCY TRANSFER FORM - PHYSICIAN ORDERS   7/15/2018                       [facility]: [phone]            Attending Provider: Eleuterio Anderson MD     Allergies:  Amoxicillin, Levaquin [Levofloxacin], Naproxen, Nickel, Tramadol, Sulindac     "Infection:  C-Difficile   Service:  GENERAL [facility]    Ht:  1.6 m (5' 3\")   Wt:  61.8 kg (136 lb 3.9 oz)   Admission Wt:  61.8 kg (136 lb 3.9 oz)    BMI:  24.13 kg/m 2   BSA:  1.66 m 2            ED Clinical Impression     Diagnosis Description Comment Added By Time Added    Hypokalemia [E87.6] Hypokalemia [E87.6]  Venkat Aguilar PA 7/15/2018  4:57 PM    Calculus of ureter [N20.1] Calculus of ureter [N20.1]  Venkat Aguilar PA 7/15/2018  4:58 PM      Hospital Problems as of 7/18/2018              Priority Class Noted POA    * (Principal)Hypokalemia Medium  6/18/2013 Yes    History of cranioplasty Medium  5/24/2018 Yes    Calculus of lower urinary tract Medium  7/15/2018 Yes    History of CVA (cerebrovascular accident) Medium  7/15/2018 Yes    Left hemiparesis (H) Medium  7/15/2018 Yes    Chronic anticoagulation Medium  7/15/2018 Yes    Chemical dependency (H) Medium  7/16/2018 Yes      Non-Hospital Problems as of 7/18/2018              Priority Class Noted    Diarrhea Medium  4/17/2008    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    Pain in joint, lower leg Medium  5/1/2009    Cervicalgia Medium  6/16/2009    Low back pain Medium  6/16/2009    Muscle pain Medium  7/30/2009    Anxiety state Medium  3/25/2013    Acute upper GI bleed Medium  6/18/2013    Acute cystitis Medium  6/18/2013    Acute chest pain Medium  5/6/2016    Leukocytosis Medium  5/6/2016    Lung mass Medium  5/6/2016    SOB (shortness of breath) Medium  5/6/2016    Pyelonephritis Medium  1/5/2017    Sepsis (H) Medium  4/28/2017    Influenza B Medium  5/1/2017    Opacity of lung on imaging study Medium  5/1/2017    Community acquired pneumonia Medium  5/1/2017    C. difficile colitis Medium  5/1/2017    Acute ischemic stroke (H) Medium  2/17/2018      Code Status History     Date Active Date Inactive Code Status Order ID Comments User Context    7/18/2018  8:55 AM  Full Code [id number]  Eleuterio Anderson MD " Outpatient    7/15/2018  7:23 PM 7/18/2018  8:55 AM Full Code [id number]  Abrahan Robles MD Inpatient    5/26/2018  6:08 AM 7/15/2018  7:23 PM Full Code [id number]  Dexter Vergara MD Outpatient    3/1/2018 12:04 PM 5/26/2018  6:08 AM Full Code [id number]  Yeison Crawley MD Outpatient    2/17/2018  3:20 PM 3/1/2018 12:04 PM Full Code [id number]  Reinier Shepherd MD Inpatient    5/1/2017  9:02 AM 2/17/2018  3:20 PM Full Code [id number]  Delmi Nicole NP Outpatient    4/28/2017 10:21 PM 5/1/2017  9:02 AM Full Code [id number]  Chester Cool MD Inpatient    1/8/2017  2:15 PM 4/28/2017 10:21 PM Full Code [id number]  Joey Quan DO Outpatient    1/5/2017  5:08 PM 1/8/2017  2:15 PM Full Code [id number]  Chester Cool MD Inpatient    5/8/2016  8:22 AM 1/5/2017  5:08 PM Full Code [id number]  Eleuterio Anderson MD Outpatient    5/6/2016 11:58 PM 5/8/2016  8:22 AM Full Code [id number]  Boo Méndez MD Inpatient    6/17/2013  8:27 PM 6/19/2013 12:05 AM Full Code [id number]  Brandy Livingston RN Inpatient      Current Code Status     Date Active Code Status Order ID Comments User Context       Prior      Summary of Visit     Reason for your hospital stay       c diff colitis                Medication Review      START taking        Dose / Directions Comments    tamsulosin 0.4 MG capsule   Commonly known as:  FLOMAX   Used for:  Calculus of ureter        Dose:  0.4 mg   Take 1 capsule (0.4 mg) by mouth daily   Quantity:  7 capsule   Refills:  0        vancomycin 50 MG/ML Solr   Commonly known as:  FIRVANQ   Indication:  Clostridium difficile Bacteria   Used for:  C. difficile colitis        Dose:  125 mg   Take 2.5 mLs (125 mg) by mouth 4 times daily for 7 days   Quantity:  70 mL   Refills:  0          CONTINUE these medications which may have CHANGED, or have new prescriptions. If we are uncertain of the size of tablets/capsules you have at home, strength may be listed as something that  might have changed.        Dose / Directions Comments    XARELTO 20 MG Tabs tablet   This may have changed:  Another medication with the same name was removed. Continue taking this medication, and follow the directions you see here.   Generic drug:  rivaroxaban ANTICOAGULANT        Dose:  20 mg   Take 20 mg by mouth every morning   Refills:  11          CONTINUE these medications which have NOT CHANGED        Dose / Directions Comments    ABILIFY 5 MG tablet   Generic drug:  ARIPiprazole        Dose:  5 mg   Take 5 mg by mouth At Bedtime   Refills:  0        bismuth subsalicylate 262 MG/15ML suspension   Commonly known as:  PEPTO BISMOL        Dose:  15 mL   Take 15 mLs by mouth every 6 hours as needed for indigestion   Refills:  0        cyanocobalamin 1000 MCG tablet   Commonly known as:  vitamin  B-12        Dose:  1000 mcg   Take 1,000 mcg by mouth daily   Refills:  0        Dextromethorphan-guaiFENesin  MG/5ML syrup        Dose:  5 mL   Take 5 mLs by mouth every 4 hours as needed for cough   Refills:  0        DULOXETINE HCL PO        Dose:  60 mg   Take 60 mg by mouth daily   Refills:  0        ferrous sulfate 325 (65 Fe) MG tablet   Commonly known as:  IRON        Take by mouth daily (with breakfast)   Refills:  0        * GABAPENTIN PO        Dose:  600 mg   Take 600 mg by mouth At Bedtime   Refills:  0        * GABAPENTIN PO        Dose:  300 mg   Take 300 mg by mouth 2 times daily 300mg AM and Noon   Refills:  0        hydrOXYzine 25 MG tablet   Commonly known as:  ATARAX        Dose:  25 mg   Take 25 mg by mouth every 6 hours as needed   Refills:  11        loperamide 2 MG capsule   Commonly known as:  IMODIUM        Dose:  2 mg   Take 2 mg by mouth 4 times daily as needed for diarrhea   Refills:  0        nicotine 7 MG/24HR 24 hr patch   Commonly known as:  NICODERM CQ        Dose:  1 patch   Place 1 patch onto the skin every 24 hours   Refills:  0        oxyCODONE IR 10 MG tablet   Commonly known  as:  ROXICODONE   Used for:  Cervicalgia        Dose:  10 mg   Take 1 tablet (10 mg) by mouth every 3 hours as needed   Quantity:  10 tablet   Refills:  0        Salicylic Acid 40 % Pads        Dose:  1 Dose   Apply 1 Dose topically every other day Apply pad topically every other  day for 14 days   Refills:  0        TRAZODONE HCL PO        Dose:  100 mg   Take 100 mg by mouth At Bedtime   Refills:  0        TYLENOL PO        Dose:  650 mg   Take 650 mg by mouth every 4 hours as needed for mild pain or fever   Refills:  0        ZOFRAN PO        Dose:  4 mg   Take 4 mg by mouth every 8 hours as needed for nausea or vomiting   Refills:  0        * Notice:  This list has 2 medication(s) that are the same as other medications prescribed for you. Read the directions carefully, and ask your doctor or other care provider to review them with you.            After Care     Activity - Up with nursing assistance           Advance Diet as Tolerated       Follow this diet upon discharge: Orders Placed This Encounter      Advance Diet as Tolerated: Regular Diet Adult       Fall precautions           General info for SNF       Length of Stay Estimate: Short Term Care: Estimated # of Days <30  Condition at Discharge: Stable  Level of care:skilled   Rehabilitation Potential: Fair  Admission H&P remains valid and up-to-date: Yes  Recent Chemotherapy: N/A  Use Nursing Home Standing Orders: Yes             Referrals     Occupational Therapy Adult Consult       Evaluate and treat as clinically indicated.    Reason:  weakness       Physical Therapy Adult Consult       Evaluate and treat as clinically indicated.    Reason:  weakness             Follow-Up Appointment Instructions     Follow Up and recommended labs and tests       Follow up with Nursing home physician.  No follow up labs or test are needed.             Your next 10 appointments already scheduled     Jul 18, 2018  3:00 PM CDT   (Arrive by 2:45 PM)   Return Visit with Emeterio  "Tobin Mesa MD   [facility] ([facility] [place])    [address]  3rd Floor  [facility] [address] [phone]              Statement of Approval     Ordered          07/18/18 0856  I have reviewed and agree with all the recommendations and orders detailed in this document.  EFFECTIVE NOW     Approved and electronically signed by:  Eleutreio Anderson MD                                                 INTERAGENCY TRANSFER FORM - NURSING   7/15/2018                       [facility]: [phone]            Attending Provider: Eleuterio Anderson MD     Allergies:  Amoxicillin, Levaquin [Levofloxacin], Naproxen, Nickel, Tramadol, Sulindac    Infection:  C-Difficile   Service:  GENERAL MEDI    Ht:  1.6 m (5' 3\")   Wt:  61.8 kg (136 lb 3.9 oz)   Admission Wt:  61.8 kg (136 lb 3.9 oz)    BMI:  24.13 kg/m 2   BSA:  1.66 m 2            Advance Directives        Scanned docmt in ACP Activity?           Yes, scanned ACP docmt        Immunizations     Name Date      HepB 09/22/03     Influenza (IIV3) PF 10/13/08     Pneumococcal 23 valent 05/01/17     TD (ADULT, 7+) 09/22/03       ASSESSMENT     Discharge Profile Flowsheet     DISCHARGE NEEDS ASSESSMENT     PAS Number  -- (n/a) 04/29/17 1821    Concerns To Be Addressed  discharge planning concerns;substance/tobacco abuse/use concerns 07/16/18 1128   SKIN      Concerns Comments  Currently working with Sandra Alatorre for additional supports 05/09/16 1158   Inspection of bony prominences  Full 07/18/18 0120    Equipment Currently Used at Home  cane, quad;walker, javi 05/25/18 1614   Full except areas not inspected   -- (all except periarea/buttocks.pt declines,will assess when up) 07/17/18 0606    Key Recommendations  F/U with PCP 04/29/17 1821   Inspection under devices  Full 07/16/18 1232    [facility] Name  [facility]  07/16/18 1128   Skin WDL  WDL 07/18/18 0120    Primary Care MD Name  Dr. Flores  07/16/18 1128   Skin " "Temperature  warm 07/18/18 0120    GASTROINTESTINAL (ADULT,PEDIATRIC,OB)     Skin Moisture  dry 07/18/18 0120    GI WDL  ex;GI symptoms 07/18/18 0120   Skin Elasticity  quick return to original state 07/17/18 2138    Last Bowel Movement  07/17/18 07/17/18 2138   Skin Integrity  scar(s) 07/18/18 0120    GI Signs/Symptoms  abdominal pain;diarrhea;nausea 07/18/18 0120   SAFETY      Passing flatus  yes 07/18/18 0120   Safety WDL  WDL 07/18/18 0120    COMMUNICATION ASSESSMENT     Safety Factors  ID band on;upper side rails raised x 2;call light in reach;wheels locked;bed in low position 07/17/18 1238    Patient's communication style  spoken language (English or Bilingual) 07/15/18 1839   All Alarms  alarm(s) activated and audible 07/18/18 0120    FINAL RESOURCES                        Assessment WDL (Within Defined Limits) Definitions           Safety WDL     Effective: 09/28/15    Row Information: <b>WDL Definition:</b> Bed in low position, wheels locked; call light in reach; upper side rails up x 2; ID band on<br> <font color=\"gray\"><i>Item=AS safety wdl>>List=AS safety wdl>>Version=F14</i></font>      Skin WDL     Effective: 09/28/15    Row Information: <b>WDL Definition:</b> Warm; dry; intact; elastic; without discoloration; pressure points without redness<br> <font color=\"gray\"><i>Item=AS skin wdl>>List=AS skin wdl>>Version=F14</i></font>      Vitals     Vital Signs Flowsheet     VITAL SIGNS     ANALGESIA SIDE EFFECTS MONITORING      Temp  98.8  F (37.1  C) 07/18/18 0106   Side Effects Monitoring: Respiratory Quality  R 07/16/18 1057    Temp src  Temporal 07/18/18 0106   Side Effects Monitoring: Respiratory Depth  N 07/16/18 1057    Resp  16 07/18/18 0106   HEIGHT AND WEIGHT      Pulse  76 07/15/18 1815   Height  1.6 m (5' 3\") 07/15/18 2209    Heart Rate  102 07/18/18 0106   Height Method  Estimated 07/15/18 2209    Pulse/Heart Rate Source  Monitor 07/18/18 0106   Weight  61.8 kg (136 lb 3.9 oz) 07/15/18 2209    BP  " 99/58 07/18/18 0106   Weight Method  Bed scale 07/15/18 2209    OXYGEN THERAPY     BSA (Calculated - sq m)  1.66 07/15/18 2209    SpO2  97 % 07/18/18 0106   BMI (Calculated)  24.19 07/15/18 2209    O2 Device  None (Room air) 07/18/18 0106   GIL COMA SCALE      PAIN/COMFORT     Best Eye Response  4-->(E4) spontaneous 07/17/18 1916    Patient Currently in Pain  yes 07/18/18 0958   Best Motor Response  6-->(M6) obeys commands 07/17/18 1916    Preferred Pain Scale  number (Numeric Rating Pain Scale) 07/18/18 0958   Best Verbal Response  5-->(V5) oriented 07/17/18 1916    Patient's Stated Pain Goal  No pain 07/18/18 0958   [place] Coma Scale Score  15 07/17/18 1916    0-10 Pain Scale  9 07/18/18 0801   POSITIONING      Pain Location  Abdomen 07/18/18 0958   Body Position  independently positioning 07/18/18 0924    Pain Orientation  Left 07/18/18 0958   Head of Bed (HOB)  HOB at 60-90 degrees 07/18/18 0924    Pain Descriptors  Aching 07/18/18 0958   DAILY CARE      Pain Intervention(s)  Medication (See eMAR) 07/18/18 0958   Activity Management  up to bedside commode 07/18/18 0924    Response to Interventions  Decrease in pain 07/18/18 0958   Activity Assistance Provided  assistance, 1 person 07/18/18 0924            Patient Lines/Drains/Airways Status    Active LINES/DRAINS/AIRWAYS     Name: Placement date: Placement time: Site: Days: Last dressing change:    Incision/Surgical Site 02/18/18 Right Head 02/18/18   2122    149     Incision/Surgical Site 05/24/18 Right Head 05/24/18   1021    54             Patient Lines/Drains/Airways Status    Active PICC/CVC     None            Intake/Output Detail Report     Date Intake     Output   Net    Shift P.O. I.V. IV Piggyback Total Urine Other Total       Noc 07/16/18 2300 - 07/17/18 0659 240 786 -- 1026 -- -- -- 1026    Day 07/17/18 0700 - 07/17/18 1459 100 600 -- 700 -- -- -- 700    Nguyen 07/17/18 1500 - 07/17/18 2259 240 417 -- 657 200 -- 200 457    Noc 07/17/18 2300 -  07/18/18 0659 -- -- -- -- -- 450 450 -450    Day 07/18/18 0700 - 07/18/18 1459 -- -- -- -- 300 -- 300 -300      Last Void/BM       Most Recent Value    Urine Occurrence 1 at 07/18/2018 0453    Stool Occurrence 1 at 07/16/2018 1412      Case Management/Discharge Planning     Case Management/Discharge Planning Flowsheet     REFERRAL INFORMATION     Communication preferences  phone 07/16/18 1128    Did the Initial Social Work Assessment result in a Social Work Case?  Yes 07/16/18 1128   COPING/STRESS      Arrived From  home or self-care 04/29/17 1821   Major Change/Loss/Stressor  denies 07/16/18 0313    Reason For Consult  discharge planning;mental health concerns;substance use concerns 07/16/18 1128   ASSESSMENT/CONCERNS TO BE ADDRESSED      [facility] Name  [facility]  07/16/18 1128   Concerns To Be Addressed  discharge planning concerns;substance/tobacco abuse/use concerns 07/16/18 1128    Primary Care MD Name  Dr. Flores  07/16/18 1128   Concerns Comments  Currently working with Sandra Alatorre for additional supports 05/09/16 1158    LIVING ENVIRONMENT     DISCHARGE PLANNING      Lives With  facility resident 07/16/18 1128   Key Recommendations  F/U with PCP 04/29/17 1821    Living Arrangements  extended care facility ([facility] ) 07/16/18 1128   FINAL RESOURCES      Provides Primary Care For  no one, unable/limited ability to care for self 07/16/18 1128   Equipment Currently Used at Home  cane, quad;walker, javi 05/25/18 1614    Quality Of Family Relationships  involved 07/16/18 1128   PAS Number  -- (n/a) 04/29/17 1821    Able to Return to Prior Living Arrangements  yes 07/16/18 1128   ABUSE RISK SCREEN      HOME SAFETY     QUESTION TO PATIENT:  Has a member of your family or a partner(now or in the past) intimidated, hurt, manipulated, or controlled you in any way?  no 07/15/18 1331    Patient Feels Safe Living in Home?  yes 07/16/18 1128   QUESTION TO PATIENT: Do you feel safe going back to the  place where you are living?  yes 07/15/18 1331    ASSESSMENT OF FAMILY/SOCIAL SUPPORT     OBSERVATION: Is there reason to believe there has been maltreatment of a vulnerable adult (ie. Physical/Sexual/Emotional abuse, self neglect, lack of adequate food, shelter, medical care, or financial exploitation)?  no 07/15/18 1331    Marital Status  Single 07/16/18 1128   OTHER      Who is your support system?  Parent(s) 07/16/18 1128   Are you depressed or being treated for depression?  Yes 07/15/18 1839    Description of Support System  Involved 07/16/18 1128   HOMICIDE RISK      Support Assessment  Adequate family and caregiver support 07/16/18 1128   Feels Like Hurting Others  no 07/15/18 1331                  [facility]: [phone]            Medication Administration Report for Maggie Case GENIA as of 07/18/18 1000   Legend:    Given Hold Not Given Due Canceled Entry Other Actions    Time Time (Time) Time  Time-Action       Inactive    Active    Linked        Medications 07/12/18 07/13/18 07/14/18 07/15/18 07/16/18 07/17/18 07/18/18    ARIPiprazole (ABILIFY) tablet 5 mg  Dose: 5 mg  Freq: AT BEDTIME Route: PO  Start: 07/16/18 2200   Admin. Amount: 1 tablet (1 × 5 mg tablet)  Dispense Loc: [facility] [id number]         (2155)-Not Given        (2253)-Not Given        [ ] 2200           dextrose 5% and 0.45% NaCl + KCl 20 mEq/L infusion  Rate: 50 mL/hr   Freq: CONTINUOUS Route: IV  Start: 07/15/18 1930   Last Admin: 07/17/18 0932  Dispense Loc: [facility]  Volume: 1,000 mL        2055 ( )-Started        1328 ( )-New Bag       2337 ( )-New Bag        0919 ( )-Rate/Dose Change       0932 ( )-New Bag            diphenhydrAMINE (BENADRYL) capsule 25 mg  Dose: 25 mg  Freq: EVERY 6 HOURS PRN Route: PO  PRN Reason: itching  Start: 07/16/18 0449   Admin. Amount: 1 capsule (1 × 25 mg capsule)  Last Admin: 07/18/18 0425  Dispense Loc: [facility] [id number]         0507 (25 mg)-Given       1317 (25 mg)-Given        0301 (25  mg)-Given       0930 (25 mg)-Given       2128 (25 mg)-Given        0425 (25 mg)-Given           DULoxetine (CYMBALTA) EC capsule 60 mg  Dose: 60 mg  Freq: DAILY Route: PO  Start: 07/16/18 0900   Admin. Amount: 1 capsule (1 × 60 mg capsule)  Last Admin: 07/18/18 0800  Dispense Loc: [facility] [id number]         0958 (60 mg)-Given        0913 (60 mg)-Given        0800 (60 mg)-Given           ferrous sulfate (IRON) tablet 325 mg  Dose: 325 mg  Freq: DAILY WITH BREAKFAST Route: PO  Start: 07/16/18 0900   Admin Instructions: Absorbed best on an empty stomach. If stomach upset occurs, can take with meals.    Admin. Amount: 1 tablet (1 × 325 mg tablet)  Last Admin: 07/18/18 0800  Dispense Loc: [facility] [id number]         0958 (325 mg)-Given        0912 (325 mg)-Given        0800 (325 mg)-Given           gabapentin (NEURONTIN) capsule 300 mg  Dose: 300 mg  Freq: 2 TIMES DAILY Route: PO  Start: 07/16/18 0900   Admin. Amount: 1 capsule (1 × 300 mg capsule)  Last Admin: 07/18/18 0800  Dispense Loc: [facility] [id number]         0958 (300 mg)-Given       1444 (300 mg)-Given        0912 (300 mg)-Given       1422 (300 mg)-Given        0800 (300 mg)-Given       [ ] 1400           gabapentin (NEURONTIN) tablet 600 mg  Dose: 600 mg  Freq: AT BEDTIME Route: PO  Start: 07/16/18 2200   Admin. Amount: 1 tablet (1 × 600 mg tablet)  Dispense Loc: HI [id number]         (2155)-Not Given        (2253)-Not Given        [ ] 2200           hydrOXYzine (ATARAX) tablet 25 mg  Dose: 25 mg  Freq: EVERY 6 HOURS PRN Route: PO  PRN Reasons: itching,anxiety  Start: 07/16/18 0846   Admin. Amount: 1 tablet (1 × 25 mg tablet)  Last Admin: 07/17/18 2059  Dispense Loc: HI [id number]          1422 (25 mg)-Given       2059 (25 mg)-Given            ketorolac (TORADOL) injection 30 mg  Dose: 30 mg  Freq: EVERY 6 HOURS PRN Route: IV  PRN Reason: moderate to severe pain  Start: 07/15/18 2300   End: 07/20/18 9067   Admin Instructions: Can cause pain on injection. Administer through a running  maintenance fluid over 1 minute followed by a flush. If patient complains of pain on injection, may dilute 15-30 mg in 5 mL and push over 1 to 2 minutes.    Admin. Amount: 30 mg = 1 mL Conc: 30 mg/mL  Last Admin: 07/17/18 0000  Dispense Loc: HI [id number]  Volume: 1 mL        2252 (30 mg)-Given        0508 (30 mg)-Given       1626 (30 mg)-Given        0000 (30 mg)-Given            magnesium sulfate 2 g in water intermittent infusion  Dose: 2 g  Freq: DAILY PRN Route: IV  PRN Reason: magnesium supplementation  Start: 07/15/18 1920   Admin Instructions: For Serum Mg++ 1.6 - 2 mg/dL  Give 2 g and recheck magnesium level next AM.    Admin. Amount: 2 g = 50 mL Conc: 0.04 g/mL  Dispense Loc: [facility]  Infused Over: 60 Minutes  Volume: 50 mL               magnesium sulfate 4 g in 100 mL sterile water (premade)  Dose: 4 g  Freq: EVERY 4 HOURS PRN Route: IV  PRN Reason: magnesium supplementation  Start: 07/15/18 1920   Admin Instructions: For serum Mg++ less than 1.6 mg/dL  Give 4 g and recheck magnesium level 2 hours after dose, and next AM.    Admin. Amount: 4 g = 100 mL Conc: 4 g/100 mL  Dispense Loc: [facility]  Infused Over: 120 Minutes  Volume: 100 mL               melatonin tablet 1 mg  Dose: 1 mg  Freq: AT BEDTIME PRN Route: PO  PRN Reason: sleep  Start: 07/15/18 1916   Admin Instructions: Do not give unless at least 6 hours of uninterrupted sleep is expected.    Admin. Amount: 1 tablet (1 × 1 mg tablet)  Last Admin: 07/16/18 0057  Dispense Loc: HI [id number]         0057 (1 mg)-Given             naloxone (NARCAN) injection 0.1-0.4 mg  Dose: 0.1-0.4 mg  Freq: EVERY 2 MIN PRN Route: IV  PRN Reason: opioid reversal  Start: 07/15/18 1916   Admin Instructions: For respiratory rate LESS than or EQUAL to 8.  Partial reversal dose:  0.1 mg titrated q 2 minutes for Analgesia Side Effects Monitoring Sedation Level of 3 (frequently drowsy, arousable, drifts to sleep during conversation).Full reversal dose:  0.4 mg  bolus for Analgesia Side Effects Monitoring Sedation Level of 4 (somnolent, minimal or no response to stimulation).  For ordered doses up to 2mg give IVP. Give each 0.4mg over 15 seconds in emergency situations. For non-emergent situations further dilute in 9mL of NS to facilitate titration of response.    Admin. Amount: 0.1-0.4 mg = 0.25-1 mL Conc: 0.4 mg/mL  Dispense Loc: HI [id number]  Volume: 1 mL               nicotine (NICODERM CQ) 21 MG/24HR 24 hr patch 1 patch  Dose: 1 patch  Freq: DAILY Route: TD  Start: 07/15/18 1930   Admin. Amount: 1 patch  Last Admin: 07/17/18 2100  Dispense Loc: [facility] [id number]        2051 (1 patch)-Given               2031 (1 patch)-Given        2100 (1 patch)-Given        [ ] 2100           nicotine Patch in Place  Freq: EVERY 8 HOURS Route: TD  Start: 07/15/18 1930   Admin Instructions: Chart every shift, confirming that patch is still in place on patient (no barcode scan needed). See patch order for dose information.    Last Admin: 07/18/18 0330  Dispense Loc: [facility]        2055 ( )-Given        0245 ( )-Patch in Place       1201 ( )-Patch in Place       2000 ( )-Patch in Place        0607 ( )-Patch in Place       1130 ( )-Patch in Place       1930 ( )-Given        0330 ( )-Patch in Place       [ ] 1130       [ ] 1930           nicotine patch REMOVAL  Freq: DAILY Route: TD  Start: 07/16/18 0900   Admin Instructions: Remove patch when new patch is applied or patch is discontinued.    Dispense Loc: [facility]         0942-Hold [C]       2031 ( )-Patch Removed        2100 ( )-Patch Removed        [ ] 2100           ondansetron (ZOFRAN) tablet 4 mg  Dose: 4 mg  Freq: EVERY 8 HOURS PRN Route: PO  PRN Reasons: nausea,vomiting  Start: 07/16/18 0846   Admin. Amount: 1 tablet (1 × 4 mg tablet)  [id number]               ondansetron (ZOFRAN-ODT) ODT tab 4 mg  Dose: 4 mg  Freq: EVERY 6 HOURS PRN Route: PO  PRN Reasons: nausea,vomiting  Start: 07/15/18 1920   Admin  Instructions: This is Step 1 of nausea and vomiting management.  If nausea not resolved in 15 minutes, go to Step 2 prochlorperazine (COMPAZINE). Do not push through foil backing. Peel back foil and gently remove. Place on tongue immediately. Administration with liquid unnecessary  With dry hands, peel back foil backing and gently remove tablet; do not push oral disintegrating tablet through foil backing; administer immediately on tongue and oral disintegrating tablet dissolves in seconds; then swallow with saliva; liquid not required.    Admin. Amount: 1 tablet (1 × 4 mg tablet)  Dispense Loc: [facility] [id number]                                                                      Or  ondansetron (ZOFRAN) injection 4 mg  Dose: 4 mg  Freq: EVERY 6 HOURS PRN Route: IV  PRN Reasons: nausea,vomiting  Start: 07/15/18 1920   Admin Instructions: This is Step 1 of nausea and vomiting management.  If nausea not resolved in 15 minutes, go to Step 2 prochlorperazine (COMPAZINE).  Irritant. For ordered doses up to 4 mg, give IV Push undiluted over 2-5 minutes.    Admin. Amount: 4 mg = 2 mL Conc: 4 mg/2 mL  Last Admin: 07/18/18 0756  Dispense Loc: [facility] [id number]  Infused Over: 2-5 Minutes  Volume: 2 mL         0009 (4 mg)-Given       0505 (4 mg)-Given       1019 (4 mg)-Given       1707 (4 mg)-Given       2342 (4 mg)-Given        1447 (4 mg)-Given       2059 (4 mg)-Given        0756 (4 mg)-Given           oxyCODONE IR (ROXICODONE) tablet 10 mg  Dose: 10 mg  Freq: EVERY 3 HOURS PRN Route: PO  PRN Reason: moderate to severe pain  Start: 07/16/18 0846   Admin. Amount: 2 tablet (2 × 5 mg tablet)  Last Admin: 07/18/18 0800  Dispense Loc: [facility] [id number]         0957 (10 mg)-Given       1445 (10 mg)-Given       1953 (10 mg)-Given        0000 (10 mg)-Given       0308 (10 mg)-Given       0930 (10 mg)-Given       1422 (10 mg)-Given       1934 (10 mg)-Given        0059 (10 mg)-Given       0425 (10 mg)-Given       0800 (10 mg)-Given           Patient is  already receiving anticoagulation with heparin, enoxaparin (LOVENOX), warfarin (COUMADIN)  or other anticoagulant medication  Freq: CONTINUOUS PRN Route: XX  Start: 07/15/18 1919   Dispense Loc: [facility]               potassium chloride (KLOR-CON) Packet 20-40 mEq  Dose: 20-40 mEq  Freq: EVERY 2 HOURS PRN Route: ORAL OR FEED  PRN Reason: potassium supplementation  Start: 07/15/18 1919   Admin Instructions: Use if unable to tolerate tablets.   If Serum K+ 3.4-4.0, dose = 20 mEq x1. Recheck K+ level the next AM.  If Serum K+ 3.0-3.3, dose = 60 mEq po total dose (40 mEq x 1 followed in 2 hours by 20 mEq X1). Recheck K+ level 4 hours after dose and the next AM.  If Serum K+ 2.5-2.9, dose = 80 mEq po total dose (40 mEq Q2H x2). Recheck K+ level 4 hours after dose and the next AM.  If Serum K+ less than 2.5, See IV order.  Dissolve packet contents in 4-8 ounces of cold water or juice.    Admin. Amount: 20-40 mEq  Dispense Loc: [facility] [id number]               potassium chloride 10 mEq in 100 mL intermittent infusion with 10 mg lidocaine  Dose: 10 mEq  Freq: EVERY 1 HOUR PRN Route: IV  PRN Reason: potassium supplementation  Start: 07/15/18 1919   Admin Instructions: Infuse via PERIPHERAL LINE. Use potassium with lidocaine for pain with peripheral administration.  If Serum K+ 3.4-4.0, dose = 10 mEq/hr x2 doses. Recheck K+ level the next AM.  If Serum K+ 3.0-3.3, dose = 10 mEq/hr x4 doses (40 mEq IV total dose). Recheck K+ level 2 hours after dose and the next AM.  If Serum K+ less than 3.0, dose = 10 mEq/hr x6 doses (60 mEq IV total dose). Recheck K+ level 2 hours after dose and the next AM.    Admin. Amount: 10 mEq = 100 mL Conc: 10 mEq/100 mL  Last Admin: 07/16/18 0057  Dispense Loc: [facility]  Infused Over: 1 Hours  Volume: 100 mL        2136 (10 mEq)-New Bag       2242 (10 mEq)-New Bag        0057 (10 mEq)-New Bag             potassium chloride 10 mEq in 100 mL sterile water intermittent infusion  (premix)  Dose: 10 mEq  Freq: EVERY 1 HOUR PRN Route: IV  PRN Reason: potassium supplementation  Last Dose: 10 mEq (07/16/18 0654)  Start: 07/15/18 1919   Admin Instructions: Infuse via PERIPHERAL LINE or CENTRAL LINE.   If Serum K+ 3.4-4.0, dose = 10 mEq/hr x2 doses. Recheck K+ level the next AM.  If Serum K+ 3.0-3.3, dose = 10 mEq/hr x4 doses (40 mEq IV total dose). Recheck K+ level 2 hours after dose and the next AM.  If Serum K+ less than 3.0, dose = 10 mEq/hr x6 doses (60 mEq IV total dose). Recheck K+ level 2 hours after dose and the next AM.    Admin. Amount: 10 mEq = 100 mL Conc: 10 mEq/100 mL  Last Admin: 07/16/18 0654  Dispense Loc: [facility]  Infused Over: 60 Minutes  Volume: 100 mL         0654 (10 mEq)-New Bag             potassium chloride SA (K-DUR/KLOR-CON M) CR tablet 20-40 mEq  Dose: 20-40 mEq  Freq: EVERY 2 HOURS PRN Route: PO  PRN Reason: potassium supplementation  Start: 07/15/18 1919   Admin Instructions: Use if able to take PO.   If Serum K+ 3.4-4.0, dose = 20 mEq x1. Recheck K+ level the next AM.  If Serum K+ 3.0-3.3, dose = 60 mEq po total dose (40 mEq x1 followed in 2 hours by 20 mEq x1). Recheck K+ level 4 hours after dose and the next AM.  If Serum K+ 2.5-2.9, dose = 80 mEq po total dose (40 mEq Q2H x2). Recheck K+ level 4 hours after dose and the next AM.  If Serum K+ less than 2.5, See IV order.  DO NOT CRUSH    Admin. Amount: 1-2 tablet (1-2 × 20 mEq tablet)  Last Admin: 07/16/18 1444  Dispense Loc: [facility] [id number]         0459 (20 mEq)-Given [C]       0959 (40 mEq)-Given       1317 (40 mEq)-Given       1444 (20 mEq)-Given             rivaroxaban ANTICOAGULANT (XARELTO) tablet 20 mg  Dose: 20 mg  Freq: EVERY MORNING Route: PO  Start: 07/16/18 0900   Admin Instructions: Give with food.    Admin. Amount: 1 tablet (1 × 20 mg tablet)  Last Admin: 07/18/18 0800  Dispense Loc: [facility] [id number]         0958 (20 mg)-Given        0912 (20 mg)-Given        0800 (20 mg)-Given           tamsulosin  (FLOMAX) capsule 0.4 mg  Dose: 0.4 mg  Freq: DAILY Route: PO  Start: 07/15/18 2000   Admin Instructions: Administer 30 minutes after the same meal each day.  Capsules should be swallowed whole; do not crush chew or open.    Admin. Amount: 1 capsule (1 × 0.4 mg capsule)  Last Admin: 18  Dispense Loc: [facility] [id number]         (0.4 mg)-Given         (0.4 mg)-Given         (0.4 mg)-Given        [ ]            traZODone (DESYREL) tablet 100 mg  Dose: 100 mg  Freq: AT BEDTIME Route: PO  Start: 18   Admin. Amount: 1 tablet (1 × 100 mg tablet)  Dispense Loc: [facility] [id number]         ()-Not Given        ()-Not Given        [ ]            vancomycin (FIRVANQ) oral solution 125 mg  Dose: 125 mg  Freq: 4 TIMES DAILY Route: PO  Indications of Use: CLOSTRIDIUM DIFFICILE  Start: 18 0900   Admin. Amount: 125 mg = 2.5 mL Conc: 50 mg/mL  Last Admin: 18 0802  Dispense Loc: [facility]  Volume: 2.5 mL         1103 (125 mg)-Given       1445 (125 mg)-Given       1954 (125 mg)-Given       2337 (125 mg)-Given        0913 (125 mg)-Given       1422 (125 mg)-Given       1927 (125 mg)-Given       2247 (125 mg)-Given        0802 (125 mg)-Given       [ ] 1500       [ ] 1900       [ ] 2300          Completed Medications  Medications 07/12/18 07/13/18 07/14/18 07/15/18 07/16/18 07/17/18 07/18/18         Dose: 1,000 mL  Freq: ONCE Route: IV  Last Dose: Stopped (07/15/18 155)  Start: 07/15/18 1334   End: 07/15/18 1556   Admin. Amount: 1,000 mL  Last Admin: 07/15/18 1436  Dispense Loc: [facility]  Infused Over: 1 Hours  Administrations Remainin  Volume: 1,000 mL        1436 (1,000 mL)-New Bag       1556-Stopped                Start: 07/15/18 2034   End: 07/15/18 2055   Admin Instructions: Gurvinder Andrade: cabinet override    Last Admin: 07/15/18 2055  Administrations Remainin         (1,000 mL)-New Bag                Dose: 25 mg  Freq: ONCE Route: IV  Start: 07/15/18 1438    End: 07/15/18 1445   Admin Instructions: For ordered doses up to 50 mg, give IV Push undiluted. Give each 25mg over a minimum of 1 minute. Extend in non-emergency    Admin. Amount: 25 mg = 0.5 mL Conc: 50 mg/mL  Last Admin: 07/15/18 1444  Dispense Loc: [facility] MEED  Infused Over: 1-2 Minutes  Administrations Remainin  Volume: 1 mL        1444 (25 mg)-Given                Dose: 100 mL  Freq: ONCE Route: IV  Start: 07/15/18 155   End: 07/15/18 160   Admin Instructions: Supplied and administered by Radiology.    Admin. Amount: 100 mL  Last Admin: 07/15/18 160  Dispense Loc: [facility]  Administrations Remainin  Volume: 100 mL        1608 (100 mL)-Given                Dose: 30 mg  Freq: ONCE Route: IV  Start: 07/15/18 165   End: 07/15/18 1700   Admin Instructions: Can cause pain on injection. Administer through a running maintenance fluid over 1 minute followed by a flush. If patient complains of pain on injection, may dilute 15-30 mg in 5 mL and push over 1 to 2 minutes.    Admin. Amount: 30 mg = 1 mL Conc: 30 mg/mL  Last Admin: 07/15/18 1658  Dispense Loc: [facility]  Infused Over: 2 Minutes  Administrations Remainin  Volume: 1 mL        1658 (30 mg)-Given                Dose: 4 mg  Freq: ONCE Route: IV  Start: 07/15/18 1334   End: 07/15/18 1439   Admin Instructions: Irritant. For ordered doses up to 4 mg, give IV Push undiluted over 2-5 minutes.    Admin. Amount: 4 mg = 2 mL Conc: 4 mg/2 mL  Last Admin: 07/15/18 1437  Dispense Loc: [facility]  Infused Over: 2-5 Minutes  Administrations Remainin  Volume: 2 mL        1437 (4 mg)-Given                Dose: 60 mL  Freq: ONCE Route: IV  Start: 07/15/18 155   End: 07/15/18 160   Admin. Amount: 60 mL  Last Admin: 07/15/18 160  Dispense Loc: [facility]  Administrations Remainin  Volume: 60 mL        1608 (60 mL)-Given             Discontinued Medications  Medications 07/12/18 07/13/18 07/14/18 07/15/18 07/16/18 07/17/18 07/18/18         Rate:  75 mL/hr   Freq: CONTINUOUS Route: IV  Last Dose: Stopped (07/15/18 180)  Start: 07/15/18 1528   End: 07/15/18 2108   Last Admin: 07/15/18 155  Dispense Loc: [facility]  Volume: 1,000 mL        1557 ( )-New Bag       1809-ED Infusing on Admission/transfer              -Med Discontinued            Dose: 0.5 mg  Freq: ONCE Route: IV  Start: 07/15/18 1648   End: 07/15/18 165   Admin Instructions: For ordered doses up to 4 mg give IV Push undiluted. Administer each 2mg over 2-5 minutes.    Admin. Amount: 0.5 mg  Dispense Loc: [facility]  Administrations Remainin1655-Med Discontinued            Dose: 500 mg  Freq: EVERY 6 HOURS Route: IV  Indications of Use: INTRA-ABDOMINAL INFECTION  Last Dose: 500 mg (18)  Start: 07/15/18 2000   End: 18   Admin. Amount: 500 mg = 100 mL Conc: 5 mg/mL  Last Admin: 18  Dispense Loc: [facility]  Infused Over: 60 Minutes  Volume: 100 mL         (500 mg)-New Bag        207 (500 mg)-New Bag       0603-Med Discontinued           Dose: 1 patch  Freq: EVERY 24 HOURS Route: TD  Start: 18 0900   End: 18 1308   Admin. Amount: 1 patch  Dispense Loc: [facility] [id number]                1308-Med Discontinued           Start: 07/15/18 2038   End: 18 0844   Admin Instructions: Gurvinder Andrade: cabinet override    Administrations Remainin                44-Med Discontinued           Dose: 550 mg  Freq: 2 TIMES DAILY Route: PO  Indications of Use: INTRA-ABDOMINAL INFECTION  Start: 07/15/18 2100   End: 18 0603   Admin. Amount: 1 tablet (1 × 550 mg tablet)  Last Admin: 07/15/18 2052  Dispense Loc: [facility]         (550 mg)-Given        0603-Med Discontinued      Medications 07/12/18 07/13/18 07/14/18 07/15/18 07/16/18 07/17/18 07/18/18               INTERAGENCY TRANSFER FORM - NOTES (H&P, Discharge Summary, Consults, Procedures, Therapies)   7/15/2018                       [facility]:  [phone]               History & Physicals      H&P by Abrahan Robles MD at 7/15/2018  6:49 PM     Author:  Abrahan Robles MD Service:  Hospitalist Author Type:  Physician    Filed:  7/15/2018  8:14 PM Date of Service:  7/15/2018  6:49 PM Creation Time:  7/15/2018  6:49 PM    Status:  Signed :  Abrahan Robles MD (Physician)         [facility]    History and Physical  Hospitalist       Date of Admission:  7/15/2018    Assessment & Plan   Maggie Case is a 28 year old female who presents with nausea vomiting and diarrhea and was admitted for hypokalemia treatment. She also diagnosed with ureteral stone on admission    Principal Problem:    Hypokalemia    Assessment: chronic (chronic less likely to cause serious cardiac arrhythmias, Diff GI loss, renal loss Bartter's, Gittelman's, Liddle's syndromes (she also has metabolic alkalosis)[AU1.1] but most likely GI loss (Colitis on CT)[AU1.2]    Plan: Replace IV and PO (I was told that we have limited IV supply. Not sure how she will tolerate PO due to N/V). Will try both ways.   Check Mg.  She is not hypertensive.   Check serum Osm, urine lytes, urine osm  Check urine K excretion. Consider transtubular K gradient (TTKG)     Metabolic alkalosis  - could be volume contraction due to diarrhea  -- complete GI w/u (in search for upset stomach, diarrhea)  - IVF[AU1.1]    Colitis:   - will start rifaximin and flagyl (allergic to fluoroquinolones)   - stool cx pending (ordered by ED)[AU1.2]    Active Problems:    History of cranioplasty    Assessment: chronic condition    Plan: no intervention required      Calculus of lower urinary tract    Assessment: at UPJ, 3 mm only    Plan: will pass, hydrate, flomax      History of CVA (cerebrovascular accident)    Assessment: remote 2.2018 due to ASD and hypercoaguable state    Plan: she is on chronic anticoagulation with xarelto (warfarin dc'd 7.10.2018 - was difficult to have steady normal values due to  interaction with alcoho.       Left hemiparesis (H)    Assessment: chronic     Plan: will ask PT to see her      Chronic anticoagulation    Assessment: xarelto. She has had extensive hypercoagulability w/u at  - -ve     Plan: continue xarelto    Nicotine dependence:  -will offer nicotine patch    Possible severe depression:   - I would recommend psychiatric evaluation (consult) during this admission (she may leave AMA as she did before). Severe depression documented in her PCP note.   - will continue duloxetin and trazodone     # Pain Assessment:  Current Pain Score 7/15/2018   Patient currently in pain? -   Pain score (0-10) 9   Pain location -   Pain descriptors -   CPOT pain score -[AU1.1]   - Maggie is experiencing pain due to colitis and nephrolithiasis. Pain management was discussed and the plan was created in a collaborative fashion.  Maggie's response to the current recommendations: mixed response  -We will try Toradol first, if not enough we will give small doses of opiates.[AU1.2]    DVT Prophylaxis: pt already on xarelto  Code Status: Full Code    Disposition: Expected discharge in 2-3  days once low K corrected and cause found (may need GI w/u).    Abrahan Robles MD      Primary Care Physician   Chapo Flores    Chief Complaint[AU1.1]   Nausea vomiting diarrhea, right-sided back pain.    Reason for admission: Colitis, dehydration, hypokalemia,    History is obtained from the patient, electronic health record and emergency department physician.  History of present illness limited due to patient non-willingness to cooperate and answer questions.  Most information obtained from medical records.[AU1.2]    History of Present Illness   Maggie Case is a 28 year old[AU1.1] woman with past medical history significant for hypercoagulable state with negative hypercoagulability work, atrial septal defect DVT large PE cardioembolic paradoxical stroke with left hemiplegia noncompliance with anticoagulation  when being on warfarin(switched to Xarelto July 10 this year) who was treated yesterday in the emergency room left [place] comes back to emergency room with back pain.  Emergency room workup revealed 3 mm ureterovesical junction left-sided stone with mild hydronephrosis, colitis ascending and transverse colon, and hypokalemia with increased total CO2 on chemistry.  CRP is 9.5.  White blood cell count normal.  Urgency during treatment: Given normal saline bolus, stool sent for ova parasites and white blood cells and cultures.  Patient admits abdominal pain which she describes as constant started several days ago associated with nausea vomiting diarrhea.  Pain does not radiate associated with vomiting.  She vomits immediately after she eats undigested food.  She also admits feeling weak denies fever chills cough dysuria palpitation diplopia or dysarthria or dysphagia rash focal weakness except the old left hemiplegia.  She also complains left knee pain.  Hospitalist was called to admit[AU1.2]    Past Medical History    I have reviewed this patient's medical history and updated it with pertinent information if needed.   Past Medical History:   Diagnosis Date     Anemia      Anxiety      Chemical dependency (H)      Chronic diarrhea      Lactose intolerance      Myalgia      OCD (obsessive compulsive disorder)      Pulmonary embolism (H) 05/06/16     Stroke (H) 02/2018     Vitamin B12 deficiency        Past Surgical History   I have reviewed this patient's surgical history and updated it with pertinent information if needed.  Past Surgical History:   Procedure Laterality Date     CLOSED REDUCTION, PERCUTANEOUS PINNING LOWER EXTREMITY, COMBINED Right 4/6/2016    Procedure: COMBINED CLOSED REDUCTION, PERCUTANEOUS PINNING LOWER EXTREMITY;  Surgeon: Dexter Jones MD;  Location: [facility]     COLONOSCOPY       CRANIECTOMY Right 2/18/2018    Procedure: CRANIECTOMY;  RIGHT HEMICRANIECTOMY;  Surgeon: Emeterio Mesa MD;   Location: [facility]     CRANIOPLASTY Right 2018    Procedure: CRANIOPLASTY;  Right Cranioplasty ;  Surgeon: Emeterio Mesa MD;  Location: [facility] OR     UPPER GI ENDOSCOPY         Prior to Admission Medications   Prior to Admission Medications   Prescriptions Last Dose Informant Patient Reported? Taking?   DULOXETINE HCL PO 2018 at Unknown time  Yes Yes   Sig: Take 60 mg by mouth daily   Dextromethorphan-guaiFENesin  MG/5ML syrup 2018 at Unknown time  Yes Yes   Sig: Take 5 mLs by mouth every 4 hours as needed for cough   GABAPENTIN PO Past Week at Unknown time  Yes Yes   Sig: Take 600 mg by mouth At Bedtime    GABAPENTIN PO Past Week at Unknown time  Yes Yes   Sig: Take 300 mg by mouth 2 times daily 300mg AM and Noon   Rivaroxaban (XARELTO PO) Past Week at Unknown time  Yes Yes   TRAZODONE HCL PO 2018 at Unknown time  Yes Yes   Sig: Take 100 mg by mouth At Bedtime   Warfarin Therapy Reminder Unknown at Unknown time  No No   Si each continuous prn   acetaminophen (TYLENOL) 32 mg/mL solution Unknown at Unknown time  No No   Si.3 mLs (650 mg) by Per G Tube route every 4 hours as needed for mild pain or fever   Patient taking differently: Take 650 mg by mouth every 4 hours as needed for mild pain or fever    bismuth subsalicylate (PEPTO BISMOL) 262 MG/15ML suspension 2018 at Unknown time  Yes Yes   Sig: Take 15 mLs by mouth every 6 hours as needed for indigestion   cyanocobalamin (VITAMIN  B-12) 1000 MCG tablet 2018 at Unknown time  Yes Yes   Sig: Take 1,000 mcg by mouth daily   ferrous sulfate (IRON) 325 (65 Fe) MG tablet 2018 at Unknown time  Yes Yes   Sig: Take by mouth daily (with breakfast)   loperamide (IMODIUM) 2 MG capsule 2018 at Unknown time  Yes Yes   Sig: Take 2 mg by mouth 4 times daily as needed for diarrhea   nicotine (NICODERM CQ) 7 MG/24HR 24 hr patch 2018 at Unknown time  Yes Yes   Sig: Place 1 patch onto the skin every 24 hours   ondansetron  (ZOFRAN) 4 MG tablet Unknown at Unknown time  No No   Si-2 tablets (4-8 mg) by Per Feeding Tube route every 8 hours as needed for nausea   Patient taking differently: Take 4 mg by mouth every 8 hours as needed for nausea       Facility-Administered Medications: None     Allergies   Allergies   Allergen Reactions     Amoxicillin Other (See Comments)     Headaches     Levaquin [Levofloxacin] Swelling     Naproxen Other (See Comments)     Reaction: Headaches     Nickel      Tramadol      Sulindac Rash       Social History   I have reviewed this patient's social history and updated it with pertinent information if needed. Maggie Case  reports that she has quit smoking. Her smoking use included Cigarettes. She has a 1.75 pack-year smoking history. She has never used smokeless tobacco. She reports that she drinks alcohol. She reports that she uses illicit drugs, including Marijuana and Other.    Family History   I have reviewed this patient's family history and updated it with pertinent information if needed.   Family History   Problem Relation Age of Onset     Depression Mother      Migraines Maternal Half-Sister        Review of Systems[AU1.1]   As per history of present illness 14 points review of system obtained[AU1.2]    Physical Exam   Temp: 98.5  F (36.9  C) Temp src: Oral BP: 104/66 Pulse: 76 Heart Rate: 74 Resp: 20 SpO2: 96 % O2 Device: None (Room air)    Vital Signs with Ranges  Temp:  [98  F (36.7  C)-98.5  F (36.9  C)] 98.5  F (36.9  C)  Pulse:  [66-76] 76  Heart Rate:  [70-74] 74  Resp:  [16-20] 20  BP: ()/(66-73) 104/66  SpO2:  [96 %-100 %] 96 %  0 lbs 0 oz[AU1.1]    Physical exam:  General: Young woman not in acute distress lays in bed with close eyes and responds to voice commands.  Flat affect.  HEENT: Postcraniotomy changes.  Extraocular movement intact.  Mucous membranes moist.  Neck supple no bruits no lymphadenopathy  Cardiac regular rhythm and rate no murmurs gallops or rubs  Pulmonary:  Clear vesicular breath sounds bilateral anterior and posterior  GI: Abdomen soft bowel sounds present, tender diffusely, no rebound no guarding.  No pulsatile masses.   bladder not palpable, no CVA tenderness.  Neurologic left hemiplegia more significant left upper extremity than left lower extremity, myoclonus and Babinski left lower extremity.    Psychiatric exam: Very flat affect denies homicidal suicidal ideation admits to being sad and depressed.  Skin: Warm dry no rash.[AU1.2]      Data   Data reviewed today:  I personally reviewed[AU1.1] the abdominal CT image(s) showing 3 mm left ureterovesical junction stone mild hydronephrosis, ascending and transverse colitis.   EKG was not done in the emergency room we will get one when she is admitted[AU1.2]    Recent Labs  Lab 07/15/18  1435 07/13/18  1459   WBC 6.1 6.9   HGB 12.8 13.7   MCV 85 83    407   INR 1.04  --     139   POTASSIUM 2.2* 2.4*   CHLORIDE 98 93*   CO2 34* 38*   BUN 7 6*   CR 0.46* 0.45*   ANIONGAP 10 8   AVINASH 8.3* 8.5   GLC 92 95   ALBUMIN 2.6* 2.6*   PROTTOTAL 5.6* 5.9*   BILITOTAL 0.3 0.4   ALKPHOS 120 135   ALT 19 19   AST 41 41   LIPASE 283  --        Recent Results (from the past 24 hour(s))   CT Abdomen Pelvis w Contrast    Narrative    EXAMINATION: CT ABDOMEN PELVIS W CONTRAST, 7/15/2018 4:23 PM    TECHNIQUE:  Helical CT images from the lung bases through the  symphysis pubis were obtained  with IV contrast. Contrast dose: Isovue  300, 100ml    COMPARISON: none    HISTORY: abdominal pain, diarrhea;     FINDINGS:    There is dependent atelectasis at the lung bases.    The liver is free of masses or biliary ductal enlargement. No  calcified gallstones are seen.    The the spleen and pancreas appear normal.    The adrenal glands are normal.    There is a delayed enhancement of the left kidney. There is dilation  of the left renal collecting system. There is a 3 mm in diameter  calculus at the ureterovesical junction on the left.  There is a 4 mm  diameter calculus in the upper pole of the right kidney. A right renal  cyst is seen.    The periaortic lymph nodes are normal in caliber.    There is some bowel wall thickening seen in the ascending and  transverse colon consistent with colitis.    In the pelvis the bladder and rectum appear normal.    The regional skeleton is intact      Impression    IMPRESSION:   1. 3 mm distal left ureteric calculus at the ureterovesical junction  with the mild left-sided hydronephrosis.  2. Colitis in the ascending and transverse colon     GEETHA JACOBS MD[AU1.1]          Revision History        User Key Date/Time User Provider Type Action    > AU1.2 7/15/2018  8:14 PM Abrahan Robles MD Physician Sign     AU1.1 7/15/2018  6:49 PM Abrahan Robles MD Physician                      Discharge Summaries      Discharge Summaries by Eleuterio Anderson MD at 7/18/2018  9:04 AM     Author:  Eleuterio Anderson MD Service:  Internal Medicine Author Type:  Physician    Filed:  7/18/2018  9:13 AM Date of Service:  7/18/2018  9:04 AM Creation Time:  7/18/2018  9:04 AM    Status:  Signed :  Eleuterio Anderson MD (Physician)         [facility]    Discharge Summary  Hospitalist    Date of Admission:  7/15/2018  Date of Discharge:[SH1.1]  7/18/2018[SH1.2]  Discharging Provider: Eleuterio Anderson MD  Date of Service (when I saw the patient): 07/18/18    Discharge Diagnoses   Principal Problem:    Hypokalemia (6/18/2013)  Active Problems:    History of cranioplasty (5/24/2018)    Calculus of lower urinary tract (7/15/2018)    History of CVA (cerebrovascular accident) (7/15/2018)    Left hemiparesis (H) (7/15/2018)    Chronic anticoagulation (7/15/2018)    Chemical dependency (H) (7/16/2018)      History of Present Illness   Maggie Case is an 28 year old female who presented with n/v/d.  Please see admission H+P for additional details.    Hospital Course   Maggie Case was admitted on 7/15/2018 for n/v/d,  found to have colitis on CT scan.  She tested positive for c diff.  In addition, she was incidentally found to have left sided ureterolithiasis.  The following problems were addressed during her hospitalization:    C diff colitis: CT abdomen pelvis with contrast showed diffuse colitis in the ascending and transverse colon.  Fever curve stable, WBC wnl.  Is not clinically septic.  She got vancomycin PO QID with improvement, will discharge her with 7 more days.     Hypokalemia: likely from GI losses.  Contraction metabolic alkalosis resolved. This was replaced.       Left sided Ureterolithiasis: incidental finding on CT abd/pelvis showing 3 mm distal left ureteral calculus at UPJ with mild left-sided hydronephrosis.  She does have pain.  She was treated with IVFs and flomax.  Her renal function and UOP have been stable.  Since she is now asymptomatic, this has likely passed.  If she does have a recurrence in symptoms, f/u imaging likely indicated.     H/o CVA: Feb 2018 resulting in left sided hemiparesis and SNF state.  She has h/o hypercoagulability and ASD which was worked up at the [facility] (negative findings).  Stable, continuing Xarelto on discharge.     Depression: stable, continue outpatient duloxetine and trazadone     Nicotine dependence: nicotine replacement prn.  She repeatedly asked to go out and smoke while hospitalized.  Cessation counseling provided.    Eleuterio Anderson MD    Significant Results and Procedures   See below    Pending Results   These results will be followed up by Chapo Flores    Unresulted Labs Ordered in the Past 30 Days of this Admission     Date and Time Order Name Status Description    7/15/2018 1416 Stool culture SSCE Preliminary           Code Status   Full Code       Primary Care Physician   Chapo Flores    Physical Exam   Temp: 98.8  F (37.1  C) Temp src: Temporal BP: 99/58   Heart Rate: 102 Resp: 16 SpO2: 97 % O2 Device: None (Room air)    Vitals:    07/15/18 1814   Weight: 61.8  kg (136 lb 3.9 oz)     Vital Signs with Ranges  Temp:  [95.8  F (35.4  C)-98.8  F (37.1  C)] 98.8  F (37.1  C)  Heart Rate:  [] 102  Resp:  [16-18] 16  BP: ()/(58-69) 99/58  SpO2:  [95 %-100 %] 97 %  I/O last 3 completed shifts:  In: 1357 [P.O.:340; I.V.:1017]  Out: 650 [Urine:200; Other:450]    Constitutional: AA, NAD  Eyes: PERRLA, no injection, no icterus  HEENT: atraumatic, normocephalic  Respiratory: CTA b/l  Cardiovascular: S1 S2 RRR  GI: soft, NT, ND, + bowel sounds  Lymph/Hematologic: no palpable lymphadenopathy  Skin: no rashes, no lesions  Musculoskeletal: No edema, good tone, no deformities  Neurologic: left sided hemiplegia  Psychiatric: appropriate affect    # Discharge Pain Plan:   - During her hospitalization, Maggie experienced pain due to chronic pain and colitis.  The pain plan for discharge was discussed with Maggie and the plan was created in a collaborative fashion.    - Follow-up: nursing home MD      Discharge Disposition   Discharged to rehabilitation facility  Condition at discharge: Stable    Consultations This Hospital Stay   SOCIAL WORK IP CONSULT  PHYSICAL THERAPY ADULT IP CONSULT  OCCUPATIONAL THERAPY ADULT IP CONSULT    Time Spent on this Encounter   IEleuterio MD, personally saw the patient today and spent greater than 30 minutes discharging this patient.    Discharge Orders     General info for SNF   Length of Stay Estimate: Short Term Care: Estimated # of Days <30  Condition at Discharge: Stable  Level of care:skilled   Rehabilitation Potential: Fair  Admission H&P remains valid and up-to-date: Yes  Recent Chemotherapy: N/A  Use Nursing Home Standing Orders: Yes     Reason for your hospital stay   c diff colitis     Follow Up and recommended labs and tests   Follow up with Nursing home physician.  No follow up labs or test are needed.     Activity - Up with nursing assistance     Full Code     Physical Therapy Adult Consult   Evaluate and treat as clinically  indicated.    Reason:  weakness     Occupational Therapy Adult Consult   Evaluate and treat as clinically indicated.    Reason:  weakness     Fall precautions     Advance Diet as Tolerated   Follow this diet upon discharge: Orders Placed This Encounter     Advance Diet as Tolerated: Regular Diet Adult       Discharge Medications   Current Discharge Medication List      START taking these medications    Details   tamsulosin (FLOMAX) 0.4 MG capsule Take 1 capsule (0.4 mg) by mouth daily  Qty: 7 capsule, Refills: 0    Associated Diagnoses: Calculus of ureter      vancomycin (FIRVANQ) 50 MG/ML SOLR Take 2.5 mLs (125 mg) by mouth 4 times daily for 7 days  Qty: 70 mL, Refills: 0    Associated Diagnoses: C. difficile colitis         CONTINUE these medications which have CHANGED    Details   oxyCODONE IR (ROXICODONE) 10 MG tablet Take 1 tablet (10 mg) by mouth every 3 hours as needed  Qty: 10 tablet, Refills: 0    Associated Diagnoses: Cervicalgia         CONTINUE these medications which have NOT CHANGED    Details   Acetaminophen (TYLENOL PO) Take 650 mg by mouth every 4 hours as needed for mild pain or fever      ARIPiprazole (ABILIFY) 5 MG tablet Take 5 mg by mouth At Bedtime      bismuth subsalicylate (PEPTO BISMOL) 262 MG/15ML suspension Take 15 mLs by mouth every 6 hours as needed for indigestion      cyanocobalamin (VITAMIN  B-12) 1000 MCG tablet Take 1,000 mcg by mouth daily      Dextromethorphan-guaiFENesin  MG/5ML syrup Take 5 mLs by mouth every 4 hours as needed for cough      DULOXETINE HCL PO Take 60 mg by mouth daily      ferrous sulfate (IRON) 325 (65 Fe) MG tablet Take by mouth daily (with breakfast)      !! GABAPENTIN PO Take 300 mg by mouth 2 times daily 300mg AM and Noon      !! GABAPENTIN PO Take 600 mg by mouth At Bedtime       loperamide (IMODIUM) 2 MG capsule Take 2 mg by mouth 4 times daily as needed for diarrhea      nicotine (NICODERM CQ) 7 MG/24HR 24 hr patch Place 1 patch onto the skin  every 24 hours      Ondansetron HCl (ZOFRAN PO) Take 4 mg by mouth every 8 hours as needed for nausea or vomiting      Salicylic Acid 40 % PADS Apply 1 Dose topically every other day Apply pad topically every other  day for 14 days      TRAZODONE HCL PO Take 100 mg by mouth At Bedtime      XARELTO 20 MG TABS tablet Take 20 mg by mouth every morning  Refills: 11      hydrOXYzine (ATARAX) 25 MG tablet Take 25 mg by mouth every 6 hours as needed  Refills: 11       !! - Potential duplicate medications found. Please discuss with provider.        Allergies   Allergies   Allergen Reactions     Amoxicillin Other (See Comments)     Headaches     Levaquin [Levofloxacin] Swelling     Naproxen Other (See Comments)     Reaction: Headaches     Nickel      Tramadol      Sulindac Rash     Data   Most Recent 3 CBC's:  Recent Labs   Lab Test  07/16/18   0414  07/15/18   1435  07/13/18   1459   WBC  5.5  6.1  6.9   HGB  10.4*  12.8  13.7   MCV  86  85  83   PLT  238  321  407      Most Recent 3 BMP's:  Recent Labs   Lab Test  07/18/18   0527  07/17/18   1045  07/17/18   0536   07/16/18   1218  07/16/18   0414   NA  141   --   145*   --   143  146*   POTASSIUM  4.3  4.3  4.3   < >  3.0*  2.8*   CHLORIDE  108   --   113*   --   108  111*   CO2  26   --   21   --   27  25   BUN  1*   --   2*   --    --   4*   CR  0.42*   --   0.36*   --    --   0.37*   ANIONGAP  7   --   11   --   8  10   AVINASH  7.8*   --   7.7*   --    --   7.4*   GLC  91   --   80   --    --   86    < > = values in this interval not displayed.     Most Recent 2 LFT's:  Recent Labs   Lab Test  07/16/18   0414  07/15/18   1435   AST  29  41   ALT  16  19   ALKPHOS  83  120   BILITOTAL  0.2  0.3     Most Recent INR's and Anticoagulation Dosing History:  Anticoagulation Dose History     Recent Dosing and Labs Latest Ref Rng & Units 2/22/2018 3/1/2018 4/17/2018 4/23/2018 5/24/2018 6/24/2018 7/15/2018    INR 0.80 - 1.20 1.13 0.96 4.08(H) 2.20(H) 1.14 8.43(HH) 1.04        Most  Recent 3 Troponin's:  Recent Labs   Lab Test  04/23/18   1757  04/17/18   1810  02/17/18   1107   TROPI  <0.015  <0.015  <0.015     Most Recent Cholesterol Panel:  Recent Labs   Lab Test  02/17/18   1752   CHOL  98   LDL  39   HDL  38*   TRIG  105     Most Recent 6 Bacteria Isolates From Any Culture (See EPIC Reports for Culture Details):  Recent Labs   Lab Test  07/16/18   0430  02/19/18   1714  02/19/18   1713  02/19/18   1344  02/19/18   1240  02/19/18   1217   CULT  Culture in progress  Canceled, Test credited  Duplicate request    Light growth  Normal beatriz    Heavy growth  Staphylococcus aureus  *  Heavy growth  Haemophilus influenzae  Beta lactamase negative  Beta-lactamase negative Haemophilus influenzae are usually susceptible to ampicillin,   amoxacillin/clavulanic acid, levofloxacin, and 3rd generation cephalosporins, such as   ceftriaxone.  *  No growth  No growth  >100,000 colonies/mL  Escherichia coli  *     Most Recent TSH, T4 and A1c Labs:  Recent Labs   Lab Test  02/17/18   1752  12/26/15   1108   TSH   --   0.68   A1C  5.8   --      Results for orders placed or performed during the hospital encounter of 07/15/18   CT Abdomen Pelvis w Contrast    Narrative    EXAMINATION: CT ABDOMEN PELVIS W CONTRAST, 7/15/2018 4:23 PM    TECHNIQUE:  Helical CT images from the lung bases through the  symphysis pubis were obtained  with IV contrast. Contrast dose: Isovue  300, 100ml    COMPARISON: none    HISTORY: abdominal pain, diarrhea;     FINDINGS:    There is dependent atelectasis at the lung bases.    The liver is free of masses or biliary ductal enlargement. No  calcified gallstones are seen.    The the spleen and pancreas appear normal.    The adrenal glands are normal.    There is a delayed enhancement of the left kidney. There is dilation  of the left renal collecting system. There is a 3 mm in diameter  calculus at the ureterovesical junction on the left. There is a 4 mm  diameter calculus in the upper  pole of the right kidney. A right renal  cyst is seen.    The periaortic lymph nodes are normal in caliber.    There is some bowel wall thickening seen in the ascending and  transverse colon consistent with colitis.    In the pelvis the bladder and rectum appear normal.    The regional skeleton is intact      Impression    IMPRESSION:   1. 3 mm distal left ureteric calculus at the ureterovesical junction  with the mild left-sided hydronephrosis.  2. Colitis in the ascending and transverse colon     GEETHA JACOBS MD[SH1.1]          Revision History        User Key Date/Time User Provider Type Action    > SH1.2 7/18/2018  9:13 AM Eleuterio Anderson MD Physician Sign     SH1.1 7/18/2018  9:04 AM Eleuterio Anderson MD Physician                   Consult Notes     No notes of this type exist for this encounter.         Progress Notes - Physician (Notes for yesterday and today)      Progress Notes by Eleuterio Anderson MD at 7/17/2018  8:56 AM     Author:  Eleuterio Anderson MD Service:  Internal Medicine Author Type:  Physician    Filed:  7/17/2018  9:00 AM Date of Service:  7/17/2018  8:56 AM Creation Time:  7/17/2018  8:56 AM    Status:  Signed :  Eleuterio Anderson MD (Physician)         [facility]    Hospitalist Progress Note    Date of Service (when I saw the patient): 07/17/2018    Assessment & Plan   Maggie Case is a 28 year old female who was admitted on 7/15/2018 for n/v/d found to have c diff colitis.    C diff colitis: CT abdomen pelvis with contrast showed diffuse colitis in the ascending and transverse colon.  Fever curve stable, WBC wnl.  Is not clinically septic.  - continue vancomycin PO QID  - slow IVFs  - electrolytes replacement  - supportive cares    Hypokalemia: likely from GI losses.  Contraction metabolic alkalosis resolved.  - replacement protocol  - Mg level wnl at 2.3    Left sided Ureterolithiasis: incidental finding on CT abd/pelvis showing 3 mm distal left ureteral calculus at UPJ  with mild left-sided hydronephrosis.  She does have pain  - flomax  - rescan her, likely tomorrow to see if it passed    H/o CVA: Feb 2018 resulting in left sided hemiparesis and SNF state.  She has h/o hypercoagulability and ASD [facility]  Stable.  - continuing Xarelto    Depression: possibly decompensated due to her extremely flat affect  - continue outpatient duloxetine and trazadone    Nicotine dependence: nicotine replacement prn    FEN: oral diet as tolerated, encourage intake  - replace electrolytes as indicated    DVT Prophylaxis: she is on Xarelto    Code Status: Full Code    Disposition: She is improving.  Expected discharge in 1-2 days.    Eleuterio Anderson MD      Interval History   Patient is much improved today, alert, interactive, awaiting breakfast.  Still c/o left sided abdominal pain.    -Data reviewed today: I reviewed all new labs and imaging results over the last 24 hours. I personally reviewed no images or EKG's today.    Physical Exam   Temp: 97.1  F (36.2  C) Temp src: Tympanic BP: 123/60   Heart Rate: 84 Resp: 18 SpO2: 100 % O2 Device: None (Room air)    Vitals:    07/15/18 1814   Weight: 61.8 kg (136 lb 3.9 oz)     Vital Signs with Ranges  Temp:  [97  F (36.1  C)-98  F (36.7  C)] 97.1  F (36.2  C)  Heart Rate:  [76-84] 84  Resp:  [18] 18  BP: ()/(60-61) 123/60  SpO2:  [99 %-100 %] 100 %[SH1.1]      Intake/Output Summary (Last 24 hours) at 07/17/18 0900  Last data filed at 07/17/18 0556   Gross per 24 hour   Intake             4825 ml   Output                0 ml   Net             4825 ml[SH1.2]     # Pain Assessment:  Current Pain Score 7/17/2018   Patient currently in pain? sleeping: patient not able to self report   Pain score (0-10) -   Pain location -   Pain descriptors -   CPOT pain score -   Maggie s pain level was assessed and she currently denies pain.        Peripheral IV 07/15/18 Right Upper forearm (Active)   Site Assessment WDL  7/16/2018 11:45 PM   Line Status Infusing;Checked every 1-2 hour 7/16/2018 11:45 PM   Phlebitis Scale 0-->no symptoms 7/16/2018 11:45 PM   Infiltration Scale 0 7/16/2018 11:45 PM   Infiltration Site Treatment Method  None 7/16/2018 10:00 AM   Number of days:2       Incision/Surgical Site 02/18/18 Right Head (Active)   Number of days:149       Incision/Surgical Site 05/24/18 Right Head (Active)   Number of days:54     Line/device assessment(s) completed for medical necessity    Constitutional - AA, NAD  HEENT - atraumatic, normocephalic  Neck - supple, no masses, no JVD  CVS - S1 S2 RRR, no murmurs, rubs, gallops  Respiratory - CTA b/l  GI - soft, moderate tenderness on palpation in upper abdomen, ND, + bowel sounds, no organomegaly  Musculoskeletal - no LE edema, no lesions  Neuro - oriented x 3, no gross focal deficits  Psych - normal affect today    Medications     dextrose 5% and 0.45% NaCl + KCl 20 mEq/L 100 mL/hr at 07/16/18 2337     - MEDICATION INSTRUCTIONS -         ARIPiprazole  5 mg Oral At Bedtime     DULoxetine (CYMBALTA) EC capsule 60 mg  60 mg Oral Daily     ferrous sulfate  325 mg Oral Daily with breakfast     gabapentin (NEURONTIN) capsule 300 mg  300 mg Oral BID     gabapentin (NEURONTIN) tablet 600 mg  600 mg Oral At Bedtime     nicotine  1 patch Transdermal Daily     nicotine   Transdermal Q8H     nicotine   Transdermal Daily     rivaroxaban ANTICOAGULANT  20 mg Oral QAM     tamsulosin  0.4 mg Oral Daily     traZODone (DESYREL) tablet 100 mg  100 mg Oral At Bedtime     vancomycin  125 mg Oral 4x Daily       Data     Recent Labs  Lab 07/17/18  0536 07/16/18  1941 07/16/18  1218 07/16/18  0414  07/15/18  1435 07/13/18  1459   WBC  --   --   --  5.5  --  6.1 6.9   HGB  --   --   --  10.4*  --  12.8 13.7   MCV  --   --   --  86  --  85 83   PLT  --   --   --  238  --  321 407   INR  --   --   --   --   --  1.04  --    *  --  143 146*  < > 142 139   POTASSIUM 4.3 4.3 3.0* 2.8*  < > 2.2* 2.4*  "  CHLORIDE 113*  --  108 111*  < > 98 93*   CO2 21  --  27 25  < > 34* 38*   BUN 2*  --   --  4*  --  7 6*   CR 0.36*  --   --  0.37*  --  0.46* 0.45*   ANIONGAP 11  --  8 10  < > 10 8   AVINASH 7.7*  --   --  7.4*  --  8.3* 8.5   GLC 80  --   --  86  --  92 95   ALBUMIN  --   --   --  1.8*  --  2.6* 2.6*   PROTTOTAL  --   --   --  4.2*  --  5.6* 5.9*   BILITOTAL  --   --   --  0.2  --  0.3 0.4   ALKPHOS  --   --   --  83  --  120 135   ALT  --   --   --  16  --  19 19   AST  --   --   --  29  --  41 41   LIPASE  --   --   --   --   --  283  --    < > = values in this interval not displayed.  Lactic Acid   Date Value Ref Range Status   04/29/2017 0.7 0.4 - 2.0 mmol/L Final   03/19/2017 1.3 0.4 - 2.0 mmol/L Final   05/06/2016 1.3 0.4 - 2.0 mmol/L Final       No results found for this or any previous visit (from the past 24 hour(s)).    Eleuterio Anderson MD[SH1.1]       Revision History        User Key Date/Time User Provider Type Action    > SH1.2 7/17/2018  9:00 AM Eleuterio Anderson MD Physician Sign     SH1.1 7/17/2018  8:56 AM Eleuterio Anderson MD Physician                   Procedure Notes     No notes of this type exist for this encounter.      Progress Notes - Therapies (Notes from 07/15/18 through 07/18/18)     No notes of this type exist for this encounter.                                          INTERAGENCY TRANSFER FORM - LAB / IMAGING / EKG / EMG RESULTS   7/15/2018                       [facility]: [phone]            Unresulted Labs (24h ago through future)    Start       Ordered    07/17/18 0500  Basic metabolic panel  DAILY,   Routine      07/16/18 1338    Unscheduled  Potassium  (Potassium Replacement - \"High\" - Replacement for all levels less than 4.1 mmol/L - HI)  CONDITIONAL (SPECIFY),   Routine     Comments:  Obtain Potassium Level for these conditions:  *IF no potassium result within 24 hrs before initiation of order set, draw potassium level with next lab collect.    *2 HOURS AFTER last IV " "potassium replacement dose and 4 hours after an oral replacement dose when potassium replacement given for level less than 3.4.  *Next morning after potassium dose.     Repeat Potassium Replacement if necessary.    07/15/18 1923    Unscheduled  Magnesium  (Magnesium Replacement - Adult - \"High\" - Replacement for all levels less than or equal to 2 mg/dL)  CONDITIONAL (SPECIFY),   Routine     Comments:  Obtain Magnesium Level for these conditions:  *IF no magnesium result within 24 hrs before initiation of order set, draw magnesium level with next lab collect.    *2 HOURS AFTER last magnesium replacement dose when magnesium replacement given for level less than 1.6  *Next morning after magnesium dose.     Repeat Magnesium Replacement if necessary.    07/15/18 1923         Lab Results - 3 Days      Basic metabolic panel [[id number]] (Abnormal)  Resulted: 07/18/18 0558, Result status: Final result    Ordering provider: Eleuterio Anderson MD  07/17/18 2300 Resulting lab: [facility] [place]    Specimen Information    Type Source Collected On   Blood  07/18/18 0527          Components       Value Reference Range Flag Lab   Sodium 141 133 - 144 mmol/L  HI   Potassium 4.3 3.4 - 5.3 mmol/L  HI   Chloride 108 94 - 109 mmol/L  HI   Carbon Dioxide 26 20 - 32 mmol/L  HI   Anion Gap 7 3 - 14 mmol/L  HI   Glucose 91 70 - 99 mg/dL  HI   Urea Nitrogen 1 7 - 30 mg/dL L HI   Creatinine 0.42 0.52 - 1.04 mg/dL L HI   GFR Estimate >90 >60 mL/min/1.7m2  HI   Comment:  Non  GFR Calc   GFR Estimate If Black >90 >60 mL/min/1.7m2  HI   Comment:  African American GFR Calc   Calcium 7.8 8.5 - 10.1 mg/dL L HI            Stool culture SSCE [[id number]]  Resulted: 07/17/18 1406, Result status: Preliminary result    Ordering provider: Venkat Aguilar PA  07/15/18 1416 Resulting lab: [facility] [place]    Specimen Information    Type Source Collected On   Feces  07/16/18 0430          Components       Value Reference " Range Flag Lab   Specimen Description Feces      Shiga-Toxins 1&2 --   HI   Result:         Shiga toxin 1 NOT detected and Shiga toxin 2 NOT detected.              Test results are to be used in conjunction with information available from the patient   clinical evaluation and other diagnostic procedures.     Culture Micro Culture in progress   HI   Result:              Potassium [[id number]]  Resulted: 07/17/18 1108, Result status: Final result    Ordering provider: Eleuterio Anderson MD  07/17/18 1038 Resulting lab: [facility]    Specimen Information    Type Source Collected On   Blood  07/17/18 1045          Components       Value Reference Range Flag Lab   Potassium 4.3 3.4 - 5.3 mmol/L  HI            Basic metabolic panel [[id number]] (Abnormal)  Resulted: 07/17/18 0556, Result status: Final result    Ordering provider: Eleuterio Anderson MD  07/16/18 2300 Resulting lab: [facility]    Specimen Information    Type Source Collected On   Blood  07/17/18 0536          Components       Value Reference Range Flag Lab   Sodium 145 133 - 144 mmol/L H HI   Potassium 4.3 3.4 - 5.3 mmol/L  HI   Comment:  Specimen slightly hemolyzed, potassium may be falsely elevated   Chloride 113 94 - 109 mmol/L H HI   Carbon Dioxide 21 20 - 32 mmol/L  HI   Anion Gap 11 3 - 14 mmol/L  HI   Glucose 80 70 - 99 mg/dL  HI   Urea Nitrogen 2 7 - 30 mg/dL L HI   Creatinine 0.36 0.52 - 1.04 mg/dL L HI   GFR Estimate >90 >60 mL/min/1.7m2  HI   Comment:  Non  GFR Calc   GFR Estimate If Black >90 >60 mL/min/1.7m2  HI   Comment:  African American GFR Calc   Calcium 7.7 8.5 - 10.1 mg/dL L HI            Potassium [[id number]]  Resulted: 07/17/18 0536, Result status: In process    Ordering provider: Eleuterio Anderson MD  07/17/18 0001 Resulting lab: [facility]    Specimen Information    Type Source Collected On   Blood  07/17/18 0536            Potassium [[id number]]  Resulted: 07/16/18 1959, Result status: Final  result    Ordering provider: Eleuterio Anderson MD  07/16/18 1450 Resulting lab: [facility]    Specimen Information    Type Source Collected On   Blood  07/16/18 1941          Components       Value Reference Range Flag Lab   Potassium 4.3 3.4 - 5.3 mmol/L  HI            Electrolyte panel [[id number]] (Abnormal)  Resulted: 07/16/18 1242, Result status: Final result    Ordering provider: Abrahan Robles MD  07/16/18 0600 Resulting lab: [facility]    Specimen Information    Type Source Collected On   Blood  07/16/18 1218          Components       Value Reference Range Flag Lab   Sodium 143 133 - 144 mmol/L  HI   Potassium 3.0 3.4 - 5.3 mmol/L L HI   Chloride 108 94 - 109 mmol/L  HI   Carbon Dioxide 27 20 - 32 mmol/L  HI   Anion Gap 8 3 - 14 mmol/L  HI            Ova and Parasite screen [[id number]]  Resulted: 07/16/18 1240, Result status: Final result    Ordering provider: Abrahan Robles MD  07/16/18 0530 Resulting lab: [facility]    Specimen Information    Type Source Collected On   Feces  07/16/18 1102          Components       Value Reference Range Flag Lab   Specimen Description Feces      Ova and Parasite CRYPTOSPORIDIUM AG NOT DETECTED   HI   Ova and Parasite GIARDIA AG NOT DETECTED   HI            Ova and Parasite Screen [[id number]]  Resulted: 07/16/18 1240, Result status: Final result    Ordering provider: Venkat Aguilar PA  07/15/18 1416 Resulting lab: [facility]    Specimen Information    Type Source Collected On   Feces  07/16/18 0430          Components       Value Reference Range Flag Lab   Specimen Description Feces      Ova and Parasite CRYPTOSPORIDIUM AG NOT DETECTED   HI   Ova and Parasite GIARDIA AG NOT DETECTED   HI            Occult blood fecal HGB immuno [[id number]] (Abnormal)  Resulted: 07/16/18 0851, Result status: Final result    Ordering provider: Venkat Aguilar PA  07/15/18 1416 Resulting lab: [facility]    Specimen Information    Type Source Collected On   Stool  07/16/18 0430          Components       Value Reference Range Flag Lab   Occult Blood HGB FIT Positive NEG^Negative A HI            Clostridium difficile toxin B PCR [[id number]] (Abnormal)  Resulted: 07/16/18 0553, Result status: Final result    Ordering provider: Venkat Aguilar PA  07/15/18 1416 Resulting lab: [facility]    Specimen Information    Type Source Collected On   Feces  07/15/18 0430          Components       Value Reference Range Flag Lab   Specimen Description Feces   HI   C Diff Toxin B PCR Positive NEG^Negative A HI   Comment:         Positive: Toxin producing Clostridium difficile target DNA sequences detected,   presumed positive for Clostridium difficile toxin B.  Clostridium difficile (Requires Enteric Isolation)  FDA approved assay performed using [employer] GeneXpert real-time PCR.  Critical Value called to and read back by  JAVIER GIL AT 0551 ON 7  Critical Value called to and read back by  JAVIER GIL AT 0551 ON 7/16/18 BY LUIS CARLOS.              Fecal Lactoferrin [[id number]] (Abnormal)  Resulted: 07/16/18 0532, Result status: Final result    Ordering provider: Abrahan Robles MD  07/15/18 1942 Resulting lab: [facility]    Specimen Information    Type Source Collected On   Stool  07/16/18 0430          Components       Value Reference Range Flag Lab   Fecal Lactoferrin Positive NEG^Negative A HI   Comment:  Test not valid for breast fed patients.            Comprehensive metabolic panel [[id number]] (Abnormal)  Resulted: 07/16/18 0506, Result status: Final result    Ordering provider: Abrahan Robles MD  07/16/18 0000 Resulting lab: [facility]    Specimen Information    Type Source Collected On   Blood  07/16/18 0414          Components       Value Reference Range Flag Lab   Sodium 146 133 - 144 mmol/L H HI   Potassium 2.8 3.4 - 5.3 mmol/L LL HI   Comment:   Critical Value called to and read back by  KEHINDE MCNEIL AT 0444 BY TF     Chloride 111 94 - 109 mmol/L H HI   Carbon Dioxide 25 20 - 32 mmol/L  HI   Anion Gap 10 3 - 14 mmol/L  HI   Glucose 86 70 - 99 mg/dL  HI   Urea Nitrogen 4 7 - 30 mg/dL L HI   Creatinine 0.37 0.52 - 1.04 mg/dL L HI   GFR Estimate >90 >60 mL/min/1.7m2  HI   Comment:  Non  GFR Calc   GFR Estimate If Black >90 >60 mL/min/1.7m2  HI   Comment:  African American GFR Calc   Calcium 7.4 8.5 - 10.1 mg/dL L HI   Bilirubin Total 0.2 0.2 - 1.3 mg/dL  HI   Albumin 1.8 3.4 - 5.0 g/dL L HI   Protein Total 4.2 6.8 - 8.8 g/dL L HI   Alkaline Phosphatase 83 40 - 150 U/L  HI   ALT 16 0 - 50 U/L  HI   AST 29 0 - 45 U/L  HI            Potassium [[id number]] (Abnormal)  Resulted: 07/16/18 0445, Result status: Final result    Ordering provider: Abrahan Robles MD  07/16/18 0251 Resulting lab: [facility]    Specimen Information    Type Source Collected On   Blood  07/16/18 0400          Components       Value Reference Range Flag Lab   Potassium 2.7 3.4 - 5.3 mmol/L LL HI   Comment:  Critical Value called to and read back by  KEHINDE MCNEIL AT 0444 BY TF              CBC with platelets [[id number]] (Abnormal)  Resulted: 07/16/18 0418, Result status: Final result    Ordering provider: Abrahan Robles MD  07/16/18 0000 Resulting lab: [facility]    Specimen Information    Type Source Collected On   Blood  07/16/18 0414          Components       Value Reference Range Flag Lab   WBC 5.5 4.0 - 11.0 10e9/L  HI   RBC Count 3.61 3.8 - 5.2 10e12/L L HI   Hemoglobin 10.4 11.7 - 15.7 g/dL L HI   Hematocrit 31.2 35.0 - 47.0 % L HI   MCV 86 78 - 100 fl  HI   MCH 28.8 26.5 - 33.0 pg  HI   MCHC 33.3 31.5 - 36.5 g/dL  HI   RDW 18.1 10.0 - 15.0 % H HI   Platelet Count 238 150 - 450 10e9/L  HI            Electrolyte panel [[id number]] (Abnormal)  Resulted: 07/16/18 0030, Result status: Final result    Ordering provider: Abrahan Robles,  MD  07/15/18 2341 Resulting lab: [facility]    Specimen Information    Type Source Collected On   Blood  07/16/18 0010          Components       Value Reference Range Flag Lab   Sodium 148 133 - 144 mmol/L H HI   Potassium 3.0 3.4 - 5.3 mmol/L L HI   Chloride 111 94 - 109 mmol/L H HI   Carbon Dioxide 28 20 - 32 mmol/L  HI   Anion Gap 9 3 - 14 mmol/L  HI            Potassium random urine [[id number]]  Resulted: 07/15/18 2119, Result status: Final result    Ordering provider: Abrahan Robles MD  07/15/18 1923 Resulting lab: [facility]    Specimen Information    Type Source Collected On   Urine  07/15/18 1718          Components       Value Reference Range Flag Lab   Potassium Urine mmol/L <1 mmol/L  HI            Sodium random urine [[id number]]  Resulted: 07/15/18 2117, Result status: Final result    Ordering provider: Abrahan Robles MD  07/15/18 1923 Resulting lab: [facility]    Specimen Information    Type Source Collected On   Urine  07/15/18 1718          Components       Value Reference Range Flag Lab   Sodium Urine mmol/L 47 mmol/L  HI            Chloride random urine [[id number]]  Resulted: 07/15/18 2117, Result status: Final result    Ordering provider: Abrahan Robles MD  07/15/18 1923 Resulting lab: [facility]    Specimen Information    Type Source Collected On   Urine  07/15/18 1718          Components       Value Reference Range Flag Lab   Chloride Urine mmol/L 37 mmol/L  HI            Magnesium [[id number]]  Resulted: 07/15/18 2109, Result status: Final result    Ordering provider: Abrahan Robles MD  07/15/18 2011 Resulting lab: [facility]    Specimen Information    Type Source Collected On   Blood  07/15/18 1435          Components       Value Reference Range Flag Lab   Magnesium 2.3 1.6 - 2.3 mg/dL  HI            Osmolality urine [[id number]]  Resulted: 07/15/18 2107, Result status: Final result     Ordering provider: Abrahan Robles MD  07/15/18 1923 Resulting lab: [facility]    Specimen Information    Type Source Collected On   Urine  07/15/18 1718          Components       Value Reference Range Flag Lab   Urine Osmolality 175 100 - 1200 mmol/kg  HI            Osmolality [[id number]]  Resulted: 07/15/18 1954, Result status: Final result    Ordering provider: Abrahan Robles MD  07/15/18 1923 Resulting lab: [facility]    Specimen Information    Type Source Collected On   Blood  07/15/18 1435          Components       Value Reference Range Flag Lab   Osmolality 287 280 - 295 mmol/kg  HI            UA reflex to Microscopic and Culture [[id number]] (Abnormal)  Resulted: 07/15/18 1752, Result status: Final result    Ordering provider: Venkat Aguilar PA  07/15/18 1332 Resulting lab: [facility]    Specimen Information    Type Source Collected On   Midstream Urine  07/15/18 1718          Components       Value Reference Range Flag Lab   Color Urine Straw   HI   Appearance Urine Clear   HI   Glucose Urine Negative NEG^Negative mg/dL  HI   Bilirubin Urine Negative NEG^Negative  HI   Ketones Urine Negative NEG^Negative mg/dL  HI   Specific Gravity Urine 1.019 1.003 - 1.035  HI   Blood Urine Trace NEG^Negative A HI   pH Urine 8.0 4.7 - 8.0 pH  HI   Protein Albumin Urine Negative NEG^Negative mg/dL  HI   Urobilinogen mg/dL Normal 0.0 - 2.0 mg/dL  HI   Nitrite Urine Negative NEG^Negative  HI   Leukocyte Esterase Urine Trace NEG^Negative A HI   Source Midstream Urine   HI   RBC Urine <1 0 - 2 /HPF  HI   WBC Urine 3 0 - 5 /HPF  HI   Bacteria Urine None NEG^Negative /HPF A HI            INR [[id number]]  Resulted: 07/15/18 1728, Result status: Final result    Ordering provider: Venkat Aguilar PA  07/15/18 1712 Resulting lab: [facility]    Specimen Information    Type Source Collected On   Blood  07/15/18 1435          Components       Value  Reference Range Flag Lab   INR 1.04 0.80 - 1.20  HI            Comprehensive metabolic panel [[id number]] (Abnormal)  Resulted: 07/15/18 1506, Result status: Final result    Ordering provider: Venkat Agiular PA  07/15/18 1431 Resulting lab: [facility]    Specimen Information    Type Source Collected On   Blood  07/15/18 1435          Components       Value Reference Range Flag Lab   Sodium 142 133 - 144 mmol/L  HI   Potassium 2.2 3.4 - 5.3 mmol/L LL HI   Comment:         Critical Value called to and read back by  JIMY BRENNAN AT 1504 ON 7/15/2018 BY ERM     Chloride 98 94 - 109 mmol/L  HI   Carbon Dioxide 34 20 - 32 mmol/L H HI   Anion Gap 10 3 - 14 mmol/L  HI   Glucose 92 70 - 99 mg/dL  HI   Urea Nitrogen 7 7 - 30 mg/dL  HI   Creatinine 0.46 0.52 - 1.04 mg/dL L HI   GFR Estimate >90 >60 mL/min/1.7m2  HI   Comment:  Non  GFR Calc   GFR Estimate If Black >90 >60 mL/min/1.7m2  HI   Comment:  African American GFR Calc   Calcium 8.3 8.5 - 10.1 mg/dL L HI   Bilirubin Total 0.3 0.2 - 1.3 mg/dL  HI   Albumin 2.6 3.4 - 5.0 g/dL L HI   Protein Total 5.6 6.8 - 8.8 g/dL L HI   Alkaline Phosphatase 120 40 - 150 U/L  HI   ALT 19 0 - 50 U/L  HI   AST 41 0 - 45 U/L  HI            CRP inflammation [[id number]] (Abnormal)  Resulted: 07/15/18 1506, Result status: Final result    Ordering provider: Venkat Aguilar PA  07/15/18 1431 Resulting lab: [facility]    Specimen Information    Type Source Collected On   Blood  07/15/18 1435          Components       Value Reference Range Flag Lab   CRP Inflammation 9.5 0.0 - 8.0 mg/L H HI            Lipase [[id number]]  Resulted: 07/15/18 1506, Result status: Final result    Ordering provider: Venkat Aguilar PA  07/15/18 1431 Resulting lab: [facility]    Specimen Information    Type Source Collected On   Blood  07/15/18 1435          Components       Value Reference Range Flag Lab   Lipase 283 73 - 393 U/L  HI             CBC with platelets differential [[id number]] (Abnormal)  Resulted: 07/15/18 1443, Result status: Final result    Ordering provider: Venkat Aguilar PA  07/15/18 1331 Resulting lab: [facility]    Specimen Information    Type Source Collected On   Blood  07/15/18 1435          Components       Value Reference Range Flag Lab   WBC 6.1 4.0 - 11.0 10e9/L  HI   RBC Count 4.52 3.8 - 5.2 10e12/L  HI   Hemoglobin 12.8 11.7 - 15.7 g/dL  HI   Hematocrit 38.2 35.0 - 47.0 %  HI   MCV 85 78 - 100 fl  HI   MCH 28.3 26.5 - 33.0 pg  HI   MCHC 33.5 31.5 - 36.5 g/dL  HI   RDW 17.7 10.0 - 15.0 % H HI   Platelet Count 321 150 - 450 10e9/L  HI   Diff Method Automated Method   HI   % Neutrophils 67.0 %  HI   % Lymphocytes 22.9 %  HI   % Monocytes 6.6 %  HI   % Eosinophils 3.0 %  HI   % Basophils 0.2 %  HI   % Immature Granulocytes 0.3 %  HI   Nucleated RBCs 0 0 /100  HI   Absolute Neutrophil 4.1 1.6 - 8.3 10e9/L  HI   Absolute Lymphocytes 1.4 0.8 - 5.3 10e9/L  HI   Absolute Monocytes 0.4 0.0 - 1.3 10e9/L  HI   Absolute Eosinophils 0.2 0.0 - 0.7 10e9/L  HI   Absolute Basophils 0.0 0.0 - 0.2 10e9/L  HI   Abs Immature Granulocytes 0.0 0 - 0.4 10e9/L  HI   Absolute Nucleated RBC 0.0   HI            Testing Performed By     Lab - Abbreviation Name Director Address Valid Date Range    45 - LYP873 [facility] Unknown Unknown 01/28/02 0000 - Present    210 - HI [facility] Unknown [address] [address] 05/08/15 1057 - Present               Imaging Results - 3 Days      CT Abdomen Pelvis w Contrast [[id number]]  Resulted: 07/15/18 1636, Result status: Final result    Ordering provider: Venkat Aguilar PA  07/15/18 1526 Resulted by: Tony Marino MD    Performed: 07/15/18 1558 - 07/15/18 1623 Resulting lab: [facility]    Narrative:       EXAMINATION: CT ABDOMEN PELVIS W CONTRAST, 7/15/2018 4:23 PM    TECHNIQUE:  Helical CT images from the lung bases through the  symphysis pubis were  obtained  with IV contrast. Contrast dose: Isovue  300, 100ml    COMPARISON: none    HISTORY: abdominal pain, diarrhea;     FINDINGS:    There is dependent atelectasis at the lung bases.    The liver is free of masses or biliary ductal enlargement. No  calcified gallstones are seen.    The the spleen and pancreas appear normal.    The adrenal glands are normal.    There is a delayed enhancement of the left kidney. There is dilation  of the left renal collecting system. There is a 3 mm in diameter  calculus at the ureterovesical junction on the left. There is a 4 mm  diameter calculus in the upper pole of the right kidney. A right renal  cyst is seen.    The periaortic lymph nodes are normal in caliber.    There is some bowel wall thickening seen in the ascending and  transverse colon consistent with colitis.    In the pelvis the bladder and rectum appear normal.    The regional skeleton is intact      Impression:       IMPRESSION:   1. 3 mm distal left ureteric calculus at the ureterovesical junction  with the mild left-sided hydronephrosis.  2. Colitis in the ascending and transverse colon     GEETHA JACOBS MD      Testing Performed By     Lab - Abbreviation Name Director Address Valid Date Range    104 - Rad [facility] RADIOLOGY RESULTS Unknown Unknown 02/16/05 1553 - Present            Encounter-Level Documents:     There are no encounter-level documents.      Order-Level Documents:     There are no order-level documents.

## 2018-07-15 NOTE — ED NOTES
"Pt assisted to bathroom by w/c and one person transfer assist. Pt had small \"diarrhea\" stool but did not collect it in hat. Pt did not urinate. Pt encouraged to provide urine and stool samples next time she is to use the bathroom.  "

## 2018-07-15 NOTE — IP AVS SNAPSHOT
MRN:[id number]                      After Visit Summary   7/15/2018    Maggie Case    MRN: [id number]           Thank you!     Thank you for choosing [facility] for your care. Our goal is always to provide you with excellent care. Hearing back from our patients is one way we can continue to improve our services. Please take a few minutes to complete the written survey that you may receive in the mail after you visit with us. Thank you!        Patient Information     Date Of Birth          1990        About your hospital stay     You were admitted on:  July 15, 2018 You last received care in the:  [facility]    You were discharged on:  July 18, 2018        Reason for your hospital stay       c diff colitis                  Who to Call     For medical emergencies, please call 911.  For non-urgent questions about your medical care, please call your primary care provider or clinic, [phone]          Attending Provider     Provider Specialty    Venkat Aguilar PA Physician Assistant - Medical    Abrahan Robles MD Internal Medicine    [facility], Eleuterio Wan MD Internal Medicine       Primary Care Provider Office Phone # Fax #    Chapo IAN Flores [phone]      After Care Instructions     Activity - Up with nursing assistance           Advance Diet as Tolerated       Follow this diet upon discharge: Orders Placed This Encounter      Advance Diet as Tolerated: Regular Diet Adult            Fall precautions           General info for SNF       Length of Stay Estimate: Short Term Care: Estimated # of Days <30  Condition at Discharge: Stable  Level of care:skilled   Rehabilitation Potential: Fair  Admission H&P remains valid and up-to-date: Yes  Recent Chemotherapy: N/A  Use Nursing Home Standing Orders: Yes                  Follow-up Appointments     Follow Up and recommended labs and tests       Follow up with Nursing home physician.  No follow up labs or test are needed.   "                Your next 10 appointments already scheduled     2018  3:00 PM CDT   (Arrive by 2:45 PM)   Return Visit with Emeterio Mesa MD   [facility] ([facility])    [address]  [address] [facility] [id number] [phone]              Additional Services     Occupational Therapy Adult Consult       Evaluate and treat as clinically indicated.    Reason:  weakness            Physical Therapy Adult Consult       Evaluate and treat as clinically indicated.    Reason:  weakness                  Pending Results     Date and Time Order Name Status Description    7/15/2018 1416 Stool culture SSCE Preliminary             Statement of Approval     Ordered          18 0856  I have reviewed and agree with all the recommendations and orders detailed in this document.  EFFECTIVE NOW     Approved and electronically signed by:  Eleuterio Anderson MD             Admission Information     Date & Time Provider Department Dept. Phone    7/15/2018 Eleuterio Anderson MD [facility] [phone]      Your Vitals Were     Blood Pressure Pulse Temperature Respirations Height Weight    99/58 76 98.8  F (37.1  C) (Temporal) 16 1.6 m (5' 3\") 61.8 kg (136 lb 3.9 oz)    Pulse Oximetry BMI (Body Mass Index)                97% 24.13 kg/m2          MyChart Information     [facility] lets you send messages to your doctor, view your test results, renew your prescriptions, schedule appointments and more. To sign up, go to [email or web address] . Click on \"Log in\" on the left side of the screen, which will take you to the Welcome page. Then click on \"Sign up Now\" on the right side of the page.     You will be asked to enter the access code listed below, as well as some personal information. Please follow the directions to create your username and password.     Your access code is: [email or web address]  Expires: 2018 10:33 PM     Your access code will  in 90 days. If you " need help or a new code, please call your [facility] clinic or [phone].        Care EveryWhere ID     This is your Care EveryWhere ID. This could be used by other organizations to access your [facility] medical records  [id number]        Equal Access to Services     MYLES FLAHERTY : Hadii aad ku hadmariamjaime Sopia, waaxda luqadaha, qaybta kaalmada adesujata, justin jordanin hayaajonas streetemma julien ja astorga. So [facility] [phone].    ATENCIÓN: Si habla español, tiene a montalvo disposición servicios gratuitos de asistencia lingüística. Llame al [phone].    We comply with applicable federal civil rights laws and [place] laws. We do not discriminate on the basis of race, color, national origin, age, disability, sex, sexual orientation, or gender identity.               Review of your medicines      START taking        Dose / Directions    tamsulosin 0.4 MG capsule   Commonly known as:  FLOMAX   Used for:  Calculus of ureter        Dose:  0.4 mg   Take 1 capsule (0.4 mg) by mouth daily   Quantity:  7 capsule   Refills:  0       vancomycin 50 MG/ML Solr   Commonly known as:  FIRVANQ   Indication:  Clostridium difficile Bacteria   Used for:  C. difficile colitis        Dose:  125 mg   Take 2.5 mLs (125 mg) by mouth 4 times daily for 7 days   Quantity:  70 mL   Refills:  0         CONTINUE these medicines which may have CHANGED, or have new prescriptions. If we are uncertain of the size of tablets/capsules you have at home, strength may be listed as something that might have changed.        Dose / Directions    XARELTO 20 MG Tabs tablet   This may have changed:  Another medication with the same name was removed. Continue taking this medication, and follow the directions you see here.   Generic drug:  rivaroxaban ANTICOAGULANT        Dose:  20 mg   Take 20 mg by mouth every morning   Refills:  11         CONTINUE these medicines which have NOT CHANGED        Dose / Directions    ABILIFY 5 MG tablet   Generic drug:  ARIPiprazole         Dose:  5 mg   Take 5 mg by mouth At Bedtime   Refills:  0       bismuth subsalicylate 262 MG/15ML suspension   Commonly known as:  PEPTO BISMOL        Dose:  15 mL   Take 15 mLs by mouth every 6 hours as needed for indigestion   Refills:  0       cyanocobalamin 1000 MCG tablet   Commonly known as:  vitamin  B-12        Dose:  1000 mcg   Take 1,000 mcg by mouth daily   Refills:  0       Dextromethorphan-guaiFENesin  MG/5ML syrup        Dose:  5 mL   Take 5 mLs by mouth every 4 hours as needed for cough   Refills:  0       DULOXETINE HCL PO        Dose:  60 mg   Take 60 mg by mouth daily   Refills:  0       ferrous sulfate 325 (65 Fe) MG tablet   Commonly known as:  IRON        Take by mouth daily (with breakfast)   Refills:  0       * GABAPENTIN PO        Dose:  600 mg   Take 600 mg by mouth At Bedtime   Refills:  0       * GABAPENTIN PO        Dose:  300 mg   Take 300 mg by mouth 2 times daily 300mg AM and Noon   Refills:  0       hydrOXYzine 25 MG tablet   Commonly known as:  ATARAX        Dose:  25 mg   Take 25 mg by mouth every 6 hours as needed   Refills:  11       loperamide 2 MG capsule   Commonly known as:  IMODIUM        Dose:  2 mg   Take 2 mg by mouth 4 times daily as needed for diarrhea   Refills:  0       nicotine 7 MG/24HR 24 hr patch   Commonly known as:  NICODERM CQ        Dose:  1 patch   Place 1 patch onto the skin every 24 hours   Refills:  0       oxyCODONE IR 10 MG tablet   Commonly known as:  ROXICODONE   Used for:  Cervicalgia        Dose:  10 mg   Take 1 tablet (10 mg) by mouth every 3 hours as needed   Quantity:  10 tablet   Refills:  0       Salicylic Acid 40 % Pads        Dose:  1 Dose   Apply 1 Dose topically every other day Apply pad topically every other  day for 14 days   Refills:  0       TRAZODONE HCL PO        Dose:  100 mg   Take 100 mg by mouth At Bedtime   Refills:  0       TYLENOL PO        Dose:  650 mg   Take 650 mg by mouth every 4 hours as needed for mild pain or  fever   Refills:  0       ZOFRAN PO        Dose:  4 mg   Take 4 mg by mouth every 8 hours as needed for nausea or vomiting   Refills:  0       * Notice:  This list has 2 medication(s) that are the same as other medications prescribed for you. Read the directions carefully, and ask your doctor or other care provider to review them with you.         Where to get your medicines      Some of these will need a paper prescription and others can be bought over the counter. Ask your nurse if you have questions.     Bring a paper prescription for each of these medications     oxyCODONE IR 10 MG tablet    tamsulosin 0.4 MG capsule    vancomycin 50 MG/ML Solr                Protect others around you: Learn how to safely use, store and throw away your medicines at www.disposemymeds.org.        ANTIBIOTIC INSTRUCTION     You've Been Prescribed an Antibiotic - Now What?  Your healthcare team thinks that you or your loved one might have an infection. Some infections can be treated with antibiotics, which are powerful, life-saving drugs. Like all medications, antibiotics have side effects and should only be used when necessary. There are some important things you should know about your antibiotic treatment.      Your healthcare team may run tests before you start taking an antibiotic.    Your team may take samples (e.g., from your blood, urine or other areas) to run tests to look for bacteria. These test can be important to determine if you need an antibiotic at all and, if you do, which antibiotic will work best.      Within a few days, your healthcare team might change or even stop your antibiotic.    Your team may start you on an antibiotic while they are working to find out what is making you sick.    Your team might change your antibiotic because test results show that a different antibiotic would be better to treat your infection.    In some cases, once your team has more information, they learn that you do not need an  antibiotic at all. They may find out that you don't have an infection, or that the antibiotic you're taking won't work against your infection. For example, an infection caused by a virus can't be treated with antibiotics. Staying on an antibiotic when you don't need it is more likely to be harmful than helpful.      You may experience side effects from your antibiotic.    Like all medications, antibiotics have side effects. Some of these can be serious.    Let you healthcare team know if you have any known allergies when you are admitted to the hospital.    One significant side effect of nearly all antibiotics is the risk of severe and sometimes deadly diarrhea caused by Clostridium difficile (C. Difficile). This occurs when a person takes antibiotics because some good germs are destroyed. Antibiotic use allows C. diificile to take over, putting patients at high risk for this serious infection.    As a patient or caregiver, it is important to understand your or your loved one's antibiotic treatment. It is especially important for caregivers to speak up when patients can't speak for themselves. Here are some important questions to ask your healthcare team.    What infection is this antibiotic treating and how do you know I have that infection?    What side effects might occur from this antibiotic?    How long will I need to take this antibiotic?    Is it safe to take this antibiotic with other medications or supplements (e.g., vitamins) that I am taking?     Are there any special directions I need to know about taking this antibiotic? For example, should I take it with food?    How will I be monitored to know whether my infection is responding to the antibiotic?    What tests may help to make sure the right antibiotic is prescribed for me?      Information provided by:  www.cdc.gov/getsmart  U.S. Department of Health and Human Services  Centers for disease Control and Prevention  National Center for Emerging and  Zoonotic Infectious Diseases  Division of Healthcare Quality Promotion        Information about OPIOIDS     PRESCRIPTION OPIOIDS: WHAT YOU NEED TO KNOW   We gave you an opioid (narcotic) pain medicine. It is important to manage your pain, but opioids are not always the best choice. You should first try all the other options your care team gave you. Take this medicine for as short a time (and as few doses) as possible.     These medicines have risks:    DO NOT drive when on new or higher doses of pain medicine. These medicines can affect your alertness and reaction times, and you could be arrested for driving under the influence (DUI). If you need to use opioids long-term, talk to your care team about driving.    DO NOT operate heave machinery    DO NOT do any other dangerous activities while taking these medicines.     DO NOT drink any alcohol while taking these medicines.      If the opioid prescribed includes acetaminophen, DO NOT take with any other medicines that contain acetaminophen. Read all labels carefully. Look for the word  acetaminophen  or  Tylenol.  Ask your pharmacist if you have questions or are unsure.    You can get addicted to pain medicines, especially if you have a history of addiction (chemical, alcohol or substance dependence). Talk to your care team about ways to reduce this risk.    Store your pills in a secure place, locked if possible. We will not replace any lost or stolen medicine. If you don t finish your medicine, please throw away (dispose) as directed by your pharmacist. The [place] Pollution Control Agency has more information about safe disposal: https://www.pca.[facility].[place].us/living-green/managing-unwanted-medications.     All opioids tend to cause constipation. Drink plenty of water and eat foods that have a lot of fiber, such as fruits, vegetables, prune juice, apple juice and high-fiber cereal. Take a laxative (Miralax, milk of magnesia, Colace, Senna) if you don t move your  bowels at least every other day.              Medication List: This is a list of all your medications and when to take them. Check marks below indicate your daily home schedule. Keep this list as a reference.      Medications           Morning Afternoon Evening Bedtime As Needed    ABILIFY 5 MG tablet   Take 5 mg by mouth At Bedtime   Generic drug:  ARIPiprazole                                bismuth subsalicylate 262 MG/15ML suspension   Commonly known as:  PEPTO BISMOL   Take 15 mLs by mouth every 6 hours as needed for indigestion                                cyanocobalamin 1000 MCG tablet   Commonly known as:  vitamin  B-12   Take 1,000 mcg by mouth daily                                Dextromethorphan-guaiFENesin  MG/5ML syrup   Take 5 mLs by mouth every 4 hours as needed for cough                                DULOXETINE HCL PO   Take 60 mg by mouth daily   Last time this was given:  60 mg on 7/18/2018  8:00 AM                                ferrous sulfate 325 (65 Fe) MG tablet   Commonly known as:  IRON   Take by mouth daily (with breakfast)   Last time this was given:  325 mg on 7/18/2018  8:00 AM                                * GABAPENTIN PO   Take 600 mg by mouth At Bedtime   Last time this was given:  300 mg on 7/18/2018  8:00 AM                                * GABAPENTIN PO   Take 300 mg by mouth 2 times daily 300mg AM and Noon   Last time this was given:  300 mg on 7/18/2018  8:00 AM                                hydrOXYzine 25 MG tablet   Commonly known as:  ATARAX   Take 25 mg by mouth every 6 hours as needed   Last time this was given:  25 mg on 7/17/2018  8:59 PM                                loperamide 2 MG capsule   Commonly known as:  IMODIUM   Take 2 mg by mouth 4 times daily as needed for diarrhea                                nicotine 7 MG/24HR 24 hr patch   Commonly known as:  NICODERM CQ   Place 1 patch onto the skin every 24 hours                                oxyCODONE IR 10  MG tablet   Commonly known as:  ROXICODONE   Take 1 tablet (10 mg) by mouth every 3 hours as needed   Last time this was given:  10 mg on 7/18/2018  8:00 AM                                Salicylic Acid 40 % Pads   Apply 1 Dose topically every other day Apply pad topically every other  day for 14 days                                tamsulosin 0.4 MG capsule   Commonly known as:  FLOMAX   Take 1 capsule (0.4 mg) by mouth daily   Last time this was given:  0.4 mg on 7/17/2018  7:34 PM                                TRAZODONE HCL PO   Take 100 mg by mouth At Bedtime                                TYLENOL PO   Take 650 mg by mouth every 4 hours as needed for mild pain or fever                                vancomycin 50 MG/ML Solr   Commonly known as:  FIRVANQ   Take 2.5 mLs (125 mg) by mouth 4 times daily for 7 days   Last time this was given:  125 mg on 7/18/2018  8:02 AM                                XARELTO 20 MG Tabs tablet   Take 20 mg by mouth every morning   Last time this was given:  20 mg on 7/18/2018  8:00 AM   Generic drug:  rivaroxaban ANTICOAGULANT                                ZOFRAN PO   Take 4 mg by mouth every 8 hours as needed for nausea or vomiting                                * Notice:  This list has 2 medication(s) that are the same as other medications prescribed for you. Read the directions carefully, and ask your doctor or other care provider to review them with you.              More Information      About C. diff Infection  For Patients, Family and Visitors  What is C. diff (clostridium difficile)?  C. diff are germs (bacteria). These germs can live in the guts of healthy people. Antibiotic medicine can change the balance of germs in the gut, causing C. diff infection and loose, watery stools (diarrhea).  You can also get C. diff infection during a hospital stay, after surgery, or if you have a weak immune system or IBD (inflammatory bowel disease).  For a video, visit  [email or web address].  What are the symptoms?  If you have these symptoms, your doctor will ask for a stool (poop) sample for testing:    Diarrhea (loose, watery stools)    Belly pain, tenderness and cramping    Fever  How does it spread?  C. diff safely leaves your body as part of your stool. However, it can make you ill if:  1. You touch a surface that has C. diff germs, then  2. You touch food or objects that go in your mouth.  How can you prevent C. diff infections?     Wash hands often, especially in the hospital, after using the bathroom and before you eat.    Use antibiotics only when you need them. Don't ask for them if your doctor says you have a virus.    If you take antibiotics, follow the directions. Finish all the pills, even if you feel better.    If you have C. diff infection, try to use a separate restroom until you are well.    At home, clean countertops, sinks, faucets, bathroom doorknobs and toilets often. Use warm water with soap or cleaning products with bleach. (Don't use pure bleach. It's too strong.)    In the hospital, your care team should wear gloves and gowns. They should clean their hands before touching you and before leaving the room. If they don't, please remind them.  Hand washing  For this illness, soap and water works better than hand .    Wash hands with warm water and plenty of soap. Wash for 15 to 20 seconds.    Clean under nails, between fingers and up the wrists.    Rinse hands, letting water run down your fingers.    Dry hands well. Use a paper towel to turn off the faucet and open the door.   How is it treated?  Your doctor may change your antibiotics and give you medicine for diarrhea. Don't take other medicine for diarrhea. They will make things worse.  You may get extra fluids through an IV (small tube in the arm or hand).   Sometimes, the infection comes back. If symptoms return, please call your doctor.   For informational purposes only. Not to replace  the advice of your health care provider. Copyright   2014 [facility]. All rights reserved. [employer] 453103 - REV 09/17.            Treating C. Difficile: Medicine to Prevent a Repeat Infection   Clostridium difficile (C. diff) are bacteria that can infect your large intestine. Your large intestine has millions of other bacteria. Many of them help keep you healthy. If you take an antibiotic to cure an infection, the medicine will kill the bacteria causing the infection. But it will also kill the good bacteria in your large intestine.   When these good bacteria are killed, C. diff bacteria can multiply. These bacteria release toxins in the intestine that cause symptoms such as diarrhea and belly (abdominal) pain.   To treat a C. diff infection, your healthcare provider will have you stop taking the antibiotic that caused the C. diff to multiply. You will likely take a different antibiotic to treat the C. diff. Treatment for C. diff stops the symptoms.   In some people, the symptoms come back after a short time (relapse). If you are being treated for C. diff and are at risk for a relapse, your healthcare provider may prescribe an additional medicine. This medicine is called bezlotoxumab. It helps stop the infection and symptoms from coming back. It s given to adults ages 18 and older who are being treated for C. diff and are at high risk of having another C. diff infection.   Why is bezlotoxumab used?   After an infection of C. diff is treated, symptoms can come back weeks or months later. This may happen because the first treatment did not cure the infection. Or it may happen because you were infected again with C. diff. Getting a C. diff infection a second time is more likely if you are in places where C. diff spreads more easily, such as a hospital or nursing care facility. It can happen if your immune system is not working normally. This may be the case if you have a disease that affects the immune  system or if you are an elderly adult. Or you may be taking medicine to lessen the response of your immune system. Bezlotoxumab can help prevent C. diff symptoms from coming back.   How does it work?   The medicine is a human monoclonal antibody. Antibodies are chemicals made by the immune system to fight illness. The medicine is an antibody created to work just like a person s own immune system. The medicine stops one of the toxins made by the C. diff bacteria. Bezlotoxumab does not treat the infection or kill the bacteria, so it is only used along with the antibiotic medicine used to treat C. diff.   Before your treatment   Tell your healthcare provider if any of the below apply to you:    You are pregnant or may be pregnant. This medicine hasn't been tested on pregnant women. Researchers don t yet know the effects on a baby in the womb.    You are breastfeeding. This medicine hasn't been tested on breastfeeding women. Researchers don t yet know if the medicine can show up in breastmilk.    You have congestive heart failure (CHF). Heart failure and death after treatment are more common in people with CHF who are treated with this medicine.    Have high blood pressure. The medicine may cause blood pressure to rise on the day of treatment.   Talk with your healthcare provider about the risks and benefits to you before treatment.   How the medicine is given   Bezlotoxumab is a liquid medicine that is given through an IV line into a vein. A healthcare provider will put a needle into a vein in your arm or hand. A thin, flexible tube (catheter) is then put into the vein. The medicine drips slowly through the tube into your vein. It takes about 1 hour to complete the treatment. You get this treatment just one time while you are taking the antibiotics.   Side effects   Possible side effects on the day of treatment can include:    Nausea    Headache    Fever    Dizzy feeling    Feeling short of breath    Tiredness    High  blood pressure   Possible side effects within 4 weeks of treatment can include:    Nausea    Fever    Headache   Tell your healthcare providers if you have any other side effects not listed here.  Date Last Reviewed: 1/1/2017 2000-2017 [employer]. [address] [place] [address], [place] [address]. All rights reserved. This information is not intended as a substitute for professional medical care. Always follow your healthcare professional's instructions.                Tamsulosin Hydrochloride Oral capsule  What is this medicine?  TAMSULOSIN (estrada MARGOT angel sin) is used to treat enlargement of the prostate gland in men, a condition called benign prostatic hyperplasia or BPH. It is not for use in women. It works by relaxing muscles in the prostate and bladder neck. This improves urine flow and reduces BPH symptoms.  This medicine may be used for other purposes; ask your health care provider or pharmacist if you have questions.  What should I tell my health care provider before I take this medicine?  They need to know if you have any of the following conditions:    advanced kidney disease    advanced liver disease    low blood pressure    prostate cancer    an unusual or allergic reaction to tamsulosin, sulfa drugs, other medicines, foods, dyes, or preservatives    pregnant or trying to get pregnant    breast-feeding  How should I use this medicine?  Take this medicine by mouth about 30 minutes after the same meal every day. Follow the directions on the prescription label. Swallow the capsules whole with a glass of water. Do not crush, chew, or open capsules. Do not take your medicine more often than directed. Do not stop taking your medicine unless your doctor tells you to.  Talk to your pediatrician regarding the use of this medicine in children. Special care may be needed.  Overdosage: If you think you have taken too much of this medicine contact a poison control center or emergency room at once.  NOTE: This  medicine is only for you. Do not share this medicine with others.  What if I miss a dose?  If you miss a dose, take it as soon as you can. If it is almost time for your next dose, take only that dose. Do not take double or extra doses. If you stop taking your medicine for several days or more, ask your doctor or health care professional what dose you should start back on.  What may interact with this medicine?    cimetidine    fluoxetine    ketoconazole    medicines for erectile disfunction like sildenafil, tadalafil, vardenafil    medicines for high blood pressure    other alpha-blockers like alfuzosin, doxazosin, phentolamine, phenoxybenzamine, prazosin, terazosin    warfarin  This list may not describe all possible interactions. Give your health care provider a list of all the medicines, herbs, non-prescription drugs, or dietary supplements you use. Also tell them if you smoke, drink alcohol, or use illegal drugs. Some items may interact with your medicine.  What should I watch for while using this medicine?  Visit your doctor or health care professional for regular check ups. You will need lab work done before you start this medicine and regularly while you are taking it. Check your blood pressure as directed. Ask your health care professional what your blood pressure should be, and when you should contact him or her.  This medicine may make you feel dizzy or lightheaded. This is more likely to happen after the first dose, after an increase in dose, or during hot weather or exercise. Drinking alcohol and taking some medicines can make this worse. Do not drive, use machinery, or do anything that needs mental alertness until you know how this medicine affects you. Do not sit or stand up quickly. If you begin to feel dizzy, sit down until you feel better. These effects can decrease once your body adjusts to the medicine.  Contact your doctor or health care professional right away if you have an erection that lasts  longer than 4 hours or if it becomes painful. This may be a sign of a serious problem and must be treated right away to prevent permanent damage.  If you are thinking of having cataract surgery, tell your eye surgeon that you have taken this medicine.  What side effects may I notice from receiving this medicine?  Side effects that you should report to your doctor or health care professional as soon as possible:    allergic reactions like skin rash or itching, hives, swelling of the lips, mouth, tongue, or throat    breathing problems    change in vision    feeling faint or lightheaded    irregular heartbeat    prolonged or painful erection    weakness  Side effects that usually do not require medical attention (report to your doctor or health care professional if they continue or are bothersome):    back pain    change in sex drive or performance    constipation, nausea or vomiting    cough    drowsy    runny or stuffy nose    trouble sleeping  This list may not describe all possible side effects. Call your doctor for medical advice about side effects. You may report side effects to FDA at 7-035-FCV-2064.  Where should I keep my medicine?  Keep out of the reach of children.  Store at room temperature between 15 and 30 degrees C (59 and 86 degrees F). Throw away any unused medicine after the expiration date.  NOTE:This sheet is a summary. It may not cover all possible information. If you have questions about this medicine, talk to your doctor, pharmacist, or health care provider. Copyright  2016 Gold Standard                Vancomycin capsules  Brand Name: Vancocin  What is this medicine?  VANCOMYCIN (van koe MYE sin) is a glycopeptide antibiotic. It is used to treat certain kinds of bacterial infections in the bowel. It will not work for colds, flu, or other viral infections.  How should I use this medicine?  Take this medicine by mouth with a glass of water. Follow the directions on the prescription label. Take  your medicine at regular intervals. Do not take your medicine more often than directed. Take all of your medicine as directed even if you think you are better. Do not skip doses or stop your medicine early.  Talk to your pediatrician regarding the use of this medicine in children. Special care may be needed.  What side effects may I notice from receiving this medicine?  Side effects that you should report to your doctor or health care professional as soon as possible:    allergic reactions like skin rash, itching or hives, swelling of the face, lips, or tongue    breathing difficulty    change in amount, color of urine    change in hearing    dizziness    fever, infection    redness, blistering, peeling or loosening of the skin, including inside the mouth    unusual bleeding or bruising    unusually weak or tired  Side effects that usually do not require medical attention (report to your doctor or health care professional if they continue or are bothersome):    nausea, vomiting    stomach cramps  What may interact with this medicine?    birth control pills    cholestyramine    colestipol    vancomycin injection    What if I miss a dose?  If you miss a dose, take it as soon as you can. If it is almost time for your next dose, take only that dose. Do not take double or extra doses.  Where should I keep my medicine?  Keep out of the reach of children.  Store at room temperature between 15 and 30 degrees C (59 and 86 degrees F). Throw away any unused medicine after the expiration date.  What should I tell my health care provider before I take this medicine?  They need to know if you have any of these conditions:    bowel, intestines, stomach disease    kidney disease    an unusual or allergic reaction to vancomycin, other medicines, foods, dyes, or preservatives    pregnant or trying to get pregnant    breast-feeding  What should I watch for while using this medicine?  Tell your doctor or health care professional if  your symptoms do not improve or if you get new symptoms.  Avoid taking this medicine within 3 or 4 hours of taking cholestyramine or colestipol.  NOTE:This sheet is a summary. It may not cover all possible information. If you have questions about this medicine, talk to your doctor, pharmacist, or health care provider. Copyright  2018 Elsevier

## 2018-07-15 NOTE — ED NOTES
Offered pt to quick cath her for a urine sample. Pt wanting pain medications for catheterization and explained to pt that it will not hurt but may feel a little pressure. Pt quick catheterized for urine, tolerated well, with some complained. No urine return even after manipulating pts body in different directions to allow for urine flow. Pt reports she has no urine left because she went in the brief. Brief slightly wet with some yellow staining. Pt requesting warm blankets again and pt repositioned to left side with pillow.

## 2018-07-15 NOTE — ED NOTES
"Warm pack applied to pts right hand where IV was missed per pts request. Pt also reports she is \"agitated\" and feeling like she has bug bites all over. Pt states to this writer that the [facility] \"David\" said she could have ativan, Also pt is feeling her left knee getting cold and then numb and the cold and numb again and again. Pt also c/o tailbone pain and is requesting pain medication. PA notified.  "

## 2018-07-15 NOTE — ED NOTES
UA states that pt is using call light and c/o tailbone pain 10/10 and LLQ 10/10 and warm blankets were given. PA notified.

## 2018-07-15 NOTE — ED NOTES
DATE:  7/15/2018   TIME OF RECEIPT FROM LAB:  8126  LAB TEST:  potassium  LAB VALUE:  2.2  RESULTS GIVEN WITH READ-BACK TO (PROVIDER):  Venkat HENSON  TIME LAB VALUE REPORTED TO PROVIDER:   0061

## 2018-07-16 PROBLEM — F19.20 CHEMICAL DEPENDENCY (H): Status: ACTIVE | Noted: 2018-07-16

## 2018-07-16 LAB
ALBUMIN SERPL-MCNC: 1.8 G/DL (ref 3.4–5)
ALP SERPL-CCNC: 83 U/L (ref 40–150)
ALT SERPL W P-5'-P-CCNC: 16 U/L (ref 0–50)
ANION GAP SERPL CALCULATED.3IONS-SCNC: 10 MMOL/L (ref 3–14)
ANION GAP SERPL CALCULATED.3IONS-SCNC: 8 MMOL/L (ref 3–14)
ANION GAP SERPL CALCULATED.3IONS-SCNC: 9 MMOL/L (ref 3–14)
AST SERPL W P-5'-P-CCNC: 29 U/L (ref 0–45)
BILIRUB SERPL-MCNC: 0.2 MG/DL (ref 0.2–1.3)
BUN SERPL-MCNC: 4 MG/DL (ref 7–30)
C DIFF TOX B STL QL: POSITIVE
CALCIUM SERPL-MCNC: 7.4 MG/DL (ref 8.5–10.1)
CHLORIDE SERPL-SCNC: 108 MMOL/L (ref 94–109)
CHLORIDE SERPL-SCNC: 111 MMOL/L (ref 94–109)
CHLORIDE SERPL-SCNC: 111 MMOL/L (ref 94–109)
CO2 SERPL-SCNC: 25 MMOL/L (ref 20–32)
CO2 SERPL-SCNC: 27 MMOL/L (ref 20–32)
CO2 SERPL-SCNC: 28 MMOL/L (ref 20–32)
CREAT SERPL-MCNC: 0.37 MG/DL (ref 0.52–1.04)
ERYTHROCYTE [DISTWIDTH] IN BLOOD BY AUTOMATED COUNT: 18.1 % (ref 10–15)
G LAMBLIA+CRYPTOSP AG STL QL IA: NORMAL
GFR SERPL CREATININE-BSD FRML MDRD: >90 ML/MIN/1.7M2
GLUCOSE SERPL-MCNC: 86 MG/DL (ref 70–99)
HCT VFR BLD AUTO: 31.2 % (ref 35–47)
HEMOCCULT STL QL IA: POSITIVE
HGB BLD-MCNC: 10.4 G/DL (ref 11.7–15.7)
LACTOFERRIN STL QL IA: POSITIVE
MCH RBC QN AUTO: 28.8 PG (ref 26.5–33)
MCHC RBC AUTO-ENTMCNC: 33.3 G/DL (ref 31.5–36.5)
MCV RBC AUTO: 86 FL (ref 78–100)
PLATELET # BLD AUTO: 238 10E9/L (ref 150–450)
POTASSIUM SERPL-SCNC: 2.7 MMOL/L (ref 3.4–5.3)
POTASSIUM SERPL-SCNC: 2.8 MMOL/L (ref 3.4–5.3)
POTASSIUM SERPL-SCNC: 3 MMOL/L (ref 3.4–5.3)
POTASSIUM SERPL-SCNC: 3 MMOL/L (ref 3.4–5.3)
POTASSIUM SERPL-SCNC: 4.3 MMOL/L (ref 3.4–5.3)
PROT SERPL-MCNC: 4.2 G/DL (ref 6.8–8.8)
RBC # BLD AUTO: 3.61 10E12/L (ref 3.8–5.2)
SODIUM SERPL-SCNC: 143 MMOL/L (ref 133–144)
SODIUM SERPL-SCNC: 146 MMOL/L (ref 133–144)
SODIUM SERPL-SCNC: 148 MMOL/L (ref 133–144)
SPECIMEN SOURCE: ABNORMAL
SPECIMEN SOURCE: NORMAL
SPECIMEN SOURCE: NORMAL
WBC # BLD AUTO: 5.5 10E9/L (ref 4–11)

## 2018-07-16 PROCEDURE — 87329 GIARDIA AG IA: CPT | Performed by: PHYSICIAN ASSISTANT

## 2018-07-16 PROCEDURE — 85027 COMPLETE CBC AUTOMATED: CPT | Performed by: INTERNAL MEDICINE

## 2018-07-16 PROCEDURE — 87015 SPECIMEN INFECT AGNT CONCNTJ: CPT | Performed by: PHYSICIAN ASSISTANT

## 2018-07-16 PROCEDURE — 25800025 ZZH RX 258: Performed by: INTERNAL MEDICINE

## 2018-07-16 PROCEDURE — 84132 ASSAY OF SERUM POTASSIUM: CPT | Performed by: INTERNAL MEDICINE

## 2018-07-16 PROCEDURE — 87328 CRYPTOSPORIDIUM AG IA: CPT | Performed by: INTERNAL MEDICINE

## 2018-07-16 PROCEDURE — 80053 COMPREHEN METABOLIC PANEL: CPT | Performed by: INTERNAL MEDICINE

## 2018-07-16 PROCEDURE — 99232 SBSQ HOSP IP/OBS MODERATE 35: CPT | Performed by: INTERNAL MEDICINE

## 2018-07-16 PROCEDURE — 25000132 ZZH RX MED GY IP 250 OP 250 PS 637: Performed by: INTERNAL MEDICINE

## 2018-07-16 PROCEDURE — 25000132 ZZH RX MED GY IP 250 OP 250 PS 637

## 2018-07-16 PROCEDURE — 36416 COLLJ CAPILLARY BLOOD SPEC: CPT | Performed by: INTERNAL MEDICINE

## 2018-07-16 PROCEDURE — 25000128 H RX IP 250 OP 636: Performed by: INTERNAL MEDICINE

## 2018-07-16 PROCEDURE — 80051 ELECTROLYTE PANEL: CPT | Performed by: INTERNAL MEDICINE

## 2018-07-16 PROCEDURE — 36415 COLL VENOUS BLD VENIPUNCTURE: CPT | Performed by: INTERNAL MEDICINE

## 2018-07-16 PROCEDURE — 82274 ASSAY TEST FOR BLOOD FECAL: CPT | Performed by: PHYSICIAN ASSISTANT

## 2018-07-16 PROCEDURE — 87329 GIARDIA AG IA: CPT | Performed by: INTERNAL MEDICINE

## 2018-07-16 PROCEDURE — 25000125 ZZHC RX 250: Performed by: INTERNAL MEDICINE

## 2018-07-16 PROCEDURE — 12000000 ZZH R&B MED SURG/OB

## 2018-07-16 PROCEDURE — 87328 CRYPTOSPORIDIUM AG IA: CPT | Performed by: PHYSICIAN ASSISTANT

## 2018-07-16 PROCEDURE — 87899 AGENT NOS ASSAY W/OPTIC: CPT | Performed by: PHYSICIAN ASSISTANT

## 2018-07-16 RX ORDER — GABAPENTIN 600 MG/1
600 TABLET ORAL AT BEDTIME
Status: DISCONTINUED | OUTPATIENT
Start: 2018-07-16 | End: 2018-07-18 | Stop reason: HOSPADM

## 2018-07-16 RX ORDER — ARIPIPRAZOLE 5 MG/1
5 TABLET ORAL AT BEDTIME
Status: DISCONTINUED | OUTPATIENT
Start: 2018-07-16 | End: 2018-07-18 | Stop reason: HOSPADM

## 2018-07-16 RX ORDER — HYDROXYZINE HYDROCHLORIDE 25 MG/1
25 TABLET, FILM COATED ORAL EVERY 6 HOURS PRN
Status: DISCONTINUED | OUTPATIENT
Start: 2018-07-16 | End: 2018-07-18 | Stop reason: HOSPADM

## 2018-07-16 RX ORDER — FERROUS SULFATE 325(65) MG
325 TABLET ORAL
Status: DISCONTINUED | OUTPATIENT
Start: 2018-07-16 | End: 2018-07-18 | Stop reason: HOSPADM

## 2018-07-16 RX ORDER — DIPHENHYDRAMINE HCL 25 MG
CAPSULE ORAL
Status: COMPLETED
Start: 2018-07-16 | End: 2018-07-16

## 2018-07-16 RX ORDER — VANCOMYCIN HYDROCHLORIDE 50 MG/ML
125 KIT ORAL 4 TIMES DAILY
Status: DISCONTINUED | OUTPATIENT
Start: 2018-07-16 | End: 2018-07-18 | Stop reason: HOSPADM

## 2018-07-16 RX ORDER — DULOXETIN HYDROCHLORIDE 60 MG/1
60 CAPSULE, DELAYED RELEASE ORAL DAILY
Status: DISCONTINUED | OUTPATIENT
Start: 2018-07-16 | End: 2018-07-18 | Stop reason: HOSPADM

## 2018-07-16 RX ORDER — DIPHENHYDRAMINE HCL 25 MG
25 CAPSULE ORAL EVERY 6 HOURS PRN
Status: DISCONTINUED | OUTPATIENT
Start: 2018-07-16 | End: 2018-07-18 | Stop reason: HOSPADM

## 2018-07-16 RX ORDER — OXYCODONE HYDROCHLORIDE 5 MG/1
10 TABLET ORAL
Status: DISCONTINUED | OUTPATIENT
Start: 2018-07-16 | End: 2018-07-18 | Stop reason: HOSPADM

## 2018-07-16 RX ORDER — GABAPENTIN 300 MG/1
300 CAPSULE ORAL 2 TIMES DAILY
Status: DISCONTINUED | OUTPATIENT
Start: 2018-07-16 | End: 2018-07-18 | Stop reason: HOSPADM

## 2018-07-16 RX ORDER — TRAZODONE HYDROCHLORIDE 100 MG/1
100 TABLET ORAL AT BEDTIME
Status: DISCONTINUED | OUTPATIENT
Start: 2018-07-16 | End: 2018-07-18 | Stop reason: HOSPADM

## 2018-07-16 RX ORDER — ONDANSETRON 4 MG/1
4 TABLET, FILM COATED ORAL EVERY 8 HOURS PRN
Status: DISCONTINUED | OUTPATIENT
Start: 2018-07-16 | End: 2018-07-18 | Stop reason: HOSPADM

## 2018-07-16 RX ADMIN — KETOROLAC TROMETHAMINE 30 MG: 30 INJECTION, SOLUTION INTRAMUSCULAR at 16:26

## 2018-07-16 RX ADMIN — FERROUS SULFATE TAB 325 MG (65 MG ELEMENTAL FE) 325 MG: 325 (65 FE) TAB at 09:58

## 2018-07-16 RX ADMIN — VANCOMYCIN HYDROCHLORIDE 125 MG: KIT at 14:45

## 2018-07-16 RX ADMIN — OXYCODONE HYDROCHLORIDE 10 MG: 5 TABLET ORAL at 14:45

## 2018-07-16 RX ADMIN — TAMSULOSIN HYDROCHLORIDE 0.4 MG: 0.4 CAPSULE ORAL at 19:53

## 2018-07-16 RX ADMIN — VANCOMYCIN HYDROCHLORIDE 125 MG: KIT at 11:03

## 2018-07-16 RX ADMIN — KETOROLAC TROMETHAMINE 30 MG: 30 INJECTION, SOLUTION INTRAMUSCULAR at 05:08

## 2018-07-16 RX ADMIN — POTASSIUM CHLORIDE 20 MEQ: 1500 TABLET, EXTENDED RELEASE ORAL at 04:59

## 2018-07-16 RX ADMIN — RIVAROXABAN 20 MG: 20 TABLET, FILM COATED ORAL at 09:58

## 2018-07-16 RX ADMIN — POTASSIUM CHLORIDE 10 MEQ: 7.46 INJECTION, SOLUTION INTRAVENOUS at 06:54

## 2018-07-16 RX ADMIN — DIPHENHYDRAMINE HYDROCHLORIDE 25 MG: 25 CAPSULE ORAL at 13:17

## 2018-07-16 RX ADMIN — POTASSIUM CHLORIDE, DEXTROSE MONOHYDRATE AND SODIUM CHLORIDE: 150; 5; 450 INJECTION, SOLUTION INTRAVENOUS at 23:37

## 2018-07-16 RX ADMIN — POTASSIUM CHLORIDE 20 MEQ: 1500 TABLET, EXTENDED RELEASE ORAL at 14:44

## 2018-07-16 RX ADMIN — ONDANSETRON 4 MG: 2 INJECTION, SOLUTION INTRAMUSCULAR; INTRAVENOUS at 23:42

## 2018-07-16 RX ADMIN — POTASSIUM CHLORIDE 40 MEQ: 1500 TABLET, EXTENDED RELEASE ORAL at 09:59

## 2018-07-16 RX ADMIN — DIPHENHYDRAMINE HYDROCHLORIDE 25 MG: 25 CAPSULE ORAL at 05:07

## 2018-07-16 RX ADMIN — VANCOMYCIN HYDROCHLORIDE 125 MG: KIT at 19:54

## 2018-07-16 RX ADMIN — VANCOMYCIN HYDROCHLORIDE 125 MG: KIT at 23:37

## 2018-07-16 RX ADMIN — GABAPENTIN 300 MG: 300 CAPSULE ORAL at 14:44

## 2018-07-16 RX ADMIN — OXYCODONE HYDROCHLORIDE 10 MG: 5 TABLET ORAL at 19:53

## 2018-07-16 RX ADMIN — POTASSIUM CHLORIDE, DEXTROSE MONOHYDRATE AND SODIUM CHLORIDE: 150; 5; 450 INJECTION, SOLUTION INTRAVENOUS at 13:28

## 2018-07-16 RX ADMIN — METRONIDAZOLE 500 MG: 500 INJECTION, SOLUTION INTRAVENOUS at 02:07

## 2018-07-16 RX ADMIN — OXYCODONE HYDROCHLORIDE 10 MG: 5 TABLET ORAL at 09:57

## 2018-07-16 RX ADMIN — ONDANSETRON 4 MG: 2 INJECTION, SOLUTION INTRAMUSCULAR; INTRAVENOUS at 10:19

## 2018-07-16 RX ADMIN — ONDANSETRON 4 MG: 2 INJECTION, SOLUTION INTRAMUSCULAR; INTRAVENOUS at 17:07

## 2018-07-16 RX ADMIN — ONDANSETRON 4 MG: 2 INJECTION, SOLUTION INTRAMUSCULAR; INTRAVENOUS at 05:05

## 2018-07-16 RX ADMIN — Medication 10 MEQ: at 00:57

## 2018-07-16 RX ADMIN — NICOTINE 1 PATCH: 21 PATCH, EXTENDED RELEASE TRANSDERMAL at 20:31

## 2018-07-16 RX ADMIN — ONDANSETRON 4 MG: 2 INJECTION, SOLUTION INTRAMUSCULAR; INTRAVENOUS at 00:09

## 2018-07-16 RX ADMIN — DULOXETINE HYDROCHLORIDE 60 MG: 60 CAPSULE, DELAYED RELEASE ORAL at 09:58

## 2018-07-16 RX ADMIN — POTASSIUM CHLORIDE 40 MEQ: 1500 TABLET, EXTENDED RELEASE ORAL at 13:17

## 2018-07-16 RX ADMIN — Medication 1 MG: at 00:57

## 2018-07-16 RX ADMIN — GABAPENTIN 300 MG: 300 CAPSULE ORAL at 09:58

## 2018-07-16 ASSESSMENT — PAIN DESCRIPTION - DESCRIPTORS: DESCRIPTORS: ACHING

## 2018-07-16 NOTE — PROGRESS NOTES
Spoke with Sandrita at [facility].  She shared that Maggie has been at [facility] since March.  She stopped doing therapies about a month ago.  She completed a Rule 25 with Vincent Erwin with [facility].  His recommendation was intense outpatient versus inpatient treatment.  She currently sees Shwetha Frazier weekly for counseling through [facility].  She continues to drink and has admitted to adderall and marijuana use.  Has come back to [facility] many times under the influence.  Per report wants help with this.  Also in the process of looking at a group home [facility].  Scheduled to see the facility again either this week or next week.  Applied for disability in April, still awaiting for approval or denial.      Amy AWAD, advised writer to not see Maggie at this time.       Assessment completed see flowsheet.      Dx: hypokalemia, nausea, vomiting and diarrhea       Lives with: Lives With: facility resident    Living at:  [facility]     Equipment used: none     Support System:  Limited parents         Primary PCP: [facility] Flores    POA/Guardian: No      Health Care Directive: NO      Pharmacy: [facility] while at [facility]     :  n/a    Homecare/[facility] Services:   Awaiting disability       Adequate Resources for needs (housing, utilities, food/med): YES    Meds and appointments management: YES    Work: NO    Transportation: YES       ADLs: continence, transferring, dressing, toileting and bathing    Able to Return to Prior Living Arrangements: YES    Rupert: NO        Goals: return to [facility]     Barriers: motivation     Needs: Demonstrates Competency    GUSTABO: elevated     Discharge Plan: return to [facility]; transportation to be determined

## 2018-07-16 NOTE — PLAN OF CARE
[facility] Inpatient Admission Note:    Patient admitted to 3112/3112-1 at approximately 1812 via w/c  accompanied by transport tech from emergency room . Report received from ED in SBAR format at 1758 via telephone. Patient transferred or ambulated to bed via assist of one. Patient is alert and oriented X 3, reports pain; rates at 9 on 0-10 scale.  Patient oriented to room, unit, hourly rounding, and plan of care. Explained admission packet and patient handbook with patient bill of rights brochure. Will continue to monitor and document as needed.     Inpatient Nursing criteria listed below was met:    Health care directives status obtained and documented: Yes    Care Everywhere authorization obtained No    MRSA swab completed for patient 65 years and older: No    Patient identifies a surrogate decision maker: Yes If yes, who:parents- rocky and lewis Contact Information:see facesheet    Core Measure diagnosis present:No. If yes, state diagnosis: na     If initial lactic acid >2.0, repeat lactic acid drawn within one hour of arrival to unit: NA. If no, state reason: na    Vaccination assessment and education completed:      Vaccinations received prior to admission: Pneumovax no  Influenza(seasonal)  N/A   Vaccination(s) ordered: patient declines    Clergy visit ordered if patient requests: N/A    Skin issues/needs documented: N/A    Isolation Patient: yes Education given, correct sign in place and documentation row added to PCS:  Yes    Fall Prevention Yes: Care plan updated, education given and documented, sticker and magnet in place: Yes    Care Plan initiated: Yes    Education Documented (including assessment): Yes    Patient has discharge needs : No If yes, please explain:na

## 2018-07-16 NOTE — PROVIDER NOTIFICATION
DATE:  7/16/2018   TIME OF RECEIPT FROM LAB:  0550  LAB TEST:  c diff  LAB VALUE:  positive  RESULTS GIVEN WITH READ-BACK TO (PROVIDER):  Abrahan Robles MD  TIME LAB VALUE REPORTED TO PROVIDER:   0551

## 2018-07-16 NOTE — PLAN OF CARE
Problem: Patient Care Overview  Goal: Plan of Care/Patient Progress Review  Outcome: No Change  Upon general assessment, patient was exhibiting some manipulative behaviors, would only take one pill at a time and requested numerous items in an attempt to control the situation, this writer was in the patients room attending to her needs for one hour and 45 minuets this morning,  patient was able to get up to the commode with assist of one staff earlier in the day and this afternoon patient was incontinent of a large amount of stool and urine, and required two staff to assist her, potassium level was slowly coming up, and patient is not tolerating IV potassium, therefore oral medication was administered. Patient was complaining of generalized discomfort, oral oxy was administered, and subsequently patient reported she felt itchy, oral benadryl was administered. A print off was given to educate patient on C MD [place]rett has been updated.

## 2018-07-16 NOTE — PLAN OF CARE
Problem: Patient Care Overview  Goal: Plan of Care/Patient Progress Review  Outcome: No Change  Pt alert and orientated.  Sipping on pop.  No problem with swallowing.  No emesis. No stool.  Incontinence x2  Urine.  On enteric isolation.    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  Uses right side.  Skin good. Has small wart on left  great toe.  Been treated with medicine at  [facility]. Uses call light appropriately.     Problem: Pain, Acute (Adult)  Goal: Identify Related Risk Factors and Signs and Symptoms  Related risk factors and signs and symptoms are identified upon initiation of Human Response Clinical Practice Guideline (CPG).  Outcome: No Change  Rates pain 9/10 on left side.   Had toradol.  Pt been sleeping most of shift.

## 2018-07-16 NOTE — PLAN OF CARE
Face to face report given with opportunity to observe patient.    Report given to Nimco Staley   7/15/2018  11:07 PM

## 2018-07-16 NOTE — PROGRESS NOTES
Checked in with Maggie.  She plans to return to [facility] upon discharge.  Would like [facility] arranged for transportation upon discharge.  No additional questions or concerns identified.   CTS will remain available.

## 2018-07-16 NOTE — PLAN OF CARE
Problem: Patient Care Overview  Goal: Plan of Care/Patient Progress Review  Outcome: No Change  Pt A&Ox3. She c/o nausea, zofran given with relief. She denies pain. Received PRN melatonin to promote sleep and did sleep most of night. BP 92/52. HR 60-70s. Afebrile. RR 16 and sating greater than 92% on room air. Lung sounds clear, bowel sounds are active. Three doses of IV potassium replacement given, Potassium recheck: 2.7, attempted PO potassium, pt became nauseated after first pill and refused second. IV riders restarted. IVF continued. Skin intact, pt repositioned throughout night. Transfers with assist of one. One stool this shift, loose yellow/green, positive for C-diff, other stool labs sent but will need to collect another stool for ova and parasite. Alarms on, call light within reach and pt calls appropriately. Enteric precautions continued.     Face to face report given with opportunity to observe patient.  Report given to RITESH Reyes.    Ibis Garcia  7/16/2018, 7:47 AM

## 2018-07-16 NOTE — PROGRESS NOTES
[facility] called for update. WOODY Nunez, advised that pt was admitted and call was transferred up to the medical floor.

## 2018-07-16 NOTE — PLAN OF CARE
MD updated that patient has had both IV and oral potassium however per protocol, patient does need 10 meq more of potassium, lab is here, and potassium is ordered as an add on, will administer potassium according to the lab value obtained at this time.

## 2018-07-16 NOTE — PLAN OF CARE
Face to face report given with opportunity to observe patient.    Report given to Jasmyn Davis RN  7/16/2018  4:13 PM

## 2018-07-16 NOTE — PHARMACY
[facility]    Pharmacy      Antimicrobial Stewardship Note     Current antimicrobial therapy:  Anti-infectives (Future)    Start     Dose/Rate Route Frequency Ordered Stop    07/16/18 0900  vancomycin (FIRVANQ) oral solution 125 mg      125 mg Oral 4 TIMES DAILY 07/16/18 0603            Indication: C. Difficile colitis    Days of Therapy: 1     Pertinent labs:  Creatinine   Creatinine   Date Value Ref Range Status   07/16/2018 0.37 (L) 0.52 - 1.04 mg/dL Final   07/15/2018 0.46 (L) 0.52 - 1.04 mg/dL Final   07/13/2018 0.45 (L) 0.52 - 1.04 mg/dL Final     WBC   WBC   Date Value Ref Range Status   07/16/2018 5.5 4.0 - 11.0 10e9/L Final   07/15/2018 6.1 4.0 - 11.0 10e9/L Final   07/13/2018 6.9 4.0 - 11.0 10e9/L Final     Procalcitonin   Procalcitonin   Date Value Ref Range Status   02/25/2018 0.23 ng/ml Final     Comment:     0.05-0.24 ng/ml Low risk of systemic bacterial infection. Local bacterial   infection possible.  Recommendation: Assess other clinical features of   infection. Discourage antibiotics unless strong clinical suspicion for serious   infection.       CRP   CRP Inflammation   Date Value Ref Range Status   07/15/2018 9.5 (H) 0.0 - 8.0 mg/L Final   02/26/2018 140.0 (H) 0.0 - 8.0 mg/L Final   02/19/2018 85.0 (H) 0.0 - 8.0 mg/L Final       Culture Results:   Procedure Component Value Units Date/Time      Ova and Parasite screen      Order Status: No result Lab Status: No result      Specimen: Stool      Ova and Parasite Screen [[id number]] Collected: 07/16/18 0430     Order Status: Completed Lab Status: In process Updated: 07/16/18 0823     Specimen: Stool      Stool culture SSCE [[id number]] Collected: 07/15/18 0430     Order Status: Completed Lab Status: Preliminary result Updated: 07/16/18 0748     Specimen: Feces       Specimen Description Feces      Shiga-Toxins 1&2 PENDING      Culture Micro Culture in progress     Clostridium difficile toxin B PCR [[id number]] (Abnormal) Collected: 07/15/18 0430      Order Status: Completed Lab Status: Final result Updated: 07/16/18 0553     Specimen: Feces       Specimen Description Feces      C Diff Toxin B PCR Positive (A)       Positive: Toxin producing Clostridium difficile target DNA sequences detected,    presumed positive for Clostridium difficile toxin B.   Clostridium difficile (Requires Enteric Isolation)   FDA approved assay performed using [employer] GeneXpert real-time PCR.   Critical Value called to and read back by   JAVIER GIL AT 0551 ON 7   Critical Value called to and read back by   JAVIER GIL AT 0551 ON 7/16/18 BY LUIS CARLOS.              Recommendations/Interventions:  1. No recommendations at this time. UpToDate recommends a duration of 10 days of therapy for nonsevere c. Diff diarrhea. More severe cases typically have a treatment duration of 17 days.    Jeramie Lundy, DULCE MARIAE Student  July 16, 2018

## 2018-07-16 NOTE — PROVIDER NOTIFICATION
DATE:  7/16/2018   TIME OF RECEIPT FROM LAB:  0443  LAB TEST:  potassium  LAB VALUE:  2.7  RESULTS GIVEN WITH READ-BACK TO (PROVIDER):  Abrahan Robles MD  TIME LAB VALUE REPORTED TO PROVIDER:   0449    Updated provider of pt requesting benadryl for itching, PO order received.

## 2018-07-17 LAB
ANION GAP SERPL CALCULATED.3IONS-SCNC: 11 MMOL/L (ref 3–14)
BUN SERPL-MCNC: 2 MG/DL (ref 7–30)
CALCIUM SERPL-MCNC: 7.7 MG/DL (ref 8.5–10.1)
CHLORIDE SERPL-SCNC: 113 MMOL/L (ref 94–109)
CO2 SERPL-SCNC: 21 MMOL/L (ref 20–32)
CREAT SERPL-MCNC: 0.36 MG/DL (ref 0.52–1.04)
GFR SERPL CREATININE-BSD FRML MDRD: >90 ML/MIN/1.7M2
GLUCOSE SERPL-MCNC: 80 MG/DL (ref 70–99)
POTASSIUM SERPL-SCNC: 4.3 MMOL/L (ref 3.4–5.3)
POTASSIUM SERPL-SCNC: 4.3 MMOL/L (ref 3.4–5.3)
SODIUM SERPL-SCNC: 145 MMOL/L (ref 133–144)

## 2018-07-17 PROCEDURE — 25800025 ZZH RX 258: Performed by: INTERNAL MEDICINE

## 2018-07-17 PROCEDURE — 25000128 H RX IP 250 OP 636: Performed by: INTERNAL MEDICINE

## 2018-07-17 PROCEDURE — 84132 ASSAY OF SERUM POTASSIUM: CPT | Performed by: INTERNAL MEDICINE

## 2018-07-17 PROCEDURE — 36416 COLLJ CAPILLARY BLOOD SPEC: CPT | Performed by: INTERNAL MEDICINE

## 2018-07-17 PROCEDURE — 25000132 ZZH RX MED GY IP 250 OP 250 PS 637: Performed by: INTERNAL MEDICINE

## 2018-07-17 PROCEDURE — 99232 SBSQ HOSP IP/OBS MODERATE 35: CPT | Performed by: INTERNAL MEDICINE

## 2018-07-17 PROCEDURE — 12000000 ZZH R&B MED SURG/OB

## 2018-07-17 PROCEDURE — 80048 BASIC METABOLIC PNL TOTAL CA: CPT | Performed by: INTERNAL MEDICINE

## 2018-07-17 RX ADMIN — HYDROXYZINE HYDROCHLORIDE 25 MG: 25 TABLET ORAL at 20:59

## 2018-07-17 RX ADMIN — HYDROXYZINE HYDROCHLORIDE 25 MG: 25 TABLET ORAL at 14:22

## 2018-07-17 RX ADMIN — GABAPENTIN 300 MG: 300 CAPSULE ORAL at 14:22

## 2018-07-17 RX ADMIN — NICOTINE 1 PATCH: 21 PATCH, EXTENDED RELEASE TRANSDERMAL at 21:00

## 2018-07-17 RX ADMIN — POTASSIUM CHLORIDE, DEXTROSE MONOHYDRATE AND SODIUM CHLORIDE: 150; 5; 450 INJECTION, SOLUTION INTRAVENOUS at 09:32

## 2018-07-17 RX ADMIN — GABAPENTIN 300 MG: 300 CAPSULE ORAL at 09:12

## 2018-07-17 RX ADMIN — DIPHENHYDRAMINE HYDROCHLORIDE 25 MG: 25 CAPSULE ORAL at 03:08

## 2018-07-17 RX ADMIN — OXYCODONE HYDROCHLORIDE 10 MG: 5 TABLET ORAL at 03:08

## 2018-07-17 RX ADMIN — RIVAROXABAN 20 MG: 20 TABLET, FILM COATED ORAL at 09:12

## 2018-07-17 RX ADMIN — OXYCODONE HYDROCHLORIDE 10 MG: 5 TABLET ORAL at 14:22

## 2018-07-17 RX ADMIN — DIPHENHYDRAMINE HYDROCHLORIDE 25 MG: 25 CAPSULE ORAL at 21:28

## 2018-07-17 RX ADMIN — TAMSULOSIN HYDROCHLORIDE 0.4 MG: 0.4 CAPSULE ORAL at 19:34

## 2018-07-17 RX ADMIN — FERROUS SULFATE TAB 325 MG (65 MG ELEMENTAL FE) 325 MG: 325 (65 FE) TAB at 09:12

## 2018-07-17 RX ADMIN — VANCOMYCIN HYDROCHLORIDE 125 MG: KIT at 19:27

## 2018-07-17 RX ADMIN — VANCOMYCIN HYDROCHLORIDE 125 MG: KIT at 22:47

## 2018-07-17 RX ADMIN — ONDANSETRON 4 MG: 2 INJECTION, SOLUTION INTRAMUSCULAR; INTRAVENOUS at 14:47

## 2018-07-17 RX ADMIN — KETOROLAC TROMETHAMINE 30 MG: 30 INJECTION, SOLUTION INTRAMUSCULAR at 00:00

## 2018-07-17 RX ADMIN — OXYCODONE HYDROCHLORIDE 10 MG: 5 TABLET ORAL at 09:30

## 2018-07-17 RX ADMIN — DIPHENHYDRAMINE HYDROCHLORIDE 25 MG: 25 CAPSULE ORAL at 09:30

## 2018-07-17 RX ADMIN — DULOXETINE HYDROCHLORIDE 60 MG: 60 CAPSULE, DELAYED RELEASE ORAL at 09:13

## 2018-07-17 RX ADMIN — OXYCODONE HYDROCHLORIDE 10 MG: 5 TABLET ORAL at 19:34

## 2018-07-17 RX ADMIN — ONDANSETRON 4 MG: 2 INJECTION, SOLUTION INTRAMUSCULAR; INTRAVENOUS at 20:59

## 2018-07-17 RX ADMIN — VANCOMYCIN HYDROCHLORIDE 125 MG: KIT at 09:13

## 2018-07-17 RX ADMIN — OXYCODONE HYDROCHLORIDE 10 MG: 5 TABLET ORAL at 00:00

## 2018-07-17 RX ADMIN — VANCOMYCIN HYDROCHLORIDE 125 MG: KIT at 14:22

## 2018-07-17 ASSESSMENT — PAIN DESCRIPTION - DESCRIPTORS
DESCRIPTORS: ACHING
DESCRIPTORS: ACHING

## 2018-07-17 ASSESSMENT — ACTIVITIES OF DAILY LIVING (ADL)
ADLS_ACUITY_SCORE: 24

## 2018-07-17 NOTE — PROGRESS NOTES
Left message for Sandrita at [facility].

## 2018-07-17 NOTE — PHARMACY
[facility]    Pharmacy      Antimicrobial Stewardship Note     Current antimicrobial therapy:  Anti-infectives (Future)    Start     Dose/Rate Route Frequency Ordered Stop    07/16/18 0900  vancomycin (FIRVANQ) oral solution 125 mg      125 mg Oral 4 TIMES DAILY 07/16/18 0603            Indication: c. Difficile colitis    Days of Therapy: 2     Pertinent labs:  Creatinine   Creatinine   Date Value Ref Range Status   07/17/2018 0.36 (L) 0.52 - 1.04 mg/dL Final   07/16/2018 0.37 (L) 0.52 - 1.04 mg/dL Final   07/15/2018 0.46 (L) 0.52 - 1.04 mg/dL Final     WBC   WBC   Date Value Ref Range Status   07/16/2018 5.5 4.0 - 11.0 10e9/L Final   07/15/2018 6.1 4.0 - 11.0 10e9/L Final   07/13/2018 6.9 4.0 - 11.0 10e9/L Final     Procalcitonin   Procalcitonin   Date Value Ref Range Status   02/25/2018 0.23 ng/ml Final     Comment:     0.05-0.24 ng/ml Low risk of systemic bacterial infection. Local bacterial   infection possible.  Recommendation: Assess other clinical features of   infection. Discourage antibiotics unless strong clinical suspicion for serious   infection.       CRP   CRP Inflammation   Date Value Ref Range Status   07/15/2018 9.5 (H) 0.0 - 8.0 mg/L Final   02/26/2018 140.0 (H) 0.0 - 8.0 mg/L Final   02/19/2018 85.0 (H) 0.0 - 8.0 mg/L Final       Culture Results:   Procedure Component Value Units Date/Time      Ova and Parasite screen [[id number]] Collected: 07/16/18 1102     Order Status: Completed Lab Status: Final result Updated: 07/16/18 1240     Specimen: Feces       Specimen Description Feces      Ova and Parasite CRYPTOSPORIDIUM AG NOT DETECTED       GIARDIA AG NOT DETECTED     Ova and Parasite Screen [[id number]] Collected: 07/16/18 0430     Order Status: Completed Lab Status: Final result Updated: 07/16/18 1240     Specimen: Feces       Specimen Description Feces      Ova and Parasite CRYPTOSPORIDIUM AG NOT DETECTED       GIARDIA AG NOT DETECTED     Stool culture SSCE [[id number]] Collected: 07/15/18 8670      Order Status: Completed Lab Status: Preliminary result Updated: 07/16/18 0748     Specimen: Feces       Specimen Description Feces      Shiga-Toxins 1&2 PENDING      Culture Micro Culture in progress     Clostridium difficile toxin B PCR [[id number]] (Abnormal) Collected: 07/15/18 0430     Order Status: Completed Lab Status: Final result Updated: 07/16/18 0553     Specimen: Feces       Specimen Description Feces      C Diff Toxin B PCR Positive (A)       Positive: Toxin producing Clostridium difficile target DNA sequences detected,    presumed positive for Clostridium difficile toxin B.   Clostridium difficile (Requires Enteric Isolation)   FDA approved assay performed using [employer] GeneXpert real-time PCR.   Critical Value called to and read back by   JAVIER GIL AT 0551 ON 7   Critical Value called to and read back by   JAVIER GIL AT 0551 ON 7/16/18 BY LUIS CARLOS.              Recommendations/Interventions:  1. No recommendations at this time. UpToDate recommends a duration of 10 days of therapy for nonsevere c. Diff diarrhea. More severe cases typically have a treatment duration of 17 days.    CHRISTINA Worthy Student  July 17, 2018

## 2018-07-17 NOTE — PROGRESS NOTES
Patient is not interested in talking with me after I had written this previous note.  I will complete a call to Sandrita to have her discuss any other thoughts for continued care for patient.

## 2018-07-17 NOTE — PLAN OF CARE
"Problem: Patient Care Overview  Goal: Plan of Care/Patient Progress Review  Outcome: No Change  Patient continues to c/o left sided abdominal pain, she's rating pain 7-8/10. Toradol IV was given at the beginning of the shift because it was too early to give Roxicodone. Patient stated that \"the Toradol doesn't help,\" to which I informed her it was too early to have the Roxicodone. She was found sleeping just over 2 hours later. Roxicodone was given just before 2000, she did report relief after that. Patient did not request pain medication for the rest of the shift. VSS, afebrile. Patient c/o nausea at the beginning of the shift, Zofran IV was given and helped relieve nausea. Later in the shift she was insistent on eating food despite being nauseated earlier in the shift and continuing to c/o abdominal pain. MD was updated and did report that patient could eat regular food but that if she did become nauseous that her diet will go back to full liquid. Patient was educated on BRAT diet because of her current C diff colitis after she stated she wanted broccoli and cheese soup and a sandwich. When she was checked on just before 2200, she was sitting with her daughter and had [employer] meatball sandwich with chips in front of her. At that time she reported her pain \"was good,\" and she declined her scheduled bedtime medications. Patient was up to the commode once this shift to void, no BM this shift. Remains assist of 2 for safety because she is shaky, only pivot transferring to bedside commode. She declined to get up to the chair this shift, she's declined being turned despite frequent education on pressure injuries. Patient has continued to have repeated requests after you help with what she needs for extended time and you remove your PPE, she then will request something more. Attempting to make sure all requests have been met before staff leaves the room still hasn't prevented patient from using call light multiple times " this shift. Alarms in place. Call light within reach. IVF with potassium continues. PO antibiotic continues. Potassium recheck came back at 4.3.    Face to face report given with opportunity to observe patient.    Report given to Josselyn Tapia   7/17/2018  12:48 AM

## 2018-07-17 NOTE — PROGRESS NOTES
[facility]    Hospitalist Progress Note    Date of Service (when I saw the patient): 07/17/2018    Assessment & Plan   Maggie Case is a 28 year old female who was admitted on 7/15/2018 for n/v/d found to have c diff colitis.    C diff colitis: CT abdomen pelvis with contrast showed diffuse colitis in the ascending and transverse colon.  Fever curve stable, WBC wnl.  Is not clinically septic.  - continue vancomycin PO QID  - slow IVFs  - electrolytes replacement  - supportive cares    Hypokalemia: likely from GI losses.  Contraction metabolic alkalosis resolved.  - replacement protocol  - Mg level wnl at 2.3    Left sided Ureterolithiasis: incidental finding on CT abd/pelvis showing 3 mm distal left ureteral calculus at UPJ with mild left-sided hydronephrosis.  She does have pain  - flomax  - rescan her, likely tomorrow to see if it passed    H/o CVA: Feb 2018 resulting in left sided hemiparesis and SNF state.  She has h/o hypercoagulability and ASD which [facility]  Stable.  - continuing Xarelto    Depression: possibly decompensated due to her extremely flat affect  - continue outpatient duloxetine and trazadone    Nicotine dependence: nicotine replacement prn    FEN: oral diet as tolerated, encourage intake  - replace electrolytes as indicated    DVT Prophylaxis: she is on Xarelto    Code Status: Full Code    Disposition: She is improving.  Expected discharge in 1-2 days.    Eleuterio Anderson MD      Interval History   Patient is much improved today, alert, interactive, awaiting breakfast.  Still c/o left sided abdominal pain.    -Data reviewed today: I reviewed all new labs and imaging results over the last 24 hours. I personally reviewed no images or EKG's today.    Physical Exam   Temp: 97.1  F (36.2  C) Temp src: Tympanic BP: 123/60   Heart Rate: 84 Resp: 18 SpO2: 100 % O2 Device: None (Room air)    Vitals:    07/15/18 1814   Weight: 61.8 kg (136 lb 3.9 oz)      Vital Signs with Ranges  Temp:  [97  F (36.1  C)-98  F (36.7  C)] 97.1  F (36.2  C)  Heart Rate:  [76-84] 84  Resp:  [18] 18  BP: ()/(60-61) 123/60  SpO2:  [99 %-100 %] 100 %      Intake/Output Summary (Last 24 hours) at 07/17/18 0900  Last data filed at 07/17/18 0556   Gross per 24 hour   Intake             4825 ml   Output                0 ml   Net             4825 ml     # Pain Assessment:  Current Pain Score 7/17/2018   Patient currently in pain? sleeping: patient not able to self report   Pain score (0-10) -   Pain location -   Pain descriptors -   CPOT pain score -   Maggie s pain level was assessed and she currently denies pain.        Peripheral IV 07/15/18 Right Upper forearm (Active)   Site Assessment WDL 7/16/2018 11:45 PM   Line Status Infusing;Checked every 1-2 hour 7/16/2018 11:45 PM   Phlebitis Scale 0-->no symptoms 7/16/2018 11:45 PM   Infiltration Scale 0 7/16/2018 11:45 PM   Infiltration Site Treatment Method  None 7/16/2018 10:00 AM   Number of days:2       Incision/Surgical Site 02/18/18 Right Head (Active)   Number of days:149       Incision/Surgical Site 05/24/18 Right Head (Active)   Number of days:54     Line/device assessment(s) completed for medical necessity    Constitutional - AA, NAD  HEENT - atraumatic, normocephalic  Neck - supple, no masses, no JVD  CVS - S1 S2 RRR, no murmurs, rubs, gallops  Respiratory - CTA b/l  GI - soft, moderate tenderness on palpation in upper abdomen, ND, + bowel sounds, no organomegaly  Musculoskeletal - no LE edema, no lesions  Neuro - oriented x 3, no gross focal deficits  Psych - normal affect today    Medications     dextrose 5% and 0.45% NaCl + KCl 20 mEq/L 100 mL/hr at 07/16/18 7633     - MEDICATION INSTRUCTIONS -         ARIPiprazole  5 mg Oral At Bedtime     DULoxetine (CYMBALTA) EC capsule 60 mg  60 mg Oral Daily     ferrous sulfate  325 mg Oral Daily with breakfast     gabapentin (NEURONTIN) capsule 300 mg  300 mg Oral BID     gabapentin  (NEURONTIN) tablet 600 mg  600 mg Oral At Bedtime     nicotine  1 patch Transdermal Daily     nicotine   Transdermal Q8H     nicotine   Transdermal Daily     rivaroxaban ANTICOAGULANT  20 mg Oral QAM     tamsulosin  0.4 mg Oral Daily     traZODone (DESYREL) tablet 100 mg  100 mg Oral At Bedtime     vancomycin  125 mg Oral 4x Daily       Data     Recent Labs  Lab 07/17/18  0536 07/16/18  1941 07/16/18  1218 07/16/18  0414  07/15/18  1435 07/13/18  1459   WBC  --   --   --  5.5  --  6.1 6.9   HGB  --   --   --  10.4*  --  12.8 13.7   MCV  --   --   --  86  --  85 83   PLT  --   --   --  238  --  321 407   INR  --   --   --   --   --  1.04  --    *  --  143 146*  < > 142 139   POTASSIUM 4.3 4.3 3.0* 2.8*  < > 2.2* 2.4*   CHLORIDE 113*  --  108 111*  < > 98 93*   CO2 21  --  27 25  < > 34* 38*   BUN 2*  --   --  4*  --  7 6*   CR 0.36*  --   --  0.37*  --  0.46* 0.45*   ANIONGAP 11  --  8 10  < > 10 8   AVINASH 7.7*  --   --  7.4*  --  8.3* 8.5   GLC 80  --   --  86  --  92 95   ALBUMIN  --   --   --  1.8*  --  2.6* 2.6*   PROTTOTAL  --   --   --  4.2*  --  5.6* 5.9*   BILITOTAL  --   --   --  0.2  --  0.3 0.4   ALKPHOS  --   --   --  83  --  120 135   ALT  --   --   --  16  --  19 19   AST  --   --   --  29  --  41 41   LIPASE  --   --   --   --   --  283  --    < > = values in this interval not displayed.  Lactic Acid   Date Value Ref Range Status   04/29/2017 0.7 0.4 - 2.0 mmol/L Final   03/19/2017 1.3 0.4 - 2.0 mmol/L Final   05/06/2016 1.3 0.4 - 2.0 mmol/L Final       No results found for this or any previous visit (from the past 24 hour(s)).    Eleuterio Anderson MD

## 2018-07-17 NOTE — PLAN OF CARE
"Problem: Patient Care Overview  Goal: Plan of Care/Patient Progress Review  Outcome: Improving  Vitals: VSS. /60  Pulse 76  Temp 97.1  F (36.2  C) (Tympanic)  Resp 18  Ht 1.6 m (5' 3\")  Wt 61.8 kg (136 lb 3.9 oz)  SpO2 100%  BMI 24.13 kg/m2    Pain: Pain rated 7/10 to LLQ. Treated with IV Toradol, 10 mg oxy, and ice packs.    Orientation: A&O. Flat affect.     Cardiac: Tele reading SR 60's to 80's per ICU    Lungs: Clear    ABD: Bowels Active. Complains of nausea, treated with IV zofran. Pt refused fulls and was advanced to reg to see how she does. Tolerating well so far. Positive for C-Diff, no stools this shift.     Urinating: Incontinent at times. Mignon in color.     Skin: WNL    IV fluids: D5/0.45NS/20K at 100.    Antibiotics: PO vanco.    Mobility: Wheelchair bound. Up with assist of 2. Gait belt used. Weakness to left side from hx of stroke. Flaccid left arm. left leg shakes when used and also has trouble putting leg down.     Safety: Bed alarm on. Call light in reach. Rounding done.    Comment: Slept very little last night. On light often.     Face to face report given with opportunity to observe patient.    Report given to Brandy Yates   7/17/2018  8:18 AM                 "

## 2018-07-17 NOTE — PLAN OF CARE
Was asked by Dr Flores earlier if pt could have order for chantix. Spoke with Dr Fernandez. No order for Chantix rec'd. Explained to pt.

## 2018-07-17 NOTE — PLAN OF CARE
Problem: Patient Care Overview  Goal: Plan of Care/Patient Progress Review  Outcome: No Change  A&OX3. Cooperative with cares. Flaccid Left side . S/P R) CVA with L) sided weakness. Uses call light approp. No stools this shift. Continues on IVF. No diarrhea stools this shift. Remains in Enteric Isolation. Med for pain L) abd. Nicotene patch in place. Appetite fair.     Face to face report given with opportunity to observe patient.    Report given to Marga Livingston   7/17/2018  3:41 PM

## 2018-07-17 NOTE — PROVIDER NOTIFICATION
MD updated RE: patient's request to have advanced diet, she currently is on full liquid. She was nauseated at the beginning of the shift and Zofran was given. Patient continuing to c/o abdominal pain, requesting pain medication. MD okays patient to have a regular diet but that if she becomes nauseated, revert her back to full liquid diet.

## 2018-07-17 NOTE — PROVIDER NOTIFICATION
MD updated RE: patient's requesting diet to be advanced. She is currently on full liquid. Patient was nauseated at the beginning of the shift, Zofran IV given. Patient informed she can't have Zofran again until 2300. She was having BM's on day shift. MD okays regular diet but to put patient back to full liquid if she becomes nauseated.

## 2018-07-18 VITALS
DIASTOLIC BLOOD PRESSURE: 61 MMHG | HEIGHT: 63 IN | TEMPERATURE: 98.7 F | BODY MASS INDEX: 24.14 KG/M2 | OXYGEN SATURATION: 98 % | WEIGHT: 136.24 LBS | RESPIRATION RATE: 16 BRPM | SYSTOLIC BLOOD PRESSURE: 100 MMHG | HEART RATE: 67 BPM

## 2018-07-18 LAB
ANION GAP SERPL CALCULATED.3IONS-SCNC: 7 MMOL/L (ref 3–14)
BUN SERPL-MCNC: 1 MG/DL (ref 7–30)
CALCIUM SERPL-MCNC: 7.8 MG/DL (ref 8.5–10.1)
CHLORIDE SERPL-SCNC: 108 MMOL/L (ref 94–109)
CO2 SERPL-SCNC: 26 MMOL/L (ref 20–32)
CREAT SERPL-MCNC: 0.42 MG/DL (ref 0.52–1.04)
GFR SERPL CREATININE-BSD FRML MDRD: >90 ML/MIN/1.7M2
GLUCOSE SERPL-MCNC: 91 MG/DL (ref 70–99)
POTASSIUM SERPL-SCNC: 4.3 MMOL/L (ref 3.4–5.3)
SODIUM SERPL-SCNC: 141 MMOL/L (ref 133–144)

## 2018-07-18 PROCEDURE — 25000132 ZZH RX MED GY IP 250 OP 250 PS 637: Performed by: INTERNAL MEDICINE

## 2018-07-18 PROCEDURE — 80048 BASIC METABOLIC PNL TOTAL CA: CPT | Performed by: INTERNAL MEDICINE

## 2018-07-18 PROCEDURE — 25000128 H RX IP 250 OP 636: Performed by: INTERNAL MEDICINE

## 2018-07-18 PROCEDURE — 99239 HOSP IP/OBS DSCHRG MGMT >30: CPT | Performed by: INTERNAL MEDICINE

## 2018-07-18 PROCEDURE — 36415 COLL VENOUS BLD VENIPUNCTURE: CPT | Performed by: INTERNAL MEDICINE

## 2018-07-18 RX ORDER — OXYCODONE HYDROCHLORIDE 10 MG/1
10 TABLET ORAL
Qty: 10 TABLET | Refills: 0 | Status: SHIPPED | OUTPATIENT
Start: 2018-07-18 | End: 2018-07-30

## 2018-07-18 RX ORDER — VANCOMYCIN HYDROCHLORIDE 50 MG/ML
125 KIT ORAL 4 TIMES DAILY
Qty: 70 ML | Refills: 0 | Status: SHIPPED | OUTPATIENT
Start: 2018-07-18 | End: 2018-07-25

## 2018-07-18 RX ORDER — TAMSULOSIN HYDROCHLORIDE 0.4 MG/1
0.4 CAPSULE ORAL DAILY
Qty: 7 CAPSULE | Refills: 0 | Status: SHIPPED | OUTPATIENT
Start: 2018-07-18 | End: 2018-07-30

## 2018-07-18 RX ADMIN — FERROUS SULFATE TAB 325 MG (65 MG ELEMENTAL FE) 325 MG: 325 (65 FE) TAB at 08:00

## 2018-07-18 RX ADMIN — GABAPENTIN 300 MG: 300 CAPSULE ORAL at 08:00

## 2018-07-18 RX ADMIN — DULOXETINE HYDROCHLORIDE 60 MG: 60 CAPSULE, DELAYED RELEASE ORAL at 08:00

## 2018-07-18 RX ADMIN — OXYCODONE HYDROCHLORIDE 10 MG: 5 TABLET ORAL at 04:25

## 2018-07-18 RX ADMIN — VANCOMYCIN HYDROCHLORIDE 125 MG: KIT at 08:02

## 2018-07-18 RX ADMIN — ONDANSETRON 4 MG: 2 INJECTION, SOLUTION INTRAMUSCULAR; INTRAVENOUS at 07:56

## 2018-07-18 RX ADMIN — DIPHENHYDRAMINE HYDROCHLORIDE 25 MG: 25 CAPSULE ORAL at 04:25

## 2018-07-18 RX ADMIN — OXYCODONE HYDROCHLORIDE 10 MG: 5 TABLET ORAL at 11:27

## 2018-07-18 RX ADMIN — OXYCODONE HYDROCHLORIDE 10 MG: 5 TABLET ORAL at 00:59

## 2018-07-18 RX ADMIN — OXYCODONE HYDROCHLORIDE 10 MG: 5 TABLET ORAL at 08:00

## 2018-07-18 RX ADMIN — DIPHENHYDRAMINE HYDROCHLORIDE 25 MG: 25 CAPSULE ORAL at 11:27

## 2018-07-18 RX ADMIN — RIVAROXABAN 20 MG: 20 TABLET, FILM COATED ORAL at 08:00

## 2018-07-18 ASSESSMENT — ACTIVITIES OF DAILY LIVING (ADL)
ADLS_ACUITY_SCORE: 24

## 2018-07-18 ASSESSMENT — PAIN DESCRIPTION - DESCRIPTORS
DESCRIPTORS: ACHING
DESCRIPTORS: ACHING

## 2018-07-18 NOTE — PLAN OF CARE
"Problem: Infection, Risk/Actual (Adult)  Goal: Identify Related Risk Factors and Signs and Symptoms  Related risk factors and signs and symptoms are identified upon initiation of Human Response Clinical Practice Guideline (CPG).   Outcome: Improving   07/17/18 1634   Infection, Risk/Actual   Related Risk Factors (Infection, Risk/Actual) chronic illness/condition;exposure to microbes   Signs and Symptoms (Infection, Risk/Actual) cultures positive;pain;nausea;lab value changes     Most Recent Vitals: /63  Pulse 76  Temp (!) 95.8  F (35.4  C) (Tympanic)  Resp 18  Ht 1.6 m (5' 3\")  Wt 61.8 kg (136 lb 3.9 oz)  SpO2 95%  BMI 24.13 kg/m2  Orientation: Alert and oriented. Pt is pleasant but uses call light excessively at times.  Pain Management: Oxycodone 10mg given with good effect.  Cardiac: Telemetry in place. Sinus rhythm 90s.  Respiratory: WDL  GI: Pt reports abdominal pain as well as chronic nausea. Zofran given x1 with good effect. Bowels active, passing gas, no BMs this shift.  : Incontinent of urine but also has used the commode this shift.  Skin: WDL  IV Fluids: D5 1/2 NS with 20 mEq of K+  ABX: Oral vanco  Mobility: Assist x2  Safety: Uses call light.  Comments: Potassium level 4.3 today.    Face to face report given with opportunity to observe patient.    Report given to Josselyn Peterson   7/17/2018  11:26 PM    "

## 2018-07-18 NOTE — DISCHARGE SUMMARY
[facility]    Discharge Summary  Hospitalist    Date of Admission:  7/15/2018  Date of Discharge:  7/18/2018  Discharging Provider: Eleuterio Anderson MD  Date of Service (when I saw the patient): 07/18/18    Discharge Diagnoses   Principal Problem:    Hypokalemia (6/18/2013)  Active Problems:    History of cranioplasty (5/24/2018)    Calculus of lower urinary tract (7/15/2018)    History of CVA (cerebrovascular accident) (7/15/2018)    Left hemiparesis (H) (7/15/2018)    Chronic anticoagulation (7/15/2018)    Chemical dependency (H) (7/16/2018)      History of Present Illness   Maggie Case is an 28 year old female who presented with n/v/d.  Please see admission H+P for additional details.    Hospital Course   Maggie Case was admitted on 7/15/2018 for n/v/d, found to have colitis on CT scan.  She tested positive for c diff.  In addition, she was incidentally found to have left sided ureterolithiasis.  The following problems were addressed during her hospitalization:    C diff colitis: CT abdomen pelvis with contrast showed diffuse colitis in the ascending and transverse colon.  Fever curve stable, WBC wnl.  Is not clinically septic.  She got vancomycin PO QID with improvement, will discharge her with 7 more days.     Hypokalemia: likely from GI losses.  Contraction metabolic alkalosis resolved. This was replaced.       Left sided Ureterolithiasis: incidental finding on CT abd/pelvis showing 3 mm distal left ureteral calculus at UPJ with mild left-sided hydronephrosis.  She does have pain.  She was treated with IVFs and flomax.  Her renal function and UOP have been stable.  Since she is now asymptomatic, this has likely passed.  If she does have a recurrence in symptoms, f/u imaging likely indicated.     H/o CVA: Feb 2018 resulting in left sided hemiparesis and SNF state.  She has h/o hypercoagulability and ASD which was worked up at the [facility] (negative findings).  Stable, continuing Xarelto on  discharge.     Depression: stable, continue outpatient duloxetine and trazadone     Nicotine dependence: nicotine replacement prn.  She repeatedly asked to go out and smoke while hospitalized.  Cessation counseling provided.    Eleuterio Anderson MD    Significant Results and Procedures   See below    Pending Results   These results will be followed up by Chapo Flores    Unresulted Labs Ordered in the Past 30 Days of this Admission     Date and Time Order Name Status Description    7/15/2018 1416 Stool culture SSCE Preliminary           Code Status   Full Code       Primary Care Physician   Chapo Flores    Physical Exam   Temp: 98.8  F (37.1  C) Temp src: Temporal BP: 99/58   Heart Rate: 102 Resp: 16 SpO2: 97 % O2 Device: None (Room air)    Vitals:    07/15/18 1814   Weight: 61.8 kg (136 lb 3.9 oz)     Vital Signs with Ranges  Temp:  [95.8  F (35.4  C)-98.8  F (37.1  C)] 98.8  F (37.1  C)  Heart Rate:  [] 102  Resp:  [16-18] 16  BP: ()/(58-69) 99/58  SpO2:  [95 %-100 %] 97 %  I/O last 3 completed shifts:  In: 1357 [P.O.:340; I.V.:1017]  Out: 650 [Urine:200; Other:450]    Constitutional: AA, NAD  Eyes: PERRLA, no injection, no icterus  HEENT: atraumatic, normocephalic  Respiratory: CTA b/l  Cardiovascular: S1 S2 RRR  GI: soft, NT, ND, + bowel sounds  Lymph/Hematologic: no palpable lymphadenopathy  Skin: no rashes, no lesions  Musculoskeletal: No edema, good tone, no deformities  Neurologic: left sided hemiplegia  Psychiatric: appropriate affect    # Discharge Pain Plan:   - During her hospitalization, Maggie experienced pain due to chronic pain and colitis.  The pain plan for discharge was discussed with Maggie and the plan was created in a collaborative fashion.    - Follow-up: nursing home MD      Discharge Disposition   Discharged to rehabilitation facility  Condition at discharge: Stable    Consultations This Hospital Stay   SOCIAL WORK IP CONSULT  PHYSICAL THERAPY ADULT IP CONSULT  OCCUPATIONAL THERAPY  ADULT IP CONSULT    Time Spent on this Encounter   Eleuterio BIANCHI MD, personally saw the patient today and spent greater than 30 minutes discharging this patient.    Discharge Orders     General info for SNF   Length of Stay Estimate: Short Term Care: Estimated # of Days <30  Condition at Discharge: Stable  Level of care:skilled   Rehabilitation Potential: Fair  Admission H&P remains valid and up-to-date: Yes  Recent Chemotherapy: N/A  Use Nursing Home Standing Orders: Yes     Reason for your hospital stay   c diff colitis     Follow Up and recommended labs and tests   Follow up with Nursing home physician.  No follow up labs or test are needed.     Activity - Up with nursing assistance     Full Code     Physical Therapy Adult Consult   Evaluate and treat as clinically indicated.    Reason:  weakness     Occupational Therapy Adult Consult   Evaluate and treat as clinically indicated.    Reason:  weakness     Fall precautions     Advance Diet as Tolerated   Follow this diet upon discharge: Orders Placed This Encounter     Advance Diet as Tolerated: Regular Diet Adult       Discharge Medications   Current Discharge Medication List      START taking these medications    Details   tamsulosin (FLOMAX) 0.4 MG capsule Take 1 capsule (0.4 mg) by mouth daily  Qty: 7 capsule, Refills: 0    Associated Diagnoses: Calculus of ureter      vancomycin (FIRVANQ) 50 MG/ML SOLR Take 2.5 mLs (125 mg) by mouth 4 times daily for 7 days  Qty: 70 mL, Refills: 0    Associated Diagnoses: C. difficile colitis         CONTINUE these medications which have CHANGED    Details   oxyCODONE IR (ROXICODONE) 10 MG tablet Take 1 tablet (10 mg) by mouth every 3 hours as needed  Qty: 10 tablet, Refills: 0    Associated Diagnoses: Cervicalgia         CONTINUE these medications which have NOT CHANGED    Details   Acetaminophen (TYLENOL PO) Take 650 mg by mouth every 4 hours as needed for mild pain or fever      ARIPiprazole (ABILIFY) 5 MG tablet Take 5  mg by mouth At Bedtime      bismuth subsalicylate (PEPTO BISMOL) 262 MG/15ML suspension Take 15 mLs by mouth every 6 hours as needed for indigestion      cyanocobalamin (VITAMIN  B-12) 1000 MCG tablet Take 1,000 mcg by mouth daily      Dextromethorphan-guaiFENesin  MG/5ML syrup Take 5 mLs by mouth every 4 hours as needed for cough      DULOXETINE HCL PO Take 60 mg by mouth daily      ferrous sulfate (IRON) 325 (65 Fe) MG tablet Take by mouth daily (with breakfast)      !! GABAPENTIN PO Take 300 mg by mouth 2 times daily 300mg AM and Noon      !! GABAPENTIN PO Take 600 mg by mouth At Bedtime       loperamide (IMODIUM) 2 MG capsule Take 2 mg by mouth 4 times daily as needed for diarrhea      nicotine (NICODERM CQ) 7 MG/24HR 24 hr patch Place 1 patch onto the skin every 24 hours      Ondansetron HCl (ZOFRAN PO) Take 4 mg by mouth every 8 hours as needed for nausea or vomiting      Salicylic Acid 40 % PADS Apply 1 Dose topically every other day Apply pad topically every other  day for 14 days      TRAZODONE HCL PO Take 100 mg by mouth At Bedtime      XARELTO 20 MG TABS tablet Take 20 mg by mouth every morning  Refills: 11      hydrOXYzine (ATARAX) 25 MG tablet Take 25 mg by mouth every 6 hours as needed  Refills: 11       !! - Potential duplicate medications found. Please discuss with provider.        Allergies   Allergies   Allergen Reactions     Amoxicillin Other (See Comments)     Headaches     Levaquin [Levofloxacin] Swelling     Naproxen Other (See Comments)     Reaction: Headaches     Nickel      Tramadol      Sulindac Rash     Data   Most Recent 3 CBC's:  Recent Labs   Lab Test  07/16/18   0414  07/15/18   1435  07/13/18   1459   WBC  5.5  6.1  6.9   HGB  10.4*  12.8  13.7   MCV  86  85  83   PLT  238  321  407      Most Recent 3 BMP's:  Recent Labs   Lab Test  07/18/18   0527  07/17/18   1045  07/17/18   0536   07/16/18   1218  07/16/18   0414   NA  141   --   145*   --   143  146*   POTASSIUM  4.3  4.3   4.3   < >  3.0*  2.8*   CHLORIDE  108   --   113*   --   108  111*   CO2  26   --   21   --   27  25   BUN  1*   --   2*   --    --   4*   CR  0.42*   --   0.36*   --    --   0.37*   ANIONGAP  7   --   11   --   8  10   AVINASH  7.8*   --   7.7*   --    --   7.4*   GLC  91   --   80   --    --   86    < > = values in this interval not displayed.     Most Recent 2 LFT's:  Recent Labs   Lab Test  07/16/18   0414  07/15/18   1435   AST  29  41   ALT  16  19   ALKPHOS  83  120   BILITOTAL  0.2  0.3     Most Recent INR's and Anticoagulation Dosing History:  Anticoagulation Dose History     Recent Dosing and Labs Latest Ref Rng & Units 2/22/2018 3/1/2018 4/17/2018 4/23/2018 5/24/2018 6/24/2018 7/15/2018    INR 0.80 - 1.20 1.13 0.96 4.08(H) 2.20(H) 1.14 8.43(HH) 1.04        Most Recent 3 Troponin's:  Recent Labs   Lab Test  04/23/18   1757  04/17/18   1810  02/17/18   1107   TROPI  <0.015  <0.015  <0.015     Most Recent Cholesterol Panel:  Recent Labs   Lab Test  02/17/18   1752   CHOL  98   LDL  39   HDL  38*   TRIG  105     Most Recent 6 Bacteria Isolates From Any Culture (See EPIC Reports for Culture Details):  Recent Labs   Lab Test  07/16/18   0430  02/19/18   1714  02/19/18   1713  02/19/18   1344  02/19/18   1240  02/19/18   1217   CULT  Culture in progress  Canceled, Test credited  Duplicate request    Light growth  Normal beatriz    Heavy growth  Staphylococcus aureus  *  Heavy growth  Haemophilus influenzae  Beta lactamase negative  Beta-lactamase negative Haemophilus influenzae are usually susceptible to ampicillin,   amoxacillin/clavulanic acid, levofloxacin, and 3rd generation cephalosporins, such as   ceftriaxone.  *  No growth  No growth  >100,000 colonies/mL  Escherichia coli  *     Most Recent TSH, T4 and A1c Labs:  Recent Labs   Lab Test  02/17/18   1752  12/26/15   1108   TSH   --   0.68   A1C  5.8   --      Results for orders placed or performed during the hospital encounter of 07/15/18   CT Abdomen Pelvis  w Contrast    Narrative    EXAMINATION: CT ABDOMEN PELVIS W CONTRAST, 7/15/2018 4:23 PM    TECHNIQUE:  Helical CT images from the lung bases through the  symphysis pubis were obtained  with IV contrast. Contrast dose: Isovue  300, 100ml    COMPARISON: none    HISTORY: abdominal pain, diarrhea;     FINDINGS:    There is dependent atelectasis at the lung bases.    The liver is free of masses or biliary ductal enlargement. No  calcified gallstones are seen.    The the spleen and pancreas appear normal.    The adrenal glands are normal.    There is a delayed enhancement of the left kidney. There is dilation  of the left renal collecting system. There is a 3 mm in diameter  calculus at the ureterovesical junction on the left. There is a 4 mm  diameter calculus in the upper pole of the right kidney. A right renal  cyst is seen.    The periaortic lymph nodes are normal in caliber.    There is some bowel wall thickening seen in the ascending and  transverse colon consistent with colitis.    In the pelvis the bladder and rectum appear normal.    The regional skeleton is intact      Impression    IMPRESSION:   1. 3 mm distal left ureteric calculus at the ureterovesical junction  with the mild left-sided hydronephrosis.  2. Colitis in the ascending and transverse colon     GEETHA JACOBS MD

## 2018-07-18 NOTE — PLAN OF CARE
Problem: Patient Care Overview  Goal: Plan of Care/Patient Progress Review  Outcome: Adequate for Discharge Date Met: 07/18/18  Patient discharged at 11:50 PM via wheel chair accompanied by other:transport tech and staff. Prescriptions sent with patient to fill . All belongings sent with patient.     Discharge instructions reviewed with pt. Listed belongings gathered and returned to patient.     Patient discharged to [facility]  Report called to Nursing Home:  [facility]    Core Measures and Vaccines  Core Measures applicable during stay: No. If yes, state diagnosis:  Pneumonia and Influenza given prior to discharge, if indicated: N/A    Surgical Patient   Surgical Procedures during stay:   Did patient receive discharge instruction on wound care and recognition of infection symptoms? No    MISC  Follow up appointment made:  No  Home and hospital aquired medications returned to patient: N/A  Patient reports pain was well managed at discharge: Yes

## 2018-07-18 NOTE — PROGRESS NOTES
[facility] arranged for 11 am.  Maggie notified, no questions or concerns.  Sandrita at [facility] notified and faxed orders.  Brandy AWAD aware.      Name: Maggie Case    MRN#: [id number]    Reason for Hospitalization: Calculus of ureter [N20.1]  Hypokalemia [E87.6]    GUSTABO: elevated     Discharge Date: 7/18/2018    Patient / Family response to discharge plan: agreeable     Follow-Up Appt: Future Appointments  Date Time Provider Department Center   7/18/2018 3:00 PM Emeterio Mesa MD [facility]       Other Providers (Care Coordinator, County Services, PCA services etc): Yes: see above       Discharge Disposition: nursing home- [facility]     Anne Ramirez

## 2018-07-18 NOTE — PLAN OF CARE
"Problem: Patient Care Overview  Goal: Plan of Care/Patient Progress Review  Outcome: No Change  Vitals: VSS. BP 99/58  Pulse 76  Temp 98.8  F (37.1  C) (Temporal)  Resp 16  Ht 1.6 m (5' 3\")  Wt 61.8 kg (136 lb 3.9 oz)  SpO2 97%  BMI 24.13 kg/m2     Pain: Pain rated 8 to 9/10 to LLQ. Treated with 10 mg oxy, and ice packs. Also gave benadryl for itching.      Orientation: A&O. Flat affect.      Cardiac: Tele reading SR 60's to 80's per ICU     Lungs: Clear     ABD: Bowels Active. No c/o nausea. Reg diet, tolerating well. Positive for C-Diff, one stool this shift.      Urinating: Incontinent at times. Mignon in color.      Skin: WNL     IV fluids: D5/0.45NS/20K at 50.     Antibiotics: PO vanco.     Mobility: Wheelchair bound. Up with assist of 2. Gait belt used. Weakness to left side from hx of stroke. Flaccid left arm. left leg shakes when used and also has trouble putting leg down.      Safety: Bed alarm on. Call light in reach. Rounding done.     Comment: Slept very little last night.     Face to face report given with opportunity to observe patient.    Report given to Brandy Yates   7/18/2018  7:17 AM          "

## 2018-07-18 NOTE — PHARMACY
[facility]    Pharmacy      Antimicrobial Stewardship Note     Current antimicrobial therapy:  Anti-infectives (Future)    Start     Dose/Rate Route Frequency Ordered Stop    07/18/18 0000  vancomycin (FIRVANQ) 50 MG/ML SOLR      125 mg Oral 4 TIMES DAILY 07/18/18 0855 07/25/18 2359    07/16/18 0900  vancomycin (FIRVANQ) oral solution 125 mg      125 mg Oral 4 TIMES DAILY 07/16/18 0603            Indication: C. Difficile colitis    Days of Therapy: 3     Pertinent labs:  Creatinine   Creatinine   Date Value Ref Range Status   07/18/2018 0.42 (L) 0.52 - 1.04 mg/dL Final   07/17/2018 0.36 (L) 0.52 - 1.04 mg/dL Final   07/16/2018 0.37 (L) 0.52 - 1.04 mg/dL Final     WBC   WBC   Date Value Ref Range Status   07/16/2018 5.5 4.0 - 11.0 10e9/L Final   07/15/2018 6.1 4.0 - 11.0 10e9/L Final   07/13/2018 6.9 4.0 - 11.0 10e9/L Final     Procalcitonin   Procalcitonin   Date Value Ref Range Status   02/25/2018 0.23 ng/ml Final     Comment:     0.05-0.24 ng/ml Low risk of systemic bacterial infection. Local bacterial   infection possible.  Recommendation: Assess other clinical features of   infection. Discourage antibiotics unless strong clinical suspicion for serious   infection.       CRP   CRP Inflammation   Date Value Ref Range Status   07/15/2018 9.5 (H) 0.0 - 8.0 mg/L Final   02/26/2018 140.0 (H) 0.0 - 8.0 mg/L Final   02/19/2018 85.0 (H) 0.0 - 8.0 mg/L Final       Culture Results:   Procedure Component Value Units Date/Time      Ova and Parasite screen [[id number]] Collected: 07/16/18 1102     Order Status: Completed Lab Status: Final result Updated: 07/16/18 1240     Specimen: Feces       Specimen Description Feces      Ova and Parasite CRYPTOSPORIDIUM AG NOT DETECTED       GIARDIA AG NOT DETECTED     Stool culture SSCE [[id number]] Collected: 07/16/18 0430     Order Status: Completed Lab Status: Preliminary result Updated: 07/17/18 1406     Specimen: Feces       Specimen Description Feces      Shiga-Toxins 1&2 Shiga  toxin 1 NOT detected and Shiga toxin 2 NOT detected.                   Test results are to be used in conjunction with information available from the patient   clinical evaluation and other diagnostic procedures.         Culture Micro Culture in progress     Ova and Parasite Screen [[id number]] Collected: 07/16/18 0430     Order Status: Completed Lab Status: Final result Updated: 07/16/18 1240     Specimen: Feces       Specimen Description Feces      Ova and Parasite CRYPTOSPORIDIUM AG NOT DETECTED       GIARDIA AG NOT DETECTED     Clostridium difficile toxin B PCR [[id number]] (Abnormal) Collected: 07/15/18 0430     Order Status: Completed Lab Status: Final result Updated: 07/16/18 0553     Specimen: Feces       Specimen Description Feces      C Diff Toxin B PCR Positive (A)       Positive: Toxin producing Clostridium difficile target DNA sequences detected,    presumed positive for Clostridium difficile toxin B.   Clostridium difficile (Requires Enteric Isolation)   FDA approved assay performed using [employer] GeneXpert real-time PCR.   Critical Value called to and read back by   JAVIER GIL AT 0551 ON 7   Critical Value called to and read back by   JAVIER GIL AT 0551 ON 7/16/18 BY LUIS CARLOS.              Recommendations/Interventions:  1. No recommendations at this time. Stop date of 7/25/18 brings duration of treatment to 10 days, which is recommended in nonsevere c. Diff infection.    Jeramie Lundy, DULCE MARIAE Student  July 18, 2018

## 2018-07-19 LAB
BACTERIA SPEC CULT: NORMAL
E COLI SXT1+2 STL IA: NORMAL
SPECIMEN SOURCE: NORMAL

## 2018-07-29 ENCOUNTER — HOSPITAL ENCOUNTER (EMERGENCY)
Facility: HOSPITAL | Age: 28
Discharge: HOME | End: 2018-07-29
Attending: FAMILY MEDICINE | Admitting: FAMILY MEDICINE
Payer: COMMERCIAL

## 2018-07-29 VITALS
DIASTOLIC BLOOD PRESSURE: 71 MMHG | RESPIRATION RATE: 16 BRPM | SYSTOLIC BLOOD PRESSURE: 103 MMHG | HEART RATE: 67 BPM | TEMPERATURE: 98.6 F | OXYGEN SATURATION: 98 %

## 2018-07-29 DIAGNOSIS — A04.72 C. DIFFICILE COLITIS: ICD-10-CM

## 2018-07-29 DIAGNOSIS — R63.0 ANOREXIA: ICD-10-CM

## 2018-07-29 LAB
ALBUMIN SERPL-MCNC: 2.3 G/DL (ref 3.4–5)
ALP SERPL-CCNC: 101 U/L (ref 40–150)
ALT SERPL W P-5'-P-CCNC: 19 U/L (ref 0–50)
ANION GAP SERPL CALCULATED.3IONS-SCNC: 8 MMOL/L (ref 3–14)
AST SERPL W P-5'-P-CCNC: 35 U/L (ref 0–45)
BASOPHILS # BLD AUTO: 0 10E9/L (ref 0–0.2)
BASOPHILS NFR BLD AUTO: 0 %
BILIRUB SERPL-MCNC: 0.3 MG/DL (ref 0.2–1.3)
BUN SERPL-MCNC: 5 MG/DL (ref 7–30)
CALCIUM SERPL-MCNC: 7.7 MG/DL (ref 8.5–10.1)
CHLORIDE SERPL-SCNC: 109 MMOL/L (ref 94–109)
CO2 SERPL-SCNC: 27 MMOL/L (ref 20–32)
CREAT SERPL-MCNC: 0.42 MG/DL (ref 0.52–1.04)
DIFFERENTIAL METHOD BLD: ABNORMAL
EOSINOPHIL # BLD AUTO: 0.5 10E9/L (ref 0–0.7)
EOSINOPHIL NFR BLD AUTO: 6.7 %
ERYTHROCYTE [DISTWIDTH] IN BLOOD BY AUTOMATED COUNT: 17.7 % (ref 10–15)
GFR SERPL CREATININE-BSD FRML MDRD: >90 ML/MIN/1.7M2
GLUCOSE SERPL-MCNC: 85 MG/DL (ref 70–99)
HCT VFR BLD AUTO: 37.1 % (ref 35–47)
HGB BLD-MCNC: 12.4 G/DL (ref 11.7–15.7)
IMM GRANULOCYTES # BLD: 0 10E9/L (ref 0–0.4)
IMM GRANULOCYTES NFR BLD: 0.3 %
LACTATE SERPL-SCNC: 1.3 MMOL/L (ref 0.4–2)
LIPASE SERPL-CCNC: 388 U/L (ref 73–393)
LYMPHOCYTES # BLD AUTO: 1.6 10E9/L (ref 0.8–5.3)
LYMPHOCYTES NFR BLD AUTO: 21 %
MCH RBC QN AUTO: 29.9 PG (ref 26.5–33)
MCHC RBC AUTO-ENTMCNC: 33.4 G/DL (ref 31.5–36.5)
MCV RBC AUTO: 89 FL (ref 78–100)
MONOCYTES # BLD AUTO: 0.5 10E9/L (ref 0–1.3)
MONOCYTES NFR BLD AUTO: 6.5 %
NEUTROPHILS # BLD AUTO: 5.1 10E9/L (ref 1.6–8.3)
NEUTROPHILS NFR BLD AUTO: 65.5 %
NRBC # BLD AUTO: 0 10*3/UL
NRBC BLD AUTO-RTO: 0 /100
PLATELET # BLD AUTO: 394 10E9/L (ref 150–450)
POTASSIUM SERPL-SCNC: 3.3 MMOL/L (ref 3.4–5.3)
PROT SERPL-MCNC: 5 G/DL (ref 6.8–8.8)
RBC # BLD AUTO: 4.15 10E12/L (ref 3.8–5.2)
SODIUM SERPL-SCNC: 144 MMOL/L (ref 133–144)
WBC # BLD AUTO: 7.7 10E9/L (ref 4–11)

## 2018-07-29 PROCEDURE — 93005 ELECTROCARDIOGRAM TRACING: CPT

## 2018-07-29 PROCEDURE — 85025 COMPLETE CBC W/AUTO DIFF WBC: CPT | Performed by: FAMILY MEDICINE

## 2018-07-29 PROCEDURE — 99284 EMERGENCY DEPT VISIT MOD MDM: CPT | Mod: Z6 | Performed by: FAMILY MEDICINE

## 2018-07-29 PROCEDURE — 83605 ASSAY OF LACTIC ACID: CPT | Performed by: FAMILY MEDICINE

## 2018-07-29 PROCEDURE — 36415 COLL VENOUS BLD VENIPUNCTURE: CPT | Performed by: FAMILY MEDICINE

## 2018-07-29 PROCEDURE — 80053 COMPREHEN METABOLIC PANEL: CPT | Performed by: FAMILY MEDICINE

## 2018-07-29 PROCEDURE — 99284 EMERGENCY DEPT VISIT MOD MDM: CPT

## 2018-07-29 PROCEDURE — 83690 ASSAY OF LIPASE: CPT | Performed by: FAMILY MEDICINE

## 2018-07-29 PROCEDURE — 93010 ELECTROCARDIOGRAM REPORT: CPT | Performed by: INTERNAL MEDICINE

## 2018-07-29 RX ORDER — SODIUM CHLORIDE 9 MG/ML
1000 INJECTION, SOLUTION INTRAVENOUS CONTINUOUS
Status: DISCONTINUED | OUTPATIENT
Start: 2018-07-29 | End: 2018-07-29

## 2018-07-29 RX ORDER — ONDANSETRON 2 MG/ML
8 INJECTION INTRAMUSCULAR; INTRAVENOUS ONCE
Status: DISCONTINUED | OUTPATIENT
Start: 2018-07-29 | End: 2018-07-29

## 2018-07-29 NOTE — ED AVS SNAPSHOT
[facility]    [address] [address]    Phone:  [phone]                                       Maggie Case   MRN: [id number]    Department:  [facility]   Date of Visit:  7/29/2018           After Visit Summary Signature Page     I have received my discharge instructions, and my questions have been answered. I have discussed any challenges I see with this plan with the nurse or doctor.    ..........................................................................................................................................  Patient/Patient Representative Signature      ..........................................................................................................................................  Patient Representative Print Name and Relationship to Patient    ..................................................               ................................................  Date                                            Time    ..........................................................................................................................................  Reviewed by Signature/Title    ...................................................              ..............................................  Date                                                            Time

## 2018-07-29 NOTE — ED NOTES
The patient was assisted with transfer to the wheelchair for transport by [employer] to home. Balance steady with stand by assistance.

## 2018-07-29 NOTE — ED AVS SNAPSHOT
[facility]    [address] [place] [address]    Phone:  [phone]                                       Maggie Case   MRN: [id number]    Department:  [facility]   Date of Visit:  7/29/2018           Patient Information     Date Of Birth          1990        Your diagnoses for this visit were:     Anorexia     C. difficile colitis        You were seen by Estephania Shanks MD.      Follow-up Information     Follow up with Chapo Flores MD. Call in 1 day.    Specialty:  Family Practice    Contact information:    [facility]  [address] [place] [address]  [phone]          Discharge Instructions           What Is C. diff?  C. diff is an infection caused by Clostridium difficile (C. diff) bacteria. These are germs that live in the part of your belly called your colon, or large intestine. They don't usually cause problems, but if the normal balance of good and bad bacteria in your colon changes, C. diff bacteria can grow out of control and lead to infection. This can harm your colon and cause diarrhea and belly pain.  What are the symptoms of C. diff?  Some people with C. diff have no symptoms, but they can still pass the infection to others. Symptoms can include:    Watery diarrhea    Fever    Belly pain and cramping    Nausea and vomiting    Loss of appetite and weight loss   Who is most likely to get C. diff?  Anyone can get C. diff. But you are more likely to get the infection if you:    Are ages 65 and older    Are taking antibiotics    Have a weak immune system because of other health problems    Have inflammatory bowel disease    Have had C. diff before    Have had gastrointestinal (GI) surgery    Work or are a patient in a hospital, clinic, or nursing home  After treatment, C. diff can come back in about 1 in 4 people. If C. diff does come back, you are at higher risk for infection again in the future.   How can I lower my chance of  getting C. diff again?  Take antibiotics only when you really need them. Antibiotics don't help treat illnesses caused by viruses, such as colds and the flu. Don't ask for antibiotics from your doctor if he or she says they won't work.  When you are given antibiotics, take them exactly as your doctor tells you to. Don't take more or less than the amount prescribed (the dosage). Also don't take them for a shorter or longer time than your doctor tells you to, even if you feel better.  How can I stop the spread of C. diff?  C. diff can easily spread to other people in your home or workplace. The germs can remain on your hands after using the bathroom, then spread to any person, surface, or object you touch. Here's how to not spread C. diff to other people:    Practice good handwashing. This is especially important after using the bathroom and before eating. Here's what to do: Wet your hands, scrub them with soap for 30 to 40 seconds, then rinse well and dry.    Wash your clothes, bed sheets, and towels in separate loads. Use hot water. Use both detergent and chlorine bleach.     Use chlorine bleach-based products to disinfect surfaces you touch often, such as table tops, light switches, door knobs, and toilet seats.    Remind others to wear gloves and to wash their hands if assisting you in the bathroom.  Don't use alcohol-based hand . They don't work against C. diff.   Date Last Reviewed: 8/1/2017 2000-2017 [employer]. [address] [place] [address], [place] [address]. All rights reserved. This information is not intended as a substitute for professional medical care. Always follow your healthcare professional's instructions.      Maggie,    Your potassium is normal today.  Continue with your antibiotics for c dif       Review of your medicines      Our records show that you are taking the medicines listed below. If these are incorrect, please call your family doctor or clinic.        Dose /  Directions Last dose taken    ABILIFY 5 MG tablet   Dose:  5 mg   Generic drug:  ARIPiprazole        Take 5 mg by mouth At Bedtime   Refills:  0        bismuth subsalicylate 262 MG/15ML suspension   Commonly known as:  PEPTO BISMOL   Dose:  15 mL        Take 15 mLs by mouth every 6 hours as needed for indigestion   Refills:  0        cyanocobalamin 1000 MCG tablet   Commonly known as:  vitamin  B-12   Dose:  1000 mcg        Take 1,000 mcg by mouth daily   Refills:  0        Dextromethorphan-guaiFENesin  MG/5ML syrup   Dose:  5 mL        Take 5 mLs by mouth every 4 hours as needed for cough   Refills:  0        DULOXETINE HCL PO   Dose:  60 mg        Take 60 mg by mouth daily   Refills:  0        ferrous sulfate 325 (65 Fe) MG tablet   Commonly known as:  IRON        Take by mouth daily (with breakfast)   Refills:  0        * GABAPENTIN PO   Dose:  600 mg        Take 600 mg by mouth At Bedtime   Refills:  0        * GABAPENTIN PO   Dose:  300 mg        Take 300 mg by mouth 2 times daily 300mg AM and Noon   Refills:  0        hydrOXYzine 25 MG tablet   Commonly known as:  ATARAX   Dose:  25 mg        Take 25 mg by mouth every 6 hours as needed   Refills:  11        loperamide 2 MG capsule   Commonly known as:  IMODIUM   Dose:  2 mg        Take 2 mg by mouth 4 times daily as needed for diarrhea   Refills:  0        nicotine 7 MG/24HR 24 hr patch   Commonly known as:  NICODERM CQ   Dose:  1 patch        Place 1 patch onto the skin every 24 hours   Refills:  0        oxyCODONE IR 10 MG tablet   Commonly known as:  ROXICODONE   Dose:  10 mg   Quantity:  10 tablet        Take 1 tablet (10 mg) by mouth every 3 hours as needed   Refills:  0        tamsulosin 0.4 MG capsule   Commonly known as:  FLOMAX   Dose:  0.4 mg   Quantity:  7 capsule        Take 1 capsule (0.4 mg) by mouth daily   Refills:  0        TRAZODONE HCL PO   Dose:  100 mg        Take 100 mg by mouth At Bedtime   Refills:  0        TYLENOL PO   Dose:   "650 mg        Take 650 mg by mouth every 4 hours as needed for mild pain or fever   Refills:  0        XARELTO 20 MG Tabs tablet   Dose:  20 mg   Generic drug:  rivaroxaban ANTICOAGULANT        Take 20 mg by mouth every morning   Refills:  11        ZOFRAN PO   Dose:  4 mg        Take 4 mg by mouth every 8 hours as needed for nausea or vomiting   Refills:  0        * Notice:  This list has 2 medication(s) that are the same as other medications prescribed for you. Read the directions carefully, and ask your doctor or other care provider to review them with you.            Procedures and tests performed during your visit     CBC with platelets differential    Comprehensive metabolic panel    Lactic acid    Lipase      Orders Needing Specimen Collection     None      Pending Results     No orders found from 2018 to 2018.            Pending Culture Results     No orders found from 2018 to 2018.            Thank you for choosing [facility]       Thank you for choosing [facility] for your care. Our goal is always to provide you with excellent care. Hearing back from our patients is one way we can continue to improve our services. Please take a few minutes to complete the written survey that you may receive in the mail after you visit with us. Thank you!        [facility] Information     [employer] lets you send messages to your doctor, view your test results, renew your prescriptions, schedule appointments and more. To sign up, go to [email or web address] . Click on \"Log in\" on the left side of the screen, which will take you to the Welcome page. Then click on \"Sign up Now\" on the right side of the page.     You will be asked to enter the access code listed below, as well as some personal information. Please follow the directions to create your username and password.     Your access code is: [email or web address]  Expires: 2018 10:33 PM     Your access code will  in 90 days. If you need help or a new code, " please call your [place] clinic or [phone].        Care EveryWhere ID     This is your Care EveryWhere ID. This could be used by other organizations to access your [place] medical records  [id number]        Equal Access to Services     MYLES FLAHERTY : Griselda Mcarthur, walissyda luqadaha, qaybta kaalmada kaycee, justin astorga. So [facility] [phone].    ATENCIÓN: Si habla español, tiene a montalvo disposición servicios gratuitos de asistencia lingüística. Llame al [phone].    We comply with applicable federal civil rights laws and [place] laws. We do not discriminate on the basis of race, color, national origin, age, disability, sex, sexual orientation, or gender identity.            After Visit Summary       This is your record. Keep this with you and show to your community pharmacist(s) and doctor(s) at your next visit.

## 2018-07-29 NOTE — DISCHARGE INSTRUCTIONS
What Is C. diff?  C. diff is an infection caused by Clostridium difficile (C. diff) bacteria. These are germs that live in the part of your belly called your colon, or large intestine. They don't usually cause problems, but if the normal balance of good and bad bacteria in your colon changes, C. diff bacteria can grow out of control and lead to infection. This can harm your colon and cause diarrhea and belly pain.  What are the symptoms of C. diff?  Some people with C. diff have no symptoms, but they can still pass the infection to others. Symptoms can include:    Watery diarrhea    Fever    Belly pain and cramping    Nausea and vomiting    Loss of appetite and weight loss   Who is most likely to get C. diff?  Anyone can get C. diff. But you are more likely to get the infection if you:    Are ages 65 and older    Are taking antibiotics    Have a weak immune system because of other health problems    Have inflammatory bowel disease    Have had C. diff before    Have had gastrointestinal (GI) surgery    Work or are a patient in a hospital, clinic, or nursing home  After treatment, C. diff can come back in about 1 in 4 people. If C. diff does come back, you are at higher risk for infection again in the future.   How can I lower my chance of getting C. diff again?  Take antibiotics only when you really need them. Antibiotics don't help treat illnesses caused by viruses, such as colds and the flu. Don't ask for antibiotics from your doctor if he or she says they won't work.  When you are given antibiotics, take them exactly as your doctor tells you to. Don't take more or less than the amount prescribed (the dosage). Also don't take them for a shorter or longer time than your doctor tells you to, even if you feel better.  How can I stop the spread of C. diff?  C. diff can easily spread to other people in your home or workplace. The germs can remain on your hands after using the bathroom, then spread to any person,  surface, or object you touch. Here's how to not spread C. diff to other people:    Practice good handwashing. This is especially important after using the bathroom and before eating. Here's what to do: Wet your hands, scrub them with soap for 30 to 40 seconds, then rinse well and dry.    Wash your clothes, bed sheets, and towels in separate loads. Use hot water. Use both detergent and chlorine bleach.     Use chlorine bleach-based products to disinfect surfaces you touch often, such as table tops, light switches, door knobs, and toilet seats.    Remind others to wear gloves and to wash their hands if assisting you in the bathroom.  Don't use alcohol-based hand . They don't work against C. diff.   Date Last Reviewed: 8/1/2017 2000-2017 [employer]. [address] [place] [address], [place] [address]. All rights reserved. This information is not intended as a substitute for professional medical care. Always follow your healthcare professional's instructions.      Maggie,    Your potassium is normal today.  Continue with your antibiotics for c dif

## 2018-07-29 NOTE — ED NOTES
The patient is a difficult IV start x 4. Nursing supervisor notified for IV start. The patient requested the lab staff draw her lab specimens.

## 2018-07-29 NOTE — ED NOTES
The nursing supervisor attempted IV start x 1, unsuccessful access. The patient is now refusing the IV and requesting to go home. Dr. Shanks notified.

## 2018-07-29 NOTE — ED NOTES
The patient was assisted to the bathroom to void and assisted with obtaining urine specimen and florentino care and then back to the stretcher. Able to stand and transfer herself and use the right arm.

## 2018-07-29 NOTE — ED NOTES
The patient is ready for discharge back to [facility]. [employer] being contacted for transportation.

## 2018-07-30 ENCOUNTER — MEDICAL CORRESPONDENCE (OUTPATIENT)
Dept: HEALTH INFORMATION MANAGEMENT | Facility: CLINIC | Age: 28
End: 2018-07-30

## 2018-07-30 ENCOUNTER — HOSPITAL ENCOUNTER (EMERGENCY)
Facility: HOSPITAL | Age: 28
Discharge: HOME | End: 2018-07-30
Attending: PHYSICIAN ASSISTANT | Admitting: PHYSICIAN ASSISTANT
Payer: COMMERCIAL

## 2018-07-30 VITALS
RESPIRATION RATE: 20 BRPM | DIASTOLIC BLOOD PRESSURE: 83 MMHG | SYSTOLIC BLOOD PRESSURE: 109 MMHG | BODY MASS INDEX: 23.92 KG/M2 | OXYGEN SATURATION: 98 % | TEMPERATURE: 98.2 F | HEIGHT: 63 IN | WEIGHT: 135 LBS

## 2018-07-30 DIAGNOSIS — B37.0 THRUSH, ORAL: ICD-10-CM

## 2018-07-30 DIAGNOSIS — M54.2 CERVICALGIA: ICD-10-CM

## 2018-07-30 PROCEDURE — 96375 TX/PRO/DX INJ NEW DRUG ADDON: CPT

## 2018-07-30 PROCEDURE — 96361 HYDRATE IV INFUSION ADD-ON: CPT

## 2018-07-30 PROCEDURE — 96374 THER/PROPH/DIAG INJ IV PUSH: CPT

## 2018-07-30 PROCEDURE — 99284 EMERGENCY DEPT VISIT MOD MDM: CPT | Performed by: PHYSICIAN ASSISTANT

## 2018-07-30 PROCEDURE — 25000132 ZZH RX MED GY IP 250 OP 250 PS 637: Performed by: PHYSICIAN ASSISTANT

## 2018-07-30 PROCEDURE — 99284 EMERGENCY DEPT VISIT MOD MDM: CPT | Mod: 25

## 2018-07-30 PROCEDURE — 25000128 H RX IP 250 OP 636: Performed by: PHYSICIAN ASSISTANT

## 2018-07-30 RX ORDER — ONDANSETRON 2 MG/ML
4 INJECTION INTRAMUSCULAR; INTRAVENOUS ONCE
Status: COMPLETED | OUTPATIENT
Start: 2018-07-30 | End: 2018-07-30

## 2018-07-30 RX ORDER — PROMETHAZINE HYDROCHLORIDE 25 MG/ML
25 INJECTION, SOLUTION INTRAMUSCULAR; INTRAVENOUS ONCE
Status: COMPLETED | OUTPATIENT
Start: 2018-07-30 | End: 2018-07-30

## 2018-07-30 RX ORDER — DEXAMETHASONE SODIUM PHOSPHATE 10 MG/ML
10 INJECTION, SOLUTION INTRAMUSCULAR; INTRAVENOUS ONCE
Status: COMPLETED | OUTPATIENT
Start: 2018-07-30 | End: 2018-07-30

## 2018-07-30 RX ORDER — ACETAMINOPHEN 325 MG/1
975 TABLET ORAL ONCE
Status: COMPLETED | OUTPATIENT
Start: 2018-07-30 | End: 2018-07-30

## 2018-07-30 RX ORDER — NYSTATIN 100000/ML
500000 SUSPENSION, ORAL (FINAL DOSE FORM) ORAL 4 TIMES DAILY
Qty: 280 ML | Refills: 0 | Status: SHIPPED | OUTPATIENT
Start: 2018-07-30 | End: 2018-07-30

## 2018-07-30 RX ORDER — NYSTATIN 100000/ML
500000 SUSPENSION, ORAL (FINAL DOSE FORM) ORAL 4 TIMES DAILY
Qty: 280 ML | Refills: 0 | Status: SHIPPED | OUTPATIENT
Start: 2018-07-30 | End: 2020-03-05

## 2018-07-30 RX ORDER — DIPHENHYDRAMINE HYDROCHLORIDE 50 MG/ML
25 INJECTION INTRAMUSCULAR; INTRAVENOUS ONCE
Status: COMPLETED | OUTPATIENT
Start: 2018-07-30 | End: 2018-07-30

## 2018-07-30 RX ADMIN — DEXAMETHASONE SODIUM PHOSPHATE 10 MG: 10 INJECTION, SOLUTION INTRAMUSCULAR; INTRAVENOUS at 19:25

## 2018-07-30 RX ADMIN — ACETAMINOPHEN 975 MG: 325 TABLET, FILM COATED ORAL at 18:02

## 2018-07-30 RX ADMIN — ONDANSETRON 4 MG: 2 INJECTION, SOLUTION INTRAMUSCULAR; INTRAVENOUS at 21:20

## 2018-07-30 RX ADMIN — DIPHENHYDRAMINE HYDROCHLORIDE 25 MG: 50 INJECTION, SOLUTION INTRAMUSCULAR; INTRAVENOUS at 20:31

## 2018-07-30 RX ADMIN — PROMETHAZINE HYDROCHLORIDE 25 MG: 25 INJECTION INTRAMUSCULAR; INTRAVENOUS at 20:33

## 2018-07-30 RX ADMIN — SODIUM CHLORIDE 1000 ML: 9 INJECTION, SOLUTION INTRAVENOUS at 18:02

## 2018-07-30 ASSESSMENT — ENCOUNTER SYMPTOMS
CARDIOVASCULAR NEGATIVE: 1
NECK STIFFNESS: 1
NAUSEA: 1
CONSTITUTIONAL NEGATIVE: 1
RESPIRATORY NEGATIVE: 1
HEADACHES: 1

## 2018-07-30 NOTE — ED PROVIDER NOTES
History     Chief Complaint   Patient presents with     Headache     Neck Pain     HPI  Maggie Case is a 28 year old female well known to this facility who returns with unchanged HA and neck pain she was evaluated for yesterday. She believes she is dehydrated as she has not had appetite today. She has Hx significant for ischemic stroke and most recently discharged from hospital on 7/18 for hypokalemia and c dif. She reports no improvement after her visit yesterday. Labs were stable 24 hrs ago, potassium was 3.3.    Problem List:    Patient Active Problem List    Diagnosis Date Noted     Chemical dependency (H) 07/16/2018     Priority: Medium     Calculus of lower urinary tract 07/15/2018     Priority: Medium     History of CVA (cerebrovascular accident) 07/15/2018     Priority: Medium     Left hemiparesis (H) 07/15/2018     Priority: Medium     Chronic anticoagulation 07/15/2018     Priority: Medium     History of cranioplasty 05/24/2018     Priority: Medium     Acute ischemic stroke (H) 02/17/2018     Priority: Medium     Influenza B 05/01/2017     Priority: Medium     Opacity of lung on imaging study 05/01/2017     Priority: Medium     Community acquired pneumonia 05/01/2017     Priority: Medium     C. difficile colitis 05/01/2017     Priority: Medium     Sepsis (H) 04/28/2017     Priority: Medium     Pyelonephritis 01/05/2017     Priority: Medium     Acute chest pain 05/06/2016     Priority: Medium     Leukocytosis 05/06/2016     Priority: Medium     Lung mass 05/06/2016     Priority: Medium     SOB (shortness of breath) 05/06/2016     Priority: Medium     Acute upper GI bleed 06/18/2013     Priority: Medium     Hypokalemia 06/18/2013     Priority: Medium     Acute cystitis 06/18/2013     Priority: Medium     Anxiety state 03/25/2013     Priority: Medium     Muscle pain 07/30/2009     Priority: Medium     Overview:   IMO Update 10/11       Cervicalgia 06/16/2009     Priority: Medium     Overview:   IMO  Update 10/11       Low back pain 06/16/2009     Priority: Medium     Overview:   IMO Update 10/11       Pain in joint, lower leg 05/01/2009     Priority: Medium     Diarrhea 04/17/2008     Priority: Medium     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     Priority: Medium        Past Medical History:    Past Medical History:   Diagnosis Date     Anemia      Anxiety      Chemical dependency (H)      Chronic diarrhea      Lactose intolerance      Myalgia      OCD (obsessive compulsive disorder)      Pulmonary embolism (H) 05/06/16     Stroke (H) 02/2018     Vitamin B12 deficiency        Past Surgical History:    Past Surgical History:   Procedure Laterality Date     CLOSED REDUCTION, PERCUTANEOUS PINNING LOWER EXTREMITY, COMBINED Right 4/6/2016    Procedure: COMBINED CLOSED REDUCTION, PERCUTANEOUS PINNING LOWER EXTREMITY;  Surgeon: Dexter Jones MD;  [facility]     COLONOSCOPY       CRANIECTOMY Right 2/18/2018    Procedure: CRANIECTOMY;  RIGHT HEMICRANIECTOMY;  Surgeon: Emeterio Mesa MD;  [facility]     CRANIOPLASTY Right 5/24/2018    Procedure: CRANIOPLASTY;  Right Cranioplasty ;  Surgeon: Emeterio Mesa MD;  [facility]     UPPER GI ENDOSCOPY         Family History:    Family History   Problem Relation Age of Onset     Depression Mother      Migraines Maternal Half-Sister        Social History:  Marital Status:  Single [1]  Social History   Substance Use Topics     Smoking status: Former Smoker     Packs/day: 0.25     Years: 7.00     Types: Cigarettes     Smokeless tobacco: Never Used      Comment: quit 7/12/18     Alcohol use 0.0 oz/week     0 Standard drinks or equivalent per week      Comment: last drink 3 days ago        Medications:      Acetaminophen (TYLENOL PO)   cyanocobalamin (VITAMIN  B-12) 1000 MCG tablet   DULOXETINE HCL PO   ferrous sulfate (IRON) 325 (65 Fe) MG tablet   GABAPENTIN PO   GABAPENTIN PO   nystatin (MYCOSTATIN) [id number]  "UNIT/ML suspension   TRAZODONE HCL PO   VANCOMYCIN HCL PO   Varenicline Tartrate (CHANTIX PO)   XARELTO 20 MG TABS tablet   Acetaminophen (TYLENOL PO)   Dextromethorphan-guaiFENesin  MG/5ML syrup   loperamide (IMODIUM) 2 MG capsule   nicotine (NICODERM CQ) 7 MG/24HR 24 hr patch   Ondansetron HCl (ZOFRAN PO)         Review of Systems   Constitutional: Negative.    Respiratory: Negative.    Cardiovascular: Negative.    Gastrointestinal: Positive for nausea (baseline).   Musculoskeletal: Positive for neck stiffness.   Neurological: Positive for headaches.       Physical Exam   BP: 123/84  Heart Rate: 65  Temp: 99.1  F (37.3  C)  Resp: 16  Height: 160 cm (5' 3\")  Weight: 61.2 kg (135 lb)  SpO2: 100 %      Physical Exam   Constitutional: She is oriented to person, place, and time. She appears well-developed and well-nourished. No distress.   HENT:   White plaque tongue   Eyes: Conjunctivae and EOM are normal. Pupils are equal, round, and reactive to light.   Cardiovascular: Normal rate and normal heart sounds.    Pulmonary/Chest: Effort normal and breath sounds normal.   Abdominal: Soft. Bowel sounds are normal.   Neurological: She is alert and oriented to person, place, and time.   Skin: Skin is warm and dry.   Psychiatric: She has a normal mood and affect.   Nursing note and vitals reviewed.      ED Course     ED Course     Procedures    No results found for this or any previous visit (from the past 24 hour(s)).    Medications   0.9% sodium chloride BOLUS (0 mLs Intravenous Stopped 7/30/18 2101)   acetaminophen (TYLENOL) tablet 975 mg (975 mg Oral Given 7/30/18 1802)   dexamethasone PF (DECADRON) injection 10 mg (10 mg Intravenous Given 7/30/18 1925)   diphenhydrAMINE (BENADRYL) injection 25 mg (25 mg Intravenous Given 7/30/18 2031)   promethazine (PHENERGAN) IV injection 25 mg (25 mg Intravenous Given 7/30/18 2033)   ondansetron (ZOFRAN) injection 4 mg (4 mg Intravenous Given 7/30/18 2120)       Assessments & " Plan (with Medical Decision Making)     I have reviewed the nursing notes.  I have reviewed the findings, diagnosis, plan and need for follow up with the patient.     New Prescriptions    NYSTATIN (MYCOSTATIN) [id number] UNIT/ML SUSPENSION    Take 5 mLs (500,000 Units) by mouth 4 times daily       Final diagnoses:   Cervicalgia   Thrush, oral   I was able to speak with Dr Shanks who evaluated Maggie yesterday and pain has not changed since discharge, so no further labs are ordered. Maggie did receive tylenol, decadron IVF, benadryl and phenergan for chronic HA/nausea with moderate improvement. In fact, when I went to recheck prior to discharge, Maggie was sleeping. She does have thrush and will start treatment above. I advised f/u with PCP as soon as practical, returning here with fever, worsening, concerns.     7/30/2018   [facility]     Venkat Aguilar PA  07/30/18 6251

## 2018-07-30 NOTE — ED AVS SNAPSHOT
[facility]    [address] [place] [address]    Phone:  [phone]                                       Maggie Case   MRN: [id number]    Department:  [facility]   Date of Visit:  7/30/2018           Patient Information     Date Of Birth          1990        Your diagnoses for this visit were:     Cervicalgia     Thrush, oral        You were seen by Venkat Aguilar PA.      Follow-up Information     Follow up with Chapo Flores MD.    Specialty:  Family Practice    Why:  Th/Friday    Contact information:    [facility]  [address]  [place] [id number] [phone]          Follow up with [facility].    Specialty:  EMERGENCY MEDICINE    Why:  If symptoms worsen    Contact information:    [address] [place] [address] [phone]    Additional information:    From [facility]: Take [address]. Turn left at [address]. Turn left at the first stoplight on [address]. At the first stop sign, take a right onto [address]. Take a left into the parking lot and continue through until you reach the North enterance of the building.       From [place]: Take [address]. Take the [address] ramp towards [place]. Turn left onto [address]. Take a slight right onto [address]. Turn left at the first stoplight on [address]. At the first stop sign, take a right onto [address]. Take a left into the parking lot and continue through until you reach the North enterance of the building.       From [place]: Take [address]. Take a right at [address]. At the first stop sign, take a right onto [address]. Take a left into the parking lot and continue through until you reach the North enterance of the building.         Discharge Instructions       - Heat and topicals for neck pain.   - Topicals: Arnica Cream (5$ at [employer]) and/or Capsaicin. (1/4  teaspoon cayenne pepper in a palmful olive oil and rub in 2-3 times daily for 5-7 days for pain)  - Rotate ibuprofen and tylenol every 3-6 hrs for 2-3 days.   - Start the nystatin oral for sore throat/thrush.   * Follow up with Dr Flores Thursday/Friday, returning here with concern for worsening despite treatment.        Discharge References/Attachments     CANDIDA INFECTION: THRUSH (ENGLISH)    CERVICAL SPINE PROBLEMS, NONSURGICAL TREATMENT OF (ENGLISH)         Review of your medicines      START taking        Dose / Directions Last dose taken    nystatin [id number] UNIT/ML suspension   Commonly known as:  MYCOSTATIN   Dose:  014255 Units   Quantity:  280 mL        Take 5 mLs (500,000 Units) by mouth 4 times daily   Refills:  0          Our records show that you are taking the medicines listed below. If these are incorrect, please call your family doctor or clinic.        Dose / Directions Last dose taken    CHANTIX PO   Dose:  1 mg        Take 1 mg by mouth   Refills:  0        cyanocobalamin 1000 MCG tablet   Commonly known as:  vitamin  B-12   Dose:  1000 mcg        Take 1,000 mcg by mouth daily   Refills:  0        Dextromethorphan-guaiFENesin  MG/5ML syrup   Dose:  5 mL        Take 5 mLs by mouth every 4 hours as needed for cough   Refills:  0        DULOXETINE HCL PO   Dose:  60 mg        Take 60 mg by mouth daily   Refills:  0        ferrous sulfate 325 (65 Fe) MG tablet   Commonly known as:  IRON        Take by mouth daily (with breakfast)   Refills:  0        * GABAPENTIN PO   Dose:  600 mg        Take 600 mg by mouth At Bedtime   Refills:  0        * GABAPENTIN PO   Dose:  300 mg        Take 300 mg by mouth 2 times daily 300mg AM and Noon   Refills:  0        loperamide 2 MG capsule   Commonly known as:  IMODIUM   Dose:  2 mg        Take 2 mg by mouth 4 times daily as needed for diarrhea   Refills:  0        nicotine 7 MG/24HR 24 hr patch   Commonly known as:  NICODERM CQ   Dose:  1 patch        Place  1 patch onto the skin every 24 hours   Refills:  0        TRAZODONE HCL PO   Dose:  100 mg        Take 100 mg by mouth At Bedtime   Refills:  0        * TYLENOL PO   Dose:  650 mg        Take 650 mg by mouth every 4 hours as needed for mild pain or fever   Refills:  0        * TYLENOL PO   Dose:  650 mg        Take 650 mg by mouth 4 times daily   Refills:  0        VANCOMYCIN HCL PO   Dose:  125 mg        Take 125 mg by mouth 2 times daily   Refills:  0        XARELTO 20 MG Tabs tablet   Dose:  20 mg   Generic drug:  rivaroxaban ANTICOAGULANT        Take 20 mg by mouth every morning   Refills:  11        ZOFRAN PO   Dose:  4 mg        Take 4 mg by mouth every 8 hours as needed for nausea or vomiting   Refills:  0        * Notice:  This list has 4 medication(s) that are the same as other medications prescribed for you. Read the directions carefully, and ask your doctor or other care provider to review them with you.            Prescriptions were sent or printed at these locations (1 Prescription)                   [id number] - [place] - [address]   [address] [place] [address]    Telephone:  [phone]   Fax:  [phone]   Hours:                  E-Prescribed (1 of 1)         nystatin (MYCOSTATIN) [id number] UNIT/ML suspension                Orders Needing Specimen Collection     None      Pending Results     Date and Time Order Name Status Description    7/29/2018 1438 EKG CARDIAC - HIM SCAN Preliminary             Pending Culture Results     No orders found from 7/28/2018 to 7/31/2018.            Thank you for choosing [facility]       Thank you for choosing [facility] for your care. Our goal is always to provide you with excellent care. Hearing back from our patients is one way we can continue to improve our services. Please take a few minutes to complete the written survey that you may receive in the mail after you visit with us. Thank you!        MyChart Information     [facility] lets  "you send messages to your doctor, view your test results, renew your prescriptions, schedule appointments and more. To sign up, go to [email or web address] . Click on \"Log in\" on the left side of the screen, which will take you to the Welcome page. Then click on \"Sign up Now\" on the right side of the page.     You will be asked to enter the access code listed below, as well as some personal information. Please follow the directions to create your username and password.     Your access code is: [email or web address]  Expires: 2018 10:33 PM     Your access code will  in 90 days. If you need help or a new code, please call your [facility] clinic or [phone].        Care EveryWhere ID     This is your Care EveryWhere ID. This could be used by other organizations to access your [facility] medical records  [id number]        Equal Access to Services     MYLES FLAHERTY : Griselda Mcarthur, wamusa brown, qachristina kaalmanadir benoit, justin astorga. So [facility] [phone].    ATENCIÓN: Si habla español, tiene a montalvo disposición servicios gratuitos de asistencia lingüística. Llame al [phone].    We comply with applicable federal civil rights laws and [place] laws. We do not discriminate on the basis of race, color, national origin, age, disability, sex, sexual orientation, or gender identity.            After Visit Summary       This is your record. Keep this with you and show to your community pharmacist(s) and doctor(s) at your next visit.                  "

## 2018-07-30 NOTE — ED AVS SNAPSHOT
[facility]    [address] [address]    Phone:  [phone]                                       Maggie Case   MRN: [id number]    Department:  [facility]   Date of Visit:  7/30/2018           After Visit Summary Signature Page     I have received my discharge instructions, and my questions have been answered. I have discussed any challenges I see with this plan with the nurse or doctor.    ..........................................................................................................................................  Patient/Patient Representative Signature      ..........................................................................................................................................  Patient Representative Print Name and Relationship to Patient    ..................................................               ................................................  Date                                            Time    ..........................................................................................................................................  Reviewed by Signature/Title    ...................................................              ..............................................  Date                                                            Time

## 2018-07-30 NOTE — ED NOTES
Pt from nursing with headache, vomitting, diarrhea, same sx as yesterday but now worse.  Poor eating for at least 5 says,  One loose stool today, vomitted yesterday.  Attempted  IV -unable. Pt refuses to let this nurse try again.

## 2018-07-31 NOTE — DISCHARGE INSTRUCTIONS
- Heat and topicals for neck pain.   - Topicals: Arnica Cream (5$ at [facility]) and/or Capsaicin. (1/4 teaspoon cayenne pepper in a palmful olive oil and rub in 2-3 times daily for 5-7 days for pain)  - Rotate ibuprofen and tylenol every 3-6 hrs for 2-3 days. May use the tramadol for pain as prescribed.   - Start the nystatin oral for sore throat/thrush.   * Follow up with Dr Flores as soon as practical as this may be best managed with neurology, return here with concern for worsening despite treatment/profuse vomiting.

## 2018-07-31 NOTE — ED PROVIDER NOTES
EMERGENCY DEPARTMENT ENCOUNTER       CHIEF COMPLAINT    No chief complaint on file.        HPI    Maggie Case is a 28 year old female who presents with not being able to keep anything down for the last 5 days.  Maggie's history documented in previous visits.  She is in NH following massive CVA due to noncompliance with anticoagulation.  Her NH stay has been mary, she reportedly has friends who come in and take her to bars.  She is felt to have capacity to make own medical decisions.  She has been in before for vomiting and hypokalemia, is very abusive to staff and leaves AMA.  She was recently in 7/15-7/17 for c.dif.  It appears she was seen 2 days ago by Dr. Flores at the NH, note not yet available.  .     REVIEW OF SYSTEMS    GI: Patient complains of abdominal pain, no diarrhea, complains of vomiting but this is not verified.  Cardiac: No chest pain or syncope  Pulmonary: No difficulty breathing or new cough  General: No fevers or chills  : No hematuria or dysuria  See HPI for further details.   All other systems reviewed and are negative.     PAST MEDICAL & SURGICAL HISTORY     Past Medical History         Past Medical History:   Diagnosis Date     Anemia       Anxiety       Chemical dependency (H)       Chronic diarrhea       Lactose intolerance       Myalgia       OCD (obsessive compulsive disorder)       Pulmonary embolism (H) 05/06/16     Stroke (H) 02/2018     Vitamin B12 deficiency            Past Surgical History          Past Surgical History:   Procedure Laterality Date     CLOSED REDUCTION, PERCUTANEOUS PINNING LOWER EXTREMITY, COMBINED Right 4/6/2016     Procedure: COMBINED CLOSED REDUCTION, PERCUTANEOUS PINNING LOWER EXTREMITY;  Surgeon: Dexter Jones MD;  [facility]     COLONOSCOPY         CRANIECTOMY Right 2/18/2018     Procedure: CRANIECTOMY;  RIGHT HEMICRANIECTOMY;  Surgeon: Emeterio Mesa MD;  [facility]     CRANIOPLASTY Right 5/24/2018     Procedure: CRANIOPLASTY;   Right Cranioplasty ;  Surgeon: Emeterio Mesa MD;  [facility]     UPPER GI ENDOSCOPY                CURRENT MEDICATIONS     Current Outpatient Rx           Current Outpatient Rx   Medication Sig Dispense Refill     Acetaminophen (TYLENOL PO) Take 650 mg by mouth every 4 hours as needed for mild pain or fever         ARIPiprazole (ABILIFY) 5 MG tablet Take 5 mg by mouth At Bedtime         bismuth subsalicylate (PEPTO BISMOL) 262 MG/15ML suspension Take 15 mLs by mouth every 6 hours as needed for indigestion         cyanocobalamin (VITAMIN  B-12) 1000 MCG tablet Take 1,000 mcg by mouth daily         Dextromethorphan-guaiFENesin  MG/5ML syrup Take 5 mLs by mouth every 4 hours as needed for cough         DULOXETINE HCL PO Take 60 mg by mouth daily         ferrous sulfate (IRON) 325 (65 Fe) MG tablet Take by mouth daily (with breakfast)         GABAPENTIN PO Take 300 mg by mouth 2 times daily 300mg AM and Noon         GABAPENTIN PO Take 600 mg by mouth At Bedtime          hydrOXYzine (ATARAX) 25 MG tablet Take 25 mg by mouth every 6 hours as needed   11     loperamide (IMODIUM) 2 MG capsule Take 2 mg by mouth 4 times daily as needed for diarrhea         nicotine (NICODERM CQ) 7 MG/24HR 24 hr patch Place 1 patch onto the skin every 24 hours         Ondansetron HCl (ZOFRAN PO) Take 4 mg by mouth every 8 hours as needed for nausea or vomiting         oxyCODONE IR (ROXICODONE) 10 MG tablet Take 1 tablet (10 mg) by mouth every 3 hours as needed 10 tablet 0     tamsulosin (FLOMAX) 0.4 MG capsule Take 1 capsule (0.4 mg) by mouth daily 7 capsule 0     TRAZODONE HCL PO Take 100 mg by mouth At Bedtime         XARELTO 20 MG TABS tablet Take 20 mg by mouth every morning   11            ALLERGIES          Allergies   Allergen Reactions     Amoxicillin Other (See Comments)       Headaches     Levaquin [Levofloxacin] Swelling     Naproxen Other (See Comments)       Reaction: Headaches     Nickel       Tramadol        "Sulindac Rash         SOCIAL AND FAMILY HISTORY    Social History            Social History     Marital status: Single       Spouse name: N/A     Number of children: N/A     Years of education: N/A              Social History Main Topics     Smoking status: Former Smoker       Packs/day: 0.25       Years: 7.00       Types: Cigarettes     Smokeless tobacco: Never Used         Comment: quit 7/12/18     Alcohol use 0.0 oz/week        0 Standard drinks or equivalent per week          Comment: last drink 3 days ago     Drug use: Yes       Special: Marijuana, Other         Comment: adderall, yest, last MJ 3 days ago     Sexual activity: Not Currently       Partners: Female           Other Topics Concern     Not on file      Social History Narrative       Family History          Family History   Problem Relation Age of Onset     Depression Mother       Migraines Maternal Half-Sister              PHYSICAL EXAM    VITAL SIGNS: There were no vitals taken for this visit.  Constitutional: Maggie appears her usual state, alert  Eyes:  Sclera nonicteric, conjunctiva moist  HENT:  Atraumatic, nose normal  Neck: Supple, no JVD  Respiratory:  No retractions, no accessory muscle use, normal breath sounds   Cardiovascular:  regular rate, normal rhythm, no murmurs  GI:  Soft, no abdominal tenderness, no guarding, bowel sounds present, no audible bruits or palpable pulsatile masses.  Musculoskeletal:  No edema, no acute deformity   Vascular: DP pulses 2+ equal bilaterally  Integument: No rash, dry skin  Neurologic:  Alert & oriented, no slurred speech  Psychiatric: Cooperative, pleasant affect          ED COURSE & MEDICAL DECISION MAKING    Pertinent Labs & Imaging studies reviewed and interpreted. (See chart for details)      See chart for details of medications given during the ED stay.      Vitals    There were no vitals filed for this visit.          FINAL IMPRESSION    vomiting     PLAN  Initial report was for vomiting and \"not " "keeping anything down\",  But when she got here she said she had a headache.  She has had low potassium I the past, it's normal today.  Discharged back to NH     (Please note that this note was completed with a voice recognition program.  Every attempt was made to edit the dictations, but inevitably there remain words that are mis-transcribed.)     Estephania Shanks MD  07/31/18 1116    "

## 2018-07-31 NOTE — ED NOTES
Report called to nursing home. Pt transferred to  and home via healthline.  Nursing home request script be sent to [facility] in [place]  Pt alert and nausea much better.

## 2018-08-06 ENCOUNTER — TRANSFERRED RECORDS (OUTPATIENT)
Dept: HEALTH INFORMATION MANAGEMENT | Facility: CLINIC | Age: 28
End: 2018-08-06

## 2018-08-06 ENCOUNTER — HOSPITAL ENCOUNTER (EMERGENCY)
Facility: HOSPITAL | Age: 28
Discharge: NURSING, REHAB OR LONG-TERM CARE | End: 2018-08-06
Attending: FAMILY MEDICINE | Admitting: FAMILY MEDICINE
Payer: COMMERCIAL

## 2018-08-06 VITALS
SYSTOLIC BLOOD PRESSURE: 119 MMHG | RESPIRATION RATE: 18 BRPM | TEMPERATURE: 98.3 F | HEART RATE: 90 BPM | OXYGEN SATURATION: 99 % | DIASTOLIC BLOOD PRESSURE: 85 MMHG

## 2018-08-06 DIAGNOSIS — F43.21 ADJUSTMENT DISORDER WITH DEPRESSED MOOD: ICD-10-CM

## 2018-08-06 DIAGNOSIS — L89.111: ICD-10-CM

## 2018-08-06 PROCEDURE — 99284 EMERGENCY DEPT VISIT MOD MDM: CPT | Performed by: FAMILY MEDICINE

## 2018-08-06 PROCEDURE — 99285 EMERGENCY DEPT VISIT HI MDM: CPT

## 2018-08-06 ASSESSMENT — ENCOUNTER SYMPTOMS
DYSURIA: 0
ABDOMINAL PAIN: 0
ROS GI COMMENTS: DIARRHEA IMPROVED
SHORTNESS OF BREATH: 0
DYSPHORIC MOOD: 1
COLOR CHANGE: 1
DIARRHEA: 1
ACTIVITY CHANGE: 0
HEADACHES: 0
DIAPHORESIS: 0
VOMITING: 0
WEAKNESS: 0
FATIGUE: 0
CONSTIPATION: 0
FREQUENCY: 0
NAUSEA: 0
FEVER: 0

## 2018-08-06 NOTE — ED AVS SNAPSHOT
[facility]    [address] [address]    Phone:  [phone]                                       Maggie Case   MRN: [id number]    Department:  [facility]   Date of Visit:  8/6/2018           After Visit Summary Signature Page     I have received my discharge instructions, and my questions have been answered. I have discussed any challenges I see with this plan with the nurse or doctor.    ..........................................................................................................................................  Patient/Patient Representative Signature      ..........................................................................................................................................  Patient Representative Print Name and Relationship to Patient    ..................................................               ................................................  Date                                            Time    ..........................................................................................................................................  Reviewed by Signature/Title    ...................................................              ..............................................  Date                                                            Time

## 2018-08-06 NOTE — ED AVS SNAPSHOT
[facility]    [address] [place] [address]    Phone:  [phone]                                       Maggie Case   MRN: [id number]    Department:  [facility]   Date of Visit:  8/6/2018           Patient Information     Date Of Birth          1990        Your diagnoses for this visit were:     Adjustment disorder with depressed mood     Decubitus ulcer of right upper back, stage 1        You were seen by Shea Sutherland MD.      Follow-up Information     Follow up with Chapo Flores MD In 1 week.    Specialty:  Family Practice    Why:  Follow up ED visit    Contact information:    [facility]  [address] [place] [id number]  [phone]          Follow up with mental health provider.    Why:  Follow up ED visit, as agreed to with DEC        Discharge Instructions         Understanding Adjustment Disorders  Most people have stress in their lives, and sometimes you may have more than you can handle. You may find it hard to cope with a stressful event. As a result, you may become anxious and depressed. You might even get sick. These can be symptoms of an adjustment disorder. But you don t have to suffer. Ask your healthcare provider or mental health professional for help.    Common symptoms of an adjustment disorder    Hopelessness    Frequent crying    Depressed mood    Trembling or twitching    Fast, pounding, or fluttering heartbeat (palpitations)    Health problems    Withdrawal    Anxiety or tension   What is an adjustment disorder?  Adjustment disorders sometimes occur when life gets to be too much. They often appear within 3 months of a stressful time. The symptoms vary widely. You might pretend the stressful event never happened. Or you might think about it so much you can t eat or sleep. In most cases, your feelings may seem beyond your control.  What causes it?  The events that trigger an adjustment disorder vary from  person to person. Adults may be troubled by work, money, or marriage problems. Teens are more likely bothered by school or conflict with parents. They also may find it hard to cope with a divorce or sex. The death of a loved one can be especially hard to face. So can major life changes such as a move. Poverty or a lack of social skills may make matters worse.  What can be done?  Adjustment disorders can almost always be helped by therapy. You may feel relieved just to talk to someone. In some cases, only you and your therapist will meet. In others, your whole family may be involved. You might also join a group for people with this disorder. The support and concern of others can help you recover more quickly.  Date Last Reviewed: 1/1/2017 2000-2017 [employer]. [address] [place] [address], [place] [address]. All rights reserved. This information is not intended as a substitute for professional medical care. Always follow your healthcare professional's instructions.             Review of your medicines      Our records show that you are taking the medicines listed below. If these are incorrect, please call your family doctor or clinic.        Dose / Directions Last dose taken    CHANTIX PO   Dose:  1 mg        Take 1 mg by mouth   Refills:  0        cyanocobalamin 1000 MCG tablet   Commonly known as:  vitamin  B-12   Dose:  1000 mcg        Take 1,000 mcg by mouth daily   Refills:  0        Dextromethorphan-guaiFENesin  MG/5ML syrup   Dose:  5 mL        Take 5 mLs by mouth every 4 hours as needed for cough   Refills:  0        DULOXETINE HCL PO   Dose:  60 mg        Take 60 mg by mouth daily   Refills:  0        ferrous sulfate 325 (65 Fe) MG tablet   Commonly known as:  IRON        Take by mouth daily (with breakfast)   Refills:  0        * GABAPENTIN PO   Dose:  600 mg        Take 600 mg by mouth At Bedtime   Refills:  0        * GABAPENTIN PO   Dose:  300 mg        Take 300 mg by mouth 2 times  daily 300mg AM and Noon   Refills:  0        loperamide 2 MG capsule   Commonly known as:  IMODIUM   Dose:  2 mg        Take 2 mg by mouth 4 times daily as needed for diarrhea   Refills:  0        nicotine 7 MG/24HR 24 hr patch   Commonly known as:  NICODERM CQ   Dose:  1 patch        Place 1 patch onto the skin every 24 hours   Refills:  0        nystatin 215529 UNIT/ML suspension   Commonly known as:  MYCOSTATIN   Dose:  939643 Units   Quantity:  280 mL        Take 5 mLs (500,000 Units) by mouth 4 times daily   Refills:  0        TRAZODONE HCL PO   Dose:  100 mg        Take 100 mg by mouth At Bedtime   Refills:  0        * TYLENOL PO   Dose:  650 mg        Take 650 mg by mouth every 4 hours as needed for mild pain or fever   Refills:  0        * TYLENOL PO   Dose:  650 mg        Take 650 mg by mouth 4 times daily   Refills:  0        VANCOMYCIN HCL PO   Dose:  125 mg        Take 125 mg by mouth 2 times daily   Refills:  0        XARELTO 20 MG Tabs tablet   Dose:  20 mg   Generic drug:  rivaroxaban ANTICOAGULANT        Take 20 mg by mouth every morning   Refills:  11        ZOFRAN PO   Dose:  4 mg        Take 4 mg by mouth every 8 hours as needed for nausea or vomiting   Refills:  0        * Notice:  This list has 4 medication(s) that are the same as other medications prescribed for you. Read the directions carefully, and ask your doctor or other care provider to review them with you.            Orders Needing Specimen Collection     None      Pending Results     No orders found from 8/4/2018 to 8/7/2018.            Pending Culture Results     No orders found from 8/4/2018 to 8/7/2018.            Thank you for choosing [facility]       Thank you for choosing [facility] for your care. Our goal is always to provide you with excellent care. Hearing back from our patients is one way we can continue to improve our services. Please take a few minutes to complete the written survey that you may receive in the mail after you  "visit with us. Thank you!        [email or web address] Messaging Information     SLID lets you send messages to your doctor, view your test results, renew your prescriptions, schedule appointments and more. To sign up, go to [email or web address] . Click on \"Log in\" on the left side of the screen, which will take you to the Welcome page. Then click on \"Sign up Now\" on the right side of the page.     You will be asked to enter the access code listed below, as well as some personal information. Please follow the directions to create your username and password.     Your access code is: [email or web address]  Expires: 2018  7:51 PM     Your access code will  in 90 days. If you need help or a new code, please call your [facility] clinic or [phone].        Care EveryWhere ID     This is your Care EveryWhere ID. This could be used by other organizations to access your [facility] medical records  [id number]        Equal Access to Services     [facility] CenterANDREY : Hadii jovany abramso Sopia, waaxda luqadaha, qaybta kaalmada adesujata, justin peres . So [facility] [phone].    ATENCIÓN: Si habla español, tiene a montalvo disposición servicios gratuitos de asistencia lingüística. Llame al [phone].    We comply with applicable federal civil rights laws and [place] laws. We do not discriminate on the basis of race, color, national origin, age, disability, sex, sexual orientation, or gender identity.            After Visit Summary       This is your record. Keep this with you and show to your community pharmacist(s) and doctor(s) at your next visit.                  "

## 2018-08-07 NOTE — ED NOTES
Pt signed contract for safety, Discharge teaching done, AVS reviewed with pt, questions answered. Pt verbalized understanding and is agreeable with discharge plan. Pt discharged to [facility] via [facility].

## 2018-08-07 NOTE — ED PROVIDER NOTES
History     Chief Complaint   Patient presents with     Psychiatric Evaluation     HPI  Maggie Case is a 28 year old female who presents to the emergency room from Dr. Flores's office after having had suicidal thoughts and ideation.  Dr. Flores was concerned that she be evaluated further, requested a DEC assessment.  Patient has had issues with drinking and drug use, today stating that she had suicidal thoughts all the time, however she would not carry through on them.  She has been a  the past, prior to her stroke, but does not feel she is in danger of starting that again.  She has no specific plan for hurting herself.    Problem List:    Patient Active Problem List    Diagnosis Date Noted     Chemical dependency (H) 07/16/2018     Priority: Medium     Calculus of lower urinary tract 07/15/2018     Priority: Medium     History of CVA (cerebrovascular accident) 07/15/2018     Priority: Medium     Left hemiparesis (H) 07/15/2018     Priority: Medium     Chronic anticoagulation 07/15/2018     Priority: Medium     History of cranioplasty 05/24/2018     Priority: Medium     Acute ischemic stroke (H) 02/17/2018     Priority: Medium     Influenza B 05/01/2017     Priority: Medium     Opacity of lung on imaging study 05/01/2017     Priority: Medium     Community acquired pneumonia 05/01/2017     Priority: Medium     C. difficile colitis 05/01/2017     Priority: Medium     Sepsis (H) 04/28/2017     Priority: Medium     Pyelonephritis 01/05/2017     Priority: Medium     Acute chest pain 05/06/2016     Priority: Medium     Leukocytosis 05/06/2016     Priority: Medium     Lung mass 05/06/2016     Priority: Medium     SOB (shortness of breath) 05/06/2016     Priority: Medium     Acute upper GI bleed 06/18/2013     Priority: Medium     Hypokalemia 06/18/2013     Priority: Medium     Acute cystitis 06/18/2013     Priority: Medium     Anxiety state 03/25/2013     Priority: Medium     Muscle pain 07/30/2009      Priority: Medium     Overview:   IMO Update 10/11       Cervicalgia 06/16/2009     Priority: Medium     Overview:   IMO Update 10/11       Low back pain 06/16/2009     Priority: Medium     Overview:   IMO Update 10/11       Pain in joint, lower leg 05/01/2009     Priority: Medium     Diarrhea 04/17/2008     Priority: Medium     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     Priority: Medium        Past Medical History:    Past Medical History:   Diagnosis Date     Anemia      Anxiety      Chemical dependency (H)      Chronic diarrhea      Lactose intolerance      Myalgia      OCD (obsessive compulsive disorder)      Pulmonary embolism (H) 05/06/16     Stroke (H) 02/2018     Vitamin B12 deficiency        Past Surgical History:    Past Surgical History:   Procedure Laterality Date     CLOSED REDUCTION, PERCUTANEOUS PINNING LOWER EXTREMITY, COMBINED Right 4/6/2016    Procedure: COMBINED CLOSED REDUCTION, PERCUTANEOUS PINNING LOWER EXTREMITY;  Surgeon: Dexter Jones MD;  [facility]     COLONOSCOPY       CRANIECTOMY Right 2/18/2018    Procedure: CRANIECTOMY;  RIGHT HEMICRANIECTOMY;  Surgeon: Emeterio Mesa MD;  Location: [facility]     CRANIOPLASTY Right 5/24/2018    Procedure: CRANIOPLASTY;  Right Cranioplasty ;  Surgeon: Emeterio Mesa MD;  Location: [facility] OR     UPPER GI ENDOSCOPY         Family History:    Family History   Problem Relation Age of Onset     Depression Mother      Migraines Maternal Half-Sister        Social History:  Marital Status:  Single [1]  Social History   Substance Use Topics     Smoking status: Former Smoker     Packs/day: 0.25     Years: 7.00     Types: Cigarettes     Smokeless tobacco: Never Used      Comment: quit 7/12/18     Alcohol use 0.0 oz/week     0 Standard drinks or equivalent per week      Comment: last drink 3 days ago        Medications:      Acetaminophen (TYLENOL PO)   Acetaminophen (TYLENOL PO)   cyanocobalamin (VITAMIN  B-12)  1000 MCG tablet   Dextromethorphan-guaiFENesin  MG/5ML syrup   DULOXETINE HCL PO   ferrous sulfate (IRON) 325 (65 Fe) MG tablet   GABAPENTIN PO   GABAPENTIN PO   loperamide (IMODIUM) 2 MG capsule   nicotine (NICODERM CQ) 7 MG/24HR 24 hr patch   nystatin (MYCOSTATIN) 557073 UNIT/ML suspension   Ondansetron HCl (ZOFRAN PO)   TRAZODONE HCL PO   VANCOMYCIN HCL PO   Varenicline Tartrate (CHANTIX PO)   XARELTO 20 MG TABS tablet         Review of Systems   Constitutional: Negative for activity change, diaphoresis, fatigue and fever.   HENT: Negative.    Respiratory: Negative for shortness of breath.    Cardiovascular: Negative for chest pain.   Gastrointestinal: Positive for diarrhea. Negative for abdominal pain, constipation, nausea and vomiting.        Diarrhea improved   Genitourinary: Negative for dysuria, frequency and urgency.   Skin: Positive for color change.        Red area at ~T3 overlying spinous process that is erythematous and does not roscoe.  It appears to be a pressure sore, Stage 1.   Neurological: Negative for weakness and headaches.   Psychiatric/Behavioral: Positive for dysphoric mood and suicidal ideas.       Physical Exam   BP: 111/83  Heart Rate: 119  Temp: 98.3  F (36.8  C)  Resp: 16  SpO2: 97 %      Physical Exam   Constitutional: She is oriented to person, place, and time. She appears well-developed and well-nourished. No distress.   HENT:   Head: Normocephalic and atraumatic.   Mouth/Throat: Oropharynx is clear and moist.   Neck: Normal range of motion. Neck supple.   Cardiovascular: Normal rate, regular rhythm, normal heart sounds and intact distal pulses.    No murmur heard.  Pulmonary/Chest: Effort normal and breath sounds normal. No respiratory distress.   Abdominal: Soft. Bowel sounds are normal. She exhibits no distension. There is no tenderness.   Musculoskeletal: Normal range of motion. She exhibits tenderness. She exhibits no edema.        Arms:  Deficits from stroke unchanged.    Neurological: She is alert and oriented to person, place, and time.   Skin: Skin is warm and dry.   Psychiatric: She has a normal mood and affect.   Nursing note and vitals reviewed.      ED Course     ED Course     Procedures  Labs on 0.0  No results found for this or any previous visit (from the past 24 hour(s)).    Medications - No data to display    Assessments & Plan (with Medical Decision Making)   DEC felt that there was no reason the patient needed to be hospitalized for her mental health at this time. he provided a safety plan for her, and recommended that she see her therapist which she has not done for some time.  Following speaking with the DEC  I spoke with Dr. Flores, she is comfortable with the patient going home, wants her to follow-up with her therapist as well.  Patient has not been consistent in seeing her therapist in the past.  Dr. Flores will follow up with her at the nursing home.    I have reviewed the nursing notes.    I have reviewed the findings, diagnosis, plan and need for follow up with the patient.  New Prescriptions    No medications on file       Final diagnoses:   Adjustment disorder with depressed mood   Decubitus ulcer of right upper back, stage 1       8/6/2018   [facility]     Shea Sutherland MD  08/06/18 [id number]

## 2018-08-07 NOTE — DISCHARGE INSTRUCTIONS
Understanding Adjustment Disorders  Most people have stress in their lives, and sometimes you may have more than you can handle. You may find it hard to cope with a stressful event. As a result, you may become anxious and depressed. You might even get sick. These can be symptoms of an adjustment disorder. But you don t have to suffer. Ask your healthcare provider or mental health professional for help.    Common symptoms of an adjustment disorder    Hopelessness    Frequent crying    Depressed mood    Trembling or twitching    Fast, pounding, or fluttering heartbeat (palpitations)    Health problems    Withdrawal    Anxiety or tension   What is an adjustment disorder?  Adjustment disorders sometimes occur when life gets to be too much. They often appear within 3 months of a stressful time. The symptoms vary widely. You might pretend the stressful event never happened. Or you might think about it so much you can t eat or sleep. In most cases, your feelings may seem beyond your control.  What causes it?  The events that trigger an adjustment disorder vary from person to person. Adults may be troubled by work, money, or marriage problems. Teens are more likely bothered by school or conflict with parents. They also may find it hard to cope with a divorce or sex. The death of a loved one can be especially hard to face. So can major life changes such as a move. Poverty or a lack of social skills may make matters worse.  What can be done?  Adjustment disorders can almost always be helped by therapy. You may feel relieved just to talk to someone. In some cases, only you and your therapist will meet. In others, your whole family may be involved. You might also join a group for people with this disorder. The support and concern of others can help you recover more quickly.  Date Last Reviewed: 1/1/2017 2000-2017 [employer] [address] [facility], [place] [address]. All rights reserved. This information is  not intended as a substitute for professional medical care. Always follow your healthcare professional's instructions.

## 2018-08-07 NOTE — ED NOTES
"Patient being evaluated today for increased depression. She came over from her PCP's office. She has a history of CVA and resides at a local nursing home. She currently denies any suicidal or homicidal ideation. She states, \"I just having feeling of why did I have my stroke? I should have been a better daughter. I should have been a better mother. Just feelings like that.\" Patient states that she had her DEC assessment and will be discharged. She will consent to safety and agrees with discharge planing.   "

## 2018-08-13 ENCOUNTER — HOSPITAL ENCOUNTER (INPATIENT)
Facility: HOSPITAL | Age: 28
LOS: 2 days | Discharge: NURSING, REHAB OR LONG-TERM CARE | DRG: 372 | End: 2018-08-17
Attending: PHYSICIAN ASSISTANT | Admitting: INTERNAL MEDICINE
Payer: COMMERCIAL

## 2018-08-13 DIAGNOSIS — E87.6 HYPOKALEMIA: ICD-10-CM

## 2018-08-13 DIAGNOSIS — R11.2 NON-INTRACTABLE VOMITING WITH NAUSEA, UNSPECIFIED VOMITING TYPE: ICD-10-CM

## 2018-08-13 DIAGNOSIS — A04.72 CLOSTRIDIUM DIFFICILE COLITIS: Primary | ICD-10-CM

## 2018-08-13 LAB
ALBUMIN SERPL-MCNC: 3 G/DL (ref 3.4–5)
ALP SERPL-CCNC: 132 U/L (ref 40–150)
ALT SERPL W P-5'-P-CCNC: 25 U/L (ref 0–50)
ANION GAP SERPL CALCULATED.3IONS-SCNC: 9 MMOL/L (ref 3–14)
AST SERPL W P-5'-P-CCNC: 60 U/L (ref 0–45)
BASOPHILS # BLD AUTO: 0 10E9/L (ref 0–0.2)
BASOPHILS NFR BLD AUTO: 0.1 %
BILIRUB SERPL-MCNC: 0.4 MG/DL (ref 0.2–1.3)
BUN SERPL-MCNC: 13 MG/DL (ref 7–30)
CALCIUM SERPL-MCNC: 8.5 MG/DL (ref 8.5–10.1)
CHLORIDE SERPL-SCNC: 103 MMOL/L (ref 94–109)
CO2 SERPL-SCNC: 29 MMOL/L (ref 20–32)
CREAT SERPL-MCNC: 0.44 MG/DL (ref 0.52–1.04)
DIFFERENTIAL METHOD BLD: ABNORMAL
EOSINOPHIL # BLD AUTO: 0.1 10E9/L (ref 0–0.7)
EOSINOPHIL NFR BLD AUTO: 1.1 %
ERYTHROCYTE [DISTWIDTH] IN BLOOD BY AUTOMATED COUNT: 16 % (ref 10–15)
GFR SERPL CREATININE-BSD FRML MDRD: >90 ML/MIN/1.7M2
GLUCOSE SERPL-MCNC: 111 MG/DL (ref 70–99)
HCT VFR BLD AUTO: 42.8 % (ref 35–47)
HGB BLD-MCNC: 14.6 G/DL (ref 11.7–15.7)
IMM GRANULOCYTES # BLD: 0.1 10E9/L (ref 0–0.4)
IMM GRANULOCYTES NFR BLD: 0.4 %
LYMPHOCYTES # BLD AUTO: 1.4 10E9/L (ref 0.8–5.3)
LYMPHOCYTES NFR BLD AUTO: 12.4 %
MAGNESIUM SERPL-MCNC: 2 MG/DL (ref 1.6–2.3)
MAGNESIUM SERPL-MCNC: 2 MG/DL (ref 1.6–2.3)
MCH RBC QN AUTO: 30.2 PG (ref 26.5–33)
MCHC RBC AUTO-ENTMCNC: 34.1 G/DL (ref 31.5–36.5)
MCV RBC AUTO: 89 FL (ref 78–100)
MONOCYTES # BLD AUTO: 0.7 10E9/L (ref 0–1.3)
MONOCYTES NFR BLD AUTO: 6.1 %
NEUTROPHILS # BLD AUTO: 9.2 10E9/L (ref 1.6–8.3)
NEUTROPHILS NFR BLD AUTO: 79.9 %
NRBC # BLD AUTO: 0 10*3/UL
NRBC BLD AUTO-RTO: 0 /100
PLATELET # BLD AUTO: 340 10E9/L (ref 150–450)
POTASSIUM SERPL-SCNC: 2.8 MMOL/L (ref 3.4–5.3)
POTASSIUM SERPL-SCNC: 3.6 MMOL/L (ref 3.4–5.3)
PROT SERPL-MCNC: 6.5 G/DL (ref 6.8–8.8)
RBC # BLD AUTO: 4.83 10E12/L (ref 3.8–5.2)
SODIUM SERPL-SCNC: 141 MMOL/L (ref 133–144)
TROPONIN I SERPL-MCNC: <0.015 UG/L (ref 0–0.04)
WBC # BLD AUTO: 11.6 10E9/L (ref 4–11)

## 2018-08-13 PROCEDURE — 85025 COMPLETE CBC W/AUTO DIFF WBC: CPT | Performed by: PHYSICIAN ASSISTANT

## 2018-08-13 PROCEDURE — G0378 HOSPITAL OBSERVATION PER HR: HCPCS

## 2018-08-13 PROCEDURE — 96372 THER/PROPH/DIAG INJ SC/IM: CPT

## 2018-08-13 PROCEDURE — 80053 COMPREHEN METABOLIC PANEL: CPT | Performed by: PHYSICIAN ASSISTANT

## 2018-08-13 PROCEDURE — 25000128 H RX IP 250 OP 636: Performed by: INTERNAL MEDICINE

## 2018-08-13 PROCEDURE — 36415 COLL VENOUS BLD VENIPUNCTURE: CPT | Performed by: INTERNAL MEDICINE

## 2018-08-13 PROCEDURE — 99285 EMERGENCY DEPT VISIT HI MDM: CPT | Mod: 25

## 2018-08-13 PROCEDURE — 25000125 ZZHC RX 250: Performed by: PHYSICIAN ASSISTANT

## 2018-08-13 PROCEDURE — 93010 ELECTROCARDIOGRAM REPORT: CPT | Performed by: INTERNAL MEDICINE

## 2018-08-13 PROCEDURE — 25000132 ZZH RX MED GY IP 250 OP 250 PS 637: Performed by: INTERNAL MEDICINE

## 2018-08-13 PROCEDURE — 99223 1ST HOSP IP/OBS HIGH 75: CPT | Performed by: INTERNAL MEDICINE

## 2018-08-13 PROCEDURE — 84484 ASSAY OF TROPONIN QUANT: CPT | Performed by: PHYSICIAN ASSISTANT

## 2018-08-13 PROCEDURE — 93005 ELECTROCARDIOGRAM TRACING: CPT

## 2018-08-13 PROCEDURE — 83735 ASSAY OF MAGNESIUM: CPT | Performed by: INTERNAL MEDICINE

## 2018-08-13 PROCEDURE — 83735 ASSAY OF MAGNESIUM: CPT | Performed by: PHYSICIAN ASSISTANT

## 2018-08-13 PROCEDURE — 25000128 H RX IP 250 OP 636: Performed by: PHYSICIAN ASSISTANT

## 2018-08-13 PROCEDURE — 84132 ASSAY OF SERUM POTASSIUM: CPT | Performed by: INTERNAL MEDICINE

## 2018-08-13 PROCEDURE — 99284 EMERGENCY DEPT VISIT MOD MDM: CPT | Mod: Z6 | Performed by: PHYSICIAN ASSISTANT

## 2018-08-13 PROCEDURE — 36415 COLL VENOUS BLD VENIPUNCTURE: CPT | Performed by: PHYSICIAN ASSISTANT

## 2018-08-13 RX ORDER — POTASSIUM CHLORIDE 7.45 MG/ML
10 INJECTION INTRAVENOUS
Status: DISCONTINUED | OUTPATIENT
Start: 2018-08-13 | End: 2018-08-17 | Stop reason: HOSPADM

## 2018-08-13 RX ORDER — DULOXETIN HYDROCHLORIDE 60 MG/1
60 CAPSULE, DELAYED RELEASE ORAL DAILY
Status: DISCONTINUED | OUTPATIENT
Start: 2018-08-13 | End: 2018-08-17 | Stop reason: HOSPADM

## 2018-08-13 RX ORDER — BISACODYL 10 MG
10 SUPPOSITORY, RECTAL RECTAL DAILY PRN
Status: DISCONTINUED | OUTPATIENT
Start: 2018-08-13 | End: 2018-08-17 | Stop reason: HOSPADM

## 2018-08-13 RX ORDER — LORAZEPAM 2 MG/ML
.5-1 INJECTION INTRAMUSCULAR EVERY 6 HOURS PRN
Status: DISCONTINUED | OUTPATIENT
Start: 2018-08-13 | End: 2018-08-14

## 2018-08-13 RX ORDER — PROMETHAZINE HYDROCHLORIDE 25 MG/ML
25 INJECTION, SOLUTION INTRAMUSCULAR; INTRAVENOUS EVERY 6 HOURS PRN
Status: DISCONTINUED | OUTPATIENT
Start: 2018-08-13 | End: 2018-08-13

## 2018-08-13 RX ORDER — TRAZODONE HYDROCHLORIDE 100 MG/1
100 TABLET ORAL AT BEDTIME
Status: DISCONTINUED | OUTPATIENT
Start: 2018-08-13 | End: 2018-08-17 | Stop reason: HOSPADM

## 2018-08-13 RX ORDER — POTASSIUM CHLORIDE 1500 MG/1
20-40 TABLET, EXTENDED RELEASE ORAL
Status: DISCONTINUED | OUTPATIENT
Start: 2018-08-13 | End: 2018-08-17 | Stop reason: HOSPADM

## 2018-08-13 RX ORDER — VARENICLINE TARTRATE 1 MG/1
1 TABLET, FILM COATED ORAL 2 TIMES DAILY
Status: DISCONTINUED | OUTPATIENT
Start: 2018-08-13 | End: 2018-08-13 | Stop reason: CLARIF

## 2018-08-13 RX ORDER — ONDANSETRON 2 MG/ML
4 INJECTION INTRAMUSCULAR; INTRAVENOUS EVERY 6 HOURS PRN
Status: DISCONTINUED | OUTPATIENT
Start: 2018-08-13 | End: 2018-08-13

## 2018-08-13 RX ORDER — ONDANSETRON 4 MG/1
4 TABLET, ORALLY DISINTEGRATING ORAL EVERY 6 HOURS PRN
Status: DISCONTINUED | OUTPATIENT
Start: 2018-08-13 | End: 2018-08-13

## 2018-08-13 RX ORDER — LOPERAMIDE HCL 2 MG
2 CAPSULE ORAL 4 TIMES DAILY PRN
Status: DISCONTINUED | OUTPATIENT
Start: 2018-08-13 | End: 2018-08-15

## 2018-08-13 RX ORDER — MAGNESIUM SULFATE HEPTAHYDRATE 40 MG/ML
4 INJECTION, SOLUTION INTRAVENOUS EVERY 4 HOURS PRN
Status: DISCONTINUED | OUTPATIENT
Start: 2018-08-13 | End: 2018-08-17 | Stop reason: HOSPADM

## 2018-08-13 RX ORDER — ACETAMINOPHEN 650 MG/1
650 SUPPOSITORY RECTAL EVERY 4 HOURS PRN
Status: DISCONTINUED | OUTPATIENT
Start: 2018-08-13 | End: 2018-08-17 | Stop reason: HOSPADM

## 2018-08-13 RX ORDER — DIPHENHYDRAMINE HCL 25 MG
25-50 CAPSULE ORAL EVERY 6 HOURS PRN
Status: DISCONTINUED | OUTPATIENT
Start: 2018-08-13 | End: 2018-08-17 | Stop reason: HOSPADM

## 2018-08-13 RX ORDER — POTASSIUM CL/LIDO/0.9 % NACL 10MEQ/0.1L
10 INTRAVENOUS SOLUTION, PIGGYBACK (ML) INTRAVENOUS
Status: DISCONTINUED | OUTPATIENT
Start: 2018-08-13 | End: 2018-08-13

## 2018-08-13 RX ORDER — GABAPENTIN 300 MG/1
300 CAPSULE ORAL 2 TIMES DAILY
Status: DISCONTINUED | OUTPATIENT
Start: 2018-08-13 | End: 2018-08-17 | Stop reason: HOSPADM

## 2018-08-13 RX ORDER — POTASSIUM CL/LIDO/0.9 % NACL 10MEQ/0.1L
10 INTRAVENOUS SOLUTION, PIGGYBACK (ML) INTRAVENOUS
Status: DISCONTINUED | OUTPATIENT
Start: 2018-08-13 | End: 2018-08-17 | Stop reason: HOSPADM

## 2018-08-13 RX ORDER — SODIUM CHLORIDE AND POTASSIUM CHLORIDE 300; 900 MG/100ML; MG/100ML
INJECTION, SOLUTION INTRAVENOUS CONTINUOUS
Status: DISCONTINUED | OUTPATIENT
Start: 2018-08-13 | End: 2018-08-15

## 2018-08-13 RX ORDER — GABAPENTIN 600 MG/1
600 TABLET ORAL AT BEDTIME
Status: DISCONTINUED | OUTPATIENT
Start: 2018-08-13 | End: 2018-08-17 | Stop reason: HOSPADM

## 2018-08-13 RX ORDER — NALOXONE HYDROCHLORIDE 0.4 MG/ML
.1-.4 INJECTION, SOLUTION INTRAMUSCULAR; INTRAVENOUS; SUBCUTANEOUS
Status: DISCONTINUED | OUTPATIENT
Start: 2018-08-13 | End: 2018-08-17 | Stop reason: HOSPADM

## 2018-08-13 RX ORDER — ONDANSETRON 4 MG/1
4 TABLET, FILM COATED ORAL EVERY 8 HOURS PRN
Status: DISCONTINUED | OUTPATIENT
Start: 2018-08-13 | End: 2018-08-13

## 2018-08-13 RX ORDER — POTASSIUM CHLORIDE 1.5 G/1.58G
20-40 POWDER, FOR SOLUTION ORAL
Status: DISCONTINUED | OUTPATIENT
Start: 2018-08-13 | End: 2018-08-17 | Stop reason: HOSPADM

## 2018-08-13 RX ORDER — ONDANSETRON 4 MG/1
4 TABLET, ORALLY DISINTEGRATING ORAL ONCE
Status: COMPLETED | OUTPATIENT
Start: 2018-08-13 | End: 2018-08-13

## 2018-08-13 RX ORDER — HYDROMORPHONE HYDROCHLORIDE 1 MG/ML
.3-.5 INJECTION, SOLUTION INTRAMUSCULAR; INTRAVENOUS; SUBCUTANEOUS
Status: DISCONTINUED | OUTPATIENT
Start: 2018-08-13 | End: 2018-08-14

## 2018-08-13 RX ORDER — ACETAMINOPHEN 325 MG/1
650 TABLET ORAL EVERY 4 HOURS PRN
Status: DISCONTINUED | OUTPATIENT
Start: 2018-08-13 | End: 2018-08-17 | Stop reason: HOSPADM

## 2018-08-13 RX ORDER — ACETAMINOPHEN 325 MG/1
650 TABLET ORAL EVERY 4 HOURS PRN
Status: DISCONTINUED | OUTPATIENT
Start: 2018-08-13 | End: 2018-08-13

## 2018-08-13 RX ADMIN — Medication 0.3 MG: at 13:15

## 2018-08-13 RX ADMIN — Medication 0.5 MG: at 19:00

## 2018-08-13 RX ADMIN — POTASSIUM CHLORIDE AND SODIUM CHLORIDE: 900; 300 INJECTION, SOLUTION INTRAVENOUS at 22:19

## 2018-08-13 RX ADMIN — Medication 0.3 MG: at 15:58

## 2018-08-13 RX ADMIN — LORAZEPAM 0.5 MG: 2 INJECTION INTRAMUSCULAR; INTRAVENOUS at 22:39

## 2018-08-13 RX ADMIN — POTASSIUM CHLORIDE AND SODIUM CHLORIDE: 900; 300 INJECTION, SOLUTION INTRAVENOUS at 11:43

## 2018-08-13 RX ADMIN — ENOXAPARIN SODIUM 50 MG: 60 INJECTION SUBCUTANEOUS at 11:26

## 2018-08-13 RX ADMIN — DIPHENHYDRAMINE HYDROCHLORIDE 25 MG: 25 CAPSULE ORAL at 19:30

## 2018-08-13 RX ADMIN — Medication 0.5 MG: at 21:07

## 2018-08-13 RX ADMIN — POTASSIUM CHLORIDE AND SODIUM CHLORIDE: 900; 300 INJECTION, SOLUTION INTRAVENOUS at 15:58

## 2018-08-13 RX ADMIN — ACETAMINOPHEN 650 MG: 325 TABLET, FILM COATED ORAL at 19:30

## 2018-08-13 RX ADMIN — PROMETHAZINE HYDROCHLORIDE 25 MG: 25 INJECTION INTRAMUSCULAR; INTRAVENOUS at 13:59

## 2018-08-13 RX ADMIN — DIPHENHYDRAMINE HYDROCHLORIDE 25 MG: 25 CAPSULE ORAL at 17:11

## 2018-08-13 RX ADMIN — ONDANSETRON 4 MG: 4 TABLET, ORALLY DISINTEGRATING ORAL at 10:28

## 2018-08-13 ASSESSMENT — ENCOUNTER SYMPTOMS
SHORTNESS OF BREATH: 0
ACTIVITY CHANGE: 1
FEVER: 0
DIZZINESS: 1
CHILLS: 0
LIGHT-HEADEDNESS: 0
COUGH: 0
ABDOMINAL PAIN: 1
NAUSEA: 1
VOMITING: 1
DIARRHEA: 0
WEAKNESS: 1
BACK PAIN: 0
PALPITATIONS: 0
FATIGUE: 1
APPETITE CHANGE: 1

## 2018-08-13 ASSESSMENT — PAIN DESCRIPTION - DESCRIPTORS
DESCRIPTORS: STABBING
DESCRIPTORS: STABBING

## 2018-08-13 NOTE — H&P
[facility] History and Physical    Maggie Case MRN# [id number]   Age: 28 year old YOB: 1990     Date of Admission:  8/13/2018      Primary care provider: Chapo Flores          Assessment and Plan:   Assessment:     Cyclic vomiting  Symptomatic management  Normal abdominal exam    Hypokalemia  With EKG changes  Telemetry   IV potassium replacement  Intensive monitoring    hypercoagulability disorder  Restart xarelto    Medical non compliance  Restart prescribed PTA meds    Code status   Full code    DVT prophylaxis  On xarelto    Patient Active Problem List    Diagnosis Date Noted     Chemical dependency (H) 07/16/2018     Priority: Medium     Calculus of lower urinary tract 07/15/2018     Priority: Medium     History of CVA (cerebrovascular accident) 07/15/2018     Priority: Medium     Left hemiparesis (H) 07/15/2018     Priority: Medium     Chronic anticoagulation 07/15/2018     Priority: Medium     History of cranioplasty 05/24/2018     Priority: Medium     Acute ischemic stroke (H) 02/17/2018     Priority: Medium     Influenza B 05/01/2017     Priority: Medium     Opacity of lung on imaging study 05/01/2017     Priority: Medium     Community acquired pneumonia 05/01/2017     Priority: Medium     C. difficile colitis 05/01/2017     Priority: Medium     Sepsis (H) 04/28/2017     Priority: Medium     Pyelonephritis 01/05/2017     Priority: Medium     Acute chest pain 05/06/2016     Priority: Medium     Leukocytosis 05/06/2016     Priority: Medium     Lung mass 05/06/2016     Priority: Medium     SOB (shortness of breath) 05/06/2016     Priority: Medium     Acute upper GI bleed 06/18/2013     Priority: Medium     Hypokalemia 06/18/2013     Priority: Medium     Acute cystitis 06/18/2013     Priority: Medium     Anxiety state 03/25/2013     Priority: Medium     Muscle pain 07/30/2009     Priority: Medium     Overview:   IMO Update 10/11       Cervicalgia 06/16/2009     Priority: Medium      Overview:   IMO Update 10/11       Low back pain 06/16/2009     Priority: Medium     Overview:   IMO Update 10/11       Pain in joint, lower leg 05/01/2009     Priority: Medium     Diarrhea 04/17/2008     Priority: Medium     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     Priority: Medium           Plan:   Orders Placed This Encounter   Procedures     Comprehensive metabolic panel     Troponin I     CBC with platelets differential     Magnesium     Basic metabolic panel     Potassium     Magnesium     EKG 12-lead, tracing only     Peripheral IV catheter     Observation goals     Nursing observations and interventions     Notify Provider -Pain Management     Measure height and weight     Vital signs     Notify Physician to consider change to inpatient status IF     Notify Physician to consider change to inpatient status IF     Activity: Ambulate with assist     Full Code     Enteric Isolation     Grawn to Observation     ED Bed Request                 Chief Complaint:     Nausea and vomiting    History is obtained from the patient         History of Present Illness:   This patient is a 28 year old  female with a significant past medical history of ASD, DVT, PE and paradoxical, hemorrhagic CVA requiring craniotomy.    As an outpatient, she was prescribed xarelto but did not take this for the last several days.   She also reports anterior chest pain worse with deep inspiration and better with rest.   In the ED she was noted to have a normal troponin but elevated serum potasium with corresponding EKG changes.   She reports no change in bowel habit and no bloody stool or vomit.   She was admitted for further management.          Past Medical History:     Past Medical History:   Diagnosis Date     Anemia      Anxiety      Chemical dependency (H)      Chronic diarrhea      Lactose intolerance      Myalgia      OCD (obsessive compulsive disorder)      Pulmonary embolism (H)  05/06/16     Stroke (H) 02/2018     Vitamin B12 deficiency              Past Surgical History:     Past Surgical History:   Procedure Laterality Date     CLOSED REDUCTION, PERCUTANEOUS PINNING LOWER EXTREMITY, COMBINED Right 4/6/2016    Procedure: COMBINED CLOSED REDUCTION, PERCUTANEOUS PINNING LOWER EXTREMITY;  Surgeon: Dexter Jones MD;  Location: [facility]     COLONOSCOPY       CRANIECTOMY Right 2/18/2018    Procedure: CRANIECTOMY;  RIGHT HEMICRANIECTOMY;  Surgeon: Emeterio Mesa MD;  Location: [facility] OR     CRANIOPLASTY Right 5/24/2018    Procedure: CRANIOPLASTY;  Right Cranioplasty ;  Surgeon: Emeterio Mesa MD;  Location: [facility] OR     UPPER GI ENDOSCOPY               Obstetrical History:one child by C section              Social History:     Social History   Substance Use Topics     Smoking status: Former Smoker     Packs/day: 0.25     Years: 7.00     Types: Cigarettes     Quit date: 8/2/2018     Smokeless tobacco: Never Used     Alcohol use No             Family History:     Family History   Problem Relation Age of Onset     Depression Mother      Migraines Maternal Half-Sister              Immunizations:     VACCINE/DOSE   Diptheria   DPT   DTAP   HBIG   Hepatitis A   Hepatitis B   HIB   Influenza   Measles   Meningococcal   MMR   Mumps   Pneumococcal   Polio   Rubella   Small Pox   TDAP   Varicella   Zoster            Allergies:     Allergies   Allergen Reactions     Amoxicillin Other (See Comments)     Headaches     Levaquin [Levofloxacin] Swelling     Naproxen Other (See Comments)     Reaction: Headaches     Nickel      Tramadol      Sulindac Rash             Medications:     Current Facility-Administered Medications   Medication     0.9% sodium chloride with KCL 40 mEq/L IV infusion     acetaminophen (TYLENOL) Suppository 650 mg     acetaminophen (TYLENOL) tablet 650 mg     acetaminophen (TYLENOL) tablet 650 mg     bisacodyl (DULCOLAX) Suppository 10 mg     DULoxetine (CYMBALTA) EC capsule 60 mg  "    gabapentin (NEURONTIN) capsule 300 mg     gabapentin (NEURONTIN) tablet 600 mg     HYDROmorphone (PF) (DILAUDID) injection 0.3-0.5 mg     loperamide (IMODIUM) capsule 2 mg     magnesium hydroxide (MILK OF MAGNESIA) suspension 30 mL     magnesium sulfate 4 g in 100 mL sterile water (premade)     melatonin tablet 1 mg     naloxone (NARCAN) injection 0.1-0.4 mg     ondansetron (ZOFRAN-ODT) ODT tab 4 mg    Or     ondansetron (ZOFRAN) injection 4 mg     ondansetron (ZOFRAN) tablet 4 mg     potassium chloride (KLOR-CON) Packet 20-40 mEq     potassium chloride 10 mEq in 100 mL intermittent infusion with 10 mg lidocaine     potassium chloride 10 mEq in 100 mL sterile water intermittent infusion (premix)     potassium chloride SA (K-DUR/KLOR-CON M) CR tablet 20-40 mEq     rivaroxaban ANTICOAGULANT (XARELTO) tablet 20 mg     traZODone (DESYREL) tablet 100 mg     VANCOMYCIN HCL  mg     varenicline (CHANTIX) tablet 1 mg             Review of Systems:   Unless mentioned in the HPI above, non contributory on comprehensive review.          Physical Exam:   Patient Vitals for the past 8 hrs:   BP Temp Temp src Pulse Heart Rate Resp SpO2 Height Weight   08/13/18 1236 135/96 98.3  F (36.8  C) Temporal - 93 16 100 % - -   08/13/18 1208 120/81 98  F (36.7  C) Oral - 89 16 95 % - -   08/13/18 1130 120/81 - - - 89 - - - -   08/13/18 1045 113/90 - - - 86 - - - -   08/13/18 1030 110/85 - - - 86 - - - -   08/13/18 1008 109/90 98.3  F (36.8  C) Oral 88 - 16 100 % 1.626 m (5' 4\") 54.4 kg (120 lb)     Gen; resting in bed in no acute distress  Const; afebrile  Psych; pleasant affect  Neuro; alert and oriented, left sided weakness  Cvs; reg reg, S1 S2 present  Lungs; clear with no adventitial lung sounds  Abdo; soft, non tender, normal bowel sounds  Extremities; no muscle wasting  Skin; warm, non diaphoretic           Data:     Results for orders placed or performed during the hospital encounter of 08/13/18   Comprehensive metabolic " panel   Result Value Ref Range    Sodium 141 133 - 144 mmol/L    Potassium 2.8 (LL) 3.4 - 5.3 mmol/L    Chloride 103 94 - 109 mmol/L    Carbon Dioxide 29 20 - 32 mmol/L    Anion Gap 9 3 - 14 mmol/L    Glucose 111 (H) 70 - 99 mg/dL    Urea Nitrogen 13 7 - 30 mg/dL    Creatinine 0.44 (L) 0.52 - 1.04 mg/dL    GFR Estimate >90 >60 mL/min/1.7m2    GFR Estimate If Black >90 >60 mL/min/1.7m2    Calcium 8.5 8.5 - 10.1 mg/dL    Bilirubin Total 0.4 0.2 - 1.3 mg/dL    Albumin 3.0 (L) 3.4 - 5.0 g/dL    Protein Total 6.5 (L) 6.8 - 8.8 g/dL    Alkaline Phosphatase 132 40 - 150 U/L    ALT 25 0 - 50 U/L    AST 60 (H) 0 - 45 U/L   Troponin I   Result Value Ref Range    Troponin I ES <0.015 0.000 - 0.045 ug/L   CBC with platelets differential   Result Value Ref Range    WBC 11.6 (H) 4.0 - 11.0 10e9/L    RBC Count 4.83 3.8 - 5.2 10e12/L    Hemoglobin 14.6 11.7 - 15.7 g/dL    Hematocrit 42.8 35.0 - 47.0 %    MCV 89 78 - 100 fl    MCH 30.2 26.5 - 33.0 pg    MCHC 34.1 31.5 - 36.5 g/dL    RDW 16.0 (H) 10.0 - 15.0 %    Platelet Count 340 150 - 450 10e9/L    Diff Method Automated Method     % Neutrophils 79.9 %    % Lymphocytes 12.4 %    % Monocytes 6.1 %    % Eosinophils 1.1 %    % Basophils 0.1 %    % Immature Granulocytes 0.4 %    Nucleated RBCs 0 0 /100    Absolute Neutrophil 9.2 (H) 1.6 - 8.3 10e9/L    Absolute Lymphocytes 1.4 0.8 - 5.3 10e9/L    Absolute Monocytes 0.7 0.0 - 1.3 10e9/L    Absolute Eosinophils 0.1 0.0 - 0.7 10e9/L    Absolute Basophils 0.0 0.0 - 0.2 10e9/L    Abs Immature Granulocytes 0.1 0 - 0.4 10e9/L    Absolute Nucleated RBC 0.0    Magnesium   Result Value Ref Range    Magnesium 2.0 1.6 - 2.3 mg/dL     EKG with sinus rhythm and diffuse st depression throughout      Attestation:  I have reviewed today's vital signs, notes, medications, labs and imaging.  Amount of time performed on this history and physical: 27 minutes.    Gurvinder Marie MD

## 2018-08-13 NOTE — IP AVS SNAPSHOT
"    [facility]: [phone]                                              INTERAGENCY TRANSFER FORM - LAB / IMAGING / EKG / EMG RESULTS   2018                    Hospital Admission Date: 2018  NADIR GARCIA   : 1990  Sex: Female        Attending Provider: Emeterio Barba MD     Allergies:  Amoxicillin, Levaquin [Levofloxacin], Naproxen, Nickel, Tramadol, Sulindac    Infection:  C-Difficile   Service:  GENERAL [facility]    Ht:  1.6 m (5' 3\")   Wt:  54.2 kg (119 lb 7.8 oz)   Admission Wt:  54.4 kg (120 lb)    BMI:  21.17 kg/m 2   BSA:  1.55 m 2            Patient PCP Information     Provider PCP Type    Chapo Flores MD General         Lab Results - 3 Days      Basic metabolic panel [[id number]] (Abnormal)  Resulted: 18, Result status: Final result    Ordering provider: Gurvinder Marie MD  18 0000 Resulting lab: [facility]    Specimen Information    Type Source Collected On   Blood  18          Components       Value Reference Range Flag Lab   Sodium 137 133 - 144 mmol/L  [facility]   Potassium 4.3 3.4 - 5.3 mmol/L  HI   Chloride 105 94 - 109 mmol/L  HI   Carbon Dioxide 22 20 - 32 mmol/L  HI   Anion Gap 10 3 - 14 mmol/L  HI   Glucose 101 70 - 99 mg/dL H [facility]   Urea Nitrogen 1 7 - 30 mg/dL L [facility]   Creatinine 0.40 0.52 - 1.04 mg/dL L [facility]   GFR Estimate >90 >60 mL/min/1.7m2  HI   Comment:  Non  GFR Calc   GFR Estimate If Black >90 >60 mL/min/1.7m2  HI   Comment:  African American GFR Calc   Calcium 8.3 8.5 - 10.1 mg/dL L [facility]            Magnesium [[id number]]  Resulted: 18, Result status: Final result    Ordering provider: Gurvinder Marie MD  18 0000 Resulting lab: [facility]    Specimen Information    Type Source Collected On   Blood  18          Components       Value Reference Range Flag Lab   Magnesium 1.7 1.6 - 2.3 mg/dL  [facility]            CBC with platelets [[id number]] (Abnormal)  " Resulted: 08/16/18 0650, Result status: Final result    Ordering provider: Gurvinder Marie MD  08/16/18 0000 Resulting lab: [facility]    Specimen Information    Type Source Collected On   Blood  08/16/18 0631          Components       Value Reference Range Flag Lab   WBC 8.7 4.0 - 11.0 10e9/L  HI   RBC Count 4.55 3.8 - 5.2 10e12/L  HI   Hemoglobin 13.7 11.7 - 15.7 g/dL  HI   Hematocrit 40.3 35.0 - 47.0 %  HI   MCV 89 78 - 100 fl  HI   MCH 30.1 26.5 - 33.0 pg  HI   MCHC 34.0 31.5 - 36.5 g/dL  HI   RDW 15.7 10.0 - 15.0 % H HI   Platelet Count 307 150 - 450 10e9/L  HI            Potassium [[id number]]  Resulted: 08/15/18 1102, Result status: Final result    Ordering provider: Gurvinder Marie MD  08/15/18 1028 Resulting lab: [facility]    Specimen Information    Type Source Collected On   Blood  08/15/18 1045          Components       Value Reference Range Flag Lab   Potassium 5.1 3.4 - 5.3 mmol/L  HI            Basic metabolic panel [[id number]] (Abnormal)  Resulted: 08/15/18 1022, Result status: Final result    Ordering provider: Gurvinder Marie MD  08/15/18 0908 Resulting lab: [facility]    Specimen Information    Type Source Collected On   Blood  08/15/18 0944          Components       Value Reference Range Flag Lab   Sodium 137 133 - 144 mmol/L  HI   Potassium 6.2 3.4 - 5.3 mmol/L HH [facility]   Comment:         Specimen slightly hemolyzed, potassium may be falsely elevated  Critical Value called to and read back by  EDNA FISHER ON 8/15/18 AT 1021 BY [facility]     Chloride 111 94 - 109 mmol/L H [facility]   Carbon Dioxide 18 20 - 32 mmol/L L HI   Anion Gap 8 3 - 14 mmol/L  HI   Glucose 73 70 - 99 mg/dL  HI   Urea Nitrogen 2 7 - 30 mg/dL L HI   Creatinine 0.32 0.52 - 1.04 mg/dL L HI   GFR Estimate >90 >60 mL/min/1.7m2  HI   Comment:  Non  GFR Calc   GFR Estimate If Black >90 >60 mL/min/1.7m2  HI   Comment:  African American GFR Calc   Calcium 7.9  8.5 - 10.1 mg/dL L HI            Magnesium [[id number]]  Resulted: 08/15/18 1022, Result status: Final result    Ordering provider: Gurvinder Marie MD  08/15/18 0908 Resulting lab: [facility]    Specimen Information    Type Source Collected On   Blood  08/15/18 0944          Components       Value Reference Range Flag Lab   Magnesium 1.7 1.6 - 2.3 mg/dL  HI            CBC with platelets differential [[id number]] (Abnormal)  Resulted: 08/15/18 1005, Result status: Final result    Ordering provider: Gurvinder Marie MD  08/15/18 0908 Resulting lab: [facility]    Specimen Information    Type Source Collected On   Blood  08/15/18 0944          Components       Value Reference Range Flag Lab   WBC 10.4 4.0 - 11.0 10e9/L  HI   RBC Count 4.56 3.8 - 5.2 10e12/L  HI   Hemoglobin 13.9 11.7 - 15.7 g/dL  HI   Hematocrit 42.7 35.0 - 47.0 %  HI   MCV 94 78 - 100 fl  HI   MCH 30.5 26.5 - 33.0 pg  HI   MCHC 32.6 31.5 - 36.5 g/dL  HI   RDW 16.3 10.0 - 15.0 % H HI   Platelet Count 277 150 - 450 10e9/L  HI   Diff Method Automated Method   HI   % Neutrophils 72.9 %  HI   % Lymphocytes 17.4 %  HI   % Monocytes 5.6 %  HI   % Eosinophils 3.1 %  HI   % Basophils 0.2 %  HI   % Immature Granulocytes 0.8 %  HI   Nucleated RBCs 0 0 /100  HI   Absolute Neutrophil 7.6 1.6 - 8.3 10e9/L  HI   Absolute Lymphocytes 1.8 0.8 - 5.3 10e9/L  HI   Absolute Monocytes 0.6 0.0 - 1.3 10e9/L  HI   Absolute Eosinophils 0.3 0.0 - 0.7 10e9/L  HI   Absolute Basophils 0.0 0.0 - 0.2 10e9/L  HI   Abs Immature Granulocytes 0.1 0 - 0.4 10e9/L  HI   Absolute Nucleated RBC 0.0   HI            Clostridium difficile toxin B PCR [[id number]] (Abnormal)  Resulted: 08/14/18 1704, Result status: Final result    Ordering provider: Gurvinder Marie MD  08/14/18 1546 Resulting lab: [facility]    Specimen Information    Type Source Collected On   Feces  08/14/18 9926          Components       Value Reference  Range Flag Lab   Specimen Description Feces   [facility]   C Diff Toxin B PCR Positive NEG^Negative A [facility]   Comment:         Positive: Toxin producing Clostridium difficile target DNA sequences detected,   presumed positive for Clostridium difficile toxin B.  Clostridium difficile (Requires Enteric Isolation)  FDA approved assay performed using [employer] GeneXpert real-time PCR.  Critical Value called to and read back by  Ana Moore at 1702 by               Basic metabolic panel [[id number]] (Abnormal)  Resulted: 08/14/18 0800, Result status: Final result    Ordering provider: Gurvinder Marie MD  08/14/18 0000 Resulting lab: [facility]    Specimen Information    Type Source Collected On   Blood  08/14/18 0610          Components       Value Reference Range Flag Lab   Sodium 147 133 - 144 mmol/L H [facility]   Potassium 4.4 3.4 - 5.3 mmol/L  HI   Chloride 116 94 - 109 mmol/L H [facility]   Carbon Dioxide 17 20 - 32 mmol/L L HI   Anion Gap 14 3 - 14 mmol/L  HI   Glucose 83 70 - 99 mg/dL  HI   Urea Nitrogen 9 7 - 30 mg/dL  HI   Creatinine 0.37 0.52 - 1.04 mg/dL L HI   GFR Estimate >90 >60 mL/min/1.7m2  HI   Comment:  Non  GFR Calc   GFR Estimate If Black >90 >60 mL/min/1.7m2  HI   Comment:  African American GFR Calc   Calcium 7.9 8.5 - 10.1 mg/dL L HI            Magnesium [[id number]]  Resulted: 08/14/18 0800, Result status: Final result    Ordering provider: Gurvinder Marie MD  08/14/18 0000 Resulting lab: [facility]    Specimen Information    Type Source Collected On   Blood  08/14/18 0610          Components       Value Reference Range Flag Lab   Magnesium 1.7 1.6 - 2.3 mg/dL  HI            Testing Performed By     Lab - Abbreviation Name Director Address Valid Date Range    210 - [facility] Unknown [address] [address] 05/08/15 1057 - Present            Unresulted Labs (24h ago through future)    Start       Ordered    Unscheduled   "Potassium  (Potassium Replacement - \"Standard\" - For K levels less than 3.4 mmol/L - [facility] )  CONDITIONAL (SPECIFY),   Routine     Comments:  Obtain Potassium Level for these conditions:  *IF no potassium result within 24 hrs before initiation of order set, draw potassium level with next lab collect.    *2 HOURS AFTER last IV potassium replacement dose and 4 hours after an oral replacement dose.  *Next morning after potassium dose.     Repeat Potassium Replacement if necessary.    08/13/18 1227    Unscheduled  Magnesium  (Magnesium Replacement -  Adult - \"Standard\" - Replacement for all levels less than 1.6 mg/dL )  CONDITIONAL (SPECIFY),   Routine     Comments:  Obtain Magnesium Level for these conditions:  *IF no magnesium result within 24 hrs before initiation of order set, draw magnesium level with next lab collect.    *2 HOURS AFTER last magnesium replacement dose when magnesium replacement given for level less than 1.6   *Next morning after magnesium dose.     Repeat Magnesium Replacement if necessary.    08/13/18 1227         Imaging Results - 3 Days      XR Abdomen Port 2 Views [[id number]]  Resulted: 08/17/18 0830, Result status: Final result    Ordering provider: Emeterio Barba MD  08/17/18 0009 Resulted by: Tony Jacobs MD    Performed: 08/17/18 0626 - 08/17/18 0650 Resulting lab: RADIOLOGY RESULTS    Narrative:       PROCEDURE: XR ABDOMEN PORT 2 VW 8/17/2018 6:50 AM    HISTORY: may do port if necessary. C diff Persistent nausea.;     COMPARISONS: August 15, 2018    TECHNIQUE: Flat and upright    FINDINGS: There is a normal intestinal gas pattern. No extraluminal  gas is noted. Surgical clips are seen in the left side of the abdomen.         Impression:       IMPRESSION: Normal abdominal gas pattern    TONY JACOBS MD      XR Abdomen 2 Views [[id number]]  Resulted: 08/15/18 0816, Result status: Final result    Ordering provider: Gurvinder Marie MD  08/15/18 0724 Resulted by: Jamila, " Tony KELSEY MD    Performed: 08/15/18 0753 - 08/15/18 0814 Resulting lab: RADIOLOGY RESULTS    Narrative:       PROCEDURE: XR ABDOMEN 2 VW 8/15/2018 8:14 AM    HISTORY: bowel obstruction;     COMPARISONS: None.    TECHNIQUE: Flat and upright    FINDINGS: There is a normal intestinal gas pattern. No extraluminal  gas or pathologic intra-abdominal calcifications are noted. Mild  lumbar scoliosis is seen.         Impression:       IMPRESSION: Normal abdominal gas pattern    TONY JACOBS MD      Testing Performed By     Lab - Abbreviation Name Director Address Valid Date Range    104 - Rad [facility] RADIOLOGY RESULTS Unknown Unknown 02/16/05 1553 - Present                Encounter-Level Documents:     There are no encounter-level documents.      Order-Level Documents - 08/13/2018:     Scan on 8/17/2018  3:18 PM by Outside, Provider : FINAL EKG (below)            on 8/17/2018  3:18 PM by Outside, Provider : FINAL EKG]  Scan on 8/17/2018  3:18 PM by Outside, Provider : FINAL EKG (below)            on 8/17/2018 10:24 AM by Outside, Provider : FINAL EKG]  Scan on 8/17/2018 10:24 AM by Outside, Provider : FINAL EKG (below)            on 8/17/2018 10:20 AM by Outside, Provider : FINAL EKG]  Scan on 8/17/2018 10:20 AM by Outside, Provider : FINAL EKG (below)            on 8/17/2018 10:20 AM by Outside, Provider : FINAL EKG]  Scan on 8/17/2018 10:20 AM by Outside, Provider : FINAL EKG (below)            on 8/17/2018 10:20 AM by Outside, Provider : FINAL EKG]  Scan on 8/17/2018 10:20 AM by Outside, Provider : FINAL EKG (below)            on 8/17/2018 10:18 AM by Outside, Provider : FINAL EKG]  Scan on 8/17/2018 10:18 AM by Outside, Provider : FINAL EKG (below)

## 2018-08-13 NOTE — ED PROVIDER NOTES
History     Chief Complaint   Patient presents with     Nausea & Vomiting     emesis x 3 today, unable to keep anything down     Chest Pain     The history is provided by the patient.     Maggie Case is a 28 year old female who presents to the emergency department via EMS for evaluation of vomiting, fatigue, and chest pain.  She states that she has been having rather appreciable emesis for the last 3 days and has not been able to keep anything down.  She denies any fevers or chills.  Denies any significant abdominal discomfort except for some mild left-sided mid abdominal pain.  Denies any hematochezia, melena, or hematemesis.  Chest pain is described as generalized and anterior.  Sharp in nature, worse with deep breath and cough, as well as palpation.  Past medical history is most significant for rather profound ischemic CVA in February requiring craniotomy.  She is currently supposed be on Xarelto for hypercoagulable state but reports not taking the medication for approximately the last 2 days.  Denies any appreciable shortness of breath.    Problem List:    Patient Active Problem List    Diagnosis Date Noted     Chemical dependency (H) 07/16/2018     Priority: Medium     Calculus of lower urinary tract 07/15/2018     Priority: Medium     History of CVA (cerebrovascular accident) 07/15/2018     Priority: Medium     Left hemiparesis (H) 07/15/2018     Priority: Medium     Chronic anticoagulation 07/15/2018     Priority: Medium     History of cranioplasty 05/24/2018     Priority: Medium     Acute ischemic stroke (H) 02/17/2018     Priority: Medium     Influenza B 05/01/2017     Priority: Medium     Opacity of lung on imaging study 05/01/2017     Priority: Medium     Community acquired pneumonia 05/01/2017     Priority: Medium     C. difficile colitis 05/01/2017     Priority: Medium     Sepsis (H) 04/28/2017     Priority: Medium     Pyelonephritis 01/05/2017     Priority: Medium     Acute chest pain 05/06/2016      Priority: Medium     Leukocytosis 05/06/2016     Priority: Medium     Lung mass 05/06/2016     Priority: Medium     SOB (shortness of breath) 05/06/2016     Priority: Medium     Acute upper GI bleed 06/18/2013     Priority: Medium     Hypokalemia 06/18/2013     Priority: Medium     Acute cystitis 06/18/2013     Priority: Medium     Anxiety state 03/25/2013     Priority: Medium     Muscle pain 07/30/2009     Priority: Medium     Overview:   IMO Update 10/11       Cervicalgia 06/16/2009     Priority: Medium     Overview:   IMO Update 10/11       Low back pain 06/16/2009     Priority: Medium     Overview:   IMO Update 10/11       Pain in joint, lower leg 05/01/2009     Priority: Medium     Diarrhea 04/17/2008     Priority: Medium     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     Priority: Medium        Past Medical History:    Past Medical History:   Diagnosis Date     Anemia      Anxiety      Chemical dependency (H)      Chronic diarrhea      Lactose intolerance      Myalgia      OCD (obsessive compulsive disorder)      Pulmonary embolism (H) 05/06/16     Stroke (H) 02/2018     Vitamin B12 deficiency        Past Surgical History:    Past Surgical History:   Procedure Laterality Date     CLOSED REDUCTION, PERCUTANEOUS PINNING LOWER EXTREMITY, COMBINED Right 4/6/2016    Procedure: COMBINED CLOSED REDUCTION, PERCUTANEOUS PINNING LOWER EXTREMITY;  Surgeon: Dexter Jones MD;  Location: [facility]     COLONOSCOPY       CRANIECTOMY Right 2/18/2018    Procedure: CRANIECTOMY;  RIGHT HEMICRANIECTOMY;  Surgeon: Emeterio Mesa MD;  Location: [facility]     CRANIOPLASTY Right 5/24/2018    Procedure: CRANIOPLASTY;  Right Cranioplasty ;  Surgeon: Emeterio Mesa MD;  Location: [facility]     UPPER GI ENDOSCOPY         Family History:    Family History   Problem Relation Age of Onset     Depression Mother      Migraines Maternal Half-Sister        Social History:  Marital Status:  Single  "[1]  Social History   Substance Use Topics     Smoking status: Former Smoker     Packs/day: 0.25     Years: 7.00     Types: Cigarettes     Quit date: 8/2/2018     Smokeless tobacco: Never Used     Alcohol use No        Medications:      Acetaminophen (TYLENOL PO)   Acetaminophen (TYLENOL PO)   cyanocobalamin (VITAMIN  B-12) 1000 MCG tablet   Dextromethorphan-guaiFENesin  MG/5ML syrup   DULOXETINE HCL PO   ferrous sulfate (IRON) 325 (65 Fe) MG tablet   GABAPENTIN PO   GABAPENTIN PO   loperamide (IMODIUM) 2 MG capsule   nystatin (MYCOSTATIN) 714988 UNIT/ML suspension   Ondansetron HCl (ZOFRAN PO)   TRAZODONE HCL PO   VANCOMYCIN HCL PO   Varenicline Tartrate (CHANTIX PO)   XARELTO 20 MG TABS tablet         Review of Systems   Constitutional: Positive for activity change, appetite change and fatigue. Negative for chills and fever.   Respiratory: Negative for cough and shortness of breath.    Cardiovascular: Positive for chest pain. Negative for palpitations.   Gastrointestinal: Positive for abdominal pain, nausea and vomiting. Negative for diarrhea.   Genitourinary: Negative.    Musculoskeletal: Negative for back pain.   Skin: Negative.    Neurological: Positive for dizziness and weakness. Negative for light-headedness.       Physical Exam   BP: 109/90  Pulse: 88  Temp: 98.3  F (36.8  C)  Resp: 16  Height: 162.6 cm (5' 4\")  Weight: 54.4 kg (120 lb) (stated)  SpO2: 100 %      Physical Exam   Constitutional: She is oriented to person, place, and time. She appears well-developed and well-nourished.   Cardiovascular: Normal rate and regular rhythm.    Pulmonary/Chest: Effort normal and breath sounds normal. She exhibits tenderness.   Right-sided chest tenderness with palpation.   Abdominal: Soft. There is no tenderness.   Neurological: She is alert and oriented to person, place, and time.   Skin: Skin is warm and dry.   Psychiatric: She has a normal mood and affect.   Nursing note and vitals reviewed.      ED Course "     ED Course     Procedures  EKG shows a normal sinus rhythm at a rate of 89.  Normal DE interval.  Normal QRS duration.  Prolonged QTC.  Left axis.  She has diffuse T-wave inversions across the precordial leads as well as the limb leads.             Critical Care time:  none               Results for orders placed or performed during the hospital encounter of 08/13/18 (from the past 24 hour(s))   Comprehensive metabolic panel   Result Value Ref Range    Sodium 141 133 - 144 mmol/L    Potassium 2.8 (LL) 3.4 - 5.3 mmol/L    Chloride 103 94 - 109 mmol/L    Carbon Dioxide 29 20 - 32 mmol/L    Anion Gap 9 3 - 14 mmol/L    Glucose 111 (H) 70 - 99 mg/dL    Urea Nitrogen 13 7 - 30 mg/dL    Creatinine 0.44 (L) 0.52 - 1.04 mg/dL    GFR Estimate >90 >60 mL/min/1.7m2    GFR Estimate If Black >90 >60 mL/min/1.7m2    Calcium 8.5 8.5 - 10.1 mg/dL    Bilirubin Total 0.4 0.2 - 1.3 mg/dL    Albumin 3.0 (L) 3.4 - 5.0 g/dL    Protein Total 6.5 (L) 6.8 - 8.8 g/dL    Alkaline Phosphatase 132 40 - 150 U/L    ALT 25 0 - 50 U/L    AST 60 (H) 0 - 45 U/L   Troponin I   Result Value Ref Range    Troponin I ES <0.015 0.000 - 0.045 ug/L   CBC with platelets differential   Result Value Ref Range    WBC 11.6 (H) 4.0 - 11.0 10e9/L    RBC Count 4.83 3.8 - 5.2 10e12/L    Hemoglobin 14.6 11.7 - 15.7 g/dL    Hematocrit 42.8 35.0 - 47.0 %    MCV 89 78 - 100 fl    MCH 30.2 26.5 - 33.0 pg    MCHC 34.1 31.5 - 36.5 g/dL    RDW 16.0 (H) 10.0 - 15.0 %    Platelet Count 340 150 - 450 10e9/L    Diff Method Automated Method     % Neutrophils 79.9 %    % Lymphocytes 12.4 %    % Monocytes 6.1 %    % Eosinophils 1.1 %    % Basophils 0.1 %    % Immature Granulocytes 0.4 %    Nucleated RBCs 0 0 /100    Absolute Neutrophil 9.2 (H) 1.6 - 8.3 10e9/L    Absolute Lymphocytes 1.4 0.8 - 5.3 10e9/L    Absolute Monocytes 0.7 0.0 - 1.3 10e9/L    Absolute Eosinophils 0.1 0.0 - 0.7 10e9/L    Absolute Basophils 0.0 0.0 - 0.2 10e9/L    Abs Immature Granulocytes 0.1 0 - 0.4  10e9/L    Absolute Nucleated RBC 0.0    Magnesium   Result Value Ref Range    Magnesium 2.0 1.6 - 2.3 mg/dL       Medications   0.9% sodium chloride with KCL 40 mEq/L IV infusion (not administered)   ondansetron (ZOFRAN-ODT) ODT tab 4 mg (4 mg Oral Given 8/13/18 1028)   enoxaparin (LOVENOX) injection 50 mg (50 mg Subcutaneous Given 8/13/18 1126)       Assessments & Plan (with Medical Decision Making)   Noncompliant with medications.  She has not taken her Xarelto in 2 days.  Potassium is 2.8 with EKG changes.  She would fit any reasonable criteria for observation with potassium replacement and advancing diet as tolerated.  Discussed the case with Dr. Marie, who graciously accpeted her care.  Subcutaneous lovenox in the ED.       I have reviewed the nursing notes.    I have reviewed the findings, diagnosis, plan and need for follow up with the patient.       New Prescriptions    No medications on file       Final diagnoses:   Hypokalemia   Non-intractable vomiting with nausea, unspecified vomiting type       8/13/2018   [facility]     Lucy Rebolledo PA-C  08/13/18 [id number]

## 2018-08-13 NOTE — PLAN OF CARE
[facility] Inpatient Admission Note:    Patient admitted to 3224/3224-1 at approximately 1215 via cart accompanied by transport tech from emergency room . Report received from Alma Delia in SBAR format at 1205 via telephone. Patient ambulated to bed via self.. Patient is alert and oriented X 3, reports pain; rates at 8 on 0-10 scale.  Patient oriented to room, unit, hourly rounding, and plan of care. Explained admission packet and patient handbook with patient bill of rights brochure. Will continue to monitor and document as needed.     Inpatient Nursing criteria listed below was met:    Health care directives status obtained and documented: Yes    Care Everywhere authorization obtained Yes    MRSA swab completed for patient 65 years and older: N/A    Patient identifies a surrogate decision maker: Yes If yes, who:  Contact Information:     Core Measure diagnosis present:No. If yes, state diagnosis: NA     If initial lactic acid >2.0, repeat lactic acid drawn within one hour of arrival to unit: NA. If no, state reason: NA    Vaccination assessment and education completed: Yes   Vaccinations received prior to admission: Pneumovax yes Influenza(seasonal)  N/A   Vaccination(s) ordered: not given today because had prior    Clergy visit ordered if patient requests: N/A    Skin issues/needs documented: N/A    Isolation Patient: yes Education given, correct sign in place and documentation row added to PCS:  Yes    Fall Prevention Yes: Care plan updated, education given and documented, sticker and magnet in place: Yes    Care Plan initiated: Yes    Education Documented (including assessment): Yes    Patient has discharge needs : No If yes, please explain: back to [facility]

## 2018-08-13 NOTE — PLAN OF CARE
Face to face report given with opportunity to observe patient.    Report given to RITESH Vines   8/13/2018  3:14 PM

## 2018-08-13 NOTE — IP AVS SNAPSHOT
"` `           [facility]: [phone]                                              INTERAGENCY TRANSFER FORM - NURSING   2018                    Hospital Admission Date: 2018  NADIR GARCIA   : 1990  Sex: Female        Attending Provider: Emeterio Barba MD     Allergies:  Amoxicillin, Levaquin [Levofloxacin], Naproxen, Nickel, Tramadol, Sulindac    Infection:  C-Difficile   Service:  GENERAL [facility]    Ht:  1.6 m (5' 3\")   Wt:  54.2 kg (119 lb 7.8 oz)   Admission Wt:  54.4 kg (120 lb)    BMI:  21.17 kg/m 2   BSA:  1.55 m 2            Patient PCP Information     Provider PCP Type    Chapo Flores MD General      Current Code Status     Date Active Code Status Order ID Comments User Context       Prior      Code Status History     Date Active Date Inactive Code Status Order ID Comments User Context    2018  1:05 PM  Full Code [id number]  Emeterio Barba MD Outpatient    2018 12:26 PM 2018  1:05 PM Full Code [id number]  Gurvinder Marie MD Inpatient    2018  8:55 AM 2018 12:26 PM Full Code [id number]  Eleuterio Anderson MD Outpatient    7/15/2018  7:23 PM 2018  8:55 AM Full Code [id number]  Abrahan Robles MD Inpatient    2018  6:08 AM 7/15/2018  7:23 PM Full Code [id number]  Dexter Vergara MD Outpatient    3/1/2018 12:04 PM 2018  6:08 AM Full Code [id number]  Yeison Crawley MD Outpatient    2018  3:20 PM 3/1/2018 12:04 PM Full Code [id number]  Reinier Shepherd MD Inpatient    2017  9:02 AM 2018  3:20 PM Full Code [id number]  Delmi Nicole NP Outpatient    2017 10:21 PM 2017  9:02 AM Full Code [id number]  Chester Cool MD Inpatient    2017  2:15 PM 2017 10:21 PM Full Code [id number]  Joey Quan DO Outpatient    2017  5:08 PM 2017  2:15 PM Full Code [id number]  Chester Cool MD Inpatient    2016  8:22 AM 2017  5:08 PM Full Code [id number]  Eleuterio Anderson " MD [facility]    5/6/2016 11:58 PM 5/8/2016  8:22 AM Full Code [id number]  Boo Méndez MD Inpatient    6/17/2013  8:27 PM 6/19/2013 12:05 AM Full Code [id number]  Brandy Livingston RN Inpatient      Advance Directives        Scanned docmt in ACP Activity?           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    Pain in joint, lower leg Medium  5/1/2009    Cervicalgia Medium  6/16/2009    Low back pain Medium  6/16/2009    Muscle pain Medium  7/30/2009    Anxiety state Medium  3/25/2013    Acute upper GI bleed Medium  6/18/2013    Acute cystitis Medium  6/18/2013    Acute chest pain Medium  5/6/2016    Leukocytosis Medium  5/6/2016    Lung mass Medium  5/6/2016    SOB (shortness of breath) Medium  5/6/2016    Pyelonephritis Medium  1/5/2017    Sepsis (H) Medium  4/28/2017    Influenza B Medium  5/1/2017    Opacity of lung on imaging study Medium  5/1/2017    Community acquired pneumonia Medium  5/1/2017    C. difficile colitis Medium  5/1/2017    Acute ischemic stroke (H) Medium  2/17/2018    History of cranioplasty Medium  5/24/2018    Calculus of lower urinary tract Medium  7/15/2018    History of CVA (cerebrovascular accident) Medium  7/15/2018    Left hemiparesis (H) Medium  7/15/2018    Chronic anticoagulation Medium  7/15/2018    Chemical dependency (H) Medium  7/16/2018      Immunizations     Name Date      HepB 09/22/03     Influenza (IIV3) PF 10/13/08     Pneumococcal 23 valent 05/01/17     TD (ADULT, 7+) 09/22/03          END      ASSESSMENT     Discharge Profile Flowsheet     EXPECTED DISCHARGE     All Quadrants Bowel Sounds  audible and active in all quadrants 08/17/18  0851    Expected Discharge Date  08/15/18 08/14/18 1910   All Quadrants Palpation  soft/nontender 08/13/18 1031    DISCHARGE NEEDS ASSESSMENT     Last Bowel Movement  08/16/18 08/16/18 1844    Concerns To Be Addressed  discharge planning concerns;substance/tobacco abuse/use concerns 07/16/18 1128   GI Signs/Symptoms  nausea 08/16/18 1844    Concerns Comments  Currently working with Sandra Alatorre for additional supports 05/09/16 1158   Passing flatus  yes 08/16/18 1844    Patient/family verbalizes understanding of discharge plan recommendations?  Yes 08/14/18 1910   COMMUNICATION ASSESSMENT      Medical Team notified of plan?  yes 08/14/18 1910   Patient's communication style  spoken language (English or Bilingual) 08/13/18 1005    Readmission Within The Last 30 Days  unable to assess 08/14/18 1910   FINAL RESOURCES      Anticipated Changes Related to Illness  none 08/14/18 1910   PAS Number  -- (n/a) 04/29/17 1821    Equipment Currently Used at Home  wheelchair, manual 08/14/18 1910   SKIN      Transportation Available  agency transportation 08/14/18 1910   Inspection of bony prominences  Full 08/17/18 0851    Key Recommendations  F/U with PCP 08/14/18 1910   Inspection under devices  Full 08/17/18 0851    Does Patient Need a Referral for Clinic CC  No 08/14/18 1910   Skin WDL  ex 08/17/18 0851    New Steerage to [facility]?  No 08/14/18 1910   Skin Color/Characteristics  redness blanchable (buttocks/coccyx) 08/16/18 1844    [facility] 08/14/18 1910   Skin Temperature  warm 08/17/18 0851    Primary Care MD Name  Dr Chapo Flores 08/14/18 1910   Skin Moisture  dry 08/17/18 0851    ASSESSMENT OF FUNCTIONAL STATUS     Skin Elasticity  quick return to original state 08/16/18 1844    Prior to admission patient needed assistance with:  Medications;Meal preparation;Laundry/Housekeeping;Shopping;Transportation;Handling finances 08/14/18 1910   Skin Integrity  bruise(s) 08/17/18 0851     "GASTROINTESTINAL (ADULT,PEDIATRIC,OB)     SAFETY      GI WDL  WDL 08/17/18 0851   Safety WDL  WDL 08/17/18 0851    Abdominal Appearance  nondistended 08/17/18 0851   All Alarms  alarm(s) activated and audible 08/17/18 0851                 Assessment WDL (Within Defined Limits) Definitions           Safety WDL     Effective: 09/28/15    Row Information: <b>WDL Definition:</b> Bed in low position, wheels locked; call light in reach; upper side rails up x 2; ID band on<br> <font color=\"gray\"><i>Item=AS safety wdl>>List=AS safety wdl>>Version=F14</i></font>      Skin WDL     Effective: 09/28/15    Row Information: <b>WDL Definition:</b> Warm; dry; intact; elastic; without discoloration; pressure points without redness<br> <font color=\"gray\"><i>Item=AS skin wdl>>List=AS skin wdl>>Version=F14</i></font>      Vitals     Vital Signs Flowsheet     VITAL SIGNS     Height  1.6 m (5' 3\") 08/13/18 1359    Temp  97.6  F (36.4  C) 08/17/18 0839   Weight  54.2 kg (119 lb 7.8 oz) 08/13/18 1359    Temp src  Temporal 08/17/18 0839   BSA (Calculated - sq m)  1.55 08/13/18 1359    Resp  16 08/17/18 0839   BMI (Calculated)  21.21 08/13/18 1359    Pulse  88 08/13/18 1010   EKG MONITORING      Heart Rate  98 08/17/18 0839   Cardiac Regularity  Regular 08/17/18 0839    Pulse/Heart Rate Source  Monitor 08/17/18 0030   Cardiac Rhythm  NSR 08/13/18 1030    BP  115/87 08/17/18 0837   GIL COMA SCALE      BP Location  Right arm 08/17/18 0839   Best Eye Response  4-->(E4) spontaneous 08/16/18 1844    OXYGEN THERAPY     Best Motor Response  6-->(M6) obeys commands 08/16/18 1844    SpO2  98 % 08/17/18 0838   Best Verbal Response  5-->(V5) oriented 08/16/18 1844    O2 Device  None (Room air) 08/17/18 0839   Gil Coma Scale Score  15 08/16/18 1844    PAIN/COMFORT     POSITIONING      Patient Currently in Pain  no 08/17/18 0839   Body Position  supine;supine, head elevated 08/17/18 1334    Preferred Pain Scale  number (Numeric Rating Pain Scale) " 08/16/18 1805   Head of Bed (HOB)  HOB at 30 degrees 08/16/18 2007    Patient's Stated Pain Goal  No pain 08/16/18 1805   Positioning/Transfer Devices  pillows;in use 08/14/18 0618    0-10 Pain Scale  6 08/16/18 1845   DAILY CARE      Pain Location  Back 08/16/18 1805   Activity Management  activity adjusted per tolerance 08/17/18 1334    Pain Orientation  Upper 08/16/18 1805   Activity Assistance Provided  assistance, 1 person 08/17/18 1334    Pain Descriptors  Aching 08/16/18 1805   ECG      Pain Intervention(s)  Medication (See eMAR) 08/16/18 1805   ECG Rhythm  Sinus rhythm 08/15/18 1308    Response to Interventions  Decrease in pain 08/16/18 1845   Ectopy  None 08/15/18 1308    HEIGHT AND WEIGHT     Lead Monitored  Lead II 08/15/18 1308            Patient Lines/Drains/Airways Status    Active LINES/DRAINS/AIRWAYS     None            Patient Lines/Drains/Airways Status    Active PICC/CVC     None            Intake/Output Detail Report     Date Intake     Output   Net    Shift P.O. I.V. IV Piggyback Total Urine Stool Total       Day 08/16/18 0700 - 08/16/18 1459 120 -- -- 120 -- -- -- 120    Nguyen 08/16/18 1500 - 08/16/18 2259 320 -- -- 320 150 -- 150 170    Noc 08/16/18 2300 - 08/17/18 0659 -- -- -- -- -- 100 100 -100    Day 08/17/18 0700 - 08/17/18 1459 240 -- -- 240 200 -- 200 40    Nguyen 08/17/18 1500 - 08/17/18 2259 -- -- -- -- -- -- -- 0      Last Void/BM       Most Recent Value    Urine Occurrence 1 at 08/17/2018 1334    Stool Occurrence 1 at 08/17/2018 1001      Case Management/Discharge Planning     Case Management/Discharge Planning Flowsheet     REFERRAL INFORMATION     COPING/STRESS      Did the Initial Social Work Assessment result in a Social Work Case?  No 08/14/18 1910   Major Change/Loss/Stressor  denies 08/16/18 0531    Admission Type  observation 08/14/18 1910   EXPECTED DISCHARGE      Arrived From  home or self-care 08/14/18 1910   Expected Discharge Date  08/15/18 08/14/18 1910    Referral Source   admission list 08/14/18 1910   ASSESSMENT/CONCERNS TO BE ADDRESSED      New Steerage to  Clinics?  No 08/14/18 1910   Concerns To Be Addressed  discharge planning concerns;substance/tobacco abuse/use concerns 07/16/18 1128    Reason For Consult  discharge planning 08/14/18 1910   Concerns Comments  Currently working with Sandra Alatorre for additional supports 05/09/16 1158    Record Reviewed  history and physical;plan of care 08/14/18 1910   DISCHARGE PLANNING      CTS Assigned to Harry Villegas RN  08/14/18 1910   Patient/family verbalizes understanding of discharge plan recommendations?  Yes 08/14/18 1910    [facility] 08/14/18 1910   Medical Team notified of plan?  yes 08/14/18 1910    Primary Care MD Name  Dr Chapo Flores 08/14/18 1910   Readmission Within The Last 30 Days  unable to assess 08/14/18 1910    LIVING ENVIRONMENT     Anticipated Changes Related to Illness  none 08/14/18 1910    Lives With  facility resident 08/14/18 1910   Transportation Available  agency transportation 08/14/18 1910    Living Arrangements  extended care facility 08/14/18 1910   Key Recommendations  F/U with PCP 08/14/18 1910    Provides Primary Care For  no one 08/14/18 1910   Does Patient Need a Referral for Clinic CC  No 08/14/18 1910    Quality Of Family Relationships  unable to assess 08/14/18 1910   FINAL RESOURCES      Able to Return to Prior Living Arrangements  yes 08/14/18 1910   Equipment Currently Used at Home  wheelchair, manual 08/14/18 1910    HOME SAFETY     PAS Number  -- (n/a) 04/29/17 1821    Patient Feels Safe Living in Home?  yes 08/14/18 1910   ABUSE RISK SCREEN      ASSESSMENT OF FAMILY/SOCIAL SUPPORT     QUESTION TO PATIENT:  Has a member of your family or a partner(now or in the past) intimidated, hurt, manipulated, or controlled you in any way?  no 08/13/18 1007    Marital Status  Single 08/14/18 1910   QUESTION TO PATIENT: Do you feel safe going back to the place where you are  living?  yes 08/13/18 1007    Who is your support system?  -- (friends) 08/14/18 1910   OBSERVATION: Is there reason to believe there has been maltreatment of a vulnerable adult (ie. Physical/Sexual/Emotional abuse, self neglect, lack of adequate food, shelter, medical care, or financial exploitation)?  no 08/13/18 1007    Description of Support System  Supportive;Involved 08/14/18 1910   OTHER      ASSESSMENT OF FUNCTIONAL STATUS     Are you depressed or being treated for depression?  Yes 08/13/18 1316    Prior to admission patient needed assistance with:  Medications;Meal preparation;Laundry/Housekeeping;Shopping;Transportation;Handling finances 08/14/18 1910   HOMICIDE RISK      EMPLOYMENT     Feels Like Hurting Others  no 08/13/18 1007    Do you work full or part-time?  no 08/14/18 1910

## 2018-08-13 NOTE — ED TRIAGE NOTES
Patient presents this am via [facility] from [facility] with complaints of nausea and vomiting for about 1.5 weeks and she is unable to keep anything down.   Patient c/o chest pain across her chest and feels it may be due to all the vomiting.  Monitors applied SR 80's.   EKG done.

## 2018-08-13 NOTE — IP AVS SNAPSHOT
` `     [facility]: [phone]            Medication Administration Report for Maggie Case as of 08/17/18 1532   Legend:    Given Hold Not Given Due Canceled Entry Other Actions    Time Time (Time) Time  Time-Action       Inactive    Active    Linked        Medications 08/11/18 08/12/18 08/13/18 08/14/18 08/15/18 08/16/18 08/17/18    acetaminophen (TYLENOL) Suppository 650 mg  Dose: 650 mg  Freq: EVERY 4 HOURS PRN Route: RE  PRN Reason: mild pain  Start: 08/13/18 1225   Admin Instructions: Alternate ibuprofen (if ordered) with acetaminophen.  Maximum acetaminophen dose from all sources = 75 mg/kg/day not to exceed 4 grams/day.    Admin. Amount: 1 suppository (1 × 650 mg suppository)  Dispense Loc: [facility] [id number]               acetaminophen (TYLENOL) tablet 650 mg  Dose: 650 mg  Freq: EVERY 4 HOURS PRN Route: PO  PRN Reasons: mild pain,fever  Start: 08/13/18 1222   Admin Instructions: Maximum acetaminophen dose from all sources = 75 mg/kg/day not to exceed 4 grams/day.    Admin. Amount: 2 tablet (2 × 325 mg tablet)  Last Admin: 08/16/18 1805  Dispense Loc: [facility] [id number]       1930 (650 mg)-Given        1816 (650 mg)-Given        1920 (650 mg)-Given        1012 (650 mg)-Given       1805 (650 mg)-Given            bisacodyl (DULCOLAX) Suppository 10 mg  Dose: 10 mg  Freq: DAILY PRN Route: RE  PRN Reason: constipation  Start: 08/13/18 1226   Admin Instructions: Hold for loose stools.  This is the third step of a three step constipation treatment.    Admin. Amount: 1 suppository (1 × 10 mg suppository)  Dispense Loc: [facility] [id number]               diphenhydrAMINE (BENADRYL) capsule 25-50 mg  Dose: 25-50 mg  Freq: EVERY 6 HOURS PRN Route: PO  PRN Reason: itching  Start: 08/13/18 1703   Admin. Amount: 1-2 capsule (1-2 × 25 mg capsule)  Last Admin: 08/13/18 1930  Dispense Loc: [facility] [id number]       1711 (25 mg)-Given       1930 (25 mg)-Given               DULoxetine (CYMBALTA) EC capsule 60 mg  Dose: 60 mg  Freq: DAILY  Route: PO  Start: 08/13/18 1230   Admin. Amount: 1 capsule (1 × 60 mg capsule)  Last Admin: 08/17/18 0821  Dispense Loc: [facility] [id number]       (1317)-Not Given        1027 (60 mg)-Given        (1323)-Not Given [C]        (1035)-Not Given       1312 (60 mg)-Given        0821 (60 mg)-Given           gabapentin (NEURONTIN) capsule 300 mg  Dose: 300 mg  Freq: 2 TIMES DAILY Route: PO  Start: 08/13/18 1315   Admin. Amount: 1 capsule (1 × 300 mg capsule)  Last Admin: 08/17/18 1420  Dispense Loc: [facility] [id number]       (1317)-Not Given        (0830)-Not Given [C]       1319 (300 mg)-Given        (1018)-Not Given       (1323)-Not Given        (1035)-Not Given       1312 (300 mg)-Given        0821 (300 mg)-Given       1420 (300 mg)-Given           gabapentin (NEURONTIN) tablet 600 mg  Dose: 600 mg  Freq: AT BEDTIME Route: PO  Start: 08/13/18 2200   Admin. Amount: 1 tablet (1 × 600 mg tablet)  Last Admin: 08/16/18 2119  Dispense Loc: [facility] [id number]       (2130)-Not Given [C]        2132 (600 mg)-Given        (2126)-Not Given        2119 (600 mg)-Given        [ ] 2200           magnesium hydroxide (MILK OF MAGNESIA) suspension 30 mL  Dose: 30 mL  Freq: DAILY PRN Route: PO  PRN Reason: constipation  Start: 08/13/18 1226   Admin Instructions: Hold for loose stools.  This is the second step of a three step constipation treatment.    Admin. Amount: 30 mL  Dispense Loc: [facility] [id number]  Volume: 30 mL               magnesium sulfate 4 g in 100 mL sterile water (premade)  Dose: 4 g  Freq: EVERY 4 HOURS PRN Route: IV  PRN Reason: magnesium supplementation  Start: 08/13/18 1227   Admin Instructions: For serum Mg++ less than 1.6 mg/dL  Give 4 g and recheck magnesium level 2 hours after dose, and next AM.    Admin. Amount: 4 g = 100 mL Conc: 4 g/100 mL  Dispense Loc: [facility]  Infused Over: 120 Minutes  Volume: 100 mL               melatonin tablet 1 mg  Dose: 1 mg  Freq: AT BEDTIME PRN Route: PO  PRN Reason: sleep  Start: 08/13/18 1225   Admin  Instructions: Do not give unless at least 6 hours of uninterrupted sleep is expected.    Admin. Amount: 1 tablet (1 × 1 mg tablet)  Dispense Loc: [facility] [id number]               naloxone (NARCAN) injection 0.1-0.4 mg  Dose: 0.1-0.4 mg  Freq: EVERY 2 MIN PRN Route: IV  PRN Reason: opioid reversal  Start: 08/13/18 1225   Admin Instructions: For respiratory rate LESS than or EQUAL to 8.  Partial reversal dose:  0.1 mg titrated q 2 minutes for Analgesia Side Effects Monitoring Sedation Level of 3 (frequently drowsy, arousable, drifts to sleep during conversation).Full reversal dose:  0.4 mg bolus for Analgesia Side Effects Monitoring Sedation Level of 4 (somnolent, minimal or no response to stimulation).  For ordered doses up to 2mg give IVP. Give each 0.4mg over 15 seconds in emergency situations. For non-emergent situations further dilute in 9mL of NS to facilitate titration of response.    Admin. Amount: 0.1-0.4 mg = 0.25-1 mL Conc: 0.4 mg/mL  Dispense Loc: [facility] [id number]  Volume: 1 mL               ondansetron (ZOFRAN) injection 4 mg  Dose: 4 mg  Freq: EVERY 6 HOURS PRN Route: IV  PRN Reasons: nausea,vomiting  Start: 08/14/18 1839   Admin Instructions: Irritant. For ordered doses up to 4 mg, give IV Push undiluted over 2-5 minutes.    Admin. Amount: 4 mg = 2 mL Conc: 4 mg/2 mL  Last Admin: 08/16/18 0233  Dispense Loc: [facility] [id number]  Infused Over: 2-5 Minutes  Volume: 2 mL        1844 (4 mg)-Given        0852 (4 mg)-Given       1742 (4 mg)-Given        0233 (4 mg)-Given            ondansetron (ZOFRAN-ODT) ODT tab 8 mg  Dose: 8 mg  Freq: EVERY 8 HOURS PRN Route: PO  PRN Reasons: nausea,vomiting  Start: 08/16/18 1001   Admin Instructions: With dry hands, peel back foil backing and gently remove tablet; do not push oral disintegrating tablet through foil backing; administer immediately on tongue and oral disintegrating tablet dissolves in seconds; then swallow with saliva; liquid not required.    Admin. Amount: 2 tablet (2 × 4 mg  tablet)  Last Admin: 08/16/18 1805  Dispense Loc: [facility] [id number]          1010 (8 mg)-Given       1805 (8 mg)-Given            potassium chloride (KLOR-CON) Packet 20-40 mEq  Dose: 20-40 mEq  Freq: EVERY 2 HOURS PRN Route: ORAL OR FEED  PRN Reason: potassium supplementation  Start: 08/13/18 1227   Admin Instructions: Use if unable to tolerate tablets.   If Serum K+ 3.0-3.3, dose = 60 mEq po total dose (40 mEq x1 followed in 2 hours by 20 mEq x1). Recheck K+ level 4 hours after dose and the next AM.  If Serum K+ 2.5-2.9, dose = 80 mEq po total dose (40 mEq Q2H x2). Recheck K+ level 4 hours after dose and the next AM.  If Serum K+ less than 2.5, See IV order.  Dissolve packet contents in 4-8 ounces of cold water or juice.    Admin. Amount: 20-40 mEq  Dispense Loc: [facility] [id number]               potassium chloride 10 mEq in 100 mL intermittent infusion with 10 mg lidocaine  Dose: 10 mEq  Freq: EVERY 1 HOUR PRN Route: IV  PRN Reason: potassium supplementation  Start: 08/13/18 1227   Admin Instructions: Infuse via PERIPHERAL LINE. Use potassium with lidocaine for pain with peripheral administration.  If Serum K+ 3.0-3.3, dose = 10 mEq/hr x 4 doses (40 mEq IV total dose). Recheck K+ level 2 hours after dose and the next AM.  If Serum K+ less than 3.0, dose = 10 mEq/hr x 6 doses (60 mEq IV total dose). Recheck K+ level 2 hours after dose and the next AM.    Admin. Amount: 10 mEq = 100 mL Conc: 10 mEq/100 mL  Dispense Loc: [facility]  Infused Over: 1 Hours  Volume: 100 mL               potassium chloride 10 mEq in 100 mL sterile water intermittent infusion (premix)  Dose: 10 mEq  Freq: EVERY 1 HOUR PRN Route: IV  PRN Reason: potassium supplementation  Start: 08/13/18 1227   Admin Instructions: Infuse via PERIPHERAL LINE or CENTRAL LINE.  If Serum K+ 3.0-3.3, dose = 10 mEq/hr x 4 doses (40 mEq IV total dose). Recheck K+ level 2 hours after dose and the next AM.  If Serum K+ less than 3.0, dose = 10 mEq/hr x 6 doses (60 mEq IV  total dose). Recheck K+ level 2 hours after dose and the next AM.    Admin. Amount: 10 mEq = 100 mL Conc: 10 mEq/100 mL  Dispense Loc: [facility]  Infused Over: 60 Minutes  Volume: 100 mL               potassium chloride SA (K-DUR/KLOR-CON M) CR tablet 20-40 mEq  Dose: 20-40 mEq  Freq: EVERY 2 HOURS PRN Route: PO  PRN Reason: potassium supplementation  Start: 08/13/18 1227   Admin Instructions: Use if able to take PO.   If Serum K+ 3.0-3.3, dose = 60 mEq po total dose (40 mEq x1 followed in 2 hours by 20 mEq x1). Recheck K+ level 4 hours after dose and the next AM.  If Serum K+ 2.5-2.9, dose = 80 mEq po total dose (40 mEq Q2H x2). Recheck K+ level 4 hours after dose and the next AM.  If Serum K+ less than 2.5, See IV order.  DO NOT CRUSH    Admin. Amount: 1-2 tablet (1-2 × 20 mEq tablet)  Dispense Loc: [facility] [id number]               prochlorperazine (COMPAZINE) injection 5 mg  Dose: 5 mg  Freq: EVERY 6 HOURS PRN Route: IV  PRN Reasons: nausea,vomiting  Start: 08/16/18 1001   Admin Instructions: For ordered doses up to 10 mg, give IV Push undiluted. Each 5mg over 1 minute.    Admin. Amount: 5 mg = 1 mL Conc: 5 mg/mL  Dispense Loc: [facility] [id number]  Infused Over: 1-2 Minutes  Volume: 1 mL                            Or  prochlorperazine (COMPAZINE) tablet 5-10 mg  Dose: 5-10 mg  Freq: EVERY 6 HOURS PRN Route: PO  PRN Reasons: nausea,vomiting  Start: 08/16/18 1001   Admin. Amount: 1-2 tablet (1-2 × 5 mg tablet)  Last Admin: 08/16/18 2119  Dispense Loc: [facility] [id number]          1311 (10 mg)-Given       2119 (10 mg)-Given           Or  prochlorperazine (COMPAZINE) Suppository 25 mg  Dose: 25 mg  Freq: EVERY 12 HOURS PRN Route: RE  PRN Reasons: nausea,vomiting  Start: 08/16/18 1001   Admin. Amount: 1 suppository (1 × 25 mg suppository)  [id number]                             rivaroxaban ANTICOAGULANT (XARELTO) tablet 20 mg  Dose: 20 mg  Freq: EVERY MORNING Route: PO  Start: 08/13/18 1230   Admin. Amount: 1 tablet  (1 × 20 mg tablet)  Last Admin: 08/17/18 0821  Dispense Loc: [facility] [id number]       (1319)-Not Given        1027 (20 mg)-Given        (1323)-Not Given [C]        (1035)-Not Given       1312 (20 mg)-Given        0821 (20 mg)-Given           traZODone (DESYREL) tablet 100 mg  Dose: 100 mg  Freq: AT BEDTIME Route: PO  Start: 08/13/18 2200   Admin. Amount: 1 tablet (1 × 100 mg tablet)  Last Admin: 08/16/18 2119  Dispense Loc: [facility] [id number]       (2130)-Not Given [C]        (2138)-Not Given [C]        (2126)-Not Given        2119 (100 mg)-Given        [ ] 2200           vancomycin (FIRVANQ) oral solution 125 mg  Dose: 125 mg  Freq: 4 TIMES DAILY Route: PO  Indications Comment: cdiff colitis  Start: 08/15/18 0915   Admin. Amount: 125 mg = 2.5 mL Conc: 50 mg/mL  Last Admin: 08/17/18 1420  Dispense Loc: [facility]  Volume: 2.5 mL         1055 (125 mg)-Given       1422 (125 mg)-Given       1742 (125 mg)-Given       2127 (125 mg)-Given        1034 (125 mg)-Given       (1400)-Not Given       1804 (125 mg)-Given       2119 (125 mg)-Given        1016 (125 mg)-Given       1420 (125 mg)-Given       [ ] 1800       [ ] 2200          Discontinued Medications  Medications 08/11/18 08/12/18 08/13/18 08/14/18 08/15/18 08/16/18 08/17/18         Rate: 125 mL/hr   Freq: CONTINUOUS Route: IV  Start: 08/13/18 1113   End: 08/15/18 1026   Last Admin: 08/14/18 2237  Dispense Loc: [facility]  Volume: 1,000 mL       1143 ( )-New Bag       1209-ED Infusing on Admission/transfer       1558 ( )-New Bag       1711 ( )-Rate/Dose Change       2219 ( )-New Bag        0706 ( )-New Bag       1452 ( )-New Bag       2237 ( )-New Bag        1026-Med Discontinued           Rate: 100 mL/hr   Freq: CONTINUOUS Route: IV  Start: 08/15/18 1345   End: 08/16/18 1001   Last Admin: 08/16/18 0022  Dispense Loc: [facility]  Volume: 1,000 mL         1418 ( )-New Bag        0022 ( )-New Bag       1001-Med Discontinued          Dose: 2 mg  Freq: 4 TIMES DAILY  PRN Route: PO  PRN Reason: diarrhea  Start: 08/13/18 1222   End: 08/15/18 1013   Admin. Amount: 1 capsule (1 × 2 mg capsule)  Dispense Loc: [address]

## 2018-08-13 NOTE — IP AVS SNAPSHOT
[facility]    [address] [place] [id number]    Phone:  [phone]    Fax:  [phone]                                       After Visit Summary   8/13/2018    Maggie Case    MRN: [id number]           After Visit Summary Signature Page     I have received my discharge instructions, and my questions have been answered. I have discussed any challenges I see with this plan with the nurse or doctor.    ..........................................................................................................................................  Patient/Patient Representative Signature      ..........................................................................................................................................  Patient Representative Print Name and Relationship to Patient    ..................................................               ................................................  Date                                            Time    ..........................................................................................................................................  Reviewed by Signature/Title    ...................................................              ..............................................  Date                                                            Time

## 2018-08-13 NOTE — IP AVS SNAPSHOT
"    [facility]: [phone]                                              INTERAGENCY TRANSFER FORM - PHYSICIAN ORDERS   2018                    Hospital Admission Date: 2018  NADIR GARCIA   : 1990  Sex: Female        Attending Provider: Emeterio Barba MD     Allergies:  Amoxicillin, Levaquin [Levofloxacin], Naproxen, Nickel, Tramadol, Sulindac    Infection:  C-Difficile   Service:  GENERAL [facility]    Ht:  1.6 m (5' 3\")   Wt:  54.2 kg (119 lb 7.8 oz)   Admission Wt:  54.4 kg (120 lb)    BMI:  21.17 kg/m 2   BSA:  1.55 m 2            Patient PCP Information     Provider PCP Type    Chapo Flores MD General      ED Clinical Impression     Diagnosis Description Comment Added By Time Added    Hypokalemia [E87.6] Hypokalemia [E87.6]  Lucy Rebolledo PA-C 2018 11:13 AM    Non-intractable vomiting with nausea, unspecified vomiting type [R11.2] Non-intractable vomiting with nausea, unspecified vomiting type [R11.2]  Lucy Rebolledo PA-C 2018 11:33 AM      Hospital Problems as of 2018              Priority Class Noted POA    Hypokalemia Medium  2013 Yes    Clostridium difficile colitis Medium  8/15/2018 Yes      Non-Hospital Problems as of 2018              Priority Class Noted    Diarrhea Medium  2008    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 difficile colitis Medium  5/1/2017    Acute ischemic stroke (H) Medium  2/17/2018    History of cranioplasty Medium  5/24/2018    Calculus of lower urinary tract Medium  7/15/2018    History of CVA (cerebrovascular accident) Medium  7/15/2018    Left hemiparesis (H) Medium  7/15/2018    Chronic anticoagulation Medium  7/15/2018    Chemical dependency (H) Medium  7/16/2018      Code Status History     Date Active Date Inactive Code Status Order ID Comments User Context    8/17/2018  1:05 PM  Full Code [id number]  Emeterio Barba MD Outpatient    8/13/2018 12:26 PM 8/17/2018  1:05 PM Full Code [id number]  Gurvinder Marie MD Inpatient    7/18/2018  8:55 AM 8/13/2018 12:26 PM Full Code [id number]  Eleuterio Anderson MD Outpatient    7/15/2018  7:23 PM 7/18/2018  8:55 AM Full Code [id number]  Abrahan Robles MD Inpatient    5/26/2018  6:08 AM 7/15/2018  7:23 PM Full Code [id number]  Dexter Vergara MD Outpatient    3/1/2018 12:04 PM 5/26/2018  6:08 AM Full Code [id number]  Yeison Crawley MD Outpatient    2/17/2018  3:20 PM 3/1/2018 12:04 PM Full Code [id number]  Reinier Shepherd MD Inpatient    5/1/2017  9:02 AM 2/17/2018  3:20 PM Full Code [id number]  Delmi Nicole NP Outpatient    4/28/2017 10:21 PM 5/1/2017  9:02 AM Full Code [id number]  Chester Cool MD Inpatient    1/8/2017  2:15 PM 4/28/2017 10:21 PM Full Code [id number]  Joey Quan DO Outpatient    1/5/2017  5:08 PM 1/8/2017  2:15 PM Full Code [id number]  Chester Cool MD Inpatient    5/8/2016  8:22 AM 1/5/2017  5:08 PM Full Code [id number]  Eleuterio Anderson MD Outpatient    5/6/2016 11:58 PM 5/8/2016  8:22 AM Full Code [id number]  Boo Méndez MD Inpatient    6/17/2013  8:27 PM 6/19/2013 12:05 AM Full Code [id number]  Brandy Livingston RN Inpatient         Medication Review      START taking        Dose / Directions Comments    prochlorperazine 25 MG Suppository   Commonly known as:   COMPAZINE        Dose:  25 mg   Place 1 suppository (25 mg) rectally every 12 hours as needed for nausea or vomiting   Quantity:  60 suppository   Refills:  0        prochlorperazine 5 MG tablet   Commonly known as:  COMPAZINE        Dose:  5-10 mg   Take 1-2 tablets (5-10 mg) by mouth every 6 hours as needed for nausea or vomiting   Quantity:  90 tablet   Refills:  0        vancomycin 50 MG/ML Solr   Commonly known as:  FIRVANQ   Indication:  cdiff colitis        125 mg PO QID x 7 days, then 125 mg PO BID x 7 days, then 125 mg PO daily x 7 days, then 125 mg PO every other day x 28 days.   Quantity:  140 mL   Refills:  0    May substitute capsule or other available for appropriate for oral use         CONTINUE these medications which have NOT CHANGED        Dose / Directions Comments    cyanocobalamin 1000 MCG tablet   Commonly known as:  vitamin  B-12        Dose:  1000 mcg   Take 1,000 mcg by mouth daily   Refills:  0        Dextromethorphan-guaiFENesin  MG/5ML syrup        Dose:  5 mL   Take 5 mLs by mouth every 4 hours as needed for cough   Refills:  0        DULOXETINE HCL PO        Dose:  60 mg   Take 60 mg by mouth daily   Refills:  0        ferrous sulfate 325 (65 Fe) MG tablet   Commonly known as:  IRON        Take by mouth daily (with breakfast)   Refills:  0        * GABAPENTIN PO        Dose:  600 mg   Take 600 mg by mouth At Bedtime   Refills:  0        * GABAPENTIN PO        Dose:  300 mg   Take 300 mg by mouth 2 times daily 300mg AM and Noon   Refills:  0        loperamide 2 MG capsule   Commonly known as:  IMODIUM        Dose:  2 mg   Take 2 mg by mouth 4 times daily as needed for diarrhea   Refills:  0        nystatin [id number] UNIT/ML suspension   Commonly known as:  MYCOSTATIN        Dose:  198751 Units   Take 5 mLs (500,000 Units) by mouth 4 times daily   Quantity:  280 mL   Refills:  0        TRAZODONE HCL PO        Dose:  100 mg   Take 100 mg by mouth At Bedtime   Refills:  0        *  TYLENOL PO        Dose:  650 mg   Take 650 mg by mouth every 4 hours as needed for mild pain or fever   Refills:  0        * TYLENOL PO        Dose:  650 mg   Take 650 mg by mouth 4 times daily   Refills:  0        XARELTO 20 MG Tabs tablet   Generic drug:  rivaroxaban ANTICOAGULANT        Dose:  20 mg   Take 20 mg by mouth every morning   Refills:  11        ZOFRAN PO        Dose:  4 mg   Take 4 mg by mouth every 8 hours as needed for nausea or vomiting   Refills:  0        * Notice:  This list has 4 medication(s) that are the same as other medications prescribed for you. Read the directions carefully, and ask your doctor or other care provider to review them with you.            Summary of Visit     Reason for your hospital stay       Thist is a 28 year old woman with a history significant for ASD, DVT/PE and paradoxical, hemorrhagic CVA which required craniotomy.  She presented to ED with vomiting, fatigue, and chest pain, with emesis for the 3 days and has not been able to keep anything down.  She denies any fevers or chills.  Evaluation showed hypokalemia and positive toxin for C. difficile (recent treatment for C. difficile colitis July/2018).  She has been treated with rivarobaxan but did not take this for the last several days before admission because of her symptoms.  She responded to usual treatment for hypokalemia and resuming treatment for possible C. difficile infection with oral vancomycin.  With persistent encouragement she has been able to take adequate oral fluid over the past several days.  Nausea is not been an issue over the past day or so but did require treatment including with Compazine during this admission.             After Care     Activity - Up with nursing assistance           Additional Discharge Instructions       Encourage oral fluids, at least 500 mL per 8 hour shift during the day  Enteric precautions per institution policy for C difficile infection       Advance Diet as Tolerated        Follow this diet upon discharge: Orders Placed This Encounter      Regular Diet Adult  Ensure clear or other nutritional supplement       Fall precautions           General info for SNF       Length of Stay Estimate: Long Term Care  Condition at Discharge: Improving  Level of care:skilled   Rehabilitation Potential: Fair  Admission H&P remains valid and up-to-date: Yes  Recent Chemotherapy: N/A  Use Nursing Home Standing Orders: Yes       Intake and output       Every shift       Mantoux instructions       Give two-step Mantoux (PPD) Per Facility Policy Yes             Referrals     Nutrition Services Adult IP Consult       Reason:  Difficulty tolerating oral food and fluid             Follow-Up Appointment Instructions     Future Labs/Procedures    Follow Up and recommended labs and tests     Comments:    Follow up with Nursing home physician or primary care provider in 4-7 days. BMP in 1 week to evaluate potassium and renal function      Follow-Up Appointment Instructions     Follow Up and recommended labs and tests       Follow up with Nursing home physician or primary care provider in 4-7 days. BMP in 1 week to evaluate potassium and renal function             Statement of Approval     Ordered          08/17/18 1315  I have reviewed and agree with all the recommendations and orders detailed in this document.  EFFECTIVE NOW     Approved and electronically signed by:  Emeterio Barba MD

## 2018-08-13 NOTE — ED NOTES
DATE:  8/13/2018   TIME OF RECEIPT FROM LAB:  1104  LAB TEST:  Potassium  LAB VALUE:  2.8   RESULTS GIVEN WITH READ-BACK TO (PROVIDER):  Lucy Rebolledo PA-C  TIME LAB VALUE REPORTED TO PROVIDER:   1105

## 2018-08-13 NOTE — IP AVS SNAPSHOT
` ` Patient Information     Patient Name Sex     Maggie Case ([id number]) Female 1990       Room Bed    3224 3224-1      Patient Demographics     Address Phone    [address] [place] [address] [phone] (Home)  [phone] (Mobile) *Preferred*      Patient Ethnicity & Race     Ethnic Group Patient Race    American  or       Emergency Contact(s)     Name Relation Home Work Mobile    Dandre Case Father [phone]    Pawan Case Mother [phone]      Documents on File        Status Date Received Description       Documents for the Patient    Consent for Services - Hospital/Clinic-ESign Received () 13     [facility] Privacy Notice-ESign Received 13     Insurance Card Not Received  NEED 18    Patient ID Received () 13 [place]    Consent for EHR Access-Received-ESign Received 13     Consent for EHR Access-Decline-ESign       Consent for Services - Hospital/Clinic-ESign Received () 14     Consent for Services - Hospital/Clinic-ESign Received () 08/29/15     HIM ARANZA Authorization - File Only  () 12/29/15 AUTHORIZATION FOR RELEASE OF PHI    Consent for Services/Privacy Notice - Hospital/Clinic-Esign Received () 16     Privacy Notice - [facility] Received 16     Consent for EHR Access       External Medication Information Consent       [facility] Specified Other       Consent for Services/Privacy Notice - Hospital/Clinic Received () 17     Patient ID Received 16 [place]    HIM ARANZA Authorization  16 [facility]    HIM ARANZA Authorization  16 [facility]    HIM ARANZA Authorization  17     HIM ARANZA Authorization - File Only  17 AUTHORIZATION FOR RELEASE OF PROTECTED HEALTH INFORMATION    HIM ARAZNA Authorization  17     Care Everywhere Prospective Auth Received 18     Consent for Services/Privacy Notice - Hospital/Clinic Received  18     HIM ARANZA Authorization  18 PATIENT    HIM ARANZA Authorization  18 ANDIR GARCIA/SHERRON    Consent for Services - Hospital and Clinic Received 18     HIE Auth Received 18     HIM ARANZA Authorization  18 SFR    Advance Directives and Living Will Received 18 POLST 2018    Consent for Services - [facility]       HIM ARANZA Authorization - File Only  18 [facility]    Patient Photo   Photo of Patient    CE Prospective Auth Received () 18 Authorization Document from Social Security Administration    System-Retrieved CE Auth Form Received () 18 External Authorization Form from Social Security Administration    CE Prospective Auth Received () 18 Authorization Document from [employer]    System-Retrieved CE Auth Form Received () 18 External Authorization Form from [employer]       Documents for the Encounter    Discharge Attachment   HYPOKALEMIA, DISCHARGE INSTRUCTIONS (ENGLISH)    Discharge Attachment   HYPOKALEMIA (ENGLISH)    Discharge Attachment   C. DIFF, WHAT IS  (ENGLISH)    Discharge Attachment   PROCHLORPERAZINE TABLETS (ENGLISH)    Discharge Attachment   PROCHLORPERAZINE SUPPOSITORIES (ENGLISH)    Discharge Attachment   VANCOMYCIN CAPSULES (ENGLISH)    Discharge Attachment   VOMITING (6Y-ADULT) (ENGLISH)    EKG Cardiac - HIM Scan  18 FINAL EKG    EKG Cardiac - HIM Scan  18 FINAL EKG    EKG Cardiac - HIM Scan  18 FINAL EKG    EKG Cardiac - HIM Scan  18 FINAL EKG    EKG Cardiac - HIM Scan  18 FINAL EKG    EKG Cardiac - HIM Scan  18 FINAL EKG    EKG Cardiac - HIM Scan  18 FINAL EKG      Admission Information     Attending Provider Admitting Provider Admission Type Admission Date/Time    [facility]  1001    Discharge Date Hospital Service Auth/Cert Status Service Area      General Medicine Incomplete [facility] SERVICES    Unit Room/Bed Admission Status       [facility] MEDICAL SURGICAL 3224/3224-1 Admission (Confirmed)       Admission     Complaint    Hypokalemia, Clostridium difficile colitis      Hospital Account     Name Acct ID Class Status Primary Coverage    Maggie Case [id number] Inpatient Open KEDAR - KEDAR NAVA            Guarantor Account (for Hospital Account #[id number])     Name Relation to Pt Service Area Active? Acct Type    Maggie Case Self RANGE Yes Personal/Family    Address Phone          [address] [place] [address] [phone](H)              Coverage Information (for Hospital Account #[id number])     F/O Payor/Plan Precert #    KEDAR/KEDAR NAVA     Subscriber Subscriber #    Maggie Case [id number]    Address Phone    [address]  [place] [address] [phone]

## 2018-08-13 NOTE — PLAN OF CARE
Problem: Patient Care Overview  Goal: Plan of Care/Patient Progress Review  -diagnostic tests and consults completed and resulted   -vital signs normal or at patient baseline  Outcome: No Change  Alert and oriented. VSS. Lower abdominal pain, pelvic pain and lower back pain- states this has been going on since biopsies taken from cervix in clinic on 8/10/18. 0.3mg dilaudid given once with some decrease in pain. Phenergan requested d/t zofran making nausea worse. Pt having scant brown drainage from vagina since surgery. Bowels hypoactive. No loose stool. Completed oral vanco on 8/4/2018 for cdiff. IV- NS with 40kcl at 250ml/hr. Call light use appropriate. Bedside commode within reach.

## 2018-08-13 NOTE — PROVIDER NOTIFICATION
Lab tried to draw a potassium but was unable to get enough to run a result.  Patient had told them to stop and she didn't want to do it again.  Writer updated Dr. Marie.  We will try to get another draw in a couple hours; MD is keeping IVF at 250ml/hr.  Ordered another draw for 1800.

## 2018-08-13 NOTE — IP AVS SNAPSHOT
` `     [facility]: [phone]                 INTERAGENCY TRANSFER FORM - NOTES (H&P, Discharge Summary, Consults, Procedures, Therapies)   2018                    Hospital Admission Date: 2018  NADIR GARCIA   : 1990  Sex: Female        Patient PCP Information     Provider PCP Type    Chapo Flores MD General         History & Physicals      H&P by Gurvinder Marie MD at 2018 12:54 PM     Author:  Gurvinder Marie MD Service:  Hospitalist Author Type:  Physician    Filed:  2018  1:27 PM Date of Service:  2018 12:54 PM Creation Time:  2018 12:54 PM    Status:  Addendum :  Gurvinder Marie MD (Physician)         [facility] History and Physical    Nadir Garcia MRN# [id number]   Age: 28 year old YOB: 1990     Date of Admission:  2018      Primary care provider: Chapo Flores          Assessment and Plan:   Assessment:     Cyclic vomiting  Symptomatic management  Normal abdominal exam    Hypokalemia  With EKG changes  Telemetry   IV potassium replacement  Intensive monitoring    hypercoagulability disorder  Restart xarelto    Medical non compliance  Restart prescribed PTA meds    Code status   Full code    DVT prophylaxis  On xarelto    Patient Active Problem List    Diagnosis Date Noted     Chemical dependency (H) 2018     Priority: Medium     Calculus of lower urinary tract 07/15/2018     Priority: Medium     History of CVA (cerebrovascular accident) 07/15/2018     Priority: Medium     Left hemiparesis (H) 07/15/2018     Priority: Medium     Chronic anticoagulation 07/15/2018     Priority: Medium     History of cranioplasty 2018     Priority: Medium     Acute ischemic stroke (H) 2018     Priority: Medium     Influenza B 2017     Priority: Medium     Opacity of lung on imaging study 2017     Priority: Medium     Community acquired pneumonia 2017     Priority: Medium     C.  difficile colitis 05/01/2017     Priority: Medium     Sepsis (H) 04/28/2017     Priority: Medium     Pyelonephritis 01/05/2017     Priority: Medium     Acute chest pain 05/06/2016     Priority: Medium     Leukocytosis 05/06/2016     Priority: Medium     Lung mass 05/06/2016     Priority: Medium     SOB (shortness of breath) 05/06/2016     Priority: Medium     Acute upper GI bleed 06/18/2013     Priority: Medium     Hypokalemia 06/18/2013     Priority: Medium     Acute cystitis 06/18/2013     Priority: Medium     Anxiety state 03/25/2013     Priority: Medium     Muscle pain 07/30/2009     Priority: Medium     Overview:   IMO Update 10/11       Cervicalgia 06/16/2009     Priority: Medium     Overview:   IMO Update 10/11       Low back pain 06/16/2009     Priority: Medium     Overview:   IMO Update 10/11       Pain in joint, lower leg 05/01/2009     Priority: Medium     Diarrhea 04/17/2008     Priority: Medium     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     Priority: Medium           Plan:   Orders Placed This Encounter   Procedures     Comprehensive metabolic panel     Troponin I     CBC with platelets differential     Magnesium     Basic metabolic panel     Potassium     Magnesium     EKG 12-lead, tracing only     Peripheral IV catheter     Observation goals     Nursing observations and interventions     Notify Provider -Pain Management     Measure height and weight     Vital signs     Notify Physician to consider change to inpatient status IF     Notify Physician to consider change to inpatient status IF     Activity: Ambulate with assist     Full Code     Enteric Isolation     [place] to Observation     ED Bed Request                 Chief Complaint:     Nausea and vomiting    History is obtained from the patient         History of Present Illness:   This patient is a 28 year old  female with a significant past medical history of[PS1.1] ASD,[PS1.2] DVT[PS1.1], PE and  paradoxical,[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    As an outpatient, she was prescribed xarelto but[PS1.1] did[PS1.2] not take this for the last several days.   She also reports anterior chest pain worse with deep inspiration and better with rest.   In the ED she was noted to have a normal troponin but elevated serum potasium with corresponding EKG changes.   She reports no change in bowel habit and no bloody stool or vomit.   She was admitted for further management.          Past Medical History:     Past Medical History:   Diagnosis Date     Anemia      Anxiety      Chemical dependency (H)      Chronic diarrhea      Lactose intolerance      Myalgia      OCD (obsessive compulsive disorder)      Pulmonary embolism (H) 05/06/16     Stroke (H) 02/2018     Vitamin B12 deficiency              Past Surgical History:     Past Surgical History:   Procedure Laterality Date     CLOSED REDUCTION, PERCUTANEOUS PINNING LOWER EXTREMITY, COMBINED Right 4/6/2016    Procedure: COMBINED CLOSED REDUCTION, PERCUTANEOUS PINNING LOWER EXTREMITY;  Surgeon: Dexter Jones MD;  Location: [facility]     COLONOSCOPY       CRANIECTOMY Right 2/18/2018    Procedure: CRANIECTOMY;  RIGHT HEMICRANIECTOMY;  Surgeon: Emeterio Mesa MD;  Location: [facility]     CRANIOPLASTY Right 5/24/2018    Procedure: CRANIOPLASTY;  Right Cranioplasty ;  Surgeon: Emeterio Mesa MD;  Location: [facility] OR     UPPER GI ENDOSCOPY               Obstetrical History:one child by C section              Social History:     Social History   Substance Use Topics     Smoking status: Former Smoker     Packs/day: 0.25     Years: 7.00     Types: Cigarettes     Quit date: 8/2/2018     Smokeless tobacco: Never Used     Alcohol use No             Family History:     Family History   Problem Relation Age of Onset     Depression Mother      Migraines Maternal Half-Sister              Immunizations:     VACCINE/DOSE   Diptheria   DPT   DTAP   HBIG   Hepatitis A   Hepatitis  B   HIB   Influenza   Measles   Meningococcal   MMR   Mumps   Pneumococcal   Polio   Rubella   Small Pox   TDAP   Varicella   Zoster            Allergies:     Allergies   Allergen Reactions     Amoxicillin Other (See Comments)     Headaches     Levaquin [Levofloxacin] Swelling     Naproxen Other (See Comments)     Reaction: Headaches     Nickel      Tramadol      Sulindac Rash             Medications:     Current Facility-Administered Medications   Medication     0.9% sodium chloride with KCL 40 mEq/L IV infusion     acetaminophen (TYLENOL) Suppository 650 mg     acetaminophen (TYLENOL) tablet 650 mg     acetaminophen (TYLENOL) tablet 650 mg     bisacodyl (DULCOLAX) Suppository 10 mg     DULoxetine (CYMBALTA) EC capsule 60 mg     gabapentin (NEURONTIN) capsule 300 mg     gabapentin (NEURONTIN) tablet 600 mg     HYDROmorphone (PF) (DILAUDID) injection 0.3-0.5 mg     loperamide (IMODIUM) capsule 2 mg     magnesium hydroxide (MILK OF MAGNESIA) suspension 30 mL     magnesium sulfate 4 g in 100 mL sterile water (premade)     melatonin tablet 1 mg     naloxone (NARCAN) injection 0.1-0.4 mg     ondansetron (ZOFRAN-ODT) ODT tab 4 mg    Or     ondansetron (ZOFRAN) injection 4 mg     ondansetron (ZOFRAN) tablet 4 mg     potassium chloride (KLOR-CON) Packet 20-40 mEq     potassium chloride 10 mEq in 100 mL intermittent infusion with 10 mg lidocaine     potassium chloride 10 mEq in 100 mL sterile water intermittent infusion (premix)     potassium chloride SA (K-DUR/KLOR-CON M) CR tablet 20-40 mEq     rivaroxaban ANTICOAGULANT (XARELTO) tablet 20 mg     traZODone (DESYREL) tablet 100 mg     VANCOMYCIN HCL  mg     varenicline (CHANTIX) tablet 1 mg             Review of Systems:   Unless mentioned in the HPI above, non contributory on comprehensive review.          Physical Exam:   Patient Vitals for the past 8 hrs:   BP Temp Temp src Pulse Heart Rate Resp SpO2 Height Weight   08/13/18 1236 135/96 98.3  F (36.8  C) Temporal  "- 93 16 100 % - -   08/13/18 1208 120/81 98  F (36.7  C) Oral - 89 16 95 % - -   08/13/18 1130 120/81 - - - 89 - - - -   08/13/18 1045 113/90 - - - 86 - - - -   08/13/18 1030 110/85 - - - 86 - - - -   08/13/18 1008 109/90 98.3  F (36.8  C) Oral 88 - 16 100 % 1.626 m (5' 4\") 54.4 kg (120 lb)[PS1.1]     Gen; resting in bed in no acute distress  Const; afebrile  Psych; pleasant affect  Neuro; alert and oriented, left sided weakness  Cvs; reg reg, S1 S2 present  Lungs; clear with no adventitial lung sounds  Abdo; soft, non tender, normal bowel sounds  Extremities; no muscle wasting  Skin; warm, non diaphoretic[PS1.3]           Data:     Results for orders placed or performed during the hospital encounter of 08/13/18   Comprehensive metabolic panel   Result Value Ref Range    Sodium 141 133 - 144 mmol/L    Potassium 2.8 (LL) 3.4 - 5.3 mmol/L    Chloride 103 94 - 109 mmol/L    Carbon Dioxide 29 20 - 32 mmol/L    Anion Gap 9 3 - 14 mmol/L    Glucose 111 (H) 70 - 99 mg/dL    Urea Nitrogen 13 7 - 30 mg/dL    Creatinine 0.44 (L) 0.52 - 1.04 mg/dL    GFR Estimate >90 >60 mL/min/1.7m2    GFR Estimate If Black >90 >60 mL/min/1.7m2    Calcium 8.5 8.5 - 10.1 mg/dL    Bilirubin Total 0.4 0.2 - 1.3 mg/dL    Albumin 3.0 (L) 3.4 - 5.0 g/dL    Protein Total 6.5 (L) 6.8 - 8.8 g/dL    Alkaline Phosphatase 132 40 - 150 U/L    ALT 25 0 - 50 U/L    AST 60 (H) 0 - 45 U/L   Troponin I   Result Value Ref Range    Troponin I ES <0.015 0.000 - 0.045 ug/L   CBC with platelets differential   Result Value Ref Range    WBC 11.6 (H) 4.0 - 11.0 10e9/L    RBC Count 4.83 3.8 - 5.2 10e12/L    Hemoglobin 14.6 11.7 - 15.7 g/dL    Hematocrit 42.8 35.0 - 47.0 %    MCV 89 78 - 100 fl    MCH 30.2 26.5 - 33.0 pg    MCHC 34.1 31.5 - 36.5 g/dL    RDW 16.0 (H) 10.0 - 15.0 %    Platelet Count 340 150 - 450 10e9/L    Diff Method Automated Method     % Neutrophils 79.9 %    % Lymphocytes 12.4 %    % Monocytes 6.1 %    % Eosinophils 1.1 %    % Basophils 0.1 %    % " Immature Granulocytes 0.4 %    Nucleated RBCs 0 0 /100    Absolute Neutrophil 9.2 (H) 1.6 - 8.3 10e9/L    Absolute Lymphocytes 1.4 0.8 - 5.3 10e9/L    Absolute Monocytes 0.7 0.0 - 1.3 10e9/L    Absolute Eosinophils 0.1 0.0 - 0.7 10e9/L    Absolute Basophils 0.0 0.0 - 0.2 10e9/L    Abs Immature Granulocytes 0.1 0 - 0.4 10e9/L    Absolute Nucleated RBC 0.0    Magnesium   Result Value Ref Range    Magnesium 2.0 1.6 - 2.3 mg/dL     EKG with sinus rhythm and diffuse st depression throughout      Attestation:  I have reviewed today's vital signs, notes, medications, labs and imaging.  Amount of time performed on this history and physical: 27 minutes.    Gurvinder Marie MD[PS1.1]        Revision History        User Key Date/Time User Provider Type Action    > PS1.2 8/13/2018  1:27 PM Gurvinder Marie MD Physician Addend     PS1.3 8/13/2018  1:23 PM Gurvinder Marie MD Physician Sign     PS1.1 8/13/2018 12:54 PM Gurvinder Marie MD Physician                   Discharge Summaries     No notes of this type exist for this encounter.      Consult Notes     No notes of this type exist for this encounter.         Progress Notes - Physician (Notes from 08/14/18 through 08/17/18)      Progress Notes by Anny Guidry at 8/17/2018  3:06 PM     Author:  Anny Guidry Service:  Ancillary, Case Management Author Type:      Filed:  8/17/2018  3:08 PM Date of Service:  8/17/2018  3:06 PM Creation Time:  8/17/2018  3:06 PM    Status:  Signed :  Anny Guidry ()         Name: Maggie Case    MRN#: [id number]    Reason for Hospitalization: Hypokalemia [E87.6]  Non-intractable vomiting with nausea, unspecified vomiting type [R11.2]    Discharge Date:[JS1.1] 8/17/2018[JS1.2]    Patient / Family response to discharge plan: agree with plan    Follow-Up Appt: No future appointments.    Other Providers (Care Coordinator, County Services, PCA services etc): Yes: [facility]    Discharge  Disposition: short-term care facility    Anny Guidry[JS1.1]         Revision History        User Key Date/Time User Provider Type Action    > JS1.2 8/17/2018  3:08 PM Anny Guidry  Sign     JS1.1 8/17/2018  3:06 PM Anny Guidry              Progress Notes by Anny Guidry at 8/17/2018  1:43 PM     Author:  Anny Guidry Service:  Ancillary, Case Management Author Type:      Filed:  8/17/2018  1:45 PM Date of Service:  8/17/2018  1:43 PM Creation Time:  8/17/2018  1:43 PM    Status:  Signed :  Anny Guidry ()         Spoke with Maggie, agreeing with plan for dc to [facility]. Will need transportation with [facility] arranged.   Maggie concerned about diet, has an diet education sheet with recommended foods. CM will alert Sandrita of this as patient would like to follow diet.     Transportation available a 4 pm.   Second message left for Sandrita.[JS1.1]      Revision History        User Key Date/Time User Provider Type Action    > JS1.1 8/17/2018  1:45 PM Anny Guidry  Sign            Progress Notes by Anny Guidry at 8/17/2018  1:25 PM     Author:  Anny Guidry Service:  Ancillary, Case Management Author Type:      Filed:  8/17/2018  1:26 PM Date of Service:  8/17/2018  1:25 PM Creation Time:  8/17/2018  1:25 PM    Status:  Signed :  Anny Guidry ()         Orders received for DC, message left for Sandrita.   Orders faxed to Sandrita.[JS1.1]      Revision History        User Key Date/Time User Provider Type Action    > JS1.1 8/17/2018  1:26 PM Anny Guidry  Sign            Progress Notes by Emeterio Barba MD at 8/16/2018  5:16 PM     Author:  Emeterio Barba MD Service:  Hospitalist Author Type:  Physician    Filed:  8/17/2018 12:54 PM Date of Service:  8/16/2018  5:16 PM Creation Time:  8/16/2018  5:16 PM    Status:  Signed :  Emeterio Barba MD (Physician)           Hospitalist Progress Note           Assessment and Plan:       [AM1.1]This patient is a 28 year old woman with a history significant for ASD, DVT/PE and paradoxical, hemorrhagic CVA which required craniotomy.[AM1.2]  She presented to ED with vomiting, fatigue, and chest pain, with emesis for the last 3 days and has not been able to keep anything down.  She denies any fevers or chills.[AM1.3]  She has been treated with rivarobaxan  but did not take this for the last several days before admission. She noted anterior chest pain worse with deep inspiration and better with rest.[AM1.2]     C. difficile colitis[AM1.1]  Presented with nausea vomiting abdominal pain.  C. difficile toxin was positive.  Initially prominent diarrhea although total volume not great.  This has improved with oral vancomycin.  Now she has moderate p[AM1.3]ersistent nausea with minimal small volumes of emesis[AM1.1], poorly responsive to ondansetron[AM1.3].  Abdominal pain itself appears relatively modest.  She reports flatus.  She is quite disturbed by her persistent abdominal discomfort and nausea.  She has been able to tolerate small amounts of volume but reluctant to take more to avoid nausea.  Have accelerated her symptomatic treatment with addition of Compazine.  Extensive discussion with her including options of intravenous fluid requiring placement of new IV as existing one today became nonfunctional.  Discussed with her that trial of increased oral intake might be the most reasonable.  At length she is agreeable to attempting this.  For the perspective of her C. difficile colitis she was first diagnosed in July of this year with positive C. difficile toxin.  She had a usual 10 day treatment with resolution of her symptoms.  Whether her recurrent symptoms are new or persistent disease is uncertain.  Continuing to encourage her to take oral vancomycin which intermittently she is reluctant to do.[AM1.1]  Although it in fact it is not certain that this is a worsening of C.  difficile infection at this point would be inclined to recommend 30 day tapering dose[AM1.3].  Doubt ileus or other limitation in spontaneous gut function.  Repeat abdominal radiograph in the morning.[AM1.1]    Nausea  No further emesis and diarrheal stools improved.  She does intermittently have nausea.  Poor response to ondansetron which is not unexpected as this is most effective in the context of chemotherapy or post anesthesia nausea and vomiting.  Trial of Compazine as this may be more effective with due care to minimize doses as either agent may be associated with prolonged QT interval.  As noted this has not been increased over her baseline QT prolongation present and relatively stable over several years of EKGs available for review.[AM1.3]    Hypokalemia[AM1.1]  This is now[AM1.3] fully resolved[AM1.1] with usual replacement, and likely represented GI losses alone[AM1.3].[AM1.1]  Initially concern raised for prolonged QT interval on EKG possibly related to the hypokalemia.  S[AM1.3]he does continue to have a prolonged QT interval however on review of available EKGs over the past several years this is been persistent issue and unlikely to be explained by recent events.  She does need close monitoring and caution with the use of medications worsening QT prolongation including ondansetron and prochlorperazine.  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  In addition ASD with paradoxical right to left emboli and residual left-sided weakness.  Continuing rivarobaxan which she had previously been prescribed although not able to take it for several days because of her GI symptoms.  She has been evaluated in the past for possible hypercoagulable state without significant findings.    Difficulty following medical plan  This is been persistent problem for this patient with  "multiple chronic medical issues at a relatively young age.  Nursing staff has been particularly good at working with her encouraging in particular oral fluid and essential medications.  Overall it appears that the patient's been attempting, not unexpectedly, to maintain what control she can of her life particularly with a somewhat inflexible approach to oral fluid, food, medication input.  This is been particularly difficult today but particularly by the end of the day she shown some improvement.[AM1.3]                        Interval History:[AM1.1]   Awake and alert.  Overall cooperative and interactive although somewhat evasive.  No some nausea.  No rachel chest discomfort.  Passing flatus.[AM1.3]              Medications:[AM1.1]       DULoxetine (CYMBALTA) EC capsule 60 mg  60 mg Oral Daily     gabapentin (NEURONTIN) capsule 300 mg  300 mg Oral BID     gabapentin (NEURONTIN) tablet 600 mg  600 mg Oral At Bedtime     rivaroxaban ANTICOAGULANT  20 mg Oral QAM     traZODone (DESYREL) tablet 100 mg  100 mg Oral At Bedtime     vancomycin  125 mg Oral 4x Daily     acetaminophen, acetaminophen (TYLENOL) tablet 650 mg, bisacodyl, diphenhydrAMINE, magnesium hydroxide, magnesium sulfate, melatonin, naloxone, ondansetron, ondansetron, potassium chloride, potassium chloride with lidocaine, potassium chloride, potassium chloride, prochlorperazine **OR** prochlorperazine **OR** prochlorperazine[AM1.4]               Physical Exam:[AM1.1]   Blood pressure 114/85, pulse 88, temperature 98.8  F (37.1  C), temperature source Tympanic, resp. rate 16, height 1.6 m (5' 3\"), weight 54.2 kg (119 lb 7.8 oz), SpO2 94 %.  Vitals:    18 1008 18 1208   Weight: 54.4 kg (120 lb) 54.2 kg (119 lb 7.8 oz)[AM1.4]       Vital Sign Ranges  Temperature Temp  Av.6  F (36.4  C)  Min: 97.1  F (36.2  C)  Max: 98.4  F (36.9  C)   Blood pressure Systolic (24hrs), Av , Min:115 , Max:117        Diastolic (24hrs), Av, Min:65, " Max:74      Pulse No Data Recorded   Respirations Resp  Av  Min: 16  Max: 16   Pulse oximetry SpO2  Av %  Min: 100 %  Max: 100 %         Intake/Output Summary (Last 24 hours) at 18 1718  Last data filed at 18 1452   Gross per 24 hour   Intake             3163 ml   Output               50 ml   Net             3113 ml     General;[AM1.1] awake and alert.  Generally appears comfortable[AM1.3]  Psych;[AM1.1] mildly blunted affect.  Somewhat evasive.  Overall cooperative and interactive with persistent prompting.[AM1.3]  Neuro;[AM1.1] awake and alert.  Motor strength with mild paresis to the left upper and lower.  No fasciculations or tremors.[AM1.3]  Neck;[AM1.1] jugular venous pressure not elevated.  Midline trachea.[AM1.3]  Lungs;[AM1.1] aeration to bases.  Accessory muscles not in use.  No wheezes, crackles, or rhonchi.  PMI not displaced.  S1 and S2 regular and other[AM1.3]  Precordium;[AM1.1] actable.  No murmurs.[AM1.3]  Abdomen; soft,[AM1.1] minimal palpation tenderness.  No percussion tenderness.  Bowel sounds in all quadrants.[AM1.3]  Skin; warm, non diaphoretic         Data:   All new lab and imaging data was reviewed.[AM1.1]   Recent Labs   Lab Test  18   0631  08/15/18   0944  18   1036   07/15/18   1435   18   0343   18   0619   WBC  8.7  10.4  11.6*   < >  6.1   < >   --    < >   --    HGB  13.7  13.9  14.6   < >  12.8   < >   --    < >  11.5*   MCV  89  94  89   < >  85   < >   --    < >   --    PLT  307  277  340   < >  321   < >   --    < >   --    INR   --    --    --    --   1.04   --   8.43*   --   1.14    < > = values in this interval not displayed.      Recent Labs   Lab Test  18   0631  08/15/18   1045  08/15/18   0944  18   0610   NA  137   --   137  147*   POTASSIUM  4.3  5.1  6.2*  4.4   CHLORIDE  105   --   111*  116*   CO2  22   --   18*  17*   BUN  1*   --   2*  9   CR  0.40*   --   0.32*  0.37*   ANIONGAP  10   --   8  14   AVINASH   8.3*   --   7.9*  7.9*   GLC  101*   --   73  83[AM1.4]          Revision History        User Key Date/Time User Provider Type Action    > AM1.3 8/17/2018 12:54 PM Emeterio Barba MD Physician Sign     AM1.2 8/17/2018 12:37 PM Emeterio Barba MD Physician      AM1.4 8/16/2018  5:17 PM Emeterio Barba MD Physician      AM1.1 8/16/2018  5:16 PM Emeterio Barba MD Physician             Progress Notes by Anny Guidry at 8/17/2018 10:40 AM     Author:  Anny Guidry Service:  Ancillary, Case Management Author Type:      Filed:  8/17/2018 10:41 AM Date of Service:  8/17/2018 10:40 AM Creation Time:  8/17/2018 10:40 AM    Status:  Signed :  Anny Guidry ()         Message left for Sandrita at [facility].[JS1.1]      Revision History        User Key Date/Time User Provider Type Action    > JS1.1 8/17/2018 10:41 AM Anny Guidry  Sign            Progress Notes by Laisha Prado RD at 8/16/2018  3:17 PM     Author:  Laisha Prado RD Service:  Nutrition Author Type:  Registered Dietitian    Filed:  8/16/2018  3:51 PM Date of Service:  8/16/2018  3:17 PM Creation Time:  8/16/2018  3:17 PM    Status:  Addendum :  Laisha Prado RD (Registered Dietitian)         CLINICAL NUTRITION SERVICES  -  ASSESSMENT NOTE    Maggie Case : Poor intake, C diff colitis    28 yof admitted for hypokalemia. Pt has a hx of B12 deficiency, lactose intolerance, chronic diarrhea, anemia, and stroke in 2/2018. After her stroke she experienced dysphagia and was on tubes feeds. Noted weight has been trending down over the past 6 months. Pt/family estimate reduced oral intake since stroke. Pt has experienced some episodes of N/V/D in the recent past. Pt continues to be feel nauseous. Spoke with family and pt about trying to include high calorie and high protein foods into diet if she is only able to tolerate a few bites. Also reviewed nutrition recommendations for  "diarrhea. Pt is willing to sip on 3 Ensure Clear daily.[EP1.1] Pt is currently on a regular diet with limited intake, 0-25%.[EP1.2]    Height: 5' 3\"  Weight: 119 lbs 7.83 oz  Body mass index is 21.17 kg/(m^2).  Weight Status:  Normal BMI  IBWR: 104-140lb  Weight History: 9lb (6%) weight loss x 3 month (2/17/18 to 5/24/18). Additional 17lbs weight loss in last month if weight is confirmed. Total weight loss would then be 26lbs (18%) in 6 months (3/1/18 to 8/13/18).  136lb - 7/15/18  139lb - 6/22/18  136lb - 5/24/18  151lb - 3/1/18- same hospital stay as previous weight  145lb - 2/17/18- admit weight  139lb - 4/28/17      Estimated Energy Needs: 3225-4943 kcals (35-40 Kcal/Kg)  Estimated Protein Needs: 65-80 grams protein (1.2-1.5 g pro/Kg)      MALNUTRITION:  % Weight Loss: Weight loss does not meet criteria for malnutrition If admit weight is confirmed, weight loss does meet criteria for malnutrition (> 10% in 6 months - severe malnutrition)  % Intake:  </= 75% for >/= 1 month (severe malnutrition)-  less than 50% intake for the last week.       Malnutrition Diagnosis:[EP1.1] need[EP1.2] second[EP1.3] weight to confirm weight loss- at high risk[EP1.2]      NUTRITION DIAGNOSIS:  Inadequate oral intake related to reduced oral intake and GI dysfunction as evidenced by pt report of poor appetite/intake and N/V/D with weight trending down.      NUTRITION RECOMMENDATIONS  -Encourage intake of frequent meals/snacks. Include high protein and high calorie foods such as eggs, peanut butter, and gravy).   - Will send Ensure Clear TID.  - Suggest adding probiotic supplement.  - Please confirm admit weight with second measurement.      MONITORING AND EVALUATION:  RD will monitor intake, weight, labs[EP1.1]             Revision History        User Key Date/Time User Provider Type Action    > EP1.3 8/16/2018  3:51 PM Laisha Prado RD Registered Dietitian Addend     EP1.2 8/16/2018  3:50 PM Laisha Prado RD Registered " Dietitian Addend     EP1.1 8/16/2018  3:46 PM Laisha Prado RD Registered Dietitian Sign            Progress Notes by Anny Guidry at 8/16/2018  3:23 PM     Author:  Anny Guidry Service:  Ancillary, Case Management Author Type:      Filed:  8/16/2018  3:25 PM Date of Service:  8/16/2018  3:23 PM Creation Time:  8/16/2018  3:23 PM    Status:  Signed :  Anny Guidry ()         Updated Sandrita at [facility].  Wondering if patient would benefit from having oral potassium replacement maintenance or have labs ordered at the nursing home. CM to bring concern to hospitalist.[JS1.1]     Revision History        User Key Date/Time User Provider Type Action    > JS1.1 8/16/2018  3:25 PM Anny Guidry  Sign            Progress Notes by Gurvinder Marie MD at 8/15/2018 10:04 AM     Author:  Gurvinder Marie MD Service:  Hospitalist Author Type:  Physician    Filed:  8/15/2018 10:38 AM Date of Service:  8/15/2018 10:04 AM Creation Time:  8/15/2018 10:04 AM    Status:  Addendum :  Gurvinder Marie MD (Physician)           Hospitalist Progress Note          Assessment and Plan:         Diarrhea and vomiting  positive C. 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    As an outpatient, she was prescribed xarelto but did not take this for the last several days.   She reported several days of worsening nausea, anorexia and vomiting.  She also reports anterior chest pain worse with deep inspiration and better with rest.   In the ED she was noted to have a normal troponin but[PS1.1] low[PS1.3] serum potasium with corresponding EKG changes and an elevated QTC interval.[PS1.1]     Since admission s[PS1.3]he[PS1.1] has been having[PS1.3] worsening adominal discomfort and diarrhea  Her c.diff returned positive  She was started on po Vancomycin              Medications:       DULoxetine (CYMBALTA) EC capsule 60 mg  60 mg Oral Daily     gabapentin (NEURONTIN) capsule 300 mg  300 mg Oral BID     gabapentin (NEURONTIN) tablet 600 mg  600 mg Oral At Bedtime     rivaroxaban ANTICOAGULANT  20 mg Oral QAM     traZODone (DESYREL) tablet 100 mg  100 mg Oral At Bedtime     vancomycin  125 mg Oral 4x Daily     acetaminophen, acetaminophen (TYLENOL) tablet 650 mg, bisacodyl, diphenhydrAMINE, loperamide, magnesium hydroxide, magnesium sulfate, melatonin, naloxone, ondansetron, potassium chloride, potassium chloride with lidocaine, potassium chloride, potassium chloride               Physical Exam:   Blood pressure 128/90, pulse 88, temperature 98.3  F (36.8  C), temperature source Tympanic, resp. rate 16, height 1.6 m (5' 3\"), weight 54.2 kg (119 lb 7.8 oz), SpO2 100 %.  Vitals:    18 1008 18 1208   Weight: 54.4 kg (120 lb) 54.2 kg (119 lb 7.8 oz)       Vital Sign Ranges  Temperature Temp  Av.6  F (36.4  C)  Min: 97.1  F (36.2  C)  Max: 98.4  F (36.9  C)   Blood pressure Systolic (24hrs), Av , Min:115 , Max:117        Diastolic (24hrs), Av, Min:65, Max:74      Pulse No Data Recorded   Respirations Resp  Av  Min: 16  Max: 16   Pulse oximetry SpO2  Avg: " 100 %  Min: 100 %  Max: 100 %         Intake/Output Summary (Last 24 hours) at 08/14/18 1718  Last data filed at 08/14/18 1452   Gross per 24 hour   Intake             3163 ml   Output               50 ml   Net             3113 ml     General; looks unwell, no acute distress  Constitutional; Non febrile, non diaphoretic  Psych; pleasant affect  Neuro; somnolent but alert to voice, oriented and appropriate, left sided weakness  Neck; no JVD  Lungs; resonant on percussion, normal vesicular breath sounds with no adventitial sounds  Precordium; no heave or thrill, reg reg, s1 s2 present  Abdomen; soft, diffuse tenderness, no peritoneal signs, prominent tinkling bowel sounds  Skin; warm, non diaphoretic         Data:   All new lab and imaging data was reviewed.   Recent Labs   Lab Test  08/13/18   1036  07/29/18   1523  07/16/18   0414  07/15/18   1435   06/24/18   0343   05/24/18   0619   WBC  11.6*  7.7  5.5  6.1   < >   --    < >   --    HGB  14.6  12.4  10.4*  12.8   < >   --    < >  11.5*   MCV  89  89  86  85   < >   --    < >   --    PLT  340  394  238  321   < >   --    < >   --    INR   --    --    --   1.04   --   8.43*   --   1.14    < > = values in this interval not displayed.      Recent Labs   Lab Test  08/14/18   0610  08/13/18   1925  08/13/18   1036  07/29/18   1523   NA  147*   --   141  144   POTASSIUM  4.4  3.6  2.8*  3.3*   CHLORIDE  116*   --   103  109   CO2  17*   --   29  27   BUN  9   --   13  5*   CR  0.37*   --   0.44*  0.42*   ANIONGAP  14   --   9  8   AVINASH  7.9*   --   8.5  7.7*   GLC  83   --   111*  85[PS1.1]          Revision History        User Key Date/Time User Provider Type Action    > PS1.3 8/15/2018 10:38 Gurvinder Hauser MD Physician Addend     PS1.2 8/15/2018 10:14 Gurvinder Hauser MD Physician Addend     PS1.1 8/15/2018 10:12 AM Gurvinder Marie MD Physician Sign            Progress Notes by Anny Guidry at 8/15/2018 10:10 AM     Author:  Anny Guidry  Service:  Ancillary, Case Management Author Type:      Filed:  8/15/2018 10:10 AM Date of Service:  8/15/2018 10:10 AM Creation Time:  8/15/2018 10:10 AM    Status:  Signed :  Anny Guidry ()         Updated Sandrita at [facility].   CM continues to follow.[JS1.1]      Revision History        User Key Date/Time User Provider Type Action    > JS1.1 8/15/2018 10:10 AM Anny Guidry  Sign            Progress Notes by Nelly Youssef CM at 8/14/2018  7:11 PM     Author:  Nelly Youssef CM Service:  Ancillary, Case Management Author Type:      Filed:  8/14/2018  7:27 PM Date of Service:  8/14/2018  7:11 PM Creation Time:  8/14/2018  7:11 PM    Status:  Signed :  Nelly Youssef CM ()         Assessment completed see[CS1.1] flow sheet[CS1.2].[CS1.1]    Maggie is sitting in bed. Her PCP is Dr Chapo Flores. She sees a variety of specialists through [facility]. She is currently seeing Shwetha Frazier at [facility] and is participating in C/D treatment at [place]. She has a POLST on file. She has MA through [facility], she does not know if she has a .    Maggie currently resides at [facility]. She states she feels safe. She needs assistance getting out of bed, using the bathroom and bathing.Therapies were discontinued 2 month ago. She ambulates with a wheelchair.     She sleeps during the day at [facility] because its so noisy. She is on medication for her depression. She quit smoking and drinking 2 weeks ago and is currently in treatment. Her kenny is important to her, she does not want kenny support at this time. Tiffanie and Jm cruz are her support system. She enjoys family time and getting together with friends.     Plan is to go back to [facility] with agency transportation.[CS1.2]    LOC: alert    Others present: Patient    Dx: hypokalemia    Lives with: Lives With: facility resident    Living  at: Living Arrangements: extended care facility    Equipment used:  wheelchair    Support System: Description of Support System: Supportive, Involved    Primary Care Provider:[CS1.1] Chapo Flores[CS1.3]    POA/Guardian: No     Health Care Directive:[CS1.1] YES , POLST on file[CS1.2]    Pharmacy:[CS1.1] [facility][CS1.2]    :[CS1.1]  Unknown[CS1.2]    Homecare/[facility] Services:[CS1.1]   MA[CS1.2]    Adequate Resources for needs (housing, utilities, food/med):[CS1.1] YES[CS1.2]    Meds and appointments management:[CS1.1] YES[CS1.2]    Work:[CS1.1] NO[CS1.2]    Transportation:[CS1.1] YES[CS1.2]     ADLs:[CS1.1] transferring, toileting and bathing[CS1.2]    Falls:[CS1.1] No[CS1.2]    Able to Return to Prior Living Arrangements:[CS1.1] YES[CS1.2]    :[CS1.1] NO[CS1.2]    Barriers:[CS1.1] motivation[CS1.2]    Needs:[CS1.1] Demonstrates Competency[CS1.2]    GUSTABO:[CS1.1] elevated[CS1.2]    Discharge Plan:[CS1.1] return to [facility], usually transported by [facility] per pt.[CS1.2]       Revision History        User Key Date/Time User Provider Type Action    > CS1.2 8/14/2018  7:27 PM Nelly Youssef CM  Sign     CS1.3 8/14/2018  7:12 PM Nelly Youssef CM       CS1.1 8/14/2018  7:11 PM Nelly Youssef CM              Progress Notes by Gurvinder Marie MD at 8/14/2018  5:18 PM     Author:  Gurvinder Marie MD Service:  Hospitalist Author Type:  Physician    Filed:  8/14/2018  5:21 PM Date of Service:  8/14/2018  5:18 PM Creation Time:  8/14/2018  5:18 PM    Status:  Signed :  Gurvinder Marie MD (Physician)           Hospitalist Progress Note          Assessment and Plan:         Diarrhea and vomiting  Check C. diff  Symptomatic management  Normal abdominal exam     Hypokalemia  With EKG changes  Telemetry   IV potassium replacement  Intensive monitoring  QTC improved with K replacement     hypercoagulability disorder  Restart  "xarelto     Medical non compliance  Restart prescribed PTA meds     Code status   Full code     DVT prophylaxis  On xarelto                     Interval History:   This patient is a 28 year old  female with a significant past medical history of ASD, DVT, PE and paradoxical, hemorrhagic CVA requiring craniotomy.    As an outpatient, she was prescribed xarelto but did not take this for the last several days.   She also reports anterior chest pain worse with deep inspiration and better with rest.   In the ED she was noted to have a normal troponin but elevated serum potasium with corresponding EKG changes.   Loose stool today   No cardiac symptoms today   QTC improved              Medications:       DULoxetine (CYMBALTA) EC capsule 60 mg  60 mg Oral Daily     gabapentin (NEURONTIN) capsule 300 mg  300 mg Oral BID     gabapentin (NEURONTIN) tablet 600 mg  600 mg Oral At Bedtime     rivaroxaban ANTICOAGULANT  20 mg Oral QAM     traZODone (DESYREL) tablet 100 mg  100 mg Oral At Bedtime     acetaminophen, acetaminophen (TYLENOL) tablet 650 mg, bisacodyl, diphenhydrAMINE, loperamide, magnesium hydroxide, magnesium sulfate, melatonin, naloxone, potassium chloride, potassium chloride with lidocaine, potassium chloride, potassium chloride               Physical Exam:   Blood pressure 116/65, pulse 88, temperature 98.4  F (36.9  C), temperature source Oral, resp. rate 16, height 1.6 m (5' 3\"), weight 54.2 kg (119 lb 7.8 oz), SpO2 100 %.  Vitals:    18 1008 18 1208   Weight: 54.4 kg (120 lb) 54.2 kg (119 lb 7.8 oz)       Vital Sign Ranges  Temperature Temp  Av.6  F (36.4  C)  Min: 97.1  F (36.2  C)  Max: 98.4  F (36.9  C)   Blood pressure Systolic (24hrs), Av , Min:115 , Max:117        Diastolic (24hrs), Av, Min:65, Max:74      Pulse No Data Recorded   Respirations Resp  Av  Min: 16  Max: 16   Pulse oximetry SpO2  Av %  Min: 100 %  Max: 100 %         Intake/Output Summary (Last 24 " hours) at 08/14/18 1718  Last data filed at 08/14/18 1452   Gross per 24 hour   Intake             3163 ml   Output               50 ml   Net             3113 ml     General; no acute distress  Constitutional; Non febrile, non diaphoretic  Psych; pleasant affect  Neuro; alert, oriented and appropriate  Neck; no JVD  Lungs; resonant on percussion, normal vesicular breath sounds with no adventitial sounds  Precordium; no heave or thrill, reg reg, s1 s2 present  Abdomen; soft non tender, no peritoneal signs, bowel sounds present  Skin; warm, non diaphoretic         Data:   All new lab and imaging data was reviewed.   Recent Labs   Lab Test  08/13/18   1036  07/29/18   1523  07/16/18   0414  07/15/18   1435   06/24/18   0343   05/24/18   0619   WBC  11.6*  7.7  5.5  6.1   < >   --    < >   --    HGB  14.6  12.4  10.4*  12.8   < >   --    < >  11.5*   MCV  89  89  86  85   < >   --    < >   --    PLT  340  394  238  321   < >   --    < >   --    INR   --    --    --   1.04   --   8.43*   --   1.14    < > = values in this interval not displayed.      Recent Labs   Lab Test  08/14/18   0610  08/13/18   1925  08/13/18   1036  07/29/18   1523   NA  147*   --   141  144   POTASSIUM  4.4  3.6  2.8*  3.3*   CHLORIDE  116*   --   103  109   CO2  17*   --   29  27   BUN  9   --   13  5*   CR  0.37*   --   0.44*  0.42*   ANIONGAP  14   --   9  8   AVINASH  7.9*   --   8.5  7.7*   GLC  83   --   111*  85[PS1.1]          Revision History        User Key Date/Time User Provider Type Action    > PS1.1 8/14/2018  5:21 PM Gurvinder Marie MD Physician Sign            Progress Notes by Susan Cochran CLS at 8/14/2018  1:43 PM     Author:  Susan Cochran CLS Service:  Infection Preventionist Author Type:  Technician    Filed:  8/14/2018  1:44 PM Date of Service:  8/14/2018  1:43 PM Creation Time:  8/14/2018  1:43 PM    Status:  Signed :  Susan Cochran CLS (Technician)         Patient's last known positive C Diff was over 30 days  ago, pt completed treatment and has had no stools since admit.  Pt removed from Enteric Isolation by IP.[LH1.1]     Revision History        User Key Date/Time User Provider Type Action    > LH1.1 8/14/2018  1:44 PM Susan Cochran, [facility] Technician Sign                  Procedure Notes     No notes of this type exist for this encounter.      Progress Notes - Therapies (Notes from 08/14/18 through 08/17/18)     No notes of this type exist for this encounter.

## 2018-08-14 LAB
ANION GAP SERPL CALCULATED.3IONS-SCNC: 14 MMOL/L (ref 3–14)
BUN SERPL-MCNC: 9 MG/DL (ref 7–30)
C DIFF TOX B STL QL: POSITIVE
CALCIUM SERPL-MCNC: 7.9 MG/DL (ref 8.5–10.1)
CHLORIDE SERPL-SCNC: 116 MMOL/L (ref 94–109)
CO2 SERPL-SCNC: 17 MMOL/L (ref 20–32)
CREAT SERPL-MCNC: 0.37 MG/DL (ref 0.52–1.04)
GFR SERPL CREATININE-BSD FRML MDRD: >90 ML/MIN/1.7M2
GLUCOSE SERPL-MCNC: 83 MG/DL (ref 70–99)
MAGNESIUM SERPL-MCNC: 1.7 MG/DL (ref 1.6–2.3)
POTASSIUM SERPL-SCNC: 4.4 MMOL/L (ref 3.4–5.3)
SODIUM SERPL-SCNC: 147 MMOL/L (ref 133–144)
SPECIMEN SOURCE: ABNORMAL

## 2018-08-14 PROCEDURE — 80048 BASIC METABOLIC PNL TOTAL CA: CPT | Performed by: INTERNAL MEDICINE

## 2018-08-14 PROCEDURE — 25000132 ZZH RX MED GY IP 250 OP 250 PS 637: Performed by: INTERNAL MEDICINE

## 2018-08-14 PROCEDURE — 25000128 H RX IP 250 OP 636: Performed by: INTERNAL MEDICINE

## 2018-08-14 PROCEDURE — 83735 ASSAY OF MAGNESIUM: CPT | Performed by: INTERNAL MEDICINE

## 2018-08-14 PROCEDURE — 87493 C DIFF AMPLIFIED PROBE: CPT | Performed by: INTERNAL MEDICINE

## 2018-08-14 PROCEDURE — 93010 ELECTROCARDIOGRAM REPORT: CPT | Mod: 76 | Performed by: INTERNAL MEDICINE

## 2018-08-14 PROCEDURE — 93005 ELECTROCARDIOGRAM TRACING: CPT

## 2018-08-14 PROCEDURE — 99232 SBSQ HOSP IP/OBS MODERATE 35: CPT | Performed by: INTERNAL MEDICINE

## 2018-08-14 PROCEDURE — 36415 COLL VENOUS BLD VENIPUNCTURE: CPT | Performed by: INTERNAL MEDICINE

## 2018-08-14 PROCEDURE — G0378 HOSPITAL OBSERVATION PER HR: HCPCS

## 2018-08-14 RX ORDER — ONDANSETRON 2 MG/ML
4 INJECTION INTRAMUSCULAR; INTRAVENOUS EVERY 6 HOURS PRN
Status: DISCONTINUED | OUTPATIENT
Start: 2018-08-14 | End: 2018-08-17 | Stop reason: HOSPADM

## 2018-08-14 RX ADMIN — GABAPENTIN 300 MG: 300 CAPSULE ORAL at 13:19

## 2018-08-14 RX ADMIN — POTASSIUM CHLORIDE AND SODIUM CHLORIDE: 900; 300 INJECTION, SOLUTION INTRAVENOUS at 14:52

## 2018-08-14 RX ADMIN — GABAPENTIN 600 MG: 600 TABLET ORAL at 21:32

## 2018-08-14 RX ADMIN — DULOXETINE HYDROCHLORIDE 60 MG: 60 CAPSULE, DELAYED RELEASE ORAL at 10:27

## 2018-08-14 RX ADMIN — POTASSIUM CHLORIDE AND SODIUM CHLORIDE: 900; 300 INJECTION, SOLUTION INTRAVENOUS at 22:37

## 2018-08-14 RX ADMIN — RIVAROXABAN 20 MG: 20 TABLET, FILM COATED ORAL at 10:27

## 2018-08-14 RX ADMIN — ONDANSETRON 4 MG: 2 INJECTION INTRAMUSCULAR; INTRAVENOUS at 18:44

## 2018-08-14 RX ADMIN — ACETAMINOPHEN 650 MG: 325 TABLET, FILM COATED ORAL at 18:16

## 2018-08-14 RX ADMIN — POTASSIUM CHLORIDE AND SODIUM CHLORIDE: 900; 300 INJECTION, SOLUTION INTRAVENOUS at 07:06

## 2018-08-14 RX ADMIN — Medication 0.5 MG: at 00:43

## 2018-08-14 RX ADMIN — LORAZEPAM 0.5 MG: 2 INJECTION INTRAMUSCULAR; INTRAVENOUS at 05:02

## 2018-08-14 ASSESSMENT — PAIN DESCRIPTION - DESCRIPTORS: DESCRIPTORS: ACHING

## 2018-08-14 NOTE — PLAN OF CARE
Face to face report given with opportunity to observe patient.    Report given to RITESH Rodríguez   8/13/2018  11:39 PM

## 2018-08-14 NOTE — PROVIDER NOTIFICATION
MD notified of urine output of 50mL over night shift with no new orders and to continue to monitor.

## 2018-08-14 NOTE — PROGRESS NOTES
Patient's last known positive C Diff was over 30 days ago, pt completed treatment and has had no stools since admit.  Pt removed from Enteric Isolation by IP.

## 2018-08-14 NOTE — PLAN OF CARE
"Problem: Patient Care Overview  Goal: Plan of Care/Patient Progress Review  -diagnostic tests and consults completed and resulted   -vital signs normal or at patient baseline   Outcome: No Change  Reason for hospital stay:  Hypokalemia    Most recent vitals: /72  Pulse 88  Temp 97.4  F (36.3  C) (Temporal)  Resp 16  Ht 1.6 m (5' 3\")  Wt 54.2 kg (119 lb 7.8 oz)  SpO2 100%  BMI 21.17 kg/m2  Pain Management:  Pt slept most of shift. When assessed states \"I'm ok\" and drift back off to sleep  Orientation:  Alert, lethargic, sleepy  Cardiac:  EKG x2 this shift per MD  Respiratory:  LS diminished and clear. Denies sob.  GI:  BS active. No emesis or report of nausea as slept all shift.  :  Incontinent of Urine x1 this shift  Diet: Reg diet. Only consuming popsicles and Gingerale with minimal taken in.  Skin Issues:  Scattered bruising  IVF:  NS +40 at 125   ABX:  None  Mobility:  A1 to C  Safety:  Call light within reach. Bed alarm active.    Comments: Potassium 4.4 and magnesium 1.7, no replacement needed    8/14/2018  2:42 PM  Anne Goldman    Face to face report given with opportunity to observe patient.    Report given to RITESH Perez   8/14/2018  3:21 PM      "

## 2018-08-14 NOTE — PLAN OF CARE
Saw Pt this am to finish completing assessment and discuss plan for taking ordered am medication. On assessment Pt is very lethargic and drifts to sleep during conversation. Reported to Pt that she has am medications due with explanation of what with the Pt not responding and continuing to drift to sleep. Informed Pt I would check back after she has a nap, as she was up all of the night shift.

## 2018-08-14 NOTE — PROGRESS NOTES
"  Hospitalist Progress Note          Assessment and Plan:         Diarrhea and vomiting  Check C. diff  Symptomatic management  Normal abdominal exam     Hypokalemia  With EKG changes  Telemetry   IV potassium replacement  Intensive monitoring  QTC improved with K replacement     hypercoagulability disorder  Restart xarelto     Medical non compliance  Restart prescribed PTA meds     Code status   Full code     DVT prophylaxis  On xarelto                     Interval History:   This patient is a 28 year old  female with a significant past medical history of ASD, DVT, PE and paradoxical, hemorrhagic CVA requiring craniotomy.    As an outpatient, she was prescribed xarelto but did not take this for the last several days.   She also reports anterior chest pain worse with deep inspiration and better with rest.   In the ED she was noted to have a normal troponin but elevated serum potasium with corresponding EKG changes.   Loose stool today   No cardiac symptoms today   QTC improved              Medications:       DULoxetine (CYMBALTA) EC capsule 60 mg  60 mg Oral Daily     gabapentin (NEURONTIN) capsule 300 mg  300 mg Oral BID     gabapentin (NEURONTIN) tablet 600 mg  600 mg Oral At Bedtime     rivaroxaban ANTICOAGULANT  20 mg Oral QAM     traZODone (DESYREL) tablet 100 mg  100 mg Oral At Bedtime     acetaminophen, acetaminophen (TYLENOL) tablet 650 mg, bisacodyl, diphenhydrAMINE, loperamide, magnesium hydroxide, magnesium sulfate, melatonin, naloxone, potassium chloride, potassium chloride with lidocaine, potassium chloride, potassium chloride               Physical Exam:   Blood pressure 116/65, pulse 88, temperature 98.4  F (36.9  C), temperature source Oral, resp. rate 16, height 1.6 m (5' 3\"), weight 54.2 kg (119 lb 7.8 oz), SpO2 100 %.  Vitals:    18 1008 18 1208   Weight: 54.4 kg (120 lb) 54.2 kg (119 lb 7.8 oz)       Vital Sign Ranges  Temperature Temp  Av.6  F (36.4  C)  Min: 97.1  F " (36.2  C)  Max: 98.4  F (36.9  C)   Blood pressure Systolic (24hrs), Av , Min:115 , Max:117        Diastolic (24hrs), Av, Min:65, Max:74      Pulse No Data Recorded   Respirations Resp  Av  Min: 16  Max: 16   Pulse oximetry SpO2  Av %  Min: 100 %  Max: 100 %         Intake/Output Summary (Last 24 hours) at 18 1718  Last data filed at 18 1452   Gross per 24 hour   Intake             3163 ml   Output               50 ml   Net             3113 ml     General; no acute distress  Constitutional; Non febrile, non diaphoretic  Psych; pleasant affect  Neuro; alert, oriented and appropriate  Neck; no JVD  Lungs; resonant on percussion, normal vesicular breath sounds with no adventitial sounds  Precordium; no heave or thrill, reg reg, s1 s2 present  Abdomen; soft non tender, no peritoneal signs, bowel sounds present  Skin; warm, non diaphoretic         Data:   All new lab and imaging data was reviewed.   Recent Labs   Lab Test  18   1036  18   1523  18   0414  07/15/18   1435   18   0343   18   0619   WBC  11.6*  7.7  5.5  6.1   < >   --    < >   --    HGB  14.6  12.4  10.4*  12.8   < >   --    < >  11.5*   MCV  89  89  86  85   < >   --    < >   --    PLT  340  394  238  321   < >   --    < >   --    INR   --    --    --   1.04   --   8.43*   --   1.14    < > = values in this interval not displayed.      Recent Labs   Lab Test  18   0610  18   1925  18   1036  18   1523   NA  147*   --   141  144   POTASSIUM  4.4  3.6  2.8*  3.3*   CHLORIDE  116*   --   103  109   CO2  17*   --   29  27   BUN  9   --   13  5*   CR  0.37*   --   0.44*  0.42*   ANIONGAP  14   --   9  8   AVINASH  7.9*   --   8.5  7.7*   GLC  83   --   111*  85

## 2018-08-14 NOTE — PLAN OF CARE
"Problem: Patient Care Overview  Goal: Plan of Care/Patient Progress Review  -diagnostic tests and consults completed and resulted   -vital signs normal or at patient baseline   Outcome: No Change  Reason for hospital stay:  Hypokalemia    Most recent vitals: /74  Pulse 88  Temp 97.1  F (36.2  C) (Tympanic)  Resp 16  Ht 1.6 m (5' 3\")  Wt 54.2 kg (119 lb 7.8 oz)  SpO2 100%  BMI 21.17 kg/m2  Pain Management:  C/o left abd pain with request specifically for Dilaudid, administered x1 with decrease in pain.  Orientation:  A&O. Pleasant but makes repeated requests for fluids without consuming anything, reminded that she has full amount of fluid still in front of her  Cardiac:  HR reg. No chest pain reported this shift  Respiratory:  LS diminished but clear. Reports some slight sob. No use of O2 with sat 100% on RA  GI:  BS active. C/o nausea with IV ativan given with relief. No BM this shift  :  Voids very small concentrated amount of urine. Encouraged oral fluids.  Diet: Reg diet. Only taking in popsicles and sips of gingerale  Skin Issues:  Intact. Some bruising  IVF:  NS +40 KCL at 125   ABX:  None  Mobility:  A1 to stand by to BSC.  Safety:  Call light within each. Bed alarm active    Comments:    8/14/2018  5:39 AM  Anne Goldman          "

## 2018-08-14 NOTE — PLAN OF CARE
Problem: Patient Care Overview  Goal: Plan of Care/Patient Progress Review  -diagnostic tests and consults completed and resulted   -vital signs normal or at patient baseline   Outcome: No Change  IV fluids with potassium were running at 250ml/hr but then decreased to 125ml/hr.  Potassium recheck solo from 2.8 to 3.6.  Magnesium WDL.  Continues to have a poor intake.  Tried to feed her one bite of supper but she spit it out in a tissue; poor fluid intake as well.  Up to bedside commode with assist of 1-2.  Voided 50ml of dark tea colored urine.  Encouraged her to sit in chair for supper but she refused to get into chair.  Received IV Dilaudid for lower back pain, tylenol for headache, and benadryl for itching.  LS are CTA but slightly diminished.  States she has not had a BM for a few days; denied offer of PRN stool softeners.  Is alert and able to make needs known, alarms on for safety.      Problem: Fluid Volume Deficit (Adult)  Goal: Identify Related Risk Factors and Signs and Symptoms  Related risk factors and signs and symptoms are identified upon initiation of Human Response Clinical Practice Guideline (CPG).   Outcome: No Change   08/13/18 1720   Fluid Volume Deficit   Related Risk Factors (Fluid Volume Deficit) other (see comments)   Signs and Symptoms (Fluid Volume Deficit) lab value changes;urine concentrated;urine output decreased     Goal: Optimal Fluid Balance  Patient will demonstrate the desired outcomes by discharge/transition of care.   Outcome: No Change   08/13/18 1720   Fluid Volume Deficit (Adult)   Optimal Fluid Balance unable to achieve outcome

## 2018-08-15 ENCOUNTER — APPOINTMENT (OUTPATIENT)
Dept: GENERAL RADIOLOGY | Facility: HOSPITAL | Age: 28
DRG: 372 | End: 2018-08-15
Attending: INTERNAL MEDICINE
Payer: COMMERCIAL

## 2018-08-15 PROBLEM — A04.72 CLOSTRIDIUM DIFFICILE COLITIS: Status: ACTIVE | Noted: 2018-08-15

## 2018-08-15 LAB
ANION GAP SERPL CALCULATED.3IONS-SCNC: 8 MMOL/L (ref 3–14)
BASOPHILS # BLD AUTO: 0 10E9/L (ref 0–0.2)
BASOPHILS NFR BLD AUTO: 0.2 %
BUN SERPL-MCNC: 2 MG/DL (ref 7–30)
CALCIUM SERPL-MCNC: 7.9 MG/DL (ref 8.5–10.1)
CHLORIDE SERPL-SCNC: 111 MMOL/L (ref 94–109)
CO2 SERPL-SCNC: 18 MMOL/L (ref 20–32)
CREAT SERPL-MCNC: 0.32 MG/DL (ref 0.52–1.04)
DIFFERENTIAL METHOD BLD: ABNORMAL
EOSINOPHIL # BLD AUTO: 0.3 10E9/L (ref 0–0.7)
EOSINOPHIL NFR BLD AUTO: 3.1 %
ERYTHROCYTE [DISTWIDTH] IN BLOOD BY AUTOMATED COUNT: 16.3 % (ref 10–15)
GFR SERPL CREATININE-BSD FRML MDRD: >90 ML/MIN/1.7M2
GLUCOSE SERPL-MCNC: 73 MG/DL (ref 70–99)
HCT VFR BLD AUTO: 42.7 % (ref 35–47)
HGB BLD-MCNC: 13.9 G/DL (ref 11.7–15.7)
IMM GRANULOCYTES # BLD: 0.1 10E9/L (ref 0–0.4)
IMM GRANULOCYTES NFR BLD: 0.8 %
LYMPHOCYTES # BLD AUTO: 1.8 10E9/L (ref 0.8–5.3)
LYMPHOCYTES NFR BLD AUTO: 17.4 %
MAGNESIUM SERPL-MCNC: 1.7 MG/DL (ref 1.6–2.3)
MCH RBC QN AUTO: 30.5 PG (ref 26.5–33)
MCHC RBC AUTO-ENTMCNC: 32.6 G/DL (ref 31.5–36.5)
MCV RBC AUTO: 94 FL (ref 78–100)
MONOCYTES # BLD AUTO: 0.6 10E9/L (ref 0–1.3)
MONOCYTES NFR BLD AUTO: 5.6 %
NEUTROPHILS # BLD AUTO: 7.6 10E9/L (ref 1.6–8.3)
NEUTROPHILS NFR BLD AUTO: 72.9 %
NRBC # BLD AUTO: 0 10*3/UL
NRBC BLD AUTO-RTO: 0 /100
PLATELET # BLD AUTO: 277 10E9/L (ref 150–450)
POTASSIUM SERPL-SCNC: 5.1 MMOL/L (ref 3.4–5.3)
POTASSIUM SERPL-SCNC: 6.2 MMOL/L (ref 3.4–5.3)
RBC # BLD AUTO: 4.56 10E12/L (ref 3.8–5.2)
SODIUM SERPL-SCNC: 137 MMOL/L (ref 133–144)
WBC # BLD AUTO: 10.4 10E9/L (ref 4–11)

## 2018-08-15 PROCEDURE — 84132 ASSAY OF SERUM POTASSIUM: CPT | Performed by: INTERNAL MEDICINE

## 2018-08-15 PROCEDURE — 12000000 ZZH R&B MED SURG/OB

## 2018-08-15 PROCEDURE — 93005 ELECTROCARDIOGRAM TRACING: CPT

## 2018-08-15 PROCEDURE — 36415 COLL VENOUS BLD VENIPUNCTURE: CPT | Performed by: INTERNAL MEDICINE

## 2018-08-15 PROCEDURE — 85025 COMPLETE CBC W/AUTO DIFF WBC: CPT | Performed by: INTERNAL MEDICINE

## 2018-08-15 PROCEDURE — G0378 HOSPITAL OBSERVATION PER HR: HCPCS

## 2018-08-15 PROCEDURE — 99233 SBSQ HOSP IP/OBS HIGH 50: CPT | Performed by: INTERNAL MEDICINE

## 2018-08-15 PROCEDURE — 83735 ASSAY OF MAGNESIUM: CPT | Performed by: INTERNAL MEDICINE

## 2018-08-15 PROCEDURE — 80048 BASIC METABOLIC PNL TOTAL CA: CPT | Performed by: INTERNAL MEDICINE

## 2018-08-15 PROCEDURE — 25000128 H RX IP 250 OP 636: Performed by: INTERNAL MEDICINE

## 2018-08-15 PROCEDURE — 74019 RADEX ABDOMEN 2 VIEWS: CPT | Mod: TC

## 2018-08-15 PROCEDURE — 36416 COLLJ CAPILLARY BLOOD SPEC: CPT | Performed by: INTERNAL MEDICINE

## 2018-08-15 PROCEDURE — 93010 ELECTROCARDIOGRAM REPORT: CPT | Performed by: INTERNAL MEDICINE

## 2018-08-15 PROCEDURE — 25800025 ZZH RX 258: Performed by: INTERNAL MEDICINE

## 2018-08-15 PROCEDURE — 25000132 ZZH RX MED GY IP 250 OP 250 PS 637: Performed by: INTERNAL MEDICINE

## 2018-08-15 RX ORDER — DEXTROSE MONOHYDRATE, SODIUM CHLORIDE, AND POTASSIUM CHLORIDE 50; 1.49; 4.5 G/1000ML; G/1000ML; G/1000ML
INJECTION, SOLUTION INTRAVENOUS CONTINUOUS
Status: DISCONTINUED | OUTPATIENT
Start: 2018-08-15 | End: 2018-08-16

## 2018-08-15 RX ORDER — VANCOMYCIN HYDROCHLORIDE 50 MG/ML
125 KIT ORAL 4 TIMES DAILY
Status: DISCONTINUED | OUTPATIENT
Start: 2018-08-15 | End: 2018-08-17 | Stop reason: HOSPADM

## 2018-08-15 RX ADMIN — ONDANSETRON 4 MG: 2 INJECTION INTRAMUSCULAR; INTRAVENOUS at 17:42

## 2018-08-15 RX ADMIN — POTASSIUM CHLORIDE, DEXTROSE MONOHYDRATE AND SODIUM CHLORIDE: 150; 5; 450 INJECTION, SOLUTION INTRAVENOUS at 14:18

## 2018-08-15 RX ADMIN — ONDANSETRON 4 MG: 2 INJECTION INTRAMUSCULAR; INTRAVENOUS at 08:52

## 2018-08-15 RX ADMIN — VANCOMYCIN HYDROCHLORIDE 125 MG: KIT at 14:22

## 2018-08-15 RX ADMIN — VANCOMYCIN HYDROCHLORIDE 125 MG: KIT at 21:27

## 2018-08-15 RX ADMIN — VANCOMYCIN HYDROCHLORIDE 125 MG: KIT at 10:55

## 2018-08-15 RX ADMIN — ACETAMINOPHEN 650 MG: 325 TABLET, FILM COATED ORAL at 19:20

## 2018-08-15 RX ADMIN — VANCOMYCIN HYDROCHLORIDE 125 MG: KIT at 17:42

## 2018-08-15 ASSESSMENT — ACTIVITIES OF DAILY LIVING (ADL)
ADLS_ACUITY_SCORE: 25
ADLS_ACUITY_SCORE: 25

## 2018-08-15 ASSESSMENT — PAIN DESCRIPTION - DESCRIPTORS: DESCRIPTORS: DISCOMFORT

## 2018-08-15 NOTE — PLAN OF CARE
Face to face report given with opportunity to observe patient.    Report given to Jordin Pate   8/15/2018  3:31 PM

## 2018-08-15 NOTE — PLAN OF CARE
DATE:  8/15/2018   TIME OF RECEIPT FROM LAB:  1020  LAB TEST:  Potassium   LAB VALUE:  6.2   RESULTS GIVEN WITH READ-BACK TO (PROVIDER):  Dr. Marie   TIME LAB VALUE REPORTED TO PROVIDER:   1027    Specimen was hemolyzed per lab staff. Dr. Marie would like lab redrawn.

## 2018-08-15 NOTE — PLAN OF CARE
"Problem: Patient Care Overview  Goal: Plan of Care/Patient Progress Review  -diagnostic tests and consults completed and resulted   -vital signs normal or at patient baseline   Outcome: Therapy, progress toward functional goals as expected  Reason for hospital stay:  Hypokalemia        Most recent vitals: /79  Pulse 88  Temp 98.9  F (37.2  C) (Tympanic)  Resp 16  Ht 1.6 m (5' 3\")  Wt 54.2 kg (119 lb 7.8 oz)  SpO2 100%  BMI 21.17 kg/m2    Pain Management:  Denies    Orientation:  Somnolent, rouses to voice, however does not open eyes    Cardiac:  WDL    Respiratory:  Clear et diminished bilat sats 99% on r/a    GI:  Bowel sounds audible et active x4, loose incontinent stool x1    :  Incontinent    Skin Issues:  Bruising noted    IVF:  NSK+40 125ml/hr    ABX:  n/a    Mobility:  Assist of 1 et gait belt    Safety:  Alarms audible et active         Comments:        8/15/2018  4:26 AM  Kristin Whitney        Face to face report given with opportunity to observe patient.    Report given to Marie Whitney   8/15/2018  6:52 AM      "

## 2018-08-15 NOTE — PLAN OF CARE
Problem: Patient Care Overview  Goal: Plan of Care/Patient Progress Review  -diagnostic tests and consults completed and resulted   -vital signs normal or at patient baseline   Outcome: No Change  Diarrhea at start of shift.  Spec sent to lab, c-diff positive.  Slept most of shift, awakens when called but often keeps eyes closed.  Staff awakened her to order supper, but became nauseated before tray arrived.  Order for zofran obtained & given.  Nausea eased but unable to eat any supper.  Voided only once this shift.  Trazadone held at HS due to somnolence.    Face to face report given with opportunity to observe patient.    Report given to Kristin MARTINEZ   8/14/2018  11:21 PM

## 2018-08-15 NOTE — PROGRESS NOTES
Hospitalist Progress Note          Assessment and Plan:         Diarrhea and vomiting  positive C. 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    As an outpatient, she was prescribed xarelto but did not take this for the last several days.   She reported several days of worsening nausea, anorexia and vomiting.  She also reports anterior chest pain worse with deep inspiration and better with rest.   In the ED she was noted to have a normal troponin but low serum potasium with corresponding EKG changes and an elevated QTC interval.     Since admission she has been having worsening adominal discomfort and diarrhea  Her c.diff returned positive  She was started on po Vancomycin              Medications:       DULoxetine (CYMBALTA) EC capsule 60 mg  60 mg Oral Daily     gabapentin (NEURONTIN) capsule 300 mg  300 mg Oral BID     gabapentin (NEURONTIN) tablet 600 mg  600 mg Oral At Bedtime     rivaroxaban ANTICOAGULANT  20 mg Oral QAM     traZODone (DESYREL) tablet 100 mg  100 mg Oral At Bedtime     vancomycin  125 mg Oral 4x Daily     acetaminophen, acetaminophen (TYLENOL) tablet 650 mg, bisacodyl, diphenhydrAMINE, loperamide,  "magnesium hydroxide, magnesium sulfate, melatonin, naloxone, ondansetron, potassium chloride, potassium chloride with lidocaine, potassium chloride, potassium chloride               Physical Exam:   Blood pressure 128/90, pulse 88, temperature 98.3  F (36.8  C), temperature source Tympanic, resp. rate 16, height 1.6 m (5' 3\"), weight 54.2 kg (119 lb 7.8 oz), SpO2 100 %.  Vitals:    18 1008 18 1208   Weight: 54.4 kg (120 lb) 54.2 kg (119 lb 7.8 oz)       Vital Sign Ranges  Temperature Temp  Av.6  F (36.4  C)  Min: 97.1  F (36.2  C)  Max: 98.4  F (36.9  C)   Blood pressure Systolic (24hrs), Av , Min:115 , Max:117        Diastolic (24hrs), Av, Min:65, Max:74      Pulse No Data Recorded   Respirations Resp  Av  Min: 16  Max: 16   Pulse oximetry SpO2  Av %  Min: 100 %  Max: 100 %         Intake/Output Summary (Last 24 hours) at 18 1718  Last data filed at 18 1452   Gross per 24 hour   Intake             3163 ml   Output               50 ml   Net             3113 ml     General; looks unwell, no acute distress  Constitutional; Non febrile, non diaphoretic  Psych; pleasant affect  Neuro; somnolent but alert to voice, oriented and appropriate, left sided weakness  Neck; no JVD  Lungs; resonant on percussion, normal vesicular breath sounds with no adventitial sounds  Precordium; no heave or thrill, reg reg, s1 s2 present  Abdomen; soft, diffuse tenderness, no peritoneal signs, prominent tinkling bowel sounds  Skin; warm, non diaphoretic         Data:   All new lab and imaging data was reviewed.   Recent Labs   Lab Test  18   1036  18   1523  18   0414  07/15/18   1435   18   0343   18   0619   WBC  11.6*  7.7  5.5  6.1   < >   --    < >   --    HGB  14.6  12.4  10.4*  12.8   < >   --    < >  11.5*   MCV  89  89  86  85   < >   --    < >   --    PLT  340  394  238  321   < >   --    < >   --    INR   --    --    --   1.04   --   8.43*   --   1.14 "    < > = values in this interval not displayed.      Recent Labs   Lab Test  08/14/18   0610  08/13/18   1925  08/13/18   1036  07/29/18   1523   NA  147*   --   141  144   POTASSIUM  4.4  3.6  2.8*  3.3*   CHLORIDE  116*   --   103  109   CO2  17*   --   29  27   BUN  9   --   13  5*   CR  0.37*   --   0.44*  0.42*   ANIONGAP  14   --   9  8   AVINASH  7.9*   --   8.5  7.7*   GLC  83   --   111*  85

## 2018-08-15 NOTE — PLAN OF CARE
Problem: Patient Care Overview  Goal: Plan of Care/Patient Progress Review  -diagnostic tests and consults completed and resulted   -vital signs normal or at patient baseline   Outcome: No Change  Patient has been very withdrawn today. Has basically been refusing everything and needs a lot of encouragement to agree to cares, assessments, and taking some medications. Patient has been awake off and on today but has refused to get out of bed and into the chair. Incontinent of bowel and bladder. Vanco PO started for c-diff. Patient did agree to take this with a lot of encouragement. Potassium level up to 5.1. Patient placed on tele and new EKG was gotten per MD order. Tele showing SR 90's, EKG continues to show prolonged QT. Abdominal x-ray completed this morning showing no problems, but MD is suspicious of an ileus (see his note). Patient's appetite has been minimal, zofran given once this morning for nausea. Patient has not had any emesis. Patient did have one stool this morning but none this afternoon. Bowels tinkling throughout. IV fluids adjusted, see MAR. Vitals remain stable. Using call light minimally this shift.

## 2018-08-15 NOTE — PLAN OF CARE
Patient continues to refuse Cymbalta, Gabapentin, and Xarelto this afternoon even with education on the importance. Patient did agree to take PO Vanco for c-diff because she had another stool.

## 2018-08-15 NOTE — PROGRESS NOTES
Assessment completed see flow sheet.    Maggie is sitting in bed. Her PCP is Dr Chapo Flores. She sees a variety of specialists through [facility]. She is currently seeing Shwetha Frazier at [facility] and is participating in C/D treatment at [place]. She has a POLST on file. She has MA through [facility], she does not know if she has a .    Maggie currently resides at [facility]. She states she feels safe. She needs assistance getting out of bed, using the bathroom and bathing.Therapies were discontinued 2 month ago. She ambulates with a wheelchair.     She sleeps during the day at [facility] because its so noisy. She is on medication for her depression. She quit smoking and drinking 2 weeks ago and is currently in treatment. Her kenny is important to her, she does not want kenny support at this time. Tiffanie and Jm cruz are her support system. She enjoys family time and getting together with friends.     Plan is to go back to [facility] with agency transportation.    LOC: alert    Others present: Patient    Dx: hypokalemia    Lives with: Lives With: facility resident    Living at: Living Arrangements: extended care facility    Equipment used:  wheelchair    Support System: Description of Support System: Supportive, Involved    Primary Care Provider: Chapo Flores    POA/Guardian: No     Health Care Directive: YES , POLST on file    Pharmacy: [facility]    :  Unknown    Homecare/[facility] Services:   MA    Adequate Resources for needs (housing, utilities, food/med): YES    Meds and appointments management: YES    Work: NO    Transportation: YES     ADLs: transferring, toileting and bathing    Falls: No    Able to Return to Prior Living Arrangements: YES    [place]: NO    Barriers: motivation    Needs: Demonstrates Competency    GUSTABO: elevated    Discharge Plan: return to [facility], usually transported by [facility] per pt.

## 2018-08-15 NOTE — PHARMACY
[facility]    Pharmacy      Antimicrobial Stewardship Note     Current antimicrobial therapy:  Anti-infectives (Future)    Start     Dose/Rate Route Frequency Ordered Stop    08/15/18 0915  vancomycin (FIRVANQ) oral solution 125 mg      125 mg Oral 4 TIMES DAILY 08/15/18 0912            Indication: C. Difficile    Days of Therapy: 1     Pertinent labs:  Creatinine   Creatinine   Date Value Ref [facility] Status   08/15/2018 0.32 (L) 0.52 - 1.04 mg/dL Final   08/14/2018 0.37 (L) 0.52 - 1.04 mg/dL Final   08/13/2018 0.44 (L) 0.52 - 1.04 mg/dL Final     WBC   WBC   Date Value Ref Range Status   08/15/2018 10.4 4.0 - 11.0 10e9/L Final   08/13/2018 11.6 (H) 4.0 - 11.0 10e9/L Final   07/29/2018 7.7 4.0 - 11.0 10e9/L Final     Procalcitonin   Procalcitonin   Date Value Ref Range Status   02/25/2018 0.23 ng/ml Final     Comment:     0.05-0.24 ng/ml Low risk of systemic bacterial infection. Local bacterial   infection possible.  Recommendation: Assess other clinical features of   infection. Discourage antibiotics unless strong clinical suspicion for serious   infection.       CRP   CRP Inflammation   Date Value Ref Range Status   07/15/2018 9.5 (H) 0.0 - 8.0 mg/L Final   02/26/2018 140.0 (H) 0.0 - 8.0 mg/L Final   02/19/2018 85.0 (H) 0.0 - 8.0 mg/L Final       Culture Results:   Clostridium difficile toxin B PCR [[id number]] (Abnormal) Collected: 08/14/18 1550      Order Status: Completed Lab Status: Final result Updated: 08/14/18 3605     Specimen: Feces       Specimen Description Feces      C Diff Toxin B PCR Positive (A)       Positive: Toxin producing Clostridium difficile target DNA sequences detected,    presumed positive for Clostridium difficile toxin B.   Clostridium difficile (Requires Enteric Isolation)   FDA approved assay performed using [employer] GeneXpert real-time PCR.   Critical Value called to and read back by   Ana Moore at 1702 by SG                    Recommendations/Interventions:  1. No  recommendations at this time    Samantha Vaca, [facility]  August 15, 2018

## 2018-08-15 NOTE — PROVIDER NOTIFICATION
Dr. Marie notified that there was no EKG order for today and that patient has not been on telemetry. EKG ordered and telemetry order placed per MD orders.

## 2018-08-15 NOTE — PLAN OF CARE
Patient is very withdrawn. When staff are in room she will not open eyes and answers all questions with minimal responses. Patient was refusing to have vitals and assessment performed this morning but writer was able to talk her into this. Patient complaining of abdominal discomfort and nausea, Zofran given IV. Refused scheduled meds at this time.

## 2018-08-16 LAB
ANION GAP SERPL CALCULATED.3IONS-SCNC: 10 MMOL/L (ref 3–14)
BUN SERPL-MCNC: 1 MG/DL (ref 7–30)
CALCIUM SERPL-MCNC: 8.3 MG/DL (ref 8.5–10.1)
CHLORIDE SERPL-SCNC: 105 MMOL/L (ref 94–109)
CO2 SERPL-SCNC: 22 MMOL/L (ref 20–32)
CREAT SERPL-MCNC: 0.4 MG/DL (ref 0.52–1.04)
ERYTHROCYTE [DISTWIDTH] IN BLOOD BY AUTOMATED COUNT: 15.7 % (ref 10–15)
GFR SERPL CREATININE-BSD FRML MDRD: >90 ML/MIN/1.7M2
GLUCOSE SERPL-MCNC: 101 MG/DL (ref 70–99)
HCT VFR BLD AUTO: 40.3 % (ref 35–47)
HGB BLD-MCNC: 13.7 G/DL (ref 11.7–15.7)
MAGNESIUM SERPL-MCNC: 1.7 MG/DL (ref 1.6–2.3)
MCH RBC QN AUTO: 30.1 PG (ref 26.5–33)
MCHC RBC AUTO-ENTMCNC: 34 G/DL (ref 31.5–36.5)
MCV RBC AUTO: 89 FL (ref 78–100)
PLATELET # BLD AUTO: 307 10E9/L (ref 150–450)
POTASSIUM SERPL-SCNC: 4.3 MMOL/L (ref 3.4–5.3)
RBC # BLD AUTO: 4.55 10E12/L (ref 3.8–5.2)
SODIUM SERPL-SCNC: 137 MMOL/L (ref 133–144)
WBC # BLD AUTO: 8.7 10E9/L (ref 4–11)

## 2018-08-16 PROCEDURE — 25000125 ZZHC RX 250: Performed by: INTERNAL MEDICINE

## 2018-08-16 PROCEDURE — 93010 ELECTROCARDIOGRAM REPORT: CPT | Performed by: INTERNAL MEDICINE

## 2018-08-16 PROCEDURE — 85027 COMPLETE CBC AUTOMATED: CPT | Performed by: INTERNAL MEDICINE

## 2018-08-16 PROCEDURE — 25800025 ZZH RX 258: Performed by: INTERNAL MEDICINE

## 2018-08-16 PROCEDURE — 36415 COLL VENOUS BLD VENIPUNCTURE: CPT | Performed by: INTERNAL MEDICINE

## 2018-08-16 PROCEDURE — 25000128 H RX IP 250 OP 636: Performed by: INTERNAL MEDICINE

## 2018-08-16 PROCEDURE — 83735 ASSAY OF MAGNESIUM: CPT | Performed by: INTERNAL MEDICINE

## 2018-08-16 PROCEDURE — 99232 SBSQ HOSP IP/OBS MODERATE 35: CPT | Performed by: INTERNAL MEDICINE

## 2018-08-16 PROCEDURE — 93005 ELECTROCARDIOGRAM TRACING: CPT

## 2018-08-16 PROCEDURE — 25000132 ZZH RX MED GY IP 250 OP 250 PS 637: Performed by: INTERNAL MEDICINE

## 2018-08-16 PROCEDURE — 12000000 ZZH R&B MED SURG/OB

## 2018-08-16 PROCEDURE — 80048 BASIC METABOLIC PNL TOTAL CA: CPT | Performed by: INTERNAL MEDICINE

## 2018-08-16 RX ORDER — PROCHLORPERAZINE MALEATE 5 MG
5-10 TABLET ORAL EVERY 6 HOURS PRN
Status: DISCONTINUED | OUTPATIENT
Start: 2018-08-16 | End: 2018-08-17 | Stop reason: HOSPADM

## 2018-08-16 RX ORDER — PROCHLORPERAZINE 25 MG
25 SUPPOSITORY, RECTAL RECTAL EVERY 12 HOURS PRN
Status: DISCONTINUED | OUTPATIENT
Start: 2018-08-16 | End: 2018-08-17 | Stop reason: HOSPADM

## 2018-08-16 RX ORDER — ONDANSETRON 4 MG/1
8 TABLET, ORALLY DISINTEGRATING ORAL EVERY 8 HOURS PRN
Status: DISCONTINUED | OUTPATIENT
Start: 2018-08-16 | End: 2018-08-17 | Stop reason: HOSPADM

## 2018-08-16 RX ADMIN — ACETAMINOPHEN 650 MG: 325 TABLET, FILM COATED ORAL at 18:05

## 2018-08-16 RX ADMIN — GABAPENTIN 300 MG: 300 CAPSULE ORAL at 13:12

## 2018-08-16 RX ADMIN — VANCOMYCIN HYDROCHLORIDE 125 MG: KIT at 10:34

## 2018-08-16 RX ADMIN — GABAPENTIN 600 MG: 600 TABLET ORAL at 21:19

## 2018-08-16 RX ADMIN — POTASSIUM CHLORIDE, DEXTROSE MONOHYDRATE AND SODIUM CHLORIDE: 150; 5; 450 INJECTION, SOLUTION INTRAVENOUS at 00:22

## 2018-08-16 RX ADMIN — RIVAROXABAN 20 MG: 20 TABLET, FILM COATED ORAL at 13:12

## 2018-08-16 RX ADMIN — ACETAMINOPHEN 650 MG: 325 TABLET, FILM COATED ORAL at 10:12

## 2018-08-16 RX ADMIN — VANCOMYCIN HYDROCHLORIDE 125 MG: KIT at 18:04

## 2018-08-16 RX ADMIN — PROCHLORPERAZINE MALEATE 10 MG: 5 TABLET, FILM COATED ORAL at 21:19

## 2018-08-16 RX ADMIN — VANCOMYCIN HYDROCHLORIDE 125 MG: KIT at 21:19

## 2018-08-16 RX ADMIN — ONDANSETRON 4 MG: 2 INJECTION INTRAMUSCULAR; INTRAVENOUS at 02:33

## 2018-08-16 RX ADMIN — TRAZODONE HYDROCHLORIDE 100 MG: 100 TABLET ORAL at 21:19

## 2018-08-16 RX ADMIN — DULOXETINE HYDROCHLORIDE 60 MG: 60 CAPSULE, DELAYED RELEASE ORAL at 13:12

## 2018-08-16 RX ADMIN — ONDANSETRON 8 MG: 4 TABLET, ORALLY DISINTEGRATING ORAL at 10:10

## 2018-08-16 RX ADMIN — ONDANSETRON 8 MG: 4 TABLET, ORALLY DISINTEGRATING ORAL at 18:05

## 2018-08-16 RX ADMIN — PROCHLORPERAZINE MALEATE 10 MG: 5 TABLET, FILM COATED ORAL at 13:11

## 2018-08-16 ASSESSMENT — ACTIVITIES OF DAILY LIVING (ADL)
ADLS_ACUITY_SCORE: 27
BATHING: 2-->ASSISTIVE PERSON
AMBULATION: 1-->ASSISTIVE EQUIPMENT
COGNITION: 0 - NO COGNITION ISSUES REPORTED
ADLS_ACUITY_SCORE: 24
ADLS_ACUITY_SCORE: 26
TRANSFERRING: 2-->ASSISTIVE PERSON
RETIRED_COMMUNICATION: 0-->UNDERSTANDS/COMMUNICATES WITHOUT DIFFICULTY
ADLS_ACUITY_SCORE: 26
SWALLOWING: 0-->SWALLOWS FOODS/LIQUIDS WITHOUT DIFFICULTY
RETIRED_EATING: 0-->INDEPENDENT
ADLS_ACUITY_SCORE: 25
ADLS_ACUITY_SCORE: 26
DRESS: 2-->ASSISTIVE PERSON
FALL_HISTORY_WITHIN_LAST_SIX_MONTHS: NO
TOILETING: 2-->ASSISTIVE PERSON

## 2018-08-16 NOTE — PLAN OF CARE
"Problem: Patient Care Overview  Goal: Plan of Care/Patient Progress Review  -diagnostic tests and consults completed and resulted   -vital signs normal or at patient baseline   Outcome: Therapy, progress toward functional goals as expected  Reason for hospital stay:  Hypokalemia        Most recent vitals: /75 (BP Location: Right arm)  Pulse 88  Temp 97.1  F (36.2  C) (Tympanic)  Resp 16  Ht 1.6 m (5' 3\")  Wt 54.2 kg (119 lb 7.8 oz)  SpO2 100%  BMI 21.17 kg/m2    Pain Management:  Denies    Orientation:  A/O    Cardiac:  WDL    Respiratory:  Lungs clear et diminished bilat    GI:  Bowel sounds audible et active x4    :  Incontinent of bladder, calls to be changed after weting brief, attempts made to get up to commode to prevent incontinence without success    Skin Issues:  N0 alterations noted    IVF:  D5NS K+40      ABX:  n/a    Mobility:  Assist of 1 with gait belt for transfers to commode, independently repositions in bed    Safety:  Alarms audible et active        Comments:        8/16/2018  3:40 AM  Kristin Whitney        Face to face report given with opportunity to observe patient.    Report given to Jasmyn Whitney   8/16/2018  6:59 AM      "

## 2018-08-16 NOTE — PROGRESS NOTES
Hospitalist Progress Note          Assessment and Plan:       This patient is a 28 year old woman with a history significant for ASD, DVT/PE and paradoxical, hemorrhagic CVA which required craniotomy.  She presented to ED with vomiting, fatigue, and chest pain, with emesis for the last 3 days and has not been able to keep anything down.  She denies any fevers or chills.  She has been treated with rivarobaxan  but did not take this for the last several days before admission. She noted anterior chest pain worse with deep inspiration and better with rest.     C. difficile colitis  Presented with nausea vomiting abdominal pain.  C. difficile toxin was positive.  Initially prominent diarrhea although total volume not great.  This has improved with oral vancomycin.  Now she has moderate persistent nausea with minimal small volumes of emesis, poorly responsive to ondansetron.  Abdominal pain itself appears relatively modest.  She reports flatus.  She is quite disturbed by her persistent abdominal discomfort and nausea.  She has been able to tolerate small amounts of volume but reluctant to take more to avoid nausea.  Have accelerated her symptomatic treatment with addition of Compazine.  Extensive discussion with her including options of intravenous fluid requiring placement of new IV as existing one today became nonfunctional.  Discussed with her that trial of increased oral intake might be the most reasonable.  At length she is agreeable to attempting this.  For the perspective of her C. difficile colitis she was first diagnosed in July of this year with positive C. difficile toxin.  She had a usual 10 day treatment with resolution of her symptoms.  Whether her recurrent symptoms are new or persistent disease is uncertain.  Continuing to encourage her to take oral vancomycin which intermittently she is reluctant to do.  Although it in fact it is not certain that this is a worsening of C. difficile infection at this  point would be inclined to recommend 30 day tapering dose.  Doubt ileus or other limitation in spontaneous gut function.  Repeat abdominal radiograph in the morning.    Nausea  No further emesis and diarrheal stools improved.  She does intermittently have nausea.  Poor response to ondansetron which is not unexpected as this is most effective in the context of chemotherapy or post anesthesia nausea and vomiting.  Trial of Compazine as this may be more effective with due care to minimize doses as either agent may be associated with prolonged QT interval.  As noted this has not been increased over her baseline QT prolongation present and relatively stable over several years of EKGs available for review.    Hypokalemia  This is now fully resolved with usual replacement, and likely represented GI losses alone.  Initially concern raised for prolonged QT interval on EKG possibly related to the hypokalemia.  She does continue to have a prolonged QT interval however on review of available EKGs over the past several years this is been persistent issue and unlikely to be explained by recent events.  She does need close monitoring and caution with the use of medications worsening QT prolongation including ondansetron and prochlorperazine.  However benefit of both these agents at this point outweighs these risks with judicious dosing.    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  In addition ASD with paradoxical right to left emboli and residual left-sided weakness.  Continuing rivarobaxan which she had previously been prescribed although not able to take it for several days because of her GI symptoms.  She has been evaluated in the past for possible hypercoagulable state without significant findings.    Difficulty following medical plan  This is been persistent problem for this patient with multiple chronic medical issues at a relatively young age.  Nursing staff has been particularly good at working  "with her encouraging in particular oral fluid and essential medications.  Overall it appears that the patient's been attempting, not unexpectedly, to maintain what control she can of her life particularly with a somewhat inflexible approach to oral fluid, food, medication input.  This is been particularly difficult today but particularly by the end of the day she shown some improvement.                        Interval History:   Awake and alert.  Overall cooperative and interactive although somewhat evasive.  No some nausea.  No rachel chest discomfort.  Passing flatus.              Medications:       DULoxetine (CYMBALTA) EC capsule 60 mg  60 mg Oral Daily     gabapentin (NEURONTIN) capsule 300 mg  300 mg Oral BID     gabapentin (NEURONTIN) tablet 600 mg  600 mg Oral At Bedtime     rivaroxaban ANTICOAGULANT  20 mg Oral QAM     traZODone (DESYREL) tablet 100 mg  100 mg Oral At Bedtime     vancomycin  125 mg Oral 4x Daily     acetaminophen, acetaminophen (TYLENOL) tablet 650 mg, bisacodyl, diphenhydrAMINE, magnesium hydroxide, magnesium sulfate, melatonin, naloxone, ondansetron, ondansetron, potassium chloride, potassium chloride with lidocaine, potassium chloride, potassium chloride, prochlorperazine **OR** prochlorperazine **OR** prochlorperazine               Physical Exam:   Blood pressure 114/85, pulse 88, temperature 98.8  F (37.1  C), temperature source Tympanic, resp. rate 16, height 1.6 m (5' 3\"), weight 54.2 kg (119 lb 7.8 oz), SpO2 94 %.  Vitals:    18 1008 18 1208   Weight: 54.4 kg (120 lb) 54.2 kg (119 lb 7.8 oz)       Vital Sign Ranges  Temperature Temp  Av.6  F (36.4  C)  Min: 97.1  F (36.2  C)  Max: 98.4  F (36.9  C)   Blood pressure Systolic (24hrs), Av , Min:115 , Max:117        Diastolic (24hrs), Av, Min:65, Max:74      Pulse No Data Recorded   Respirations Resp  Av  Min: 16  Max: 16   Pulse oximetry SpO2  Av %  Min: 100 %  Max: 100 %         Intake/Output " Summary (Last 24 hours) at 08/14/18 1718  Last data filed at 08/14/18 1452   Gross per 24 hour   Intake             3163 ml   Output               50 ml   Net             3113 ml     General; awake and alert.  Generally appears comfortable  Psych; mildly blunted affect.  Somewhat evasive.  Overall cooperative and interactive with persistent prompting.  Neuro; awake and alert.  Motor strength with mild paresis to the left upper and lower.  No fasciculations or tremors.  Neck; jugular venous pressure not elevated.  Midline trachea.  Lungs; aeration to bases.  Accessory muscles not in use.  No wheezes, crackles, or rhonchi.  PMI not displaced.  S1 and S2 regular and other  Precordium; actable.  No murmurs.  Abdomen; soft, minimal palpation tenderness.  No percussion tenderness.  Bowel sounds in all quadrants.  Skin; warm, non diaphoretic         Data:   All new lab and imaging data was reviewed.   Recent Labs   Lab Test  08/16/18   0631  08/15/18   0944  08/13/18   1036   07/15/18   1435   06/24/18   0343   05/24/18   0619   WBC  8.7  10.4  11.6*   < >  6.1   < >   --    < >   --    HGB  13.7  13.9  14.6   < >  12.8   < >   --    < >  11.5*   MCV  89  94  89   < >  85   < >   --    < >   --    PLT  307  277  340   < >  321   < >   --    < >   --    INR   --    --    --    --   1.04   --   8.43*   --   1.14    < > = values in this interval not displayed.      Recent Labs   Lab Test  08/16/18   0631  08/15/18   1045  08/15/18   0944  08/14/18   0610   NA  137   --   137  147*   POTASSIUM  4.3  5.1  6.2*  4.4   CHLORIDE  105   --   111*  116*   CO2  22   --   18*  17*   BUN  1*   --   2*  9   CR  0.40*   --   0.32*  0.37*   ANIONGAP  10   --   8  14   AVINASH  8.3*   --   7.9*  7.9*   GLC  101*   --   73  83

## 2018-08-16 NOTE — PROGRESS NOTES
"CLINICAL NUTRITION SERVICES  -  ASSESSMENT NOTE    Maggie Case : Poor intake, C diff colitis    28 yof admitted for hypokalemia. Pt has a hx of B12 deficiency, lactose intolerance, chronic diarrhea, anemia, and stroke in 2/2018. After her stroke she experienced dysphagia and was on tubes feeds. Noted weight has been trending down over the past 6 months. Pt/family estimate reduced oral intake since stroke. Pt has experienced some episodes of N/V/D in the recent past. Pt continues to be feel nauseous. Spoke with family and pt about trying to include high calorie and high protein foods into diet if she is only able to tolerate a few bites. Also reviewed nutrition recommendations for diarrhea. Pt is willing to sip on 3 Ensure Clear daily. Pt is currently on a regular diet with limited intake, 0-25%.    Height: 5' 3\"  Weight: 119 lbs 7.83 oz  Body mass index is 21.17 kg/(m^2).  Weight Status:  Normal BMI  IBWR: 104-140lb  Weight History: 9lb (6%) weight loss x 3 month (2/17/18 to 5/24/18). Additional 17lbs weight loss in last month if weight is confirmed. Total weight loss would then be 26lbs (18%) in 6 months (3/1/18 to 8/13/18).  136lb - 7/15/18  139lb - 6/22/18  136lb - 5/24/18  151lb - 3/1/18- same hospital stay as previous weight  145lb - 2/17/18- admit weight  139lb - 4/28/17      Estimated Energy Needs: 8183-3438 kcals (35-40 Kcal/Kg)  Estimated Protein Needs: 65-80 grams protein (1.2-1.5 g pro/Kg)      MALNUTRITION:  % Weight Loss: Weight loss does not meet criteria for malnutrition If admit weight is confirmed, weight loss does meet criteria for malnutrition (> 10% in 6 months - severe malnutrition)  % Intake:  </= 75% for >/= 1 month (severe malnutrition)-  less than 50% intake for the last week.       Malnutrition Diagnosis: need second weight to confirm weight loss- at high risk      NUTRITION DIAGNOSIS:  Inadequate oral intake related to reduced oral intake and GI dysfunction as evidenced by pt report of " poor appetite/intake and N/V/D with weight trending down.      NUTRITION RECOMMENDATIONS  -Encourage intake of frequent meals/snacks. Include high protein and high calorie foods such as eggs, peanut butter, and gravy).   - Will send Ensure Clear TID.  - Suggest adding probiotic supplement.  - Please confirm admit weight with second measurement.      MONITORING AND EVALUATION:  RD will monitor intake, weight, labs

## 2018-08-16 NOTE — PROVIDER NOTIFICATION
Spoke with MD SANCHEZ: patient's refusal to increase oral fluid intake; she's only had in 120 ml this whole shift. Because of that, IV restart attempted 2 times by 2 different nurses and patient refused a 3rd attempt. Updated that patient had refused her scheduled medications today up until just recently. Oral Vanco was given after 1000. Patient has only had 1 emesis that was in her basin when I walked into her room this morning. Zofran has been given along with Compazine just recently. Patient has been manipulative all shift, repeated frequent requests despite repeated encouragement for patient to try to group requests together. She's been incontinent twice this shift, stating she needs to have 2 staff to help her pivot to commode because she feels lightheaded. MD okays anesthesia to start IV if patient does not take in an additional 120 ml by 1700 this evening.

## 2018-08-16 NOTE — PROGRESS NOTES
Updated Sandrita at [facility].  Wondering if patient would benefit from having oral potassium replacement maintenance or have labs ordered at the nursing home. CM to bring concern to hospitalist.

## 2018-08-16 NOTE — PLAN OF CARE
Problem: Patient Care Overview  Goal: Plan of Care/Patient Progress Review  -diagnostic tests and consults completed and resulted   -vital signs normal or at patient baseline   Outcome: Improving  No BMs this shift, BS active. Pt incontinent of urine multiple times. Pt calls appropriately. Prn Tylenol administered once for upper back pain, otherwise denies pain. IV fluids infusing 100ml/hr. Pt tried to eat a little dinner, then vomited. Pt with poor appetite. Pt withdrawn, with eyes shut most of shift. Pt also refusing her scheduled meds. Pt did take her Vanco at 1700, but refused her bedtime dose. On tele, HR SR in the 80s.     Face to face report given with opportunity to observe patient.    Report given to Kristin Casanova   8/15/2018  11:08 PM

## 2018-08-16 NOTE — PLAN OF CARE
"Problem: Patient Care Overview  Goal: Plan of Care/Patient Progress Review  -diagnostic tests and consults completed and resulted   -vital signs normal or at patient baseline   Outcome: No Change  Patient has c/o pain most of the shift. She's c/o pain in her shoulder and her upper back. Tylenol PO was given one time this shift because patient has been refusing to take medications orally because she's reporting she's nauseated. Upon going into her room at the beginning of the shift, a small yellowish bile emesis was noted in basin. Towards the end of the shift she was had small green foamy emesis. Zofran SL given after 1000 due to loss of IV access. Compazine PO given preemptively when patient agreed to take Xarelto and other daily medications. Patient has been manipulative all shift. Frequently on call light, after we've checked on her and asked for everything she thinks she might need before we go into the room due to isolation precaution. She's asked what she needs before staff enters the room, an example being when I attempted her morning medications, I initially asked her if she was going to take her pertinent medications this morning along with Tylenol for c/o pain and Zofran, she stated no, just the Tylenol and Zofran. Once Tylenol was physically given to patient for her to take along with water that was on bedside table, she stated \"I can't take it with water.\" I asked her what she takes medications with and she stated juice. Patient only had ginger ale on her table. When asked again later if she was going to take at least her Xarelto to prevent blood clots and strokes she stated yes, when I got into the room with the medications she stated she couldn't, that she was nauseated. Patient later agreed to take her medications with Compazine PO for nausea. VSS, afebrile. While I was in the room getting patient Tylenol, patient stated she needed to go the bathroom; I finished opening the medication to put in med " "cup and started helping her up and she asked \"is it okay to go in the brief?\" To which I told her no it's not okay, let's get up, to which she then stated \"well I already went.\" Patient was told that if she needed to go to the bathroom and could not hold it that she needed to tell us right away so that we know. Patient then proceeded to say that she couldn't get up, when asked why she stated she felt lightheaded. I informed patient that we were just getting her to the commode, that she only needed to pivot and I could help her. She refused stating I needed another person with me. Patient then proceeded to say she needed 2 people to assist her for the rest of the shift. IV infiltrated at the beginning of the shift, MD was updated and patient was told that her IV could be left out if she increased her oral fluid intake. Patient refused to increase oral intake until she agreed to take medications after 1300, when she took in one apple juice with meds. IV restart attempted twice, once by 2 different nurses, before patient refused a 3rd attempt. MD was updated and did go in and tell the patient that if she increased her oral fluid intake we could leave the IV out. Clarified how much patient needed to drink and by what time before anesthesia is called for restart, he stated patient needed to drink another 120 ml by 1700. She has been incontinent of stool once this shift and urine once this shift. Patient refused to get up to the chair all shift. She is being turned in bed to prevent skin breakdown. Call light within reach. Alarms in place.    At the end of the shift, the paternal grandparents were present visiting. They asked if they could ask questions on her behalf, to which I asked the patient if that would be okay because they were not listed in her chart, she stated yes. Paternal grandparents asked about her care, how \"she gets fixed here and then sent back to the nursing home where she just goes downhill and has to " "come back here.\" They asked who makes her plan of care. They asked who makes patient's decisions, if her mother did. They asked if the patient had a directive of any kind. They were told that the patient makes her own decisions, her mother makes none for her. They were informed that the patient has been refusing her pertinent medications, like the Xarelto to prevent blood clots and the Cymbalta that would help her anxiety and is an adjunct for pain. They were told that she's refused the only medication available to treat C-diff, the reason she is likely having diarrhea. They were told that the patient has been told what she needs to do to help herself; she needs to eat, she needs to drink. Patient was made aware that the IV could be left out if she drank another 120 cc by 1700 today, she has not done that as of 1520. They were informed that the patient has to have some accountability and self responsibility to help herself get better. They were informed that the hospital's role is to stabilize what the patient came in for, that the primary care doctor is the one who manages general care of all systems. They were informed that patient did not have any directive in place, that if the patient is unable to make decisions for herself, that next of kin would be contacted to make decisions. Paternal grandparents did voice that I answered their questions.    Face to face report given with opportunity to observe patient.    Report given to Jordin Tapia   8/16/2018  4:43 PM          "

## 2018-08-16 NOTE — PHARMACY
[facility]    Pharmacy      Antimicrobial Stewardship Note     Current antimicrobial therapy:  Anti-infectives (Future)    Start     Dose/Rate Route Frequency Ordered Stop    08/15/18 0915  vancomycin (FIRVANQ) oral solution 125 mg      125 mg Oral 4 TIMES DAILY 08/15/18 0912            Indication: C.Difficile    Days of Therapy: 2     Pertinent labs:  Creatinine   Creatinine   Date Value Ref Range Status   08/16/2018 0.40 (L) 0.52 - 1.04 mg/dL Final   08/15/2018 0.32 (L) 0.52 - 1.04 mg/dL Final   08/14/2018 0.37 (L) 0.52 - 1.04 mg/dL Final     WBC   WBC   Date Value Ref Range Status   08/16/2018 8.7 4.0 - 11.0 10e9/L Final   08/15/2018 10.4 4.0 - 11.0 10e9/L Final   08/13/2018 11.6 (H) 4.0 - 11.0 10e9/L Final     Procalcitonin   Procalcitonin   Date Value Ref Range Status   02/25/2018 0.23 ng/ml Final     Comment:     0.05-0.24 ng/ml Low risk of systemic bacterial infection. Local bacterial   infection possible.  Recommendation: Assess other clinical features of   infection. Discourage antibiotics unless strong clinical suspicion for serious   infection.       CRP   CRP Inflammation   Date Value Ref Range Status   07/15/2018 9.5 (H) 0.0 - 8.0 mg/L Final   02/26/2018 140.0 (H) 0.0 - 8.0 mg/L Final   02/19/2018 85.0 (H) 0.0 - 8.0 mg/L Final       Culture Results:    Clostridium difficile toxin B PCR [[id number]] (Abnormal) Collected: 08/14/18 1550     Order Status: Completed Lab Status: Final result Updated: 08/14/18 2979     Specimen: Feces       Specimen Description Feces      C Diff Toxin B PCR Positive (A)       Positive: Toxin producing Clostridium difficile target DNA sequences detected,    presumed positive for Clostridium difficile toxin B.   Clostridium difficile (Requires Enteric Isolation)   FDA approved assay performed using [employer] GeneXpert real-time PCR.   Critical Value called to and read back by   Ana Moore at 1702 by SG              Recommendations/Interventions:  1. No recommendations  at this time, patient has been refusing doses      Samantha Vaca, [facility]  August 16, 2018

## 2018-08-17 ENCOUNTER — APPOINTMENT (OUTPATIENT)
Dept: GENERAL RADIOLOGY | Facility: HOSPITAL | Age: 28
DRG: 372 | End: 2018-08-17
Attending: INTERNAL MEDICINE
Payer: COMMERCIAL

## 2018-08-17 VITALS
RESPIRATION RATE: 16 BRPM | HEIGHT: 63 IN | OXYGEN SATURATION: 98 % | HEART RATE: 88 BPM | SYSTOLIC BLOOD PRESSURE: 115 MMHG | TEMPERATURE: 97.6 F | BODY MASS INDEX: 21.17 KG/M2 | DIASTOLIC BLOOD PRESSURE: 87 MMHG | WEIGHT: 119.49 LBS

## 2018-08-17 PROCEDURE — 74019 RADEX ABDOMEN 2 VIEWS: CPT | Mod: TC

## 2018-08-17 PROCEDURE — 25000132 ZZH RX MED GY IP 250 OP 250 PS 637: Performed by: INTERNAL MEDICINE

## 2018-08-17 PROCEDURE — 99239 HOSP IP/OBS DSCHRG MGMT >30: CPT | Performed by: INTERNAL MEDICINE

## 2018-08-17 PROCEDURE — 93010 ELECTROCARDIOGRAM REPORT: CPT | Performed by: INTERNAL MEDICINE

## 2018-08-17 PROCEDURE — 93005 ELECTROCARDIOGRAM TRACING: CPT

## 2018-08-17 RX ORDER — VANCOMYCIN HYDROCHLORIDE 50 MG/ML
KIT ORAL
Qty: 140 ML | DISCHARGE
Start: 2018-08-17 | End: 2018-09-19

## 2018-08-17 RX ORDER — PROCHLORPERAZINE 25 MG
25 SUPPOSITORY, RECTAL RECTAL EVERY 12 HOURS PRN
Qty: 60 SUPPOSITORY | DISCHARGE
Start: 2018-08-17 | End: 2018-09-19

## 2018-08-17 RX ORDER — PROCHLORPERAZINE MALEATE 5 MG
5-10 TABLET ORAL EVERY 6 HOURS PRN
Qty: 90 TABLET | DISCHARGE
Start: 2018-08-17 | End: 2018-09-19

## 2018-08-17 RX ADMIN — VANCOMYCIN HYDROCHLORIDE 125 MG: KIT at 14:20

## 2018-08-17 RX ADMIN — DULOXETINE HYDROCHLORIDE 60 MG: 60 CAPSULE, DELAYED RELEASE ORAL at 08:21

## 2018-08-17 RX ADMIN — VANCOMYCIN HYDROCHLORIDE 125 MG: KIT at 10:16

## 2018-08-17 RX ADMIN — GABAPENTIN 300 MG: 300 CAPSULE ORAL at 08:21

## 2018-08-17 RX ADMIN — GABAPENTIN 300 MG: 300 CAPSULE ORAL at 14:20

## 2018-08-17 RX ADMIN — RIVAROXABAN 20 MG: 20 TABLET, FILM COATED ORAL at 08:21

## 2018-08-17 ASSESSMENT — ACTIVITIES OF DAILY LIVING (ADL)
ADLS_ACUITY_SCORE: 24

## 2018-08-17 NOTE — PLAN OF CARE
Problem: Patient Care Overview  Goal: Plan of Care/Patient Progress Review  -diagnostic tests and consults completed and resulted   -vital signs normal or at patient baseline   Outcome: Improving  Pt a&o and cooperative this shift. Pt drinking fluids and even ate some dinner this shift. Pt also taking all of her prescribed meds. VSS. Pt calling appropriately. Pt continent of urine, no BM this shift. Prn Tylenol administered for back pain, prn Zofran and compazine administered for nausea with good effect. No IV access.     Face to face report given with opportunity to observe patient.    Report given to Kristin Casanova   8/16/2018  11:10 PM

## 2018-08-17 NOTE — PLAN OF CARE
Problem: Patient Care Overview  Goal: Plan of Care/Patient Progress Review  -diagnostic tests and consults completed and resulted   -vital signs normal or at patient baseline   Outcome: Adequate for Discharge Date Met: 08/17/18  Cooperative with cares.  Taking sips of fluid when encouraged to do so.  Very small amount of loose stool x1 this shift.  Continues on oral Vanco for C-diff.  Enteric isolation.  Discharge pending for 4 pm, report called to CSV.  Spoke with Rebekah AWAD at 1400 to give nurse to nurse report.  Does not report pain or nausea this shift.  Encouragement to eat meals.    Face to face report given with opportunity to observe patient.  Report given to RITESH Pettit.    Darcy Stephens  8/17/2018, 3:19 PM

## 2018-08-17 NOTE — PLAN OF CARE
"Problem: Patient Care Overview  Goal: Plan of Care/Patient Progress Review  -diagnostic tests and consults completed and resulted   -vital signs normal or at patient baseline   Outcome: Therapy, progress toward functional goals as expected  Reason for hospital stay:  Hypokalemia        Most recent vitals: /75  Pulse 88  Temp 99.6  F (37.6  C) (Tympanic)  Resp 16  Ht 1.6 m (5' 3\")  Wt 54.2 kg (119 lb 7.8 oz)  SpO2 94%  BMI 21.17 kg/m2    Pain Management:  Denies    Orientation:  Somnolent, flat affect, will respond with short answers    Cardiac:  WDL    Respiratory:  Lungs clear et diminished bilat sats 94% on r/a    GI:  Bowel sounds audible et active x4    :  WDL    Skin Issues:  bruising    IVF:  No IV access    ABX:  n/a    Mobility:  Assist of 1 with transfers on/off commode    Safety:  Alarms audible et active        Comments:        8/17/2018  4:12 AM  Kristin Whitney          "

## 2018-08-17 NOTE — PROGRESS NOTES
Name: Maggie Case    MRN#: [id number]    Reason for Hospitalization: Hypokalemia [E87.6]  Non-intractable vomiting with nausea, unspecified vomiting type [R11.2]    Discharge Date: 8/17/2018    Patient / Family response to discharge plan: agree with plan    Follow-Up Appt: No future appointments.    Other Providers (Care Coordinator, County Services, PCA services etc): Yes: [facility]    Discharge Disposition: short-term care facility    Anny Guidry

## 2018-08-17 NOTE — PLAN OF CARE
Patient discharged at 5:07 PM via wheel chair accompanied by transport staff and staff. Prescriptions sent to patients preferred pharmacy. All belongings sent with patient.     Discharge instructions reviewed with rj Arcos RN they were discussed with RITESH Tello. All belongings gathered and returned to patient, pt [id number] that shoes were lost. [facility] was called and grey tennis shoes were in pt room.    Patient discharged to [facility].   Report called to Nursing Home:  RITESH Tello per RITESH Arcos    Core Measures and Vaccines  Core Measures applicable during stay: No. If yes, state diagnosis: NA  Pneumonia and Influenza given prior to discharge, if indicated: N/A    Surgical Patient   Surgical Procedures during stay: no  Did patient receive discharge instruction on wound care and recognition of infection symptoms? N/A    MISC  Follow up appointment made:  No  Home and hospital aquired medications returned to patient: Yes  Patient reports pain was well managed at discharge: Yes

## 2018-08-17 NOTE — PROGRESS NOTES
Spoke with Maggie, agreeing with plan for dc to [facility]. Will need transportation with [facility] arranged.   Maggie concerned about diet, has an diet education sheet with recommended foods. CM will alert Sandrita of this as patient would like to follow diet.     Transportation available a 4 pm.   Second message left for Sandrita.

## 2018-08-17 NOTE — DISCHARGE SUMMARY
[facility]    Discharge Summary  Hospitalist    Date of Admission:  8/13/2018  Date of Discharge:  August 17, 2018  Discharging Provider: Emeterio Garrison  Date of Service (when I saw the patient): August 17, 2018    Discharge Diagnoses   Hypokalemia, related to GI losses, resolved  Volume depletion, related to GI losses, resolved.  Probable C difficile colitis, recurrent  Nausea, related to colitis and volume depletion, resolved  Established thromboembolic disease, with chronic anticoagulation with rivarobaxan  Difficulty following medical plan    History of Present Illness   his patient is a 28 year old woman with a history significant for ASD, DVT/PE and paradoxical, hemorrhagic CVA which required craniotomy.  She presented to ED with vomiting, fatigue, and chest pain, with emesis for the last 3 days and has not been able to keep anything down.  She denies any fevers or chills.  She has been treated with rivarobaxan  but did not take this for the last several days before admission. She noted anterior chest pain worse with deep inspiration and better with rest.       Hospital Course   Maggie Case was admitted on 8/13/2018.  The following problems were addressed during her hospitalization:    C. difficile colitis  Presented with nausea vomiting abdominal pain.  C. difficile toxin was positive.  Initially prominent diarrhea although total volume not great.  This has improved with oral vancomycin and now largely resolved as has nausea.  Over the past 24 hours she has made great progress in tolerating oral fluids and limited solid intake. Nutritional intake resonable as she is tolerating Ensure clear.  For the perspective of her C. difficile colitis she was first diagnosed in July of this year with positive C. difficile toxin.  She had a usual 10 day treatment with resolution of her symptoms.  Whether her recurrent symptoms are new or persistent disease is uncertain.  Continuing to encourage her to  take oral vancomycin which intermittently she is reluctant to do.  Although it in fact it is not certain that this is a worsening of C. difficile infection on discharge prescribed prolonged tapering dose.     Nausea  Largely resolved. No further emesis and diarrheal stools improved.  She does intermittently have nausea.  Poor response to ondansetron which is not unexpected as this is most effective in the context of chemotherapy or post anesthesia nausea and vomiting.  Trial of Compazine as this may be more effective with due care to minimize doses as either agent may be associated with prolonged QT interval.  As noted this has not been increased over her baseline QT prolongation present and relatively stable over several years of EKGs available for review.     Hypokalemia  This is now fully resolved with usual replacement, and likely represented GI losses alone.  Initially concern raised for prolonged QT interval on EKG possibly related to the hypokalemia.  She does continue to have a prolonged QT interval however on review of available EKGs over the past several years this is been persistent issue and unlikely to be explained by recent events.  She does need close monitoring and caution with the use of medications worsening QT prolongation including ondansetron and prochlorperazine.  However benefit of both these agents at this point outweighs these risks with judicious dosing.     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  In addition ASD with paradoxical right to left emboli and residual left-sided weakness.  Continuing rivarobaxan which she had previously been prescribed although not able to take it for several days because of her GI symptoms.  She has been evaluated in the past for possible hypercoagulable state without significant findings.     Difficulty following medical plan  This is been persistent problem for this patient with multiple chronic medical issues at a relatively young  "age.  Nursing staff has been particularly good at working with her encouraging in particular oral fluid and essential medications.  Overall it appears that the patient's been attempting, not unexpectedly, to maintain what control she can of her life particularly with a somewhat inflexible approach to oral fluid, food, medication input.  This is been particularly difficult today but particularly by the end of the day she shown some improvement.            Emeterio MILLER Bladimir    Code Status   Full       Primary Care Physician   Chapo Flores    Vital signs:  Temp: 97.6  F (36.4  C) Temp src: Temporal BP: 115/87   Heart Rate: 98 Resp: 16 SpO2: 98 % O2 Device: None (Room air)   Height: 160 cm (5' 3\") Weight: 54.2 kg (119 lb 7.8 oz)  Estimated body mass index is 21.17 kg/(m^2) as calculated from the following:    Height as of this encounter: 1.6 m (5' 3\").    Weight as of this encounter: 54.2 kg (119 lb 7.8 oz).        Awake, aler. Somewhat evasive, vut with prompting able to participate in disucssion.  HEENT: Pupils equal, conjugate. No icterus or nystagmus. Oral mucosa moist. No facial asymmetry.   Neck: Supple, jugular veins not elevated. Trachea midline   Chest: No chest wall movement asymmetry. Aeration preserved to bases. Accessory muscles not in use. Expiratory time not increased. No tidal wheezes. No rhonchi. No discrete crackles.   Cardiac: PMI not displaced. S1, S2 unremarkable. No S3, S4. P2 not accentuated. No murmurs. Carotid upstroke preserved. Carotid amplitude preserved.   Abdomen: Soft. No palpation or percussion tenderness. No distention. Normoactive bowel sounds in all quadrants. Liver and spleen not increased in size. No bruits, masses, or pulsations.   Extremities: No lower extremity edema. No eccymoses, clubbing.   Neurologic: Mental state above. Motor 5/5 and bilaterally equal. Tone preserved. No fasiculations or tremors.          Discharge Disposition   Discharged to nursing home; continued " extended care.  Condition at discharge: Stable    Consultations This Hospital Stay   SURGERY GENERAL IP CONSULT  NUTRITION SERVICES ADULT IP CONSULT    Time Spent on this Encounter   }Over 30 miniutes spent on discharge planning and documentation.  Discharge Orders     General info for SNF   Length of Stay Estimate: Long Term Care  Condition at Discharge: Improving  Level of care:skilled   Rehabilitation Potential: Fair  Admission H&P remains valid and up-to-date: Yes  Recent Chemotherapy: N/A  Use Nursing Home Standing Orders: Yes     Mantoux instructions   Give two-step Mantoux (PPD) Per Facility Policy Yes     Reason for your hospital stay   Thist is a 28 year old woman with a history significant for ASD, DVT/PE and paradoxical, hemorrhagic CVA which required craniotomy.  She presented to ED with vomiting, fatigue, and chest pain, with emesis for the 3 days and has not been able to keep anything down.  She denies any fevers or chills.  Evaluation showed hypokalemia and positive toxin for C. difficile (recent treatment for C. difficile colitis July/2018).  She has been treated with rivarobaxan but did not take this for the last several days before admission because of her symptoms.  She responded to usual treatment for hypokalemia and resuming treatment for possible C. difficile infection with oral vancomycin.  With persistent encouragement she has been able to take adequate oral fluid over the past several days.  Nausea is not been an issue over the past day or so but did require treatment including with Compazine during this admission.     Intake and output   Every shift     Additional Discharge Instructions   Encourage oral fluids, at least 500 mL per 8 hour shift during the day  Enteric precautions per institution policy for C difficile infection     Activity - Up with nursing assistance     Follow Up and recommended labs and tests   Follow up with Nursing home physician or primary care provider in 4-7 days.  BMP in 1 week to evaluate potassium and renal function     Full Code     Nutrition Services Adult IP Consult   Reason:  Difficulty tolerating oral food and fluid     Fall precautions     Advance Diet as Tolerated   Follow this diet upon discharge: Orders Placed This Encounter     Regular Diet Adult  Ensure clear or other nutritional supplement       Discharge Medications   Discharge Medication List as of 8/17/2018  3:32 PM      START taking these medications    Details   prochlorperazine (COMPAZINE) 25 MG Suppository Place 1 suppository (25 mg) rectally every 12 hours as needed for nausea or vomiting, Disp-60 suppository, Transitional      prochlorperazine (COMPAZINE) 5 MG tablet Take 1-2 tablets (5-10 mg) by mouth every 6 hours as needed for nausea or vomiting, Disp-90 tablet, Transitional      vancomycin (FIRVANQ) 50 MG/ML SOLR 125 mg PO QID x 7 days, then 125 mg PO BID x 7 days, then 125 mg PO daily x 7 days, then 125 mg PO every other day x 28 days., Disp-140 mL, TransitionalMay substitute capsule or other available for appropriate for oral use         CONTINUE these medications which have NOT CHANGED    Details   !! Acetaminophen (TYLENOL PO) Take 650 mg by mouth every 4 hours as needed for mild pain or fever, Historical      Ondansetron HCl (ZOFRAN PO) Take 4 mg by mouth every 8 hours as needed for nausea or vomiting, Historical      !! Acetaminophen (TYLENOL PO) Take 650 mg by mouth 4 times daily, Historical      cyanocobalamin (VITAMIN  B-12) 1000 MCG tablet Take 1,000 mcg by mouth daily, Historical      Dextromethorphan-guaiFENesin  MG/5ML syrup Take 5 mLs by mouth every 4 hours as needed for cough, Historical      DULOXETINE HCL PO Take 60 mg by mouth daily, Historical      ferrous sulfate (IRON) 325 (65 Fe) MG tablet Take by mouth daily (with breakfast), Historical      !! GABAPENTIN PO Take 300 mg by mouth 2 times daily 300mg AM and Noon, Historical      !! GABAPENTIN PO Take 600 mg by mouth At  Bedtime , Historical      loperamide (IMODIUM) 2 MG capsule Take 2 mg by mouth 4 times daily as needed for diarrhea, Historical      nystatin (MYCOSTATIN) 105698 UNIT/ML suspension Take 5 mLs (500,000 Units) by mouth 4 times dailyDisp-280 mL, [id number]      TRAZODONE HCL PO Take 100 mg by mouth At Bedtime, Historical      XARELTO 20 MG TABS tablet Take 20 mg by mouth every morning, R-11, PRINCE, Historical       !! - Potential duplicate medications found. Please discuss with provider.        Allergies   Allergies   Allergen Reactions     Amoxicillin Other (See Comments)     Headaches     Levaquin [Levofloxacin] Swelling     Naproxen Other (See Comments)     Reaction: Headaches     Nickel      Tramadol      Sulindac Rash     Data   Most Recent 3 CBC's:  Recent Labs   Lab Test  08/16/18   0631  08/15/18   0944  08/13/18   1036   WBC  8.7  10.4  11.6*   HGB  13.7  13.9  14.6   MCV  89  94  89   PLT  307  277  340      Most Recent 3 BMP's:  Recent Labs   Lab Test  08/16/18   0631  08/15/18   1045  08/15/18   0944  08/14/18   0610   NA  137   --   137  147*   POTASSIUM  4.3  5.1  6.2*  4.4   CHLORIDE  105   --   111*  116*   CO2  22   --   18*  17*   BUN  1*   --   2*  9   CR  0.40*   --   0.32*  0.37*   ANIONGAP  10   --   8  14   AVINASH  8.3*   --   7.9*  7.9*   GLC  101*   --   73  83     Most Recent 2 LFT's:  Recent Labs   Lab Test  08/13/18   1036  07/29/18   1523   AST  60*  35   ALT  25  19   ALKPHOS  132  101   BILITOTAL  0.4  0.3     Most Recent INR's and Anticoagulation Dosing History:  Anticoagulation Dose History     Recent Dosing and Labs Latest Ref Rng & Units 2/22/2018 3/1/2018 4/17/2018 4/23/2018 5/24/2018 6/24/2018 7/15/2018    INR 0.80 - 1.20 1.13 0.96 4.08(H) 2.20(H) 1.14 8.43(HH) 1.04        Most Recent 3 Troponin's:  Recent Labs   Lab Test  08/13/18   1036  04/23/18   1757  04/17/18   1810   TROPI  <0.015  <0.015  <0.015     Most Recent Cholesterol Panel:  Recent Labs   Lab Test  02/17/18   2459    CHOL  98   LDL  39   HDL  38*   TRIG  105     Most Recent 6 Bacteria Isolates From Any Culture (See EPIC Reports for Culture Details):  Recent Labs   Lab Test  07/16/18   0430  02/19/18   1714  02/19/18   1713  02/19/18   1344  02/19/18   1240  02/19/18   1217   CULT  No Salmonella, Shigella, Campylobacter, E. coli O157, Aeromonas, or Plesiomonas isolated.  No Yersinia enterocolitica isolated    Canceled, Test credited  Duplicate request    Light growth  Normal beatriz    Heavy growth  Staphylococcus aureus  *  Heavy growth  Haemophilus influenzae  Beta lactamase negative  Beta-lactamase negative Haemophilus influenzae are usually susceptible to ampicillin,   amoxacillin/clavulanic acid, levofloxacin, and 3rd generation cephalosporins, such as   ceftriaxone.  *  No growth  No growth  >100,000 colonies/mL  Escherichia coli  *     Most Recent TSH, T4 and A1c Labs:  Recent Labs   Lab Test  02/17/18   1752  12/26/15   1108   TSH   --   0.68   A1C  5.8   --      Results for orders placed or performed during the hospital encounter of 08/13/18   XR Abdomen 2 Views    Narrative    PROCEDURE: XR ABDOMEN 2 VW 8/15/2018 8:14 AM    HISTORY: bowel obstruction;     COMPARISONS: None.    TECHNIQUE: Flat and upright    FINDINGS: There is a normal intestinal gas pattern. No extraluminal  gas or pathologic intra-abdominal calcifications are noted. Mild  lumbar scoliosis is seen.         Impression    IMPRESSION: Normal abdominal gas pattern    GEETHA JACOBS MD   XR Abdomen Port 2 Views    Narrative    PROCEDURE: XR ABDOMEN PORT 2 VW 8/17/2018 6:50 AM    HISTORY: may do port if necessary. C diff Persistent nausea.;     COMPARISONS: August 15, 2018    TECHNIQUE: Flat and upright    FINDINGS: There is a normal intestinal gas pattern. No extraluminal  gas is noted. Surgical clips are seen in the left side of the abdomen.         Impression    IMPRESSION: Normal abdominal gas pattern    GEETHA JACOBS MD

## 2018-08-17 NOTE — PHARMACY
[facility]    Pharmacy      Antimicrobial Stewardship Note     Current antimicrobial therapy:  Anti-infectives (Future)    Start     Dose/Rate Route Frequency Ordered Stop    08/15/18 0915  vancomycin (FIRVANQ) oral solution 125 mg      125 mg Oral 4 TIMES DAILY 08/15/18 0912            Indication: C.Difficile    Days of Therapy: 3     Pertinent labs:  Creatinine   Creatinine   Date Value Ref Range Status   08/16/2018 0.40 (L) 0.52 - 1.04 mg/dL Final   08/15/2018 0.32 (L) 0.52 - 1.04 mg/dL Final   08/14/2018 0.37 (L) 0.52 - 1.04 mg/dL Final     WBC   WBC   Date Value Ref Range Status   08/16/2018 8.7 4.0 - 11.0 10e9/L Final   08/15/2018 10.4 4.0 - 11.0 10e9/L Final   08/13/2018 11.6 (H) 4.0 - 11.0 10e9/L Final     Procalcitonin   Procalcitonin   Date Value Ref Range Status   02/25/2018 0.23 ng/ml Final     Comment:     0.05-0.24 ng/ml Low risk of systemic bacterial infection. Local bacterial   infection possible.  Recommendation: Assess other clinical features of   infection. Discourage antibiotics unless strong clinical suspicion for serious   infection.       CRP   CRP Inflammation   Date Value Ref Range Status   07/15/2018 9.5 (H) 0.0 - 8.0 mg/L Final   02/26/2018 140.0 (H) 0.0 - 8.0 mg/L Final   02/19/2018 85.0 (H) 0.0 - 8.0 mg/L Final       Culture Results:   Clostridium difficile toxin B PCR [[id number]] (Abnormal) Collected: 08/14/18 1550      Order Status: Completed Lab Status: Final result Updated: 08/14/18 6981     Specimen: Feces       Specimen Description Feces      C Diff Toxin B PCR Positive (A)       Positive: Toxin producing Clostridium difficile target DNA sequences detected,    presumed positive for Clostridium difficile toxin B.   Clostridium difficile (Requires Enteric Isolation)   FDA approved assay performed using [employer] GeneXpert real-time PCR.   Critical Value called to and read back by   Ana Moore at 1702 by SG              Recommendations/Interventions:  1. No recommendations  at this time      Samantha Vaca, [facility]  August 17, 2018

## 2018-09-17 ENCOUNTER — APPOINTMENT (OUTPATIENT)
Dept: GENERAL RADIOLOGY | Facility: HOSPITAL | Age: 28
End: 2018-09-17
Attending: EMERGENCY MEDICINE
Payer: COMMERCIAL

## 2018-09-17 ENCOUNTER — HOSPITAL ENCOUNTER (EMERGENCY)
Facility: HOSPITAL | Age: 28
Discharge: HOME | End: 2018-09-17
Attending: EMERGENCY MEDICINE | Admitting: EMERGENCY MEDICINE
Payer: COMMERCIAL

## 2018-09-17 VITALS
TEMPERATURE: 97.9 F | RESPIRATION RATE: 18 BRPM | DIASTOLIC BLOOD PRESSURE: 86 MMHG | HEART RATE: 86 BPM | SYSTOLIC BLOOD PRESSURE: 128 MMHG | OXYGEN SATURATION: 97 %

## 2018-09-17 DIAGNOSIS — S93.601A FOOT SPRAIN, RIGHT, INITIAL ENCOUNTER: ICD-10-CM

## 2018-09-17 DIAGNOSIS — S90.31XA CONTUSION OF RIGHT FOOT, INITIAL ENCOUNTER: ICD-10-CM

## 2018-09-17 PROCEDURE — 99283 EMERGENCY DEPT VISIT LOW MDM: CPT | Performed by: EMERGENCY MEDICINE

## 2018-09-17 PROCEDURE — 25000132 ZZH RX MED GY IP 250 OP 250 PS 637: Performed by: EMERGENCY MEDICINE

## 2018-09-17 PROCEDURE — 73630 X-RAY EXAM OF FOOT: CPT | Mod: TC,LT

## 2018-09-17 PROCEDURE — 99283 EMERGENCY DEPT VISIT LOW MDM: CPT

## 2018-09-17 RX ORDER — OXYCODONE AND ACETAMINOPHEN 5; 325 MG/1; MG/1
1 TABLET ORAL ONCE
Status: COMPLETED | OUTPATIENT
Start: 2018-09-17 | End: 2018-09-17

## 2018-09-17 RX ORDER — HYDROCODONE BITARTRATE AND ACETAMINOPHEN 5; 325 MG/1; MG/1
1 TABLET ORAL
Qty: 5 TABLET | Refills: 0 | Status: ON HOLD | OUTPATIENT
Start: 2018-09-17 | End: 2018-09-21

## 2018-09-17 RX ADMIN — OXYCODONE HYDROCHLORIDE AND ACETAMINOPHEN 1 TABLET: 5; 325 TABLET ORAL at 20:34

## 2018-09-17 ASSESSMENT — ENCOUNTER SYMPTOMS
ARTHRALGIAS: 1
BACK PAIN: 1
WEAKNESS: 1
GASTROINTESTINAL NEGATIVE: 1
RESPIRATORY NEGATIVE: 1
ACTIVITY CHANGE: 1

## 2018-09-17 NOTE — ED AVS SNAPSHOT
[facility]    [address] [place], [place] [address] [address]    Phone:  [phone]                                       Maggie Case   MRN: [id number]    Department:  [facility]   Date of Visit:  9/17/2018           Patient Information     Date Of Birth          1990        Your diagnoses for this visit were:     Foot sprain, right, initial encounter     Contusion of right foot, initial encounter        You were seen by Gabriel Whitley MD.      Follow-up Information     Follow up with Chapo Flores MD.    Specialty:  Family Practice    Why:  As needed    Contact information:    [facility]  [address] [place] [id number] [phone]          Discharge Instructions         Bruises (Contusions)    A contusion is a bruise. A bruise happens when a blow to your body doesn't break the skin but does break blood vessels beneath the skin. Blood leaking from the broken vessels causes redness and swelling. As it heals, your bruise is likely to turn colors like purple, green, and yellow. This is normal. The bruise should fade in 2 or 3 weeks.  Factors that make you more likely to bruise  Almost everyone bruises now and then. Certain people do bruise more easily than others. You're more prone to bruising as you get older. That's because blood vessels become more fragile with age. You're also more likely to bruise if you have a clotting disorder such as hemophilia or take medicines that reduce clotting, including aspirin and coumadin.  When to go to the emergency room (ER)  Bruises almost always heal on their own without special treatment. But for some people, a bad bruise can be serious. Seek medical care if you:    Have a clotting disorder such as hemophilia    Have cirrhosis or other serious liver disease    Take blood-thinning medicines such as warfarin  What to expect in the ER  A doctor will examine your bruise and ask about any health conditions you have. In some  cases, you may have a test to check how well your blood clots. Other treatment will depend on your needs.  Follow-up care  Sometimes a bruise gets worse instead of better. It may become larger and more swollen. This can occur when your body walls off a small pool of blood under the skin (hematoma). In very rare cases, your doctor may need to drain extra blood from the area.  Tip:  Apply an ice pack or bag of frozen peas to a bruise. Keep a thin cloth between the ice or frozen peas and your skin. The cold can help reduce redness and swelling.   Date Last Reviewed: 12/1/2016 2000-2017 [employer]. [address] [place] [address], [place] [address]. All rights reserved. This information is not intended as a substitute for professional medical care. Always follow your healthcare professional's instructions.          Foot Sprain    A sprain is a stretching or tearing of the ligaments that hold a joint together. There are no broken bones. Sprains generally take from 3-6 weeks to heal. A sprain may be treated with a splint, walking cast, or special boot. Mild sprains may not need any additional support.  Home care  The following guidelines will help you care for your injury at home:    Keep your leg elevated when sitting or lying down. This is very important during the first 48 hours to reduce swelling. Stay off the injured foot as much as possible until you can walk on it without pain. If needed, you may use crutches during the first week for this purpose. Crutches can be rented at many pharmacies or surgical/orthopedic supply stores.    You may be given a cast shoe to wear to prevent movement in your foot. If not, you can use a sandal or any shoe that does not put pressure on the injured area until the swelling and pain go away. If using a sandal, be careful not to hit your foot against anything, since another injury could make the sprain worse.    Apply an ice pack over the injured area for 15 to 20 minutes  every 3 to 6 hours. You should do this for the first 24 to 48 hours. You can make an ice pack by filling a plastic bag that seals at the top with ice cubes and then wrapping it with a thin towel. Continue to use ice packs for relief of pain and swelling as needed. As the ice melts, avoid getting any wrap, splint, or cast wet. After 48 hours, apply heat from a warm shower or bath for 20 minutes several times daily. Alternating ice and heat may also be helpful.    You may use over-the-counter pain medicine to control pain, unless another medicine was prescribed. If you have chronic liver or kidney disease or ever had a stomach ulcer or GI bleeding, talk with your healthcare provider before using these medicines.    If you were given a splint or cast, keep it dry. Bathe with your splint or cast well out of the water, protected with 2 large plastic bags, rubber-banded at the top end. If a fiberglass splint or cast gets wet, you can dry it with a hair dryer.    You may return to sports after healing, when you can run without pain.  Follow-up care  Follow up with your healthcare provider as directed. Sometimes fractures don t show up on the first X-ray. Bruises and sprains can sometimes hurt as much as a fracture. These injuries can take time to heal completely. If your symptoms don t improve or they get worse, talk with your healthcare provider. You may need a repeat X-ray.  When to seek medical advice  Call your healthcare provider right away if any of these occur:    The plaster cast or splint gets wet or soft    The fiberglass cast or splint gets wet and does not dry for 24 hours    Pain or swelling increases, or redness appears    A bad odor comes from within the cast    Fever of 100.4 F (38 C) or above lasting for 24 to 48 hours    Toes on the injured foot become cold, blue, numb, or tingly  Date Last Reviewed: 11/20/2015 2000-2017 [employer]. [address] [place] [address], [place] [address]. All rights  reserved. This information is not intended as a substitute for professional medical care. Always follow your healthcare professional's instructions.      Maggie's caregivers,   No fracture was noted in Maggie's foot injury.  Use some ice packs for the next 48 hours and moist heat thereafter.  She may use a single norco tab at bedtime starting tomorrow for nite time pain.  Only #5 Rx'd with her opioid history.  This should heal in the next week.  Good luck!       Review of your medicines      START taking        Dose / Directions Last dose taken    HYDROcodone-acetaminophen 5-325 MG per tablet   Commonly known as:  NORCO   Dose:  1 tablet   Quantity:  5 tablet        Take 1 tablet by mouth nightly as needed for pain   Refills:  0          Our records show that you are taking the medicines listed below. If these are incorrect, please call your family doctor or clinic.        Dose / Directions Last dose taken    cyanocobalamin 1000 MCG tablet   Commonly known as:  vitamin  B-12   Dose:  1000 mcg        Take 1,000 mcg by mouth daily   Refills:  0        Dextromethorphan-guaiFENesin  MG/5ML syrup   Dose:  5 mL        Take 5 mLs by mouth every 4 hours as needed for cough   Refills:  0        DULOXETINE HCL PO   Dose:  60 mg        Take 60 mg by mouth daily   Refills:  0        ferrous sulfate 325 (65 Fe) MG tablet   Commonly known as:  IRON        Take by mouth daily (with breakfast)   Refills:  0        * GABAPENTIN PO   Dose:  600 mg        Take 600 mg by mouth At Bedtime   Refills:  0        * GABAPENTIN PO   Dose:  300 mg        Take 300 mg by mouth 2 times daily 300mg AM and Noon   Refills:  0        loperamide 2 MG capsule   Commonly known as:  IMODIUM   Dose:  2 mg        Take 2 mg by mouth 4 times daily as needed for diarrhea   Refills:  0        nystatin 892936 UNIT/ML suspension   Commonly known as:  MYCOSTATIN   Dose:  503595 Units   Quantity:  280 mL        Take 5 mLs (500,000 Units) by mouth 4 times daily    Refills:  0        prochlorperazine 25 MG Suppository   Commonly known as:  COMPAZINE   Dose:  25 mg   Quantity:  60 suppository        Place 1 suppository (25 mg) rectally every 12 hours as needed for nausea or vomiting   Refills:  0        prochlorperazine 5 MG tablet   Commonly known as:  COMPAZINE   Dose:  5-10 mg   Quantity:  90 tablet        Take 1-2 tablets (5-10 mg) by mouth every 6 hours as needed for nausea or vomiting   Refills:  0        TRAZODONE HCL PO   Dose:  100 mg        Take 100 mg by mouth At Bedtime   Refills:  0        * TYLENOL PO   Dose:  650 mg        Take 650 mg by mouth every 4 hours as needed for mild pain or fever   Refills:  0        * TYLENOL PO   Dose:  650 mg        Take 650 mg by mouth 4 times daily   Refills:  0        vancomycin 50 MG/ML oral solution   Commonly known as:  FIRVANQ   Indication:  cdiff colitis   Quantity:  140 mL        125 mg PO QID x 7 days, then 125 mg PO BID x 7 days, then 125 mg PO daily x 7 days, then 125 mg PO every other day x 28 days.   Refills:  0        XARELTO 20 MG Tabs tablet   Dose:  20 mg   Generic drug:  rivaroxaban ANTICOAGULANT        Take 20 mg by mouth every morning   Refills:  11        ZOFRAN PO   Dose:  4 mg        Take 4 mg by mouth every 8 hours as needed for nausea or vomiting   Refills:  0        * Notice:  This list has 4 medication(s) that are the same as other medications prescribed for you. Read the directions carefully, and ask your doctor or other care provider to review them with you.            Information about OPIOIDS     PRESCRIPTION OPIOIDS: WHAT YOU NEED TO KNOW   We gave you an opioid (narcotic) pain medicine. It is important to manage your pain, but opioids are not always the best choice. You should first try all the other options your care team gave you. Take this medicine for as short a time (and as few doses) as possible.    Some activities can increase your pain, such as bandage changes or therapy sessions. It may  help to take your pain medicine 30 to 60 minutes before these activities. Reduce your stress by getting enough sleep, working on hobbies you enjoy and practicing relaxation or meditation. Talk to your care team about ways to manage your pain beyond prescription opioids.    These medicines have risks:    DO NOT drive when on new or higher doses of pain medicine. These medicines can affect your alertness and reaction times, and you could be arrested for driving under the influence (DUI). If you need to use opioids long-term, talk to your care team about driving.    DO NOT operate heavy machinery    DO NOT do any other dangerous activities while taking these medicines.    DO NOT drink any alcohol while taking these medicines.     If the opioid prescribed includes acetaminophen, DO NOT take with any other medicines that contain acetaminophen. Read all labels carefully. Look for the word  acetaminophen  or  Tylenol.  Ask your pharmacist if you have questions or are unsure.    You can get addicted to pain medicines, especially if you have a history of addiction (chemical, alcohol or substance dependence). Talk to your care team about ways to reduce this risk.    All opioids tend to cause constipation. Drink plenty of water and eat foods that have a lot of fiber, such as fruits, vegetables, prune juice, apple juice and high-fiber cereal. Take a laxative (Miralax, milk of magnesia, Colace, Senna) if you don t move your bowels at least every other day. Other side effects include upset stomach, sleepiness, dizziness, throwing up, tolerance (needing more of the medicine to have the same effect), physical dependence and slowed breathing.    Store your pills in a secure place, locked if possible. We will not replace any lost or stolen medicine. If you don t finish your medicine, please throw away (dispose) as directed by your pharmacist. The [place] [employer] has more information about safe disposal:  "[email or web address]        Prescriptions were sent or printed at these locations (1 Prescription)                   Other Prescriptions                Printed at Department/Unit printer (1 of 1)         HYDROcodone-acetaminophen (NORCO) 5-325 MG per tablet                Procedures and tests performed during your visit     XR Foot Port Left 3 Views      Orders Needing Specimen Collection     None      Pending Results     No orders found from 9/15/2018 to 2018.            Pending Culture Results     No orders found from 9/15/2018 to 2018.            Thank you for choosing [facility]       Thank you for choosing [facility] for your care. Our goal is always to provide you with excellent care. Hearing back from our patients is one way we can continue to improve our services. Please take a few minutes to complete the written survey that you may receive in the mail after you visit with us. Thank you!        [employer] Information     [employer] lets you send messages to your doctor, view your test results, renew your prescriptions, schedule appointments and more. To sign up, go to [email or web address] . Click on \"Log in\" on the left side of the screen, which will take you to the Welcome page. Then click on \"Sign up Now\" on the right side of the page.     You will be asked to enter the access code listed below, as well as some personal information. Please follow the directions to create your username and password.     Your access code is: [email or web address]  Expires: 2018  7:51 PM     Your access code will  in 90 days. If you need help or a new code, please call your [facility] clinic or [phone].        Care EveryWhere ID     This is your Care EveryWhere ID. This could be used by other organizations to access your [facility] medical records  [id number]        Equal Access to Services     MYLES FLAHERTY AH: chapin Knight qaybta " justin mojica ah. So [facility] [phone].    ATENCIÓN: Si habla español, tiene a montalvo disposición servicios gratuitos de asistencia lingüística. Llame al [phone].    We comply with applicable federal civil rights laws and [place] laws. We do not discriminate on the basis of race, color, national origin, age, disability, sex, sexual orientation, or gender identity.            After Visit Summary       This is your record. Keep this with you and show to your community pharmacist(s) and doctor(s) at your next visit.

## 2018-09-17 NOTE — ED AVS SNAPSHOT
[facility]    [address] [address]    Phone:  [phone]                                       Maggie Case   MRN: [id number]    Department[facility]  [facility]   Date of Visit:  9/17/2018           After Visit Summary Signature Page     I have received my discharge instructions, and my questions have been answered. I have discussed any challenges I see with this plan with the nurse or doctor.    ..........................................................................................................................................  Patient/Patient Representative Signature      ..........................................................................................................................................  Patient Representative Print Name and Relationship to Patient    ..................................................               ................................................  Date                                   Time    ..........................................................................................................................................  Reviewed by Signature/Title    ...................................................              ..............................................  Date                                               Time          22EPIC Rev 08/18

## 2018-09-18 ENCOUNTER — HOSPITAL ENCOUNTER (EMERGENCY)
Facility: HOSPITAL | Age: 28
Discharge: HOME | End: 2018-09-18
Attending: NURSE PRACTITIONER | Admitting: NURSE PRACTITIONER
Payer: COMMERCIAL

## 2018-09-18 ENCOUNTER — APPOINTMENT (OUTPATIENT)
Dept: GENERAL RADIOLOGY | Facility: HOSPITAL | Age: 28
End: 2018-09-18
Attending: NURSE PRACTITIONER
Payer: COMMERCIAL

## 2018-09-18 VITALS
SYSTOLIC BLOOD PRESSURE: 134 MMHG | OXYGEN SATURATION: 100 % | HEART RATE: 114 BPM | BODY MASS INDEX: 21.97 KG/M2 | DIASTOLIC BLOOD PRESSURE: 91 MMHG | WEIGHT: 124 LBS | RESPIRATION RATE: 12 BRPM | HEIGHT: 63 IN | TEMPERATURE: 97.5 F

## 2018-09-18 DIAGNOSIS — S93.602A FOOT SPRAIN, LEFT, INITIAL ENCOUNTER: ICD-10-CM

## 2018-09-18 PROCEDURE — G0463 HOSPITAL OUTPT CLINIC VISIT: HCPCS

## 2018-09-18 PROCEDURE — 73630 X-RAY EXAM OF FOOT: CPT | Mod: TC,LT

## 2018-09-18 PROCEDURE — 99213 OFFICE O/P EST LOW 20 MIN: CPT | Performed by: NURSE PRACTITIONER

## 2018-09-18 RX ORDER — TROLAMINE SALICYLATE 10 G/100G
1 CREAM TOPICAL 3 TIMES DAILY
Qty: 35.4 G | Refills: 0 | Status: SHIPPED | OUTPATIENT
Start: 2018-09-18 | End: 2020-03-06

## 2018-09-18 ASSESSMENT — ENCOUNTER SYMPTOMS
TROUBLE SWALLOWING: 0
PSYCHIATRIC NEGATIVE: 1
WEAKNESS: 1
COUGH: 0
ACTIVITY CHANGE: 1
DYSURIA: 0

## 2018-09-18 NOTE — DISCHARGE INSTRUCTIONS
Take tylenol and/or ibuprofen for pain. Follow dosing on package.   Apply ice to left foot for 20 minutes every 1-2 hours. Protect skin.   Elevate left leg as much as able.   See RICE handout.   Wear ace wrap to left foot. Take off when resting.    Nursing home can apply Aspercreme to left foot 3 times a day to help with pain.   Pain medication per nursing home to dispense as ordered.   Follow up with PCP in 10 days.   Return to urgent care or emergency department with any increase in symptoms or concerns.

## 2018-09-18 NOTE — ED NOTES
States that last yesterday she fell down some steps and started having increased bilateral foot pain last night. Taking Tylenol which isn't helping much.  States today she got up to use the bathroom and pain increased when walking to bathroom.

## 2018-09-18 NOTE — ED AVS SNAPSHOT
[facility]    [address]    [place] [address]    Phone:  [phone]                                       Magige Case   MRN: [id number]    Department:  [facility]   Date of Visit:  9/18/2018           Patient Information     Date Of Birth          1990        Your diagnoses for this visit were:     Foot sprain, left, initial encounter        You were seen by Rebekah Stanton NP.      Follow-up Information     Follow up with Chapo Flores MD In 10 days.    Specialty:  Family Practice    Why:  For re-evalaution.     Contact information:    [facility] [place]  [address]  [place] [address] [phone]          Follow up with [facility].    Specialty:  EMERGENCY MEDICINE    Why:  As needed, If symptoms worsen    Contact information:    [address]  [place] [address] [phone]    Additional information:    From [facility]: Take [address]. Turn left at [address]. Turn left at the first stoplight on [address]. At the first stop sign, take a right onto [address]. Take a left into the parking lot and continue through until you reach the North enterance of the building.       From [place]: Take [address]. Take the [address] ramp towards [place]. Turn left onto [address]. Take a slight right onto [address]. Turn left at the first stoplight on [address]. At the first stop sign, take a right onto [address]. Take a left into the parking lot and continue through until you reach the North enterance of the building.       From [place]: Take [address]. Take a right at [address]. At the first stop sign, take a right onto [address]. Take a left into the parking lot and continue through until you reach the North enterance of the building.         Discharge Instructions       Take tylenol and/or ibuprofen for pain. Follow dosing on  package.   Apply ice to left foot for 20 minutes every 1-2 hours. Protect skin.   Elevate left leg as much as able.   See RICE handout.   Wear ace wrap to left foot. Take off when resting.    Nursing home can apply Aspercreme to left foot 3 times a day to help with pain.   Pain medication per nursing home to dispense as ordered.   Follow up with PCP in 10 days.   Return to urgent care or emergency department with any increase in symptoms or concerns.       Discharge References/Attachments     FOOT SPRAIN (ENGLISH)    STRAINS AND SPRAINS, TREATING (ENGLISH)    R.I.C.E. (ENGLISH)         Review of your medicines      START taking        Dose / Directions Last dose taken    trolamine salicylate 10 % cream   Commonly known as:  ASPERCREME   Dose:  1 g   Quantity:  35.4 g        Apply 1 g topically 3 times daily   Refills:  0          Our records show that you are taking the medicines listed below. If these are incorrect, please call your family doctor or clinic.        Dose / Directions Last dose taken    cyanocobalamin 1000 MCG tablet   Commonly known as:  vitamin  B-12   Dose:  1000 mcg        Take 1,000 mcg by mouth daily   Refills:  0        Dextromethorphan-guaiFENesin  MG/5ML syrup   Dose:  5 mL        Take 5 mLs by mouth every 4 hours as needed for cough   Refills:  0        DULOXETINE HCL PO   Dose:  60 mg        Take 60 mg by mouth daily   Refills:  0        ferrous sulfate 325 (65 Fe) MG tablet   Commonly known as:  IRON        Take by mouth daily (with breakfast)   Refills:  0        * GABAPENTIN PO   Dose:  600 mg        Take 600 mg by mouth At Bedtime   Refills:  0        * GABAPENTIN PO   Dose:  300 mg        Take 300 mg by mouth 2 times daily 300mg AM and Noon   Refills:  0        HYDROcodone-acetaminophen 5-325 MG per tablet   Commonly known as:  NORCO   Dose:  1 tablet   Quantity:  5 tablet        Take 1 tablet by mouth nightly as needed for pain   Refills:  0        loperamide 2 MG capsule    Commonly known as:  IMODIUM   Dose:  2 mg        Take 2 mg by mouth 4 times daily as needed for diarrhea   Refills:  0        nystatin 658530 UNIT/ML suspension   Commonly known as:  MYCOSTATIN   Dose:  524935 Units   Quantity:  280 mL        Take 5 mLs (500,000 Units) by mouth 4 times daily   Refills:  0        prochlorperazine 25 MG Suppository   Commonly known as:  COMPAZINE   Dose:  25 mg   Quantity:  60 suppository        Place 1 suppository (25 mg) rectally every 12 hours as needed for nausea or vomiting   Refills:  0        prochlorperazine 5 MG tablet   Commonly known as:  COMPAZINE   Dose:  5-10 mg   Quantity:  90 tablet        Take 1-2 tablets (5-10 mg) by mouth every 6 hours as needed for nausea or vomiting   Refills:  0        TRAZODONE HCL PO   Dose:  100 mg        Take 100 mg by mouth At Bedtime   Refills:  0        * TYLENOL PO   Dose:  650 mg        Take 650 mg by mouth every 4 hours as needed for mild pain or fever   Refills:  0        * TYLENOL PO   Dose:  650 mg        Take 650 mg by mouth 4 times daily   Refills:  0        vancomycin 50 MG/ML oral solution   Commonly known as:  FIRVANQ   Indication:  cdiff colitis   Quantity:  140 mL        125 mg PO QID x 7 days, then 125 mg PO BID x 7 days, then 125 mg PO daily x 7 days, then 125 mg PO every other day x 28 days.   Refills:  0        XARELTO 20 MG Tabs tablet   Dose:  20 mg   Generic drug:  rivaroxaban ANTICOAGULANT        Take 20 mg by mouth every morning   Refills:  11        ZOFRAN PO   Dose:  4 mg        Take 4 mg by mouth every 8 hours as needed for nausea or vomiting   Refills:  0        * Notice:  This list has 4 medication(s) that are the same as other medications prescribed for you. Read the directions carefully, and ask your doctor or other care provider to review them with you.            Prescriptions were sent or printed at these locations (1 Prescription)                   [facility] - [place] - [address]  "  [address] [place] [address]    Telephone:  [phone]   Fax:  [phone]   Hours:                  E-Prescribed (1 of 1)         trolamine salicylate (ASPERCREME) 10 % cream                Procedures and tests performed during your visit     Foot XR, G/E 3 views, left      Orders Needing Specimen Collection     None      Pending Results     Date and Time Order Name Status Description    2018 1138 Foot XR, G/E 3 views, left In process             Pending Culture Results     No orders found from 2018 to 2018.            Thank you for choosing [facility]       Thank you for choosing [facility] for your care. Our goal is always to provide you with excellent care. Hearing back from our patients is one way we can continue to improve our services. Please take a few minutes to complete the written survey that you may receive in the mail after you visit with us. Thank you!        [email or web address] Information     [employer] lets you send messages to your doctor, view your test results, renew your prescriptions, schedule appointments and more. To sign up, go to [email or web address] . Click on \"Log in\" on the left side of the screen, which will take you to the Welcome page. Then click on \"Sign up Now\" on the right side of the page.     You will be asked to enter the access code listed below, as well as some personal information. Please follow the directions to create your username and password.     Your access code is: [email or web address]  Expires: 2018  7:51 PM     Your access code will  in 90 days. If you need help or a new code, please call your [facility] clinic or [phone].        Care EveryWhere ID     This is your Care EveryWhere ID. This could be used by other organizations to access your [facility] medical records  [id number]        Equal Access to Services     MYLES FLAHERTY : Griselda Mcarthur, chapin brown, justin crane. So " [facility] [phone].    ATENCIÓN: Si habla español, tiene a montalvo disposición servicios gratuitos de asistencia lingüística. Llame al [phone].    We comply with applicable federal civil rights laws and [place] laws. We do not discriminate on the basis of race, color, national origin, age, disability, sex, sexual orientation, or gender identity.            After Visit Summary       This is your record. Keep this with you and show to your community pharmacist(s) and doctor(s) at your next visit.
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
## 2018-09-18 NOTE — DISCHARGE INSTRUCTIONS
Bruises (Contusions)    A contusion is a bruise. A bruise happens when a blow to your body doesn't break the skin but does break blood vessels beneath the skin. Blood leaking from the broken vessels causes redness and swelling. As it heals, your bruise is likely to turn colors like purple, green, and yellow. This is normal. The bruise should fade in 2 or 3 weeks.  Factors that make you more likely to bruise  Almost everyone bruises now and then. Certain people do bruise more easily than others. You're more prone to bruising as you get older. That's because blood vessels become more fragile with age. You're also more likely to bruise if you have a clotting disorder such as hemophilia or take medicines that reduce clotting, including aspirin and coumadin.  When to go to the emergency room (ER)  Bruises almost always heal on their own without special treatment. But for some people, a bad bruise can be serious. Seek medical care if you:    Have a clotting disorder such as hemophilia    Have cirrhosis or other serious liver disease    Take blood-thinning medicines such as warfarin  What to expect in the ER  A doctor will examine your bruise and ask about any health conditions you have. In some cases, you may have a test to check how well your blood clots. Other treatment will depend on your needs.  Follow-up care  Sometimes a bruise gets worse instead of better. It may become larger and more swollen. This can occur when your body walls off a small pool of blood under the skin (hematoma). In very rare cases, your doctor may need to drain extra blood from the area.  Tip:  Apply an ice pack or bag of frozen peas to a bruise. Keep a thin cloth between the ice or frozen peas and your skin. The cold can help reduce redness and swelling.   Date Last Reviewed: 12/1/2016 2000-2017 [employer]. [address], [place] [address]. All rights reserved. This information is not intended as a substitute for  professional medical care. Always follow your healthcare professional's instructions.          Foot Sprain    A sprain is a stretching or tearing of the ligaments that hold a joint together. There are no broken bones. Sprains generally take from 3-6 weeks to heal. A sprain may be treated with a splint, walking cast, or special boot. Mild sprains may not need any additional support.  Home care  The following guidelines will help you care for your injury at home:    Keep your leg elevated when sitting or lying down. This is very important during the first 48 hours to reduce swelling. Stay off the injured foot as much as possible until you can walk on it without pain. If needed, you may use crutches during the first week for this purpose. Crutches can be rented at many pharmacies or surgical/orthopedic supply stores.    You may be given a cast shoe to wear to prevent movement in your foot. If not, you can use a sandal or any shoe that does not put pressure on the injured area until the swelling and pain go away. If using a sandal, be careful not to hit your foot against anything, since another injury could make the sprain worse.    Apply an ice pack over the injured area for 15 to 20 minutes every 3 to 6 hours. You should do this for the first 24 to 48 hours. You can make an ice pack by filling a plastic bag that seals at the top with ice cubes and then wrapping it with a thin towel. Continue to use ice packs for relief of pain and swelling as needed. As the ice melts, avoid getting any wrap, splint, or cast wet. After 48 hours, apply heat from a warm shower or bath for 20 minutes several times daily. Alternating ice and heat may also be helpful.    You may use over-the-counter pain medicine to control pain, unless another medicine was prescribed. If you have chronic liver or kidney disease or ever had a stomach ulcer or GI bleeding, talk with your healthcare provider before using these medicines.    If you were given  a splint or cast, keep it dry. Bathe with your splint or cast well out of the water, protected with 2 large plastic bags, rubber-banded at the top end. If a fiberglass splint or cast gets wet, you can dry it with a hair dryer.    You may return to sports after healing, when you can run without pain.  Follow-up care  Follow up with your healthcare provider as directed. Sometimes fractures don t show up on the first X-ray. Bruises and sprains can sometimes hurt as much as a fracture. These injuries can take time to heal completely. If your symptoms don t improve or they get worse, talk with your healthcare provider. You may need a repeat X-ray.  When to seek medical advice  Call your healthcare provider right away if any of these occur:    The plaster cast or splint gets wet or soft    The fiberglass cast or splint gets wet and does not dry for 24 hours    Pain or swelling increases, or redness appears    A bad odor comes from within the cast    Fever of 100.4 F (38 C) or above lasting for 24 to 48 hours    Toes on the injured foot become cold, blue, numb, or tingly  Date Last Reviewed: 11/20/2015 2000-2017 [employer]. [address] [place] [address]. All rights reserved. This information is not intended as a substitute for professional medical care. Always follow your healthcare professional's instructions.      Maggie's caregivers,   No fracture was noted in Maggie's foot injury.  Use some ice packs for the next 48 hours and moist heat thereafter.  She may use a single norco tab at bedtime starting tomorrow for nite time pain.  Only #5 Rx'd with her opioid history.  This should heal in the next week.  Good luck!

## 2018-09-18 NOTE — ED AVS SNAPSHOT
[facility]    [address]    [place] [address]    Phone:  [phone]                                       Maggie Case   MRN: [id number]    Department:  [facility]   Date of Visit:  9/18/2018           After Visit Summary Signature Page     I have received my discharge instructions, and my questions have been answered. I have discussed any challenges I see with this plan with the nurse or doctor.    ..........................................................................................................................................  Patient/Patient Representative Signature      ..........................................................................................................................................  Patient Representative Print Name and Relationship to Patient    ..................................................               ................................................  Date                                   Time    ..........................................................................................................................................  Reviewed by Signature/Title    ...................................................              ..............................................  Date                                               Time          22EPIC Rev 08/18

## 2018-09-18 NOTE — ED TRIAGE NOTES
Pt is here with a friend. Pt states that she has neuropathy and her both her feet have been hurting lately and she cant get relief. Was into the ER last night where she was given hydrocodone which she states did give her relief last night. She is stating that she wants to be given the hydrocodone prn instead of nightly. Pt is on 650 mg qid currently.

## 2018-09-18 NOTE — ED NOTES
Discharge instructions gone over with patient and she states understanding. Patient is then discharged in stable condition, per wheelchair, with health line staff.

## 2018-09-19 ENCOUNTER — HOSPITAL ENCOUNTER (INPATIENT)
Facility: HOSPITAL | Age: 28
LOS: 2 days | Discharge: NURSING, REHAB OR LONG-TERM CARE | DRG: 881 | End: 2018-09-21
Attending: EMERGENCY MEDICINE | Admitting: PSYCHIATRY & NEUROLOGY
Payer: COMMERCIAL

## 2018-09-19 DIAGNOSIS — R45.851 DEPRESSION WITH SUICIDAL IDEATION: Primary | ICD-10-CM

## 2018-09-19 DIAGNOSIS — N30.00 ACUTE CYSTITIS WITHOUT HEMATURIA: ICD-10-CM

## 2018-09-19 DIAGNOSIS — E87.6 HYPOKALEMIA: ICD-10-CM

## 2018-09-19 DIAGNOSIS — F32.A DEPRESSION WITH SUICIDAL IDEATION: Primary | ICD-10-CM

## 2018-09-19 DIAGNOSIS — R45.851 SUICIDAL IDEATION: ICD-10-CM

## 2018-09-19 LAB
ALBUMIN SERPL-MCNC: 2.7 G/DL (ref 3.4–5)
ALBUMIN UR-MCNC: 10 MG/DL
ALBUMIN UR-MCNC: 10 MG/DL
ALP SERPL-CCNC: 108 U/L (ref 40–150)
ALT SERPL W P-5'-P-CCNC: 13 U/L (ref 0–50)
AMORPH CRY #/AREA URNS HPF: ABNORMAL /HPF
AMORPH CRY #/AREA URNS HPF: ABNORMAL /HPF
AMPHETAMINES UR QL SCN: NEGATIVE
ANION GAP SERPL CALCULATED.3IONS-SCNC: 8 MMOL/L (ref 3–14)
APPEARANCE UR: ABNORMAL
APPEARANCE UR: ABNORMAL
AST SERPL W P-5'-P-CCNC: 19 U/L (ref 0–45)
BACTERIA #/AREA URNS HPF: ABNORMAL /HPF
BACTERIA #/AREA URNS HPF: ABNORMAL /HPF
BARBITURATES UR QL: NEGATIVE
BASOPHILS # BLD AUTO: 0 10E9/L (ref 0–0.2)
BASOPHILS NFR BLD AUTO: 0.1 %
BENZODIAZ UR QL: NEGATIVE
BILIRUB SERPL-MCNC: 0.3 MG/DL (ref 0.2–1.3)
BILIRUB UR QL STRIP: NEGATIVE
BILIRUB UR QL STRIP: NEGATIVE
BUN SERPL-MCNC: 4 MG/DL (ref 7–30)
CALCIUM SERPL-MCNC: 8.2 MG/DL (ref 8.5–10.1)
CANNABINOIDS UR QL SCN: NEGATIVE
CHLORIDE SERPL-SCNC: 105 MMOL/L (ref 94–109)
CO2 SERPL-SCNC: 27 MMOL/L (ref 20–32)
COCAINE UR QL: NEGATIVE
COLOR UR AUTO: YELLOW
COLOR UR AUTO: YELLOW
CREAT SERPL-MCNC: 0.38 MG/DL (ref 0.52–1.04)
DIFFERENTIAL METHOD BLD: ABNORMAL
EOSINOPHIL # BLD AUTO: 0.8 10E9/L (ref 0–0.7)
EOSINOPHIL NFR BLD AUTO: 9.3 %
ERYTHROCYTE [DISTWIDTH] IN BLOOD BY AUTOMATED COUNT: 14.9 % (ref 10–15)
GFR SERPL CREATININE-BSD FRML MDRD: >90 ML/MIN/1.7M2
GLUCOSE SERPL-MCNC: 88 MG/DL (ref 70–99)
GLUCOSE UR STRIP-MCNC: NEGATIVE MG/DL
GLUCOSE UR STRIP-MCNC: NEGATIVE MG/DL
HCG SERPL QL: NEGATIVE
HCT VFR BLD AUTO: 37.6 % (ref 35–47)
HGB BLD-MCNC: 13.1 G/DL (ref 11.7–15.7)
HGB UR QL STRIP: ABNORMAL
HGB UR QL STRIP: NEGATIVE
IMM GRANULOCYTES # BLD: 0 10E9/L (ref 0–0.4)
IMM GRANULOCYTES NFR BLD: 0.4 %
KETONES UR STRIP-MCNC: NEGATIVE MG/DL
KETONES UR STRIP-MCNC: NEGATIVE MG/DL
LEUKOCYTE ESTERASE UR QL STRIP: ABNORMAL
LEUKOCYTE ESTERASE UR QL STRIP: ABNORMAL
LYMPHOCYTES # BLD AUTO: 1.7 10E9/L (ref 0.8–5.3)
LYMPHOCYTES NFR BLD AUTO: 20.7 %
MCH RBC QN AUTO: 32.5 PG (ref 26.5–33)
MCHC RBC AUTO-ENTMCNC: 34.8 G/DL (ref 31.5–36.5)
MCV RBC AUTO: 93 FL (ref 78–100)
METHADONE UR QL SCN: NEGATIVE
MONOCYTES # BLD AUTO: 0.5 10E9/L (ref 0–1.3)
MONOCYTES NFR BLD AUTO: 6.5 %
MUCOUS THREADS #/AREA URNS LPF: PRESENT /LPF
MUCOUS THREADS #/AREA URNS LPF: PRESENT /LPF
NEUTROPHILS # BLD AUTO: 5.2 10E9/L (ref 1.6–8.3)
NEUTROPHILS NFR BLD AUTO: 63 %
NITRATE UR QL: NEGATIVE
NITRATE UR QL: NEGATIVE
NRBC # BLD AUTO: 0 10*3/UL
NRBC BLD AUTO-RTO: 0 /100
OPIATES UR QL SCN: POSITIVE
PCP UR QL SCN: NEGATIVE
PH UR STRIP: 7 PH (ref 4.7–8)
PH UR STRIP: 7.5 PH (ref 4.7–8)
PLATELET # BLD AUTO: 395 10E9/L (ref 150–450)
POTASSIUM SERPL-SCNC: 2.6 MMOL/L (ref 3.4–5.3)
PROT SERPL-MCNC: 5.9 G/DL (ref 6.8–8.8)
RBC # BLD AUTO: 4.03 10E12/L (ref 3.8–5.2)
RBC #/AREA URNS AUTO: 0 /HPF (ref 0–2)
RBC #/AREA URNS AUTO: 27 /HPF (ref 0–2)
SODIUM SERPL-SCNC: 140 MMOL/L (ref 133–144)
SOURCE: ABNORMAL
SOURCE: ABNORMAL
SP GR UR STRIP: 1.01 (ref 1–1.03)
SP GR UR STRIP: 1.01 (ref 1–1.03)
SQUAMOUS #/AREA URNS AUTO: 10 /HPF (ref 0–1)
SQUAMOUS #/AREA URNS AUTO: <1 /HPF (ref 0–1)
TRANS CELLS #/AREA URNS HPF: <1 /HPF (ref 0–1)
TSH SERPL DL<=0.005 MIU/L-ACNC: 0.25 MU/L (ref 0.4–4)
UROBILINOGEN UR STRIP-MCNC: NORMAL MG/DL (ref 0–2)
UROBILINOGEN UR STRIP-MCNC: NORMAL MG/DL (ref 0–2)
WBC # BLD AUTO: 8.3 10E9/L (ref 4–11)
WBC #/AREA URNS AUTO: 23 /HPF (ref 0–5)
WBC #/AREA URNS AUTO: 91 /HPF (ref 0–5)
WBC CLUMPS #/AREA URNS HPF: PRESENT /HPF

## 2018-09-19 PROCEDURE — 99285 EMERGENCY DEPT VISIT HI MDM: CPT | Performed by: EMERGENCY MEDICINE

## 2018-09-19 PROCEDURE — 99285 EMERGENCY DEPT VISIT HI MDM: CPT

## 2018-09-19 PROCEDURE — 81001 URINALYSIS AUTO W/SCOPE: CPT | Performed by: EMERGENCY MEDICINE

## 2018-09-19 PROCEDURE — 25000125 ZZHC RX 250: Performed by: NURSE PRACTITIONER

## 2018-09-19 PROCEDURE — 84703 CHORIONIC GONADOTROPIN ASSAY: CPT | Performed by: EMERGENCY MEDICINE

## 2018-09-19 PROCEDURE — 80053 COMPREHEN METABOLIC PANEL: CPT | Performed by: EMERGENCY MEDICINE

## 2018-09-19 PROCEDURE — 80307 DRUG TEST PRSMV CHEM ANLYZR: CPT | Performed by: EMERGENCY MEDICINE

## 2018-09-19 PROCEDURE — 84443 ASSAY THYROID STIM HORMONE: CPT | Performed by: EMERGENCY MEDICINE

## 2018-09-19 PROCEDURE — 85025 COMPLETE CBC W/AUTO DIFF WBC: CPT | Performed by: EMERGENCY MEDICINE

## 2018-09-19 PROCEDURE — 12400000 ZZH R&B MH

## 2018-09-19 PROCEDURE — 87086 URINE CULTURE/COLONY COUNT: CPT | Performed by: EMERGENCY MEDICINE

## 2018-09-19 PROCEDURE — 36415 COLL VENOUS BLD VENIPUNCTURE: CPT | Performed by: EMERGENCY MEDICINE

## 2018-09-19 PROCEDURE — 87088 URINE BACTERIA CULTURE: CPT | Performed by: EMERGENCY MEDICINE

## 2018-09-19 PROCEDURE — 25000132 ZZH RX MED GY IP 250 OP 250 PS 637: Performed by: EMERGENCY MEDICINE

## 2018-09-19 PROCEDURE — 87186 SC STD MICRODIL/AGAR DIL: CPT | Performed by: EMERGENCY MEDICINE

## 2018-09-19 RX ORDER — BISACODYL 10 MG
10 SUPPOSITORY, RECTAL RECTAL DAILY PRN
Status: DISCONTINUED | OUTPATIENT
Start: 2018-09-19 | End: 2018-09-21 | Stop reason: HOSPADM

## 2018-09-19 RX ORDER — ACETAMINOPHEN 325 MG/1
650 TABLET ORAL EVERY 4 HOURS PRN
Status: DISCONTINUED | OUTPATIENT
Start: 2018-09-19 | End: 2018-09-20

## 2018-09-19 RX ORDER — HYDROXYZINE HYDROCHLORIDE 25 MG/1
25 TABLET, FILM COATED ORAL EVERY 4 HOURS PRN
Status: DISCONTINUED | OUTPATIENT
Start: 2018-09-19 | End: 2018-09-20

## 2018-09-19 RX ORDER — TRAZODONE HYDROCHLORIDE 50 MG/1
50 TABLET, FILM COATED ORAL
Status: DISCONTINUED | OUTPATIENT
Start: 2018-09-19 | End: 2018-09-20

## 2018-09-19 RX ORDER — OLANZAPINE 10 MG/2ML
10 INJECTION, POWDER, FOR SOLUTION INTRAMUSCULAR
Status: DISCONTINUED | OUTPATIENT
Start: 2018-09-19 | End: 2018-09-21 | Stop reason: HOSPADM

## 2018-09-19 RX ORDER — POTASSIUM CHLORIDE 1500 MG/1
40 TABLET, EXTENDED RELEASE ORAL ONCE
Status: COMPLETED | OUTPATIENT
Start: 2018-09-19 | End: 2018-09-19

## 2018-09-19 RX ORDER — ALUMINA, MAGNESIA, AND SIMETHICONE 2400; 2400; 240 MG/30ML; MG/30ML; MG/30ML
30 SUSPENSION ORAL EVERY 4 HOURS PRN
Status: DISCONTINUED | OUTPATIENT
Start: 2018-09-19 | End: 2018-09-21 | Stop reason: HOSPADM

## 2018-09-19 RX ORDER — NITROFURANTOIN MACROCRYSTALS 50 MG/1
100 CAPSULE ORAL
Status: DISCONTINUED | OUTPATIENT
Start: 2018-09-19 | End: 2018-09-20

## 2018-09-19 RX ORDER — ONDANSETRON 4 MG/1
4 TABLET, ORALLY DISINTEGRATING ORAL EVERY 4 HOURS PRN
Status: DISCONTINUED | OUTPATIENT
Start: 2018-09-19 | End: 2018-09-21 | Stop reason: HOSPADM

## 2018-09-19 RX ORDER — OLANZAPINE 10 MG/1
10 TABLET ORAL
Status: DISCONTINUED | OUTPATIENT
Start: 2018-09-19 | End: 2018-09-21 | Stop reason: HOSPADM

## 2018-09-19 RX ADMIN — POTASSIUM CHLORIDE 40 MEQ: 20 TABLET, EXTENDED RELEASE ORAL at 19:26

## 2018-09-19 RX ADMIN — ONDANSETRON 4 MG: 4 TABLET, ORALLY DISINTEGRATING ORAL at 22:14

## 2018-09-19 RX ADMIN — NITROFURANTOIN MACROCRYSTALS 100 MG: 50 CAPSULE ORAL at 20:21

## 2018-09-19 ASSESSMENT — ENCOUNTER SYMPTOMS
APPETITE CHANGE: 1
ACTIVITY CHANGE: 1
NERVOUS/ANXIOUS: 1
SLEEP DISTURBANCE: 1
MYALGIAS: 1
ARTHRALGIAS: 1

## 2018-09-19 ASSESSMENT — ACTIVITIES OF DAILY LIVING (ADL)
BATHING: 2-->ASSISTIVE PERSON
WHICH_OF_THE_ABOVE_FUNCTIONAL_RISKS_HAD_A_RECENT_ONSET_OR_CHANGE?: AMBULATION
SWALLOWING: 0-->SWALLOWS FOODS/LIQUIDS WITHOUT DIFFICULTY
RETIRED_COMMUNICATION: 0-->UNDERSTANDS/COMMUNICATES WITHOUT DIFFICULTY
DRESS: 2-->ASSISTIVE PERSON
FALL_HISTORY_WITHIN_LAST_SIX_MONTHS: YES
RETIRED_EATING: 2-->ASSISTIVE PERSON
NUMBER_OF_TIMES_PATIENT_HAS_FALLEN_WITHIN_LAST_SIX_MONTHS: 1
COGNITION: 0 - NO COGNITION ISSUES REPORTED
TRANSFERRING: 3-->ASSISTIVE EQUIPMENT AND PERSON
AMBULATION: 3-->ASSISTIVE EQUIPMENT AND PERSON
TOILETING: 3-->ASSISTIVE EQUIPMENT AND PERSON
GROOMING: WITH ASSISTANCE

## 2018-09-19 NOTE — IP AVS SNAPSHOT
[facility]    [address] [place] [address]    Phone:  [phone]    Fax:  [phone]                                       After Visit Summary   9/19/2018    Maggie Case    MRN: [id number]           After Visit Summary Signature Page     I have received my discharge instructions, and my questions have been answered. I have discussed any challenges I see with this plan with the nurse or doctor.    ..........................................................................................................................................  Patient/Patient Representative Signature      ..........................................................................................................................................  Patient Representative Print Name and Relationship to Patient    ..................................................               ................................................  Date                                   Time    ..........................................................................................................................................  Reviewed by Signature/Title    ...................................................              ..............................................  Date                                               Time          22EPIC Rev 08/18

## 2018-09-19 NOTE — IP AVS SNAPSHOT
MRN:[id number]                      After Visit Summary   9/19/2018    Maggie Case    MRN: [id number]           Thank you!     Thank you for choosing [facility] for your care. Our goal is always to provide you with excellent care. Hearing back from our patients is one way we can continue to improve our services. Please take a few minutes to complete the written survey that you may receive in the mail after you visit with us. Thank you!        Patient Information     Date Of Birth          1990        About your hospital stay     You were admitted on:  September 19, 2018 You last received care in the: [facility]    You were discharged on:  September 21, 2018       Who to Call     For medical emergencies, please call 911.  For non-urgent questions about your medical care, please call your primary care provider or clinic, [phone]          Attending Provider     Provider Specialty    Gabriel Whitley MD Emergency Medicine    Giovanna Casey Mc, APRN CNP Nurse Practitioner Psych/Mental Health    Reinier Rinaldi MD Psychiatry       Primary Care Provider Office Phone # Fax #    Chapo Flores [phone]      Further instructions from your care team       Behavioral Discharge Planning and Instructions    Summary: Patient was brought in from her nursing home due to increased SI with a plan to wrap headphone cords around her neck and flip her bed over .Patient is in a wheelchair as a result of a stroke last year.     Main Diagnosis: Persistent Depressive Disorder  Anxiety Disorder Unspecified  H/O Substance Use Disorder  H/O OCD  H/O Stroke    Major Treatments, Procedures and Findings: Stabilize with medications, connect with community programs.    Symptoms to Report: feeling more aggressive, increased confusion, losing more sleep, mood getting worse or thoughts of suicide    Lifestyle Adjustment: Take all medications as prescribed, meet with doctor/ medication provider,  "out patient therapist, ,as scheduled. Abstain from alcohol or any unprescribed drugs.  Chemical Health Assessment is recommended - Follow recommendations made by assessment      Psychiatry Follow-up:       [facility] - Dr. Chapo Flores  [address] [place] [address]  Phone - [phone]     Fax - [phone]      Resources:   Crisis Intervention: 690.513.6034 or 985-255-5216 (TTY: 369.317.9303).  Call anytime for help.  National [place] on Mental Illness (www.[place].mp.org): 579.415.7276 or 446-974-2416.  Alcoholics Anonymous (www.alcoholics-anonymous.org): Check your phone book for your local chapter.  Suicide Awareness Voices of Education (SAVE) (www.save.org): 613-431-RZCE (0072)  National Suicide Prevention Line (www.mentalhealthmn.org): 351-580-FJBG (5833)  Mental Health Consumer/Survivor Network of [place] (www.mhcsn.net): 787.404.9977 or 926-126-9899  Mental Health Association of [place] (www.mentalhealth.org): 414.375.4489 or 161-006-0333    [place] Area:  [facility], Crisis stabilization [facility]- 255.311.4047  [facility] Crisis Line: 1-569.699.6801  Advocates For Family Peace: 150-5908  Sexual Assault Program of [facility]: 490.507.8412 or 1-141.840.8873  [facility] Battered Women's Program: 1-565.166.2359 Ext: 279       Calls answered Mon-Fri-8:00 am--4:30 pm    [place]:  Advocates for Family Peace: 1-598.510.7694  [facility] first call for help: 1-754.456.8186  [facility] Crisis Center:  (668) 187-5501      [place] Area:  Warm Line: 1-473.519.6028       Calls answered Tuesday--Saturday 4:00 pm--10:00 pm  [facility] Crisis Line - 797.697.5990  [facility] 651-888-1634    [place] Statewide:  [place] Crisis and Referral Services: 6-745-158-1281  National Suicide Prevention Lifeline: 8-177-605-TALK (1696)   - yfk7mhoh- Text \"Life\" to 39247  First Call for Help: 2-1-1  [place] Helpline- 0-888-UBSK-HELP    General Medication Instructions:   See your medication " "sheet(s) for instructions.   Take all medicines as directed.  Make no changes unless your doctor suggests them.   Go to all your doctor visits.  Be sure to have all your required lab tests. This way, your medicines can be refilled on time.  Do not use any drugs not prescribed by your doctor.  Avoid alcohol.  Chemical Health Assessment is recommended - follow all recommendations    Chemical Health assessment resources:    [facility]  [address] [place]  [address]  Phone - [phone]  Fax - [phone]    [facility]  [address] [place]  [address]  Phone - [phone]  Fax - [phone]    [facility]  [address] [place] [address]  office :[phone]  fax: [phone]    [facility]   [address]  [place] [address]  Phone: [phone]     [facility]   [address] [place] [address]  Phone: [phone]  Fax: [phone]          Pending Results     Date and Time Order Name Status Description    9/19/2018 1925 Urine Culture Aerobic Bacterial Preliminary             Statement of Approval     Ordered          09/21/18 1314  I have reviewed and agree with all the recommendations and orders detailed in this document.  EFFECTIVE NOW     Approved and electronically signed by:  Giovanna Casey Mc, APRN CNP             Admission Information     Date & Time Provider Department Dept. Phone    9/19/2018 Reinier Rinaldi MD [facility] [phone]      Your Vitals Were     Blood Pressure Pulse Temperature Respirations Height Weight    130/81 114 98.2  F (36.8  C) (Tympanic) 15 1.6 m (5' 3\") 56.2 kg (124 lb)    Pulse Oximetry BMI (Body Mass Index)                99% 21.97 kg/m2          MyChart Information     [facility] lets you send messages to your doctor, view your test results, renew your prescriptions, schedule appointments and more. To sign up, go to [email or web address] . Click on \"Log in\" on the " "left side of the screen, which will take you to the Welcome page. Then click on \"Sign up Now\" on the right side of the page.     You will be asked to enter the access code listed below, as well as some personal information. Please follow the directions to create your username and password.     Your access code is: [email or web address]  Expires: 2018  7:51 PM     Your access code will  in 90 days. If you need help or a new code, please call your [facility] clinic or [phone].        Care EveryWhere ID     This is your Care EveryWhere ID. This could be used by other organizations to access your [facility] medical records  [id number]        Equal Access to Services     MYLES FLAHERTY : Griselda Mcarthur, chapin brown, lyndsey benoit, justin astorga. So [facility] [phone].    ATENCIÓN: Si habla español, tiene a montalvo disposición servicios gratuitos de asistencia lingüística. Llame al [phone].    We comply with applicable federal civil rights laws and [place] laws. We do not discriminate on the basis of race, color, national origin, age, disability, sex, sexual orientation, or gender identity.               Review of your medicines      START taking        Dose / Directions    nitroFURantoin macrocrystal 100 MG capsule   Commonly known as:  MACRODANTIN   Indication:  Urinary Tract Infection   Used for:  Acute cystitis without hematuria        Dose:  100 mg   Take 1 capsule (100 mg) by mouth 2 times daily for 5 doses   Quantity:  5 capsule   Refills:  0         CONTINUE these medicines which may have CHANGED, or have new prescriptions. If we are uncertain of the size of tablets/capsules you have at home, strength may be listed as something that might have changed.        Dose / Directions    * DULOXETINE HCL PO   This may have changed:  Another medication with the same name was added. Make sure you understand how and when to take each.        Dose:  60 mg   Take 60 " mg by mouth daily   Refills:  0       * DULoxetine 30 MG EC capsule   Commonly known as:  CYMBALTA   This may have changed:  You were already taking a medication with the same name, and this prescription was added. Make sure you understand how and when to take each.        Dose:  30 mg   Take 1 capsule (30 mg) by mouth daily (with dinner)   Quantity:  30 capsule   Refills:  0       TYLENOL PO   This may have changed:  Another medication with the same name was removed. Continue taking this medication, and follow the directions you see here.        Dose:  650 mg   Take 650 mg by mouth every 4 hours as needed for mild pain or fever   Refills:  0       * Notice:  This list has 2 medication(s) that are the same as other medications prescribed for you. Read the directions carefully, and ask your doctor or other care provider to review them with you.      CONTINUE these medicines which have NOT CHANGED        Dose / Directions    cyanocobalamin 1000 MCG tablet   Commonly known as:  vitamin  B-12        Dose:  1000 mcg   Take 1,000 mcg by mouth daily   Refills:  0       ferrous sulfate 325 (65 Fe) MG tablet   Commonly known as:  IRON        Take by mouth daily (with breakfast)   Refills:  0       * GABAPENTIN PO        Dose:  600 mg   Take 600 mg by mouth At Bedtime   Refills:  0       * GABAPENTIN PO        Dose:  300 mg   Take 300 mg by mouth 2 times daily 300mg AM and Noon   Refills:  0       loperamide 2 MG capsule   Commonly known as:  IMODIUM        Dose:  2 mg   Take 2 mg by mouth 4 times daily as needed for diarrhea   Refills:  0       MELATONIN PO        Dose:  5 mg   Take 5 mg by mouth   Refills:  0       nystatin 651255 UNIT/ML suspension   Commonly known as:  MYCOSTATIN        Dose:  258724 Units   Take 5 mLs (500,000 Units) by mouth 4 times daily   Quantity:  280 mL   Refills:  0       TRAZODONE HCL PO        Dose:  100 mg   Take 100 mg by mouth At Bedtime   Refills:  0       trolamine salicylate 10 % cream    Commonly known as:  ASPERCREME        Dose:  1 g   Apply 1 g topically 3 times daily   Quantity:  35.4 g   Refills:  0       XARELTO 20 MG Tabs tablet   Generic drug:  rivaroxaban ANTICOAGULANT        Dose:  20 mg   Take 20 mg by mouth every morning   Refills:  11       ZOFRAN PO        Dose:  4 mg   Take 4 mg by mouth every 8 hours as needed for nausea or vomiting   Refills:  0       * Notice:  This list has 2 medication(s) that are the same as other medications prescribed for you. Read the directions carefully, and ask your doctor or other care provider to review them with you.      STOP taking     HYDROcodone-acetaminophen 5-325 MG per tablet   Commonly known as:  NORCO                Where to get your medicines      These medications were sent to  [id number] - [facility] - [address]  [address] [place] [address]     Phone:  [phone]     DULoxetine 30 MG EC capsule    nitroFURantoin macrocrystal 100 MG capsule                Protect others around you: Learn how to safely use, store and throw away your medicines at www.disposemymeds.org.             Medication List: This is a list of all your medications and when to take them. Check marks below indicate your daily home schedule. Keep this list as a reference.      Medications           Morning Afternoon Evening Bedtime As Needed    cyanocobalamin 1000 MCG tablet   Commonly known as:  vitamin  B-12   Take 1,000 mcg by mouth daily   Last time this was given:  1,000 mcg on 9/21/2018  8:20 AM                                * DULOXETINE HCL PO   Take 60 mg by mouth daily   Last time this was given:  60 mg on 9/21/2018  8:20 AM                                * DULoxetine 30 MG EC capsule   Commonly known as:  CYMBALTA   Take 1 capsule (30 mg) by mouth daily (with dinner)   Last time this was given:  60 mg on 9/21/2018  8:20 AM                                ferrous sulfate 325 (65 Fe) MG tablet   Commonly known as:  IRON   Take by mouth  daily (with breakfast)                                * GABAPENTIN PO   Take 600 mg by mouth At Bedtime   Last time this was given:  300 mg on 9/21/2018 11:56 AM                                * GABAPENTIN PO   Take 300 mg by mouth 2 times daily 300mg AM and Noon   Last time this was given:  300 mg on 9/21/2018 11:56 AM                                loperamide 2 MG capsule   Commonly known as:  IMODIUM   Take 2 mg by mouth 4 times daily as needed for diarrhea   Last time this was given:  2 mg on 9/21/2018  1:33 PM                                MELATONIN PO   Take 5 mg by mouth   Last time this was given:  5 mg on 9/20/2018  8:06 PM                                nitroFURantoin macrocrystal 100 MG capsule   Commonly known as:  MACRODANTIN   Take 1 capsule (100 mg) by mouth 2 times daily for 5 doses   Last time this was given:  100 mg on 9/21/2018 11:55 AM                                nystatin [id number] UNIT/ML suspension   Commonly known as:  MYCOSTATIN   Take 5 mLs (500,000 Units) by mouth 4 times daily   Last time this was given:  500,000 Units on 9/20/2018  9:05 AM                                TRAZODONE HCL PO   Take 100 mg by mouth At Bedtime   Last time this was given:  100 mg on 9/20/2018  8:06 PM                                trolamine salicylate 10 % cream   Commonly known as:  ASPERCREME   Apply 1 g topically 3 times daily   Last time this was given:  1 g on 9/21/2018  1:24 PM                                TYLENOL PO   Take 650 mg by mouth every 4 hours as needed for mild pain or fever   Last time this was given:  650 mg on 9/21/2018 11:55 AM                                XARELTO 20 MG Tabs tablet   Take 20 mg by mouth every morning   Last time this was given:  20 mg on 9/21/2018  8:20 AM   Generic drug:  rivaroxaban ANTICOAGULANT                                ZOFRAN PO   Take 4 mg by mouth every 8 hours as needed for nausea or vomiting                                * Notice:  This list has 4  medication(s) that are the same as other medications prescribed for you. Read the directions carefully, and ask your doctor or other care provider to review them with you.

## 2018-09-20 LAB — POTASSIUM SERPL-SCNC: 3.1 MMOL/L (ref 3.4–5.3)

## 2018-09-20 PROCEDURE — 36415 COLL VENOUS BLD VENIPUNCTURE: CPT | Performed by: NURSE PRACTITIONER

## 2018-09-20 PROCEDURE — 25000125 ZZHC RX 250: Performed by: NURSE PRACTITIONER

## 2018-09-20 PROCEDURE — 25000132 ZZH RX MED GY IP 250 OP 250 PS 637: Performed by: NURSE PRACTITIONER

## 2018-09-20 PROCEDURE — 84132 ASSAY OF SERUM POTASSIUM: CPT | Performed by: NURSE PRACTITIONER

## 2018-09-20 PROCEDURE — 25000132 ZZH RX MED GY IP 250 OP 250 PS 637: Performed by: EMERGENCY MEDICINE

## 2018-09-20 PROCEDURE — 12400000 ZZH R&B MH

## 2018-09-20 PROCEDURE — 99223 1ST HOSP IP/OBS HIGH 75: CPT | Performed by: NURSE PRACTITIONER

## 2018-09-20 RX ORDER — TRAZODONE HYDROCHLORIDE 100 MG/1
100 TABLET ORAL AT BEDTIME
Status: DISCONTINUED | OUTPATIENT
Start: 2018-09-20 | End: 2018-09-21 | Stop reason: HOSPADM

## 2018-09-20 RX ORDER — GABAPENTIN 300 MG/1
300 CAPSULE ORAL 2 TIMES DAILY
Status: DISCONTINUED | OUTPATIENT
Start: 2018-09-20 | End: 2018-09-20

## 2018-09-20 RX ORDER — DULOXETIN HYDROCHLORIDE 60 MG/1
60 CAPSULE, DELAYED RELEASE ORAL DAILY
Status: DISCONTINUED | OUTPATIENT
Start: 2018-09-20 | End: 2018-09-21 | Stop reason: HOSPADM

## 2018-09-20 RX ORDER — DULOXETIN HYDROCHLORIDE 30 MG/1
30 CAPSULE, DELAYED RELEASE ORAL
Status: DISCONTINUED | OUTPATIENT
Start: 2018-09-20 | End: 2018-09-21 | Stop reason: HOSPADM

## 2018-09-20 RX ORDER — NITROFURANTOIN MACROCRYSTALS 50 MG/1
100 CAPSULE ORAL
Status: DISCONTINUED | OUTPATIENT
Start: 2018-09-20 | End: 2018-09-21 | Stop reason: HOSPADM

## 2018-09-20 RX ORDER — DULOXETIN HYDROCHLORIDE 30 MG/1
30 CAPSULE, DELAYED RELEASE ORAL DAILY
Status: DISCONTINUED | OUTPATIENT
Start: 2018-09-20 | End: 2018-09-20

## 2018-09-20 RX ORDER — NICOTINE 21 MG/24HR
1 PATCH, TRANSDERMAL 24 HOURS TRANSDERMAL DAILY
Status: DISCONTINUED | OUTPATIENT
Start: 2018-09-20 | End: 2018-09-21 | Stop reason: HOSPADM

## 2018-09-20 RX ORDER — GABAPENTIN 400 MG/1
400 CAPSULE ORAL 2 TIMES DAILY
Status: DISCONTINUED | OUTPATIENT
Start: 2018-09-20 | End: 2018-09-21

## 2018-09-20 RX ORDER — LOPERAMIDE HCL 2 MG
2 CAPSULE ORAL 4 TIMES DAILY PRN
Status: DISCONTINUED | OUTPATIENT
Start: 2018-09-20 | End: 2018-09-21 | Stop reason: HOSPADM

## 2018-09-20 RX ORDER — TROLAMINE SALICYLATE 10 G/100G
1 CREAM TOPICAL 3 TIMES DAILY
Status: DISCONTINUED | OUTPATIENT
Start: 2018-09-20 | End: 2018-09-21 | Stop reason: HOSPADM

## 2018-09-20 RX ORDER — ACETAMINOPHEN 325 MG/1
650 TABLET ORAL
Status: DISCONTINUED | OUTPATIENT
Start: 2018-09-20 | End: 2018-09-21 | Stop reason: HOSPADM

## 2018-09-20 RX ORDER — LANOLIN ALCOHOL/MO/W.PET/CERES
1000 CREAM (GRAM) TOPICAL DAILY
Status: DISCONTINUED | OUTPATIENT
Start: 2018-09-20 | End: 2018-09-21 | Stop reason: HOSPADM

## 2018-09-20 RX ORDER — FERROUS SULFATE 325(65) MG
325 TABLET ORAL
Status: DISCONTINUED | OUTPATIENT
Start: 2018-09-20 | End: 2018-09-21 | Stop reason: HOSPADM

## 2018-09-20 RX ORDER — NYSTATIN 100000/ML
500000 SUSPENSION, ORAL (FINAL DOSE FORM) ORAL 4 TIMES DAILY
Status: DISCONTINUED | OUTPATIENT
Start: 2018-09-20 | End: 2018-09-21 | Stop reason: HOSPADM

## 2018-09-20 RX ORDER — HYDROXYZINE HYDROCHLORIDE 25 MG/1
25-50 TABLET, FILM COATED ORAL EVERY 4 HOURS PRN
Status: DISCONTINUED | OUTPATIENT
Start: 2018-09-20 | End: 2018-09-21 | Stop reason: HOSPADM

## 2018-09-20 RX ORDER — GABAPENTIN 600 MG/1
600 TABLET ORAL AT BEDTIME
Status: DISCONTINUED | OUTPATIENT
Start: 2018-09-20 | End: 2018-09-21 | Stop reason: HOSPADM

## 2018-09-20 RX ORDER — ONDANSETRON 4 MG/1
4 TABLET, FILM COATED ORAL EVERY 8 HOURS PRN
Status: DISCONTINUED | OUTPATIENT
Start: 2018-09-20 | End: 2018-09-20

## 2018-09-20 RX ADMIN — TRAZODONE HYDROCHLORIDE 100 MG: 100 TABLET ORAL at 20:06

## 2018-09-20 RX ADMIN — GABAPENTIN 400 MG: 400 CAPSULE ORAL at 12:20

## 2018-09-20 RX ADMIN — NITROFURANTOIN MACROCRYSTALS 100 MG: 50 CAPSULE ORAL at 17:25

## 2018-09-20 RX ADMIN — LOPERAMIDE HYDROCHLORIDE 2 MG: 2 CAPSULE ORAL at 00:45

## 2018-09-20 RX ADMIN — HYDROXYZINE HYDROCHLORIDE 25 MG: 25 TABLET ORAL at 18:48

## 2018-09-20 RX ADMIN — TROLAMINE SALICYLATE 1 G: 10 CREAM TOPICAL at 09:24

## 2018-09-20 RX ADMIN — MELATONIN 5 MG TABLET 5 MG: at 00:45

## 2018-09-20 RX ADMIN — RIVAROXABAN 20 MG: 20 TABLET, FILM COATED ORAL at 09:05

## 2018-09-20 RX ADMIN — MELATONIN 5 MG TABLET 5 MG: at 20:06

## 2018-09-20 RX ADMIN — GABAPENTIN 600 MG: 600 TABLET, FILM COATED ORAL at 00:45

## 2018-09-20 RX ADMIN — ONDANSETRON 4 MG: 4 TABLET, ORALLY DISINTEGRATING ORAL at 14:01

## 2018-09-20 RX ADMIN — TRAZODONE HYDROCHLORIDE 100 MG: 100 TABLET ORAL at 00:45

## 2018-09-20 RX ADMIN — ACETAMINOPHEN 650 MG: 325 TABLET, FILM COATED ORAL at 17:19

## 2018-09-20 RX ADMIN — ACETAMINOPHEN 650 MG: 325 TABLET, FILM COATED ORAL at 09:24

## 2018-09-20 RX ADMIN — GABAPENTIN 600 MG: 600 TABLET, FILM COATED ORAL at 20:06

## 2018-09-20 RX ADMIN — TROLAMINE SALICYLATE 1 G: 10 CREAM TOPICAL at 22:03

## 2018-09-20 RX ADMIN — CYANOCOBALAMIN TAB 1000 MCG 1000 MCG: 1000 TAB at 09:04

## 2018-09-20 RX ADMIN — DULOXETINE HYDROCHLORIDE 60 MG: 60 CAPSULE, DELAYED RELEASE ORAL at 09:04

## 2018-09-20 RX ADMIN — GABAPENTIN 300 MG: 300 CAPSULE ORAL at 09:04

## 2018-09-20 RX ADMIN — NITROFURANTOIN MACROCRYSTALS 100 MG: 50 CAPSULE ORAL at 09:04

## 2018-09-20 RX ADMIN — LOPERAMIDE HYDROCHLORIDE 2 MG: 2 CAPSULE ORAL at 14:01

## 2018-09-20 RX ADMIN — DULOXETINE HYDROCHLORIDE 30 MG: 30 CAPSULE, DELAYED RELEASE ORAL at 17:19

## 2018-09-20 RX ADMIN — NYSTATIN 500000 UNITS: 100000 SUSPENSION ORAL at 09:05

## 2018-09-20 RX ADMIN — ACETAMINOPHEN 650 MG: 325 TABLET, FILM COATED ORAL at 13:06

## 2018-09-20 RX ADMIN — TROLAMINE SALICYLATE 1 G: 10 CREAM TOPICAL at 14:01

## 2018-09-20 RX ADMIN — NICOTINE 1 PATCH: 14 PATCH, EXTENDED RELEASE TRANSDERMAL at 12:20

## 2018-09-20 ASSESSMENT — ACTIVITIES OF DAILY LIVING (ADL)
GROOMING: WITH ASSISTANCE
DRESS: SCRUBS (BEHAVIORAL HEALTH);WITH ASSISTANCE
GROOMING: WITH ASSISTANCE
LAUNDRY: UNABLE TO COMPLETE
ORAL_HYGIENE: WITH ASSISTANCE

## 2018-09-20 ASSESSMENT — PAIN DESCRIPTION - DESCRIPTORS: DESCRIPTORS: BURNING;STABBING

## 2018-09-20 NOTE — ED NOTES
TC to [facility] in [place].  Spoke with WOODY Nunez, notified patient was admitted to Behavioral Health Unit - [address] [place] [address].

## 2018-09-20 NOTE — ED NOTES
TC from [facility] [place] Intake.  Provider, SHIVANI Casey, requested that low potassium, abnormal UA results be addressed, and discuss with patient that she is being admitted for mental health and not pain control issues.  Dr. Whitley notified.

## 2018-09-20 NOTE — ED PROVIDER NOTES
History     Chief Complaint   Patient presents with     Suicidal     HPI  Maggie Case is a 28 year old female with above hx.  Seen today at [facility] by her FP Chapo Flores who was concerned that Maggie's statement and plan had to be taken seriously.  She has never had psychiatric admission and review of her records noted below suggest chemical dependency and anxiety.  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      Problem List:    Patient Active Problem List    Diagnosis Date Noted     Clostridium difficile colitis 08/15/2018     Priority: Medium     Chemical dependency (H) 07/16/2018     Priority: Medium     Calculus of lower urinary tract 07/15/2018     Priority: Medium     History of CVA (cerebrovascular accident) 07/15/2018     Priority: Medium     Left hemiparesis (H) 07/15/2018     Priority: Medium     Chronic anticoagulation 07/15/2018     Priority: Medium     History of cranioplasty 05/24/2018     Priority: Medium     Acute ischemic stroke (H) 02/17/2018     Priority: Medium     Influenza B 05/01/2017     Priority: Medium     Opacity of lung on imaging study 05/01/2017     Priority: Medium     Community acquired pneumonia 05/01/2017     Priority: Medium     C. difficile colitis 05/01/2017     Priority: Medium     Sepsis (H) 04/28/2017     Priority: Medium     Pyelonephritis 01/05/2017     Priority: Medium     Acute chest pain 05/06/2016     Priority: Medium     Leukocytosis 05/06/2016     Priority: Medium     Lung mass 05/06/2016     Priority: Medium     SOB (shortness of breath) 05/06/2016     Priority: Medium     Acute upper GI bleed 06/18/2013     Priority: Medium     Hypokalemia 06/18/2013     Priority: Medium     Acute cystitis 06/18/2013     Priority: Medium     Anxiety state 03/25/2013     Priority: Medium     Muscle pain  07/30/2009     Priority: Medium     Overview:   IMO Update 10/11       Cervicalgia 06/16/2009     Priority: Medium     Overview:   IMO Update 10/11       Low back pain 06/16/2009     Priority: Medium     Overview:   IMO Update 10/11       Pain in joint, lower leg 05/01/2009     Priority: Medium     Diarrhea 04/17/2008     Priority: Medium     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     Priority: Medium        Past Medical History:    Past Medical History:   Diagnosis Date     Anemia      Anxiety      Chemical dependency (H)      Chronic diarrhea      Lactose intolerance      Myalgia      OCD (obsessive compulsive disorder)      Pulmonary embolism (H) 05/06/16     Stroke (H) 02/2018     Vitamin B12 deficiency        Past Surgical History:    Past Surgical History:   Procedure Laterality Date     CLOSED REDUCTION, PERCUTANEOUS PINNING LOWER EXTREMITY, COMBINED Right 4/6/2016    Procedure: COMBINED CLOSED REDUCTION, PERCUTANEOUS PINNING LOWER EXTREMITY;  Surgeon: Dexter Jones MD;  Location: [facility]     COLONOSCOPY       CRANIECTOMY Right 2/18/2018    Procedure: CRANIECTOMY;  RIGHT HEMICRANIECTOMY;  Surgeon: Emeterio Mesa MD;  Location: [facility]     CRANIOPLASTY Right 5/24/2018    Procedure: CRANIOPLASTY;  Right Cranioplasty ;  Surgeon: Emeterio Mesa MD;  Location: [facility] OR     UPPER GI ENDOSCOPY         Family History:    Family History   Problem Relation Age of Onset     Depression Mother      Migraines Maternal Half-Sister        Social History:  Marital Status:  Single [1]  Social History   Substance Use Topics     Smoking status: Former Smoker     Packs/day: 0.25     Years: 7.00     Types: Cigarettes     Quit date: 8/2/2018     Smokeless tobacco: Never Used     Alcohol use No        Medications:      Acetaminophen (TYLENOL PO)   Acetaminophen (TYLENOL PO)   cyanocobalamin (VITAMIN  B-12) 1000 MCG tablet   DULOXETINE HCL PO   ferrous sulfate (IRON) 325 (65 Fe) MG  tablet   GABAPENTIN PO   GABAPENTIN PO   HYDROcodone-acetaminophen (NORCO) 5-325 MG per tablet   loperamide (IMODIUM) 2 MG capsule   MELATONIN PO   Ondansetron HCl (ZOFRAN PO)   salicylic acid 40 % MISC   TRAZODONE HCL PO   trolamine salicylate (ASPERCREME) 10 % cream   XARELTO 20 MG TABS tablet   nystatin (MYCOSTATIN) 356583 UNIT/ML suspension       Review of Systems   Constitutional: Positive for activity change and appetite change.   Musculoskeletal: Positive for arthralgias, gait problem and myalgias.   Psychiatric/Behavioral: Positive for behavioral problems, self-injury, sleep disturbance and suicidal ideas. The patient is nervous/anxious.    All other systems reviewed and are negative.    Physical Exam   BP: 126/95  Heart Rate: 93  Temp: 98.6  F (37  C)  Resp: 16  SpO2: 99 %    Physical Exam   Constitutional: She is oriented to person, place, and time. She appears well-developed and well-nourished. No distress.   talkative cognitively intact emotionally expressive woman with collapsed right hemicraniotomy defect.   HENT:   Head: Normocephalic.   Eyes: Conjunctivae are normal. Pupils are equal, round, and reactive to light.   Neck: Normal range of motion. Neck supple.   Cardiovascular: Normal rate.    Pulmonary/Chest: Effort normal.   Abdominal: Soft.   Musculoskeletal: Normal range of motion. She exhibits tenderness.   Tender right foot    Neurological: She is alert and oriented to person, place, and time.   Skin: Skin is warm. She is not diaphoretic.     ED Course     ED Course     Procedures  Critical Care time:  none      Results for orders placed or performed during the hospital encounter of 09/19/18 (from the past 24 hour(s))   CBC with platelets differential   Result Value Ref Range    WBC 8.3 4.0 - 11.0 10e9/L    RBC Count 4.03 3.8 - 5.2 10e12/L    Hemoglobin 13.1 11.7 - 15.7 g/dL    Hematocrit 37.6 35.0 - 47.0 %    MCV 93 78 - 100 fl    MCH 32.5 26.5 - 33.0 pg    MCHC 34.8 31.5 - 36.5 g/dL    RDW 14.9  10.0 - 15.0 %    Platelet Count 395 150 - 450 10e9/L    Diff Method Automated Method     % Neutrophils 63.0 %    % Lymphocytes 20.7 %    % Monocytes 6.5 %    % Eosinophils 9.3 %    % Basophils 0.1 %    % Immature Granulocytes 0.4 %    Nucleated RBCs 0 0 /100    Absolute Neutrophil 5.2 1.6 - 8.3 10e9/L    Absolute Lymphocytes 1.7 0.8 - 5.3 10e9/L    Absolute Monocytes 0.5 0.0 - 1.3 10e9/L    Absolute Eosinophils 0.8 (H) 0.0 - 0.7 10e9/L    Absolute Basophils 0.0 0.0 - 0.2 10e9/L    Abs Immature Granulocytes 0.0 0 - 0.4 10e9/L    Absolute Nucleated RBC 0.0    Comprehensive metabolic panel   Result Value Ref Range    Sodium 140 133 - 144 mmol/L    Potassium 2.6 (LL) 3.4 - 5.3 mmol/L    Chloride 105 94 - 109 mmol/L    Carbon Dioxide 27 20 - 32 mmol/L    Anion Gap 8 3 - 14 mmol/L    Glucose 88 70 - 99 mg/dL    Urea Nitrogen 4 (L) 7 - 30 mg/dL    Creatinine 0.38 (L) 0.52 - 1.04 mg/dL    GFR Estimate >90 >60 mL/min/1.7m2    GFR Estimate If Black >90 >60 mL/min/1.7m2    Calcium 8.2 (L) 8.5 - 10.1 mg/dL    Bilirubin Total 0.3 0.2 - 1.3 mg/dL    Albumin 2.7 (L) 3.4 - 5.0 g/dL    Protein Total 5.9 (L) 6.8 - 8.8 g/dL    Alkaline Phosphatase 108 40 - 150 U/L    ALT 13 0 - 50 U/L    AST 19 0 - 45 U/L   HCG qualitative Blood   Result Value Ref Range    HCG Qualitative Serum Negative NEG^Negative   TSH   Result Value Ref Range    TSH 0.25 (L) 0.40 - 4.00 mU/L   Drug Screen Urine (Range)   Result Value Ref Range    Amphetamine Qual Urine Negative NEG^Negative    Barbiturates Qual Urine Negative NEG^Negative    Benzodiazepine Qual Urine Negative NEG^Negative    Cannabinoids Qual Urine Negative NEG^Negative    Cocaine Qual Urine Negative NEG^Negative    Opiates Qualitative Urine Positive (A) NEG^Negative    Methadone Qual Urine Negative NEG^Negative    PCP Qual Urine Negative NEG^Negative   UA with Microscopic reflex to Culture   Result Value Ref Range    Color Urine Yellow     Appearance Urine Cloudy     Glucose Urine Negative  NEG^Negative mg/dL    Bilirubin Urine Negative NEG^Negative    Ketones Urine Negative NEG^Negative mg/dL    Specific Gravity Urine 1.013 1.003 - 1.035    Blood Urine Negative NEG^Negative    pH Urine 7.5 4.7 - 8.0 pH    Protein Albumin Urine 10 (A) NEG^Negative mg/dL    Urobilinogen mg/dL Normal 0.0 - 2.0 mg/dL    Nitrite Urine Negative NEG^Negative    Leukocyte Esterase Urine Large (A) NEG^Negative    Source Midstream Urine     WBC Urine 91 (H) 0 - 5 /HPF    RBC Urine 0 0 - 2 /HPF    WBC Clumps Present (A) NEG^Negative /HPF    Bacteria Urine Moderate (A) NEG^Negative /HPF    Squamous Epithelial /HPF Urine 10 (H) 0 - 1 /HPF    Mucous Urine Present (A) NEG^Negative /LPF    Amorphous Crystals Few (A) NEG^Negative /HPF   UA with Microscopic   Result Value Ref Range    Color Urine Yellow     Appearance Urine Cloudy     Glucose Urine Negative NEG^Negative mg/dL    Bilirubin Urine Negative NEG^Negative    Ketones Urine Negative NEG^Negative mg/dL    Specific Gravity Urine 1.013 1.003 - 1.035    Blood Urine Small (A) NEG^Negative    pH Urine 7.0 4.7 - 8.0 pH    Protein Albumin Urine 10 (A) NEG^Negative mg/dL    Urobilinogen mg/dL Normal 0.0 - 2.0 mg/dL    Nitrite Urine Negative NEG^Negative    Leukocyte Esterase Urine Moderate (A) NEG^Negative    Source Catheterized Urine     WBC Urine 23 (H) 0 - 5 /HPF    RBC Urine 27 (H) 0 - 2 /HPF    Bacteria Urine Few (A) NEG^Negative /HPF    Squamous Epithelial /HPF Urine <1 0 - 1 /HPF    Transitional Epi <1 0 - 1 /HPF    Mucous Urine Present (A) NEG^Negative /LPF    Amorphous Crystals Many (A) NEG^Negative /HPF       Medications   nitroFURantoin (MACRODANTIN) capsule 100 mg (not administered)   potassium chloride SA (K-DUR/KLOR-CON M) CR tablet 40 mEq (40 mEq Oral Given 9/19/18 1926)     Assessments & Plan (with Medical Decision Making)   Maggie expresses suicidal ideation and plan that resulted in her being sent to the ED for MHU placement.  She continues to request pain meds that  her personal FP Chapo Flores does not want to be the focus of the admission because Dr. Flores feels she can manipulate the situation and symptoms to this end.  Labs were drawn and she was found to have unexpected inexplicable hypokalemia and mild UTI on straight cath.  Klor Con 40meq given in ED and daily for next 5 days to correct along with macrodantin 100mg in the ED and 100mg bid thereafter for 3 days.  DEC assessed agreed with admission and ultimately Reinier Rinaldi, consulting psychiatrist, accepted for admission.    I have reviewed the nursing notes.    I have reviewed the findings, diagnosis, plan and need for follow up with the patient.          New Prescriptions    No medications on file       Final diagnoses:   Suicidal ideation   Hypokalemia   Acute cystitis without hematuria       9/19/2018   [facility]     Gabriel Whitley MD  09/19/18 [id number]
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
## 2018-09-20 NOTE — PLAN OF CARE
Problem: Patient Care Overview  Goal: Discharge Needs Assessment  Outcome: No Change  Pateints Mother Adela called to check in on the status of Pt. ARANZA is signed for mother. Adela stated she was not in agreement with the teams plan to stabilize and discharged pt back to her nursing home. She states she knows Maggie will kill herself if she returns there. Adela asked how we could let her go home if she is just going to kill herself. Writer informed adela that daily checks with pt ask about si and that if she states she is actively suicidal she will not be discharged, mother felt better following that answer. Adela asked if there was anyway Pt's PCP could get her committed. Writer told adela there is nothing PCP can do since she is admitted to [facility] and if her NP feels like that is something she needs, her care team will discuss it; however it is not being discussed at this time. Informed mother Pt agreed to a new outpatient therapist, mother is very happy about this. Adela is going to be in contact with her nursing home and future group home to come up with a solid plan the decide where therapy appointment should be scheduled.

## 2018-09-20 NOTE — PLAN OF CARE
Face to face end of shift report received from RITESH Mckeon. Rounding completed. Patient observed laying in bed eyes closed with normal non labored respirations.     Monika Cee  9/20/2018  8:24 AM

## 2018-09-20 NOTE — ED NOTES
Face to face report given with opportunity to observe patient.    Report given to Alma Delia BRENNAN   9/19/2018  7:04 PM

## 2018-09-20 NOTE — PLAN OF CARE
"Problem: Patient Care Overview  Goal: Individualization & Mutuality  Pt will sleep at least 5-6 hours at night  Pt will eat at least 50% of meals  Pt will comply with treatment team orders and suggestions  Pt will attend at least 50% of groups offered   Outcome: No Change  Patient is seen in room laying in bed is incontinent of urine. Complete clothing & bed linen changed.  Left sided weakness, is unable to sit up or stand up on own.  Rates anxiety 10/10, depression 10/10.  Pain this am is 9/10 in both feet describes it as stabbing, burning pain.  Medicated with Tylenol 650 mg po at 0904 & 1306.  Tylenol is now a scheduled medication. Has not attended groups today, spent most of the resting, napping.   Did say her anxiety & depression was due to the stroke she had.  Mood is calm, affect is sad.    Problem: Suicide Risk (Adult)  Goal: Strength-Based Wellness/Recovery  Patient will demonstrate the desired outcomes by discharge/transition of care.  Pt will remain safe from self harm or injury   Outcome: No Change  Admits to chronic thoughts of suicide because \"of my medical problems\".  Does contract for safety will here in the hospital.      "

## 2018-09-20 NOTE — PROGRESS NOTES
09/19/18 2147   Patient Belongings   Did you bring any home meds/supplements to the hospital?  No   Patient Belongings cell phone/electronics;clothing   Disposition of Belongings Sent to security per site process;Sent Home   Belongings Search Yes   Clothing Search Yes   Second Staff luke   General Info Comment barrington FERREIRA pants, purple fuzzy socks, grey flag t-shirt, black hooded sweatshirt, SMOK, black samsung cell phone    List items sent to safe: black samsung cell phone, SMOK  All other belongings put in assigned cubby in belongings room.     I have reviewed my belongings list on admission and verify that it is correct.     Patient signature_______________________________    Second staff witness (if patient unable to sign) ______________________________       I have received all my belongings at discharge.    Patient signature________________________________    Rosey  9/19/2018  9:49 PM

## 2018-09-20 NOTE — ED NOTES
Quick cath UA specimen obtained.  Return of cloudy yellow urine.  Pt tolerated procedure well.  Specimen sent to naz. Eli in lab notified to do UA / UC on cath specimen per Dr. Whitely.
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
## 2018-09-20 NOTE — PHARMACY
[facility]    Pharmacy      Antimicrobial Stewardship Note     Current antimicrobial therapy:  Anti-infectives     None          Indication: UTI    Days of Therapy: 2     Pertinent labs:  Creatinine   Creatinine   Date Value Ref [facility] Status   09/19/2018 0.38 (L) 0.52 - 1.04 mg/dL Final   08/16/2018 0.40 (L) 0.52 - 1.04 mg/dL Final   08/15/2018 0.32 (L) 0.52 - 1.04 mg/dL Final     WBC   WBC   Date Value Ref [facility] Status   09/19/2018 8.3 4.0 - 11.0 10e9/L Final   08/16/2018 8.7 4.0 - 11.0 10e9/L Final   08/15/2018 10.4 4.0 - 11.0 10e9/L Final     Procalcitonin   Procalcitonin   Date Value Ref Range Status   02/25/2018 0.23 ng/ml Final     Comment:     0.05-0.24 ng/ml Low risk of systemic bacterial infection. Local bacterial   infection possible.  Recommendation: Assess other clinical features of   infection. Discourage antibiotics unless strong clinical suspicion for serious   infection.       CRP   CRP Inflammation   Date Value Ref [facility] Status   07/15/2018 9.5 (H) 0.0 - 8.0 mg/L Final   02/26/2018 140.0 (H) 0.0 - 8.0 mg/L Final   02/19/2018 85.0 (H) 0.0 - 8.0 mg/L Final       Culture Results:   Urine Culture Aerobic Bacterial [[id number]] Collected: 09/19/18 1937      Order Status: Completed Lab Status: Preliminary result Updated: 09/20/18 0825     Specimen: Catheterized Urine       Specimen Description Catheterized Urine      Culture Micro Culture in progress          Recommendations/Interventions:  1. No recommendations at this time    Samantha Vaca, [facility]  September 20, 2018

## 2018-09-20 NOTE — PLAN OF CARE
ADMISSION NOTE    Reason for admission  SI.  Safety concerns : Statements of wrapping headphones or cords around neck and attach to siderails at nursing home or hit  head on sink in the bathroom. Contracts for safety while here on unit. History of stroke in February 2018   Risk for or history of violence :na.   Full skin assessment: done by RN. Small bruise noted to L shoulder. Plantar warts to bilateral soles of feet    Patient arrived on unit from [place] ED accompanied by [facility]  and ED transporter on 9/19/2018  2107 PM.   Status on arrival:  28 year old female arrived to unit pushed in wheel chair accompanied by staff. Occasionally tearful. Quiet, cooperative. Affect blunted and flat  /100  Temp 99  F (37.2  C) (Tympanic)  Resp 18  SpO2 98%  Patient given tour of unit and Welcome to  unit papers given to patient, wanding completed, belongings inventoried, and admission assessment completed.   Patient's legal status on arrival is voluntary. Appropriate legal rights discussed with and copy given to patient. Patient Bill of Rights discussed with and copy given to patient.   Patient denies SI, HI, and thoughts of self harm and contracts for safety while on unit.      Latonya ELLISON Lowell  9/19/2018  10:28 PM    2107- pt needed assist of 2 for transfer to bathroom for void and wiping. Unable to pulls pants up per self. Needs assisstance in all ADL's. Able to stand wearing safety belt with 2 helping her up out of w/c onto toilet and back to bed, transfers poorly due to weakness  LLE and flaccidity to LUE and bilateral numbness to legs with tingling to feet.Able to turn self in bed. Wears brief due to occasional bowel and bladder incontinence. Hx of cdiff and chronic diarrhea. No incontinence at this time. Becomes tearful when talking about health status and stroke issues. Legal status voluntary, copy given to pt along with admit packet. Manuela placed next to pt for needed assistance.  Cooperative and pleasant. Able to express feelings to writer.     2214- Medicated with Zofran 4mg SL for small emesis of undigested food  and nausea x2.

## 2018-09-20 NOTE — PLAN OF CARE
Face to face end of shift report received from Monika AWAD. Rounding completed. Patient observed.     Latonya Etienne  9/20/2018  3:20 PM

## 2018-09-20 NOTE — PLAN OF CARE
Face to face end of shift report received from Latonya MAKI RN. Rounding completed. Patient observed in room laying in bed.     Linda Price  9/20/2018  12:07 AM

## 2018-09-20 NOTE — PLAN OF CARE
Problem: Patient Care Overview  Goal: Individualization & Mutuality  Pt will sleep at least 5-6 hours at night  Pt will eat at least 50% of meals  Pt will comply with treatment team orders and suggestions  Pt will attend at least 50% of groups offered   Outcome: No Change  Pt is awake in bed when nurse arrives to this shift. Pt is incontinent of urine, she is able to bridge with encouragement. Pt does pivot transfer with assistance of 2 from her bed to her wheelchair. Pt is able to bear weight on right leg, she is able to move her left foot to assist with transfer. Pt does have a continent loose stool. Pt is asking for her bedtime medications, she c/o pain and requests her gabapentin. Nurse does contact Giovanna BLANKENSHIP NP and requests medications to be order. Bedtime medications are ordered. Pt does ask nurse to adjust her pant legs, nurse does encourage pt attempt to pull pant leg down. Pt does appear to be sleeping, breathing is regular.

## 2018-09-20 NOTE — PLAN OF CARE
"Problem: Patient Care Overview  Goal: Individualization & Mutuality  Pt will sleep at least 5-6 hours at night  Pt will eat at least 50% of meals  Pt will comply with treatment team orders and suggestions  Pt will attend at least 50% of groups offered   Outcome: No Change  Pt lying in bed on L side, able to reposition per self. Needs assistance with ADL's. LUE flaccid with weakness to LLE. Remains depressed with suicidal thoughts. States \"nothing changed\". Affect flat and blunted. Call bell at hand. No requests at this time. No nausea or emesis. VSS.     1848- requested and received Atarax 25 mg po for anxiety  1935- pt less anxious  "

## 2018-09-20 NOTE — PLAN OF CARE
Problem: Patient Care Overview  Goal: Team Discussion  Team Plan:   BEHAVIORAL TEAM DISCUSSION    Participants: Teodora Oquendo NP,Jeet Casas NP, Giovanna Casey NP,  Ofelia Penny LICSW,  Natalee Parker LGSW, Anne Barksdale RN. Henny Dunbar [employer] [facility], Narciso AbdoulayeWilson Health OT, Michell Wiley OT, Arlene Alegre OT  Progress: New patient  Continued Stay Criteria/Rationale: Provider to assess  Medical/Physical: Stroke, Craniotomy surgery, WC bound, UTI,  Assist of 2 for ADL.  Precautions:   Behavioral Orders   Procedures     Code 1 - Restrict to Unit     Fall precautions     Routine Programming     As clinically indicated     Self Injury Precaution     Status 15     Every 15 minutes.     Plan: Stabilize and discontinue to nursing home.   Rationale for change in precautions or plan: none

## 2018-09-21 VITALS
RESPIRATION RATE: 15 BRPM | SYSTOLIC BLOOD PRESSURE: 130 MMHG | TEMPERATURE: 98.2 F | WEIGHT: 124 LBS | DIASTOLIC BLOOD PRESSURE: 81 MMHG | HEART RATE: 114 BPM | OXYGEN SATURATION: 99 % | HEIGHT: 63 IN | BODY MASS INDEX: 21.97 KG/M2

## 2018-09-21 PROCEDURE — 25000132 ZZH RX MED GY IP 250 OP 250 PS 637: Performed by: NURSE PRACTITIONER

## 2018-09-21 PROCEDURE — 84132 ASSAY OF SERUM POTASSIUM: CPT | Performed by: NURSE PRACTITIONER

## 2018-09-21 PROCEDURE — 99239 HOSP IP/OBS DSCHRG MGMT >30: CPT | Performed by: NURSE PRACTITIONER

## 2018-09-21 RX ORDER — NITROFURANTOIN MACROCRYSTAL 100 MG
100 CAPSULE ORAL
Qty: 5 CAPSULE | Refills: 0 | Status: SHIPPED | OUTPATIENT
Start: 2018-09-21 | End: 2018-09-24

## 2018-09-21 RX ORDER — GABAPENTIN 300 MG/1
300 CAPSULE ORAL 2 TIMES DAILY
Status: DISCONTINUED | OUTPATIENT
Start: 2018-09-21 | End: 2018-09-21 | Stop reason: HOSPADM

## 2018-09-21 RX ORDER — DULOXETIN HYDROCHLORIDE 30 MG/1
30 CAPSULE, DELAYED RELEASE ORAL
Qty: 30 CAPSULE | Refills: 0 | Status: SHIPPED | OUTPATIENT
Start: 2018-09-21 | End: 2020-03-06

## 2018-09-21 RX ADMIN — TROLAMINE SALICYLATE 1 G: 10 CREAM TOPICAL at 08:26

## 2018-09-21 RX ADMIN — ACETAMINOPHEN 650 MG: 325 TABLET, FILM COATED ORAL at 08:20

## 2018-09-21 RX ADMIN — ACETAMINOPHEN 650 MG: 325 TABLET, FILM COATED ORAL at 16:20

## 2018-09-21 RX ADMIN — HYDROXYZINE HYDROCHLORIDE 50 MG: 25 TABLET ORAL at 09:06

## 2018-09-21 RX ADMIN — ACETAMINOPHEN 650 MG: 325 TABLET, FILM COATED ORAL at 11:55

## 2018-09-21 RX ADMIN — LOPERAMIDE HYDROCHLORIDE 2 MG: 2 CAPSULE ORAL at 13:33

## 2018-09-21 RX ADMIN — DULOXETINE HYDROCHLORIDE 30 MG: 30 CAPSULE, DELAYED RELEASE ORAL at 16:20

## 2018-09-21 RX ADMIN — HYDROXYZINE HYDROCHLORIDE 50 MG: 25 TABLET ORAL at 13:25

## 2018-09-21 RX ADMIN — DULOXETINE HYDROCHLORIDE 60 MG: 60 CAPSULE, DELAYED RELEASE ORAL at 08:20

## 2018-09-21 RX ADMIN — GABAPENTIN 400 MG: 400 CAPSULE ORAL at 08:20

## 2018-09-21 RX ADMIN — CYANOCOBALAMIN TAB 1000 MCG 1000 MCG: 1000 TAB at 08:20

## 2018-09-21 RX ADMIN — NICOTINE 1 PATCH: 14 PATCH, EXTENDED RELEASE TRANSDERMAL at 08:19

## 2018-09-21 RX ADMIN — GABAPENTIN 300 MG: 300 CAPSULE ORAL at 11:56

## 2018-09-21 RX ADMIN — TROLAMINE SALICYLATE 1 G: 10 CREAM TOPICAL at 13:24

## 2018-09-21 RX ADMIN — HYDROXYZINE HYDROCHLORIDE 50 MG: 25 TABLET ORAL at 04:30

## 2018-09-21 RX ADMIN — OLANZAPINE 10 MG: 10 TABLET, FILM COATED ORAL at 16:20

## 2018-09-21 RX ADMIN — RIVAROXABAN 20 MG: 20 TABLET, FILM COATED ORAL at 08:20

## 2018-09-21 RX ADMIN — NITROFURANTOIN MACROCRYSTALS 100 MG: 50 CAPSULE ORAL at 11:55

## 2018-09-21 ASSESSMENT — ACTIVITIES OF DAILY LIVING (ADL)
GROOMING: WITH ASSISTANCE
LAUNDRY: UNABLE TO COMPLETE
ORAL_HYGIENE: WITH ASSISTANCE
DRESS: SCRUBS (BEHAVIORAL HEALTH)

## 2018-09-21 NOTE — PLAN OF CARE
Face to face end of shift report received from Monika PARRISH RN. Rounding completed. Patient observed sitting in day room with peers. No requests at this time.     Jasmyn Aleman  9/21/2018  3:35 PM

## 2018-09-21 NOTE — PHARMACY
[facility]    Pharmacy      Antimicrobial Stewardship Note     Current antimicrobial therapy:  Anti-infectives     None          Indication: UTI    Days of Therapy: 3     Pertinent labs:  Creatinine   Creatinine   Date Value Ref Range Status   09/19/2018 0.38 (L) 0.52 - 1.04 mg/dL Final   08/16/2018 0.40 (L) 0.52 - 1.04 mg/dL Final   08/15/2018 0.32 (L) 0.52 - 1.04 mg/dL Final     WBC   WBC   Date Value Ref Range Status   09/19/2018 8.3 4.0 - 11.0 10e9/L Final   08/16/2018 8.7 4.0 - 11.0 10e9/L Final   08/15/2018 10.4 4.0 - 11.0 10e9/L Final     Procalcitonin   Procalcitonin   Date Value Ref Range Status   02/25/2018 0.23 ng/ml Final     Comment:     0.05-0.24 ng/ml Low risk of systemic bacterial infection. Local bacterial   infection possible.  Recommendation: Assess other clinical features of   infection. Discourage antibiotics unless strong clinical suspicion for serious   infection.       CRP   CRP Inflammation   Date Value Ref Range Status   07/15/2018 9.5 (H) 0.0 - 8.0 mg/L Final   02/26/2018 140.0 (H) 0.0 - 8.0 mg/L Final   02/19/2018 85.0 (H) 0.0 - 8.0 mg/L Final       Culture Results:   Urine Culture Aerobic Bacterial [[id number]] (Abnormal) Collected: 09/19/18 1937      Order Status: Completed Lab Status: Preliminary result Updated: 09/21/18 0716     Specimen: Catheterized Urine       Specimen Description Catheterized Urine      Culture Micro 50,000 to 100,000 colonies/mL   Non lactose fermenting gram negative rods   Identification and susceptibility to follow.           Recommendations/Interventions:  1. Macrodantin ordered for UTI treatment.  Recommended dosing interval is four times daily vs bid. Consider changing to four times daily or change to Macrobid 100 mg bid      Samantha Vaca, [facility]  September 21, 2018

## 2018-09-21 NOTE — PLAN OF CARE
"Problem: Patient Care Overview  Goal: Discharge Needs Assessment  Spoke to Brittany at [facility] - informed her pt will be returning today. Brittany asked that pt's meds be sent to the pharmacy ([facility], [place]) because :\"Maggie has a history of not telling them her meds\".       "

## 2018-09-21 NOTE — PLAN OF CARE
Face to face end of shift report received from RITESH Mckeon. Rounding completed. Patient observed laying in bed with eyes closed, normal non labored respirations.     Monika Cee  9/21/2018  7:35 AM

## 2018-09-21 NOTE — PLAN OF CARE
Face to face end of shift report received from Latonya MAKI RN. Rounding completed. Patient observed in room laying in bed.     Linda Price  9/20/2018  11:47 PM

## 2018-09-21 NOTE — PLAN OF CARE
"Problem: Patient Care Overview  Goal: Individualization & Mutuality  Pt will sleep at least 5-6 hours at night  Pt will eat at least 50% of meals  Pt will comply with treatment team orders and suggestions  Pt will attend at least 50% of groups offered   Outcome: No Change  Patient stays in bed most of the day, coming out for meals then going back to bed.  Does not attend groups. Social with peers. Complains of pain in feet, describes as burning/stabbing.  Does get scheduled Tylenol & Aspercreme through out the day.  Has refused to let Lab draw a potassium level times 2 this am.    Dad came to see patient prior to lunch, stayed for short time then left.  Potassium lab was ordered again, patient asking for something other than Tylenol.  Lab here to draw lab and patient stated, \"I will not let them draw labs until I get something for stronger for pain\".   Patient was advised that this unit was for her mental health condition and narcotics would not be ordered at this time.  Patient here voluntarily, she requested to leave.  New order written to discharge to [facility].  Patient is providing own transportation.    Problem: Suicide Risk (Adult)  Goal: Strength-Based Wellness/Recovery  Patient will demonstrate the desired outcomes by discharge/transition of care.  Pt will remain safe from self harm or injury   Outcome: No Change  Denies being suicidal.      "

## 2018-09-21 NOTE — DISCHARGE INSTRUCTIONS
Behavioral Discharge Planning and Instructions    Summary: Patient was brought in from her nursing home due to increased SI with a plan to wrap headphone cords around her neck and flip her bed over .Patient is in a wheelchair as a result of a stroke last year.     Main Diagnosis: Persistent Depressive Disorder  Anxiety Disorder Unspecified  H/O Substance Use Disorder  H/O OCD  H/O Stroke    Major Treatments, Procedures and Findings: Stabilize with medications, connect with community programs.    Symptoms to Report: feeling more aggressive, increased confusion, losing more sleep, mood getting worse or thoughts of suicide    Lifestyle Adjustment: Take all medications as prescribed, meet with doctor/ medication provider, out patient therapist, ,as scheduled. Abstain from alcohol or any unprescribed drugs.  Chemical Health Assessment is recommended - Follow recommendations made by assessment      Psychiatry Follow-up:       [facility] - Dr. Chapo Flores  [address] [place] [address]  Phone - [phone]     Fax - [phone]      Resources:   Crisis Intervention: 344.480.2126 or 419-902-3390 (TTY: 182.324.1093).  Call anytime for help.  National [place] on Mental Illness (www.[place].dhruv.org): 290.440.6566 or 453-868-2372.  Alcoholics Anonymous (www.alcoholics-anonymous.org): Check your phone book for your local chapter.  Suicide Awareness Voices of Education (SAVE) (www.save.org): 244-960-SKGZ (5103)  National Suicide Prevention Line (www.mentalhealthmn.org): 313-956-KFHA (3417)  Mental Health Consumer/Survivor Network of [place] (www.mhcsn.net): 594.184.2455 or 274-828-2408  Mental Health Association of [place] (www.mentalhealth.org): 638.433.4164 or 863-071-6670    [facility] Area:  [facility], [facility] stabilization [facility]- [phone]  [facility] Crisis Line: 1-494.989.6883  Advocates For Family Peace: 387-1405  Sexual Assault Program [facility]: 786.209.6252 or 1-540.964.2674  [place]incoln Gonzales  "Battered Women's Program: 3-623-337-8547 Ext: 279       Calls answered Mon-Fri-8:00 am--4:30 pm    [place]:  Advocates for Family Peace: 1-399.880.1124  [facility] first call for help: 3-297-991-0102  [facility] Counseling Crisis Center:  (984) 510-5952      [place] Area:  Warm Line: 1-901.559.7123       Calls answered Tuesday--Saturday 4:00 pm--10:00 pm  [place] Crisis Line - 961.688.6640  [facility] [place] [facility] 778-201-1420    [place] Statewide:  [place] Crisis and Referral Services: 1-557.870.1152  National Suicide Prevention Lifeline: 2-787-568-TALK (3902)   - mjk2gyon- Text \"Life\" to 84744  First Call for Help: 2-1-1  [place] Helpline- 9-754-JEBR-HELP    General Medication Instructions:   See your medication sheet(s) for instructions.   Take all medicines as directed.  Make no changes unless your doctor suggests them.   Go to all your doctor visits.  Be sure to have all your required lab tests. This way, your medicines can be refilled on time.  Do not use any drugs not prescribed by your doctor.  Avoid alcohol.  Chemical Health Assessment is recommended - follow all recommendations    Chemical Health assessment resources:    [facility]  [address] [place]  [address]  Phone - [phone]  Fax - [phone]    [facility]  [address], [place]  [address]  Phone - [phone]  Fax - [phone]    [facility]  [address] [place] [address]  office :[phone]  fax: [phone]    [facility]   [address]  [place] [address]  Phone: [phone]     [facility]   [address] [place] [address]  Phone: [phone]  Fax: [phone]        "

## 2018-09-21 NOTE — PLAN OF CARE
Problem: Patient Care Overview  Goal: Team Discussion  Team Plan:   BEHAVIORAL TEAM DISCUSSION    Participants: Teodora Oquendo NP, Jeet Casas NP, Giovanna Casey NP, Ofelia Penny LICSW, Natalee Parker SW, Susan Hoang RN, Nicole Ledesma RN, Nica Vázquez OT, Michell Wiley OT  Progress: minimal, suicidal due to chronic pain  Continued Stay Criteria/Rationale: increased gabapentin, increased cymbalta- added evening dose, high anxiety  Medical/Physical: Stroke, Craniotomy surgery, WC bound, UTI, Assist of 1 to 2 for ADL. Low potassium, diarrhea  Precautions:   Behavioral Orders   Procedures     Code 1 - Restrict to Unit     Fall precautions     Routine Programming     As clinically indicated     Self Injury Precaution     Status 15     Every 15 minutes.     Plan: looking into group home, or discharge back to nursing home when stable  Rationale for change in precautions or plan: none

## 2018-09-21 NOTE — DISCHARGE SUMMARY
[facility]  Psychiatric Discharge Summary    Maggie Case MRN# [id number]   Age: 28 year old YOB: 1990     Date of Admission:  9/19/2018  Date of Discharge:  9/21/2018  Admitting Physician:  Reinier Rinaldi MD  Discharge Physician:  WOO Jerome CNP         Event Leading to Hospitalization and Hospital Stay   Maggie Case is a 28 year old never   female who presented via EMS from her nursing home facility. Patient had seen her PCP, Dr. Flores, on 9/19/18 and endorsed suicidal ideation with plan to tie headphones around her neck. At time of assessment in ED,she continued to endorse suicidal ideation and inability to contract for safety if returned to Nursing Home. She had been to the ED on 9/17 and 9/18 for pain issues with her foot, stating her wheelchair had run over it and requesting Norco for pain. She was prescribed five pills with Epic charts noting she should not have opioids and has history of drug seeking behaviors.  Pt has no previous mental health admissions. Pt endorses occasional marijuana use; last use 9/18/18. Pt also endorses occasional alcohol use; last use 9/16/18.  She has history of depression, anxiety and substance use issues. Patient also has extensive medical history with a stroke and craniectomy February 2018 and cranioplasty May 2018. Patient utilizes wheelchair due to left side paralysis. She is able to do most ADLs independently, requiring assistance with transferring on occasion. She resides at a nursing home currently. She endorsed the following depressive symptoms: excessive sleep, poor appetite, low energy, and lack of motivation. She was diagnosed with a UTI and started on antibiotic. Her potassium level was low at 2.6 and with replacement was 3.1.      Patient is tearful during initial interview. Stated she has had issues with depression and anxiety prior to stroke.  Reports suicidal ideation in past with plan to cut wrists  "or shoot herself with dad's gun.  Notes her nine year old daughter is her motivation for not following through on suicide attempts. Stated she has not had custody since her stroke and is anticipating an upcoming court date in October to determine visitation schedule. She rates her depression \"10/10 [10 being worst]\".  She states she has anxiety with panic attacks twice a week. Symptoms include racing heart rate, urge to flee, difficulty breathing and can last up to eight minutes.  She is able to decrease her panic attacks with mindfulness skills.  Patient reports anxiety is worse when around crowds or having to go into stores where a lot of people are.  Noted difficulty sleeping at night related to pain from neuropathy in feet. Reports it is much worse in past several days and has increased her suicidal ideations.  Reports gabapentin helps but tylenol has little efficacy. Stated she sleeps during the day and pieces together about six hours of sleep in 24 hour period but needs 8-9 to feel rested. She is unsure if Cymbalta is working for pain or depression but agreeable to increase dose to increase reported efficacy. Does not want to return to nursing home, reports parents live in [place] and have found placement for her there. She is agreeable to this move.    During her hospitalization, patient had improved appetite and sleep.  She continued to endorse pain issues, anxiety with panic attacks and chronic depression since a teenager.  Medications were adjusted with an increase in gabapentin and Cymbalta. Side effect of gabapentin was hypersomnolence and was decreased to previous dose.  She was not on Cymbalta long enough to note benefit to mood or decreased pain. She did not attend groups. Patient had minimal interactions with peers and remained in room. She made multiple requests of staff, even when patient was capable of completing request on her own.  She utilized a wheelchair for left sided weakness due to past " stroke.  She reported decrease in suicidal thoughts.  Patient refused follow up blood draw for K+ level four times.  At the fourth time, she bargained that she would complete the blood draw if this provider would prescribe her more pain medication.  Declined due to scope of practice, following with another provider for pain management and past drug seeking behaviors upon review of records. Approximately an hour later patient signed 12 Hour Intent to leave and was discharged back to nursing home. Medication changes were sent to pharmacy on record.       At time of discharge, there is no evidence that patient is in immediate danger of self or others.        Diagnoses:   Persistent Depressive Disorder  Anxiety Disorder Unspecified  H/O Substance Use Disorder  Cluster B Personality Traits  H/O OCD  H/O Stroke           Labs:     Results for orders placed or performed during the hospital encounter of 09/19/18   Drug Screen Urine ([facility])   Result Value Ref [facility]    Amphetamine Qual Urine Negative NEG^Negative    Barbiturates Qual Urine Negative NEG^Negative    Benzodiazepine Qual Urine Negative NEG^Negative    Cannabinoids Qual Urine Negative NEG^Negative    Cocaine Qual Urine Negative NEG^Negative    Opiates Qualitative Urine Positive (A) NEG^Negative    Methadone Qual Urine Negative NEG^Negative    PCP Qual Urine Negative NEG^Negative   UA with Microscopic reflex to Culture   Result Value Ref [facility]    Color Urine Yellow     Appearance Urine Cloudy     Glucose Urine Negative NEG^Negative mg/dL    Bilirubin Urine Negative NEG^Negative    Ketones Urine Negative NEG^Negative mg/dL    Specific Gravity Urine 1.013 1.003 - 1.035    Blood Urine Negative NEG^Negative    pH Urine 7.5 4.7 - 8.0 pH    Protein Albumin Urine 10 (A) NEG^Negative mg/dL    Urobilinogen mg/dL Normal 0.0 - 2.0 mg/dL    Nitrite Urine Negative NEG^Negative    Leukocyte Esterase Urine Large (A) NEG^Negative    Source Midstream Urine     WBC Urine 91 (H) 0 -  5 /HPF    RBC Urine 0 0 - 2 /HPF    WBC Clumps Present (A) NEG^Negative /HPF    Bacteria Urine Moderate (A) NEG^Negative /HPF    Squamous Epithelial /HPF Urine 10 (H) 0 - 1 /HPF    Mucous Urine Present (A) NEG^Negative /LPF    Amorphous Crystals Few (A) NEG^Negative /HPF   CBC with platelets differential   Result Value Ref [facility]    WBC 8.3 4.0 - 11.0 10e9/L    RBC Count 4.03 3.8 - 5.2 10e12/L    Hemoglobin 13.1 11.7 - 15.7 g/dL    Hematocrit 37.6 35.0 - 47.0 %    MCV 93 78 - 100 fl    MCH 32.5 26.5 - 33.0 pg    MCHC 34.8 31.5 - 36.5 g/dL    RDW 14.9 10.0 - 15.0 %    Platelet Count 395 150 - 450 10e9/L    Diff Method Automated Method     % Neutrophils 63.0 %    % Lymphocytes 20.7 %    % Monocytes 6.5 %    % Eosinophils 9.3 %    % Basophils 0.1 %    % Immature Granulocytes 0.4 %    Nucleated RBCs 0 0 /100    Absolute Neutrophil 5.2 1.6 - 8.3 10e9/L    Absolute Lymphocytes 1.7 0.8 - 5.3 10e9/L    Absolute Monocytes 0.5 0.0 - 1.3 10e9/L    Absolute Eosinophils 0.8 (H) 0.0 - 0.7 10e9/L    Absolute Basophils 0.0 0.0 - 0.2 10e9/L    Abs Immature Granulocytes 0.0 0 - 0.4 10e9/L    Absolute Nucleated RBC 0.0    Comprehensive metabolic panel   Result Value Ref [facility]    Sodium 140 133 - 144 mmol/L    Potassium 2.6 (LL) 3.4 - 5.3 mmol/L    Chloride 105 94 - 109 mmol/L    Carbon Dioxide 27 20 - 32 mmol/L    Anion Gap 8 3 - 14 mmol/L    Glucose 88 70 - 99 mg/dL    Urea Nitrogen 4 (L) 7 - 30 mg/dL    Creatinine 0.38 (L) 0.52 - 1.04 mg/dL    GFR Estimate >90 >60 mL/min/1.7m2    GFR Estimate If Black >90 >60 mL/min/1.7m2    Calcium 8.2 (L) 8.5 - 10.1 mg/dL    Bilirubin Total 0.3 0.2 - 1.3 mg/dL    Albumin 2.7 (L) 3.4 - 5.0 g/dL    Protein Total 5.9 (L) 6.8 - 8.8 g/dL    Alkaline Phosphatase 108 40 - 150 U/L    ALT 13 0 - 50 U/L    AST 19 0 - 45 U/L   HCG qualitative Blood   Result Value Ref [facility]    HCG Qualitative Serum Negative NEG^Negative   TSH   Result Value Ref [facility]    TSH 0.25 (L) 0.40 - 4.00 mU/L   UA with Microscopic    Result Value Ref [facility]    Color Urine Yellow     Appearance Urine Cloudy     Glucose Urine Negative NEG^Negative mg/dL    Bilirubin Urine Negative NEG^Negative    Ketones Urine Negative NEG^Negative mg/dL    Specific Gravity Urine 1.013 1.003 - 1.035    Blood Urine Small (A) NEG^Negative    pH Urine 7.0 4.7 - 8.0 pH    Protein Albumin Urine 10 (A) NEG^Negative mg/dL    Urobilinogen mg/dL Normal 0.0 - 2.0 mg/dL    Nitrite Urine Negative NEG^Negative    Leukocyte Esterase Urine Moderate (A) NEG^Negative    Source Catheterized Urine     WBC Urine 23 (H) 0 - 5 /HPF    RBC Urine 27 (H) 0 - 2 /HPF    Bacteria Urine Few (A) NEG^Negative /HPF    Squamous Epithelial /HPF Urine <1 0 - 1 /HPF    Transitional Epi <1 0 - 1 /HPF    Mucous Urine Present (A) NEG^Negative /LPF    Amorphous Crystals Many (A) NEG^Negative /HPF   Potassium   Result Value Ref [facility]    Potassium 3.1 (L) 3.4 - 5.3 mmol/L   Urine Culture Aerobic Bacterial   Result Value Ref [facility]    Specimen Description Catheterized Urine     Culture Micro (A)      50,000 to 100,000 colonies/mL  Non lactose fermenting gram negative rods  Identification and susceptibility to follow.            Discharge Medications:     Current Discharge Medication List      START taking these medications    Details   !! DULoxetine (CYMBALTA) 30 MG EC capsule Take 1 capsule (30 mg) by mouth daily (with dinner)  Qty: 30 capsule, Refills: 0    Associated Diagnoses: Depression with suicidal ideation      nitroFURantoin macrocrystal (MACRODANTIN) 100 MG capsule Take 1 capsule (100 mg) by mouth 2 times daily for 5 doses  Qty: 5 capsule, Refills: 0    Associated Diagnoses: Acute cystitis without hematuria       !! - Potential duplicate medications found. Please discuss with provider.      CONTINUE these medications which have NOT CHANGED    Details   Acetaminophen (TYLENOL PO) Take 650 mg by mouth every 4 hours as needed for mild pain or fever      cyanocobalamin (VITAMIN  B-12) 1000 MCG tablet  Take 1,000 mcg by mouth daily      !! DULOXETINE HCL PO Take 60 mg by mouth daily      ferrous sulfate (IRON) 325 (65 Fe) MG tablet Take by mouth daily (with breakfast)      !! GABAPENTIN PO Take 300 mg by mouth 2 times daily 300mg AM and Noon      !! GABAPENTIN PO Take 600 mg by mouth At Bedtime       loperamide (IMODIUM) 2 MG capsule Take 2 mg by mouth 4 times daily as needed for diarrhea      MELATONIN PO Take 5 mg by mouth      nystatin (MYCOSTATIN) 311835 UNIT/ML suspension Take 5 mLs (500,000 Units) by mouth 4 times daily  Qty: 280 mL, Refills: 0      Ondansetron HCl (ZOFRAN PO) Take 4 mg by mouth every 8 hours as needed for nausea or vomiting      TRAZODONE HCL PO Take 100 mg by mouth At Bedtime      trolamine salicylate (ASPERCREME) 10 % cream Apply 1 g topically 3 times daily  Qty: 35.4 g, Refills: 0      XARELTO 20 MG TABS tablet Take 20 mg by mouth every morning  Refills: 11       !! - Potential duplicate medications found. Please discuss with provider.      STOP taking these medications       HYDROcodone-acetaminophen (NORCO) 5-325 MG per tablet Comments:   Reason for Stopping: Qty 5            Justification for dual anti-psychotic use: Not applicable  Patient is not on two different antipsychotics at time of discharge.         Psychiatric Examination:   Appearance: sedated, tired  Attitude: partially cooperative  Eye Contact:  fair  Mood:  sad  and depressed  Affect:  mood congruent and intensity is heightened  Speech:  clear, coherent  Psychomotor Behavior:  no evidence of tardive dyskinesia, dystonia, or tics  Thought Process:  goal oriented  Associations:  no loose associations  Thought Content: passive suicidal ideation present  Insight:  fair  Judgment:  fair  Oriented to:  time, person, and place  Attention Span and Concentration:  intact  Recent and Remote Memory:  intact  Fund of Knowledge: appropriate  Muscle Strength and Tone: left sided weakness from stroke  Gait and Station: abnormal.  utilizes wheelchair due to weakness from stroke.         Discharge Plan:     [facility] - Dr. Chapo Flores  [address] [place] [address]  Phone - [phone]     Fax - [phone]       Discharge Services Provided:   > 30 minutes spent on discharge services, including:  Final examination of patient.  Review and discussion of Hospital stay.  Instructions for continued outpatient care/goals.  Preparation of discharge records.  Preparation of medications refills and new prescriptions.    Attestation:  The patient has been seen and evaluated by me,  WOO Jerome CNP

## 2018-09-21 NOTE — PLAN OF CARE
Problem: Patient Care Overview  Goal: Discharge Needs Assessment  Outcome: No Change  Spoke to patients provider, pt signed a 12 hour intent to leave and NP is going to discharge her today. Patient will be discharging back to [facility] in [place], stated she wasn't sure what pharmacy they use but she thinks [facility]. Will set up transportation.

## 2018-09-21 NOTE — PLAN OF CARE
Problem: Patient Care Overview  Goal: Discharge Needs Assessment  Outcome: No Change  Spoke to patient, she is going to have her friends Serena transport her to her aunts, and then back to [facility]. She does not want transportation arranged because she will not be able to stop at her aunts. Patient does not know when her friend will be here. Told her to communicate with staff regarding transportation when she knows more.

## 2018-09-21 NOTE — PLAN OF CARE
Problem: Patient Care Overview  Goal: Individualization & Mutuality  Pt will sleep at least 5-6 hours at night  Pt will eat at least 50% of meals  Pt will comply with treatment team orders and suggestions  Pt will attend at least 50% of groups offered   Outcome: Improving  Pt is laying in bed and appears to be sleeping. Respirations are regular.

## 2018-09-21 NOTE — PLAN OF CARE
Discharge Note    Patient discharged to [facility] on 9/21/2018 4:35 PM via Private Car accompanied by friend and [facility] staff to door.     Patient informed of discharge instructions in AVS. Patient verbalizes understanding and denies having any questions pertaining to AVS. Patient stable at time of discharge. Patient denies SI, HI, and thoughts of self harm at time of discharge. All personal belongings returned to patient. Discharge prescriptions sent to [facility] in [place] via electronic communication.     Jasmyn Aleman  9/21/2018  4:35 PM

## 2018-09-23 LAB
BACTERIA SPEC CULT: ABNORMAL
SPECIMEN SOURCE: ABNORMAL

## 2018-11-05 ENCOUNTER — HOSPITAL ENCOUNTER (EMERGENCY)
Facility: HOSPITAL | Age: 28
Discharge: NURSING, REHAB OR LONG-TERM CARE | End: 2018-11-05
Attending: FAMILY MEDICINE | Admitting: FAMILY MEDICINE
Payer: COMMERCIAL

## 2018-11-05 VITALS
DIASTOLIC BLOOD PRESSURE: 69 MMHG | SYSTOLIC BLOOD PRESSURE: 96 MMHG | OXYGEN SATURATION: 99 % | BODY MASS INDEX: 21.62 KG/M2 | RESPIRATION RATE: 16 BRPM | WEIGHT: 122 LBS | TEMPERATURE: 98.2 F | HEIGHT: 63 IN

## 2018-11-05 DIAGNOSIS — E87.6 HYPOKALEMIA: ICD-10-CM

## 2018-11-05 LAB
ALBUMIN SERPL-MCNC: 2.9 G/DL (ref 3.4–5)
ALP SERPL-CCNC: 111 U/L (ref 40–150)
ALT SERPL W P-5'-P-CCNC: 31 U/L (ref 0–50)
ANION GAP SERPL CALCULATED.3IONS-SCNC: 10 MMOL/L (ref 3–14)
AST SERPL W P-5'-P-CCNC: 56 U/L (ref 0–45)
BASOPHILS # BLD AUTO: 0 10E9/L (ref 0–0.2)
BASOPHILS NFR BLD AUTO: 0.1 %
BILIRUB SERPL-MCNC: 0.5 MG/DL (ref 0.2–1.3)
BUN SERPL-MCNC: 3 MG/DL (ref 7–30)
CALCIUM SERPL-MCNC: 8 MG/DL (ref 8.5–10.1)
CHLORIDE SERPL-SCNC: 98 MMOL/L (ref 94–109)
CO2 SERPL-SCNC: 31 MMOL/L (ref 20–32)
CREAT SERPL-MCNC: 0.46 MG/DL (ref 0.52–1.04)
DIFFERENTIAL METHOD BLD: NORMAL
EOSINOPHIL # BLD AUTO: 0.1 10E9/L (ref 0–0.7)
EOSINOPHIL NFR BLD AUTO: 1.2 %
ERYTHROCYTE [DISTWIDTH] IN BLOOD BY AUTOMATED COUNT: 13.6 % (ref 10–15)
GFR SERPL CREATININE-BSD FRML MDRD: >90 ML/MIN/1.7M2
GLUCOSE SERPL-MCNC: 80 MG/DL (ref 70–99)
HCT VFR BLD AUTO: 39.7 % (ref 35–47)
HGB BLD-MCNC: 14.1 G/DL (ref 11.7–15.7)
IMM GRANULOCYTES # BLD: 0 10E9/L (ref 0–0.4)
IMM GRANULOCYTES NFR BLD: 0.5 %
LYMPHOCYTES # BLD AUTO: 1.7 10E9/L (ref 0.8–5.3)
LYMPHOCYTES NFR BLD AUTO: 20.5 %
MAGNESIUM SERPL-MCNC: 2.2 MG/DL (ref 1.6–2.3)
MCH RBC QN AUTO: 32.7 PG (ref 26.5–33)
MCHC RBC AUTO-ENTMCNC: 35.5 G/DL (ref 31.5–36.5)
MCV RBC AUTO: 92 FL (ref 78–100)
MONOCYTES # BLD AUTO: 0.4 10E9/L (ref 0–1.3)
MONOCYTES NFR BLD AUTO: 5.5 %
NEUTROPHILS # BLD AUTO: 5.8 10E9/L (ref 1.6–8.3)
NEUTROPHILS NFR BLD AUTO: 72.2 %
NRBC # BLD AUTO: 0 10*3/UL
NRBC BLD AUTO-RTO: 0 /100
PLATELET # BLD AUTO: 340 10E9/L (ref 150–450)
POTASSIUM SERPL-SCNC: 2.1 MMOL/L (ref 3.4–5.3)
PROT SERPL-MCNC: 6 G/DL (ref 6.8–8.8)
RBC # BLD AUTO: 4.31 10E12/L (ref 3.8–5.2)
SODIUM SERPL-SCNC: 139 MMOL/L (ref 133–144)
WBC # BLD AUTO: 8 10E9/L (ref 4–11)

## 2018-11-05 PROCEDURE — 36415 COLL VENOUS BLD VENIPUNCTURE: CPT | Performed by: FAMILY MEDICINE

## 2018-11-05 PROCEDURE — 96366 THER/PROPH/DIAG IV INF ADDON: CPT

## 2018-11-05 PROCEDURE — 25000131 ZZH RX MED GY IP 250 OP 636 PS 637: Performed by: FAMILY MEDICINE

## 2018-11-05 PROCEDURE — 85025 COMPLETE CBC W/AUTO DIFF WBC: CPT | Performed by: FAMILY MEDICINE

## 2018-11-05 PROCEDURE — 99283 EMERGENCY DEPT VISIT LOW MDM: CPT | Mod: Z6 | Performed by: FAMILY MEDICINE

## 2018-11-05 PROCEDURE — 80053 COMPREHEN METABOLIC PANEL: CPT | Performed by: FAMILY MEDICINE

## 2018-11-05 PROCEDURE — 99284 EMERGENCY DEPT VISIT MOD MDM: CPT | Mod: 25

## 2018-11-05 PROCEDURE — 83735 ASSAY OF MAGNESIUM: CPT | Performed by: FAMILY MEDICINE

## 2018-11-05 PROCEDURE — 25000132 ZZH RX MED GY IP 250 OP 250 PS 637: Performed by: FAMILY MEDICINE

## 2018-11-05 PROCEDURE — 96365 THER/PROPH/DIAG IV INF INIT: CPT

## 2018-11-05 PROCEDURE — 25000128 H RX IP 250 OP 636: Performed by: FAMILY MEDICINE

## 2018-11-05 RX ORDER — POTASSIUM CHLORIDE 7.45 MG/ML
10 INJECTION INTRAVENOUS CONTINUOUS
Status: DISCONTINUED | OUTPATIENT
Start: 2018-11-05 | End: 2018-11-05 | Stop reason: HOSPADM

## 2018-11-05 RX ORDER — POTASSIUM CHLORIDE 20MEQ/15ML
20 LIQUID (ML) ORAL ONCE
Status: COMPLETED | OUTPATIENT
Start: 2018-11-05 | End: 2018-11-05

## 2018-11-05 RX ORDER — LORAZEPAM 1 MG/1
1 TABLET ORAL ONCE
Status: COMPLETED | OUTPATIENT
Start: 2018-11-05 | End: 2018-11-05

## 2018-11-05 RX ORDER — ONDANSETRON 4 MG/1
4 TABLET, ORALLY DISINTEGRATING ORAL ONCE
Status: COMPLETED | OUTPATIENT
Start: 2018-11-05 | End: 2018-11-05

## 2018-11-05 RX ORDER — LORAZEPAM 2 MG/ML
1 INJECTION INTRAMUSCULAR ONCE
Status: DISCONTINUED | OUTPATIENT
Start: 2018-11-05 | End: 2018-11-05 | Stop reason: HOSPADM

## 2018-11-05 RX ORDER — POTASSIUM CHLORIDE 1.5 G/1.58G
20 POWDER, FOR SOLUTION ORAL 2 TIMES DAILY
Qty: 60 TABLET | Refills: 0 | Status: SHIPPED | OUTPATIENT
Start: 2018-11-05 | End: 2020-03-05

## 2018-11-05 RX ORDER — ONDANSETRON 2 MG/ML
8 INJECTION INTRAMUSCULAR; INTRAVENOUS ONCE
Status: DISCONTINUED | OUTPATIENT
Start: 2018-11-05 | End: 2018-11-05

## 2018-11-05 RX ORDER — ONDANSETRON 4 MG/1
4 TABLET, ORALLY DISINTEGRATING ORAL ONCE
Status: DISCONTINUED | OUTPATIENT
Start: 2018-11-05 | End: 2018-11-05

## 2018-11-05 RX ADMIN — LORAZEPAM 1 MG: 1 TABLET ORAL at 14:52

## 2018-11-05 RX ADMIN — POTASSIUM CHLORIDE 10 MEQ: 7.46 INJECTION, SOLUTION INTRAVENOUS at 13:20

## 2018-11-05 RX ADMIN — POTASSIUM CHLORIDE 20 MEQ: 20 SOLUTION ORAL at 14:58

## 2018-11-05 RX ADMIN — POTASSIUM CHLORIDE 20 MEQ: 20 SOLUTION ORAL at 13:38

## 2018-11-05 RX ADMIN — ONDANSETRON 4 MG: 4 TABLET, ORALLY DISINTEGRATING ORAL at 15:07

## 2018-11-05 RX ADMIN — ONDANSETRON 4 MG: 4 TABLET, ORALLY DISINTEGRATING ORAL at 11:51

## 2018-11-05 NOTE — ED NOTES
"This RN called and spoke with Daysi from [facility]; report given.  Staff did verify that patient does not have potassium on her medication list because she refuses to take it.  \"I only want it if it can be given in a shot\"  Dr. Shanks updated.  "

## 2018-11-05 NOTE — ED NOTES
Pt with C/O severe right hand pain. IV potassium discontinued and attempted to flush IV. No blood return and hand red and swollen. Did not restart MD DANIEL updated, will change order to oral. VSS.

## 2018-11-05 NOTE — DISCHARGE INSTRUCTIONS
Discharge Instructions for Hypokalemia  You have been diagnosed with hypokalemia. This means you have a low level of potassium in your blood. Potassium helps your nerve and muscle cells work as they should. These cells include the cells in your heart. A low level of potassium in the blood can cause serious problems, such as abnormal heart rhythms and even heart attack.  Diet changes  Eat more potassium-rich foods:    Bananas    Oranges and orange juice    Tomatoes, tomato sauce, and tomato juice    Leafy green vegetables, such as spinach, kale, salad greens, collards, and chard    Melons (all kinds)    Pomegranates    Peas    Beans    Potatoes    Sweet potatoes    Avocados, including guacamole    Vegetable juices, such as V8    Fruit juices    All nuts and seeds    Fish, including tuna, halibut, salmon, cod, snapper, manisha, swordfish, and perch    Milk, including fat-free, low-fat, whole, chocolate, and buttermilk    Soy milk  Other home care    Take a potassium supplement as directed by your healthcare provider.    After strenuous exercise or any activity that causes you to sweat a lot, grab a beverage high in potassium. This includes chocolate milk, coconut water, orange juice, or low-sodium vegetable juices.    Be sure to eat foods or drink fluids that contain potassium if you are having diarrhea or vomiting.    Have your potassium levels checked regularly.    Take all medicines exactly as directed.    Tell your healthcare provider about all prescription and over-the-counter medicines you are taking. This includes herbal products.    Avoid foods that are high in salt. Avoid canned and prepared foods that are high in salt.  Follow-up    Make a follow-up appointment as directed by our staff.    Keep all follow-up appointments. Your healthcare provider needs to monitor your condition closely.     When to call your healthcare provider  Call your provider right away or go to the emergency room if you have any of  the following:    Vomiting    Fatigue    Diarrhea    Rapid, irregular heartbeat    Shortness of breath    Chest pain    Muscle cramps, spasms, or twitching    Weakness    Paralysis   Date Last Reviewed: 6/1/2017 2000-2017 [employer]. [address] [place] [address], [place] [address]. All rights reserved. This information is not intended as a substitute for professional medical care. Always follow your healthcare professional's instructions.      Maggie,    It can be life threatening if you potassium gets too low.  I use you to take your replacement as prescribed.

## 2018-11-05 NOTE — ED NOTES
After administering oral potassium patient immediately had to sit up at edge of bed and vomit approximately 50cc of foamy orange emesis.  Dr. Shanks notified.

## 2018-11-05 NOTE — ED AVS SNAPSHOT
[facility]    [address] [address]    Phone:  [phone]                                       Maggie Case   MRN: [id number]    Department:  [facility]   Date of Visit:  11/5/2018           After Visit Summary Signature Page     I have received my discharge instructions, and my questions have been answered. I have discussed any challenges I see with this plan with the nurse or doctor.    ..........................................................................................................................................  Patient/Patient Representative Signature      ..........................................................................................................................................  Patient Representative Print Name and Relationship to Patient    ..................................................               ................................................  Date                                   Time    ..........................................................................................................................................  Reviewed by Signature/Title    ...................................................              ..............................................  Date                                               Time          22EPIC Rev 08/18

## 2018-11-05 NOTE — ED NOTES
Arrived via [facility], transferred to cot, gown placed, call light in reach.  Headache, nausea and vomiting x 3 days.  Hx of chronic diarrhea.  Headache pain is 9, describes pain as constant sharp pain in both temples.  Pain goal is zero.  No pain medications today.

## 2018-11-05 NOTE — ED NOTES
DATE:  11/5/2018   TIME OF RECEIPT FROM LAB:  1241  LAB TEST:  Potassium  LAB VALUE:  2.1  RESULTS GIVEN WITH READ-BACK TO (PROVIDER):  Estephania Shanks MD  TIME LAB VALUE REPORTED TO PROVIDER:   9836

## 2018-11-05 NOTE — ED AVS SNAPSHOT
[facility]    [address] [place] [address]    Phone:  [phone]                                       Maggie Case   MRN: [id number]    Department:  [facility]   Date of Visit:  11/5/2018           Patient Information     Date Of Birth          1990        Your diagnoses for this visit were:     Hypokalemia        You were seen by Estephania Shanks MD.        Discharge Instructions           Discharge Instructions for Hypokalemia  You have been diagnosed with hypokalemia. This means you have a low level of potassium in your blood. Potassium helps your nerve and muscle cells work as they should. These cells include the cells in your heart. A low level of potassium in the blood can cause serious problems, such as abnormal heart rhythms and even heart attack.  Diet changes  Eat more potassium-rich foods:    Bananas    Oranges and orange juice    Tomatoes, tomato sauce, and tomato juice    Leafy green vegetables, such as spinach, kale, salad greens, collards, and chard    Melons (all kinds)    Pomegranates    Peas    Beans    Potatoes    Sweet potatoes    Avocados, including guacamole    Vegetable juices, such as V8    Fruit juices    All nuts and seeds    Fish, including tuna, halibut, salmon, cod, snapper, manisha, swordfish, and perch    Milk, including fat-free, low-fat, whole, chocolate, and buttermilk    Soy milk  Other home care    Take a potassium supplement as directed by your healthcare provider.    After strenuous exercise or any activity that causes you to sweat a lot, grab a beverage high in potassium. This includes chocolate milk, coconut water, orange juice, or low-sodium vegetable juices.    Be sure to eat foods or drink fluids that contain potassium if you are having diarrhea or vomiting.    Have your potassium levels checked regularly.    Take all medicines exactly as directed.    Tell your healthcare provider about all prescription and  over-the-counter medicines you are taking. This includes herbal products.    Avoid foods that are high in salt. Avoid canned and prepared foods that are high in salt.  Follow-up    Make a follow-up appointment as directed by our staff.    Keep all follow-up appointments. Your healthcare provider needs to monitor your condition closely.     When to call your healthcare provider  Call your provider right away or go to the emergency room if you have any of the following:    Vomiting    Fatigue    Diarrhea    Rapid, irregular heartbeat    Shortness of breath    Chest pain    Muscle cramps, spasms, or twitching    Weakness    Paralysis   Date Last Reviewed: 6/1/2017 2000-2017 [email or web address]. [address] [place] [address]. All rights reserved. This information is not intended as a substitute for professional medical care. Always follow your healthcare professional's instructions.      Maggie,    It can be life threatening if you potassium gets too low.  I use you to take your replacement as prescribed.         Review of your medicines      START taking        Dose / Directions Last dose taken    potassium chloride 20 MEQ Packet   Commonly known as:  KLOR-CON   Dose:  20 mEq   Quantity:  60 tablet        Take 20 mEq by mouth 2 times daily   Refills:  0          Our records show that you are taking the medicines listed below. If these are incorrect, please call your family doctor or clinic.        Dose / Directions Last dose taken    CAPSAICIN EX        Externally apply topically 3 times daily as needed   Refills:  0        cyanocobalamin 1000 MCG tablet   Commonly known as:  vitamin  B-12   Dose:  1000 mcg        Take 1,000 mcg by mouth daily   Refills:  0        * DULOXETINE HCL PO   Dose:  90 mg        Take 90 mg by mouth daily   Refills:  0        * DULoxetine 30 MG EC capsule   Commonly known as:  CYMBALTA   Dose:  30 mg   Quantity:  30 capsule        Take 1 capsule (30 mg) by mouth daily (with  dinner)   Refills:  0        ferrous sulfate 325 (65 Fe) MG tablet   Commonly known as:  IRON        Take by mouth daily (with breakfast)   Refills:  0        * GABAPENTIN PO   Dose:  600 mg        Take 600 mg by mouth At Bedtime   Refills:  0        * GABAPENTIN PO   Dose:  300 mg        Take 300 mg by mouth 2 times daily 300mg AM and Noon   Refills:  0        loperamide 2 MG capsule   Commonly known as:  IMODIUM   Dose:  2 mg        Take 2 mg by mouth 4 times daily as needed for diarrhea   Refills:  0        MELATONIN PO   Dose:  5 mg        Take 5 mg by mouth   Refills:  0        nystatin 913722 UNIT/ML suspension   Commonly known as:  MYCOSTATIN   Dose:  344962 Units   Quantity:  280 mL        Take 5 mLs (500,000 Units) by mouth 4 times daily   Refills:  0        TRAZODONE HCL PO   Dose:  100 mg        Take 100 mg by mouth At Bedtime   Refills:  0        trolamine salicylate 10 % cream   Commonly known as:  ASPERCREME   Dose:  1 g   Quantity:  35.4 g        Apply 1 g topically 3 times daily   Refills:  0        TYLENOL PO   Dose:  650 mg        Take 650 mg by mouth every 4 hours as needed for mild pain or fever   Refills:  0        XARELTO 20 MG Tabs tablet   Dose:  20 mg   Generic drug:  rivaroxaban ANTICOAGULANT        Take 20 mg by mouth every morning   Refills:  11        ZOFRAN PO   Dose:  4 mg        Take 4 mg by mouth every 8 hours as needed for nausea or vomiting   Refills:  0        * Notice:  This list has 4 medication(s) that are the same as other medications prescribed for you. Read the directions carefully, and ask your doctor or other care provider to review them with you.            Prescriptions were sent or printed at these locations (1 Prescription)                   [id number] - [place] - [address] [address] [address] [address]    Telephone:  [phone]   Fax:  [phone]   Hours:                  E-Prescribed (1 of 1)         potassium chloride (KLOR-CON)  "20 MEQ Packet                Procedures and tests performed during your visit     CBC with platelets differential    Comprehensive metabolic panel    Magnesium      Orders Needing Specimen Collection     None      Pending Results     No orders found from 11/3/2018 to 2018.            Pending Culture Results     No orders found from 11/3/2018 to 2018.            Thank you for choosing [facility]       Thank you for choosing [facility] for your care. Our goal is always to provide you with excellent care. Hearing back from our patients is one way we can continue to improve our services. Please take a few minutes to complete the written survey that you may receive in the mail after you visit with us. Thank you!        [email or web address] Information     [employer] lets you send messages to your doctor, view your test results, renew your prescriptions, schedule appointments and more. To sign up, go to [email or web address] . Click on \"Log in\" on the left side of the screen, which will take you to the Welcome page. Then click on \"Sign up Now\" on the right side of the page.     You will be asked to enter the access code listed below, as well as some personal information. Please follow the directions to create your username and password.     Your access code is: [email or web address]  Expires: 2/3/2019  4:04 PM     Your access code will  in 90 days. If you need help or a new code, please call your [facility] clinic or [phone].        Care EveryWhere ID     This is your Care EveryWhere ID. This could be used by other organizations to access your [facility] medical records  [id number]        Equal Access to Services     [facility] : Hadii jovany champagne hadasho Soshonali, waaxda luqadaha, qaybta kaalmada adeegyada, justin peres . So [facility] [phone].    ATENCIÓN: Si habla español, tiene a montalvo disposición servicios gratuitos de asistencia lingüística. Llame al [phone].    We comply with applicable federal " civil rights laws and [place] laws. We do not discriminate on the basis of race, color, national origin, age, disability, sex, sexual orientation, or gender identity.            After Visit Summary       This is your record. Keep this with you and show to your community pharmacist(s) and doctor(s) at your next visit.

## 2018-11-06 NOTE — ED PROVIDER NOTES
eMERGENCY dEPARTMENT eNCOUnter      CHIEF COMPLAINT    Chief Complaint   Patient presents with     Headache     Generalized Body Aches     Nausea, Vomiting, & Diarrhea       HPI    Maggie Case is a 28 year old female who presents with usual complaints of nausea, achiness, vomiting and she wonders if she has low potassium.  Maggie has been in many times for low potassium, as a result of vomiting, but she refuses all labs and IV and often leaves AMA.  She resides at [facility] after a debilitating stroke about a year ago.     REVIEW OF SYSTEMS    GI: Patient complains ofbody aches no diarrhea  Cardiac: No chest pain or syncope  Pulmonary: No difficulty breathing or new cough  General: No fevers or chills  : No hematuria or dysuria  See HPI for further details.   All other systems reviewed and are negative.    PAST MEDICAL & SURGICAL HISTORY    Past Medical History:   Diagnosis Date     Anemia      Anxiety      Chemical dependency (H)      Chronic diarrhea      Lactose intolerance      Myalgia      OCD (obsessive compulsive disorder)      Pulmonary embolism (H) 05/06/16     Stroke (H) 02/2018     Vitamin B12 deficiency      Past Surgical History:   Procedure Laterality Date     CLOSED REDUCTION, PERCUTANEOUS PINNING LOWER EXTREMITY, COMBINED Right 4/6/2016    Procedure: COMBINED CLOSED REDUCTION, PERCUTANEOUS PINNING LOWER EXTREMITY;  Surgeon: Dexter Jones MD;  [facility]: [facility]     COLONOSCOPY       CRANIECTOMY Right 2/18/2018    Procedure: CRANIECTOMY;  RIGHT HEMICRANIECTOMY;  Surgeon: Emeterio Mesa MD;  Location[facility]     CRANIOPLASTY Right 5/24/2018    Procedure: CRANIOPLASTY;  Right Cranioplasty ;  Surgeon: Emeterio Mesa MD;  Location: [facility] OR     UPPER GI ENDOSCOPY         CURRENT MEDICATIONS    Current Outpatient Rx   Medication Sig Dispense Refill     Acetaminophen (TYLENOL PO) Take 650 mg by mouth every 4 hours as needed for mild pain or fever       CAPSAICIN EX Externally apply  topically 3 times daily as needed       cyanocobalamin (VITAMIN  B-12) 1000 MCG tablet Take 1,000 mcg by mouth daily       DULOXETINE HCL PO Take 90 mg by mouth daily        ferrous sulfate (IRON) 325 (65 Fe) MG tablet Take by mouth daily (with breakfast)       GABAPENTIN PO Take 300 mg by mouth 2 times daily 300mg AM and Noon       GABAPENTIN PO Take 600 mg by mouth At Bedtime        loperamide (IMODIUM) 2 MG capsule Take 2 mg by mouth 4 times daily as needed for diarrhea       MELATONIN PO Take 5 mg by mouth       Ondansetron HCl (ZOFRAN PO) Take 4 mg by mouth every 8 hours as needed for nausea or vomiting       potassium chloride (KLOR-CON) 20 MEQ Packet Take 20 mEq by mouth 2 times daily 60 tablet 0     TRAZODONE HCL PO Take 100 mg by mouth At Bedtime       trolamine salicylate (ASPERCREME) 10 % cream Apply 1 g topically 3 times daily 35.4 g 0     XARELTO 20 MG TABS tablet Take 20 mg by mouth every morning  11     DULoxetine (CYMBALTA) 30 MG EC capsule Take 1 capsule (30 mg) by mouth daily (with dinner) 30 capsule 0     nystatin (MYCOSTATIN) 864162 UNIT/ML suspension Take 5 mLs (500,000 Units) by mouth 4 times daily 280 mL 0       ALLERGIES    Allergies   Allergen Reactions     Amoxicillin Other (See Comments)     Headaches     Levaquin [Levofloxacin] Swelling     Naproxen Other (See Comments)     Reaction: Headaches     Nickel      Tramadol      Sulindac Rash       SOCIAL AND FAMILY HISTORY    Social History     Social History     Marital status: Single     Spouse name: N/A     Number of children: N/A     Years of education: N/A     Social History Main Topics     Smoking status: Current Every Day Smoker     Packs/day: 0.25     Years: 7.00     Types: Cigarettes     Last attempt to quit: 8/2/2018     Smokeless tobacco: Never Used     Alcohol use No      Comment: last drank; last week     Drug use: No      Comment: hx of marijuana and meth use     Sexual activity: Not Currently     Partners: Female     Other  "Topics Concern     None     Social History Narrative     Family History   Problem Relation Age of Onset     Depression Mother      Migraines Maternal Half-Sister        PHYSICAL EXAM    VITAL SIGNS: BP 96/69  Temp 98.2  F (36.8  C) (Oral)  Resp 16  Ht 1.6 m (5' 3\")  Wt 55.3 kg (122 lb)  SpO2 99%  BMI 21.61 kg/m2  Constitutional: bryn looks her normal.  Good eye contact.  Eyes:  Sclera nonicteric, conjunctiva moist  HENT: she has had a partial craniectomy  nose normal  Neck: Supple, no JVD  Respiratory:  No retractions, no accessory muscle use, normal breath sounds   Cardiovascular:  regular rate, normal rhythm, no murmurs  GI:  Soft, no abdominal tenderness, no guarding, bowel sounds present, no audible bruits or palpable pulsatile masses.  Musculoskeletal:  No edema, no acute deformity   Vascular: DP pulses 2+ equal bilaterally  Integument: No rash, dry skin  Neurologic: she remains with some unilateral weakness, which is chronic.  Psychiatric: Cooperative, pleasant affect        ED COURSE & MEDICAL DECISION MAKING    Pertinent Labs & Imaging studies reviewed and interpreted. (See chart for details)     See chart for details of medications given during the ED stay.    Vitals:    11/05/18 1133 11/05/18 1134 11/05/18 1150 11/05/18 1610   BP: 92/66 (!) 89/56 95/69 96/69   Resp: 16   16   Temp: 98.2  F (36.8  C)      TempSrc: Oral      SpO2: 98%  97% 99%   Weight: 55.3 kg (122 lb)      Height: 1.6 m (5' 3\")              FINAL IMPRESSION    1. Hypokalemia        PLAN  Bryn is basically refusing all cares, refusing recheck of potassium, refusing IV.  She has had potassium ordered at the NH but she refused it, so it was taken off her med list.  I have reordered it.  Return here as needed.       Estephania Shanks MD  11/06/18 4946    "

## 2019-01-15 ENCOUNTER — TELEPHONE (OUTPATIENT)
Dept: NEUROLOGY | Facility: CLINIC | Age: 29
End: 2019-01-15

## 2019-01-15 NOTE — TELEPHONE ENCOUNTER
"MomAdela calls stating that patient had a stroke 2/17/18 and Dr. Mesa wanted to continue to follow patient. Patient was in nursing home following stroke and has been \"going downhill\" since. Patient was transferred to [facility] in [place] ([facility]). Mom states patient is suffering from malnourishment. She has lost 13#. Where reconstructive surgery was done is caving in. Now the other side of her head is caving in. Mom states patient is withering away and she doesn't know what to do. Past few days patient doesn't know where she is, getting agitated, doesn't recognize the nurses. They have been changing her psych medications. Mom would like Dr. Mesa's advice.     "

## 2019-01-17 NOTE — TELEPHONE ENCOUNTER
Dr. Mesa spoke with patients mother, Adela, and advised mom to speak with Medical Director of facility and have patient transferred to [facility] for care.

## 2019-03-26 NOTE — PLAN OF CARE
Problem: Patient Care Overview  Goal: Plan of Care/Patient Progress Review  PT 4A    Discharge Planner PT   Patient plan for discharge: not discussed today  Current status: required lift and assist x 2 to transfer chair > bed.  Sat at EOB x 6 min with initial min assist then progressing to SBA.  Needs max encouragement to increase activity level.   Barriers to return to prior living situation: assistance level needed for basic mobility.  Recommendations for discharge: ARU pending improved participation with increasing activity level.   Rationale for recommendations: limited tolerance to activity, dependence with functional mobility.        Entered by: Vinod Corbett 02/22/2018 5:21 PM          limited prior exposure to nutrition and DM-related education PTA

## 2019-08-12 NOTE — PROGRESS NOTES
Patient is here for her 1 month post-op visit.  S/P 6/24 Open Right Extended Hemicolectomy.    HISTORY REVIEW:   Medications reviewed and updated.  Allergies reviewed and updated.    Smoking history reviewed with patient.  Patient's pharmacy and PCP were verified at today's visit.   [facility]    Hospitalist Progress Note    Date of Service (when I saw the patient): 07/16/2018    Assessment & Plan   Maggie Case is a 28 year old female who was admitted on 7/15/2018 for n/v/d found to have c diff colitis.    C diff colitis: CT abdomen pelvis with contrast showed diffuse colitis in the ascending and transverse colon.  Fever curve stable, WBC wnl.  Is not clinically septic.  - vancomycin PO QID  - IVFs  - electrolytes replacement  - supportive cares    Hypokalemia: likely from GI losses.  Contraction metabolic alkalosis resolved.  - replacement protocol  - Mg level wnl at 2.3    Left sided Ureterolithiasis: incidental finding on CT abd/pelvis showing 3 mm distal left ureteral calculus at UPJ with mild left-sided hydronephrosis.  ? If this is asymptomatic.  - IVFs  - flomax    H/o CVA: Feb 2018 resulting in left sided hemiparesis and SNF state.  She has h/o hypercoagulability and ASD which was worked up at the [facility] (negative findings).  Stable.  - continuing Xarelto    Depression: possibly decompensated due to her extremely flat affect  - continue outpatient duloxetine and trazadone    Nicotine dependence: nicotine replacement prn    FEN: oral diet as tolerated  - replace electrolytes as indicated    DVT Prophylaxis: she is on Xarelto    Code Status: Full Code    Disposition: Expected discharge in 2-3 days once clinically improved.    Eleuterio Anderson MD      Interval History   Patient somewhat tired and sleepy, nods and shakes head to answer questions.  Denies any pain currently.  Says that she feels somewhat better with diarrhea slightly improved.    -Data reviewed today: I reviewed all new labs and imaging results over the last 24 hours. I personally reviewed no images or EKG's today.    Physical Exam   Temp: 98.6  F (37  C) Temp src: Tympanic BP: 92/58 Pulse: 76 Heart Rate: 66 Resp: 16 SpO2: 97 % O2 Device: None (Room air)    Vitals:    07/15/18 1814   Weight: 61.8 kg (136 lb 3.9  oz)     Vital Signs with Ranges  Temp:  [98  F (36.7  C)-98.6  F (37  C)] 98.6  F (37  C)  Pulse:  [66-76] 76  Heart Rate:  [66-74] 66  Resp:  [16-20] 16  BP: ()/(52-73) 92/58  SpO2:  [95 %-100 %] 97 %    Intake/Output Summary (Last 24 hours) at 07/16/18 0721  Last data filed at 07/15/18 2200   Gross per 24 hour   Intake              100 ml   Output                0 ml   Net              100 ml       # Pain Assessment:  Current Pain Score 7/16/2018   Patient currently in pain? yes   Pain score (0-10) 9   Pain location Generalized   Pain descriptors Aching   CPOT pain score -   Maggie sen pain level was assessed and she currently denies pain.        Peripheral IV 07/15/18 Right Upper forearm (Active)   Site Assessment WDL 7/16/2018 12:23 AM   Line Status Infusing 7/16/2018 12:23 AM   Phlebitis Scale 0-->no symptoms 7/16/2018 12:23 AM   Infiltration Scale 0 7/16/2018 12:23 AM   Number of days:1       Incision/Surgical Site 02/18/18 Right Head (Active)   Number of days:148       Incision/Surgical Site 05/24/18 Right Head (Active)   Number of days:53     Line/device assessment(s) completed for medical necessity    Constitutional - sleepy but interactive, flat affect  HEENT - atraumatic, normocephalic  Neck - supple, no masses, no JVD  CVS - S1 S2 RRR, no murmurs, rubs, gallops  Respiratory - CTA b/l  GI - soft, moderate tenderness on palpation in upper abdomen, ND, + bowel sounds, no organomegaly  Musculoskeletal - no LE edema, no lesions  Neuro - oriented x 3, no gross focal deficits  Psych - flat affect    Medications     dextrose 5% and 0.45% NaCl + KCl 20 mEq/L       - MEDICATION INSTRUCTIONS -         nicotine  1 patch Transdermal Daily     nicotine   Transdermal Q8H     nicotine   Transdermal Daily     potassium chloride with lidocaine         tamsulosin  0.4 mg Oral Daily     vancomycin  125 mg Oral 4x Daily       Data     Recent Labs  Lab 07/16/18  0414 07/16/18  0400 07/16/18  0010 07/15/18  1435  07/13/18  1459   WBC 5.5  --   --  6.1 6.9   HGB 10.4*  --   --  12.8 13.7   MCV 86  --   --  85 83     --   --  321 407   INR  --   --   --  1.04  --    *  --  148* 142 139   POTASSIUM 2.8* 2.7* 3.0* 2.2* 2.4*   CHLORIDE 111*  --  111* 98 93*   CO2 25  --  28 34* 38*   BUN 4*  --   --  7 6*   CR 0.37*  --   --  0.46* 0.45*   ANIONGAP 10  --  9 10 8   AVINASH 7.4*  --   --  8.3* 8.5   GLC 86  --   --  92 95   ALBUMIN 1.8*  --   --  2.6* 2.6*   PROTTOTAL 4.2*  --   --  5.6* 5.9*   BILITOTAL 0.2  --   --  0.3 0.4   ALKPHOS 83  --   --  120 135   ALT 16  --   --  19 19   AST 29  --   --  41 41   LIPASE  --   --   --  283  --      Lactic Acid   Date Value Ref Range Status   04/29/2017 0.7 0.4 - 2.0 mmol/L Final   03/19/2017 1.3 0.4 - 2.0 mmol/L Final   05/06/2016 1.3 0.4 - 2.0 mmol/L Final       Recent Results (from the past 24 hour(s))   CT Abdomen Pelvis w Contrast    Narrative    EXAMINATION: CT ABDOMEN PELVIS W CONTRAST, 7/15/2018 4:23 PM    TECHNIQUE:  Helical CT images from the lung bases through the  symphysis pubis were obtained  with IV contrast. Contrast dose: Isovue  300, 100ml    COMPARISON: none    HISTORY: abdominal pain, diarrhea;     FINDINGS:    There is dependent atelectasis at the lung bases.    The liver is free of masses or biliary ductal enlargement. No  calcified gallstones are seen.    The the spleen and pancreas appear normal.    The adrenal glands are normal.    There is a delayed enhancement of the left kidney. There is dilation  of the left renal collecting system. There is a 3 mm in diameter  calculus at the ureterovesical junction on the left. There is a 4 mm  diameter calculus in the upper pole of the right kidney. A right renal  cyst is seen.    The periaortic lymph nodes are normal in caliber.    There is some bowel wall thickening seen in the ascending and  transverse colon consistent with colitis.    In the pelvis the bladder and rectum appear normal.    The regional skeleton  is intact      Impression    IMPRESSION:   1. 3 mm distal left ureteric calculus at the ureterovesical junction  with the mild left-sided hydronephrosis.  2. Colitis in the ascending and transverse colon     MD Eleuterio CARO MD

## 2020-03-05 ENCOUNTER — TELEPHONE (OUTPATIENT)
Dept: BEHAVIORAL HEALTH | Facility: CLINIC | Age: 30
End: 2020-03-05

## 2020-03-05 ENCOUNTER — HOSPITAL ENCOUNTER (EMERGENCY)
Facility: HOSPITAL | Age: 30
Discharge: HOME | End: 2020-03-06
Attending: EMERGENCY MEDICINE | Admitting: EMERGENCY MEDICINE
Payer: MEDICAID

## 2020-03-05 DIAGNOSIS — E87.6 HYPOKALEMIA: ICD-10-CM

## 2020-03-05 DIAGNOSIS — R45.851 SUICIDAL IDEATION: Primary | ICD-10-CM

## 2020-03-05 LAB
ALBUMIN SERPL-MCNC: 3.3 G/DL (ref 3.4–5)
ALP SERPL-CCNC: 88 U/L (ref 40–150)
ALT SERPL W P-5'-P-CCNC: 26 U/L (ref 0–50)
ANION GAP SERPL CALCULATED.3IONS-SCNC: 9 MMOL/L (ref 3–14)
AST SERPL W P-5'-P-CCNC: 29 U/L (ref 0–45)
BASOPHILS # BLD AUTO: 0 10E9/L (ref 0–0.2)
BASOPHILS NFR BLD AUTO: 0.1 %
BILIRUB SERPL-MCNC: 0.4 MG/DL (ref 0.2–1.3)
BUN SERPL-MCNC: 7 MG/DL (ref 7–30)
CALCIUM SERPL-MCNC: 9 MG/DL (ref 8.5–10.1)
CHLORIDE SERPL-SCNC: 108 MMOL/L (ref 94–109)
CO2 SERPL-SCNC: 25 MMOL/L (ref 20–32)
CREAT SERPL-MCNC: 0.51 MG/DL (ref 0.52–1.04)
DIFFERENTIAL METHOD BLD: NORMAL
EOSINOPHIL # BLD AUTO: 0.3 10E9/L (ref 0–0.7)
EOSINOPHIL NFR BLD AUTO: 4 %
ERYTHROCYTE [DISTWIDTH] IN BLOOD BY AUTOMATED COUNT: 12.6 % (ref 10–15)
ETHANOL SERPL-MCNC: <0.01 G/DL
GFR SERPL CREATININE-BSD FRML MDRD: >90 ML/MIN/{1.73_M2}
GLUCOSE SERPL-MCNC: 88 MG/DL (ref 70–99)
HCT VFR BLD AUTO: 40.7 % (ref 35–47)
HGB BLD-MCNC: 14 G/DL (ref 11.7–15.7)
IMM GRANULOCYTES # BLD: 0 10E9/L (ref 0–0.4)
IMM GRANULOCYTES NFR BLD: 0.4 %
LYMPHOCYTES # BLD AUTO: 1.7 10E9/L (ref 0.8–5.3)
LYMPHOCYTES NFR BLD AUTO: 20.9 %
MCH RBC QN AUTO: 31.4 PG (ref 26.5–33)
MCHC RBC AUTO-ENTMCNC: 34.4 G/DL (ref 31.5–36.5)
MCV RBC AUTO: 91 FL (ref 78–100)
MONOCYTES # BLD AUTO: 0.5 10E9/L (ref 0–1.3)
MONOCYTES NFR BLD AUTO: 6.7 %
NEUTROPHILS # BLD AUTO: 5.4 10E9/L (ref 1.6–8.3)
NEUTROPHILS NFR BLD AUTO: 67.9 %
NRBC # BLD AUTO: 0 10*3/UL
NRBC BLD AUTO-RTO: 0 /100
PLATELET # BLD AUTO: 328 10E9/L (ref 150–450)
POTASSIUM SERPL-SCNC: 2.7 MMOL/L (ref 3.4–5.3)
POTASSIUM SERPL-SCNC: 2.8 MMOL/L (ref 3.4–5.3)
PROT SERPL-MCNC: 6.8 G/DL (ref 6.8–8.8)
RBC # BLD AUTO: 4.46 10E12/L (ref 3.8–5.2)
SODIUM SERPL-SCNC: 142 MMOL/L (ref 133–144)
WBC # BLD AUTO: 7.9 10E9/L (ref 4–11)

## 2020-03-05 PROCEDURE — 85025 COMPLETE CBC W/AUTO DIFF WBC: CPT | Performed by: EMERGENCY MEDICINE

## 2020-03-05 PROCEDURE — 25000132 ZZH RX MED GY IP 250 OP 250 PS 637: Performed by: EMERGENCY MEDICINE

## 2020-03-05 PROCEDURE — 99284 EMERGENCY DEPT VISIT MOD MDM: CPT | Mod: Z6 | Performed by: EMERGENCY MEDICINE

## 2020-03-05 PROCEDURE — 84132 ASSAY OF SERUM POTASSIUM: CPT | Mod: 91 | Performed by: EMERGENCY MEDICINE

## 2020-03-05 PROCEDURE — 80320 DRUG SCREEN QUANTALCOHOLS: CPT | Performed by: EMERGENCY MEDICINE

## 2020-03-05 PROCEDURE — 99285 EMERGENCY DEPT VISIT HI MDM: CPT

## 2020-03-05 PROCEDURE — 36415 COLL VENOUS BLD VENIPUNCTURE: CPT | Performed by: EMERGENCY MEDICINE

## 2020-03-05 PROCEDURE — 80053 COMPREHEN METABOLIC PANEL: CPT | Performed by: EMERGENCY MEDICINE

## 2020-03-05 RX ORDER — GABAPENTIN 300 MG/1
300 CAPSULE ORAL 3 TIMES DAILY
Status: DISCONTINUED | OUTPATIENT
Start: 2020-03-05 | End: 2020-03-06 | Stop reason: HOSPADM

## 2020-03-05 RX ORDER — ALUMINA, MAGNESIA, AND SIMETHICONE 2400; 2400; 240 MG/30ML; MG/30ML; MG/30ML
30 SUSPENSION ORAL EVERY 4 HOURS PRN
Status: CANCELLED | OUTPATIENT
Start: 2020-03-05

## 2020-03-05 RX ORDER — BUSPIRONE HYDROCHLORIDE 15 MG/1
7.5 TABLET ORAL 2 TIMES DAILY
COMMUNITY
End: 2020-03-06

## 2020-03-05 RX ORDER — BISACODYL 10 MG
10 SUPPOSITORY, RECTAL RECTAL DAILY PRN
Status: CANCELLED | OUTPATIENT
Start: 2020-03-05

## 2020-03-05 RX ORDER — BUSPIRONE HYDROCHLORIDE 10 MG/1
10 TABLET ORAL ONCE
Status: COMPLETED | OUTPATIENT
Start: 2020-03-05 | End: 2020-03-05

## 2020-03-05 RX ORDER — ACETAMINOPHEN 325 MG/1
650 TABLET ORAL EVERY 4 HOURS PRN
Status: CANCELLED | OUTPATIENT
Start: 2020-03-05

## 2020-03-05 RX ORDER — TRAZODONE HYDROCHLORIDE 50 MG/1
50 TABLET, FILM COATED ORAL
Status: CANCELLED | OUTPATIENT
Start: 2020-03-05

## 2020-03-05 RX ORDER — HYDROXYZINE HYDROCHLORIDE 10 MG/1
30-50 TABLET, FILM COATED ORAL EVERY 4 HOURS PRN
Status: CANCELLED | OUTPATIENT
Start: 2020-03-05

## 2020-03-05 RX ORDER — POTASSIUM CHLORIDE 1500 MG/1
40 TABLET, EXTENDED RELEASE ORAL ONCE
Status: COMPLETED | OUTPATIENT
Start: 2020-03-05 | End: 2020-03-05

## 2020-03-05 RX ADMIN — BUSPIRONE HYDROCHLORIDE 10 MG: 10 TABLET ORAL at 18:17

## 2020-03-05 RX ADMIN — POTASSIUM CHLORIDE 40 MEQ: 1500 TABLET, EXTENDED RELEASE ORAL at 15:25

## 2020-03-05 RX ADMIN — GABAPENTIN 300 MG: 300 CAPSULE ORAL at 21:07

## 2020-03-05 RX ADMIN — POTASSIUM CHLORIDE 40 MEQ: 1500 TABLET, EXTENDED RELEASE ORAL at 19:39

## 2020-03-05 ASSESSMENT — ENCOUNTER SYMPTOMS
ABDOMINAL PAIN: 0
SHORTNESS OF BREATH: 0
FEVER: 0

## 2020-03-05 NOTE — ED NOTES
Central Intake updated that our facility has declined admission. State they will call back with more information

## 2020-03-05 NOTE — ED NOTES
"Incoming call from Philipp at [facility]. States he will not be looking for placement per policy that Dr. Chin is to call our [facility] provider for admission review.  Philipp did have closing report that pt stated to him, \"if I have to go back to that house I will kill myself\". Pt has no privacy there, concerned there are no doors on bathroom and members are very messy   "

## 2020-03-05 NOTE — ED NOTES
Clarified admission status with house supervisor. Per supervisor our psych unit unable to accommodate her physical needs with W/C and assistance with ADLs.

## 2020-03-05 NOTE — ED TRIAGE NOTES
Pt has been out of gabapentin and Buspar for over a week. States dealing with insurance issues for these medications.   States she thought about cutting wrist, but feels she would fail wants inpatient help and possible med adjustments.   Contracts for safety while here

## 2020-03-05 NOTE — ED PROVIDER NOTES
History     Chief Complaint   Patient presents with     Suicidal     out of meds     HPI  Maggie Case is a 29 year old female who presented to the emergency department with suicidal ideation and intent to commit suicide.   The [facility]  changed her medical assistance insurance and she was unable to fill her gabapentin for the last 2 days because she is required to have a copay.  She intends to kill herself by slicing her wrists or her thighs.  She has never attempted suicide in the past but used to cut her wrists and thighs which she has not done now for quite some time.  She feels more suicidal because of having missed her gabapentin and also because she does not like her living situation.  She now requires assistance to move from bed to the wheelchair and with all activities of daily living.    Allergies:  Allergies   Allergen Reactions     Amoxicillin Other (See Comments)     Headaches     Levaquin [Levofloxacin] Swelling     Naproxen Other (See Comments)     Reaction: Headaches     Nickel      Tramadol      Sulindac Rash       Problem List:    Patient Active Problem List    Diagnosis Date Noted     Depression with suicidal ideation 09/19/2018     Priority: Medium     Clostridium difficile colitis 08/15/2018     Priority: Medium     Chemical dependency (H) 07/16/2018     Priority: Medium     Calculus of lower urinary tract 07/15/2018     Priority: Medium     History of CVA (cerebrovascular accident) 07/15/2018     Priority: Medium     Left hemiparesis (H) 07/15/2018     Priority: Medium     Chronic anticoagulation 07/15/2018     Priority: Medium     History of cranioplasty 05/24/2018     Priority: Medium     Acute ischemic stroke (H) 02/17/2018     Priority: Medium     Influenza B 05/01/2017     Priority: Medium     Opacity of lung on imaging study 05/01/2017     Priority: Medium     Community acquired pneumonia 05/01/2017     Priority: Medium     C. difficile colitis 05/01/2017     Priority:  Medium     Sepsis (H) 04/28/2017     Priority: Medium     Pyelonephritis 01/05/2017     Priority: Medium     Acute chest pain 05/06/2016     Priority: Medium     Leukocytosis 05/06/2016     Priority: Medium     Lung mass 05/06/2016     Priority: Medium     SOB (shortness of breath) 05/06/2016     Priority: Medium     Acute upper GI bleed 06/18/2013     Priority: Medium     Hypokalemia 06/18/2013     Priority: Medium     Acute cystitis 06/18/2013     Priority: Medium     Anxiety state 03/25/2013     Priority: Medium     Muscle pain 07/30/2009     Priority: Medium     Overview:   IMO Update 10/11       Cervicalgia 06/16/2009     Priority: Medium     Overview:   IMO Update 10/11       Low back pain 06/16/2009     Priority: Medium     Overview:   IMO Update 10/11       Pain in joint, lower leg 05/01/2009     Priority: Medium     Diarrhea 04/17/2008     Priority: Medium     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     Priority: Medium        Past Medical History:    Past Medical History:   Diagnosis Date     Anemia      Anxiety      Chemical dependency (H)      Chronic diarrhea      Lactose intolerance      Myalgia      OCD (obsessive compulsive disorder)      Pulmonary embolism (H) 05/06/16     Stroke (H) 02/2018     Vitamin B12 deficiency        Past Surgical History:    Past Surgical History:   Procedure Laterality Date     CLOSED REDUCTION, PERCUTANEOUS PINNING LOWER EXTREMITY, COMBINED Right 4/6/2016    Procedure: COMBINED CLOSED REDUCTION, PERCUTANEOUS PINNING LOWER EXTREMITY;  Surgeon: Dexter Jones MD;  Location: [facility]     COLONOSCOPY       CRANIECTOMY Right 2/18/2018    Procedure: CRANIECTOMY;  RIGHT HEMICRANIECTOMY;  Surgeon: Emeterio Mesa MD;  Location: [facility]     CRANIOPLASTY Right 5/24/2018    Procedure: CRANIOPLASTY;  Right Cranioplasty ;  Surgeon: Emeterio Mesa MD;  Location: [facility] OR     UPPER GI ENDOSCOPY         Family History:    Family History    Problem Relation Age of Onset     Depression Mother      Migraines Maternal Half-Sister        Social History:  Marital Status:  Single [1]  Social History     Tobacco Use     Smoking status: Current Every Day Smoker     Packs/day: 0.25     Years: 7.00     Pack years: 1.75     Types: Cigarettes     Last attempt to quit: 2018     Years since quittin.5     Smokeless tobacco: Never Used   Substance Use Topics     Alcohol use: No     Alcohol/week: 0.0 standard drinks     Comment: last drank; last week     Drug use: No     Comment: hx of marijuana and meth use        Medications:    acetaminophen (TYLENOL) 500 MG tablet  baclofen (LIORESAL) 10 MG tablet  benzocaine (ORAJEL MAXIMUM STRENGTH) 20 % GEL gel  busPIRone (BUSPAR) 15 MG tablet  CHANTIX CONTINUING MONTH ASHLEY 1 MG tablet  DULoxetine (CYMBALTA) 60 MG capsule  famotidine (PEPCID) 20 MG tablet  folic acid (FOLVITE) 1 MG tablet  gabapentin (NEURONTIN) 300 MG capsule  loperamide (IMODIUM) 2 MG capsule  medroxyPROGESTERone (DEPO-PROVERA) 150 MG/ML IM injection  melatonin 5 MG tablet  mirtazapine (REMERON) 15 MG tablet  XARELTO 20 MG TABS tablet  nicotine (NICODERM CQ) 7 MG/24HR 24 hr patch  promethazine (PHENERGAN) 12.5 MG tablet          Review of Systems   Constitutional: Negative for fever.   Respiratory: Negative for shortness of breath.    Cardiovascular: Negative for chest pain.   Gastrointestinal: Negative for abdominal pain.   Psychiatric/Behavioral: Positive for suicidal ideas.   All other systems reviewed and are negative.      Physical Exam   BP: 111/73  Pulse: 82  Heart Rate: 74  Temp: 99  F (37.2  C)  Resp: 16  SpO2: 94 %      Physical Exam  Vitals signs and nursing note reviewed.   Constitutional:       General: She is not in acute distress.     Appearance: Normal appearance. She is not diaphoretic.   HENT:      Head: Atraumatic.      Mouth/Throat:      Pharynx: No oropharyngeal exudate.   Eyes:      General: No scleral icterus.     Pupils: Pupils  are equal, round, and reactive to light.   Cardiovascular:      Rate and Rhythm: Regular rhythm.      Heart sounds: Normal heart sounds.   Pulmonary:      Effort: No respiratory distress.      Breath sounds: Normal breath sounds.   Chest:      Chest wall: No tenderness.   Abdominal:      General: Bowel sounds are normal.      Palpations: Abdomen is soft.      Tenderness: There is no abdominal tenderness.   Musculoskeletal: Normal range of motion.         General: No tenderness.      Comments: Wheelchair bound and needs assistance with transfer from wheelchair to bed.   Skin:     General: Skin is warm.      Findings: No abrasion, laceration or rash.   Neurological:      Mental Status: She is alert and oriented to person, place, and time.   Psychiatric:         Mood and Affect: Mood is depressed. Affect is blunt.         Speech: Speech normal.         Behavior: Behavior is slowed. Behavior is cooperative.         Thought Content: Thought content includes suicidal ideation. Thought content includes suicidal plan.         ED Course   Patient evaluated upon arrival to the ED and labs ordered.  [facility] assessment ordered.  March 6, 2020 10:15 AM-still awaiting for bed placement in an inpatient psychiatric unit.  Central intake is aware.  11 AM-patient is no longer suicidal and is requesting to be discharged home as long as she gets her medications.  She does not get paid until 11th of this month and wants medicine that can take for the next 6 days before she gets paid.  She has a co-pay of $211 to be able to purchase her medications.  She does not have this money and wants help to get medications until she gets paid.   contacted.    Procedures      Results for orders placed or performed during the hospital encounter of 03/05/20 (from the past 24 hour(s))   Alcohol ethyl   Result Value Ref Range    Ethanol g/dL <0.01 0.01 g/dL   CBC with platelets differential   Result Value Ref Range    WBC 7.9 4.0 - 11.0  10e9/L    RBC Count 4.46 3.8 - 5.2 10e12/L    Hemoglobin 14.0 11.7 - 15.7 g/dL    Hematocrit 40.7 35.0 - 47.0 %    MCV 91 78 - 100 fl    MCH 31.4 26.5 - 33.0 pg    MCHC 34.4 31.5 - 36.5 g/dL    RDW 12.6 10.0 - 15.0 %    Platelet Count 328 150 - 450 10e9/L    Diff Method Automated Method     % Neutrophils 67.9 %    % Lymphocytes 20.9 %    % Monocytes 6.7 %    % Eosinophils 4.0 %    % Basophils 0.1 %    % Immature Granulocytes 0.4 %    Nucleated RBCs 0 0 /100    Absolute Neutrophil 5.4 1.6 - 8.3 10e9/L    Absolute Lymphocytes 1.7 0.8 - 5.3 10e9/L    Absolute Monocytes 0.5 0.0 - 1.3 10e9/L    Absolute Eosinophils 0.3 0.0 - 0.7 10e9/L    Absolute Basophils 0.0 0.0 - 0.2 10e9/L    Abs Immature Granulocytes 0.0 0 - 0.4 10e9/L    Absolute Nucleated RBC 0.0    Comprehensive metabolic panel   Result Value Ref Range    Sodium 142 133 - 144 mmol/L    Potassium 2.7 (LL) 3.4 - 5.3 mmol/L    Chloride 108 94 - 109 mmol/L    Carbon Dioxide 25 20 - 32 mmol/L    Anion Gap 9 3 - 14 mmol/L    Glucose 88 70 - 99 mg/dL    Urea Nitrogen 7 7 - 30 mg/dL    Creatinine 0.51 (L) 0.52 - 1.04 mg/dL    GFR Estimate >90 >60 mL/min/[1.73_m2]    GFR Estimate If Black >90 >60 mL/min/[1.73_m2]    Calcium 9.0 8.5 - 10.1 mg/dL    Bilirubin Total 0.4 0.2 - 1.3 mg/dL    Albumin 3.3 (L) 3.4 - 5.0 g/dL    Protein Total 6.8 6.8 - 8.8 g/dL    Alkaline Phosphatase 88 40 - 150 U/L    ALT 26 0 - 50 U/L    AST 29 0 - 45 U/L   Potassium   Result Value Ref Range    Potassium 2.8 (LL) 3.4 - 5.3 mmol/L   Potassium   Result Value Ref Range    Potassium 2.9 (LL) 3.4 - 5.3 mmol/L       Medications   gabapentin (NEURONTIN) capsule 300 mg (300 mg Oral Given 3/6/20 0900)   potassium chloride ER (KLOR-CON M) CR tablet 40 mEq (40 mEq Oral Given 3/5/20 1525)   busPIRone (BUSPAR) tablet 10 mg (10 mg Oral Given 3/5/20 1817)   potassium chloride ER (KLOR-CON M) CR tablet 40 mEq (40 mEq Oral Given 3/5/20 1939)   loperamide (IMODIUM) capsule 2 mg (2 mg Oral Given 3/6/20 1019)    acetaminophen (TYLENOL) tablet 975 mg (975 mg Oral Given 3/6/20 1019)   potassium chloride ER (KLOR-CON M) CR tablet 40 mEq (40 mEq Oral Given 3/6/20 1122)       Assessments & Plan (with Medical Decision Making)   Suicidal ideation: Originally presented to the ED with suicidal ideation and a plan to kill herself because she had not been taking her medications for a few days.  She did not have money for the co-pay to get her medicines.  Original decision was made to admit patient after being seen.  Unfortunately we were not able to get any facility that had available beds to admit patient for inpatient stabilization and evaluation by psychiatrist.    On March 6, 2020 in the morning, patient told me that she was no longer suicidal and only wanted her medications until she is able to get her money on March 11 2020 to have the co-pay to get full month prescription.   were contacted who were able to eval patient some money for the co-pay and a new prescription for medication was sent to [facility].  Patient discharged from the ED and will follow-up with PCP next week.    Hypokalemia: Corrected with 3 doses of oral K-Dur 40 mEq.  Serum potassium level this morning was 2.9 but advised patient to recheck these at PCPs follow-up next week.  Return to ED if her weakness worsens and condition deteriorates.    I have reviewed the nursing notes.    I have reviewed the findings, diagnosis, plan and need for follow up with the patient.    Discharge Medication List as of 3/6/2020 12:07 PM          Final diagnoses:   Suicidal ideation   Hypokalemia       3/5/2020   [facility]     Julian hCin MD  03/06/20 7124

## 2020-03-05 NOTE — ED AVS SNAPSHOT
[facility]  [address]  [place] [address]  Phone:  [phone]                                    Maggie Case   MRN: [id number]    Department:  [facility]   Date of Visit:  3/5/2020           After Visit Summary Signature Page    I have received my discharge instructions, and my questions have been answered. I have discussed any challenges I see with this plan with the nurse or doctor.    ..........................................................................................................................................  Patient/Patient Representative Signature      ..........................................................................................................................................  Patient Representative Print Name and Relationship to Patient    ..................................................               ................................................  Date                                   Time    ..........................................................................................................................................  Reviewed by Signature/Title    ...................................................              ..............................................  Date                                               Time          22EPIC Rev 08/18

## 2020-03-05 NOTE — ED NOTES
DATE:  3/5/2020   TIME OF RECEIPT FROM LAB:  76332  LAB TEST:  potassium  LAB VALUE:  2.7  RESULTS GIVEN WITH READ-BACK TO (PROVIDER):  Julian hCin MD  TIME LAB VALUE REPORTED TO PROVIDER:   1786

## 2020-03-06 VITALS
TEMPERATURE: 98.7 F | SYSTOLIC BLOOD PRESSURE: 90 MMHG | RESPIRATION RATE: 16 BRPM | HEART RATE: 72 BPM | DIASTOLIC BLOOD PRESSURE: 60 MMHG | OXYGEN SATURATION: 100 %

## 2020-03-06 LAB — POTASSIUM SERPL-SCNC: 2.9 MMOL/L (ref 3.4–5.3)

## 2020-03-06 PROCEDURE — 84132 ASSAY OF SERUM POTASSIUM: CPT | Performed by: EMERGENCY MEDICINE

## 2020-03-06 PROCEDURE — 25000132 ZZH RX MED GY IP 250 OP 250 PS 637: Performed by: EMERGENCY MEDICINE

## 2020-03-06 PROCEDURE — 36415 COLL VENOUS BLD VENIPUNCTURE: CPT | Performed by: EMERGENCY MEDICINE

## 2020-03-06 RX ORDER — GABAPENTIN 300 MG/1
900 CAPSULE ORAL 3 TIMES DAILY
Qty: 90 CAPSULE | Refills: 0 | Status: ON HOLD | OUTPATIENT
Start: 2020-03-06 | End: 2020-05-26

## 2020-03-06 RX ORDER — MEDROXYPROGESTERONE ACETATE 150 MG/ML
150 INJECTION, SUSPENSION INTRAMUSCULAR
COMMUNITY

## 2020-03-06 RX ORDER — POTASSIUM CHLORIDE 1500 MG/1
40 TABLET, EXTENDED RELEASE ORAL ONCE
Status: COMPLETED | OUTPATIENT
Start: 2020-03-06 | End: 2020-03-06

## 2020-03-06 RX ORDER — DULOXETIN HYDROCHLORIDE 60 MG/1
60 CAPSULE, DELAYED RELEASE ORAL DAILY
COMMUNITY
End: 2020-03-06

## 2020-03-06 RX ORDER — BACLOFEN 10 MG/1
5 TABLET ORAL EVERY 8 HOURS PRN
Status: ON HOLD | COMMUNITY
Start: 2020-01-07 | End: 2020-05-26

## 2020-03-06 RX ORDER — LOPERAMIDE HCL 2 MG
4 CAPSULE ORAL PRN
Qty: 20 CAPSULE | Refills: 0 | Status: ON HOLD | OUTPATIENT
Start: 2020-03-06 | End: 2020-05-26

## 2020-03-06 RX ORDER — POTASSIUM CHLORIDE 750 MG/1
10 TABLET, EXTENDED RELEASE ORAL 4 TIMES DAILY
COMMUNITY
Start: 2020-01-10 | End: 2020-03-06

## 2020-03-06 RX ORDER — DULOXETIN HYDROCHLORIDE 60 MG/1
60 CAPSULE, DELAYED RELEASE ORAL DAILY
Qty: 10 CAPSULE | Refills: 0 | Status: ON HOLD | OUTPATIENT
Start: 2020-03-06 | End: 2020-05-26

## 2020-03-06 RX ORDER — VARENICLINE TARTRATE 1 MG/1
1 TABLET, FILM COATED ORAL 2 TIMES DAILY
Status: ON HOLD | COMMUNITY
Start: 2020-01-27 | End: 2020-07-22

## 2020-03-06 RX ORDER — PROMETHAZINE HYDROCHLORIDE 12.5 MG/1
TABLET ORAL
Status: ON HOLD | COMMUNITY
Start: 2019-10-30 | End: 2020-05-26

## 2020-03-06 RX ORDER — LANOLIN ALCOHOL/MO/W.PET/CERES
1000 CREAM (GRAM) TOPICAL DAILY
COMMUNITY
Start: 2019-06-15 | End: 2020-03-06

## 2020-03-06 RX ORDER — ACETAMINOPHEN 500 MG
500 TABLET ORAL 3 TIMES DAILY
Status: ON HOLD | COMMUNITY
End: 2020-08-26

## 2020-03-06 RX ORDER — MIRTAZAPINE 15 MG/1
7.5 TABLET, FILM COATED ORAL AT BEDTIME
Qty: 10 TABLET | Refills: 0 | Status: ON HOLD | OUTPATIENT
Start: 2020-03-06 | End: 2020-05-26

## 2020-03-06 RX ORDER — MIRTAZAPINE 15 MG/1
7.5 TABLET, FILM COATED ORAL AT BEDTIME
COMMUNITY
End: 2020-03-06

## 2020-03-06 RX ORDER — ACETAMINOPHEN 325 MG/1
975 TABLET ORAL ONCE
Status: COMPLETED | OUTPATIENT
Start: 2020-03-06 | End: 2020-03-06

## 2020-03-06 RX ORDER — FOLIC ACID 1 MG/1
1000 TABLET ORAL DAILY
Status: ON HOLD | COMMUNITY
Start: 2020-01-07 | End: 2020-07-22

## 2020-03-06 RX ORDER — FERROUS SULFATE 325(65) MG
325 TABLET ORAL DAILY
COMMUNITY
Start: 2020-01-07 | End: 2020-03-06

## 2020-03-06 RX ORDER — LOPERAMIDE HCL 2 MG
2 CAPSULE ORAL ONCE
Status: COMPLETED | OUTPATIENT
Start: 2020-03-06 | End: 2020-03-06

## 2020-03-06 RX ORDER — BUSPIRONE HYDROCHLORIDE 15 MG/1
7.5 TABLET ORAL 2 TIMES DAILY
Qty: 10 TABLET | Refills: 0 | Status: ON HOLD | OUTPATIENT
Start: 2020-03-06 | End: 2020-05-26

## 2020-03-06 RX ORDER — FAMOTIDINE 20 MG/1
20 TABLET, FILM COATED ORAL 2 TIMES DAILY
COMMUNITY
Start: 2020-01-15 | End: 2020-08-26

## 2020-03-06 RX ADMIN — POTASSIUM CHLORIDE 40 MEQ: 1500 TABLET, EXTENDED RELEASE ORAL at 11:22

## 2020-03-06 RX ADMIN — LOPERAMIDE HYDROCHLORIDE 2 MG: 2 CAPSULE ORAL at 10:19

## 2020-03-06 RX ADMIN — GABAPENTIN 300 MG: 300 CAPSULE ORAL at 09:00

## 2020-03-06 RX ADMIN — ACETAMINOPHEN 975 MG: 325 TABLET, FILM COATED ORAL at 10:19

## 2020-03-06 NOTE — ED NOTES
"Pt stated,\"Usually I can wipe my butt, and pull up my own pants without problems.  When I take my gabapentin 900 mg three times a day.\"  Pt able to slowly transfer herself from commode, to wheelchair, to bed.  Pt has left sided weakness, and usually transfers herself in this fashion at home.    "

## 2020-03-06 NOTE — ED NOTES
DATE:  3/5/2020   TIME OF RECEIPT FROM LAB:  1827  LAB TEST:  potassium  LAB VALUE:  2.8  RESULTS GIVEN WITH READ-BACK TO (PROVIDER):  Dr. Chin  TIME LAB VALUE REPORTED TO PROVIDER:   1827

## 2020-03-06 NOTE — ED NOTES
Patient asked about Dr ordering more gabapentin again and I again explained to her that the prescription we have here states it is 3x daily and I gave it to her at 9 pm last night so the next order would not be until the morning. After leaving as she stated she didn't need anything else she asked security to use the bathroom so she is now on the bedpan.

## 2020-03-06 NOTE — ED NOTES
Care Transitions focused note:      This worker attempted to call Kellen from [facility], , to update her on Maggie's situation. This worker received verbal permission from Maggie.     This worker communicated with Maggie, patient crisis funds due to her inability to pay for her prescriptions. This worker called Katherine for financial to see patient.     Patient financial document filled out, signed for medication.     This worker received a phone call back from Kellen, and attempted to call again to Kellen.     This worker was able to connect with Kellen, and pass information along regarding Maggie's comments, and wishes for her living situation.

## 2020-03-06 NOTE — ED NOTES
"Care Transitions focused note:        Social Service Psychosocial Assessment  Presenting Problem:   Suicidal ideation.    Marital Status:   Unknown.   Spouse / Children:    Patient reports she has an 11 year old daughter.  Psychiatric TX HX:   Pt reported that she has been on the 5th floor on one occasion.   Suicide Risk Assessment:  Patient reported that she is not currently suicidal. Patient reported that she does want to live. Patient stated several times, that she wanted to just get her medicine and go home.   Medical Issues:   Patient reported a stroke 2 years ago.   Visual -Motor Functioning:   No visual impairments noticed. Patient had a stroke so has some large motor impairments.   Communication Skills /Needs:   Patient    Living Situation:   Patient was living in a group home; and due to the group home closing at the beginning of the year she moved in with a \"classmates\" father. Patient reports a strained living condition, due to the condition of the home.   ADL s:  Patient has a 2 PCAs to assist her for her daily living activities.   Education:  Did not discuss.    Financial Situation:   Patient reported she receives $1048 per month from social security.   Occupation:  Patient did not report any employment.   Leisure & Recreation:  Patient reports that she enjoys watching some [employer] shows.   Substance Use/ Abuse:   Patient indicated that she does drink on occasion, but does not think that she has a problem.  Chemical Dependency Treatment HX:   Patient reported that she never had treatment.   Significant Life Events:   Stroke 2 years ago. Prior to this medical event, patient was able to walk.   Strengths:   Patient has a strong relationship with her daughter, and was able to identify 5 support.   Challenges /Limitation:   Patient recently had a living environment change, which triggered a medical spend down.   Patient Support Contact (Include name, relationship, number, and summary of conversation):  " "Patient reported 5 support people. Her mother, father, daughter, Allen (her PCA), and her friend Cinda.   Interventions:       Medical/Dental Care - Patient has primary care provider.     Individual Therapy - Patient reports that she has an appointment for individual therapy on March 9th, 2020.     Housing/Placement - Patient reported that she is currently living with a \"classmates\" father, but is wanting to find alternative housing. Patient reported that she is open to living in a place further away as she is not very happy with her currently living situation.     Case Management - Patient has a , [facility].     High Risk Safety Plan - This worker provided numbers for Suicide hotline, and programed them into the phone with patient. This worker provided hard copy. This worker discussed support system, and scheduled a follow up call for Monday morning with patient.   KYARA Flannery  3/6/2020  10:15 AM      "

## 2020-03-06 NOTE — TELEPHONE ENCOUNTER
S: Philip Davila, calling w clinical on [place] 29/F SI w plan     B: Pt reports depression, SI, plan to cut wrists   Lives independently, does all ADLs  Hx of stroke, left side limitations - pt can do ADLs,   Pt in wheelchair, pt reports she sometimes needs help transferring to the toilet, but traditionally does her own transfers   Meds: buspar, gabapentin - pt reports she ran out of meds and stopped taking them   Medically cleared,   Patient cleared and ready for behavioral bed placement: Yes    A: Voluntary     R: No appropriate placement available at this time.  Pt remains on wait list at this time

## 2020-03-06 NOTE — ED NOTES
"Pt up to bedside commode.  2 staff in to assist with gait belt.  Pt then stated she doesn't need as much help today, \"yesterday was a shit show, when I had to be transferred.\"  This writer informed patient, that we have to have a hand on gait belt, to transfer to commode, to assess how patient is transferring today, but will let patient do all the work.  Pt was in agreement.  Pt pivoted self without issue to commode.    "

## 2020-03-06 NOTE — ED NOTES
Arnold brought pt wheelchair and backpack for pt, WC and backpack is in storage room by pt locker number A with pt label on it.

## 2020-03-06 NOTE — ED NOTES
"MD Chin notified, pt with loose stool, and patient requesting tylenol for a headache and imodium for loose stool.  \"I will be up all day on the commode, if I don't get imodium for my diarrhea.\"  "

## 2020-03-06 NOTE — ED NOTES
Aditi, from [facility] called.  She is still looking into, to see if patient can be admitted to [facility].

## 2020-03-06 NOTE — PROGRESS NOTES
Assisted UA and Security to get patient up with transfer belt and shoes on to the wheelchair as patient had requested to use the bathroom.  Patient very anxious and hollaring out while attempting to instruct and assist her in wheelchair.  Able to move right/left  Lower extremities without hesitation and can move left foot/leg with time.  When in bathroom, self and UA assisted patient to stand and pivot using grab bars and 15 minutes of coaxing.  While on toilet, purposefully moving left leg and assisted staff in her removing of clothes and putting on paper scrubs.  Put left leg up on right leg to put her tennis shoe on and then dropped foot gently to floor.  Much coaxing and instructing to assist up in wheelchair in into room.  Two security guards and two staff assisted back to bed with transfer belt in place.  Again, much instruction and coaxing to get patient to stand and pivot.  Patient questions everyone's lifting and transfer techniques and multiple times, appropriate information reinforced.  At times when patient assisted with moving, patient would resist our assisting movements.  Bathroom process took approximately 45 minutes.

## 2020-03-06 NOTE — ED NOTES
DATE:  3/6/2020   TIME OF RECEIPT FROM LAB:  1119  LAB TEST:  potassium  LAB VALUE:  2.9  RESULTS GIVEN WITH READ-BACK TO (PROVIDER):  Dr. Chin  TIME LAB VALUE REPORTED TO PROVIDER:   1115

## 2020-03-06 NOTE — ED NOTES
Chasidy Med scribe at bedside.  Jennifer, , has seen the patient as well. Pt eating breakfast now.  PO imodium and tylenol given.

## 2020-03-06 NOTE — ED NOTES
Face to face report given with opportunity to observe patient.    Report given to Tere Wang RN   3/6/2020  7:09 AM

## 2020-03-06 NOTE — PLAN OF CARE
Prior to Admission Medication Reconciliation:     Medications added:   [] None  [x] As listed below: rx verified through outside med report (see below)    Mirtazapine    chantix    pepcid    Folic acid    Depo-provera    Nicotine patches    orajel    baclofen      Medications deleted:   [] None  [x] As listed below:    Capsaicin    Potassium- discontinued 1/27 during office visit, patient confirmed    Iron- discontinued 1/27 during office visit, patient confirmed    aspercreme- patient confirmed, no longer uses    chantix starter pack- therapy complete, patient on continuing pack    Trazodone- patient stated she prefers to take the mirtazapine at night and she last filled mirtazapine more recently. I took it off the list so both don't get ordered although she DOES have history of them both and they have both been filled around the same time. Provider to advise.     Changes made to existing medications:   [] None  [x] As listed below:    Input appropriate strengths and frequencies utilizing the outside med dispense report (see below and click on link for specific details for each med) and patient confirmation.     Last times/dates taken verified with patient:  [x] Yes- completed myself:     patient said she took most things a couple days ago. Her fill dates don't exactly  coincide with compliancy so I don't know how regularly patient takes her maintenance meds. She said she is struggling with being able to afford some meds that are currently at the pharmacy due to a spend down.   [] Nurse completed no changes made  [] Unable to review with patient:  [] Nurse completed/changes made:     Allergy modifications:    [x]Did not review  []Patient verified NKA  []Patient verified current existing allergies: no changes made  []New allergies: listed below    Medication reconciliation sources:   [x]Patient  []Patient family member/emergency contact: **  [][facility]  []Epic Chart Review  [x]Care Everywhere  review  medroxyPROGESTERone (Depo-Provera) 150 MG/ML injection 150 mg 1 01/27/2020   Discontinued Medications  - documented as of this encounter  Medication Sig Discontinue Reason Start Date End Date   ferrous sulfate 325 (65 Fe) MG tablet   Take 1 Tab by mouth one time a day. Course of treatment completed 03/26/2019 01/27/2020   potassium chloride CR (K-DUR, KLOR-CON M) 10 MEQ tablet   Take 1 Tab by mouth four times a day with meals. Do not crush. Course of treatment completed 04/16/2019 01/27/2020     Outpatient Medications Marked as Taking for the 7/31/19 encounter (Office Visit) with Yifan Jara III, MD   Medication Sig     diclofenac (VOLTAREN) 1 % topical Apply 1-2 g topically four times a day as needed for Pain . Apply gel to affected joint and rub into skin gently; apply to entire joint , Left knee     Menthol, Topical Analgesic, (BIOFREEZE) 4 % Gel Apply topically. Apply to soft tissues areas of pain as needed     mirtazapine (REMERON) 15 MG tablet Take 0.5 Tabs by mouth one time a day.     traZODone (DESYREL) 100 MG tablet Take 1 Tab by mouth at bedtime.     folic acid 1 MG tablet Take 1 Tab by mouth one time a day.     famotidine (PEPCID) 20 MG tablet Take 1 Tab by mouth two times a day.     potassium chloride CR (K-DUR, KLOR-CON M) 10 MEQ tablet Take 1 Tab by mouth four times a day with meals. Do not crush.     gabapentin (NEURONTIN) 300 MG capsule Take 3 Caps by mouth three times a day.     cyanocobalamin (VITAMIN B-12) 1000 MCG tablet Take 1 Tab by mouth one time a day. Compassionate care     magnesium carbonate (MAGONATE) 54 MG/5ML Liquid Take 5 mL by mouth two times a day.     MAGONATE 54 (Mag Equiv) MG/5ML Liquid TAKE 5ML'S BY MOUTH TWICE A DAY     acetaminophen (TYLENOL) 500 MG tablet Take 2 Tabs by mouth three times a day. Limit acetaminophen to 4000 mg per day from all sources.     ferrous sulfate 325 (65 Fe) MG tablet Take 1 Tab by mouth one time a day.     rivaroxaban (XARELTO) 20 MG  tablet Take 1 Tab by mouth every morning with breakfast.     DULoxetine (CYMBALTA) 60 MG delayed release capsule Take 2 Caps by mouth one time a day. Do not crush.     melatonin 3 MG tablet Take 3 Tabs by mouth at bedtime.     []Pharmacy med list: **  [x]Pharmacy phone call: [facility] in [place]  [x]Outside meds dispense report:  Medication Dispense History (from 3/7/2019 to 3/5/2020)   Expand All  Collapse All   Acetaminophen       Dispensed Days Supply Quantity Provider Pharmacy   ACETAMINOPHEN ES 500MG CAPLET 01/07/2020 33 100 GUILLERMO,GENE [facility]...   PAIN RELIEVER ES 500MG CAPLET 12/06/2019 30 180 [facility]LLERMO,GENE [facility]   SM PAIN RELIEVER EXTRA ST 500MG TABLET 12/06/2019 24 144 tablet     PAIN RELIEVER ES 500MG CAPLET 11/06/2019 30 180 GUILLERMO,RODRICK [facility]   PAIN RELIEVER ES 500MG CAPLET 10/07/2019 30 180 GUILLERMO,RODRICK [facility]...   ACETAMINOPHEN ES 500MG CAPLET 08/29/2019 28 168 GUILLERMO,GENE [facility]...   ACETAMINOPHEN ES 500MG CAPLET 08/25/2019 50 300 GUILLERMO,GENE [facility]...   ACETAMINOPHEN ES 500MG CAPLET 06/21/2019 50 300 GUILLERMO,GENE [facility]...   ACETAMINOPHEN EXTRA STRENGTH 500MG TABLET 05/11/2019 51 300 tablet     ACETAMINOPHEN ES 500MG CAPLET 04/22/2019 17 100 GUILLERMO,GENE [facility]...   ACETAMINOPHEN ES 500MG CAPLET 04/15/2019 23 140 [facility]MO,GENE [facility]   ACETAMINOPHEN ES 500MG CAPLET 03/31/2019 17 100 GUILLERMO,GENE [facility]...   ACETAMINOPHEN ES 500MG CAPLET 03/29/2019 24 140 GUILLERMO,GENE [facility]   ACETAMINOPHEN ES 500MG CAPLET 03/18/2019 15 100 AMRIAMA [facility]...         Baclofen       Dispensed Days Supply Quantity Provider Pharmacy   BACLOFEN 10MG TABLET 01/07/2020 30 45 RODRICK GUILLERMO Pharmacy...   BACLOFEN 10MG TABLET 11/20/2019 30 45 RODRICK [facility]...   BACLOFEN 10MG TABLET 05/15/2019 30 45 RODRICK [facility]LLERMO  [facility]...   BACLOFEN 10MG TABLET 03/28/2019 30 45 MARS BONILLA [facility] - C...   BACLOFEN 10MG TABLET 03/13/2019 5 8 MARIAMA [facility] - [facility]...   BACLOFEN 5MG TABLET 03/08/2019 10 30 [facility] - [facility]...         Benzocaine       Dispensed Days Supply Quantity Provider Pharmacy   [id number] 20% TOP GEL 03/13/2019 15 15 g [facility] - [facility]...         Budesonide       Dispensed Days Supply Quantity Provider Pharmacy   BUDESONIDE 3MG CAPSULE DELAYED RELEASE PARTICLES 03/08/2019 14 14 capsule           Cyanocobalamin       Dispensed Days Supply Quantity Provider Pharmacy   VITAMIN B12 1000MCG TAB 06/15/2019 30 100 RODRICK [facility] - C...         DULoxetine HCl       Dispensed Days Supply Quantity Provider Pharmacy   DULOXETINE 60MG DR CAPSULE 01/07/2020 30 30 [facility] SPAULDING [facility]   DULOXETINE 60MG DR CAPSULE 12/04/2019 30 30 MARIAMA DIAMOND [facility] - C...   DULOXETINE HYDROCHLORIDE 60MG CAPSULE DELAYED RELEASE PARTICLES 12/04/2019 26 26 capsule     DULOXETINE 60MG DR CAPSULE 11/04/2019 30 30 MARIAMA DIAMOND [facility] - C...   DULOXETINE 60MG DR CAPSULE 09/27/2019 28 56 RODRICK GUILLERMO [facility] - C...   DULOXETINE 60MG DR CAPSULE 08/29/2019 28 56 RODRICK [facility]MO [facility] - C...   DULOXETINE 60MG DR CAPSULE 08/02/2019 30 60 RODRICK [facility] - C...   DULOXETINE 60MG DR CAPSULE 07/11/2019 24 48 [facility]ODRICK [facility]...   DULOXETINE 60MG DR CAPSULE 06/17/2019 30 60 RODRICK [facility]...   DULOXETINE 60MG DR CAPSULE 04/23/2019 28 56 RODRICK [facility]...   DULOXETINE 60MG DR CAPSULE 03/27/2019 19 38 MARIAMA WHEELER [facility] - [facility]...         Famotidine       Dispensed Days Supply Quantity Provider Pharmacy   FAMOTIDINE 20MG TABLET 01/15/2020 30 60 GUILLERMO,GENE [facility]...   FAMOTIDINE 20MG TABLET 12/06/2019 24 48 tablet      FAMOTIDINE 20MG TABLET 12/06/2019 30 60 GUILLERMO,GENE [facility] - [facility]...   FAMOTIDINE 20MG TABLET 11/06/2019 30 60 GUILLERMO,GENE [facility] - [facility]...   FAMOTIDINE 20MG TABLET 10/07/2019 30 60 GUILLERMO,GENE [facility] - [facility]...   FAMOTIDINE 20MG TABLET 08/29/2019 28 56 GUILLERMO,GENE [facility] - [facility]...   FAMOTIDINE 20MG TABLET 08/02/2019 30 60 GUILLERMO,GENE [facility] - [facility]...   FAMOTIDINE 20MG TABLET 07/03/2019 30 60 GUILLERMO,GENE [facility]...   FAMOTIDINE 20MG TABLET 06/25/2019 10 20 [facility]MO,GENE [facility]   FAMOTIDINE 20MG TABLET 06/01/2019 30 60 GUILLERMO,GENE [facility]...   FAMOTIDINE 20MG TABLET 04/30/2019 30 60 GUILLERMO,GENE [facility]...   FAMOTIDINE 20MG TABLET 04/22/2019 30 59 GUILLERMO,GENE [facility]...   FAMOTIDINE 20MG TABLET 04/16/2019 8 16 GUILLERMO,RODRICK [facility]...   FAMOTIDINE 20MG TABLET 03/27/2019 19 38 [facility] - [facility]...         Ferrous Sulfate       Dispensed Days Supply Quantity Provider Pharmacy   FERROUS SULFATE 325MG TABLET 01/07/2020 90 90 EAGLE,RODRICK [facility]   FEROSUL 325MG TABLET 12/06/2019 24 24 tablet     FERROUS SULFATE 325MG TABLET 12/06/2019 30 30 GUILLERMO,RODRICK [facility]...   FERROUS SULFATE 325MG TABLET 11/06/2019 30 30 GUILLERMO,RODRICK [facility]...   FERROUS SULFATE 325MG TABLET 10/07/2019 30 30 GUILLERMO,RODRICK [facility]...   FERROUS SULFATE 325MG TABLET 09/05/2019 28 28 tablet     FERROUS SULFATE 325MG TABLET 08/29/2019 28 28 GUILLERMO,RODRICK [facility]...   FERROUS SULFATE 325MG TABLET 08/22/2019 17 17 RODRICK GUILLERMO #107 - C...   FERROUS SULFATE 325MG TABLET 03/26/2019 30 100 MARS BONILLA #107 - C...         Folic Acid       Dispensed Days Supply Quantity Provider Pharmacy   FOLIC ACID 1MG TABLET 01/07/2020 30 30 RODRICK GUILLERMO Pharmacy...   FOLIC ACID 1MG TABLET 12/06/2019 24 24 tablet     FOLIC ACID 1MG TABLET  12/06/2019 30 30 GUILLERMO,GENE [facility]   FOLIC ACID 1MG TABLET 11/06/2019 30 30 GUILLERMO,GENE [facility]...   FOLIC ACID 1MG TABLET 10/07/2019 30 30 GUILLERMO,GENE [facility]...   FOLIC ACID 1MG TABLET 08/29/2019 28 28 GUILLERMO,GENE [facility]...   FOLIC ACID 1MG TABLET 08/02/2019 30 30 GUILLERMO,GENE [facility]...   FOLIC ACID 1MG TABLET 07/15/2019 20 20 GUILLERMO,GENE [facility]...   FOLIC ACID 1MG TABLET 06/21/2019 30 30 GUILLERMO,GENE [facility]...   FOLIC ACID 1MG TABLET 05/22/2019 30 30 GUILLERMO,GENE [facility]...   FOLIC ACID 1MG TABLET 04/22/2019 30 30 GUILLERMO,RODRICK [facility]...         Gabapentin       Dispensed Days Supply Quantity Provider Pharmacy   GABAPENTIN 300MG CAPSULE 03/04/2020 30 270 MARS BONILLA [facility]...   GABAPENTIN 300MG CAPSULE 01/31/2020 30 270 GUILLERMO,GENE [facility]...   GABAPENTIN 300MG CAPSULE 01/07/2020 30 270 GUILLERMO,GENE [facility]...   GABAPENTIN 300MG CAPSULE 12/06/2019 24 216 capsule     GABAPENTIN 300MG CAPSULE 12/06/2019 30 270 GUILLERMO,RODRICK [facility]...   GABAPENTIN 300MG CAPSULE 11/06/2019 30 270 GUILLERMO,GENE [facility]...   GABAPENTIN 300MG CAPSULE 10/07/2019 30 270 GUILLERMO,GENE [facility]...   GABAPENTIN 300MG CAPSULE 08/29/2019 28 252 GUILLERMO,GENE [facility]...   GABAPENTIN 300MG CAPSULE 08/02/2019 34 306 GUILLERMO,GENE [facility]...   GABAPENTIN 300MG CAPSULE 07/05/2019 30 270 GUILLERMO,GENE [facility] - [facility]...   GABAPENTIN 300MG CAPSULE 06/07/2019 30 270 GUILLERMO,GENE [facility]...   GABAPENTIN 300MG CAPSULE 05/08/2019 30 270 RODRICK [facility]...   GABAPENTIN 300MG CAPSULE 04/08/2019 30 264 RODRICK [facility]...   GABAPENTIN 300MG CAPSULE 03/29/2019 10 90 MARS BONILLAeduardo [facility] - [facility]...   GABAPENTIN 100MG CAPSULE 03/28/2019 10 30 MARS BONILLA [facility] - [facility]...    GABAPENTIN 300MG CAPSULE 03/15/2019 10 90 MARIAMA [facility] #107 - C...         HYDROcodone-Acetaminophen       Dispensed Days Supply Quantity Provider Pharmacy   HYDROCODONE APAP 5MG-325MG TAB 05/16/2019 4 20 RODRICK GUILLERMO [facility] - C...   HYDROCODONE APAP 5MG-325MG TAB 04/04/2019 5 30 RODRICK GUI[facility]MO #107 - C...         Loperamide HCl       Dispensed Days Supply Quantity Provider Pharmacy   LOPERAMIDE 2MG CAPSULE 01/07/2020 27 216 FIORANNE [facility]...   ANTI-DIARRHEAL 2MG CAPLET 12/27/2019 2 12 MARIAMA DIAMOND [facility] - C...   ANTI-DIARRHEAL 2MG CAPLET 12/18/2019 2 12 MARIAMA DIAMOND [facility]107 - C...   ANTI-DIARRHEAL 2MG CAPLET 12/16/2019 2 12 MARIAMA DI[facility]OND #107 - C...   ANTI-DIARRHEAL 2MG CAPLET 12/03/2019 2 12 MARIAMA DIAMOND #107 - C...   ANTI-DIARRHEAL 2MG CAPLET 11/27/2019 2 12 MARIAMA DI[facility]OND #107 - C...   ANTI-DIARRHEAL 2MG CAPLET 11/21/2019 2 12 MAR[facility]AMA DI[facility]OND #107 - C...   ANTI-DIARRHEAL 2MG CAPLET 11/09/2019 2 12 MARIAMA DI[facility]OND #107 - C...   ANTI-DIARRHEAL 2MG CAPLET 10/24/2019 2 12 MARIAMA DI[facility]OND #107 - C...   ANTI-DIARRHEAL 2MG CAPLET 10/16/2019 3 12 MARIAMA DI[facility]OND #107 - C...   ANTI-DIARRHEAL 2MG CAPLET 09/27/2019 3 12 [facility]AMA DIAMOND [facility] - [facility]...         Magnesium Carbonate       Dispensed Days Supply Quantity Provider Pharmacy   MAGONATE 1000MG/5ML SOLN 09/17/2019 35 355 mL E[facility]LERODRICK [facility] - [facility]...   MAGONATE 1000MG/5ML SOLN 08/11/2019 35 355 mL RODRICK [facility] #107 - C...   MAGONATE 1000MG/5ML SOLN 07/11/2019 35 355 mL RODRICK [facility] #107 - C...   MAGONATE 1000MG 5ML SOLN 05/31/2019 35 355 mL RODRICK [facility] #107 - C...   MAGONATE 1000MG 5ML SOLN 05/02/2019 35 355 mL RODRICK [facility] #107 - C...   MAGONATE 1000MG 5ML SOLN 03/27/2019 35 355 mL MARIAMA WHEELER #107 - C...          Magnesium Oxide       Dispensed Days Supply Quantity Provider Pharmacy   MAGNESIUM OXIDE 400MG TABLET 10/14/2019 3 6 LUIS FIGUEROA #[facility] - C...         Mirtazapine       Dispensed Days Supply Quantity Provider Pharmacy   MIRTAZAPINE 15MG TABLET 02/10/2020 30 15 [facility]ILL[facility]...   MIRTAZAPINE 15MG TABLET 01/07/2020 22 11 GUILLERMO,RODRICK [facility]...   MIRTAZAPINE 15MG TABLET 12/06/2019 20 10 tablet     MIRTAZAPINE 15MG TABLET 12/06/2019 30 16 RODRICK GUI[facility]MO [facility] - C...   MIRTAZAPINE 15MG TABLET 11/06/2019 30 16 RODRICK [facility] #107 - C...   MIRTAZAPINE 15MG TABLET 10/07/2019 30 16 RODRICK [facility] #107 - C...   MIRTAZAPINE 15MG TABLET 08/29/2019 28 14 RODRICK GUI[facility]MO #107 - C...   MIRTAZAPINE 15MG TABLET 08/02/2019 28 14 RODRICK [facility]MO #107 - C...   MIRTAZAPINE 15MG TABLET 06/12/2019 28 14 RODRICK GUI[facility]MO #107 - C...   MIRTAZAPINE 15MG TABLET 05/18/2019 30 30 RODRICK GUILLERMO #107 - C...         Nicotine       Dispensed Days Supply Quantity Provider Pharmacy   NICOTINE 7MG/24HR PATCH 01/07/2020 28 28 MARIAMA SPAULDING [facility]    NICOT TRANS SYS 7MG 14 11/06/2019 28 28 MARIAMA DIAMOND [facility] - [facility]...   NICOTINE TRANSDERMAL SYST 7MG/24HR PATCH 24 HOUR 03/08/2019 30 30 patch           Nicotine Polacrilex       Dispensed Days Supply Quantity Provider Pharmacy    NICOTINE POLACRILEX 2MG GUM 03/08/2019 4 50           Polyethylene Glycol 3350       Dispensed Days Supply Quantity Provider Pharmacy   POLYETHYLENE GLYCOL 3350 PACKET 03/08/2019 30 30 packet           Potassium Chloride       Dispensed Days Supply Quantity Provider Pharmacy   POTASSIUM CL 10MEQ ER TABLET 01/10/2020 30 120 MARIAMA SPAULDING Pharmacy...   POTASSIUM CL 10MEQ ER TABLET 12/19/2019 28 112 MARIAMA DIAMOND [facility] - [facility]...   POTASSIUM CHLORIDE ER 10MEQ TABLET EXTENDED RELEASE 11/23/2019 28 112 tablet      POTASSIUM CL 10MEQ ER TABLET 11/21/2019 28 112 [facility]AMA [facility] - [facility]...   POTASSIUM CHLORIDE CR 10MEQ TABLET EXTENDED RELEASE 11/01/2019 28 112 tablet     POTASSIUM CL 10MEQ ER TABLET 10/25/2019 28 112 [facility] - [facility]...   POTASSIUM CL 10MEQ ER TABLET 10/18/2019 18 74 MARIAMA DI[facility]OND [facility]107 - C...   POTASSIUM CL 10MEQ ER TABLET 10/16/2019 3 24 MARIAMA DI[facility]107 - C...   POTASSIUM CL 10MEQ ER TABLET 10/14/2019 2 4 LUIS FIGUEROA #107 - C...   POTASSIUM CL 20MEQ ER TABLET 10/03/2019 32 63 MARIAMA DI[facility]OND #107 - C...   POTASSIUM CL 20MEQ ER TABLET 09/27/2019 28 56 MARIAMA DI[facility]OND #107 - C...         Potassium Chloride Noemi ER       Dispensed Days Supply Quantity Provider Pharmacy   POTASSIUM CL 10MEQ ER TABLET 09/27/2019 28 112 RODRICK GUILLERMO #107 - C...   POTASSIUM CL 10MEQ ER TABLET 08/29/2019 28 112 RODRICK [facility]LLERMO [facility] - [facility]...   POTASSIUM CL 10MEQ ER TABLET 08/02/2019 30 120 GUILLERMORODRICK VILLALBA #107 - C...   POTASSIUM CL 10MEQ ER TABLET 07/25/2019 10 40 RODRICK GUILLERMO #107 - C...   POTASSIUM CL 10MEQ ER TABLET 07/01/2019 30 120 GUILLERMORODRICK VILLALBA [facility] - [facility]...   POTASSIUM CL 10MEQ ER TABLET 06/01/2019 30 120 GUILLERMO,RODRICK [facility]...   POTASSIUM CL 10MEQ ER TABLET 05/02/2019 30 120 GUILLERMO,RODRICK [facility] - [facility]...   POTASSIUM CL 10MEQ ER TABLET 04/02/2019 30 118 MARIAMA WHEELER #107 - C...   POTASSIUM CHLORIDE ER 10MEQ TABLET EXTENDED RELEASE 03/08/2019 30 120 tablet           Promethazine HCl       Dispensed Days Supply Quantity Provider Pharmacy   PROMETHAZINE 12.5MG TABLET 10/30/2019 8 30 [facility]AMA DI[facility]107 - C...   PROMETHAZINE 12.5MG TABLET 10/16/2019 8 30 MARIAMA DIAMOND #107 - C...         Psyllium       Dispensed Days Supply Quantity Provider Pharmacy   FIBER THERAPY FOR SUSPENSION 09/30/2019 30 368 g MARIAMA DIAMOND  N[facility] - [facility]...   NATURAL FIBER THERAPY 48.57% POWDER 09/30/2019 30 368 each           Rivaroxaban       Dispensed Days Supply Quantity Provider Pharmacy   XARELTO 20MG TABLET 01/15/2020 30 30 GUILLERMO,RODRICK [facility]   XARELTO 20MG TABLET 12/24/2019 28 28 GUILLERMO,RODRICK [facility]...   XARELTO 20MG TABLET 12/17/2019 28 28 GUILLERMO,RODRICK [facility] - [facility]...   XARELTO 20MG TABLET 12/02/2019 28 28 GUILLERMO,GENE [facility] - [facility]...   XARELTO 20MG TABLET 09/27/2019 28 28 GUILLERMO,RODRICK [facility]...   XARELTO 20MG TABLET 08/29/2019 28 28 GUILLERMO,GENE [facility] - [facility]...   XARELTO 20MG TABLET 08/03/2019 30 30 tablet     XARELTO 20MG TABLET 08/02/2019 30 30 GUILLERMO,RODRICK [facility]...   XARELTO 20MG TABLET 07/30/2019 5 5 GUILLERMO,GENE [facility] - [facility]...   XARELTO 20MG TABLET 07/05/2019 30 30 GUILLERMO,GENE [facility] - [facility]...   XARELTO 20MG TABLET 07/03/2019 28 28 [facility]LLERMO,GENE [facility]...   XARELTO 20MG TABLET 06/24/2019 28 28 GUILLERMO,RODRICK [facility] - [facility]...   XARELTO 20MG TABLET 06/07/2019 28 28 GUILLERMO,GENE [facility]...   XARELTO 20MG TABLET 05/23/2019 19 19 tablet     XARELTO 20MG TABLET 05/23/2019 28 28 GUILLERMO,GENE [facility]...   XARELTO 20MG TABLET 04/29/2019 30 30 [facility]ERMO,GENE [facility]...   XARELTO 20MG TABLET 04/05/2019 30 30 GUILLERMO,GENE [facility]...         Sennosides-Docusate Sodium       Dispensed Days Supply Quantity Provider Pharmacy   SM STOOL SOFTENER TABLET 04/02/2019 50 100 tablet     STOOL SOFTENER W LAX TABLET 04/02/2019 20 39 MARIAMA WHEELER #107 - C...         Varenicline Tartrate       Dispensed Days Supply Quantity Provider Pharmacy   CHANTIX CONTINUING MONTH BOX 01/27/2020 28 56 MARS BONILLA Pharmacy...   CHANTIX STARTING MONTH BOX 01/27/2020 28 53 MARS BONILLA Pharmacy...         Other       Dispensed Days Supply Quantity Provider Pharmacy    VITAMIN B-12 1,000 MCG TABLET 09/27/2019 28 28 RODRICK GUILLERMO #107 - C...   VITAMIN B-12 1,000 MCG TABLET 08/29/2019 28 28 RODRICK [facility]LLERMO #107 - C...   VITAMIN B-12 1000MCG TABLET 06/15/2019 30 100 tablet     FOLIC ACID 1MG TABLET 03/27/2019 19 19 MARIAMA WHEELER #107 - C...   VITAMIN B-12 1,000 MCG TABLET 03/27/2019 100 100 MARIAMA WHEELER #107 - C...   LAXATIVE 10MG RECTAL SUPP 03/13/2019 12 12 MARIAMA WHEELER #107 - C...   NATURAL FIBER THERAPY PWD SUSP 03/13/2019 30 368 g MARIAMA WHEELER #107 - C...   NATURAL FIBER THERAPY PWD SUSP 03/08/2019 30 368 g MARIAMA WHEELER #107 - C...         busPIRone HCl       Dispensed Days Supply Quantity Provider Pharmacy   BUSPIRONE 15MG TABLET 02/20/2020 30 30 TASHAYOLIS Changeduardo [facility] Pharm[facility]...   BUSPIRONE 15MG TABLET 01/09/2020 30 30 YOLIS CORDOVA Pharmacy...   BUSPIRONE 15MG TABLET 12/19/2019 30 30 YOLIS CORDOVA #107 - C...   BUSPIRONE HYDROCHLORIDE 15MG TABLET 12/19/2019 26 26 tablet     BUSPIRONE HYDROCHLORIDE 15MG TABLET 11/25/2019 30 30 tablet     BUSPIRONE 15MG TABLET 11/04/2019 30 30 YOLIS CORDOVA [facility] - [facility]...   BUSPIRONE 15MG TABLET 10/30/2019 33 33 YOLIS CORDOVA [facility] - C...   BUSPIRONE 15MG TABLET 10/21/2019 7 14 TASHAYOLIS [facility] - C...         traZODone HCl       Dispensed Days Supply Quantity Provider Pharmacy   TRAZODONE 100MG TABLET 01/07/2020 30 30 RODRICK GUILLERMO Pharmacy...   TRAZODONE 100MG TABLET 12/06/2019 30 30 RODRICK GUILLERMO #107 - C...   TRAZODONE HYDROCHLORIDE 100MG TABLET 12/06/2019 24 24 tablet     TRAZODONE 100MG TABLET 11/06/2019 30 30 RODRICK [facility] - [facility]...   TRAZODONE 100MG TABLET 10/07/2019 30 30 RODRICK [facility]MO[facility] - C...   TRAZODONE 100MG TABLET 08/29/2019 28 28 RODRICK [facility]MO[facility] - C...   TRAZODONE 100MG TABLET 08/02/2019 30 30  EAGLE,RODRICK [facility] - C...   TRAZODONE 100MG TABLET 07/25/2019 10 10 GUILLERMO,RODRICK [facility] - C...   TRAZODONE 100MG TABLET 07/01/2019 30 30 RODRICK GUILLERMO #107 - C...   TRAZODONE 100MG TABLET 06/01/2019 30 30 RODRICK GUILLERMO #107 - C...   TRAZODONE 100MG TABLET 05/02/2019 30 30 RODRICK GUILLERMO #107 - C...   TRAZODONE 100MG TABLET 04/02/2019 30 30 MARIAMA WHEELER #107 - C...   TRAZODONE 50MG TABLET 04/02/2019 30 30 MARIAMA WHEELER #107 - C...   TRAZODONE HYDROCHLORIDE 100MG TABLET 03/08/2019 30 30 tablet     TRAZODONE HYDROCHLORIDE 50MG TABLET 03/08/2019 30 15 tablet             []Nursing home MAR:  []Other: **    Is patient on coumadin?  [x]No: is on Xarelto     Requests for consultation by provider or pharmacist:   [x] Patient understands why all of their meds were prescribed and how to take them. No questions.   [] Fill dates coincide with compliancy for all maintenance meds.   [x] Fill dates do not coincide with compliancy with maintenance meds. Patient possibly non-compliant.   [] Patient has questions about the following:    Comments: patient manages her own meds and stated she is having issues being able to afford some of her medication.     Chasidy Calle on 3/6/2020 at 9:49 AM       Discrepancies: [x] No []Not Applicable []Yes: listed below    Time spent on medication reconciliation:   []5-20 mins  [x]20-40 mins  []> 40 mins    Issues completing PTA medication reconciliation:  [] On hold for a long time  [] Waited for a call back  [] Fax didn't come through  [] Fax took a long time  [x] Other:computer froze and I was unable to complete note in a timely fashion.     Notifying appropriate party of changes/additions/discrepancies:  []No changes made, notification not necessary.   [] Notified attending provider via text page  [] Notified attending provider in person  [] Notified pharmacy  [x] Notified nurse  [] Attending provider not available,  left detailed notes  [] Changes/additions made don't need provider notification because provider has not seen patient or input any orders  [] Changes/additions made don't need provider notification because changes made are to medications not ordered      (Not in a hospital admission)

## 2020-03-06 NOTE — ED NOTES
This writer called [facility], and spoke with Aditi, about an update on patient.  Currently no beds available, but will be looking into places.

## 2020-03-06 NOTE — TELEPHONE ENCOUNTER
3/6/20  R  10:19am - L/M for Dr. Rinaldi Medical Director for [place]. Awaiting call back.   10:35am - Dr. Rinaldi to look into chart and talk with provider  10:49am - Dr. Rinaldi stated management declined patient, no psychiatry provider  10:54am - L/M for unit RN manager.  11:35am - Called ED for update. Patient is no long experiencing SI and will discharge home. Confirmed that patient will be removed from wait list at this time.

## 2020-03-06 NOTE — ED NOTES
1:1 pt. observation paperwork filled out in package. Documentation is located form [id number] here. Handoff was given to .

## 2020-03-06 NOTE — ED NOTES
Dr. Flores stating that she is not going to refill the medications patient is requesting at this time. Pt to be transferred and have medications changed and/or evaluated by that facility.

## 2020-03-06 NOTE — ED NOTES
Discharge instructions reviewed with patient, verbalizes understanding, no questions.  Encouraged to return here if not getting better, or worsening.  Pt will call ambulance, or for a ride to ED, if she feels like hurting herself.

## 2020-03-06 NOTE — ED NOTES
Patient very concerned about her gabapentin and upset she's only getting 300 as she states she takes 3 of there pills at the same time not one she takes 900 at a time not 300. Just explained that's what we have as her prescription.

## 2020-03-09 ENCOUNTER — TELEPHONE (OUTPATIENT)
Dept: CASE MANAGEMENT | Facility: HOSPITAL | Age: 30
End: 2020-03-09

## 2020-03-09 NOTE — TELEPHONE ENCOUNTER
Care Transitions focused note:      This worker followed up with patient, was able to make contact. Verified appointment with mental health professional. Patient indicated she would be attending the appointment today at 3:00pm.     No other needs identified by patient.

## 2020-03-09 NOTE — TELEPHONE ENCOUNTER
Care Transitions focused note:      Attempted to call patient as scheduled, this worker attempted to call 2 times, will try one and final time.

## 2020-03-18 ENCOUNTER — HOSPITAL ENCOUNTER (EMERGENCY)
Facility: HOSPITAL | Age: 30
Discharge: HOME | End: 2020-03-18
Attending: NURSE PRACTITIONER | Admitting: NURSE PRACTITIONER
Payer: MEDICAID

## 2020-03-18 VITALS
SYSTOLIC BLOOD PRESSURE: 106 MMHG | TEMPERATURE: 99.1 F | HEART RATE: 92 BPM | RESPIRATION RATE: 14 BRPM | DIASTOLIC BLOOD PRESSURE: 65 MMHG | OXYGEN SATURATION: 97 %

## 2020-03-18 DIAGNOSIS — R11.2 NAUSEA WITH VOMITING: ICD-10-CM

## 2020-03-18 DIAGNOSIS — E87.6 HYPOKALEMIA: ICD-10-CM

## 2020-03-18 DIAGNOSIS — Z78.9 ALCOHOL USE: ICD-10-CM

## 2020-03-18 LAB
ALBUMIN SERPL-MCNC: 3.6 G/DL (ref 3.4–5)
ALP SERPL-CCNC: 88 U/L (ref 40–150)
ALT SERPL W P-5'-P-CCNC: 39 U/L (ref 0–50)
ANION GAP SERPL CALCULATED.3IONS-SCNC: 10 MMOL/L (ref 3–14)
AST SERPL W P-5'-P-CCNC: 90 U/L (ref 0–45)
BASOPHILS # BLD AUTO: 0 10E9/L (ref 0–0.2)
BASOPHILS NFR BLD AUTO: 0.2 %
BILIRUB SERPL-MCNC: 0.4 MG/DL (ref 0.2–1.3)
BUN SERPL-MCNC: 4 MG/DL (ref 7–30)
CALCIUM SERPL-MCNC: 8.9 MG/DL (ref 8.5–10.1)
CHLORIDE SERPL-SCNC: 112 MMOL/L (ref 94–109)
CO2 SERPL-SCNC: 20 MMOL/L (ref 20–32)
CREAT SERPL-MCNC: 0.49 MG/DL (ref 0.52–1.04)
DIFFERENTIAL METHOD BLD: ABNORMAL
EOSINOPHIL # BLD AUTO: 0.2 10E9/L (ref 0–0.7)
EOSINOPHIL NFR BLD AUTO: 1.1 %
ERYTHROCYTE [DISTWIDTH] IN BLOOD BY AUTOMATED COUNT: 14.2 % (ref 10–15)
ETHANOL SERPL-MCNC: <0.01 G/DL
GFR SERPL CREATININE-BSD FRML MDRD: >90 ML/MIN/{1.73_M2}
GLUCOSE SERPL-MCNC: 155 MG/DL (ref 70–99)
HCT VFR BLD AUTO: 43.9 % (ref 35–47)
HGB BLD-MCNC: 14.8 G/DL (ref 11.7–15.7)
IMM GRANULOCYTES # BLD: 0.1 10E9/L (ref 0–0.4)
IMM GRANULOCYTES NFR BLD: 0.5 %
LACTATE BLD-SCNC: 3.7 MMOL/L (ref 0.7–2)
LIPASE SERPL-CCNC: 387 U/L (ref 73–393)
LYMPHOCYTES # BLD AUTO: 1.1 10E9/L (ref 0.8–5.3)
LYMPHOCYTES NFR BLD AUTO: 6.8 %
MAGNESIUM SERPL-MCNC: 1.8 MG/DL (ref 1.6–2.3)
MCH RBC QN AUTO: 31.6 PG (ref 26.5–33)
MCHC RBC AUTO-ENTMCNC: 33.7 G/DL (ref 31.5–36.5)
MCV RBC AUTO: 94 FL (ref 78–100)
MONOCYTES # BLD AUTO: 0.8 10E9/L (ref 0–1.3)
MONOCYTES NFR BLD AUTO: 5.1 %
NEUTROPHILS # BLD AUTO: 13.9 10E9/L (ref 1.6–8.3)
NEUTROPHILS NFR BLD AUTO: 86.3 %
NRBC # BLD AUTO: 0 10*3/UL
NRBC BLD AUTO-RTO: 0 /100
PLATELET # BLD AUTO: 412 10E9/L (ref 150–450)
POTASSIUM SERPL-SCNC: 2.9 MMOL/L (ref 3.4–5.3)
PROT SERPL-MCNC: 7.6 G/DL (ref 6.8–8.8)
RBC # BLD AUTO: 4.69 10E12/L (ref 3.8–5.2)
SODIUM SERPL-SCNC: 142 MMOL/L (ref 133–144)
WBC # BLD AUTO: 16.1 10E9/L (ref 4–11)

## 2020-03-18 PROCEDURE — 25000128 H RX IP 250 OP 636: Performed by: NURSE PRACTITIONER

## 2020-03-18 PROCEDURE — 96365 THER/PROPH/DIAG IV INF INIT: CPT

## 2020-03-18 PROCEDURE — 36415 COLL VENOUS BLD VENIPUNCTURE: CPT | Performed by: NURSE PRACTITIONER

## 2020-03-18 PROCEDURE — 80053 COMPREHEN METABOLIC PANEL: CPT | Performed by: NURSE PRACTITIONER

## 2020-03-18 PROCEDURE — 85025 COMPLETE CBC W/AUTO DIFF WBC: CPT | Performed by: NURSE PRACTITIONER

## 2020-03-18 PROCEDURE — 96366 THER/PROPH/DIAG IV INF ADDON: CPT

## 2020-03-18 PROCEDURE — 96361 HYDRATE IV INFUSION ADD-ON: CPT

## 2020-03-18 PROCEDURE — 83690 ASSAY OF LIPASE: CPT | Performed by: NURSE PRACTITIONER

## 2020-03-18 PROCEDURE — 25000132 ZZH RX MED GY IP 250 OP 250 PS 637: Performed by: NURSE PRACTITIONER

## 2020-03-18 PROCEDURE — 80320 DRUG SCREEN QUANTALCOHOLS: CPT | Performed by: NURSE PRACTITIONER

## 2020-03-18 PROCEDURE — 99284 EMERGENCY DEPT VISIT MOD MDM: CPT | Mod: Z6 | Performed by: NURSE PRACTITIONER

## 2020-03-18 PROCEDURE — 83605 ASSAY OF LACTIC ACID: CPT | Performed by: NURSE PRACTITIONER

## 2020-03-18 PROCEDURE — 96375 TX/PRO/DX INJ NEW DRUG ADDON: CPT

## 2020-03-18 PROCEDURE — 99284 EMERGENCY DEPT VISIT MOD MDM: CPT | Mod: 25

## 2020-03-18 PROCEDURE — 83735 ASSAY OF MAGNESIUM: CPT | Performed by: NURSE PRACTITIONER

## 2020-03-18 PROCEDURE — 25800030 ZZH RX IP 258 OP 636: Performed by: NURSE PRACTITIONER

## 2020-03-18 RX ORDER — ONDANSETRON 2 MG/ML
4 INJECTION INTRAMUSCULAR; INTRAVENOUS ONCE
Status: COMPLETED | OUTPATIENT
Start: 2020-03-18 | End: 2020-03-18

## 2020-03-18 RX ORDER — KETOROLAC TROMETHAMINE 30 MG/ML
30 INJECTION, SOLUTION INTRAMUSCULAR; INTRAVENOUS
Status: DISCONTINUED | OUTPATIENT
Start: 2020-03-18 | End: 2020-03-18

## 2020-03-18 RX ORDER — POTASSIUM CL/LIDO/0.9 % NACL 10MEQ/0.1L
10 INTRAVENOUS SOLUTION, PIGGYBACK (ML) INTRAVENOUS
Status: DISPENSED | OUTPATIENT
Start: 2020-03-18 | End: 2020-03-18

## 2020-03-18 RX ORDER — ACETAMINOPHEN 325 MG/1
650 TABLET ORAL
Status: COMPLETED | OUTPATIENT
Start: 2020-03-18 | End: 2020-03-18

## 2020-03-18 RX ORDER — POTASSIUM CHLORIDE 1500 MG/1
40 TABLET, EXTENDED RELEASE ORAL ONCE
Status: COMPLETED | OUTPATIENT
Start: 2020-03-18 | End: 2020-03-18

## 2020-03-18 RX ADMIN — Medication: at 13:33

## 2020-03-18 RX ADMIN — Medication 10 MEQ: at 14:24

## 2020-03-18 RX ADMIN — POTASSIUM CHLORIDE 40 MEQ: 1500 TABLET, EXTENDED RELEASE ORAL at 14:23

## 2020-03-18 RX ADMIN — ONDANSETRON 4 MG: 2 INJECTION INTRAMUSCULAR; INTRAVENOUS at 13:38

## 2020-03-18 RX ADMIN — SODIUM CHLORIDE 1000 ML: 9 INJECTION, SOLUTION INTRAVENOUS at 14:47

## 2020-03-18 RX ADMIN — ACETAMINOPHEN 650 MG: 325 TABLET, FILM COATED ORAL at 14:47

## 2020-03-18 RX ADMIN — SODIUM CHLORIDE 1000 ML: 9 INJECTION, SOLUTION INTRAVENOUS at 13:39

## 2020-03-18 ASSESSMENT — ENCOUNTER SYMPTOMS
SORE THROAT: 0
APPETITE CHANGE: 0
DIARRHEA: 1
ABDOMINAL PAIN: 0
CONSTIPATION: 0
VOMITING: 1
DYSURIA: 0
NAUSEA: 1
COUGH: 0
DIZZINESS: 0
HEADACHES: 0
CHILLS: 0
FEVER: 0
LIGHT-HEADEDNESS: 0
RHINORRHEA: 1

## 2020-03-18 NOTE — ED NOTES
DATE:  3/18/2020   TIME OF RECEIPT FROM LAB:  6341  LAB TEST:  potassium  LAB VALUE:  2.9  RESULTS GIVEN WITH READ-BACK TO (PROVIDER):  Tiffanie Cline CNP  TIME LAB VALUE REPORTED TO PROVIDER:   7994

## 2020-03-18 NOTE — ED PROVIDER NOTES
History     Chief Complaint   Patient presents with     Nausea & Vomiting     HPI  Maggie Case is a 30 year old female who presents on stretcher via EMS with concerns of low potassium and dehydration. History of chronically low potassium. About 3 months ago she was taken off of potassium and iron- feeling more tired.   February 17, 2018 had a stroke - rare alcohol use since. Last night she drank more than she has since due to trying to keep up with others. She drank 1 pint of Fireball whiskey, then was doing shots of Fireball out of liter bottle, had a few drinks at [facility].   This morning when she tried to get to the bathroom her left knee and leg were having spasms. She has had nausea with vomiting x 2. Has chronic diarrhea - nothing new.       Allergies:  Allergies   Allergen Reactions     Amoxicillin Other (See Comments)     Headaches     Levaquin [Levofloxacin] Swelling     Naproxen Other (See Comments)     Reaction: Headaches     Nickel      Tramadol      Sulindac Rash       Problem List:    Patient Active Problem List    Diagnosis Date Noted     Depression with suicidal ideation 09/19/2018     Priority: Medium     Clostridium difficile colitis 08/15/2018     Priority: Medium     Chemical dependency (H) 07/16/2018     Priority: Medium     Calculus of lower urinary tract 07/15/2018     Priority: Medium     History of CVA (cerebrovascular accident) 07/15/2018     Priority: Medium     Left hemiparesis (H) 07/15/2018     Priority: Medium     Chronic anticoagulation 07/15/2018     Priority: Medium     History of cranioplasty 05/24/2018     Priority: Medium     Acute ischemic stroke (H) 02/17/2018     Priority: Medium     Influenza B 05/01/2017     Priority: Medium     Opacity of lung on imaging study 05/01/2017     Priority: Medium     Community acquired pneumonia 05/01/2017     Priority: Medium     C. difficile colitis 05/01/2017     Priority: Medium     Sepsis (H) 04/28/2017     Priority: Medium      Pyelonephritis 01/05/2017     Priority: Medium     Acute chest pain 05/06/2016     Priority: Medium     Leukocytosis 05/06/2016     Priority: Medium     Lung mass 05/06/2016     Priority: Medium     SOB (shortness of breath) 05/06/2016     Priority: Medium     Acute upper GI bleed 06/18/2013     Priority: Medium     Hypokalemia 06/18/2013     Priority: Medium     Acute cystitis 06/18/2013     Priority: Medium     Anxiety state 03/25/2013     Priority: Medium     Muscle pain 07/30/2009     Priority: Medium     Overview:   IMO Update 10/11       Cervicalgia 06/16/2009     Priority: Medium     Overview:   IMO Update 10/11       Low back pain 06/16/2009     Priority: Medium     Overview:   IMO Update 10/11       Pain in joint, lower leg 05/01/2009     Priority: Medium     Diarrhea 04/17/2008     Priority: Medium     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     Priority: Medium        Past Medical History:    Past Medical History:   Diagnosis Date     Anemia      Anxiety      Chemical dependency (H)      Chronic diarrhea      Lactose intolerance      Myalgia      OCD (obsessive compulsive disorder)      Pulmonary embolism (H) 05/06/16     Stroke (H) 02/2018     Vitamin B12 deficiency        Past Surgical History:    Past Surgical History:   Procedure Laterality Date     CLOSED REDUCTION, PERCUTANEOUS PINNING LOWER EXTREMITY, COMBINED Right 4/6/2016    Procedure: COMBINED CLOSED REDUCTION, PERCUTANEOUS PINNING LOWER EXTREMITY;  Surgeon: Dexter Jones MD;  Location: [facility]     COLONOSCOPY       CRANIECTOMY Right 2/18/2018    Procedure: CRANIECTOMY;  RIGHT HEMICRANIECTOMY;  Surgeon: Emeterio Mesa MD;  Location: [facility]     CRANIOPLASTY Right 5/24/2018    Procedure: CRANIOPLASTY;  Right Cranioplasty ;  Surgeon: Emeterio Mesa MD;  Location: [facility] OR     UPPER GI ENDOSCOPY         Family History:    Family History   Problem Relation Age of Onset     Depression Mother       "Migraines Maternal Half-Sister        Social History:  Marital Status:  Single [1]  Social History     Tobacco Use     Smoking status: Current Every Day Smoker     Packs/day: 0.25     Years: 7.00     Pack years: 1.75     Types: Cigarettes     Last attempt to quit: 2018     Years since quittin.6     Smokeless tobacco: Never Used   Substance Use Topics     Alcohol use: Yes     Alcohol/week: 0.0 standard drinks     Comment: drank \"a lot\" last night; last drank prior January      Drug use: No     Comment: hx of marijuana and meth use        Medications:    acetaminophen (TYLENOL) 500 MG tablet  baclofen (LIORESAL) 10 MG tablet  busPIRone (BUSPAR) 15 MG tablet  folic acid (FOLVITE) 1 MG tablet  gabapentin (NEURONTIN) 300 MG capsule  LITHIUM PO  loperamide (IMODIUM) 2 MG capsule  mirtazapine (REMERON) 15 MG tablet  XARELTO 20 MG TABS tablet  benzocaine (ORAJEL MAXIMUM STRENGTH) 20 % GEL gel  CHANTIX CONTINUING MONTH ASHLEY 1 MG tablet  DULoxetine (CYMBALTA) 60 MG capsule  famotidine (PEPCID) 20 MG tablet  medroxyPROGESTERone (DEPO-PROVERA) 150 MG/ML IM injection  melatonin 5 MG tablet  nicotine (NICODERM CQ) 7 MG/24HR 24 hr patch  promethazine (PHENERGAN) 12.5 MG tablet          Review of Systems   Constitutional: Negative for appetite change, chills and fever.   HENT: Positive for rhinorrhea. Negative for congestion, ear pain and sore throat.    Respiratory: Negative for cough.    Gastrointestinal: Positive for diarrhea, nausea and vomiting. Negative for abdominal pain and constipation.   Genitourinary: Negative for dysuria.        + urinary incontinence - using pullup   Musculoskeletal:        + chronic generalized body aches   Neurological: Negative for dizziness, light-headedness and headaches.       Physical Exam   BP: 119/86  Pulse: 92  Heart Rate: 112  Temp: 99  F (37.2  C)  Resp: 16  SpO2: 98 %      Physical Exam  Constitutional:       General: She is not in acute distress.     Appearance: Normal appearance. " She is not ill-appearing, toxic-appearing or diaphoretic.   HENT:      Right Ear: Tympanic membrane, ear canal and external ear normal.      Left Ear: Tympanic membrane, ear canal and external ear normal.      Nose: Nose normal.      Mouth/Throat:      Lips: Pink.      Mouth: Mucous membranes are moist.      Pharynx: Oropharynx is clear. Uvula midline. No pharyngeal swelling, oropharyngeal exudate or posterior oropharyngeal erythema.   Eyes:      General: Lids are normal.      Conjunctiva/sclera: Conjunctivae normal.      Pupils: Pupils are equal, round, and reactive to light.   Neck:      Musculoskeletal: Neck supple.   Cardiovascular:      Rate and Rhythm: Normal rate and regular rhythm.      Heart sounds: S1 normal and S2 normal. No murmur. No friction rub. No gallop.    Pulmonary:      Effort: Pulmonary effort is normal. No tachypnea or respiratory distress.      Breath sounds: Normal breath sounds. No wheezing, rhonchi or rales.   Abdominal:      General: Bowel sounds are normal.      Palpations: Abdomen is soft.      Tenderness: There is no abdominal tenderness. There is no guarding or rebound.   Lymphadenopathy:      Cervical: No cervical adenopathy.   Skin:     General: Skin is warm and dry.      Capillary Refill: Capillary refill takes less than 2 seconds.      Coloration: Skin is not pale.      Findings: No rash.   Neurological:      Mental Status: She is alert and oriented to person, place, and time.   Psychiatric:         Mood and Affect: Mood normal.         Speech: Speech normal.         Behavior: Behavior normal. Behavior is cooperative.         ED Course        Procedures            Results for orders placed or performed during the hospital encounter of 03/18/20 (from the past 24 hour(s))   CBC with platelets differential   Result Value Ref Range    WBC 16.1 (H) 4.0 - 11.0 10e9/L    RBC Count 4.69 3.8 - 5.2 10e12/L    Hemoglobin 14.8 11.7 - 15.7 g/dL    Hematocrit 43.9 35.0 - 47.0 %    MCV 94 78 -  100 fl    MCH 31.6 26.5 - 33.0 pg    MCHC 33.7 31.5 - 36.5 g/dL    RDW 14.2 10.0 - 15.0 %    Platelet Count 412 150 - 450 10e9/L    Diff Method Automated Method     % Neutrophils 86.3 %    % Lymphocytes 6.8 %    % Monocytes 5.1 %    % Eosinophils 1.1 %    % Basophils 0.2 %    % Immature Granulocytes 0.5 %    Nucleated RBCs 0 0 /100    Absolute Neutrophil 13.9 (H) 1.6 - 8.3 10e9/L    Absolute Lymphocytes 1.1 0.8 - 5.3 10e9/L    Absolute Monocytes 0.8 0.0 - 1.3 10e9/L    Absolute Eosinophils 0.2 0.0 - 0.7 10e9/L    Absolute Basophils 0.0 0.0 - 0.2 10e9/L    Abs Immature Granulocytes 0.1 0 - 0.4 10e9/L    Absolute Nucleated RBC 0.0    Comprehensive metabolic panel   Result Value Ref Range    Sodium 142 133 - 144 mmol/L    Potassium 2.9 (LL) 3.4 - 5.3 mmol/L    Chloride 112 (H) 94 - 109 mmol/L    Carbon Dioxide 20 20 - 32 mmol/L    Anion Gap 10 3 - 14 mmol/L    Glucose 155 (H) 70 - 99 mg/dL    Urea Nitrogen 4 (L) 7 - 30 mg/dL    Creatinine 0.49 (L) 0.52 - 1.04 mg/dL    GFR Estimate >90 >60 mL/min/[1.73_m2]    GFR Estimate If Black >90 >60 mL/min/[1.73_m2]    Calcium 8.9 8.5 - 10.1 mg/dL    Bilirubin Total 0.4 0.2 - 1.3 mg/dL    Albumin 3.6 3.4 - 5.0 g/dL    Protein Total 7.6 6.8 - 8.8 g/dL    Alkaline Phosphatase 88 40 - 150 U/L    ALT 39 0 - 50 U/L    AST 90 (H) 0 - 45 U/L   Lipase   Result Value Ref Range    Lipase 387 73 - 393 U/L   Lactic acid whole blood   Result Value Ref Range    Lactic Acid 3.7 (H) 0.7 - 2.0 mmol/L   Alcohol ethyl   Result Value Ref Range    Ethanol g/dL <0.01 0.01 g/dL   Magnesium   Result Value Ref Range    Magnesium 1.8 1.6 - 2.3 mg/dL       Medications   potassium chloride 10 mEq in 100 mL intermittent infusion with 10 mg lidocaine (0 mEq Intravenous Stopped 3/18/20 1615)   0.9% sodium chloride BOLUS (0 mLs Intravenous Stopped 3/18/20 1439)     Followed by   0.9% sodium chloride BOLUS (0 mLs Intravenous Stopped 3/18/20 1603)   ondansetron (ZOFRAN) injection 4 mg (4 mg Intravenous Given  3/18/20 1338)   skin refrigerant (GEBAUER'S) topical spray ( Topical Given 3/18/20 1333)   potassium chloride ER (KLOR-CON M) CR tablet 40 mEq (40 mEq Oral Given 3/18/20 1423)   acetaminophen (TYLENOL) tablet 650 mg (650 mg Oral Given 3/18/20 1447)       Assessments & Plan (with Medical Decision Making)     I have reviewed the nursing notes.    I have reviewed the findings, diagnosis, plan and need for follow up with the patient.  (R11.2) Nausea with vomiting  (Z72.89) Alcohol use  (E87.6) Hypokalemia  Comment: acute, symptomatic  Plan: 30-year old female presents ambulatory with concerns of nausea, vomiting, dehydration after drinking alcohol last night. She has leukocytosis with WBC at 16.1. Lactic acid is 3.7. She is afebrile. Denies any respiratory symptoms, denies urinary symptoms. Refused urine sample. Lab attempted re-draw of lactic acid but unable to get enough with first draw, refused second attempt. She was given 2 liters of IV fluids, zofran, potassium 40 mEq oral, 20 meQ IVPB, acetaminophen in the ED.   Potassium 20 mEq x 10 days sent to pharmacy.   She should call to scheduled ED follow-up in 5-7 days with her primary care provider.         RETURN TO THE ED WITH NEW OR WORSENING SYMPTOMS.    FOLLOW-UP WITH YOUR PRIMARY CARE PROVIDER IN 5-7 DAYS.      Tiffanie Cline CNP    New Prescriptions    No medications on file       Final diagnoses:   Nausea with vomiting   Alcohol use   Hypokalemia       3/18/2020   [facility]     Tiffanie Cline CNP  03/18/20 8636

## 2020-03-18 NOTE — ED TRIAGE NOTES
"Patient presents via ambulance with complaints/concerns about being hungover, dry and low potassium.  Patient states she drank a lot last night, trying to \"impress\" and \"keep up\" with her friends.  Patient is very concerned that her potassium is very low as her primary recently took her off of the potassium.    "

## 2020-03-18 NOTE — PROGRESS NOTES
Referral to see 30 year old female presenting after drinking with friends last night and being hung over.  Pt is afraid her potassium is low.  Of note pt has hx of CVA a couple of years ago with right sided hemiparesis.  Pt states she receives 32.5 hours of PCA service per week.  Right now she is living with Abrahan Hammond in [place].  She was room mates with his mother at [facility] in [place] and she is renting a room from him.  She wants to get to [place] though where her daughter and family are at.  She has a , Halima Ruiz from [facility] as she was in a group home there at one time.  She desperately wants to be in [place] so I did suggest to her that she request to be transferred back to [facility] so that a [place] worker can start to work on her case.      Pt states her drinking is not good right now.  She has had a few falls at home some drunk and some not.  She would like to get a rule 25 but due to COVID 19 it is unlikely that she will be getting into treatment or groups anytime soon.  I have provided her with a couple of online resources and  my telephone number.  She does go to [place] [facility] and was supposed to go there today but they cancelled her appt.  I did discuss referral to [facility] here at the [facility] and she is going to reach out to me after she thinks about it.  She could get a rule 25 done at the clinic as well and see a therapist.      She denies being suicidal at this time.  She has not been totally compliant with medications and started Lithium but only took it one time.  Said it is for her moods.      Pt is aware of medical transportation and how to set this up.  I encouraged her to reach out to me in the near future and I will assist with referrals.  Discussed reducing her risks and how vulnerable she is.    Will follow and assist as needed.    RE Mukherjee

## 2020-03-18 NOTE — ED NOTES
DATE:  3/18/2020   TIME OF RECEIPT FROM LAB:  1518  LAB TEST:  Lactic acid  LAB VALUE:  3.7  RESULTS GIVEN WITH READ-BACK TO (PROVIDER):  Tiffanie Cline CNP  TIME LAB VALUE REPORTED TO PROVIDER:   4759

## 2020-03-18 NOTE — ED NOTES
Patient states that her IV site is burning/hurting and requesting that her IV was removed.  Provider updated.

## 2020-03-18 NOTE — ED AVS SNAPSHOT
[facility]  [address]  [place] [address]  Phone:  [phone]                                    Maggie Case   MRN: [id number]    Department:  [facility]   Date of Visit:  3/18/2020           After Visit Summary Signature Page    I have received my discharge instructions, and my questions have been answered. I have discussed any challenges I see with this plan with the nurse or doctor.    ..........................................................................................................................................  Patient/Patient Representative Signature      ..........................................................................................................................................  Patient Representative Print Name and Relationship to Patient    ..................................................               ................................................  Date                                   Time    ..........................................................................................................................................  Reviewed by Signature/Title    ...................................................              ..............................................  Date                                               Time          22EPIC Rev 08/18

## 2020-03-18 NOTE — DISCHARGE INSTRUCTIONS
(R11.2) Nausea with vomiting  (Z72.89) Alcohol use  (E87.6) Hypokalemia  Comment: acute, symptomatic  Plan: 30-year old female presents ambulatory with concerns of nausea, vomiting, dehydration after drinking alcohol last night. She has leukocytosis with WBC at 16.1. Lactic acid is 3.7. She is afebrile. Denies any respiratory symptoms, denies urinary symptoms. Refused urine sample. Lab attempted re-draw of lactic acid but unable to get enough with first draw, refused second attempt. She was given 2 liters of IV fluids, zofran, potassium 40 mEq oral, 20 meQ IVPB, acetaminophen in the ED.   Potassium 20 mEq x 10 days sent to pharmacy  She should call to scheduled ED follow-up in 5-7 days with her primary care provider.         RETURN TO THE ED WITH NEW OR WORSENING SYMPTOMS.    FOLLOW-UP WITH YOUR PRIMARY CARE PROVIDER IN 5-7 DAYS.      Tiffanie Cline, CNP

## 2020-03-18 NOTE — ED NOTES
C/o increased pain to IV insertion site with IV potassium. Slowed the rate of the IV potassium down to 50cc/hr. Requesting something for her left knee pain. Updated Tiffanie Cline NP. See new orders.

## 2020-03-18 NOTE — ED NOTES
Patient given verbal and written discharge instructions. Patient verbalized understanding of discharge instructions.

## 2020-03-19 ENCOUNTER — TELEPHONE (OUTPATIENT)
Dept: CASE MANAGEMENT | Facility: HOSPITAL | Age: 30
End: 2020-03-19

## 2020-03-19 RX ORDER — POTASSIUM CHLORIDE 1500 MG/1
20 TABLET, EXTENDED RELEASE ORAL
Qty: 10 TABLET | Refills: 0 | Status: ON HOLD | OUTPATIENT
Start: 2020-03-19 | End: 2020-05-26

## 2020-05-08 ENCOUNTER — HOSPITAL ENCOUNTER (EMERGENCY)
Facility: HOSPITAL | Age: 30
Discharge: HOME | End: 2020-05-08
Attending: PHYSICIAN ASSISTANT | Admitting: PHYSICIAN ASSISTANT
Payer: MEDICAID

## 2020-05-08 VITALS
RESPIRATION RATE: 16 BRPM | OXYGEN SATURATION: 99 % | TEMPERATURE: 98.1 F | DIASTOLIC BLOOD PRESSURE: 77 MMHG | SYSTOLIC BLOOD PRESSURE: 109 MMHG

## 2020-05-08 DIAGNOSIS — D72.829 LEUKOCYTOSIS: ICD-10-CM

## 2020-05-08 DIAGNOSIS — E87.6 HYPOKALEMIA: ICD-10-CM

## 2020-05-08 DIAGNOSIS — R11.0 NAUSEA: ICD-10-CM

## 2020-05-08 DIAGNOSIS — N39.0 URINARY TRACT INFECTION: ICD-10-CM

## 2020-05-08 LAB
ALBUMIN UR-MCNC: NEGATIVE MG/DL
ANION GAP SERPL CALCULATED.3IONS-SCNC: 9 MMOL/L (ref 3–14)
APPEARANCE UR: ABNORMAL
BACTERIA #/AREA URNS HPF: ABNORMAL /HPF
BASOPHILS # BLD AUTO: 0.1 10E9/L (ref 0–0.2)
BASOPHILS NFR BLD AUTO: 0.3 %
BILIRUB UR QL STRIP: NEGATIVE
BUN SERPL-MCNC: 2 MG/DL (ref 7–30)
CALCIUM SERPL-MCNC: 9.6 MG/DL (ref 8.5–10.1)
CHLORIDE SERPL-SCNC: 103 MMOL/L (ref 94–109)
CO2 SERPL-SCNC: 23 MMOL/L (ref 20–32)
COLOR UR AUTO: ABNORMAL
CREAT SERPL-MCNC: 0.49 MG/DL (ref 0.52–1.04)
DIFFERENTIAL METHOD BLD: ABNORMAL
EOSINOPHIL # BLD AUTO: 0.4 10E9/L (ref 0–0.7)
EOSINOPHIL NFR BLD AUTO: 2.1 %
ERYTHROCYTE [DISTWIDTH] IN BLOOD BY AUTOMATED COUNT: 13.4 % (ref 10–15)
GFR SERPL CREATININE-BSD FRML MDRD: >90 ML/MIN/{1.73_M2}
GLUCOSE SERPL-MCNC: 94 MG/DL (ref 70–99)
GLUCOSE UR STRIP-MCNC: NEGATIVE MG/DL
HCT VFR BLD AUTO: 44.4 % (ref 35–47)
HGB BLD-MCNC: 15.9 G/DL (ref 11.7–15.7)
HGB UR QL STRIP: ABNORMAL
IMM GRANULOCYTES # BLD: 0.1 10E9/L (ref 0–0.4)
IMM GRANULOCYTES NFR BLD: 0.7 %
KETONES UR STRIP-MCNC: NEGATIVE MG/DL
LEUKOCYTE ESTERASE UR QL STRIP: ABNORMAL
LYMPHOCYTES # BLD AUTO: 1.5 10E9/L (ref 0.8–5.3)
LYMPHOCYTES NFR BLD AUTO: 7.9 %
MCH RBC QN AUTO: 32.1 PG (ref 26.5–33)
MCHC RBC AUTO-ENTMCNC: 35.8 G/DL (ref 31.5–36.5)
MCV RBC AUTO: 90 FL (ref 78–100)
MONOCYTES # BLD AUTO: 1.4 10E9/L (ref 0–1.3)
MONOCYTES NFR BLD AUTO: 7.2 %
MUCOUS THREADS #/AREA URNS LPF: PRESENT /LPF
NEUTROPHILS # BLD AUTO: 15.8 10E9/L (ref 1.6–8.3)
NEUTROPHILS NFR BLD AUTO: 81.8 %
NITRATE UR QL: NEGATIVE
NRBC # BLD AUTO: 0 10*3/UL
NRBC BLD AUTO-RTO: 0 /100
PH UR STRIP: 7.5 PH (ref 4.7–8)
PLATELET # BLD AUTO: 493 10E9/L (ref 150–450)
POTASSIUM SERPL-SCNC: 2.1 MMOL/L (ref 3.4–5.3)
POTASSIUM SERPL-SCNC: 2.7 MMOL/L (ref 3.4–5.3)
RBC # BLD AUTO: 4.96 10E12/L (ref 3.8–5.2)
RBC #/AREA URNS AUTO: 11 /HPF (ref 0–2)
SODIUM SERPL-SCNC: 135 MMOL/L (ref 133–144)
SOURCE: ABNORMAL
SP GR UR STRIP: 1 (ref 1–1.03)
SQUAMOUS #/AREA URNS AUTO: 10 /HPF (ref 0–1)
UROBILINOGEN UR STRIP-MCNC: NORMAL MG/DL (ref 0–2)
WBC # BLD AUTO: 19.3 10E9/L (ref 4–11)
WBC #/AREA URNS AUTO: 63 /HPF (ref 0–5)

## 2020-05-08 PROCEDURE — 25000132 ZZH RX MED GY IP 250 OP 250 PS 637

## 2020-05-08 PROCEDURE — 81001 URINALYSIS AUTO W/SCOPE: CPT | Performed by: PHYSICIAN ASSISTANT

## 2020-05-08 PROCEDURE — 87088 URINE BACTERIA CULTURE: CPT | Performed by: PHYSICIAN ASSISTANT

## 2020-05-08 PROCEDURE — 36415 COLL VENOUS BLD VENIPUNCTURE: CPT | Performed by: PHYSICIAN ASSISTANT

## 2020-05-08 PROCEDURE — 25000132 ZZH RX MED GY IP 250 OP 250 PS 637: Performed by: PHYSICIAN ASSISTANT

## 2020-05-08 PROCEDURE — 99284 EMERGENCY DEPT VISIT MOD MDM: CPT | Mod: Z6 | Performed by: PHYSICIAN ASSISTANT

## 2020-05-08 PROCEDURE — 87086 URINE CULTURE/COLONY COUNT: CPT | Performed by: PHYSICIAN ASSISTANT

## 2020-05-08 PROCEDURE — 99283 EMERGENCY DEPT VISIT LOW MDM: CPT

## 2020-05-08 PROCEDURE — 25000128 H RX IP 250 OP 636: Performed by: PHYSICIAN ASSISTANT

## 2020-05-08 PROCEDURE — 87186 SC STD MICRODIL/AGAR DIL: CPT | Performed by: PHYSICIAN ASSISTANT

## 2020-05-08 PROCEDURE — 85025 COMPLETE CBC W/AUTO DIFF WBC: CPT | Performed by: PHYSICIAN ASSISTANT

## 2020-05-08 PROCEDURE — 84132 ASSAY OF SERUM POTASSIUM: CPT | Mod: 91 | Performed by: PHYSICIAN ASSISTANT

## 2020-05-08 PROCEDURE — 80048 BASIC METABOLIC PNL TOTAL CA: CPT | Performed by: PHYSICIAN ASSISTANT

## 2020-05-08 RX ORDER — ONDANSETRON 4 MG/1
8 TABLET, ORALLY DISINTEGRATING ORAL ONCE
Status: COMPLETED | OUTPATIENT
Start: 2020-05-08 | End: 2020-05-08

## 2020-05-08 RX ORDER — HYDROXYZINE HYDROCHLORIDE 25 MG/1
25 TABLET, FILM COATED ORAL ONCE
Status: COMPLETED | OUTPATIENT
Start: 2020-05-08 | End: 2020-05-08

## 2020-05-08 RX ORDER — POTASSIUM CL/LIDO/0.9 % NACL 10MEQ/0.1L
10 INTRAVENOUS SOLUTION, PIGGYBACK (ML) INTRAVENOUS
Status: DISCONTINUED | OUTPATIENT
Start: 2020-05-08 | End: 2020-05-08

## 2020-05-08 RX ORDER — GABAPENTIN 300 MG/1
900 CAPSULE ORAL ONCE
Status: COMPLETED | OUTPATIENT
Start: 2020-05-08 | End: 2020-05-08

## 2020-05-08 RX ORDER — CEPHALEXIN 500 MG/1
500 CAPSULE ORAL 2 TIMES DAILY
Qty: 14 CAPSULE | Refills: 0 | Status: ON HOLD | OUTPATIENT
Start: 2020-05-08 | End: 2020-05-26

## 2020-05-08 RX ORDER — POTASSIUM CHLORIDE 20MEQ/15ML
20 LIQUID (ML) ORAL ONCE
Status: DISCONTINUED | OUTPATIENT
Start: 2020-05-08 | End: 2020-05-08

## 2020-05-08 RX ORDER — POTASSIUM CHLORIDE 1.5 G/1.58G
40 POWDER, FOR SOLUTION ORAL 2 TIMES DAILY
Status: DISCONTINUED | OUTPATIENT
Start: 2020-05-08 | End: 2020-05-08 | Stop reason: HOSPADM

## 2020-05-08 RX ORDER — POTASSIUM CHLORIDE 1.5 G/1.58G
POWDER, FOR SOLUTION ORAL
Status: COMPLETED
Start: 2020-05-08 | End: 2020-05-08

## 2020-05-08 RX ORDER — ONDANSETRON 8 MG/1
8 TABLET, ORALLY DISINTEGRATING ORAL EVERY 8 HOURS PRN
Qty: 10 TABLET | Refills: 0 | Status: ON HOLD | OUTPATIENT
Start: 2020-05-08 | End: 2020-05-26

## 2020-05-08 RX ORDER — SODIUM CHLORIDE 9 MG/ML
1000 INJECTION, SOLUTION INTRAVENOUS CONTINUOUS
Status: DISCONTINUED | OUTPATIENT
Start: 2020-05-08 | End: 2020-05-08

## 2020-05-08 RX ORDER — ONDANSETRON 2 MG/ML
4 INJECTION INTRAMUSCULAR; INTRAVENOUS EVERY 30 MIN PRN
Status: DISCONTINUED | OUTPATIENT
Start: 2020-05-08 | End: 2020-05-08

## 2020-05-08 RX ORDER — ONDANSETRON 4 MG/1
4 TABLET, ORALLY DISINTEGRATING ORAL ONCE
Status: COMPLETED | OUTPATIENT
Start: 2020-05-08 | End: 2020-05-08

## 2020-05-08 RX ADMIN — HYDROXYZINE HYDROCHLORIDE 25 MG: 25 TABLET ORAL at 16:34

## 2020-05-08 RX ADMIN — GABAPENTIN 900 MG: 300 CAPSULE ORAL at 15:17

## 2020-05-08 RX ADMIN — POTASSIUM CHLORIDE 40 MEQ: 1.5 POWDER, FOR SOLUTION ORAL at 15:03

## 2020-05-08 RX ADMIN — ONDANSETRON 4 MG: 4 TABLET, ORALLY DISINTEGRATING ORAL at 15:59

## 2020-05-08 RX ADMIN — ONDANSETRON 8 MG: 4 TABLET, ORALLY DISINTEGRATING ORAL at 14:15

## 2020-05-08 ASSESSMENT — ENCOUNTER SYMPTOMS
BLOOD IN STOOL: 0
RESPIRATORY NEGATIVE: 1
ABDOMINAL PAIN: 0
VOMITING: 0
FATIGUE: 1
NAUSEA: 1
DIARRHEA: 1
EYES NEGATIVE: 1
MUSCULOSKELETAL NEGATIVE: 1
FEVER: 0
NEUROLOGICAL NEGATIVE: 1
CARDIOVASCULAR NEGATIVE: 1

## 2020-05-08 NOTE — ED NOTES
"Patient did ask to use the bathroom before getting into bed. She was able to stand and pivot to the toilet however it was slightly difficult as the side rails are to her left and that is her weak side from the stroke. We did well though and she was able to get into the bed as well with little assistance. Patient then asking for \"our yusuf mist pop\" as she's thirsty. When asked if she was nauseated before drinking she stated yes so I went to get some ODT zofran.    "

## 2020-05-08 NOTE — ED AVS SNAPSHOT
[facility]  [address]  [place] [address]  Phone:  [phone]                                    Maggie Case   MRN: [id number]    Department:  [facility]   Date of Visit:  5/8/2020           After Visit Summary Signature Page    I have received my discharge instructions, and my questions have been answered. I have discussed any challenges I see with this plan with the nurse or doctor.    ..........................................................................................................................................  Patient/Patient Representative Signature      ..........................................................................................................................................  Patient Representative Print Name and Relationship to Patient    ..................................................               ................................................  Date                                   Time    ..........................................................................................................................................  Reviewed by Signature/Title    ...................................................              ..............................................  Date                                               Time          22EPIC Rev 08/18

## 2020-05-08 NOTE — ED TRIAGE NOTES
Patient thinks her potasium is low and is feeling nauseated. However hasn't been throwing up per her statement in triage. States she feels as though she's been weaker with transfers and that when she takes her potasium it makes her nauseated

## 2020-05-08 NOTE — ED NOTES
DATE:  5/8/2020   TIME OF RECEIPT FROM LAB:  1441  LAB TEST:  Potasium  LAB VALUE:  2.1  RESULTS GIVEN WITH READ-BACK TO (PROVIDER):  Jeramie Slaughter PA  TIME LAB VALUE REPORTED TO PROVIDER:   144

## 2020-05-08 NOTE — ED PROVIDER NOTES
History     Chief Complaint   Patient presents with     Nausea     HPI  Maggie Case is a 30 year old female who presents emergency department with complaints of nausea but no vomiting.  Patient is concerned that she may have a low potassium.  Patient has had problems with low potassium in the past.  She is supposed to take potassium tablets daily however they make her nauseous and she does not always feel she can take them.  Patient has a history of stroke with resulting left-sided weakness.  She has noted increasing fatigue and difficulty with making transfers from her wheelchair.  She denies fever, cough, shortness of breath, urinary symptoms, abdominal pain, chest pain, upper respiratory symptoms, headache.    Allergies:  Allergies   Allergen Reactions     Amoxicillin Other (See Comments)     Headaches     Levaquin [Levofloxacin] Swelling     Naproxen Other (See Comments)     Reaction: Headaches     Nickel      Tramadol      Sulindac Rash       Problem List:    Patient Active Problem List    Diagnosis Date Noted     Depression with suicidal ideation 09/19/2018     Priority: Medium     Clostridium difficile colitis 08/15/2018     Priority: Medium     Chemical dependency (H) 07/16/2018     Priority: Medium     Calculus of lower urinary tract 07/15/2018     Priority: Medium     History of CVA (cerebrovascular accident) 07/15/2018     Priority: Medium     Left hemiparesis (H) 07/15/2018     Priority: Medium     Chronic anticoagulation 07/15/2018     Priority: Medium     History of cranioplasty 05/24/2018     Priority: Medium     Acute ischemic stroke (H) 02/17/2018     Priority: Medium     Influenza B 05/01/2017     Priority: Medium     Opacity of lung on imaging study 05/01/2017     Priority: Medium     Community acquired pneumonia 05/01/2017     Priority: Medium     C. difficile colitis 05/01/2017     Priority: Medium     Sepsis (H) 04/28/2017     Priority: Medium     Pyelonephritis 01/05/2017     Priority:  Medium     Acute chest pain 05/06/2016     Priority: Medium     Leukocytosis 05/06/2016     Priority: Medium     Lung mass 05/06/2016     Priority: Medium     SOB (shortness of breath) 05/06/2016     Priority: Medium     Acute upper GI bleed 06/18/2013     Priority: Medium     Hypokalemia 06/18/2013     Priority: Medium     Acute cystitis 06/18/2013     Priority: Medium     Anxiety state 03/25/2013     Priority: Medium     Muscle pain 07/30/2009     Priority: Medium     Overview:   IMO Update 10/11       Cervicalgia 06/16/2009     Priority: Medium     Overview:   IMO Update 10/11       Low back pain 06/16/2009     Priority: Medium     Overview:   IMO Update 10/11       Pain in joint, lower leg 05/01/2009     Priority: Medium     Diarrhea 04/17/2008     Priority: Medium     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     Priority: Medium        Past Medical History:    Past Medical History:   Diagnosis Date     Anemia      Anxiety      Chemical dependency (H)      Chronic diarrhea      Lactose intolerance      Myalgia      OCD (obsessive compulsive disorder)      Pulmonary embolism (H) 05/06/16     Stroke (H) 02/2018     Vitamin B12 deficiency        Past Surgical History:    Past Surgical History:   Procedure Laterality Date     CLOSED REDUCTION, PERCUTANEOUS PINNING LOWER EXTREMITY, COMBINED Right 4/6/2016    Procedure: COMBINED CLOSED REDUCTION, PERCUTANEOUS PINNING LOWER EXTREMITY;  Surgeon: Dexter Jones MD;  Location: [facility]     COLONOSCOPY       CRANIECTOMY Right 2/18/2018    Procedure: CRANIECTOMY;  RIGHT HEMICRANIECTOMY;  Surgeon: Emeterio Mesa MD;  Location: [facility]     CRANIOPLASTY Right 5/24/2018    Procedure: CRANIOPLASTY;  Right Cranioplasty ;  Surgeon: Emeterio Mesa MD;  Location: [facility]     UPPER GI ENDOSCOPY         Family History:    Family History   Problem Relation Age of Onset     Depression Mother      Migraines Maternal Half-Sister        Social  "History:  Marital Status:  Single [1]  Social History     Tobacco Use     Smoking status: Current Every Day Smoker     Packs/day: 1.00     Years: 7.00     Pack years: 7.00     Types: Cigarettes     Last attempt to quit: 2018     Years since quittin.7     Smokeless tobacco: Never Used   Substance Use Topics     Alcohol use: Not Currently     Alcohol/week: 0.0 standard drinks     Comment: drank \"a lot\" last night; last drank prior January      Drug use: No     Comment: hx of marijuana and meth use        Medications:    cephALEXin (KEFLEX) 500 MG capsule  ondansetron (ZOFRAN-ODT) 8 MG ODT tab  acetaminophen (TYLENOL) 500 MG tablet  baclofen (LIORESAL) 10 MG tablet  benzocaine (ORAJEL MAXIMUM STRENGTH) 20 % GEL gel  busPIRone (BUSPAR) 15 MG tablet  CHANTIX CONTINUING MONTH ASHLEY 1 MG tablet  DULoxetine (CYMBALTA) 60 MG capsule  famotidine (PEPCID) 20 MG tablet  folic acid (FOLVITE) 1 MG tablet  gabapentin (NEURONTIN) 300 MG capsule  LITHIUM PO  loperamide (IMODIUM) 2 MG capsule  medroxyPROGESTERone (DEPO-PROVERA) 150 MG/ML IM injection  melatonin 5 MG tablet  mirtazapine (REMERON) 15 MG tablet  nicotine (NICODERM CQ) 7 MG/24HR 24 hr patch  potassium chloride ER (K-TAB) 20 MEQ CR tablet  promethazine (PHENERGAN) 12.5 MG tablet  XARELTO 20 MG TABS tablet          Review of Systems   Constitutional: Positive for fatigue. Negative for fever.   HENT: Negative.    Eyes: Negative.    Respiratory: Negative.    Cardiovascular: Negative.    Gastrointestinal: Positive for diarrhea and nausea. Negative for abdominal pain, blood in stool and vomiting.   Genitourinary: Negative.    Musculoskeletal: Negative.    Skin: Negative.    Neurological: Negative.        Physical Exam   BP: 109/77  Heart Rate: 107  Temp: 98.1  F (36.7  C)  Resp: 16  SpO2: 98 %      Physical Exam  Constitutional:       General: She is not in acute distress.     Appearance: She is not diaphoretic.   HENT:      Head: Atraumatic.      Mouth/Throat:      " Mouth: Mucous membranes are moist.      Pharynx: No oropharyngeal exudate.   Eyes:      General: No scleral icterus.     Extraocular Movements: Extraocular movements intact.      Conjunctiva/sclera: Conjunctivae normal.      Pupils: Pupils are equal, round, and reactive to light.   Neck:      Musculoskeletal: Normal range of motion and neck supple.   Cardiovascular:      Rate and Rhythm: Regular rhythm. Tachycardia present.      Heart sounds: Normal heart sounds.   Pulmonary:      Effort: Pulmonary effort is normal. No respiratory distress.      Breath sounds: Normal breath sounds.   Abdominal:      General: Bowel sounds are normal.      Palpations: Abdomen is soft.      Tenderness: There is no abdominal tenderness.   Musculoskeletal:         General: No tenderness.      Comments: Left-sided hand contracture   Skin:     General: Skin is warm.      Findings: No rash.   Neurological:      Mental Status: She is alert.   Psychiatric:      Comments: Patient reports feeling anxious         ED Course        Procedures  Patient noted to have potassium of 2.1.  White blood cell count significantly elevated at 19,000.  Patient denies any known symptoms of infection.  Urinalysis shows leukocyte esterase and white blood cells.  Will treat as urinary tract infection.  Patient was given 40 mEq of potassium in the emergency department and redraw potassium was performed and the level was 2.7.    Patient was given oral Zofran for relief of nausea.  She states that she had not taken her normal dose of gabapentin at noon and this was given to her.  She felt she was having some problems with anxiety.  We gave her a single dose of hydroxyzine for this.             Results for orders placed or performed during the hospital encounter of 05/08/20 (from the past 24 hour(s))   UA reflex to Microscopic and Culture    Specimen: Urine clean catch; Midstream Urine   Result Value Ref Range    Color Urine Light Yellow     Appearance Urine Slightly  Cloudy     Glucose Urine Negative NEG^Negative mg/dL    Bilirubin Urine Negative NEG^Negative    Ketones Urine Negative NEG^Negative mg/dL    Specific Gravity Urine 1.004 1.003 - 1.035    Blood Urine Small (A) NEG^Negative    pH Urine 7.5 4.7 - 8.0 pH    Protein Albumin Urine Negative NEG^Negative mg/dL    Urobilinogen mg/dL Normal 0.0 - 2.0 mg/dL    Nitrite Urine Negative NEG^Negative    Leukocyte Esterase Urine Large (A) NEG^Negative    Source Midstream Urine     RBC Urine 11 (H) 0 - 2 /HPF    WBC Urine 63 (H) 0 - 5 /HPF    Bacteria Urine Few (A) NEG^Negative /HPF    Squamous Epithelial /HPF Urine 10 (H) 0 - 1 /HPF    Mucous Urine Present (A) NEG^Negative /LPF   Basic metabolic panel   Result Value Ref Range    Sodium 135 133 - 144 mmol/L    Potassium 2.1 (LL) 3.4 - 5.3 mmol/L    Chloride 103 94 - 109 mmol/L    Carbon Dioxide 23 20 - 32 mmol/L    Anion Gap 9 3 - 14 mmol/L    Glucose 94 70 - 99 mg/dL    Urea Nitrogen 2 (L) 7 - 30 mg/dL    Creatinine 0.49 (L) 0.52 - 1.04 mg/dL    GFR Estimate >90 >60 mL/min/[1.73_m2]    GFR Estimate If Black >90 >60 mL/min/[1.73_m2]    Calcium 9.6 8.5 - 10.1 mg/dL   CBC with platelets differential   Result Value Ref Range    WBC 19.3 (H) 4.0 - 11.0 10e9/L    RBC Count 4.96 3.8 - 5.2 10e12/L    Hemoglobin 15.9 (H) 11.7 - 15.7 g/dL    Hematocrit 44.4 35.0 - 47.0 %    MCV 90 78 - 100 fl    MCH 32.1 26.5 - 33.0 pg    MCHC 35.8 31.5 - 36.5 g/dL    RDW 13.4 10.0 - 15.0 %    Platelet Count 493 (H) 150 - 450 10e9/L    Diff Method Automated Method     % Neutrophils 81.8 %    % Lymphocytes 7.9 %    % Monocytes 7.2 %    % Eosinophils 2.1 %    % Basophils 0.3 %    % Immature Granulocytes 0.7 %    Nucleated RBCs 0 0 /100    Absolute Neutrophil 15.8 (H) 1.6 - 8.3 10e9/L    Absolute Lymphocytes 1.5 0.8 - 5.3 10e9/L    Absolute Monocytes 1.4 (H) 0.0 - 1.3 10e9/L    Absolute Eosinophils 0.4 0.0 - 0.7 10e9/L    Absolute Basophils 0.1 0.0 - 0.2 10e9/L    Abs Immature Granulocytes 0.1 0 - 0.4 10e9/L     Absolute Nucleated RBC 0.0        Medications   0.9% sodium chloride BOLUS (has no administration in time range)   potassium chloride (KLOR-CON) Packet 40 mEq (40 mEq Oral Given 5/8/20 1503)   ondansetron (ZOFRAN-ODT) ODT tab 8 mg (8 mg Oral Given 5/8/20 1415)   gabapentin (NEURONTIN) capsule 900 mg (900 mg Oral Given 5/8/20 1517)   ondansetron (ZOFRAN-ODT) ODT tab 4 mg (4 mg Oral Given 5/8/20 1559)   hydrOXYzine (ATARAX) tablet 25 mg (25 mg Oral Given 5/8/20 1634)       Assessments & Plan (with Medical Decision Making)     I have reviewed the nursing notes.    I have reviewed the findings, diagnosis, plan and need for follow up with the patient.    New Prescriptions    CEPHALEXIN (KEFLEX) 500 MG CAPSULE    Take 1 capsule (500 mg) by mouth 2 times daily for 7 days    ONDANSETRON (ZOFRAN-ODT) 8 MG ODT TAB    Take 1 tablet (8 mg) by mouth every 8 hours as needed for nausea       Final diagnoses:   Nausea   Hypokalemia   Leukocytosis   Urinary tract infection     SIXTO De La Torre on 5/8/2020 at 5:53 PM   5/8/2020   [facility]     Jeramie Slaughter PA  05/08/20 [id number]

## 2020-05-10 LAB
BACTERIA SPEC CULT: ABNORMAL
SPECIMEN SOURCE: ABNORMAL

## 2020-05-12 ENCOUNTER — HOSPITAL ENCOUNTER (EMERGENCY)
Facility: HOSPITAL | Age: 30
Discharge: LEFT BEFORE DISCHARGE | End: 2020-05-13
Attending: INTERNAL MEDICINE | Admitting: INTERNAL MEDICINE
Payer: MEDICAID

## 2020-05-12 VITALS
OXYGEN SATURATION: 97 % | SYSTOLIC BLOOD PRESSURE: 107 MMHG | TEMPERATURE: 98.5 F | DIASTOLIC BLOOD PRESSURE: 61 MMHG | RESPIRATION RATE: 16 BRPM

## 2020-05-12 DIAGNOSIS — R11.2 NAUSEA AND VOMITING, INTRACTABILITY OF VOMITING NOT SPECIFIED, UNSPECIFIED VOMITING TYPE: ICD-10-CM

## 2020-05-12 LAB — LACTATE BLD-SCNC: 2.1 MMOL/L (ref 0.7–2)

## 2020-05-12 PROCEDURE — 36415 COLL VENOUS BLD VENIPUNCTURE: CPT | Performed by: INTERNAL MEDICINE

## 2020-05-12 PROCEDURE — 86140 C-REACTIVE PROTEIN: CPT | Performed by: INTERNAL MEDICINE

## 2020-05-12 PROCEDURE — 83690 ASSAY OF LIPASE: CPT | Performed by: INTERNAL MEDICINE

## 2020-05-12 PROCEDURE — 85025 COMPLETE CBC W/AUTO DIFF WBC: CPT | Performed by: INTERNAL MEDICINE

## 2020-05-12 PROCEDURE — 83605 ASSAY OF LACTIC ACID: CPT | Performed by: INTERNAL MEDICINE

## 2020-05-12 PROCEDURE — 83735 ASSAY OF MAGNESIUM: CPT | Performed by: INTERNAL MEDICINE

## 2020-05-12 PROCEDURE — 99283 EMERGENCY DEPT VISIT LOW MDM: CPT | Mod: Z6 | Performed by: INTERNAL MEDICINE

## 2020-05-12 PROCEDURE — 80053 COMPREHEN METABOLIC PANEL: CPT | Performed by: INTERNAL MEDICINE

## 2020-05-12 PROCEDURE — 99283 EMERGENCY DEPT VISIT LOW MDM: CPT

## 2020-05-12 RX ORDER — METOCLOPRAMIDE HYDROCHLORIDE 5 MG/ML
10 INJECTION INTRAMUSCULAR; INTRAVENOUS ONCE
Status: DISCONTINUED | OUTPATIENT
Start: 2020-05-12 | End: 2020-05-13 | Stop reason: HOSPADM

## 2020-05-13 ENCOUNTER — ANESTHESIA EVENT (OUTPATIENT)
Dept: EMERGENCY MEDICINE | Facility: HOSPITAL | Age: 30
End: 2020-05-13
Payer: MEDICAID

## 2020-05-13 ENCOUNTER — ANESTHESIA (OUTPATIENT)
Dept: EMERGENCY MEDICINE | Facility: HOSPITAL | Age: 30
End: 2020-05-13
Payer: MEDICAID

## 2020-05-13 LAB
ALBUMIN SERPL-MCNC: 3.2 G/DL (ref 3.4–5)
ALP SERPL-CCNC: 105 U/L (ref 40–150)
ALT SERPL W P-5'-P-CCNC: 29 U/L (ref 0–50)
ANION GAP SERPL CALCULATED.3IONS-SCNC: 8 MMOL/L (ref 3–14)
AST SERPL W P-5'-P-CCNC: 82 U/L (ref 0–45)
BASOPHILS # BLD AUTO: 0.1 10E9/L (ref 0–0.2)
BASOPHILS NFR BLD AUTO: 0.3 %
BILIRUB SERPL-MCNC: 0.7 MG/DL (ref 0.2–1.3)
BUN SERPL-MCNC: 3 MG/DL (ref 7–30)
CALCIUM SERPL-MCNC: 9.6 MG/DL (ref 8.5–10.1)
CHLORIDE SERPL-SCNC: 104 MMOL/L (ref 94–109)
CO2 SERPL-SCNC: 23 MMOL/L (ref 20–32)
CREAT SERPL-MCNC: 0.46 MG/DL (ref 0.52–1.04)
CRP SERPL-MCNC: 32.6 MG/L (ref 0–8)
DIFFERENTIAL METHOD BLD: ABNORMAL
EOSINOPHIL # BLD AUTO: 0.3 10E9/L (ref 0–0.7)
EOSINOPHIL NFR BLD AUTO: 1.9 %
ERYTHROCYTE [DISTWIDTH] IN BLOOD BY AUTOMATED COUNT: 13.4 % (ref 10–15)
GFR SERPL CREATININE-BSD FRML MDRD: >90 ML/MIN/{1.73_M2}
GLUCOSE SERPL-MCNC: 97 MG/DL (ref 70–99)
HCT VFR BLD AUTO: 43.3 % (ref 35–47)
HGB BLD-MCNC: 15.1 G/DL (ref 11.7–15.7)
IMM GRANULOCYTES # BLD: 0.1 10E9/L (ref 0–0.4)
IMM GRANULOCYTES NFR BLD: 0.8 %
LIPASE SERPL-CCNC: 189 U/L (ref 73–393)
LYMPHOCYTES # BLD AUTO: 1.4 10E9/L (ref 0.8–5.3)
LYMPHOCYTES NFR BLD AUTO: 8.8 %
MAGNESIUM SERPL-MCNC: 2.5 MG/DL (ref 1.6–2.3)
MCH RBC QN AUTO: 31.9 PG (ref 26.5–33)
MCHC RBC AUTO-ENTMCNC: 34.9 G/DL (ref 31.5–36.5)
MCV RBC AUTO: 91 FL (ref 78–100)
MONOCYTES # BLD AUTO: 1 10E9/L (ref 0–1.3)
MONOCYTES NFR BLD AUTO: 6 %
NEUTROPHILS # BLD AUTO: 13.1 10E9/L (ref 1.6–8.3)
NEUTROPHILS NFR BLD AUTO: 82.2 %
NRBC # BLD AUTO: 0 10*3/UL
NRBC BLD AUTO-RTO: 0 /100
PLATELET # BLD AUTO: 266 10E9/L (ref 150–450)
POTASSIUM SERPL-SCNC: 3.1 MMOL/L (ref 3.4–5.3)
PROT SERPL-MCNC: 7.6 G/DL (ref 6.8–8.8)
RBC # BLD AUTO: 4.74 10E12/L (ref 3.8–5.2)
SODIUM SERPL-SCNC: 135 MMOL/L (ref 133–144)
WBC # BLD AUTO: 15.9 10E9/L (ref 4–11)

## 2020-05-13 PROCEDURE — 37000011 ZZH ANESTHESIA WARD SERVICE: Performed by: NURSE ANESTHETIST, CERTIFIED REGISTERED

## 2020-05-13 RX ORDER — LORAZEPAM 2 MG/ML
2 INJECTION INTRAMUSCULAR ONCE
Status: DISCONTINUED | OUTPATIENT
Start: 2020-05-13 | End: 2020-05-13 | Stop reason: HOSPADM

## 2020-05-13 ASSESSMENT — ENCOUNTER SYMPTOMS
WHEEZING: 0
NAUSEA: 1
LIGHT-HEADEDNESS: 0
BACK PAIN: 0
MYALGIAS: 1
SHORTNESS OF BREATH: 0
HEMATURIA: 0
WOUND: 0
COUGH: 0
DYSURIA: 0
NECK PAIN: 0
DIAPHORESIS: 0
BLOOD IN STOOL: 0
CONFUSION: 0
NECK STIFFNESS: 0
FLANK PAIN: 0
COLOR CHANGE: 0
DIZZINESS: 0
ANAL BLEEDING: 0
VOMITING: 1
ABDOMINAL DISTENTION: 0
NUMBNESS: 0
FATIGUE: 1
FEVER: 0
CHILLS: 0
HEADACHES: 0
VOICE CHANGE: 0
ARTHRALGIAS: 0
CHEST TIGHTNESS: 0
ABDOMINAL PAIN: 0
PALPITATIONS: 0

## 2020-05-13 NOTE — ANESTHESIA PREPROCEDURE EVALUATION
Anesthesia Pre-Procedure Evaluation    Patient: Maggie Case   MRN: [id number] : 1990          Preoperative Diagnosis: * No pre-op diagnosis entered *    * No procedures listed *    Past Medical History:   Diagnosis Date     Anemia      Anxiety      Chemical dependency (H)      Chronic diarrhea      Lactose intolerance      Myalgia      OCD (obsessive compulsive disorder)      Pulmonary embolism (H) 16     Stroke (H) 2018     Vitamin B12 deficiency      Past Surgical History:   Procedure Laterality Date     CLOSED REDUCTION, PERCUTANEOUS PINNING LOWER EXTREMITY, COMBINED Right 2016    Procedure: COMBINED CLOSED REDUCTION, PERCUTANEOUS PINNING LOWER EXTREMITY;  Surgeon: Dexter Jones MD;  Location: [facility]     COLONOSCOPY       CRANIECTOMY Right 2018    Procedure: CRANIECTOMY;  RIGHT HEMICRANIECTOMY;  Surgeon: Emeterio Mesa MD;  Location: [facility]     CRANIOPLASTY Right 2018    Procedure: CRANIOPLASTY;  Right Cranioplasty ;  Surgeon: Emeterio Mesa MD;  Location: [facility]     UPPER GI ENDOSCOPY                          Lab Results   Component Value Date    WBC 15.9 (H) 2020    HGB 15.1 2020    HCT 43.3 2020     2020    CRP 9.5 (H) 07/15/2018    SED 14 2017     2020    POTASSIUM 2.7 (LL) 2020    CHLORIDE 103 2020    CO2 23 2020    BUN 2 (L) 2020    CR 0.49 (L) 2020    GLC 94 2020    AVINASH 9.6 2020    PHOS 3.5 2018    MAG 1.8 2020    ALBUMIN 3.6 2020    PROTTOTAL 7.6 2020    ALT 39 2020    AST 90 (H) 2020    ALKPHOS 88 2020    BILITOTAL 0.4 2020    LIPASE 387 2020    AMYLASE 50 2016    PTT 30 2018    INR 1.04 07/15/2018    FIBR 544 (H) 2018    TSH 0.25 (L) 2018    HCG Negative 2017    HCGS Negative 2018       Preop Vitals  BP Readings from Last 3 Encounters:   20 107/61   20 109/77  "  03/18/20 106/65    Pulse Readings from Last 3 Encounters:   03/18/20 92   03/06/20 72   09/18/18 114      Resp Readings from Last 3 Encounters:   05/12/20 16   05/08/20 16   03/18/20 14    SpO2 Readings from Last 3 Encounters:   05/12/20 97%   05/08/20 99%   03/18/20 97%      Temp Readings from Last 1 Encounters:   05/12/20 98.5  F (36.9  C) (Tympanic)    Ht Readings from Last 1 Encounters:   11/05/18 1.6 m (5' 3\")      Wt Readings from Last 1 Encounters:   11/05/18 55.3 kg (122 lb)    Estimated body mass index is 21.61 kg/m  as calculated from the following:    Height as of 11/5/18: 1.6 m (5' 3\").    Weight as of 11/5/18: 55.3 kg (122 lb).       Anesthesia Plan  Procedure only, no anesthetic delivered             Postoperative Care      Consents                 Larry Eugene, APRN CRNA  "

## 2020-05-13 NOTE — ANESTHESIA CARE TRANSFER NOTE
Patient: Maggie Case    * No procedures listed *    Diagnosis: * No pre-op diagnosis entered *  Diagnosis Additional Information: No value filed.    Anesthesia Type:   No value filed.     Note:    Patient transferred to:Emergency Department  Handoff Report: Identifed the Patient, Identified the Reponsible Provider, Reviewed the pertinent medical history, Discussed the surgical course, Reviewed Intra-OP anesthesia mangement and issues during anesthesia, Set expectations for post-procedure period and Allowed opportunity for questions and acknowledgement of understanding      Vitals: (Last set prior to Anesthesia Care Transfer)              Electronically Signed By: WOO Cristina CRNA  May 13, 2020  12:05 AM

## 2020-05-13 NOTE — ANESTHESIA POSTPROCEDURE EVALUATION
Patient: Maggie Case    * No procedures listed *    Diagnosis:* No pre-op diagnosis entered *  Diagnosis Additional Information: No value filed.    Anesthesia Type:  No value filed.    Note:  Anesthesia Post Evaluation    Patient location during evaluation: ED             Last vitals:  Vitals:    05/12/20 2250   BP: 107/61   Resp: 16   Temp: 98.5  F (36.9  C)   SpO2: 97%         Electronically Signed By: WOO Cristina CRNA  May 13, 2020  12:05 AM

## 2020-05-13 NOTE — ED NOTES
"Attempting to find a vein for IV access and pt declines IV start by RN stating she \"wants anesthesia to start\" her IV.    Pt reports that she has vomited today x 2 and thinks her potassium is low. Pt has had a low appetite and only ate 2 oranges in the last 2 days.     Call light in reach.   "

## 2020-05-13 NOTE — ED TRIAGE NOTES
Pt states she has a history of nausea and vomiting. States recent history of low potassium and admission. States has not been able to eat or drink.

## 2020-05-13 NOTE — ED NOTES
Pt reports that she does not want to be poked anymore and would rather go home and treat her low potassium. Pt wants healthline transportation to bring her home.

## 2020-05-13 NOTE — ED NOTES
DATE:  5/12/2020   TIME OF RECEIPT FROM LAB:  4004  LAB TEST:  lactic  LAB VALUE:  2.1  RESULTS GIVEN WITH READ-BACK TO (PROVIDER):  Dr. Gentile  TIME LAB VALUE REPORTED TO PROVIDER:   6112

## 2020-05-13 NOTE — ED NOTES
Unable to use IV access. Painful and red when flushing and starting fluids.Removed IV due to symptoms and patient insistence. Unable to give medications. Patient states does not want to stay here and be poked. Wants to go home. Provider notified.

## 2020-05-14 NOTE — ED PROVIDER NOTES
History     Chief Complaint   Patient presents with     Nausea & Vomiting     HPI  Maggieangela Case is a 30 year old female who came for nausea vomiting for 2 days, hx of similar episode in the past, multiple ER visits.    Allergies:  Allergies   Allergen Reactions     Amoxicillin Other (See Comments)     Headaches     Levaquin [Levofloxacin] Swelling     Naproxen Other (See Comments)     Reaction: Headaches     Nickel      Tramadol      Sulindac Rash       Problem List:    Patient Active Problem List    Diagnosis Date Noted     Depression with suicidal ideation 09/19/2018     Priority: Medium     Clostridium difficile colitis 08/15/2018     Priority: Medium     Chemical dependency (H) 07/16/2018     Priority: Medium     Calculus of lower urinary tract 07/15/2018     Priority: Medium     History of CVA (cerebrovascular accident) 07/15/2018     Priority: Medium     Left hemiparesis (H) 07/15/2018     Priority: Medium     Chronic anticoagulation 07/15/2018     Priority: Medium     History of cranioplasty 05/24/2018     Priority: Medium     Acute ischemic stroke (H) 02/17/2018     Priority: Medium     Influenza B 05/01/2017     Priority: Medium     Opacity of lung on imaging study 05/01/2017     Priority: Medium     Community acquired pneumonia 05/01/2017     Priority: Medium     C. difficile colitis 05/01/2017     Priority: Medium     Sepsis (H) 04/28/2017     Priority: Medium     Pyelonephritis 01/05/2017     Priority: Medium     Acute chest pain 05/06/2016     Priority: Medium     Leukocytosis 05/06/2016     Priority: Medium     Lung mass 05/06/2016     Priority: Medium     SOB (shortness of breath) 05/06/2016     Priority: Medium     Acute upper GI bleed 06/18/2013     Priority: Medium     Hypokalemia 06/18/2013     Priority: Medium     Acute cystitis 06/18/2013     Priority: Medium     Anxiety state 03/25/2013     Priority: Medium     Muscle pain 07/30/2009     Priority: Medium     Overview:   IMO Update 10/11    "    Cervicalgia 2009     Priority: Medium     Overview:   IMO Update 10/11       Low back pain 2009     Priority: Medium     Overview:   IMO Update 10/11       Pain in joint, lower leg 2009     Priority: Medium     Diarrhea 2008     Priority: Medium     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     Priority: Medium        Past Medical History:    Past Medical History:   Diagnosis Date     Anemia      Anxiety      Chemical dependency (H)      Chronic diarrhea      Lactose intolerance      Myalgia      OCD (obsessive compulsive disorder)      Pulmonary embolism (H) 16     Stroke (H) 2018     Vitamin B12 deficiency        Past Surgical History:    Past Surgical History:   Procedure Laterality Date     CLOSED REDUCTION, PERCUTANEOUS PINNING LOWER EXTREMITY, COMBINED Right 2016    Procedure: COMBINED CLOSED REDUCTION, PERCUTANEOUS PINNING LOWER EXTREMITY;  Surgeon: Dexter Jones MD;  Location: [facility]     COLONOSCOPY       CRANIECTOMY Right 2018    Procedure: CRANIECTOMY;  RIGHT HEMICRANIECTOMY;  Surgeon: Emeterio Mesa MD;  Location: [facility]     CRANIOPLASTY Right 2018    Procedure: CRANIOPLASTY;  Right Cranioplasty ;  Surgeon: Emeterio Mesa MD;  Location: [facility] OR     UPPER GI ENDOSCOPY         Family History:    Family History   Problem Relation Age of Onset     Depression Mother      Migraines Maternal Half-Sister        Social History:  Marital Status:  Single [1]  Social History     Tobacco Use     Smoking status: Current Every Day Smoker     Packs/day: 1.00     Years: 7.00     Pack years: 7.00     Types: Cigarettes     Last attempt to quit: 2018     Years since quittin.7     Smokeless tobacco: Never Used   Substance Use Topics     Alcohol use: Not Currently     Alcohol/week: 0.0 standard drinks     Comment: drank \"a lot\" last night; last drank prior January      Drug use: No     Comment: hx of marijuana and meth " use        Medications:    acetaminophen (TYLENOL) 500 MG tablet  baclofen (LIORESAL) 10 MG tablet  benzocaine (ORAJEL MAXIMUM STRENGTH) 20 % GEL gel  busPIRone (BUSPAR) 15 MG tablet  cephALEXin (KEFLEX) 500 MG capsule  CHANTIX CONTINUING MONTH ASHLEY 1 MG tablet  DULoxetine (CYMBALTA) 60 MG capsule  famotidine (PEPCID) 20 MG tablet  folic acid (FOLVITE) 1 MG tablet  gabapentin (NEURONTIN) 300 MG capsule  LITHIUM PO  loperamide (IMODIUM) 2 MG capsule  medroxyPROGESTERone (DEPO-PROVERA) 150 MG/ML IM injection  melatonin 5 MG tablet  mirtazapine (REMERON) 15 MG tablet  nicotine (NICODERM CQ) 7 MG/24HR 24 hr patch  ondansetron (ZOFRAN-ODT) 8 MG ODT tab  potassium chloride ER (K-TAB) 20 MEQ CR tablet  promethazine (PHENERGAN) 12.5 MG tablet  XARELTO 20 MG TABS tablet          Review of Systems   Constitutional: Positive for fatigue. Negative for chills, diaphoresis and fever.   HENT: Negative for voice change.    Eyes: Negative for visual disturbance.   Respiratory: Negative for cough, chest tightness, shortness of breath and wheezing.    Cardiovascular: Negative for chest pain, palpitations and leg swelling.   Gastrointestinal: Positive for nausea and vomiting. Negative for abdominal distention, abdominal pain, anal bleeding and blood in stool.   Genitourinary: Negative for decreased urine volume, dysuria, flank pain and hematuria.   Musculoskeletal: Positive for myalgias. Negative for arthralgias, back pain, gait problem, neck pain and neck stiffness.   Skin: Negative for color change, pallor, rash and wound.   Neurological: Negative for dizziness, syncope, light-headedness, numbness and headaches.   Psychiatric/Behavioral: Negative for confusion and suicidal ideas.       Physical Exam   BP: 107/61  Heart Rate: 93  Temp: 98.5  F (36.9  C)  Resp: 16  SpO2: 97 %      Physical Exam  Vitals signs and nursing note reviewed.   Constitutional:       Appearance: She is well-developed.   HENT:      Head: Normocephalic and  atraumatic.      Mouth/Throat:      Pharynx: No oropharyngeal exudate.   Eyes:      Conjunctiva/sclera: Conjunctivae normal.      Pupils: Pupils are equal, round, and reactive to light.   Neck:      Musculoskeletal: Normal range of motion and neck supple.      Thyroid: No thyromegaly.      Vascular: No JVD.      Trachea: No tracheal deviation.   Cardiovascular:      Rate and Rhythm: Normal rate and regular rhythm.      Heart sounds: Normal heart sounds. No murmur. No friction rub. No gallop.    Pulmonary:      Effort: Pulmonary effort is normal. No respiratory distress.      Breath sounds: Normal breath sounds. No stridor. No wheezing or rales.   Chest:      Chest wall: No tenderness.   Abdominal:      General: Bowel sounds are normal. There is no distension.      Palpations: Abdomen is soft. There is no mass.      Tenderness: There is no abdominal tenderness. There is no guarding or rebound.   Musculoskeletal: Normal range of motion.         General: No tenderness.   Lymphadenopathy:      Cervical: No cervical adenopathy.   Skin:     General: Skin is warm and dry.      Coloration: Skin is not pale.      Findings: No erythema or rash.   Neurological:      Mental Status: She is alert and oriented to person, place, and time.   Psychiatric:         Behavior: Behavior normal.         ED Course        Procedures                 Results for orders placed or performed during the hospital encounter of 05/12/20 (from the past 24 hour(s))   CBC with platelets differential   Result Value Ref Range    WBC 15.9 (H) 4.0 - 11.0 10e9/L    RBC Count 4.74 3.8 - 5.2 10e12/L    Hemoglobin 15.1 11.7 - 15.7 g/dL    Hematocrit 43.3 35.0 - 47.0 %    MCV 91 78 - 100 fl    MCH 31.9 26.5 - 33.0 pg    MCHC 34.9 31.5 - 36.5 g/dL    RDW 13.4 10.0 - 15.0 %    Platelet Count 266 150 - 450 10e9/L    Diff Method Automated Method     % Neutrophils 82.2 %    % Lymphocytes 8.8 %    % Monocytes 6.0 %    % Eosinophils 1.9 %    % Basophils 0.3 %    %  Immature Granulocytes 0.8 %    Nucleated RBCs 0 0 /100    Absolute Neutrophil 13.1 (H) 1.6 - 8.3 10e9/L    Absolute Lymphocytes 1.4 0.8 - 5.3 10e9/L    Absolute Monocytes 1.0 0.0 - 1.3 10e9/L    Absolute Eosinophils 0.3 0.0 - 0.7 10e9/L    Absolute Basophils 0.1 0.0 - 0.2 10e9/L    Abs Immature Granulocytes 0.1 0 - 0.4 10e9/L    Absolute Nucleated RBC 0.0    Comprehensive metabolic panel   Result Value Ref Range    Sodium 135 133 - 144 mmol/L    Potassium 3.1 (L) 3.4 - 5.3 mmol/L    Chloride 104 94 - 109 mmol/L    Carbon Dioxide 23 20 - 32 mmol/L    Anion Gap 8 3 - 14 mmol/L    Glucose 97 70 - 99 mg/dL    Urea Nitrogen 3 (L) 7 - 30 mg/dL    Creatinine 0.46 (L) 0.52 - 1.04 mg/dL    GFR Estimate >90 >60 mL/min/[1.73_m2]    GFR Estimate If Black >90 >60 mL/min/[1.73_m2]    Calcium 9.6 8.5 - 10.1 mg/dL    Bilirubin Total 0.7 0.2 - 1.3 mg/dL    Albumin 3.2 (L) 3.4 - 5.0 g/dL    Protein Total 7.6 6.8 - 8.8 g/dL    Alkaline Phosphatase 105 40 - 150 U/L    ALT 29 0 - 50 U/L    AST 82 (H) 0 - 45 U/L   CRP inflammation   Result Value Ref Range    CRP Inflammation 32.6 (H) 0.0 - 8.0 mg/L   Lactic acid whole blood   Result Value Ref Range    Lactic Acid 2.1 (H) 0.7 - 2.0 mmol/L   Lipase   Result Value Ref Range    Lipase 189 73 - 393 U/L   Magnesium   Result Value Ref Range    Magnesium 2.5 (H) 1.6 - 2.3 mg/dL       Medications - No data to display    Assessments & Plan (with Medical Decision Making)   Nausea , vomiting  Very difficult stick, IV line established but then stopped working , pt refused further attempt  Labs reviewed  Pt did not want to stay longer in hospital felt her symptoms getting better, left AMA   I have reviewed the nursing notes.    I have reviewed the findings, diagnosis, plan and need for follow up with the patient.      Discharge Medication List as of 5/13/2020  1:12 AM          Final diagnoses:   Nausea and vomiting, intractability of vomiting not specified, unspecified vomiting type       5/12/2020    [facility]     Ramin Gentiel MD  05/13/20 2052

## 2020-05-22 ENCOUNTER — ANESTHESIA EVENT (OUTPATIENT)
Dept: EMERGENCY MEDICINE | Facility: HOSPITAL | Age: 30
End: 2020-05-22
Payer: MEDICAID

## 2020-05-22 ENCOUNTER — APPOINTMENT (OUTPATIENT)
Dept: ULTRASOUND IMAGING | Facility: HOSPITAL | Age: 30
End: 2020-05-22
Attending: PHYSICIAN ASSISTANT
Payer: MEDICAID

## 2020-05-22 ENCOUNTER — ANESTHESIA (OUTPATIENT)
Dept: EMERGENCY MEDICINE | Facility: HOSPITAL | Age: 30
End: 2020-05-22
Payer: MEDICAID

## 2020-05-22 ENCOUNTER — HOSPITAL ENCOUNTER (INPATIENT)
Facility: HOSPITAL | Age: 30
LOS: 4 days | Discharge: LEFT BEFORE DISCHARGE | End: 2020-05-26
Attending: PHYSICIAN ASSISTANT | Admitting: INTERNAL MEDICINE
Payer: MEDICAID

## 2020-05-22 ENCOUNTER — APPOINTMENT (OUTPATIENT)
Dept: CT IMAGING | Facility: HOSPITAL | Age: 30
End: 2020-05-22
Attending: PHYSICIAN ASSISTANT
Payer: MEDICAID

## 2020-05-22 ENCOUNTER — APPOINTMENT (OUTPATIENT)
Dept: GENERAL RADIOLOGY | Facility: HOSPITAL | Age: 30
End: 2020-05-22
Attending: INTERNAL MEDICINE
Payer: MEDICAID

## 2020-05-22 DIAGNOSIS — N39.0 ACUTE URINARY TRACT INFECTION: ICD-10-CM

## 2020-05-22 DIAGNOSIS — E87.6 HYPOKALEMIA: ICD-10-CM

## 2020-05-22 DIAGNOSIS — F19.20 CHEMICAL DEPENDENCY (H): Primary | ICD-10-CM

## 2020-05-22 DIAGNOSIS — E87.20 LACTIC ACIDOSIS: ICD-10-CM

## 2020-05-22 PROBLEM — N10 ACUTE PYELONEPHRITIS: Status: ACTIVE | Noted: 2020-05-22

## 2020-05-22 LAB
ALBUMIN SERPL-MCNC: 4.2 G/DL (ref 3.4–5)
ALBUMIN UR-MCNC: NEGATIVE MG/DL
ALP SERPL-CCNC: 156 U/L (ref 40–150)
ALT SERPL W P-5'-P-CCNC: 19 U/L (ref 0–50)
AMPHETAMINES UR QL: NOT DETECTED NG/ML
ANION GAP SERPL CALCULATED.3IONS-SCNC: 9 MMOL/L (ref 3–14)
APPEARANCE UR: CLEAR
AST SERPL W P-5'-P-CCNC: 23 U/L (ref 0–45)
BACTERIA #/AREA URNS HPF: ABNORMAL /HPF
BARBITURATES UR QL SCN: NOT DETECTED NG/ML
BASOPHILS # BLD AUTO: 0.1 10E9/L (ref 0–0.2)
BASOPHILS NFR BLD AUTO: 0.3 %
BENZODIAZ UR QL SCN: NOT DETECTED NG/ML
BILIRUB SERPL-MCNC: 0.7 MG/DL (ref 0.2–1.3)
BILIRUB UR QL STRIP: NEGATIVE
BUN SERPL-MCNC: 12 MG/DL (ref 7–30)
BUPRENORPHINE UR QL: NOT DETECTED NG/ML
CALCIUM SERPL-MCNC: 10.6 MG/DL (ref 8.5–10.1)
CANNABINOIDS UR QL: NOT DETECTED NG/ML
CHLORIDE SERPL-SCNC: 97 MMOL/L (ref 94–109)
CO2 SERPL-SCNC: 24 MMOL/L (ref 20–32)
COCAINE UR QL SCN: NOT DETECTED NG/ML
COLOR UR AUTO: ABNORMAL
CREAT SERPL-MCNC: 0.87 MG/DL (ref 0.52–1.04)
D-METHAMPHET UR QL: NOT DETECTED NG/ML
DIFFERENTIAL METHOD BLD: ABNORMAL
EOSINOPHIL # BLD AUTO: 0.2 10E9/L (ref 0–0.7)
EOSINOPHIL NFR BLD AUTO: 0.5 %
ERYTHROCYTE [DISTWIDTH] IN BLOOD BY AUTOMATED COUNT: 13 % (ref 10–15)
ETHANOL SERPL-MCNC: <0.01 G/DL
GFR SERPL CREATININE-BSD FRML MDRD: 88 ML/MIN/{1.73_M2}
GLUCOSE SERPL-MCNC: 110 MG/DL (ref 70–99)
GLUCOSE UR STRIP-MCNC: NEGATIVE MG/DL
HCG UR QL: NEGATIVE
HCT VFR BLD AUTO: 45.9 % (ref 35–47)
HGB BLD-MCNC: 17.2 G/DL (ref 11.7–15.7)
HGB UR QL STRIP: NEGATIVE
IMM GRANULOCYTES # BLD: 0.5 10E9/L (ref 0–0.4)
IMM GRANULOCYTES NFR BLD: 1.7 %
KETONES UR STRIP-MCNC: NEGATIVE MG/DL
LACTATE BLD-SCNC: 2.7 MMOL/L (ref 0.7–2)
LEUKOCYTE ESTERASE UR QL STRIP: ABNORMAL
LIPASE SERPL-CCNC: 343 U/L (ref 73–393)
LITHIUM SERPL-SCNC: 2 MMOL/L (ref 0.6–1.2)
LYMPHOCYTES # BLD AUTO: 0.9 10E9/L (ref 0.8–5.3)
LYMPHOCYTES NFR BLD AUTO: 3 %
MAGNESIUM SERPL-MCNC: 3.1 MG/DL (ref 1.6–2.3)
MCH RBC QN AUTO: 32.2 PG (ref 26.5–33)
MCHC RBC AUTO-ENTMCNC: 37.5 G/DL (ref 31.5–36.5)
MCV RBC AUTO: 86 FL (ref 78–100)
METHADONE UR QL SCN: NOT DETECTED NG/ML
MONOCYTES # BLD AUTO: 1.8 10E9/L (ref 0–1.3)
MONOCYTES NFR BLD AUTO: 6.1 %
MUCOUS THREADS #/AREA URNS LPF: PRESENT /LPF
NEUTROPHILS # BLD AUTO: 26.7 10E9/L (ref 1.6–8.3)
NEUTROPHILS NFR BLD AUTO: 88.4 %
NITRATE UR QL: NEGATIVE
NRBC # BLD AUTO: 0 10*3/UL
NRBC BLD AUTO-RTO: 0 /100
OPIATES UR QL SCN: NOT DETECTED NG/ML
OXYCODONE UR QL SCN: NOT DETECTED NG/ML
PCP UR QL SCN: NOT DETECTED NG/ML
PH UR STRIP: 7.5 PH (ref 4.7–8)
PLATELET # BLD AUTO: 512 10E9/L (ref 150–450)
POTASSIUM SERPL-SCNC: 1.7 MMOL/L (ref 3.4–5.3)
PROPOXYPH UR QL: NOT DETECTED NG/ML
PROT SERPL-MCNC: 8.4 G/DL (ref 6.8–8.8)
RBC # BLD AUTO: 5.34 10E12/L (ref 3.8–5.2)
RBC #/AREA URNS AUTO: <1 /HPF (ref 0–2)
SODIUM SERPL-SCNC: 130 MMOL/L (ref 133–144)
SOURCE: ABNORMAL
SP GR UR STRIP: 1.02 (ref 1–1.03)
SQUAMOUS #/AREA URNS AUTO: 4 /HPF (ref 0–1)
TRICYCLICS UR QL SCN: NOT DETECTED NG/ML
TSH SERPL DL<=0.005 MIU/L-ACNC: 1.91 MU/L (ref 0.4–4)
UROBILINOGEN UR STRIP-MCNC: NORMAL MG/DL (ref 0–2)
WBC # BLD AUTO: 30.2 10E9/L (ref 4–11)
WBC #/AREA URNS AUTO: 22 /HPF (ref 0–5)

## 2020-05-22 PROCEDURE — 87040 BLOOD CULTURE FOR BACTERIA: CPT | Performed by: PHYSICIAN ASSISTANT

## 2020-05-22 PROCEDURE — 83690 ASSAY OF LIPASE: CPT | Performed by: PHYSICIAN ASSISTANT

## 2020-05-22 PROCEDURE — 84132 ASSAY OF SERUM POTASSIUM: CPT | Performed by: INTERNAL MEDICINE

## 2020-05-22 PROCEDURE — 37000011 ZZH ANESTHESIA WARD SERVICE: Performed by: NURSE ANESTHETIST, CERTIFIED REGISTERED

## 2020-05-22 PROCEDURE — 36415 COLL VENOUS BLD VENIPUNCTURE: CPT | Performed by: PHYSICIAN ASSISTANT

## 2020-05-22 PROCEDURE — 12000000 ZZH R&B MED SURG/OB

## 2020-05-22 PROCEDURE — 96365 THER/PROPH/DIAG IV INF INIT: CPT

## 2020-05-22 PROCEDURE — 84443 ASSAY THYROID STIM HORMONE: CPT | Performed by: INTERNAL MEDICINE

## 2020-05-22 PROCEDURE — 87186 SC STD MICRODIL/AGAR DIL: CPT | Performed by: PHYSICIAN ASSISTANT

## 2020-05-22 PROCEDURE — 87086 URINE CULTURE/COLONY COUNT: CPT | Performed by: PHYSICIAN ASSISTANT

## 2020-05-22 PROCEDURE — 74177 CT ABD & PELVIS W/CONTRAST: CPT | Mod: TC

## 2020-05-22 PROCEDURE — 99285 EMERGENCY DEPT VISIT HI MDM: CPT | Mod: 25

## 2020-05-22 PROCEDURE — 80053 COMPREHEN METABOLIC PANEL: CPT | Performed by: PHYSICIAN ASSISTANT

## 2020-05-22 PROCEDURE — 25500064 ZZH RX 255 OP 636: Performed by: PHYSICIAN ASSISTANT

## 2020-05-22 PROCEDURE — 87088 URINE BACTERIA CULTURE: CPT | Performed by: PHYSICIAN ASSISTANT

## 2020-05-22 PROCEDURE — 96361 HYDRATE IV INFUSION ADD-ON: CPT

## 2020-05-22 PROCEDURE — 25000132 ZZH RX MED GY IP 250 OP 250 PS 637: Performed by: INTERNAL MEDICINE

## 2020-05-22 PROCEDURE — 25000128 H RX IP 250 OP 636: Performed by: PHYSICIAN ASSISTANT

## 2020-05-22 PROCEDURE — 81025 URINE PREGNANCY TEST: CPT | Performed by: INTERNAL MEDICINE

## 2020-05-22 PROCEDURE — 25000132 ZZH RX MED GY IP 250 OP 250 PS 637: Performed by: PHYSICIAN ASSISTANT

## 2020-05-22 PROCEDURE — 80178 ASSAY OF LITHIUM: CPT | Performed by: INTERNAL MEDICINE

## 2020-05-22 PROCEDURE — 71045 X-RAY EXAM CHEST 1 VIEW: CPT | Mod: TC

## 2020-05-22 PROCEDURE — 83605 ASSAY OF LACTIC ACID: CPT | Performed by: PHYSICIAN ASSISTANT

## 2020-05-22 PROCEDURE — 80306 DRUG TEST PRSMV INSTRMNT: CPT | Performed by: INTERNAL MEDICINE

## 2020-05-22 PROCEDURE — 25000128 H RX IP 250 OP 636: Performed by: INTERNAL MEDICINE

## 2020-05-22 PROCEDURE — 99284 EMERGENCY DEPT VISIT MOD MDM: CPT | Mod: Z6 | Performed by: PHYSICIAN ASSISTANT

## 2020-05-22 PROCEDURE — 80320 DRUG SCREEN QUANTALCOHOLS: CPT | Performed by: INTERNAL MEDICINE

## 2020-05-22 PROCEDURE — 83735 ASSAY OF MAGNESIUM: CPT | Performed by: INTERNAL MEDICINE

## 2020-05-22 PROCEDURE — 36415 COLL VENOUS BLD VENIPUNCTURE: CPT | Performed by: INTERNAL MEDICINE

## 2020-05-22 PROCEDURE — 99222 1ST HOSP IP/OBS MODERATE 55: CPT | Performed by: INTERNAL MEDICINE

## 2020-05-22 PROCEDURE — 25800030 ZZH RX IP 258 OP 636: Performed by: PHYSICIAN ASSISTANT

## 2020-05-22 PROCEDURE — 76705 ECHO EXAM OF ABDOMEN: CPT | Mod: TC

## 2020-05-22 PROCEDURE — 85025 COMPLETE CBC W/AUTO DIFF WBC: CPT | Performed by: PHYSICIAN ASSISTANT

## 2020-05-22 PROCEDURE — 25800030 ZZH RX IP 258 OP 636: Performed by: INTERNAL MEDICINE

## 2020-05-22 PROCEDURE — 81001 URINALYSIS AUTO W/SCOPE: CPT | Performed by: PHYSICIAN ASSISTANT

## 2020-05-22 RX ORDER — POTASSIUM CHLORIDE 1.5 G/1.58G
20-40 POWDER, FOR SOLUTION ORAL
Status: DISCONTINUED | OUTPATIENT
Start: 2020-05-22 | End: 2020-05-23

## 2020-05-22 RX ORDER — PROCHLORPERAZINE 25 MG
25 SUPPOSITORY, RECTAL RECTAL EVERY 12 HOURS PRN
Status: DISCONTINUED | OUTPATIENT
Start: 2020-05-22 | End: 2020-05-26 | Stop reason: HOSPADM

## 2020-05-22 RX ORDER — LIDOCAINE 40 MG/G
CREAM TOPICAL
Status: DISCONTINUED | OUTPATIENT
Start: 2020-05-22 | End: 2020-05-26 | Stop reason: HOSPADM

## 2020-05-22 RX ORDER — FAMOTIDINE 20 MG/1
20 TABLET, FILM COATED ORAL 2 TIMES DAILY
Status: DISCONTINUED | OUTPATIENT
Start: 2020-05-22 | End: 2020-05-26 | Stop reason: HOSPADM

## 2020-05-22 RX ORDER — POTASSIUM CHLORIDE 7.45 MG/ML
10 INJECTION INTRAVENOUS
Status: DISCONTINUED | OUTPATIENT
Start: 2020-05-22 | End: 2020-05-23

## 2020-05-22 RX ORDER — ONDANSETRON 2 MG/ML
4 INJECTION INTRAMUSCULAR; INTRAVENOUS EVERY 6 HOURS PRN
Status: DISCONTINUED | OUTPATIENT
Start: 2020-05-22 | End: 2020-05-26 | Stop reason: HOSPADM

## 2020-05-22 RX ORDER — POTASSIUM CL/LIDO/0.9 % NACL 10MEQ/0.1L
10 INTRAVENOUS SOLUTION, PIGGYBACK (ML) INTRAVENOUS
Status: DISCONTINUED | OUTPATIENT
Start: 2020-05-22 | End: 2020-05-23

## 2020-05-22 RX ORDER — ACETAMINOPHEN 325 MG/1
650 TABLET ORAL EVERY 4 HOURS PRN
Status: DISCONTINUED | OUTPATIENT
Start: 2020-05-22 | End: 2020-05-26 | Stop reason: HOSPADM

## 2020-05-22 RX ORDER — NALOXONE HYDROCHLORIDE 0.4 MG/ML
.1-.4 INJECTION, SOLUTION INTRAMUSCULAR; INTRAVENOUS; SUBCUTANEOUS
Status: DISCONTINUED | OUTPATIENT
Start: 2020-05-22 | End: 2020-05-26 | Stop reason: HOSPADM

## 2020-05-22 RX ORDER — DULOXETIN HYDROCHLORIDE 30 MG/1
60 CAPSULE, DELAYED RELEASE ORAL DAILY
Status: DISCONTINUED | OUTPATIENT
Start: 2020-05-23 | End: 2020-05-22 | Stop reason: CLARIF

## 2020-05-22 RX ORDER — POTASSIUM CL/LIDO/0.9 % NACL 10MEQ/0.1L
10 INTRAVENOUS SOLUTION, PIGGYBACK (ML) INTRAVENOUS
Status: DISCONTINUED | OUTPATIENT
Start: 2020-05-22 | End: 2020-05-22

## 2020-05-22 RX ORDER — POTASSIUM CHLORIDE 1.5 G/1.58G
40 POWDER, FOR SOLUTION ORAL ONCE
Status: COMPLETED | OUTPATIENT
Start: 2020-05-22 | End: 2020-05-22

## 2020-05-22 RX ORDER — SODIUM CHLORIDE 9 MG/ML
INJECTION, SOLUTION INTRAVENOUS CONTINUOUS
Status: DISCONTINUED | OUTPATIENT
Start: 2020-05-22 | End: 2020-05-23

## 2020-05-22 RX ORDER — ONDANSETRON 4 MG/1
4 TABLET, ORALLY DISINTEGRATING ORAL EVERY 6 HOURS PRN
Status: DISCONTINUED | OUTPATIENT
Start: 2020-05-22 | End: 2020-05-26 | Stop reason: HOSPADM

## 2020-05-22 RX ORDER — GABAPENTIN 300 MG/1
300 CAPSULE ORAL 3 TIMES DAILY
Status: DISCONTINUED | OUTPATIENT
Start: 2020-05-22 | End: 2020-05-22 | Stop reason: CLARIF

## 2020-05-22 RX ORDER — CEFTRIAXONE SODIUM 1 G/50ML
1 INJECTION, SOLUTION INTRAVENOUS EVERY 24 HOURS
Status: DISCONTINUED | OUTPATIENT
Start: 2020-05-22 | End: 2020-05-26

## 2020-05-22 RX ORDER — AMOXICILLIN 250 MG
2 CAPSULE ORAL 2 TIMES DAILY
Status: DISCONTINUED | OUTPATIENT
Start: 2020-05-22 | End: 2020-05-26 | Stop reason: HOSPADM

## 2020-05-22 RX ORDER — PROCHLORPERAZINE MALEATE 10 MG
10 TABLET ORAL EVERY 6 HOURS PRN
Status: DISCONTINUED | OUTPATIENT
Start: 2020-05-22 | End: 2020-05-26 | Stop reason: HOSPADM

## 2020-05-22 RX ORDER — ONDANSETRON 2 MG/ML
4 INJECTION INTRAMUSCULAR; INTRAVENOUS EVERY 30 MIN PRN
Status: DISCONTINUED | OUTPATIENT
Start: 2020-05-22 | End: 2020-05-22

## 2020-05-22 RX ORDER — MIRTAZAPINE 7.5 MG/1
7.5 TABLET, FILM COATED ORAL AT BEDTIME
Status: DISCONTINUED | OUTPATIENT
Start: 2020-05-22 | End: 2020-05-26 | Stop reason: HOSPADM

## 2020-05-22 RX ORDER — BACLOFEN 5 MG/1
5 TABLET ORAL EVERY 8 HOURS PRN
Status: DISCONTINUED | OUTPATIENT
Start: 2020-05-22 | End: 2020-05-26 | Stop reason: HOSPADM

## 2020-05-22 RX ORDER — POTASSIUM CHLORIDE 1500 MG/1
20-40 TABLET, EXTENDED RELEASE ORAL
Status: DISCONTINUED | OUTPATIENT
Start: 2020-05-22 | End: 2020-05-23

## 2020-05-22 RX ORDER — AMOXICILLIN 250 MG
1 CAPSULE ORAL 2 TIMES DAILY
Status: DISCONTINUED | OUTPATIENT
Start: 2020-05-22 | End: 2020-05-26 | Stop reason: HOSPADM

## 2020-05-22 RX ORDER — IOPAMIDOL 612 MG/ML
100 INJECTION, SOLUTION INTRAVASCULAR ONCE
Status: COMPLETED | OUTPATIENT
Start: 2020-05-22 | End: 2020-05-22

## 2020-05-22 RX ORDER — LACTOBACILLUS RHAMNOSUS GG 10B CELL
1 CAPSULE ORAL 2 TIMES DAILY
Status: DISCONTINUED | OUTPATIENT
Start: 2020-05-22 | End: 2020-05-26 | Stop reason: HOSPADM

## 2020-05-22 RX ORDER — ONDANSETRON 4 MG/1
4 TABLET, ORALLY DISINTEGRATING ORAL ONCE
Status: COMPLETED | OUTPATIENT
Start: 2020-05-22 | End: 2020-05-22

## 2020-05-22 RX ADMIN — SODIUM CHLORIDE 1000 ML: 9 INJECTION, SOLUTION INTRAVENOUS at 17:38

## 2020-05-22 RX ADMIN — POTASSIUM CHLORIDE 40 MEQ: 1.5 POWDER, FOR SOLUTION ORAL at 14:24

## 2020-05-22 RX ADMIN — CEFTRIAXONE SODIUM 1 G: 1 INJECTION, SOLUTION INTRAVENOUS at 21:25

## 2020-05-22 RX ADMIN — RIVAROXABAN 20 MG: 20 TABLET, FILM COATED ORAL at 21:58

## 2020-05-22 RX ADMIN — TAZOBACTAM SODIUM AND PIPERACILLIN SODIUM 3.38 G: 375; 3 INJECTION, SOLUTION INTRAVENOUS at 18:21

## 2020-05-22 RX ADMIN — NICOTINE 1 PATCH: 7 PATCH, EXTENDED RELEASE TRANSDERMAL at 21:25

## 2020-05-22 RX ADMIN — Medication 1 MG: at 21:29

## 2020-05-22 RX ADMIN — SODIUM CHLORIDE 1000 ML: 9 INJECTION, SOLUTION INTRAVENOUS at 20:25

## 2020-05-22 RX ADMIN — IOPAMIDOL 100 ML: 612 INJECTION, SOLUTION INTRAVENOUS at 18:06

## 2020-05-22 RX ADMIN — Medication 10 MEQ: at 20:24

## 2020-05-22 RX ADMIN — ONDANSETRON 4 MG: 4 TABLET, ORALLY DISINTEGRATING ORAL at 14:23

## 2020-05-22 RX ADMIN — ONDANSETRON 4 MG: 4 TABLET, ORALLY DISINTEGRATING ORAL at 20:49

## 2020-05-22 ASSESSMENT — ENCOUNTER SYMPTOMS
SHORTNESS OF BREATH: 0
EYES NEGATIVE: 1
VOMITING: 1
FEVER: 0
ABDOMINAL PAIN: 1
ACTIVITY CHANGE: 0
NAUSEA: 1
CHEST TIGHTNESS: 0
DYSURIA: 1
CHILLS: 0
DIFFICULTY URINATING: 1
DIARRHEA: 1
COUGH: 0

## 2020-05-22 ASSESSMENT — MIFFLIN-ST. JEOR: SCORE: 1412.13

## 2020-05-22 NOTE — ED PROVIDER NOTES
"  History     Chief Complaint   Patient presents with     Anxiety     The history is provided by the patient.     Maggie Case is a 30 year old female, with hx of alcoholism, prior stroke,  who presents with \"not feeling well\" for a couple weeks.  She has vomited x2 this am and has had diarrhea for the last couple day.  She denies sob, chest pain or urinary symptoms.  She denies fevers.  Her history is limited as she will not divulge much further with repeated questioning.      Returned later to illicit further exam and questioning.  Notes some ongoing abdominal pain, though also notes like she feels like she has a UTI.  She is willing to have an IV placed and obtain a CT of the abdomen, given the diarrhea, vomiting and pain, not normally seen with UTI.      Allergies:  Allergies   Allergen Reactions     Amoxicillin Other (See Comments)     Headaches     Levaquin [Levofloxacin] Swelling     Naproxen Other (See Comments)     Reaction: Headaches     Nickel      Tramadol      Sulindac Rash       Problem List:    Patient Active Problem List    Diagnosis Date Noted     Depression with suicidal ideation 09/19/2018     Priority: Medium     Clostridium difficile colitis 08/15/2018     Priority: Medium     Chemical dependency (H) 07/16/2018     Priority: Medium     Calculus of lower urinary tract 07/15/2018     Priority: Medium     History of CVA (cerebrovascular accident) 07/15/2018     Priority: Medium     Left hemiparesis (H) 07/15/2018     Priority: Medium     Chronic anticoagulation 07/15/2018     Priority: Medium     History of cranioplasty 05/24/2018     Priority: Medium     Acute ischemic stroke (H) 02/17/2018     Priority: Medium     Influenza B 05/01/2017     Priority: Medium     Opacity of lung on imaging study 05/01/2017     Priority: Medium     Community acquired pneumonia 05/01/2017     Priority: Medium     C. difficile colitis 05/01/2017     Priority: Medium     Sepsis (H) 04/28/2017     Priority: Medium "     Pyelonephritis 01/05/2017     Priority: Medium     Acute chest pain 05/06/2016     Priority: Medium     Leukocytosis 05/06/2016     Priority: Medium     Lung mass 05/06/2016     Priority: Medium     SOB (shortness of breath) 05/06/2016     Priority: Medium     Acute upper GI bleed 06/18/2013     Priority: Medium     Hypokalemia 06/18/2013     Priority: Medium     Acute cystitis 06/18/2013     Priority: Medium     Anxiety state 03/25/2013     Priority: Medium     Muscle pain 07/30/2009     Priority: Medium     Overview:   IMO Update 10/11       Cervicalgia 06/16/2009     Priority: Medium     Overview:   IMO Update 10/11       Low back pain 06/16/2009     Priority: Medium     Overview:   IMO Update 10/11       Pain in joint, lower leg 05/01/2009     Priority: Medium     Diarrhea 04/17/2008     Priority: Medium     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     Priority: Medium        Past Medical History:    Past Medical History:   Diagnosis Date     Anemia      Anxiety      Chemical dependency (H)      Chronic diarrhea      Lactose intolerance      Myalgia      OCD (obsessive compulsive disorder)      Pulmonary embolism (H) 05/06/16     Stroke (H) 02/2018     Vitamin B12 deficiency        Past Surgical History:    Past Surgical History:   Procedure Laterality Date     CLOSED REDUCTION, PERCUTANEOUS PINNING LOWER EXTREMITY, COMBINED Right 4/6/2016    Procedure: COMBINED CLOSED REDUCTION, PERCUTANEOUS PINNING LOWER EXTREMITY;  Surgeon: Dexter Jones MD;  Location: [facility]     COLONOSCOPY       CRANIECTOMY Right 2/18/2018    Procedure: CRANIECTOMY;  RIGHT HEMICRANIECTOMY;  Surgeon: Emeterio Mesa MD;  Location: [facility]     CRANIOPLASTY Right 5/24/2018    Procedure: CRANIOPLASTY;  Right Cranioplasty ;  Surgeon: Emeterio Mesa MD;  Location: [facility]     UPPER GI ENDOSCOPY         Family History:    Family History   Problem Relation Age of Onset     Depression Mother       "Migraines Maternal Half-Sister        Social History:  Marital Status:  Single [1]  Social History     Tobacco Use     Smoking status: Current Every Day Smoker     Packs/day: 1.00     Years: 7.00     Pack years: 7.00     Types: Cigarettes     Last attempt to quit: 2018     Years since quittin.8     Smokeless tobacco: Never Used   Substance Use Topics     Alcohol use: Not Currently     Alcohol/week: 0.0 standard drinks     Comment: drank \"a lot\" last night; last drank prior January      Drug use: No     Comment: hx of marijuana and meth use        Medications:    melatonin 5 MG tablet  mirtazapine (REMERON) 15 MG tablet  XARELTO 20 MG TABS tablet  acetaminophen (TYLENOL) 500 MG tablet  baclofen (LIORESAL) 10 MG tablet  benzocaine (ORAJEL MAXIMUM STRENGTH) 20 % GEL gel  busPIRone (BUSPAR) 15 MG tablet  CHANTIX CONTINUING MONTH ASHLEY 1 MG tablet  DULoxetine (CYMBALTA) 60 MG capsule  famotidine (PEPCID) 20 MG tablet  folic acid (FOLVITE) 1 MG tablet  gabapentin (NEURONTIN) 300 MG capsule  LITHIUM PO  loperamide (IMODIUM) 2 MG capsule  medroxyPROGESTERone (DEPO-PROVERA) 150 MG/ML IM injection  nicotine (NICODERM CQ) 7 MG/24HR 24 hr patch  potassium chloride ER (K-TAB) 20 MEQ CR tablet  promethazine (PHENERGAN) 12.5 MG tablet          Review of Systems   Constitutional: Negative for activity change, chills and fever.   HENT: Negative.    Eyes: Negative.    Respiratory: Negative for cough, chest tightness and shortness of breath.    Cardiovascular: Negative for chest pain.   Gastrointestinal: Positive for abdominal pain, diarrhea, nausea and vomiting.   Genitourinary: Positive for difficulty urinating and dysuria.       Physical Exam   BP: 105/74  Pulse: 94  Heart Rate: 90  Temp: 99.2  F (37.3  C)  Resp: 18  SpO2: 100 %      Physical Exam  Constitutional:       General: She is not in acute distress.     Appearance: She is not ill-appearing, toxic-appearing or diaphoretic.      Comments: Intermittently difficult to " understand.  Limited communication initially, then opened up a bit more.   HENT:      Head: Normocephalic.      Nose: Nose normal.   Eyes:      General:         Right eye: No discharge.         Left eye: No discharge.   Neck:      Musculoskeletal: Normal range of motion.   Cardiovascular:      Rate and Rhythm: Normal rate.   Pulmonary:      Effort: Pulmonary effort is normal. No respiratory distress.      Breath sounds: Normal breath sounds. No stridor. No wheezing, rhonchi or rales.   Abdominal:      General: Bowel sounds are normal. There is distension.      Palpations: Abdomen is soft. There is no mass.      Tenderness: There is abdominal tenderness. There is no right CVA tenderness, left CVA tenderness, guarding or rebound.      Hernia: No hernia is present.      Comments: Mild tenderness RUQ and pelvic region.     Neurological:      Mental Status: She is alert.      Motor: Weakness present.      Comments: Left side weak at baseline       ED Course   The Lactic acid level is elevated due to UTI, at this time there is no sign of severe sepsis or septic shock.     Results for orders placed or performed during the hospital encounter of 05/22/20 (from the past 24 hour(s))   CBC with platelets differential   Result Value Ref Range    WBC 30.2 (H) 4.0 - 11.0 10e9/L    RBC Count 5.34 (H) 3.8 - 5.2 10e12/L    Hemoglobin 17.2 (H) 11.7 - 15.7 g/dL    Hematocrit 45.9 35.0 - 47.0 %    MCV 86 78 - 100 fl    MCH 32.2 26.5 - 33.0 pg    MCHC 37.5 (H) 31.5 - 36.5 g/dL    RDW 13.0 10.0 - 15.0 %    Platelet Count 512 (H) 150 - 450 10e9/L    Diff Method Automated Method     % Neutrophils 88.4 %    % Lymphocytes 3.0 %    % Monocytes 6.1 %    % Eosinophils 0.5 %    % Basophils 0.3 %    % Immature Granulocytes 1.7 %    Nucleated RBCs 0 0 /100    Absolute Neutrophil 26.7 (H) 1.6 - 8.3 10e9/L    Absolute Lymphocytes 0.9 0.8 - 5.3 10e9/L    Absolute Monocytes 1.8 (H) 0.0 - 1.3 10e9/L    Absolute Eosinophils 0.2 0.0 - 0.7 10e9/L     Absolute Basophils 0.1 0.0 - 0.2 10e9/L    Abs Immature Granulocytes 0.5 (H) 0 - 0.4 10e9/L    Absolute Nucleated RBC 0.0    Comprehensive metabolic panel   Result Value Ref Range    Sodium 130 (L) 133 - 144 mmol/L    Potassium 1.7 (LL) 3.4 - 5.3 mmol/L    Chloride 97 94 - 109 mmol/L    Carbon Dioxide 24 20 - 32 mmol/L    Anion Gap 9 3 - 14 mmol/L    Glucose 110 (H) 70 - 99 mg/dL    Urea Nitrogen 12 7 - 30 mg/dL    Creatinine 0.87 0.52 - 1.04 mg/dL    GFR Estimate 88 >60 mL/min/[1.73_m2]    GFR Estimate If Black >90 >60 mL/min/[1.73_m2]    Calcium 10.6 (H) 8.5 - 10.1 mg/dL    Bilirubin Total 0.7 0.2 - 1.3 mg/dL    Albumin 4.2 3.4 - 5.0 g/dL    Protein Total 8.4 6.8 - 8.8 g/dL    Alkaline Phosphatase 156 (H) 40 - 150 U/L    ALT 19 0 - 50 U/L    AST 23 0 - 45 U/L   Lipase   Result Value Ref Range    Lipase 343 73 - 393 U/L   Lactic acid whole blood   Result Value Ref Range    Lactic Acid 2.7 (H) 0.7 - 2.0 mmol/L   Alcohol ethyl   Result Value Ref Range    Ethanol g/dL <0.01 0.01 g/dL   US Abdomen Limited (RUQ)    Narrative    PROCEDURES: US ABDOMEN LIMITED    HISTORY: Female, age 30 years, emesis, abd pain    TECHNIQUE: Ultrasound of the upper abdomen was performed.    COMPARISON: None.     FINDINGS:    LIVER: Diffusely echogenic and enlarged.    GALLBLADDER: Echogenic intraluminal focus suggesting at least one  stone. No wall thickening. No pericholecystic fluid.    Patient terminated the examination before the common duct and biliary  tree could be assessed.    ABDOMINAL AORTA AND IVC: The visualized portions of the abdominal  aorta are unremarkable.    PANCREAS:The visualized portions of the pancreas are normal.    RIGHT KIDNEY: The right kidney was not assessed as the patient  prematurely terminated the examination.    OTHER: There is no free fluid in the upper abdomen.      Impression    IMPRESSION: The patient terminated the examination prematurely.  Limited evaluation suggests cholelithiasis without evidence  of  cholecystitis.    The common duct and biliary tree as well as the right kidney were not  evaluated.    ANNE PETERSON MD   CT Abdomen Pelvis w Contrast    Narrative    EXAMINATION: CT ABDOMEN PELVIS W CONTRAST, 5/22/2020 6:21 PM    TECHNIQUE:  Helical CT images from the lung bases through the  symphysis pubis were obtained  with IV contrast. Contrast dose: Isovue  300 100mL    COMPARISON: none    HISTORY: Abd pain, acute, generalized    FINDINGS:    The lung bases are clear    There is intense fatty infiltration of the liver. No calcified  gallstones are seen. There is no biliary ductal enlargement.    The the spleen and pancreas appear normal.    The adrenal glands are normal.    A right renal cyst is noted. There is no hydronephrosis.    The periaortic lymph nodes are normal in caliber.    No intraperitoneal masses or inflammatory changes are noted    In the pelvis the bladder and rectum appear normal.    The regional skeleton is intact      Impression    IMPRESSION: Fatty infiltration of the liver. No intraperitoneal masses  or inflammatory changes are noted     GEETHA JACOBS MD   UA reflex to Microscopic and Culture    Specimen: Midstream Urine   Result Value Ref Range    Color Urine Straw     Appearance Urine Clear     Glucose Urine Negative NEG^Negative mg/dL    Bilirubin Urine Negative NEG^Negative    Ketones Urine Negative NEG^Negative mg/dL    Specific Gravity Urine 1.017 1.003 - 1.035    Blood Urine Negative NEG^Negative    pH Urine 7.5 4.7 - 8.0 pH    Protein Albumin Urine Negative NEG^Negative mg/dL    Urobilinogen mg/dL Normal 0.0 - 2.0 mg/dL    Nitrite Urine Negative NEG^Negative    Leukocyte Esterase Urine Moderate (A) NEG^Negative    Source Midstream Urine     RBC Urine <1 0 - 2 /HPF    WBC Urine 22 (H) 0 - 5 /HPF    Bacteria Urine Few (A) NEG^Negative /HPF    Squamous Epithelial /HPF Urine 4 (H) 0 - 1 /HPF    Mucous Urine Present (A) NEG^Negative /LPF   Urine Drugs of Abuse Screen Panel 13    Result Value Ref Range    Cannabinoids (89-rdh-7-carboxy-9-THC) Not Detected NDET^Not Detected ng/mL    Phencyclidine (Phencyclidine) Not Detected NDET^Not Detected ng/mL    Cocaine (Benzoylecgonine) Not Detected NDET^Not Detected ng/mL    Methamphetamine (d-Methamphetamine) Not Detected NDET^Not Detected ng/mL    Opiates (Morphine) Not Detected NDET^Not Detected ng/mL    Amphetamine (d-Amphetamine) Not Detected NDET^Not Detected ng/mL    Benzodiazepines (Nordiazepam) Not Detected NDET^Not Detected ng/mL    Tricyclic Antidepressants (Desipramine) Not Detected NDET^Not Detected ng/mL    Methadone (Methadone) Not Detected NDET^Not Detected ng/mL    Barbiturates (Butalbital) Not Detected NDET^Not Detected ng/mL    Oxycodone (Oxycodone) Not Detected NDET^Not Detected ng/mL    Propoxyphene (Norpropoxyphene) Not Detected NDET^Not Detected ng/mL    Buprenorphine (Buprenorphine) Not Detected NDET^Not Detected ng/mL   HCG qualitative urine   Result Value Ref Range    HCG Qual Urine Negative NEG^Negative       Medications   ondansetron (ZOFRAN) injection 4 mg (4 mg Intravenous Not Given 5/22/20 1418)   sodium chloride 0.9% infusion ( Intravenous Not Given 5/22/20 1418)   potassium chloride 10 mEq in 100 mL intermittent infusion with 10 mg lidocaine (0 mEq Intravenous ED Infusing on Admission/transfer 5/22/20 1909)   piperacillin-tazobactam (ZOSYN) infusion 3.375 g (0 g Intravenous Stopped 5/22/20 1842)   potassium chloride (KLOR-CON) Packet 40 mEq (40 mEq Oral Given 5/22/20 1424)   ondansetron (ZOFRAN-ODT) ODT tab 4 mg (4 mg Oral Given 5/22/20 1423)   0.9% sodium chloride BOLUS (0 mLs Intravenous Stopped 5/22/20 1905)   iopamidol (ISOVUE-300) IV solution 61% 100 mL (100 mLs Intravenous Given 5/22/20 1806)   sodium chloride (PF) 0.9% PF flush 60 mL (60 mLs Intravenous Given 5/22/20 1807)       Assessments & Plan (with Medical Decision Making)     I have reviewed the nursing notes.    I have reviewed the findings, diagnosis,  "plan and need for follow up with the patient.       ED to Inpatient Handoff:    Discussed with Dr. Manuel at 1910  Patient accepted for Inpatient Stay  Pending studies include NA  Code Status: Full Code           New Prescriptions    No medications on file     Final diagnoses:   Lactic acidosis   Hypokalemia     29 yo female, presenting with emesis, diarrhea, \"not feeling well.\":  -history is somewhat limited given patient's willingness to participate.  Given RUQ tenderness on initial exam, RUQ US was obtained that showed stones without cholecystitis.  Given WBC of 30.2 and vague abdominal symptoms, ordered CT abd/pelvis with IV contrast.  Discussed need for this given her symptoms and WBC and she agreed to obtain IV and have the test.  CT scan showed no acute findings.  UA obtained as well which showed UTI.  Potassium critically low at 1.7 and patient refused initially refused IV placement, then agreed, requiring anesthesia for IV placement.  Patient was given PO K replacement in the meantime, 40 mEq.  Discussed patient with Dr. Manuel who will admit.  Patient had also been started on Zosyn for empiric coverage, and refused to obtain blood cultures.  Given the frequent refusals during her ED stay, the nursing manager was called to further address with patient.  Ultimately she did elect to be treated and to be admitted.          5/22/2020   [facility]     Silvio Patel PA-C  05/22/20 1944    "

## 2020-05-22 NOTE — ED NOTES
DATE:  5/22/2020   TIME OF RECEIPT FROM LAB:  1411  LAB TEST:  Potassium  LAB VALUE:  1.7  RESULTS GIVEN WITH READ-BACK TO (PROVIDER):  Silvio Patel PA-C  TIME LAB VALUE REPORTED TO PROVIDER:   1413

## 2020-05-22 NOTE — ANESTHESIA PREPROCEDURE EVALUATION
Anesthesia Pre-Procedure Evaluation    Patient: Maggie Case   MRN: [id number] : 1990          Preoperative Diagnosis: * No pre-op diagnosis entered *    * No procedures listed *    Past Medical History:   Diagnosis Date     Anemia      Anxiety      Chemical dependency (H)      Chronic diarrhea      Lactose intolerance      Myalgia      OCD (obsessive compulsive disorder)      Pulmonary embolism (H) 16     Stroke (H) 2018     Vitamin B12 deficiency      Past Surgical History:   Procedure Laterality Date     CLOSED REDUCTION, PERCUTANEOUS PINNING LOWER EXTREMITY, COMBINED Right 2016    Procedure: COMBINED CLOSED REDUCTION, PERCUTANEOUS PINNING LOWER EXTREMITY;  Surgeon: Dexter Jones MD;  Location: [facility]     COLONOSCOPY       CRANIECTOMY Right 2018    Procedure: CRANIECTOMY;  RIGHT HEMICRANIECTOMY;  Surgeon: Emeterio Mesa MD;  Location: [facility]     CRANIOPLASTY Right 2018    Procedure: CRANIOPLASTY;  Right Cranioplasty ;  Surgeon: Emeterio Mesa MD;  Location: [facility]     UPPER GI ENDOSCOPY                          Lab Results   Component Value Date    WBC 30.2 (H) 2020    HGB 17.2 (H) 2020    HCT 45.9 2020     (H) 2020    CRP 32.6 (H) 2020    SED 14 2017     (L) 2020    POTASSIUM 1.7 (LL) 2020    CHLORIDE 97 2020    CO2 24 2020    BUN 12 2020    CR 0.87 2020     (H) 2020    AVINASH 10.6 (H) 2020    PHOS 3.5 2018    MAG 2.5 (H) 2020    ALBUMIN 4.2 2020    PROTTOTAL 8.4 2020    ALT 19 2020    AST 23 2020    ALKPHOS 156 (H) 2020    BILITOTAL 0.7 2020    LIPASE 343 2020    AMYLASE 50 2016    PTT 30 2018    INR 1.04 07/15/2018    FIBR 544 (H) 2018    TSH 0.25 (L) 2018    HCG Negative 2017    HCGS Negative 2018       Preop Vitals  BP Readings from Last 3 Encounters:   20 105/74  "  05/12/20 107/61   05/08/20 109/77    Pulse Readings from Last 3 Encounters:   05/22/20 94   03/18/20 92   03/06/20 72      Resp Readings from Last 3 Encounters:   05/22/20 18   05/12/20 16   05/08/20 16    SpO2 Readings from Last 3 Encounters:   05/22/20 100%   05/12/20 97%   05/08/20 99%      Temp Readings from Last 1 Encounters:   05/22/20 99.2  F (37.3  C) (Tympanic)    Ht Readings from Last 1 Encounters:   11/05/18 1.6 m (5' 3\")      Wt Readings from Last 1 Encounters:   11/05/18 55.3 kg (122 lb)    Estimated body mass index is 21.61 kg/m  as calculated from the following:    Height as of 11/5/18: 1.6 m (5' 3\").    Weight as of 11/5/18: 55.3 kg (122 lb).       Anesthesia Plan  Procedure only, no anesthetic delivered             Postoperative Care      Consents                 WOO Rosas CRNA  "

## 2020-05-22 NOTE — ED NOTES
Refusing iv, pt keeps pulling her arm up and away from staff. Attempting to help her change her depends and pt won't allow staff to help her roll over and pt won't roll herself. Provider updated on pt status.

## 2020-05-22 NOTE — ED NOTES
"Pt is non compliant with her assessment c/o staff being \"rude\" due to asking assessment questions and attempting to go over her medication list   "

## 2020-05-22 NOTE — ED NOTES
crna attempted to start iv but pt now not allowing additional attempts. crna is calling in a 2nd crna

## 2020-05-23 PROBLEM — Z79.01 CHRONIC ANTICOAGULATION: Status: ACTIVE | Noted: 2018-07-15

## 2020-05-23 PROBLEM — N39.0 ACUTE URINARY TRACT INFECTION: Status: ACTIVE | Noted: 2020-05-23

## 2020-05-23 LAB
ANION GAP SERPL CALCULATED.3IONS-SCNC: 7 MMOL/L (ref 3–14)
BUN SERPL-MCNC: 5 MG/DL (ref 7–30)
CALCIUM SERPL-MCNC: 8.9 MG/DL (ref 8.5–10.1)
CHLORIDE SERPL-SCNC: 118 MMOL/L (ref 94–109)
CHLORIDE UR-SCNC: 39 MMOL/L
CO2 SERPL-SCNC: 18 MMOL/L (ref 20–32)
CREAT SERPL-MCNC: 0.62 MG/DL (ref 0.52–1.04)
ERYTHROCYTE [DISTWIDTH] IN BLOOD BY AUTOMATED COUNT: 13.4 % (ref 10–15)
GFR SERPL CREATININE-BSD FRML MDRD: >90 ML/MIN/{1.73_M2}
GLUCOSE SERPL-MCNC: 122 MG/DL (ref 70–99)
HCT VFR BLD AUTO: 35.2 % (ref 35–47)
HGB BLD-MCNC: 13 G/DL (ref 11.7–15.7)
LACTATE BLD-SCNC: 1.4 MMOL/L (ref 0.7–2)
LITHIUM SERPL-SCNC: 1.15 MMOL/L (ref 0.6–1.2)
MAGNESIUM SERPL-MCNC: 2.4 MG/DL (ref 1.6–2.3)
MCH RBC QN AUTO: 32.6 PG (ref 26.5–33)
MCHC RBC AUTO-ENTMCNC: 36.9 G/DL (ref 31.5–36.5)
MCV RBC AUTO: 88 FL (ref 78–100)
OSMOLALITY SERPL: 291 MMOL/KG (ref 280–295)
OSMOLALITY SERPL: 293 MMOL/KG (ref 280–295)
OSMOLALITY UR: 112 MMOL/KG (ref 100–1200)
OSMOLALITY UR: 99 MMOL/KG (ref 100–1200)
PLATELET # BLD AUTO: 344 10E9/L (ref 150–450)
POTASSIUM SERPL-SCNC: 2 MMOL/L (ref 3.4–5.3)
POTASSIUM SERPL-SCNC: 2.3 MMOL/L (ref 3.4–5.3)
POTASSIUM SERPL-SCNC: 4 MMOL/L (ref 3.4–5.3)
POTASSIUM UR-SCNC: <1 MMOL/L
RBC # BLD AUTO: 3.99 10E12/L (ref 3.8–5.2)
SODIUM SERPL-SCNC: 142 MMOL/L (ref 133–144)
SODIUM SERPL-SCNC: 143 MMOL/L (ref 133–144)
SODIUM UR-SCNC: 34 MMOL/L
SP GR UR STRIP: 1 (ref 1–1.03)
UUN UR-MCNC: 36 MG/DL
UUN/CREAT 24H UR: 5 G/G CR
WBC # BLD AUTO: 19 10E9/L (ref 4–11)

## 2020-05-23 PROCEDURE — 25000128 H RX IP 250 OP 636

## 2020-05-23 PROCEDURE — 83735 ASSAY OF MAGNESIUM: CPT | Performed by: INTERNAL MEDICINE

## 2020-05-23 PROCEDURE — 25800030 ZZH RX IP 258 OP 636: Performed by: NURSE PRACTITIONER

## 2020-05-23 PROCEDURE — 82436 ASSAY OF URINE CHLORIDE: CPT | Performed by: NURSE PRACTITIONER

## 2020-05-23 PROCEDURE — 84132 ASSAY OF SERUM POTASSIUM: CPT | Performed by: NURSE PRACTITIONER

## 2020-05-23 PROCEDURE — 12000000 ZZH R&B MED SURG/OB

## 2020-05-23 PROCEDURE — 25000128 H RX IP 250 OP 636: Performed by: INTERNAL MEDICINE

## 2020-05-23 PROCEDURE — 85027 COMPLETE CBC AUTOMATED: CPT | Performed by: INTERNAL MEDICINE

## 2020-05-23 PROCEDURE — 83935 ASSAY OF URINE OSMOLALITY: CPT | Performed by: NURSE PRACTITIONER

## 2020-05-23 PROCEDURE — 80048 BASIC METABOLIC PNL TOTAL CA: CPT | Performed by: INTERNAL MEDICINE

## 2020-05-23 PROCEDURE — 25000132 ZZH RX MED GY IP 250 OP 250 PS 637: Performed by: NURSE PRACTITIONER

## 2020-05-23 PROCEDURE — 99233 SBSQ HOSP IP/OBS HIGH 50: CPT | Performed by: NURSE PRACTITIONER

## 2020-05-23 PROCEDURE — 84133 ASSAY OF URINE POTASSIUM: CPT | Performed by: NURSE PRACTITIONER

## 2020-05-23 PROCEDURE — 81003 URINALYSIS AUTO W/O SCOPE: CPT | Performed by: NURSE PRACTITIONER

## 2020-05-23 PROCEDURE — 84300 ASSAY OF URINE SODIUM: CPT | Performed by: NURSE PRACTITIONER

## 2020-05-23 PROCEDURE — 80178 ASSAY OF LITHIUM: CPT | Performed by: INTERNAL MEDICINE

## 2020-05-23 PROCEDURE — 84540 ASSAY OF URINE/UREA-N: CPT | Performed by: NURSE PRACTITIONER

## 2020-05-23 PROCEDURE — 36415 COLL VENOUS BLD VENIPUNCTURE: CPT | Performed by: INTERNAL MEDICINE

## 2020-05-23 PROCEDURE — 84295 ASSAY OF SERUM SODIUM: CPT | Performed by: NURSE PRACTITIONER

## 2020-05-23 PROCEDURE — 36415 COLL VENOUS BLD VENIPUNCTURE: CPT | Performed by: NURSE PRACTITIONER

## 2020-05-23 PROCEDURE — 25800030 ZZH RX IP 258 OP 636: Performed by: INTERNAL MEDICINE

## 2020-05-23 PROCEDURE — 83930 ASSAY OF BLOOD OSMOLALITY: CPT | Performed by: NURSE PRACTITIONER

## 2020-05-23 PROCEDURE — 25000132 ZZH RX MED GY IP 250 OP 250 PS 637: Performed by: INTERNAL MEDICINE

## 2020-05-23 PROCEDURE — 83605 ASSAY OF LACTIC ACID: CPT | Performed by: INTERNAL MEDICINE

## 2020-05-23 RX ORDER — POTASSIUM CL/LIDO/0.9 % NACL 10MEQ/0.1L
INTRAVENOUS SOLUTION, PIGGYBACK (ML) INTRAVENOUS
Status: COMPLETED
Start: 2020-05-23 | End: 2020-05-23

## 2020-05-23 RX ORDER — POTASSIUM CHLORIDE 750 MG/1
40 CAPSULE, EXTENDED RELEASE ORAL ONCE
Status: COMPLETED | OUTPATIENT
Start: 2020-05-23 | End: 2020-05-23

## 2020-05-23 RX ORDER — SODIUM CHLORIDE 9 MG/ML
INJECTION, SOLUTION INTRAVENOUS CONTINUOUS
Status: DISCONTINUED | OUTPATIENT
Start: 2020-05-23 | End: 2020-05-24

## 2020-05-23 RX ADMIN — Medication 10 MEQ: at 12:28

## 2020-05-23 RX ADMIN — Medication 10 MEQ: at 00:21

## 2020-05-23 RX ADMIN — SODIUM CHLORIDE: 9 INJECTION, SOLUTION INTRAVENOUS at 03:23

## 2020-05-23 RX ADMIN — SODIUM CHLORIDE: 9 INJECTION, SOLUTION INTRAVENOUS at 15:43

## 2020-05-23 RX ADMIN — Medication 10 MEQ: at 08:07

## 2020-05-23 RX ADMIN — POTASSIUM CHLORIDE 40 MEQ: 750 CAPSULE, EXTENDED RELEASE ORAL at 12:06

## 2020-05-23 RX ADMIN — Medication 10 MEQ: at 01:21

## 2020-05-23 RX ADMIN — Medication 10 MEQ: at 02:18

## 2020-05-23 RX ADMIN — Medication 1 CAPSULE: at 08:19

## 2020-05-23 RX ADMIN — Medication 10 MEQ: at 09:12

## 2020-05-23 RX ADMIN — SODIUM CHLORIDE: 9 INJECTION, SOLUTION INTRAVENOUS at 08:06

## 2020-05-23 RX ADMIN — FAMOTIDINE 20 MG: 20 TABLET ORAL at 08:19

## 2020-05-23 RX ADMIN — Medication 10 MEQ: at 04:24

## 2020-05-23 RX ADMIN — Medication 10 MEQ: at 10:21

## 2020-05-23 RX ADMIN — NICOTINE 1 PATCH: 7 PATCH, EXTENDED RELEASE TRANSDERMAL at 08:19

## 2020-05-23 RX ADMIN — CEFTRIAXONE SODIUM 1 G: 1 INJECTION, SOLUTION INTRAVENOUS at 21:03

## 2020-05-23 RX ADMIN — Medication 10 MEQ: at 11:26

## 2020-05-23 RX ADMIN — Medication 10 MEQ: at 13:32

## 2020-05-23 RX ADMIN — Medication 1 CAPSULE: at 21:03

## 2020-05-23 RX ADMIN — FAMOTIDINE 20 MG: 20 TABLET ORAL at 21:03

## 2020-05-23 RX ADMIN — Medication 10 MEQ: at 05:25

## 2020-05-23 RX ADMIN — RIVAROXABAN 20 MG: 20 TABLET, FILM COATED ORAL at 08:19

## 2020-05-23 RX ADMIN — SODIUM CHLORIDE: 9 INJECTION, SOLUTION INTRAVENOUS at 17:11

## 2020-05-23 RX ADMIN — Medication 10 MEQ: at 03:22

## 2020-05-23 RX ADMIN — MIRTAZAPINE 7.5 MG: 7.5 TABLET ORAL at 21:03

## 2020-05-23 ASSESSMENT — ACTIVITIES OF DAILY LIVING (ADL)
ADLS_ACUITY_SCORE: 30
ADLS_ACUITY_SCORE: 26
ADLS_ACUITY_SCORE: 26
ADLS_ACUITY_SCORE: 30
ADLS_ACUITY_SCORE: 26
ADLS_ACUITY_SCORE: 26

## 2020-05-23 NOTE — PLAN OF CARE
Oriented to place, time and self. Disoriented to situation, very hesitant to respond and doesn't remember why she called ambulance in the first place. Takes a lot of prompting and repetitive questions to get responses. See previous note regarding conversation with pt's mother. Unsure of baseline mentation d/t hx of CVA. VSS. Afebrile. LUE contracted and unable to use, LLE weaker than RLE. This is baseline per pt. Maria placed for I & O and persistent incontinence. Pt also incontinent of loose stool. Potassium recheck at 2330 was 2.0, continuing IV replacement. 1L bolus given over 3 hours following admit. IVF-NS at 125ml/hr. Only took xarelto and melatonin for bedtime meds, refused all others. Need updated list from tati white today. Drinking copious amounts of PO fluids. Will continue to monitor.

## 2020-05-23 NOTE — PLAN OF CARE
"Per pt she would like her mother, Munira updated on her admission and this nurse was given permission to call her. After speaking with Munira it sounds like pt has been in and out of group homes and has been \"kicked out multiple times for not following the rules\" As far as Munira knows pt was getting help from PCA in the home ut she is unsure if that is happening.     The pt lives with her friend, Rudi, and he is one of her PCAs. Pt states she has only been taking her xarelto and none of her other meds for at least 2 weeks. She also says she hasn't had a shower in 1.5 months. Prior to this week pt was continent and able to transfer to toilet from wheelchair with minimal assistance. This week she has been incontinent of both bowel and bladder frequently.     Pt continues to state that she feels safe in her home and safe with who she lives with and feels it would be appropriate to go back home after discharge.   "

## 2020-05-23 NOTE — PLAN OF CARE
Face to face report given with opportunity to observe patient.    Report given to RITESH Martinez RN   5/23/2020  7:11 AM

## 2020-05-23 NOTE — PLAN OF CARE
Pt continues to have increased oral fluid intake t/o shift, strict I&O as ordered. Pt continues to be noncompliant with tasks, needs redirection and reenforcement of tasks. Has been up in chair for remainder of shift, transfers with assist x2 and EZ stand lift.  Continues to have a flat effect and slow to respond to verbal cues, needs to have constant reminders.

## 2020-05-23 NOTE — PHARMACY
[facility]    Pharmacy      Antimicrobial Stewardship Note     Current antimicrobial therapy:  Anti-infectives (From now, onward)    Start     Dose/Rate Route Frequency Ordered Stop    05/22/20 2030  cefTRIAXone in d5w (ROCEPHIN) intermittent infusion 1 g      1 g  over 30 Minutes Intravenous EVERY 24 HOURS 05/22/20 2020            Indication: Pyelonephritis    Days of Therapy: 2     Pertinent labs:  Creatinine   Creatinine   Date Value Ref Range Status   05/23/2020 0.62 0.52 - 1.04 mg/dL Final   05/22/2020 0.87 0.52 - 1.04 mg/dL Final   05/12/2020 0.46 (L) 0.52 - 1.04 mg/dL Final     WBC   WBC   Date Value Ref Range Status   05/23/2020 19.0 (H) 4.0 - 11.0 10e9/L Final   05/22/2020 30.2 (H) 4.0 - 11.0 10e9/L Final   05/12/2020 15.9 (H) 4.0 - 11.0 10e9/L Final     Procalcitonin   Procalcitonin   Date Value Ref Range Status   02/25/2018 0.23 ng/ml Final     Comment:     0.05-0.24 ng/ml Low risk of systemic bacterial infection. Local bacterial   infection possible.  Recommendation: Assess other clinical features of   infection. Discourage antibiotics unless strong clinical suspicion for serious   infection.       CRP   CRP Inflammation   Date Value Ref Range Status   05/12/2020 32.6 (H) 0.0 - 8.0 mg/L Final   07/15/2018 9.5 (H) 0.0 - 8.0 mg/L Final   02/26/2018 140.0 (H) 0.0 - 8.0 mg/L Final       Culture Results:   (5/22/20) Urine    (5/8/20) Urine = E. Coli resistant to ampicillin, Unasyn, Ciprofloxacin, Gentamicin, Levofloxacin, Zosyn. Sensitive to Cefepime, Ceftriaxone, Imipenem, Nitrofurantoin, Bactrim  Prescribed Keflex but never took/picked up per nursing notes       Recommendations/Interventions:  1. None today  2. Chart review shows history of C. Diff; transition off of cephalosporin as soon as possible and switch to Bactrim (lower C. Diff risk than cephalosporins and sensitive to recent urine culture)    Refugio Teixeira, [facility]  May 23, 2020

## 2020-05-23 NOTE — PROGRESS NOTES
[facility]    Hospitalist Progress Note    Date of Service (when I saw the patient): 05/23/2020    Assessment & Plan       Acute urinary tract infection: Continue Rocephin, prelim culture gram negative rods.       Lithium toxicity: Presented with elevated lithium level at 2.0 on arrival, 3 days of diarrhea, vomiting and fecal/urinary incontinence. She has polydipsia and polyuria as well. She feels like that has been on for a few weeks. This morning level down to 1.15. Recheck in AM, holding doses. Consult psych for medications recommendations in AM.      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 Will continue IVF overnight though back down to 75ml/hr. Maria in place for accurate I/O.       Hypokalemia: IV and oral replacement. Likely due to profound diarrhea at home.       Chronic anticoagulation: Continue Xarelto.       DVT Prophylaxis: Xarelto  Code Status: Full Code    Disposition: Expected discharge in few days days once stable.    Delmi Nicole, CNP    Interval History   Feels tired but denies pain. No dyspnea, nausea. Thirsty.     -Data reviewed today: I reviewed all new labs and imaging results over the last 24 hours.    Physical Exam   Temp: 98.3  F (36.8  C) Temp src: Tympanic BP: 113/57 Pulse: 96 Heart Rate: 94 Resp: 18 SpO2: 95 % O2 Device: None (Room air)    Vitals:    05/22/20 1959   Weight: 73.3 kg (161 lb 9.6 oz)     Vital Signs with Ranges  Temp:  [97.1  F (36.2  C)-99.1  F (37.3  C)] 98.3  F (36.8  C)  Pulse:  [] 96  Heart Rate:  [90-94] 94  Resp:  [16-18] 18  BP: (103-121)/(7-78) 113/57  SpO2:  [95 %-100 %] 95 %  I/O last 3 completed shifts:  In: 7718.08 [P.O.:5070; I.V.:1648.08; IV Piggyback:1000]  Out: 7165 [Urine:7165]    Peripheral IV 05/12/20 Right Upper arm (Active)   Number of days: 11       Peripheral IV 05/22/20 Right Upper arm (Active)   Site Assessment WDL except;Other (Comment) 05/23/20 0308    Line Status Infusing;Checked every 1-2 hour 05/23/20 1545   Phlebitis Scale 0-->no symptoms 05/23/20 1545   Infiltration Scale 0 05/23/20 1210   Number of days: 1       Urethral Catheter Latex 16 fr (Active)   Tube Description UTV 05/23/20 1545   Catheter Care Done 05/23/20 1017   Collection Container Standard 05/23/20 1545   Securement Method Securing device (Describe) 05/23/20 1545   Urine Output 1250 mL 05/23/20 1400   Number of days: 1     Line/device assessment(s) completed for medical necessity    Constitutional: Awake,alert, no acute distress. Delayed response times.   Respiratory: Clear bilaterally, no wheezes, crackles or rhonchi.   Cardiovascular: HRR, no murmurs, rubs, thrills.   GI: Soft,nontender, bowel sounds positive.  Skin/Integumen: Pale, scattered bruises at venipuncture sites.        Medications     - MEDICATION INSTRUCTIONS -       sodium chloride         cefTRIAXone  1 g Intravenous Q24H     famotidine  20 mg Oral BID     lactobacillus rhamnosus (GG)  1 capsule Oral BID     mirtazapine  7.5 mg Oral At Bedtime     nicotine  1 patch Topical Daily     nicotine   Transdermal Q8H     rivaroxaban ANTICOAGULANT  20 mg Oral Daily with breakfast     senna-docusate  1 tablet Oral BID    Or     senna-docusate  2 tablet Oral BID     sodium chloride (PF)  3 mL Intracatheter Q8H       Data   Recent Labs   Lab 05/23/20  1558 05/23/20  1509 05/23/20  0701 05/22/20  2359 05/22/20  1340   WBC  --   --  19.0*  --  30.2*   HGB  --   --  13.0  --  17.2*   MCV  --   --  88  --  86   PLT  --   --  344  --  512*   NA  --  142 143  --  130*   POTASSIUM 4.0  --  2.3* 2.0* 1.7*   CHLORIDE  --   --  118*  --  97   CO2  --   --  18*  --  24   BUN  --   --  5*  --  12   CR  --   --  0.62  --  0.87   ANIONGAP  --   --  7  --  9   AVINASH  --   --  8.9  --  10.6*   GLC  --   --  122*  --  110*   ALBUMIN  --   --   --   --  4.2   PROTTOTAL  --   --   --   --  8.4   BILITOTAL  --   --   --   --  0.7   ALKPHOS  --   --   --   --   156*   ALT  --   --   --   --  19   AST  --   --   --   --  23   LIPASE  --   --   --   --  343       Recent Results (from the past 24 hour(s))   CT Abdomen Pelvis w Contrast    Narrative    EXAMINATION: CT ABDOMEN PELVIS W CONTRAST, 5/22/2020 6:21 PM    TECHNIQUE:  Helical CT images from the lung bases through the  symphysis pubis were obtained  with IV contrast. Contrast dose: Isovue  300 100mL    COMPARISON: none    HISTORY: Abd pain, acute, generalized    FINDINGS:    The lung bases are clear    There is intense fatty infiltration of the liver. No calcified  gallstones are seen. There is no biliary ductal enlargement.    The the spleen and pancreas appear normal.    The adrenal glands are normal.    A right renal cyst is noted. There is no hydronephrosis.    The periaortic lymph nodes are normal in caliber.    No intraperitoneal masses or inflammatory changes are noted    In the pelvis the bladder and rectum appear normal.    The regional skeleton is intact      Impression    IMPRESSION: Fatty infiltration of the liver. No intraperitoneal masses  or inflammatory changes are noted     GEETHA JACOBS MD   XR Chest Port 1 View    Narrative    XR CHEST PORT 1 VW    HISTORY: 30 yearsFemale elevated WBC    TECHNIQUE: A single view of the chest was performed    COMPARISON: 4/23/2018    FINDINGS: Heart size and pulmonary vascularity are within normal  limits. Lungs are clear. No consolidating airspace opacities are  present.        Impression    IMPRESSION: Clear chest    NATALIE ALARCON MD

## 2020-05-23 NOTE — ED NOTES
When staff went into pt rm to see if she could use the bedpan. Pt. brief and bedding was soaked and needed to be changed. Pt call light was within reach. Pt allowed staff to change brief but declined to have bedding changed by staff. PCS came into room was given an update. PC talked with pt. The first time pt declined to give urine sample and to have bedding changed. After sometime of encouragement and . Bedding was changed and urine sample was provided. PCS at bedside along with staff.

## 2020-05-23 NOTE — PLAN OF CARE
[facility] Inpatient Admission Note:    Patient admitted to 3218/3218-1 at approximately 1955 via cart accompanied by transport tech from emergency room . Report received from Katherine in SBAR format at 1940 via telephone. Patient transferred to bed via slide board.. Patient is alert and oriented X 3, denies pain; rates at 0 on 0-10 scale.  Patient oriented to room, unit, hourly rounding, and plan of care. Explained admission packet and patient handbook with patient bill of rights brochure. Will continue to monitor and document as needed.     Inpatient Nursing criteria listed below was met:    Health care directives status obtained and documented: Yes    Care Everywhere authorization obtained Yes    MRSA swab completed for patient 65 years and older: N/A    Patient identifies a surrogate decision maker: Yes If yes, who: Pawan- mother Contact Information: [phone]     If initial lactic acid >2.0, repeat lactic acid drawn within one hour of arrival to unit: Yes. If no, state reason: NA    Vaccination assessment and education completed: Yes   Vaccinations received prior to admission: Pneumovax no  Influenza(seasonal)  YES   Vaccination(s) ordered: patient declines    Clergy visit ordered if patient requests: N/A    Skin issues/needs documented: N/A    Isolation Patient: no Education given, correct sign in place and documentation row added to PCS:  NA    Fall Prevention Yes: Care plan updated, education given and documented, sticker and magnet in place: Yes    Care Plan initiated: Yes    Education Documented (including assessment): Yes    Patient has discharge needs : Yes If yes, please explain:NA

## 2020-05-23 NOTE — H&P
[facility]    History and Physical - Hospitalist Service       Date of Admission:  5/22/2020    Assessment & Plan   Maggie Case is a 30 year old female admitted on 5/22/2020.     Acute cystitis without hematuria: Rocephin, based on 5/8/2020 UA culture results    Associated sepsis ( + WBC 30.2): IV fluids and treatment, above. Lactic acid is elevated (2.7); trend lactic acid    Elevated Lithium level: hold Lithium, trend level    Hypokalemia: check magnesium, use electrolyte replacement protocol    Hypercalcemia: likely due to dehydration; trend calcium    Elevated magnesium: trend magnesium    Weakness, incontinence and fall risk: Maria with timely removal and PT evaluation    General: Check pregnancy test, drug screen, TSH     Diet: Combination Diet Regular Diet Adult    DVT Prophylaxis: on chronic Xarelto  Maria Catheter: not present  Code Status: Full Code           Disposition Plan   Expected discharge: 2 - 3 days, recommended to needs Social Work living siuation survey once discharge plan established.  Entered: Yury Manuel DO 05/22/2020, 8:27 PM     The patient's care was discussed with the Patient. Nursing will attempt to discuss with family    Yury Manuel DO  [facility]    ______________________________________________________________________    Chief Complaint      Subjective fever, chills; weakness, nausea and vomiting    History is obtained from the patient and EHR review    History of Present Illness   Maggie Case is a 30 year old female with a complex phychiatric history that is medically managed and who has presented to this ER with UTI diagnosis in the past. Most recently, on 5/8/2020, she was diagnosed with UTI and prescribed Keflex, which she claims not to have picked up (she has no reason why). She presented to this ER with nausea and vomiting 5/12/2020, but left AMA. Today, the symptoms were worsening.    ER Course: she had low grade fever of 99.2  with other vitals normal; she appeared weak and dehydrated. Lab diagnostics resulted WBC 30.2 with left shift, chemistry panel showed mild dehydration and hyperkalemia (1.7). UA showed cystitis with leukocyte esterase, WBC and bacteria. CT of ABD showed substantial fatty liver. She was treated with IV fluids and antibiotics were started    Review of Systems       Constitutional: subjective fever and chills, developing generalized weakness, no unintentional weight change, diminished appetite  Ears, Nose & Throat: no sore throat, no nasal drainage, no congestion. No ear pain, no ear drainage, no particular change in hearing  Eyes: no particular change in vision, no redness, no drainage  Cardiovascular: No chest pain at rest, no chest pain with familiar activities.  Pulmonary: No cough, no particular change in work of breathing, no particular change in shortness of breath with position changes  Gastrointestinal: No abdominal pain, but nausea and vomiting, no diarrhea. No particular change in bowel movement pattern, no black stools, no bloody stools  Genitourinary: developing incontinence, no pain with urination, no particular change in stream, smaller amount urinated, no discharge  Skin: No particular change in bruising, no rashes  Musculoskeletal: no particular change in strength, no particular change in muscle development  Neurological: no numbness and tingling, no headache, no particular change in balance, no dizziness  Psychologic: No particular change in depression and/or anxiety  She claims to have not taken her medications for several weeks, but Lithium level is elevated  Endocrine: No particular change in heat or cold intolerance        Past Medical History    I have reviewed this patient's medical history and updated it with pertinent information if needed.   Past Medical History:   Diagnosis Date     Anemia      Anxiety      Chemical dependency (H)      Chronic diarrhea      Lactose intolerance      Myalgia   "    OCD (obsessive compulsive disorder)      Pulmonary embolism (H) 16     Stroke (H) 2018     Vitamin B12 deficiency        Past Surgical History   I have reviewed this patient's surgical history and updated it with pertinent information if needed.  Past Surgical History:   Procedure Laterality Date     CLOSED REDUCTION, PERCUTANEOUS PINNING LOWER EXTREMITY, COMBINED Right 2016    Procedure: COMBINED CLOSED REDUCTION, PERCUTANEOUS PINNING LOWER EXTREMITY;  Surgeon: Dexter Jones MD;  Location: [facility]     COLONOSCOPY       CRANIECTOMY Right 2018    Procedure: CRANIECTOMY;  RIGHT HEMICRANIECTOMY;  Surgeon: Emeterio Mesa MD;  Location: [facility] OR     CRANIOPLASTY Right 2018    Procedure: CRANIOPLASTY;  Right Cranioplasty ;  Surgeon: Emeterio Mesa MD;  Location: [facility] OR     UPPER GI ENDOSCOPY         Social History   I have reviewed this patient's social history and updated it with pertinent information if needed.  Social History     Tobacco Use     Smoking status: Current Every Day Smoker     Packs/day: 1.00     Years: 7.00     Pack years: 7.00     Types: Cigarettes     Last attempt to quit: 2018     Years since quittin.8     Smokeless tobacco: Never Used   Substance Use Topics     Alcohol use: Not Currently     Alcohol/week: 0.0 standard drinks     Comment: drank \"a lot\" last night; last drank prior January      Drug use: No     Comment: hx of marijuana and meth use       Family History   I have reviewed this patient's family history and updated it with pertinent information if needed.   Family History   Problem Relation Age of Onset     Depression Mother      Migraines Maternal Half-Sister         Prior to Admission Medications   Prior to Admission Medications   Prescriptions Last Dose Informant Patient Reported? Taking?   CHANTIX CONTINUING MONTH ASHLEY 1 MG tablet Unknown at Unknown time  Yes No   Sig: Take 1 mg by mouth 2 times daily (Take with food and a glass of water.) "   DULoxetine (CYMBALTA) 60 MG capsule   No No   Sig: Take 1 capsule (60 mg) by mouth daily for 10 days   LITHIUM PO Unknown at Unknown time  Yes No   XARELTO 20 MG TABS tablet 5/21/2020 at Unknown time  Yes Yes   Sig: Take 20 mg by mouth every morning with breakfast.   acetaminophen (TYLENOL) 500 MG tablet Unknown at Unknown time  Yes No   Sig: Take 500 mg by mouth 3 times daily (MAXIMUM OF 4000MG ACETAMINOPHEN FROM ALL SOURCES IN 24 HOURS)   baclofen (LIORESAL) 10 MG tablet   Yes No   Sig: Take 5 mg by mouth every 8 hours as needed   benzocaine (ORAJEL MAXIMUM STRENGTH) 20 % GEL gel Unknown at Unknown time  Yes No   Sig: Take by mouth 2 times daily as needed for pain   busPIRone (BUSPAR) 15 MG tablet   No No   Sig: Take 0.5 tablets (7.5 mg) by mouth 2 times daily for 10 days   cephALEXin (KEFLEX) 500 MG capsule   No No   Sig: Take 1 capsule (500 mg) by mouth 2 times daily for 7 days   famotidine (PEPCID) 20 MG tablet Unknown at Unknown time  Yes No   Sig: Take 20 mg by mouth 2 times daily   folic acid (FOLVITE) 1 MG tablet Unknown at Unknown time  Yes No   Sig: Take 1,000 mcg by mouth daily   gabapentin (NEURONTIN) 300 MG capsule   No No   Sig: Take 3 capsules (900 mg) by mouth 3 times daily for 10 days   loperamide (IMODIUM) 2 MG capsule Unknown at Unknown time  No No   Sig: Take 2 capsules (4 mg) by mouth as needed for diarrhea with first loose stool, then 1 capsule after each loose stool. Max of 8 caps per day.   medroxyPROGESTERone (DEPO-PROVERA) 150 MG/ML IM injection Unknown at Unknown time  Yes No   Sig: Inject 150 mg into the muscle every 3 months   melatonin 5 MG tablet 5/21/2020 at Unknown time  No Yes   Sig: Take 1 tablet (5 mg) by mouth nightly as needed for sleep   mirtazapine (REMERON) 15 MG tablet 5/21/2020 at Unknown time  No Yes   Sig: Take 0.5 tablets (7.5 mg) by mouth At Bedtime   nicotine (NICODERM CQ) 7 MG/24HR 24 hr patch Unknown at Unknown time  Yes No   Sig: Apply 1 patch topically daily  "  ondansetron (ZOFRAN-ODT) 8 MG ODT tab   No No   Sig: Take 1 tablet (8 mg) by mouth every 8 hours as needed for nausea   potassium chloride ER (K-TAB) 20 MEQ CR tablet Unknown at Unknown time  No No   Sig: Take 1 tablet (20 mEq) by mouth daily (with breakfast)   promethazine (PHENERGAN) 12.5 MG tablet Unknown at Unknown time  Yes No   Sig: TAKE 1 TABLET BY MOUTH EVERY 6 HOURS AS NEEDED FOR NAUSEA      Facility-Administered Medications: None     Allergies   Allergies   Allergen Reactions     Amoxicillin Other (See Comments)     Headaches     Levaquin [Levofloxacin] Swelling     Naproxen Other (See Comments)     Reaction: Headaches     Nickel      Tramadol      Sulindac Rash       Physical Exam   Vital Signs: Temp: 97.1  F (36.2  C) Temp src: Tympanic BP: (!) 103/7 Pulse: 102 Heart Rate: 90 Resp: 16 SpO2: 100 % O2 Device: None (Room air)    Weight: 0 lbs 0 oz     Case reviewed with the ER Provider, EHR reviewed; patient seen in ER room 7    Vital signs:  Temp: 97.1  F (36.2  C) Temp src: Tympanic BP: 121/76 Pulse: 90 Heart Rate: 90 Resp: 16 SpO2: 100 % O2 Device: None (Room air)        Estimated body mass index is 21.61 kg/m  as calculated from the following:    Height as of 11/5/18: 1.6 m (5' 3\").    Weight as of 11/5/18: 55.3 kg (122 lb).      General: No distress, interactive, but slow with meaningful responses; she appears dry (oral mucosa), and says she's very thirsty  Head: normocephalic, no obvious trauma  Eyes: Gaze directed normally, sclera clear, no discharge, no abnormal ocular movements  Ears: Normal appearing age-related external ears, no discharge, stable hearing acuity loss  Nose: Normal age-related appearance  Mouth: Normal appearing oral mucosa, Gums and throat appear age-related normal  Neck: Normal age-related appearance, age-related range of motion, supple, no adenopathy  Pulmonary: Normal work of breathing, no expiratory delay, no coarseness, no wheezing  Cardiovascular: Distant heart sounds, " regular rhythm   Abdomen: No obvious distention, soft, bowel sounds present with normal frequency and pitch  Rectal: Deferred  Back: Age-related normal  Skin: Age-related normal, no rashes  Extremities: Not tender, no lower extremity edema. Moving upper and lower extremities  Neurological: Grossly in tact  Psychiatric: Mood is stable, appropriately interactive          Data   Data reviewed today: I reviewed all medications, new labs and imaging results over the last 24 hours

## 2020-05-23 NOTE — PLAN OF CARE
DATE:  5/23/2020   TIME OF RECEIPT FROM LAB:  0740  LAB TEST:  K  LAB VALUE:  2.3  RESULTS GIVEN WITH READ-BACK TO (PROVIDER): Text results to Mable Nicole  TIME LAB VALUE REPORTED TO PROVIDER: 0745

## 2020-05-24 ENCOUNTER — APPOINTMENT (OUTPATIENT)
Dept: CT IMAGING | Facility: HOSPITAL | Age: 30
End: 2020-05-24
Attending: NURSE PRACTITIONER
Payer: MEDICAID

## 2020-05-24 LAB
ANION GAP SERPL CALCULATED.3IONS-SCNC: 6 MMOL/L (ref 3–14)
ANION GAP SERPL CALCULATED.3IONS-SCNC: ABNORMAL MMOL/L (ref 3–14)
BACTERIA SPEC CULT: ABNORMAL
BUN SERPL-MCNC: 2 MG/DL (ref 7–30)
BUN SERPL-MCNC: <1 MG/DL (ref 7–30)
CALCIUM SERPL-MCNC: 8.7 MG/DL (ref 8.5–10.1)
CALCIUM SERPL-MCNC: ABNORMAL MG/DL (ref 8.5–10.1)
CHLORIDE SERPL-SCNC: 122 MMOL/L (ref 94–109)
CHLORIDE SERPL-SCNC: 122 MMOL/L (ref 94–109)
CO2 SERPL-SCNC: 18 MMOL/L (ref 20–32)
CO2 SERPL-SCNC: ABNORMAL MMOL/L (ref 20–32)
CREAT SERPL-MCNC: 0.5 MG/DL (ref 0.52–1.04)
CREAT SERPL-MCNC: <0.14 MG/DL (ref 0.52–1.04)
ERYTHROCYTE [DISTWIDTH] IN BLOOD BY AUTOMATED COUNT: 14.1 % (ref 10–15)
GFR SERPL CREATININE-BSD FRML MDRD: >90 ML/MIN/{1.73_M2}
GFR SERPL CREATININE-BSD FRML MDRD: >90 ML/MIN/{1.73_M2}
GLUCOSE SERPL-MCNC: 131 MG/DL (ref 70–99)
GLUCOSE SERPL-MCNC: 136 MG/DL (ref 70–99)
HCT VFR BLD AUTO: 35.7 % (ref 35–47)
HGB BLD-MCNC: 12.5 G/DL (ref 11.7–15.7)
MAGNESIUM SERPL-MCNC: 2.3 MG/DL (ref 1.6–2.3)
MCH RBC QN AUTO: 32.4 PG (ref 26.5–33)
MCHC RBC AUTO-ENTMCNC: 35 G/DL (ref 31.5–36.5)
MCV RBC AUTO: 93 FL (ref 78–100)
OSMOLALITY SERPL: 306 MMOL/KG (ref 280–295)
OSMOLALITY UR: 109 MMOL/KG (ref 100–1200)
OSMOLALITY UR: 118 MMOL/KG (ref 100–1200)
OSMOLALITY UR: 120 MMOL/KG (ref 100–1200)
OSMOLALITY UR: 133 MMOL/KG (ref 100–1200)
OSMOLALITY UR: 133 MMOL/KG (ref 100–1200)
OSMOLALITY UR: 141 MMOL/KG (ref 100–1200)
PLATELET # BLD AUTO: 320 10E9/L (ref 150–450)
POTASSIUM SERPL-SCNC: 2.8 MMOL/L (ref 3.4–5.3)
POTASSIUM SERPL-SCNC: 4.1 MMOL/L (ref 3.4–5.3)
RBC # BLD AUTO: 3.86 10E12/L (ref 3.8–5.2)
SODIUM SERPL-SCNC: 145 MMOL/L (ref 133–144)
SODIUM SERPL-SCNC: 146 MMOL/L (ref 133–144)
SP GR UR STRIP: 1 (ref 1–1.03)
SPECIMEN SOURCE: ABNORMAL
WBC # BLD AUTO: 11.3 10E9/L (ref 4–11)

## 2020-05-24 PROCEDURE — 25000132 ZZH RX MED GY IP 250 OP 250 PS 637: Performed by: NURSE PRACTITIONER

## 2020-05-24 PROCEDURE — 80048 BASIC METABOLIC PNL TOTAL CA: CPT | Performed by: INTERNAL MEDICINE

## 2020-05-24 PROCEDURE — 25000128 H RX IP 250 OP 636: Performed by: NURSE PRACTITIONER

## 2020-05-24 PROCEDURE — 25000132 ZZH RX MED GY IP 250 OP 250 PS 637: Performed by: INTERNAL MEDICINE

## 2020-05-24 PROCEDURE — 83935 ASSAY OF URINE OSMOLALITY: CPT | Performed by: NURSE PRACTITIONER

## 2020-05-24 PROCEDURE — 36415 COLL VENOUS BLD VENIPUNCTURE: CPT | Performed by: INTERNAL MEDICINE

## 2020-05-24 PROCEDURE — 83735 ASSAY OF MAGNESIUM: CPT | Performed by: INTERNAL MEDICINE

## 2020-05-24 PROCEDURE — 81003 URINALYSIS AUTO W/O SCOPE: CPT | Performed by: NURSE PRACTITIONER

## 2020-05-24 PROCEDURE — 70450 CT HEAD/BRAIN W/O DYE: CPT | Mod: TC

## 2020-05-24 PROCEDURE — 99233 SBSQ HOSP IP/OBS HIGH 50: CPT | Performed by: NURSE PRACTITIONER

## 2020-05-24 PROCEDURE — 25800030 ZZH RX IP 258 OP 636: Performed by: NURSE PRACTITIONER

## 2020-05-24 PROCEDURE — 83930 ASSAY OF BLOOD OSMOLALITY: CPT | Performed by: INTERNAL MEDICINE

## 2020-05-24 PROCEDURE — 12000000 ZZH R&B MED SURG/OB

## 2020-05-24 PROCEDURE — C9113 INJ PANTOPRAZOLE SODIUM, VIA: HCPCS | Performed by: NURSE PRACTITIONER

## 2020-05-24 PROCEDURE — 36415 COLL VENOUS BLD VENIPUNCTURE: CPT | Performed by: NURSE PRACTITIONER

## 2020-05-24 PROCEDURE — 85027 COMPLETE CBC AUTOMATED: CPT | Performed by: INTERNAL MEDICINE

## 2020-05-24 PROCEDURE — 25000128 H RX IP 250 OP 636: Performed by: INTERNAL MEDICINE

## 2020-05-24 PROCEDURE — 80048 BASIC METABOLIC PNL TOTAL CA: CPT | Performed by: NURSE PRACTITIONER

## 2020-05-24 RX ORDER — DESMOPRESSIN ACETATE 4 UG/ML
2 INJECTION, SOLUTION INTRAVENOUS; SUBCUTANEOUS ONCE
Status: COMPLETED | OUTPATIENT
Start: 2020-05-24 | End: 2020-05-24

## 2020-05-24 RX ORDER — LORAZEPAM 2 MG/ML
1 INJECTION INTRAMUSCULAR ONCE
Status: COMPLETED | OUTPATIENT
Start: 2020-05-24 | End: 2020-05-24

## 2020-05-24 RX ORDER — POTASSIUM CHLORIDE 750 MG/1
40 CAPSULE, EXTENDED RELEASE ORAL
Status: COMPLETED | OUTPATIENT
Start: 2020-05-24 | End: 2020-05-24

## 2020-05-24 RX ADMIN — FAMOTIDINE 20 MG: 20 TABLET ORAL at 08:53

## 2020-05-24 RX ADMIN — PANTOPRAZOLE SODIUM 40 MG: 40 INJECTION, POWDER, FOR SOLUTION INTRAVENOUS at 16:30

## 2020-05-24 RX ADMIN — RIVAROXABAN 20 MG: 20 TABLET, FILM COATED ORAL at 08:53

## 2020-05-24 RX ADMIN — MIRTAZAPINE 7.5 MG: 7.5 TABLET ORAL at 20:56

## 2020-05-24 RX ADMIN — NICOTINE 1 PATCH: 7 PATCH, EXTENDED RELEASE TRANSDERMAL at 08:55

## 2020-05-24 RX ADMIN — ONDANSETRON 4 MG: 2 INJECTION INTRAMUSCULAR; INTRAVENOUS at 15:13

## 2020-05-24 RX ADMIN — Medication 1 CAPSULE: at 08:53

## 2020-05-24 RX ADMIN — ACETAMINOPHEN 650 MG: 325 TABLET, FILM COATED ORAL at 08:53

## 2020-05-24 RX ADMIN — POTASSIUM CHLORIDE 40 MEQ: 750 CAPSULE, EXTENDED RELEASE ORAL at 12:25

## 2020-05-24 RX ADMIN — POTASSIUM CHLORIDE 40 MEQ: 750 CAPSULE, EXTENDED RELEASE ORAL at 08:53

## 2020-05-24 RX ADMIN — DESMOPRESSIN ACETATE 2 MCG: 4 SOLUTION INTRAVENOUS at 16:54

## 2020-05-24 RX ADMIN — SODIUM CHLORIDE: 9 INJECTION, SOLUTION INTRAVENOUS at 03:54

## 2020-05-24 RX ADMIN — LORAZEPAM 1 MG: 2 INJECTION INTRAMUSCULAR; INTRAVENOUS at 13:13

## 2020-05-24 RX ADMIN — CEFTRIAXONE SODIUM 1 G: 1 INJECTION, SOLUTION INTRAVENOUS at 20:56

## 2020-05-24 RX ADMIN — Medication 1 CAPSULE: at 20:56

## 2020-05-24 RX ADMIN — POTASSIUM CHLORIDE 40 MEQ: 750 CAPSULE, EXTENDED RELEASE ORAL at 17:34

## 2020-05-24 RX ADMIN — FAMOTIDINE 20 MG: 20 TABLET ORAL at 20:56

## 2020-05-24 ASSESSMENT — ACTIVITIES OF DAILY LIVING (ADL)
ADLS_ACUITY_SCORE: 30
ADLS_ACUITY_SCORE: 30
ADLS_ACUITY_SCORE: 28
ADLS_ACUITY_SCORE: 28
ADLS_ACUITY_SCORE: 30
ADLS_ACUITY_SCORE: 28

## 2020-05-24 NOTE — PROGRESS NOTES
"Pt referred via nutrition indicator list with reduced oral intake.   30 yof admitted with UTI, [place] toxicity.  Hx:  CVA.   Ht-63\", Wt-162#.  IBWR is 104-127#.  BMI is 29.   Labs/meds reviewed.    Regular diet ordered.  Only one meal recorded thus far at 50%.   Current weight is above ideal weight range.    No additional nutrition interventions are indicated at this time.  RD will follow intake and make further recommendations if indicated.   "

## 2020-05-24 NOTE — PLAN OF CARE
Pt more verbal t/o day,still having delayed verbal responses. Answers questions appropriately  Intermittently. Continues to be total cares. INC of stool, catheter remains intact, d/t increased urine output. Pt did speak with [facility] Provider for eval this afternoon, per her request did adm 1mg Ativan to see if any change in verbal responses, no major changes noted. PCP ordered urina studies and CT of head. Will continue to monitor. Continues to have increased thirst with poor appetite. One episode of nausea this afternoon, Zofran was administered and no further nausea noted. See orders.

## 2020-05-24 NOTE — PROGRESS NOTES
"[facility]  Has  named kellen unsure if  is from [facility]. Kellen Ruiz at [phone] ext [phone]    number found in records      Speech has significant delay. Affect very blunted. Excessive blinking. signifcant thought blocking. Doesn't appear preoccupied though at times appears to forget I am speaking with her. At times when I ask her questions she turns away appearing as she hears me though stares blankly and turns her head.     Aware it is 2020. Takes her up to one minute to respond to quetsions and only responds with one word answers.         I tried to contact father to see what her baseline mental status is. When I review her medical records it appears as though she usually holds more conversation though documentation is not clear enough to determine if this is her baseline. I did speak with her nurse today who tells me she has worked with her in the past and she appears this way. 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 I spoke with her about this and asked her if I could order IV ativan to see if it helps clear her thought process. With a very blunted robotic tone she stated \"what is wrong with you. I dont have an IV\". She said nothing other than one to two word answers after this. She does not initiate any conversation.       Nursing notes from 6:14 pm indicate she was more verbal throughout the day, was delayed though also answers questions appropriately.  I called her back 20 minutes after receiving the ativan and tried to assess whether she had improvement with speech Latency. When I called I asked her if her thoughts were more clear and she stated \"no\". She still had delayed though possibly not as much. Was difficult to tell. She still did not answer more than one to two words though when I told her I would call her in a half hour again to talk to her again " "she stated \"sounds good to me\" with more inflection in her tone.  I tried to call back later and she was vomiting and was unable to speak with her.     Likely developed diabetes insipidus secondary to lithium which then lead to toxicity though it was mild. No tremor noted. Does have diarrhea and vomiting. Doesn't appear to have delirium.     Will not restart any medications at this time other than ativan. Would like to obtain records from [place] or speak with her father about her baseline and also know her historical diagnosis to determine what to start her on.     Lamictal would likely be best choice if lithium was used to treat bipolar depression though would will likely wait until GI upset is gone.     MSE/PSYCH  PSYCHIATRIC EXAM  /59 (BP Location: Right arm)   Pulse 80   Temp 99.2  F (37.3  C) (Tympanic)   Resp 17   Ht 1.6 m (5' 3\")   Wt 73.3 kg (161 lb 9.6 oz)   SpO2 100%   BMI 28.63 kg/m    -Appearance/Behavior: normal   -Motor: psychotmotor retardation. Slowed.   -Gait: uses wheelchaor  -Abnormal involuntary movements: none  -Mood: extremely flat   -Affect: blunted  -Speech: significant latency, possible expressive aphasia      -Thought process/associations: difficult to assess due to limited speech  -Thought content: no delusions or hallucinations  -Perceptual disturbances: No hallucinations..              -Suicidal/Homicidal Ideation: likely not present though doenst answer most questions.  -Judgment: limited  -Insight: difficult to assess  *Orientation: time, year, month.  *Memory: difficult to assess fully  *Attention: poor  *Language:questionable transcortical motor aphasia vs catatonia.   *Fund of information: unknown  *Cognitive functioning estimate: needs assistance    Called father and left two messages. Did give him my cell phone to return my call to determine her post stroke baseline.       Reviewing nuerology records:    Stroke 2/18/18, age 28 secondary to hypercoagulability    No " "language deficiets were seen by neurologist Dr. Knox and clearly documented there were no speech deficiets noted on 3/13/18    5/24/18: cranioplasty performed [facility]    6/15/18: at follow up appt with neurology she had no language deficits clearly documented by nueorlogy    6/22/18: neurology notes indicate language intact.     1/15/19: her mother called [facility] stating patient is going \"downhill\" was in nursing home and suffering malnourishment. Lost 13 lbs. Mother stated reconstructive area was caving in. Psychiatric medications were being adjusted per mothers report. Physician recommended transfer to [facility]. This was the last nuerology note . No psychiatry notes available.                "

## 2020-05-24 NOTE — PHARMACY
[facility]    Pharmacy      Antimicrobial Stewardship Note     Current antimicrobial therapy:  Anti-infectives (From now, onward)    Start     Dose/Rate Route Frequency Ordered Stop    05/22/20 2030  cefTRIAXone in d5w (ROCEPHIN) intermittent infusion 1 g      1 g  over 30 Minutes Intravenous EVERY 24 HOURS 05/22/20 2020            Indication: Pyelonephritis     Days of Therapy: 3     Pertinent labs:  Creatinine   Creatinine   Date Value Ref Range Status   05/24/2020 0.50 (L) 0.52 - 1.04 mg/dL Final   05/23/2020 0.62 0.52 - 1.04 mg/dL Final   05/22/2020 0.87 0.52 - 1.04 mg/dL Final     WBC   WBC   Date Value Ref Range Status   05/24/2020 11.3 (H) 4.0 - 11.0 10e9/L Final   05/23/2020 19.0 (H) 4.0 - 11.0 10e9/L Final   05/22/2020 30.2 (H) 4.0 - 11.0 10e9/L Final     Procalcitonin   Procalcitonin   Date Value Ref Range Status   02/25/2018 0.23 ng/ml Final     Comment:     0.05-0.24 ng/ml Low risk of systemic bacterial infection. Local bacterial   infection possible.  Recommendation: Assess other clinical features of   infection. Discourage antibiotics unless strong clinical suspicion for serious   infection.       CRP   CRP Inflammation   Date Value Ref Range Status   05/12/2020 32.6 (H) 0.0 - 8.0 mg/L Final   07/15/2018 9.5 (H) 0.0 - 8.0 mg/L Final   02/26/2018 140.0 (H) 0.0 - 8.0 mg/L Final       Culture Results:   (5/22/20) Urine = 50-100K E. Coli pan sensitive     (5/8/20) Urine = E. Coli resistant to ampicillin, Unasyn, Ciprofloxacin, Gentamicin, Levofloxacin, Zosyn. Sensitive to Cefepime, Ceftriaxone, Imipenem, Nitrofurantoin, Bactrim  Prescribed Keflex but never took/picked up per nursing notes        Recommendations/Interventions:  1. Chart review shows history of C. Diff; transition off of cephalosporin as soon as possible and switch to Bactrim (lower C. Diff risk than cephalosporins and sensitive to recent urine culture)    Refugio Teixeira, [facility]  May 24, 2020

## 2020-05-24 NOTE — PLAN OF CARE
Face to face report given with opportunity to observe patient.    Report given to Anup JACINTO RN   5/23/2020  7:03 PM
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
## 2020-05-24 NOTE — PROGRESS NOTES
[facility]    Hospitalist Progress Note    Date of Service (when I saw the patient): 05/24/2020    Assessment & Plan       Acute urinary tract infection: Continue Rocephin, prelim culture gram negative rods.       Lithium toxicity: Presented with elevated lithium level at 2.0 on arrival, 3 days of diarrhea, vomiting and fecal/urinary incontinence. She has polydipsia and polyuria as well. She feels like that has been on for a few weeks. This morning level down to 1.15. Recheck in AM, holding doses. Consult psych for medications recommendations in AM.     Neurological deficit: She has shown improvement since arrival, however she still has profound delay in responses. On deep chart review this has not been previously noted. She was disoriented this morning as well. Will get non-contrast head CT, MRI when available. Behavioral health consult placed as well. She has an extensive clot history including large MCA stroke 2018. Her compliance with medications is questionable.      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 Will continue IVF overnight though back down to 75ml/hr. Maria in place for accurate I/O.   -5/24: Urine output decreased a bit as has oral intake. Sodium mildly elevated-likely due to relative fluid restriction while sleeping, will allow as much free water as she wants. Will do DDVP challenge today to confirm.      Hypokalemia: IV and oral replacement. Likely due to profound diarrhea at home. Continue replacement as needed.       Chronic anticoagulation: Continue Xarelto.       DVT Prophylaxis: Xarelto  Code Status: Full Code    Disposition: Expected discharge in few days days once stable.    Delmi Nicole, CNP    Interval History   Feels tired but denies pain. No dyspnea, nausea. Thirsty.     -Data reviewed today: I reviewed all new labs and imaging results over the last 24 hours.    Physical Exam   Temp: 99  F (37.2   C) Temp src: Tympanic BP: 112/70 Pulse: 90 Heart Rate: 80 Resp: 18 SpO2: 99 % O2 Device: None (Room air)    Vitals:    05/22/20 1959   Weight: 73.3 kg (161 lb 9.6 oz)     Vital Signs with Ranges  Temp:  [98.3  F (36.8  C)-99.6  F (37.6  C)] 99  F (37.2  C)  Pulse:  [90-96] 90  Heart Rate:  [] 80  Resp:  [18-19] 18  BP: (112-134)/(57-91) 112/70  SpO2:  [95 %-100 %] 99 %  I/O last 3 completed shifts:  In: 8834 [P.O.:6700; I.V.:2134]  Out: 8080 [Urine:8080]    Peripheral IV 05/12/20 Right Upper arm (Active)   Number of days: 12       Peripheral IV 05/22/20 Right Upper arm (Active)   Site Assessment WDL 05/24/20 1210   Line Status Infusing;Checked every 1-2 hour 05/24/20 1210   Phlebitis Scale 0-->no symptoms 05/24/20 1210   Infiltration Scale 0 05/24/20 1210   Number of days: 2       Urethral Catheter Latex 16 fr (Active)   Tube Description UTV 05/24/20 0852   Catheter Care Done 05/24/20 0852   Collection Container Standard 05/24/20 0852   Securement Method Securing device (Describe) 05/24/20 0852   Rationale for Continued Use Other (Comment) 05/24/20 0810   Urine Output 1350 mL 05/24/20 1403   Number of days: 2     Line/device assessment(s) completed for medical necessity    Constitutional: Awake,alert, no acute distress. Delayed response times mildly improved,able to make needs known.   Respiratory: Clear bilaterally, no wheezes, crackles or rhonchi.   Cardiovascular: HRR, no murmurs, rubs, thrills.   GI: Soft,nontender, bowel sounds positive.  Skin/Integumen: Pale, scattered bruises at venipuncture sites.        Medications     - MEDICATION INSTRUCTIONS -       sodium chloride 75 mL/hr at 05/24/20 0354       cefTRIAXone  1 g Intravenous Q24H     famotidine  20 mg Oral BID     lactobacillus rhamnosus (GG)  1 capsule Oral BID     mirtazapine  7.5 mg Oral At Bedtime     nicotine  1 patch Topical Daily     nicotine   Transdermal Q8H     pantoprazole (PROTONIX) IV  40 mg Intravenous Q24H     potassium chloride ER   40 mEq Oral TID w/meals     rivaroxaban ANTICOAGULANT  20 mg Oral Daily with breakfast     senna-docusate  1 tablet Oral BID    Or     senna-docusate  2 tablet Oral BID     sodium chloride (PF)  3 mL Intracatheter Q8H       Data   Recent Labs   Lab 05/24/20  0704 05/23/20  1558 05/23/20  1509 05/23/20  0701  05/22/20  1340   WBC 11.3*  --   --  19.0*  --  30.2*   HGB 12.5  --   --  13.0  --  17.2*   MCV 93  --   --  88  --  86     --   --  344  --  512*   *  --  142 143  --  130*   POTASSIUM 2.8* 4.0  --  2.3*   < > 1.7*   CHLORIDE 122*  --   --  118*  --  97   CO2 18*  --   --  18*  --  24   BUN <1*  --   --  5*  --  12   CR 0.50*  --   --  0.62  --  0.87   ANIONGAP 6  --   --  7  --  9   AVINASH 8.7  --   --  8.9  --  10.6*   *  --   --  122*  --  110*   ALBUMIN  --   --   --   --   --  4.2   PROTTOTAL  --   --   --   --   --  8.4   BILITOTAL  --   --   --   --   --  0.7   ALKPHOS  --   --   --   --   --  156*   ALT  --   --   --   --   --  19   AST  --   --   --   --   --  23   LIPASE  --   --   --   --   --  343    < > = values in this interval not displayed.       No results found for this or any previous visit (from the past 24 hour(s)).

## 2020-05-24 NOTE — PLAN OF CARE
DATE:  5/24/2020   TIME OF RECEIPT FROM LAB:  0727  LAB TEST:  K+  LAB VALUE:  2.8  RESULTS GIVEN WITH TO PROVIDER via TEXT -Mable Nicole  TIME LAB VALUE REPORTED TO PROVIDER:  0728

## 2020-05-24 NOTE — PLAN OF CARE
"VSS, denies pain. Remains confused thinking she is at \"[facility]\", required frequent reorientation, cooperative with flat affect, word finding difficulty & slow to respond. LS CTA bilat. BS normoactive, 1 loose green BM this shift. Voiding large quantity light/pale clear urine, taking in large quantity PO fluids. Jeovany to bed from chair. IVF continues, ABX - rocephin. Call light w/in reach, making needs known. Awake much of shift, alarms on.    Face to face report given with opportunity to observe patient.    Report given to Michelle Chase, RN   5/24/2020  7:20 AM      "

## 2020-05-25 LAB
ANION GAP SERPL CALCULATED.3IONS-SCNC: 4 MMOL/L (ref 3–14)
BUN SERPL-MCNC: 1 MG/DL (ref 7–30)
CALCIUM SERPL-MCNC: 9.1 MG/DL (ref 8.5–10.1)
CHLORIDE SERPL-SCNC: 119 MMOL/L (ref 94–109)
CO2 SERPL-SCNC: 24 MMOL/L (ref 20–32)
CREAT SERPL-MCNC: 0.53 MG/DL (ref 0.52–1.04)
ERYTHROCYTE [DISTWIDTH] IN BLOOD BY AUTOMATED COUNT: 14.3 % (ref 10–15)
GFR SERPL CREATININE-BSD FRML MDRD: >90 ML/MIN/{1.73_M2}
GLUCOSE SERPL-MCNC: 105 MG/DL (ref 70–99)
HCT VFR BLD AUTO: 38.4 % (ref 35–47)
HGB BLD-MCNC: 13.1 G/DL (ref 11.7–15.7)
MCH RBC QN AUTO: 32.1 PG (ref 26.5–33)
MCHC RBC AUTO-ENTMCNC: 34.1 G/DL (ref 31.5–36.5)
MCV RBC AUTO: 94 FL (ref 78–100)
OSMOLALITY SERPL: 299 MMOL/KG (ref 280–295)
OSMOLALITY UR: 221 MMOL/KG (ref 100–1200)
PLATELET # BLD AUTO: 368 10E9/L (ref 150–450)
POTASSIUM SERPL-SCNC: 3.3 MMOL/L (ref 3.4–5.3)
RBC # BLD AUTO: 4.08 10E12/L (ref 3.8–5.2)
SODIUM SERPL-SCNC: 147 MMOL/L (ref 133–144)
SP GR UR STRIP: 1 (ref 1–1.03)
SP GR UR STRIP: 1.01 (ref 1–1.03)
WBC # BLD AUTO: 10.6 10E9/L (ref 4–11)

## 2020-05-25 PROCEDURE — 99232 SBSQ HOSP IP/OBS MODERATE 35: CPT | Performed by: NURSE PRACTITIONER

## 2020-05-25 PROCEDURE — 83935 ASSAY OF URINE OSMOLALITY: CPT | Performed by: NURSE PRACTITIONER

## 2020-05-25 PROCEDURE — 80048 BASIC METABOLIC PNL TOTAL CA: CPT | Performed by: INTERNAL MEDICINE

## 2020-05-25 PROCEDURE — 85027 COMPLETE CBC AUTOMATED: CPT | Performed by: INTERNAL MEDICINE

## 2020-05-25 PROCEDURE — 81003 URINALYSIS AUTO W/O SCOPE: CPT | Performed by: NURSE PRACTITIONER

## 2020-05-25 PROCEDURE — 25000128 H RX IP 250 OP 636: Performed by: INTERNAL MEDICINE

## 2020-05-25 PROCEDURE — 83930 ASSAY OF BLOOD OSMOLALITY: CPT | Performed by: INTERNAL MEDICINE

## 2020-05-25 PROCEDURE — 25000128 H RX IP 250 OP 636: Performed by: NURSE PRACTITIONER

## 2020-05-25 PROCEDURE — 36415 COLL VENOUS BLD VENIPUNCTURE: CPT | Performed by: INTERNAL MEDICINE

## 2020-05-25 PROCEDURE — 25000132 ZZH RX MED GY IP 250 OP 250 PS 637: Performed by: INTERNAL MEDICINE

## 2020-05-25 PROCEDURE — 25000132 ZZH RX MED GY IP 250 OP 250 PS 637: Performed by: NURSE PRACTITIONER

## 2020-05-25 PROCEDURE — 12000000 ZZH R&B MED SURG/OB

## 2020-05-25 PROCEDURE — 99233 SBSQ HOSP IP/OBS HIGH 50: CPT | Performed by: NURSE PRACTITIONER

## 2020-05-25 PROCEDURE — C9113 INJ PANTOPRAZOLE SODIUM, VIA: HCPCS | Performed by: NURSE PRACTITIONER

## 2020-05-25 RX ORDER — POTASSIUM CHLORIDE 750 MG/1
40 CAPSULE, EXTENDED RELEASE ORAL 2 TIMES DAILY WITH MEALS
Status: COMPLETED | OUTPATIENT
Start: 2020-05-25 | End: 2020-05-25

## 2020-05-25 RX ADMIN — ONDANSETRON 4 MG: 2 INJECTION INTRAMUSCULAR; INTRAVENOUS at 12:36

## 2020-05-25 RX ADMIN — MIRTAZAPINE 7.5 MG: 7.5 TABLET ORAL at 21:43

## 2020-05-25 RX ADMIN — POTASSIUM CHLORIDE 40 MEQ: 750 CAPSULE, EXTENDED RELEASE ORAL at 10:13

## 2020-05-25 RX ADMIN — PANTOPRAZOLE SODIUM 40 MG: 40 INJECTION, POWDER, FOR SOLUTION INTRAVENOUS at 10:13

## 2020-05-25 RX ADMIN — FAMOTIDINE 20 MG: 20 TABLET ORAL at 10:13

## 2020-05-25 RX ADMIN — RIVAROXABAN 20 MG: 20 TABLET, FILM COATED ORAL at 10:13

## 2020-05-25 RX ADMIN — Medication 1 CAPSULE: at 10:13

## 2020-05-25 RX ADMIN — Medication 1 CAPSULE: at 21:43

## 2020-05-25 RX ADMIN — FAMOTIDINE 20 MG: 20 TABLET ORAL at 21:43

## 2020-05-25 RX ADMIN — CEFTRIAXONE SODIUM 1 G: 1 INJECTION, SOLUTION INTRAVENOUS at 21:43

## 2020-05-25 RX ADMIN — PROCHLORPERAZINE EDISYLATE 10 MG: 5 INJECTION INTRAMUSCULAR; INTRAVENOUS at 17:11

## 2020-05-25 RX ADMIN — NICOTINE 1 PATCH: 7 PATCH, EXTENDED RELEASE TRANSDERMAL at 10:14

## 2020-05-25 RX ADMIN — POTASSIUM CHLORIDE 40 MEQ: 750 CAPSULE, EXTENDED RELEASE ORAL at 17:11

## 2020-05-25 ASSESSMENT — ACTIVITIES OF DAILY LIVING (ADL)
ADLS_ACUITY_SCORE: 28

## 2020-05-25 NOTE — PHARMACY
[facility]    Pharmacy      Antimicrobial Stewardship Note     Current antimicrobial therapy:  Anti-infectives (From now, onward)    Start     Dose/Rate Route Frequency Ordered Stop    05/22/20 2030  cefTRIAXone in d5w (ROCEPHIN) intermittent infusion 1 g      1 g  over 30 Minutes Intravenous EVERY 24 HOURS 05/22/20 2020            Indication: Pyelonephritis    Days of Therapy: 4     Pertinent labs:  Creatinine   Creatinine   Date Value Ref Range Status   05/25/2020 0.53 0.52 - 1.04 mg/dL Final   05/24/2020 <0.14 (L) 0.52 - 1.04 mg/dL Final   05/24/2020 0.50 (L) 0.52 - 1.04 mg/dL Final     WBC   WBC   Date Value Ref Range Status   05/25/2020 10.6 4.0 - 11.0 10e9/L Final   05/24/2020 11.3 (H) 4.0 - 11.0 10e9/L Final   05/23/2020 19.0 (H) 4.0 - 11.0 10e9/L Final     Procalcitonin   Procalcitonin   Date Value Ref Range Status   02/25/2018 0.23 ng/ml Final     Comment:     0.05-0.24 ng/ml Low risk of systemic bacterial infection. Local bacterial   infection possible.  Recommendation: Assess other clinical features of   infection. Discourage antibiotics unless strong clinical suspicion for serious   infection.       CRP   CRP Inflammation   Date Value Ref Range Status   05/12/2020 32.6 (H) 0.0 - 8.0 mg/L Final   07/15/2018 9.5 (H) 0.0 - 8.0 mg/L Final   02/26/2018 140.0 (H) 0.0 - 8.0 mg/L Final       Culture Results:      Recommendations/Interventions:  1. Consider switching to Bactrim PO, as there is less risk of C. diff than from cephalosporins.    Philipp Jimenez, [facility]  May 25, 2020
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
## 2020-05-25 NOTE — PLAN OF CARE
"VSS, denies pain. LS CTA bilat. BS normoactive, incontinent loose green BMs. Taking in PO fluids while awake, voiding lg but decreasing in quantity light/pale urine. Pt removed locking portion of stat lock device early in shift, replaced and re-educated to not pull/tug or remove safety devices and prevention of damage to urinary tract. Moving independently in bed, remains slow to respond, and confused at times, requesting a medication that she could not name only stating \"Rudi knows what it is\" unclear if this is a true statement about a medication or confusion, did not elaborate on what she needs medication for. IV SL'd, ABX rocephin. Call light w/in reach, making needs known. Appears to be resting in comfort after approx 0230.     Face to face report given with opportunity to observe patient.    Report given to Radha Chase, RN   5/25/2020  7:18 AM      "

## 2020-05-25 NOTE — PLAN OF CARE
Face to face report given with opportunity to observe patient.    Report given to Zee JACINTO RN   5/24/2020  7:23 PM

## 2020-05-25 NOTE — PLAN OF CARE
Vitals: VSS.     Pain: Denied    Orientation: Alert. Disoriented to situation. Flat.     Cardiac: Reg.    Lungs: Clear    ABD: Bowels Active.    Urinating: Maria in for incontinence, weakness, and Strict I&O's. Adequate amount out.     Skin: Bruising. Rash on right hand. Pt states she has been washing her hands a lot.     IV fluids: SL    Antibiotics: Rocephin    Mobility: Up with Jeovany. Attempted to get up with assist of 2, which didn't got well. Weakness to right side from hx of stroke.     Safety: Bed/chair alarm on. Call light in reach. Rounding done.    Comment: Watched TV most of shift. Physiatric evaluation completed this shift.     Face to face report given with opportunity to observe patient.    Report given to Adan Yates RN   5/25/2020

## 2020-05-25 NOTE — PROGRESS NOTES
[facility]    Hospitalist Progress Note    Date of Service (when I saw the patient): 05/25/2020    Assessment & Plan       Acute urinary tract infection: Continue Rocephin, prelim culture gram negative rods.       Lithium toxicity: Presented with elevated lithium level at 2.0 on arrival, 3 days of diarrhea, vomiting and fecal/urinary incontinence. She has polydipsia and polyuria as well. She feels like that has been on for a few weeks.  Psych consult in place.     Neurological deficit: She has shown improvement since arrival, however she still has profound delay in responses. Per her PCP this is not her normal baseline. Will get non-contrast head CT, MRI when available. Behavioral health consult placed as well. She has an extensive clot history including large MCA stroke 2018. Her compliance with medications is questionable.   -5/25: CT negative for acute changes. Her deficit continues to steadily improve with quicker responses, less confused statements. Unclear of etiology, at this point lithium toxicity should not be causing this profound affect. Cannot rule out metabolic encephalopathy from UTI but her other symptoms and clinical course have been mild.      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 Will continue IVF overnight though back down to 75ml/hr. Maria in place for accurate I/O.   -5/24: Urine output decreased a bit as has oral intake. Sodium mildly elevated-likely due to relative fluid restriction while sleeping, will allow as much free water as she wants. Will do DDVP challenge today to confirm.  -5/25: DDVP challenge did prove complete nephrogenic DI. Intake and output continue to decrease, specific gravity is improving. Stop IVF, continue close I/O        Hypokalemia: IV and oral replacement. Likely due to profound diarrhea at home. This is also improving, will continue with oral replacement today.      Hypernatremia,  mild: Due to DI with relatively fluid restriction overnight while sleeping. It has been stable over the last 48 hours, will recheck in AM.       Chronic anticoagulation: Continue Xarelto.       DVT Prophylaxis: Xarelto  Code Status: Full Code    Disposition: Expected discharge in few days days once stable.    Delmi Nicole, CNP    Interval History   Feels tired but denies pain. No dyspnea, denies Thirsty.     -Data reviewed today: I reviewed all new labs and imaging results over the last 24 hours.    Physical Exam   Temp: 98  F (36.7  C) Temp src: Temporal BP: 108/72 Pulse: 78 Heart Rate: 77 Resp: 16 SpO2: 100 % O2 Device: None (Room air)    Vitals:    05/22/20 1959   Weight: 73.3 kg (161 lb 9.6 oz)     Vital Signs with Ranges  Temp:  [98  F (36.7  C)-99.3  F (37.4  C)] 98  F (36.7  C)  Pulse:  [78-80] 78  Heart Rate:  [] 77  Resp:  [16-18] 16  BP: (108-118)/(59-80) 108/72  SpO2:  [99 %-100 %] 100 %  I/O last 3 completed shifts:  In: 5067 [P.O.:4120; I.V.:947]  Out: 6650 [Urine:6650]    Peripheral IV 05/12/20 Right Upper arm (Active)   Number of days: 13       Peripheral IV 05/22/20 Right Upper arm (Active)   Site Assessment WDL 05/25/20 1000   Line Status Saline locked 05/25/20 1000   Phlebitis Scale 0-->no symptoms 05/25/20 1000   Infiltration Scale 0 05/25/20 1000   Number of days: 3       Urethral Catheter Latex 16 fr (Active)   Tube Description Positional 05/25/20 1000   Catheter Care Done 05/25/20 1000   Collection Container Standard 05/25/20 1000   Securement Method Securing device (Describe) 05/25/20 1000   Rationale for Continued Use Strict 1-2 Hour I&O 05/25/20 1000   Urine Output 230 mL 05/25/20 1300   Number of days: 3     Line/device assessment(s) completed for medical necessity    Constitutional: Awake,alert, no acute distress. Delayed response times again improved,able to make needs known.   Respiratory: Clear bilaterally, no wheezes, crackles or rhonchi.   Cardiovascular: HRR, no murmurs,  rubs, thrills.   GI: Soft,nontender, bowel sounds positive.  Skin/Integumen: Pale, scattered bruises at venipuncture sites.        Medications     - MEDICATION INSTRUCTIONS -         cefTRIAXone  1 g Intravenous Q24H     famotidine  20 mg Oral BID     lactobacillus rhamnosus (GG)  1 capsule Oral BID     mirtazapine  7.5 mg Oral At Bedtime     nicotine  1 patch Topical Daily     nicotine   Transdermal Q8H     pantoprazole (PROTONIX) IV  40 mg Intravenous Daily     potassium chloride ER  40 mEq Oral BID w/meals     rivaroxaban ANTICOAGULANT  20 mg Oral Daily with breakfast     senna-docusate  1 tablet Oral BID    Or     senna-docusate  2 tablet Oral BID     sodium chloride (PF)  3 mL Intracatheter Q8H       Data   Recent Labs   Lab 05/25/20  0529 05/24/20  1533 05/24/20  0704  05/23/20  0701  05/22/20  1340   WBC 10.6  --  11.3*  --  19.0*  --  30.2*   HGB 13.1  --  12.5  --  13.0  --  17.2*   MCV 94  --  93  --  88  --  86     --  320  --  344  --  512*   * 145* 146*   < > 143  --  130*   POTASSIUM 3.3* 4.1 2.8*   < > 2.3*   < > 1.7*   CHLORIDE 119* 122* 122*  --  118*  --  97   CO2 24 Not Calculated 18*  --  18*  --  24   BUN 1* 2* <1*  --  5*  --  12   CR 0.53 <0.14* 0.50*  --  0.62  --  0.87   ANIONGAP 4 Not Calculated 6  --  7  --  9   AVINASH 9.1 Not Calculated 8.7  --  8.9  --  10.6*   * 136* 131*  --  122*  --  110*   ALBUMIN  --   --   --   --   --   --  4.2   PROTTOTAL  --   --   --   --   --   --  8.4   BILITOTAL  --   --   --   --   --   --  0.7   ALKPHOS  --   --   --   --   --   --  156*   ALT  --   --   --   --   --   --  19   AST  --   --   --   --   --   --  23   LIPASE  --   --   --   --   --   --  343    < > = values in this interval not displayed.       Recent Results (from the past 24 hour(s))   CT Head w/o Contrast    Narrative    Exam: CT HEAD W/O CONTRAST    Clinical history:30 years Female Neuro deficit(s), subacute    Comparisons: 5/25/2018    Technique: Axial CT imaging of  the head was performed Without  intervenous contrast.    FINDINGS: Again seen are postsurgical changes of a large right-sided  craniotomy. There is a large area of encephalomalacia involving the  majority of the right middle cerebral artery distribution as well as  seen on the prior study. There is some volume loss in the right with  slight mid line shift to the right.    There is no evidence of intracranial hemorrhage or mass.      Impression    IMPRESSION: No acute changes. There is no evidence of intracranial  hemorrhage or mass.    NATALIE ALRACON MD

## 2020-05-26 VITALS
DIASTOLIC BLOOD PRESSURE: 56 MMHG | TEMPERATURE: 98.9 F | RESPIRATION RATE: 16 BRPM | SYSTOLIC BLOOD PRESSURE: 96 MMHG | OXYGEN SATURATION: 100 % | HEIGHT: 63 IN | WEIGHT: 161.6 LBS | BODY MASS INDEX: 28.63 KG/M2 | HEART RATE: 69 BPM

## 2020-05-26 LAB
ALBUMIN SERPL-MCNC: 3.2 G/DL (ref 3.4–5)
ALP SERPL-CCNC: 98 U/L (ref 40–150)
ALT SERPL W P-5'-P-CCNC: 19 U/L (ref 0–50)
ANION GAP SERPL CALCULATED.3IONS-SCNC: 9 MMOL/L (ref 3–14)
AST SERPL W P-5'-P-CCNC: 28 U/L (ref 0–45)
BILIRUB SERPL-MCNC: 0.4 MG/DL (ref 0.2–1.3)
BUN SERPL-MCNC: 2 MG/DL (ref 7–30)
CALCIUM SERPL-MCNC: 8.6 MG/DL (ref 8.5–10.1)
CHLORIDE SERPL-SCNC: 107 MMOL/L (ref 94–109)
CO2 SERPL-SCNC: 21 MMOL/L (ref 20–32)
CREAT SERPL-MCNC: 0.45 MG/DL (ref 0.52–1.04)
ERYTHROCYTE [DISTWIDTH] IN BLOOD BY AUTOMATED COUNT: 13.8 % (ref 10–15)
GFR SERPL CREATININE-BSD FRML MDRD: >90 ML/MIN/{1.73_M2}
GLUCOSE SERPL-MCNC: 137 MG/DL (ref 70–99)
HCT VFR BLD AUTO: 39.6 % (ref 35–47)
HGB BLD-MCNC: 13.6 G/DL (ref 11.7–15.7)
MCH RBC QN AUTO: 32.3 PG (ref 26.5–33)
MCHC RBC AUTO-ENTMCNC: 34.3 G/DL (ref 31.5–36.5)
MCV RBC AUTO: 94 FL (ref 78–100)
OSMOLALITY SERPL: 279 MMOL/KG (ref 280–295)
OSMOLALITY UR: 128 MMOL/KG (ref 100–1200)
PLATELET # BLD AUTO: 365 10E9/L (ref 150–450)
POTASSIUM SERPL-SCNC: 3.4 MMOL/L (ref 3.4–5.3)
PROT SERPL-MCNC: 6.2 G/DL (ref 6.8–8.8)
RBC # BLD AUTO: 4.21 10E12/L (ref 3.8–5.2)
SODIUM SERPL-SCNC: 137 MMOL/L (ref 133–144)
SP GR UR STRIP: 1 (ref 1–1.03)
WBC # BLD AUTO: 10.5 10E9/L (ref 4–11)

## 2020-05-26 PROCEDURE — 25000128 H RX IP 250 OP 636: Performed by: NURSE PRACTITIONER

## 2020-05-26 PROCEDURE — 25000132 ZZH RX MED GY IP 250 OP 250 PS 637: Performed by: INTERNAL MEDICINE

## 2020-05-26 PROCEDURE — 83930 ASSAY OF BLOOD OSMOLALITY: CPT | Performed by: INTERNAL MEDICINE

## 2020-05-26 PROCEDURE — 99238 HOSP IP/OBS DSCHRG MGMT 30/<: CPT | Performed by: NURSE PRACTITIONER

## 2020-05-26 PROCEDURE — 85027 COMPLETE CBC AUTOMATED: CPT | Performed by: INTERNAL MEDICINE

## 2020-05-26 PROCEDURE — 80053 COMPREHEN METABOLIC PANEL: CPT | Performed by: INTERNAL MEDICINE

## 2020-05-26 PROCEDURE — C9113 INJ PANTOPRAZOLE SODIUM, VIA: HCPCS | Performed by: NURSE PRACTITIONER

## 2020-05-26 PROCEDURE — 83935 ASSAY OF URINE OSMOLALITY: CPT | Performed by: NURSE PRACTITIONER

## 2020-05-26 PROCEDURE — 81003 URINALYSIS AUTO W/O SCOPE: CPT | Performed by: NURSE PRACTITIONER

## 2020-05-26 PROCEDURE — 36415 COLL VENOUS BLD VENIPUNCTURE: CPT | Performed by: INTERNAL MEDICINE

## 2020-05-26 RX ORDER — CEFUROXIME AXETIL 500 MG/1
500 TABLET ORAL EVERY 12 HOURS
Qty: 10 TABLET | Refills: 0 | Status: SHIPPED | OUTPATIENT
Start: 2020-05-26 | End: 2020-07-03

## 2020-05-26 RX ORDER — ONDANSETRON 8 MG/1
8 TABLET, ORALLY DISINTEGRATING ORAL EVERY 8 HOURS PRN
Status: ON HOLD | COMMUNITY
End: 2020-08-27

## 2020-05-26 RX ORDER — LAMOTRIGINE 25 MG/1
25 TABLET ORAL DAILY
Qty: 30 TABLET | Refills: 0 | Status: ON HOLD | OUTPATIENT
Start: 2020-05-26 | End: 2020-07-22

## 2020-05-26 RX ORDER — GABAPENTIN 300 MG/1
900 CAPSULE ORAL 3 TIMES DAILY
COMMUNITY

## 2020-05-26 RX ORDER — LITHIUM CARBONATE 300 MG/1
600 TABLET, FILM COATED, EXTENDED RELEASE ORAL AT BEDTIME
COMMUNITY
Start: 2020-03-11 | End: 2020-05-26

## 2020-05-26 RX ORDER — BACLOFEN 10 MG/1
5 TABLET ORAL EVERY 8 HOURS PRN
COMMUNITY
End: 2020-07-03

## 2020-05-26 RX ORDER — POTASSIUM CHLORIDE 750 MG/1
10 TABLET, EXTENDED RELEASE ORAL 2 TIMES DAILY
COMMUNITY
End: 2020-08-26

## 2020-05-26 RX ORDER — NYSTATIN 100000/ML
500000 SUSPENSION, ORAL (FINAL DOSE FORM) ORAL 4 TIMES DAILY
COMMUNITY
Start: 2020-05-04 | End: 2020-07-03

## 2020-05-26 RX ORDER — HYDROCORTISONE 2.5 %
CREAM (GRAM) TOPICAL 2 TIMES DAILY PRN
COMMUNITY
End: 2020-07-03

## 2020-05-26 RX ORDER — MIRTAZAPINE 15 MG/1
7.5 TABLET, FILM COATED ORAL AT BEDTIME
Status: ON HOLD | COMMUNITY
End: 2020-07-22

## 2020-05-26 RX ORDER — CEFUROXIME AXETIL 250 MG/1
500 TABLET ORAL EVERY 12 HOURS SCHEDULED
Status: DISCONTINUED | OUTPATIENT
Start: 2020-05-26 | End: 2020-05-26 | Stop reason: HOSPADM

## 2020-05-26 RX ORDER — LOPERAMIDE HYDROCHLORIDE 2 MG/1
2 TABLET ORAL 4 TIMES DAILY PRN
COMMUNITY

## 2020-05-26 RX ADMIN — PANTOPRAZOLE SODIUM 40 MG: 40 INJECTION, POWDER, FOR SOLUTION INTRAVENOUS at 08:51

## 2020-05-26 RX ADMIN — Medication 1 CAPSULE: at 08:51

## 2020-05-26 RX ADMIN — NICOTINE 1 PATCH: 7 PATCH, EXTENDED RELEASE TRANSDERMAL at 05:45

## 2020-05-26 RX ADMIN — FAMOTIDINE 20 MG: 20 TABLET ORAL at 08:51

## 2020-05-26 RX ADMIN — RIVAROXABAN 20 MG: 20 TABLET, FILM COATED ORAL at 08:51

## 2020-05-26 ASSESSMENT — ACTIVITIES OF DAILY LIVING (ADL)
ADLS_ACUITY_SCORE: 28

## 2020-05-26 NOTE — PLAN OF CARE
Prior to Admission Medication Reconciliation:     Medications added:   [] None  [x] As listed below:    Nystatin suspension per rx    Hydrocortisone per rx    Medications deleted:   [] None  [x] As listed below:    buspar 15 mg - 7.5 mg BID- pt stopped taking per [facility] at [place]    Potassium ch 20 MEQ daily    Phenergan- pt takes zofran    Duloxetine 60 mg- stopped by [facility] from [place] per patient     Changes made to existing medications:   [x] None  [] As listed below:    Last times/dates taken verified with patient:  [x] Yes- completed myself  [] Nurse completed no changes made  [] Unable to review with patient:  [] Nurse completed/changes made:       Allergy modifications:    [x]Did not review  []Patient verified NKA  []Patient verified current existing allergies: no changes made  []New allergies: listed below      Medication reconciliation sources:   [x]Patient  []Patient family member/emergency contact: **  [][facility] Report Review  []Epic Chart Review  [x]Care Everywhere review:  Medication Sig Dispensed Refills Start Date End Date Status   traZODone (DESYREL) 50 MG tablet   Take 0.5 Tabs by mouth every 24 hours as needed for Sleep (can be given during the night if patient awakens and unable to go back to sleep). 30 Tab   0 03/08/2019   Active   DULoxetine (CYMBALTA) 60 MG delayed release capsule   Take 2 Caps by mouth one time a day. Do not crush. 60 Cap   0 03/08/2019   Active   benzocaine, dental, (ANBESOL, ORAJEL) 20 % Gel   Take by mouth two times a day as needed for Pain. 30 g   0 03/08/2019   Active   acetaminophen (TYLENOL) 500 MG tablet   Take 2 Tabs by mouth three times a day. Limit acetaminophen to 4000 mg per day from all sources. 90 Tab   3 03/29/2019   Active   baclofen (LIORESAL) 10 MG tablet   Take one-half tablet (5mg) by mouth every eight hours as needed for spasticity. 45 Tab   2 04/08/2019   Active   traZODone (DESYREL) 100 MG tablet   Take 1 Tab by mouth at bedtime. 90 Tab   1  04/16/2019   Active   folic acid 1 MG tablet   Take 1 Tab by mouth one time a day. 90 Tab   3 04/16/2019   Active   nicotine (NICODERM CQ) 7 MG/24HR patch       0 03/08/2019   Active   varenicline starter package (Chantix Starting Month Juan Jose) 0.5 MG X 11 & 1 MG X 42 tablet   Days 1-3: 0.5 mg once daily;Days 4-7: 0.5 mg twice daily; Day 8 and on: 1 mg twice daily. 1 Package   0 01/27/2020   Active   varenicline (Chantix Continuing Month Juan Jose) 1 MG tablet   Take 1 Tab by mouth two times a day. Take with food and a glass of water. 56 Tab   3 01/27/2020   Active   mirtazapine (Remeron) 15 MG tablet   TAKE 1/2 TABLET BY MOUTH EVERY DAY 15 Tab   11 02/09/2020   Active   busPIRone (Buspar) 15 MG tablet   Take 0.5 Tabs by mouth two times a day. 30 Tab   11 02/26/2020   Active   Infant Care Products (Baby Wipes) Misc    Indications: Incontinence in female by Does not apply route as needed (incontinence). 2 Each   11 03/05/2020   Active   Misc. Devices (LATEX FREE EXAM GLOVES)    Indications: Incontinence in female by Does not apply route. 1 Each   11 03/05/2020   Active   Incontinence Supply Disposable (Attends Briefs Classic Medium) Misc    Indications: Incontinence in female by Does not apply route. 2 Each   11 03/05/2020   Active   rivaroxaban (Xarelto) 20 MG tablet   Take 1 Tab by mouth every morning with breakfast. 90 Tab   3 03/11/2020   Active   Lithium Carbonate Powder   Take by mouth.   0     Active   lithium CR (Lithobid) 300 MG tablet   TAKE 2 TABLETS BY MOUTH ONCE DAILY AT BEDTIME. MAY TAKE TWICE DAILYBUT ONLY ONE TABLET EACH TIME. 60 Tab   11 04/01/2020   Active   gabapentin (Neurontin) 300 MG capsule   Take 3 Caps by mouth three times a day. 270 Cap   5 04/14/2020   Active   potassium chloride CR (Klor-Con, K-Tab) 10 MEQ tablet   Take 1 Tab by mouth two times a day with meals. Do not crush. 62 Tab   11 04/14/2020   Active   famotidine (Pepcid) 20 MG tablet   Take 1 Tab by mouth two times a day. 180 Tab   3  2 2020   Active   folic acid 1 MG tablet   Take 1 Tab by mouth one time a day. 100 Tab   3 2020   Active   hydrocortisone 2.5 % Cream   Apply topically two times a day. to scaly, cracked hands 60 g   11 2020   Active   nystatin (Mycostatin) 583526 UNIT/ML oral suspension    Indications: Infection Swish and swallow 5 mL four times a day. Indications: Infection 150 mL   1 2020   Active   loperamide (Loperamide A-D) 2 MG tablet   Take 1 Tab by mouth four times a day as needed for Diarrhea for up to 30 days. Take PRN 1 tab QID PRN 60 Tab   3 04/15/2020 05/15/2020      Hospital, Clinic, or Other Facility Administered Medication Ordered Dose Route Frequency Start Date End Date Status   medroxyPROGESTERone (Depo-Provera) 150 MG/ML injection 150 mg    Indications: Encounter for other contraceptive management 150 mg IM EVERY 3 MONTHS 2020 Active     [x]Pharmacy med list: [facility]    Gabapentin 300 mg 3 caps TID 20    zofran ODT 8 mg 1 tab q8h prn Nausea 20    Famotidine 40 mg, 20 mg BID 20    Potassium chloride 10 meq 1 tab BID wc 20    Nystatin 100,000 unit/mL Swish and swallow 5 ml qid 20    Hydrocortisone 2.5 % cream BID on hands 20    Loperamide 2 mg QID prn diarrhea 4/15/20    Lithium carbonate 300 Mg ER tablet 2 tabs at bedtime, may take twice daily but only one tablet each time. 20    xarelto 20 Mg qam w/ breakfast 20    Mirtazapine 15 mg 1/2 tablet every day 20    Folic acid 1 mg daily 20  []Pharmacy phone call   []Outside meds dispense report  []Nursing home MAR:  [x]Other: called [place] to try to get clarification on psych meds. The only med approved by [facility] was the Lithium. There were no notes to stop the duloxetine or buspar but patient stated that he told her to stop those meds. Fill dates coincide with this so I took them off her list.     Pharmacy desired at discharge: [facility]    Is patient on  coumadin?  [x]No      Requests for consultation by provider or pharmacist:   [] Patient understands why all of their meds were prescribed and how to take them. No questions.   [x] Fill dates coincide with compliancy for maintenance meds.   [] Fill dates do not coincide with compliancy with maintenance meds. See notes in PTA medlist about how patient is taking.   [] Patient has questions about the following:      Comments: most fill dates for her meds are within compliancy range. She SEEMED alert and oriented when I talked to her and didn't have any issues answering questions about her meds.       Chasidy Calle on 5/26/2020 at 8:38 AM       Discrepancies: [x] No []Not Applicable []Yes: listed below        Time spent on medication reconciliation:   []5-20 mins  [x]20-40 mins  []> 40 mins    Issues completing PTA medication reconciliation:  [] On hold for a long time  [] Waited for a call back  [] Fax didn't come through  [] Fax took a long time  [x] Other:complicated med list with lots of recent changes    Notifying appropriate party of changes/additions/discrepancies:  []No changes made, notification not necessary.   [] Notified attending provider via text page  [] Notified attending provider in person  [] Notified pharmacy  [] Notified nurse  [] Attending provider not available, left detailed notes  [] Changes/additions made don't need provider notification because provider has not seen patient or input any orders  [x] Changes/additions made don't need provider notification because changes made are to medications not ordered and patient is trying to leave AMA    Medications Prior to Admission   Medication Sig Dispense Refill Last Dose     baclofen (LIORESAL) 10 MG tablet Take 5 mg by mouth every 8 hours as needed for muscle spasms        CHANTIX CONTINUING MONTH ASHLEY 1 MG tablet Take 1 mg by mouth 2 times daily (Take with food and a glass of water.)   Past Month at Unknown time     gabapentin (NEURONTIN) 300 MG  capsule Take 900 mg by mouth 3 times daily   5/21/2020 at Unknown     hydrocortisone 2.5 % cream Apply topically 2 times daily as needed   Past Month at Unknown time     loperamide (IMODIUM A-D) 2 MG tablet Take 2 mg by mouth 4 times daily as needed for diarrhea   5/21/2020 at Unknown     melatonin 5 MG tablet Take 1 tablet (5 mg) by mouth nightly as needed for sleep 10 tablet 0 5/21/2020 at Unknown     mirtazapine (REMERON) 15 MG tablet Take 7.5 mg by mouth At Bedtime   5/21/2020 at Unknown     nicotine (NICODERM CQ) 7 MG/24HR 24 hr patch Apply 1 patch topically daily   Past Month at Unknown     nystatin (MYCOSTATIN) 149924 UNIT/ML suspension Take 500,000 Units by mouth 4 times daily    5/21/2020 at Unknown     ondansetron (ZOFRAN-ODT) 8 MG ODT tab Take 8 mg by mouth every 8 hours as needed for nausea   5/22/2020 at Unknown     potassium chloride ER (KLOR-CON M) 10 MEQ CR tablet Take 10 mEq by mouth 2 times daily   5/21/2020 at Unknown     XARELTO 20 MG TABS tablet Take 20 mg by mouth every morning with breakfast.  11 5/21/2020 at Unknown     acetaminophen (TYLENOL) 500 MG tablet Take 500 mg by mouth 3 times daily as needed (MAXIMUM OF 4000MG ACETAMINOPHEN FROM ALL SOURCES IN 24 HOURS)    Unknown at Unknown time     benzocaine (ORAJEL MAXIMUM STRENGTH) 20 % GEL gel Take by mouth 2 times daily as needed for pain   Unknown at Unknown time     famotidine (PEPCID) 40 MG tablet Take 20 mg by mouth 2 times daily    5/21/2020 at Unknown time     folic acid (FOLVITE) 1 MG tablet Take 1,000 mcg by mouth daily   Unknown at Unknown time     lithium ER (LITHOBID) 300 MG CR tablet Take 600 mg by mouth At Bedtime (May be taken twice daily, but only one tablet each time.)   5/21/2020 at Unknown     medroxyPROGESTERone (DEPO-PROVERA) 150 MG/ML IM injection Inject 150 mg into the muscle every 3 months   Unknown at Unknown

## 2020-05-26 NOTE — PLAN OF CARE
Here from home w/ plyonephritis. Pt is A&O. However, does have some word finding difficulty and is slow to respond to requests. VSS. Denies pain. Saline-locked. IV rocephin. Slight contracture noted to LUE. Turned q2 hrs. Did not eat dinner this evening, but nibbled on a few grapes. Held PO senna dt loose stools on dayshift. No episodes of fecal incontinence this shift. Intermittent nausea. IV compazine administered w/ adequate relief. Blanchable redness noted inside buttocks w/ barrier cream applied. Total output from [facility] of 2160 mLs pale clear urine. UA collected w/ 1.004 urine specific gravity. Pleasant and cooperative w/ cares. Bed in lowest position. Call light in reach. ID band in place. Room near unit station. Makes needs known.     Face to face report given with opportunity to observe patient.    Report given to Nimco Mosher   5/25/2020  11:30 PM

## 2020-05-26 NOTE — DISCHARGE SUMMARY
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 Most recently, on 5/8/2020, she was diagnosed with UTI and prescribed Keflex, which she claims not to have picked up (she has no reason why). She presented to this ER with nausea and vomiting 5/12/2020, but left AMA. Today, the symptoms were worsening.     ER Course: she had low grade fever of 99.2 with other vitals normal; she appeared weak and dehydrated. Lab diagnostics resulted WBC 30.2 with left shift, chemistry panel showed mild dehydration and hyperkalemia (1.7). UA showed cystitis with leukocyte esterase, WBC and bacteria. CT of ABD showed substantial fatty liver. She was treated with IV fluids and antibiotics were started    Hospital Course      Patient checked herself out AMA stating she felt well and no longer wanted to be in the hospital. Discharge instructions are mailed to her, prescriptions were sent to her pharmacy. This morning she stated she would like to find a new mental health provider, agreeable to us setting that up. Care transitions will try to get ahold of her to complete this via phone. She is encouraged to follow up with her PCP    Acute urinary tract infection: Started on Rocephin on arrival, culture reveals E. Coli pansensitive. Ceftin called in to her pharmacy for rest of course.         Lithium toxicity: Presented with elevated lithium level at 2.0 on  arrival, 3 days of diarrhea, vomiting and fecal/urinary incontinence. She has polydipsia and polyuria as well. She feels like that has been on for a few weeks.  Psych consulted and made medications changes including stopping lithium indefinitely. Start lamictal.       Neurological deficit: Quite profound on arrival. Per her PCP this is not her normal baseline. Will get non-contrast head CT, MRI when available. Behavioral health consult placed as well. She has an extensive clot history including large MCA stroke 2018. Her compliance with medications is questionable.   -5/25: CT negative for acute changes. Her deficit continues to steadily improve with quicker responses, less confused statements. Unclear of etiology, at this point lithium toxicity should not be causing this profound affect. Cannot rule out metabolic encephalopathy from UTI but her other symptoms and clinical course have been mild.   -5/26: Steady improvement noted from admission. She is likely near her baseline though she may still have a very mild delay in responses.       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 Will continue IVF overnight though back down to 75ml/hr. Maria in place for accurate I/O.   -5/24: Urine output decreased a bit as has oral intake. Sodium mildly elevated-likely due to relative fluid restriction while sleeping, will allow as much free water as she wants. Will do DDVP challenge today to confirm.  -5/25: DDVP challenge did prove complete nephrogenic DI. Intake and output continue to decrease, specific gravity is improving. Stop IVF, continue close I/O    -5/26: Still large volume urine out though intake has decreased significantly and she states she no longer feels extremely thirsty. Maria catheter is dc'd.        Hypokalemia: IV and oral replacement. Likely due to profound diarrhea at home. Potassium normal this morning, continue home dose oral  replacement.        Hypernatremia, mild: Due to DI with relatively fluid restriction overnight while sleeping. Late this morning her sodium is normal.        Chronic anticoagulation: Continue Xarelto.         Delmi Nicole,  CNP        Pending Results     Unresulted Labs Ordered in the Past 30 Days of this Admission     No orders found from 4/22/2020 to 5/23/2020.          Code Status   Full Code       Primary Care Physician   Chapo Flores          Discharge Disposition   Discharged to home  Condition at discharge: Stable    Consultations This Hospital Stay   PSYCHIATRY IP CONSULT    Time Spent on this Encounter   I, Delmi Nicole NP, personally saw the patient today and spent less than or equal to 30 minutes discharging this patient.    Discharge Orders      Reason for your hospital stay    UTI, Lithium toxicity     Follow-up and recommended labs and tests     Follow up with primary care provider, Chapo Flores, within 3-5 days for hospital follow- up.  No follow up labs or test are needed.     Activity    Your activity upon discharge: activity as tolerated     When to contact your care team    Call your primary doctor if you have any of the following: fever, excess fatigue, dehydration, muscle cramping.     Discharge Instructions    You left the hospital against medical advice(AMA). This morning your provider discussed the need for further monitoring to ensure you sodium and potassium levels were stable and you thirst and urine output were stable. You opted to discharge instead. Please  and finish the antibiotic that is prescribed for your urinary tract infection. The new medication that replaced the lithium was also sent to your pharmacy. Please follow-up with your doctor- Dr. Flores as soon as possible.     Full Code     Diet    Follow this diet upon discharge: Orders Placed This Encounter      Combination Diet Regular Diet Adult         Discharge Medications   Discharge Medication List as  of 5/26/2020  2:13 PM      CONTINUE these medications which have NOT CHANGED    Details   baclofen (LIORESAL) 10 MG tablet Take 5 mg by mouth every 8 hours as needed for muscle spasms, Historical      CHANTIX CONTINUING MONTH ASHLEY 1 MG tablet Take 1 mg by mouth 2 times daily (Take with food and a glass of water.), PRINCE, Historical      gabapentin (NEURONTIN) 300 MG capsule Take 900 mg by mouth 3 times daily, Historical      hydrocortisone 2.5 % cream Apply topically 2 times daily as neededHistorical      loperamide (IMODIUM A-D) 2 MG tablet Take 2 mg by mouth 4 times daily as needed for diarrhea, Historical      melatonin 5 MG tablet Take 1 tablet (5 mg) by mouth nightly as needed for sleep, Disp-10 tablet, R-0, E-Prescribe      mirtazapine (REMERON) 15 MG tablet Take 7.5 mg by mouth At Bedtime, Historical      nicotine (NICODERM CQ) 7 MG/24HR 24 hr patch Apply 1 patch topically daily, Historical      nystatin (MYCOSTATIN) 923659 UNIT/ML suspension Take 500,000 Units by mouth 4 times daily Historical      ondansetron (ZOFRAN-ODT) 8 MG ODT tab Take 8 mg by mouth every 8 hours as needed for nausea, Historical      potassium chloride ER (KLOR-CON M) 10 MEQ CR tablet Take 10 mEq by mouth 2 times daily, Historical      XARELTO 20 MG TABS tablet Take 20 mg by mouth every morning with breakfast., R-11, DAW, Historical      acetaminophen (TYLENOL) 500 MG tablet Take 500 mg by mouth 3 times daily as needed (MAXIMUM OF 4000MG ACETAMINOPHEN FROM ALL SOURCES IN 24 HOURS) , Historical      benzocaine (ORAJEL MAXIMUM STRENGTH) 20 % GEL gel Take by mouth 2 times daily as needed for painHistorical      famotidine (PEPCID) 40 MG tablet Take 20 mg by mouth 2 times daily , Historical      folic acid (FOLVITE) 1 MG tablet Take 1,000 mcg by mouth daily, Historical      medroxyPROGESTERone (DEPO-PROVERA) 150 MG/ML IM injection Inject 150 mg into the muscle every 3 months, Historical             Lamictal 25mg PO daily    Ceftin 500mg PO BID  for 5 days     Allergies   Allergies   Allergen Reactions     Amoxicillin Other (See Comments)     Headaches     Levaquin [Levofloxacin] Swelling     Lithium      Diabetes insipidus      Naproxen Other (See Comments)     Reaction: Headaches     Nickel      Tramadol      Sulindac Rash     Data   Most Recent 3 CBC's:  Recent Labs   Lab Test 05/26/20  0934 05/25/20  0529 05/24/20  0704   WBC 10.5 10.6 11.3*   HGB 13.6 13.1 12.5   MCV 94 94 93    368 320      Most Recent 3 BMP's:  Recent Labs   Lab Test 05/26/20  0934 05/25/20  0529 05/24/20  1533    147* 145*   POTASSIUM 3.4 3.3* 4.1   CHLORIDE 107 119* 122*   CO2 21 24 Not Calculated   BUN 2* 1* 2*   CR 0.45* 0.53 <0.14*   ANIONGAP 9 4 Not Calculated   AVINASH 8.6 9.1 Not Calculated   * 105* 136*     Most Recent 2 LFT's:  Recent Labs   Lab Test 05/26/20  0934 05/22/20  1340   AST 28 23   ALT 19 19   ALKPHOS 98 156*   BILITOTAL 0.4 0.7     Most Recent INR's and Anticoagulation Dosing History:  Anticoagulation Dose History     Recent Dosing and Labs Latest Ref Rng & Units 2/22/2018 3/1/2018 4/17/2018 4/23/2018 5/24/2018 6/24/2018 7/15/2018    INR 0.80 - 1.20 1.13 0.96 4.08(H) 2.20(H) 1.14 8.43(HH) 1.04        Most Recent 3 Troponin's:  Recent Labs   Lab Test 08/13/18  1036 04/23/18  1757 04/17/18  1810   TROPI <0.015 <0.015 <0.015     Most Recent Cholesterol Panel:  Recent Labs   Lab Test 02/17/18  1752   CHOL 98   LDL 39   HDL 38*   TRIG 105     Most Recent 6 Bacteria Isolates From Any Culture (See EPIC Reports for Culture Details):  Recent Labs   Lab Test 05/22/20  1901 05/08/20  1510 09/19/18  1937 07/16/18  0430 02/19/18  1714 02/19/18  1713   CULT 50,000 to 100,000 colonies/mL  Escherichia coli  * >100,000 colonies/mL  Escherichia coli  * 50,000 to 100,000 colonies/mL  Escherichia coli  * No Salmonella, Shigella, Campylobacter, E. coli O157, Aeromonas, or Plesiomonas isolated.  No Yersinia enterocolitica isolated   Canceled, Test credited  Duplicate  request   Light growth  Normal beatriz    Heavy growth  Staphylococcus aureus  *  Heavy growth  Haemophilus influenzae  Beta lactamase negative  Beta-lactamase negative Haemophilus influenzae are usually susceptible to ampicillin,   amoxacillin/clavulanic acid, levofloxacin, and 3rd generation cephalosporins, such as   ceftriaxone.  *     Most Recent TSH, T4 and A1c Labs:  Recent Labs   Lab Test 05/22/20  1340  02/17/18  1752   TSH 1.91   < >  --    A1C  --   --  5.8    < > = values in this interval not displayed.     Results for orders placed or performed during the hospital encounter of 05/22/20   US Abdomen Limited (RUQ)    Narrative    PROCEDURES: US ABDOMEN LIMITED    HISTORY: Female, age 30 years, emesis, abd pain    TECHNIQUE: Ultrasound of the upper abdomen was performed.    COMPARISON: None.     FINDINGS:    LIVER: Diffusely echogenic and enlarged.    GALLBLADDER: Echogenic intraluminal focus suggesting at least one  stone. No wall thickening. No pericholecystic fluid.    Patient terminated the examination before the common duct and biliary  tree could be assessed.    ABDOMINAL AORTA AND IVC: The visualized portions of the abdominal  aorta are unremarkable.    PANCREAS:The visualized portions of the pancreas are normal.    RIGHT KIDNEY: The right kidney was not assessed as the patient  prematurely terminated the examination.    OTHER: There is no free fluid in the upper abdomen.      Impression    IMPRESSION: The patient terminated the examination prematurely.  Limited evaluation suggests cholelithiasis without evidence of  cholecystitis.    The common duct and biliary tree as well as the right kidney were not  evaluated.    ANNE PETERSON MD   CT Abdomen Pelvis w Contrast    Narrative    EXAMINATION: CT ABDOMEN PELVIS W CONTRAST, 5/22/2020 6:21 PM    TECHNIQUE:  Helical CT images from the lung bases through the  symphysis pubis were obtained  with IV contrast. Contrast dose: Isovue  300  100mL    COMPARISON: none    HISTORY: Abd pain, acute, generalized    FINDINGS:    The lung bases are clear    There is intense fatty infiltration of the liver. No calcified  gallstones are seen. There is no biliary ductal enlargement.    The the spleen and pancreas appear normal.    The adrenal glands are normal.    A right renal cyst is noted. There is no hydronephrosis.    The periaortic lymph nodes are normal in caliber.    No intraperitoneal masses or inflammatory changes are noted    In the pelvis the bladder and rectum appear normal.    The regional skeleton is intact      Impression    IMPRESSION: Fatty infiltration of the liver. No intraperitoneal masses  or inflammatory changes are noted     GEETHA JACOBS MD   XR Chest Port 1 View    Narrative    XR CHEST PORT 1 VW    HISTORY: 30 yearsFemale elevated WBC    TECHNIQUE: A single view of the chest was performed    COMPARISON: 4/23/2018    FINDINGS: Heart size and pulmonary vascularity are within normal  limits. Lungs are clear. No consolidating airspace opacities are  present.        Impression    IMPRESSION: Clear chest    NATALIE ALARCON MD   CT Head w/o Contrast    Narrative    Exam: CT HEAD W/O CONTRAST    Clinical history:30 years Female Neuro deficit(s), subacute    Comparisons: 5/25/2018    Technique: Axial CT imaging of the head was performed Without  intervenous contrast.    FINDINGS: Again seen are postsurgical changes of a large right-sided  craniotomy. There is a large area of encephalomalacia involving the  majority of the right middle cerebral artery distribution as well as  seen on the prior study. There is some volume loss in the right with  slight mid line shift to the right.    There is no evidence of intracranial hemorrhage or mass.      Impression    IMPRESSION: No acute changes. There is no evidence of intracranial  hemorrhage or mass.    NATALIE ALARCON MD

## 2020-05-26 NOTE — DISCHARGE INSTRUCTIONS

## 2020-05-26 NOTE — PLAN OF CARE
Patient left this afternoon AMA. Patient called for her own ride with [facility]. [facility] brought a wheelchair for patient. AMA paperwork signed by patient. Reiterated to patient that she is supposed to be on an antibiotic for her UTI. Patient stated she has one at home. Educated patient that the antibiotic she has at home may not work for her UTI this time. Educated patient that she needs to call her PCP and make an appointment as soon as possible. Patient's sylvester catheter was removed at noon and she did void spontaneously prior to leaving.

## 2020-05-26 NOTE — PLAN OF CARE
Called patient to verify the address listed on the Facesheet-  [address] [place], [place]. Patient stated this was correct and was in agreement to have her discharge paperwork mailed there.

## 2020-05-26 NOTE — PLAN OF CARE
Pt A&Ox3. Some difficulty finding words and somewhat slow to respond to requests at times. She denies pain. VSS. Lung sounds are clear. Bowel sounds are active. Had one small loose green stool this shift. Maria patent with good output. Repositioned as allowed. Some blanchable redness on bottom. IV saline lock. Call light within reach and pt calls appropriately.     Face to face report given with opportunity to observe patient.    Report given to RITESH Salazar RN   5/26/2020  7:20 AM

## 2020-05-26 NOTE — PROGRESS NOTES
Return call received from her [facility]  Kellen Ruiz (ph [phone] ext [phone]). [facility] contracts with the [employer] [facility] agency for the case management of some of the CADI services. Kellen is still an active  for Maggie. She says Maggie was in the process of switching PCA agencies from [facility] to [facility]; she is unsure if that change was completed. Kellen has been looking for a new Adult Foster Care provider for Maggie and says it has been difficult to find one that is w/c accessible and has vacancies; Maggie is on some waiting lists.     Spoke to Maggie by phone this afternoon. She made it home and has someone with her to help her as needed. She is willing to work with [place] providers for mental health counseling, med management, and the [facility] program. This referral has been sent.     Spoke to [facility]. They have not yet been able to reach her to begin services. They were provided accurate phone contact information and will continue to try to reach her.    [facility] [phone]

## 2020-06-04 ENCOUNTER — TELEPHONE (OUTPATIENT)
Dept: BEHAVIORAL HEALTH | Facility: OTHER | Age: 30
End: 2020-06-04

## 2020-06-04 DIAGNOSIS — R69 DIAGNOSIS DEFERRED: Primary | ICD-10-CM

## 2020-06-05 NOTE — TELEPHONE ENCOUNTER
[facility]  @FLOW([id number])@      Social Work Care Navigator Note      Patient: Maggie Case  Date: June 5, 2020  Preferred Name: Maggie    Previous PHQ-9: No flowsheet data found.  Previous DIANNA-7: No flowsheet data found.  SEYMOUR LEVEL:  No flowsheet data found.    Preferred Contact: @ELIEZER([phone])@  Type of Contact Today: Phone call (patient / identified key support person reached)      Data: (subjective / Objective):  Patient Goals Areas: @FLOW([id number])@  Patient Stated Goals: @FLOW([id number])@  Recent ED/IP Admission or Discharge?   None  Recent ED/IP Admission or Discharge?   None    Patient Goals:  No data recorded      [facility] Core Service Provided:  Comprehensive Care Management: utilized the electronic medical record / patient registry to identify and support patient's health conditions / needs more effectively   Care Coordination: provided care management services/referrals necessary to ensure patient and their identified supports have access to medical, behavioral health, pharmacology and recovery support services.  Ensured that patient's care is integrated across all settings and services.   Health and Wellness Promotion  Individual and Family Support: aimed to help clients reduce barriers to achieving goals, increase health literacy and knowledge about chronic condition(s), increase self-efficacy skills, and improve health outcomes  Referral to Community and Social Support Services: Provided patient with referrals (see plan section)  Assisted in scheduling an appointment to a referral / service (see plan section)    Current Stressors / Issues / Care Plan Objective Addressed Today:  Med management, therapy,     Intervention:  Motivational Interviewing: Expressed Empathy/Understanding, Supported Autonomy, Collaboration, Evocation, Permission to raise concern or advise and Open-ended questions   Target Behavior(s): Explored and resolved challenges related to taking anti-depressants as  prescribed, Explored thoughts and readiness to participate in individual therapy and Explored and resolved challenges to attending appointments as scheduled    Assessment: (Progress on Goals / Homework):  Writer received referral for [facility] services.  Contacted pt and introduced integrated model/ behavioral health services.  Pt was very interested and completed enrollment paperwork during phone call.  Will plan on completing health and wellness at future visit.    Pt explained she was looking to better manage her mental health and wanted med management/ therapy (does not want [facility]).   Writer confirmed if she ok with male therapist which she confirmed. Scheduled pt for therapy with ZOË Olmstead on 6/15 at 1pm.  Also submitted referral to [facility] for medication management.    Updated pt via text and she thanked writer for the assistance.     Plan: (Homework, other):  Patient was encouraged to continue to seek condition-related information and education.      Scheduled a Phone follow up appointment with HERNAN VILLALOBOS in 1 week     Patient has set self-identified goals and will monitor progress until the next appointment.   RE Ayon, Social Work Care Coordinator       Referrals/other:med management, therapy    BLAYNE Ayon, LSW     Next 5 appointments (look out 90 days)    Shalom 15, 2020  1:00 PM CDT  Telephone Visit with ZOË Jean  [facility] [place] ([facility] [place] ) [address]  [place] [address]  [phone]   Jun 22, 2020  1:00 PM CDT  [facility] [place] [facility] [place] [facility] [address]  [place] [address]  [phone]   Jun 29, 2020  1:00 PM CDT  Telephone Visit with ZOË Jean  [facility] [place] ([facility] [place] ) [address]  [place] [address]  [phone]   Jul 06, 2020  1:00 PM CDT  Telephone Visit with Ishan Vaca  [facility] [place] ([facility] [place] ) [address]  [place] [address] [phone]

## 2020-06-09 ENCOUNTER — TELEPHONE (OUTPATIENT)
Dept: BEHAVIORAL HEALTH | Facility: OTHER | Age: 30
End: 2020-06-09

## 2020-06-10 NOTE — TELEPHONE ENCOUNTER
[facility]  @FLOW([id number])@      Social Work Care Navigator Note      Patient: Maggie Case  Date: Roxi 10, 2020  Preferred Name: Maggie    Previous PHQ-9: No flowsheet data found.  Previous DIANNA-7: No flowsheet data found.  SEYMOUR LEVEL:  No flowsheet data found.    Preferred Contact: @ELIEZER([phone])@  Type of Contact Today: Phone call (patient / identified key support person reached)      Data: (subjective / Objective):  Patient Goals Areas: @FLOW([id number])@  Patient Stated Goals: @FLOW([id number])@  Recent ED/IP Admission or Discharge?   [facility] IP Admission date: 05/22/2020  Recent ED/IP Admission or Discharge?   None    Patient Goals:  No data recorded      [facility] Core Service Provided:  Comprehensive Care Management: utilized the electronic medical record / patient registry to identify and support patient's health conditions / needs more effectively   Care Coordination: provided care management services/referrals necessary to ensure patient and their identified supports have access to medical, behavioral health, pharmacology and recovery support services.  Ensured that patient's care is integrated across all settings and services.   Health and Wellness Promotion  Referral to Community and Social Support Services: Provided patient with referrals (see plan section)  Assisted in scheduling an appointment to a referral / service (see plan section)    Current Stressors / Issues / Care Plan Objective Addressed Today:  Medication management    Intervention:  Motivational Interviewing: Expressed Empathy/Understanding, Supported Autonomy, Collaboration, Evocation, Permission to raise concern or advise and Open-ended questions   Target Behavior(s): Explored and resolved challenges related to taking anti-depressants as prescribed and Explored and resolved challenges to attending appointments as scheduled    Assessment: (Progress on Goals / Homework):  Received call from KRISTYN Ellis with [facility] medication  management.  Scheduled pt for an appt on June 17th at 1pm.  Updated pt via text and advised Rhonda would call with more information about appt.    Plan: (Homework, other):  Patient was encouraged to continue to seek condition-related information and education.      Scheduled a Phone follow up appointment with HERNAN VILLALOBOS in 1 week     Patient has set self-identified goals and will monitor progress until the next appointment.   RE Ayon, Social Work Care Coordinator       Referrals/other:med management      APARNA AyonW, LSW     Next 5 appointments (look out 90 days)    Shalom 15, 2020  1:00 PM CDT  Telephone Visit with Ishan Vaca [facility] [place] ([facility] [place] ) [address]  [place] [address] [phone]   Jun 22, 2020  1:00 PM CDT  Telephone Visit with Ishan Vaca [facility] [place] ([facility] [place] ) [address]  [place] [address] [phone]   Jun 29, 2020  1:00 PM CDT  Telephone Visit with Ishan Vaca [facility] [place] ([facility] [place] ) [address]  [place] [address] [phone]   Jul 06, 2020  1:00 PM CDT  Telephone Visit with Ishan Vaca [facility] [place] ([facility] [place] ) [address]  [place] [address] [phone]

## 2020-06-15 ENCOUNTER — VIRTUAL VISIT (OUTPATIENT)
Dept: PSYCHOLOGY | Facility: OTHER | Age: 30
End: 2020-06-15
Attending: COUNSELOR
Payer: MEDICAID

## 2020-06-15 ENCOUNTER — TELEPHONE (OUTPATIENT)
Dept: BEHAVIORAL HEALTH | Facility: OTHER | Age: 30
End: 2020-06-15

## 2020-06-15 DIAGNOSIS — F32.A ANXIETY AND DEPRESSION: ICD-10-CM

## 2020-06-15 DIAGNOSIS — F41.9 ANXIETY AND DEPRESSION: ICD-10-CM

## 2020-06-15 PROCEDURE — 90832 PSYTX W PT 30 MINUTES: CPT | Performed by: COUNSELOR

## 2020-06-15 NOTE — PROGRESS NOTES
"[facility]    Behavioral Health Progress Note    Roxi 15, 2020      Patient Name: Maggie Case         Service Type: Phone Visit           Session Start Time:  1255  Session End Time: 0130      Session Length: 16 - 37               Phone call duration: 35 minutes      Attendees: Patient      Maggie Case is a 30 year old female who is being evaluated via a billable telephone visit.      The patient has been notified of following:     \"This telephone visit will be conducted via a call between you and your physician/provider. We have found that certain health care needs can be provided without the need for a physical exam.  This service lets us provide the care you need with a short phone conversation.  If a prescription is necessary we can send it directly to your pharmacy.  If lab work is needed we can place an order for that and you can then stop by our lab to have the test done at a later time.    If during the course of the call the physician/provider feels a telephone visit is not appropriate, you will not be charged for this service.\"     Patient has given verbal consent for Telephone visit?  Yes    Maggie Case complains of    Chief Complaint   Patient presents with     Depression      The pt was contacted for the first time. She presented as O x 4, Coherent,relevant, and somewhat slowed. The pt was unaware that I would be contacting her today but agreed to talk. Maggie reported significant health problems which appear to be major contributors to her overall mental status.            Treatment Objective(s) Addressed in This Session:  Target Behavior(s):    The pt was  Contacted for the first time.The purpose of this session was to identify the concerns and issues that the pt felt needed addressing. She felt that she needed \"someone to talk with\".  Social isolation was identified a major contributor to the pt's anxiety and depression.She had a stroke several  years ago which has " has cause her a number of problems including being non-ambulatory, incontinent, having short term memory lost,unable to care for herself in any viable and as a consequence being depressed. Discussed with the pt's situational contributions to her dysphoria and anxiety in order to see where I could be helpful.      Current Stressors / Issues:              1. Urinary incontinence          2. Social isolation /Social environments          3. Multiple health issues          4. Mobility: The pt is wheelchair bound.    Progress on Treatment Objective:        Previous PHQ-9: No flowsheet data found.  Previous DIANNA-7: No flowsheet data found.          Medication Review:  No changes to current psychiatric medication(s)    Medication Compliance:  Yes    Changes in Health Issues:       The pt has multiple health issues which affect her well being including s/p CVA, being wheel chair bound, and having urinary incontinence.    Chemical Use Review:   Substance Use: Chemical use reviewed, no active concerns identified      Tobacco Use:  The pt smokes        Assessment: Current Emotional / Mental Status (status of significant symptoms):    Risk status (Self / Other harm or suicidal ideation)  Patient denies current fears or concerns for personal safety.  Patient denies current or recent suicidal ideation or behaviors.  Patient denies current or recent homicidal ideation or behaviors.  Patient denies current or recent self injurious behavior or ideation.  Patient denies other safety concerns.  A safety and risk management plan has not been developed at this time, however patient was encouraged to call [facility] / [facility] should there be a change in any of these risk factors.      Attitude:   Interested Guarded   Orientation:   All  Speech   Rate / Production: The pt was primarily responded Sh   Volume:  Normal   Mood:    Anxious  Depressed   Affect:    Telephone interview  Thought Content:  Clear   Thought Form:  Goal Directed    Insight:    Fair     Diagnoses:  1. Anxiety and depression        Collateral Reports Completed:  Case was discussed with  Care team member in order to clarify the pt's issues.    Plan: (Homework, other):      1.  Clarify issues which may address in order to decrease the pt's anxiety    2. Evaluate whether stress inoculation techniques may be helpful.                         Ishan Vaca, [facility]

## 2020-06-17 NOTE — TELEPHONE ENCOUNTER
[facility]  @FLOW([id number])@      Social Work Care Navigator Note      Patient: Maggie Case  Date: Roxi 15, 2020  Preferred Name: Maggie    Previous PHQ-9: No flowsheet data found.  Previous DIANNA-7: No flowsheet data found.  SEYMOUR LEVEL:  No flowsheet data found.    Preferred Contact: @ELIEZER([id number])@  Type of Contact Today: Phone call (patient / identified key support person reached)      Data: (subjective / Objective):  Patient Goals Areas: @FLOW([id number])@  Patient Stated Goals: @FLOW([id number])@  Recent ED/IP Admission or Discharge?   None  Recent ED/IP Admission or Discharge?   None    Patient Goals:  No data recorded      [facility] Core Service Provided:  Comprehensive Care Management: utilized the electronic medical record / patient registry to identify and support patient's health conditions / needs more effectively   Care Coordination: provided care management services/referrals necessary to ensure patient and their identified supports have access to medical, behavioral health, pharmacology and recovery support services.  Ensured that patient's care is integrated across all settings and services.   Individual and Family Support: aimed to help clients reduce barriers to achieving goals, increase health literacy and knowledge about chronic condition(s), increase self-efficacy skills, and improve health outcomes    Current Stressors / Issues / Care Plan Objective Addressed Today:  None noted    Intervention:  Motivational Interviewing: Expressed Empathy/Understanding, Supported Autonomy, Collaboration, Evocation, Permission to raise concern or advise and Open-ended questions   Target Behavior(s): Explored and resolved challenges to attending appointments as scheduled    Assessment: (Progress on Goals / Homework):  Contacted pt to see how things were going and remind her of appt with ZOË Olmstead at 1pm today. Pt denied any concerns at this time.  Will remain available.    Plan: (Homework,  other):  Patient was encouraged to continue to seek condition-related information and education.      Scheduled a Phone follow up appointment with HERNAN VILLALOBOS in 1 week     Patient has set self-identified goals and will monitor progress until the next appointment.   RE Ayon, Social Work Care Coordinator     APARNA AyonW, LSW     Next 5 appointments (look out 90 days)    Jun 22, 2020  1:00 PM CDT  Telephone Visit with ZOË Jean  [facility] [place] ([facility] [place] ) [address]  [place] [address] [phone]   Jun 29, 2020  1:00 PM CDT  Telephone Visit with ZOË Jean  [facility] [place] ([facility] [place] ) [address]  [place] [address] [phone]   Jul 06, 2020  1:00 PM CDT  Telephone Visit with ZËO Jean  [facility] [place] ([facility] [place] ) [address]  [place] [address] [phone]

## 2020-06-22 ENCOUNTER — VIRTUAL VISIT (OUTPATIENT)
Dept: PSYCHOLOGY | Facility: OTHER | Age: 30
End: 2020-06-22
Attending: COUNSELOR
Payer: MEDICAID

## 2020-06-22 DIAGNOSIS — F32.A ANXIETY AND DEPRESSION: Primary | ICD-10-CM

## 2020-06-22 DIAGNOSIS — F41.9 ANXIETY AND DEPRESSION: Primary | ICD-10-CM

## 2020-06-22 PROCEDURE — 90832 PSYTX W PT 30 MINUTES: CPT | Performed by: COUNSELOR

## 2020-06-25 ENCOUNTER — TELEPHONE (OUTPATIENT)
Dept: BEHAVIORAL HEALTH | Facility: OTHER | Age: 30
End: 2020-06-25

## 2020-06-29 ENCOUNTER — VIRTUAL VISIT (OUTPATIENT)
Dept: PSYCHOLOGY | Facility: OTHER | Age: 30
End: 2020-06-29
Attending: COUNSELOR
Payer: MEDICAID

## 2020-06-29 DIAGNOSIS — F41.9 ANXIETY AND DEPRESSION: Primary | ICD-10-CM

## 2020-06-29 DIAGNOSIS — F32.A ANXIETY AND DEPRESSION: Primary | ICD-10-CM

## 2020-06-29 NOTE — TELEPHONE ENCOUNTER
[facility]  @FLOW([id number])@      Social Work Care Navigator Note      Patient: Maggie Case  Date: June 25, 2020  Preferred Name: Maggie    Previous PHQ-9: No flowsheet data found.  Previous DIANNA-7: No flowsheet data found.  SEYMOUR LEVEL:  No flowsheet data found.    Preferred Contact: @ELIEZER([id number])@  Type of Contact Today: Phone call (patient / identified key support person reached)      Data: (subjective / Objective):  Patient Goals Areas: @FLOW([id number])@  Patient Stated Goals: @FLOW([id number])@  Recent ED/IP Admission or Discharge?   None  Recent ED/IP Admission or Discharge?   None    Patient Goals:  No data recorded      [facility] Core Service Provided:  Comprehensive Care Management: utilized the electronic medical record / patient registry to identify and support patient's health conditions / needs more effectively   Care Transitions: focused on the coordinated and seamless movement of patient between or within different levels of care or settings  Care Coordination: provided care management services/referrals necessary to ensure patient and their identified supports have access to medical, behavioral health, pharmacology and recovery support services.  Ensured that patient's care is integrated across all settings and services.   Health and Wellness Promotion  Individual and Family Support: aimed to help clients reduce barriers to achieving goals, increase health literacy and knowledge about chronic condition(s), increase self-efficacy skills, and improve health outcomes    Current Stressors / Issues / Care Plan Objective Addressed Today:  Housing/PCA    Intervention:  Motivational Interviewing: Expressed Empathy/Understanding, Supported Autonomy, Collaboration, Evocation, Permission to raise concern or advise and Open-ended questions   Target Behavior(s): NA    Assessment: (Progress on Goals / Homework):  Contacted pt to see how things were going.  Also discussed whether pt currently had PCA.   She advised she was working with a PCA; however, was in need of more hours.    Also discussed pt's current housing and how she would like to move to [place].  She advised that she will need a handicap accessible place and more PCA hrs in order to move.  Asked pt if she would be ok with writer reaching out to  which she confirmed and gave contact info (Kellen Ruiz - [phone] ext [phone]).  Called and LVM for Kellen to discuss future plan.    Plan: (Homework, other):  Patient was encouraged to continue to seek condition-related information and education.      Scheduled a Phone follow up appointment with HERNAN VILLALOBOS in 1 week     Patient has set self-identified goals and will monitor progress until the next appointment.   RE Ayon, Social Work Care Coordinator     Referrals/other:RE Oconnell  Social Work Care Coordinator  [facility] Home  [phone] option 2 or [phone]       Next 5 appointments (look out 90 days)    Jul 06, 2020  1:00 PM CDT  Telephone Visit with ZOË Jean  [facility] ([facility] ) [address]  [facility] [address] [phone]

## 2020-07-03 VITALS
SYSTOLIC BLOOD PRESSURE: 113 MMHG | HEART RATE: 80 BPM | BODY MASS INDEX: 31.53 KG/M2 | WEIGHT: 178 LBS | OXYGEN SATURATION: 97 % | DIASTOLIC BLOOD PRESSURE: 83 MMHG | TEMPERATURE: 98 F | RESPIRATION RATE: 16 BRPM

## 2020-07-03 PROCEDURE — 40000268 ZZH STATISTIC NO CHARGES

## 2020-07-04 ENCOUNTER — HOSPITAL ENCOUNTER (EMERGENCY)
Facility: HOSPITAL | Age: 30
Discharge: LEFT BEFORE DISCHARGE | End: 2020-07-04
Attending: INTERNAL MEDICINE | Admitting: INTERNAL MEDICINE
Payer: MEDICAID

## 2020-07-06 ENCOUNTER — VIRTUAL VISIT (OUTPATIENT)
Dept: PSYCHOLOGY | Facility: OTHER | Age: 30
End: 2020-07-06
Attending: COUNSELOR
Payer: MEDICAID

## 2020-07-06 DIAGNOSIS — F32.A ANXIETY AND DEPRESSION: Primary | ICD-10-CM

## 2020-07-06 DIAGNOSIS — F41.9 ANXIETY AND DEPRESSION: Primary | ICD-10-CM

## 2020-07-06 PROCEDURE — 90832 PSYTX W PT 30 MINUTES: CPT | Performed by: COUNSELOR

## 2020-07-06 NOTE — PROGRESS NOTES
"[facility]    Behavioral Health Progress Note    July 6, 2020      Patient Name: Maggie Case         Service Type: Phone Visit           Session Start Time:  0100 Session End Time: 0125      Session Length: 16 - 37               Phone call duration: 25 minutes      Attendees: Patient      Maggie Case is a 30 year old female who is being evaluated via a billable telephone visit.      The patient has been notified of following:     \"This telephone visit will be conducted via a call between you and your physician/provider. We have found that certain health care needs can be provided without the need for a physical exam.  This service lets us provide the care you need with a short phone conversation.  If a prescription is necessary we can send it directly to your pharmacy.  If lab work is needed we can place an order for that and you can then stop by our lab to have the test done at a later time.    If during the course of the call the physician/provider feels a telephone visit is not appropriate, you will not be charged for this service.\"     Patient has given verbal consent for Telephone visit?  Yes    Maggie Case complains of    Chief Complaint   Patient presents with     Anxiety       Maggie was contacted for regularly scheduled telephone session. She presented as O x 4,coherent,relative and more fluent than usual then during previous interviews. The pt reports that she had several concerns over the past week. She said that she had been in episodic dental pain. She had come to the hospital but left before she could be waited on.She also said that she was socially isolated and wanted to move to [facility](?) so that she closer to Aunt who would take her about.          Treatment Objective(s) Addressed in This Session:  Target Behavior(s):  Anxiety: will experience a reduction in anxiety        Current Stressors / Issues:      The heat,dental pain and social isolation are the pt present " social stressors    Progress on Treatment Objective:     There has been very limited progress,the pt is just beginning to engage.    Previous PHQ-9: No flowsheet data found.  Previous DIANNA-7: No flowsheet data found.    Medication Review:  No changes to current psychiatric medication(s)    Medication Compliance:  No pt reports  her meds make her sick    Changes in Health Issues:   Yes: Pain, reports some ddental pain    Chemical Use Review:   Substance Use: Chemical use reviewed, no active concerns identified      Tobacco Use: Pt smokes daily        Assessment: Current Emotional / Mental Status (status of significant symptoms):    Risk status (Self / Other harm or suicidal ideation)  Patient denies current fears or concerns for personal safety.  Patient denies current or recent suicidal ideation or behaviors.  Patient denies current or recent homicidal ideation or behaviors.  Patient denies current or recent self injurious behavior or ideation.  Patient denies other safety concerns.  A safety and risk management plan has not been developed at this time, however patient was encouraged to call [facility] / [facility] should there be a change in any of these risk factors.      Attitude:   Cooperative   Orientation:   All  Speech   Rate / Production: Slow  Normal    Volume:  Soft   Mood:    Anxious  Depressed   Affect:    Could not observed. This was a telephone interview.  Thought Content:  Clear   Thought Form:  Coherent  Logical   Insight:    Fair     Diagnoses:  1. Anxiety and depression        Collateral Reports Completed:  Will work with the  in [facility] to provide the pt with the services she need.    Plan: (Homework, other):    Continue to help the pt establish realisticgoals           Ishan Vaca [facility]

## 2020-07-07 ENCOUNTER — TELEPHONE (OUTPATIENT)
Dept: BEHAVIORAL HEALTH | Facility: OTHER | Age: 30
End: 2020-07-07

## 2020-07-07 ENCOUNTER — TELEPHONE (OUTPATIENT)
Dept: BEHAVIORAL HEALTH | Facility: OTHER | Age: 30
End: 2020-07-07
Payer: MEDICAID

## 2020-07-07 DIAGNOSIS — Z53.9 ERRONEOUS ENCOUNTER--DISREGARD: Primary | ICD-10-CM

## 2020-07-08 ENCOUNTER — HOSPITAL ENCOUNTER (EMERGENCY)
Facility: HOSPITAL | Age: 30
Discharge: HOME | End: 2020-07-09
Attending: INTERNAL MEDICINE | Admitting: INTERNAL MEDICINE
Payer: MEDICAID

## 2020-07-08 DIAGNOSIS — F10.920 ALCOHOLIC INTOXICATION WITHOUT COMPLICATION (H): ICD-10-CM

## 2020-07-08 PROCEDURE — 25000132 ZZH RX MED GY IP 250 OP 250 PS 637: Performed by: INTERNAL MEDICINE

## 2020-07-08 PROCEDURE — 99283 EMERGENCY DEPT VISIT LOW MDM: CPT

## 2020-07-08 PROCEDURE — 99282 EMERGENCY DEPT VISIT SF MDM: CPT | Mod: Z6 | Performed by: INTERNAL MEDICINE

## 2020-07-08 RX ORDER — LORAZEPAM 1 MG/1
2 TABLET ORAL ONCE
Status: COMPLETED | OUTPATIENT
Start: 2020-07-08 | End: 2020-07-08

## 2020-07-08 RX ADMIN — LORAZEPAM 2 MG: 1 TABLET ORAL at 22:07

## 2020-07-08 NOTE — ED AVS SNAPSHOT
[facility]  [address]  [place] [address]  Phone:  [phone]                                    Maggie Case   MRN: [id number]    Department:  [facility]   Date of Visit:  7/8/2020           After Visit Summary Signature Page    I have received my discharge instructions, and my questions have been answered. I have discussed any challenges I see with this plan with the nurse or doctor.    ..........................................................................................................................................  Patient/Patient Representative Signature      ..........................................................................................................................................  Patient Representative Print Name and Relationship to Patient    ..................................................               ................................................  Date                                   Time    ..........................................................................................................................................  Reviewed by Signature/Title    ...................................................              ..............................................  Date                                               Time          22EPIC Rev 08/18

## 2020-07-09 ENCOUNTER — HOSPITAL ENCOUNTER (INPATIENT)
Facility: HOSPITAL | Age: 30
LOS: 12 days | Discharge: HOME | End: 2020-07-22
Attending: PHYSICIAN ASSISTANT | Admitting: PSYCHIATRY & NEUROLOGY
Payer: MEDICAID

## 2020-07-09 ENCOUNTER — TELEPHONE (OUTPATIENT)
Dept: BEHAVIORAL HEALTH | Facility: OTHER | Age: 30
End: 2020-07-09

## 2020-07-09 VITALS
RESPIRATION RATE: 22 BRPM | SYSTOLIC BLOOD PRESSURE: 116 MMHG | HEART RATE: 100 BPM | OXYGEN SATURATION: 96 % | TEMPERATURE: 98.7 F | DIASTOLIC BLOOD PRESSURE: 86 MMHG

## 2020-07-09 DIAGNOSIS — R45.851 SUICIDAL IDEATION: ICD-10-CM

## 2020-07-09 DIAGNOSIS — F33.9 EPISODE OF RECURRENT MAJOR DEPRESSIVE DISORDER, UNSPECIFIED DEPRESSION EPISODE SEVERITY (H): Primary | ICD-10-CM

## 2020-07-09 DIAGNOSIS — F31.75 BIPOLAR DISORDER, IN PARTIAL REMISSION, MOST RECENT EPISODE DEPRESSED (H): ICD-10-CM

## 2020-07-09 DIAGNOSIS — E87.6 HYPOKALEMIA: ICD-10-CM

## 2020-07-09 LAB
ALBUMIN SERPL-MCNC: 3 G/DL (ref 3.4–5)
ALBUMIN UR-MCNC: 30 MG/DL
ALP SERPL-CCNC: 73 U/L (ref 40–150)
ALT SERPL W P-5'-P-CCNC: 35 U/L (ref 0–50)
AMPHETAMINES UR QL: NOT DETECTED NG/ML
ANION GAP SERPL CALCULATED.3IONS-SCNC: 8 MMOL/L (ref 3–14)
APPEARANCE UR: ABNORMAL
AST SERPL W P-5'-P-CCNC: 85 U/L (ref 0–45)
BACTERIA #/AREA URNS HPF: ABNORMAL /HPF
BARBITURATES UR QL SCN: NOT DETECTED NG/ML
BASOPHILS # BLD AUTO: 0.1 10E9/L (ref 0–0.2)
BASOPHILS NFR BLD AUTO: 0.4 %
BENZODIAZ UR QL SCN: ABNORMAL NG/ML
BILIRUB SERPL-MCNC: 0.3 MG/DL (ref 0.2–1.3)
BILIRUB UR QL STRIP: NEGATIVE
BUN SERPL-MCNC: 5 MG/DL (ref 7–30)
BUPRENORPHINE UR QL: NOT DETECTED NG/ML
CALCIUM SERPL-MCNC: 8.8 MG/DL (ref 8.5–10.1)
CANNABINOIDS UR QL: ABNORMAL NG/ML
CHLORIDE SERPL-SCNC: 111 MMOL/L (ref 94–109)
CO2 SERPL-SCNC: 21 MMOL/L (ref 20–32)
COCAINE UR QL SCN: NOT DETECTED NG/ML
COLOR UR AUTO: YELLOW
CREAT SERPL-MCNC: 0.4 MG/DL (ref 0.52–1.04)
D-METHAMPHET UR QL: NOT DETECTED NG/ML
DIFFERENTIAL METHOD BLD: ABNORMAL
EOSINOPHIL # BLD AUTO: 0.6 10E9/L (ref 0–0.7)
EOSINOPHIL NFR BLD AUTO: 5.3 %
ERYTHROCYTE [DISTWIDTH] IN BLOOD BY AUTOMATED COUNT: 13.8 % (ref 10–15)
ETHANOL SERPL-MCNC: <0.01 G/DL
GFR SERPL CREATININE-BSD FRML MDRD: >90 ML/MIN/{1.73_M2}
GLUCOSE SERPL-MCNC: 115 MG/DL (ref 70–99)
GLUCOSE UR STRIP-MCNC: NEGATIVE MG/DL
HCT VFR BLD AUTO: 38.9 % (ref 35–47)
HGB BLD-MCNC: 13.2 G/DL (ref 11.7–15.7)
HGB UR QL STRIP: ABNORMAL
IMM GRANULOCYTES # BLD: 0 10E9/L (ref 0–0.4)
IMM GRANULOCYTES NFR BLD: 0.4 %
KETONES UR STRIP-MCNC: 5 MG/DL
LEUKOCYTE ESTERASE UR QL STRIP: ABNORMAL
LYMPHOCYTES # BLD AUTO: 1.8 10E9/L (ref 0.8–5.3)
LYMPHOCYTES NFR BLD AUTO: 15.7 %
MAGNESIUM SERPL-MCNC: 1.7 MG/DL (ref 1.6–2.3)
MCH RBC QN AUTO: 32.1 PG (ref 26.5–33)
MCHC RBC AUTO-ENTMCNC: 33.9 G/DL (ref 31.5–36.5)
MCV RBC AUTO: 95 FL (ref 78–100)
METHADONE UR QL SCN: NOT DETECTED NG/ML
MONOCYTES # BLD AUTO: 1.1 10E9/L (ref 0–1.3)
MONOCYTES NFR BLD AUTO: 9.7 %
MUCOUS THREADS #/AREA URNS LPF: PRESENT /LPF
NEUTROPHILS # BLD AUTO: 7.7 10E9/L (ref 1.6–8.3)
NEUTROPHILS NFR BLD AUTO: 68.5 %
NITRATE UR QL: NEGATIVE
NRBC # BLD AUTO: 0 10*3/UL
NRBC BLD AUTO-RTO: 0 /100
OPIATES UR QL SCN: NOT DETECTED NG/ML
OXYCODONE UR QL SCN: NOT DETECTED NG/ML
PCP UR QL SCN: NOT DETECTED NG/ML
PH UR STRIP: 6.5 PH (ref 4.7–8)
PLATELET # BLD AUTO: 417 10E9/L (ref 150–450)
POTASSIUM SERPL-SCNC: 2.4 MMOL/L (ref 3.4–5.3)
POTASSIUM SERPL-SCNC: 2.7 MMOL/L (ref 3.4–5.3)
POTASSIUM SERPL-SCNC: 3.2 MMOL/L (ref 3.4–5.3)
PROPOXYPH UR QL: NOT DETECTED NG/ML
PROT SERPL-MCNC: 6.2 G/DL (ref 6.8–8.8)
RBC # BLD AUTO: 4.11 10E12/L (ref 3.8–5.2)
RBC #/AREA URNS AUTO: 11 /HPF (ref 0–2)
SODIUM SERPL-SCNC: 140 MMOL/L (ref 133–144)
SOURCE: ABNORMAL
SP GR UR STRIP: 1.02 (ref 1–1.03)
SQUAMOUS #/AREA URNS AUTO: 10 /HPF (ref 0–1)
TRICYCLICS UR QL SCN: NOT DETECTED NG/ML
UROBILINOGEN UR STRIP-MCNC: NORMAL MG/DL (ref 0–2)
WBC # BLD AUTO: 11.2 10E9/L (ref 4–11)
WBC #/AREA URNS AUTO: 18 /HPF (ref 0–5)

## 2020-07-09 PROCEDURE — 81001 URINALYSIS AUTO W/SCOPE: CPT | Performed by: PHYSICIAN ASSISTANT

## 2020-07-09 PROCEDURE — 80053 COMPREHEN METABOLIC PANEL: CPT | Performed by: PHYSICIAN ASSISTANT

## 2020-07-09 PROCEDURE — 99283 EMERGENCY DEPT VISIT LOW MDM: CPT | Mod: Z6 | Performed by: PHYSICIAN ASSISTANT

## 2020-07-09 PROCEDURE — 87635 SARS-COV-2 COVID-19 AMP PRB: CPT | Performed by: PHYSICIAN ASSISTANT

## 2020-07-09 PROCEDURE — 99285 EMERGENCY DEPT VISIT HI MDM: CPT

## 2020-07-09 PROCEDURE — 36415 COLL VENOUS BLD VENIPUNCTURE: CPT | Performed by: PHYSICIAN ASSISTANT

## 2020-07-09 PROCEDURE — 80320 DRUG SCREEN QUANTALCOHOLS: CPT | Performed by: PHYSICIAN ASSISTANT

## 2020-07-09 PROCEDURE — C9803 HOPD COVID-19 SPEC COLLECT: HCPCS

## 2020-07-09 PROCEDURE — 80306 DRUG TEST PRSMV INSTRMNT: CPT | Performed by: PHYSICIAN ASSISTANT

## 2020-07-09 PROCEDURE — 84132 ASSAY OF SERUM POTASSIUM: CPT | Performed by: PHYSICIAN ASSISTANT

## 2020-07-09 PROCEDURE — 25000132 ZZH RX MED GY IP 250 OP 250 PS 637: Performed by: PHYSICIAN ASSISTANT

## 2020-07-09 PROCEDURE — 83735 ASSAY OF MAGNESIUM: CPT | Performed by: PHYSICIAN ASSISTANT

## 2020-07-09 PROCEDURE — 85025 COMPLETE CBC W/AUTO DIFF WBC: CPT | Performed by: PHYSICIAN ASSISTANT

## 2020-07-09 RX ORDER — POTASSIUM CL/LIDO/0.9 % NACL 10MEQ/0.1L
10 INTRAVENOUS SOLUTION, PIGGYBACK (ML) INTRAVENOUS
Status: DISCONTINUED | OUTPATIENT
Start: 2020-07-09 | End: 2020-07-12

## 2020-07-09 RX ORDER — POTASSIUM CHLORIDE 1500 MG/1
40 TABLET, EXTENDED RELEASE ORAL ONCE
Status: COMPLETED | OUTPATIENT
Start: 2020-07-09 | End: 2020-07-09

## 2020-07-09 RX ORDER — ACETAMINOPHEN 325 MG/1
650 TABLET ORAL ONCE
Status: COMPLETED | OUTPATIENT
Start: 2020-07-09 | End: 2020-07-09

## 2020-07-09 RX ORDER — POTASSIUM CHLORIDE 1500 MG/1
20 TABLET, EXTENDED RELEASE ORAL ONCE
Status: COMPLETED | OUTPATIENT
Start: 2020-07-09 | End: 2020-07-09

## 2020-07-09 RX ADMIN — ACETAMINOPHEN 650 MG: 325 TABLET, FILM COATED ORAL at 22:12

## 2020-07-09 RX ADMIN — POTASSIUM CHLORIDE 20 MEQ: 1500 TABLET, EXTENDED RELEASE ORAL at 21:06

## 2020-07-09 RX ADMIN — POTASSIUM CHLORIDE 40 MEQ: 1500 TABLET, EXTENDED RELEASE ORAL at 17:38

## 2020-07-09 ASSESSMENT — ENCOUNTER SYMPTOMS
ANAL BLEEDING: 0
NAUSEA: 0
VOICE CHANGE: 0
BACK PAIN: 0
CHILLS: 0
DYSURIA: 0
DIZZINESS: 0
WOUND: 0
SHORTNESS OF BREATH: 0
NECK PAIN: 0
DIAPHORESIS: 0
ABDOMINAL PAIN: 0
FEVER: 0
WHEEZING: 0
CONFUSION: 0
HEADACHES: 0
SHORTNESS OF BREATH: 0
COLOR CHANGE: 0
ABDOMINAL DISTENTION: 0
NECK STIFFNESS: 0
BLOOD IN STOOL: 0
COUGH: 0
FEVER: 0
MYALGIAS: 0
VOMITING: 0
LIGHT-HEADEDNESS: 0
FLANK PAIN: 0
CHEST TIGHTNESS: 0
PALPITATIONS: 0
NUMBNESS: 0
ARTHRALGIAS: 0
HEMATURIA: 0
SLEEP DISTURBANCE: 1

## 2020-07-09 ASSESSMENT — LIFESTYLE VARIABLES: INTOXICATION: 1

## 2020-07-09 NOTE — ED NOTES
"Pt. Put her call light on.  When staff answered by phone pt stated \"are you that black nurse. Staff when went into pt room and I stated \"yes,  I am How may I help you\". Pt stated that she didn't want my help she wanted another staff.  staff noticed pt. Was in the bed smoking. Staff attempted to grab the cigarette Pt moved hand away. Staff got the cigarette and placed it into the sink. Pt was informed that it is against hospital policy to smoke in the hospital. She was also informed that staff will remove all personal belongings rom the room to ensure that she isnt going to smoke in the room again and for staff and her safety. Pt stated that she understands and that fine whatever. When staff went to remove the the cigarette  lighter and other object near her arm pt stated \" I don't want you touching me. Your not even suppose to have anything to do with me. I have filled out so many complaints about you that you shouldn't even be working here anymore. Let alone have anything to do with me\". Pt was informed that I had no knowledge of of this. I also informed pt that I was the only Unit assistant working tonight. If she did not want my help she would have a longer want time when in need of assistance because all of the other staff was in other pt rooms. Pt was also informed that the [facility] nurse will be aware of her complaint against me tonight. And not wanting me to be involved in her care.    2320 Call light went off again I did answer via phone. Pt declined for staff to assist her    2330 Call light went off again. I answered via phone. Pt stated that I should have never took her lip stuff away that iot has nothing to do with her smoking and that it was not necessary. Pt was inform that at the time of removing all of her belongings that too was removed. I will ask the nurse if its okay to return it. It was returned.     2333 Call light went off again   "

## 2020-07-09 NOTE — ED NOTES
Delay in discharging pt to home as she is wheelchair bound, needs her PCA to care for her, is not able to be transported via [facility] as pt's roommate still not answering phone and pt's wheelchair is at home. Pt stated earlier this night that her roommate would be able to help her out this night though he is not usually her care giver.

## 2020-07-09 NOTE — ED NOTES
"This nurse to bedside. Patient states \"I don't want that black yonathan in here, I want my bag.\" Informed patient that her bag was removed due to she was smoking in her room and so for the safety of her and other patient's in the ED she can't have access to her cigarettes. Patient states \"well that mary mcmanus took all my shit and I want my clove oil for my tooth pain.\" Discussed with patient the inappropriateness of her calling the UA that black chick, calling multiple staff cunts and bitches, and belittling and swearing at staff. States \"I don't call you any of that, just the ones I don't like that are pushy and rude.\" Discussed with patient that the ED if very busy with very sick patients and all of the employees are very good people that are running themselves rampant trying to take very good care of everyone in here and it makes it very difficult for everyone to do their job when they are belittled and name called the way that she does. Patient looked toward the floor and states \"yeah, well can just you answer my call light, I don't want that aide in here.\" Informed patient again that the ED is very busy, that the aide will not enter patient's room again for the well being of the aide and patient, and I will attempt to answer her call lights but if I am unavailable and another nurse answers she can either tell them what she needs or be patient until I am able to enter room. Verbalized understanding. Warm blanket given. Light turned down. States she is going to try and get some sleep and requesting to just stay in the ED for the night. Call light within reach.   "

## 2020-07-09 NOTE — TELEPHONE ENCOUNTER
[facility]  @FLOW([id number])@      Social Work Care Navigator Note      Patient: Maggie Case  Date: July 10, 2020  Preferred Name: Maggie    Previous PHQ-9: No flowsheet data found.  Previous DIANNA-7: No flowsheet data found.  SEMYOUR LEVEL:  No flowsheet data found.    Preferred Contact: @ELIEZER([id number])@  Type of Contact Today: Phone call (patient / identified key support person reached)      Data: (subjective / Objective):  Patient Goals Areas: @FLOW([id number])@  Patient Stated Goals: @FLOW([id number])@  Recent ED/IP Admission or Discharge?   None  Recent ED/IP Admission or Discharge?   None    Patient Goals:  No data recorded      [facility] Core Service Provided:  Comprehensive Care Management: utilized the electronic medical record / patient registry to identify and support patient's health conditions / needs more effectively   Care Coordination: provided care management services/referrals necessary to ensure patient and their identified supports have access to medical, behavioral health, pharmacology and recovery support services.  Ensured that patient's care is integrated across all settings and services.   Health and Wellness Promotion  Individual and Family Support: aimed to help clients reduce barriers to achieving goals, increase health literacy and knowledge about chronic condition(s), increase self-efficacy skills, and improve health outcomes    Current Stressors / Issues / Care Plan Objective Addressed Today:    Air conditioner, psychiatrist, move,     Intervention:  Motivational Interviewing: Expressed Empathy/Understanding, Supported Autonomy, Collaboration, Evocation, Permission to raise concern or advise and Open-ended questions   Target Behavior(s): Explored and resolved challenges related to taking anti-depressants as prescribed and Explored current social supports and reinforced opportunities to increase engagement    Assessment: (Progress on Goals / Homework):  Received message from  RE Pineda that ZOË Olmstead had reached regarding pt needing an air conditioner.  He also stated pt wanted to move to [place].  Writer had contacted pt's  on 6/25 and again on 6/30.  However has not received return call.  Attempted to contact her again on 7/7 regarding air conditioner and possible move.     He also mentioned that she would benefit from antidepressants and med management.  Pt is already established with [facility] and she confirmed she is still working with Rhonda there.  WIll remain available if she would like assistance with a new provider.    He also stated that pt has been having extreme dental pain.  Pt stated they have tried all over the range, but have been unable to secure appt due to covid.  Writer advised [facility]  [facility] stated they are not accepting new patients under any circumstances; if infection they advised pt go to the ER.  Updated pt via text.    Will continue to remain available.    Plan: (Homework, other):  Patient was encouraged to continue to seek condition-related information and education.      Scheduled a Phone follow up appointment with HERNAN VILLALOBOS in 1 week     Patient has set self-identified goals and will monitor progress until the next appointment.   RE Ayon, Social Work Care Coordinator     RE Ayon  Social Work Care Coordinator  [facility]  [phone] option 2 or [phone]

## 2020-07-09 NOTE — ED NOTES
Pt to the ED via [facility] with report of pt feeling suicidal.  States to nurse she wants to kill herself by cutting her arteries in her legs.  Pt states has been binge drinking and was here last HS and discharged this am after a binge drinking night with friends.  Pt states she wants to work and no one will hire her because she is in a w/c.  Pt reports she is isolated.  Smokes pot and cigarettes.  States sometimes she talks to Radha on the 5th floor. Assessment is complete. Security was called and are now in view of pt.   is here at the bedside for blood draw.

## 2020-07-09 NOTE — ED TRIAGE NOTES
DATE:  7/9/2020   TIME OF RECEIPT FROM LAB:  1640  LAB TEST:  potassium  LAB VALUE:  2.4  RESULTS GIVEN WITH READ-BACK TO (PROVIDER):  Lucy Rebolledo  TIME LAB VALUE REPORTED TO PROVIDER:   Shayy

## 2020-07-09 NOTE — ED NOTES
This writer attempted to make contact with pt's room mate Rudi Godfreykarolinedaniel at [phone]. Answering machine only, no message left at this time as message was left previously.

## 2020-07-09 NOTE — ED NOTES
Care Transitions focused note:      Met with patient.  She asked that I leave the room.  I offered to make some calls for her and she asked that I charge her phone.  Her PCA has arrived to pick her up.  I will make a follow up call to check on her in a day or 2.    RE Mukherjee

## 2020-07-09 NOTE — ED NOTES
Pt incontinent of copious amounts of loose stool and urine. Pt resistant to repositioning during bath care, stool from pt's mid back to her feet. Half bath required. All of linens required changing. Pt requested her slacks be thrown away as they were covered in stool. Pt states her PCA is coming to pick her up and is bringing clothing for her.

## 2020-07-09 NOTE — ED NOTES
"Patient given discharge instructions.  This RN attempted to get a last set of vital signs from the patient and patient started yelling and screaming \"take the cuff off-its hurting me\" This RN was attempting to remove the cuff, when the patient grabbed it with her teeth and ripped it off.  Patient declined further bp's at this time.  Home.  "
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
## 2020-07-09 NOTE — ED NOTES
"Pt brought in by [facility] who report pt told them if they did not bring her to the hospital, \"right now, you are going to regret it later tonight.\"   When the paramedic asked pt if she meant to do self harm pt replied, \"yes\". Pt states she was in the back of the police car when she told the paramedics the above. Pt's speech is slurred, pt admits to being intoxicated. Pt is hostile and belligerent, \"you stupid cunt, you fucking idiot.\"  Pt refusing to answer questions appropriately about her home medications, \"do you want a sticker for doing the right thing in the lunch line, you are so fucking stupid.\"  Pt is using her phone to call the police to try to get to talk with the paramedic who brought her in to the ED. Dr Gentile at the bedside. Pt states she never meant self harm, \"I was being sarcastic.\" pt continues swearing profusely at this writer.   "

## 2020-07-09 NOTE — ED NOTES
Pt requesting to be allowed to go home. Dr Gentile notified. Per Dr Gentile, pt could go home if she had someone at home to help take care of her. Per pt request, pt's roommate Rudi Godfreykarolinedaniel called, no answer, generic message left on his answering machine.

## 2020-07-09 NOTE — ED PROVIDER NOTES
History     Chief Complaint   Patient presents with     Alcohol Intoxication     Psychiatric Evaluation     pt told paramedics if she wasn't brought in she would do self harm.     The history is provided by the patient.   Alcohol Intoxication   Severity:  Mild  Onset quality:  Gradual  Timing:  Constant  Progression:  Improving  Chronicity:  Recurrent  Suspected agents:  Alcohol  Associated symptoms: no abdominal pain, no confusion, no headaches, no nausea, no palpitations, no shortness of breath, no suicidal ideation and no vomiting          Allergies:  Allergies   Allergen Reactions     Amoxicillin Other (See Comments)     Headaches     Levaquin [Levofloxacin] Swelling     Lithium      Diabetes insipidus      Naproxen Other (See Comments)     Reaction: Headaches     Nickel      Tramadol      Sulindac Rash       Problem List:    Patient Active Problem List    Diagnosis Date Noted     Anxiety and depression 06/15/2020     Priority: Medium     Acute urinary tract infection 05/23/2020     Priority: Medium     Acute pyelonephritis 05/22/2020     Priority: Medium     Depression with suicidal ideation 09/19/2018     Priority: Medium     Clostridium difficile colitis 08/15/2018     Priority: Medium     Chemical dependency (H) 07/16/2018     Priority: Medium     Calculus of lower urinary tract 07/15/2018     Priority: Medium     History of CVA (cerebrovascular accident) 07/15/2018     Priority: Medium     Left hemiparesis (H) 07/15/2018     Priority: Medium     Chronic anticoagulation 07/15/2018     Priority: Medium     History of cranioplasty 05/24/2018     Priority: Medium     Acute ischemic stroke (H) 02/17/2018     Priority: Medium     Influenza B 05/01/2017     Priority: Medium     Opacity of lung on imaging study 05/01/2017     Priority: Medium     Community acquired pneumonia 05/01/2017     Priority: Medium     C. difficile colitis 05/01/2017     Priority: Medium     Sepsis (H) 04/28/2017     Priority: Medium      Pyelonephritis 01/05/2017     Priority: Medium     Acute chest pain 05/06/2016     Priority: Medium     Leukocytosis 05/06/2016     Priority: Medium     Lung mass 05/06/2016     Priority: Medium     SOB (shortness of breath) 05/06/2016     Priority: Medium     Acute upper GI bleed 06/18/2013     Priority: Medium     Hypokalemia 06/18/2013     Priority: Medium     Acute cystitis 06/18/2013     Priority: Medium     Anxiety state 03/25/2013     Priority: Medium     Muscle pain 07/30/2009     Priority: Medium     Overview:   IMO Update 10/11       Cervicalgia 06/16/2009     Priority: Medium     Overview:   IMO Update 10/11       Low back pain 06/16/2009     Priority: Medium     Overview:   IMO Update 10/11       Pain in joint, lower leg 05/01/2009     Priority: Medium     Diarrhea 04/17/2008     Priority: Medium     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     Priority: Medium        Past Medical History:    Past Medical History:   Diagnosis Date     Anemia      Anxiety      Chemical dependency (H)      Chronic diarrhea      Lactose intolerance      Myalgia      OCD (obsessive compulsive disorder)      Pulmonary embolism (H) 05/06/16     Stroke (H) 02/2018     Vitamin B12 deficiency        Past Surgical History:    Past Surgical History:   Procedure Laterality Date     CLOSED REDUCTION, PERCUTANEOUS PINNING LOWER EXTREMITY, COMBINED Right 4/6/2016    Procedure: COMBINED CLOSED REDUCTION, PERCUTANEOUS PINNING LOWER EXTREMITY;  Surgeon: Dexter Jones MD;  Location: [facility]     COLONOSCOPY       CRANIECTOMY Right 2/18/2018    Procedure: CRANIECTOMY;  RIGHT HEMICRANIECTOMY;  Surgeon: Emeterio Mesa MD;  Location: [facility]     CRANIOPLASTY Right 5/24/2018    Procedure: CRANIOPLASTY;  Right Cranioplasty ;  Surgeon: Emeterio Mesa MD;  Location: [facility] OR     UPPER GI ENDOSCOPY         Family History:    Family History   Problem Relation Age of Onset     Depression Mother       Migraines Maternal Half-Sister        Social History:  Marital Status:  Single [1]  Social History     Tobacco Use     Smoking status: Current Every Day Smoker     Packs/day: 1.00     Years: 7.00     Pack years: 7.00     Types: Cigarettes     Last attempt to quit: 2018     Years since quittin.9     Smokeless tobacco: Never Used   Substance Use Topics     Alcohol use: Yes     Alcohol/week: 0.0 standard drinks     Comment: currently intoxicated     Drug use: Yes     Frequency: 7.0 times per week     Types: Marijuana, Methamphetamines     Comment: hx of marijuana and meth use        Medications:    lamoTRIgine (LAMICTAL) 25 MG tablet  acetaminophen (TYLENOL) 500 MG tablet  CHANTIX CONTINUING MONTH ASHLEY 1 MG tablet  famotidine (PEPCID) 40 MG tablet  folic acid (FOLVITE) 1 MG tablet  gabapentin (NEURONTIN) 300 MG capsule  loperamide (IMODIUM A-D) 2 MG tablet  medroxyPROGESTERone (DEPO-PROVERA) 150 MG/ML IM injection  melatonin 5 MG tablet  mirtazapine (REMERON) 15 MG tablet  nicotine (NICODERM CQ) 7 MG/24HR 24 hr patch  ondansetron (ZOFRAN-ODT) 8 MG ODT tab  potassium chloride ER (KLOR-CON M) 10 MEQ CR tablet  XARELTO 20 MG TABS tablet          Review of Systems   Constitutional: Negative for chills, diaphoresis and fever.   HENT: Negative for voice change.    Eyes: Negative for visual disturbance.   Respiratory: Negative for cough, chest tightness, shortness of breath and wheezing.    Cardiovascular: Negative for chest pain, palpitations and leg swelling.   Gastrointestinal: Negative for abdominal distention, abdominal pain, anal bleeding, blood in stool, nausea and vomiting.   Genitourinary: Negative for decreased urine volume, dysuria, flank pain and hematuria.   Musculoskeletal: Negative for arthralgias, back pain, gait problem, myalgias, neck pain and neck stiffness.   Skin: Negative for color change, pallor, rash and wound.   Neurological: Negative for dizziness, syncope, light-headedness, numbness and  headaches.   Psychiatric/Behavioral: Negative for confusion and suicidal ideas.   All other systems reviewed and are negative.      Physical Exam   BP: 116/86  Pulse: 115  Temp: 98.7  F (37.1  C)  Resp: 22  SpO2: 95 %      Physical Exam  Vitals signs and nursing note reviewed.   Constitutional:       Appearance: She is well-developed.   HENT:      Head: Normocephalic and atraumatic.      Mouth/Throat:      Pharynx: No oropharyngeal exudate.   Eyes:      Conjunctiva/sclera: Conjunctivae normal.      Pupils: Pupils are equal, round, and reactive to light.   Neck:      Musculoskeletal: Normal range of motion and neck supple.      Thyroid: No thyromegaly.      Vascular: No JVD.      Trachea: No tracheal deviation.   Cardiovascular:      Rate and Rhythm: Normal rate and regular rhythm.      Heart sounds: Normal heart sounds. No murmur. No friction rub. No gallop.    Pulmonary:      Effort: Pulmonary effort is normal. No respiratory distress.      Breath sounds: Normal breath sounds. No stridor. No wheezing or rales.   Chest:      Chest wall: No tenderness.   Abdominal:      General: Bowel sounds are normal. There is no distension.      Palpations: Abdomen is soft. There is no mass.      Tenderness: There is no abdominal tenderness. There is no guarding or rebound.   Musculoskeletal: Normal range of motion.         General: No tenderness.      Comments: R arm weakness , chronic   Lymphadenopathy:      Cervical: No cervical adenopathy.   Skin:     General: Skin is warm and dry.      Coloration: Skin is not pale.      Findings: No erythema or rash.   Neurological:      Mental Status: She is alert and oriented to person, place, and time.   Psychiatric:         Behavior: Behavior normal.         ED Course        Procedures                 No results found for this or any previous visit (from the past 24 hour(s)).    Medications   LORazepam (ATIVAN) tablet 2 mg (2 mg Oral Given 7/8/20 2207)       Assessments & Plan (with  "Medical Decision Making)   Alcohol intoxication, mild  Brought for some suicidal comments, pt denies any suicidal ideas state\" I was just mad them\"  Refused any blood work   Slept and wokeup, AAox3, wheelchair bound  D C home  I have reviewed the nursing notes.    I have reviewed the findings, diagnosis, plan and need for follow up with the patient.      New Prescriptions    No medications on file       Final diagnoses:   Alcoholic intoxication without complication (H)       7/8/2020   [facility]     Ramin Gentile MD  07/09/20 0743    "

## 2020-07-10 PROBLEM — K52.831 COLITIS, COLLAGENOUS: Status: ACTIVE | Noted: 2019-01-16

## 2020-07-10 PROBLEM — B07.0 BILATERAL PLANTAR WART: Status: ACTIVE | Noted: 2018-07-27

## 2020-07-10 PROBLEM — G62.9 NEUROPATHY: Status: ACTIVE | Noted: 2019-01-28

## 2020-07-10 PROBLEM — R45.851 SUICIDAL IDEATION: Status: ACTIVE | Noted: 2020-07-10

## 2020-07-10 PROBLEM — F10.11 HISTORY OF ALCOHOL ABUSE: Status: ACTIVE | Noted: 2018-03-01

## 2020-07-10 PROBLEM — S06.9X0S TRAUMATIC BRAIN INJURY, WITHOUT LOSS OF CONSCIOUSNESS, SEQUELA (H): Status: ACTIVE | Noted: 2018-05-12

## 2020-07-10 PROBLEM — I63.511 ACUTE RIGHT MCA STROKE (H): Status: ACTIVE | Noted: 2018-03-01

## 2020-07-10 PROBLEM — R25.2 SPASTICITY AS LATE EFFECT OF CEREBROVASCULAR ACCIDENT (CVA): Status: ACTIVE | Noted: 2019-12-30

## 2020-07-10 PROBLEM — I69.398 SPASTICITY AS LATE EFFECT OF CEREBROVASCULAR ACCIDENT (CVA): Status: ACTIVE | Noted: 2019-12-30

## 2020-07-10 PROBLEM — F42.9 OCD (OBSESSIVE COMPULSIVE DISORDER): Status: ACTIVE | Noted: 2017-01-11

## 2020-07-10 PROBLEM — G89.29 CHRONIC PAIN OF LEFT KNEE: Status: ACTIVE | Noted: 2020-02-04

## 2020-07-10 PROBLEM — M25.562 CHRONIC PAIN OF LEFT KNEE: Status: ACTIVE | Noted: 2020-02-04

## 2020-07-10 PROBLEM — K58.0 IRRITABLE BOWEL SYNDROME WITH DIARRHEA: Status: ACTIVE | Noted: 2018-12-07

## 2020-07-10 PROBLEM — F17.200 TOBACCO USE DISORDER: Status: ACTIVE | Noted: 2018-11-28

## 2020-07-10 PROBLEM — N20.0 NEPHROLITHIASIS: Status: ACTIVE | Noted: 2018-07-27

## 2020-07-10 PROBLEM — R59.0 HILAR LYMPHADENOPATHY: Status: ACTIVE | Noted: 2018-03-01

## 2020-07-10 PROBLEM — Q21.12 PFO (PATENT FORAMEN OVALE): Status: ACTIVE | Noted: 2018-03-01

## 2020-07-10 PROBLEM — G89.29 CHRONIC LEFT SHOULDER PAIN: Status: ACTIVE | Noted: 2020-06-24

## 2020-07-10 PROBLEM — M25.512 CHRONIC LEFT SHOULDER PAIN: Status: ACTIVE | Noted: 2020-06-24

## 2020-07-10 PROBLEM — F60.3 BORDERLINE PERSONALITY DISORDER (H): Status: ACTIVE | Noted: 2018-12-08

## 2020-07-10 PROBLEM — I63.231 CEREBROVASCULAR ACCIDENT (CVA) DUE TO OCCLUSION OF RIGHT CAROTID ARTERY (H): Status: ACTIVE | Noted: 2018-03-26

## 2020-07-10 PROBLEM — R32 INCONTINENCE OF URINE IN FEMALE: Status: ACTIVE | Noted: 2020-04-22

## 2020-07-10 PROBLEM — F41.1 GENERALIZED ANXIETY DISORDER: Status: ACTIVE | Noted: 2018-09-16

## 2020-07-10 PROBLEM — M62.81 MUSCLE WEAKNESS: Status: ACTIVE | Noted: 2020-02-04

## 2020-07-10 PROBLEM — E87.6 CHRONIC HYPOKALEMIA: Status: ACTIVE | Noted: 2018-11-27

## 2020-07-10 PROBLEM — F31.75 BIPOLAR DISORDER, IN PARTIAL REMISSION, MOST RECENT EPISODE DEPRESSED (H): Status: ACTIVE | Noted: 2020-05-01

## 2020-07-10 PROBLEM — Z86.73 HISTORY OF CVA (CEREBROVASCULAR ACCIDENT): Status: ACTIVE | Noted: 2018-07-15

## 2020-07-10 PROBLEM — R91.1 PULMONARY NODULE SEEN ON IMAGING STUDY: Status: ACTIVE | Noted: 2017-03-21

## 2020-07-10 PROBLEM — Z86.711 PERSONAL HISTORY OF PE (PULMONARY EMBOLISM): Status: ACTIVE | Noted: 2018-03-01

## 2020-07-10 PROBLEM — R13.10 DYSPHAGIA: Status: ACTIVE | Noted: 2018-03-01

## 2020-07-10 PROBLEM — Z86.718 HISTORY OF DVT (DEEP VEIN THROMBOSIS): Status: ACTIVE | Noted: 2018-11-27

## 2020-07-10 PROBLEM — D62 ACUTE BLOOD LOSS ANEMIA: Status: ACTIVE | Noted: 2018-03-01

## 2020-07-10 PROBLEM — Z91.199 HISTORY OF NONADHERENCE TO MEDICAL TREATMENT: Status: ACTIVE | Noted: 2018-11-27

## 2020-07-10 LAB
SARS-COV-2 PCR COMMENT: NORMAL
SARS-COV-2 RNA SPEC QL NAA+PROBE: NEGATIVE
SARS-COV-2 RNA SPEC QL NAA+PROBE: NORMAL
SPECIMEN SOURCE: NORMAL
SPECIMEN SOURCE: NORMAL

## 2020-07-10 PROCEDURE — 99223 1ST HOSP IP/OBS HIGH 75: CPT | Performed by: NURSE PRACTITIONER

## 2020-07-10 PROCEDURE — 12400000 ZZH R&B MH

## 2020-07-10 PROCEDURE — 25000132 ZZH RX MED GY IP 250 OP 250 PS 637: Performed by: NURSE PRACTITIONER

## 2020-07-10 PROCEDURE — 99222 1ST HOSP IP/OBS MODERATE 55: CPT | Performed by: NURSE PRACTITIONER

## 2020-07-10 PROCEDURE — 25000125 ZZHC RX 250: Performed by: NURSE PRACTITIONER

## 2020-07-10 PROCEDURE — 25000128 H RX IP 250 OP 636: Performed by: NURSE PRACTITIONER

## 2020-07-10 RX ORDER — BISACODYL 10 MG
10 SUPPOSITORY, RECTAL RECTAL DAILY PRN
Status: DISCONTINUED | OUTPATIENT
Start: 2020-07-10 | End: 2020-07-22 | Stop reason: HOSPADM

## 2020-07-10 RX ORDER — BACLOFEN 10 MG/1
10 TABLET ORAL EVERY 8 HOURS PRN
COMMUNITY

## 2020-07-10 RX ORDER — POLYETHYLENE GLYCOL 3350 17 G
2 POWDER IN PACKET (EA) ORAL
Status: DISCONTINUED | OUTPATIENT
Start: 2020-07-10 | End: 2020-07-22 | Stop reason: HOSPADM

## 2020-07-10 RX ORDER — ERGOCALCIFEROL 1.25 MG/1
1.25 CAPSULE ORAL WEEKLY
COMMUNITY
End: 2020-08-26

## 2020-07-10 RX ORDER — FAMOTIDINE 20 MG/1
20 TABLET, FILM COATED ORAL 2 TIMES DAILY
Status: DISCONTINUED | OUTPATIENT
Start: 2020-07-10 | End: 2020-07-22 | Stop reason: HOSPADM

## 2020-07-10 RX ORDER — ACETAMINOPHEN 325 MG/1
650 TABLET ORAL EVERY 4 HOURS PRN
Status: DISCONTINUED | OUTPATIENT
Start: 2020-07-10 | End: 2020-07-12

## 2020-07-10 RX ORDER — DULOXETIN HYDROCHLORIDE 60 MG/1
60 CAPSULE, DELAYED RELEASE ORAL DAILY
Status: ON HOLD | COMMUNITY
End: 2020-07-22

## 2020-07-10 RX ORDER — NICOTINE 21 MG/24HR
1 PATCH, TRANSDERMAL 24 HOURS TRANSDERMAL DAILY
Status: DISCONTINUED | OUTPATIENT
Start: 2020-07-10 | End: 2020-07-22 | Stop reason: HOSPADM

## 2020-07-10 RX ORDER — BUSPIRONE HYDROCHLORIDE 15 MG/1
7.5 TABLET ORAL 2 TIMES DAILY
Status: ON HOLD | COMMUNITY
End: 2020-07-22

## 2020-07-10 RX ORDER — POTASSIUM CHLORIDE 750 MG/1
10 CAPSULE, EXTENDED RELEASE ORAL 2 TIMES DAILY
Status: DISCONTINUED | OUTPATIENT
Start: 2020-07-10 | End: 2020-07-13

## 2020-07-10 RX ORDER — LAMOTRIGINE 25 MG/1
25 TABLET ORAL DAILY
Status: DISCONTINUED | OUTPATIENT
Start: 2020-07-10 | End: 2020-07-14

## 2020-07-10 RX ORDER — TRAZODONE HYDROCHLORIDE 100 MG/1
100 TABLET ORAL AT BEDTIME
Status: ON HOLD | COMMUNITY
End: 2020-07-22

## 2020-07-10 RX ORDER — ALUMINA, MAGNESIA, AND SIMETHICONE 2400; 2400; 240 MG/30ML; MG/30ML; MG/30ML
30 SUSPENSION ORAL EVERY 4 HOURS PRN
Status: DISCONTINUED | OUTPATIENT
Start: 2020-07-10 | End: 2020-07-22 | Stop reason: HOSPADM

## 2020-07-10 RX ORDER — FERROUS SULFATE 325(65) MG
325 TABLET ORAL DAILY
Status: ON HOLD | COMMUNITY
End: 2020-07-22

## 2020-07-10 RX ORDER — GABAPENTIN 300 MG/1
900 CAPSULE ORAL 3 TIMES DAILY
Status: DISCONTINUED | OUTPATIENT
Start: 2020-07-10 | End: 2020-07-22 | Stop reason: HOSPADM

## 2020-07-10 RX ORDER — NYSTATIN 100000/ML
500000 SUSPENSION, ORAL (FINAL DOSE FORM) ORAL 4 TIMES DAILY
Status: ON HOLD | COMMUNITY
End: 2020-07-22

## 2020-07-10 RX ORDER — MIRTAZAPINE 7.5 MG/1
7.5-15 TABLET, FILM COATED ORAL
Status: DISCONTINUED | OUTPATIENT
Start: 2020-07-10 | End: 2020-07-22 | Stop reason: HOSPADM

## 2020-07-10 RX ORDER — ERGOCALCIFEROL 1.25 MG/1
50000 CAPSULE ORAL WEEKLY
Status: DISCONTINUED | OUTPATIENT
Start: 2020-07-13 | End: 2020-07-22 | Stop reason: HOSPADM

## 2020-07-10 RX ORDER — DIPHENHYDRAMINE HYDROCHLORIDE AND LIDOCAINE HYDROCHLORIDE AND ALUMINUM HYDROXIDE AND MAGNESIUM HYDRO
10 KIT EVERY 6 HOURS PRN
Status: DISCONTINUED | OUTPATIENT
Start: 2020-07-10 | End: 2020-07-22 | Stop reason: HOSPADM

## 2020-07-10 RX ORDER — LOPERAMIDE HYDROCHLORIDE 2 MG/1
4 TABLET ORAL DAILY
Status: DISCONTINUED | OUTPATIENT
Start: 2020-07-10 | End: 2020-07-10

## 2020-07-10 RX ORDER — HYDROCORTISONE 2.5 %
CREAM (GRAM) TOPICAL 2 TIMES DAILY
Status: ON HOLD | COMMUNITY
End: 2020-07-22

## 2020-07-10 RX ORDER — LOPERAMIDE HCL 2 MG
4 CAPSULE ORAL DAILY
Status: DISCONTINUED | OUTPATIENT
Start: 2020-07-11 | End: 2020-07-14

## 2020-07-10 RX ORDER — HYDROXYZINE HYDROCHLORIDE 25 MG/1
50-100 TABLET, FILM COATED ORAL EVERY 4 HOURS PRN
Status: DISCONTINUED | OUTPATIENT
Start: 2020-07-10 | End: 2020-07-22 | Stop reason: HOSPADM

## 2020-07-10 RX ORDER — LOPERAMIDE HCL 2 MG
2 CAPSULE ORAL 4 TIMES DAILY PRN
Status: DISCONTINUED | OUTPATIENT
Start: 2020-07-10 | End: 2020-07-22 | Stop reason: HOSPADM

## 2020-07-10 RX ORDER — ONDANSETRON 4 MG/1
4 TABLET, ORALLY DISINTEGRATING ORAL EVERY 6 HOURS PRN
Status: DISCONTINUED | OUTPATIENT
Start: 2020-07-10 | End: 2020-07-22 | Stop reason: HOSPADM

## 2020-07-10 RX ORDER — LITHIUM CARBONATE 300 MG/1
300 TABLET, FILM COATED, EXTENDED RELEASE ORAL 2 TIMES DAILY
Status: ON HOLD | COMMUNITY
End: 2020-07-22

## 2020-07-10 RX ORDER — TRAZODONE HYDROCHLORIDE 50 MG/1
50 TABLET, FILM COATED ORAL
Status: DISCONTINUED | OUTPATIENT
Start: 2020-07-10 | End: 2020-07-11

## 2020-07-10 RX ADMIN — NICOTINE POLACRILEX 4 MG: 2 GUM, CHEWING ORAL at 17:25

## 2020-07-10 RX ADMIN — NICOTINE POLACRILEX 2 MG: 2 LOZENGE ORAL at 15:34

## 2020-07-10 RX ADMIN — FAMOTIDINE 20 MG: 20 TABLET ORAL at 13:26

## 2020-07-10 RX ADMIN — DIPHENHYDRAMINE HYDROCHLORIDE AND LIDOCAINE HYDROCHLORIDE AND ALUMINUM HYDROXIDE AND MAGNESIUM HYDRO 10 ML: KIT at 14:09

## 2020-07-10 RX ADMIN — GABAPENTIN 900 MG: 300 CAPSULE ORAL at 20:24

## 2020-07-10 RX ADMIN — HYDROXYZINE HYDROCHLORIDE 100 MG: 25 TABLET ORAL at 08:38

## 2020-07-10 RX ADMIN — LOPERAMIDE HYDROCHLORIDE 2 MG: 2 CAPSULE ORAL at 08:53

## 2020-07-10 RX ADMIN — LAMOTRIGINE 25 MG: 25 TABLET ORAL at 14:09

## 2020-07-10 RX ADMIN — ACETAMINOPHEN 650 MG: 325 TABLET, FILM COATED ORAL at 15:34

## 2020-07-10 RX ADMIN — ONDANSETRON 4 MG: 4 TABLET, ORALLY DISINTEGRATING ORAL at 14:08

## 2020-07-10 RX ADMIN — RIVAROXABAN 20 MG: 20 TABLET, FILM COATED ORAL at 14:09

## 2020-07-10 RX ADMIN — POTASSIUM CHLORIDE 10 MEQ: 750 CAPSULE, EXTENDED RELEASE ORAL at 20:24

## 2020-07-10 RX ADMIN — DIPHENHYDRAMINE HYDROCHLORIDE AND LIDOCAINE HYDROCHLORIDE AND ALUMINUM HYDROXIDE AND MAGNESIUM HYDRO 10 ML: KIT at 20:31

## 2020-07-10 RX ADMIN — Medication: at 14:22

## 2020-07-10 RX ADMIN — NICOTINE 1 PATCH: 21 PATCH, EXTENDED RELEASE TRANSDERMAL at 10:38

## 2020-07-10 RX ADMIN — HYDROXYZINE HYDROCHLORIDE 100 MG: 25 TABLET ORAL at 17:24

## 2020-07-10 RX ADMIN — HYDROXYZINE HYDROCHLORIDE 100 MG: 25 TABLET ORAL at 21:26

## 2020-07-10 RX ADMIN — FAMOTIDINE 20 MG: 20 TABLET ORAL at 20:24

## 2020-07-10 RX ADMIN — ONDANSETRON 4 MG: 4 TABLET, ORALLY DISINTEGRATING ORAL at 20:28

## 2020-07-10 RX ADMIN — HYDROXYZINE HYDROCHLORIDE 100 MG: 25 TABLET ORAL at 12:50

## 2020-07-10 RX ADMIN — GABAPENTIN 900 MG: 300 CAPSULE ORAL at 13:26

## 2020-07-10 RX ADMIN — Medication: at 17:25

## 2020-07-10 RX ADMIN — ACETAMINOPHEN 650 MG: 325 TABLET, FILM COATED ORAL at 08:35

## 2020-07-10 RX ADMIN — POTASSIUM CHLORIDE 10 MEQ: 750 CAPSULE, EXTENDED RELEASE ORAL at 13:25

## 2020-07-10 RX ADMIN — MIRTAZAPINE 7.5 MG: 7.5 TABLET, FILM COATED ORAL at 20:28

## 2020-07-10 ASSESSMENT — ACTIVITIES OF DAILY LIVING (ADL)
COGNITION: 1 - ATTENTION OR MEMORY DEFICITS
RETIRED_EATING: 0-->INDEPENDENT
TRANSFERRING: 2-->ASSISTIVE PERSON
LAUNDRY: UNABLE TO COMPLETE
BATHING: 1-->ASSISTIVE EQUIPMENT
DRESS: 0-->INDEPENDENT
COGNITION: 1 - ATTENTION OR MEMORY DEFICITS
DRESS: 1-->ASSISTIVE EQUIPMENT
SWALLOWING: 0-->SWALLOWS FOODS/LIQUIDS WITHOUT DIFFICULTY
AMBULATION: 1-->ASSISTIVE EQUIPMENT
SWALLOWING: 0-->SWALLOWS FOODS/LIQUIDS WITHOUT DIFFICULTY
ORAL_HYGIENE: INDEPENDENT
BATHING: 1-->ASSISTIVE EQUIPMENT
AMBULATION: 2-->ASSISTIVE PERSON
RETIRED_EATING: 0-->INDEPENDENT
TOILETING: 2-->ASSISTIVE PERSON
DRESS: SCRUBS (BEHAVIORAL HEALTH);INDEPENDENT
RETIRED_COMMUNICATION: 0-->UNDERSTANDS/COMMUNICATES WITHOUT DIFFICULTY
RETIRED_COMMUNICATION: 0-->UNDERSTANDS/COMMUNICATES WITHOUT DIFFICULTY
FALL_HISTORY_WITHIN_LAST_SIX_MONTHS: NO
FALL_HISTORY_WITHIN_LAST_SIX_MONTHS: NO
TOILETING: 2-->ASSISTIVE PERSON
HYGIENE/GROOMING: INDEPENDENT
TRANSFERRING: 1-->ASSISTIVE EQUIPMENT

## 2020-07-10 ASSESSMENT — MIFFLIN-ST. JEOR: SCORE: 1486.13

## 2020-07-10 NOTE — ED NOTES
Helped up to commode with SBA. Lea.  Requested tylenol for dental pain. Using oragel but pain is still 7/10
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
## 2020-07-10 NOTE — PROGRESS NOTES
07/10/20 0147   Patient Belongings   Did you bring any home meds/supplements to the hospital?  Yes   Disposition of meds  Sent to security/pharmacy per site process   Patient Belongings locker   Belongings Search Yes   Clothing Search Yes   Comment 1 orange hoodie, 1 pr mismatched socks, 1 pair black nike sneakers, 1 pr gray sweatpants, 1 red headband, 1 red cloth face mask, 1 gray tshirt, 1 pr pj pants, 1 backpack, 1 pill splitter, 1 phone card, 1 btle lotion, 1 tube ointment, 1 black lighter, 5 camel cigarettes, 1 tube orajel, 1 btle clove oil, 1 pr scratched sunglasses, 3 gauze pads, 1 partial pack baby wipes, 1 white phone  with 10 ft cord, 1 contigo mug, 5 adult briefs, 1 black [employer] phone with cracked screen, 1 tupperware no lid, 1 empty pill bottle.

## 2020-07-10 NOTE — ED NOTES
"Call placed to lab re: draw for K+. State the pt told them to \"come back in an hour\". Provider informed. Pt then informed lab would be drawing her now.  "

## 2020-07-10 NOTE — PLAN OF CARE
Called and spoke with Maia in the clinic to confirm some of pt's services as pt seemed confused on which providers she has and what they are for. Will schedule follow up as needed.
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
## 2020-07-10 NOTE — PLAN OF CARE
Problem: Adult Behavioral Health Plan of Care  Goal: Patient-Specific Goal (Individualization)  Description: Pt will attend 50 % of unit programming.   Pt will be compliant with medications  Pt will sleep 6-8 hours per night     Outcome: No Change  Note: Shift [place]y: Pt slept well in room.  Attendant at bedside for assistance with ADLS, transfer, safety and supervision .   Pt uses a wheelchair for mobility and has left sided weakness from a previous stroke in 2018.  Left arm is in a sling.  She wears depends due to incontinence.  She is alert and oriented X3.  States she has been getting more depressed lately  Due to being more isolated.  She wants to work but states no one will hire her due to her disability, she has started binge drinking.  She lives with a roommate.      0030-Pt had requested her night medications.  Writer pulled medications but on return to administer pt was sleeping and difficult to arouse.  Medications not given.      Pt slept approx. 6 hours.      Face to face report will be communicated to oncoming RN.    Jamee Maya RN  7/10/2020  6:16 AM    ADMISSION NOTE    Reason for admission SI Pt stated she wanted to cut her femoral arteries due to being stuck in a body that is not well.    Safety concerns Fall Risk  Related to left sided weakness in leg and left arm is flaccid, she has a sling for her left arm.    Risk for or history of violence No .   Full skin assessment: Completed with no open areas noted, she has a couple tattoos noted.     Patient arrived on unit from [facility] ER accompanied by Security on 7/09/2020  1577   Status on arrival: Alert calm and cooperative with staff.   /65   Pulse 77   Temp 99  F (37.2  C) (Tympanic)   Resp 18   Wt 80.7 kg (178 lb)   SpO2 97%   BMI 31.53 kg/m    Patient given a partial tour of unit and Welcome to  unit papers given to patient, wanding completed, belongings inventoried, and admission assessment started..   Patient's legal status on  arrival is Voluntary.  Appropriate legal rights discussed with and copy given to patient. Patient Bill of Rights discussed with and copy given to patient.   Patient endorses SI thoughts only.  Denies  [facility], and contracts for safety while on unit.      Jamee Maya RN  7/10/2020  1:08 AM

## 2020-07-10 NOTE — ED NOTES
Face to face report given with opportunity to observe patient.    Report given to RITESH Fulton RN   7/9/2020  7:15 PM

## 2020-07-10 NOTE — PLAN OF CARE
"  Problem: Adult Behavioral Health Plan of Care  Goal: Patient-Specific Goal (Individualization)  Description: Pt will attend 50 % of unit programming.   Pt will be compliant with medications  Pt will sleep 6-8 hours per night   Pt will eat at least 50% of meals.  Okay to have shoe laces on tennis shoes as long as 1:1 is ordered per NP.      7/10/2020 1539 by Bushra Forde, RN  Outcome: No Change    End of shift report received from previous shift RN. Pt observed, up in group. 1:1 present, within arms reach. Gait belt utilized. Pt requested and was given a different W/C. Arm sling being utilized. Pt c/o pain and requesting lozenge. Admin tylenol 650mg for 8/10 tooth pain and ming lozenge 2mg at 1534. Flat affect, irritable at times.    1608- Pt eating and drinking ok. Absence of nonverbal stimuli.  1655- Pt requests lemon lime soda stating she got it with prior meals this stay. Called kitchen, kitchen states they only send up soda if it is part of diet order. Informed pt, \"who do I have to talk to, I have pop everyday.\" Encouraged pt to speak with provider about this.   1725- Pt to nurse station requesting something for tooth pain. Benzocaine-menthol gel admin per request. Pt also requests ming gum at this time, encouraged pt to wait as she just got numbing gel, pt persistent. Ming gum admin. Rates anxiety as high - upset about not getting three doses of gabapentin today as her meds were started at 1pm. Pt educated on this, verbalized understanding. States atarax works for her. Hydroxyzine 100mg admin. Later reports effectiveness.   2028- HS medications given. Pt irritable at this time, short with writer. Redirectable. Relates to increased anxiety. Pt requests and was admin magic mouthwash, remeron 7.5mg for sleep, and zofran ODT 4mg for nausea d/t K+ admin. Pt requests something else for anxiety, encouraged coping skills. Pt states ativan worked for her in the past. Will continue to monitor.    Problem: Fall Injury " Risk  Goal: Absence of Fall and Fall-Related Injury  Description: Pt will remain free from falls.  Pt on 1:1 r/t fall risk.   Pt will use wheelchair and transfer belt during stay.   Pt will wear appropriate footwear.  7/10/2020 1539 by Bushra Forde RN  Outcome: Improving   1:1 within arms reach. Wheelchair being utilized. Nonskid footwear in place.     Face to face end of shift report communicated to oncoming RN.     Bushra Forde RN  7/10/2020  10:58 PM

## 2020-07-10 NOTE — ED NOTES
DATE:  7/9/2020   TIME OF RECEIPT FROM LAB:  2054  LAB TEST:  K+  LAB VALUE:  2.7  RESULTS GIVEN WITH READ-BACK TO (PROVIDER): Lucy Rebolledo    TIME LAB VALUE REPORTED TO PROVIDER:  2054

## 2020-07-10 NOTE — ED NOTES
Informed by lab that pt refused lab draw.   Spoke with pt, plan of care reviewed. Pt agrees. Agrees to lab draw.  Will call lab with update

## 2020-07-10 NOTE — H&P
"[facility]    History and Physical  Medical Services       Date of Admission:  7/9/2020  Date of Service (when I saw the patient): 07/10/20    Assessment & Plan     Principal Problem:    Suicidal ideation    Active Medical Problems:    Hypokalemia- pt has chronic history of low potassium. Potassium level in ED was 2.4 and was replaced and level came up to 3.2. Home dose of potassium ordered. Will check potassium level in morning.       History of CVA (cerebrovascular accident)- according to medical chart and pt she had a PE in 2015 and stopped taking anticoagulants and then had a right sided CVA in 2018. This left her with left sided hemiparesis and in a wheelchair. Pt wears a sling on her left arm to help hold it up because she experiences chronic left shoulder pain now since her stroke. Pt denies any new stroke symptoms, chest pain, sob. Ordered home dose of Xarelto. Pt is a 1:1 for pt safety due to use of wheelchair and left sided hemiparesis.        Chronic left shoulder pain- pt states she experiences chronic left shoulder pain since she had her stroke in 2018 due to it \"dangling\" and she is unable to lift her arm. Ordered home dose of gabapentin and pt states this helps control her pain.       Irritable bowel syndrome with diarrhea- pt reports she takes imodium 4mg once in the morning and this helps control her diarrhea all day. Pt denies taking any other medications for her IBS and states the imodium has controlled it.      covid screening- pending     Code Status: Full Code    Jessica Arenas CNP    Primary Care Physician   Chapo Flores    Chief Complaint   Psych evaluation     History is obtained from the patient and medical chart     History of Present Illness   (per ED) Maggie Case is a 30 year old female who presented to the emergency department via EMS for evaluation of suicidal ideation.  Seen yesterday in the emergency department for alcohol abuse.  Just discharged this morning.  Now " has thoughts of harming herself by slicing her femoral artery.  Long past medical history most significant for CVA in 2018.    Past Medical History    I have reviewed this patient's medical history and updated it with pertinent information if needed.   Past Medical History:   Diagnosis Date     Anemia      Anxiety      Chemical dependency (H)      Chronic diarrhea      Lactose intolerance      Myalgia      OCD (obsessive compulsive disorder)      Pulmonary embolism (H) 05/06/16     Stroke (H) 02/2018     Vitamin B12 deficiency        Past Surgical History   I have reviewed this patient's surgical history and updated it with pertinent information if needed.  Past Surgical History:   Procedure Laterality Date     CLOSED REDUCTION, PERCUTANEOUS PINNING LOWER EXTREMITY, COMBINED Right 4/6/2016    Procedure: COMBINED CLOSED REDUCTION, PERCUTANEOUS PINNING LOWER EXTREMITY;  Surgeon: Dexter Jones MD;  Location: [facility]     COLONOSCOPY       CRANIECTOMY Right 2/18/2018    Procedure: CRANIECTOMY;  RIGHT HEMICRANIECTOMY;  Surgeon: Emeterio Mesa MD;  Location: [facility]     CRANIOPLASTY Right 5/24/2018    Procedure: CRANIOPLASTY;  Right Cranioplasty ;  Surgeon: Emeterio Mesa MD;  Location: [facility] OR     UPPER GI ENDOSCOPY         Prior to Admission Medications   Prior to Admission Medications   Prescriptions Last Dose Informant Patient Reported? Taking?   CHANTIX CONTINUING MONTH ASHLEY 1 MG tablet Unknown at Unknown time  Yes No   Sig: Take 1 mg by mouth 2 times daily (Take with food and a glass of water.)   DULoxetine (CYMBALTA) 60 MG capsule   Yes No   Sig: Take 60 mg by mouth daily   XARELTO 20 MG TABS tablet 7/9/2020 at Unknown time  Yes Yes   Sig: Take 20 mg by mouth every morning with breakfast.   acetaminophen (TYLENOL) 500 MG tablet 7/10/2020 at Unknown time  Yes Yes   Sig: Take 500 mg by mouth 3 times daily (MAXIMUM OF 4000MG ACETAMINOPHEN FROM ALL SOURCES IN 24 HOURS)    baclofen (LIORESAL) 10 MG tablet   Yes  No   Sig: Take 10 mg by mouth 3 times daily Take 1/2 tablet (5mg) by mouth every eight hours as needed.   busPIRone (BUSPAR) 15 MG tablet   Yes No   Sig: Take 7.5 mg by mouth 2 times daily   ergocalciferol (ERGOCALCIFEROL) 1.25 MG (85942 UT) capsule 7/6/2020  Yes Yes   Sig: Take 1.25 mg by mouth once a week Pt states takes on Mondays   famotidine (PEPCID) 20 MG tablet 7/9/2020 at Unknown time  Yes Yes   Sig: Take 20 mg by mouth 2 times daily    ferrous sulfate (FEROSUL) 325 (65 Fe) MG tablet   Yes No   Sig: Take 325 mg by mouth daily   folic acid (FOLVITE) 1 MG tablet 7/9/2020 at Unknown time  Yes Yes   Sig: Take 1,000 mcg by mouth daily   gabapentin (NEURONTIN) 300 MG capsule 7/9/2020 at Unknown time  Yes Yes   Sig: Take 900 mg by mouth 3 times daily   hydrocortisone 2.5 % cream   Yes No   Sig: Apply topically 2 times daily   lamoTRIgine (LAMICTAL) 25 MG tablet Past Week at Unknown time  No Yes   Sig: Take 1 tablet (25 mg) by mouth daily   lithium ER (LITHOBID) 300 MG CR tablet   Yes No   Sig: Take 300 mg by mouth 2 times daily Take two tablets by mouth once daily at bedtime. May take twice daily but only one tablet each time.   loperamide (IMODIUM A-D) 2 MG tablet 7/10/2020 at Unknown time  Yes Yes   Sig: Take 2 mg by mouth 4 times daily as needed for diarrhea Pt states takes two tablets at the same time once daily.   medroxyPROGESTERone (DEPO-PROVERA) 150 MG/ML IM injection Unknown at Unknown time  Yes No   Sig: Inject 150 mg into the muscle every 3 months   melatonin 5 MG tablet Unknown at Unknown time  No No   Sig: Take 1 tablet (5 mg) by mouth nightly as needed for sleep   mirtazapine (REMERON) 15 MG tablet Past Week at Unknown time  Yes Yes   Sig: Take 7.5 mg by mouth At Bedtime   nicotine (NICODERM CQ) 7 MG/24HR 24 hr patch Unknown at Unknown time  Yes No   Sig: Apply 1 patch topically daily    nystatin (MYCOSTATIN) 087940 UNIT/ML suspension   Yes No   Sig: Take 500,000 Units by mouth 4 times daily    ondansetron (ZOFRAN-ODT) 8 MG ODT tab Unknown at Unknown time  Yes No   Sig: Take 8 mg by mouth every 8 hours as needed for nausea    potassium chloride ER (KLOR-CON M) 10 MEQ CR tablet 7/9/2020 at Unknown time  Yes Yes   Sig: Take 10 mEq by mouth 2 times daily   traZODone (DESYREL) 100 MG tablet   Yes No   Sig: Take 100 mg by mouth At Bedtime      Facility-Administered Medications: None     Allergies   Allergies   Allergen Reactions     Amoxicillin Other (See Comments)     Headaches     Levaquin [Levofloxacin] Swelling     Lithium      Diabetes insipidus      Naproxen Other (See Comments)     Reaction: Headaches     Nickel      Tramadol      Sulindac Rash       Social History   I have reviewed this patient's social history and updated it with pertinent information if needed. Maggie Case  reports that she has been smoking cigarettes. She has a 7.00 pack-year smoking history. She has never used smokeless tobacco. She reports current alcohol use. She reports current drug use. Frequency: 7.00 times per week. Drugs: Marijuana and Methamphetamines.    Family History   I have reviewed this patient's family history and updated it with pertinent information if needed.   Family History   Problem Relation Age of Onset     Depression Mother      Migraines Maternal Half-Sister        Review of Systems   CONSTITUTIONAL:  negative  EYES:  negative  HEENT:  negative  RESPIRATORY:  negative  CARDIOVASCULAR:  negative  GASTROINTESTINAL:  negative except for diarrhea  GENITOURINARY:  negative  INTEGUMENT/BREAST:  negative  HEMATOLOGIC/LYMPHATIC:  negative  ALLERGIC/IMMUNOLOGIC:  negative  ENDOCRINE:  negative  MUSCULOSKELETAL:  negative except for  pain, decreased range of motion and muscle weakness  NEUROLOGICAL:  negative except for coordination problems, gait problems, weakness and pain    Physical Exam   Temp: 98.1  F (36.7  C) Temp src: Tympanic BP: 96/60 Pulse: 68   Resp: 14 SpO2: 97 % O2 Device: None (Room air)    Vital  "Signs with Ranges  Temp:  [98.1  F (36.7  C)-99.4  F (37.4  C)] 98.1  F (36.7  C)  Pulse:  [] 68  Resp:  [14-20] 14  BP: ()/(60-76) 96/60  SpO2:  [97 %-98 %] 97 %  177 lbs 14.58 oz    Constitutional: awake, alert, cooperative, no apparent distress, and appears stated age  Eyes: Lids and lashes normal, pupils equal, round and reactive to light, extra ocular muscles intact, sclera clear, conjunctiva normal  ENT: Normocephalic, without obvious abnormality, atraumatic, sinuses nontender on palpation, external ears without lesions, oral pharynx with moist mucous membranes  Hematologic / Lymphatic: no cervical lymphadenopathy  Respiratory: No increased work of breathing, good air exchange, clear to auscultation bilaterally, no crackles or wheezing  Cardiovascular: Normal apical impulse, regular rate and rhythm, normal S1 and S2, no S3 or S4, and no murmur noted  GI: No scars, normal bowel sounds, soft, non-distended, non-tender, no masses palpated, no hepatosplenomegally  Genitounirinary: deferred   Skin: normal skin color, texture, turgor, no redness, warmth, or swelling, no rashes and no lesions  Musculoskeletal: no lower extremity pitting edema present  there is no redness, warmth, or swelling of the joints  tone is normal with exception of left side. Noticeable left shoulder \"drop\". No left side strength.    Neurologic: Awake, alert, oriented to name, place and time. Left sided hemiparesis, wheelchair   Neuropsychiatric: General: normal, calm and normal eye contact    Data   Data reviewed today:   Recent Labs   Lab 07/09/20 2254 07/09/20  2013 07/09/20  1613   WBC  --   --  11.2*   HGB  --   --  13.2   MCV  --   --  95   PLT  --   --  417   NA  --   --  140   POTASSIUM 3.2* 2.7* 2.4*   CHLORIDE  --   --  111*   CO2  --   --  21   BUN  --   --  5*   CR  --   --  0.40*   ANIONGAP  --   --  8   AVINASH  --   --  8.8   GLC  --   --  115*   ALBUMIN  --   --  3.0*   PROTTOTAL  --   --  6.2*   BILITOTAL  --   --  " 0.3   ALKPHOS  --   --  73   ALT  --   --  35   AST  --   --  85*       No results found for this or any previous visit (from the past 24 hour(s)).

## 2020-07-10 NOTE — ED PROVIDER NOTES
History     Chief Complaint   Patient presents with     Suicidal     wants to cut her legs where arteries are.     The history is provided by the patient.     Maggie Case is a 30 year old female who presented to the emergency department via EMS for evaluation of suicidal ideation.  Seen yesterday in the emergency department for alcohol abuse.  Just discharged this morning.  Now has thoughts of harming herself by slicing her femoral artery.  Long past medical history most significant for CVA in 2018.    Allergies:  Allergies   Allergen Reactions     Amoxicillin Other (See Comments)     Headaches     Levaquin [Levofloxacin] Swelling     Lithium      Diabetes insipidus      Naproxen Other (See Comments)     Reaction: Headaches     Nickel      Tramadol      Sulindac Rash       Problem List:    Patient Active Problem List    Diagnosis Date Noted     Anxiety and depression 06/15/2020     Priority: Medium     Acute urinary tract infection 05/23/2020     Priority: Medium     Acute pyelonephritis 05/22/2020     Priority: Medium     Depression with suicidal ideation 09/19/2018     Priority: Medium     Clostridium difficile colitis 08/15/2018     Priority: Medium     Chemical dependency (H) 07/16/2018     Priority: Medium     Calculus of lower urinary tract 07/15/2018     Priority: Medium     History of CVA (cerebrovascular accident) 07/15/2018     Priority: Medium     Left hemiparesis (H) 07/15/2018     Priority: Medium     Chronic anticoagulation 07/15/2018     Priority: Medium     History of cranioplasty 05/24/2018     Priority: Medium     Acute ischemic stroke (H) 02/17/2018     Priority: Medium     Influenza B 05/01/2017     Priority: Medium     Opacity of lung on imaging study 05/01/2017     Priority: Medium     Community acquired pneumonia 05/01/2017     Priority: Medium     C. difficile colitis 05/01/2017     Priority: Medium     Sepsis (H) 04/28/2017     Priority: Medium     Pyelonephritis 01/05/2017      Priority: Medium     Acute chest pain 05/06/2016     Priority: Medium     Leukocytosis 05/06/2016     Priority: Medium     Lung mass 05/06/2016     Priority: Medium     SOB (shortness of breath) 05/06/2016     Priority: Medium     Acute upper GI bleed 06/18/2013     Priority: Medium     Hypokalemia 06/18/2013     Priority: Medium     Acute cystitis 06/18/2013     Priority: Medium     Anxiety state 03/25/2013     Priority: Medium     Muscle pain 07/30/2009     Priority: Medium     Overview:   IMO Update 10/11       Cervicalgia 06/16/2009     Priority: Medium     Overview:   IMO Update 10/11       Low back pain 06/16/2009     Priority: Medium     Overview:   IMO Update 10/11       Pain in joint, lower leg 05/01/2009     Priority: Medium     Diarrhea 04/17/2008     Priority: Medium     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     Priority: Medium        Past Medical History:    Past Medical History:   Diagnosis Date     Anemia      Anxiety      Chemical dependency (H)      Chronic diarrhea      Lactose intolerance      Myalgia      OCD (obsessive compulsive disorder)      Pulmonary embolism (H) 05/06/16     Stroke (H) 02/2018     Vitamin B12 deficiency        Past Surgical History:    Past Surgical History:   Procedure Laterality Date     CLOSED REDUCTION, PERCUTANEOUS PINNING LOWER EXTREMITY, COMBINED Right 4/6/2016    Procedure: COMBINED CLOSED REDUCTION, PERCUTANEOUS PINNING LOWER EXTREMITY;  Surgeon: Dexter Jones MD;  Location: [facility]     COLONOSCOPY       CRANIECTOMY Right 2/18/2018    Procedure: CRANIECTOMY;  RIGHT HEMICRANIECTOMY;  Surgeon: Emeterio Mesa MD;  Location: [facility] OR     CRANIOPLASTY Right 5/24/2018    Procedure: CRANIOPLASTY;  Right Cranioplasty ;  Surgeon: Emeterio Mesa MD;  Location: [facility]     UPPER GI ENDOSCOPY         Family History:    Family History   Problem Relation Age of Onset     Depression Mother      Migraines Maternal Half-Sister         Social History:  Marital Status:  Single [1]  Social History     Tobacco Use     Smoking status: Current Every Day Smoker     Packs/day: 1.00     Years: 7.00     Pack years: 7.00     Types: Cigarettes     Last attempt to quit: 2018     Years since quittin.9     Smokeless tobacco: Never Used   Substance Use Topics     Alcohol use: Yes     Alcohol/week: 0.0 standard drinks     Comment: currently intoxicated     Drug use: Yes     Frequency: 7.0 times per week     Types: Marijuana, Methamphetamines     Comment: hx of marijuana and meth use        Medications:    famotidine (PEPCID) 40 MG tablet  folic acid (FOLVITE) 1 MG tablet  gabapentin (NEURONTIN) 300 MG capsule  mirtazapine (REMERON) 15 MG tablet  XARELTO 20 MG TABS tablet  acetaminophen (TYLENOL) 500 MG tablet  CHANTIX CONTINUING MONTH ASHLEY 1 MG tablet  lamoTRIgine (LAMICTAL) 25 MG tablet  loperamide (IMODIUM A-D) 2 MG tablet  medroxyPROGESTERone (DEPO-PROVERA) 150 MG/ML IM injection  melatonin 5 MG tablet  nicotine (NICODERM CQ) 7 MG/24HR 24 hr patch  ondansetron (ZOFRAN-ODT) 8 MG ODT tab  potassium chloride ER (KLOR-CON M) 10 MEQ CR tablet          Review of Systems   Constitutional: Negative for fever.   Respiratory: Negative for shortness of breath.    Psychiatric/Behavioral: Positive for sleep disturbance and suicidal ideas.       Physical Exam   BP: 112/76  Pulse: 108  Temp: 99.4  F (37.4  C)  Resp: 20  Weight: 80.7 kg (178 lb)  SpO2: 98 %      Physical Exam  Vitals signs and nursing note reviewed.   Constitutional:       Appearance: Normal appearance. She is normal weight.   Cardiovascular:      Rate and Rhythm: Regular rhythm.      Comments: Mild tachycardia  Skin:     General: Skin is warm and dry.      Capillary Refill: Capillary refill takes less than 2 seconds.   Neurological:      General: No focal deficit present.      Mental Status: She is alert and oriented to person, place, and time.   Psychiatric:         Mood and Affect: Mood  normal.         ED Course        Procedures               Critical Care time:  none               Results for orders placed or performed during the hospital encounter of 07/09/20 (from the past 24 hour(s))   CBC with platelets differential   Result Value Ref Range    WBC 11.2 (H) 4.0 - 11.0 10e9/L    RBC Count 4.11 3.8 - 5.2 10e12/L    Hemoglobin 13.2 11.7 - 15.7 g/dL    Hematocrit 38.9 35.0 - 47.0 %    MCV 95 78 - 100 fl    MCH 32.1 26.5 - 33.0 pg    MCHC 33.9 31.5 - 36.5 g/dL    RDW 13.8 10.0 - 15.0 %    Platelet Count 417 150 - 450 10e9/L    Diff Method Automated Method     % Neutrophils 68.5 %    % Lymphocytes 15.7 %    % Monocytes 9.7 %    % Eosinophils 5.3 %    % Basophils 0.4 %    % Immature Granulocytes 0.4 %    Nucleated RBCs 0 0 /100    Absolute Neutrophil 7.7 1.6 - 8.3 10e9/L    Absolute Lymphocytes 1.8 0.8 - 5.3 10e9/L    Absolute Monocytes 1.1 0.0 - 1.3 10e9/L    Absolute Eosinophils 0.6 0.0 - 0.7 10e9/L    Absolute Basophils 0.1 0.0 - 0.2 10e9/L    Abs Immature Granulocytes 0.0 0 - 0.4 10e9/L    Absolute Nucleated RBC 0.0    Comprehensive metabolic panel   Result Value Ref Range    Sodium 140 133 - 144 mmol/L    Potassium 2.4 (LL) 3.4 - 5.3 mmol/L    Chloride 111 (H) 94 - 109 mmol/L    Carbon Dioxide 21 20 - 32 mmol/L    Anion Gap 8 3 - 14 mmol/L    Glucose 115 (H) 70 - 99 mg/dL    Urea Nitrogen 5 (L) 7 - 30 mg/dL    Creatinine 0.40 (L) 0.52 - 1.04 mg/dL    GFR Estimate >90 >60 mL/min/[1.73_m2]    GFR Estimate If Black >90 >60 mL/min/[1.73_m2]    Calcium 8.8 8.5 - 10.1 mg/dL    Bilirubin Total 0.3 0.2 - 1.3 mg/dL    Albumin 3.0 (L) 3.4 - 5.0 g/dL    Protein Total 6.2 (L) 6.8 - 8.8 g/dL    Alkaline Phosphatase 73 40 - 150 U/L    ALT 35 0 - 50 U/L    AST 85 (H) 0 - 45 U/L   Alcohol ethyl   Result Value Ref Range    Ethanol g/dL <0.01 0.01 g/dL   Magnesium   Result Value Ref Range    Magnesium 1.7 1.6 - 2.3 mg/dL   Urine Drugs of Abuse Screen Panel 13   Result Value Ref Range    Cannabinoids  (28-[id number]-0-[id number]-9-THC) Detected, Abnormal Result (A) NDET^Not Detected ng/mL    Phencyclidine (Phencyclidine) Not Detected NDET^Not Detected ng/mL    Cocaine (Benzoylecgonine) Not Detected NDET^Not Detected ng/mL    Methamphetamine (d-Methamphetamine) Not Detected NDET^Not Detected ng/mL    Opiates (Morphine) Not Detected NDET^Not Detected ng/mL    Amphetamine (d-Amphetamine) Not Detected NDET^Not Detected ng/mL    Benzodiazepines (Nordiazepam) Detected, Abnormal Result (A) NDET^Not Detected ng/mL    Tricyclic Antidepressants (Desipramine) Not Detected NDET^Not Detected ng/mL    Methadone (Methadone) Not Detected NDET^Not Detected ng/mL    Barbiturates (Butalbital) Not Detected NDET^Not Detected ng/mL    Oxycodone (Oxycodone) Not Detected NDET^Not Detected ng/mL    Propoxyphene (Norpropoxyphene) Not Detected NDET^Not Detected ng/mL    Buprenorphine (Buprenorphine) Not Detected NDET^Not Detected ng/mL   UA reflex to Microscopic   Result Value Ref Range    Color Urine Yellow     Appearance Urine Slightly Cloudy     Glucose Urine Negative NEG^Negative mg/dL    Bilirubin Urine Negative NEG^Negative    Ketones Urine 5 (A) NEG^Negative mg/dL    Specific Gravity Urine 1.024 1.003 - 1.035    Blood Urine Small (A) NEG^Negative    pH Urine 6.5 4.7 - 8.0 pH    Protein Albumin Urine 30 (A) NEG^Negative mg/dL    Urobilinogen mg/dL Normal 0.0 - 2.0 mg/dL    Nitrite Urine Negative NEG^Negative    Leukocyte Esterase Urine Moderate (A) NEG^Negative    Source Midstream Urine     RBC Urine 11 (H) 0 - 2 /HPF    WBC Urine 18 (H) 0 - 5 /HPF    Bacteria Urine None (A) NEG^Negative /HPF    Squamous Epithelial /HPF Urine 10 (H) 0 - 1 /HPF    Mucous Urine Present (A) NEG^Negative /LPF   Potassium   Result Value Ref Range    Potassium 2.7 (LL) 3.4 - 5.3 mmol/L       Medications   potassium chloride 10 mEq in 100 mL intermittent infusion with 10 mg lidocaine (10 mEq Intravenous Not Given 7/9/20 1801)   potassium chloride ER (KLOR-CON M) CR  tablet 20 mEq (has no administration in time range)   potassium chloride ER (KLOR-CON M) CR tablet 40 mEq (40 mEq Oral Given 7/9/20 8174)       Assessments & Plan (with Medical Decision Making)   DEC assessment was appreciated.  They recommend admission.  Discussed with on-call behavioral health provider.  She agrees to accept under Dr. Rinaldi.  Hypokalemia was treated in the emergency department with oral potassium chloride.    This document was prepared using a combination of typing and voice generated software.  While every attempt was made for accuracy, spelling and grammatical errors may exist.    I have reviewed the nursing notes.    I have reviewed the findings, diagnosis, plan and need for follow up with the patient.       New Prescriptions    No medications on file       Final diagnoses:   Suicidal ideation   Hypokalemia       7/9/2020   [facility]     Lucy Rebolledo PA-C  07/09/20 [id number]

## 2020-07-10 NOTE — PLAN OF CARE
"Problem: Adult Behavioral Health Plan of Care  Goal: Patient-Specific Goal (Individualization)  Description: Pt will attend 50 % of unit programming.   Pt will be compliant with medications  Pt will sleep 6-8 hours per night   Pt will eat at least 50% of meals.  Okay to have shoe laces on tennis shoes as long as 1:1 is ordered per NP.    Pt in room at start of shift. According to charting, pt slept approx. 6 hours last night. Pt did have bowel incontinence this am. Bedding change required. Pt up in wheelchair with assistance by 1:1. Pt does have left arm sling on and tennis shoes provided for support. Compliant with assessments. Pt states that yesterday she had suicidal thoughts until noon when she called \"[facility]\" who came and assisted her. Wearing mask. Will continue to monitor.    0835 C/o 8/10 tooth pain. Prn tylenol administered per pt request.  0838 States anxiety is \"not good\". Prn atarax 100mg administered at this time. Tears present in pt's eyes.   0853 Prn imodium administered per pt request. States she may have another \"accident\" if doesn't take this. Pt states she does take imodium regularly at home.   1250 Prn atarax 100mg administered per pt request for anxiety.  1330 Pt requesting magic mouthwash. Sent request to pharmacy to be sent up and will administer when available.  1336 Pt requesting to be discharged. 12hr intent to leave explained and pt signed at this time.  1408 C/o nausea after medication administration. Prn zofran administered per pt request. Saltines offered, pt declined.  1409 Prn magic mouthwash administered at this time.   1422 Prn orajel administered for tooth pain.   1425 NP in speaking with pt.  1450 Pt rescinded 12hr intent.    Outcome: No Change    Problem: Fall Injury Risk  Goal: Absence of Fall and Fall-Related Injury  Description: Pt will remain free from falls.  Pt on 1:1 r/t fall risk.   Pt will use wheelchair and transfer belt during stay.   Pt will wear appropriate " footwear.    1:1 remains at this time due to high fall risk. Appropriate footwear. Transfer belt and wheelchair being utilized.  Outcome: Improving    Face to face end of shift report communicated to oncoming RN.     7/10/2020  1:41 PM

## 2020-07-11 LAB — POTASSIUM SERPL-SCNC: 3.1 MMOL/L (ref 3.4–5.3)

## 2020-07-11 PROCEDURE — 25000128 H RX IP 250 OP 636: Performed by: NURSE PRACTITIONER

## 2020-07-11 PROCEDURE — 84132 ASSAY OF SERUM POTASSIUM: CPT | Performed by: NURSE PRACTITIONER

## 2020-07-11 PROCEDURE — 36415 COLL VENOUS BLD VENIPUNCTURE: CPT | Performed by: NURSE PRACTITIONER

## 2020-07-11 PROCEDURE — 12400000 ZZH R&B MH

## 2020-07-11 PROCEDURE — 25000132 ZZH RX MED GY IP 250 OP 250 PS 637: Performed by: NURSE PRACTITIONER

## 2020-07-11 PROCEDURE — 99233 SBSQ HOSP IP/OBS HIGH 50: CPT | Performed by: NURSE PRACTITIONER

## 2020-07-11 RX ORDER — LORAZEPAM 0.5 MG/1
0.5 TABLET ORAL 2 TIMES DAILY PRN
Status: DISCONTINUED | OUTPATIENT
Start: 2020-07-11 | End: 2020-07-12

## 2020-07-11 RX ORDER — ESCITALOPRAM OXALATE 5 MG/1
5 TABLET ORAL DAILY
Status: COMPLETED | OUTPATIENT
Start: 2020-07-11 | End: 2020-07-11

## 2020-07-11 RX ORDER — ESCITALOPRAM OXALATE 10 MG/1
10 TABLET ORAL DAILY
Status: DISCONTINUED | OUTPATIENT
Start: 2020-07-12 | End: 2020-07-22 | Stop reason: HOSPADM

## 2020-07-11 RX ADMIN — MIRTAZAPINE 7.5 MG: 7.5 TABLET, FILM COATED ORAL at 20:17

## 2020-07-11 RX ADMIN — ACETAMINOPHEN 650 MG: 325 TABLET, FILM COATED ORAL at 19:12

## 2020-07-11 RX ADMIN — HYDROXYZINE HYDROCHLORIDE 100 MG: 25 TABLET ORAL at 16:34

## 2020-07-11 RX ADMIN — NICOTINE 1 PATCH: 21 PATCH, EXTENDED RELEASE TRANSDERMAL at 08:23

## 2020-07-11 RX ADMIN — LOPERAMIDE HYDROCHLORIDE 4 MG: 2 CAPSULE ORAL at 08:22

## 2020-07-11 RX ADMIN — FAMOTIDINE 20 MG: 20 TABLET ORAL at 20:17

## 2020-07-11 RX ADMIN — DIPHENHYDRAMINE HYDROCHLORIDE AND LIDOCAINE HYDROCHLORIDE AND ALUMINUM HYDROXIDE AND MAGNESIUM HYDRO 10 ML: KIT at 08:00

## 2020-07-11 RX ADMIN — LORAZEPAM 0.5 MG: 0.5 TABLET ORAL at 18:43

## 2020-07-11 RX ADMIN — LOPERAMIDE HYDROCHLORIDE 2 MG: 2 CAPSULE ORAL at 06:49

## 2020-07-11 RX ADMIN — POTASSIUM CHLORIDE 10 MEQ: 750 CAPSULE, EXTENDED RELEASE ORAL at 08:22

## 2020-07-11 RX ADMIN — ESCITALOPRAM OXALATE 5 MG: 5 TABLET, FILM COATED ORAL at 14:00

## 2020-07-11 RX ADMIN — GABAPENTIN 900 MG: 300 CAPSULE ORAL at 20:17

## 2020-07-11 RX ADMIN — ONDANSETRON 4 MG: 4 TABLET, ORALLY DISINTEGRATING ORAL at 20:17

## 2020-07-11 RX ADMIN — NICOTINE POLACRILEX 4 MG: 2 GUM, CHEWING ORAL at 22:29

## 2020-07-11 RX ADMIN — GABAPENTIN 900 MG: 300 CAPSULE ORAL at 13:04

## 2020-07-11 RX ADMIN — HYDROXYZINE HYDROCHLORIDE 100 MG: 25 TABLET ORAL at 06:46

## 2020-07-11 RX ADMIN — DIPHENHYDRAMINE HYDROCHLORIDE AND LIDOCAINE HYDROCHLORIDE AND ALUMINUM HYDROXIDE AND MAGNESIUM HYDRO 10 ML: KIT at 21:33

## 2020-07-11 RX ADMIN — LORAZEPAM 0.5 MG: 0.5 TABLET ORAL at 10:23

## 2020-07-11 RX ADMIN — LAMOTRIGINE 25 MG: 25 TABLET ORAL at 08:23

## 2020-07-11 RX ADMIN — ONDANSETRON 4 MG: 4 TABLET, ORALLY DISINTEGRATING ORAL at 08:29

## 2020-07-11 RX ADMIN — DIPHENHYDRAMINE HYDROCHLORIDE AND LIDOCAINE HYDROCHLORIDE AND ALUMINUM HYDROXIDE AND MAGNESIUM HYDRO 10 ML: KIT at 16:34

## 2020-07-11 RX ADMIN — POTASSIUM CHLORIDE 10 MEQ: 750 CAPSULE, EXTENDED RELEASE ORAL at 20:17

## 2020-07-11 RX ADMIN — NICOTINE POLACRILEX 4 MG: 2 GUM, CHEWING ORAL at 17:01

## 2020-07-11 RX ADMIN — GABAPENTIN 900 MG: 300 CAPSULE ORAL at 08:22

## 2020-07-11 RX ADMIN — ACETAMINOPHEN 650 MG: 325 TABLET, FILM COATED ORAL at 08:22

## 2020-07-11 RX ADMIN — FAMOTIDINE 20 MG: 20 TABLET ORAL at 08:22

## 2020-07-11 RX ADMIN — RIVAROXABAN 20 MG: 20 TABLET, FILM COATED ORAL at 08:23

## 2020-07-11 RX ADMIN — HYDROXYZINE HYDROCHLORIDE 100 MG: 25 TABLET ORAL at 21:30

## 2020-07-11 ASSESSMENT — ACTIVITIES OF DAILY LIVING (ADL)
ORAL_HYGIENE: INDEPENDENT
DRESS: SCRUBS (BEHAVIORAL HEALTH)
HYGIENE/GROOMING: INDEPENDENT

## 2020-07-11 NOTE — PLAN OF CARE
Observed pt lying in a supine position - eyes closed - non-labored breathing - has one to one staff at bedside -   Wheel chair at bedside - pt requires assist with most adls r/t left sided weakness.   Has labs ordered for this am  Will tag sticky notes with soda request as it does help with her  GI issues.  Has slept all noc without issue.

## 2020-07-11 NOTE — PLAN OF CARE
"Problem: Adult Behavioral Health Plan of Care  Goal: Patient-Specific Goal (Individualization)  Description: Pt will attend 50 % of unit programming.   Pt will be compliant with medications  Pt will sleep 6-8 hours per night   Pt will eat at least 50% of meals.  Okay to have shoe laces on tennis shoes as long as 1:1 is ordered per NP.  Pt may use commode.    Pt up in lounge at start of shift watching television. According to charting, pt slept approx. 8 hours last night. Pt states she woke up early and was unable to go back to sleep r/t lab draw and vitals. Pt denies anxiety as she took prn at end of night shift. Pt denies SI. After breakfast, pt out in lounge coloring pictures for daughter. Attending groups this shift.  Will continue to monitor.    0800 Prn magic mouthwash administered per pt request for tooth pain  0822 Prn tylenol administered per pt request for 5/10 tooth pain and HA.  0829 Prn zofran administered per pt request for nausea. Pt states it is r/t scheduled potassium. Pt only ate 25% of breakfast. Lemon lime soda and ginger ale provided for nausea.  1023 Pt anxious and talking about how she can't color all day. Pt talking about wanting to leave. Pt endorsing 8/10 anxiety. States \"anxiety is the worst\". Prn ativan administered at this time. Pt returned to coloring.    Outcome: Improving    Problem: Fall Injury Risk  Goal: Absence of Fall and Fall-Related Injury  Description: Pt will remain free from falls.  Pt on 1:1 r/t fall risk.   Pt will use wheelchair and transfer belt during stay.   Pt will wear appropriate footwear.  Okay to have shoe laces on tennis shoes as long as 1:1 is ordered per NP.    1:1 remains due to high fall risk. Appropriate footwear on. Using wheelchair. No falls this shift.    Outcome: Improving    Face to face end of shift report communicated to oncoming RN.     7/11/2020       "

## 2020-07-11 NOTE — PROGRESS NOTES
"[facility]  Psychiatric Progress Note      Impression:   This is a 30 year old yo female with a history of depression, anxiety, substance use, and CVA resulting in left sided hemiparesis.    Maggie is up in the group working working on a craft project when I see her today. When asked how she is doing she replies \"I'm still alive, aren't I?\" She then states \"I haven't felt suicidal so far today\". She does wish to start on a new antidepressant today. States that she stopped taking the Cymbalta because she felt it made her sick. She is unable to recall any other antidepressants she has taken. Does not ever remember taking Lexapro, discussed we would start at low dose and could increased dose as tolerated. She does agree to this. Notes indicate that she slept well last night. 1:1 observation due to high fall risk. Has been attending groups. K+ 3.1 today, does receive scheduled oral doses of potassium, will recheck level again tomorrow.      Educated regarding medication indications, risks, benefits, side effects, contraindications and possible interactions. Verbally expressed understanding.        Diagnoses:   Major depressive disorder, recurrent, moderate  Unspecified anxiety disorder, R/o DIANNA vs PTSD  History of CVA in 2018  TBI      Attestation:  Patient has been seen and evaluated by me,  Mayra Bhat NP          Interim History:   The patient's care was discussed with the treatment team and chart notes were reviewed.    BEHAVIORAL TEAM DISCUSSION    Progress: Fair. Denies active suicidal thoughts today.     Continued Stay Criteria/Rationale: depressed mood, recent suicidal ideation with plan, start new medication    Medical/Physical: left-sided weakness from CVA, chronic hypokalemia.   Precautions:   Falls precaution?: YES- currently on 1:1 observation due to fall risk  Behavioral Orders   Procedures     Code 1 - Restrict to Unit     Routine Programming     As clinically indicated     Status 15     " Every 15 minutes.     Plan:   Start Lexapro 5 mg daily, increase to 10 mg daily tomorrow  Continue Lamictal 25 mg daily- had been off of this for several days prior to admission  Recheck K+ tomorrow    Rationale for change in precautions or plan: continued depressed mood      Participants: Mayra Bhat, CNP, nursing          Medications:     Current Facility-Administered Medications Ordered in Epic   Medication Dose Route Frequency Last Rate Last Dose     acetaminophen (TYLENOL) tablet 650 mg  650 mg Oral Q4H PRN   650 mg at 07/11/20 0822     alum & mag hydroxide-simethicone (MAALOX  ES) suspension 30 mL  30 mL Oral Q4H PRN         Benzocaine-Menthol 20-0.26 % GEL   Mouth/Throat 4x Daily PRN         bisacodyl (DULCOLAX) Suppository 10 mg  10 mg Rectal Daily PRN         [START ON 7/13/2020] ergocalciferol capsule 50,000 Units  50,000 Units Oral Weekly         [START ON 7/12/2020] escitalopram (LEXAPRO) tablet 10 mg  10 mg Oral Daily         escitalopram (LEXAPRO) tablet 5 mg  5 mg Oral Daily         famotidine (PEPCID) tablet 20 mg  20 mg Oral BID   20 mg at 07/11/20 0822     gabapentin (NEURONTIN) capsule 900 mg  900 mg Oral TID   900 mg at 07/11/20 1304     hydrOXYzine (ATARAX) tablet  mg   mg Oral Q4H PRN   100 mg at 07/11/20 0646     lamoTRIgine (LaMICtal) tablet 25 mg  25 mg Oral Daily   25 mg at 07/11/20 0823     loperamide (IMODIUM) capsule 2 mg  2 mg Oral 4x Daily PRN   2 mg at 07/11/20 0649     loperamide (IMODIUM) capsule 4 mg  4 mg Oral Daily   4 mg at 07/11/20 0822     LORazepam (ATIVAN) tablet 0.5 mg  0.5 mg Oral BID PRN   0.5 mg at 07/11/20 1023     magic mouthwash suspension (diphenhydramine, lidocaine, aluminum-magnesium & simethicone)  10 mL Swish & Swallow Q6H PRN   10 mL at 07/11/20 0800     magnesium hydroxide (MILK OF MAGNESIA) suspension 30 mL  30 mL Oral At Bedtime PRN         melatonin 3 mg (with vit B6 10 mg) extended release tablet 2 tablet  2 tablet Oral At Bedtime PRN      "    mirtazapine (REMERON) tablet TABS 7.5-15 mg  7.5-15 mg Oral At Bedtime PRN   7.5 mg at 07/10/20 2028     nicotine (COMMIT) lozenge 2 mg  2 mg Buccal Q1H PRN   2 mg at 07/10/20 1534     nicotine (NICODERM CQ) 21 MG/24HR 24 hr patch 1 patch  1 patch Transdermal Daily   1 patch at 07/11/20 0823     nicotine (NICORETTE) gum 2-4 mg  2-4 mg Buccal Q1H PRN   4 mg at 07/10/20 1725     nicotine Patch in Place   Transdermal Q8H   Stopped at 07/11/20 0646     ondansetron (ZOFRAN-ODT) ODT tab 4 mg  4 mg Oral Q6H PRN   4 mg at 07/11/20 0829     potassium chloride 10 mEq in 100 mL intermittent infusion with 10 mg lidocaine  10 mEq Intravenous Q1H PRN         potassium chloride ER (MICRO-K) CR capsule 10 mEq  10 mEq Oral BID   10 mEq at 07/11/20 0822     rivaroxaban ANTICOAGULANT (XARELTO) tablet 20 mg  20 mg Oral QAM   20 mg at 07/11/20 0823     No current [facility]-ordered outpatient medications on file.          10 point ROS- chronic pain, intermittent nausea       Allergies:     Allergies   Allergen Reactions     Amoxicillin Other (See Comments)     Headaches     Levaquin [Levofloxacin] Swelling     Lithium      Diabetes insipidus      Naproxen Other (See Comments)     Reaction: Headaches     Nickel      Tramadol      Sulindac Rash            Psychiatric Examination:   BP 97/52   Pulse 76   Temp 97.7  F (36.5  C) (Tympanic)   Resp 14   Ht 1.6 m (5' 3\")   Wt 80.7 kg (177 lb 14.6 oz)   SpO2 98%   BMI 31.52 kg/m    Weight is 177 lbs 14.58 oz  Body mass index is 31.52 kg/m .    Appearance:  awake, alert, adequately groomed, dressed in hospital scrubs and appeared as age stated  Attitude:  cooperative  Eye Contact:  fair  Mood:  depressed  Affect:  mood congruent  Speech:  clear, coherent  Psychomotor Behavior:  no evidence of tardive dyskinesia, dystonia, or tics  Thought Process:  linear and goal oriented  Associations:  no loose associations  Thought Content:  no evidence of psychotic thought and passive suicidal ideation " present, no active plan or intent  Insight:  fair  Judgment:  fair  Oriented to:  time, person, and place  Attention Span and Concentration:  intact  Recent and Remote Memory:  fair  Fund of Knowledge: low-normal  Muscle Strength and Tone: left-sided weakness r/t CVA  Gait and Station: wheelchair-bound           Labs:     Results for orders placed or performed during the hospital encounter of 07/09/20 (from the past 24 hour(s))   Potassium   Result Value Ref Range    Potassium 3.1 (L) 3.4 - 5.3 mmol/L

## 2020-07-12 LAB — POTASSIUM SERPL-SCNC: 3.2 MMOL/L (ref 3.4–5.3)

## 2020-07-12 PROCEDURE — 99233 SBSQ HOSP IP/OBS HIGH 50: CPT | Performed by: NURSE PRACTITIONER

## 2020-07-12 PROCEDURE — 12400000 ZZH R&B MH

## 2020-07-12 PROCEDURE — 25000128 H RX IP 250 OP 636: Performed by: NURSE PRACTITIONER

## 2020-07-12 PROCEDURE — 25000132 ZZH RX MED GY IP 250 OP 250 PS 637: Performed by: NURSE PRACTITIONER

## 2020-07-12 PROCEDURE — 36415 COLL VENOUS BLD VENIPUNCTURE: CPT | Performed by: NURSE PRACTITIONER

## 2020-07-12 PROCEDURE — 84132 ASSAY OF SERUM POTASSIUM: CPT | Performed by: NURSE PRACTITIONER

## 2020-07-12 RX ORDER — ACETAMINOPHEN 325 MG/1
975 TABLET ORAL EVERY 6 HOURS PRN
Status: DISCONTINUED | OUTPATIENT
Start: 2020-07-12 | End: 2020-07-22 | Stop reason: HOSPADM

## 2020-07-12 RX ORDER — POTASSIUM CHLORIDE 20MEQ/15ML
30 LIQUID (ML) ORAL ONCE
Status: COMPLETED | OUTPATIENT
Start: 2020-07-12 | End: 2020-07-12

## 2020-07-12 RX ORDER — LORAZEPAM 0.5 MG/1
0.5 TABLET ORAL 3 TIMES DAILY PRN
Status: DISCONTINUED | OUTPATIENT
Start: 2020-07-12 | End: 2020-07-22 | Stop reason: HOSPADM

## 2020-07-12 RX ADMIN — LORAZEPAM 0.5 MG: 0.5 TABLET ORAL at 17:42

## 2020-07-12 RX ADMIN — NICOTINE POLACRILEX 4 MG: 2 GUM, CHEWING ORAL at 09:58

## 2020-07-12 RX ADMIN — LOPERAMIDE HYDROCHLORIDE 4 MG: 2 CAPSULE ORAL at 08:18

## 2020-07-12 RX ADMIN — NICOTINE POLACRILEX 4 MG: 2 GUM, CHEWING ORAL at 17:37

## 2020-07-12 RX ADMIN — POTASSIUM CHLORIDE 10 MEQ: 750 CAPSULE, EXTENDED RELEASE ORAL at 20:17

## 2020-07-12 RX ADMIN — GABAPENTIN 900 MG: 300 CAPSULE ORAL at 20:17

## 2020-07-12 RX ADMIN — GABAPENTIN 900 MG: 300 CAPSULE ORAL at 08:18

## 2020-07-12 RX ADMIN — LAMOTRIGINE 25 MG: 25 TABLET ORAL at 08:18

## 2020-07-12 RX ADMIN — HYDROXYZINE HYDROCHLORIDE 100 MG: 25 TABLET ORAL at 09:57

## 2020-07-12 RX ADMIN — DIPHENHYDRAMINE HYDROCHLORIDE AND LIDOCAINE HYDROCHLORIDE AND ALUMINUM HYDROXIDE AND MAGNESIUM HYDRO 10 ML: KIT at 12:53

## 2020-07-12 RX ADMIN — POTASSIUM CHLORIDE 10 MEQ: 750 CAPSULE, EXTENDED RELEASE ORAL at 08:17

## 2020-07-12 RX ADMIN — GABAPENTIN 900 MG: 300 CAPSULE ORAL at 12:52

## 2020-07-12 RX ADMIN — ONDANSETRON 4 MG: 4 TABLET, ORALLY DISINTEGRATING ORAL at 16:55

## 2020-07-12 RX ADMIN — Medication: at 16:02

## 2020-07-12 RX ADMIN — Medication: at 11:51

## 2020-07-12 RX ADMIN — NICOTINE POLACRILEX 4 MG: 2 GUM, CHEWING ORAL at 07:18

## 2020-07-12 RX ADMIN — LOPERAMIDE HYDROCHLORIDE 2 MG: 2 CAPSULE ORAL at 06:50

## 2020-07-12 RX ADMIN — FAMOTIDINE 20 MG: 20 TABLET ORAL at 08:17

## 2020-07-12 RX ADMIN — LORAZEPAM 0.5 MG: 0.5 TABLET ORAL at 06:50

## 2020-07-12 RX ADMIN — RIVAROXABAN 20 MG: 20 TABLET, FILM COATED ORAL at 08:18

## 2020-07-12 RX ADMIN — ACETAMINOPHEN 650 MG: 325 TABLET, FILM COATED ORAL at 06:50

## 2020-07-12 RX ADMIN — ACETAMINOPHEN 975 MG: 325 TABLET, FILM COATED ORAL at 16:34

## 2020-07-12 RX ADMIN — ESCITALOPRAM OXALATE 10 MG: 10 TABLET ORAL at 08:17

## 2020-07-12 RX ADMIN — LORAZEPAM 0.5 MG: 0.5 TABLET ORAL at 12:22

## 2020-07-12 RX ADMIN — NICOTINE 1 PATCH: 21 PATCH, EXTENDED RELEASE TRANSDERMAL at 08:18

## 2020-07-12 RX ADMIN — NICOTINE POLACRILEX 4 MG: 2 GUM, CHEWING ORAL at 12:58

## 2020-07-12 RX ADMIN — POTASSIUM CHLORIDE 30 MEQ: 20 SOLUTION ORAL at 16:55

## 2020-07-12 RX ADMIN — ACETAMINOPHEN 650 MG: 325 TABLET, FILM COATED ORAL at 00:03

## 2020-07-12 RX ADMIN — DIPHENHYDRAMINE HYDROCHLORIDE AND LIDOCAINE HYDROCHLORIDE AND ALUMINUM HYDROXIDE AND MAGNESIUM HYDRO 10 ML: KIT at 06:52

## 2020-07-12 RX ADMIN — DIPHENHYDRAMINE HYDROCHLORIDE AND LIDOCAINE HYDROCHLORIDE AND ALUMINUM HYDROXIDE AND MAGNESIUM HYDRO 10 ML: KIT at 19:32

## 2020-07-12 RX ADMIN — Medication: at 08:42

## 2020-07-12 RX ADMIN — FAMOTIDINE 20 MG: 20 TABLET ORAL at 20:17

## 2020-07-12 RX ADMIN — ONDANSETRON 4 MG: 4 TABLET, ORALLY DISINTEGRATING ORAL at 08:18

## 2020-07-12 RX ADMIN — HYDROXYZINE HYDROCHLORIDE 100 MG: 25 TABLET ORAL at 16:01

## 2020-07-12 RX ADMIN — MIRTAZAPINE 7.5 MG: 7.5 TABLET, FILM COATED ORAL at 20:17

## 2020-07-12 ASSESSMENT — MIFFLIN-ST. JEOR: SCORE: 1433.46

## 2020-07-12 NOTE — PLAN OF CARE
"Report received from Cassy HERNANDEZ RN. PT at nurses station window immediately after shift change requesting PRN for anxiety and PRN for tooth pain.   1601 Pt was given Atarax 100mg; and Benzo-Menthol for tooth pain.   PT then reported that the Benzo-Menthol \"didn't do shit, I need something more for pain, its going up my face and giving me a migraine.\" Provider called and patient received new order for Tylenol 975mg Q6hrs PRN. PT stated \"well that isnt going to do shit, but I will take it.\"   Writer offered patient ice pack, Pt refused stating \"that makes it worse.\"    Regarding tooth pain PT rating 10/10 stating I need something stronger than just tylenol, patient then ate 100% of dinner and requested Nicotine gum.     1655 PT requested PRN Zofran to take with her potassium.Reporting \"that's going to make me throw up all over the floor. I hate this shit.\"  PT was able to successfully tolerate supplement.   1742 Patient reporting she is unrelieved from anxiety PT given Ativan 0.5mg    1932- Pt requested magic mouthwash for tooth pain.      PT irritable, reporting unrelief from tooth pain, and unrelief from anxiety.      2017 PT requested PRN Remeron 7.5mg with her HS medications and has been in bed since.      PT denies SI/HI and hallucinations but does endorse that she doesn't know how to live her life \"this way\" r/t her stroke. This is something she really stuggles with because she wants to be more independent and she wants to have a job. PT reports she has a hard time coping with her life as it is now.         Face to face end of shift report communicated to oncoming RN  Janet Enamorado RN  7/12/2020  9:42 PM            Problem: Adult Inpatient Plan of Care  Goal: Patient-Specific Goal (Individualization)  7/12/2020 1754 by Janet Enamorado, RN  Outcome: No Change  7/12/2020 1751 by Janet Enamorado, RN  Outcome: No Change     Problem: Adult Behavioral Health Plan of Care  Goal: Patient-Specific Goal " (Individualization)  Description: Pt will attend 50 % of unit programming.   Pt will be compliant with medications  Pt will sleep 6-8 hours per night   Pt will eat at least 50% of meals.  Okay to have shoe laces on tennis shoes as long as 1:1 is ordered per NP.  Pt may use commode.      7/12/2020 1754 by Janet Enamorado, RN  Outcome: No Change     Problem: Fall Injury Risk  Goal: Absence of Fall and Fall-Related Injury  Description: Pt will remain free from falls.  Pt on 1:1 r/t fall risk.   Pt will use wheelchair and transfer belt during stay.   Pt will wear appropriate footwear.  Okay to have shoe laces on tennis shoes as long as 1:1 is ordered per NP.  7/12/2020 1754 by Janet Enamorado, RN  Outcome: No Change     Problem: Suicide Risk  Goal: Absence of Self-Harm  Description: Will remain without harm while in hospital.  7/12/2020 1754 by Janet Enamorado, RN  Outcome: Improving

## 2020-07-12 NOTE — PLAN OF CARE
Problem: Adult Behavioral Health Plan of Care  Goal: Patient-Specific Goal (Individualization)  Description: Pt will attend 50 % of unit programming.   Pt will be compliant with medications  Pt will sleep 6-8 hours per night   Pt will eat at least 50% of meals.  Okay to have shoe laces on tennis shoes as long as 1:1 is ordered per NP.  Pt may use commode.    VSS, tooth pain and HA rated 8/10-tylenol 650 mg and magic mouth rec'd this shift.     States she still has passive thoughts of not wanting to live--especially like this (pt points to her W/C & L-side.)  hopeful that medication changes will help her mood. Pt has concerns about her living arrangement and would like to be closer to family. States she has been very isolative and would like to be around people closer to her own age. Talkative  with staff and peers. Affect slightly blunted but improves t/o shift.  Pt talks about her cousin passing away Tuesday and that is why she went out drinking and ended up in ED before admission.     Showers with assist x two this evening---able to stand and hold herself steady for short time with grab bar in reach. Much anxiety with moving from w/c to shower chair due to high fear of fall.   Prn medications this shift. Hydroxyzine 100 mg x two doses. Ativan 0.5 mg before shower. Tylenol 650 mg and magic mouthwash.  Cooperative with nursing assessment and medications.       7/11/2020 2125 by Jennifer Wooten, RN  Outcome: Improving     Problem: Fall Injury Risk  Goal: Absence of Fall and Fall-Related Injury  Description: Pt will remain free from falls.  Pt on 1:1 r/t fall risk.   Pt will use wheelchair and transfer belt during stay.   Pt will wear appropriate footwear.  Okay to have shoe laces on tennis shoes as long as 1:1 is ordered per NP.  7/11/2020 2125 by Jennifer Wooten, RN  Outcome: No Change     Problem: Suicide Risk  Goal: Absence of Self-Harm  Description: Will remain without harm while in hospital.  Outcome: No  Change     Face to face end of shift report communicated to Face to face end of shift report communicated to oncoming shift.      Jennifer Wooten RN  7/11/2020  9:50 PM           Jennifer Wooten RN  7/11/2020  9:50 PM

## 2020-07-12 NOTE — PLAN OF CARE
Problem: Adult Behavioral Health Plan of Care  Goal: Patient-Specific Goal (Individualization)  Description: Pt will attend 50 % of unit programming.   Pt will be compliant with medications  Pt will sleep 6-8 hours per night   Pt will eat at least 50% of meals.  Okay to have shoe laces on tennis shoes as long as 1:1 is ordered per NP.  Pt may use commode.    Pt up in lounge at start of shift coloring. According to charting, pt slept approx. 6.5 hours last night. States slept better than last night. Ate 75% of breakfast. Compliant with medications and assessment. Denies anxiety this am; states prn ativan that she received on nights was effective. Denies SI. Will continue to monitor.    0842 Prn orajel administered at this time per pt request for tooth pain.  0957 Prn atarax 100mg administered per pt request for anxiety.  1151 Prn orajel administered per pt request.  1222 Prn ativan administered per pt request for anxiety.  1240 Report given to Cassy AWAD on [address].  1254 Prn magic mouthwash administered per pt request.    Outcome: Improving    Problem: Fall Injury Risk  Goal: Absence of Fall and Fall-Related Injury  Description: Pt will remain free from falls.  Pt on 1:1 r/t fall risk.   Pt will use wheelchair and transfer belt during stay.   Pt will wear appropriate footwear.  Okay to have shoe laces on tennis shoes as long as 1:1 is ordered per NP.    Appropriate footwear. 1:1 remains due to high fall risk. Pt using wheelchair and transfer belt. No falls this shift. Uses over the toilet commode to assist with toileting. Does need assist standing and pivoting to transfer to toilet. Able to do hygiene by self.    Outcome: Improving     Problem: Suicide Risk  Goal: Absence of Self-Harm  Description: Will remain without harm while in hospital.    Denies SI. Pt has remained free from self harm.    Outcome: Improving    Face to face end of shift report communicated to oncoming RN.     7/12/2020

## 2020-07-12 NOTE — PROGRESS NOTES
"[facility]  Psychiatric Progress Note      Impression:   This is a 30 year old yo female with a history of depression, anxiety, substance use, and CVA resulting in left sided hemiparesis.    Maggie is up in the lounge playing a card game when I see her today. She reports she is \"okay\" today, denies any active suicidal thoughts. Does report hopelessness related to her situation and where she is living. States she would like to get into another group home, did encourage her to call her county  tomorrow to discuss this with her. Denies any side effects from Lexapro, dose increased to 10 mg this morning. Reports that she slept \"okay\". Has been attending groups. She does remain on 1:1 for fall risk.      Educated regarding medication indications, risks, benefits, side effects, contraindications and possible interactions. Verbally expressed understanding.        Diagnoses:   Major depressive disorder, recurrent, moderate  Unspecified anxiety disorder, R/o DIANNA vs PTSD  History of CVA in 2018  TBI      Attestation:  Patient has been seen and evaluated by me,  Mayra Bhat NP          Interim History:   The patient's care was discussed with the treatment team and chart notes were reviewed.    BEHAVIORAL TEAM DISCUSSION    Progress: Fair. Denies active SI though feelings of hopelessness remain.     Continued Stay Criteria/Rationale: depressed mood, recent suicidal ideation with plan, start new medication    Medical/Physical: left-sided weakness from CVA, chronic hypokalemia.   Precautions:   Falls precaution?: YES- currently on 1:1 observation due to fall risk  Behavioral Orders   Procedures     Code 1 - Restrict to Unit     Routine Programming     As clinically indicated     Status 15     Every 15 minutes.     Plan:   Increase Lexapro to 10 mg daily  Continue Lamictal 25 mg daily- had been off of this for several days prior to admission  Monitor K+, today 3.2 though will receive 2 scheduled " doses    Rationale for change in precautions or plan: continued depressed mood      Participants: Mayra Bhat, CNP, nursing          Medications:     Current Facility-Administered Medications Ordered in Epic   Medication Dose Route Frequency Last Rate Last Dose     acetaminophen (TYLENOL) tablet 650 mg  650 mg Oral Q4H PRN   650 mg at 07/11/20 0822     alum & mag hydroxide-simethicone (MAALOX  ES) suspension 30 mL  30 mL Oral Q4H PRN         Benzocaine-Menthol 20-0.26 % GEL   Mouth/Throat 4x Daily PRN         bisacodyl (DULCOLAX) Suppository 10 mg  10 mg Rectal Daily PRN         [START ON 7/13/2020] ergocalciferol capsule 50,000 Units  50,000 Units Oral Weekly         [START ON 7/12/2020] escitalopram (LEXAPRO) tablet 10 mg  10 mg Oral Daily         escitalopram (LEXAPRO) tablet 5 mg  5 mg Oral Daily         famotidine (PEPCID) tablet 20 mg  20 mg Oral BID   20 mg at 07/11/20 0822     gabapentin (NEURONTIN) capsule 900 mg  900 mg Oral TID   900 mg at 07/11/20 1304     hydrOXYzine (ATARAX) tablet  mg   mg Oral Q4H PRN   100 mg at 07/11/20 0646     lamoTRIgine (LaMICtal) tablet 25 mg  25 mg Oral Daily   25 mg at 07/11/20 0823     loperamide (IMODIUM) capsule 2 mg  2 mg Oral 4x Daily PRN   2 mg at 07/11/20 0649     loperamide (IMODIUM) capsule 4 mg  4 mg Oral Daily   4 mg at 07/11/20 0822     LORazepam (ATIVAN) tablet 0.5 mg  0.5 mg Oral BID PRN   0.5 mg at 07/11/20 1023     magic mouthwash suspension (diphenhydramine, lidocaine, aluminum-magnesium & simethicone)  10 mL Swish & Swallow Q6H PRN   10 mL at 07/11/20 0800     magnesium hydroxide (MILK OF MAGNESIA) suspension 30 mL  30 mL Oral At Bedtime PRN         melatonin 3 mg (with vit B6 10 mg) extended release tablet 2 tablet  2 tablet Oral At Bedtime PRN         mirtazapine (REMERON) tablet TABS 7.5-15 mg  7.5-15 mg Oral At Bedtime PRN   7.5 mg at 07/10/20 2028     nicotine (COMMIT) lozenge 2 mg  2 mg Buccal Q1H PRN   2 mg at 07/10/20 1536      "nicotine (NICODERM CQ) 21 MG/24HR 24 hr patch 1 patch  1 patch Transdermal Daily   1 patch at 07/11/20 0823     nicotine (NICORETTE) gum 2-4 mg  2-4 mg Buccal Q1H PRN   4 mg at 07/10/20 1725     nicotine Patch in Place   Transdermal Q8H   Stopped at 07/11/20 0646     ondansetron (ZOFRAN-ODT) ODT tab 4 mg  4 mg Oral Q6H PRN   4 mg at 07/11/20 0829     potassium chloride 10 mEq in 100 mL intermittent infusion with 10 mg lidocaine  10 mEq Intravenous Q1H PRN         potassium chloride ER (MICRO-K) CR capsule 10 mEq  10 mEq Oral BID   10 mEq at 07/11/20 0822     rivaroxaban ANTICOAGULANT (XARELTO) tablet 20 mg  20 mg Oral QAM   20 mg at 07/11/20 0823     No current Epic-ordered outpatient medications on file.          10 point ROS- chronic pain, intermittent nausea       Allergies:     Allergies   Allergen Reactions     Amoxicillin Other (See Comments)     Headaches     Levaquin [Levofloxacin] Swelling     Lithium      Diabetes insipidus      Naproxen Other (See Comments)     Reaction: Headaches     Nickel      Tramadol      Sulindac Rash            Psychiatric Examination:   /55   Pulse 74   Temp 97.8  F (36.6  C) (Tympanic)   Resp 14   Ht 1.6 m (5' 3\")   Wt 75.4 kg (166 lb 4.8 oz)   SpO2 98%   BMI 29.46 kg/m    Weight is 166 lbs 4.8 oz  Body mass index is 29.46 kg/m .    Appearance:  awake, alert, adequately groomed, dressed in hospital scrubs and appeared as age stated  Attitude:  cooperative, pleasant  Eye Contact:  fair  Mood: less depressed today  Affect:  Somewhat blunted  Speech:  clear, coherent  Psychomotor Behavior:  no evidence of tardive dyskinesia, dystonia, or tics  Thought Process:  linear and goal oriented  Associations:  no loose associations  Thought Content:  no evidence of psychotic thought and passive suicidal ideation present, no active plan or intent  Insight:  fair  Judgment:  fair  Oriented to:  time, person, and place  Attention Span and Concentration:  intact  Recent and Remote " Memory:  fair  Fund of Knowledge: low-normal  Muscle Strength and Tone: left-sided weakness r/t CVA  Gait and Station: wheelchair-bound           Labs:     Results for orders placed or performed during the hospital encounter of 07/09/20 (from the past 24 hour(s))   Potassium   Result Value Ref Range    Potassium 3.2 (L) 3.4 - 5.3 mmol/L

## 2020-07-12 NOTE — PLAN OF CARE
Received nurse-to-nurse from [id number] May RN. Pt transferred to unit from 5N in w/c escorted by [facility] staff et security. Tour of unit given. Pt voices frustrations re: bed set up et difficulties transferring. Full range affect, irritable/anxious mood, denies need for any prn pharmacological intervention @ this time. 1:1 staff implemented. Resting in bed @ this time.   Face to face end of shift report to be communicated to on-coming staff.     Cassy Graham RN  7/12/2020  2:59 PM

## 2020-07-12 NOTE — PLAN OF CARE
"[place] [facility] [place] pages of decorative pictures in [place] [facility] [place] with the one to one staff - does have ice water near by. Is watching the movie Grease also.  At 0003 administered tylenol 650 mg po per pt request for c/o a headache she rates the pain an \"8\" and by 0045 pt was asleep  One to one staff continues.   It is now 0600 and pt has slept all noc since retiring to bed at approximately at 0030 and she was sleeping by 0115.Face to face end of shift report communicated to geoff AWAD..     Sangita Orr RN  7/12/2020  5:5      "

## 2020-07-12 NOTE — PLAN OF CARE
0615 - labs obtained as ordered.    0650 administration of ativan 0.5 mg po - imodium 2 mg - magic mouthwash po - and tylenol for headache pain she rates 9/10 - pt requested these - will pass on to oncoming staff to monitor for effectiveness.  Pt polite, pleasant and appropriate with staff - one to one and peers.

## 2020-07-12 NOTE — PLAN OF CARE
2017:  PRN mirtazapine 7.5 mg po administered for sleep.  PRN Zofran ODT 4 mg administered for c/o stomach upset/ nausea reported when patient takes scheduled potassium chloride.

## 2020-07-13 LAB — POTASSIUM SERPL-SCNC: 3.3 MMOL/L (ref 3.4–5.3)

## 2020-07-13 PROCEDURE — 36415 COLL VENOUS BLD VENIPUNCTURE: CPT | Performed by: NURSE PRACTITIONER

## 2020-07-13 PROCEDURE — 84132 ASSAY OF SERUM POTASSIUM: CPT | Performed by: NURSE PRACTITIONER

## 2020-07-13 PROCEDURE — 25000132 ZZH RX MED GY IP 250 OP 250 PS 637: Performed by: NURSE PRACTITIONER

## 2020-07-13 PROCEDURE — 25000128 H RX IP 250 OP 636: Performed by: NURSE PRACTITIONER

## 2020-07-13 PROCEDURE — 99232 SBSQ HOSP IP/OBS MODERATE 35: CPT | Performed by: NURSE PRACTITIONER

## 2020-07-13 PROCEDURE — 12400000 ZZH R&B MH

## 2020-07-13 PROCEDURE — 99233 SBSQ HOSP IP/OBS HIGH 50: CPT | Performed by: NURSE PRACTITIONER

## 2020-07-13 RX ORDER — CLINDAMYCIN HCL 300 MG
300 CAPSULE ORAL EVERY 8 HOURS SCHEDULED
Status: DISCONTINUED | OUTPATIENT
Start: 2020-07-13 | End: 2020-07-15

## 2020-07-13 RX ORDER — POTASSIUM CHLORIDE 750 MG/1
20 CAPSULE, EXTENDED RELEASE ORAL 2 TIMES DAILY
Status: DISCONTINUED | OUTPATIENT
Start: 2020-07-13 | End: 2020-07-16

## 2020-07-13 RX ORDER — LACTOBACILLUS RHAMNOSUS GG 10B CELL
1 CAPSULE ORAL 2 TIMES DAILY
Status: COMPLETED | OUTPATIENT
Start: 2020-07-13 | End: 2020-07-20

## 2020-07-13 RX ADMIN — GABAPENTIN 900 MG: 300 CAPSULE ORAL at 08:49

## 2020-07-13 RX ADMIN — FAMOTIDINE 20 MG: 20 TABLET ORAL at 08:49

## 2020-07-13 RX ADMIN — DIPHENHYDRAMINE HYDROCHLORIDE AND LIDOCAINE HYDROCHLORIDE AND ALUMINUM HYDROXIDE AND MAGNESIUM HYDRO 10 ML: KIT at 18:31

## 2020-07-13 RX ADMIN — POTASSIUM CHLORIDE 10 MEQ: 750 CAPSULE, EXTENDED RELEASE ORAL at 08:49

## 2020-07-13 RX ADMIN — DIPHENHYDRAMINE HYDROCHLORIDE AND LIDOCAINE HYDROCHLORIDE AND ALUMINUM HYDROXIDE AND MAGNESIUM HYDRO 10 ML: KIT at 06:37

## 2020-07-13 RX ADMIN — HYDROXYZINE HYDROCHLORIDE 100 MG: 25 TABLET ORAL at 18:31

## 2020-07-13 RX ADMIN — NICOTINE POLACRILEX 4 MG: 2 GUM, CHEWING ORAL at 12:40

## 2020-07-13 RX ADMIN — Medication 1 CAPSULE: at 21:10

## 2020-07-13 RX ADMIN — LOPERAMIDE HYDROCHLORIDE 4 MG: 2 CAPSULE ORAL at 06:29

## 2020-07-13 RX ADMIN — GABAPENTIN 900 MG: 300 CAPSULE ORAL at 13:33

## 2020-07-13 RX ADMIN — NICOTINE POLACRILEX 4 MG: 2 GUM, CHEWING ORAL at 14:47

## 2020-07-13 RX ADMIN — ONDANSETRON 4 MG: 4 TABLET, ORALLY DISINTEGRATING ORAL at 21:49

## 2020-07-13 RX ADMIN — LAMOTRIGINE 25 MG: 25 TABLET ORAL at 08:49

## 2020-07-13 RX ADMIN — GABAPENTIN 900 MG: 300 CAPSULE ORAL at 21:10

## 2020-07-13 RX ADMIN — RIVAROXABAN 20 MG: 20 TABLET, FILM COATED ORAL at 08:49

## 2020-07-13 RX ADMIN — DIPHENHYDRAMINE HYDROCHLORIDE AND LIDOCAINE HYDROCHLORIDE AND ALUMINUM HYDROXIDE AND MAGNESIUM HYDRO 10 ML: KIT at 12:35

## 2020-07-13 RX ADMIN — FAMOTIDINE 20 MG: 20 TABLET ORAL at 21:11

## 2020-07-13 RX ADMIN — ACETAMINOPHEN 975 MG: 325 TABLET, FILM COATED ORAL at 18:31

## 2020-07-13 RX ADMIN — CLINDAMYCIN HYDROCHLORIDE 300 MG: 300 CAPSULE ORAL at 16:42

## 2020-07-13 RX ADMIN — POTASSIUM CHLORIDE 20 MEQ: 750 CAPSULE, EXTENDED RELEASE ORAL at 21:10

## 2020-07-13 RX ADMIN — LORAZEPAM 0.5 MG: 0.5 TABLET ORAL at 06:30

## 2020-07-13 RX ADMIN — Medication: at 11:56

## 2020-07-13 RX ADMIN — ACETAMINOPHEN 975 MG: 325 TABLET, FILM COATED ORAL at 12:35

## 2020-07-13 RX ADMIN — CLINDAMYCIN HYDROCHLORIDE 300 MG: 300 CAPSULE ORAL at 21:11

## 2020-07-13 RX ADMIN — NICOTINE 1 PATCH: 21 PATCH, EXTENDED RELEASE TRANSDERMAL at 08:56

## 2020-07-13 RX ADMIN — HYDROXYZINE HYDROCHLORIDE 100 MG: 25 TABLET ORAL at 13:35

## 2020-07-13 RX ADMIN — LORAZEPAM 0.5 MG: 0.5 TABLET ORAL at 12:35

## 2020-07-13 RX ADMIN — ONDANSETRON 4 MG: 4 TABLET, ORALLY DISINTEGRATING ORAL at 08:59

## 2020-07-13 RX ADMIN — Medication: at 16:31

## 2020-07-13 RX ADMIN — NICOTINE POLACRILEX 2 MG: 2 LOZENGE ORAL at 13:40

## 2020-07-13 RX ADMIN — ESCITALOPRAM OXALATE 10 MG: 10 TABLET ORAL at 08:49

## 2020-07-13 RX ADMIN — HYDROXYZINE HYDROCHLORIDE 100 MG: 25 TABLET ORAL at 09:04

## 2020-07-13 RX ADMIN — ACETAMINOPHEN 975 MG: 325 TABLET, FILM COATED ORAL at 06:29

## 2020-07-13 RX ADMIN — LORAZEPAM 0.5 MG: 0.5 TABLET ORAL at 15:55

## 2020-07-13 RX ADMIN — ERGOCALCIFEROL 50000 UNITS: 1.25 CAPSULE, LIQUID FILLED ORAL at 08:49

## 2020-07-13 ASSESSMENT — ACTIVITIES OF DAILY LIVING (ADL)
HYGIENE/GROOMING: WITH ASSISTANCE;WITH SUPERVISION
LAUNDRY: UNABLE TO COMPLETE
ORAL_HYGIENE: WITH ASSISTANCE;WITH SUPERVISION

## 2020-07-13 NOTE — PROGRESS NOTES
"[facility]  Psychiatric Progress Note      Impression:   This is a 30 year old yo female with a history of depression, anxiety, substance use, and CVA resulting in left sided hemiparesis.    Maggie is in bed this AM, states that she had on \"okay\" night. Lab tracy labs at 0540, has been awake since. Denies any side effects from Lexapro. Denies any active suicidal thoughts though does have some hopelessness, last night stated she is struggling with how to live her life since her stroke and she wants to be independent. Does state that she was having some \"bad memories\" this morning, though does not elaborate further. Is wanting group home placement and her  has already been looking into this. She is encouraged to speak with  today.    Patient did report left sided tooth pain, states she is unable to see a dentist until next month and is worried it is infected. Spoke with medical NP who recommended Clindamycin 300 mg Q 8 hours for 7 days. Will also start probiotic.       Educated regarding medication indications, risks, benefits, side effects, contraindications and possible interactions. Verbally expressed understanding.        Diagnoses:   Major depressive disorder, recurrent, moderate  Unspecified anxiety disorder, R/o DIANNA vs PTSD  History of CVA in 2018  TBI      Attestation:  Patient has been seen and evaluated by me,  Mayra Bhat, NP          Interim History:   The patient's care was discussed with the treatment team and chart notes were reviewed.    BEHAVIORAL TEAM DISCUSSION    Progress: Fair. Mood and SI have improved over last few days    Continued Stay Criteria/Rationale: depressed mood, recent suicidal ideation with plan, start new medication    Medical/Physical: left-sided weakness from CVA, chronic hypokalemia.   Precautions:   Falls precaution?: YES- currently on 1:1 observation due to fall risk  Behavioral Orders   Procedures     Code 1 - Restrict to Unit     Routine " Programming     As clinically indicated     Status 15     Every 15 minutes.     Plan:   Continue Lexapro to 10 mg daily  Continue Lamictal 25 mg daily- had been off of this for several days prior to admission      Rationale for change in precautions or plan: continued depressed mood      Participants: Mayra Bhat, CNP, nursing, OT, SW          Medications:     Current Facility-Administered Medications Ordered in Epic   Medication Dose Route Frequency Last Rate Last Dose     acetaminophen (TYLENOL) tablet 650 mg  650 mg Oral Q4H PRN   650 mg at 07/11/20 0822     alum & mag hydroxide-simethicone (MAALOX  ES) suspension 30 mL  30 mL Oral Q4H PRN         Benzocaine-Menthol 20-0.26 % GEL   Mouth/Throat 4x Daily PRN         bisacodyl (DULCOLAX) Suppository 10 mg  10 mg Rectal Daily PRN         [START ON 7/13/2020] ergocalciferol capsule 50,000 Units  50,000 Units Oral Weekly         [START ON 7/12/2020] escitalopram (LEXAPRO) tablet 10 mg  10 mg Oral Daily         escitalopram (LEXAPRO) tablet 5 mg  5 mg Oral Daily         famotidine (PEPCID) tablet 20 mg  20 mg Oral BID   20 mg at 07/11/20 0822     gabapentin (NEURONTIN) capsule 900 mg  900 mg Oral TID   900 mg at 07/11/20 1304     hydrOXYzine (ATARAX) tablet  mg   mg Oral Q4H PRN   100 mg at 07/11/20 0646     lamoTRIgine (LaMICtal) tablet 25 mg  25 mg Oral Daily   25 mg at 07/11/20 0823     loperamide (IMODIUM) capsule 2 mg  2 mg Oral 4x Daily PRN   2 mg at 07/11/20 0649     loperamide (IMODIUM) capsule 4 mg  4 mg Oral Daily   4 mg at 07/11/20 0822     LORazepam (ATIVAN) tablet 0.5 mg  0.5 mg Oral BID PRN   0.5 mg at 07/11/20 1023     magic mouthwash suspension (diphenhydramine, lidocaine, aluminum-magnesium & simethicone)  10 mL Swish & Swallow Q6H PRN   10 mL at 07/11/20 0800     magnesium hydroxide (MILK OF MAGNESIA) suspension 30 mL  30 mL Oral At Bedtime PRN         melatonin 3 mg (with vit B6 10 mg) extended release tablet 2 tablet  2 tablet Oral  "At Bedtime PRN         mirtazapine (REMERON) tablet TABS 7.5-15 mg  7.5-15 mg Oral At Bedtime PRN   7.5 mg at 07/10/20 2028     nicotine (COMMIT) lozenge 2 mg  2 mg Buccal Q1H PRN   2 mg at 07/10/20 1534     nicotine (NICODERM CQ) 21 MG/24HR 24 hr patch 1 patch  1 patch Transdermal Daily   1 patch at 07/11/20 0823     nicotine (NICORETTE) gum 2-4 mg  2-4 mg Buccal Q1H PRN   4 mg at 07/10/20 1725     nicotine Patch in Place   Transdermal Q8H   Stopped at 07/11/20 0646     ondansetron (ZOFRAN-ODT) ODT tab 4 mg  4 mg Oral Q6H PRN   4 mg at 07/11/20 0829     potassium chloride 10 mEq in 100 mL intermittent infusion with 10 mg lidocaine  10 mEq Intravenous Q1H PRN         potassium chloride ER (MICRO-K) CR capsule 10 mEq  10 mEq Oral BID   10 mEq at 07/11/20 0822     rivaroxaban ANTICOAGULANT (XARELTO) tablet 20 mg  20 mg Oral QAM   20 mg at 07/11/20 0823     No current [facility]-ordered outpatient medications on file.          10 point ROS- chronic pain, intermittent nausea       Allergies:     Allergies   Allergen Reactions     Amoxicillin Other (See Comments)     Headaches     Levaquin [Levofloxacin] Swelling     Lithium      Diabetes insipidus      Naproxen Other (See Comments)     Reaction: Headaches     Nickel      Tramadol      Sulindac Rash            Psychiatric Examination:   /68   Pulse 80   Temp 97.5  F (36.4  C) (Tympanic)   Resp 14   Ht 1.6 m (5' 3\")   Wt 75.4 kg (166 lb 4.8 oz)   SpO2 98%   BMI 29.46 kg/m    Weight is 166 lbs 4.8 oz  Body mass index is 29.46 kg/m .    Appearance:  awake, alert, adequately groomed, dressed in hospital scrubs and appeared as age stated  Attitude:  cooperative, pleasant  Eye Contact:  fair  Mood: some depression and anxiety at times  Affect:  Somewhat blunted  Speech:  clear, coherent  Psychomotor Behavior:  no evidence of tardive dyskinesia, dystonia, or tics  Thought Process:  linear and goal oriented  Associations:  no loose associations  Thought Content:  no " evidence of psychotic thought and passive suicidal ideation present, no active plan or intent  Insight:  fair  Judgment:  fair  Oriented to:  time, person, and place  Attention Span and Concentration:  intact  Recent and Remote Memory:  fair  Fund of Knowledge: low-normal  Muscle Strength and Tone: left-sided weakness r/t CVA  Gait and Station: wheelchair-bound           Labs:     Results for orders placed or performed during the hospital encounter of 07/09/20 (from the past 24 hour(s))   Potassium   Result Value Ref Range    Potassium 3.3 (L) 3.4 - 5.3 mmol/L

## 2020-07-13 NOTE — PROGRESS NOTES
"[facility]    Medical Services Progress Note    Date of Service (when I saw the patient): 07/13/2020    Assessment & Plan     Principal Problem:    Suicidal ideation     Active Medical Problems:    Hypokalemia- pt has chronic history of low potassium. Potassium level in ED was 2.4 and was replaced and level came up to 3.2. Home dose of potassium ordered. Will check potassium level in morning.   7/13/20- k+ level on 7/11/20 was 3.1 and 3.2 on 7/12/20. Pmhnp replaced with 30 meq po. K+ level is morning was 3.3. Pt has hx of chronic hypokalemia and takes 10 meq bid. Increased to 20 meq bid. Will recheck level in the morning.       History of CVA (cerebrovascular accident)- according to medical chart and pt she had a PE in 2015 and stopped taking anticoagulants and then had a right sided CVA in 2018. This left her with left sided hemiparesis and in a wheelchair. Pt wears a sling on her left arm to help hold it up because she experiences chronic left shoulder pain now since her stroke. Pt denies any new stroke symptoms, chest pain, sob. Ordered home dose of Xarelto. Pt is a 1:1 for pt safety due to use of wheelchair and left sided hemiparesis.    7/13/20 -pt is a 1:1 for pt safety. Pt denies any acute stroke symptoms, chest pain, sob.       Chronic left shoulder pain- pt states she experiences chronic left shoulder pain since she had her stroke in 2018 due to it \"dangling\" and she is unable to lift her arm. Ordered home dose of gabapentin and pt states this helps control her pain.   7/13/20- denies any pain.         Irritable bowel syndrome with diarrhea- pt reports she takes imodium 4mg once in the morning and this helps control her diarrhea all day. Pt denies taking any other medications for her IBS and states the imodium has controlled it.             covid screening- negative     Code Status: Full Code.    Jessica Arenas CNP        -Data reviewed today: I reviewed all new labs and imaging results over the " "last 24 hours.     Physical Exam   Temp: 97.5  F (36.4  C) Temp src: Tympanic BP: 111/68 Pulse: 80   Resp: 14 SpO2: 98 % O2 Device: None (Room air)    Vitals:    07/09/20 1547 07/10/20 0500 07/12/20 0730   Weight: 80.7 kg (178 lb) 80.7 kg (177 lb 14.6 oz) 75.4 kg (166 lb 4.8 oz)     Vital Signs with Ranges  Temp:  [97.5  F (36.4  C)] 97.5  F (36.4  C)  Pulse:  [80-83] 80  Resp:  [14-16] 14  BP: (111-124)/(61-68) 111/68  SpO2:  [98 %-99 %] 98 %  No intake/output data recorded.    Constitutional: sleeping, wakes on command, cooperative, no apparent distress.   Respiratory: No increased work of breathing, speaks in full sentences, no audible wheeze, symmetric rise and fall of chest, CTA bilaterally, no rales, no rhonchi, no wheezes, no crackles   Cardiovascular: S1, S2, no extra heart sounds, no murmurs, no edema   GI: normoactive bowel sounds x 4, soft, non-distended, non-tender, no masses  Skin: warm, dry, intact, no rashes   Musculoskeletal: no lower extremity pitting edema present  there is no redness, warmth, or swelling of the joints  tone is normal with exception of left side. Noticeable left shoulder \"drop\". No left side strength.range of motion noted.   Neuropsychiatric: General: normal, calm and normal eye contact    Medications     ergocalciferol  50,000 Units Oral Weekly     escitalopram  10 mg Oral Daily     famotidine  20 mg Oral BID     gabapentin  900 mg Oral TID     lamoTRIgine  25 mg Oral Daily     loperamide  4 mg Oral Daily     nicotine  1 patch Transdermal Daily     nicotine   Transdermal Q8H     potassium chloride ER  20 mEq Oral BID     rivaroxaban ANTICOAGULANT  20 mg Oral QAM       Data   Recent Labs   Lab 07/13/20  0551 07/12/20  0642 07/11/20  0634  07/09/20  1613   WBC  --   --   --   --  11.2*   HGB  --   --   --   --  13.2   MCV  --   --   --   --  95   PLT  --   --   --   --  417   NA  --   --   --   --  140   POTASSIUM 3.3* 3.2* 3.1*   < > 2.4*   CHLORIDE  --   --   --   --  111*   CO2  " --   --   --   --  21   BUN  --   --   --   --  5*   CR  --   --   --   --  0.40*   ANIONGAP  --   --   --   --  8   AVINASH  --   --   --   --  8.8   GLC  --   --   --   --  115*   ALBUMIN  --   --   --   --  3.0*   PROTTOTAL  --   --   --   --  6.2*   BILITOTAL  --   --   --   --  0.3   ALKPHOS  --   --   --   --  73   ALT  --   --   --   --  35   AST  --   --   --   --  85*    < > = values in this interval not displayed.       No results found for this or any previous visit (from the past 24 hour(s)).

## 2020-07-13 NOTE — PLAN OF CARE
Face to face end of shift report will be communicated to oncoming RN.    Problem: Adult Inpatient Plan of Care  Goal: Patient-Specific Goal (Individualization)  7/13/2020 0036 by Marzena Newsome RN  Outcome: No Change     Problem: Adult Behavioral Health Plan of Care  Goal: Patient-Specific Goal (Individualization)  Description: Pt will attend 50 % of unit programming.   Pt will be compliant with medications  Pt will sleep 6-8 hours per night   Pt will eat at least 50% of meals.  Okay to have shoe laces on tennis shoes as long as 1:1 is ordered per NP.  Pt may use commode.      7/13/2020 0036 by Marzena Newsome RN  Outcome: No Change     Problem: Fall Injury Risk  Goal: Absence of Fall and Fall-Related Injury  Description: Pt will remain free from falls.  Pt on 1:1 r/t fall risk.   Pt will use wheelchair and transfer belt during stay.   Pt will wear appropriate footwear.  Okay to have shoe laces on tennis shoes as long as 1:1 is ordered per NP.  7/13/2020 0036 by Marzena Newsome RN  Outcome: Improving     Problem: Suicide Risk  Goal: Absence of Self-Harm  Description: Will remain without harm while in hospital.  7/13/2020 0036 by Marzena Newsome RN  Outcome: No Change    Face to face end of shift report obtained from RITESH Gonzales. Pt observed resting in bed. 1:1 staff with patient.  Pt appears to be sleeping in bed with eyes closed, having regular respirations, and position changes. 15 minutes and PRN safety checks completed with no noted complains. No falls or self harm noted or reported so far this shift. Will continue to monitor.   0540- Lab here. Blood sample obtained without difficulty. Waiting results.  0629- Pt c/o headache of 9/10 and high anxiety. Tylenol 975 mg and Ativan 0.5 mg given as requested. Pt also had large loose stool, pt states that she take 4 mg of imodium early in the morning when she gets up. Scheduled 4 mg given earlier per pt's request.   0637- Pt c/o tooth pain. Magic mouthwash  administered per request. Will continue to monitor.

## 2020-07-14 LAB — POTASSIUM SERPL-SCNC: 3.1 MMOL/L (ref 3.4–5.3)

## 2020-07-14 PROCEDURE — 25000128 H RX IP 250 OP 636: Performed by: NURSE PRACTITIONER

## 2020-07-14 PROCEDURE — 25000132 ZZH RX MED GY IP 250 OP 250 PS 637: Performed by: NURSE PRACTITIONER

## 2020-07-14 PROCEDURE — 12400000 ZZH R&B MH

## 2020-07-14 PROCEDURE — 36415 COLL VENOUS BLD VENIPUNCTURE: CPT | Performed by: NURSE PRACTITIONER

## 2020-07-14 PROCEDURE — 99233 SBSQ HOSP IP/OBS HIGH 50: CPT | Performed by: NURSE PRACTITIONER

## 2020-07-14 PROCEDURE — 84132 ASSAY OF SERUM POTASSIUM: CPT | Performed by: NURSE PRACTITIONER

## 2020-07-14 RX ORDER — POTASSIUM CHLORIDE 1.5 G/1.58G
40 POWDER, FOR SOLUTION ORAL ONCE
Status: DISCONTINUED | OUTPATIENT
Start: 2020-07-14 | End: 2020-07-14

## 2020-07-14 RX ORDER — LOPERAMIDE HCL 2 MG
4 CAPSULE ORAL DAILY PRN
Status: DISCONTINUED | OUTPATIENT
Start: 2020-07-14 | End: 2020-07-22 | Stop reason: HOSPADM

## 2020-07-14 RX ORDER — POTASSIUM CHLORIDE 1500 MG/1
40 TABLET, EXTENDED RELEASE ORAL ONCE
Status: COMPLETED | OUTPATIENT
Start: 2020-07-14 | End: 2020-07-14

## 2020-07-14 RX ADMIN — POTASSIUM CHLORIDE 40 MEQ: 1500 TABLET, EXTENDED RELEASE ORAL at 13:06

## 2020-07-14 RX ADMIN — LORAZEPAM 0.5 MG: 0.5 TABLET ORAL at 17:45

## 2020-07-14 RX ADMIN — HYDROXYZINE HYDROCHLORIDE 100 MG: 25 TABLET ORAL at 15:24

## 2020-07-14 RX ADMIN — POTASSIUM CHLORIDE 20 MEQ: 750 CAPSULE, EXTENDED RELEASE ORAL at 21:03

## 2020-07-14 RX ADMIN — ONDANSETRON 4 MG: 4 TABLET, ORALLY DISINTEGRATING ORAL at 13:07

## 2020-07-14 RX ADMIN — Medication 1 CAPSULE: at 09:11

## 2020-07-14 RX ADMIN — GABAPENTIN 900 MG: 300 CAPSULE ORAL at 09:11

## 2020-07-14 RX ADMIN — DIPHENHYDRAMINE HYDROCHLORIDE AND LIDOCAINE HYDROCHLORIDE AND ALUMINUM HYDROXIDE AND MAGNESIUM HYDRO 10 ML: KIT at 12:46

## 2020-07-14 RX ADMIN — NICOTINE POLACRILEX 4 MG: 2 GUM, CHEWING ORAL at 13:29

## 2020-07-14 RX ADMIN — NICOTINE 1 PATCH: 21 PATCH, EXTENDED RELEASE TRANSDERMAL at 09:17

## 2020-07-14 RX ADMIN — DIPHENHYDRAMINE HYDROCHLORIDE AND LIDOCAINE HYDROCHLORIDE AND ALUMINUM HYDROXIDE AND MAGNESIUM HYDRO 10 ML: KIT at 17:49

## 2020-07-14 RX ADMIN — Medication 1 CAPSULE: at 21:02

## 2020-07-14 RX ADMIN — LORAZEPAM 0.5 MG: 0.5 TABLET ORAL at 12:46

## 2020-07-14 RX ADMIN — Medication: at 15:24

## 2020-07-14 RX ADMIN — NICOTINE POLACRILEX 4 MG: 2 GUM, CHEWING ORAL at 18:10

## 2020-07-14 RX ADMIN — FAMOTIDINE 20 MG: 20 TABLET ORAL at 21:03

## 2020-07-14 RX ADMIN — ACETAMINOPHEN 975 MG: 325 TABLET, FILM COATED ORAL at 15:28

## 2020-07-14 RX ADMIN — LORAZEPAM 0.5 MG: 0.5 TABLET ORAL at 06:43

## 2020-07-14 RX ADMIN — ESCITALOPRAM OXALATE 10 MG: 10 TABLET ORAL at 09:11

## 2020-07-14 RX ADMIN — ONDANSETRON 4 MG: 4 TABLET, ORALLY DISINTEGRATING ORAL at 07:07

## 2020-07-14 RX ADMIN — ACETAMINOPHEN 975 MG: 325 TABLET, FILM COATED ORAL at 06:42

## 2020-07-14 RX ADMIN — DIPHENHYDRAMINE HYDROCHLORIDE AND LIDOCAINE HYDROCHLORIDE AND ALUMINUM HYDROXIDE AND MAGNESIUM HYDRO 10 ML: KIT at 06:43

## 2020-07-14 RX ADMIN — CLINDAMYCIN HYDROCHLORIDE 300 MG: 300 CAPSULE ORAL at 13:07

## 2020-07-14 RX ADMIN — CLINDAMYCIN HYDROCHLORIDE 300 MG: 300 CAPSULE ORAL at 21:03

## 2020-07-14 RX ADMIN — HYDROXYZINE HYDROCHLORIDE 100 MG: 25 TABLET ORAL at 09:25

## 2020-07-14 RX ADMIN — GABAPENTIN 900 MG: 300 CAPSULE ORAL at 21:03

## 2020-07-14 RX ADMIN — ONDANSETRON 4 MG: 4 TABLET, ORALLY DISINTEGRATING ORAL at 21:09

## 2020-07-14 RX ADMIN — RIVAROXABAN 20 MG: 20 TABLET, FILM COATED ORAL at 09:11

## 2020-07-14 RX ADMIN — CLINDAMYCIN HYDROCHLORIDE 300 MG: 300 CAPSULE ORAL at 06:43

## 2020-07-14 RX ADMIN — FAMOTIDINE 20 MG: 20 TABLET ORAL at 09:11

## 2020-07-14 RX ADMIN — LOPERAMIDE HYDROCHLORIDE 4 MG: 2 CAPSULE ORAL at 06:43

## 2020-07-14 RX ADMIN — LAMOTRIGINE 25 MG: 25 TABLET ORAL at 09:11

## 2020-07-14 RX ADMIN — HYDROXYZINE HYDROCHLORIDE 100 MG: 25 TABLET ORAL at 03:38

## 2020-07-14 RX ADMIN — POTASSIUM CHLORIDE 20 MEQ: 750 CAPSULE, EXTENDED RELEASE ORAL at 09:11

## 2020-07-14 RX ADMIN — GABAPENTIN 900 MG: 300 CAPSULE ORAL at 13:07

## 2020-07-14 RX ADMIN — NICOTINE POLACRILEX 4 MG: 2 GUM, CHEWING ORAL at 16:17

## 2020-07-14 RX ADMIN — LOPERAMIDE HYDROCHLORIDE 2 MG: 2 CAPSULE ORAL at 03:39

## 2020-07-14 ASSESSMENT — ACTIVITIES OF DAILY LIVING (ADL)
LAUNDRY: UNABLE TO COMPLETE
ORAL_HYGIENE: WITH ASSISTANCE
DRESS: SCRUBS (BEHAVIORAL HEALTH);WITH ASSISTANCE
HYGIENE/GROOMING: WITH ASSISTANCE

## 2020-07-14 NOTE — PROGRESS NOTES
[facility]  Psychiatric Progress Note      Impression:   This is a 30 year old yo female with a history of depression, anxiety, substance use, and CVA resulting in left sided hemiparesis.    Maggie is up in the lounge when I see her today. She shows me a small blister on her thumb and states she thinks that there may be a small blister on her lower lip, though is unsure if it could just be irritation or a burn from hot coffee this morning. Discussed that we would stop Lamictal, as it is unclear if this is SJS. She states that she had been taking Lamictal 50 mg daily, though then ran out of medication a few days prior to admission. Lamictal was restarted at 25 mg daily on admit, has so far taken 5 doses. She did feel that the Lamictal had been helping. Previously had been on Lithium, ended up toxic and hospitalized and was switched to Lamictal. Will continue Lexapro at this time for depression and anxiety. Discussed we could monitor mood on antidepressant for a few days to further determine need for mood stabilizer. Denies any active suicidal thoughts today, though states she tries to keep busy so she does not have to think about her life circumstances, as these make her feel more hopeless.       Educated regarding medication indications, risks, benefits, side effects, contraindications and possible interactions. Verbally expressed understanding.        Diagnoses:   Major depressive disorder, recurrent, moderate  Unspecified anxiety disorder, R/o DIANNA vs PTSD  History of CVA in 2018  TBI      Attestation:  Patient has been seen and evaluated by me,  Mayra Bhat NP          Interim History:   The patient's care was discussed with the treatment team and chart notes were reviewed.    BEHAVIORAL TEAM DISCUSSION    Progress: Fair. Mood and SI have improved over last few days    Continued Stay Criteria/Rationale: depressed mood, recent suicidal ideation with plan, stop lamictal- possible side effect.  Consider need for alternative mood stabilizer    Medical/Physical: left-sided weakness from CVA, chronic hypokalemia.   Precautions:   Falls precaution?: YES- currently on 1:1 observation due to fall risk  Behavioral Orders   Procedures     Code 1 - Restrict to Unit     Routine Programming     As clinically indicated     Status 15     Every 15 minutes.     Plan:   Continue Lexapro 10 mg daily  Stop Lamictal- ?SJS, small blister to thumb and possibly bottom lip new today  Consider alternative mood stabilizer if needed  Continue antibiotic for tooth pain/abcess per medical NP    Rationale for change in precautions or plan: continued depressed mood, side effect from medication      Participants: Mayra Bhat, CNP, nursing, OT, SW          Medications:     Current Facility-Administered Medications Ordered in Epic   Medication Dose Route Frequency Last Rate Last Dose     acetaminophen (TYLENOL) tablet 650 mg  650 mg Oral Q4H PRN   650 mg at 07/11/20 0822     alum & mag hydroxide-simethicone (MAALOX  ES) suspension 30 mL  30 mL Oral Q4H PRN         Benzocaine-Menthol 20-0.26 % GEL   Mouth/Throat 4x Daily PRN         bisacodyl (DULCOLAX) Suppository 10 mg  10 mg Rectal Daily PRN         [START ON 7/13/2020] ergocalciferol capsule 50,000 Units  50,000 Units Oral Weekly         [START ON 7/12/2020] escitalopram (LEXAPRO) tablet 10 mg  10 mg Oral Daily         escitalopram (LEXAPRO) tablet 5 mg  5 mg Oral Daily         famotidine (PEPCID) tablet 20 mg  20 mg Oral BID   20 mg at 07/11/20 0822     gabapentin (NEURONTIN) capsule 900 mg  900 mg Oral TID   900 mg at 07/11/20 1304     hydrOXYzine (ATARAX) tablet  mg   mg Oral Q4H PRN   100 mg at 07/11/20 0646     lamoTRIgine (LaMICtal) tablet 25 mg  25 mg Oral Daily   25 mg at 07/11/20 0823     loperamide (IMODIUM) capsule 2 mg  2 mg Oral 4x Daily PRN   2 mg at 07/11/20 0649     loperamide (IMODIUM) capsule 4 mg  4 mg Oral Daily   4 mg at 07/11/20 0822     LORazepam  "(ATIVAN) tablet 0.5 mg  0.5 mg Oral BID PRN   0.5 mg at 07/11/20 1023     magic mouthwash suspension (diphenhydramine, lidocaine, aluminum-magnesium & simethicone)  10 mL Swish & Swallow Q6H PRN   10 mL at 07/11/20 0800     magnesium hydroxide (MILK OF MAGNESIA) suspension 30 mL  30 mL Oral At Bedtime PRN         melatonin 3 mg (with vit B6 10 mg) extended release tablet 2 tablet  2 tablet Oral At Bedtime PRN         mirtazapine (REMERON) tablet TABS 7.5-15 mg  7.5-15 mg Oral At Bedtime PRN   7.5 mg at 07/10/20 2028     nicotine (COMMIT) lozenge 2 mg  2 mg Buccal Q1H PRN   2 mg at 07/10/20 1534     nicotine (NICODERM CQ) 21 MG/24HR 24 hr patch 1 patch  1 patch Transdermal Daily   1 patch at 07/11/20 0823     nicotine (NICORETTE) gum 2-4 mg  2-4 mg Buccal Q1H PRN   4 mg at 07/10/20 1725     nicotine Patch in Place   Transdermal Q8H   Stopped at 07/11/20 0646     ondansetron (ZOFRAN-ODT) ODT tab 4 mg  4 mg Oral Q6H PRN   4 mg at 07/11/20 0829     potassium chloride 10 mEq in 100 mL intermittent infusion with 10 mg lidocaine  10 mEq Intravenous Q1H PRN         potassium chloride ER (MICRO-K) CR capsule 10 mEq  10 mEq Oral BID   10 mEq at 07/11/20 0822     rivaroxaban ANTICOAGULANT (XARELTO) tablet 20 mg  20 mg Oral QAM   20 mg at 07/11/20 0823     No current [facility]-ordered outpatient medications on file.          10 point ROS- chronic pain, intermittent nausea, small blister to thumb and lower lip       Allergies:     Allergies   Allergen Reactions     Amoxicillin Other (See Comments)     Headaches     Levaquin [Levofloxacin] Swelling     Lithium      Diabetes insipidus      Naproxen Other (See Comments)     Reaction: Headaches     Nickel      Tramadol      Sulindac Rash            Psychiatric Examination:   /64   Pulse 73   Temp 99.1  F (37.3  C) (Tympanic)   Resp 16   Ht 1.6 m (5' 3\")   Wt 75.4 kg (166 lb 4.8 oz)   SpO2 97%   BMI 29.46 kg/m    Weight is 166 lbs 4.8 oz  Body mass index is 29.46 " kg/m .    Appearance:  awake, alert, adequately groomed, dressed in hospital scrubs and appeared as age stated  Attitude:  cooperative, pleasant  Eye Contact: good  Mood: still some depression, though improved from admit  Affect:  Somewhat blunted  Speech:  clear, coherent  Psychomotor Behavior:  no evidence of tardive dyskinesia, dystonia, or tics  Thought Process:  linear and goal oriented  Associations:  no loose associations  Thought Content:  no evidence of psychotic thought and passive suicidal ideation present, no active plan or intent  Insight:  fair  Judgment:  fair  Oriented to:  time, person, and place  Attention Span and Concentration:  intact  Recent and Remote Memory:  fair  Fund of Knowledge: low-normal  Muscle Strength and Tone: left-sided weakness r/t CVA  Gait and Station: wheelchair-bound           Labs:     Results for orders placed or performed during the hospital encounter of 07/09/20 (from the past 24 hour(s))   Potassium   Result Value Ref Range    Potassium 3.1 (L) 3.4 - 5.3 mmol/L

## 2020-07-14 NOTE — PLAN OF CARE
"Face to face report received from Yadira PARRISH RN. Pt. Observed.     Problem: Adult Inpatient Plan of Care  Goal: Patient-Specific Goal (Individualization)  Outcome: Completed    Problem: Fall Injury Risk  Goal: Absence of Fall and Fall-Related Injury  Description: Pt will remain free from falls.  Pt on 1:1 r/t fall risk.   Pt will use wheelchair and transfer belt during stay.   Pt will wear appropriate footwear.  Okay to have shoe laces on tennis shoes as long as 1:1 is ordered per NP.  7/13/2020 2210 by Karen Lindsay RN  Outcome: No Change    Pt. Continues on 1:1 due to fall risk. Denies HI,SI, and hallucinations. Pt.  Reporting anxiety, depression, and pain. @ 1530 Pt. At nurses station pounding on door raising voice to this writer demanding PRN for pain. \"My tooth hurts. I need something stronger then tylenol.\" Pt. Offered cool pack, dental kit, and prn, benzocaine gel due to all other PRNs given. Pt. Raising voice. \"I want to talk to the NP I asked for something earlier. You need to give me something for my teeth. None of that stuff works. I need something stronger then Tylenol or advil\" NP called with update on Pt. Tooth pain. Provider started pt. On Clindamycin, education administered.  Pt. In agreement to update staff on thoughts feeling of wanting to harm self or others. Pt. Cooperative with medications and nursing assessment. Pt. Eating WDL. Pt. Offers no c/o issues with bowel and bladder. Pt. Out to lounge most of shift socializing with peers and staff. Pt. Apologizing for outburst \"I just have pain.\"     1555 Pt. Administered PRN Ativan 0.5 mg po per pt. Request for increased anxiety.   1631 Pt. Administered PRN Benzocaine gel per pt. Request for increased tooth pain.   1831 Pt. Administered PRN Tylenol 975 mg po and magic mouth wash po per pt. Request for increased tooth pain.   Pt. Administered PRN Benzoczine gel per pt. Request for increased tooth pain.   1831 Pt. Administered PRN Atarax 100 mg po " per pt. Request for increased anxiety.   2149 Pt. Administered PRN Zofran 4 mg po per pt. Request for Nausea.         Face to face end of shift report to be communicated to oncoming RN.     Karen Lindsay RN  7/13/2020

## 2020-07-14 NOTE — PLAN OF CARE
"Face to face shift report received from RITESH Ivan.       Problem: Adult Behavioral Health Plan of Care  Goal: Patient-Specific Goal (Individualization)  Description: Pt will attend 50 % of unit programming.   Pt will be compliant with medications  Pt will sleep 6-8 hours per night   Pt will eat at least 50% of meals.  Okay to have shoe laces on tennis shoes as long as 1:1 is ordered per NP.  Pt may use commode.      7/14/2020 1217 by Yadira Prado RN  Outcome: Improving  Note: Remains on 1:1 continuous observation for fall risk. Utilizes wheelchair and gait belt. Cooperative and medication compliant. Irritable at times. Denies suicidal ideation, reports \"I don't want to kill myself.\" Reports depression and anxiety. Spends time in lounge area. Social with others. Right thumb knuckle appears to have a blister, and redness surrounding blister. Pt reports this is painful. Jessica Arenas CNP updated, no new orders at this time.   0925: Hydroxyzine 100mg for 8/10 anxiety.  1246: Ativan 0.5mg PO for 8/10 anxiety and magic mouthwash for mouth pain.  1307: Zofran ODT 4mg for nausea which pt reports is from antibiotic and potassium.   1528: requested and received Hydroxyzine 100mg for anxiety caused by head ache and tooth pain. At this time pt also requested and received Acetaminophen 975mg for 10/10 head and tooth pain.     Problem: Fall Injury Risk  Goal: Absence of Fall and Fall-Related Injury  Description: Pt will remain free from falls.  Pt on 1:1 r/t fall risk.   Pt will use wheelchair and transfer belt during stay.   Pt will wear appropriate footwear.  Okay to have shoe laces on tennis shoes as long as 1:1 is ordered per NP.  7/14/2020 1217 by Yadira Prado RN  Outcome: Improving   Remains on 1:1 continuous observation for fall risk.  Free from falls or injury this shift.     Problem: Suicide Risk  Goal: Absence of Self-Harm  Description: Will remain without harm while in hospital.  7/14/2020 1217 by Johan, " RITESH May  Outcome: Improving   Free from self harm or injury this shift.       Face to face end of shift report to be communicated to oncoming RN.
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
## 2020-07-14 NOTE — PHARMACY
[facility]    Pharmacy      Antimicrobial Stewardship Note     Current antimicrobial therapy:  Anti-infectives (From now, onward)    Start     Dose/Rate Route Frequency Ordered Stop    07/13/20 1630  clindamycin (CLEOCIN) capsule 300 mg      300 mg Oral EVERY 8 HOURS SCHEDULED 07/13/20 1619 07/20/20 1359        Indication: tooth abscess     Days of Therapy: 2     Pertinent labs:  Creatinine   Creatinine   Date Value Ref Range Status   07/09/2020 0.40 (L) 0.52 - 1.04 mg/dL Final   05/26/2020 0.45 (L) 0.52 - 1.04 mg/dL Final   05/25/2020 0.53 0.52 - 1.04 mg/dL Final     WBC   WBC   Date Value Ref Range Status   07/09/2020 11.2 (H) 4.0 - 11.0 10e9/L Final   05/26/2020 10.5 4.0 - 11.0 10e9/L Final   05/25/2020 10.6 4.0 - 11.0 10e9/L Final     Procalcitonin   Procalcitonin   Date Value Ref Range Status   02/25/2018 0.23 ng/ml Final     Comment:     0.05-0.24 ng/ml Low risk of systemic bacterial infection. Local bacterial   infection possible.  Recommendation: Assess other clinical features of   infection. Discourage antibiotics unless strong clinical suspicion for serious   infection.       CRP   CRP Inflammation   Date Value Ref Range Status   05/12/2020 32.6 (H) 0.0 - 8.0 mg/L Final   07/15/2018 9.5 (H) 0.0 - 8.0 mg/L Final   02/26/2018 140.0 (H) 0.0 - 8.0 mg/L Final       Culture Results: none       Recommendations/Interventions:  1. None at this time     Radha Cisse [facility]  July 14, 2020

## 2020-07-14 NOTE — PLAN OF CARE
Face to face end of shift report will be communicated to oncoming RN.    Problem: Adult Behavioral Health Plan of Care  Goal: Patient-Specific Goal (Individualization)  Description: Pt will attend 50 % of unit programming.   Pt will be compliant with medications  Pt will sleep 6-8 hours per night   Pt will eat at least 50% of meals.  Okay to have shoe laces on tennis shoes as long as 1:1 is ordered per NP.  Pt may use commode.      7/14/2020 0124 by Marzena Newsome RN  Outcome: No Change     Problem: Fall Injury Risk  Goal: Absence of Fall and Fall-Related Injury  Description: Pt will remain free from falls.  Pt on 1:1 r/t fall risk.   Pt will use wheelchair and transfer belt during stay.   Pt will wear appropriate footwear.  Okay to have shoe laces on tennis shoes as long as 1:1 is ordered per NP.  7/14/2020 0124 by Marzena Newsome RN  Outcome: Improving     Problem: Suicide Risk  Goal: Absence of Self-Harm  Description: Will remain without harm while in hospital.  Outcome: No Change   Face to face end of shift report obtained from RITESH Jones. Pt observed resting in bed. 1:1 staff with patient.  0100-Pt appears to be sleeping in bed with eyes closed, having regular respirations, and position changes. 15 minutes and PRN safety checks completed with no noted complains. No falls or self harm noted or reported so far this shift. Will continue to monitor.   0338- Pt had large loose stools. Imodium 2 mg given.  0541-Lab here. Blood sample obtained without difficulty. Waiting results.  0643- Pt c/o anxiety,  9/10 generalized pain and right thumb, and tooth pain. Ativan 0.5 mg, Tylenol 975 mg, and magic mouthwash given as requested. Will continue to monitor.  0707- Pt c/o nausea. States she thinks is the antibiotic. Zofran 4 mg given. Will continue to monitor.

## 2020-07-14 NOTE — PLAN OF CARE
Face to face end of shift report communicated from Yadira PARRISH RN. Pt in lounge playing cards at the start of the shift.     Lore Navarrete RN  7/14/2020  4:33 PM       Problem: Fall Injury Risk  Goal: Absence of Fall and Fall-Related Injury  Description: Pt will remain free from falls.  Pt on 1:1 r/t fall risk.   Pt will use wheelchair and transfer belt during stay.   Pt will wear appropriate footwear.  Okay to have shoe laces on tennis shoes as long as 1:1 is ordered per NP.    Pt has been free of falls this shift, pt remains on 1:1 for safety.   7/14/2020 1632 by Lore Navarrete RN  Outcome: No Change     Problem: Adult Behavioral Health Plan of Care  Goal: Patient-Specific Goal (Individualization)  Description: Pt will attend 50 % of unit programming.   Pt will be compliant with medications  Pt will sleep 6-8 hours per night   Pt will eat at least 50% of meals.  Okay to have shoe laces on tennis shoes as long as 1:1 is ordered per NP.  Pt may use commode.      Pt in lounge most of the shift. Pt played cards with 1:1 staff. Pt requested ativan when shift started, reminded pt that she had just taken hydroxyzine for anxiety and had her ativan at 1246. Pt rated her toothpain at 7/10, anxiety at 8/10. Pt states that she uses playing cards and sleeping as her coping skills for her anxiety. Pt denies SI/HI and hallucinations.    Pt requested her ativan and magic mouthwash at 1745.    Pt reported that when she sat on the commode, her skin on the back of her leg got caught between the bowl and the lid causing the skin to get pinched. Nursing noticed a red area on the back of her right thigh,     Pt given night meds, pt requested zofran 4mg at 2109 to take with her meds stating that her antibiotic makes her sick. Pt took meds at this time, 20 minutes later pt vomited at the side of her bed. Pt vomited several times. Pt was encouraged to notify her nurse practitioner in the morning.   7/14/2020 1632 by Landon  Lore HERNANDEZ RN  Outcome: Improving  Note:        Problem: Suicide Risk  Goal: Absence of Self-Harm  Description: Will remain without harm while in hospital.      7/14/2020 1632 by Lore Navarrete RN  Outcome: Improving       Face to face end of shift report communicated to oncoming RN. Reported that pt is a fall risk and at risk for suicide.     Lore Navarrete RN  7/14/2020  4:34 PM

## 2020-07-15 LAB
ALBUMIN SERPL-MCNC: 3 G/DL (ref 3.4–5)
ALP SERPL-CCNC: 80 U/L (ref 40–150)
ALT SERPL W P-5'-P-CCNC: 29 U/L (ref 0–50)
ANION GAP SERPL CALCULATED.3IONS-SCNC: 6 MMOL/L (ref 3–14)
AST SERPL W P-5'-P-CCNC: 33 U/L (ref 0–45)
BASOPHILS # BLD AUTO: 0 10E9/L (ref 0–0.2)
BASOPHILS NFR BLD AUTO: 0.5 %
BILIRUB SERPL-MCNC: 0.4 MG/DL (ref 0.2–1.3)
BUN SERPL-MCNC: 4 MG/DL (ref 7–30)
CALCIUM SERPL-MCNC: 8.8 MG/DL (ref 8.5–10.1)
CHLORIDE SERPL-SCNC: 109 MMOL/L (ref 94–109)
CO2 SERPL-SCNC: 25 MMOL/L (ref 20–32)
CREAT SERPL-MCNC: 0.51 MG/DL (ref 0.52–1.04)
DIFFERENTIAL METHOD BLD: ABNORMAL
EOSINOPHIL # BLD AUTO: 0.8 10E9/L (ref 0–0.7)
EOSINOPHIL NFR BLD AUTO: 9.7 %
ERYTHROCYTE [DISTWIDTH] IN BLOOD BY AUTOMATED COUNT: 13 % (ref 10–15)
GFR SERPL CREATININE-BSD FRML MDRD: >90 ML/MIN/{1.73_M2}
GLUCOSE SERPL-MCNC: 90 MG/DL (ref 70–99)
HCT VFR BLD AUTO: 37.8 % (ref 35–47)
HGB BLD-MCNC: 12.8 G/DL (ref 11.7–15.7)
IMM GRANULOCYTES # BLD: 0 10E9/L (ref 0–0.4)
IMM GRANULOCYTES NFR BLD: 0.2 %
LYMPHOCYTES # BLD AUTO: 1.1 10E9/L (ref 0.8–5.3)
LYMPHOCYTES NFR BLD AUTO: 12.9 %
MCH RBC QN AUTO: 31.7 PG (ref 26.5–33)
MCHC RBC AUTO-ENTMCNC: 33.9 G/DL (ref 31.5–36.5)
MCV RBC AUTO: 94 FL (ref 78–100)
MONOCYTES # BLD AUTO: 0.5 10E9/L (ref 0–1.3)
MONOCYTES NFR BLD AUTO: 6.1 %
NEUTROPHILS # BLD AUTO: 5.9 10E9/L (ref 1.6–8.3)
NEUTROPHILS NFR BLD AUTO: 70.6 %
NRBC # BLD AUTO: 0 10*3/UL
NRBC BLD AUTO-RTO: 0 /100
PLATELET # BLD AUTO: 285 10E9/L (ref 150–450)
POTASSIUM SERPL-SCNC: 3.5 MMOL/L (ref 3.4–5.3)
PROT SERPL-MCNC: 6 G/DL (ref 6.8–8.8)
RBC # BLD AUTO: 4.04 10E12/L (ref 3.8–5.2)
SODIUM SERPL-SCNC: 140 MMOL/L (ref 133–144)
WBC # BLD AUTO: 8.4 10E9/L (ref 4–11)

## 2020-07-15 PROCEDURE — 85025 COMPLETE CBC W/AUTO DIFF WBC: CPT | Performed by: NURSE PRACTITIONER

## 2020-07-15 PROCEDURE — 25000128 H RX IP 250 OP 636: Performed by: NURSE PRACTITIONER

## 2020-07-15 PROCEDURE — 25000132 ZZH RX MED GY IP 250 OP 250 PS 637: Performed by: NURSE PRACTITIONER

## 2020-07-15 PROCEDURE — 99232 SBSQ HOSP IP/OBS MODERATE 35: CPT | Performed by: NURSE PRACTITIONER

## 2020-07-15 PROCEDURE — 12400000 ZZH R&B MH

## 2020-07-15 PROCEDURE — 80053 COMPREHEN METABOLIC PANEL: CPT | Performed by: NURSE PRACTITIONER

## 2020-07-15 PROCEDURE — 36415 COLL VENOUS BLD VENIPUNCTURE: CPT | Performed by: NURSE PRACTITIONER

## 2020-07-15 RX ORDER — HYDROCORTISONE 25 MG/G
CREAM TOPICAL 2 TIMES DAILY PRN
Status: DISCONTINUED | OUTPATIENT
Start: 2020-07-15 | End: 2020-07-22 | Stop reason: HOSPADM

## 2020-07-15 RX ORDER — DIPHENHYDRAMINE HCL 25 MG
25-50 CAPSULE ORAL EVERY 6 HOURS PRN
Status: DISCONTINUED | OUTPATIENT
Start: 2020-07-15 | End: 2020-07-22 | Stop reason: HOSPADM

## 2020-07-15 RX ORDER — HYDROCORTISONE 25 MG/G
CREAM TOPICAL 2 TIMES DAILY PRN
Status: DISCONTINUED | OUTPATIENT
Start: 2020-07-15 | End: 2020-07-15

## 2020-07-15 RX ADMIN — ACETAMINOPHEN 975 MG: 325 TABLET, FILM COATED ORAL at 05:45

## 2020-07-15 RX ADMIN — DIPHENHYDRAMINE HYDROCHLORIDE 50 MG: 25 CAPSULE ORAL at 10:52

## 2020-07-15 RX ADMIN — FAMOTIDINE 20 MG: 20 TABLET ORAL at 20:05

## 2020-07-15 RX ADMIN — Medication: at 15:23

## 2020-07-15 RX ADMIN — ONDANSETRON 4 MG: 4 TABLET, ORALLY DISINTEGRATING ORAL at 09:09

## 2020-07-15 RX ADMIN — Medication 1 CAPSULE: at 08:58

## 2020-07-15 RX ADMIN — GABAPENTIN 900 MG: 300 CAPSULE ORAL at 08:59

## 2020-07-15 RX ADMIN — FAMOTIDINE 20 MG: 20 TABLET ORAL at 08:58

## 2020-07-15 RX ADMIN — HYDROCORTISONE: 25 CREAM TOPICAL at 19:21

## 2020-07-15 RX ADMIN — GABAPENTIN 900 MG: 300 CAPSULE ORAL at 20:05

## 2020-07-15 RX ADMIN — ESCITALOPRAM OXALATE 10 MG: 10 TABLET ORAL at 09:00

## 2020-07-15 RX ADMIN — LOPERAMIDE HYDROCHLORIDE 4 MG: 2 CAPSULE ORAL at 04:26

## 2020-07-15 RX ADMIN — DIPHENHYDRAMINE HYDROCHLORIDE AND LIDOCAINE HYDROCHLORIDE AND ALUMINUM HYDROXIDE AND MAGNESIUM HYDRO 10 ML: KIT at 04:27

## 2020-07-15 RX ADMIN — LORAZEPAM 0.5 MG: 0.5 TABLET ORAL at 12:25

## 2020-07-15 RX ADMIN — NICOTINE POLACRILEX 4 MG: 2 GUM, CHEWING ORAL at 14:05

## 2020-07-15 RX ADMIN — Medication 2 TABLET: at 20:04

## 2020-07-15 RX ADMIN — NICOTINE POLACRILEX 4 MG: 2 GUM, CHEWING ORAL at 15:24

## 2020-07-15 RX ADMIN — POTASSIUM CHLORIDE 20 MEQ: 750 CAPSULE, EXTENDED RELEASE ORAL at 20:05

## 2020-07-15 RX ADMIN — LORAZEPAM 0.5 MG: 0.5 TABLET ORAL at 04:26

## 2020-07-15 RX ADMIN — GABAPENTIN 900 MG: 300 CAPSULE ORAL at 14:05

## 2020-07-15 RX ADMIN — Medication 1 CAPSULE: at 20:04

## 2020-07-15 RX ADMIN — NICOTINE 1 PATCH: 21 PATCH, EXTENDED RELEASE TRANSDERMAL at 08:56

## 2020-07-15 RX ADMIN — RIVAROXABAN 20 MG: 20 TABLET, FILM COATED ORAL at 09:20

## 2020-07-15 RX ADMIN — DIPHENHYDRAMINE HYDROCHLORIDE 50 MG: 25 CAPSULE ORAL at 20:04

## 2020-07-15 RX ADMIN — NICOTINE POLACRILEX 4 MG: 2 GUM, CHEWING ORAL at 12:39

## 2020-07-15 RX ADMIN — HYDROXYZINE HYDROCHLORIDE 100 MG: 25 TABLET ORAL at 16:38

## 2020-07-15 RX ADMIN — ONDANSETRON 4 MG: 4 TABLET, ORALLY DISINTEGRATING ORAL at 20:05

## 2020-07-15 RX ADMIN — LORAZEPAM 0.5 MG: 0.5 TABLET ORAL at 14:04

## 2020-07-15 RX ADMIN — HYDROXYZINE HYDROCHLORIDE 100 MG: 25 TABLET ORAL at 09:10

## 2020-07-15 RX ADMIN — ACETAMINOPHEN 975 MG: 325 TABLET, FILM COATED ORAL at 12:25

## 2020-07-15 RX ADMIN — DIPHENHYDRAMINE HYDROCHLORIDE AND LIDOCAINE HYDROCHLORIDE AND ALUMINUM HYDROXIDE AND MAGNESIUM HYDRO 10 ML: KIT at 12:26

## 2020-07-15 RX ADMIN — DIPHENHYDRAMINE HYDROCHLORIDE AND LIDOCAINE HYDROCHLORIDE AND ALUMINUM HYDROXIDE AND MAGNESIUM HYDRO 10 ML: KIT at 20:09

## 2020-07-15 RX ADMIN — POTASSIUM CHLORIDE 10 MEQ: 750 CAPSULE, EXTENDED RELEASE ORAL at 12:40

## 2020-07-15 RX ADMIN — POTASSIUM CHLORIDE 10 MEQ: 750 CAPSULE, EXTENDED RELEASE ORAL at 09:23

## 2020-07-15 ASSESSMENT — ACTIVITIES OF DAILY LIVING (ADL)
DRESS: SCRUBS (BEHAVIORAL HEALTH)
HYGIENE/GROOMING: WITH ASSISTANCE
ORAL_HYGIENE: WITH ASSISTANCE
LAUNDRY: UNABLE TO COMPLETE

## 2020-07-15 NOTE — PLAN OF CARE
Face to face report received from Lore CADET RN. Pt. Observed.     Problem: Adult Behavioral Health Plan of Care  Goal: Patient-Specific Goal (Individualization)  Description: Pt will attend 50 % of unit programming.   Pt will be compliant with medications  Pt will sleep 6-8 hours per night   Pt will eat at least 50% of meals.  Okay to have shoe laces on tennis shoes as long as 1:1 is ordered per NP.  Pt may use commode.  7/15/2020 0601 by Karen Lindsay RN  Outcome: No Change  Note:      Pt has been in bed with eyes closed and regular respirations for a total of 5 hours this noc shift. 15 minute and PRN checks all night. Pt. Refusing Labs this a.m. and scheduled Clindamycin 300 mg po. Pt. Reporting blister on lip is painful. Chap stick administered.Pt. Requesting hemorrhoid cream. Provider sticky noted. Will continue to monitor.       0426 Pt. Administered PRN ativan 0.5 mg po Per pt. Request for anxiety with effective results.      0426 Pt. Administered PRN Imodium 4 mg po Per pt. Request for Diarrhea with effective results.      0426 Pt. Administered PRN Magic mouth wash 10 mL po Per pt. Request for anxiety with effective results.      0545 Pt. Administered PRN Tylenol 975 mg po Per pt. Request for all over body pain with somewhat effective results.   Problem: Fall Injury Risk  Goal: Absence of Fall and Fall-Related Injury  Description: Pt will remain free from falls.  Pt on 1:1 r/t fall risk.   Pt will use wheelchair and transfer belt during stay.   Pt will wear appropriate footwear.  Okay to have shoe laces on tennis shoes as long as 1:1 is ordered per NP.  7/15/2020 0601 by Karen Lindsay, RN  Outcome: Improving  Pt. Free from falls this noc shift.      Face to face end of shift report to be communicated to oncoming RN.     Karen Lindsay RN  7/15/2020

## 2020-07-15 NOTE — PLAN OF CARE
0700: End of shift report received from Karen Mc RN.     0750: Pt observed in room at this time. 1:1 in room with pt at this time.     0900: Pt requesting to only take partial dose of the Potassium this morning. Pt states she became nauseated after taking 20 mEq at once yesterday evening. Pt states would take 10 mEq of potassium at this time and then the other 10 mEq of potassium at noon.   Pt took Zofran 4 mg at 0909 to help with nausea.     1052: 50 mg Benadryl given at this time.    Pt refused lab draw for potassium this morning, but was willing to let lab takes labs later on.  Results back this afternoon and potassium is stable today, 3.5. NP states to continue with the 20 mEq BID.      1240 : Pt received additional 10 mEq of potassium at this time. Pt requested 0.5 mg ativan, and magic mouth wash at this time.     0200: Pt up in [facility] area this afternoon, playing cards with 1:1 staff.   Pt requested another dose 0.5 mg ativan at this time.     Pt complaints of blister to upper and lower lip. No other sores noted in mouth this shift. Pt's tongue does appear swollen, and pt does complain of a sore throat. Np believes may be a reaction to the clindamycin, which medical NP did discontinue the clindamycin.     Swelling in patient's lips appear to be less swollen this afternoon. Pt complaints still of severe pain, and tooth pain today still.  Pt is irritable at times, and impatient waiting for PRN's.     Problem: Adult Behavioral Health Plan of Care  Goal: Patient-Specific Goal (Individualization)  Description: Pt will attend 50 % of unit programming.   Pt will be compliant with medications  Pt will sleep 6-8 hours per night   Pt will eat at least 50% of meals.  Okay to have shoe laces on tennis shoes as long as 1:1 is ordered per NP.  Pt may use commode.      7/15/2020 1022 by Jasmyn Manuel, RN  Outcome: Improving  Note:        Problem: Fall Injury Risk  Goal: Absence of Fall and Fall-Related  Injury  Description: Pt will remain free from falls.  Pt on 1:1 r/t fall risk.   Pt will use wheelchair and transfer belt during stay.   Pt will wear appropriate footwear.  Okay to have shoe laces on tennis shoes as long as 1:1 is ordered per NP.  7/15/2020 1022 by Jasmyn Manuel RN  Outcome: Improving     Problem: Suicide Risk  Goal: Absence of Self-Harm  Description: Will remain without harm while in hospital.  Outcome: Improving

## 2020-07-15 NOTE — PROGRESS NOTES
[facility]    Medical Services Progress Note    Date of Service (when I saw the patient): 07/15/2020    Assessment & Plan     Principal Problem:    Suicidal ideation     Active Medical Problems:    Hypokalemia- pt has chronic history of low potassium. Potassium level in ED was 2.4 and was replaced and level came up to 3.2. Home dose of potassium ordered. Will check potassium level in morning.   7/13/20- k+ level on 7/11/20 was 3.1 and 3.2 on 7/12/20. Pmhnp replaced with 30 meq po. K+ level is morning was 3.3. Pt has hx of chronic hypokalemia and takes 10 meq bid. Increased to 20 meq bid. Will recheck level in the morning.   7/15/20- k+ level was still low yesterday, replaced with 40 meq yesterday. Ordered K+ this morning but pt refused labs, pt also refused to take both tablets of the potassium this morning and only took 10 meq. After talking with pt she agreed for labs. K+ level is within the normal range but on the low end at 3.5. Discussed with pt about being complaint taking ordered potassium and lab draws. Pt verbalized understanding and stated she would take the potassium as ordered.        History of CVA (cerebrovascular accident)- according to medical chart and pt she had a PE in 2015 and stopped taking anticoagulants and then had a right sided CVA in 2018. This left her with left sided hemiparesis and in a wheelchair. Pt wears a sling on her left arm to help hold it up because she experiences chronic left shoulder pain now since her stroke. Pt denies any new stroke symptoms, chest pain, sob. Ordered home dose of Xarelto. Pt is a 1:1 for pt safety due to use of wheelchair and left sided hemiparesis.    7/13/20 -pt is a 1:1 for pt safety. Pt denies any acute stroke symptoms, chest pain, sob.   7/15/20- pt is 1:1 for pt safety. Pt denies any falls or acute stroke symptoms, chest pain, sob.        Chronic left shoulder pain- pt states she experiences chronic left shoulder pain since she had her  "stroke in 2018 due to it \"dangling\" and she is unable to lift her arm. Ordered home dose of gabapentin and pt states this helps control her pain.   7/13/20- denies any pain.  7/15/20- denies any pain       Irritable bowel syndrome with diarrhea- pt reports she takes imodium 4mg once in the morning and this helps control her diarrhea all day. Pt denies taking any other medications for her IBS and states the imodium has controlled it.      Tooth abscess- consulted by Pmhnp on 7/13/20 afternoon, pt  complaining of tooth pain in upper mouth.  Pt using magic mouthwash and Orajel. Pt states she has a dentist appointment August 14. Ordered clindamycin and probiotics on Monday.  Pt refused dose this morning. Discontinued due to possible side effects. Pt reports tooth pain is better and she will continue magic mouthwash and it is helping. No drainage, erythema noted.     Lower lip blister and thumb blister with bruise- Pt reports that her thumb had a blister on it a week ago, but now appears bruised. It appears as though she has smashed it in something. Pt denies any injury and is able to move it. She states it is just tender when she pushes on the top of blister. Pt also has a lip blister on her lower lip. Lamictal was discontinued. Talked to Dr. Rinaldi and he discontinued due to potential side effect from Lamictal. Pt and nurse reports blister on lip and thumb started before clindamycin was started. However, discontinued due to pt refusing to take it and possible side effects. CBC and CMP ordered. CBC normal and CMP revealed continued decreased renal function and albumin and protein decreased, otherwise unremarkable.     Abnormal kidney function- pt has history of low creatinine. On admission creatinine was 0.40, today it is improved at 0.51. Creatinine in May when admitted to medical floor lithium toxicity was as low as 0.45. Will continue to monitor and avoid ibuprofen.      covid screening- negative     Code Status: Full " "Code.    Jessica Arenas CNP        -Data reviewed today: I reviewed all new labs and imaging results over the last 24 hours.     Physical Exam   Temp: 99.1  F (37.3  C) Temp src: Tympanic BP: 99/51 Pulse: 75   Resp: 18 SpO2: 96 % O2 Device: None (Room air)    Vitals:    07/09/20 1547 07/10/20 0500 07/12/20 0730   Weight: 80.7 kg (178 lb) 80.7 kg (177 lb 14.6 oz) 75.4 kg (166 lb 4.8 oz)     Vital Signs with Ranges  Temp:  [97.8  F (36.6  C)-99.1  F (37.3  C)] 99.1  F (37.3  C)  Pulse:  [75-97] 75  Resp:  [16-18] 18  BP: ()/(51-73) 99/51  SpO2:  [96 %-98 %] 96 %  No intake/output data recorded.    Constitutional: sleeping, wakes on command, cooperative, no apparent distress.   Respiratory: No increased work of breathing, speaks in full sentences, no audible wheeze, symmetric rise and fall of chest.   Cardiovascular: No edema, warm extremities per pt report  GI: soft, non-distended, non-tender, no masses palpated per pt   Skin: upper and lower lip  small blister, no blistering  in mouth, small blister on thumb. Otherwise skin warm, dry, intact.   Musculoskeletal: no lower extremity pitting edema present  there is no redness, warmth, or swelling of the joints  tone is normal with exception of left side. Noticeable left shoulder \"drop\". No left side strength.range of motion noted.   Neuropsychiatric: General: normal, calm and normal eye contact    Medications     ergocalciferol  50,000 Units Oral Weekly     escitalopram  10 mg Oral Daily     famotidine  20 mg Oral BID     gabapentin  900 mg Oral TID     lactobacillus rhamnosus (GG)  1 capsule Oral BID     nicotine  1 patch Transdermal Daily     nicotine   Transdermal Q8H     potassium chloride ER  20 mEq Oral BID     rivaroxaban ANTICOAGULANT  20 mg Oral QAM       Data   Recent Labs   Lab 07/15/20  1052 07/14/20  0541 07/13/20  0551  07/09/20  1613   WBC 8.4  --   --   --  11.2*   HGB 12.8  --   --   --  13.2   MCV 94  --   --   --  95     --   --   --  417 " "    --   --   --  140   POTASSIUM 3.5 3.1* 3.3*   < > 2.4*   CHLORIDE 109  --   --   --  111*   CO2 25  --   --   --  21   BUN 4*  --   --   --  5*   CR 0.51*  --   --   --  0.40*   ANIONGAP 6  --   --   --  8   AVINASH 8.8  --   --   --  8.8   GLC 90  --   --   --  115*   ALBUMIN 3.0*  --   --   --  3.0*   PROTTOTAL 6.0*  --   --   --  6.2*   BILITOTAL 0.4  --   --   --  0.3   ALKPHOS 80  --   --   --  73   ALT 29  --   --   --  35   AST 33  --   --   --  85*    < > = values in this interval not displayed.       No results found for this or any previous visit (from the past 24 hour(s)).     Maggie Case is a 30 year old female who is being evaluated via a billable video visit.      The patient has been notified of following:     \"This video visit will be conducted via a call between you and your physician/provider. We have found that certain health care needs can be provided without the need for an in-person physical exam.  This service lets us provide the care you need with a video conversation.  If a prescription is necessary we can send it directly to your pharmacy.  If lab work is needed we can place an order for that and you can then stop by our lab to have the test done at a later time.    If during the course of the call the physician/provider feels a video visit is not appropriate, you will not be charged for this service.\"     Patient has given verbal consent for Video visit? Yes      Video Start Time: 1325    Maggie Case complains of    Chief Complaint   Patient presents with     Suicidal     wants to cut her legs where arteries are.       I have reviewed and updated the patient's Past Medical History, Social History, Family History and Medication List.    ALLERGIES  Amoxicillin; Levaquin [levofloxacin]; Lithium; Naproxen; Nickel; Tramadol; and Sulindac      Video-Visit Details    Type of service:  Video Visit    Video End Time (time video stopped): 1345    Originating Location (pt. Location): " Other [address] [place] [address]     Distant Location (provider location):  [facility]     Mode of Communication:  Video Conference via Saran Arenas CNP

## 2020-07-15 NOTE — PLAN OF CARE
"  Problem: Suicide Risk  Goal: Absence of Self-Harm  Description: Will remain without harm while in hospital.  7/15/2020 1822 by Monika Cee, RN  Outcome: Improving   Denies suicidal ideations.  Problem: Fall Injury Risk  Goal: Absence of Fall and Fall-Related Injury  Description: Pt will remain free from falls.  Pt on 1:1 r/t fall risk.   Pt will use wheelchair and transfer belt during stay.   Pt will wear appropriate footwear.  Okay to have shoe laces on tennis shoes as long as 1:1 is ordered per NP.  7/15/2020 1822 by Monika Cee, RN  Outcome: No Change   Has had no falls or injuries during this evening shift.  Does have 1:1 staff present at all times.  Problem: Adult Behavioral Health Plan of Care  Goal: Patient-Specific Goal (Individualization)  Description: Pt will attend 50 % of unit programming.   Pt will be compliant with medications  Pt will sleep 6-8 hours per night   Pt will eat at least 50% of meals.  Okay to have shoe laces on tennis shoes as long as 1:1 is ordered per NP.  Pt may use commode.      7/15/2020 1822 by Monika Cee, RN  Outcome: No Change  Note:   Sitting in lounge area, with 1:1 staff, does admit she does not go to groups because \"there are to many people there\". Rates anxiety 9/10, depression 10/10, states, \"they are high because I am here\"  Does ask for prns frequently. States, \"I sleep like shit here\". Does not socialize with peers. Can be rude and demanding at times.    Asked for and received Atarax 100 mg at 1640.       Patient asked for and received multiple PRNs this shift.  Went to bed at 1930 requested meds at 2000.  PRNs: magic mouth 2009, Hemorroid cream 1921, Benadryl 50 mg 2004, Zofran 4 mg 2005, Melatonin 3 mg 2004.   Did ask for Remeron also but after discussion with pharmacy it was decided that giving it with the Benadryl & Melatonin was not a good idea.  Per pharmacy we could wait one hour and if patient was still awake and requested it, it would than be okay. "  Patient is in bed with eyes closed, normal non labored respirations appears to be asleep.  No further request this evening.      End of shift report given to oncoming midnight RN.

## 2020-07-16 LAB — POTASSIUM SERPL-SCNC: 4 MMOL/L (ref 3.4–5.3)

## 2020-07-16 PROCEDURE — 36415 COLL VENOUS BLD VENIPUNCTURE: CPT | Performed by: NURSE PRACTITIONER

## 2020-07-16 PROCEDURE — 84132 ASSAY OF SERUM POTASSIUM: CPT | Performed by: NURSE PRACTITIONER

## 2020-07-16 PROCEDURE — 25000128 H RX IP 250 OP 636: Performed by: NURSE PRACTITIONER

## 2020-07-16 PROCEDURE — 25000132 ZZH RX MED GY IP 250 OP 250 PS 637: Performed by: NURSE PRACTITIONER

## 2020-07-16 PROCEDURE — 99233 SBSQ HOSP IP/OBS HIGH 50: CPT | Mod: GT | Performed by: NURSE PRACTITIONER

## 2020-07-16 PROCEDURE — 99231 SBSQ HOSP IP/OBS SF/LOW 25: CPT | Performed by: NURSE PRACTITIONER

## 2020-07-16 PROCEDURE — 12400000 ZZH R&B MH

## 2020-07-16 RX ORDER — ARIPIPRAZOLE 2 MG/1
2 TABLET ORAL DAILY
Status: DISCONTINUED | OUTPATIENT
Start: 2020-07-16 | End: 2020-07-18

## 2020-07-16 RX ORDER — POTASSIUM CHLORIDE 750 MG/1
10 CAPSULE, EXTENDED RELEASE ORAL 2 TIMES DAILY
Status: DISCONTINUED | OUTPATIENT
Start: 2020-07-16 | End: 2020-07-19

## 2020-07-16 RX ADMIN — NICOTINE POLACRILEX 4 MG: 2 GUM, CHEWING ORAL at 20:16

## 2020-07-16 RX ADMIN — LORAZEPAM 0.5 MG: 0.5 TABLET ORAL at 14:24

## 2020-07-16 RX ADMIN — LOPERAMIDE HYDROCHLORIDE 4 MG: 2 CAPSULE ORAL at 05:30

## 2020-07-16 RX ADMIN — LORAZEPAM 0.5 MG: 0.5 TABLET ORAL at 12:22

## 2020-07-16 RX ADMIN — DIPHENHYDRAMINE HYDROCHLORIDE 50 MG: 25 CAPSULE ORAL at 20:49

## 2020-07-16 RX ADMIN — ONDANSETRON 4 MG: 4 TABLET, ORALLY DISINTEGRATING ORAL at 20:49

## 2020-07-16 RX ADMIN — Medication 2 TABLET: at 20:49

## 2020-07-16 RX ADMIN — POTASSIUM CHLORIDE 20 MEQ: 750 CAPSULE, EXTENDED RELEASE ORAL at 08:53

## 2020-07-16 RX ADMIN — Medication 1 CAPSULE: at 20:49

## 2020-07-16 RX ADMIN — ESCITALOPRAM OXALATE 10 MG: 10 TABLET ORAL at 08:53

## 2020-07-16 RX ADMIN — ONDANSETRON 4 MG: 4 TABLET, ORALLY DISINTEGRATING ORAL at 09:01

## 2020-07-16 RX ADMIN — DIPHENHYDRAMINE HYDROCHLORIDE 50 MG: 25 CAPSULE ORAL at 09:12

## 2020-07-16 RX ADMIN — Medication 1 CAPSULE: at 08:54

## 2020-07-16 RX ADMIN — NICOTINE 1 PATCH: 21 PATCH, EXTENDED RELEASE TRANSDERMAL at 08:51

## 2020-07-16 RX ADMIN — ACETAMINOPHEN 975 MG: 325 TABLET, FILM COATED ORAL at 19:10

## 2020-07-16 RX ADMIN — NICOTINE POLACRILEX 4 MG: 2 GUM, CHEWING ORAL at 18:13

## 2020-07-16 RX ADMIN — ACETAMINOPHEN 975 MG: 325 TABLET, FILM COATED ORAL at 12:22

## 2020-07-16 RX ADMIN — HYDROCORTISONE: 25 CREAM TOPICAL at 05:32

## 2020-07-16 RX ADMIN — NICOTINE POLACRILEX 4 MG: 2 GUM, CHEWING ORAL at 13:23

## 2020-07-16 RX ADMIN — POTASSIUM CHLORIDE 10 MEQ: 750 CAPSULE, EXTENDED RELEASE ORAL at 20:49

## 2020-07-16 RX ADMIN — GABAPENTIN 900 MG: 300 CAPSULE ORAL at 08:52

## 2020-07-16 RX ADMIN — DIPHENHYDRAMINE HYDROCHLORIDE AND LIDOCAINE HYDROCHLORIDE AND ALUMINUM HYDROXIDE AND MAGNESIUM HYDRO 10 ML: KIT at 12:24

## 2020-07-16 RX ADMIN — DIPHENHYDRAMINE HYDROCHLORIDE AND LIDOCAINE HYDROCHLORIDE AND ALUMINUM HYDROXIDE AND MAGNESIUM HYDRO 10 ML: KIT at 17:22

## 2020-07-16 RX ADMIN — GABAPENTIN 900 MG: 300 CAPSULE ORAL at 20:49

## 2020-07-16 RX ADMIN — GABAPENTIN 900 MG: 300 CAPSULE ORAL at 13:22

## 2020-07-16 RX ADMIN — DIPHENHYDRAMINE HYDROCHLORIDE AND LIDOCAINE HYDROCHLORIDE AND ALUMINUM HYDROXIDE AND MAGNESIUM HYDRO 10 ML: KIT at 06:11

## 2020-07-16 RX ADMIN — FAMOTIDINE 20 MG: 20 TABLET ORAL at 08:54

## 2020-07-16 RX ADMIN — ACETAMINOPHEN 975 MG: 325 TABLET, FILM COATED ORAL at 06:10

## 2020-07-16 RX ADMIN — HYDROXYZINE HYDROCHLORIDE 100 MG: 25 TABLET ORAL at 17:21

## 2020-07-16 RX ADMIN — LORAZEPAM 0.5 MG: 0.5 TABLET ORAL at 06:11

## 2020-07-16 RX ADMIN — Medication: at 19:14

## 2020-07-16 RX ADMIN — NICOTINE POLACRILEX 4 MG: 2 GUM, CHEWING ORAL at 14:23

## 2020-07-16 RX ADMIN — ARIPIPRAZOLE 2 MG: 2 TABLET ORAL at 12:22

## 2020-07-16 RX ADMIN — RIVAROXABAN 20 MG: 20 TABLET, FILM COATED ORAL at 08:59

## 2020-07-16 RX ADMIN — HYDROXYZINE HYDROCHLORIDE 100 MG: 25 TABLET ORAL at 05:30

## 2020-07-16 RX ADMIN — FAMOTIDINE 20 MG: 20 TABLET ORAL at 20:49

## 2020-07-16 ASSESSMENT — ACTIVITIES OF DAILY LIVING (ADL)
LAUNDRY: UNABLE TO COMPLETE
LAUNDRY: UNABLE TO COMPLETE
ORAL_HYGIENE: WITH ASSISTANCE
HYGIENE/GROOMING: WITH ASSISTANCE
DRESS: SCRUBS (BEHAVIORAL HEALTH)
DRESS: SCRUBS (BEHAVIORAL HEALTH);WITH ASSISTANCE
ORAL_HYGIENE: WITH ASSISTANCE
HYGIENE/GROOMING: WITH ASSISTANCE

## 2020-07-16 NOTE — PLAN OF CARE
"Checked in with pt this morning - pt reported she is not doing well stated her lips are painful due to blisters and her thumb as it has a blister on it too - reported that she believes its a side effect from the antibiotic she was on for her tooth. Informed pt of her hearing on Monday and who her  was - provided contact information to pt. Pt also asked if writer has spoken with her PCA - message was left for company that PCA works for - pt gave writer PCAs direct number. Pt would like PCA to bring in her mail and knee brace - will speak with OT - pt then asked who she would need to speak with \"to have my ativan increased I think that is the only thing that will help right now\" - passed this along to pt's nurse     Called and spoke with pt's PCA, Susan, and Susan stated \"as you know we are not an all call service, but I will have talk to my boss\" - Susan stated she has not been able to speak with pt yet as \"everytime I call the lines busy or she's doing something which is good, but just tell her if she wants to talk to me she can call me\"   "

## 2020-07-16 NOTE — PLAN OF CARE
Face to face end of shift report will be communicated to oncaba RN.    Problem: Adult Behavioral Health Plan of Care  Goal: Patient-Specific Goal (Individualization)  Description: Pt will attend 50 % of unit programming.   Pt will be compliant with medications  Pt will sleep 6-8 hours per night   Pt will eat at least 50% of meals.  Okay to have shoe laces on tennis shoes as long as 1:1 is ordered per NP.  Pt may use commode.      7/16/2020 0015 by Marzena Newsome RN  Outcome: No Change     Problem: Fall Injury Risk  Goal: Absence of Fall and Fall-Related Injury  Description: Pt will remain free from falls.  Pt on 1:1 r/t fall risk.   Pt will use wheelchair and transfer belt during stay.   Pt will wear appropriate footwear.  Okay to have shoe laces on tennis shoes as long as 1:1 is ordered per NP.  7/16/2020 0015 by Marzena Newsome RN  Outcome: Improving     Problem: Suicide Risk  Goal: Absence of Self-Harm  Description: Will remain without harm while in hospital.  7/16/2020 0015 by Marzena Newsome RN  Outcome: No Change    Face to face end of shift report obtained from RITESH Frank. Pt observed resting in bed. 1:1 staff with patient.  Pt appears to be sleeping in bed with eyes closed, having regular respirations, and position changes. 15 minutes and PRN safety checks completed with no noted complains. No falls or self harm noted or reported so far this shift. Will continue to monitor.   0530- Pt c/o anxiety and had first loose stool of the day. Imodium 4 mg, Hydrocortisone cream and Atarax 100 mg given per pt's request.   0535- Pt plesantly refused labs until 0800.   0610- PT states anxiety is worsen,  c/o 9/10 pain to left knee and tooth pain. Tylenol 975 mg, Ativan 0.5 mg, and magic mouthwash given per pt request. Pt mouth blister reddened, and sore.  Right thumb discolored.  Will continue to monitor.

## 2020-07-16 NOTE — PLAN OF CARE
0700: End of shift report received from Marzena COBB RN.     0850: [facility] in [facility] area at this time socializing. Pt cooperative taking medication, although pt does stress she would like potassium spaced out at different times. This writer will leave sticky note for NP.  Pt still has complaints of 8/10 pain R/T blisters on lips. Will make sure to keep up with tylenol this shift when due for this.    Pt refused group this morning, states lips hurting too much to talk.     This writer will also notify therapy to see if able to get Knee brace for pt. Pt states she uses one at home and is helpful when pt is transfering.     Pt did state SI still this shift when she spoke with NP. NP did discuss starting Abilify, which pt agreed to. Abilify mg daily started today at 1112.     Pt out in [facility] area this afternoon, socializing with peers.   Pt irritated , and impatient at times this shift.  Speech is clear and pressured at times this shift.     1500: Face to face end of shift report  to be communicated to oncoming RN.     Jasmyn Manuel RN  7/16/2020  9:29 AM       Problem: Adult Behavioral Health Plan of Care  Goal: Patient-Specific Goal (Individualization)  Description: Pt will attend 50 % of unit programming.   Pt will be compliant with medications  Pt will sleep 6-8 hours per night   Pt will eat at least 50% of meals.  Okay to have shoe laces on tennis shoes as long as 1:1 is ordered per NP.  Pt may use commode.      7/16/2020 0928 by Jasmyn Manuel RN  Outcome: Improving  Note:        Problem: Fall Injury Risk  Goal: Absence of Fall and Fall-Related Injury  Description: Pt will remain free from falls.  Pt on 1:1 r/t fall risk.   Pt will use wheelchair and transfer belt during stay.   Pt will wear appropriate footwear.  Okay to have shoe laces on tennis shoes as long as 1:1 is ordered per NP.  7/16/2020 0928 by Jasmyn Manuel RN  Outcome: Improving     Problem: Suicide Risk  Goal: Absence of  Self-Harm  Description: Will remain without harm while in hospital.  7/16/2020 0928 by Jasmyn Manuel, RN  Outcome: Improving

## 2020-07-16 NOTE — PROGRESS NOTES
Spoke with pt today about her knee brace.  States that she uses it to help with transfers and plans to have staff talk to her PCA about bringing it to her.  Spoke with HERNAN who is on contact with pts PCA.

## 2020-07-16 NOTE — PROGRESS NOTES
[facility]    Medical Services Progress Note    Date of Service (when I saw the patient): 07/16/2020    Assessment & Plan     Principal Problem:    Suicidal ideation     Active Medical Problems:    Hypokalemia- pt has chronic history of low potassium. Potassium level in ED was 2.4 and was replaced and level came up to 3.2. Home dose of potassium ordered. Will check potassium level in morning.   7/13/20- k+ level on 7/11/20 was 3.1 and 3.2 on 7/12/20. Pmhnp replaced with 30 meq po. K+ level is morning was 3.3. Pt has hx of chronic hypokalemia and takes 10 meq bid. Increased to 20 meq bid. Will recheck level in the morning.   7/15/20- k+ level was still low yesterday, replaced with 40 meq yesterday. Ordered K+ this morning but pt refused labs, pt also refused to take both tablets of the potassium this morning and only took 10 meq. After talking with pt she agreed for labs. K+ level is within the normal range but on the low end at 3.5. Discussed with pt about being complaint taking ordered potassium and lab draws. Pt verbalized understanding and stated she would take the potassium as ordered.   7/16/20- potassium level is much better today at 4.0. Pt does not like taking 2 tablets of potassium at the same time  and has to be encouraged. Pt wants to go back to her 10 meq bid. Since levels have improved will return to pt home dose of 10 meq bid and continue to monitor.        History of CVA (cerebrovascular accident)- according to medical chart and pt she had a PE in 2015 and stopped taking anticoagulants and then had a right sided CVA in 2018. This left her with left sided hemiparesis and in a wheelchair. Pt wears a sling on her left arm to help hold it up because she experiences chronic left shoulder pain now since her stroke. Pt denies any new stroke symptoms, chest pain, sob. Ordered home dose of Xarelto. Pt is a 1:1 for pt safety due to use of wheelchair and left sided hemiparesis.    7/13/20 -pt is a  "1:1 for pt safety. Pt denies any acute stroke symptoms, chest pain, sob.   7/15/20- pt is 1:1 for pt safety. Pt denies any falls or acute stroke symptoms, chest pain, sob.   7/16/20- continue 1:1 for pt safety. Denies any falls of injuries, Denies any acute stroke symptoms, chest pain, sob.        Chronic left shoulder pain- pt states she experiences chronic left shoulder pain since she had her stroke in 2018 due to it \"dangling\" and she is unable to lift her arm. Ordered home dose of gabapentin and pt states this helps control her pain.   7/13/20- denies any pain.  7/15/20- denies any pain       Irritable bowel syndrome with diarrhea- pt reports she takes imodium 4mg once in the morning and this helps control her diarrhea all day. Pt denies taking any other medications for her IBS and states the imodium has controlled it.      Tooth abscess- consulted by Pmhnp on 7/13/20 afternoon, pt  complaining of tooth pain in upper mouth.  Pt using magic mouthwash and Orajel. Pt states she has a dentist appointment August 14. Ordered clindamycin and probiotics on Monday.  Pt refused dose this morning. Discontinued due to possible side effects. Pt reports tooth pain is better and she will continue magic mouthwash and it is helping. No drainage, erythema noted.   7/16/20- reports the magic mouthwash is helping with tooth pain. Reports she is able to eat and drink okay.     Lower lip blister and thumb blister with bruise- Pt reports that her thumb had a blister on it a week ago, but now appears bruised. It appears as though she has smashed it in something. Pt denies any injury and is able to move it. She states it is just tender when she pushes on the top of blister. Pt also has a lip blister on her lower lip. Lamictal was discontinued. Talked to Dr. Rinaldi and he discontinued due to potential side effect from Lamictal. Pt and nurse reports blister on lip and thumb started before clindamycin was started. However, discontinued due " "to pt refusing to take it and possible side effects. CBC and CMP ordered. CBC normal and CMP revealed continued decreased renal function and albumin and protein decreased, otherwise unremarkable.   7/16/20- Pmhnp reports the lip blister and thumb blister are consistent with Lamictal rash. Some improvement noted. No edema in lip.     Abnormal kidney function- pt has history of low creatinine. On admission creatinine was 0.40, today it is improved at 0.51. Creatinine in May when admitted to medical floor lithium toxicity was as low as 0.45. Will continue to monitor and avoid ibuprofen.      covid screening- negative     Code Status: Full Code.    Jessica Arenas CNP        -Data reviewed today: I reviewed all new labs and imaging results over the last 24 hours.     Physical Exam   Temp: 97.6  F (36.4  C) Temp src: Tympanic BP: 111/68 Pulse: 83   Resp: 18 SpO2: 95 % O2 Device: None (Room air)    Vitals:    07/09/20 1547 07/10/20 0500 07/12/20 0730   Weight: 80.7 kg (178 lb) 80.7 kg (177 lb 14.6 oz) 75.4 kg (166 lb 4.8 oz)     Vital Signs with Ranges  Temp:  [97.6  F (36.4  C)-98.4  F (36.9  C)] 97.6  F (36.4  C)  Pulse:  [] 83  Resp:  [16-18] 18  BP: (111-123)/(68-78) 111/68  SpO2:  [95 %-97 %] 95 %  No intake/output data recorded.    Constitutional: sleeping, wakes on command, cooperative, no apparent distress.   Respiratory: No increased work of breathing, speaks in full sentences, no audible wheeze, symmetric rise and fall of chest.   Cardiovascular: No edema, warm extremities per pt report  GI: soft, non-distended, non-tender, no masses palpated per pt   Skin: upper and lower lip  small blister, no blistering  in mouth, small blister on thumb. Otherwise skin warm, dry, intact.   Musculoskeletal: no lower extremity pitting edema present  there is no redness, warmth, or swelling of the joints  tone is normal with exception of left side. Noticeable left shoulder \"drop\". No left side strength.range of motion noted. " "  Neuropsychiatric: General: normal, calm and normal eye contact    Medications     ARIPiprazole  2 mg Oral Daily     ergocalciferol  50,000 Units Oral Weekly     escitalopram  10 mg Oral Daily     famotidine  20 mg Oral BID     gabapentin  900 mg Oral TID     lactobacillus rhamnosus (GG)  1 capsule Oral BID     nicotine  1 patch Transdermal Daily     nicotine   Transdermal Q8H     potassium chloride ER  10 mEq Oral BID     rivaroxaban ANTICOAGULANT  20 mg Oral QAM       Data   Recent Labs   Lab 07/16/20  0823 07/15/20  1052 07/14/20  0541  07/09/20  1613   WBC  --  8.4  --   --  11.2*   HGB  --  12.8  --   --  13.2   MCV  --  94  --   --  95   PLT  --  285  --   --  417   NA  --  140  --   --  140   POTASSIUM 4.0 3.5 3.1*   < > 2.4*   CHLORIDE  --  109  --   --  111*   CO2  --  25  --   --  21   BUN  --  4*  --   --  5*   CR  --  0.51*  --   --  0.40*   ANIONGAP  --  6  --   --  8   AVINASH  --  8.8  --   --  8.8   GLC  --  90  --   --  115*   ALBUMIN  --  3.0*  --   --  3.0*   PROTTOTAL  --  6.0*  --   --  6.2*   BILITOTAL  --  0.4  --   --  0.3   ALKPHOS  --  80  --   --  73   ALT  --  29  --   --  35   AST  --  33  --   --  85*    < > = values in this interval not displayed.       No results found for this or any previous visit (from the past 24 hour(s)).     Maggie Case is a 30 year old female who is being evaluated via a billable video visit.      The patient has been notified of following:     \"This video visit will be conducted via a call between you and your physician/provider. We have found that certain health care needs can be provided without the need for an in-person physical exam.  This service lets us provide the care you need with a video conversation.  If a prescription is necessary we can send it directly to your pharmacy.  If lab work is needed we can place an order for that and you can then stop by our lab to have the test done at a later time.    If during the course of the call the " "physician/provider feels a video visit is not appropriate, you will not be charged for this service.\"     Patient has given verbal consent for Video visit? Yes      Video Start Time: 1103    Maggieangela Case complains of    Chief Complaint   Patient presents with     Suicidal     wants to cut her legs where arteries are.       I have reviewed and updated the patient's Past Medical History, Social History, Family History and Medication List.    ALLERGIES  Amoxicillin; Levaquin [levofloxacin]; Lithium; Naproxen; Nickel; Tramadol; and Sulindac      Video-Visit Details    Type of service:  Video Visit    Video End Time (time video stopped): 1113    Originating Location (pt. Location): [address]     Distant Location (provider location):  [facility]     Mode of Communication:  Video Conference via Saran Arenas CNP                "

## 2020-07-16 NOTE — PROGRESS NOTES
"[facility]  Psychiatric Progress Note  Telemedicine Visit: The patient's condition can be safely assessed and treated via synchronous audio and visual telemedicine encounter.      Start Time: 1049  Stop Time: 1102    Reason for Telemedicine Visit: COVID    Originating Site (Patient Location): IP     Distant Site (Provider Location): Provider Remote Setting    Consent:  The patient/guardian has verbally consented to: the potential risks and benefits of telemedicine (video visit) versus in person care; bill my insurance or make self-payment for services provided; and responsibility for payment of non-covered services.     Mode of Communication:  Video Conference via [employer]    As the provider I attest to compliance with applicable laws and regulations related to telemedicine.       Impression:   This is a 30 year old yo female with a history of depression, anxiety, substance use, and CVA resulting in left sided hemiparesis.    Maggie was napping and initially resistant to interview. We were joined by the FNP who assessed the status of Maggie's blisters on her lip and thumb. They do not appear to be worsening and are less swollen. Blisters and coloring around blisters appear consistent with severe lamictal reaction. Will add as an allergy, and Maggie and I did discuss not taking this again. She did report that she is still feeling somewhat suicidal. She does believe that she would benefit from a mood stabilizer. She has previously taken Lithium and \"will never take that again.\" We also discussed Depakote, which we decided to leave as a future option if indicated. Agreeable to a low dose of Abilify with the hope that this will augment her antidepressant benefits, improve motivation, and stabilize her mood some. Discussed the indications, potential risks/benefits, and specifically symptoms of akithisia and TDS. She was agreeable to sign a consent.        Diagnoses:   Major depressive disorder, recurrent, " moderate  Unspecified anxiety disorder, R/o DIANNA vs PTSD  History of CVA in 2018  TBI      Attestation:  Patient has been seen and evaluated by me,  Jeet Casas NP          Interim History:   The patient's care was discussed with the treatment team and chart notes were reviewed.    BEHAVIORAL TEAM DISCUSSION    Progress: Minimal. She was cooperative once she woke.    Continued Stay Criteria/Rationale: depressed mood, ongoing suicidal ideation with plan, initiation of new medication with dosage adjustments and monitoring.    Medical/Physical: left-sided weakness from CVA, chronic hypokalemia.   Precautions:   Falls precaution?: YES- currently on 1:1 observation due to fall risk  Behavioral Orders   Procedures     Code 1 - Restrict to Unit     Routine Programming     As clinically indicated     Status 15     Every 15 minutes.     Plan:   Continue Lexapro 10 mg daily  Start Abilify 2mg daily. Will increase as indicated/tolerated but plan is to utilize lowest beneficial dose to avoid potential side effects.    Rationale for change in precautions or plan: continued depressed mood, ongoing SI, feels need for more mood stabilization.       Participants: Jeet Casas, APRN, CNP, Nursing, SW        Medications:     Current Facility-Administered Medications Ordered in Epic   Medication Dose Route Frequency Last Rate Last Dose     acetaminophen (TYLENOL) tablet 650 mg  650 mg Oral Q4H PRN   650 mg at 07/11/20 0822     alum & mag hydroxide-simethicone (MAALOX  ES) suspension 30 mL  30 mL Oral Q4H PRN         Benzocaine-Menthol 20-0.26 % GEL   Mouth/Throat 4x Daily PRN         bisacodyl (DULCOLAX) Suppository 10 mg  10 mg Rectal Daily PRN         [START ON 7/13/2020] ergocalciferol capsule 50,000 Units  50,000 Units Oral Weekly         [START ON 7/12/2020] escitalopram (LEXAPRO) tablet 10 mg  10 mg Oral Daily         escitalopram (LEXAPRO) tablet 5 mg  5 mg Oral Daily         famotidine (PEPCID) tablet 20 mg  20 mg Oral  BID   20 mg at 07/11/20 0822     gabapentin (NEURONTIN) capsule 900 mg  900 mg Oral TID   900 mg at 07/11/20 1304     hydrOXYzine (ATARAX) tablet  mg   mg Oral Q4H PRN   100 mg at 07/11/20 0646     lamoTRIgine (LaMICtal) tablet 25 mg  25 mg Oral Daily   25 mg at 07/11/20 0823     loperamide (IMODIUM) capsule 2 mg  2 mg Oral 4x Daily PRN   2 mg at 07/11/20 0649     loperamide (IMODIUM) capsule 4 mg  4 mg Oral Daily   4 mg at 07/11/20 0822     LORazepam (ATIVAN) tablet 0.5 mg  0.5 mg Oral BID PRN   0.5 mg at 07/11/20 1023     magic mouthwash suspension (diphenhydramine, lidocaine, aluminum-magnesium & simethicone)  10 mL Swish & Swallow Q6H PRN   10 mL at 07/11/20 0800     magnesium hydroxide (MILK OF MAGNESIA) suspension 30 mL  30 mL Oral At Bedtime PRN         melatonin 3 mg (with vit B6 10 mg) extended release tablet 2 tablet  2 tablet Oral At Bedtime PRN         mirtazapine (REMERON) tablet TABS 7.5-15 mg  7.5-15 mg Oral At Bedtime PRN   7.5 mg at 07/10/20 2028     nicotine (COMMIT) lozenge 2 mg  2 mg Buccal Q1H PRN   2 mg at 07/10/20 1534     nicotine (NICODERM CQ) 21 MG/24HR 24 hr patch 1 patch  1 patch Transdermal Daily   1 patch at 07/11/20 0823     nicotine (NICORETTE) gum 2-4 mg  2-4 mg Buccal Q1H PRN   4 mg at 07/10/20 1725     nicotine Patch in Place   Transdermal Q8H   Stopped at 07/11/20 0646     ondansetron (ZOFRAN-ODT) ODT tab 4 mg  4 mg Oral Q6H PRN   4 mg at 07/11/20 0829     potassium chloride 10 mEq in 100 mL intermittent infusion with 10 mg lidocaine  10 mEq Intravenous Q1H PRN         potassium chloride ER (MICRO-K) CR capsule 10 mEq  10 mEq Oral BID   10 mEq at 07/11/20 0822     rivaroxaban ANTICOAGULANT (XARELTO) tablet 20 mg  20 mg Oral QAM   20 mg at 07/11/20 0823     No current Epic-ordered outpatient medications on file.         10 point ROS- chronic pain, intermittent nausea, small blister to thumb and lower lip       Allergies:     Allergies   Allergen Reactions      "Amoxicillin Other (See Comments)     Headaches     Levaquin [Levofloxacin] Swelling     Lithium      Diabetes insipidus      Naproxen Other (See Comments)     Reaction: Headaches     Nickel      Tramadol      Sulindac Rash            Psychiatric Examination:   /68   Pulse 83   Temp 97.6  F (36.4  C) (Tympanic)   Resp 18   Ht 1.6 m (5' 3\")   Wt 75.4 kg (166 lb 4.8 oz)   SpO2 95%   BMI 29.46 kg/m    Weight is 166 lbs 4.8 oz  Body mass index is 29.46 kg/m .    Appearance: Fatigued, in bed, slightly disheveled  Attitude:  Initially not very cooperative, but she warmed up quickly  Eye Contact: good  Mood: depressed  Affect:  Flat but she did attempt to smile a couple times  Speech:  clear, coherent  Psychomotor Behavior:  no evidence of tardive dyskinesia, dystonia, or tics  Thought Process:  Logical,linear, goal oriented  Associations:  no loose associations  Thought Content:  no evidence of psychotic thought and passive suicidal ideation present, no active plan or intent  Insight:  fair  Judgment:  fair  Oriented to:  time, person, and place  Attention Span and Concentration:  intact  Recent and Remote Memory:  fair  Fund of Knowledge: low-normal  Muscle Strength and Tone: left-sided weakness r/t CVA  Gait and Station: wheelchair-bound           Labs:     Results for orders placed or performed during the hospital encounter of 07/09/20 (from the past 24 hour(s))   Potassium   Result Value Ref Range    Potassium 4.0 3.4 - 5.3 mmol/L             "

## 2020-07-17 PROCEDURE — 99231 SBSQ HOSP IP/OBS SF/LOW 25: CPT | Performed by: NURSE PRACTITIONER

## 2020-07-17 PROCEDURE — 12400000 ZZH R&B MH

## 2020-07-17 PROCEDURE — 25000132 ZZH RX MED GY IP 250 OP 250 PS 637: Performed by: NURSE PRACTITIONER

## 2020-07-17 PROCEDURE — 25000128 H RX IP 250 OP 636: Performed by: NURSE PRACTITIONER

## 2020-07-17 RX ADMIN — ESCITALOPRAM OXALATE 10 MG: 10 TABLET ORAL at 08:58

## 2020-07-17 RX ADMIN — ACETAMINOPHEN 975 MG: 325 TABLET, FILM COATED ORAL at 06:34

## 2020-07-17 RX ADMIN — LORAZEPAM 0.5 MG: 0.5 TABLET ORAL at 12:37

## 2020-07-17 RX ADMIN — POTASSIUM CHLORIDE 10 MEQ: 750 CAPSULE, EXTENDED RELEASE ORAL at 21:02

## 2020-07-17 RX ADMIN — NICOTINE POLACRILEX 4 MG: 2 GUM, CHEWING ORAL at 18:04

## 2020-07-17 RX ADMIN — NICOTINE POLACRILEX 4 MG: 2 GUM, CHEWING ORAL at 21:09

## 2020-07-17 RX ADMIN — Medication 1 CAPSULE: at 08:57

## 2020-07-17 RX ADMIN — DIPHENHYDRAMINE HYDROCHLORIDE AND LIDOCAINE HYDROCHLORIDE AND ALUMINUM HYDROXIDE AND MAGNESIUM HYDRO 10 ML: KIT at 06:33

## 2020-07-17 RX ADMIN — NICOTINE 1 PATCH: 21 PATCH, EXTENDED RELEASE TRANSDERMAL at 09:03

## 2020-07-17 RX ADMIN — ACETAMINOPHEN 975 MG: 325 TABLET, FILM COATED ORAL at 18:24

## 2020-07-17 RX ADMIN — HYDROXYZINE HYDROCHLORIDE 100 MG: 25 TABLET ORAL at 14:24

## 2020-07-17 RX ADMIN — LORAZEPAM 0.5 MG: 0.5 TABLET ORAL at 06:34

## 2020-07-17 RX ADMIN — ONDANSETRON 4 MG: 4 TABLET, ORALLY DISINTEGRATING ORAL at 09:08

## 2020-07-17 RX ADMIN — NICOTINE POLACRILEX 4 MG: 2 GUM, CHEWING ORAL at 12:54

## 2020-07-17 RX ADMIN — HYDROXYZINE HYDROCHLORIDE 100 MG: 25 TABLET ORAL at 21:05

## 2020-07-17 RX ADMIN — FAMOTIDINE 20 MG: 20 TABLET ORAL at 08:57

## 2020-07-17 RX ADMIN — Medication 1 CAPSULE: at 21:03

## 2020-07-17 RX ADMIN — RIVAROXABAN 20 MG: 20 TABLET, FILM COATED ORAL at 08:57

## 2020-07-17 RX ADMIN — Medication: at 11:50

## 2020-07-17 RX ADMIN — NICOTINE POLACRILEX 4 MG: 2 GUM, CHEWING ORAL at 13:59

## 2020-07-17 RX ADMIN — FAMOTIDINE 20 MG: 20 TABLET ORAL at 21:03

## 2020-07-17 RX ADMIN — DIPHENHYDRAMINE HYDROCHLORIDE AND LIDOCAINE HYDROCHLORIDE AND ALUMINUM HYDROXIDE AND MAGNESIUM HYDRO 10 ML: KIT at 12:38

## 2020-07-17 RX ADMIN — POTASSIUM CHLORIDE 10 MEQ: 750 CAPSULE, EXTENDED RELEASE ORAL at 08:57

## 2020-07-17 RX ADMIN — GABAPENTIN 900 MG: 300 CAPSULE ORAL at 08:57

## 2020-07-17 RX ADMIN — GABAPENTIN 900 MG: 300 CAPSULE ORAL at 13:56

## 2020-07-17 RX ADMIN — GABAPENTIN 900 MG: 300 CAPSULE ORAL at 21:03

## 2020-07-17 RX ADMIN — LORAZEPAM 0.5 MG: 0.5 TABLET ORAL at 17:55

## 2020-07-17 RX ADMIN — LOPERAMIDE HYDROCHLORIDE 4 MG: 2 CAPSULE ORAL at 06:33

## 2020-07-17 RX ADMIN — ARIPIPRAZOLE 2 MG: 2 TABLET ORAL at 08:57

## 2020-07-17 RX ADMIN — HYDROXYZINE HYDROCHLORIDE 100 MG: 25 TABLET ORAL at 09:07

## 2020-07-17 RX ADMIN — HYDROCORTISONE: 25 CREAM TOPICAL at 06:33

## 2020-07-17 ASSESSMENT — ACTIVITIES OF DAILY LIVING (ADL)
HYGIENE/GROOMING: WITH ASSISTANCE
ORAL_HYGIENE: WITH ASSISTANCE
LAUNDRY: UNABLE TO COMPLETE
DRESS: SCRUBS (BEHAVIORAL HEALTH);WITH ASSISTANCE

## 2020-07-17 NOTE — PROGRESS NOTES
[facility]    Medical Services Progress Note    Date of Service (when I saw the patient): 07/17/2020    Assessment & Plan     Principal Problem:    Suicidal ideation     Active Medical Problems:    Hypokalemia- pt has chronic history of low potassium. Potassium level in ED was 2.4 and was replaced and level came up to 3.2. Home dose of potassium ordered. Will check potassium level in morning.   7/13/20- k+ level on 7/11/20 was 3.1 and 3.2 on 7/12/20. Pmhnp replaced with 30 meq po. K+ level is morning was 3.3. Pt has hx of chronic hypokalemia and takes 10 meq bid. Increased to 20 meq bid. Will recheck level in the morning.   7/15/20- k+ level was still low yesterday, replaced with 40 meq yesterday. Ordered K+ this morning but pt refused labs, pt also refused to take both tablets of the potassium this morning and only took 10 meq. After talking with pt she agreed for labs. K+ level is within the normal range but on the low end at 3.5. Discussed with pt about being complaint taking ordered potassium and lab draws. Pt verbalized understanding and stated she would take the potassium as ordered.   7/16/20- potassium level is much better today at 4.0. Pt does not like taking 2 tablets of potassium at the same time  and has to be encouraged. Pt wants to go back to her 10 meq bid. Since levels have improved will return to pt home dose of 10 meq bid and continue to monitor.   7/17/20- potasium level ordered for tomorrow.        History of CVA (cerebrovascular accident)- according to medical chart and pt she had a PE in 2015 and stopped taking anticoagulants and then had a right sided CVA in 2018. This left her with left sided hemiparesis and in a wheelchair. Pt wears a sling on her left arm to help hold it up because she experiences chronic left shoulder pain now since her stroke. Pt denies any new stroke symptoms, chest pain, sob. Ordered home dose of Xarelto. Pt is a 1:1 for pt safety due to use of wheelchair  "and left sided hemiparesis.    7/13/20 -pt is a 1:1 for pt safety. Pt denies any acute stroke symptoms, chest pain, sob.   7/15/20- pt is 1:1 for pt safety. Pt denies any falls or acute stroke symptoms, chest pain, sob.   7/16/20- continue 1:1 for pt safety. Denies any falls of injuries, Denies any acute stroke symptoms, chest pain, sob.   7/17/20- continue 1:1 for pt safety. Denies falls or injuries. Denies acute stroke symptoms, chest pain, sob.        Chronic left shoulder pain- pt states she experiences chronic left shoulder pain since she had her stroke in 2018 due to it \"dangling\" and she is unable to lift her arm. Ordered home dose of gabapentin and pt states this helps control her pain.   7/13/20- denies any pain.  7/15/20- denies any pain       Irritable bowel syndrome with diarrhea- pt reports she takes imodium 4mg once in the morning and this helps control her diarrhea all day. Pt denies taking any other medications for her IBS and states the imodium has controlled it.      Tooth abscess- consulted by Pmhnp on 7/13/20 afternoon, pt  complaining of tooth pain in upper mouth.  Pt using magic mouthwash and Orajel. Pt states she has a dentist appointment August 14. Ordered clindamycin and probiotics on Monday.  Pt refused dose this morning. Discontinued due to possible side effects. Pt reports tooth pain is better and she will continue magic mouthwash and it is helping. No drainage, erythema noted.   7/16/20- reports the magic mouthwash is helping with tooth pain. Reports she is able to eat and drink okay.   7/17/20- using magic mouthwash, no pain reported.     Lower lip blister and thumb blister with bruise- Pt reports that her thumb had a blister on it a week ago, but now appears bruised. It appears as though she has smashed it in something. Pt denies any injury and is able to move it. She states it is just tender when she pushes on the top of blister. Pt also has a lip blister on her lower lip. Lamictal " was discontinued. Talked to Dr. Rinaldi and he discontinued due to potential side effect from Lamictal. Pt and nurse reports blister on lip and thumb started before clindamycin was started. However, discontinued due to pt refusing to take it and possible side effects. CBC and CMP ordered. CBC normal and CMP revealed continued decreased renal function and albumin and protein decreased, otherwise unremarkable.   7/16/20- Pmhnp reports the lip blister and thumb blister are consistent with Lamictal rash. Some improvement noted. No edema in lip.   7/17/20- pt reports pain and blistering are improved. Lips appear to be much better today. No edema. Pt is applying chap stick throughout the day and reports this is helping.     Abnormal kidney function- pt has history of low creatinine. On admission creatinine was 0.40, today it is improved at 0.51. Creatinine in May when admitted to medical floor lithium toxicity was as low as 0.45. Will continue to monitor and avoid ibuprofen.      covid screening- negative     Code Status: Full Code.    Jessica Arenas CNP        -Data reviewed today: I reviewed all new labs and imaging results over the last 24 hours.     Physical Exam   Temp: 99.9  F (37.7  C) Temp src: Tympanic BP: 108/59 Pulse: 79   Resp: 16 SpO2: 95 % O2 Device: None (Room air)    Vitals:    07/09/20 1547 07/10/20 0500 07/12/20 0730   Weight: 80.7 kg (178 lb) 80.7 kg (177 lb 14.6 oz) 75.4 kg (166 lb 4.8 oz)     Vital Signs with Ranges  Temp:  [97.5  F (36.4  C)-99.9  F (37.7  C)] 99.9  F (37.7  C)  Pulse:  [69-84] 79  Resp:  [16] 16  BP: (108-130)/(58-76) 108/59  SpO2:  [94 %-95 %] 95 %  No intake/output data recorded.    Constitutional: sleeping, wakes on command, cooperative, no apparent distress, vitals stable   Respiratory: No increased work of breathing, speaks in full sentences, no audible wheeze, symmetric rise and fall of chest, CTA bilaterally   Cardiovascular: RRR, S1, S2, no extra heart sounds, no edema, warm  "extremities   GI: soft, non-distended, normoactive bowel sounds x 4,  non-tender, no masses    Skin: upper and lower lip  small blister, no blistering  in mouth, small blister on thumb. Otherwise skin warm, dry, intact, no rashes   Musculoskeletal: no lower extremity pitting edema present  there is no redness, warmth, or swelling of the joints  tone is normal with exception of left side. Noticeable left shoulder \"drop\". No left side strength.range of motion noted.   Neuropsychiatric: General: normal, calm and normal eye contact    Medications     ARIPiprazole  2 mg Oral Daily     ergocalciferol  50,000 Units Oral Weekly     escitalopram  10 mg Oral Daily     famotidine  20 mg Oral BID     gabapentin  900 mg Oral TID     lactobacillus rhamnosus (GG)  1 capsule Oral BID     nicotine  1 patch Transdermal Daily     nicotine   Transdermal Q8H     potassium chloride ER  10 mEq Oral BID     rivaroxaban ANTICOAGULANT  20 mg Oral QAM       Data   Recent Labs   Lab 07/16/20  0823 07/15/20  1052 07/14/20  0541   WBC  --  8.4  --    HGB  --  12.8  --    MCV  --  94  --    PLT  --  285  --    NA  --  140  --    POTASSIUM 4.0 3.5 3.1*   CHLORIDE  --  109  --    CO2  --  25  --    BUN  --  4*  --    CR  --  0.51*  --    ANIONGAP  --  6  --    AVINASH  --  8.8  --    GLC  --  90  --    ALBUMIN  --  3.0*  --    PROTTOTAL  --  6.0*  --    BILITOTAL  --  0.4  --    ALKPHOS  --  80  --    ALT  --  29  --    AST  --  33  --        No results found for this or any previous visit (from the past 24 hour(s)).                       "

## 2020-07-17 NOTE — PLAN OF CARE
"  Problem: Adult Behavioral Health Plan of Care  Goal: Patient-Specific Goal (Individualization)  Description: Pt will attend 50 % of unit programming.   Pt will be compliant with medications  Pt will sleep 6-8 hours per night   Pt will eat at least 50% of meals.  Okay to have shoe laces on tennis shoes as long as 1:1 is ordered per NP.  Pt may use commode.      7/16/2020 1954 by Monika Cee RN  Outcome: No Change  Note:   Takes all medications with no problems. Eating and sleeping with no problems. Did attend groups this evening.  Complains of some anxiety & frustration after talking with her daughter. States, \"I feel embarrassed about being here & I know I shouldn't especially after talking with my daughter\".   Did talk on the phone to her mom, seemed to be having a good conversation laughing and talking.  Shortly after patient complained of having a severe headache. Rated it 9/10 & throbbing.  Gave tylenol 975 mg at 1916 & benzocain for tooth pain, with some relief.  Shortly there after in [employer] area playing cards with peers. Does continue to have 1:1 staff present at all times.  Has had no falls or injuries during this shift.  Denies being suicidal at this time.  Requested and received the following PRNs  Benadryl 50 mg, Melatonin 6 mg, Zofran 4 mg at 2049.      End of shift report given to oncaba midnight RN.  "

## 2020-07-17 NOTE — PLAN OF CARE
Spoke with pt this afternoon - pt appears to be doing well, pt slept in this morning writer attempted to meet with pt this morning, but pt was sleeping. Pt stated she is looking to stay through the weekend and does have a court hearing on Monday morning at 9:30 - attempted to reschedule pt's med management appointment that is set for Monday at 10:00 writer left a message with Rhonda at [facility]. Left a message for pt's PCP to schedule an appointment, but they are closed. Pt reported she would like her roommate to bring in her mail and knee brace - writer attempted to call, but was unable to get through.     Received a call back from Rhonda and pt's med appointment was rescheduled.

## 2020-07-17 NOTE — PLAN OF CARE
"Face to face shift report received from RITESH Ivan.       Problem: Adult Behavioral Health Plan of Care  Goal: Patient-Specific Goal (Individualization)  Description: Pt will attend 50 % of unit programming.   Pt will be compliant with medications  Pt will sleep 6-8 hours per night   Pt will eat at least 50% of meals.  Okay to have shoe laces on tennis shoes as long as 1:1 is ordered per NP.  Pt may use commode.      7/17/2020 1206 by Yadira Prado RN  Outcome: Improving  Note: Remains on 1:1 continuous observation for fall risk. Utilizes wheelchair and gait belt. Irritable and rude at times. Reports anxiety and depression. Reports anxiety is due to \"those people\" referring to other patients. When informed it was too early for another dose of Ativan or Hydroxyzine pt became argumentative and irritable with writer. Pt questioned writer if they had ever been on \"this side\" and then informed writer that \"you have no idea what it's like. I'm gonna go out there and flip out on them assholes.\" Pt educated on unit expectations and encouraged pt to remain in control. Pt requested that writer leave pt's room. Pt out to [facility] for lunch time. No outbursts towards other pt's observed during this time. Pt more pleasant and calm following lunch.      0907: requested and received Hydroxyzine 100mg PO for anxiety and 4mg Zofran ODT for nausea caused by potassium.  1150: Requested and received Benzocaine gel for tooth pain.   1238: Requested and received Ativan 0.5mg for anxiety and magic mouth wash for tooth/mouth pain.   1424: Hydroxyzine 100mg PO for anxiety  Problem: Fall Injury Risk  Goal: Absence of Fall and Fall-Related Injury  Description: Pt will remain free from falls.  Pt on 1:1 r/t fall risk.   Pt will use wheelchair and transfer belt during stay.   Pt will wear appropriate footwear.  Okay to have shoe laces on tennis shoes as long as 1:1 is ordered per NP.  7/17/2020 1206 by Yadira Prado, RITESH  Outcome: " Improving   Remains on 1:1 continuous observation for fall risk. Utilizes wheelchair and gait belt.     Problem: Suicide Risk  Goal: Absence of Self-Harm  Description: Will remain without harm while in hospital.  7/17/2020 1206 by Yadira Prado RN  Outcome: Improving   Free from self harm or injury this shift.       Face to face end of shift report to be communicated to oncoming RN.

## 2020-07-17 NOTE — PLAN OF CARE
Face to face end of shift report communicated from Yadira PARRISH RN. Pt in bed at the start of the shift.     Lore Navarrete RN  7/17/2020  6:45 PM       Problem: Adult Behavioral Health Plan of Care  Goal: Patient-Specific Goal (Individualization)  Description: Pt will attend 50 % of unit programming.   Pt will be compliant with medications  Pt will sleep 6-8 hours per night   Pt will eat at least 50% of meals.  Okay to have shoe laces on tennis shoes as long as 1:1 is ordered per NP.  Pt may use commode.    Pt requested an ativan at 1755, pt rates her anxiety at 9/10 and her depression at 10/10. Pt states that she gets agitated when in the lounge because it is so loud.   Pt denies SI/HI and hallucinations. Pt stated that she is frustrated feeling that her  is not doing her job. Pt states that she may have a change to move out of the area with her aunt but is not sure if she can due to some court coming up. Pt was encouraged to talk to the social workers here to ask about resources.     7/17/2020 1844 by Lore Navarrete RN  Outcome: Improving  Note:        Problem: Fall Injury Risk  Goal: Absence of Fall and Fall-Related Injury  Description: Pt will remain free from falls.  Pt on 1:1 r/t fall risk.   Pt will use wheelchair and transfer belt during stay.   Pt will wear appropriate footwear.  Okay to have shoe laces on tennis shoes as long as 1:1 is ordered per NP.    Pt has been free of falls this shift.   7/17/2020 1844 by Lore Navarrete RN  Outcome: Improving     Problem: Suicide Risk  Goal: Absence of Self-Harm  Description: Will remain without harm while in hospital.    Pt denies SI.   7/17/2020 1844 by Lore Navarrete RN  Outcome: Improving       Face to face end of shift report communicated to oncaba RN. Reported that pt is a fall risk and a risk for suicide.     Lore Navarrete RN  7/17/2020  6:47 PM

## 2020-07-17 NOTE — PLAN OF CARE
Face to face end of shift report will be communicated to oncoming RN.    Problem: Adult Behavioral Health Plan of Care  Goal: Patient-Specific Goal (Individualization)  Description: Pt will attend 50 % of unit programming.   Pt will be compliant with medications  Pt will sleep 6-8 hours per night   Pt will eat at least 50% of meals.  Okay to have shoe laces on tennis shoes as long as 1:1 is ordered per NP.  Pt may use commode.      7/17/2020 0025 by Marzena Newsome RN  Outcome: No Change  Note:        Problem: Fall Injury Risk  Goal: Absence of Fall and Fall-Related Injury  Description: Pt will remain free from falls.  Pt on 1:1 r/t fall risk.   Pt will use wheelchair and transfer belt during stay.   Pt will wear appropriate footwear.  Okay to have shoe laces on tennis shoes as long as 1:1 is ordered per NP.  7/17/2020 0025 by Marzena Newsome RN  Outcome: Improving     Problem: Suicide Risk  Goal: Absence of Self-Harm  Description: Will remain without harm while in hospital.  7/17/2020 0025 by Marzena Newsome RN  Outcome: No Change   Face to face end of shift report obtained from RITESH Frank. Pt observed resting in bed.   Pt appears to be sleeping in bed with eyes closed, having regular respirations. 15 minutes and PRN safety checks completed with no noted complains. No falls or self harm noted or reported so far this shift. Will continue to monitor.  0600- Pt slept 7 hours.  0635-Pt  C/o high anxiety, 6/10 left knee pain,  headache, and mouth pain.  Tylenol 975 mg, Ativan 0.5 mg, and Imodium 4 mg given per request as well as magic mouthwash and Hydrocortisone cream.  Blister on lips and right thumb improving. Will continue to monitor.

## 2020-07-18 LAB — POTASSIUM SERPL-SCNC: 3.5 MMOL/L (ref 3.4–5.3)

## 2020-07-18 PROCEDURE — 25000132 ZZH RX MED GY IP 250 OP 250 PS 637: Performed by: NURSE PRACTITIONER

## 2020-07-18 PROCEDURE — 99233 SBSQ HOSP IP/OBS HIGH 50: CPT | Mod: GT | Performed by: NURSE PRACTITIONER

## 2020-07-18 PROCEDURE — 25000128 H RX IP 250 OP 636: Performed by: NURSE PRACTITIONER

## 2020-07-18 PROCEDURE — 12400000 ZZH R&B MH

## 2020-07-18 PROCEDURE — 84132 ASSAY OF SERUM POTASSIUM: CPT | Performed by: NURSE PRACTITIONER

## 2020-07-18 PROCEDURE — 36415 COLL VENOUS BLD VENIPUNCTURE: CPT | Performed by: NURSE PRACTITIONER

## 2020-07-18 RX ORDER — ARIPIPRAZOLE 5 MG/1
5 TABLET ORAL DAILY
Status: DISCONTINUED | OUTPATIENT
Start: 2020-07-19 | End: 2020-07-20

## 2020-07-18 RX ADMIN — NICOTINE POLACRILEX 4 MG: 2 GUM, CHEWING ORAL at 11:28

## 2020-07-18 RX ADMIN — HYDROXYZINE HYDROCHLORIDE 100 MG: 25 TABLET ORAL at 08:36

## 2020-07-18 RX ADMIN — POTASSIUM CHLORIDE 10 MEQ: 750 CAPSULE, EXTENDED RELEASE ORAL at 08:36

## 2020-07-18 RX ADMIN — ARIPIPRAZOLE 2 MG: 2 TABLET ORAL at 08:37

## 2020-07-18 RX ADMIN — Medication 2 TABLET: at 21:37

## 2020-07-18 RX ADMIN — FAMOTIDINE 20 MG: 20 TABLET ORAL at 08:37

## 2020-07-18 RX ADMIN — LORAZEPAM 0.5 MG: 0.5 TABLET ORAL at 19:24

## 2020-07-18 RX ADMIN — ONDANSETRON 4 MG: 4 TABLET, ORALLY DISINTEGRATING ORAL at 21:38

## 2020-07-18 RX ADMIN — GABAPENTIN 900 MG: 300 CAPSULE ORAL at 21:33

## 2020-07-18 RX ADMIN — RIVAROXABAN 20 MG: 20 TABLET, FILM COATED ORAL at 08:37

## 2020-07-18 RX ADMIN — FAMOTIDINE 20 MG: 20 TABLET ORAL at 21:33

## 2020-07-18 RX ADMIN — Medication: at 17:05

## 2020-07-18 RX ADMIN — GABAPENTIN 900 MG: 300 CAPSULE ORAL at 08:36

## 2020-07-18 RX ADMIN — LOPERAMIDE HYDROCHLORIDE 4 MG: 2 CAPSULE ORAL at 04:15

## 2020-07-18 RX ADMIN — NICOTINE 1 PATCH: 21 PATCH, EXTENDED RELEASE TRANSDERMAL at 08:36

## 2020-07-18 RX ADMIN — LORAZEPAM 0.5 MG: 0.5 TABLET ORAL at 04:25

## 2020-07-18 RX ADMIN — ESCITALOPRAM OXALATE 10 MG: 10 TABLET ORAL at 08:36

## 2020-07-18 RX ADMIN — NICOTINE POLACRILEX 4 MG: 2 GUM, CHEWING ORAL at 15:20

## 2020-07-18 RX ADMIN — LORAZEPAM 0.5 MG: 0.5 TABLET ORAL at 12:37

## 2020-07-18 RX ADMIN — POTASSIUM CHLORIDE 10 MEQ: 750 CAPSULE, EXTENDED RELEASE ORAL at 21:33

## 2020-07-18 RX ADMIN — ACETAMINOPHEN 975 MG: 325 TABLET, FILM COATED ORAL at 11:21

## 2020-07-18 RX ADMIN — NICOTINE POLACRILEX 4 MG: 2 GUM, CHEWING ORAL at 17:42

## 2020-07-18 RX ADMIN — DIPHENHYDRAMINE HYDROCHLORIDE AND LIDOCAINE HYDROCHLORIDE AND ALUMINUM HYDROXIDE AND MAGNESIUM HYDRO 10 ML: KIT at 11:24

## 2020-07-18 RX ADMIN — Medication: at 13:19

## 2020-07-18 RX ADMIN — NICOTINE POLACRILEX 4 MG: 2 GUM, CHEWING ORAL at 12:46

## 2020-07-18 RX ADMIN — Medication: at 10:24

## 2020-07-18 RX ADMIN — DIPHENHYDRAMINE HYDROCHLORIDE AND LIDOCAINE HYDROCHLORIDE AND ALUMINUM HYDROXIDE AND MAGNESIUM HYDRO 10 ML: KIT at 04:16

## 2020-07-18 RX ADMIN — Medication 1 CAPSULE: at 08:37

## 2020-07-18 RX ADMIN — ACETAMINOPHEN 975 MG: 325 TABLET, FILM COATED ORAL at 04:30

## 2020-07-18 RX ADMIN — ARIPIPRAZOLE 3 MG: 2 TABLET ORAL at 10:24

## 2020-07-18 RX ADMIN — HYDROXYZINE HYDROCHLORIDE 100 MG: 25 TABLET ORAL at 15:18

## 2020-07-18 RX ADMIN — NICOTINE POLACRILEX 4 MG: 2 GUM, CHEWING ORAL at 21:15

## 2020-07-18 RX ADMIN — GABAPENTIN 900 MG: 300 CAPSULE ORAL at 13:19

## 2020-07-18 RX ADMIN — ONDANSETRON 4 MG: 4 TABLET, ORALLY DISINTEGRATING ORAL at 08:37

## 2020-07-18 RX ADMIN — Medication 1 CAPSULE: at 21:33

## 2020-07-18 NOTE — PLAN OF CARE
"Face to face shift report received from RITESH Mancuso.       Problem: Adult Behavioral Health Plan of Care  Goal: Patient-Specific Goal (Individualization)  Description: Pt will attend 50 % of unit programming.   Pt will be compliant with medications  Pt will sleep 6-8 hours per night   Pt will eat at least 50% of meals.  Okay to have shoe laces on tennis shoes as long as 1:1 is ordered per NP.  Pt may use commode.      7/18/2020 1043 by Yadira Prado RN  Outcome: No Change  Note: Calm, cooperative, and medication compliant. Reports feeling \"terrible.\" Reports depression and anxiety. Frequently requests PRN medication and becomes rude and argumentative if informed that medications are not available at requested times. Requested and received Hydroxyzine 100mg with AM medications. Pt also requested and received Zofran 4mg with AM medications for nausea due to potassium capsule.   1121: Requested and received Tylenol 975mg for 8/10 headache.   1237: requested and received Ativan 0.5mg for anxiety   1520: Requested and received Hydroxyzine 100mg for anxiety  Problem: Fall Injury Risk  Goal: Absence of Fall and Fall-Related Injury  Description: Pt will remain free from falls.  Pt on 1:1 r/t fall risk.   Pt will use wheelchair and transfer belt during stay.   Pt will wear appropriate footwear.  Okay to have shoe laces on tennis shoes as long as 1:1 is ordered per NP.  Outcome: Improving   Remains on 1:1 continuous observation for fall risk. Appropriate footwear in place during transfers. Utilizes gait belt and wheelchair.     Problem: Suicide Risk  Goal: Absence of Self-Harm  Description: Will remain without harm while in hospital.  Outcome: Improving   Free from self harm or injury this shift.       Face to face end of shift report to be communicated to oncaba RN.     "

## 2020-07-18 NOTE — PLAN OF CARE
Problem: Adult Behavioral Health Plan of Care  Goal: Patient-Specific Goal (Individualization)  Description: Pt will attend 50 % of unit programming.   Pt will be compliant with medications  Pt will sleep 6-8 hours per night   Pt will eat at least 50% of meals.  Okay to have shoe laces on tennis shoes as long as 1:1 is ordered per NP.  Pt may use commode.      7/18/2020 0156 by Jamee Maya RN  Outcome: No Change  Note: Shift Summery:  Face to face shift report received from Lore HERNANDEZ RN. Rounding completed, pt observed in bed appears to be sleeping.  1:1 in room for safety.       Pt was awake at about 4 am and was unable to get back to sleep, she requested mouth wash (swish/swallow) Loperamide, Ativan, and Miralax., Slept approx 5 hours.      Face to face report will be communicated to oncoming RN.    Jamee Maya RN  7/18/2020  6:39 AM

## 2020-07-18 NOTE — PLAN OF CARE
In a good mood tonight. Smiling and social with others. Has some pain in her left knee. Requests frequent prns. Has been understanding when has to wait because its to early. Denies suicidal thoughts. Some depression. States she was doing PT. Wants move to [facility] to be closer to family. Attended group this evening. Had ativan 0.5 1925 and zofran and melatonin at 2145.   Face to face end of shift report communicated to night shift RN.     Rosa Marshall RN  7/18/2020  9:47 PM          Problem: Adult Behavioral Health Plan of Care  Goal: Patient-Specific Goal (Individualization)  Description: Pt will attend 50 % of unit programming.   Pt will be compliant with medications  Pt will sleep 6-8 hours per night   Pt will eat at least 50% of meals.  Okay to have shoe laces on tennis shoes as long as 1:1 is ordered per NP.  Pt may use commode.      7/18/2020 1845 by Rosa Marshall RN  Outcome: No Change     Problem: Suicide Risk  Goal: Absence of Self-Harm  Description: Will remain without harm while in hospital.  7/18/2020 1845 by Rosa Marshall RN  Outcome: Improving

## 2020-07-18 NOTE — PROGRESS NOTES
"[facility]  Psychiatric Progress Note  Telemedicine Visit: The patient's condition can be safely assessed and treated via synchronous audio and visual telemedicine encounter.      Start Time: 0917   Stop Time: 0927    Reason for Telemedicine Visit: COVID    Originating Site (Patient Location): IP     Distant Site (Provider Location): Provider Remote Setting    Consent:  The patient/guardian has verbally consented to: the potential risks and benefits of telemedicine (video visit) versus in person care; bill my insurance or make self-payment for services provided; and responsibility for payment of non-covered services.     Mode of Communication:  Video Conference via [employer]    As the provider I attest to compliance with applicable laws and regulations related to telemedicine.       Impression:   This is a 30 year old yo female with a history of depression, anxiety, substance use, and CVA resulting in left sided hemiparesis.    Reported feeling miserable secondary to ongoing lip pain. Adjusted orajel/benzocaine gel orders to reflect allowance for use on lip in addition to tooth pain. She did not having a little more motivation to get up and color some last night. She is agreeable to increasing the Abilify. Discussed possible discharge on Monday morning directly to court; however, she said \"that's not what I got,\" referring to her conversation with the . She is concerned that she will not be ready on Monday and indicated that court is supposed to be over the phone. Agreed to play it by ear and see how she is feeling. When asked about her mood, she responded, \"I am a crab ass.\" Her potassium has already dropped from 4 to 3.5 with the lower dosing. Will recheck potassium in the AM to ensure it does not further decrease. She has been looking forward to not having lab draws. If she would take the recommended dosing, she would not need the repeated draws. Nursing will also discuss this with her. When " "asked if she needed anything else, she replied, \"A hug?\"         Diagnoses:   Major depressive disorder, recurrent, moderate  Unspecified anxiety disorder, R/o DIANNA vs PTSD  History of CVA in 2018  TBI      Attestation:  Patient has been seen and evaluated by me,  Jeet Casas NP          Interim History:   The patient's care was discussed with the treatment team and chart notes were reviewed.    BEHAVIORAL TEAM DISCUSSION    Progress: Some. Reported some increased motivation yesterday afternoon/evening. Pleasant and cooperative in conversation.    Continued Stay Criteria/Rationale: Ongoing depression, hopelessness, passive SI - no intent, medication adjustments    Medical/Physical: left-sided weakness from CVA, chronic hypokalemia.   Precautions:   Falls precaution?: YES- currently on 1:1 observation due to fall risk  Behavioral Orders   Procedures     Code 1 - Restrict to Unit     Routine Programming     As clinically indicated     Status 15     Every 15 minutes.     Plan:   Continue Lexapro 10 mg daily  Increase Abilify to 5mg daily. Provided make-up dose of 3mg today as medication had already been administered. Will increase as indicated/tolerated but plan is to utilize lowest beneficial dose to avoid potential side effects.  Add allowance to orajel/benzocaine gel instruction so that it could be used for lip pain as well as tooth pain.  Recheck K+ in AM    Rationale for change in precautions or plan: continued depressed mood, ongoing SI, feels need for more mood stabilization. Significant lip pain secondary to blistering. Potassium already decreased with lower dosing.    Participants: Jeet Casas, APRN, CNP, Nursing       Medications:     Current Facility-Administered Medications Ordered in Epic   Medication Dose Route Frequency Last Rate Last Dose     acetaminophen (TYLENOL) tablet 650 mg  650 mg Oral Q4H PRN   650 mg at 07/11/20 0822     alum & mag hydroxide-simethicone (MAALOX  ES) suspension 30 mL  30 " mL Oral Q4H PRN         Benzocaine-Menthol 20-0.26 % GEL   Mouth/Throat 4x Daily PRN         bisacodyl (DULCOLAX) Suppository 10 mg  10 mg Rectal Daily PRN         [START ON 7/13/2020] ergocalciferol capsule 50,000 Units  50,000 Units Oral Weekly         [START ON 7/12/2020] escitalopram (LEXAPRO) tablet 10 mg  10 mg Oral Daily         escitalopram (LEXAPRO) tablet 5 mg  5 mg Oral Daily         famotidine (PEPCID) tablet 20 mg  20 mg Oral BID   20 mg at 07/11/20 0822     gabapentin (NEURONTIN) capsule 900 mg  900 mg Oral TID   900 mg at 07/11/20 1304     hydrOXYzine (ATARAX) tablet  mg   mg Oral Q4H PRN   100 mg at 07/11/20 0646     lamoTRIgine (LaMICtal) tablet 25 mg  25 mg Oral Daily   25 mg at 07/11/20 0823     loperamide (IMODIUM) capsule 2 mg  2 mg Oral 4x Daily PRN   2 mg at 07/11/20 0649     loperamide (IMODIUM) capsule 4 mg  4 mg Oral Daily   4 mg at 07/11/20 0822     LORazepam (ATIVAN) tablet 0.5 mg  0.5 mg Oral BID PRN   0.5 mg at 07/11/20 1023     magic mouthwash suspension (diphenhydramine, lidocaine, aluminum-magnesium & simethicone)  10 mL Swish & Swallow Q6H PRN   10 mL at 07/11/20 0800     magnesium hydroxide (MILK OF MAGNESIA) suspension 30 mL  30 mL Oral At Bedtime PRN         melatonin 3 mg (with vit B6 10 mg) extended release tablet 2 tablet  2 tablet Oral At Bedtime PRN         mirtazapine (REMERON) tablet TABS 7.5-15 mg  7.5-15 mg Oral At Bedtime PRN   7.5 mg at 07/10/20 2028     nicotine (COMMIT) lozenge 2 mg  2 mg Buccal Q1H PRN   2 mg at 07/10/20 1534     nicotine (NICODERM CQ) 21 MG/24HR 24 hr patch 1 patch  1 patch Transdermal Daily   1 patch at 07/11/20 0823     nicotine (NICORETTE) gum 2-4 mg  2-4 mg Buccal Q1H PRN   4 mg at 07/10/20 1725     nicotine Patch in Place   Transdermal Q8H   Stopped at 07/11/20 0646     ondansetron (ZOFRAN-ODT) ODT tab 4 mg  4 mg Oral Q6H PRN   4 mg at 07/11/20 0829     potassium chloride 10 mEq in 100 mL intermittent infusion with 10 mg lidocaine   "10 mEq Intravenous Q1H PRN         potassium chloride ER (MICRO-K) CR capsule 10 mEq  10 mEq Oral BID   10 mEq at 07/11/20 0822     rivaroxaban ANTICOAGULANT (XARELTO) tablet 20 mg  20 mg Oral QAM   20 mg at 07/11/20 0823     No current [facility]-ordered outpatient medications on file.         10 point ROS- chronic pain, intermittent nausea, small blister to thumb and lower lip       Allergies:     Allergies   Allergen Reactions     Amoxicillin Other (See Comments)     Headaches     Levaquin [Levofloxacin] Swelling     Lithium      Diabetes insipidus      Naproxen Other (See Comments)     Reaction: Headaches     Nickel      Tramadol      Sulindac Rash            Psychiatric Examination:   /76   Pulse 87   Temp 97.8  F (36.6  C) (Tympanic)   Resp 16   Ht 1.6 m (5' 3\")   Wt 75.4 kg (166 lb 4.8 oz)   SpO2 94%   BMI 29.46 kg/m    Weight is 166 lbs 4.8 oz  Body mass index is 29.46 kg/m .    Appearance: awake, alert, somewhat distressed  Attitude: Pleasant, cooperatie  Eye Contact: good  Mood: depressed  Affect:  Congruent  Speech:  clear, coherent  Psychomotor Behavior:  no evidence of tardive dyskinesia, dystonia, or tics  Thought Process:  Logical,linear, goal oriented  Associations:  no loose associations  Thought Content:  no evidence of psychotic thought and passive suicidal ideation present, no active plan or intent  Insight:  fair  Judgment:  fair  Oriented to:  time, person, and place  Attention Span and Concentration:  intact  Recent and Remote Memory:  fair  Fund of Knowledge: low-normal  Muscle Strength and Tone: left-sided weakness r/t CVA  Gait and Station: wheelchair-bound           Labs:     Results for orders placed or performed during the hospital encounter of 07/09/20 (from the past 24 hour(s))   Potassium   Result Value Ref Range    Potassium 3.5 3.4 - 5.3 mmol/L             "

## 2020-07-19 ENCOUNTER — APPOINTMENT (OUTPATIENT)
Dept: ULTRASOUND IMAGING | Facility: HOSPITAL | Age: 30
End: 2020-07-19
Attending: NURSE PRACTITIONER
Payer: MEDICAID

## 2020-07-19 LAB
D DIMER PPP FEU-MCNC: <0.3 UG/ML FEU (ref 0–0.5)
POTASSIUM SERPL-SCNC: 3.3 MMOL/L (ref 3.4–5.3)

## 2020-07-19 PROCEDURE — 93971 EXTREMITY STUDY: CPT | Mod: TC,LT

## 2020-07-19 PROCEDURE — 25000132 ZZH RX MED GY IP 250 OP 250 PS 637: Performed by: NURSE PRACTITIONER

## 2020-07-19 PROCEDURE — 12400000 ZZH R&B MH

## 2020-07-19 PROCEDURE — 36415 COLL VENOUS BLD VENIPUNCTURE: CPT | Performed by: NURSE PRACTITIONER

## 2020-07-19 PROCEDURE — 25000128 H RX IP 250 OP 636: Performed by: NURSE PRACTITIONER

## 2020-07-19 PROCEDURE — 25000125 ZZHC RX 250: Performed by: NURSE PRACTITIONER

## 2020-07-19 PROCEDURE — 85379 FIBRIN DEGRADATION QUANT: CPT | Performed by: NURSE PRACTITIONER

## 2020-07-19 PROCEDURE — 99232 SBSQ HOSP IP/OBS MODERATE 35: CPT | Performed by: NURSE PRACTITIONER

## 2020-07-19 PROCEDURE — 84132 ASSAY OF SERUM POTASSIUM: CPT | Performed by: NURSE PRACTITIONER

## 2020-07-19 PROCEDURE — 99233 SBSQ HOSP IP/OBS HIGH 50: CPT | Mod: GT | Performed by: NURSE PRACTITIONER

## 2020-07-19 RX ORDER — ONDANSETRON 4 MG/1
4 TABLET, ORALLY DISINTEGRATING ORAL ONCE
Status: COMPLETED | OUTPATIENT
Start: 2020-07-19 | End: 2020-07-19

## 2020-07-19 RX ORDER — POTASSIUM CHLORIDE 750 MG/1
20 CAPSULE, EXTENDED RELEASE ORAL 2 TIMES DAILY
Status: DISCONTINUED | OUTPATIENT
Start: 2020-07-19 | End: 2020-07-19

## 2020-07-19 RX ORDER — POTASSIUM CHLORIDE 750 MG/1
10 CAPSULE, EXTENDED RELEASE ORAL 4 TIMES DAILY
Status: DISCONTINUED | OUTPATIENT
Start: 2020-07-19 | End: 2020-07-22 | Stop reason: HOSPADM

## 2020-07-19 RX ORDER — POTASSIUM CHLORIDE 20MEQ/15ML
20 LIQUID (ML) ORAL DAILY
Status: DISCONTINUED | OUTPATIENT
Start: 2020-07-19 | End: 2020-07-19

## 2020-07-19 RX ADMIN — ONDANSETRON 4 MG: 4 TABLET, ORALLY DISINTEGRATING ORAL at 21:05

## 2020-07-19 RX ADMIN — POTASSIUM CHLORIDE 10 MEQ: 750 CAPSULE, EXTENDED RELEASE ORAL at 21:05

## 2020-07-19 RX ADMIN — FAMOTIDINE 20 MG: 20 TABLET ORAL at 21:05

## 2020-07-19 RX ADMIN — ACETAMINOPHEN 975 MG: 325 TABLET, FILM COATED ORAL at 07:33

## 2020-07-19 RX ADMIN — ONDANSETRON 4 MG: 4 TABLET, ORALLY DISINTEGRATING ORAL at 08:41

## 2020-07-19 RX ADMIN — NICOTINE POLACRILEX 4 MG: 2 GUM, CHEWING ORAL at 08:46

## 2020-07-19 RX ADMIN — FAMOTIDINE 20 MG: 20 TABLET ORAL at 08:40

## 2020-07-19 RX ADMIN — Medication 1 CAPSULE: at 08:40

## 2020-07-19 RX ADMIN — POTASSIUM CHLORIDE 20 MEQ: 20 SOLUTION ORAL at 11:52

## 2020-07-19 RX ADMIN — DIPHENHYDRAMINE HYDROCHLORIDE AND LIDOCAINE HYDROCHLORIDE AND ALUMINUM HYDROXIDE AND MAGNESIUM HYDRO 10 ML: KIT at 07:33

## 2020-07-19 RX ADMIN — ACETAMINOPHEN 975 MG: 325 TABLET, FILM COATED ORAL at 16:56

## 2020-07-19 RX ADMIN — GABAPENTIN 900 MG: 300 CAPSULE ORAL at 08:40

## 2020-07-19 RX ADMIN — LOPERAMIDE HYDROCHLORIDE 4 MG: 2 CAPSULE ORAL at 06:45

## 2020-07-19 RX ADMIN — GABAPENTIN 900 MG: 300 CAPSULE ORAL at 13:33

## 2020-07-19 RX ADMIN — HYDROCORTISONE: 25 CREAM TOPICAL at 06:44

## 2020-07-19 RX ADMIN — LORAZEPAM 0.5 MG: 0.5 TABLET ORAL at 07:33

## 2020-07-19 RX ADMIN — HYDROXYZINE HYDROCHLORIDE 100 MG: 25 TABLET ORAL at 16:56

## 2020-07-19 RX ADMIN — DIPHENHYDRAMINE HYDROCHLORIDE AND LIDOCAINE HYDROCHLORIDE AND ALUMINUM HYDROXIDE AND MAGNESIUM HYDRO 10 ML: KIT at 17:00

## 2020-07-19 RX ADMIN — ARIPIPRAZOLE 5 MG: 5 TABLET ORAL at 08:40

## 2020-07-19 RX ADMIN — HYDROXYZINE HYDROCHLORIDE 100 MG: 25 TABLET ORAL at 10:23

## 2020-07-19 RX ADMIN — NICOTINE 1 PATCH: 21 PATCH, EXTENDED RELEASE TRANSDERMAL at 08:44

## 2020-07-19 RX ADMIN — Medication: at 06:42

## 2020-07-19 RX ADMIN — ESCITALOPRAM OXALATE 10 MG: 10 TABLET ORAL at 08:40

## 2020-07-19 RX ADMIN — Medication 1 CAPSULE: at 21:05

## 2020-07-19 RX ADMIN — Medication 2 TABLET: at 21:05

## 2020-07-19 RX ADMIN — LORAZEPAM 0.5 MG: 0.5 TABLET ORAL at 19:26

## 2020-07-19 RX ADMIN — NICOTINE POLACRILEX 4 MG: 2 GUM, CHEWING ORAL at 12:40

## 2020-07-19 RX ADMIN — Medication: at 10:23

## 2020-07-19 RX ADMIN — Medication: at 12:38

## 2020-07-19 RX ADMIN — ONDANSETRON 4 MG: 4 TABLET, ORALLY DISINTEGRATING ORAL at 11:52

## 2020-07-19 RX ADMIN — NICOTINE POLACRILEX 4 MG: 2 GUM, CHEWING ORAL at 18:17

## 2020-07-19 RX ADMIN — GABAPENTIN 900 MG: 300 CAPSULE ORAL at 21:04

## 2020-07-19 RX ADMIN — RIVAROXABAN 20 MG: 20 TABLET, FILM COATED ORAL at 08:40

## 2020-07-19 RX ADMIN — NICOTINE POLACRILEX 4 MG: 2 GUM, CHEWING ORAL at 15:18

## 2020-07-19 RX ADMIN — HYDROCORTISONE: 25 CREAM TOPICAL at 11:35

## 2020-07-19 RX ADMIN — POTASSIUM CHLORIDE 10 MEQ: 750 CAPSULE, EXTENDED RELEASE ORAL at 08:40

## 2020-07-19 RX ADMIN — LORAZEPAM 0.5 MG: 0.5 TABLET ORAL at 14:49

## 2020-07-19 NOTE — PLAN OF CARE
Social on unit with peers. Has complaints of toothache on rt upper. Tylenol 975 mg given for 8/10 pain. Also given Atarax 100 mg and magic mouthwash. Remains on 1 to1 due to fall risks.  Pain decreased. Attending group.  Face to face end of shift report communicated to night shift RN.     Rosa Marshall RN  7/19/2020  9:25 PM            Problem: Adult Behavioral Health Plan of Care  Goal: Patient-Specific Goal (Individualization)  Description: Pt will attend 50 % of unit programming.   Pt will be compliant with medications  Pt will sleep 6-8 hours per night   Pt will eat at least 50% of meals.  Okay to have shoe laces on tennis shoes as long as 1:1 is ordered per NP.  Pt may use commode.      7/19/2020 1713 by Rosa Marshall RN  Outcome: Improving     Problem: Suicide Risk  Goal: Absence of Self-Harm  Description: Will remain without harm while in hospital.  7/19/2020 1713 by Rosa Marshall RN  Outcome: Improving

## 2020-07-19 NOTE — PROGRESS NOTES
"[facility]  Psychiatric Progress Note  Telemedicine Visit: The patient's condition can be safely assessed and treated via synchronous audio and visual telemedicine encounter.      Start Time: 0920   Stop Time: 0940    Reason for Telemedicine Visit: COVID    Originating Site (Patient Location): IP     Distant Site (Provider Location): Provider Remote Setting    Consent:  The patient/guardian has verbally consented to: the potential risks and benefits of telemedicine (video visit) versus in person care; bill my insurance or make self-payment for services provided; and responsibility for payment of non-covered services.     Mode of Communication:  Video Conference via [employer]    As the provider I attest to compliance with applicable laws and regulations related to telemedicine.       Impression:   This is a 30 year old yo female with a history of depression, anxiety, substance use, and CVA resulting in left sided hemiparesis.    Notably improved today. More engaging, less distraught in appearance. Multiple questions and requests. Most pressing is the fact that she reported sudden onset of pain in her left thigh, \"feels like when I had a clot.\" Consulted with medication NP. Venous Doppler and D-dimer ordered. Medical will take over follow up on this.    Also requested use of benzocaine for mouth pain more frequent than every 6 hours. The order is currently for four times daily. Nursing reported that having something more concrete, \"every two hours, every six hours,\" would be beneficial as Maggie often argues about being able to use it and when. Also discussed with FNP, and dosing guidelines specify use to be no more than four times daily. Confirmed on [employer] resource as well. Order changed to reflect that a six hour period does not have to pass, an hour should be sufficient; however, it still may not be used more than four times daily.     Maggie had questions about her potassium and why it keeps " "dropping. This is obviously a chronic issue. Again, FNP will discuss appropriate dosing with her. Reminded Maggie that she should take the recommended dose in order to prevent issues. She replied, \"I don't want to mess up my stomach. I'll try to eat a banana.\"    While discussing discharge planning, Maggie indicated she hopes to be ready to leave by \"Wednesday or Thursday.\" Reminded her that the plan was for tomorrow to court. She stated this wasn't true, and again, that court is via phone or [employer]. She is denying SI, has improved mood, and was out on the unit socializing and laughing last night. She may need much encouragement to discharge home.     No s/e from increase in Abilify. No other issues or concerns.        Diagnoses:   Major depressive disorder, recurrent, moderate  Unspecified anxiety disorder, R/o DIANNA vs PTSD  History of CVA in 2018  TBI      Attestation:  Patient has been seen and evaluated by me,  Jeet Casas NP          Interim History:   The patient's care was discussed with the treatment team and chart notes were reviewed.    BEHAVIORAL TEAM DISCUSSION    Progress: Notably improved. More engaging in conversation. Brighter affect.Denied SI  Continued Stay Criteria/Rationale: Ongoing depression, medication adjustments    Medical/Physical: left-sided weakness from CVA, chronic hypokalemia.   Precautions:   Falls precaution?: YES- currently on 1:1 observation due to fall risk  Behavioral Orders   Procedures     Code 1 - Restrict to Unit     Routine Programming     As clinically indicated     Status 15     Every 15 minutes.     Plan:   Continue Lexapro 10 mg daily  Continue Abilify 5mg daily.  Will increase as indicated/tolerated but plan is to utilize lowest beneficial dose to avoid potential side effects.  Add allowance to orajel/benzocaine gel instruction so that it could be used for lip pain as well as tooth pain.  FNP to follow up with acute left thigh pain and decreasing K+    Rationale for " change in precautions or plan:   No changes today.    Participants: Jeet Casas, APRN, CNP, Nursing       Medications:     Current Facility-Administered Medications Ordered in Epic   Medication Dose Route Frequency Last Rate Last Dose     acetaminophen (TYLENOL) tablet 650 mg  650 mg Oral Q4H PRN   650 mg at 07/11/20 0822     alum & mag hydroxide-simethicone (MAALOX  ES) suspension 30 mL  30 mL Oral Q4H PRN         Benzocaine-Menthol 20-0.26 % GEL   Mouth/Throat 4x Daily PRN         bisacodyl (DULCOLAX) Suppository 10 mg  10 mg Rectal Daily PRN         [START ON 7/13/2020] ergocalciferol capsule 50,000 Units  50,000 Units Oral Weekly         [START ON 7/12/2020] escitalopram (LEXAPRO) tablet 10 mg  10 mg Oral Daily         escitalopram (LEXAPRO) tablet 5 mg  5 mg Oral Daily         famotidine (PEPCID) tablet 20 mg  20 mg Oral BID   20 mg at 07/11/20 0822     gabapentin (NEURONTIN) capsule 900 mg  900 mg Oral TID   900 mg at 07/11/20 1304     hydrOXYzine (ATARAX) tablet  mg   mg Oral Q4H PRN   100 mg at 07/11/20 0646     lamoTRIgine (LaMICtal) tablet 25 mg  25 mg Oral Daily   25 mg at 07/11/20 0823     loperamide (IMODIUM) capsule 2 mg  2 mg Oral 4x Daily PRN   2 mg at 07/11/20 0649     loperamide (IMODIUM) capsule 4 mg  4 mg Oral Daily   4 mg at 07/11/20 0822     LORazepam (ATIVAN) tablet 0.5 mg  0.5 mg Oral BID PRN   0.5 mg at 07/11/20 1023     magic mouthwash suspension (diphenhydramine, lidocaine, aluminum-magnesium & simethicone)  10 mL Swish & Swallow Q6H PRN   10 mL at 07/11/20 0800     magnesium hydroxide (MILK OF MAGNESIA) suspension 30 mL  30 mL Oral At Bedtime PRN         melatonin 3 mg (with vit B6 10 mg) extended release tablet 2 tablet  2 tablet Oral At Bedtime PRN         mirtazapine (REMERON) tablet TABS 7.5-15 mg  7.5-15 mg Oral At Bedtime PRN   7.5 mg at 07/10/20 2028     nicotine (COMMIT) lozenge 2 mg  2 mg Buccal Q1H PRN   2 mg at 07/10/20 1534     nicotine (NICODERM CQ) 21 MG/24HR  "24 hr patch 1 patch  1 patch Transdermal Daily   1 patch at 07/11/20 0823     nicotine (NICORETTE) gum 2-4 mg  2-4 mg Buccal Q1H PRN   4 mg at 07/10/20 1725     nicotine Patch in Place   Transdermal Q8H   Stopped at 07/11/20 0646     ondansetron (ZOFRAN-ODT) ODT tab 4 mg  4 mg Oral Q6H PRN   4 mg at 07/11/20 0829     potassium chloride 10 mEq in 100 mL intermittent infusion with 10 mg lidocaine  10 mEq Intravenous Q1H PRN         potassium chloride ER (MICRO-K) CR capsule 10 mEq  10 mEq Oral BID   10 mEq at 07/11/20 0822     rivaroxaban ANTICOAGULANT (XARELTO) tablet 20 mg  20 mg Oral QAM   20 mg at 07/11/20 0823     No current [facility]-ordered outpatient medications on file.         10 point ROS- acute left thigh pain, chronic pain, intermittent nausea, small blister to thumb and lower lip       Allergies:     Allergies   Allergen Reactions     Amoxicillin Other (See Comments)     Headaches     Levaquin [Levofloxacin] Swelling     Lithium      Diabetes insipidus      Naproxen Other (See Comments)     Reaction: Headaches     Nickel      Tramadol      Sulindac Rash            Psychiatric Examination:   /74   Pulse 96   Temp 97  F (36.1  C) (Tympanic)   Resp 18   Ht 1.6 m (5' 3\")   Wt 75.4 kg (166 lb 4.8 oz)   SpO2 97%   BMI 29.46 kg/m    Weight is 166 lbs 4.8 oz  Body mass index is 29.46 kg/m .    Appearance: awake, alert  Attitude: Pleasant, cooperative  Eye Contact: good  Mood: notably improved but reportedly \"still depressed\"  Affect:  Brighter  Speech:  clear, coherent  Psychomotor Behavior:  no evidence of tardive dyskinesia, dystonia, or tics  Thought Process:  Logical,linear, goal oriented  Associations:  no loose associations  Thought Content:  no evidence of psychotic thought and passive suicidal ideation present, no active plan or intent  Insight:  fair  Judgment:  fair  Oriented to:  time, person, and place  Attention Span and Concentration:  intact  Recent and Remote Memory:  fair  Fund of " Knowledge: low-normal  Muscle Strength and Tone: left-sided weakness r/t CVA  Gait and Station: wheelchair-bound           Labs:     Results for orders placed or performed during the hospital encounter of 07/09/20 (from the past 24 hour(s))   Potassium   Result Value Ref Range    Potassium 3.3 (L) 3.4 - 5.3 mmol/L

## 2020-07-19 NOTE — PLAN OF CARE
"Face to face shift report received from RITESH Mancuso.       Problem: Adult Behavioral Health Plan of Care  Goal: Patient-Specific Goal (Individualization)  Description: Pt will attend 50 % of unit programming.   Pt will be compliant with medications  Pt will sleep 6-8 hours per night   Pt will eat at least 50% of meals.  Okay to have shoe laces on tennis shoes as long as 1:1 is ordered per NP.  Pt may use commode.      7/19/2020 0937 by Yadira Prado, RN  Outcome: Improving   Remains on 1:1 continuous observation for fall risk. Utilizes wheelchair and gait belt. Irritable and demanding at times. Becomes argumentative when informed that PRN medications are not available at requested times. Reports \"some\" depression. Denies thoughts of harming self or others. 0730 pt requested and received Ativan 0.5mg for anxietyand Tylenol 975mg for 9/10 back and lip pain. During meeting with provider pt reported left thigh pain. Pt reports this has been present for \"two days.\" 1023 pt requested and received Hydroxyzine 100mg for anxiety due to having to leave the unit for ultrasound. Pt went to ultrasound with security and nursing staff present at 1030, returned a short time later. Anusol cream applied at 1135 for hemorrhoid pain. One time dose of Potassium 20mEq solution administered at 1152 following much encouragement from both writer and Jessica Arenas CNP. Jessica Arenas CNP also ordered one time dose Zofran 4mg ODT to be administered at this time. 1238 pt requested and received Benzocaine Gel for lips. At this time pt informed this would leave her with one more dose before she reaches her 4 times daily limit. Pt voiced that she understands. Showered with assistance of 2 staff members this shift at 1420.   1450: Requested and received Ativan 0.5mg for anxiety/panic attack due to shower.     Problem: Fall Injury Risk  Goal: Absence of Fall and Fall-Related Injury  Description: Pt will remain free from falls.  Pt on 1:1 r/t fall " risk.   Pt will use wheelchair and transfer belt during stay.   Pt will wear appropriate footwear.  Okay to have shoe laces on tennis shoes as long as 1:1 is ordered per NP.  Outcome: Improving   Remains on 1:1 continuous observation for fall risk. Utilizes wheelchair and gait belt.     Problem: Suicide Risk  Goal: Absence of Self-Harm  Description: Will remain without harm while in hospital.  Outcome: Improving   Free from self harm or injury.     Face to face end of shift report to be communicated to oncoming RN.

## 2020-07-19 NOTE — PROGRESS NOTES
[facility]    Medical Services Progress Note    Date of Service (when I saw the patient): 07/19/2020    Assessment & Plan     Principal Problem:    Suicidal ideation     Active Medical Problems:    Hypokalemia- pt has chronic history of low potassium. Potassium level in ED was 2.4 and was replaced and level came up to 3.2. Home dose of potassium ordered. Will check potassium level in morning.   7/13/20- k+ level on 7/11/20 was 3.1 and 3.2 on 7/12/20. Pmhnp replaced with 30 meq po. K+ level is morning was 3.3. Pt has hx of chronic hypokalemia and takes 10 meq bid. Increased to 20 meq bid. Will recheck level in the morning.   7/15/20- k+ level was still low yesterday, replaced with 40 meq yesterday. Ordered K+ this morning but pt refused labs, pt also refused to take both tablets of the potassium this morning and only took 10 meq. After talking with pt she agreed for labs. K+ level is within the normal range but on the low end at 3.5. Discussed with pt about being complaint taking ordered potassium and lab draws. Pt verbalized understanding and stated she would take the potassium as ordered.   7/16/20- potassium level is much better today at 4.0. Pt does not like taking 2 tablets of potassium at the same time  and has to be encouraged. Pt wants to go back to her 10 meq bid. Since levels have improved will return to pt home dose of 10 meq bid and continue to monitor.   7/17/20- potasium level ordered for tomorrow.   7/19/20- potassium level yesterday had decreased to 3.5. Potassium level this morning 3.3. Increased po potassium back up to 20 meq bid. One time dose of Kayciel 20 meq ordered. Pt refusing potassium. After encouragement pt agreeable to take potassium. And One time dose of zofran to be given with potassium.  Pt also stating she will refuse the 2 tablets of potassium at a time twice daily. She states it upsets her stomach. Asked if she would be agreeable to the oral liquid and pt refused. After  much discussion pt stated she would take the potassium 10 meq qid. Pt has zofran ordered every 6 hours prn. Potassium level ordered for tomorrow.        History of CVA (cerebrovascular accident)- according to medical chart and pt she had a PE in 2015 and stopped taking anticoagulants and then had a right sided CVA in 2018. This left her with left sided hemiparesis and in a wheelchair. Pt wears a sling on her left arm to help hold it up because she experiences chronic left shoulder pain now since her stroke. Pt denies any new stroke symptoms, chest pain, sob. Ordered home dose of Xarelto. Pt is a 1:1 for pt safety due to use of wheelchair and left sided hemiparesis.    7/13/20 -pt is a 1:1 for pt safety. Pt denies any acute stroke symptoms, chest pain, sob.   7/15/20- pt is 1:1 for pt safety. Pt denies any falls or acute stroke symptoms, chest pain, sob.   7/16/20- continue 1:1 for pt safety. Denies any falls of injuries, Denies any acute stroke symptoms, chest pain, sob.   7/17/20- continue 1:1 for pt safety. Denies falls or injuries. Denies acute stroke symptoms, chest pain, sob.   7/19/20- 1:1 continued for pt safety. pt reported to nurse this morning she has been experiencing left thigh pain x 2 days, but had not reported this to nurse or NP. Pt points to lateral knee up into lateral thigh. Stating it aches and feels tender to touch when she laid on it.  No edema, no erythema, no warmth over leg. Pt denies chest pain, sob, difficulty breathing, dizziness, lightheadedness, rapid pulse.  Pt scored high risk on the Wells criteria with history of DVT and paralysis from previous CVA.  Ordered venous doppler US which was negative and D Dimer negative also. Pt instructed to report new symptoms to nurse right away. Pt given ice pack to see if this helps with discomfort and has tylenol ordered prn.        Chronic left shoulder pain- pt states she experiences chronic left shoulder pain since she had her stroke in 2018 due  "to it \"dangling\" and she is unable to lift her arm. Ordered home dose of gabapentin and pt states this helps control her pain.   7/13/20- denies any pain.  7/15/20- denies any pain  7/19/20- denies shoulder pain.        Irritable bowel syndrome with diarrhea- pt reports she takes imodium 4mg once in the morning and this helps control her diarrhea all day. Pt denies taking any other medications for her IBS and states the imodium has controlled it.      Tooth abscess- consulted by Pmhnp on 7/13/20 afternoon, pt  complaining of tooth pain in upper mouth.  Pt using magic mouthwash and Orajel. Pt states she has a dentist appointment August 14. Ordered clindamycin and probiotics on Monday.  Pt refused dose this morning. Discontinued due to possible side effects. Pt reports tooth pain is better and she will continue magic mouthwash and it is helping. No drainage, erythema noted.   7/16/20- reports the magic mouthwash is helping with tooth pain. Reports she is able to eat and drink okay.   7/17/20- using magic mouthwash, no pain reported.   7/19/20- using magic mouth wash, no erythema, drainage    Lower lip blister and thumb blister with bruise- Pt reports that her thumb had a blister on it a week ago, but now appears bruised. It appears as though she has smashed it in something. Pt denies any injury and is able to move it. She states it is just tender when she pushes on the top of blister. Pt also has a lip blister on her lower lip. Lamictal was discontinued. Talked to Dr. Rinaldi and he discontinued due to potential side effect from Lamictal. Pt and nurse reports blister on lip and thumb started before clindamycin was started. However, discontinued due to pt refusing to take it and possible side effects. CBC and CMP ordered. CBC normal and CMP revealed continued decreased renal function and albumin and protein decreased, otherwise unremarkable.   7/16/20- Pmhnp reports the lip blister and thumb blister are consistent with " Lamictal rash. Some improvement noted. No edema in lip.   7/17/20- pt reports pain and blistering are improved. Lips appear to be much better today. No edema. Pt is applying chap stick throughout the day and reports this is helping.   7/19/20- blisters continue to improve. No new blistering noted,  No lip edema. Can use Orajel 4 times daily with at least an hour between dosing, but no more than 4 times daily.     Abnormal kidney function- pt has history of low creatinine. On admission creatinine was 0.40, today it is improved at 0.51. Creatinine in May when admitted to medical floor lithium toxicity was as low as 0.45. Will continue to monitor and avoid ibuprofen.   7/19/20 - Renal panel ordered for tomorrow.      covid screening- negative     Code Status: Full Code.    Jessica Arenas CNP        -Data reviewed today: I reviewed all new labs and imaging results over the last 24 hours.     Physical Exam   Temp: 97  F (36.1  C) Temp src: Tympanic BP: 131/74 Pulse: 96   Resp: 18 SpO2: 97 % O2 Device: None (Room air)    Vitals:    07/09/20 1547 07/10/20 0500 07/12/20 0730   Weight: 80.7 kg (178 lb) 80.7 kg (177 lb 14.6 oz) 75.4 kg (166 lb 4.8 oz)     Vital Signs with Ranges  Temp:  [97  F (36.1  C)] 97  F (36.1  C)  Pulse:  [96] 96  Resp:  [18] 18  BP: (131)/(74) 131/74  SpO2:  [97 %] 97 %  No intake/output data recorded.    Constitutional: sleeping, wakes on command, cooperative, no apparent distress, vitals stable   Respiratory: No increased work of breathing, speaks in full sentences, no audible wheeze, symmetric rise and fall of chest, CTA bilaterally   Cardiovascular: RRR, S1, S2, no extra heart sounds, no edema, warm extremities   GI: soft, non-distended, normoactive bowel sounds x 4,  non-tender, no masses    Skin: upper and lower lip  small blister, no blistering  in mouth, small blister on thumb. Otherwise skin warm, dry, intact, no rashes   Musculoskeletal: no lower extremity pitting edema present  there is no  "redness, warmth, or swelling of the joints  tone is normal with exception of left side. Noticeable left shoulder \"drop\". No left side strength.range of motion noted.   Neuropsychiatric: General: normal, calm and normal eye contact    Medications     ARIPiprazole  5 mg Oral Daily     ergocalciferol  50,000 Units Oral Weekly     escitalopram  10 mg Oral Daily     famotidine  20 mg Oral BID     gabapentin  900 mg Oral TID     lactobacillus rhamnosus (GG)  1 capsule Oral BID     nicotine  1 patch Transdermal Daily     nicotine   Transdermal Q8H     potassium chloride ER  10 mEq Oral 4x Daily     rivaroxaban ANTICOAGULANT  20 mg Oral QAM       Data   Recent Labs   Lab 07/19/20  0819 07/18/20  0754 07/16/20  0823 07/15/20  1052   WBC  --   --   --  8.4   HGB  --   --   --  12.8   MCV  --   --   --  94   PLT  --   --   --  285   NA  --   --   --  140   POTASSIUM 3.3* 3.5 4.0 3.5   CHLORIDE  --   --   --  109   CO2  --   --   --  25   BUN  --   --   --  4*   CR  --   --   --  0.51*   ANIONGAP  --   --   --  6   AVINASH  --   --   --  8.8   GLC  --   --   --  90   ALBUMIN  --   --   --  3.0*   PROTTOTAL  --   --   --  6.0*   BILITOTAL  --   --   --  0.4   ALKPHOS  --   --   --  80   ALT  --   --   --  29   AST  --   --   --  33       Recent Results (from the past 24 hour(s))   US Lower Extremity Venous Duplex Left    Narrative    Exam:US LOWER EXTREMITY VENOUS DUPLEX LEFT    History: Left leg pain    Comparisons:    Technique: Venous duplex ultrasonography of the left lower extremity  was performed.     Findings: The common femoral vein, superficial femoral vein and  popliteal vein are fully compressible with spontaneous and augmentable  venous flow.           Impression    Impression: No evidence of deep venous thrombosis within the left  lower extremity.    GEETHA JACOBS MD                          "

## 2020-07-19 NOTE — PLAN OF CARE
Problem: Adult Behavioral Health Plan of Care  Goal: Patient-Specific Goal (Individualization)  Description: Pt will attend 50 % of unit programming.   Pt will be compliant with medications  Pt will sleep 6-8 hours per night   Pt will eat at least 50% of meals.  Okay to have shoe laces on tennis shoes as long as 1:1 is ordered per NP.  Pt may use commode.      7/19/2020 0016 by Jamee Maya RN  Outcome: No Change  Note: Shift Summery:  Face to face shift report received from Rosa GUERRA RN. Rounding completed, pt observed in bed, 1:1 staff present in room.  Appears to be sleeping, in no apparent distress.  Respirations are even and non labored.  Will continue to monitor.      Pt appeared to have slept well all night 7 hours.      Face to face report will be communicated to oncoming RN.    Jamee Maya RN  7/19/2020  5:52 AM

## 2020-07-20 LAB
ALBUMIN SERPL-MCNC: 3 G/DL (ref 3.4–5)
ANION GAP SERPL CALCULATED.3IONS-SCNC: 8 MMOL/L (ref 3–14)
BUN SERPL-MCNC: 3 MG/DL (ref 7–30)
CALCIUM SERPL-MCNC: 8.6 MG/DL (ref 8.5–10.1)
CHLORIDE SERPL-SCNC: 109 MMOL/L (ref 94–109)
CO2 SERPL-SCNC: 22 MMOL/L (ref 20–32)
CREAT SERPL-MCNC: 0.54 MG/DL (ref 0.52–1.04)
GFR SERPL CREATININE-BSD FRML MDRD: >90 ML/MIN/{1.73_M2}
GLUCOSE SERPL-MCNC: 106 MG/DL (ref 70–99)
PHOSPHATE SERPL-MCNC: 3.5 MG/DL (ref 2.5–4.5)
POTASSIUM SERPL-SCNC: 3.1 MMOL/L (ref 3.4–5.3)
SODIUM SERPL-SCNC: 139 MMOL/L (ref 133–144)

## 2020-07-20 PROCEDURE — 25000132 ZZH RX MED GY IP 250 OP 250 PS 637: Performed by: NURSE PRACTITIONER

## 2020-07-20 PROCEDURE — 25000128 H RX IP 250 OP 636: Performed by: NURSE PRACTITIONER

## 2020-07-20 PROCEDURE — 99232 SBSQ HOSP IP/OBS MODERATE 35: CPT | Performed by: NURSE PRACTITIONER

## 2020-07-20 PROCEDURE — 99233 SBSQ HOSP IP/OBS HIGH 50: CPT | Performed by: NURSE PRACTITIONER

## 2020-07-20 PROCEDURE — 12400000 ZZH R&B MH

## 2020-07-20 PROCEDURE — 80069 RENAL FUNCTION PANEL: CPT | Performed by: NURSE PRACTITIONER

## 2020-07-20 RX ORDER — ONDANSETRON 4 MG/1
4 TABLET, FILM COATED ORAL ONCE
Status: COMPLETED | OUTPATIENT
Start: 2020-07-20 | End: 2020-07-20

## 2020-07-20 RX ORDER — POTASSIUM CHLORIDE 750 MG/1
20 CAPSULE, EXTENDED RELEASE ORAL ONCE
Status: COMPLETED | OUTPATIENT
Start: 2020-07-20 | End: 2020-07-20

## 2020-07-20 RX ORDER — ARIPIPRAZOLE 5 MG/1
2.5 TABLET ORAL ONCE
Status: COMPLETED | OUTPATIENT
Start: 2020-07-20 | End: 2020-07-20

## 2020-07-20 RX ORDER — LANOLIN ALCOHOL/MO/W.PET/CERES
6 CREAM (GRAM) TOPICAL ONCE
Status: COMPLETED | OUTPATIENT
Start: 2020-07-20 | End: 2020-07-20

## 2020-07-20 RX ADMIN — DIPHENHYDRAMINE HYDROCHLORIDE AND LIDOCAINE HYDROCHLORIDE AND ALUMINUM HYDROXIDE AND MAGNESIUM HYDRO 10 ML: KIT at 13:00

## 2020-07-20 RX ADMIN — GABAPENTIN 900 MG: 300 CAPSULE ORAL at 08:07

## 2020-07-20 RX ADMIN — ARIPIPRAZOLE 5 MG: 5 TABLET ORAL at 08:08

## 2020-07-20 RX ADMIN — NICOTINE POLACRILEX 4 MG: 2 GUM, CHEWING ORAL at 08:44

## 2020-07-20 RX ADMIN — RIVAROXABAN 20 MG: 20 TABLET, FILM COATED ORAL at 08:08

## 2020-07-20 RX ADMIN — LORAZEPAM 0.5 MG: 0.5 TABLET ORAL at 16:05

## 2020-07-20 RX ADMIN — ARIPIPRAZOLE 2.5 MG: 5 TABLET ORAL at 14:40

## 2020-07-20 RX ADMIN — HYDROXYZINE HYDROCHLORIDE 100 MG: 25 TABLET ORAL at 20:01

## 2020-07-20 RX ADMIN — ESCITALOPRAM OXALATE 10 MG: 10 TABLET ORAL at 08:07

## 2020-07-20 RX ADMIN — LOPERAMIDE HYDROCHLORIDE 4 MG: 2 CAPSULE ORAL at 06:48

## 2020-07-20 RX ADMIN — ONDANSETRON 4 MG: 4 TABLET, ORALLY DISINTEGRATING ORAL at 08:07

## 2020-07-20 RX ADMIN — LOPERAMIDE HYDROCHLORIDE 2 MG: 2 CAPSULE ORAL at 16:15

## 2020-07-20 RX ADMIN — POTASSIUM CHLORIDE 10 MEQ: 750 CAPSULE, EXTENDED RELEASE ORAL at 08:07

## 2020-07-20 RX ADMIN — NICOTINE POLACRILEX 4 MG: 2 GUM, CHEWING ORAL at 12:38

## 2020-07-20 RX ADMIN — Medication: at 21:32

## 2020-07-20 RX ADMIN — NICOTINE 1 PATCH: 21 PATCH, EXTENDED RELEASE TRANSDERMAL at 08:14

## 2020-07-20 RX ADMIN — LORAZEPAM 0.5 MG: 0.5 TABLET ORAL at 06:48

## 2020-07-20 RX ADMIN — ACETAMINOPHEN 975 MG: 325 TABLET, FILM COATED ORAL at 15:58

## 2020-07-20 RX ADMIN — MELATONIN TAB 3 MG 6 MG: 3 TAB at 22:29

## 2020-07-20 RX ADMIN — POTASSIUM CHLORIDE 20 MEQ: 750 CAPSULE, EXTENDED RELEASE ORAL at 12:56

## 2020-07-20 RX ADMIN — Medication: at 07:17

## 2020-07-20 RX ADMIN — NICOTINE POLACRILEX 4 MG: 2 GUM, CHEWING ORAL at 21:33

## 2020-07-20 RX ADMIN — FAMOTIDINE 20 MG: 20 TABLET ORAL at 22:29

## 2020-07-20 RX ADMIN — NICOTINE POLACRILEX 4 MG: 2 GUM, CHEWING ORAL at 11:09

## 2020-07-20 RX ADMIN — ONDANSETRON HYDROCHLORIDE 4 MG: 4 TABLET, FILM COATED ORAL at 12:25

## 2020-07-20 RX ADMIN — HYDROXYZINE HYDROCHLORIDE 100 MG: 25 TABLET ORAL at 15:57

## 2020-07-20 RX ADMIN — Medication 1 CAPSULE: at 08:07

## 2020-07-20 RX ADMIN — NICOTINE POLACRILEX 4 MG: 2 GUM, CHEWING ORAL at 19:41

## 2020-07-20 RX ADMIN — POTASSIUM CHLORIDE 20 MEQ: 750 CAPSULE, EXTENDED RELEASE ORAL at 12:13

## 2020-07-20 RX ADMIN — POTASSIUM CHLORIDE 10 MEQ: 750 CAPSULE, EXTENDED RELEASE ORAL at 12:55

## 2020-07-20 RX ADMIN — HYDROXYZINE HYDROCHLORIDE 100 MG: 25 TABLET ORAL at 09:44

## 2020-07-20 RX ADMIN — LORAZEPAM 0.5 MG: 0.5 TABLET ORAL at 11:09

## 2020-07-20 RX ADMIN — ERGOCALCIFEROL 50000 UNITS: 1.25 CAPSULE, LIQUID FILLED ORAL at 08:18

## 2020-07-20 RX ADMIN — GABAPENTIN 900 MG: 300 CAPSULE ORAL at 14:40

## 2020-07-20 RX ADMIN — FAMOTIDINE 20 MG: 20 TABLET ORAL at 08:08

## 2020-07-20 RX ADMIN — GABAPENTIN 900 MG: 300 CAPSULE ORAL at 22:28

## 2020-07-20 RX ADMIN — POTASSIUM CHLORIDE 10 MEQ: 750 CAPSULE, EXTENDED RELEASE ORAL at 22:29

## 2020-07-20 RX ADMIN — ONDANSETRON 4 MG: 4 TABLET, ORALLY DISINTEGRATING ORAL at 14:43

## 2020-07-20 RX ADMIN — ACETAMINOPHEN 975 MG: 325 TABLET, FILM COATED ORAL at 06:47

## 2020-07-20 NOTE — PROGRESS NOTES
[facility]    Medical Services Progress Note    Date of Service (when I saw the patient): 07/20/2020    Assessment & Plan     Principal Problem:    Suicidal ideation     Active Medical Problems:    Hypokalemia- pt has chronic history of low potassium. Potassium level in ED was 2.4 and was replaced and level came up to 3.2. Home dose of potassium ordered. Will check potassium level in morning.   7/13/20- k+ level on 7/11/20 was 3.1 and 3.2 on 7/12/20. Pmhnp replaced with 30 meq po. K+ level is morning was 3.3. Pt has hx of chronic hypokalemia and takes 10 meq bid. Increased to 20 meq bid. Will recheck level in the morning.   7/15/20- k+ level was still low yesterday, replaced with 40 meq yesterday. Ordered K+ this morning but pt refused labs, pt also refused to take both tablets of the potassium this morning and only took 10 meq. After talking with pt she agreed for labs. K+ level is within the normal range but on the low end at 3.5. Discussed with pt about being complaint taking ordered potassium and lab draws. Pt verbalized understanding and stated she would take the potassium as ordered.   7/16/20- potassium level is much better today at 4.0. Pt does not like taking 2 tablets of potassium at the same time  and has to be encouraged. Pt wants to go back to her 10 meq bid. Since levels have improved will return to pt home dose of 10 meq bid and continue to monitor.   7/17/20- potasium level ordered for tomorrow.   7/19/20- potassium level yesterday had decreased to 3.5. Potassium level this morning 3.3. Increased po potassium back up to 20 meq bid. One time dose of Kayciel 20 meq ordered. Pt refusing potassium. After encouragement pt agreeable to take potassium. And One time dose of zofran to be given with potassium.  Pt also stating she will refuse the 2 tablets of potassium at a time twice daily. She states it upsets her stomach. Asked if she would be agreeable to the oral liquid and pt refused. After  "much discussion pt stated she would take the potassium 10 meq qid. Pt has zofran ordered every 6 hours prn. Potassium level ordered for tomorrow.   7/20/20- potassium level today 3.1. Pt stated \"I am not taking anymore potassium\" Explained to pt the risks of not taking it including heart arrhythmias and possibly death. Pt stated \"I don't care, I am refusing the potassium\" After much encouragement pt agreeable to taking the once dose of 40 meq but in a split dose. Pt also states she is not taking the liquid potassium as she states this makes her incontinent. Will repeat K+ level tomorrow.        History of CVA (cerebrovascular accident)- according to medical chart and pt she had a PE in 2015 and stopped taking anticoagulants and then had a right sided CVA in 2018. This left her with left sided hemiparesis and in a wheelchair. Pt wears a sling on her left arm to help hold it up because she experiences chronic left shoulder pain now since her stroke. Pt denies any new stroke symptoms, chest pain, sob. Ordered home dose of Xarelto. Pt is a 1:1 for pt safety due to use of wheelchair and left sided hemiparesis.    7/13/20 -pt is a 1:1 for pt safety. Pt denies any acute stroke symptoms, chest pain, sob.   7/15/20- pt is 1:1 for pt safety. Pt denies any falls or acute stroke symptoms, chest pain, sob.   7/16/20- continue 1:1 for pt safety. Denies any falls of injuries, Denies any acute stroke symptoms, chest pain, sob.   7/17/20- continue 1:1 for pt safety. Denies falls or injuries. Denies acute stroke symptoms, chest pain, sob.   7/19/20- 1:1 continued for pt safety. pt reported to nurse this morning she has been experiencing left thigh pain x 2 days, but had not reported this to nurse or NP. Pt points to lateral knee up into lateral thigh. Stating it aches and feels tender to touch when she laid on it.  No edema, no erythema, no warmth over leg. Pt denies chest pain, sob, difficulty breathing, dizziness, lightheadedness, " "rapid pulse.  Pt scored high risk on the Wells criteria with history of DVT and paralysis from previous CVA.  Ordered venous doppler US which was negative and D Dimer negative also. Pt instructed to report new symptoms to nurse right away. Pt given ice pack to see if this helps with discomfort and has tylenol ordered prn.   7/20/20- denies chest pain, sob, acute stroke symptoms, pt also denies thigh pain.        Chronic left shoulder pain- pt states she experiences chronic left shoulder pain since she had her stroke in 2018 due to it \"dangling\" and she is unable to lift her arm. Ordered home dose of gabapentin and pt states this helps control her pain.   7/13/20- denies any pain.  7/15/20- denies any pain  7/19/20- denies shoulder pain.   7/20/20- denies shoulder pain.        Irritable bowel syndrome with diarrhea- pt reports she takes imodium 4mg once in the morning and this helps control her diarrhea all day. Pt denies taking any other medications for her IBS and states the imodium has controlled it.      Tooth abscess- consulted by Pmhnp on 7/13/20 afternoon, pt  complaining of tooth pain in upper mouth.  Pt using magic mouthwash and Orajel. Pt states she has a dentist appointment August 14. Ordered clindamycin and probiotics on Monday.  Pt refused dose this morning. Discontinued due to possible side effects. Pt reports tooth pain is better and she will continue magic mouthwash and it is helping. No drainage, erythema noted.   7/16/20- reports the magic mouthwash is helping with tooth pain. Reports she is able to eat and drink okay.   7/17/20- using magic mouthwash, no pain reported.   7/19/20- using magic mouth wash, no erythema, drainage  7/20/20- continue with magic mouthwash, no erythema, no drainage.     Lower lip blister and thumb blister with bruise- Pt reports that her thumb had a blister on it a week ago, but now appears bruised. It appears as though she has smashed it in something. Pt denies any injury " and is able to move it. She states it is just tender when she pushes on the top of blister. Pt also has a lip blister on her lower lip. Lamictal was discontinued. Talked to Dr. Rinaldi and he discontinued due to potential side effect from Lamictal. Pt and nurse reports blister on lip and thumb started before clindamycin was started. However, discontinued due to pt refusing to take it and possible side effects. CBC and CMP ordered. CBC normal and CMP revealed continued decreased renal function and albumin and protein decreased, otherwise unremarkable.   7/16/20- Pmhnp reports the lip blister and thumb blister are consistent with Lamictal rash. Some improvement noted. No edema in lip.   7/17/20- pt reports pain and blistering are improved. Lips appear to be much better today. No edema. Pt is applying chap stick throughout the day and reports this is helping.   7/19/20- blisters continue to improve. No new blistering noted,  No lip edema. Can use Orajel 4 times daily with at least an hour between dosing, but no more than 4 times daily.   7/20/20- lip and thumb blister healing. Improvement from yesterday. No edema, no drainage. Continue with Orajel and chap stick.     Abnormal kidney function- pt has history of low creatinine. On admission creatinine was 0.40, today it is improved at 0.51. Creatinine in May when admitted to medical floor lithium toxicity was as low as 0.45. Will continue to monitor and avoid ibuprofen.   7/19/20 - Renal panel ordered for tomorrow.   7/20/20- Creatinine 0.54 and GFR are WNL      covid screening- negative     Code Status: Full Code.    Jessica Arenas CNP        -Data reviewed today: I reviewed all new labs and imaging results over the last 24 hours.     Physical Exam   Temp: 97.3  F (36.3  C) Temp src: Tympanic BP: 109/69 Pulse: 90   Resp: 16 SpO2: 98 %      Vitals:    07/09/20 1547 07/10/20 0500 07/12/20 0730   Weight: 80.7 kg (178 lb) 80.7 kg (177 lb 14.6 oz) 75.4 kg (166 lb 4.8 oz)  "    Vital Signs with Ranges  Temp:  [97.3  F (36.3  C)-97.8  F (36.6  C)] 97.3  F (36.3  C)  Pulse:  [90-94] 90  Resp:  [16] 16  BP: (109-112)/(69-73) 109/69  SpO2:  [98 %] 98 %  No intake/output data recorded.    Constitutional: sleeping, wakes on command, cooperative, no apparent distress, vitals stable   Respiratory: No increased work of breathing, speaks in full sentences, no audible wheeze, symmetric rise and fall of chest, CTA bilaterally   Cardiovascular: RRR, S1, S2, no extra heart sounds, no edema, warm extremities   GI: soft, non-distended, normoactive bowel sounds x 4,  non-tender, no masses    Skin:  lower lip  Small healing blister, no blistering  in mouth, small healing blister on thumb. Otherwise skin warm, dry, intact, no rashes   Musculoskeletal: no lower extremity pitting edema present  there is no redness, warmth, or swelling of the joints  tone is normal with exception of left side. Noticeable left shoulder \"drop\". No left side strength.range of motion noted.   Neuropsychiatric: General: normal, calm and normal eye contact    Medications     ARIPiprazole  5 mg Oral Daily     ergocalciferol  50,000 Units Oral Weekly     escitalopram  10 mg Oral Daily     famotidine  20 mg Oral BID     gabapentin  900 mg Oral TID     nicotine  1 patch Transdermal Daily     nicotine   Transdermal Q8H     potassium chloride ER  10 mEq Oral 4x Daily     potassium chloride ER  20 mEq Oral Once     potassium chloride ER  20 mEq Oral Once     rivaroxaban ANTICOAGULANT  20 mg Oral QAM       Data   Recent Labs   Lab 07/20/20  0809 07/19/20  0819 07/18/20  0754  07/15/20  1052   WBC  --   --   --   --  8.4   HGB  --   --   --   --  12.8   MCV  --   --   --   --  94   PLT  --   --   --   --  285     --   --   --  140   POTASSIUM 3.1* 3.3* 3.5   < > 3.5   CHLORIDE 109  --   --   --  109   CO2 22  --   --   --  25   BUN 3*  --   --   --  4*   CR 0.54  --   --   --  0.51*   ANIONGAP 8  --   --   --  6   AVINASH 8.6  --   --   " --  8.8   *  --   --   --  90   ALBUMIN 3.0*  --   --   --  3.0*   PROTTOTAL  --   --   --   --  6.0*   BILITOTAL  --   --   --   --  0.4   ALKPHOS  --   --   --   --  80   ALT  --   --   --   --  29   AST  --   --   --   --  33    < > = values in this interval not displayed.       No results found for this or any previous visit (from the past 24 hour(s)).

## 2020-07-20 NOTE — PLAN OF CARE
Problem: Adult Behavioral Health Plan of Care  Goal: Patient-Specific Goal (Individualization)  Description: Pt will attend 50 % of unit programming.   Pt will be compliant with medications  Pt will sleep 6-8 hours per night   Pt will eat at least 50% of meals.  Okay to have shoe laces on tennis shoes as long as 1:1 is ordered per NP.  Pt may use commode.      7/19/2020 2343 by Jamee Maya RN  Outcome: Improving  Note: Face to face shift report received from Rosa AWAD. Rounding completed, pt observed in bed appears to be sleeping, respirations are even and non labored.  1:1 present for safety.      Pt slept approx 7 hours during the night.  Offered to complaints.  1:1 present in room.    0645-Pt requested APAP for a headache rated 7/10, Ativan for anxiety and Loperamide.  Medications given per MD order.      Face to face report will be communicated to oncaba RN.    Jamee Maya RN  7/20/2020  5:52 AM

## 2020-07-20 NOTE — PLAN OF CARE
"Face to face end of shift report communicated to oncoming shift.     Rebekah Hansen RN  7/20/2020  3:05 PM    Patient remains free from falls and injury this shift.  1:1 staff present at all times, wheelchair and transfer belt in use.    Patient denies SI, HI, AH and VH.  Endorses depression is present.    Concerned regarding incontinence of BM upon waking this am, asks this writer what her Potassium level was, noticed to have dropped since last lab draw.  Patient reports, \"probably because it all came out this morning with my mess\"  Patient compliant with all medications this shift.   PRN:  Zofran @ 0807 for nausea r/t Potassium supplement.   PRN: Atarax 100 mg @ 0944 for anxiety r/t \"court call\"  PRN:  Zofran @ 1443 for nausea.     Patient eats greater than 50% of meals, no difficulties with sleep reported.  When discussing patient's possible discharge from the facility patient tells this writer \"No I'm not going home today, I thought it was Tuesday or Wednesday\"    Face to face start of shift report received from Jamee MAKI RN  Patient in room at this time, 1:1 staff present, will continue to monitor.   Problem: Fall Injury Risk  Goal: Absence of Fall and Fall-Related Injury  Description: Pt will remain free from falls.  Pt on 1:1 r/t fall risk.   Pt will use wheelchair and transfer belt during stay.   Pt will wear appropriate footwear.  Okay to have shoe laces on tennis shoes as long as 1:1 is ordered per NP.  Outcome: No Change     Problem: Adult Behavioral Health Plan of Care  Goal: Patient-Specific Goal (Individualization)  Description: Pt will attend 50 % of unit programming.   Pt will be compliant with medications  Pt will sleep 6-8 hours per night   Pt will eat at least 50% of meals.  Okay to have shoe laces on tennis shoes as long as 1:1 is ordered per NP.  Pt may use commode.      7/20/2020 0907 by Rebekah Hansen RN  Outcome: Improving     "

## 2020-07-20 NOTE — PLAN OF CARE
"Face to face end of shift report received from Rebekah DILLARD RN. Rounding completed and patient observed in her room with 1:1 staff in place. Requesting PRNs.     18:00 Update: Patient reported high anxiety and depression. She rated anxiety at a 10 of 10 and depression at 9 of 10. She denied SI/HI and hallucinations. Patient received 100mg Atarax at 15:58. She said this anxiety was worrying that she would \"shit the bed.\" Patient had just had a BM and reported it was lose and watery. She was given 2mg of Imodium. She was also updated on the way her Imodium is ordered. She appreciated this information and said it helped to calm her nerves knowing it will help. She complained of nausea and reported the Potassium was making her sick to her stomach and having an increase of diarrhea. She refused to take her potassium at 17:00. She also declined to eat dinner. She was pleasant and cooperative. She requested 0.5mg Ativan and received at 16:05. Patient has a 1:1 staff with her at all times.     Face to face end of shift report communicated to oncoming RN.          Problem: Adult Behavioral Health Plan of Care  Goal: Patient-Specific Goal (Individualization)  Description: Pt will attend 50 % of unit programming.   Pt will be compliant with medications  Pt will sleep 6-8 hours per night   Pt will eat at least 50% of meals.  Okay to have shoe laces on tennis shoes as long as 1:1 is ordered per NP.  Pt may use commode.      7/20/2020 1750 by Lisa Unger RN  Outcome: Improving     Problem: Fall Injury Risk  Goal: Absence of Fall and Fall-Related Injury  Description: Pt will remain free from falls.  Pt on 1:1 r/t fall risk.   Pt will use wheelchair and transfer belt during stay.   Pt will wear appropriate footwear.  Okay to have shoe laces on tennis shoes as long as 1:1 is ordered per NP.  7/20/2020 1750 by Lisa Unger RN  Outcome: Improving     Problem: Suicide Risk  Goal: Absence of Self-Harm  Description: " Will remain without harm while in hospital.  Outcome: Improving

## 2020-07-20 NOTE — PROGRESS NOTES
"[facility]  Psychiatric Progress Note      Impression:   This is a 30 year old yo female with a history of depression, anxiety, substance use, and CVA resulting in left sided hemiparesis.    Maggie is improving. We did discuss discharge planning and agreed upon a goal of Wednesday. She has indicated that she would like to get more groups in, as she is finally feeling up to participating. Agreed that if she remains until Wednesday, she will attend all groups, and we will continue adjusting the Abilify. Today we will increase to 7.5mg and if tolerated, tomorrow to 10mg daily. She is going to contact her PCA to arrange for transportation tomorrow. Maggie disclosed how close she actually was to committing suicide, and today states, \"I don't want to die. That's why I want to stay a couple more days, to make sure I'm really better.\" Agree that she is high risk and stability should be ensured. As long as she continues to do her part in actively getting well, versus napping all day and requesting more and more PRNs.        Diagnoses:   Major depressive disorder, recurrent, moderate  Unspecified anxiety disorder, R/o DIANNA vs PTSD  History of CVA in 2018  TBI    Attestation:  Patient has been seen and evaluated by me,  Jeet Casas NP          Interim History:   The patient's care was discussed with the treatment team and chart notes were reviewed.    BEHAVIORAL TEAM DISCUSSION    Progress: Continues to improve. Sleeping well. Social on unit. Leaving room more often.     Continued Stay Criteria/Rationale: Ongoing depression, medication adjustments, high risk    Medical/Physical: left-sided weakness from CVA, chronic hypokalemia.   Precautions:   Falls precaution?: YES- currently on 1:1 observation due to fall risk  Behavioral Orders   Procedures     Code 1 - Restrict to Unit     Routine Programming     As clinically indicated     Status 15     Every 15 minutes.     Plan:   Continue Lexapro 10 mg daily  Increase " Abilify to 7.5mg daily.    K+ to be checked again in AM per FNP.    Rationale for change in precautions or plan:   Improving with addition of Abilify but still has some amotivation and depressive symptoms. Requested an increase. No side effects noted.     Participants: Jeet Casas, APRN, CNP, Nursing       Medications:     Current Facility-Administered Medications Ordered in Epic   Medication Dose Route Frequency Last Rate Last Dose     acetaminophen (TYLENOL) tablet 650 mg  650 mg Oral Q4H PRN   650 mg at 07/11/20 0822     alum & mag hydroxide-simethicone (MAALOX  ES) suspension 30 mL  30 mL Oral Q4H PRN         Benzocaine-Menthol 20-0.26 % GEL   Mouth/Throat 4x Daily PRN         bisacodyl (DULCOLAX) Suppository 10 mg  10 mg Rectal Daily PRN         [START ON 7/13/2020] ergocalciferol capsule 50,000 Units  50,000 Units Oral Weekly         [START ON 7/12/2020] escitalopram (LEXAPRO) tablet 10 mg  10 mg Oral Daily         escitalopram (LEXAPRO) tablet 5 mg  5 mg Oral Daily         famotidine (PEPCID) tablet 20 mg  20 mg Oral BID   20 mg at 07/11/20 0822     gabapentin (NEURONTIN) capsule 900 mg  900 mg Oral TID   900 mg at 07/11/20 1304     hydrOXYzine (ATARAX) tablet  mg   mg Oral Q4H PRN   100 mg at 07/11/20 0646     lamoTRIgine (LaMICtal) tablet 25 mg  25 mg Oral Daily   25 mg at 07/11/20 0823     loperamide (IMODIUM) capsule 2 mg  2 mg Oral 4x Daily PRN   2 mg at 07/11/20 0649     loperamide (IMODIUM) capsule 4 mg  4 mg Oral Daily   4 mg at 07/11/20 0822     LORazepam (ATIVAN) tablet 0.5 mg  0.5 mg Oral BID PRN   0.5 mg at 07/11/20 1023     magic mouthwash suspension (diphenhydramine, lidocaine, aluminum-magnesium & simethicone)  10 mL Swish & Swallow Q6H PRN   10 mL at 07/11/20 0800     magnesium hydroxide (MILK OF MAGNESIA) suspension 30 mL  30 mL Oral At Bedtime PRN         melatonin 3 mg (with vit B6 10 mg) extended release tablet 2 tablet  2 tablet Oral At Bedtime PRN         mirtazapine  "(REMERON) tablet TABS 7.5-15 mg  7.5-15 mg Oral At Bedtime PRN   7.5 mg at 07/10/20 2028     nicotine (COMMIT) lozenge 2 mg  2 mg Buccal Q1H PRN   2 mg at 07/10/20 1534     nicotine (NICODERM CQ) 21 MG/24HR 24 hr patch 1 patch  1 patch Transdermal Daily   1 patch at 07/11/20 0823     nicotine (NICORETTE) gum 2-4 mg  2-4 mg Buccal Q1H PRN   4 mg at 07/10/20 1725     nicotine Patch in Place   Transdermal Q8H   Stopped at 07/11/20 0646     ondansetron (ZOFRAN-ODT) ODT tab 4 mg  4 mg Oral Q6H PRN   4 mg at 07/11/20 0829     potassium chloride 10 mEq in 100 mL intermittent infusion with 10 mg lidocaine  10 mEq Intravenous Q1H PRN         potassium chloride ER (MICRO-K) CR capsule 10 mEq  10 mEq Oral BID   10 mEq at 07/11/20 0822     rivaroxaban ANTICOAGULANT (XARELTO) tablet 20 mg  20 mg Oral QAM   20 mg at 07/11/20 0823     No current [facility]-ordered outpatient medications on file.         10 point ROS- chronic pain, intermittent nausea, small blister to thumb and lower lip       Allergies:     Allergies   Allergen Reactions     Amoxicillin Other (See Comments)     Headaches     Levaquin [Levofloxacin] Swelling     Lithium      Diabetes insipidus      Naproxen Other (See Comments)     Reaction: Headaches     Nickel      Tramadol      Sulindac Rash            Psychiatric Examination:   /69 (BP Location: Right arm)   Pulse 90   Temp 97.3  F (36.3  C) (Tympanic)   Resp 16   Ht 1.6 m (5' 3\")   Wt 75.4 kg (166 lb 4.8 oz)   SpO2 98%   BMI 29.46 kg/m    Weight is 166 lbs 4.8 oz  Body mass index is 29.46 kg/m .    Appearance: awake, alert  Attitude: Pleasant, cooperative  Eye Contact: good  Mood: ongoing improvement  Affect:  Brighter  Speech:  clear, coherent, engaging  Psychomotor Behavior:  no evidence of tardive dyskinesia, dystonia, or tics  Thought Process:  Logical,linear, goal oriented  Associations:  no loose associations  Thought Content:  No evidence of psychosis, denied thoughts of suicide but is still " fearful they will return  Insight:  fair  Judgment:  fair  Oriented to:  time, person, and place  Attention Span and Concentration:  intact  Recent and Remote Memory:  fair  Fund of Knowledge: low-normal  Muscle Strength and Tone: left-sided weakness r/t CVA  Gait and Station: wheelchair-bound           Labs:     Results for orders placed or performed during the hospital encounter of 07/09/20 (from the past 24 hour(s))   Renal panel   Result Value Ref Range    Sodium 139 133 - 144 mmol/L    Potassium 3.1 (L) 3.4 - 5.3 mmol/L    Chloride 109 94 - 109 mmol/L    Carbon Dioxide 22 20 - 32 mmol/L    Anion Gap 8 3 - 14 mmol/L    Glucose 106 (H) 70 - 99 mg/dL    Urea Nitrogen 3 (L) 7 - 30 mg/dL    Creatinine 0.54 0.52 - 1.04 mg/dL    GFR Estimate >90 >60 mL/min/[1.73_m2]    GFR Estimate If Black >90 >60 mL/min/[1.73_m2]    Calcium 8.6 8.5 - 10.1 mg/dL    Phosphorus 3.5 2.5 - 4.5 mg/dL    Albumin 3.0 (L) 3.4 - 5.0 g/dL

## 2020-07-21 ENCOUNTER — TELEPHONE (OUTPATIENT)
Dept: BEHAVIORAL HEALTH | Facility: OTHER | Age: 30
End: 2020-07-21

## 2020-07-21 ENCOUNTER — VIRTUAL VISIT (OUTPATIENT)
Dept: PSYCHOLOGY | Facility: OTHER | Age: 30
End: 2020-07-21
Attending: COUNSELOR
Payer: MEDICAID

## 2020-07-21 DIAGNOSIS — F41.9 ANXIETY: Primary | ICD-10-CM

## 2020-07-21 LAB — POTASSIUM SERPL-SCNC: 3.4 MMOL/L (ref 3.4–5.3)

## 2020-07-21 PROCEDURE — 36415 COLL VENOUS BLD VENIPUNCTURE: CPT | Performed by: NURSE PRACTITIONER

## 2020-07-21 PROCEDURE — 25000128 H RX IP 250 OP 636: Performed by: NURSE PRACTITIONER

## 2020-07-21 PROCEDURE — 25000132 ZZH RX MED GY IP 250 OP 250 PS 637: Performed by: NURSE PRACTITIONER

## 2020-07-21 PROCEDURE — 84132 ASSAY OF SERUM POTASSIUM: CPT | Performed by: NURSE PRACTITIONER

## 2020-07-21 PROCEDURE — 12400000 ZZH R&B MH

## 2020-07-21 PROCEDURE — 99232 SBSQ HOSP IP/OBS MODERATE 35: CPT | Performed by: NURSE PRACTITIONER

## 2020-07-21 RX ORDER — ARIPIPRAZOLE 5 MG/1
2.5 TABLET ORAL ONCE
Status: COMPLETED | OUTPATIENT
Start: 2020-07-21 | End: 2020-07-21

## 2020-07-21 RX ORDER — ARIPIPRAZOLE 10 MG/1
10 TABLET ORAL DAILY
Status: DISCONTINUED | OUTPATIENT
Start: 2020-07-22 | End: 2020-07-22 | Stop reason: HOSPADM

## 2020-07-21 RX ADMIN — FAMOTIDINE 20 MG: 20 TABLET ORAL at 08:11

## 2020-07-21 RX ADMIN — Medication: at 07:24

## 2020-07-21 RX ADMIN — HYDROXYZINE HYDROCHLORIDE 100 MG: 25 TABLET ORAL at 17:26

## 2020-07-21 RX ADMIN — Medication: at 11:42

## 2020-07-21 RX ADMIN — Medication 2 TABLET: at 22:13

## 2020-07-21 RX ADMIN — GABAPENTIN 900 MG: 300 CAPSULE ORAL at 20:35

## 2020-07-21 RX ADMIN — NICOTINE POLACRILEX 4 MG: 2 GUM, CHEWING ORAL at 12:37

## 2020-07-21 RX ADMIN — LOPERAMIDE HYDROCHLORIDE 4 MG: 2 CAPSULE ORAL at 07:24

## 2020-07-21 RX ADMIN — NICOTINE 1 PATCH: 21 PATCH, EXTENDED RELEASE TRANSDERMAL at 08:10

## 2020-07-21 RX ADMIN — NICOTINE POLACRILEX 4 MG: 2 GUM, CHEWING ORAL at 10:10

## 2020-07-21 RX ADMIN — NICOTINE POLACRILEX 4 MG: 2 GUM, CHEWING ORAL at 07:44

## 2020-07-21 RX ADMIN — HYDROXYZINE HYDROCHLORIDE 100 MG: 25 TABLET ORAL at 10:10

## 2020-07-21 RX ADMIN — ONDANSETRON 4 MG: 4 TABLET, ORALLY DISINTEGRATING ORAL at 18:48

## 2020-07-21 RX ADMIN — LORAZEPAM 0.5 MG: 0.5 TABLET ORAL at 07:24

## 2020-07-21 RX ADMIN — NICOTINE POLACRILEX 4 MG: 2 GUM, CHEWING ORAL at 20:34

## 2020-07-21 RX ADMIN — NICOTINE POLACRILEX 4 MG: 2 GUM, CHEWING ORAL at 11:42

## 2020-07-21 RX ADMIN — GABAPENTIN 900 MG: 300 CAPSULE ORAL at 13:31

## 2020-07-21 RX ADMIN — POTASSIUM CHLORIDE 10 MEQ: 750 CAPSULE, EXTENDED RELEASE ORAL at 17:26

## 2020-07-21 RX ADMIN — GABAPENTIN 900 MG: 300 CAPSULE ORAL at 08:11

## 2020-07-21 RX ADMIN — ACETAMINOPHEN 975 MG: 325 TABLET, FILM COATED ORAL at 17:25

## 2020-07-21 RX ADMIN — Medication: at 20:35

## 2020-07-21 RX ADMIN — ESCITALOPRAM OXALATE 10 MG: 10 TABLET ORAL at 08:11

## 2020-07-21 RX ADMIN — ARIPIPRAZOLE 2.5 MG: 5 TABLET ORAL at 11:41

## 2020-07-21 RX ADMIN — ARIPIPRAZOLE 7.5 MG: 15 TABLET ORAL at 08:11

## 2020-07-21 RX ADMIN — RIVAROXABAN 20 MG: 20 TABLET, FILM COATED ORAL at 08:11

## 2020-07-21 RX ADMIN — FAMOTIDINE 20 MG: 20 TABLET ORAL at 20:35

## 2020-07-21 RX ADMIN — LOPERAMIDE HYDROCHLORIDE 2 MG: 2 CAPSULE ORAL at 08:11

## 2020-07-21 RX ADMIN — ACETAMINOPHEN 975 MG: 325 TABLET, FILM COATED ORAL at 07:24

## 2020-07-21 RX ADMIN — LORAZEPAM 0.5 MG: 0.5 TABLET ORAL at 18:40

## 2020-07-21 RX ADMIN — LORAZEPAM 0.5 MG: 0.5 TABLET ORAL at 13:31

## 2020-07-21 ASSESSMENT — ACTIVITIES OF DAILY LIVING (ADL)
HYGIENE/GROOMING: WITH ASSISTANCE
ORAL_HYGIENE: WITH ASSISTANCE

## 2020-07-21 NOTE — PLAN OF CARE
Problem: Adult Behavioral Health Plan of Care  Goal: Patient-Specific Goal (Individualization)  Description: Pt will attend 50 % of unit programming.   Pt will be compliant with medications  Pt will sleep 6-8 hours per night   Pt will eat at least 50% of meals.  Okay to have shoe laces on tennis shoes as long as 1:1 is ordered per NP.  Pt may use commode.      7/21/2020 0009 by Jamee Maya RN  Outcome: Improving  Note: Shift Summery:  Face to face shift report received from Lisa AWAD. Rounding completed, pt observed in room with one on one staffing present.  Pt lying in bed in no apparent distress,  respirations are even and non labored.    Pt appeared to have slept well.  Approximately 7 hours.  Face to face report will be communicated to oncoming RN.    Jamee Maya RN  7/21/2020  5:59 AM

## 2020-07-21 NOTE — PROGRESS NOTES
"[facility]  Psychiatric Progress Note      Impression:   This is a 30 year old yo female with a history of depression, anxiety, substance use, and CVA resulting in left sided hemiparesis.    Maggie reported increasing depression today secondary to thoughts about her family, abandonment and loneliness. She still believes that she may be ready to return home tomorrow afternoon, but would like to revisit this tomorrow secondary to how she is feeling right now. She also complained of some increased anxiety related to her depression. Maggie denied any relation to abilify and anxiety and would still like to increase this to 10mg daily. She feels it is helping with her symptoms, \"this is just a thought thing - I talked about it, and I'm thinking about it, so those feelings just come back.\" We did discuss PHP, and she is interested in this. Understands that the wait list is out until August 13. I will put in a referral.        Diagnoses:   Major depressive disorder, recurrent, moderate  Unspecified anxiety disorder, R/o DIANNA vs PTSD  History of CVA in 2018  TBI    Attestation:  Patient has been seen and evaluated by me,  Jeet Casas NP          Interim History:   The patient's care was discussed with the treatment team and chart notes were reviewed.    BEHAVIORAL TEAM DISCUSSION    Progress: Continues to improve but having some depression today. Sleeping well. Social on unit. Leaving room more often.     Continued Stay Criteria/Rationale: Ongoing depression, medication adjustments, high risk    Medical/Physical: left-sided weakness from CVA, chronic hypokalemia.   Precautions:   Falls precaution?: YES- currently on 1:1 observation due to fall risk  Behavioral Orders   Procedures     Code 1 - Restrict to Unit     Routine Programming     As clinically indicated     Status 15     Every 15 minutes.     Plan:   Continue Lexapro 10 mg daily  Increase Abilify to 10mg daily.    Refer to PHP    Rationale for change " in precautions or plan:   Improving with addition of Abilify, ongoing amotivation and depressive symptoms. No side effects noted.     Participants: Jeet Casas, APRN, CNP, Nursing, OT, LADC       Medications:     Current Facility-Administered Medications Ordered in Epic   Medication Dose Route Frequency Last Rate Last Dose     acetaminophen (TYLENOL) tablet 650 mg  650 mg Oral Q4H PRN   650 mg at 07/11/20 0822     alum & mag hydroxide-simethicone (MAALOX  ES) suspension 30 mL  30 mL Oral Q4H PRN         Benzocaine-Menthol 20-0.26 % GEL   Mouth/Throat 4x Daily PRN         bisacodyl (DULCOLAX) Suppository 10 mg  10 mg Rectal Daily PRN         [START ON 7/13/2020] ergocalciferol capsule 50,000 Units  50,000 Units Oral Weekly         [START ON 7/12/2020] escitalopram (LEXAPRO) tablet 10 mg  10 mg Oral Daily         escitalopram (LEXAPRO) tablet 5 mg  5 mg Oral Daily         famotidine (PEPCID) tablet 20 mg  20 mg Oral BID   20 mg at 07/11/20 0822     gabapentin (NEURONTIN) capsule 900 mg  900 mg Oral TID   900 mg at 07/11/20 1304     hydrOXYzine (ATARAX) tablet  mg   mg Oral Q4H PRN   100 mg at 07/11/20 0646     lamoTRIgine (LaMICtal) tablet 25 mg  25 mg Oral Daily   25 mg at 07/11/20 0823     loperamide (IMODIUM) capsule 2 mg  2 mg Oral 4x Daily PRN   2 mg at 07/11/20 0649     loperamide (IMODIUM) capsule 4 mg  4 mg Oral Daily   4 mg at 07/11/20 0822     LORazepam (ATIVAN) tablet 0.5 mg  0.5 mg Oral BID PRN   0.5 mg at 07/11/20 1023     magic mouthwash suspension (diphenhydramine, lidocaine, aluminum-magnesium & simethicone)  10 mL Swish & Swallow Q6H PRN   10 mL at 07/11/20 0800     magnesium hydroxide (MILK OF MAGNESIA) suspension 30 mL  30 mL Oral At Bedtime PRN         melatonin 3 mg (with vit B6 10 mg) extended release tablet 2 tablet  2 tablet Oral At Bedtime PRN         mirtazapine (REMERON) tablet TABS 7.5-15 mg  7.5-15 mg Oral At Bedtime PRN   7.5 mg at 07/10/20 2028     nicotine (COMMIT) lozenge  "2 mg  2 mg Buccal Q1H PRN   2 mg at 07/10/20 1534     nicotine (NICODERM CQ) 21 MG/24HR 24 hr patch 1 patch  1 patch Transdermal Daily   1 patch at 07/11/20 0823     nicotine (NICORETTE) gum 2-4 mg  2-4 mg Buccal Q1H PRN   4 mg at 07/10/20 1725     nicotine Patch in Place   Transdermal Q8H   Stopped at 07/11/20 0646     ondansetron (ZOFRAN-ODT) ODT tab 4 mg  4 mg Oral Q6H PRN   4 mg at 07/11/20 0829     potassium chloride 10 mEq in 100 mL intermittent infusion with 10 mg lidocaine  10 mEq Intravenous Q1H PRN         potassium chloride ER (MICRO-K) CR capsule 10 mEq  10 mEq Oral BID   10 mEq at 07/11/20 0822     rivaroxaban ANTICOAGULANT (XARELTO) tablet 20 mg  20 mg Oral QAM   20 mg at 07/11/20 0823     No current [facility]-ordered outpatient medications on file.         10 point ROS- chronic pain, intermittent nausea, small blister to thumb and lower lip       Allergies:     Allergies   Allergen Reactions     Amoxicillin Other (See Comments)     Headaches     Levaquin [Levofloxacin] Swelling     Lithium      Diabetes insipidus      Naproxen Other (See Comments)     Reaction: Headaches     Nickel      Tramadol      Sulindac Rash            Psychiatric Examination:   /58   Pulse 83   Temp 97.6  F (36.4  C) (Tympanic)   Resp 17   Ht 1.6 m (5' 3\")   Wt 75.4 kg (166 lb 4.8 oz)   SpO2 96%   BMI 29.46 kg/m    Weight is 166 lbs 4.8 oz  Body mass index is 29.46 kg/m .    Appearance: awake, alert, appropriately groomed  Attitude: Pleasant, cooperative  Eye Contact: good  Mood: some increased depression today  Affect:  Brighter  Speech:  clear, coherent, engaging  Psychomotor Behavior:  no evidence of tardive dyskinesia, dystonia, or tics  Thought Process:  Logical,linear, goal oriented  Associations:  no loose associations  Thought Content:  No evidence of psychosis, denied thoughts of suicide but is still fearful they will return  Insight:  fair  Judgment:  fair  Oriented to:  time, person, and place  Attention Span " and Concentration:  intact  Recent and Remote Memory:  fair  Fund of Knowledge: low-normal  Muscle Strength and Tone: left-sided weakness r/t CVA  Gait and Station: wheelchair-bound           Labs:     Results for orders placed or performed during the hospital encounter of 07/09/20 (from the past 24 hour(s))   Potassium   Result Value Ref Range    Potassium 3.4 3.4 - 5.3 mmol/L

## 2020-07-21 NOTE — PLAN OF CARE
"  Problem: Adult Behavioral Health Plan of Care  Goal: Patient-Specific Goal (Individualization)  Description: Pt will attend 50 % of unit programming.   Pt will be compliant with medications  Pt will sleep 6-8 hours per night   Pt will eat at least 50% of meals.  Okay to have shoe laces on tennis shoes as long as 1:1 is ordered per NP.  Pt may use commode.      7/21/2020 1114 by Ryann Cross, RN  Outcome: Improving  Note: Shift Summery:  Patient remains as 1-1 with staff. Patient pleasant and alert, makes needs known. Having diarrhea this morning and given additional dose of immodium at 0810 this am. Also complained of anxiety and given Hydroxyzine 100 mg po at 1010. Patient ate well for breakfast. Very limited use of left side of body with chair use and assistance of 1-1. Patient uses commode at bedside with assist. Patient denies other physical problems except pain and physical limitations.     1330 Requested and given Ativan 0.5 mg po for high level anxiety. Attending groups.    Face to face end of shift report communicated to 3-11 shift RN.     Ryann Cross RN  7/21/2020  1:56 PM        Patient's Stated Goal for Shift:  \"no stated goal\"    Goal Status:  In Process       Problem: Fall Injury Risk  Goal: Absence of Fall and Fall-Related Injury  Description: Pt will remain free from falls.  Pt on 1:1 r/t fall risk.   Pt will use wheelchair and transfer belt during stay.   Pt will wear appropriate footwear.  Okay to have shoe laces on tennis shoes as long as 1:1 is ordered per NP.  Outcome: Improving     Problem: Suicide Risk  Goal: Absence of Self-Harm  Description: Will remain without harm while in hospital.  Outcome: Improving     "

## 2020-07-21 NOTE — PLAN OF CARE
"Face to face end of shift report received from Ryann BLANCO RN. Rounding completed and patient observed in her room with 1:1 staff in place.     19:15 Update: Patient endorsed 10 of 10 anxiety and 9 of 10 depression. She denied SI and hallucinations but when asked if she had thoughts of harming anyone else, she motioned towards the a peer that was being loud. She said \"I'm just being honest.\" Patient was pleasant but she is easily frustrated and she does make rude statements to peers. At times she has told peers to \"shut up\" because they were bothersome to her. Patient rated pain at an 8 of 10 on the right side of her head and received 975mg tylenol at 17:25. She also received 100mg Atarax at that time for her anxiety. Patient said she's been attending groups. She napped for about an hour and a half before dinner. Patient has 1:1 staff in place. She is an asst of 1 with transfers and had no falls this shift.     Face to face end of shift report communicated to oncoming RN.      Problem: Adult Behavioral Health Plan of Care  Goal: Patient-Specific Goal (Individualization)  Description: Pt will attend 50 % of unit programming.   Pt will be compliant with medications  Pt will sleep 6-8 hours per night   Pt will eat at least 50% of meals.  Okay to have shoe laces on tennis shoes as long as 1:1 is ordered per NP.  Pt may use commode.      7/21/2020 1614 by Lisa Unger RN  Outcome: No Change     Problem: Fall Injury Risk  Goal: Absence of Fall and Fall-Related Injury  Description: Pt will remain free from falls.  Pt on 1:1 r/t fall risk.   Pt will use wheelchair and transfer belt during stay.   Pt will wear appropriate footwear.  Okay to have shoe laces on tennis shoes as long as 1:1 is ordered per NP.  7/21/2020 1614 by Lisa Unger, RN  Outcome: Improving     Problem: Suicide Risk  Goal: Absence of Self-Harm  Description: Will remain without harm while in hospital.  7/21/2020 1614 by Lisa Unger, " RN  Outcome: Improving

## 2020-07-22 ENCOUNTER — HOSPITAL ENCOUNTER (EMERGENCY)
Facility: HOSPITAL | Age: 30
Discharge: HOME | End: 2020-07-23
Attending: EMERGENCY MEDICINE | Admitting: EMERGENCY MEDICINE
Payer: MEDICAID

## 2020-07-22 VITALS
TEMPERATURE: 98.4 F | OXYGEN SATURATION: 98 % | RESPIRATION RATE: 16 BRPM | DIASTOLIC BLOOD PRESSURE: 74 MMHG | WEIGHT: 166.3 LBS | HEART RATE: 104 BPM | BODY MASS INDEX: 29.46 KG/M2 | SYSTOLIC BLOOD PRESSURE: 128 MMHG | HEIGHT: 63 IN

## 2020-07-22 DIAGNOSIS — F10.920 ALCOHOLIC INTOXICATION WITHOUT COMPLICATION (H): ICD-10-CM

## 2020-07-22 DIAGNOSIS — E87.6 HYPOKALEMIA: ICD-10-CM

## 2020-07-22 LAB
BASOPHILS # BLD AUTO: 0.1 10E9/L (ref 0–0.2)
BASOPHILS NFR BLD AUTO: 0.5 %
DIFFERENTIAL METHOD BLD: NORMAL
EOSINOPHIL # BLD AUTO: 0.7 10E9/L (ref 0–0.7)
EOSINOPHIL NFR BLD AUTO: 6.4 %
ERYTHROCYTE [DISTWIDTH] IN BLOOD BY AUTOMATED COUNT: 13 % (ref 10–15)
HCT VFR BLD AUTO: 41.7 % (ref 35–47)
HGB BLD-MCNC: 14.2 G/DL (ref 11.7–15.7)
IMM GRANULOCYTES # BLD: 0.1 10E9/L (ref 0–0.4)
IMM GRANULOCYTES NFR BLD: 0.5 %
LYMPHOCYTES # BLD AUTO: 2.5 10E9/L (ref 0.8–5.3)
LYMPHOCYTES NFR BLD AUTO: 22.6 %
MCH RBC QN AUTO: 31.8 PG (ref 26.5–33)
MCHC RBC AUTO-ENTMCNC: 34.1 G/DL (ref 31.5–36.5)
MCV RBC AUTO: 94 FL (ref 78–100)
MONOCYTES # BLD AUTO: 0.6 10E9/L (ref 0–1.3)
MONOCYTES NFR BLD AUTO: 5.4 %
NEUTROPHILS # BLD AUTO: 7 10E9/L (ref 1.6–8.3)
NEUTROPHILS NFR BLD AUTO: 64.6 %
NRBC # BLD AUTO: 0 10*3/UL
NRBC BLD AUTO-RTO: 0 /100
PLATELET # BLD AUTO: 435 10E9/L (ref 150–450)
RBC # BLD AUTO: 4.46 10E12/L (ref 3.8–5.2)
WBC # BLD AUTO: 10.9 10E9/L (ref 4–11)

## 2020-07-22 PROCEDURE — 99284 EMERGENCY DEPT VISIT MOD MDM: CPT | Mod: Z6 | Performed by: EMERGENCY MEDICINE

## 2020-07-22 PROCEDURE — 99284 EMERGENCY DEPT VISIT MOD MDM: CPT

## 2020-07-22 PROCEDURE — 25000132 ZZH RX MED GY IP 250 OP 250 PS 637: Performed by: NURSE PRACTITIONER

## 2020-07-22 PROCEDURE — 80048 BASIC METABOLIC PNL TOTAL CA: CPT | Performed by: EMERGENCY MEDICINE

## 2020-07-22 PROCEDURE — 36415 COLL VENOUS BLD VENIPUNCTURE: CPT | Performed by: EMERGENCY MEDICINE

## 2020-07-22 PROCEDURE — 80320 DRUG SCREEN QUANTALCOHOLS: CPT | Performed by: EMERGENCY MEDICINE

## 2020-07-22 PROCEDURE — 99239 HOSP IP/OBS DSCHRG MGMT >30: CPT | Performed by: NURSE PRACTITIONER

## 2020-07-22 PROCEDURE — 25000128 H RX IP 250 OP 636: Performed by: NURSE PRACTITIONER

## 2020-07-22 PROCEDURE — 85025 COMPLETE CBC W/AUTO DIFF WBC: CPT | Performed by: EMERGENCY MEDICINE

## 2020-07-22 RX ORDER — ESCITALOPRAM OXALATE 10 MG/1
10 TABLET ORAL DAILY
Qty: 30 TABLET | Refills: 0 | Status: SHIPPED | OUTPATIENT
Start: 2020-07-23

## 2020-07-22 RX ORDER — LORAZEPAM 0.5 MG/1
0.5 TABLET ORAL 3 TIMES DAILY PRN
Qty: 30 TABLET | Refills: 0 | Status: SHIPPED | OUTPATIENT
Start: 2020-07-22 | End: 2020-08-26

## 2020-07-22 RX ORDER — ARIPIPRAZOLE 10 MG/1
10 TABLET ORAL DAILY
Qty: 30 TABLET | Refills: 0 | Status: SHIPPED | OUTPATIENT
Start: 2020-07-23 | End: 2020-08-26

## 2020-07-22 RX ORDER — HYDROCORTISONE 25 MG/G
CREAM TOPICAL 2 TIMES DAILY PRN
Status: ON HOLD | COMMUNITY
Start: 2020-07-22 | End: 2020-08-27

## 2020-07-22 RX ORDER — HYDROXYZINE HYDROCHLORIDE 50 MG/1
50 TABLET, FILM COATED ORAL 2 TIMES DAILY PRN
Qty: 30 TABLET | Refills: 0 | Status: SHIPPED | OUTPATIENT
Start: 2020-07-22

## 2020-07-22 RX ORDER — DIPHENHYDRAMINE HYDROCHLORIDE AND LIDOCAINE HYDROCHLORIDE AND ALUMINUM HYDROXIDE AND MAGNESIUM HYDRO
10 KIT EVERY 6 HOURS PRN
Status: ON HOLD | COMMUNITY
Start: 2020-07-22 | End: 2020-08-27

## 2020-07-22 RX ADMIN — DIPHENHYDRAMINE HYDROCHLORIDE AND LIDOCAINE HYDROCHLORIDE AND ALUMINUM HYDROXIDE AND MAGNESIUM HYDRO 10 ML: KIT at 04:34

## 2020-07-22 RX ADMIN — ARIPIPRAZOLE 10 MG: 10 TABLET ORAL at 08:32

## 2020-07-22 RX ADMIN — Medication: at 12:47

## 2020-07-22 RX ADMIN — HYDROXYZINE HYDROCHLORIDE 100 MG: 25 TABLET ORAL at 12:57

## 2020-07-22 RX ADMIN — LORAZEPAM 0.5 MG: 0.5 TABLET ORAL at 13:56

## 2020-07-22 RX ADMIN — RIVAROXABAN 20 MG: 20 TABLET, FILM COATED ORAL at 08:32

## 2020-07-22 RX ADMIN — ACETAMINOPHEN 975 MG: 325 TABLET, FILM COATED ORAL at 04:33

## 2020-07-22 RX ADMIN — LORAZEPAM 0.5 MG: 0.5 TABLET ORAL at 10:08

## 2020-07-22 RX ADMIN — FAMOTIDINE 20 MG: 20 TABLET ORAL at 08:32

## 2020-07-22 RX ADMIN — NICOTINE POLACRILEX 4 MG: 2 GUM, CHEWING ORAL at 10:08

## 2020-07-22 RX ADMIN — LORAZEPAM 0.5 MG: 0.5 TABLET ORAL at 04:33

## 2020-07-22 RX ADMIN — ACETAMINOPHEN 975 MG: 325 TABLET, FILM COATED ORAL at 11:06

## 2020-07-22 RX ADMIN — Medication: at 04:33

## 2020-07-22 RX ADMIN — NICOTINE POLACRILEX 4 MG: 2 GUM, CHEWING ORAL at 11:08

## 2020-07-22 RX ADMIN — ONDANSETRON 4 MG: 4 TABLET, ORALLY DISINTEGRATING ORAL at 13:22

## 2020-07-22 RX ADMIN — POTASSIUM CHLORIDE 10 MEQ: 750 CAPSULE, EXTENDED RELEASE ORAL at 12:57

## 2020-07-22 RX ADMIN — NICOTINE 1 PATCH: 21 PATCH, EXTENDED RELEASE TRANSDERMAL at 08:31

## 2020-07-22 RX ADMIN — LOPERAMIDE HYDROCHLORIDE 4 MG: 2 CAPSULE ORAL at 04:33

## 2020-07-22 RX ADMIN — GABAPENTIN 900 MG: 300 CAPSULE ORAL at 13:20

## 2020-07-22 RX ADMIN — GABAPENTIN 900 MG: 300 CAPSULE ORAL at 08:32

## 2020-07-22 RX ADMIN — ESCITALOPRAM OXALATE 10 MG: 10 TABLET ORAL at 08:32

## 2020-07-22 RX ADMIN — Medication: at 10:52

## 2020-07-22 RX ADMIN — POTASSIUM CHLORIDE 10 MEQ: 750 CAPSULE, EXTENDED RELEASE ORAL at 08:32

## 2020-07-22 RX ADMIN — NICOTINE POLACRILEX 4 MG: 2 GUM, CHEWING ORAL at 13:25

## 2020-07-22 ASSESSMENT — ACTIVITIES OF DAILY LIVING (ADL)
LAUNDRY: UNABLE TO COMPLETE
DRESS: SCRUBS (BEHAVIORAL HEALTH)
HYGIENE/GROOMING: WITH ASSISTANCE
ORAL_HYGIENE: WITH SUPERVISION

## 2020-07-22 NOTE — DISCHARGE INSTRUCTIONS
Behavioral Discharge Planning and Instructions    Summary: Maggie was admitted to  with increased suicidal ideation     Main Diagnosis:  Major depressive disorder, recurrent, moderate,Unspecified anxiety disorder, R/o DIANNA vs PTSD, History of CVA in 2018, TBI    Major Treatments, Procedures and Findings: Stabilize with medications, connect with community programs.    Symptoms to Report: feeling more aggressive, increased confusion, losing more sleep, mood getting worse or thoughts of suicide    Lifestyle Adjustment: Take all medications as prescribed, meet with doctor/ medication provider, out patient therapist, , and [facility] worker as scheduled. Abstain from alcohol or any unprescribed drugs.    Psychiatry Follow-up:     JEROM[facility]  Med Management - Sola Sy - via telemed - July 27th @ 8:30 Sola will contact [facility] - [email or web address]  [address] [place] [address]  [email or web address]  Phone: [phone]  Fax:  [phone]  Located in [facility]   PCP - Dr. Flores - message left on 7/17/2020  [address] [place] [address]   Phone: [phone]    [facility]  Care Coordinator - Maia DOWD - as arranged   Therapy - Vipin Vaca - July 24th @ 10:00   Phone: [phone]   Fax: [phone]    Resources:   Crisis Intervention: 956.511.6117 or 991-114-6722 (TTY: 926.727.4615).  Call anytime for help.  [place] on [place]ness (www.[place].dhruv.org): 373.253.4378 or 560-623-3666.  Alcoholics Anonymous (www.alcoholics-anonymous.org): Check your phone book for your local chapter.  Suicide Awareness Voices of Education (SAVE) (www.save.org): 539-623-FKKU (8828)  National Suicide Prevention Line (www.mentalhealthmn.org): 476-754-WWLT (0099)  Mental Health Consumer/Survivor Network of [place] (www.mhcsn.net): 250.518.3328 or 536-396-1976  Mental Health Association of [place] (www.mentalhealth.org): 808.935.4567 or 406-918-8901    General  "Medication Instructions:   See your medication sheet(s) for instructions.   Take all medicines as directed.  Make no changes unless your doctor suggests them.   Go to all your doctor visits.  Be sure to have all your required lab tests. This way, your medicines can be refilled on time.  Do not use any drugs not prescribed by your doctor.  Avoid alcohol.    Discharge Instructions for COVID-19 Patients  You have--or may have--COVID-19. Please follow the instructions listed below.   If you have a weakened immune system, discuss with your doctor any other actions you need to take.  How can I protect others?  If you have symptoms (fever, cough, body aches or trouble breathing):    Stay home and away from others (self-isolate) until:  ? At least 10 days have passed since your symptoms started. And   ? You've had no fever--and no medicine that reduces fever--for 3 full days (72 hours). And   ? Your other symptoms have resolved (gotten better).  If you don't show symptoms, but testing showed that you have COVID-19:    Stay home and away from others (self-isolate) until at least 10 days have passed since the date of your first positive COVID-19 test.  During this time    Stay in your own room, even for meals. Use your own bathroom if you can.    Stay away from others in your home. No hugging, kissing or shaking hands. No visitors.    Don't go to work, school or anywhere else.    Clean \"high touch\" surfaces often (doorknobs, counters, handles). Use household cleaning spray or wipes. You'll find a full list of  on the EPA website: www.epa.gov/pesticide-registration/list-n-disinfectants-use-against-sars-cov-2.    Cover your mouth and nose with a mask, tissue or wash cloth to avoid spreading germs.    Wash your hands and face often. Use soap and water.    Caregivers in these groups are at risk for severe illness due to COVID-19:  ? People 65 years and older  ? People who live in a nursing home or long-term care " facility  ? People with chronic disease (lung, heart, cancer, diabetes, kidney, liver, immunologic)  ? People who have a weakened immune system, including those who:    Are in cancer treatment    Take medicine that weakens the immune system, such as corticosteroids    Had a bone marrow or organ transplant    Have an immune deficiency    Have poorly controlled HIV or AIDS    Are obese (body mass index of 40 or higher)    Smoke regularly    Caregivers should wear gloves while washing dishes, handling laundry and cleaning bedrooms and bathrooms.    Use caution when washing and drying laundry: Don't shake dirty laundry and use the warmest water setting that you can.    For more tips on managing your health at home, go to www.cdc.gov/coronavirus/2019-ncov/downloads/10Things.pdf.  How can I take care of myself at home?  1. Get lots of rest. Drink extra fluids (unless a doctor has told you not to).  2. Take Tylenol (acetaminophen) for fever or pain. If you have liver or kidney problems, ask your family doctor if it's okay to take Tylenol.     Adults can take either:  ? 650 mg (two 325 mg pills) every 4 to 6 hours, or   ? 1,000 mg (two 500 mg pills) every 8 hours as needed.  ? Note: Don't take more than 3,000 mg in one day. Acetaminophen is found in many medicines (both prescribed and over-the-counter medicines). Read all labels to be sure you don't take too much.   For children, check the Tylenol bottle for the right dose. The dose is based on the child's age or weight.  3. If you have other health problems (like cancer, heart failure, an organ transplant or severe kidney disease): Call your specialty clinic if you don't feel better in the next 2 days.  4. Know when to call 911. Emergency warning signs include:  ? Trouble breathing or shortness of breath  ? Pain or pressure in the chest that doesn't go away  ? Feeling confused like you haven't felt before, or not being able to wake up  ? Bluish-colored lips or  face  5. Your doctor may have prescribed a blood thinner medicine. Follow their instructions.  Where can I get more information?    [facility] - About COVID-19:   [email or web address]    CDC - What to Do If You're Sick: www.cdc.gov/coronavirus/2019-ncov/about/steps-when-sick.html    CDC - Ending Home Isolation: www.cdc.gov/coronavirus/2019-ncov/hcp/disposition-in-home-patients.html    CDC - Caring for Someone: www.cdc.gov/coronavirus/2019-ncov/if-you-are-sick/care-for-someone.html    [facility] - Interim Guidance for Hospital Discharge to Home: www.health.[facility].[place]/diseases/coronavirus/hcp/hospdischarge.pdf    [facility] clinical trials (COVID-19 research studies): clinicalaffairs.[facility]-clinical-trials    Below are the COVID-19 hotlines at the [place] Department of Health ([facility]). Interpreters are available.  ? For health questions: Call 471-841-7586 or 1-825.389.3643 (7 a.m. to 7 p.m.)  ? For questions about schools and childcare: Call 849-809-8626 or 1-828.404.9133 (7 a.m. to 7 p.m.)    For informational purposes only. Not to replace the advice of your health care provider. Clinically reviewed by the Infection Prevention Team.Copyright   2020 [facility]. All rights reserved. [employer] [id number] - 06/20.

## 2020-07-22 NOTE — ED AVS SNAPSHOT
[facility]  [address]  [place] [address]  Phone:  [phone]                                    Maggie Case   MRN: [id number]    Department:  [facility]   Date of Visit:  7/22/2020           After Visit Summary Signature Page    I have received my discharge instructions, and my questions have been answered. I have discussed any challenges I see with this plan with the nurse or doctor.    ..........................................................................................................................................  Patient/Patient Representative Signature      ..........................................................................................................................................  Patient Representative Print Name and Relationship to Patient    ..................................................               ................................................  Date                                   Time    ..........................................................................................................................................  Reviewed by Signature/Title    ...................................................              ..............................................  Date                                               Time          22EPIC Rev 08/18
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
## 2020-07-22 NOTE — PLAN OF CARE
"  Problem: Fall Injury Risk  Goal: Absence of Fall and Fall-Related Injury  Description: Pt will remain free from falls.  Pt on 1:1 r/t fall risk.   Pt will use wheelchair and transfer belt during stay.   Pt will wear appropriate footwear.  Okay to have shoe laces on tennis shoes as long as 1:1 is ordered per NP.  Outcome: Improving   No falls or injuries during this hospital stay.  Problem: Adult Behavioral Health Plan of Care  Goal: Patient-Specific Goal (Individualization)  Description: Pt will attend 50 % of unit programming.   Pt will be compliant with medications  Pt will sleep 6-8 hours per night   Pt will eat at least 50% of meals.  Okay to have shoe laces on tennis shoes as long as 1:1 is ordered per NP.  Pt may use commode.      7/22/2020 0912 by Monika Cee, RN  Outcome: Improving  Note:   Tends to sleep in in the morning. States, \"sad today that will possibly discharge, it's bittersweet but, I have to see how the increase in the Abilify works today\".  Does attend some groups, social with peers. Did request shower today especially if she is going to discharge.  Continues with 1:1 staff due to being a fall risk.   Refused to take potassium medication this morning as it upsets her stomach if she doesn't take it with food. Did not eat breakfast, will try to get her to take it if she eats later.     Up at 0950 & requesting Ativan for anxiety 9/10 due to thinking about her family & possibly discharging today.  Asked her if she wants to stay, states, \"I need to go or else I'll loose my PCA, because I've been here her PCA hasn't been working and she needs to work, if I stay then I'll get a new PCA then I'll really have anxiety\".  Gave Ativan at 1008.    Patient was given back medications that she brought in with her.      "

## 2020-07-22 NOTE — PLAN OF CARE
Problem: Adult Behavioral Health Plan of Care  Goal: Patient-Specific Goal (Individualization)  Description: Pt will attend 50 % of unit programming.   Pt will be compliant with medications  Pt will sleep 6-8 hours per night   Pt will eat at least 50% of meals.  Okay to have shoe laces on tennis shoes as long as 1:1 is ordered per NP.  Pt may use commode.      7/22/2020 0002 by Jamee Maya RN  Outcome: Improving  Note: Shift Summery: Face to face shift report received from Lisa AWAD. Rounding completed, pt observed in room with one/one staff.  Lying in bed appears to be sleeping.  In no apparent distress, respirations are even and non labored.     0415-Pt woke up with being incontinent of BM, requested PRN medication, Imodium, APAP, Ativan, swish and swallow and Orajel.      Pt slept approx. 4 hours.  Face to face report will be communicated to oncaba RN.    Jamee Maya RN  7/22/2020  5:53 AM

## 2020-07-23 ENCOUNTER — APPOINTMENT (OUTPATIENT)
Dept: GENERAL RADIOLOGY | Facility: HOSPITAL | Age: 30
End: 2020-07-23
Attending: EMERGENCY MEDICINE
Payer: MEDICAID

## 2020-07-23 ENCOUNTER — TELEPHONE (OUTPATIENT)
Dept: EMERGENCY MEDICINE | Facility: HOSPITAL | Age: 30
End: 2020-07-23

## 2020-07-23 VITALS
OXYGEN SATURATION: 97 % | SYSTOLIC BLOOD PRESSURE: 122 MMHG | DIASTOLIC BLOOD PRESSURE: 81 MMHG | HEART RATE: 115 BPM | TEMPERATURE: 96.7 F | WEIGHT: 180 LBS | RESPIRATION RATE: 20 BRPM | BODY MASS INDEX: 31.89 KG/M2

## 2020-07-23 LAB
ANION GAP SERPL CALCULATED.3IONS-SCNC: 14 MMOL/L (ref 3–14)
BUN SERPL-MCNC: 2 MG/DL (ref 7–30)
CALCIUM SERPL-MCNC: 8.6 MG/DL (ref 8.5–10.1)
CHLORIDE SERPL-SCNC: 108 MMOL/L (ref 94–109)
CO2 SERPL-SCNC: 19 MMOL/L (ref 20–32)
CREAT SERPL-MCNC: 0.39 MG/DL (ref 0.52–1.04)
ETHANOL SERPL-MCNC: 0.21 G/DL
GFR SERPL CREATININE-BSD FRML MDRD: >90 ML/MIN/{1.73_M2}
GLUCOSE SERPL-MCNC: 99 MG/DL (ref 70–99)
POTASSIUM SERPL-SCNC: 2.7 MMOL/L (ref 3.4–5.3)
SODIUM SERPL-SCNC: 141 MMOL/L (ref 133–144)

## 2020-07-23 PROCEDURE — 73560 X-RAY EXAM OF KNEE 1 OR 2: CPT | Mod: TC,LT

## 2020-07-23 PROCEDURE — 25000128 H RX IP 250 OP 636: Performed by: EMERGENCY MEDICINE

## 2020-07-23 PROCEDURE — 25000132 ZZH RX MED GY IP 250 OP 250 PS 637: Performed by: EMERGENCY MEDICINE

## 2020-07-23 RX ORDER — ONDANSETRON 4 MG/1
4 TABLET, ORALLY DISINTEGRATING ORAL ONCE
Status: COMPLETED | OUTPATIENT
Start: 2020-07-23 | End: 2020-07-23

## 2020-07-23 RX ORDER — POTASSIUM CHLORIDE 1.5 G/1.58G
20 POWDER, FOR SOLUTION ORAL
Status: DISPENSED | OUTPATIENT
Start: 2020-07-23 | End: 2020-07-23

## 2020-07-23 RX ORDER — ACETAMINOPHEN 325 MG/1
975 TABLET ORAL
Status: DISCONTINUED | OUTPATIENT
Start: 2020-07-23 | End: 2020-07-23 | Stop reason: HOSPADM

## 2020-07-23 RX ORDER — POTASSIUM CHLORIDE 1500 MG/1
40 TABLET, EXTENDED RELEASE ORAL ONCE
Status: COMPLETED | OUTPATIENT
Start: 2020-07-23 | End: 2020-07-23

## 2020-07-23 RX ADMIN — ONDANSETRON 4 MG: 4 TABLET, ORALLY DISINTEGRATING ORAL at 05:36

## 2020-07-23 RX ADMIN — POTASSIUM CHLORIDE 40 MEQ: 1500 TABLET, EXTENDED RELEASE ORAL at 07:51

## 2020-07-23 RX ADMIN — ONDANSETRON 4 MG: 4 TABLET, ORALLY DISINTEGRATING ORAL at 08:22

## 2020-07-23 NOTE — ED NOTES
"DEC assessment in progress. Patient was partially through assessment and started making herself vomit and then wanted to use the bathroom. Patient assisted to commode, but did not void. Patient then requesting something \"for my anxiety\" and I advised that I would let the MD know. Patient back into bed and DEC is resuming.   "
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
## 2020-07-23 NOTE — ED NOTES
DATE:  7/23/2020   TIME OF RECEIPT FROM LAB:  0008  LAB TEST:  K+  LAB VALUE:  2.7  RESULTS GIVEN WITH READ-BACK TO (PROVIDER):  Dr. Moss  TIME LAB VALUE REPORTED TO PROVIDER:   0008
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
## 2020-07-23 NOTE — PLAN OF CARE
Discharge instructions, including; demographic sheet, psychiatric evaluation, discharge summary, and AVS were faxed to these next level of care providers - [facility]
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
## 2020-07-23 NOTE — ED NOTES
Call to [facility] Estephania Clark, [phone]  Message left for her to locate Maggie's SW.      Kellen Ruiz [phone] ext [phone]    Filed vulnerable adult with [facility] sliding scale  Web report number [id number]  RE Mukherjee

## 2020-07-23 NOTE — ED NOTES
"Patient refusing all forms of potassium at this time. States \"I take enough at home\". MD notified.   "

## 2020-07-23 NOTE — ED NOTES
Patient given written and verbal discharge instructions and patient verbalizes understanding. Patient into own w/c with assist of 1 and wheeled out to garage. [employer] providing transportation home.

## 2020-07-24 ENCOUNTER — TELEPHONE (OUTPATIENT)
Dept: BEHAVIORAL HEALTH | Facility: OTHER | Age: 30
End: 2020-07-24
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
## 2020-07-27 ENCOUNTER — TELEPHONE (OUTPATIENT)
Dept: BEHAVIORAL HEALTH | Facility: OTHER | Age: 30
End: 2020-07-27

## 2020-07-27 NOTE — TELEPHONE ENCOUNTER
[facility]  @FLOW([id number])@      Social Work Care Navigator Note      Patient: Maggie Case  Date: July 27, 2020  Preferred Name: Maggie    Previous PHQ-9: No flowsheet data found.  Previous DIANNA-7: No flowsheet data found.  SEYMOUR LEVEL:  No flowsheet data found.    Preferred Contact: @ELIEZER([id number])@  Type of Contact Today: Phone call (patient / identified key support person reached)      Data: (subjective / Objective):  Patient Goals Areas: @FLOW([id number])@  Patient Stated Goals: @FLOW([id number])@  Recent ED/IP Admission or Discharge?   None  Patient Goals  No data recorded    [facility] Service Provided:  Comprehensive Care Management: utilized the electronic medical record / patient registry to identify and support patient's health conditions / needs more effectively   Care Transitions: focused on the coordinated and seamless movement of patient between or within different levels of care or settings  Care Coordination: provided care management services/referrals necessary to ensure patient and their identified supports have access to medical, behavioral health, pharmacology and recovery support services.  Ensured that patient's care is integrated across all settings and services.   Health and Wellness Promotion     Data:  Writer received call from ZOË Olmstead.  He was scheduled for appt with pt today, but she is still inpatient for mental health hospitalization.  Rescheduled pt for Friday and will remain available.    RE Ayon, Social Work Care Coordinator       RE Ayon  Social Work Care Coordinator  [facility]  [phone] option 2 or [phone]

## 2020-07-29 ENCOUNTER — VIRTUAL VISIT (OUTPATIENT)
Dept: PSYCHOLOGY | Facility: OTHER | Age: 30
End: 2020-07-29
Attending: COUNSELOR
Payer: MEDICAID

## 2020-07-29 ENCOUNTER — TELEPHONE (OUTPATIENT)
Dept: BEHAVIORAL HEALTH | Facility: OTHER | Age: 30
End: 2020-07-29

## 2020-07-29 DIAGNOSIS — F32.A ANXIETY AND DEPRESSION: Primary | ICD-10-CM

## 2020-07-29 DIAGNOSIS — F41.9 ANXIETY AND DEPRESSION: Primary | ICD-10-CM

## 2020-07-29 PROCEDURE — 90832 PSYTX W PT 30 MINUTES: CPT | Performed by: COUNSELOR
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
## 2020-07-30 NOTE — TELEPHONE ENCOUNTER
[facility]  @FLOW([id number])@      Social Work Care Navigator Note      Patient: Maggie Case  Date: July 30, 2020  Preferred Name: Maggie    Previous PHQ-9: No flowsheet data found.  Previous DIANNA-7: No flowsheet data found.  SEYMOUR LEVEL:  No flowsheet data found.    Preferred Contact: @ELIEZER([phone])@  Type of Contact Today: Phone call (patient / identified key support person reached)      Data: (subjective / Objective):  Patient Goals Areas: @FLOW([id number])@  Patient Stated Goals: @FLOW([id number])@  Recent ED/IP Admission or Discharge?   [place] ED Admission date: 07/22/2020  [place] IP Admission date: 07/09/2020  Recent ED/IP Admission or Discharge?   None    Patient Goals:  No data recorded      [facility] Core Service Provided:  Comprehensive Care Management: utilized the electronic medical record / patient registry to identify and support patient's health conditions / needs more effectively   Care Coordination: provided care management services/referrals necessary to ensure patient and their identified supports have access to medical, behavioral health, pharmacology and recovery support services.  Ensured that patient's care is integrated across all settings and services.   Health and Wellness Promotion  Individual and Family Support: aimed to help clients reduce barriers to achieving goals, increase health literacy and knowledge about chronic condition(s), increase self-efficacy skills, and improve health outcomes  Referral to Community and Social Support Services: Provided patient with referrals (see plan section)    Current Stressors / Issues / Care Plan Objective Addressed Today:  Rule 25/ CD treatment    Intervention:  Motivational Interviewing: Expressed Empathy/Understanding, Supported Autonomy, Collaboration, Evocation, Permission to raise concern or advise and Open-ended questions   Target Behavior(s): NA    Assessment: (Progress on Goals / Homework):  Received call from pt stating she is  really struggling.  She would like to pursue a rule 25 and CD treatment. She stated she has been working with her CADI worker/ , but has not made any progress.     Writer contacted [facility] to get pt setup for rule 25.  They advised policy has recently changed and they do need to speak to the patient directly to get setup with CD intake process.  They advised pt should call them at [phone] to start this process.    Updated pt via phone and will remain available.    Plan: (Homework, other):  Patient was encouraged to continue to seek condition-related information and education.      Scheduled a Phone follow up appointment with HERNAN VILLALOBOS in 1 week     Patient has set self-identified goals and will monitor progress until the next appointment.    RE Ayon, Social Work Care Coordinator       BLAYNE Ayon, LSW

## 2020-07-30 NOTE — TELEPHONE ENCOUNTER
[facility]  @FLOW([id number])@      Social Work Care Navigator Note      Patient: Maggie Case  Date: July 29, 2020  Preferred Name: Maggie    Previous PHQ-9: No flowsheet data found.  Previous DIANNA-7: No flowsheet data found.  SEYMOUR LEVEL:  No flowsheet data found.    Preferred Contact: @ELIEZER([phone])@  Type of Contact Today: Phone call (patient / identified key support person reached)      Data: (subjective / Objective):  Patient Goals Areas: @FLOW([id number])@  Patient Stated Goals: @FLOW([id number])@  Recent ED/IP Admission or Discharge?   None  Recent ED/IP Admission or Discharge?   None    Patient Goals:  No data recorded      [facility] Core Service Provided:  Comprehensive Care Management: utilized the electronic medical record / patient registry to identify and support patient's health conditions / needs more effectively   Care Coordination: provided care management services/referrals necessary to ensure patient and their identified supports have access to medical, behavioral health, pharmacology and recovery support services.  Ensured that patient's care is integrated across all settings and services.   Health and Wellness Promotion  Referral to Community and Social Support Services: Provided patient with referrals (see plan section)    Current Stressors / Issues / Care Plan Objective Addressed Today:  housing    Intervention:  Motivational Interviewing: Expressed Empathy/Understanding, Supported Autonomy, Collaboration, Evocation, Permission to raise concern or advise and Open-ended questions   Target Behavior(s): NA    Assessment: (Progress on Goals / Homework):  Pt contacted writer stating she was needing new housing immediately.  After discussing, she explained she is having extreme issues with her roommate and they are having extensive verbal altercations.  Writer advised her to contact [address] and writer would look for housing shelters in the meantime.    Writer contacted [facility] who advised  they did have a female opening; however, they are currently not wheelchair accessible due to frost damage on their ramp.   also contacted [facility] in [place], but they are currently closed due to Covid-19.      contacted Stephanie with [employer], but was unable to LVM.  Contacted homeless coordinator with [employer] who stated she would send Stephanie an email for return call regarding potential hotel voucher.     contacted pt to update where process was at.  She stated she was unable to get through to [employer].   contacted [address] with pt and spdat assessment was scheduled with Bushra ([phone]) at 11am on 7/30.  Pt agreed to stay at her current residence till after this meeting to develop a further plan.    Also called and left another message with pt's CADI , Kellen.      *Received return call on 7/30 from Stephanie - [employer].  She stated she no longer manages the hotel vouchers but will forward to the correct person.    Plan: (Homework, other):  Patient was encouraged to continue to seek condition-related information and education.      Scheduled a Phone follow up appointment with HERNAN VILLALOBOS in 1 week     Patient has set self-identified goals and will monitor progress until the next appointment.    RE Ayon, Social Work Care Coordinator       RE Ayon  Social Work Care Coordinator  [facility]  [phone] option 2 or [phone]

## 2020-08-05 ENCOUNTER — TELEPHONE (OUTPATIENT)
Dept: BEHAVIORAL HEALTH | Facility: OTHER | Age: 30
End: 2020-08-05

## 2020-08-24 ENCOUNTER — TELEPHONE (OUTPATIENT)
Dept: BEHAVIORAL HEALTH | Facility: OTHER | Age: 30
End: 2020-08-24

## 2020-08-26 ENCOUNTER — HOSPITAL ENCOUNTER (INPATIENT)
Facility: HOSPITAL | Age: 30
LOS: 8 days | Discharge: LEFT BEFORE DISCHARGE | DRG: 371 | End: 2020-09-03
Attending: EMERGENCY MEDICINE | Admitting: INTERNAL MEDICINE
Payer: MEDICARE

## 2020-08-26 ENCOUNTER — ANESTHESIA EVENT (OUTPATIENT)
Dept: MEDSURG UNIT | Facility: HOSPITAL | Age: 30
DRG: 371 | End: 2020-08-26
Payer: MEDICARE

## 2020-08-26 ENCOUNTER — APPOINTMENT (OUTPATIENT)
Dept: CT IMAGING | Facility: HOSPITAL | Age: 30
DRG: 371 | End: 2020-08-26
Attending: EMERGENCY MEDICINE
Payer: MEDICARE

## 2020-08-26 ENCOUNTER — ANESTHESIA (OUTPATIENT)
Dept: MEDSURG UNIT | Facility: HOSPITAL | Age: 30
DRG: 371 | End: 2020-08-26
Payer: MEDICARE

## 2020-08-26 DIAGNOSIS — M54.40 LOW BACK PAIN WITH SCIATICA, SCIATICA LATERALITY UNSPECIFIED, UNSPECIFIED BACK PAIN LATERALITY, UNSPECIFIED CHRONICITY: ICD-10-CM

## 2020-08-26 DIAGNOSIS — R10.32 ABDOMINAL PAIN, LEFT LOWER QUADRANT: ICD-10-CM

## 2020-08-26 DIAGNOSIS — A04.72 C. DIFFICILE COLITIS: Primary | ICD-10-CM

## 2020-08-26 DIAGNOSIS — M79.10 MUSCLE PAIN: ICD-10-CM

## 2020-08-26 PROBLEM — R11.2 INTRACTABLE NAUSEA AND VOMITING: Status: ACTIVE | Noted: 2020-08-26

## 2020-08-26 LAB
ALBUMIN SERPL-MCNC: 3.9 G/DL (ref 3.4–5)
ALP SERPL-CCNC: 128 U/L (ref 40–150)
ALT SERPL W P-5'-P-CCNC: 32 U/L (ref 0–50)
ANION GAP SERPL CALCULATED.3IONS-SCNC: 6 MMOL/L (ref 3–14)
AST SERPL W P-5'-P-CCNC: 62 U/L (ref 0–45)
BASOPHILS # BLD AUTO: 0.1 10E9/L (ref 0–0.2)
BASOPHILS NFR BLD AUTO: 0.3 %
BILIRUB SERPL-MCNC: 1.1 MG/DL (ref 0.2–1.3)
BUN SERPL-MCNC: 12 MG/DL (ref 7–30)
CALCIUM SERPL-MCNC: 9.9 MG/DL (ref 8.5–10.1)
CHLORIDE SERPL-SCNC: 99 MMOL/L (ref 94–109)
CO2 SERPL-SCNC: 29 MMOL/L (ref 20–32)
CREAT SERPL-MCNC: 0.64 MG/DL (ref 0.52–1.04)
CRP SERPL-MCNC: 26.3 MG/L (ref 0–8)
DIFFERENTIAL METHOD BLD: ABNORMAL
EOSINOPHIL # BLD AUTO: 0 10E9/L (ref 0–0.7)
EOSINOPHIL NFR BLD AUTO: 0.1 %
ERYTHROCYTE [DISTWIDTH] IN BLOOD BY AUTOMATED COUNT: 13.5 % (ref 10–15)
ETHANOL SERPL-MCNC: <0.01 G/DL
GFR SERPL CREATININE-BSD FRML MDRD: >90 ML/MIN/{1.73_M2}
GLUCOSE SERPL-MCNC: 152 MG/DL (ref 70–99)
HCT VFR BLD AUTO: 48 % (ref 35–47)
HGB BLD-MCNC: 17.9 G/DL (ref 11.7–15.7)
IMM GRANULOCYTES # BLD: 0.2 10E9/L (ref 0–0.4)
IMM GRANULOCYTES NFR BLD: 1 %
LACTATE BLD-SCNC: 1.9 MMOL/L (ref 0.7–2)
LIPASE SERPL-CCNC: 288 U/L (ref 73–393)
LYMPHOCYTES # BLD AUTO: 1.6 10E9/L (ref 0.8–5.3)
LYMPHOCYTES NFR BLD AUTO: 7.8 %
MAGNESIUM SERPL-MCNC: 2.7 MG/DL (ref 1.6–2.3)
MCH RBC QN AUTO: 31 PG (ref 26.5–33)
MCHC RBC AUTO-ENTMCNC: 37.3 G/DL (ref 31.5–36.5)
MCV RBC AUTO: 83 FL (ref 78–100)
MONOCYTES # BLD AUTO: 0.9 10E9/L (ref 0–1.3)
MONOCYTES NFR BLD AUTO: 4.4 %
NEUTROPHILS # BLD AUTO: 17.7 10E9/L (ref 1.6–8.3)
NEUTROPHILS NFR BLD AUTO: 86.4 %
NRBC # BLD AUTO: 0 10*3/UL
NRBC BLD AUTO-RTO: 0 /100
PLATELET # BLD AUTO: 486 10E9/L (ref 150–450)
POTASSIUM SERPL-SCNC: 2.9 MMOL/L (ref 3.4–5.3)
PROT SERPL-MCNC: 8.5 G/DL (ref 6.8–8.8)
RBC # BLD AUTO: 5.77 10E12/L (ref 3.8–5.2)
SODIUM SERPL-SCNC: 134 MMOL/L (ref 133–144)
WBC # BLD AUTO: 20.5 10E9/L (ref 4–11)

## 2020-08-26 PROCEDURE — 25000128 H RX IP 250 OP 636: Performed by: EMERGENCY MEDICINE

## 2020-08-26 PROCEDURE — 99222 1ST HOSP IP/OBS MODERATE 55: CPT | Mod: AI | Performed by: INTERNAL MEDICINE

## 2020-08-26 PROCEDURE — 25000128 H RX IP 250 OP 636: Performed by: INTERNAL MEDICINE

## 2020-08-26 PROCEDURE — 86140 C-REACTIVE PROTEIN: CPT | Performed by: INTERNAL MEDICINE

## 2020-08-26 PROCEDURE — 25800030 ZZH RX IP 258 OP 636: Performed by: INTERNAL MEDICINE

## 2020-08-26 PROCEDURE — C9803 HOPD COVID-19 SPEC COLLECT: HCPCS

## 2020-08-26 PROCEDURE — 25000132 ZZH RX MED GY IP 250 OP 250 PS 637: Performed by: INTERNAL MEDICINE

## 2020-08-26 PROCEDURE — 87040 BLOOD CULTURE FOR BACTERIA: CPT | Performed by: EMERGENCY MEDICINE

## 2020-08-26 PROCEDURE — 25000132 ZZH RX MED GY IP 250 OP 250 PS 637: Performed by: EMERGENCY MEDICINE

## 2020-08-26 PROCEDURE — 83605 ASSAY OF LACTIC ACID: CPT | Performed by: EMERGENCY MEDICINE

## 2020-08-26 PROCEDURE — 99285 EMERGENCY DEPT VISIT HI MDM: CPT | Mod: 25

## 2020-08-26 PROCEDURE — 83690 ASSAY OF LIPASE: CPT | Performed by: EMERGENCY MEDICINE

## 2020-08-26 PROCEDURE — 80053 COMPREHEN METABOLIC PANEL: CPT | Performed by: EMERGENCY MEDICINE

## 2020-08-26 PROCEDURE — 25500064 ZZH RX 255 OP 636: Performed by: RADIOLOGY

## 2020-08-26 PROCEDURE — 25800030 ZZH RX IP 258 OP 636: Performed by: EMERGENCY MEDICINE

## 2020-08-26 PROCEDURE — 74177 CT ABD & PELVIS W/CONTRAST: CPT | Mod: TC

## 2020-08-26 PROCEDURE — 96374 THER/PROPH/DIAG INJ IV PUSH: CPT

## 2020-08-26 PROCEDURE — 36415 COLL VENOUS BLD VENIPUNCTURE: CPT | Performed by: EMERGENCY MEDICINE

## 2020-08-26 PROCEDURE — 85025 COMPLETE CBC W/AUTO DIFF WBC: CPT | Performed by: EMERGENCY MEDICINE

## 2020-08-26 PROCEDURE — 12000000 ZZH R&B MED SURG/OB

## 2020-08-26 PROCEDURE — 99285 EMERGENCY DEPT VISIT HI MDM: CPT | Mod: Z6 | Performed by: EMERGENCY MEDICINE

## 2020-08-26 PROCEDURE — 83735 ASSAY OF MAGNESIUM: CPT | Performed by: INTERNAL MEDICINE

## 2020-08-26 PROCEDURE — 87635 SARS-COV-2 COVID-19 AMP PRB: CPT | Performed by: EMERGENCY MEDICINE

## 2020-08-26 PROCEDURE — 36556 INSERT NON-TUNNEL CV CATH: CPT | Performed by: NURSE ANESTHETIST, CERTIFIED REGISTERED

## 2020-08-26 PROCEDURE — 80320 DRUG SCREEN QUANTALCOHOLS: CPT | Performed by: EMERGENCY MEDICINE

## 2020-08-26 RX ORDER — FAMOTIDINE 10 MG
20 TABLET ORAL 2 TIMES DAILY
Status: DISCONTINUED | OUTPATIENT
Start: 2020-08-26 | End: 2020-08-31

## 2020-08-26 RX ORDER — POTASSIUM CHLORIDE 1500 MG/1
20-40 TABLET, EXTENDED RELEASE ORAL
Status: DISCONTINUED | OUTPATIENT
Start: 2020-08-26 | End: 2020-08-27

## 2020-08-26 RX ORDER — PROCHLORPERAZINE 25 MG
25 SUPPOSITORY, RECTAL RECTAL EVERY 12 HOURS PRN
Status: DISCONTINUED | OUTPATIENT
Start: 2020-08-26 | End: 2020-08-30

## 2020-08-26 RX ORDER — ONDANSETRON 4 MG/1
4 TABLET, ORALLY DISINTEGRATING ORAL EVERY 6 HOURS PRN
Status: DISCONTINUED | OUTPATIENT
Start: 2020-08-26 | End: 2020-08-30

## 2020-08-26 RX ORDER — IOPAMIDOL 612 MG/ML
100 INJECTION, SOLUTION INTRAVASCULAR ONCE
Status: COMPLETED | OUTPATIENT
Start: 2020-08-26 | End: 2020-08-26

## 2020-08-26 RX ORDER — SODIUM CHLORIDE 9 MG/ML
INJECTION, SOLUTION INTRAVENOUS CONTINUOUS
Status: DISCONTINUED | OUTPATIENT
Start: 2020-08-26 | End: 2020-08-27

## 2020-08-26 RX ORDER — NALOXONE HYDROCHLORIDE 0.4 MG/ML
.1-.4 INJECTION, SOLUTION INTRAMUSCULAR; INTRAVENOUS; SUBCUTANEOUS
Status: DISCONTINUED | OUTPATIENT
Start: 2020-08-26 | End: 2020-09-03 | Stop reason: HOSPADM

## 2020-08-26 RX ORDER — CALCIUM CARBONATE 500 MG/1
1000 TABLET, CHEWABLE ORAL 4 TIMES DAILY PRN
Status: DISCONTINUED | OUTPATIENT
Start: 2020-08-26 | End: 2020-09-03 | Stop reason: HOSPADM

## 2020-08-26 RX ORDER — POTASSIUM CL/LIDO/0.9 % NACL 10MEQ/0.1L
10 INTRAVENOUS SOLUTION, PIGGYBACK (ML) INTRAVENOUS
Status: DISCONTINUED | OUTPATIENT
Start: 2020-08-26 | End: 2020-08-27

## 2020-08-26 RX ORDER — POTASSIUM CHLORIDE 7.45 MG/ML
10 INJECTION INTRAVENOUS
Status: DISCONTINUED | OUTPATIENT
Start: 2020-08-26 | End: 2020-08-27

## 2020-08-26 RX ORDER — LORAZEPAM 2 MG/ML
0.5 INJECTION INTRAMUSCULAR EVERY 4 HOURS PRN
Status: DISCONTINUED | OUTPATIENT
Start: 2020-08-26 | End: 2020-09-03 | Stop reason: HOSPADM

## 2020-08-26 RX ORDER — BACLOFEN 10 MG/1
10 TABLET ORAL 3 TIMES DAILY
Status: DISCONTINUED | OUTPATIENT
Start: 2020-08-26 | End: 2020-08-27

## 2020-08-26 RX ORDER — ACETAMINOPHEN 325 MG/1
650 TABLET ORAL 3 TIMES DAILY
Status: DISCONTINUED | OUTPATIENT
Start: 2020-08-26 | End: 2020-09-03 | Stop reason: HOSPADM

## 2020-08-26 RX ORDER — GABAPENTIN 300 MG/1
900 CAPSULE ORAL 3 TIMES DAILY
Status: DISCONTINUED | OUTPATIENT
Start: 2020-08-26 | End: 2020-09-03 | Stop reason: HOSPADM

## 2020-08-26 RX ORDER — SODIUM CHLORIDE 9 MG/ML
INJECTION, SOLUTION INTRAVENOUS CONTINUOUS
Status: ACTIVE | OUTPATIENT
Start: 2020-08-26 | End: 2020-08-26

## 2020-08-26 RX ORDER — LIDOCAINE 40 MG/G
CREAM TOPICAL
Status: DISCONTINUED | OUTPATIENT
Start: 2020-08-26 | End: 2020-09-03 | Stop reason: HOSPADM

## 2020-08-26 RX ORDER — MIRTAZAPINE 15 MG/1
15 TABLET, ORALLY DISINTEGRATING ORAL
Status: DISCONTINUED | OUTPATIENT
Start: 2020-08-26 | End: 2020-09-03 | Stop reason: HOSPADM

## 2020-08-26 RX ORDER — ESCITALOPRAM OXALATE 10 MG/1
10 TABLET ORAL DAILY
Status: DISCONTINUED | OUTPATIENT
Start: 2020-08-27 | End: 2020-09-03 | Stop reason: HOSPADM

## 2020-08-26 RX ORDER — HYDROCORTISONE 25 MG/G
CREAM TOPICAL 2 TIMES DAILY PRN
Status: DISCONTINUED | OUTPATIENT
Start: 2020-08-26 | End: 2020-09-03 | Stop reason: HOSPADM

## 2020-08-26 RX ORDER — PROCHLORPERAZINE MALEATE 5 MG
10 TABLET ORAL EVERY 6 HOURS PRN
Status: DISCONTINUED | OUTPATIENT
Start: 2020-08-26 | End: 2020-08-30

## 2020-08-26 RX ORDER — HYDROXYZINE HYDROCHLORIDE 25 MG/1
50 TABLET, FILM COATED ORAL 2 TIMES DAILY PRN
Status: DISCONTINUED | OUTPATIENT
Start: 2020-08-26 | End: 2020-09-03 | Stop reason: HOSPADM

## 2020-08-26 RX ORDER — POTASSIUM CHLORIDE 1500 MG/1
20 TABLET, EXTENDED RELEASE ORAL ONCE
Status: COMPLETED | OUTPATIENT
Start: 2020-08-26 | End: 2020-08-26

## 2020-08-26 RX ORDER — LANOLIN ALCOHOL/MO/W.PET/CERES
3 CREAM (GRAM) TOPICAL
Status: DISCONTINUED | OUTPATIENT
Start: 2020-08-26 | End: 2020-09-03 | Stop reason: HOSPADM

## 2020-08-26 RX ORDER — LACTOBACILLUS RHAMNOSUS GG 10B CELL
1 CAPSULE ORAL 2 TIMES DAILY
Status: DISCONTINUED | OUTPATIENT
Start: 2020-08-26 | End: 2020-09-03 | Stop reason: HOSPADM

## 2020-08-26 RX ORDER — ONDANSETRON 2 MG/ML
4 INJECTION INTRAMUSCULAR; INTRAVENOUS EVERY 6 HOURS PRN
Status: DISCONTINUED | OUTPATIENT
Start: 2020-08-26 | End: 2020-08-30

## 2020-08-26 RX ORDER — POTASSIUM CHLORIDE 1.5 G/1.58G
20-40 POWDER, FOR SOLUTION ORAL
Status: DISCONTINUED | OUTPATIENT
Start: 2020-08-26 | End: 2020-08-27

## 2020-08-26 RX ORDER — ONDANSETRON 2 MG/ML
4 INJECTION INTRAMUSCULAR; INTRAVENOUS
Status: COMPLETED | OUTPATIENT
Start: 2020-08-26 | End: 2020-08-26

## 2020-08-26 RX ADMIN — ONDANSETRON 4 MG: 2 INJECTION INTRAMUSCULAR; INTRAVENOUS at 19:33

## 2020-08-26 RX ADMIN — LORAZEPAM 0.5 MG: 2 INJECTION INTRAMUSCULAR; INTRAVENOUS at 21:19

## 2020-08-26 RX ADMIN — MIRTAZAPINE 15 MG: 15 TABLET, ORALLY DISINTEGRATING ORAL at 21:14

## 2020-08-26 RX ADMIN — SODIUM CHLORIDE: 9 INJECTION, SOLUTION INTRAVENOUS at 22:32

## 2020-08-26 RX ADMIN — IOPAMIDOL 100 ML: 612 INJECTION, SOLUTION INTRAVENOUS at 12:56

## 2020-08-26 RX ADMIN — Medication 10 MEQ: at 19:28

## 2020-08-26 RX ADMIN — NICOTINE 1 PATCH: 7 PATCH, EXTENDED RELEASE TRANSDERMAL at 18:33

## 2020-08-26 RX ADMIN — ONDANSETRON 4 MG: 2 INJECTION INTRAMUSCULAR; INTRAVENOUS at 12:17

## 2020-08-26 RX ADMIN — PROCHLORPERAZINE EDISYLATE 10 MG: 5 INJECTION INTRAMUSCULAR; INTRAVENOUS at 21:18

## 2020-08-26 RX ADMIN — SODIUM CHLORIDE: 9 INJECTION, SOLUTION INTRAVENOUS at 12:17

## 2020-08-26 RX ADMIN — POTASSIUM CHLORIDE 20 MEQ: 1500 TABLET, EXTENDED RELEASE ORAL at 12:43

## 2020-08-26 ASSESSMENT — ACTIVITIES OF DAILY LIVING (ADL): ADLS_ACUITY_SCORE: 14

## 2020-08-26 NOTE — PLAN OF CARE
Transferred patient to [facility] with assist of 2. Left room for patient to have privacy when returned found patient with right hand down throat vomiting into garbage can.   Patient had very small bm and urinated. Transferred patient from [facility] to bed with assist of 2.

## 2020-08-26 NOTE — ED NOTES
DATE:  8/26/2020   TIME OF RECEIPT FROM LAB:  6093  LAB TEST:  potassium  LAB VALUE:  2.9  RESULTS GIVEN WITH READ-BACK TO (PROVIDER):  Dr. Ramirez   TIME LAB VALUE REPORTED TO PROVIDER:  8680

## 2020-08-26 NOTE — DISCHARGE INSTRUCTIONS

## 2020-08-26 NOTE — PLAN OF CARE
Patient currently making self throw up by sticking her finger into the back of her throat, states it makes her feel better. Also ordered a lot of fluids, stated she feels dehydrated but makes herself puke after a couple sips. Took two bites of italian ice and made self throw up again.

## 2020-08-26 NOTE — ED PROVIDER NOTES
History     Chief Complaint   Patient presents with     Abdominal Pain     HPI  Maggie Case is a 30 year old female who presents to the emergency room 4 days of nausea and vomiting with no diarrhea noted.  Patient complains about 2 days of mild left lower quadrant pain.  Patient no dysuria frequency noted.  Patient has no cold or cough.  Patient has suffered a stroke with significant left-sided paresis apparently 2 years ago.  Patient does have a history recurrent depression plus chemical dependency issues.  Patient states she has not drank for 4 days at least.    Allergies:  Allergies   Allergen Reactions     Amoxicillin Other (See Comments)     Headaches     Levaquin [Levofloxacin] Swelling     Lithium      Diabetes insipidus      Naproxen Other (See Comments)     Reaction: Headaches     Nickel      Tramadol      Sulindac Rash       Problem List:    Patient Active Problem List    Diagnosis Date Noted     Suicidal ideation 07/10/2020     Priority: Medium     Chronic left shoulder pain 06/24/2020     Priority: Medium     Anxiety and depression 06/15/2020     Priority: Medium     Acute urinary tract infection 05/23/2020     Priority: Medium     Acute pyelonephritis 05/22/2020     Priority: Medium     Bipolar disorder, in partial remission, most recent episode depressed (H) 05/01/2020     Priority: Medium     Incontinence of urine in female 04/22/2020     Priority: Medium     Muscle weakness 02/04/2020     Priority: Medium     Chronic pain of left knee 02/04/2020     Priority: Medium     Spasticity as late effect of cerebrovascular accident (CVA) 12/30/2019     Priority: Medium     Neuropathy 01/28/2019     Priority: Medium     Colitis, collagenous 01/16/2019     Priority: Medium     Borderline personality disorder (H) 12/08/2018     Priority: Medium     Irritable bowel syndrome with diarrhea 12/07/2018     Priority: Medium     Tobacco use disorder 11/28/2018     Priority: Medium     History of DVT (deep vein  thrombosis) 11/27/2018     Priority: Medium     History of nonadherence to medical treatment 11/27/2018     Priority: Medium     Chronic hypokalemia 11/27/2018     Priority: Medium     Depression with suicidal ideation 09/19/2018     Priority: Medium     Generalized anxiety disorder 09/16/2018     Priority: Medium     Clostridium difficile colitis 08/15/2018     Priority: Medium     Bilateral plantar wart 07/27/2018     Priority: Medium     Nephrolithiasis 07/27/2018     Priority: Medium     Chemical dependency (H) 07/16/2018     Priority: Medium     Calculus of lower urinary tract 07/15/2018     Priority: Medium     History of CVA (cerebrovascular accident) 07/15/2018     Priority: Medium     Left hemiparesis (H) 07/15/2018     Priority: Medium     Chronic anticoagulation 07/15/2018     Priority: Medium     History of cranioplasty 05/24/2018     Priority: Medium     Traumatic brain injury, without loss of consciousness, sequela (H) 05/12/2018     Priority: Medium     Cerebrovascular accident (CVA) due to occlusion of right carotid artery (H) 03/26/2018     Priority: Medium     Overview:   She suffered a massive right-sided CVA in mid-February, 2018. She ended up with severe cerebral edema and required a craniotomy. She is left with significant residual left-sided hemiparesis. She had her cranial defect repaired on 5/24/18       Acute blood loss anemia 03/01/2018     Priority: Medium     Dysphagia 03/01/2018     Priority: Medium     History of alcohol abuse 03/01/2018     Priority: Medium     Hilar lymphadenopathy 03/01/2018     Priority: Medium     Overview:   bilateral       Personal history of PE (pulmonary embolism) 03/01/2018     Priority: Medium     Overview:   In 2015.  Discontinued anticoagulation 1/2017 due to inconsistent use of anticoagulation and lack of thrombus on CT)       PFO (patent foramen ovale) 03/01/2018     Priority: Medium     Acute right MCA stroke (H) 03/01/2018     Priority: Medium      Acute ischemic stroke (H) 02/17/2018     Priority: Medium     Influenza B 05/01/2017     Priority: Medium     Opacity of lung on imaging study 05/01/2017     Priority: Medium     Community acquired pneumonia 05/01/2017     Priority: Medium     C. difficile colitis 05/01/2017     Priority: Medium     Sepsis (H) 04/28/2017     Priority: Medium     Pulmonary nodule seen on imaging study 03/21/2017     Priority: Medium     OCD (obsessive compulsive disorder) 01/11/2017     Priority: Medium     Pyelonephritis 01/05/2017     Priority: Medium     Cervical high risk HPV (human papillomavirus) test positive 08/11/2016     Priority: Medium     Overview:   Hx LGSIL in August of 2016       Other pulmonary embolism without acute cor pulmonale, unspecified chronicity (H) 05/18/2016     Priority: Medium     Acute chest pain 05/06/2016     Priority: Medium     Leukocytosis 05/06/2016     Priority: Medium     Lung mass 05/06/2016     Priority: Medium     SOB (shortness of breath) 05/06/2016     Priority: Medium     Other vitamin B12 deficiency anemia 01/05/2016     Priority: Medium     Vitamin D deficiency 01/05/2016     Priority: Medium     Iron deficiency anemia, unspecified iron deficiency anemia type 12/28/2015     Priority: Medium     Acute upper GI bleed 06/18/2013     Priority: Medium     Hypokalemia 06/18/2013     Priority: Medium     Acute cystitis 06/18/2013     Priority: Medium     Anxiety state 03/25/2013     Priority: Medium     Muscle pain 07/30/2009     Priority: Medium     Overview:   IMO Update 10/11       Cervicalgia 06/16/2009     Priority: Medium     Overview:   IMO Update 10/11       Low back pain 06/16/2009     Priority: Medium     Overview:   IMO Update 10/11       Pain in joint, lower leg 05/01/2009     Priority: Medium     Diarrhea 04/17/2008     Priority: Medium     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     Priority: Medium        Past Medical History:    Past  Medical History:   Diagnosis Date     Anemia      Anxiety      Chemical dependency (H)      Chronic diarrhea      Lactose intolerance      Myalgia      OCD (obsessive compulsive disorder)      Pulmonary embolism (H) 16     Stroke (H) 2018     Vitamin B12 deficiency        Past Surgical History:    Past Surgical History:   Procedure Laterality Date     CLOSED REDUCTION, PERCUTANEOUS PINNING LOWER EXTREMITY, COMBINED Right 2016    Procedure: COMBINED CLOSED REDUCTION, PERCUTANEOUS PINNING LOWER EXTREMITY;  Surgeon: Dexter Jones MD;  Location: [facility]     COLONOSCOPY       CRANIECTOMY Right 2018    Procedure: CRANIECTOMY;  RIGHT HEMICRANIECTOMY;  Surgeon: Emeterio Mesa MD;  Location: [facility]     CRANIOPLASTY Right 2018    Procedure: CRANIOPLASTY;  Right Cranioplasty ;  Surgeon: Emeterio Mesa MD;  Location: [facility] OR     UPPER GI ENDOSCOPY         Family History:    Family History   Problem Relation Age of Onset     Depression Mother      Migraines Maternal Half-Sister        Social History:  Marital Status:  Single [1]  Social History     Tobacco Use     Smoking status: Current Every Day Smoker     Packs/day: 2.00     Years: 7.00     Pack years: 14.00     Types: Cigarettes     Last attempt to quit: 2018     Years since quittin.0     Smokeless tobacco: Never Used   Substance Use Topics     Alcohol use: Yes     Alcohol/week: 0.0 standard drinks     Drug use: Yes     Frequency: 7.0 times per week     Types: Marijuana, Methamphetamines     Comment: hx of marijuana and meth use        Medications:    baclofen (LIORESAL) 10 MG tablet  escitalopram (LEXAPRO) 10 MG tablet  gabapentin (NEURONTIN) 300 MG capsule  hydrOXYzine (ATARAX) 50 MG tablet  loperamide (IMODIUM A-D) 2 MG tablet  nicotine (NICODERM CQ) 7 MG/24HR 24 hr patch  XARELTO 20 MG TABS tablet  acetaminophen (TYLENOL) 500 MG tablet  Benzocaine-Menthol 20-0.26 % GEL  hydrocortisone, Perianal, (ANUSOL-HC) 2.5 % cream  magic  mouthwash suspension, diphenhydrAMINE, lidocaine, aluminum-magnesium & simethicone, (FIRST-MOUTHWASH BLM) compounding kit  medroxyPROGESTERone (DEPO-PROVERA) 150 MG/ML IM injection  ondansetron (ZOFRAN-ODT) 8 MG ODT tab          Review of Systems   All other systems reviewed and are negative.      Physical Exam   BP: 111/80  Pulse: 107  Temp: 97.5  F (36.4  C)  Resp: 16  SpO2: 98 %      Physical Exam  Vitals signs and nursing note reviewed.   Constitutional:       Appearance: She is well-developed.   HENT:      Head: Normocephalic and atraumatic.      Mouth/Throat:      Mouth: Mucous membranes are moist.      Pharynx: Oropharynx is clear.   Eyes:      Extraocular Movements: Extraocular movements intact.      Pupils: Pupils are equal, round, and reactive to light.   Cardiovascular:      Rate and Rhythm: Normal rate and regular rhythm.      Heart sounds: Normal heart sounds.   Pulmonary:      Effort: Pulmonary effort is normal.      Breath sounds: Normal breath sounds.   Abdominal:      General: Abdomen is flat. Bowel sounds are normal.      Palpations: Abdomen is soft.      Comments: Mild left lower quadrant tenderness noted.   Skin:     General: Skin is warm and dry.      Capillary Refill: Capillary refill takes less than 2 seconds.   Neurological:      Mental Status: She is alert.      Comments: Weakness noted in the left arm left leg which is chronic for this patient.   Psychiatric:         Mood and Affect: Mood normal.         Behavior: Behavior normal.      Comments: Patient denies any suicidal homicidal thoughts.         ED Course        Procedures          Because of chronic abdominal pain with elevated white count but a negative CT patient be admitted for observation.  Patient's care was discussed with on-call hospitalist who agreed to observation admission.         Results for orders placed or performed during the hospital encounter of 08/26/20 (from the past 24 hour(s))   CBC with platelets differential    Result Value Ref Range    WBC 20.5 (H) 4.0 - 11.0 10e9/L    RBC Count 5.77 (H) 3.8 - 5.2 10e12/L    Hemoglobin 17.9 (H) 11.7 - 15.7 g/dL    Hematocrit 48.0 (H) 35.0 - 47.0 %    MCV 83 78 - 100 fl    MCH 31.0 26.5 - 33.0 pg    MCHC 37.3 (H) 31.5 - 36.5 g/dL    RDW 13.5 10.0 - 15.0 %    Platelet Count 486 (H) 150 - 450 10e9/L    Diff Method Automated Method     % Neutrophils 86.4 %    % Lymphocytes 7.8 %    % Monocytes 4.4 %    % Eosinophils 0.1 %    % Basophils 0.3 %    % Immature Granulocytes 1.0 %    Nucleated RBCs 0 0 /100    Absolute Neutrophil 17.7 (H) 1.6 - 8.3 10e9/L    Absolute Lymphocytes 1.6 0.8 - 5.3 10e9/L    Absolute Monocytes 0.9 0.0 - 1.3 10e9/L    Absolute Eosinophils 0.0 0.0 - 0.7 10e9/L    Absolute Basophils 0.1 0.0 - 0.2 10e9/L    Abs Immature Granulocytes 0.2 0 - 0.4 10e9/L    Absolute Nucleated RBC 0.0    Comprehensive metabolic panel   Result Value Ref Range    Sodium 134 133 - 144 mmol/L    Potassium 2.9 (LL) 3.4 - 5.3 mmol/L    Chloride 99 94 - 109 mmol/L    Carbon Dioxide 29 20 - 32 mmol/L    Anion Gap 6 3 - 14 mmol/L    Glucose 152 (H) 70 - 99 mg/dL    Urea Nitrogen 12 7 - 30 mg/dL    Creatinine 0.64 0.52 - 1.04 mg/dL    GFR Estimate >90 >60 mL/min/[1.73_m2]    GFR Estimate If Black >90 >60 mL/min/[1.73_m2]    Calcium 9.9 8.5 - 10.1 mg/dL    Bilirubin Total 1.1 0.2 - 1.3 mg/dL    Albumin 3.9 3.4 - 5.0 g/dL    Protein Total 8.5 6.8 - 8.8 g/dL    Alkaline Phosphatase 128 40 - 150 U/L    ALT 32 0 - 50 U/L    AST 62 (H) 0 - 45 U/L   Alcohol ethyl   Result Value Ref Range    Ethanol g/dL <0.01 0.01 g/dL   Lipase   Result Value Ref Range    Lipase 288 73 - 393 U/L   CT Abdomen Pelvis w Contrast    Narrative    PROCEDURE:  CT ABDOMEN PELVIS W CONTRAST    HISTORY: Abd pain, fever, abscess suspected    TECHNIQUE:  Helical CT of the abdomen and pelvis was performed  following injection of intravenous contrast.     COMPARISON:  5/22/2020    MEDS/CONTRAST: ISOVUE 300 100 mL    FINDINGS:      Limited  images through the lung bases demonstrate no focal  consolidation or mass.  The heart size is normal. No pericardial or  pleural effusions are seen.    The liver demonstrates hepatic steatosis with sparing at the  gallbladder fossa.  The gallbladder, spleen, pancreas and adrenal  glands are unremarkable.  Symmetric nephrograms are present without  hydronephrosis. There is no abdominal aortic aneurysm.    The bowel is normal in caliber. Diffusely increased submucosal fat  throughout the colon is a chronic finding. No focal intraperitoneal  stranding is identified.    No free fluid, free air or adenopathy is present.  No suspicious  osseous lesions are identified.      Impression    IMPRESSION:      Chronic increased submucosal fat throughout the colon and to a lesser  degree stomach may be the sequela of chronic inflammation. Consider  endoscopy if not previously performed.    Hepatic steatosis.    VIPIN DICK MD   Lactate for Sepsis Protocol - Timed   Result Value Ref Range    Lactate for Sepsis Protocol 1.9 0.7 - 2.0 mmol/L       Medications   sodium chloride 0.9% infusion ( Intravenous New Bag 8/26/20 1217)   ondansetron (ZOFRAN) injection 4 mg (4 mg Intravenous Given 8/26/20 1217)   potassium chloride ER (KLOR-CON M) CR tablet 20 mEq (20 mEq Oral Given 8/26/20 1243)   iopamidol (ISOVUE-300) IV solution 61% 100 mL (100 mLs Intravenous Given 8/26/20 1256)   sodium chloride (PF) 0.9% PF flush 60 mL (60 mLs Intravenous Given 8/26/20 1256)       Assessments & Plan (with Medical Decision Making)     I have reviewed the nursing notes.    I have reviewed the findings, diagnosis, plan and need for follow up with the patient.      New Prescriptions    No medications on file       Final diagnoses:   Abdominal pain, left lower quadrant       8/26/2020   [facility]     Mane Moss MD  08/26/20 8501

## 2020-08-26 NOTE — ED NOTES
Care Transitions focused note:      This worker met with patient briefly. This worker has met with patient in the past, and is familiar of her situation, history, and condition. This worker discussed her current living situation, which is unchanged since this worker's last contact with patient.     Patient indicated that her daughter is doing well.     This worker will follow up with patient, per patient request following discharge.     KYARA Flannery on 8/26/2020 at 3:42 PM

## 2020-08-26 NOTE — TELEPHONE ENCOUNTER
[facility]  @FLOW([id number])@      Social Work Care Navigator Note      Patient: Maggie Case  Date: August 26, 2020  Preferred Name: Maggie    Previous PHQ-9: No flowsheet data found.  Previous DIANNA-7: No flowsheet data found.  SEYMOUR LEVEL:  No flowsheet data found.    Preferred Contact: @ELIEZER([phone])@  Type of Contact Today: Phone call (not reached/unavailable)      Data: (subjective / Objective):  Patient Goals Areas: @FLOW([id number])@  Patient Stated Goals: @FLOW([id number])@  Recent ED/IP Admission or Discharge?   None  Attempted to reach patient, but was unsuccessful.  Plan to attempt again.  RE Ayon LSW  Social Work Care Coordinator  [facility]  [phone] option 2 or [phone]

## 2020-08-26 NOTE — H&P
[facility]    History and Physical - Hospitalist Service       Date of Admission:  8/26/2020    Assessment & Plan   Maggie Case is a 30 year old female admitted on 8/26/2020.         Nausea and diarrhea: check stool C-Diff (returned positive) and survey diagnostics, supportive care, COVID rule out    C-Diff positive colitis: PO Vancomycin    Sepsis criteria met (WBC 20.5 + ). Lactic acid 1.9, high normal. IV fluids and trend lactic acid    Hypokalemia: related to stool loses: electrolyte replacement protocol, check magnesium    Chronic coagulopathy: continue Xarelto    General: UA,  toxicology screen     Diet: clear liquid  DVT Prophylaxis: she is on Xarelto for established coagulopathy  Maria Catheter: not present  Code Status: DNR         Disposition Plan   Expected discharge: 2 - 3 days, recommended to prior living arrangement once stable for discharge, discharge plan established.  Entered: Yury Manuel DO 08/26/2020, 5:44 PM     The patient's care was discussed with the Patient.    Yury Manuel DO  [facility]    ______________________________________________________________________    Chief Complaint   Four days of worsening nausea and diarrhea with LLQ abdominal pain.      History is obtained from the patient and EHR review    History of Present Illness   Maggie Case is a 30 year old female who has managed Behavioral Health, chronic coagulopathy on Xarelto, associated CVA and has been admitted to this hospital with similar presentation in 2018 and found to have C-Diff colitis. Her most recent hospitalization was for UTI, this year    ER Course: vitals were normal aside from mild tachycardia (109). Lab diagnostics resulted a heme profile with elevated WBC (20.5) with left shift and polycythemia likely reflecting volume contraction. The chemistry panel showed preserved renal function and hypokalemia (2.9). CT imaging showed diffuse colonic inflammatory changes.  She was given IV fluids and antiemetic    Review of Systems  she had been feeling fine until 4 days ago; her roommate has no symptoms    Constitutional: No fever or chills, some generalized weakness, no unintentional weight change, diminished appetite  Ears, Nose & Throat: no sore throat, no nasal drainage, no congestion. No ear pain, no ear drainage, no particular change in hearing  Eyes: no particular change in vision, no redness, no drainage  Cardiovascular: No chest pain at rest, no chest pain with familiar activities.  Pulmonary: No cough, no particular change in work of breathing, no particular change in shortness of breath with position changes or familiar activites  Gastrointestinal: diffuse LLQ abdominal pain,  nausea, vomiting, diarrhea (3-4 large watery stools daily; one while in ER).  no black stools, no bloody stools  Genitourinary: No particular change in incontinence, no pain with urination, no particular change in stream, no particular change in amount urinated with urge, no discharge  Skin: No particular change in bruising, no rashes  Musculoskeletal: no particular change in strength, no particular change in muscle development  Neurological: no numbness and tingling, no headache, no particular change in balance, no dizziness  Psychologic: No particular change in depression and/or anxiety  Endocrine: No particular change in heat or cold intolerance           Past Medical History    I have reviewed this patient's medical history and updated it with pertinent information if needed.   Past Medical History:   Diagnosis Date     Anemia      Anxiety      Chemical dependency (H)      Chronic diarrhea      Lactose intolerance      Myalgia      OCD (obsessive compulsive disorder)      Pulmonary embolism (H) 05/06/16     Stroke (H) 02/2018     Vitamin B12 deficiency        Past Surgical History   I have reviewed this patient's surgical history and updated it with pertinent information if needed.  Past Surgical  History:   Procedure Laterality Date     CLOSED REDUCTION, PERCUTANEOUS PINNING LOWER EXTREMITY, COMBINED Right 2016    Procedure: COMBINED CLOSED REDUCTION, PERCUTANEOUS PINNING LOWER EXTREMITY;  Surgeon: Dexter Jones MD;  Location: [facility]     COLONOSCOPY       CRANIECTOMY Right 2018    Procedure: CRANIECTOMY;  RIGHT HEMICRANIECTOMY;  Surgeon: Emeterio Mesa MD;  Location: [facility] OR     CRANIOPLASTY Right 2018    Procedure: CRANIOPLASTY;  Right Cranioplasty ;  Surgeon: Emeterio Mesa MD;  Location: [facility] OR     UPPER GI ENDOSCOPY         Social History   I have reviewed this patient's social history and updated it with pertinent information if needed.  Social History     Tobacco Use     Smoking status: Current Every Day Smoker     Packs/day: 2.00     Years: 7.00     Pack years: 14.00     Types: Cigarettes     Last attempt to quit: 2018     Years since quittin.0     Smokeless tobacco: Never Used   Substance Use Topics     Alcohol use: Yes     Alcohol/week: 0.0 standard drinks     Drug use: Yes     Frequency: 7.0 times per week     Types: Marijuana, Methamphetamines     Comment: hx of marijuana and meth use       Family History   I have reviewed this patient's family history and updated it with pertinent information if needed.  Family History   Problem Relation Age of Onset     Depression Mother      Migraines Maternal Half-Sister        Prior to Admission Medications   Prior to Admission Medications   Prescriptions Last Dose Informant Patient Reported? Taking?   Benzocaine-Menthol 20-0.26 % GEL   Yes No   Sig: Take by mouth 4 times daily as needed (for moderate pain)   XARELTO 20 MG TABS tablet Past Week at Unknown time  Yes Yes   Sig: Take 20 mg by mouth every morning with breakfast.   acetaminophen (TYLENOL) 500 MG tablet Unknown at Unknown time  Yes No   Sig: Take 500 mg by mouth 3 times daily (MAXIMUM OF 4000MG ACETAMINOPHEN FROM ALL SOURCES IN 24 HOURS)    baclofen (LIORESAL) 10  MG tablet Past Week at Unknown time  Yes Yes   Sig: Take 10 mg by mouth 3 times daily Take 1/2 tablet (5mg) by mouth every eight hours as needed.   escitalopram (LEXAPRO) 10 MG tablet Past Week at Unknown time  No Yes   Sig: Take 1 tablet (10 mg) by mouth daily   gabapentin (NEURONTIN) 300 MG capsule Past Week at Unknown time  Yes Yes   Sig: Take 900 mg by mouth 3 times daily   hydrOXYzine (ATARAX) 50 MG tablet Past Week at Unknown time  No Yes   Sig: Take 1 tablet (50 mg) by mouth 2 times daily as needed for anxiety   hydrocortisone, Perianal, (ANUSOL-HC) 2.5 % cream   Yes No   Sig: Place rectally 2 times daily as needed for hemorrhoids   loperamide (IMODIUM A-D) 2 MG tablet Past Week at Unknown time  Yes Yes   Sig: Take 2 mg by mouth 4 times daily as needed for diarrhea Pt states takes two tablets at the same time once daily.   magic mouthwash suspension, diphenhydrAMINE, lidocaine, aluminum-magnesium & simethicone, (FIRST-MOUTHWASH BLM) compounding kit   Yes No   Sig: Swish and swallow 10 mLs in mouth every 6 hours as needed for mouth sores   medroxyPROGESTERone (DEPO-PROVERA) 150 MG/ML IM injection   Yes No   Sig: Inject 150 mg into the muscle every 3 months   nicotine (NICODERM CQ) 7 MG/24HR 24 hr patch Past Month at Unknown time  Yes Yes   Sig: Apply 1 patch topically daily    ondansetron (ZOFRAN-ODT) 8 MG ODT tab Unknown at Unknown time  Yes No   Sig: Take 8 mg by mouth every 8 hours as needed for nausea       Facility-Administered Medications: None     Allergies   Allergies   Allergen Reactions     Amoxicillin Other (See Comments)     Headaches     Levaquin [Levofloxacin] Swelling     Lithium      Diabetes insipidus      Naproxen Other (See Comments)     Reaction: Headaches     Nickel      Tramadol      Sulindac Rash       Physical Exam   Vital Signs: Temp: 98.8  F (37.1  C) Temp src: Tympanic BP: 116/87 Pulse: 109   Resp: 16 SpO2: 99 % O2 Device: None (Room air)    Weight: 0 lbs 0 oz     Case reviewed with  "the ER Provider, EHR reviewed; patient seen in ER room 8    Vital signs:  Temp: 98.8  F (37.1  C) Temp src: Tympanic BP: 116/87 Pulse: 109   Resp: 16 SpO2: 99 % O2 Device: None (Room air)        Estimated body mass index is 31.89 kg/m  as calculated from the following:    Height as of 7/10/20: 1.6 m (5' 3\").    Weight as of 7/22/20: 81.6 kg (180 lb).      General: No distress, interactive, occasional gaging  Head: normocephalic, no obvious trauma  Eyes: Gaze directed normally, sclera clear, no discharge, no abnormal ocular movements  Ears: Normal appearing age-related external ears, no discharge, stable hearing acuity loss  Nose: Normal age-related appearance  Mouth: Normal appearing oral mucosa, Gums and throat appear age-related normal  Neck: Normal age-related appearance, age-related range of motion, supple, no adenopathy  Pulmonary: Normal work of breathing, no expiratory delay, no coarseness, no wheezing  Cardiovascular: Distant heart sounds, regular rhythm   Abdomen: No obvious distention, soft, bowel sounds present with normal frequency and pitch  Rectal: Deferred  Back: Age-related normal  Skin: Age-related normal, no rashes  Extremities: Not tender, no lower extremity edema. Moving upper and lower extremities  Neurological: Grossly in tact  Psychiatric: Mood is stable, appropriately interactive          Data   Data reviewed today: I reviewed all medications, new labs and imaging results over the last 24 hours.   "

## 2020-08-27 ENCOUNTER — ANESTHESIA EVENT (OUTPATIENT)
Dept: MEDSURG UNIT | Facility: HOSPITAL | Age: 30
DRG: 371 | End: 2020-08-27
Payer: MEDICARE

## 2020-08-27 ENCOUNTER — APPOINTMENT (OUTPATIENT)
Dept: GENERAL RADIOLOGY | Facility: HOSPITAL | Age: 30
DRG: 371 | End: 2020-08-27
Attending: NURSE PRACTITIONER
Payer: MEDICARE

## 2020-08-27 ENCOUNTER — ANESTHESIA (OUTPATIENT)
Dept: MEDSURG UNIT | Facility: HOSPITAL | Age: 30
DRG: 371 | End: 2020-08-27
Payer: MEDICARE

## 2020-08-27 ENCOUNTER — TRANSFERRED RECORDS (OUTPATIENT)
Dept: HEALTH INFORMATION MANAGEMENT | Facility: CLINIC | Age: 30
End: 2020-08-27

## 2020-08-27 LAB
ALBUMIN SERPL-MCNC: 2.8 G/DL (ref 3.4–5)
ALP SERPL-CCNC: 84 U/L (ref 40–150)
ALT SERPL W P-5'-P-CCNC: 22 U/L (ref 0–50)
ANION GAP SERPL CALCULATED.3IONS-SCNC: 5 MMOL/L (ref 3–14)
ANION GAP SERPL CALCULATED.3IONS-SCNC: 7 MMOL/L (ref 3–14)
ANION GAP SERPL CALCULATED.3IONS-SCNC: 7 MMOL/L (ref 3–14)
AST SERPL W P-5'-P-CCNC: 47 U/L (ref 0–45)
BILIRUB SERPL-MCNC: 1.1 MG/DL (ref 0.2–1.3)
BUN SERPL-MCNC: 6 MG/DL (ref 7–30)
BUN SERPL-MCNC: 6 MG/DL (ref 7–30)
BUN SERPL-MCNC: 8 MG/DL (ref 7–30)
C DIFF TOX B STL QL: POSITIVE
CALCIUM SERPL-MCNC: 7.6 MG/DL (ref 8.5–10.1)
CALCIUM SERPL-MCNC: 8 MG/DL (ref 8.5–10.1)
CALCIUM SERPL-MCNC: 8.2 MG/DL (ref 8.5–10.1)
CHLORIDE SERPL-SCNC: 109 MMOL/L (ref 94–109)
CHLORIDE SERPL-SCNC: 110 MMOL/L (ref 94–109)
CHLORIDE SERPL-SCNC: 112 MMOL/L (ref 94–109)
CO2 SERPL-SCNC: 24 MMOL/L (ref 20–32)
CO2 SERPL-SCNC: 24 MMOL/L (ref 20–32)
CO2 SERPL-SCNC: 26 MMOL/L (ref 20–32)
CREAT SERPL-MCNC: 0.52 MG/DL (ref 0.52–1.04)
CREAT SERPL-MCNC: 0.58 MG/DL (ref 0.52–1.04)
CREAT SERPL-MCNC: 0.61 MG/DL (ref 0.52–1.04)
ERYTHROCYTE [DISTWIDTH] IN BLOOD BY AUTOMATED COUNT: 13.3 % (ref 10–15)
GFR SERPL CREATININE-BSD FRML MDRD: >90 ML/MIN/{1.73_M2}
GLUCOSE SERPL-MCNC: 115 MG/DL (ref 70–99)
GLUCOSE SERPL-MCNC: 129 MG/DL (ref 70–99)
GLUCOSE SERPL-MCNC: 137 MG/DL (ref 70–99)
HCT VFR BLD AUTO: 36.7 % (ref 35–47)
HGB BLD-MCNC: 13.5 G/DL (ref 11.7–15.7)
LABORATORY COMMENT REPORT: NORMAL
LACTATE BLD-SCNC: 2.3 MMOL/L (ref 0.7–2)
MAGNESIUM SERPL-MCNC: 2 MG/DL (ref 1.6–2.3)
MCH RBC QN AUTO: 31.2 PG (ref 26.5–33)
MCHC RBC AUTO-ENTMCNC: 36.8 G/DL (ref 31.5–36.5)
MCV RBC AUTO: 85 FL (ref 78–100)
PLATELET # BLD AUTO: 353 10E9/L (ref 150–450)
POTASSIUM SERPL-SCNC: 1.6 MMOL/L (ref 3.4–5.3)
POTASSIUM SERPL-SCNC: 1.8 MMOL/L (ref 3.4–5.3)
POTASSIUM SERPL-SCNC: 1.9 MMOL/L (ref 3.4–5.3)
POTASSIUM SERPL-SCNC: 2.7 MMOL/L (ref 3.4–5.3)
PROT SERPL-MCNC: 5.7 G/DL (ref 6.8–8.8)
RBC # BLD AUTO: 4.33 10E12/L (ref 3.8–5.2)
SARS-COV-2 RNA SPEC QL NAA+PROBE: NEGATIVE
SARS-COV-2 RNA SPEC QL NAA+PROBE: NORMAL
SODIUM SERPL-SCNC: 140 MMOL/L (ref 133–144)
SODIUM SERPL-SCNC: 141 MMOL/L (ref 133–144)
SODIUM SERPL-SCNC: 143 MMOL/L (ref 133–144)
SPECIMEN SOURCE: ABNORMAL
SPECIMEN SOURCE: NORMAL
SPECIMEN SOURCE: NORMAL
WBC # BLD AUTO: 13.5 10E9/L (ref 4–11)

## 2020-08-27 PROCEDURE — 99232 SBSQ HOSP IP/OBS MODERATE 35: CPT | Performed by: NURSE PRACTITIONER

## 2020-08-27 PROCEDURE — 25000128 H RX IP 250 OP 636

## 2020-08-27 PROCEDURE — 25000132 ZZH RX MED GY IP 250 OP 250 PS 637: Performed by: INTERNAL MEDICINE

## 2020-08-27 PROCEDURE — 25800030 ZZH RX IP 258 OP 636: Performed by: INTERNAL MEDICINE

## 2020-08-27 PROCEDURE — 93010 ELECTROCARDIOGRAM REPORT: CPT | Performed by: INTERNAL MEDICINE

## 2020-08-27 PROCEDURE — 02H633Z INSERTION OF INFUSION DEVICE INTO RIGHT ATRIUM, PERCUTANEOUS APPROACH: ICD-10-PCS | Performed by: NURSE ANESTHETIST, CERTIFIED REGISTERED

## 2020-08-27 PROCEDURE — 40000986 XR CHEST PORT 1 VW: Mod: TC

## 2020-08-27 PROCEDURE — 84132 ASSAY OF SERUM POTASSIUM: CPT | Performed by: NURSE PRACTITIONER

## 2020-08-27 PROCEDURE — 25800030 ZZH RX IP 258 OP 636: Performed by: NURSE PRACTITIONER

## 2020-08-27 PROCEDURE — 25000128 H RX IP 250 OP 636: Performed by: NURSE PRACTITIONER

## 2020-08-27 PROCEDURE — 85027 COMPLETE CBC AUTOMATED: CPT | Performed by: INTERNAL MEDICINE

## 2020-08-27 PROCEDURE — 87046 STOOL CULTR AEROBIC BACT EA: CPT | Performed by: INTERNAL MEDICINE

## 2020-08-27 PROCEDURE — 83735 ASSAY OF MAGNESIUM: CPT | Performed by: NURSE PRACTITIONER

## 2020-08-27 PROCEDURE — 93005 ELECTROCARDIOGRAM TRACING: CPT

## 2020-08-27 PROCEDURE — 20000003 ZZH R&B ICU

## 2020-08-27 PROCEDURE — 25000128 H RX IP 250 OP 636: Performed by: INTERNAL MEDICINE

## 2020-08-27 PROCEDURE — 87899 AGENT NOS ASSAY W/OPTIC: CPT | Performed by: INTERNAL MEDICINE

## 2020-08-27 PROCEDURE — 36415 COLL VENOUS BLD VENIPUNCTURE: CPT | Performed by: INTERNAL MEDICINE

## 2020-08-27 PROCEDURE — 83605 ASSAY OF LACTIC ACID: CPT | Performed by: INTERNAL MEDICINE

## 2020-08-27 PROCEDURE — 80053 COMPREHEN METABOLIC PANEL: CPT | Performed by: INTERNAL MEDICINE

## 2020-08-27 PROCEDURE — 25000132 ZZH RX MED GY IP 250 OP 250 PS 637: Performed by: NURSE PRACTITIONER

## 2020-08-27 PROCEDURE — 80048 BASIC METABOLIC PNL TOTAL CA: CPT | Performed by: NURSE PRACTITIONER

## 2020-08-27 PROCEDURE — 87493 C DIFF AMPLIFIED PROBE: CPT | Performed by: INTERNAL MEDICINE

## 2020-08-27 PROCEDURE — 87045 FECES CULTURE AEROBIC BACT: CPT | Performed by: INTERNAL MEDICINE

## 2020-08-27 PROCEDURE — 87015 SPECIMEN INFECT AGNT CONCNTJ: CPT | Performed by: INTERNAL MEDICINE

## 2020-08-27 PROCEDURE — 84132 ASSAY OF SERUM POTASSIUM: CPT | Performed by: INTERNAL MEDICINE

## 2020-08-27 PROCEDURE — 80048 BASIC METABOLIC PNL TOTAL CA: CPT | Performed by: INTERNAL MEDICINE

## 2020-08-27 RX ORDER — POTASSIUM CHLORIDE 1.5 G/1.58G
40 POWDER, FOR SOLUTION ORAL
Status: DISCONTINUED | OUTPATIENT
Start: 2020-08-27 | End: 2020-08-27

## 2020-08-27 RX ORDER — POTASSIUM CHLORIDE 750 MG/1
10 TABLET, EXTENDED RELEASE ORAL 2 TIMES DAILY WITH MEALS
COMMUNITY
Start: 2020-08-04

## 2020-08-27 RX ORDER — HYDROCORTISONE 2.5 %
CREAM (GRAM) TOPICAL 2 TIMES DAILY
COMMUNITY
Start: 2020-04-30

## 2020-08-27 RX ORDER — SODIUM CHLORIDE AND POTASSIUM CHLORIDE 300; 900 MG/100ML; MG/100ML
INJECTION, SOLUTION INTRAVENOUS CONTINUOUS
Status: DISCONTINUED | OUTPATIENT
Start: 2020-08-27 | End: 2020-08-28

## 2020-08-27 RX ORDER — VANCOMYCIN HYDROCHLORIDE 125 MG/1
125 CAPSULE ORAL 4 TIMES DAILY
Status: DISCONTINUED | OUTPATIENT
Start: 2020-08-27 | End: 2020-08-27

## 2020-08-27 RX ORDER — POTASSIUM CL/LIDO/0.9 % NACL 10MEQ/0.1L
10 INTRAVENOUS SOLUTION, PIGGYBACK (ML) INTRAVENOUS ONCE
Status: COMPLETED | OUTPATIENT
Start: 2020-08-27 | End: 2020-08-27

## 2020-08-27 RX ORDER — MIRTAZAPINE 30 MG/1
30 TABLET, FILM COATED ORAL AT BEDTIME
COMMUNITY
Start: 2020-08-13

## 2020-08-27 RX ORDER — ACETAMINOPHEN 500 MG
1000 TABLET ORAL 3 TIMES DAILY
Status: ON HOLD | COMMUNITY
End: 2020-09-03

## 2020-08-27 RX ORDER — POTASSIUM CL/LIDO/0.9 % NACL 10MEQ/0.1L
INTRAVENOUS SOLUTION, PIGGYBACK (ML) INTRAVENOUS
Status: COMPLETED
Start: 2020-08-27 | End: 2020-08-27

## 2020-08-27 RX ORDER — POTASSIUM CHLORIDE 7.45 MG/ML
10 INJECTION INTRAVENOUS
Status: DISCONTINUED | OUTPATIENT
Start: 2020-08-27 | End: 2020-08-27

## 2020-08-27 RX ORDER — VANCOMYCIN HYDROCHLORIDE 250 MG/1
250 CAPSULE ORAL 4 TIMES DAILY
Status: DISCONTINUED | OUTPATIENT
Start: 2020-08-27 | End: 2020-09-03 | Stop reason: HOSPADM

## 2020-08-27 RX ORDER — FAMOTIDINE 20 MG/1
20 TABLET, FILM COATED ORAL 2 TIMES DAILY
COMMUNITY
Start: 2020-06-10

## 2020-08-27 RX ORDER — FOLIC ACID 1 MG/1
1 TABLET ORAL DAILY
COMMUNITY
Start: 2020-08-19

## 2020-08-27 RX ORDER — LAMOTRIGINE 25 MG/1
25 TABLET ORAL DAILY
Status: ON HOLD | COMMUNITY
Start: 2020-08-04 | End: 2020-09-11

## 2020-08-27 RX ORDER — LIDOCAINE HYDROCHLORIDE 20 MG/ML
SOLUTION OROPHARYNGEAL
COMMUNITY
Start: 2020-07-30

## 2020-08-27 RX ORDER — POTASSIUM CL/LIDO/0.9 % NACL 10MEQ/0.1L
10 INTRAVENOUS SOLUTION, PIGGYBACK (ML) INTRAVENOUS
Status: DISCONTINUED | OUTPATIENT
Start: 2020-08-27 | End: 2020-08-27

## 2020-08-27 RX ORDER — VARENICLINE TARTRATE 1 MG/1
1 TABLET, FILM COATED ORAL 2 TIMES DAILY
COMMUNITY
Start: 2020-06-10

## 2020-08-27 RX ORDER — TRAZODONE HYDROCHLORIDE 100 MG/1
100 TABLET ORAL AT BEDTIME
COMMUNITY
Start: 2020-08-13

## 2020-08-27 RX ORDER — PROMETHAZINE HYDROCHLORIDE 25 MG/1
25 TABLET ORAL EVERY 6 HOURS PRN
COMMUNITY
Start: 2020-08-20

## 2020-08-27 RX ORDER — POTASSIUM CHLORIDE 7.45 MG/ML
10 INJECTION INTRAVENOUS ONCE
Status: DISCONTINUED | OUTPATIENT
Start: 2020-08-27 | End: 2020-08-27

## 2020-08-27 RX ADMIN — RIVAROXABAN 20 MG: 20 TABLET, FILM COATED ORAL at 08:57

## 2020-08-27 RX ADMIN — LORAZEPAM 0.5 MG: 2 INJECTION INTRAMUSCULAR; INTRAVENOUS at 02:35

## 2020-08-27 RX ADMIN — Medication 10 MEQ: at 08:06

## 2020-08-27 RX ADMIN — POTASSIUM CHLORIDE 20 MEQ: 149 INJECTION, SOLUTION, CONCENTRATE INTRAVENOUS at 18:47

## 2020-08-27 RX ADMIN — NICOTINE 1 PATCH: 7 PATCH, EXTENDED RELEASE TRANSDERMAL at 09:49

## 2020-08-27 RX ADMIN — POTASSIUM CHLORIDE 20 MEQ: 149 INJECTION, SOLUTION, CONCENTRATE INTRAVENOUS at 09:47

## 2020-08-27 RX ADMIN — VANCOMYCIN HYDROCHLORIDE 250 MG: 250 CAPSULE ORAL at 16:51

## 2020-08-27 RX ADMIN — Medication 1 CAPSULE: at 08:58

## 2020-08-27 RX ADMIN — ONDANSETRON 4 MG: 2 INJECTION INTRAMUSCULAR; INTRAVENOUS at 02:35

## 2020-08-27 RX ADMIN — POTASSIUM CHLORIDE 20 MEQ: 149 INJECTION, SOLUTION, CONCENTRATE INTRAVENOUS at 10:54

## 2020-08-27 RX ADMIN — NALOXONE HYDROCHLORIDE 0.1 MG: 0.4 INJECTION, SOLUTION INTRAMUSCULAR; INTRAVENOUS; SUBCUTANEOUS at 20:30

## 2020-08-27 RX ADMIN — ACETAMINOPHEN 650 MG: 325 TABLET, FILM COATED ORAL at 09:05

## 2020-08-27 RX ADMIN — ACETAMINOPHEN 650 MG: 325 TABLET, FILM COATED ORAL at 14:04

## 2020-08-27 RX ADMIN — POTASSIUM CHLORIDE 20 MEQ: 149 INJECTION, SOLUTION, CONCENTRATE INTRAVENOUS at 20:07

## 2020-08-27 RX ADMIN — SODIUM CHLORIDE: 9 INJECTION, SOLUTION INTRAVENOUS at 05:32

## 2020-08-27 RX ADMIN — Medication 10 MEQ: at 06:43

## 2020-08-27 RX ADMIN — LORAZEPAM 0.5 MG: 2 INJECTION INTRAMUSCULAR; INTRAVENOUS at 14:03

## 2020-08-27 RX ADMIN — ONDANSETRON 4 MG: 2 INJECTION INTRAMUSCULAR; INTRAVENOUS at 08:55

## 2020-08-27 RX ADMIN — LORAZEPAM 0.5 MG: 2 INJECTION INTRAMUSCULAR; INTRAVENOUS at 22:35

## 2020-08-27 RX ADMIN — Medication 10 MEQ: at 15:34

## 2020-08-27 RX ADMIN — VANCOMYCIN HYDROCHLORIDE 250 MG: 250 CAPSULE ORAL at 14:04

## 2020-08-27 RX ADMIN — MIDAZOLAM 2 MG: 1 INJECTION INTRAMUSCULAR; INTRAVENOUS at 08:13

## 2020-08-27 RX ADMIN — ONDANSETRON 4 MG: 2 INJECTION INTRAMUSCULAR; INTRAVENOUS at 22:36

## 2020-08-27 RX ADMIN — FAMOTIDINE 20 MG: 20 TABLET, FILM COATED ORAL at 08:58

## 2020-08-27 RX ADMIN — POTASSIUM CHLORIDE 20 MEQ: 149 INJECTION, SOLUTION, CONCENTRATE INTRAVENOUS at 12:16

## 2020-08-27 RX ADMIN — GABAPENTIN 900 MG: 300 CAPSULE ORAL at 14:04

## 2020-08-27 RX ADMIN — GABAPENTIN 900 MG: 300 CAPSULE ORAL at 08:56

## 2020-08-27 RX ADMIN — POTASSIUM CHLORIDE AND SODIUM CHLORIDE: 900; 300 INJECTION, SOLUTION INTRAVENOUS at 22:36

## 2020-08-27 RX ADMIN — BACLOFEN 10 MG: 10 TABLET ORAL at 09:49

## 2020-08-27 RX ADMIN — POTASSIUM CHLORIDE 20 MEQ: 149 INJECTION, SOLUTION, CONCENTRATE INTRAVENOUS at 17:31

## 2020-08-27 RX ADMIN — PROCHLORPERAZINE EDISYLATE 10 MG: 5 INJECTION INTRAMUSCULAR; INTRAVENOUS at 14:07

## 2020-08-27 RX ADMIN — VANCOMYCIN HYDROCHLORIDE 250 MG: 250 CAPSULE ORAL at 09:51

## 2020-08-27 RX ADMIN — ESCITALOPRAM OXALATE 10 MG: 5 TABLET, FILM COATED ORAL at 08:57

## 2020-08-27 RX ADMIN — Medication 10 MEQ: at 15:31

## 2020-08-27 RX ADMIN — POTASSIUM CHLORIDE 20 MEQ: 149 INJECTION, SOLUTION, CONCENTRATE INTRAVENOUS at 21:13

## 2020-08-27 ASSESSMENT — ACTIVITIES OF DAILY LIVING (ADL)
ADLS_ACUITY_SCORE: 16
ADLS_ACUITY_SCORE: 14
ADLS_ACUITY_SCORE: 16
ADLS_ACUITY_SCORE: 16

## 2020-08-27 NOTE — ANESTHESIA CARE TRANSFER NOTE
Patient: Maggie Case    * No procedures listed *    Diagnosis: * No pre-op diagnosis entered *  Diagnosis Additional Information: No value filed.    Anesthesia Type:   No value filed.     Note:    Patient transferred to:Medical/Surgical Unit  Handoff Report: Identifed the Patient, Identified the Reponsible Provider, Reviewed the pertinent medical history, Discussed the surgical course, Reviewed Intra-OP anesthesia mangement and issues during anesthesia, Set expectations for post-procedure period and Allowed opportunity for questions and acknowledgement of understanding      Vitals: (Last set prior to Anesthesia Care Transfer)              Electronically Signed By: WOO Cristina CRNA  August 27, 2020  9:12 AM

## 2020-08-27 NOTE — PLAN OF CARE
[facility] Inpatient Admission Note:    Patient admitted to 3206/3206-1 at approximately 1815 via cart  from emergency room . Report received from Latoya SANCHZE RN in SBAR format at 1701 via telephone. Patient transferred to bed via slide board.. Patient is alert and oriented X 3, verbalizes pain, rates at 9 on 0-10 scale.  Patient oriented to room, unit, hourly rounding, and plan of care. Explained admission packet and patient handbook with patient bill of rights brochure. Will continue to monitor and document as needed.     Inpatient Nursing criteria listed below was met:    Health care directives status obtained and documented: Yes    Care Everywhere authorization obtained No    MRSA swab completed for patient 65 years and older: N/A    Patient identifies a surrogate decision maker: No If yes, who:N/A Contact Information:N/A     If initial lactic acid >2.0, repeat lactic acid drawn within one hour of arrival to unit: NA. If no, state reason: N/A    Vaccination assessment and education completed: No   Vaccinations received prior to admission: Pneumovax no  Influenza(seasonal)  NO    Clergy visit ordered if patient requests: N/A    Skin issues/needs documented: N/A    Isolation Patient: yes Education given, correct sign in place and documentation row added to PCS:  Yes    Fall Prevention Yes: Care plan updated, education given and documented, sticker and magnet in place: Yes    Care Plan initiated: Yes    Education Documented (including assessment): Yes    Patient has discharge needs : Yes If yes, please explain:Current roommate will be out of town due to surgery, will need help with day to day care.

## 2020-08-27 NOTE — PLAN OF CARE
Prior to Admission Medication Reconciliation:     Medications added:   [] None  [x] As listed below:    Medications in outside med report that are on medlist from [facility]. Patient may have told the nurse she doesn't take them. From what I could find, that is the most up to date medlist without being able to speak to the patient.     Medications deleted:   [] None  [x] As listed below:  Discontinued Medications  - documented as of this encounter  Medication Sig Discontinue Reason Start Date End Date   ARIPiprazole (Abilify) 10 MG tablet   Take 1 Tab by mouth one time a day. Allergic response 07/30/2020 08/13/2020   diphenhydrAMINE - lidocaine viscous - maalox (equal parts) (MAGIC MOUTHWASH)   Swish and swallow 10 ml in mouth every 6 hours as needed for mouth sores Course of treatment completed 07/29/2020 08/13/2020   busPIRone (Buspar) 15 MG tablet   Take 0.5 Tabs by mouth two times a day. Course of treatment completed 02/26/2020 08/13/2020   DULoxetine (CYMBALTA) 60 MG delayed release capsule   Take 2 Caps by mouth one time a day. Do not crush. Course of treatment completed 03/08/2019 08/13/2020       Changes made to existing medications:   [x] None  [] As listed below:    Last times/dates taken verified with patient:  [] Yes- completed myself  [] Nurse completed no changes made  [x] Unable to review with patient:  [] Nurse completed/changes made:       Allergy modifications:    [x]Did not review  []Patient verified NKA  []Patient verified current existing allergies: no changes made  []New allergies: listed below      Medication reconciliation sources:   []Patient  []Patient family member/emergency contact: **  [][facility] Report Review  []Epic Chart Review  [x]Care Everywhere review:  Medication Sig Dispensed Refills Start Date End Date Status   [x]benzocaine, dental, (ANBESOL, ORAJEL) 20 % Gel   Take by mouth two times a day as needed for Pain. 30 g   0 03/08/2019   Active   [x]acetaminophen (TYLENOL) 500 MG  tablet   Take 2 Tabs by mouth three times a day. Limit acetaminophen to 4000 mg per day from all sources. 90 Tab   3 03/29/2019   Active   [x]nicotine (NICODERM CQ) 7 MG/24HR patch       0 03/08/2019   Active   [x]rivaroxaban (Xarelto) 20 MG tablet   Take 1 Tab by mouth every morning with breakfast. 90 Tab   3 03/11/2020   Active   [x]gabapentin (Neurontin) 300 MG capsule   Take 3 Caps by mouth three times a day. 270 Cap   5 04/14/2020   Active   [x]potassium chloride CR (Klor-Con, K-Tab) 10 MEQ tablet   Take 1 Tab by mouth two times a day with meals. Do not crush. 62 Tab   11 04/14/2020   Active   [x]hydrocortisone 2.5 % Cream   Apply topically two times a day. to scaly, cracked hands 60 g   11 04/30/2020   Active   []nystatin (Mycostatin) 511237 UNIT/ML oral suspension    Indications: Infection Swish and swallow 5 mL four times a day. Indications: Infection 150 mL   1 05/04/2020   Active   [x]loperamide (Imodium A-D) 2 MG tablet   Take 1 Tab by mouth four times a day as needed for Diarrhea. 60 Tab   5 06/10/2020   Active   [x]varenicline (Chantix Continuing Month Juan Jose) 1 MG tablet   Take 1 Tab by mouth two times a day. Take with food and a glass of water. 56 Tab   3 06/10/2020   Active   [x]folic acid 1 MG tablet   Take 1 Tab by mouth one time a day. 90 Tab   3 07/08/2020   Active   [x]escitalopram (Lexapro) 10 MG tablet   Take 10 mg by mouth.   0 07/23/2020   Active   []lidocaine viscous (Xylocaine) 2 % Solution       0 07/30/2020   Active   [x]famotidine (Pepcid) 20 MG tablet   Take 1 Tab by mouth two times a day. 180 Tab   3 08/13/2020   Active   [x]hydrOXYzine HCl (Atarax) 50 MG tablet   Take 1 Tab by mouth two times a day as needed for Anxiety. 60 Tab   6 08/13/2020   Active   [x]traZODone (Desyrel) 100 MG tablet   Take 1 Tab by mouth at bedtime. 90 Tab   1 08/13/2020   Active   [x]mirtazapine (Remeron) 30 MG tablet   Take 1 Tab by mouth at bedtime. 30 Tab   11 08/13/2020   Active   [x]baclofen (Lioresal) 10  MG tablet   Take 1 Tab by mouth every eight hours as needed for Other (spasticity). 45 Tab   2 08/13/2020   Active   [x]lamoTRIgine (LaMICtal) 25 MG tablet   Take 1 Tab by mouth one time a day. 90 Tab   3 08/13/2020   Active   [x]promethazine (Phenergan) 25 MG tablet   Take 1 tablet (25 mg) by mouth every 6 hours as needed for nausea 20 Tab   1 08/20/2020   Active     ASSESSMENT:   1. Severe anxiety. I agree with restarting Lamictal as a mood stabilizer. To help with appetite, we are going to increase her mirtazapine from 15 mg to 30 mg. She has restarted her trazodone 100 mg at bedtime and continues with the hydroxyzine 50 mg twice a day for anxiety. She also is back on her Baclofen 10 mg 3 times a day as needed for her spasticity. She did not complain of pain today.  2. Chemical dependency, active. It is going to be very hard to get on top of her mood issues with her continued chemical use.  3. Chronic hypokalemia, multifactorial. I did have her see nephrology. The bottom line is she needs to take her potassium supplement. She freely admits she is not. I did not check her potassium today as it is obviously going to be low as she is not taking her supplement. Discussed the importance of doing so.  4. Spasticity because of her CVA.  5. CVA with hemiparesis, wheelchair bound. She really does need a more structured living environment. The problem is any structured living environment is not going to be happy with her continued chemical use.    PLAN: I did restart the Lamictal. She is going to stop it immediately if any signs of a rash or reaction. I will follow up with her in 1 month to see how things are going.     Administered Medications  - documented in this encounter  Active Administered Medications - up to 3 most recent administrations  Active Administered Medications - up to 3 most recent administrations   Medication Order MAR Action Action Date Dose Rate Site   medroxyPROGESTERone (Depo-Provera) 150 MG/ML  injection 150 mg    150 mg, Intramuscular, EVERY 3 MONTHS, 5 doses, First dose on Mon 3/30/20 at 1100, Last dose on Mon 3/29/21 at 1100   Given 08/13/2020 1:06 PM  mg   Right Ventral Gluteus    Given 03/30/2020 11:35 AM  mg   Right Ventral Gluteus         [x]Pharmacy med list: pulled [facility] medlist. Matches outside med report  []Pharmacy phone call  [x]Outside meds dispense report  []Nursing home or Assisted Living MAR:  []Other: **    Pharmacy desired at discharge: [facility]    Is patient on coumadin?  [x]No      Requests for consultation by provider or pharmacist:   [] Patient understands why all of their meds were prescribed and how to take them. No questions.   [] Fill dates coincide with compliancy for all maintenance meds.   [x] Fill dates do not coincide with compliancy with maintenance meds.   [] Patient has questions about the following:    Comments: pt unwilling to speak to me about her medications at this time. I reconciled her PTA meds using [employer] medlist and notes I found from her primary. I am not confident in saying what she is supposed to be on or not without talking to her. I have done med recs on her in the past and she takes herself off and on meds often and she has had a lot of med changes with recent hospitalizations. I will try talking to her again tomorrow.       Chasidy Calle on 8/27/2020 at 7:40 AM       Discrepancies: [x] No []Not Applicable []Yes: listed below    Time spent on medication reconciliation:   []5-20 mins  []20-40 mins  [x]> 40 mins    Issues completing PTA medication reconciliation:  [] On hold for a long time  [] Waited for a call back  [] Fax didn't come through  [] Fax took a long time  [x] Other:pt refuses to talk to me    Notifying appropriate party of changes/additions/discrepancies:  []No pertinent changes made, notification not necessary.   [] Notified attending provider via text page  [] Notified attending provider in person  [] Notified pharmacy  []  Notified nurse  [] Attending provider not available, left detailed notes  [] Changes/additions made don't need provider notification because provider has not seen patient or input any orders  [] Changes/additions made don't need provider notification because changes made are to medications not ordered    Medications Prior to Admission   Medication Sig Dispense Refill Last Dose     acetaminophen (TYLENOL) 500 MG tablet Take 1,000 mg by mouth 3 times daily . Limit acetaminophen to 4000 mg per day from all sources.        benzocaine (ORAJEL MAXIMUM STRENGTH) 20 % GEL gel Take by mouth 2 times daily as needed for moderate pain        escitalopram (LEXAPRO) 10 MG tablet Take 1 tablet (10 mg) by mouth daily 30 tablet 0 Past Week at Unknown time     gabapentin (NEURONTIN) 300 MG capsule Take 900 mg by mouth 3 times daily   Past Week at Unknown time     hydrOXYzine (ATARAX) 50 MG tablet Take 1 tablet (50 mg) by mouth 2 times daily as needed for anxiety 30 tablet 0 Past Week at Unknown time     loperamide (IMODIUM A-D) 2 MG tablet Take 2 mg by mouth 4 times daily as needed for diarrhea    Past Week at Unknown time     nicotine (NICODERM CQ) 7 MG/24HR 24 hr patch Apply 1 patch topically daily    Past Month at Unknown time     XARELTO 20 MG TABS tablet Take 20 mg by mouth every morning with breakfast.  11 Past Week at Unknown time     baclofen (LIORESAL) 10 MG tablet Take 10 mg by mouth every 8 hours as needed    Unknown at Unknown time     CHANTIX CONTINUING MONTH ASHLEY 1 MG tablet Take 1 mg by mouth 2 times daily         famotidine (PEPCID) 20 MG tablet Take 20 mg by mouth 2 times daily         folic acid (FOLVITE) 1 MG tablet Take 1 mg by mouth daily         hydrocortisone 2.5 % cream Apply topically 2 times daily to scaly, cracked hands.        lamoTRIgine (LAMICTAL) 25 MG tablet Take 25 mg by mouth daily         lidocaine (XYLOCAINE) 2 % solution         medroxyPROGESTERone (DEPO-PROVERA) 150 MG/ML IM injection Inject 150  mg into the muscle every 3 months   8/13/2020     mirtazapine (REMERON) 30 MG tablet Take 30 mg by mouth At Bedtime        potassium chloride ER (K-TAB/KLOR-CON) 10 MEQ CR tablet Take 10 mEq by mouth 2 times daily (with meals)         promethazine (PHENERGAN) 25 MG tablet Take 25 mg by mouth every 6 hours as needed         traZODone (DESYREL) 100 MG tablet Take 100 mg by mouth At Bedtime          Medication Dispense History (from 8/28/2019 to 8/24/2020)   Expand All  Collapse All   ARIPiprazole      Dispensed  Days Supply  Quantity  Provider  Pharmacy    ARIPIPRAZOLE 10MG TABLET  07/31/2020  30  30  MARS BONILLA  [facility]...    ARIPIPRAZOLE 10MG TABLET  07/30/2020  30  30  MARS BONILLA  [facility]...    ARIPIPRAZOLE 10MG TABLET  07/22/2020  22  22  MIK MCKEON  [facility]...          Acetaminophen      Dispensed  Days Supply  Quantity  Provider  Pharmacy    ACETAMINOPHEN ES 500MG CAPLET  01/07/2020  33  100  GUILLERMO,GENE  [facility]...    PAIN RELIEVER ES 500MG CAPLET  12/06/2019  30  180  GUILLERMORODRICK VILLALBA  [facility] #107 - C...    SM PAIN RELIEVER EXTRA ST 500MG TABLET  12/06/2019  24  144 tablet      PAIN RELIEVER ES 500MG CAPLET  11/06/2019  30  180  GUILLERMORODRICK VILLALBA  [facility] #[facility] - C...    PAIN RELIEVER ES 500MG CAPLET  10/07/2019  30  180  GUILLERMORODRICK VILLALBA  [facility] #107 - C...    ACETAMINOPHEN ES 500MG CAPLET  08/29/2019  28  168  [facility]LL[facility]MO,[facility] #107 - C...          Alum & Mag Hydroxide-Simeth      Dispensed  Days Supply  Quantity  Provider  Pharmacy    SM ANTACID ADVANCED SUSPENSION  07/30/2020  13  165 each            Baclofen      Dispensed  Days Supply  Quantity  Provider  Pharmacy    BACLOFEN 10MG TABLET  08/13/2020  15  45  MARS BONILLA  [facility]...    BACLOFEN 10MG TABLET  01/07/2020  30  45  RODRICK GUILLERMO  [facility]...    BACLOFEN 10MG TABLET  11/20/2019  30  45  RODRICK GUILLERMO  [facility] - C...          Cefuroxime  Axetil      Dispensed  Days Supply  Quantity  Provider  Pharmacy    CEFUROXIME AXETIL 500MG TABLET  05/26/2020  5  10  WOLF ROSENTHAL  [facility]...          Cephalexin      Dispensed  Days Supply  Quantity  Provider  Pharmacy    CEPHALEXIN 500MG CAPSULE  05/08/2020  7  14  TYLER TEJADA  [facility]...          DULoxetine HCl      Dispensed  Days Supply  Quantity  Provider  Pharmacy    DULOXETINE HYDROCHLORIDE 60MG CAPSULE DELAYED RELEASE PARTICLES  03/06/2020  10  10 capsule      DULOXETINE 60MG DR CAPSULE  01/07/2020  30  30  [facility]    DULOXETINE 60MG DR CAPSULE  12/04/2019  30  30  [facility] - C...    DULOXETINE HYDROCHLORIDE 60MG CAPSULE DELAYED RELEASE PARTICLES  12/04/2019  26  26 capsule      DULOXETINE 60MG DR CAPSULE  11/04/2019  30  30  [facility] #107 - C...    DULOXETINE 60MG DR CAPSULE  09/27/2019  28  56  [facility]    DULOXETINE 60MG DR CAPSULE  08/29/2019  28  56  RODRICK GUILLERMO  [facility] #107 - C...          Ergocalciferol      Dispensed  Days Supply  Quantity  Provider  Pharmacy    ERGOCALCIFEROL 1.25MG CAPSULE  07/27/2020  28  4  [facility]    ERGOCALCIFEROL 1.25MG CAPSULE  06/17/2020  28  4  [facility]          Escitalopram Oxalate      Dispensed  Days Supply  Quantity  Provider  Pharmacy    ESCITALOPRAM 10MG TABLET  07/22/2020  22  22  MIK MCKEON  [facility]...          Famotidine      Dispensed  Days Supply  Quantity  Provider  Pharmacy    FAMOTIDINE 20MG TABLET  06/10/2020  30  60  MARS BONILLA  [facility]...    FAMOTIDINE 40MG TABLET  05/08/2020  30  30  MARS BONILLA  [facility]...    FAMOTIDINE 20MG TABLET  04/14/2020  30  60  MARS BONILLA  [facility]...    FAMOTIDINE 20MG TABLET  01/15/2020  30  60  GUILLERMO,GENE  [facility]...     FAMOTIDINE 20MG TABLET  12/06/2019  24  48 tablet      FAMOTIDINE 20MG TABLET  12/06/2019  30  60  RODRICK GUILLERMO #107 - C...    FAMOTIDINE 20MG TABLET  11/06/2019  30  60  [facility] - [facility]...    FAMOTIDINE 20MG TABLET  10/07/2019  30  60  RODRICK GUILLERMO #107 - C...    FAMOTIDINE 20MG TABLET  08/29/2019  28  56  RODRICK GUILLERMO #107 - C...          Ferrous Sulfate      Dispensed  Days Supply  Quantity  Provider  Pharmacy    FERROUS SULFATE 325MG TABLET  01/07/2020  90  90  [facility]    FEROSUL 325MG TABLET  12/06/2019  24  24 tablet      FERROUS SULFATE 325MG TABLET  12/06/2019  30  30  RODRICK GUILLERMO #107 - C...    FERROUS SULFATE 325MG TABLET  11/06/2019  30  30  RODRICK GUILLERMO #107 - C...    FERROUS SULFATE 325MG TABLET  10/07/2019  30  30  RODRICK GUILLERMO #107 - C...    FERROUS SULFATE 325MG TABLET  09/05/2019  28  28 tablet      FERROUS SULFATE 325MG TABLET  08/29/2019  28  28  RORDICK GUILLERMO #107 - C...          Fluconazole      Dispensed  Days Supply  Quantity  Provider  Pharmacy    FLUCONAZOLE 150MG TABLET  03/20/2020  3  2  [facility]          Folic Acid      Dispensed  Days Supply  Quantity  Provider  Pharmacy    FOLIC ACID 1MG TABLET  08/19/2020  30  30  [facility]    FOLIC ACID 1MG TABLET  08/18/2020  30  30  MARS BONILLA #61 - Fa...    FOLIC ACID 1MG TABLET  07/26/2020  30  30  [facility]    FOLIC ACID 1MG TABLET  07/22/2020  30  30  MARS BONILLA #61 - Fa...    FOLIC ACID 1MG TABLET  07/01/2020  30  30  [facility]    FOLIC ACID 1MG TABLET  04/14/2020  30  30  [facility]    FOLIC ACID 1MG TABLET  01/07/2020  30  30  RODRICK GUILLERMO  [facility]...    FOLIC ACID 1MG TABLET  12/06/2019  24  24 tablet      FOLIC  ACID 1MG TABLET  12/06/2019  30  30  RODRICK GUILLERMO #107 - C...    FOLIC ACID 1MG TABLET  11/06/2019  30  30  RODRICK GUILLERMO #107 - C...    FOLIC ACID 1MG TABLET  10/07/2019  30  30  RODRICK GUILLERMO #107 - C...    FOLIC ACID 1MG TABLET  08/29/2019  28  28  RODRICK GUILLERMO #107 - C...          Gabapentin      Dispensed  Days Supply  Quantity  Provider  Pharmacy    GABAPENTIN 300MG CAPSULE  08/17/2020  30  270  [facility]    GABAPENTIN 300MG CAPSULE  06/29/2020  30  270  [facility]    GABAPENTIN 300MG CAPSULE  05/18/2020  30  270  [facility]...    GABAPENTIN 300MG CAPSULE  04/14/2020  30  270  [facility]    GABAPENTIN 300MG CAPSULE  03/16/2020  30  270  [facility]...    GABAPENTIN 300MG CAPSULE  03/06/2020  10  90 capsule      GABAPENTIN 300MG CAPSULE  03/05/2020  7  42  [facility]    GABAPENTIN 300MG CAPSULE  03/04/2020  30  270  [facility]    GABAPENTIN 300MG CAPSULE  01/31/2020  30  270  [facility]...    GABAPENTIN 300MG CAPSULE  01/07/2020  30  270  [facility]...    GABAPENTIN 300MG CAPSULE  12/06/2019  24  216 capsule      GABAPENTIN 300MG CAPSULE  12/06/2019  30  270  RODRICK GUILLERMO #107 - C...    GABAPENTIN 300MG CAPSULE  11/06/2019  30  270  RODRICK GUILLERMO #107 - C...    GABAPENTIN 300MG CAPSULE  10/07/2019  30  270  RODRICK GUILLERMO #107 - C...    GABAPENTIN 300MG CAPSULE  08/29/2019  28  252  RODRICK GUILLERMO #107 - C...          Hydrocortisone      Dispensed  Days Supply  Quantity  Provider  Pharmacy    HYDROCORTISONE 2.5% TOP CREAM  04/30/2020  30  60 g  [facility]...          LORazepam      Dispensed  Days Supply  Quantity  Provider  Pharmacy    LORAZEPAM 0.5MG  TABLET  07/22/2020  10  30  MIK MCKEON Pharmacy...          Lidocaine HCl      Dispensed  Days Supply  Quantity  Provider  Pharmacy    LIDOCAINE VISCOUS 2% SOLUTION  07/30/2020  13  165 each            Lithium Carbonate      Dispensed  Days Supply  Quantity  Provider  Pharmacy    LITHIUM CARBONATE 300MG ER TAB  06/04/2020  30  60  MARS BONILLA #61 - Fa...    LITHIUM CARBONATE 300MG ER TAB  05/09/2020  30  60  MARS BONILLA Pharmacy...    LITHIUM CARBONATE 300MG ER TAB  05/05/2020  30  60  MARS BONILLA #61 - Fa...    LITHIUM CARBONATE 300MG ER TAB  04/15/2020  30  60  MARS BONILLA Pharmacy...    LITHIUM CARBONATE 300MG ER TAB  03/11/2020  30  60  [facility] Pharmacy...    LITHIUM CARBONATE 300MG ER TAB  03/09/2020  30  60  [facility] T[facility] Pharmacy...          Loperamide HCl      Dispensed  Days Supply  Quantity  Provider  Pharmacy    LOPERAMIDE 2MG CAPSULE  08/17/2020  15  60  MARS BONILLA Pharmacy...    LOPERAMIDE 2MG CAPSULE  07/27/2020  15  60  MARS BONILLA Pharmacy...    LOPERAMIDE 2MG CAPSULE  06/10/2020  15  60  MARS BONILLA Pharmacy...    LOPERAMIDE 2MG CAPSULE  04/15/2020  15  60  MARS BONILLA Pharmacy...    LOPERAMIDE HCL 2MG CAPSULE  03/06/2020  18  20 capsule      LOPERAMIDE 2MG CAPSULE  01/07/2020  27  216  [facility]ILDRESS Pharmacy...    ANTI-DIARRHEAL 2MG CAPLET  12/27/2019  2  12  MARI[facility] DIAMOND #107 - C...    ANTI-DIARRHEAL 2MG CAPLET  12/18/2019  2  12  [facility] DIAMOND #107 - C...    ANTI-DIARRHEAL 2MG CAPLET  12/16/2019  2  12  [facility] DIAMOND #107 - C...    ANTI-DIARRHEAL 2MG CAPLET  12/03/2019  2  12  [facility] DIAMOND #107 - C...    ANTI-DIARRHEAL 2MG CAPLET  11/27/2019  2  12  [facility] DIAMOND #107 - C...    ANTI-DIARRHEAL 2MG CAPLET  11/21/2019  2   12  MARIAMA DIAMOND #107 - C...    ANTI-DIARRHEAL 2MG CAPLET  11/09/2019  2  12  [facility] DI[facility]OND #107 - C...    ANTI-DIARRHEAL 2MG CAPLET  10/24/2019  2  12  [facility] DIAMOND #107 - C...    ANTI-DIARRHEAL 2MG CAPLET  10/16/2019  3  12  [facility] DIAMOND #107 - C...    ANTI-DIARRHEAL 2MG CAPLET  09/27/2019  3  12  [facility] DIAMOND #107 - C...          Magnesium Carbonate      Dispensed  Days Supply  Quantity  Provider  Pharmacy    MAGONATE 1000MG/5ML SOLN  09/17/2019  35  355 mL  [facility]ODRICK GUILLERMO [facility] - C...          Magnesium Oxide      Dispensed  Days Supply  Quantity  Provider  Pharmacy    MAGNESIUM OXIDE 400MG TABLET  10/14/2019  3  6  FIGUEROA,[facility]...          Miconazole Nitrate      Dispensed  Days Supply  Quantity  Provider  Pharmacy    MICONAZOLE 2% 7 DAY VAG CREAM  06/11/2020  7  45 g  MARS BONILLA  [facility]...          Mirtazapine      Dispensed  Days Supply  Quantity  Provider  Pharmacy    MIRTAZAPINE 30MG TABLET  08/13/2020  30  30  MARS BONILLA  [facility]...    MIRTAZAPINE 15MG TABLET  06/25/2020  30  15  MARS BONILLA  [facility]...    MIRTAZAPINE 15MG TABLET  05/18/2020  30  15  MARS BONILLA  [facility]...    MIRTAZAPINE 15MG TABLET  03/06/2020  20  10 tablet      MIRTAZAPINE 15MG TABLET  02/10/2020  30  15  MARS BONILLA  [facility]...    MIRTAZAPINE 15MG TABLET  01/07/2020  22  11  [facility]    MIRTAZAPINE 15MG TABLET  12/06/2019  20  10 tablet      MIRTAZAPINE 15MG TABLET  12/06/2019  30  16  RODRICK GUILLERMO #107 - C...    MIRTAZAPINE 15MG TABLET  11/06/2019  30  16  [facility]...    MIRTAZAPINE 15MG TABLET  10/07/2019  30  16  RODRICK GUILLERMO #107 - C...    MIRTAZAPINE 15MG TABLET  08/29/2019  28  14  RODRICK GUILLERMO #107 - C...          Nicotine      Dispensed   Days Supply  Quantity  Provider  Pharmacy    NICOTINE 7MG/24HR PATCH  01/07/2020  28  28  [facility]    SM NICOT TRANS SYS 7MG 14  11/06/2019  28  28  MARIAMA DIAMOND  [facility] #107 - C...          Nitrofurantoin Monohyd Macro      Dispensed  Days Supply  Quantity  Provider  Pharmacy    NITROFURANTOIN M/M 100MG CAP  03/20/2020  7  14  ECALEXANDER HENSLEY  [facility]...          Nystatin      Dispensed  Days Supply  Quantity  Provider  Pharmacy    NYSTATIN 100,000UNIT/ML SUSP  07/27/2020  8  150 mL  [facility]...    NYSTATIN 100,000UNIT/ML SUSP  05/04/2020  8  150 mL  [facility]...          Ondansetron      Dispensed  Days Supply  Quantity  Provider  Pharmacy    ONDANSETRON 8MG ODT  05/08/2020  4  10  [facility]          Potassium Chloride      Dispensed  Days Supply  Quantity  Provider  Pharmacy    POTASSIUM CHLORIDE ER  10 MEQ TBCR  08/04/2020  30  60 each  [facility]...    POTASSIUM CL 10MEQ ER TABLET  08/04/2020  30  60  [facility] #61 - Fa...    POTASSIUM CL 10MEQ ER TABLET  07/05/2020  30  60  [facility] #61 - Fa...    POTASSIUM CL 10MEQ ER TABLET  06/04/2020  30  60  [facility] #61 - Fa...    POTASSIUM CL 10MEQ ER TABLET  05/08/2020  30  60  [facility]...    POTASSIUM CL 10MEQ ER TABLET  05/05/2020  30  60  [facility] #61 - Fa...    POTASSIUM CL 10MEQ ER TABLET  04/14/2020  30  60  [facility]...    POTASSIUM CL 20MEQ ER TABLET  03/19/2020  10  10  BIN RAYMUNDO  [facility]...    POTASSIUM CL 10MEQ ER TABLET  01/10/2020  30  120  [facility]    [facility] 10[facility] ER TABLET  12/19/2019  28  112  MARIAMA DIAMOND #107 - C...    POTASSIUM CHLORIDE ER 10MEQ TABLET EXTENDED RELEASE   11/23/2019  28  112 tablet      POTASSIUM CL 10MEQ ER TABLET  11/21/2019  28  112  MARIAMA DIAMOND #107 - C...    POTASSIUM CHLORIDE CR 10MEQ TABLET EXTENDED RELEASE  11/01/2019  28  112 tablet      POTASSIUM CL 10MEQ ER TABLET  10/25/2019  28  112  MARIAMA DIAMOND #107 - C...    POTASSIUM CL 10MEQ ER TABLET  10/18/2019  18  74  MARIAMA DIAMOND #107 - C...    POTASSIUM CL 10MEQ ER TABLET  10/16/2019  3  24  MARIAMA DIAMOND #107 - C...    POTASSIUM CL 10MEQ ER TABLET  10/14/2019  2  4  LUIS FIGUEROA #107 - C...    POTASSIUM CL 20MEQ ER TABLET  10/03/2019  32  63  MARIAMA DIAMOND #107 - C...    POTASSIUM CL 20MEQ ER TABLET  09/27/2019  28  56  MARIAMA DIAMOND #107 - C...          Potassium Chloride Noemi ER      Dispensed  Days Supply  Quantity  Provider  Pharmacy    POTASSIUM CL 10MEQ ER TABLET  09/27/2019  28  112  RODRICK GUILLERMO #107 - C...    POTASSIUM CL 10MEQ ER TABLET  08/29/2019  28  112  RODRICK GUILLERMO #107 - C...          Promethazine HCl      Dispensed  Days Supply  Quantity  Provider  Pharmacy    PROMETHAZINE 25MG TABLET  08/20/2020  5  20  [facility]...    PROMETHAZINE 12.5MG TABLET  10/30/2019  8  30  MARIAMA DIAMOND #107 - C...    PROMETHAZINE 12.5MG TABLET  10/16/2019  8  30  MARIAMA DIAMOND #107 - C...          Psyllium      Dispensed  Days Supply  Quantity  Provider  Pharmacy    FIBER THERAPY FOR SUSPENSION  09/30/2019  30  368 g  MARIAMA DIAMOND [place] #107 - C...    NATURAL FIBER THERAPY 48.57% POWDER  09/30/2019  30  368 each            Rivaroxaban      Dispensed  Days Supply  Quantity  Provider  Pharmacy    XARELTO 20MG TABLET  08/18/2020  30  30  MARS BONILLA #61 - Fa...    XARELTO 20MG TABLET  07/27/2020  30  30  MARS BONILLA  [facility]...    XARELTO 20MG TABLET  07/05/2020  30  30   MRAS BONILLA #61 - Fa...    XARELTO 20MG TABLET  06/04/2020  30  30  MARS BONILLA #61 - Fa...    XARELTO 20MG TABLET  05/07/2020  30  30  BONILLAMARS SAGE  [facility]...    XARELTO 20MG TABLET  05/05/2020  30  30  MARS BONILLA #61 - Fa...    XARELTO 20MG TABLET  [facility]/2020  30  [facility]    XARELTO 20MG TABLET  03/11/2020  30  30  BONILLAMARS SAGE  [facility]...    XARELTO 20MG TABLET  01/15/2020  30  30  EAGLERODRICK [facility]...    XARELTO 20MG TABLET  12/24/2019  28  28  GUILLERMORODRICK #107 - C...    XARELTO 20MG TABLET  12/17/2019  28  28  GUILLERMORODRICK #107 - C...    XARELTO 20MG TABLET  12/02/2019  28  28  RODRICK GUILLERMO #107 - C...    XARELTO 20MG TABLET  09/27/2019  28  28  RODRICK GUILLERMO #107 - C...    XARELTO 20MG TABLET  08/29/2019  28  28  RODRICK GUILLERMO #107 - C...          Varenicline Tartrate      Dispensed  Days Supply  Quantity  Provider  Pharmacy    CHANTIX CONTINUING MONTH BOX  06/10/2020  28  56  MARS BONILLA  [facility]...    CHANTIX CONTINUING MONTH BOX  01/27/2020  28  56  CHADMARS IAN  [facility]...    CHANTIX STARTING MONTH BOX  01/27/2020  28  53  CHADMARS IAN  [facility]...          Other      Dispensed  Days Supply  Quantity  Provider  Pharmacy    C-MAGIC MOUTHWASH  07/30/2020  13  495 mL  MARS BONILLA  [facility]...    VITAMIN B-12 1,000 MCG TABLET  09/27/2019  28  28  GUILLERMORODRICK [place] #107 - C...    VITAMIN B-12 1,000 MCG TABLET  08/29/2019  28  28  GUILLERMORODRICK VILLALBA #107 - C...          busPIRone HCl      Dispensed  Days Supply  Quantity  Provider  Pharmacy    BUSPIRONE 15MG TABLET  02/20/2020  30  30  [facility]...    BUSPIRONE 15MG TABLET  01/09/2020  30  30  [facility]...    BUSPIRONE 15MG TABLET   12/19/2019  30  30  YOLIS CORDOVA #107 - C...    BUSPIRONE HYDROCHLORIDE 15MG TABLET  12/19/2019  26  26 tablet      BUSPIRONE HYDROCHLORIDE 15MG TABLET  11/25/2019  30  30 tablet      BUSPIRONE 15MG TABLET  11/04/2019  30  30  YOLIS CORDOVA #107 - C...    BUSPIRONE 15MG TABLET  10/30/2019  33  33  YOLIS CORDOVA #107 - C...    BUSPIRONE 15MG TABLET  10/21/2019  7  14  YOLIS CORDOVA #107 - C...          diphenhydrAMINE HCl      Dispensed  Days Supply  Quantity  Provider  Pharmacy    SM ALLERGY RELIEF 12.5MG/5ML LIQUID  07/30/2020  13  165 each            hydrOXYzine HCl      Dispensed  Days Supply  Quantity  Provider  Pharmacy    HYDROXYZINE HCL 50MG TABLET  08/21/2020  30  60  MARS BONILLA  [facility]...    HYDROXYZINE HCL 50MG TABLET  08/10/2020  15  30  MARS BONILLA  [facility]...    HYDROXYZINE HCL 50MG TABLET  07/31/2020  15  30  MARS BONILLA  [facility]...    HYDROXYZINE HCL 50MG TABLET  07/22/2020  15  30  MIK MCKEON  [facility]...          lamoTRIgine      Dispensed  Days Supply  Quantity  Provider  Pharmacy    LAMOTRIGINE  25 MG TABS  08/04/2020  30  30 each  MARS BONILLA  [facility]...    LAMOTRIGINE 25MG TABLET  05/26/2020  30  30  PATTERSON ([facility],APRIL  [facility]...          traZODone HCl      Dispensed  Days Supply  Quantity  Provider  Pharmacy    TRAZODONE 100MG TABLET  08/13/2020  90  90  MARS BONILLA  [facility]...    TRAZODONE 100MG TABLET  01/07/2020  30  30  RODRICK GUILLERMO  [facility]...    TRAZODONE 100MG TABLET  12/06/2019  30  30  RODRICK GUILLERMO #107 - C...    TRAZODONE HYDROCHLORIDE 100MG TABLET  12/06/2019  24  24 tablet      TRAZODONE 100MG TABLET  11/06/2019  30  30  RODRICK GUILLERMO #107 - C...    TRAZODONE 100MG TABLET  10/07/2019  30  30  RODRICK GUILLERMO #107 - C...    TRAZODONE 100MG TABLET  08/29/2019   28  28  RODRICK GUILLERMO #107 - C...

## 2020-08-27 NOTE — PLAN OF CARE
VSS. Low grade fever at 99.5. A&O. Flat affect. C/o feeling anxious and requested IV ativan. Ativan given once this shift. C/o nausea, IV zofran given. Drank 480 of apple juice. Clear liquid diet. One incontinent loose, mucous stool and void. Stool sample sent and come back positive for CDiff. UA needed. MD aware. Not out of bed this shift. Reported that he is a 1 assist to pivot. K was 2.9, pt refusing protocol. IV infusing at 125. Covid/enteric precautions maintained. Slept on and off throughout shift. Call light in reach. Bed alarm on.     Face to face report given with opportunity to observe patient.    Report given to Loretta Yates RN   8/27/2020  7:33 AM

## 2020-08-27 NOTE — PLAN OF CARE
Patient alert and oriented, makes frequent requests and is able to make needs known. Patient is slow to respond at times due to her stroke and has left sided weakness and left arm is contracted. Pivots with an assist of 1 to commode and is due to void and needs a stool/urine sample. VSS, reported pain from IV site but is now saline locked. Refused Potassium replacement due to pain at IV site and vomited up meds 2-3 min after taking them. Takes small sips of water and then sticks finger down her throat immediately afterwards to make self vomit. No skin issues noted. Patient stated she does not want to go home tomorrow because she won't have anyone to take care of her due to her roommate having surgery.    Face to face report given with opportunity to observe patient.    Report given to RITESH Allen RN   8/26/2020  11:14 PM

## 2020-08-27 NOTE — PROVIDER NOTIFICATION
DATE:  8/27/2020   TIME OF RECEIPT FROM LAB:  0540  LAB TEST:  Lactic acid  LAB VALUE:  Significant value - 2.3  RESULTS GIVEN WITH READ-BACK TO (PROVIDER):  Yury Manuel, DO  TIME LAB VALUE REPORTED TO PROVIDER:   0542

## 2020-08-27 NOTE — PLAN OF CARE
Nausea subsiding now. Assisted up to the commode. Voided and passed liquid stool. Assisted to the commode with the assist of 2 fairly easy. Washed up now. Eating popsicles now . Will continue to monitor. BP turned to every 30 minutes now.

## 2020-08-27 NOTE — PROVIDER NOTIFICATION
Notified NP that pt refused to take PO K. Pt states she becomes nausea when taking K. Offered compazine, pt refused that as well. Educated pt on how critical her K level is currently. Pt states that she understands she could die but is still refusing.

## 2020-08-27 NOTE — PROGRESS NOTES
"CLINICAL NUTRITION SERVICES  -  ASSESSMENT NOTE    Maggie Case : Admission Nutrition Risk Screen - unintentional weight loss    30 yof admitted for intractable nausea and vomiting. Pt has a hx of B 12 deficiency, lactose intolerance, stroke, pulmonary embolism, chronic diarrhea, chemical dependency, anemia, anxiety, OCD. Results came back positive for C diff. No weight documented this admission to assess weight change. Weight has increased over the last couple years. Pt is on a clear liquid diet with only 1 intake documented. Noted that a probiotic has been ordered.    Diet Order: Clear liquid  Intake: 240ml apple juice      Height: 5' 3\"  Weight: 180 lbs 0 oz - 7/22/20  Body mass index is 31.89 kg/m .  Weight Status:  Obesity Grade I BMI 30-34.9  IBW:52.4 kg (115 lb 8.3 oz)  Weight History:   Wt Readings from Last 10 Encounters:   07/22/20 81.6 kg (180 lb)   07/03/20 80.7 kg (178 lb)   05/22/20 73.3 kg (161 lb 9.6 oz)   11/05/18 55.3 kg (122 lb)   09/18/18 56.2 kg (124 lb)   08/13/18 54.2 kg (119 lb 7.8 oz)   07/30/18 61.2 kg (135 lb)   07/15/18 61.8 kg (136 lb 3.9 oz)   06/24/18 63.5 kg (140 lb)   06/22/18 63.5 kg (139 lb 15.9 oz)       Estimated Energy Needs: 6330-0398 kcals ([place] Jeor- stress factor 1.1-1.3) most recent weight 81.6kg  Estimated Protein Needs: 50-60g grams protein (1-1.2 g pro/Kg)    Malnutrition Diagnosis: Unable to determine- need current weight.    NUTRITION RECOMMENDATIONS  - Advance diet as medically appropriate. Encouraged foods with soluble fiber such as bananas, applesauce, mashed potatoes, oatmeal, rice, etc.  - May need to add supplements depending on intake when diet advances    MONITORING AND EVALUATION:  RD will monitor intake, weight, labs                  "

## 2020-08-27 NOTE — ANESTHESIA PREPROCEDURE EVALUATION
Anesthesia Pre-Procedure Evaluation    Patient: Maggie Case   MRN: [id number] : 1990          Preoperative Diagnosis: * No pre-op diagnosis entered *    * No procedures listed *    Past Medical History:   Diagnosis Date     Anemia      Anxiety      Chemical dependency (H)      Chronic diarrhea      Lactose intolerance      Myalgia      OCD (obsessive compulsive disorder)      Pulmonary embolism (H) 16     Stroke (H) 2018     Vitamin B12 deficiency      Past Surgical History:   Procedure Laterality Date     CLOSED REDUCTION, PERCUTANEOUS PINNING LOWER EXTREMITY, COMBINED Right 2016    Procedure: COMBINED CLOSED REDUCTION, PERCUTANEOUS PINNING LOWER EXTREMITY;  Surgeon: Dexter Jones MD;  Location: [facility]     COLONOSCOPY       CRANIECTOMY Right 2018    Procedure: CRANIECTOMY;  RIGHT HEMICRANIECTOMY;  Surgeon: Emeterio Mesa MD;  Location: [facility]     CRANIOPLASTY Right 2018    Procedure: CRANIOPLASTY;  Right Cranioplasty ;  Surgeon: Emeterio Mesa MD;  Location: [facility]     UPPER GI ENDOSCOPY                          Lab Results   Component Value Date    WBC 13.5 (H) 2020    HGB 13.5 2020    HCT 36.7 2020     2020    CRP 26.3 (H) 2020    SED 14 2017     2020    POTASSIUM 1.6 (LL) 2020    CHLORIDE 109 2020    CO2 26 2020    BUN 8 2020    CR 0.58 2020     (H) 2020    AVINASH 8.2 (L) 2020    PHOS 3.5 2020    MAG 2.7 (H) 2020    ALBUMIN 2.8 (L) 2020    PROTTOTAL 5.7 (L) 2020    ALT 22 2020    AST 47 (H) 2020    ALKPHOS 84 2020    BILITOTAL 1.1 2020    LIPASE 288 2020    AMYLASE 50 2016    PTT 30 2018    INR 1.04 07/15/2018    FIBR 544 (H) 2018    TSH 1.91 2020    HCG Negative 2020    HCGS Negative 2018       Preop Vitals  BP Readings from Last 3 Encounters:   20 125/60   20  "122/81   07/08/20 116/86    Pulse Readings from Last 3 Encounters:   08/27/20 81   07/23/20 115   07/08/20 100      Resp Readings from Last 3 Encounters:   08/27/20 18   07/22/20 20   07/08/20 22    SpO2 Readings from Last 3 Encounters:   08/27/20 100%   07/23/20 97%   07/08/20 96%      Temp Readings from Last 1 Encounters:   08/27/20 98.6  F (37  C) (Tympanic)    Ht Readings from Last 1 Encounters:   08/26/20 1.6 m (5' 3\")      Wt Readings from Last 1 Encounters:   07/22/20 81.6 kg (180 lb)    Estimated body mass index is 31.89 kg/m  as calculated from the following:    Height as of this encounter: 1.6 m (5' 3\").    Weight as of 7/22/20: 81.6 kg (180 lb).       Anesthesia Plan  Procedure only, no anesthetic delivered             Postoperative Care      Consents                 WOO Cristina CRNA  "

## 2020-08-27 NOTE — PROGRESS NOTES
Attempted multiple times to reach Maggie to complete assessment, however per nursing she is sleeping.  Additional message left for HERNAN Peñaloza with [facility].  Spoke with RE Stout with [facility].  She advised that in speaking with Maggie in regards to roommate's upcoming surgery, that Maggie felt that just her PCA coming in was going to be sufficient.  Argelia is unclear of what the status of the living arrangements that Kellen Ruiz is working on, as she has not returned any of her calls.  Argelia states that it has been a couple weeks since she last spoke with Maggie but at that time she was eager to find new housing and complete a Rule 25.

## 2020-08-27 NOTE — ANESTHESIA PROCEDURE NOTES
CENTRAL LINE INSERTION PROCEDURE NOTE:   Staff -       CRNA: Larry Zhang APRN CRNA    Performed By: CRNA        Pre-Procedure    Location: floor    Procedure Times:8/27/2020 8:06 AM and 8/27/2020 8:55 AM  Pre-Anesthestic Checklist: patient identified, IV checked, risks and benefits discussed, informed consent, monitors and equipment checked, pre-op evaluation, at physician/surgeon's request and post-op pain management    Timeout  Correct Patient: Yes   Correct Procedure: Yes   Correct Site: Yes   Correct Laterality: Yes   Correct Position: Yes   Site Marked: N/A   .   Procedure Documentation   Procedure: central line and new line  Position:  Patient Prep/Sterile Barriers; chlorhexidine gluconate and isopropyl alcohol, maximum sterile barriers used during central venous catheter insertion  Diagnosis:low potassium, difficult IV start    Insertion Site:right, internal jugular  . A permanent image is entered into the patient's record.   Skin infiltrated with 5 mL of 1% lidocaine.  Catheter: 7 Fr, 20 cm, 3-lumen.  Assessment/Narrative         Secured by suture  Tegaderm and Biopatch dressing used.  blood aspirated from all lumens  All lumens flushed: YesTip termination:right atrium  Verification method: X-rayPerson verifying: giorgio zhang  Comments:  Central line placed, xray showed tip in r atrium, discussed with Dr Buck, line pulled back 1.5cm and is now 13.5cm at skin.  Good blood return and flush all three ports.  OK to use  Lot [id number]  Exp 1-

## 2020-08-27 NOTE — PLAN OF CARE
Patient tested negative for covid. Transferred to room 3128. VSS. Potassium remains low with more potassium riders hung. Respirations easy and unlabored.BBS clear and equal. Had 2 loose stools today, once incontinent this morning and then did get up to the commode this afternoon. Not able to get UA yet. Central line placed this morning in right jugular. This evening sleeping soundly. Difficulty waking up but did get up to take her vancomycin pill. IV patent. Will continue to monitor.

## 2020-08-27 NOTE — ANESTHESIA POSTPROCEDURE EVALUATION
Patient: Maggie Case    * No procedures listed *    Diagnosis:* No pre-op diagnosis entered *  Diagnosis Additional Information: No value filed.    Anesthesia Type:  No value filed.    Note:  Anesthesia Post Evaluation    Patient location during evaluation: Floor  Patient participation: Able to fully participate in evaluation  Level of consciousness: awake and alert             Last vitals:  Vitals:    08/27/20 0752 08/27/20 0810 08/27/20 0818   BP: 105/61 105/54 125/60   Pulse: 86 79 81   Resp:      Temp: 98.6  F (37  C)     SpO2: 99%  100%         Electronically Signed By: WOO Cristina CRNA  August 27, 2020  9:12 AM

## 2020-08-27 NOTE — PLAN OF CARE
Face to face report given with opportunity to observe patient.    Report given to Fei Humphrey RN   8/27/2020  6:52 PM

## 2020-08-27 NOTE — ANESTHESIA CARE TRANSFER NOTE
Patient: Maggie Case    * No procedures listed *    Diagnosis: * No pre-op diagnosis entered *  Diagnosis Additional Information: No value filed.    Anesthesia Type:   No value filed.     Note:  Airway :Room Air  Patient transferred to:Medical/Surgical Unit  Handoff Report: Identifed the Patient, Identified the Reponsible Provider, Reviewed the pertinent medical history, Discussed the surgical course, Reviewed Intra-OP anesthesia mangement and issues during anesthesia, Set expectations for post-procedure period and Allowed opportunity for questions and acknowledgement of understanding      Vitals: (Last set prior to Anesthesia Care Transfer)              Electronically Signed By: WOO Hatch CRNA  August 26, 2020  7:20 PM

## 2020-08-27 NOTE — PROVIDER NOTIFICATION
DATE:  8/27/2020   TIME OF RECEIPT FROM LAB:  1024  LAB TEST:  Potassium  LAB VALUE:  1.9  RESULTS GIVEN WITH READ-BACK TO (PROVIDER):  Mable LAGUNAS  TIME LAB VALUE REPORTED TO PROVIDER:  102

## 2020-08-27 NOTE — ANESTHESIA PREPROCEDURE EVALUATION
Anesthesia Pre-Procedure Evaluation    Patient: Maggie Case   MRN: [id number] : 1990          Preoperative Diagnosis: * No surgery found *        Past Medical History:   Diagnosis Date     Anemia      Anxiety      Chemical dependency (H)      Chronic diarrhea      Lactose intolerance      Myalgia      OCD (obsessive compulsive disorder)      Pulmonary embolism (H) 16     Stroke (H) 2018     Vitamin B12 deficiency      Past Surgical History:   Procedure Laterality Date     CLOSED REDUCTION, PERCUTANEOUS PINNING LOWER EXTREMITY, COMBINED Right 2016    Procedure: COMBINED CLOSED REDUCTION, PERCUTANEOUS PINNING LOWER EXTREMITY;  Surgeon: Dexter Jones MD;  Location: [facility]     COLONOSCOPY       CRANIECTOMY Right 2018    Procedure: CRANIECTOMY;  RIGHT HEMICRANIECTOMY;  Surgeon: Emeterio Mesa MD;  Location: [facility]     CRANIOPLASTY Right 2018    Procedure: CRANIOPLASTY;  Right Cranioplasty ;  Surgeon: Emeterio Mesa MD;  Location: [facility]     UPPER GI ENDOSCOPY                          Lab Results   Component Value Date    WBC 20.5 (H) 2020    HGB 17.9 (H) 2020    HCT 48.0 (H) 2020     (H) 2020    CRP 26.3 (H) 2020    SED 14 2017     2020    POTASSIUM 2.9 (LL) 2020    CHLORIDE 99 2020    CO2 29 2020    BUN 12 2020    CR 0.64 2020     (H) 2020    AVINASH 9.9 2020    PHOS 3.5 2020    MAG 2.7 (H) 2020    ALBUMIN 3.9 2020    PROTTOTAL 8.5 2020    ALT 32 2020    AST 62 (H) 2020    ALKPHOS 128 2020    BILITOTAL 1.1 2020    LIPASE 288 2020    AMYLASE 50 2016    PTT 30 2018    INR 1.04 07/15/2018    FIBR 544 (H) 2018    TSH 1.91 2020    HCG Negative 2020    HCGS Negative 2018       Preop Vitals  BP Readings from Last 3 Encounters:   20 118/79   20 122/81   20 116/86    Pulse  "Readings from Last 3 Encounters:   08/26/20 92   07/23/20 115   07/08/20 100      Resp Readings from Last 3 Encounters:   08/26/20 16   07/22/20 20   07/08/20 22    SpO2 Readings from Last 3 Encounters:   08/26/20 98%   07/23/20 97%   07/08/20 96%      Temp Readings from Last 1 Encounters:   08/26/20 98.8  F (37.1  C) (Tympanic)    Ht Readings from Last 1 Encounters:   05/22/20 1.6 m (5' 3\")      Wt Readings from Last 1 Encounters:   07/22/20 81.6 kg (180 lb)    Estimated body mass index is 31.89 kg/m  as calculated from the following:    Height as of 7/10/20: 1.6 m (5' 3\").    Weight as of 7/22/20: 81.6 kg (180 lb).       Anesthesia Plan  Procedure only, no anesthetic delivered    History & Physical Review      ASA Status:  3 .             Postoperative Care      Consents                 WOO Hatch CRNA  "

## 2020-08-27 NOTE — PLAN OF CARE
DATE:  8/27/2020   TIME OF RECEIPT FROM LAB:  1500  LAB TEST:  potassium  LAB VALUE:  1.8  RESULTS GIVEN WITH READ-BACK TO (PROVIDER):  Delmi Nicole, NP  TIME LAB VALUE REPORTED TO PROVIDER:   5859

## 2020-08-27 NOTE — ANESTHESIA CARE TRANSFER NOTE
Patient: Maggie Case    * No procedures listed *    Diagnosis: * No pre-op diagnosis entered *  Diagnosis Additional Information: No value filed.    Anesthesia Type:   No value filed.     Note:  Airway :Room Air  Patient transferred to:Emergency Department  Handoff Report: Identifed the Patient, Identified the Reponsible Provider, Reviewed the pertinent medical history, Discussed the surgical course, Reviewed Intra-OP anesthesia mangement and issues during anesthesia, Set expectations for post-procedure period and Allowed opportunity for questions and acknowledgement of understanding      Vitals: (Last set prior to Anesthesia Care Transfer)              Electronically Signed By: WOO Hatch CRNA  August 26, 2020  7:16 PM

## 2020-08-27 NOTE — PROGRESS NOTES
[facility]    Hospitalist Progress Note    Date of Service (when I saw the patient): 08/27/2020    Assessment & Plan       C. difficile colitis: mild abdominal pain, vomiting, diarrhea. She did not have signs of sepsis on arrival. She has history of chronic nausea and vomiting as well as diarrhea. However stools were more frequent than usual and nausea more severe. She has not had any vomiting since her arrival. She was started on vancomycin oral on arrival, continue same. WBC significantly improved already. She has had C. Diff colitis in the past, last episode 2018. She is taking in minimal clear liquids, IVF.     Severe Hypokalemia: Underlying chronic hypokalemia, not taking her supplements for several days at least due to nausea/vomiting. On admission she refused oral supplementation and IV replacement because of peripheral site burning. This morning she continues to absolutely refuse oral supplementation despite having a rachel discussion with her regarding severity and potential for life threatening arrhythmia.  Central line was placed so we can replace potassium at a faster rate and without peripheral pain. Will continue to encourage oral potassium.      Left hemiparesis (H): Chronic s/p CVA 2018      Chronic anticoagulation: continue Xarelto. She has a history of embolic CVA, multiple PE and DVT.      Borderline personality disorder (H): Recent inpatient stay on  in July.       History of substance abuse: Unknown status currently, she has recently agree to Rule 25 assessment due to not doing well but so far has not followed through. She is working with [facility]  and [facility].       Irritable bowel syndrome with diarrhea: Chronic.      DVT Prophylaxis: Xarelto  Code Status: No CPR- Do NOT Intubate    Disposition: Expected discharge in 2-3 days once stable.    Delmi Nicole,  CNP    Interval History   Feels weak, still nauseated, no appetite. Denies abdominal pain unless stooling.      -Data reviewed today: I reviewed all new labs and imaging results over the last 24 hours.     Physical Exam   Temp: 97.5  F (36.4  C) Temp src: Tympanic BP: (!) 107/47 Pulse: 69   Resp: 17 SpO2: 100 % O2 Device: None (Room air)    There were no vitals filed for this visit.  Vital Signs with Ranges  Temp:  [97.5  F (36.4  C)-99.5  F (37.5  C)] 97.5  F (36.4  C)  Pulse:  [] 69  Resp:  [16-49] 17  BP: ()/(40-94) 107/47  SpO2:  [94 %-100 %] 100 %  I/O last 3 completed shifts:  In: 1846 [P.O.:480; I.V.:1366]  Out: -     CVC Triple Lumen 08/27/20 Internal jugular (Active)   External Cath Length (cm) 13.5 cm 08/27/20 0000   Dressing Intervention Chlorhexidine sponge;Transparent 08/27/20 1400   Lumen A - Color BLUE 08/27/20 1400   Lumen A - Status saline locked 08/27/20 1400   Lumen B - Color BROWN 08/27/20 1400   Lumen B - Status saline locked 08/27/20 1400   Lumen C - Color WHITE 08/27/20 1400   Lumen C - Status infusing 08/27/20 1400   Number of days: 0     Line/device assessment(s) completed for medical necessity    Constitutional: Awake,alert, no acute distress, ill appearing  Respiratory: Diminished throughout but no crackles, wheezes, rhonchi.poor inspiratory effort.  Cardiovascular: HRR, no murmurs, rubs,thrills.   GI: Soft,nontender to gentle palpation, bowel sounds hypoactive  Skin/Integumen: Pale, no rashes, open areas of bruising      Medications     - MEDICATION INSTRUCTIONS -       sodium chloride 125 mL/hr at 08/27/20 0532       acetaminophen  650 mg Oral TID     escitalopram  10 mg Oral Daily     famotidine  20 mg Oral BID     gabapentin  900 mg Oral TID     lactobacillus rhamnosus (GG)  1 capsule Oral BID     nicotine  1 patch Topical Daily     nicotine   Transdermal Q8H     potassium chloride with lidocaine  10 mEq Intravenous Once     potassium chloride 20 mEq in 50 mL NS intermittent infusion (pharmacy cmpd)  20 mEq Intravenous Q1H     rivaroxaban ANTICOAGULANT  20 mg Oral Daily with  breakfast     sodium chloride (PF)  10 mL Intracatheter Q8H     sodium chloride (PF)  3 mL Intracatheter Q8H     vancomycin  250 mg Oral 4x Daily       Data   Recent Labs   Lab 08/27/20  1500 08/27/20  1000 08/27/20  0533 08/26/20  1202   WBC  --   --  13.5* 20.5*   HGB  --   --  13.5 17.9*   MCV  --   --  85 83   PLT  --   --  353 486*    141 140 134   POTASSIUM 1.8* 1.9* 1.6* 2.9*   CHLORIDE 112* 110* 109 99   CO2 24 24 26 29   BUN 6* 6* 8 12   CR 0.61 0.52 0.58 0.64   ANIONGAP 7 7 5 6   AVINASH 7.6* 8.0* 8.2* 9.9   * 115* 129* 152*   ALBUMIN  --   --  2.8* 3.9   PROTTOTAL  --   --  5.7* 8.5   BILITOTAL  --   --  1.1 1.1   ALKPHOS  --   --  84 128   ALT  --   --  22 32   AST  --   --  47* 62*   LIPASE  --   --   --  288       Recent Results (from the past 24 hour(s))   XR Chest Port 1 View    Narrative    PROCEDURE: XR CHEST PORT 1 VW 8/27/2020 8:45 AM    HISTORY: verify central line placement    COMPARISONS: 5/22/2020.    TECHNIQUE: Single view.    FINDINGS: There is a right central venous catheter with its tip in the  right atrium. No pneumothorax is seen.    Heart is not enlarged. Lungs are relatively clear and no pleural  effusion is seen.         Impression    IMPRESSION: No acute disease.    ANJALI HOOK MD

## 2020-08-27 NOTE — PROVIDER NOTIFICATION
DATE:  8/27/2020   TIME OF RECEIPT FROM LAB:  0613  LAB TEST:  potassium  LAB VALUE:  1.6  RESULTS GIVEN WITH READ-BACK TO (PROVIDER):  Yury Manuel, DO  TIME LAB VALUE REPORTED TO PROVIDER:   0615

## 2020-08-28 LAB
ALBUMIN SERPL-MCNC: 2.3 G/DL (ref 3.4–5)
ALP SERPL-CCNC: 68 U/L (ref 40–150)
ALT SERPL W P-5'-P-CCNC: 19 U/L (ref 0–50)
ANION GAP SERPL CALCULATED.3IONS-SCNC: 3 MMOL/L (ref 3–14)
AST SERPL W P-5'-P-CCNC: 39 U/L (ref 0–45)
BILIRUB SERPL-MCNC: 0.4 MG/DL (ref 0.2–1.3)
BUN SERPL-MCNC: 3 MG/DL (ref 7–30)
CALCIUM SERPL-MCNC: 7.7 MG/DL (ref 8.5–10.1)
CHLORIDE SERPL-SCNC: 121 MMOL/L (ref 94–109)
CO2 SERPL-SCNC: 25 MMOL/L (ref 20–32)
CREAT SERPL-MCNC: 0.57 MG/DL (ref 0.52–1.04)
ERYTHROCYTE [DISTWIDTH] IN BLOOD BY AUTOMATED COUNT: 14.2 % (ref 10–15)
GFR SERPL CREATININE-BSD FRML MDRD: >90 ML/MIN/{1.73_M2}
GLUCOSE SERPL-MCNC: 100 MG/DL (ref 70–99)
HCT VFR BLD AUTO: 30.9 % (ref 35–47)
HGB BLD-MCNC: 11.1 G/DL (ref 11.7–15.7)
MAGNESIUM SERPL-MCNC: 2.2 MG/DL (ref 1.6–2.3)
MCH RBC QN AUTO: 31.3 PG (ref 26.5–33)
MCHC RBC AUTO-ENTMCNC: 35.9 G/DL (ref 31.5–36.5)
MCV RBC AUTO: 87 FL (ref 78–100)
PLATELET # BLD AUTO: 254 10E9/L (ref 150–450)
POTASSIUM SERPL-SCNC: 2.7 MMOL/L (ref 3.4–5.3)
POTASSIUM SERPL-SCNC: 3.7 MMOL/L (ref 3.4–5.3)
PROT SERPL-MCNC: 4.6 G/DL (ref 6.8–8.8)
RBC # BLD AUTO: 3.55 10E12/L (ref 3.8–5.2)
SODIUM SERPL-SCNC: 149 MMOL/L (ref 133–144)
WBC # BLD AUTO: 8.1 10E9/L (ref 4–11)

## 2020-08-28 PROCEDURE — 25000128 H RX IP 250 OP 636: Performed by: INTERNAL MEDICINE

## 2020-08-28 PROCEDURE — 25000132 ZZH RX MED GY IP 250 OP 250 PS 637: Performed by: INTERNAL MEDICINE

## 2020-08-28 PROCEDURE — 25000132 ZZH RX MED GY IP 250 OP 250 PS 637: Performed by: NURSE PRACTITIONER

## 2020-08-28 PROCEDURE — 36415 COLL VENOUS BLD VENIPUNCTURE: CPT | Performed by: INTERNAL MEDICINE

## 2020-08-28 PROCEDURE — 20000003 ZZH R&B ICU

## 2020-08-28 PROCEDURE — 80053 COMPREHEN METABOLIC PANEL: CPT | Performed by: INTERNAL MEDICINE

## 2020-08-28 PROCEDURE — 83735 ASSAY OF MAGNESIUM: CPT | Performed by: INTERNAL MEDICINE

## 2020-08-28 PROCEDURE — 25800030 ZZH RX IP 258 OP 636: Performed by: INTERNAL MEDICINE

## 2020-08-28 PROCEDURE — 85027 COMPLETE CBC AUTOMATED: CPT | Performed by: INTERNAL MEDICINE

## 2020-08-28 PROCEDURE — 84132 ASSAY OF SERUM POTASSIUM: CPT | Performed by: INTERNAL MEDICINE

## 2020-08-28 PROCEDURE — 99232 SBSQ HOSP IP/OBS MODERATE 35: CPT | Performed by: INTERNAL MEDICINE

## 2020-08-28 RX ORDER — POTASSIUM CHLORIDE 1.5 G/1.58G
20-40 POWDER, FOR SOLUTION ORAL
Status: DISCONTINUED | OUTPATIENT
Start: 2020-08-28 | End: 2020-08-28

## 2020-08-28 RX ORDER — POTASSIUM CHLORIDE 1500 MG/1
20-40 TABLET, EXTENDED RELEASE ORAL
Status: DISCONTINUED | OUTPATIENT
Start: 2020-08-28 | End: 2020-08-28

## 2020-08-28 RX ORDER — SODIUM CHLORIDE AND POTASSIUM CHLORIDE 150; 450 MG/100ML; MG/100ML
INJECTION, SOLUTION INTRAVENOUS CONTINUOUS
Status: DISCONTINUED | OUTPATIENT
Start: 2020-08-28 | End: 2020-09-03 | Stop reason: HOSPADM

## 2020-08-28 RX ORDER — POTASSIUM CL/LIDO/0.9 % NACL 10MEQ/0.1L
10 INTRAVENOUS SOLUTION, PIGGYBACK (ML) INTRAVENOUS
Status: DISCONTINUED | OUTPATIENT
Start: 2020-08-28 | End: 2020-08-28

## 2020-08-28 RX ORDER — MAGNESIUM SULFATE HEPTAHYDRATE 40 MG/ML
4 INJECTION, SOLUTION INTRAVENOUS EVERY 4 HOURS PRN
Status: DISCONTINUED | OUTPATIENT
Start: 2020-08-28 | End: 2020-08-30

## 2020-08-28 RX ORDER — POTASSIUM CHLORIDE 7.45 MG/ML
10 INJECTION INTRAVENOUS
Status: DISCONTINUED | OUTPATIENT
Start: 2020-08-28 | End: 2020-08-28

## 2020-08-28 RX ADMIN — POTASSIUM CHLORIDE 20 MEQ: 2 INJECTION, SOLUTION, CONCENTRATE INTRAVENOUS at 09:12

## 2020-08-28 RX ADMIN — RIVAROXABAN 20 MG: 20 TABLET, FILM COATED ORAL at 07:38

## 2020-08-28 RX ADMIN — LORAZEPAM 0.5 MG: 2 INJECTION INTRAMUSCULAR; INTRAVENOUS at 02:58

## 2020-08-28 RX ADMIN — POTASSIUM CHLORIDE AND SODIUM CHLORIDE: 450; 150 INJECTION, SOLUTION INTRAVENOUS at 14:31

## 2020-08-28 RX ADMIN — PROCHLORPERAZINE EDISYLATE 10 MG: 5 INJECTION INTRAMUSCULAR; INTRAVENOUS at 11:16

## 2020-08-28 RX ADMIN — GABAPENTIN 900 MG: 300 CAPSULE ORAL at 13:14

## 2020-08-28 RX ADMIN — NICOTINE 1 PATCH: 7 PATCH, EXTENDED RELEASE TRANSDERMAL at 08:12

## 2020-08-28 RX ADMIN — LORAZEPAM 0.5 MG: 2 INJECTION INTRAMUSCULAR; INTRAVENOUS at 11:40

## 2020-08-28 RX ADMIN — LORAZEPAM 0.5 MG: 2 INJECTION INTRAMUSCULAR; INTRAVENOUS at 17:58

## 2020-08-28 RX ADMIN — FAMOTIDINE 20 MG: 20 TABLET, FILM COATED ORAL at 08:06

## 2020-08-28 RX ADMIN — Medication 1 CAPSULE: at 08:09

## 2020-08-28 RX ADMIN — Medication 10 MEQ: at 05:29

## 2020-08-28 RX ADMIN — ACETAMINOPHEN 650 MG: 325 TABLET, FILM COATED ORAL at 08:09

## 2020-08-28 RX ADMIN — VANCOMYCIN HYDROCHLORIDE 250 MG: 250 CAPSULE ORAL at 13:14

## 2020-08-28 RX ADMIN — POTASSIUM CHLORIDE 20 MEQ: 2 INJECTION, SOLUTION, CONCENTRATE INTRAVENOUS at 10:16

## 2020-08-28 RX ADMIN — ONDANSETRON 4 MG: 2 INJECTION INTRAMUSCULAR; INTRAVENOUS at 17:59

## 2020-08-28 RX ADMIN — VANCOMYCIN HYDROCHLORIDE 250 MG: 250 CAPSULE ORAL at 17:59

## 2020-08-28 RX ADMIN — POTASSIUM CHLORIDE 20 MEQ: 2 INJECTION, SOLUTION, CONCENTRATE INTRAVENOUS at 12:39

## 2020-08-28 RX ADMIN — POTASSIUM CHLORIDE AND SODIUM CHLORIDE: 450; 150 INJECTION, SOLUTION INTRAVENOUS at 22:45

## 2020-08-28 RX ADMIN — PROCHLORPERAZINE EDISYLATE 10 MG: 5 INJECTION INTRAMUSCULAR; INTRAVENOUS at 02:58

## 2020-08-28 RX ADMIN — ONDANSETRON 4 MG: 2 INJECTION INTRAMUSCULAR; INTRAVENOUS at 07:35

## 2020-08-28 RX ADMIN — Medication 10 MEQ: at 07:34

## 2020-08-28 RX ADMIN — ESCITALOPRAM OXALATE 10 MG: 5 TABLET, FILM COATED ORAL at 08:06

## 2020-08-28 RX ADMIN — Medication 10 MEQ: at 06:30

## 2020-08-28 RX ADMIN — GABAPENTIN 900 MG: 300 CAPSULE ORAL at 08:08

## 2020-08-28 RX ADMIN — ACETAMINOPHEN 650 MG: 325 TABLET, FILM COATED ORAL at 13:14

## 2020-08-28 RX ADMIN — VANCOMYCIN HYDROCHLORIDE 250 MG: 250 CAPSULE ORAL at 08:06

## 2020-08-28 RX ADMIN — POTASSIUM CHLORIDE 20 MEQ: 2 INJECTION, SOLUTION, CONCENTRATE INTRAVENOUS at 11:21

## 2020-08-28 RX ADMIN — LORAZEPAM 0.5 MG: 2 INJECTION INTRAMUSCULAR; INTRAVENOUS at 07:38

## 2020-08-28 ASSESSMENT — ACTIVITIES OF DAILY LIVING (ADL)
ADLS_ACUITY_SCORE: 17

## 2020-08-28 NOTE — PROVIDER NOTIFICATION
DATE:  8/27/2020   TIME OF RECEIPT FROM LAB:  6110  LAB TEST:  Potassium  LAB VALUE:  2.7  RESULTS GIVEN WITH READ-BACK TO (PROVIDER):  Dr. Manuel  TIME LAB VALUE REPORTED TO PROVIDER:   0660

## 2020-08-28 NOTE — PLAN OF CARE
Face to face report given with opportunity to observe patient.    Report given to Rebecca, RNs    Fei Grover   8/28/2020  7:02 AM

## 2020-08-28 NOTE — PROVIDER NOTIFICATION
Clarified if Dr Barba would like a potassium lab draw following the completion of her IV riders. He reported that he would like a lab draw 2 hours following completion.

## 2020-08-28 NOTE — PLAN OF CARE
DATE:  8/28/2020   TIME OF RECEIPT FROM LAB:  0519  LAB TEST:  potassium  LAB VALUE:  2.7  RESULTS GIVEN WITH READ-BACK TO (PROVIDER): Dr. Manuel    TIME LAB VALUE REPORTED TO PROVIDER:   0589

## 2020-08-28 NOTE — PROGRESS NOTES
[facility]    Hospitalist Progress Note    Date of Service (when I saw the patient): 08/28/2020    Assessment & Plan      Maggie Case is a 30 year old with chronic issues including prior pulmonary embolism with chronic anticoagulation with rivaroxaban, prior CVA with chronic left hemiparesis, and behavioral health issues including anxiety.  She presented with nausea vomiting leukocytosis and fever.  Initial evaluation showed evidence of C. difficile infection.  She had been admitted to this hospital with similar presentation in 2018 and also then found to have C. difficile infection.  She was admitted for further evaluation and management    1.  C. difficile colitis:   She had presented with mild abdominal pain, vomiting, diarrhea. She did not have signs of sepsis on arrival, with findings such as tachycardia more consistent with volume depletion because of GI losses. She has history of chronic nausea and vomiting as well as diarrhea. However stools were more frequent than usual and nausea more severe.  Since arrival in beginning treatment with oral vancomycin she has had no vomiting.  She does intermittently have some nausea relieved with fairly consistent doses of antiemetics.  Although some doses of her vancomycin and been withheld because of her somnolence she has had some improvement in emesis and no vomiting, as well as no fever or leukocytosis.  Overall C. difficile infection appears under adequate control.  Would be inclined to initiate treatment with usual 10-day course of oral vancomycin.    2.  Severe Hypokalemia:   Treatment is been somewhat complicated in part because she is been quite reluctant to consider any oral potassium replacement and has found burning from peripheral intravenous potassium burdensome.  Because of this central venous access placed 8/27.  Combination of potassium through her intravenous crystalloid replacement and intravenous replacement has resulted in some  improvement in her potassium levels although still less than expected levels.  Continuing scheduled doses of potassium appropriate for her central venous access with repeat potassium levels later today.  She does have underlying underlying chronic hypokalemia, and notes that she was not taking her supplements for several days at least due to nausea/vomiting.    3.  Metabolic encephalopathy  Suspect this is more medication affect than related to acute illness alone.  Cannot eliminate some issues related to behavioral health concerns as well.  She has taken fairly consistent medication especially for nausea which likely is decreasing her mental state intermittently.  She has had no new focal findings.  Imaging not indicated.    4.  Established left hemiparesis (H):   This is been a persistent issue for her following a CVA 2018    5.  Chronic anticoagulation:   Have considered her chronic rivaroxaban.  Multiple issues prompting anticoagulation including prior embolic CVA, multiple pulmonary emboli, and DVT.    6.  Borderline personality disorder (H):   She did recently require a inpatient stay on  in July.  Likely personality disorder is complicating other parts of her treatment regimen.  Overall under reasonable control.    7.  History of substance abuse:   Unknown whether she has had any recent issues with chemical dependency.  She had recently agree to Rule 25 assessment due to not doing well but so far has not followed through. She is working with [facility]  and [facility].     8.  Irritable bowel syndrome with diarrhea:   This is been a persistent issue for her.      DVT Prophylaxis: Rivaroxaban  Code Status: No CPR- Do NOT Intubate    Disposition: Expected discharge in 2-3 days once stable.  Discharge may be more difficult than in many other circumstances.  Home environment is somewhat uncertain with her usual roommate to be absent in the near future.  Is not certain whether she will have adequate supports  at home.  Case management/social workers are evaluating best options for her.      Interval History   Generally feels poorly although nonspecific.  Mild nausea.  No dyspnea.  Flatus without frequent bowel movements.    -Data reviewed today: I reviewed all new labs and imaging results over the last 24 hours.     Physical Exam   Temp: 97.7  F (36.5  C) Temp src: Temporal BP: 108/56 Pulse: 92   Resp: 20 SpO2: 97 % O2 Device: None (Room air)    There were no vitals filed for this visit.  Vital Signs with Ranges  Temp:  [97.2  F (36.2  C)-98.2  F (36.8  C)] 97.7  F (36.5  C)  Pulse:  [69-93] 92  Resp:  [9-49] 20  BP: ()/(42-83) 108/56  SpO2:  [94 %-100 %] 97 %  I/O last 3 completed shifts:  In: 2265 [P.O.:1235; I.V.:1030]  Out: -     CVC Triple Lumen 08/27/20 Internal jugular (Active)   Site Assessment WDL 08/28/20 1130   External Cath Length (cm) 13.5 cm 08/27/20 0000   Dressing Intervention Chlorhexidine sponge;Transparent;Securing device 08/28/20 1130   Lumen A - Color BROWN 08/28/20 1130   Lumen A - Status saline locked 08/28/20 1130   Lumen B - Color BROWN 08/28/20 1130   Lumen B - Status saline locked 08/28/20 1130   Lumen C - Color WHITE 08/28/20 1130   Lumen C - Status infusing 08/28/20 1130   Number of days: 1     Line/device assessment(s) completed for medical necessity August 28    Constitutional: Easily aroused.  Clear speech.  Oriented x3 with minimal prompting.  Respiratory: Aeration to bases with aeration mildly globally diminished..  Accessory muscles not in use.  Expiratory time not increased.  No tidal wheeze.  No crackles.  Few scattered basilar rhonchi.  Cardiovascular: PMI not displaced.  Heart tones distinct.  S1/S2 unremarkable.  No murmurs.  GI: Soft,nontender to palpation or percussion.  Bowel sounds in all quadrants slightly hypoactive.  Skin/Integumen: Extremities warm distally.  Brisk capillary refill.  No rashes, open areas of bruising  Neuro: Mental status above.  Motor 4-5/5 and  bilaterally equal.  No tremors or fasciculations.      Medications     KCl 40 mEq in NaCl 0.9% 100 mL/hr at 08/27/20 2236     - MEDICATION INSTRUCTIONS -         acetaminophen  650 mg Oral TID     escitalopram  10 mg Oral Daily     famotidine  20 mg Oral BID     gabapentin  900 mg Oral TID     lactobacillus rhamnosus (GG)  1 capsule Oral BID     nicotine  1 patch Topical Daily     nicotine   Transdermal Q8H     potassium chloride 20 mEq in 50 mL NS intermittent infusion (pharmacy cmpd)  20 mEq Intravenous Q1H     rivaroxaban ANTICOAGULANT  20 mg Oral Daily with breakfast     sodium chloride (PF)  10 mL Intracatheter Q8H     vancomycin  250 mg Oral 4x Daily       Data   Recent Labs   Lab 08/28/20  0453 08/27/20  2159 08/27/20  1500 08/27/20  1000 08/27/20  0533 08/26/20  1202   WBC 8.1  --   --   --  13.5* 20.5*   HGB 11.1*  --   --   --  13.5 17.9*   MCV 87  --   --   --  85 83     --   --   --  353 486*   *  --  143 141 140 134   POTASSIUM 2.7* 2.7* 1.8* 1.9* 1.6* 2.9*   CHLORIDE 121*  --  112* 110* 109 99   CO2 25  --  24 24 26 29   BUN 3*  --  6* 6* 8 12   CR 0.57  --  0.61 0.52 0.58 0.64   ANIONGAP 3  --  7 7 5 6   AVINASH 7.7*  --  7.6* 8.0* 8.2* 9.9   *  --  137* 115* 129* 152*   ALBUMIN 2.3*  --   --   --  2.8* 3.9   PROTTOTAL 4.6*  --   --   --  5.7* 8.5   BILITOTAL 0.4  --   --   --  1.1 1.1   ALKPHOS 68  --   --   --  84 128   ALT 19  --   --   --  22 32   AST 39  --   --   --  47* 62*   LIPASE  --   --   --   --   --  288       No results found for this or any previous visit (from the past 24 hour(s)).
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
## 2020-08-28 NOTE — PROVIDER NOTIFICATION
MD aware of potassium lab draw of 3.7 with plan for no further replacement at this time and lab recheck in the morning.

## 2020-08-28 NOTE — PLAN OF CARE
Upon initial assessment pt was obtunded and only slightly responsive to a sternal rub, only a small groan in response. Notified Dr. Manuel as all vitals are stable and RR is wnl. Attempted 0.1mg of narcan at 2030 with no response. Dr. Manuel aware and instructed to continue to monitor. Pt now starting to wake up more.

## 2020-08-28 NOTE — PLAN OF CARE
Alert, lethargic, sleeping majority of shift. VS as charted. Telemetry SR 70's. CVC continues with IVF. Afebrile. Denies pain, but does c/o neck soreness due to right CVC, scheduled Tylenol administered. LS clear. BS active. Request for antiemetics and anxiety meds frequently, see MAR. Tolerating clear liquid diet without difficultly. Incontinent of urine and stool, barrier cream to florentino area to maintain skin. Vancomycin and Enteric precautions continues for C-diff. Potassium 2.7 this am, replaced with IV as ordered, now 3.7.  Pt up in the chair with A2 to transfer this shift.    Face to face report given with opportunity to observe patient.    Report given to RITESH Tello RN   8/28/2020  6:59 PM

## 2020-08-28 NOTE — PLAN OF CARE
Pt Obtunded most of the shift, oriented when she aroused. VSS on RA. Denies pain. Some discomfort at rt internal jugular site, site looks WDL. HRR, tele reading SR 70s. Lungs are clear. BS active, some nausea with eating and drinking. Compazine and zofran in use. Loose stools x2 this shift. PO vanco for C diff. Pt unable to take meds at beginning of shift due to being obtunded, only groaning to sternal rub, tried narcan with no results. Dr. Manuel aware of pt being obtunded. Potassium this shift came up to 2.7, Dr. Manuel aware and ordered NS with 40K at 100mls/hr. No additional K riders ordered. Pt incontinent. Slept most of the time only waking up twice to have brief changed. No other significant events, will continue to monitor.       Morning potassium was still 2.7, Dr. Manuel notified and potassium protocol was restarted.

## 2020-08-29 LAB
ALBUMIN SERPL-MCNC: 2.2 G/DL (ref 3.4–5)
ALP SERPL-CCNC: 65 U/L (ref 40–150)
ALT SERPL W P-5'-P-CCNC: 19 U/L (ref 0–50)
ANION GAP SERPL CALCULATED.3IONS-SCNC: 7 MMOL/L (ref 3–14)
AST SERPL W P-5'-P-CCNC: 35 U/L (ref 0–45)
BILIRUB SERPL-MCNC: 0.4 MG/DL (ref 0.2–1.3)
BUN SERPL-MCNC: 2 MG/DL (ref 7–30)
CALCIUM SERPL-MCNC: 7.5 MG/DL (ref 8.5–10.1)
CHLORIDE SERPL-SCNC: 114 MMOL/L (ref 94–109)
CO2 SERPL-SCNC: 21 MMOL/L (ref 20–32)
CREAT SERPL-MCNC: 0.4 MG/DL (ref 0.52–1.04)
ERYTHROCYTE [DISTWIDTH] IN BLOOD BY AUTOMATED COUNT: 14.4 % (ref 10–15)
GFR SERPL CREATININE-BSD FRML MDRD: >90 ML/MIN/{1.73_M2}
GLUCOSE SERPL-MCNC: 85 MG/DL (ref 70–99)
HCT VFR BLD AUTO: 30 % (ref 35–47)
HGB BLD-MCNC: 10.5 G/DL (ref 11.7–15.7)
MCH RBC QN AUTO: 31.1 PG (ref 26.5–33)
MCHC RBC AUTO-ENTMCNC: 35 G/DL (ref 31.5–36.5)
MCV RBC AUTO: 89 FL (ref 78–100)
PLATELET # BLD AUTO: 221 10E9/L (ref 150–450)
POTASSIUM SERPL-SCNC: 3.1 MMOL/L (ref 3.4–5.3)
PROT SERPL-MCNC: 4.4 G/DL (ref 6.8–8.8)
RBC # BLD AUTO: 3.38 10E12/L (ref 3.8–5.2)
SODIUM SERPL-SCNC: 142 MMOL/L (ref 133–144)
WBC # BLD AUTO: 9.3 10E9/L (ref 4–11)

## 2020-08-29 PROCEDURE — 25000132 ZZH RX MED GY IP 250 OP 250 PS 637: Performed by: INTERNAL MEDICINE

## 2020-08-29 PROCEDURE — 25000132 ZZH RX MED GY IP 250 OP 250 PS 637: Performed by: NURSE PRACTITIONER

## 2020-08-29 PROCEDURE — 25000128 H RX IP 250 OP 636: Performed by: INTERNAL MEDICINE

## 2020-08-29 PROCEDURE — 12000000 ZZH R&B MED SURG/OB

## 2020-08-29 PROCEDURE — 99232 SBSQ HOSP IP/OBS MODERATE 35: CPT | Performed by: INTERNAL MEDICINE

## 2020-08-29 PROCEDURE — 80053 COMPREHEN METABOLIC PANEL: CPT | Performed by: INTERNAL MEDICINE

## 2020-08-29 PROCEDURE — 85027 COMPLETE CBC AUTOMATED: CPT | Performed by: INTERNAL MEDICINE

## 2020-08-29 PROCEDURE — 36415 COLL VENOUS BLD VENIPUNCTURE: CPT | Performed by: INTERNAL MEDICINE

## 2020-08-29 RX ORDER — LORAZEPAM 0.5 MG/1
.25-.5 TABLET ORAL EVERY 6 HOURS
Status: DISCONTINUED | OUTPATIENT
Start: 2020-08-29 | End: 2020-09-03 | Stop reason: HOSPADM

## 2020-08-29 RX ORDER — LORAZEPAM 2 MG/ML
0.5 INJECTION INTRAMUSCULAR EVERY 6 HOURS
Status: DISCONTINUED | OUTPATIENT
Start: 2020-08-29 | End: 2020-09-03 | Stop reason: HOSPADM

## 2020-08-29 RX ORDER — POTASSIUM CHLORIDE 750 MG/1
10 CAPSULE, EXTENDED RELEASE ORAL 2 TIMES DAILY WITH MEALS
Status: DISCONTINUED | OUTPATIENT
Start: 2020-08-29 | End: 2020-08-30

## 2020-08-29 RX ORDER — LAMOTRIGINE 25 MG/1
25 TABLET ORAL DAILY
Status: DISCONTINUED | OUTPATIENT
Start: 2020-08-29 | End: 2020-09-03 | Stop reason: HOSPADM

## 2020-08-29 RX ADMIN — ONDANSETRON 4 MG: 2 INJECTION INTRAMUSCULAR; INTRAVENOUS at 08:49

## 2020-08-29 RX ADMIN — RIVAROXABAN 20 MG: 20 TABLET, FILM COATED ORAL at 09:37

## 2020-08-29 RX ADMIN — HYDROXYZINE HYDROCHLORIDE 50 MG: 25 TABLET ORAL at 00:53

## 2020-08-29 RX ADMIN — NICOTINE 1 PATCH: 7 PATCH, EXTENDED RELEASE TRANSDERMAL at 09:37

## 2020-08-29 RX ADMIN — POTASSIUM CHLORIDE 10 MEQ: 750 CAPSULE, EXTENDED RELEASE ORAL at 18:27

## 2020-08-29 RX ADMIN — HYDROXYZINE HYDROCHLORIDE 50 MG: 25 TABLET ORAL at 14:09

## 2020-08-29 RX ADMIN — LORAZEPAM 0.5 MG: 2 INJECTION INTRAMUSCULAR; INTRAVENOUS at 04:57

## 2020-08-29 RX ADMIN — VANCOMYCIN HYDROCHLORIDE 250 MG: 250 CAPSULE ORAL at 18:26

## 2020-08-29 RX ADMIN — LORAZEPAM 0.5 MG: 0.5 TABLET ORAL at 20:33

## 2020-08-29 RX ADMIN — POTASSIUM CHLORIDE 10 MEQ: 750 CAPSULE, EXTENDED RELEASE ORAL at 09:37

## 2020-08-29 RX ADMIN — ACETAMINOPHEN 650 MG: 325 TABLET, FILM COATED ORAL at 00:54

## 2020-08-29 RX ADMIN — PROCHLORPERAZINE EDISYLATE 10 MG: 5 INJECTION INTRAMUSCULAR; INTRAVENOUS at 01:11

## 2020-08-29 RX ADMIN — Medication 1 CAPSULE: at 00:53

## 2020-08-29 RX ADMIN — LORAZEPAM 0.5 MG: 0.5 TABLET ORAL at 09:37

## 2020-08-29 RX ADMIN — ONDANSETRON 4 MG: 4 TABLET, ORALLY DISINTEGRATING ORAL at 00:52

## 2020-08-29 RX ADMIN — LORAZEPAM 0.5 MG: 2 INJECTION INTRAMUSCULAR; INTRAVENOUS at 16:54

## 2020-08-29 RX ADMIN — VANCOMYCIN HYDROCHLORIDE 250 MG: 250 CAPSULE ORAL at 14:09

## 2020-08-29 RX ADMIN — ACETAMINOPHEN 650 MG: 325 TABLET, FILM COATED ORAL at 12:12

## 2020-08-29 RX ADMIN — VANCOMYCIN HYDROCHLORIDE 250 MG: 250 CAPSULE ORAL at 09:37

## 2020-08-29 RX ADMIN — FAMOTIDINE 20 MG: 20 TABLET, FILM COATED ORAL at 00:53

## 2020-08-29 RX ADMIN — GABAPENTIN 900 MG: 300 CAPSULE ORAL at 00:53

## 2020-08-29 RX ADMIN — PROCHLORPERAZINE EDISYLATE 10 MG: 5 INJECTION INTRAMUSCULAR; INTRAVENOUS at 11:21

## 2020-08-29 RX ADMIN — ONDANSETRON 4 MG: 2 INJECTION INTRAMUSCULAR; INTRAVENOUS at 14:09

## 2020-08-29 RX ADMIN — VANCOMYCIN HYDROCHLORIDE 250 MG: 250 CAPSULE ORAL at 00:54

## 2020-08-29 ASSESSMENT — ACTIVITIES OF DAILY LIVING (ADL)
ADLS_ACUITY_SCORE: 15.5
ADLS_ACUITY_SCORE: 16.5
ADLS_ACUITY_SCORE: 15.5

## 2020-08-29 NOTE — PROGRESS NOTES
[facility]    Hospitalist Progress Note    Date of Service (when I saw the patient): 08/29/2020    Assessment & Plan      Maggie Case is a 30 year old with chronic issues including prior pulmonary embolism with chronic anticoagulation with rivaroxaban, prior CVA with chronic left hemiparesis, and behavioral health issues including anxiety.  She presented with nausea vomiting leukocytosis and fever.  Initial evaluation showed evidence of C. difficile infection.  She had been admitted to this hospital with similar presentation in 2018 and also then found to have C. difficile infection.  She was admitted for further evaluation and management    1.  C. difficile colitis:   Some persistent nausea but overall few other symptoms.  Continuing oral vancomycin.  Would plan usual initial 10-day course.  She had presented with mild abdominal pain, vomiting, diarrhea. She did not have signs of sepsis on arrival, with findings such as tachycardia more consistent with volume depletion because of GI losses. She has history of chronic nausea and vomiting as well as diarrhea. However stools were more frequent than usual and nausea more severe.  Since arrival in beginning treatment with oral vancomycin she has had no vomiting.   Although some doses of her vancomycin will withheld now several days ago because of her somnolence she has had some improvement in emesis and no vomiting, as well as no fever or leukocytosis.  Overall C. difficile infection appears under adequate control.  Would be inclined to initiate treatment with usual 10-day course of oral vancomycin.    2.  Severe Hypokalemia:   Potassium minimally reduced this morning and overall its likely total body potassium stores replete.  Will resume her chronic dose of oral sustained-release potassium.  Treatment of her more acute hypokalemia was complicated in part because she has been quite reluctant to consider any oral potassium replacement and has found  burning from peripheral intravenous potassium burdensome.  Because of this central venous access placed 8/27.  We will keep central venous access until its certain she will require further intravenous repletion.  She does have underlying underlying chronic hypokalemia, and notes that she was not taking her supplements for several days at least due to nausea/vomiting.    3.  Metabolic encephalopathy  Intermittently some somnolence that appears temporally related to medications in particular antiemetics as well as lorazepam.  Decrease dose frequency of lorazepam and attempt oral rather than intravenous dosing.  Cannot eliminate some issues related to behavioral health concerns as well.  She has taken fairly consistent medication especially for nausea which likely is decreasing her mental state intermittently.    4.  Established left hemiparesis (H):   This is been a persistent issue for her following a CVA 2018    5.  Chronic anticoagulation:   Have resumed her chronic rivaroxaban.  Multiple issues prompting anticoagulation including prior embolic CVA, multiple pulmonary emboli, and DVT.    6.  Borderline personality disorder (H):   She did recently require a inpatient stay on  in July.  Likely personality disorder is complicating other parts of her treatment regimen.  Overall under reasonable control.    7.  History of substance abuse:   Unknown whether she has had any recent issues with chemical dependency.  She had recently agree to Rule 25 assessment due to not doing well but so far has not followed through. She is working with [facility]  and [facility].     8.  Irritable bowel syndrome with diarrhea:   This is been a persistent issue for her although does not appear worse than her baseline.      DVT Prophylaxis: Rivaroxaban  Code Status: No CPR- Do NOT Intubate    Disposition: Expected discharge in 1-2 days once stable.  Discharge may be more difficult than in many other circumstances.  Home environment is  somewhat uncertain with her usual roommate to be absent in the near future.  Is not certain whether she will have adequate supports at home.  Case management/social workers are evaluating best options for her.  She does have family members in the [place] area and possibly location there in a more structured environment would be appropriate.      Interval History   More awake alert and interactive.  Still indicates that she feels poorly.  Decreased nausea.  Has tolerated liquid intake.  No dyspnea.  Flatus without frequent bowel movements.    -Data reviewed today: I reviewed all new labs and imaging results over the last 24 hours.     Physical Exam   Temp: 100.3  F (37.9  C) Temp src: Tympanic BP: 108/62 Pulse: 92   Resp: 22 SpO2: 98 % O2 Device: None (Room air)    There were no vitals filed for this visit.  Vital Signs with Ranges  Temp:  [97.6  F (36.4  C)-100.3  F (37.9  C)] 100.3  F (37.9  C)  Pulse:  [68-92] 92  Resp:  [10-29] 22  BP: ()/(21-67) 108/62  SpO2:  [94 %-100 %] 98 %  I/O last 3 completed shifts:  In: 3322 [P.O.:1300; I.V.:2022]  Out: 200 [Emesis/NG output:200]    CVC Triple Lumen 08/27/20 Internal jugular (Active)   Site Assessment WDL 08/29/20 0900   External Cath Length (cm) 13.5 cm 08/27/20 0000   Dressing Intervention Chlorhexidine sponge;Transparent 08/29/20 0900   Dressing Change Due 09/03/20 08/28/20 1600   Lumen A - Color BLUE 08/29/20 0900   Lumen A - Status saline locked 08/29/20 0900   Lumen B - Color BROWN 08/29/20 0900   Lumen B - Status saline locked 08/29/20 0900   Lumen C - Color WHITE 08/29/20 0900   Lumen C - Status infusing 08/29/20 0900   Number of days: 2     Line/device assessment(s) completed for medical necessity August 29    Constitutional: Easily aroused.  Clear speech.  Oriented x3 with minimal prompting.  Respiratory: Aeration to bases with aeration mildly globally diminished..  Accessory muscles not in use.  Expiratory time not increased.  No tidal wheeze.  No  crackles.  Few scattered basilar rhonchi.  Cardiovascular: PMI not displaced.  Heart tones distinct.  S1/S2 unremarkable.  No murmurs.  GI: Soft,nontender to palpation or percussion.  Bowel sounds in all quadrants slightly hypoactive.  Skin/Integumen: Extremities warm distally.  Brisk capillary refill.  No rashes, open areas of bruising  Neuro: Mental status above.  Motor 4-5/5 and bilaterally equal.  No tremors or fasciculations.      Medications     0.45% sodium chloride + KCl 20 mEq/L 50 mL/hr at 08/29/20 0630     - MEDICATION INSTRUCTIONS -         acetaminophen  650 mg Oral TID     escitalopram  10 mg Oral Daily     famotidine  20 mg Oral BID     gabapentin  900 mg Oral TID     lactobacillus rhamnosus (GG)  1 capsule Oral BID     lamoTRIgine  25 mg Oral Daily     LORazepam  0.5 mg Intravenous Q6H    Or     LORazepam  0.25-0.5 mg Oral Q6H     nicotine  1 patch Topical Daily     nicotine   Transdermal Q8H     potassium chloride ER  10 mEq Oral BID w/meals     rivaroxaban ANTICOAGULANT  20 mg Oral Daily with breakfast     sodium chloride (PF)  10 mL Intracatheter Q8H     vancomycin  250 mg Oral 4x Daily       Data   Recent Labs   Lab 08/29/20  0507 08/28/20  1458 08/28/20  0453  08/27/20  1500  08/27/20  0533 08/26/20  1202   WBC 9.3  --  8.1  --   --   --  13.5* 20.5*   HGB 10.5*  --  11.1*  --   --   --  13.5 17.9*   MCV 89  --  87  --   --   --  85 83     --  254  --   --   --  353 486*     --  149*  --  143   < > 140 134   POTASSIUM 3.1* 3.7 2.7*   < > 1.8*   < > 1.6* 2.9*   CHLORIDE 114*  --  121*  --  112*   < > 109 99   CO2 21  --  25  --  24   < > 26 29   BUN 2*  --  3*  --  6*   < > 8 12   CR 0.40*  --  0.57  --  0.61   < > 0.58 0.64   ANIONGAP 7  --  3  --  7   < > 5 6   AVINASH 7.5*  --  7.7*  --  7.6*   < > 8.2* 9.9   GLC 85  --  100*  --  137*   < > 129* 152*   ALBUMIN 2.2*  --  2.3*  --   --   --  2.8* 3.9   PROTTOTAL 4.4*  --  4.6*  --   --   --  5.7* 8.5   BILITOTAL 0.4  --  0.4  --   --    --  1.1 1.1   ALKPHOS 65  --  68  --   --   --  84 128   ALT 19  --  19  --   --   --  22 32   AST 35  --  39  --   --   --  47* 62*   LIPASE  --   --   --   --   --   --   --  288    < > = values in this interval not displayed.       No results found for this or any previous visit (from the past 24 hour(s)).

## 2020-08-29 NOTE — PLAN OF CARE
ROSEANNA Montemayor answered patient call light; patient had fecal and urinary incontinence x 2. Removed BP cuff and refused to put cuff back on for UA, told UA she requested anxiety and nausea meds. This RN assessed and provided Atarax d/t patient lethargy and unresponsiveness earlier in shift. Episode of vomiting clear green vomit post oral med administration. Compazine given. Linen and gown change. Denies pain. VSS.

## 2020-08-29 NOTE — PHARMACY
[facility]    Pharmacy      Antimicrobial Stewardship Note     Current antimicrobial therapy:  Anti-infectives (From now, onward)    Start     Dose/Rate Route Frequency Ordered Stop    08/27/20 0900  vancomycin (VANCOCIN) capsule 250 mg      250 mg Oral 4 TIMES DAILY 08/27/20 0640            Indication: C. diff    Days of Therapy: 3     Pertinent labs:  Creatinine   Creatinine   Date Value Ref Range Status   08/29/2020 0.40 (L) 0.52 - 1.04 mg/dL Final   08/28/2020 0.57 0.52 - 1.04 mg/dL Final   08/27/2020 0.61 0.52 - 1.04 mg/dL Final     WBC   WBC   Date Value Ref Range Status   08/29/2020 9.3 4.0 - 11.0 10e9/L Final   08/28/2020 8.1 4.0 - 11.0 10e9/L Final   08/27/2020 13.5 (H) 4.0 - 11.0 10e9/L Final     Procalcitonin   Procalcitonin   Date Value Ref Range Status   02/25/2018 0.23 ng/ml Final     Comment:     0.05-0.24 ng/ml Low risk of systemic bacterial infection. Local bacterial   infection possible.  Recommendation: Assess other clinical features of   infection. Discourage antibiotics unless strong clinical suspicion for serious   infection.       CRP   CRP Inflammation   Date Value Ref Range Status   08/26/2020 26.3 (H) 0.0 - 8.0 mg/L Final   05/12/2020 32.6 (H) 0.0 - 8.0 mg/L Final   07/15/2018 9.5 (H) 0.0 - 8.0 mg/L Final       Culture Results:        Recommendations/Interventions:  1. None at this time.    Ibis Garza, [facility]  August 29, 2020

## 2020-08-29 NOTE — PLAN OF CARE
Unable to administer already rescheduled meds to patient at this time d/t lethargy and patient only humming response; not opening eyes and not actively following commands. BP soft but VSS. Nurse will continue to monitor.

## 2020-08-29 NOTE — PLAN OF CARE
Temp: 98.3  F (36.8  C) Temp src: Temporal BP: 111/58 Pulse: 69   Resp: 14 SpO2: 98 % O2 Device: None (Room air)       Patient VSS ex soft BP; per patient that is her norm. A&O x 4, however mid shift patient was responsively unresponsive - would answer questions with a moan or a grunt, but refused to open eyes, take oral medications, or communicate. When told that this user would not give IV Ativan d/t obtunded state, patient was very upset; took oral medication, but slammed a carbonated beverage with medications thus vomiting almost immediately afterwards. Patient expressed willingness to get up to commode, was reassured that we have all the staff to assist and get her there safely, but patient refused to even attempt to use commode with several fecal and urinary incontinent episodes. Patient expressed continued trends of friends not being there to help her and homelessness; stated lonely with her daughter and family all down in [place]. Patient also said that d/t COVID all PT had stopped, and she does not express any desire to continue with PT. Encouraged patient to ambulate and consider future PT. Bed low and locked, ID band on, near unit station, alarms on and audible, video monitoring, frequent rounding, call light in reach, enteric precautions continued.     Face to face report given with opportunity to observe patient.    Report given to RITESH Jay RN   8/29/2020  9:52 AM

## 2020-08-29 NOTE — PLAN OF CARE
Notified by nursing assistant that patient was complaining of nausea and then proceeded to stick finger down her throat to make herself vomit. Patient was given Zofran with her Atarax and Vanco at 1409 and is unable to receive another antiemetic at this time.

## 2020-08-29 NOTE — PLAN OF CARE
Provider updated that patient continues to intermittently make herself vomit. Patient is only vomiting small amounts of liquid. No meds visualized in emesis. Patient is unable to receive Compazine or Zofran at this time- will try giving IV Ativan verses PO in attempts to help with nausea.

## 2020-08-30 LAB
ANION GAP SERPL CALCULATED.3IONS-SCNC: 7 MMOL/L (ref 3–14)
BACTERIA SPEC CULT: NORMAL
BUN SERPL-MCNC: <1 MG/DL (ref 7–30)
CALCIUM SERPL-MCNC: 7.5 MG/DL (ref 8.5–10.1)
CHLORIDE SERPL-SCNC: 111 MMOL/L (ref 94–109)
CO2 SERPL-SCNC: 22 MMOL/L (ref 20–32)
CREAT SERPL-MCNC: 0.38 MG/DL (ref 0.52–1.04)
E COLI SXT1+2 STL IA: NORMAL
ERYTHROCYTE [DISTWIDTH] IN BLOOD BY AUTOMATED COUNT: 13.2 % (ref 10–15)
GFR SERPL CREATININE-BSD FRML MDRD: >90 ML/MIN/{1.73_M2}
GLUCOSE SERPL-MCNC: 83 MG/DL (ref 70–99)
HCT VFR BLD AUTO: 32.7 % (ref 35–47)
HGB BLD-MCNC: 11.4 G/DL (ref 11.7–15.7)
MAGNESIUM SERPL-MCNC: 1.5 MG/DL (ref 1.6–2.3)
MAGNESIUM SERPL-MCNC: 3.5 MG/DL (ref 1.6–2.3)
MCH RBC QN AUTO: 30.7 PG (ref 26.5–33)
MCHC RBC AUTO-ENTMCNC: 34.9 G/DL (ref 31.5–36.5)
MCV RBC AUTO: 88 FL (ref 78–100)
PLATELET # BLD AUTO: 258 10E9/L (ref 150–450)
POTASSIUM SERPL-SCNC: 3.1 MMOL/L (ref 3.4–5.3)
POTASSIUM SERPL-SCNC: 3.6 MMOL/L (ref 3.4–5.3)
RBC # BLD AUTO: 3.71 10E12/L (ref 3.8–5.2)
SODIUM SERPL-SCNC: 140 MMOL/L (ref 133–144)
SPECIMEN SOURCE: NORMAL
WBC # BLD AUTO: 9.5 10E9/L (ref 4–11)

## 2020-08-30 PROCEDURE — 25000128 H RX IP 250 OP 636: Performed by: INTERNAL MEDICINE

## 2020-08-30 PROCEDURE — 25000132 ZZH RX MED GY IP 250 OP 250 PS 637: Performed by: INTERNAL MEDICINE

## 2020-08-30 PROCEDURE — 83735 ASSAY OF MAGNESIUM: CPT | Performed by: INTERNAL MEDICINE

## 2020-08-30 PROCEDURE — 36415 COLL VENOUS BLD VENIPUNCTURE: CPT | Performed by: INTERNAL MEDICINE

## 2020-08-30 PROCEDURE — 85027 COMPLETE CBC AUTOMATED: CPT | Performed by: INTERNAL MEDICINE

## 2020-08-30 PROCEDURE — 80048 BASIC METABOLIC PNL TOTAL CA: CPT | Performed by: INTERNAL MEDICINE

## 2020-08-30 PROCEDURE — 99232 SBSQ HOSP IP/OBS MODERATE 35: CPT | Performed by: INTERNAL MEDICINE

## 2020-08-30 PROCEDURE — 84132 ASSAY OF SERUM POTASSIUM: CPT | Performed by: INTERNAL MEDICINE

## 2020-08-30 PROCEDURE — 12000000 ZZH R&B MED SURG/OB

## 2020-08-30 RX ORDER — POTASSIUM CHLORIDE 7.45 MG/ML
10 INJECTION INTRAVENOUS
Status: DISCONTINUED | OUTPATIENT
Start: 2020-08-30 | End: 2020-08-30 | Stop reason: CLARIF

## 2020-08-30 RX ORDER — POTASSIUM CHLORIDE 750 MG/1
10 CAPSULE, EXTENDED RELEASE ORAL 3 TIMES DAILY
Status: DISCONTINUED | OUTPATIENT
Start: 2020-08-30 | End: 2020-09-03 | Stop reason: HOSPADM

## 2020-08-30 RX ORDER — POTASSIUM CL/LIDO/0.9 % NACL 10MEQ/0.1L
10 INTRAVENOUS SOLUTION, PIGGYBACK (ML) INTRAVENOUS
Status: COMPLETED | OUTPATIENT
Start: 2020-08-30 | End: 2020-08-30

## 2020-08-30 RX ORDER — ONDANSETRON 4 MG/1
8 TABLET, ORALLY DISINTEGRATING ORAL EVERY 6 HOURS PRN
Status: DISCONTINUED | OUTPATIENT
Start: 2020-08-30 | End: 2020-09-03 | Stop reason: HOSPADM

## 2020-08-30 RX ORDER — MAGNESIUM SULFATE HEPTAHYDRATE 40 MG/ML
4 INJECTION, SOLUTION INTRAVENOUS ONCE
Status: COMPLETED | OUTPATIENT
Start: 2020-08-30 | End: 2020-08-30

## 2020-08-30 RX ORDER — ONDANSETRON 2 MG/ML
8 INJECTION INTRAMUSCULAR; INTRAVENOUS EVERY 6 HOURS PRN
Status: DISCONTINUED | OUTPATIENT
Start: 2020-08-30 | End: 2020-09-03 | Stop reason: HOSPADM

## 2020-08-30 RX ADMIN — LORAZEPAM 0.5 MG: 0.5 TABLET ORAL at 14:27

## 2020-08-30 RX ADMIN — Medication 1 CAPSULE: at 09:29

## 2020-08-30 RX ADMIN — LORAZEPAM 0.5 MG: 0.5 TABLET ORAL at 02:35

## 2020-08-30 RX ADMIN — LAMOTRIGINE 25 MG: 25 TABLET ORAL at 09:20

## 2020-08-30 RX ADMIN — GABAPENTIN 900 MG: 300 CAPSULE ORAL at 13:33

## 2020-08-30 RX ADMIN — RIVAROXABAN 20 MG: 20 TABLET, FILM COATED ORAL at 09:20

## 2020-08-30 RX ADMIN — LORAZEPAM 0.5 MG: 2 INJECTION INTRAMUSCULAR; INTRAVENOUS at 21:37

## 2020-08-30 RX ADMIN — VANCOMYCIN HYDROCHLORIDE 250 MG: 250 CAPSULE ORAL at 09:21

## 2020-08-30 RX ADMIN — ESCITALOPRAM OXALATE 10 MG: 5 TABLET, FILM COATED ORAL at 09:21

## 2020-08-30 RX ADMIN — Medication 10 MEQ: at 11:47

## 2020-08-30 RX ADMIN — FAMOTIDINE 20 MG: 20 TABLET, FILM COATED ORAL at 09:20

## 2020-08-30 RX ADMIN — METRONIDAZOLE 500 MG: 500 INJECTION, SOLUTION INTRAVENOUS at 13:26

## 2020-08-30 RX ADMIN — POTASSIUM CHLORIDE 10 MEQ: 750 CAPSULE, EXTENDED RELEASE ORAL at 09:21

## 2020-08-30 RX ADMIN — ONDANSETRON 8 MG: 2 INJECTION INTRAMUSCULAR; INTRAVENOUS at 11:51

## 2020-08-30 RX ADMIN — Medication 10 MEQ: at 09:20

## 2020-08-30 RX ADMIN — NICOTINE 1 PATCH: 7 PATCH, EXTENDED RELEASE TRANSDERMAL at 08:13

## 2020-08-30 RX ADMIN — MAGNESIUM SULFATE IN WATER 4 G: 40 INJECTION, SOLUTION INTRAVENOUS at 12:01

## 2020-08-30 RX ADMIN — POTASSIUM CHLORIDE AND SODIUM CHLORIDE: 450; 150 INJECTION, SOLUTION INTRAVENOUS at 06:12

## 2020-08-30 RX ADMIN — GABAPENTIN 900 MG: 300 CAPSULE ORAL at 09:21

## 2020-08-30 RX ADMIN — LORAZEPAM 0.5 MG: 2 INJECTION INTRAMUSCULAR; INTRAVENOUS at 16:17

## 2020-08-30 RX ADMIN — ACETAMINOPHEN 650 MG: 325 TABLET, FILM COATED ORAL at 13:33

## 2020-08-30 RX ADMIN — ACETAMINOPHEN 650 MG: 325 TABLET, FILM COATED ORAL at 09:21

## 2020-08-30 RX ADMIN — METRONIDAZOLE 500 MG: 500 INJECTION, SOLUTION INTRAVENOUS at 21:28

## 2020-08-30 RX ADMIN — CALCIUM CARBONATE (ANTACID) CHEW TAB 500 MG 1000 MG: 500 CHEW TAB at 16:17

## 2020-08-30 RX ADMIN — VANCOMYCIN HYDROCHLORIDE 250 MG: 250 CAPSULE ORAL at 13:39

## 2020-08-30 RX ADMIN — LORAZEPAM 0.25 MG: 0.5 TABLET ORAL at 09:21

## 2020-08-30 RX ADMIN — Medication 10 MEQ: at 10:33

## 2020-08-30 RX ADMIN — LORAZEPAM 0.25 MG: 0.5 TABLET ORAL at 09:28

## 2020-08-30 RX ADMIN — VANCOMYCIN HYDROCHLORIDE 250 MG: 250 CAPSULE ORAL at 17:06

## 2020-08-30 ASSESSMENT — ACTIVITIES OF DAILY LIVING (ADL)
ADLS_ACUITY_SCORE: 15.5
ADLS_ACUITY_SCORE: 15.5
ADLS_ACUITY_SCORE: 16.5
ADLS_ACUITY_SCORE: 15
ADLS_ACUITY_SCORE: 16.5
ADLS_ACUITY_SCORE: 16.5

## 2020-08-30 NOTE — PLAN OF CARE
Intermittently nauseated throughout the day. Patient requires consistent encouragement to take her PO meds. Continues to have loose stools, has been able to take PO Vanco without vomiting up. Did get patient to shower this morning and up to chair for a few hours this afternoon; declined any other activity. Did have 2 low grade temps today- at that time patient did have multiple blankets on. Without blankets patient was afebrile. PO intake continues to be poor- IV fluids @ 50 ml/hr.     Face to face report given with opportunity to observe patient.    Report given to Shivani Pate, RN   8/29/2020  6:02 PM

## 2020-08-30 NOTE — PLAN OF CARE
Absorbed cares from [id number]. Patient has been using call light every 5-10 minutes for small requests. Attempted to set boundaries and cluster cares as much as possible. Refuses most meds, requires heavy encouragement to take them. Did not want vitals checked. Continues to have nausea, stools; incontinent. Low grade temps but has 5+ blankets on and refuses to remove them. PO intake poor, central line infusing @ 50/hr, the two locked ports flushed well with blood return. Assist x1 for pivots, assist x2 for any further activity         Face to face report given with opportunity to observe patient.    Report given to RITESH Greenwood   8/29/2020  11:05 PM

## 2020-08-30 NOTE — PLAN OF CARE
Problem: Nausea and Vomiting  Goal: Fluid and Electrolyte Balance  8/30/2020 1348 by Monika Cee, RN  Outcome: No Change   Continues to have intermittent nausea, no actual vomiting but, will spit in green emesis bag when writer is in the room. Spitting out clear liquid.   Medicated at 1151 with Zofran 8 mg via IV, with some relief.      Problem: Adult Inpatient Plan of Care  Goal: Patient-Specific Goal (Individualization)  8/30/2020 1348 by Monika Cee, RN  Outcome: No Change   Affect is flat with blunted answers. Prefers to lay in bed and sleep. Requested all morning medication but picks and chooses which ones she will take.  Refused gabapentin, probiotic, lexapro, Potassium tablet.  Does not want to use the bedside commode would rather use the briefs that are provided.  Vital signs are stable, temp low grade at 99.9 orally.  Has not ordered any meals, is on clear liquids, advance diet as tolerated, does drink ice water and apple juice.  Received potassium & magnesium replacement.  Has Ativan scheduled every 6 hours for anxiety, does ask for it when having nausea.  Will continue to monitor and medicate as needed.  Required a change at 1425 due to incontinence of stool, complains of bottom getting sore.  Staff used barrier cream on bottom. Is now in fetal position facing the window, tears present on cheek. Reassured patient that by continuing to take antibiotics the loose stools should start to resolve and that it is better to use the bedside commode as then the stool will not irritate her skin.    Face to face report given with opportunity to observe patient.    Report given to RITESH Mann RN   8/30/2020  3:00 PM

## 2020-08-30 NOTE — PROVIDER NOTIFICATION
"Patient refused all due medications except Ativan. Educated multiple times on the need for at least the antibiotic, still refused. Stated \"was not going to risk the chance of puking\"   "

## 2020-08-30 NOTE — PLAN OF CARE
"Reason for hospital stay:  Cdiff    Most recent vitals: /71 (BP Location: Right arm)   Pulse 86   Temp 98.2  F (36.8  C) (Tympanic)   Resp 16   Ht 1.6 m (5' 3\")   SpO2 97%   BMI 31.89 kg/m      Pain Management:  Denies  LOC:  A&O  Cardiac:  WDL  Respiratory:  LS clear diminished  GI:  incontinent loose stool x 2  :  incontinent   IVF:  0.45% w/20 of K @ 50 mL/hr  ABX:  PO vanco  ADL's:  Dependent x 1  Safety:  Call light within reach, bed in lowest position      Comments:      8/30/2020  3:39 AM  Krystyna Posada RN    Face to face report given with opportunity to observe patient.    Report given to RITESH Frank RN   8/30/2020  7:21 AM      "

## 2020-08-30 NOTE — PROGRESS NOTES
[facility]    Hospitalist Progress Note    Date of Service (when I saw the patient): 08/30/2020    Assessment & Plan      Maggie Caes is a 30 year old with chronic issues including prior pulmonary embolism with chronic anticoagulation with rivaroxaban, prior CVA with chronic left hemiparesis, and behavioral health issues including anxiety.  She presented with nausea vomiting leukocytosis and fever.  Initial evaluation showed evidence of C. difficile infection.  She had been admitted to this hospital with similar presentation in 2018 and also then found to have C. difficile infection.  She was admitted for further evaluation and management    1.  C. difficile colitis:   Some persistent nausea still which the patient finds very distressing.  She has been reluctant to take any medication other than lorazepam.  Discussed with her today that nausea in fact may be a symptom of her C. difficile colitis and that treatment of this is essential.  She is still somewhat skeptical and reluctant to try her vancomycin although it is in encapsulated form.  Add intravenous metronidazole although somewhat less efficacious.  If she is not able to tolerate oral vancomycin she may need a setting where she can get 3 times daily intravenous antibiotics (metronidazole).  Duration of treatment somewhat difficult to determine but likely at 10-day course would be minimal.  As below her self-care abilities are quite limited and almost certainly she will need a structured environment following discharge.      She had presented with mild abdominal pain, vomiting, diarrhea. She did not have signs of sepsis on arrival, with findings such as tachycardia more consistent with volume depletion because of GI losses. She has history of chronic nausea and vomiting as well as diarrhea. However stools were more frequent than usual and nausea more severe.  Since arrival in beginning treatment with oral vancomycin she has had no vomiting.    Although some doses of her vancomycin will withheld now several days ago because of her somnolence she has had some improvement in emesis and no vomiting, as well as no fever or leukocytosis.    2.  Severe Hypokalemia  Hypomagnesemia:   Potassium levels now under reasonable control.  This does suggest that her chronic hypokalemia is more on the basis of difficulties in regular dosing rather than any renal abnormality.  Her nausea has made her reluctant to try any oral medications and therefore will continue intravenous replacement at the present time.  Similarly intravenous magnesium.  Follow-up levels this afternoon.  Earlier in her course treatment of her more acute hypokalemia was complicated in part because she has been quite reluctant to consider any oral potassium replacement and has found burning from peripheral intravenous potassium burdensome.  Because of this central venous access placed 8/27.  We will keep central venous access until its certain she will require further intravenous repletion.  She does have underlying underlying chronic hypokalemia, and notes that she was not taking her supplements for several days at least due to nausea/vomiting.  As above it appears that her hypokalemia will somewhat worsened by GI losses largely matter of her ability or willingness to take oral supplements.  Sustained-release preparation should be reasonably well tolerated and a majority of patients.    3.  Metabolic encephalopathy  This appears quite improved.  She has been awake and interactive with me over now a number of days while she was markedly somnolent now 3 days ago.  Significant effect of CNS depressant medications especially antiemetics.  Had decreased dose of lorazepam because of this somnolence.  However she is found this the most effective antiemetic and will continue that.  Discontinue prochlorperazine as this may be more sedating than other agents.      4.  Established left hemiparesis (H):   This is  been a persistent issue for her following a CVA 2018    5.  Chronic anticoagulation:   Have resumed her chronic rivaroxaban.  Multiple issues prompting anticoagulation including prior embolic CVA, multiple pulmonary emboli, and DVT.    6.  Borderline personality disorder (H):   She did recently require a inpatient stay on  in July.  Likely personality disorder is complicating other parts of her treatment regimen.  Overall under reasonable control.    7.  History of substance abuse:   Unknown whether she has had any recent issues with chemical dependency.  She had recently agree to Rule 25 assessment due to not doing well but so far has not followed through. She is working with [facility]  and [facility].     8.  Irritable bowel syndrome with diarrhea:   This is been a persistent issue for her although does not appear worse than her baseline.    9.  Self-care limitation  This is emergent especially over the last several days as a more prominent issue.   and social workers have been investigating extensively the options available to her.  It certainly appears currently that she did require a structured environment for even a minimally safe discharge plan.      DVT Prophylaxis: Rivaroxaban  Code Status: No CPR- Do NOT Intubate    Disposition: Expected discharge difficult to determine.  Although majority of the acute issues leading to initial hospitalization have resolved or improved her self-care limitations are becoming quite prominent and unless it is possible to develop a treatment plan that will allow her to take at least a minimum of her essential medications she requires in-hospital treatment.    Interval History   More awake alert and interactive.  Some persistent nausea leading her to be reluctant to take more than minimal medications.  Has tolerated limited amount of liquid intake.    -Data reviewed today: I reviewed all new labs and imaging results over the last 24 hours.     Physical Exam    Temp: 99.9  F (37.7  C) Temp src: Oral BP: 116/70 Pulse: 98   Resp: 16 SpO2: 98 % O2 Device: None (Room air)    There were no vitals filed for this visit.  Vital Signs with Ranges  Temp:  [98.2  F (36.8  C)-99.9  F (37.7  C)] 99.9  F (37.7  C)  Pulse:  [80-98] 98  Resp:  [14-16] 16  BP: (106-125)/(69-71) 116/70  SpO2:  [97 %-100 %] 98 %  I/O last 3 completed shifts:  In: 1731 [P.O.:960; I.V.:771]  Out: 1 [Urine:1]    CVC Triple Lumen 08/27/20 Internal jugular (Active)   Site Assessment WDL 08/30/20 0800   External Cath Length (cm) 13.5 cm 08/27/20 0000   Dressing Intervention Chlorhexidine sponge;Transparent 08/29/20 1500   Dressing Change Due 09/03/20 08/28/20 1600   Lumen A - Color BLUE 08/30/20 0800   Lumen A - Status saline locked 08/30/20 0800   Lumen B - Color BROWN 08/30/20 0800   Lumen B - Status saline locked 08/30/20 0800   Lumen C - Color WHITE 08/30/20 0800   Lumen C - Status infusing 08/30/20 0800   Number of days: 3     Line/device assessment(s) completed for medical necessity August 30    Constitutional: Easily aroused.  Clear speech.  Mildly distractible but interactive  Respiratory: Aeration to bases with aeration mildly globally diminished..  Accessory muscles not in use.  Expiratory time not increased.  No tidal wheeze.  No crackles.  Few scattered basilar rhonchi.  Cardiovascular: PMI not displaced.  Heart tones distinct.  S1/S2 unremarkable.  No murmurs.  GI: Soft,nontender to palpation or percussion.  Bowel sounds in all quadrants slightly hypoactive.  Skin/Integumen: Extremities warm distally.  Brisk capillary refill.  No rashes, open areas of bruising  Neuro: Mental status above.  Motor 4-5/5 and bilaterally equal.  No tremors or fasciculations.      Medications     0.45% sodium chloride + KCl 20 mEq/L 50 mL/hr at 08/30/20 0800     - MEDICATION INSTRUCTIONS -         acetaminophen  650 mg Oral TID     escitalopram  10 mg Oral Daily     famotidine  20 mg Oral BID     gabapentin  900 mg Oral  TID     lactobacillus rhamnosus (GG)  1 capsule Oral BID     lamoTRIgine  25 mg Oral Daily     LORazepam  0.5 mg Intravenous Q6H    Or     LORazepam  0.25-0.5 mg Oral Q6H     metroNIDAZOLE  500 mg Intravenous Q8H     nicotine  1 patch Topical Daily     nicotine   Transdermal Q8H     potassium chloride ER  10 mEq Oral TID     rivaroxaban ANTICOAGULANT  20 mg Oral Daily with breakfast     sodium chloride (PF)  10 mL Intracatheter Q8H     vancomycin  250 mg Oral 4x Daily       Data   Recent Labs   Lab 08/30/20  0525 08/29/20  0507 08/28/20  1458 08/28/20  0453  08/26/20  1202   WBC 9.5 9.3  --  8.1   < > 20.5*   HGB 11.4* 10.5*  --  11.1*   < > 17.9*   MCV 88 89  --  87   < > 83    221  --  254   < > 486*    142  --  149*   < > 134   POTASSIUM 3.1* 3.1* 3.7 2.7*   < > 2.9*   CHLORIDE 111* 114*  --  121*   < > 99   CO2 22 21  --  25   < > 29   BUN <1* 2*  --  3*   < > 12   CR 0.38* 0.40*  --  0.57   < > 0.64   ANIONGAP 7 7  --  3   < > 6   AVINASH 7.5* 7.5*  --  7.7*   < > 9.9   GLC 83 85  --  100*   < > 152*   ALBUMIN  --  2.2*  --  2.3*   < > 3.9   PROTTOTAL  --  4.4*  --  4.6*   < > 8.5   BILITOTAL  --  0.4  --  0.4   < > 1.1   ALKPHOS  --  65  --  68   < > 128   ALT  --  19  --  19   < > 32   AST  --  35  --  39   < > 62*   LIPASE  --   --   --   --   --  288    < > = values in this interval not displayed.       No results found for this or any previous visit (from the past 24 hour(s)).

## 2020-08-31 LAB
ALBUMIN SERPL-MCNC: 2.5 G/DL (ref 3.4–5)
ALP SERPL-CCNC: 74 U/L (ref 40–150)
ALT SERPL W P-5'-P-CCNC: 19 U/L (ref 0–50)
ANION GAP SERPL CALCULATED.3IONS-SCNC: 8 MMOL/L (ref 3–14)
AST SERPL W P-5'-P-CCNC: 30 U/L (ref 0–45)
BILIRUB SERPL-MCNC: 0.6 MG/DL (ref 0.2–1.3)
BUN SERPL-MCNC: 1 MG/DL (ref 7–30)
CALCIUM SERPL-MCNC: 7.7 MG/DL (ref 8.5–10.1)
CHLORIDE SERPL-SCNC: 109 MMOL/L (ref 94–109)
CO2 SERPL-SCNC: 21 MMOL/L (ref 20–32)
CREAT SERPL-MCNC: 0.3 MG/DL (ref 0.52–1.04)
ERYTHROCYTE [DISTWIDTH] IN BLOOD BY AUTOMATED COUNT: 13.2 % (ref 10–15)
GFR SERPL CREATININE-BSD FRML MDRD: >90 ML/MIN/{1.73_M2}
GLUCOSE SERPL-MCNC: 85 MG/DL (ref 70–99)
HCT VFR BLD AUTO: 34.7 % (ref 35–47)
HGB BLD-MCNC: 12 G/DL (ref 11.7–15.7)
MAGNESIUM SERPL-MCNC: 2 MG/DL (ref 1.6–2.3)
MCH RBC QN AUTO: 30.8 PG (ref 26.5–33)
MCHC RBC AUTO-ENTMCNC: 34.6 G/DL (ref 31.5–36.5)
MCV RBC AUTO: 89 FL (ref 78–100)
PLATELET # BLD AUTO: 258 10E9/L (ref 150–450)
POTASSIUM SERPL-SCNC: 3.4 MMOL/L (ref 3.4–5.3)
PROT SERPL-MCNC: 5.2 G/DL (ref 6.8–8.8)
RBC # BLD AUTO: 3.9 10E12/L (ref 3.8–5.2)
SODIUM SERPL-SCNC: 138 MMOL/L (ref 133–144)
WBC # BLD AUTO: 8.6 10E9/L (ref 4–11)

## 2020-08-31 PROCEDURE — 85027 COMPLETE CBC AUTOMATED: CPT | Performed by: INTERNAL MEDICINE

## 2020-08-31 PROCEDURE — 80053 COMPREHEN METABOLIC PANEL: CPT | Performed by: INTERNAL MEDICINE

## 2020-08-31 PROCEDURE — 25000132 ZZH RX MED GY IP 250 OP 250 PS 637: Performed by: INTERNAL MEDICINE

## 2020-08-31 PROCEDURE — 83735 ASSAY OF MAGNESIUM: CPT | Performed by: INTERNAL MEDICINE

## 2020-08-31 PROCEDURE — 25000128 H RX IP 250 OP 636: Performed by: INTERNAL MEDICINE

## 2020-08-31 PROCEDURE — 99232 SBSQ HOSP IP/OBS MODERATE 35: CPT | Performed by: INTERNAL MEDICINE

## 2020-08-31 PROCEDURE — 12000000 ZZH R&B MED SURG/OB

## 2020-08-31 PROCEDURE — 36415 COLL VENOUS BLD VENIPUNCTURE: CPT | Performed by: INTERNAL MEDICINE

## 2020-08-31 RX ORDER — IBUPROFEN 400 MG/1
400 TABLET, FILM COATED ORAL EVERY 6 HOURS PRN
Status: DISCONTINUED | OUTPATIENT
Start: 2020-08-31 | End: 2020-09-03 | Stop reason: HOSPADM

## 2020-08-31 RX ADMIN — ACETAMINOPHEN 650 MG: 325 TABLET, FILM COATED ORAL at 14:40

## 2020-08-31 RX ADMIN — FAMOTIDINE 20 MG: 20 INJECTION, SOLUTION INTRAVENOUS at 12:16

## 2020-08-31 RX ADMIN — NICOTINE 1 PATCH: 7 PATCH, EXTENDED RELEASE TRANSDERMAL at 10:02

## 2020-08-31 RX ADMIN — LORAZEPAM 0.5 MG: 0.5 TABLET ORAL at 09:30

## 2020-08-31 RX ADMIN — POTASSIUM CHLORIDE AND SODIUM CHLORIDE: 450; 150 INJECTION, SOLUTION INTRAVENOUS at 11:52

## 2020-08-31 RX ADMIN — METRONIDAZOLE 500 MG: 500 INJECTION, SOLUTION INTRAVENOUS at 21:53

## 2020-08-31 RX ADMIN — VANCOMYCIN HYDROCHLORIDE 250 MG: 250 CAPSULE ORAL at 13:28

## 2020-08-31 RX ADMIN — FAMOTIDINE 20 MG: 20 INJECTION, SOLUTION INTRAVENOUS at 20:56

## 2020-08-31 RX ADMIN — LORAZEPAM 0.5 MG: 0.5 TABLET ORAL at 16:27

## 2020-08-31 RX ADMIN — ACETAMINOPHEN 650 MG: 325 TABLET, FILM COATED ORAL at 09:33

## 2020-08-31 RX ADMIN — VANCOMYCIN HYDROCHLORIDE 250 MG: 250 CAPSULE ORAL at 16:47

## 2020-08-31 RX ADMIN — IBUPROFEN 400 MG: 400 TABLET ORAL at 19:42

## 2020-08-31 RX ADMIN — METRONIDAZOLE 500 MG: 500 INJECTION, SOLUTION INTRAVENOUS at 05:33

## 2020-08-31 RX ADMIN — LORAZEPAM 0.5 MG: 0.5 TABLET ORAL at 03:28

## 2020-08-31 RX ADMIN — RIVAROXABAN 20 MG: 20 TABLET, FILM COATED ORAL at 09:32

## 2020-08-31 RX ADMIN — METRONIDAZOLE 500 MG: 500 INJECTION, SOLUTION INTRAVENOUS at 13:27

## 2020-08-31 ASSESSMENT — ACTIVITIES OF DAILY LIVING (ADL)
ADLS_ACUITY_SCORE: 15.5
ADLS_ACUITY_SCORE: 15
ADLS_ACUITY_SCORE: 16.5
ADLS_ACUITY_SCORE: 16

## 2020-08-31 NOTE — PROGRESS NOTES
[facility]    Hospitalist Progress Note    Date of Service (when I saw the patient): 08/31/2020    Assessment & Plan      Maggie Case is a 30 year old with chronic issues including prior pulmonary embolism with chronic anticoagulation with rivaroxaban, prior CVA with chronic left hemiparesis, and behavioral health issues including anxiety.  She presented with nausea vomiting leukocytosis and fever.  Initial evaluation showed evidence of C. difficile infection.  She had been admitted to this hospital with similar presentation in 2018 and also then found to have C. difficile infection.  She was admitted for further evaluation and management    1.  C. difficile colitis:   Appears to be mild to moderate disease but treatment is complicated by the patient's persistent nausea and her difficulty in taking oral medications.  She is quite adamant that nearly any oral medications results in nausea and emesis although no significant volume of emesis has been observed by nursing staff.  Because of this added intravenous metronidazole although this is likely less efficacious than oral vancomycin.  Discussed with her again today that C. difficile infection may be severe and that treatment is important for her.  Also discussed the rationale for preferring oral medication.  She has been able to take doses of vancomycin intermittently but not consistently.  Likely she will require a minimum of 10 days of treatment which might imply another 5 days of intravenous therapy (3 times daily).  Certainly her persistent nausea the patient finds very distressing.  She has been reluctant to take any medication consistently other than lorazepam.  Have discussed with her that nausea in fact may be a symptom of her C. difficile infection and that treatment of this is essential.  As below her self-care abilities are quite limited and almost certainly she will need a structured environment following discharge.      She had presented  with mild abdominal pain, vomiting, diarrhea. She did not have signs of sepsis on arrival, with findings such as tachycardia more consistent with volume depletion because of GI losses. She has history of chronic nausea and vomiting as well as diarrhea. However stools were more frequent than usual and nausea more severe.  Since arrival in beginning treatment with oral vancomycin she has had no vomiting.   Although some doses of her vancomycin will withheld now several days ago because of her somnolence she has had some improvement in emesis and no vomiting, as well as no fever or leukocytosis.    2.  Severe Hypokalemia  Hypomagnesemia, resolved:   Potassium levels now under reasonable control.  This does suggest that her chronic hypokalemia is more on the basis of difficulties in regular dosing rather than any renal abnormality. May still require IV treatment given difficulty in taking orally.  Magnesium levels replete.  Earlier in her course treatment of her more acute hypokalemia was complicated in part because she has been quite reluctant to consider any oral potassium replacement and has found burning from peripheral intravenous potassium burdensome.  Central venous access placed 8/27.  We will keep central venous access until its certain she will require further intravenous repletion.  She does have underlying underlying chronic hypokalemia, and notes that she was not taking her supplements for several days at least due to nausea/vomiting.  As above it appears that her hypokalemia will somewhat worsened by GI losses largely matter of her ability or willingness to take oral supplements.  Sustained-release preparation should be reasonably well tolerated and a majority of patients.    3.  Metabolic encephalopathy  This appears quite improved.  She has been awake and interactive with me over now a number of days while she was markedly somnolent now 3 days ago.  Significant effect of CNS depressant medications  especially antiemetics.  Had decreased dose of lorazepam because of this somnolence.  However she is found this the most effective antiemetic and will continue that.  Discontinue prochlorperazine as this may be more sedating than other agents.      4.  Established left hemiparesis (H):   This is been a persistent issue for her following a CVA 2018    5.  Chronic anticoagulation:   Have resumed her chronic rivaroxaban, although regular dosing has complicated treatment.  Multiple issues prompting anticoagulation including prior embolic CVA, multiple pulmonary emboli, and DVT.    6.  Borderline personality disorder (H):   She did recently require a inpatient stay on  in July.  Likely personality disorder is complicating other parts of her treatment regimen.      7.  History of substance abuse:   Unknown whether she has had any recent issues with chemical dependency.  She had recently agree to Rule 25 assessment due to not doing well but so far has not followed through. She is working with [facility]  and [facility].     8.  Irritable bowel syndrome with diarrhea:   This is been a persistent issue for her although does not appear worse than her baseline.  May contribute to her nausea.  Intravenous famotidine given difficulties in taking oral medicines although I am not certain there is any significant upper GI issues addressed with cyto-protection.    9.  Self-care limitation  This is emergent especially over the last several days as a more prominent issue.   and social workers have been investigating extensively the options available to her.  It certainly appears currently that she did require a structured environment for even a minimally safe discharge plan.      DVT Prophylaxis: Rivaroxaban  Code Status: No CPR- Do NOT Intubate    Disposition: Expected discharge difficult to determine.  Currently oral intake inadequate and she still needs IV crystalloid although at a low rate.  Regular use of  medications is extremely erratic.  Social workers/case management working extensively with available options which unfortunately are limited.  At the current time it appears that returning home with home care including PCA may be her only reasonable option.  She may therefore require in-hospital treatment at least until either full course of intravenous antibiotics or she is able to tolerate oral vancomycin on a consistent basis.    Interval History   More awake alert and interactive.  Persistent nausea leading her to be reluctant to take more than minimal medications.  Has tolerated limited amount of liquid intake.    -Data reviewed today: I reviewed all new labs and imaging results over the last 24 hours.     Physical Exam   Temp: 99.1  F (37.3  C) Temp src: Oral BP: 97/60 Pulse: 81   Resp: 16 SpO2: 98 % O2 Device: None (Room air)    There were no vitals filed for this visit.  Vital Signs with Ranges  Temp:  [98.2  F (36.8  C)-100  F (37.8  C)] 99.1  F (37.3  C)  Pulse:  [77-96] 81  Resp:  [16-17] 16  BP: ()/(49-69) 97/60  SpO2:  [94 %-99 %] 98 %  I/O last 3 completed shifts:  In: 480 [P.O.:480]  Out: -     CVC Triple Lumen 08/27/20 Internal jugular (Active)   Site Assessment WDL 08/31/20 1000   External Cath Length (cm) 13.5 cm 08/27/20 0000   Dressing Intervention Chlorhexidine sponge;Transparent 08/29/20 1500   Dressing Change Due 09/03/20 08/28/20 1600   Lumen A - Color BLUE 08/31/20 1000   Lumen A - Status infusing 08/31/20 1000   Lumen B - Color BROWN 08/31/20 1000   Lumen B - Status blood return noted;saline locked 08/31/20 1000   Lumen C - Color WHITE 08/31/20 1000   Lumen C - Status saline locked 08/31/20 1000   Number of days: 4     Line/device assessment(s) completed for medical necessity August 31    Constitutional: Awake and interactive. Clear speech.  Mildly distractible but interactive  Respiratory: Aeration to bases with aeration mildly globally diminished..  Accessory muscles not in use.   Expiratory time not increased.  No tidal wheeze.  No crackles.  Few scattered basilar rhonchi.  Cardiovascular: PMI not displaced.  Heart tones distinct.  S1/S2 unremarkable.  No murmurs.  GI: Soft,nontender to palpation or percussion.  Bowel sounds in all quadrants slightly hypoactive.  Skin/Integumen: Extremities warm distally.  Brisk capillary refill.  No rashes, open areas of bruising  Neuro: Mental status above.  Motor 4-5/5 and bilaterally equal.  No tremors or fasciculations.      Medications     0.45% sodium chloride + KCl 20 mEq/L 50 mL/hr at 08/31/20 1152     - MEDICATION INSTRUCTIONS -         acetaminophen  650 mg Oral TID     escitalopram  10 mg Oral Daily     famotidine  20 mg Intravenous BID     gabapentin  900 mg Oral TID     lactobacillus rhamnosus (GG)  1 capsule Oral BID     lamoTRIgine  25 mg Oral Daily     LORazepam  0.5 mg Intravenous Q6H    Or     LORazepam  0.25-0.5 mg Oral Q6H     metroNIDAZOLE  500 mg Intravenous Q8H     nicotine  1 patch Topical Daily     nicotine   Transdermal Q8H     potassium chloride ER  10 mEq Oral TID     rivaroxaban ANTICOAGULANT  20 mg Oral Daily with breakfast     sodium chloride (PF)  10 mL Intracatheter Q8H     vancomycin  250 mg Oral 4x Daily       Data   Recent Labs   Lab 08/31/20  0533 08/30/20  1425 08/30/20  0525 08/29/20  0507  08/26/20  1202   WBC 8.6  --  9.5 9.3   < > 20.5*   HGB 12.0  --  11.4* 10.5*   < > 17.9*   MCV 89  --  88 89   < > 83     --  258 221   < > 486*     --  140 142   < > 134   POTASSIUM 3.4 3.6 3.1* 3.1*   < > 2.9*   CHLORIDE 109  --  111* 114*   < > 99   CO2 21  --  22 21   < > 29   BUN 1*  --  <1* 2*   < > 12   CR 0.30*  --  0.38* 0.40*   < > 0.64   ANIONGAP 8  --  7 7   < > 6   AVINASH 7.7*  --  7.5* 7.5*   < > 9.9   GLC 85  --  83 85   < > 152*   ALBUMIN 2.5*  --   --  2.2*   < > 3.9   PROTTOTAL 5.2*  --   --  4.4*   < > 8.5   BILITOTAL 0.6  --   --  0.4   < > 1.1   ALKPHOS 74  --   --  65   < > 128   ALT 19  --   --  19    < > 32   AST 30  --   --  35   < > 62*   LIPASE  --   --   --   --   --  288    < > = values in this interval not displayed.       No results found for this or any previous visit (from the past 24 hour(s)).

## 2020-08-31 NOTE — PLAN OF CARE
"Vital signs as charted. Lungs clear, bowel sounds active. Patient refused majority of medications this shift (see MAR), she did take 1 of her 2 doses of PO Vancomycin this shift. Education provided on what medications are for, patient acknowledge understanding but would continue to refuse with multiple attempts as patient would say \"I'm nauseous, I can't take any more\" and  \"come back in a half hour\" then she would refuse again. Lots of encouragement to get patient up to commode this morning, patient refused offers of toileting every time staff in room after and would call when she was incontinent. Continues on IV Flagyl. Patient continues to c/o intermittent nausea, no emesis. Poor oral intake despite encouragement, continues on 0.45% + 20 KCl @ 50 ml/hr. Triple lumen central line patent, although white port flushes easily, but no blood return noted. Patient had agreed to get up in chair this afternoon as brother was coming to visit, then refused since brother couldn't come. Remains on enteric isolation for C-diff. Alarms on, call light in reach. Lots of encouragement to take medications, lots of encouragement for activity.     Face to face report given with opportunity to observe patient.    Report given to Tiffanie Mills RN   8/31/2020  3:30 PM    "

## 2020-08-31 NOTE — PROGRESS NOTES
Spoke with Maggie.  Discussed options of going home with home care vs snf for rehab.  She declines snf at this time.  Agreeable to home care.  No preference on agency.  AARNZA signed and faxed.  Reviewed IMM letter.    Spoke with Kellen Ruiz, .  She advised that she has been working with Maggie on a plan for few months now.  Has numerous referrals out in the [facility]/ [place] area with no openings/unable to meet Maggie's needs.  She advised that she will follow up on open referrals and contact writer back.  She advised that Maggie is eligible for PCA services and last she heard on August 10th, was that they were still looking for a new PCA for her.    Dr. Barba updated

## 2020-08-31 NOTE — PLAN OF CARE
"Most recent vitals: /59   Pulse 77   Temp 98.9  F (37.2  C) (Tympanic)   Resp 17   Ht 1.6 m (5' 3\")   SpO2 97%   BMI 31.89 kg/m      Pain has been tolerable this shift. VSS    Patient is A&O x 4. Some anxiety this shift and tearful at times. Did give PRN ativan x 1 for anxiety. Did decrease upon reassessment. Got up to sit in chair and almost immediatly wanted to go back and lay in bed. Needed encouragement to sit in chair for dinner. Pt did sit up in chair for a couple hours this shift and encouraged to order diner. Did order some broth, a pop sicle, and some juice. Had about 240 ml of fluids for diner. Patient also took oral vancomycin once but refused night dosing along with all other medications with the exception of ativan. Did also get up to pivot and use commode and take a couple steps. Assist x 2, Gb and Walker. Incontinent at times also and verbalized she would prefer not to get up. Blanchable redness to buttock, barrier cream applied. Has 1/2 NS + K 20 meq 50 ml/hr.         Comments:      8/30/2020  7:22 PM  Mackenzie Peterson RN    "

## 2020-08-31 NOTE — PHARMACY
[facility]    Pharmacy      Antimicrobial Stewardship Note     Current antimicrobial therapy:  Anti-infectives (From now, onward)    Start     Dose/Rate Route Frequency Ordered Stop    08/30/20 0900  metroNIDAZOLE (FLAGYL) infusion 500 mg      500 mg  100 mL/hr over 60 Minutes Intravenous EVERY 8 HOURS 08/30/20 0839      08/27/20 0900  vancomycin (VANCOCIN) capsule 250 mg      250 mg Oral 4 TIMES DAILY 08/27/20 0640            Indication: C diff     Days of Therapy:   Vancomycin: Day 5  Metronidazole: Day 2      Pertinent labs:  Creatinine   Creatinine   Date Value Ref Range Status   08/31/2020 0.30 (L) 0.52 - 1.04 mg/dL Final   08/30/2020 0.38 (L) 0.52 - 1.04 mg/dL Final   08/29/2020 0.40 (L) 0.52 - 1.04 mg/dL Final     WBC   WBC   Date Value Ref Range Status   08/31/2020 8.6 4.0 - 11.0 10e9/L Final   08/30/2020 9.5 4.0 - 11.0 10e9/L Final   08/29/2020 9.3 4.0 - 11.0 10e9/L Final     Procalcitonin   Procalcitonin   Date Value Ref Range Status   02/25/2018 0.23 ng/ml Final     Comment:     0.05-0.24 ng/ml Low risk of systemic bacterial infection. Local bacterial   infection possible.  Recommendation: Assess other clinical features of   infection. Discourage antibiotics unless strong clinical suspicion for serious   infection.       CRP   CRP Inflammation   Date Value Ref Range Status   08/26/2020 26.3 (H) 0.0 - 8.0 mg/L Final   05/12/2020 32.6 (H) 0.0 - 8.0 mg/L Final   07/15/2018 9.5 (H) 0.0 - 8.0 mg/L Final       Culture Results:        Recommendations/Interventions:  1. Due to inconsistency/noncompliance in taking PO vanco, IV metronidazole added to therapy. Per provider, difficult to determine but likely duration of therapy will be 10 days at minimum. No recommendations at this time. Will continue to monitor.     Miranda Parkinson RPH  August 31, 2020

## 2020-08-31 NOTE — PLAN OF CARE
"Reason for hospital stay:  Cdiff    Most recent vitals: /69 (BP Location: Right arm)   Pulse 93   Temp 98.8  F (37.1  C) (Tympanic)   Resp 16   Ht 1.6 m (5' 3\")   SpO2 97%   BMI 31.89 kg/m      Pain Management:  denies  LOC:  A&O  Cardiac:  WDL  Respiratory:  LS  GI:  BS active x4  :  Voids spontaneously     IVF:  .45 NS w/20K @ 50 m/l  ABX:  Flagyl  Ambulation:stand pivot  Safety:  Call light within reach bed alarm active      Comments:      8/31/2020  6:04 AM  Krystyna Posada RN     Face to face report given with opportunity to observe patient.    Report given to Coby Posada RN   8/31/2020  7:47 AM    "

## 2020-09-01 LAB
ANION GAP SERPL CALCULATED.3IONS-SCNC: 9 MMOL/L (ref 3–14)
BACTERIA SPEC CULT: NORMAL
BASOPHILS # BLD AUTO: 0 10E9/L (ref 0–0.2)
BASOPHILS NFR BLD AUTO: 0.4 %
BUN SERPL-MCNC: 2 MG/DL (ref 7–30)
CALCIUM SERPL-MCNC: 7.9 MG/DL (ref 8.5–10.1)
CHLORIDE SERPL-SCNC: 109 MMOL/L (ref 94–109)
CO2 SERPL-SCNC: 20 MMOL/L (ref 20–32)
CREAT SERPL-MCNC: 0.36 MG/DL (ref 0.52–1.04)
DIFFERENTIAL METHOD BLD: ABNORMAL
EOSINOPHIL # BLD AUTO: 0.4 10E9/L (ref 0–0.7)
EOSINOPHIL NFR BLD AUTO: 4.9 %
ERYTHROCYTE [DISTWIDTH] IN BLOOD BY AUTOMATED COUNT: 13.1 % (ref 10–15)
GFR SERPL CREATININE-BSD FRML MDRD: >90 ML/MIN/{1.73_M2}
GLUCOSE SERPL-MCNC: 83 MG/DL (ref 70–99)
HCT VFR BLD AUTO: 34.7 % (ref 35–47)
HGB BLD-MCNC: 12 G/DL (ref 11.7–15.7)
IMM GRANULOCYTES # BLD: 0.1 10E9/L (ref 0–0.4)
IMM GRANULOCYTES NFR BLD: 0.8 %
LYMPHOCYTES # BLD AUTO: 1.2 10E9/L (ref 0.8–5.3)
LYMPHOCYTES NFR BLD AUTO: 16.1 %
MAGNESIUM SERPL-MCNC: 1.7 MG/DL (ref 1.6–2.3)
MAGNESIUM SERPL-MCNC: 2.3 MG/DL (ref 1.6–2.3)
MCH RBC QN AUTO: 30.8 PG (ref 26.5–33)
MCHC RBC AUTO-ENTMCNC: 34.6 G/DL (ref 31.5–36.5)
MCV RBC AUTO: 89 FL (ref 78–100)
MONOCYTES # BLD AUTO: 0.5 10E9/L (ref 0–1.3)
MONOCYTES NFR BLD AUTO: 6.7 %
NEUTROPHILS # BLD AUTO: 5.5 10E9/L (ref 1.6–8.3)
NEUTROPHILS NFR BLD AUTO: 71.1 %
NRBC # BLD AUTO: 0 10*3/UL
NRBC BLD AUTO-RTO: 0 /100
PLATELET # BLD AUTO: 272 10E9/L (ref 150–450)
POTASSIUM SERPL-SCNC: 3.3 MMOL/L (ref 3.4–5.3)
POTASSIUM SERPL-SCNC: 4 MMOL/L (ref 3.4–5.3)
RBC # BLD AUTO: 3.89 10E12/L (ref 3.8–5.2)
SODIUM SERPL-SCNC: 138 MMOL/L (ref 133–144)
SPECIMEN SOURCE: NORMAL
WBC # BLD AUTO: 7.7 10E9/L (ref 4–11)

## 2020-09-01 PROCEDURE — 84132 ASSAY OF SERUM POTASSIUM: CPT | Performed by: INTERNAL MEDICINE

## 2020-09-01 PROCEDURE — 12000000 ZZH R&B MED SURG/OB

## 2020-09-01 PROCEDURE — 99232 SBSQ HOSP IP/OBS MODERATE 35: CPT | Performed by: INTERNAL MEDICINE

## 2020-09-01 PROCEDURE — 80048 BASIC METABOLIC PNL TOTAL CA: CPT | Performed by: INTERNAL MEDICINE

## 2020-09-01 PROCEDURE — 83735 ASSAY OF MAGNESIUM: CPT | Performed by: INTERNAL MEDICINE

## 2020-09-01 PROCEDURE — 25000128 H RX IP 250 OP 636: Performed by: INTERNAL MEDICINE

## 2020-09-01 PROCEDURE — 25000132 ZZH RX MED GY IP 250 OP 250 PS 637: Performed by: INTERNAL MEDICINE

## 2020-09-01 PROCEDURE — 36415 COLL VENOUS BLD VENIPUNCTURE: CPT | Performed by: INTERNAL MEDICINE

## 2020-09-01 PROCEDURE — 85025 COMPLETE CBC W/AUTO DIFF WBC: CPT | Performed by: INTERNAL MEDICINE

## 2020-09-01 RX ORDER — MAGNESIUM SULFATE HEPTAHYDRATE 40 MG/ML
2 INJECTION, SOLUTION INTRAVENOUS ONCE
Status: COMPLETED | OUTPATIENT
Start: 2020-09-01 | End: 2020-09-01

## 2020-09-01 RX ORDER — POTASSIUM CHLORIDE 7.45 MG/ML
10 INJECTION INTRAVENOUS
Status: COMPLETED | OUTPATIENT
Start: 2020-09-01 | End: 2020-09-01

## 2020-09-01 RX ADMIN — POTASSIUM CHLORIDE 10 MEQ: 7.46 INJECTION, SOLUTION INTRAVENOUS at 11:28

## 2020-09-01 RX ADMIN — NICOTINE 1 PATCH: 7 PATCH, EXTENDED RELEASE TRANSDERMAL at 09:10

## 2020-09-01 RX ADMIN — LORAZEPAM 0.5 MG: 2 INJECTION INTRAMUSCULAR; INTRAVENOUS at 10:35

## 2020-09-01 RX ADMIN — ONDANSETRON 8 MG: 2 INJECTION INTRAMUSCULAR; INTRAVENOUS at 00:25

## 2020-09-01 RX ADMIN — VANCOMYCIN HYDROCHLORIDE 250 MG: 250 CAPSULE ORAL at 14:01

## 2020-09-01 RX ADMIN — METRONIDAZOLE 500 MG: 500 INJECTION, SOLUTION INTRAVENOUS at 05:36

## 2020-09-01 RX ADMIN — FAMOTIDINE 20 MG: 20 INJECTION, SOLUTION INTRAVENOUS at 10:24

## 2020-09-01 RX ADMIN — RIVAROXABAN 20 MG: 20 TABLET, FILM COATED ORAL at 10:19

## 2020-09-01 RX ADMIN — LORAZEPAM 0.5 MG: 2 INJECTION INTRAMUSCULAR; INTRAVENOUS at 04:02

## 2020-09-01 RX ADMIN — LORAZEPAM 0.5 MG: 2 INJECTION INTRAMUSCULAR; INTRAVENOUS at 17:32

## 2020-09-01 RX ADMIN — POTASSIUM CHLORIDE 10 MEQ: 7.46 INJECTION, SOLUTION INTRAVENOUS at 12:35

## 2020-09-01 RX ADMIN — ONDANSETRON 8 MG: 2 INJECTION INTRAMUSCULAR; INTRAVENOUS at 17:32

## 2020-09-01 RX ADMIN — VANCOMYCIN HYDROCHLORIDE 250 MG: 250 CAPSULE ORAL at 10:20

## 2020-09-01 RX ADMIN — FAMOTIDINE 20 MG: 20 INJECTION, SOLUTION INTRAVENOUS at 20:42

## 2020-09-01 RX ADMIN — POTASSIUM CHLORIDE 10 MEQ: 7.46 INJECTION, SOLUTION INTRAVENOUS at 10:24

## 2020-09-01 RX ADMIN — ONDANSETRON 8 MG: 2 INJECTION INTRAMUSCULAR; INTRAVENOUS at 09:01

## 2020-09-01 RX ADMIN — LORAZEPAM 0.5 MG: 2 INJECTION INTRAMUSCULAR; INTRAVENOUS at 22:47

## 2020-09-01 RX ADMIN — POTASSIUM CHLORIDE AND SODIUM CHLORIDE: 450; 150 INJECTION, SOLUTION INTRAVENOUS at 08:50

## 2020-09-01 RX ADMIN — METRONIDAZOLE 500 MG: 500 INJECTION, SOLUTION INTRAVENOUS at 22:47

## 2020-09-01 RX ADMIN — MAGNESIUM SULFATE IN WATER 2 G: 40 INJECTION, SOLUTION INTRAVENOUS at 08:56

## 2020-09-01 RX ADMIN — POTASSIUM CHLORIDE 10 MEQ: 7.46 INJECTION, SOLUTION INTRAVENOUS at 08:54

## 2020-09-01 RX ADMIN — ACETAMINOPHEN 650 MG: 325 TABLET, FILM COATED ORAL at 10:20

## 2020-09-01 RX ADMIN — METRONIDAZOLE 500 MG: 500 INJECTION, SOLUTION INTRAVENOUS at 13:47

## 2020-09-01 ASSESSMENT — ACTIVITIES OF DAILY LIVING (ADL)
ADLS_ACUITY_SCORE: 16
ADLS_ACUITY_SCORE: 15.5
ADLS_ACUITY_SCORE: 15

## 2020-09-01 NOTE — PLAN OF CARE
"Afebrile. VSS. /66 (BP Location: Right arm)   Pulse 94   Temp 98.2  F (36.8  C) (Tympanic)   Resp 18   Ht 1.6 m (5' 3\")   SpO2 98%   BMI 31.89 kg/m   A&O. Assist of 2 walker/gait belt- pt remains in bed throughout the shift. Independently positions. Lung sounds clear, equal bilaterally. No cough or shortness of breath reported. Apical pulse regular, no edema present. BS active x4. Reports intermittent nausea with emesis x2. Administered Zofran IV with adequate results. Pt reports continuing headache- declines PRN reporting it did not help previously. IV infusing 0.45% + 20 KCl @ 50 ml/hr- blue port. Brown flushes with blood return. White flushes without blood return. Incontinent of urine x1. Refused PO dose Ativan d/t vomiting. Administered IV Ativan as scheduled. Potassium- 3.3. Magnesium- 1.7. No falls this shift. Administered Flagyl. Able to make needs known. Call light within reach- calls frequently.            Face to face report given with opportunity to observe patient.    Report given to Gil ZENDEJAS RN/ Josselyn Woods   9/1/2020  7:25 AM      "

## 2020-09-01 NOTE — PLAN OF CARE
VSS as charted, afebrile t/o shift.  Taking in clears, mostly water and lemon lime soda, has 1/2 NS with K20 at 50 mls/hr infusing.   Had 1 incontinent loose stool, brief on, pt called for help but was unable to hold it.  Was up in chair for few hours this evening and was asst x2 to chair and bed, stable on feet.  Gave PO Vanco once due to pt vomiting just before second dose.  Continuing with IV Flagyl and iv Pepcid.  Gave Motrin for headache with little relief, gave tylenol as scheduled.  Makes needs known, call light in reach and alarms in use.  Called maintenance to have heat turned up in room, pt more comfortable.     Pt refused Ativan at 2250.  I also asked her if she would take her dose of oral Vanco and she refused as well.     Face to face report given with opportunity to observe patient.    Report given to Christine Chew RN   8/31/2020  11:16 PM

## 2020-09-01 NOTE — PLAN OF CARE
Patient is alert and oriented. She makes her needs known. Apical pulse is regular. Pedal pulses present and WDL. Lung sounds are clear. Bowel sounds are active x 4 quadrants. She does have some edema to BLE.  She makes multiple, frequent requests. Patient does initially refuse to take PO medications. This writer was able to get her to take PO antibiotics. Patient sticks her finger down her throat and makes herself vomit each time she has to take PO medications. Emesis was a light green color the second time. It was clear the first time. She states that it helps her to not become nauseous after taking them. Patient was also given PRN zofran IV per her request at 0901. She took PO tylenol and xarelto this morning as well. She declined all other oral medications.     Face to face report given with opportunity to observe patient.    Report given to RITESH Cordero RN   9/1/2020  3:16 PM

## 2020-09-02 LAB
ANION GAP SERPL CALCULATED.3IONS-SCNC: 10 MMOL/L (ref 3–14)
BUN SERPL-MCNC: <1 MG/DL (ref 7–30)
CALCIUM SERPL-MCNC: 8.3 MG/DL (ref 8.5–10.1)
CHLORIDE SERPL-SCNC: 109 MMOL/L (ref 94–109)
CO2 SERPL-SCNC: 17 MMOL/L (ref 20–32)
CREAT SERPL-MCNC: 0.37 MG/DL (ref 0.52–1.04)
GFR SERPL CREATININE-BSD FRML MDRD: >90 ML/MIN/{1.73_M2}
GLUCOSE SERPL-MCNC: 84 MG/DL (ref 70–99)
MAGNESIUM SERPL-MCNC: 1.8 MG/DL (ref 1.6–2.3)
POTASSIUM SERPL-SCNC: 3.8 MMOL/L (ref 3.4–5.3)
SODIUM SERPL-SCNC: 136 MMOL/L (ref 133–144)

## 2020-09-02 PROCEDURE — 83735 ASSAY OF MAGNESIUM: CPT | Performed by: INTERNAL MEDICINE

## 2020-09-02 PROCEDURE — 12000000 ZZH R&B MED SURG/OB

## 2020-09-02 PROCEDURE — 25000128 H RX IP 250 OP 636: Performed by: INTERNAL MEDICINE

## 2020-09-02 PROCEDURE — 36415 COLL VENOUS BLD VENIPUNCTURE: CPT | Performed by: INTERNAL MEDICINE

## 2020-09-02 PROCEDURE — 99232 SBSQ HOSP IP/OBS MODERATE 35: CPT | Performed by: INTERNAL MEDICINE

## 2020-09-02 PROCEDURE — 25000132 ZZH RX MED GY IP 250 OP 250 PS 637: Performed by: INTERNAL MEDICINE

## 2020-09-02 PROCEDURE — 80048 BASIC METABOLIC PNL TOTAL CA: CPT | Performed by: INTERNAL MEDICINE

## 2020-09-02 RX ADMIN — METRONIDAZOLE 500 MG: 500 INJECTION, SOLUTION INTRAVENOUS at 13:11

## 2020-09-02 RX ADMIN — POTASSIUM CHLORIDE AND SODIUM CHLORIDE: 450; 150 INJECTION, SOLUTION INTRAVENOUS at 00:28

## 2020-09-02 RX ADMIN — POTASSIUM CHLORIDE AND SODIUM CHLORIDE: 450; 150 INJECTION, SOLUTION INTRAVENOUS at 17:44

## 2020-09-02 RX ADMIN — LORAZEPAM 0.5 MG: 2 INJECTION INTRAMUSCULAR; INTRAVENOUS at 17:42

## 2020-09-02 RX ADMIN — ONDANSETRON 8 MG: 2 INJECTION INTRAMUSCULAR; INTRAVENOUS at 10:06

## 2020-09-02 RX ADMIN — FAMOTIDINE 20 MG: 20 INJECTION, SOLUTION INTRAVENOUS at 21:11

## 2020-09-02 RX ADMIN — NICOTINE 1 PATCH: 7 PATCH, EXTENDED RELEASE TRANSDERMAL at 12:05

## 2020-09-02 RX ADMIN — FAMOTIDINE 20 MG: 20 INJECTION, SOLUTION INTRAVENOUS at 10:10

## 2020-09-02 RX ADMIN — ONDANSETRON 8 MG: 2 INJECTION INTRAMUSCULAR; INTRAVENOUS at 00:28

## 2020-09-02 RX ADMIN — METRONIDAZOLE 500 MG: 500 INJECTION, SOLUTION INTRAVENOUS at 05:15

## 2020-09-02 RX ADMIN — LORAZEPAM 0.5 MG: 2 INJECTION INTRAMUSCULAR; INTRAVENOUS at 11:33

## 2020-09-02 RX ADMIN — METRONIDAZOLE 500 MG: 500 INJECTION, SOLUTION INTRAVENOUS at 22:17

## 2020-09-02 RX ADMIN — LORAZEPAM 0.5 MG: 2 INJECTION INTRAMUSCULAR; INTRAVENOUS at 05:14

## 2020-09-02 ASSESSMENT — ACTIVITIES OF DAILY LIVING (ADL)
ADLS_ACUITY_SCORE: 15
ADLS_ACUITY_SCORE: 15
ADLS_ACUITY_SCORE: 16
ADLS_ACUITY_SCORE: 14
ADLS_ACUITY_SCORE: 14
ADLS_ACUITY_SCORE: 15

## 2020-09-02 NOTE — PLAN OF CARE
Pt VSS as charted.  She is refusing all po meds.  I have given IV Pepcid and IV flagyl.  PO Vanco has not been given this shift.  Had 1 emesis, gave 8 mg IV Zosyn with improvement.  Later in evening, pt called me in the room and said she threw up again but there was no emesis in the basin.  When I asked her where is the vomit, she said someone must have come in the room and emptied it.  She has not taken in and po fluids.  Has 1/2 NS with K20 infusing at 50 mls/hr.  Was up in chair once this shift, got up with asst x2 , moves slowly but stable on feet.   Incontinent of urine and stool x1, has brief on.  Alarms in use and call light in reach.      Face to face report given with opportunity to observe patient.    Report given to Christine Chew RN   9/1/2020  11:08 PM

## 2020-09-02 NOTE — PLAN OF CARE
Face to face report given with opportunity to observe patient.    Report given to MAGDALENA MAHER   9/2/2020  12:18 PM

## 2020-09-02 NOTE — PROGRESS NOTES
[facility]  Hospitalist Progress Note          Assessment and Plan:      Maggie Case is a 30 year old with chronic issues including prior pulmonary embolism with chronic anticoagulation with rivaroxaban, prior CVA with chronic left hemiparesis, and behavioral health issues including anxiety.  She presented with nausea vomiting leukocytosis and fever.  Initial evaluation showed evidence of C. difficile infection.  She had been admitted to this hospital with similar presentation in 2018 and also then found to have C. difficile infection.  She was admitted for further evaluation and management     1.  C. difficile colitis  Persistent nausea:   Able to tolerate several doses of oral vancomycin but quite convinced that this is the primary medication that is worsening her nausea.  Have encouraged her to continue attempt dosing.  Continue intravenous metronidazole.  Some diarrheal stools but overall mild to moderate disease however treatment is complicated by the patient's persistent nausea and her difficulty in taking oral medications.  Likely she will continue to require hospital treatment for 3 times daily intravenous metronidazole.  Nausea in part may be related to her C. difficile although this is been a chronic problem for her.  Have discussed with her again today that C. difficile infection may be severe and that treatment is important for her.  Also discussed the rationale for preferring oral medication.  As below her self-care abilities are quite limited and almost certainly she will need a structured environment following discharge.      She had presented with mild abdominal pain, vomiting, diarrhea. She did not have signs of sepsis on arrival, with findings such as tachycardia more consistent with volume depletion because of GI losses. She has history of chronic nausea and vomiting as well as diarrhea. However stools were more frequent than usual and nausea more severe.  Nausea has been an  ongoing issue.  Difficult to find any particular cause of this and beyond antiemetics I am not sure further investigation will be fruitful.  Encouraging her to be out of bed as much as possible which may be beneficial from the perspective of her overall feeling of wellbeing as well as improving gut function.  2.  Severe Hypokalemia  Hypomagnesemia, resolved:   Potassium levels now under reasonable control.  Continuing intravenous dosing.  Magnesium is at the lower limits of expected but will also replete a low dose of magnesium given nausea and intermittent emesis.  On presentation marked total body potassium depletion but overall its been able to be repleted with expected doses this does suggest that her chronic hypokalemia is more on the basis of difficulties in taking regular dosing orally rather than any renal abnormality.  Earlier in her course treatment of her more acute hypokalemia was complicated in part because she has been quite reluctant to consider any oral potassium replacement and has found burning from peripheral intravenous potassium burdensome.  Central venous access placed 8/27.  We will keep central venous access until its certain she will require further intravenous repletion.  She does have underlying underlying chronic hypokalemia, and notes that she was not taking her supplements for several days at least due to nausea/vomiting.  As above it appears that her hypokalemia will somewhat worsened by GI losses largely matter of her ability or willingness to take oral supplements.  Sustained-release preparation should be reasonably well tolerated and a majority of patients.     3.  Metabolic encephalopathy  This appears likely improved, and she has been awake alert and interactive now for a number of days.  Still reluctant and antiemetics with significant CNS depressant effects.  Earlier in her course she was intermittently quite somnolent and on this basis discontinued prochlorperazine.  On  discussion with her she is not found it to be particularly more effective than other medication.  At least in the last day she is noted that ondansetron and lorazepam has been reasonably effective although she has persistent nausea.  She has been awake and interactive with me over now a number of days while she was markedly somnolent now 3 days ago.  Had decreased dose of lorazepam because of this somnolence.  However she is found this the most effective antiemetic and will continue that.  Discontinued prochlorperazine as this may be more sedating than other agents.       4.  Established left hemiparesis (H):   This is been a persistent issue for her following a CVA 2018     5.  Chronic anticoagulation:   Resumed rivaroxaban, although regular dosing has complicated treatment.  Multiple issues prompting anticoagulation including prior embolic CVA, multiple pulmonary emboli, and DVT.     6.  Borderline personality disorder (H):   She did recently require a inpatient stay on  in July.  Likely personality disorder is complicating other parts of her treatment regimen, but overall under reasonable control.       7.  History of substance abuse:   Unknown whether she has had any recent issues with chemical dependency.  She had recently agree to Rule 25 assessment due to not doing well but so far has not followed through. She is working with [facility]  and [facility].      8.  Irritable bowel syndrome with diarrhea:   This is been a persistent issue for her although does not appear worse than her baseline.  May contribute to her nausea.  Intravenous famotidine given difficulties in taking oral medicines although I am not certain there is any significant upper GI issues addressed with cyto-protection.     9.  Self-care limitation  This is emergent especially over the last several days as a more prominent issue.   and social workers have been investigating extensively the options available to her.  It  certainly appears currently that she did require a structured environment for even a minimally safe discharge plan.        DVT Prophylaxis: Rivaroxaban  Code Status: No CPR- Do NOT Intubate     Disposition: Expected discharge difficult to determine.  Currently oral intake inadequate and she still needs IV crystalloid although at a low rate.  Regular use of medications is extremely erratic.  Social workers/case management working extensively with available options which unfortunately are limited.  At the current time it appears that returning home with home care including PCA may be her only reasonable option.  She may therefore require in-hospital treatment at least until either full course of intravenous antibiotics or she is able to tolerate oral vancomycin on a consistent basis.         Interval History:   Patient still with N/V but states she is starting to feel better.  No acute issues overnight.              Medications:       acetaminophen  650 mg Oral TID     escitalopram  10 mg Oral Daily     famotidine  20 mg Intravenous BID     gabapentin  900 mg Oral TID     lactobacillus rhamnosus (GG)  1 capsule Oral BID     lamoTRIgine  25 mg Oral Daily     LORazepam  0.5 mg Intravenous Q6H    Or     LORazepam  0.25-0.5 mg Oral Q6H     metroNIDAZOLE  500 mg Intravenous Q8H     nicotine  1 patch Topical Daily     nicotine   Transdermal Q8H     potassium chloride ER  10 mEq Oral TID     rivaroxaban ANTICOAGULANT  20 mg Oral Daily with breakfast     sodium chloride (PF)  10 mL Intracatheter Q8H     vancomycin  250 mg Oral 4x Daily                  Physical Exam:     Vitals:    09/02/20 0023 09/02/20 0513 09/02/20 0812 09/02/20 1150   BP: 99/63 109/73 96/59 97/54   BP Location: Right arm Right arm Right arm    Pulse: 85  92 91   Resp: 18 18 16 16   Temp: 98.4  F (36.9  C) 97.5  F (36.4  C) 99.1  F (37.3  C) 98  F (36.7  C)   TempSrc: Tympanic Tympanic Tympanic Tympanic   SpO2: 97% 98% 98% 98%   Height:             Vital  Sign Ranges  Temperature Temp  Av.2  F (36.8  C)  Min: 97.5  F (36.4  C)  Max: 99.1  F (37.3  C)   Blood pressure Systolic (24hrs), Av , Min:96 , Max:109        Diastolic (24hrs), Av, Min:54, Max:73      Pulse Pulse  Av.4  Min: 79  Max: 92   Respirations Resp  Av.7  Min: 16  Max: 18   Pulse oximetry SpO2  Av %  Min: 97 %  Max: 99 %         Intake/Output Summary (Last 24 hours) at 2020 1410  Last data filed at 2020 1145  Gross per 24 hour   Intake 2290 ml   Output 100 ml   Net 2190 ml       Constitutional: Awake and interactive. Clear speech.  Not distractible  Respiratory:     Aeration to bases with aeration mildly globally diminished..  Accessory muscles not in use.  Expiratory time not increased.  No tidal wheeze.  No crackles.  Few scattered basilar rhonchi.  Cardiovascular: PMI not displaced.  Heart tones distinct.  S1/S2 unremarkable.  No murmurs.  GI: Soft,nontender to palpation or percussion.  Bowel sounds in all quadrants slightly hypoactive.  Skin/Integumen: Extremities warm distally.  Brisk capillary refill.  No rashes, open areas of bruising  Neuro: Mental status above.  Motor 4-5/5 and bilaterally equal.  No tremors or fasciculations.    CVC Triple Lumen 20 Internal jugular (Active)   Site Assessment WDL 2015   External Cath Length (cm) 13.5 cm 20 0000   Dressing Intervention Chlorhexidine sponge 20 0915   Dressing Change Due 20 1600   Lumen A - Color BLUE 20 0915   Lumen A - Status infusing 20 0915   Lumen B - Color BROWN 2015   Lumen B - Status infusing 20 0915   Lumen C - Color WHITE 2015   Lumen C - Status saline locked 20 0915   Number of days: 6     Line/device assessment(s) completed for medical necessity             Data:     Lab Results   Component Value Date    WBC 7.7 2020    HGB 12.0 2020    HCT 34.7 (L) 2020     2020     2020     POTASSIUM 3.8 09/02/2020    CHLORIDE 109 09/02/2020    CO2 17 (L) 09/02/2020    BUN <1 (L) 09/02/2020    CR 0.37 (L) 09/02/2020    GLC 84 09/02/2020    SED 14 03/19/2017    DD <0.3 07/19/2020    DIMER <200 04/23/2018    NTBNPI 51 05/06/2016    TROPI <0.015 08/13/2018    AST 30 08/31/2020    ALT 19 08/31/2020    ALKPHOS 74 08/31/2020    BILITOTAL 0.6 08/31/2020    INR 1.04 07/15/2018     Lactic Acid   Date Value Ref Range Status   08/27/2020 2.3 (H) 0.7 - 2.0 mmol/L Final     Comment:     Significant value called to and read back by  MAGDALENA HATHAWAY AT 0539 ON 08/27/2020 BY AMB     05/23/2020 1.4 0.7 - 2.0 mmol/L Final   05/22/2020 2.7 (H) 0.7 - 2.0 mmol/L Final     Comment:     Significant value called to and read back by  MARKEL SHIELDS AT 1614 ON 05/22/2020 BY AMB         Joey Quan, DO

## 2020-09-02 NOTE — PROGRESS NOTES
Checked in with Maggie. She states that she isn't feeling well today.  She indicates her goal is to discharge home.  She states that she has a PCA that is willing to come in and help her.  She believes they will able to come in a couple times a week and states that this will be enough for her.  Still agreeable to home care.

## 2020-09-02 NOTE — PROGRESS NOTES
"CLINICAL NUTRITION SERVICES    Maggie Case : follow up    NUTRITION RECOMMENDATIONS  - Trial Ensure Clear TID  -Consider nutrition support if pt's intake continues to be inadequate      30 yof admitted for intractable nausea and vomiting. Pt has a hx of B 12 deficiency, lactose intolerance, stroke, pulmonary embolism, chronic diarrhea, chemical dependency, anemia, anxiety, OCD. Pt sleeping during attempts to visit. Intake has been poor this admission. Essentially NPO/ Clear liquid for a week. No new weight documented this admission. May need to consider nutrition support if pt continues to have limited intake.    Diet Order: clear, regular, low saturated fat/low cholesterol  Intake: 25% x 1 meal documented      Height: 5' 3\"  Weight: 180 lbs 0 oz - 7/22/20  Body mass index is 31.89 kg/m .  Weight Status:  Obesity Grade I BMI 30-34.9  IBW:52.4 kg (115 lb 8.3 oz)  Weight History:       Wt Readings from Last 10 Encounters:   07/22/20 81.6 kg (180 lb)   07/03/20 80.7 kg (178 lb)   05/22/20 73.3 kg (161 lb 9.6 oz)   11/05/18 55.3 kg (122 lb)   09/18/18 56.2 kg (124 lb)   08/13/18 54.2 kg (119 lb 7.8 oz)   07/30/18 61.2 kg (135 lb)   07/15/18 61.8 kg (136 lb 3.9 oz)   06/24/18 63.5 kg (140 lb)   06/22/18 63.5 kg (139 lb 15.9 oz)         Estimated Energy Needs: 2811-6674 kcals ([place] St Jeor- stress factor 1.1-1.3) most recent weight 81.6kg  Estimated Protein Needs: 50-60g grams protein (1-1.2 g pro/Kg)    Malnutrition Diagnosis: Patient does not meet two of the above criteria necessary for diagnosing malnutrition. Increased risk for malnutrition with poor oral intake.    NUTRITION DIAGNOSIS:  Inadequate oral intake related to poor oral intake for the past week as evidenced by one meal documented at 25% and pt primarily only taking clear liquids.      MONITORING AND EVALUATION:  RD will monitor intake, weight, labs          "

## 2020-09-02 NOTE — PLAN OF CARE
Alert and oriented.  Lungs clear.  Pulse regular.  Has refused all oral intake except sips of water.  Encouraged oral fluids.  Refused all oral medications, risks explained to patient.  Continues Flagyl IV but refused po Vanco.  Up to chair this morning..  Central line intact to right side of neck.  Medicated with Zofran IV x1 for nausea.  Emesis x1, small amount bile and saliva.        Face to face report given with opportunity to observe patient.    Report given to Leah and Gil Stephens, RN   9/2/2020  3:05 PM

## 2020-09-02 NOTE — PLAN OF CARE
"Afebrile. VSS. /73 (BP Location: Right arm)   Pulse 85   Temp 97.5  F (36.4  C) (Tympanic)   Resp 18   Ht 1.6 m (5' 3\")   SpO2 98%   BMI 31.89 kg/m   A&O. Denies any pain this shift. Lung sound clear, equal bilaterally. No cough or shortness of breath reported. Apical pulse regular, no edema present. BS active x4- emesis x1 with continuous nausea. Administered Zofran x1. Pt up to BSC x1. Incontinent of urine x1. IV infusing 0.45% + 20 KCl @ 50 ml/hr- blue port. Brown flushes with blood return. White flushes without blood return.  Flagyl administered x1. Skin intact with scattered bruising. Potassium at 3.8. Magnesium at 1.8- no replacementment administered. Call light within reach- calls frequently.     Face to face report given with opportunity to observe patient.    Report given to Josselyn UREÑA RN/ Nursing students    Christine Woods   9/2/2020  7:23 AM          "

## 2020-09-03 VITALS
TEMPERATURE: 97.3 F | HEART RATE: 100 BPM | DIASTOLIC BLOOD PRESSURE: 70 MMHG | BODY MASS INDEX: 31.89 KG/M2 | OXYGEN SATURATION: 98 % | SYSTOLIC BLOOD PRESSURE: 106 MMHG | RESPIRATION RATE: 20 BRPM | HEIGHT: 63 IN

## 2020-09-03 PROCEDURE — 25000128 H RX IP 250 OP 636: Performed by: INTERNAL MEDICINE

## 2020-09-03 PROCEDURE — 99238 HOSP IP/OBS DSCHRG MGMT 30/<: CPT | Performed by: INTERNAL MEDICINE

## 2020-09-03 PROCEDURE — 25000132 ZZH RX MED GY IP 250 OP 250 PS 637: Mod: GY | Performed by: INTERNAL MEDICINE

## 2020-09-03 RX ORDER — ACETAMINOPHEN 325 MG/1
325-650 TABLET ORAL EVERY 6 HOURS PRN
Qty: 1 BOTTLE | Refills: 0 | Status: SHIPPED | OUTPATIENT
Start: 2020-09-03

## 2020-09-03 RX ORDER — VANCOMYCIN HYDROCHLORIDE 250 MG/1
250 CAPSULE ORAL 4 TIMES DAILY
Qty: 40 CAPSULE | Refills: 0 | Status: ON HOLD | OUTPATIENT
Start: 2020-09-03 | End: 2020-09-11

## 2020-09-03 RX ORDER — LACTOBACILLUS RHAMNOSUS GG 10B CELL
1 CAPSULE ORAL 2 TIMES DAILY
Qty: 60 CAPSULE | Refills: 0 | Status: SHIPPED | OUTPATIENT
Start: 2020-09-03

## 2020-09-03 RX ADMIN — POTASSIUM CHLORIDE 10 MEQ: 750 CAPSULE, EXTENDED RELEASE ORAL at 10:00

## 2020-09-03 RX ADMIN — FAMOTIDINE 20 MG: 20 INJECTION, SOLUTION INTRAVENOUS at 10:04

## 2020-09-03 RX ADMIN — METRONIDAZOLE 500 MG: 500 INJECTION, SOLUTION INTRAVENOUS at 05:53

## 2020-09-03 RX ADMIN — LORAZEPAM 0.5 MG: 2 INJECTION INTRAMUSCULAR; INTRAVENOUS at 00:07

## 2020-09-03 RX ADMIN — LORAZEPAM 0.5 MG: 2 INJECTION INTRAMUSCULAR; INTRAVENOUS at 05:53

## 2020-09-03 RX ADMIN — NICOTINE 1 PATCH: 7 PATCH, EXTENDED RELEASE TRANSDERMAL at 10:03

## 2020-09-03 ASSESSMENT — ACTIVITIES OF DAILY LIVING (ADL)
ADLS_ACUITY_SCORE: 15
ADLS_ACUITY_SCORE: 15
ADLS_ACUITY_SCORE: 16
ADLS_ACUITY_SCORE: 15

## 2020-09-03 NOTE — PLAN OF CARE
Face to face report given with opportunity to observe patient.    Report given to RITESH Diaz RN   9/3/2020  7:15 AM

## 2020-09-03 NOTE — PLAN OF CARE
Patient discharged at 12:16 PM via wheel chair accompanied by  staff. Prescriptions sent to patients preferred pharmacy. All belongings sent with patient.     Discharge instructions reviewed with Patient. Listed belongings gathered and returned to patient. Yes    Patient discharged to Home AMA.       Core Measures and Vaccines  Core Measures applicable during stay: No. If yes, state diagnosis:   Pneumonia and Influenza given prior to discharge, if indicated: N/A    Surgical Patient   Surgical Procedures during stay: NO  Did patient receive discharge instruction on wound care and recognition of infection symptoms? N/A    MISC  Follow up appointment made: No, patient left AMA.   Home and hospital aquired medications returned to patient: N/A  Patient reports pain was well managed at discharge: Yes

## 2020-09-03 NOTE — DISCHARGE SUMMARY
Admit Date:     08/26/2020   Discharge Date:     09/03/2020     Admission Diagnosis; N/V and abdominal pain  Discharge Diagnosis: C. Diff colitis     PRIMARY CARE PHYSICIAN:  Chapo Flores MD      Note that this is an AMA discharge, as the patient is leaving against medical advice.      HOSPITAL COURSE:  Ms. Case is a 30-year-old female with multiple chronic issues, including pulmonary embolism on rivaroxaban and a prior CVA with left hemiparesis, along with behavioral issues and anxiety.  She did present with nausea and vomiting and was found to have a C. diff infection.      Unfortunately, during this hospitalization, she did not tolerate nor would take oral vancomycin.  Intravenous vancomycin was requested; however, it is doubtful that this had any effect as she continues to have diarrhea.  Nevertheless, and despite this AMA discharge, the patient will be given a prescription for 10 days of oral vancomycin.  She did take a handful of doses during this hospitalization, but obviously not enough to eradicate this colitis.      In addition, she had some electrolyte abnormalities, including hypokalemia and hypomagnesemia, both of which were corrected and resolved.      The patient was quite lethargic initially, but has been less somnolent in the last couple days.  It was thought that she had a metabolic encephalopathy, which was multifactorial and also compounded by her history of CVA and psychiatric history, including borderline personality disorder.      The patient otherwise will continue on anticoagulation for history of PE and will leave today AMA.      DISCHARGE PHYSICAL EXAMINATION:   VITAL SIGNS:  Heart rate 100, blood pressure 106/70, respirations 20, O2 sat 98%, temperature 97.3.   GENERAL:  The patient is alert and oriented.  She is not completely cooperative with the exam.   HEART:  Regular rate and rhythm.  No murmurs.   LUNGS:  Clear.   ABDOMEN:  Slightly distended with positive bowel sounds.    EXTREMITIES:  Trace lower extremity edema.     NEUROLOGIC:  Left hemiparesis noted.        LABORATORY AT TIME OF DISCHARGE:  Most recent labs were obtained yesterday showing a sodium 136, potassium 3.8, BUN of less than 1, and creatinine 0.37.  Magnesium was 1.8.  CBC was last done 2 days ago, which was within normal limits.      DISCHARGE INSTRUCTIONS:   CODE STATUS:  FULL.   DIET:  As tolerated.   ACTIVITY:  As tolerated.      FOLLOWUP APPOINTMENTS AND REFERRALS:  The patient should follow up with her PCP, Dr. Chapo Flores, in the next 7-10 days.         SON MCCLOUD DO             D: 2020   T: 2020   MT: JOSÉ MIGUEL      Name:     NADIR GARCIA   MRN:      [id number]        Account:        [id number]   :      1990           Admit Date:     2020                                  Discharge Date: 2020      Document: [id number]       cc: Chapo Flores MD

## 2020-09-03 NOTE — PROGRESS NOTES
[facility]  Hospitalist Progress Note          Assessment and Plan:   Maggie Case is a 30 year old with chronic issues including prior pulmonary embolism with chronic anticoagulation with rivaroxaban, prior CVA with chronic left hemiparesis, and behavioral health issues including anxiety.  She presented with nausea vomiting leukocytosis and fever.  Initial evaluation showed evidence of C. difficile infection.  She had been admitted to this hospital with similar presentation in 2018 and also then found to have C. difficile infection.  She was admitted for further evaluation and management     1.  C. difficile colitis  Persistent nausea:   Able to tolerate several doses of oral vancomycin but quite convinced that this is the primary medication that is worsening her nausea.  Have encouraged her to continue attempt dosing.  Continue intravenous metronidazole.  Some diarrheal stools but overall mild to moderate disease however treatment is complicated by the patient's persistent nausea and her difficulty in taking oral medications.  Likely she will continue to require hospital treatment for 3 times daily intravenous metronidazole.  Nausea in part may be related to her C. difficile although this is been a chronic problem for her.  Discussed the rationale for preferring oral medication.  As below her self-care abilities are quite limited and almost certainly she will need a structured environment following discharge.        2.  Severe Hypokalemia  Hypomagnesemia, resolved:   Potassium levels now under reasonable control.  Continuing intravenous dosing. Of both as needed.       3.  Metabolic encephalopathy  This appears likely improved, and she has been awake alert and interactive now for a number of days.  Still reluctant and antiemetics with significant CNS depressant effects.  Earlier in her course she was intermittently quite somnolent and on this basis discontinued prochlorperazine.  On discussion with  her she is not found it to be particularly more effective than other medication.  She has noted that ondansetron and lorazepam has been reasonably effective although she has persistent nausea.  She has been awake and interactive with me over now a number of days while she was markedly somnolent now 3 days ago.  Had decreased dose of lorazepam because of this somnolence.  However she is found this the most effective antiemetic and will continue that.  Discontinued prochlorperazine.       4.  Established left hemiparesis (H):   This is been a persistent issue for her following a CVA 2018     5.  Chronic anticoagulation:   Resumed rivaroxaban, although regular dosing has complicated treatment.  Multiple issues prompting anticoagulation including prior embolic CVA, multiple pulmonary emboli, and DVT.     6.  Borderline personality disorder (H):   She did recently require a inpatient stay on  in July.  Likely personality disorder is complicating other parts of her treatment regimen, but overall under reasonable control.       7.  History of substance abuse:   Unknown whether she has had any recent issues with chemical dependency.  She had recently agree to Rule 25 assessment due to not doing well but so far has not followed through. She is working with [facility]  and [facility].      8.  Irritable bowel syndrome with diarrhea:   This is been a persistent issue for her although does not appear worse than her baseline.  May contribute to her nausea.  Intravenous famotidine given difficulties in taking oral medicines.     9.  Self-care limitation  This is emergent especially over the last several days as a more prominent issue.   and social workers have been investigating extensively the options available to her.  It certainly appears currently that she did require a structured environment for even a minimally safe discharge plan.        DVT Prophylaxis: Rivaroxaban  Code Status: No CPR- Do NOT  Intubate     Disposition: Expected discharge difficult to determine.  Currently oral intake inadequate and she still needs IV crystalloid although at a low rate.  Regular use of medications is extremely erratic.  Social workers/case management working extensively with available options which unfortunately are limited.  At the current time it appears that returning home with home care including PCA may be her only reasonable option.  She may therefore require in-hospital treatment at least until either full course of intravenous antibiotics or she is able to tolerate oral vancomycin on a consistent basis.           Interval History:   Patient remains incontinent of urine and stool and refuses to use toilet.  She continues to have watery stools. Nauseated today but no vomiting this morning.                Medications:       acetaminophen  650 mg Oral TID     escitalopram  10 mg Oral Daily     famotidine  20 mg Intravenous BID     gabapentin  900 mg Oral TID     lactobacillus rhamnosus (GG)  1 capsule Oral BID     lamoTRIgine  25 mg Oral Daily     LORazepam  0.5 mg Intravenous Q6H    Or     LORazepam  0.25-0.5 mg Oral Q6H     metroNIDAZOLE  500 mg Intravenous Q8H     nicotine  1 patch Topical Daily     nicotine   Transdermal Q8H     potassium chloride ER  10 mEq Oral TID     rivaroxaban ANTICOAGULANT  20 mg Oral Daily with breakfast     sodium chloride (PF)  10 mL Intracatheter Q8H     vancomycin  250 mg Oral 4x Daily                  Physical Exam:     Vitals:    20 1150 20 1751 20 0002 20 0549   BP: 97/54 121/59 104/66 98/56   BP Location:  Right arm Right arm Right arm   Pulse: 91 78 86 96   Resp: 16 16 18 20   Temp: 98  F (36.7  C) 98.4  F (36.9  C) 99.1  F (37.3  C) 99  F (37.2  C)   TempSrc: Tympanic Tympanic Tympanic Tympanic   SpO2: 98% 99% 99% 97%   Height:             Vital Sign Ranges  Temperature Temp  Av.7  F (37.1  C)  Min: 98  F (36.7  C)  Max: 99.1  F (37.3  C)   Blood  pressure Systolic (24hrs), Av , Min:96 , Max:121        Diastolic (24hrs), Av, Min:54, Max:66      Pulse Pulse  Av.6  Min: 78  Max: 96   Respirations Resp  Av.2  Min: 16  Max: 20   Pulse oximetry SpO2  Av.2 %  Min: 97 %  Max: 99 %         Intake/Output Summary (Last 24 hours) at 9/3/2020 0710  Last data filed at 2020 2200  Gross per 24 hour   Intake 1297 ml   Output 100 ml   Net 1197 ml       General:  Alert and Orientated.  NAD.  Heart:  RRR, S1 S2, No murmurs, no rubs, no gallops.  Resp: CTA bilaterally.  No wheezes, no rhonchi, no crackles.  Abd: Soft/NT/ND/Positve BS.  Ext: No edema noted in either lower extremity  Neuro:  Left hemiparesis    CVC Triple Lumen 20 Internal jugular (Active)   Site Assessment WDL 20 0550   External Cath Length (cm) 13.5 cm 20 0000   Dressing Intervention Chlorhexidine sponge;Transparent 20 0000   Dressing Change Due 20 0000   Lumen A - Color BLUE 20 0550   Lumen A - Status infusing 20 0550   Lumen B - Color BROWN 20 0550   Lumen B - Status infusing 20 0550   Lumen C - Color WHITE 20 0550   Lumen C - Status saline locked 20 0550   Extravasation? No 20 0000   Number of days: 7     Line/device assessment(s) completed for medical necessity             Data:     Lab Results   Component Value Date    WBC 7.7 2020    HGB 12.0 2020    HCT 34.7 (L) 2020     2020     2020    POTASSIUM 3.8 2020    CHLORIDE 109 2020    CO2 17 (L) 2020    BUN <1 (L) 2020    CR 0.37 (L) 2020    GLC 84 2020    SED 14 2017    DD <0.3 2020    DIMER <200 2018    NTBNPI 51 2016    TROPI <0.015 2018    AST 30 2020    ALT 19 2020    ALKPHOS 74 2020    BILITOTAL 0.6 2020    INR 1.04 07/15/2018     Lactic Acid   Date Value Ref Range Status   2020 2.3 (H) 0.7 - 2.0 mmol/L Final      Comment:     Significant value called to and read back by  MAGDALENA HATHAWAY AT 0539 ON 08/27/2020 BY DOMINGO     05/23/2020 1.4 0.7 - 2.0 mmol/L Final   05/22/2020 2.7 (H) 0.7 - 2.0 mmol/L Final     Comment:     Significant value called to and read back by  MARKEL SHIELDS AT 1614 ON 05/22/2020 BY DOMINGO Quan, DO

## 2020-09-03 NOTE — PLAN OF CARE
Patient is alert and oriented, calls appropriately. Has been refusing any and all PO medications today but will take IV medications, when offered scheduled Ativan she states she only wants it if she can have it IV. No BM's this and no emesis this shift but still reporting nausea. She stated the Zofran doesn't help. VSS. Denies pain. Patient appears to be unmotivated, refused to eat and states she has been taking small sips of water throughout the day. Blue and brown lines infusing and white is saline locked. Patient would like a phone  but has no way of contacting her brother to bring it since she doesn't know the number.

## 2020-09-03 NOTE — PLAN OF CARE
"  Most recent vitals: /70 (BP Location: Right arm)   Pulse 100   Temp 97.3  F (36.3  C) (Tympanic)   Resp 20   Ht 1.6 m (5' 3\")   SpO2 98%   BMI 31.89 kg/m      Pain Management:  Denies pain.  LOC:  A&O x 4.   Cardiac:  WDL  Respiratory:  LS clear equally and bilaterally. Denies SOB.   GI:  BS active x 4. Loose stools.   :  Incontinent. Denies symptoms.   Skin Issues:  Intact    IVF:  Central line removed. Tip intact. Patient tolerated well.     Nutrition: Clear liquids  ADL's:  Assist x 1-2.  Ambulation:Assist x 1-2.         Comments: Patient leaving AMA. Paper work signed. Patient encouraged to stay to receive ABX. Patient declines care and verbalizes to go home. Oral vanco sent to [facility] for patient. Patient aware.       9/3/2020  12:09 PM  Mackenzie Peterson RN    "

## 2020-09-03 NOTE — PLAN OF CARE
VSS, afebrile, HRR, lungs clear, bowels active. Pt does report continuous nausea, and just wanted to sleep. Pt denies pain. Given scheduled medications this shift. Pt slept, only calling 1x to be changed, pt incontinent of a large amount of urine x1, and a large amount of watery brown-green stool this morning. Pt does not attempt to be continent, when asked if she would like to use toilet multiple times during shift, she flatly refused, and ignores the question. Central line infusing in brown and blue lines, white line is SL. Pt is alert and oriented x 4, and makes needs known, uses the call light appropriately.

## 2020-09-03 NOTE — PLAN OF CARE
Face to face report given with opportunity to observe patient.    Report given to RITESH Mobley RN   9/2/2020  11:30 PM

## 2020-09-04 ENCOUNTER — HOSPITAL ENCOUNTER (INPATIENT)
Facility: HOSPITAL | Age: 30
LOS: 7 days | Discharge: HOME | DRG: 372 | End: 2020-09-11
Attending: NURSE PRACTITIONER | Admitting: INTERNAL MEDICINE
Payer: MEDICARE

## 2020-09-04 DIAGNOSIS — R19.7 DIARRHEA, UNSPECIFIED TYPE: ICD-10-CM

## 2020-09-04 DIAGNOSIS — G47.00 INSOMNIA, UNSPECIFIED TYPE: ICD-10-CM

## 2020-09-04 DIAGNOSIS — E87.6 HYPOKALEMIA: ICD-10-CM

## 2020-09-04 DIAGNOSIS — Z86.711 PERSONAL HISTORY OF PE (PULMONARY EMBOLISM): ICD-10-CM

## 2020-09-04 DIAGNOSIS — E87.6 CHRONIC HYPOKALEMIA: ICD-10-CM

## 2020-09-04 DIAGNOSIS — M79.10 MUSCLE PAIN: ICD-10-CM

## 2020-09-04 DIAGNOSIS — A04.72 COLITIS DUE TO CLOSTRIDIUM DIFFICILE: ICD-10-CM

## 2020-09-04 DIAGNOSIS — G62.9 NEUROPATHY: ICD-10-CM

## 2020-09-04 DIAGNOSIS — F33.9 EPISODE OF RECURRENT MAJOR DEPRESSIVE DISORDER, UNSPECIFIED DEPRESSION EPISODE SEVERITY (H): ICD-10-CM

## 2020-09-04 DIAGNOSIS — K06.8 PAIN IN GUMS: ICD-10-CM

## 2020-09-04 DIAGNOSIS — F41.9 ANXIETY AND DEPRESSION: Primary | ICD-10-CM

## 2020-09-04 DIAGNOSIS — E73.9 INTESTINAL DISACCHARIDASE DEFICIENCY AND DISACCHARIDE MALABSORPTION: ICD-10-CM

## 2020-09-04 DIAGNOSIS — K92.2 ACUTE UPPER GI BLEED: ICD-10-CM

## 2020-09-04 DIAGNOSIS — F31.9 BIPOLAR DISORDER (H): ICD-10-CM

## 2020-09-04 DIAGNOSIS — A04.72 CLOSTRIDIUM DIFFICILE COLITIS: ICD-10-CM

## 2020-09-04 DIAGNOSIS — R45.851 SUICIDAL IDEATION: ICD-10-CM

## 2020-09-04 DIAGNOSIS — E83.42 HYPOMAGNESEMIA: ICD-10-CM

## 2020-09-04 DIAGNOSIS — F17.200 TOBACCO USE DISORDER: ICD-10-CM

## 2020-09-04 DIAGNOSIS — R11.0 NAUSEA: ICD-10-CM

## 2020-09-04 DIAGNOSIS — A04.72 C. DIFFICILE COLITIS: ICD-10-CM

## 2020-09-04 DIAGNOSIS — F32.A ANXIETY AND DEPRESSION: Primary | ICD-10-CM

## 2020-09-04 DIAGNOSIS — F41.1 ANXIETY STATE: ICD-10-CM

## 2020-09-04 DIAGNOSIS — F33.2 SEVERE EPISODE OF RECURRENT MAJOR DEPRESSIVE DISORDER, WITHOUT PSYCHOTIC FEATURES (H): ICD-10-CM

## 2020-09-04 DIAGNOSIS — D51.8 OTHER VITAMIN B12 DEFICIENCY ANEMIA: ICD-10-CM

## 2020-09-04 DIAGNOSIS — Z72.0 TOBACCO ABUSE: ICD-10-CM

## 2020-09-04 PROBLEM — F32.2 CURRENT SEVERE EPISODE OF MAJOR DEPRESSIVE DISORDER (H): Status: ACTIVE | Noted: 2020-09-04

## 2020-09-04 LAB
ALBUMIN SERPL-MCNC: 3.4 G/DL (ref 3.4–5)
ALP SERPL-CCNC: 95 U/L (ref 40–150)
ALT SERPL W P-5'-P-CCNC: 21 U/L (ref 0–50)
ANION GAP SERPL CALCULATED.3IONS-SCNC: 17 MMOL/L (ref 3–14)
APAP SERPL-MCNC: <2 MG/L (ref 10–20)
AST SERPL W P-5'-P-CCNC: 24 U/L (ref 0–45)
BASOPHILS # BLD AUTO: 0 10E9/L (ref 0–0.2)
BASOPHILS NFR BLD AUTO: 0.2 %
BILIRUB SERPL-MCNC: 0.6 MG/DL (ref 0.2–1.3)
BUN SERPL-MCNC: 3 MG/DL (ref 7–30)
CALCIUM SERPL-MCNC: 8.9 MG/DL (ref 8.5–10.1)
CHLORIDE SERPL-SCNC: 102 MMOL/L (ref 94–109)
CO2 SERPL-SCNC: 15 MMOL/L (ref 20–32)
CREAT SERPL-MCNC: 0.38 MG/DL (ref 0.52–1.04)
DIFFERENTIAL METHOD BLD: ABNORMAL
EOSINOPHIL # BLD AUTO: 0.4 10E9/L (ref 0–0.7)
EOSINOPHIL NFR BLD AUTO: 3 %
ERYTHROCYTE [DISTWIDTH] IN BLOOD BY AUTOMATED COUNT: 13.7 % (ref 10–15)
ETHANOL SERPL-MCNC: <0.01 G/DL
GFR SERPL CREATININE-BSD FRML MDRD: >90 ML/MIN/{1.73_M2}
GLUCOSE SERPL-MCNC: 103 MG/DL (ref 70–99)
HCT VFR BLD AUTO: 45.5 % (ref 35–47)
HGB BLD-MCNC: 15.5 G/DL (ref 11.7–15.7)
IMM GRANULOCYTES # BLD: 0 10E9/L (ref 0–0.4)
IMM GRANULOCYTES NFR BLD: 0.3 %
LYMPHOCYTES # BLD AUTO: 2 10E9/L (ref 0.8–5.3)
LYMPHOCYTES NFR BLD AUTO: 15.2 %
MAGNESIUM SERPL-MCNC: 1.5 MG/DL (ref 1.6–2.3)
MCH RBC QN AUTO: 31.6 PG (ref 26.5–33)
MCHC RBC AUTO-ENTMCNC: 34.1 G/DL (ref 31.5–36.5)
MCV RBC AUTO: 93 FL (ref 78–100)
MONOCYTES # BLD AUTO: 0.9 10E9/L (ref 0–1.3)
MONOCYTES NFR BLD AUTO: 6.9 %
NEUTROPHILS # BLD AUTO: 10 10E9/L (ref 1.6–8.3)
NEUTROPHILS NFR BLD AUTO: 74.4 %
NRBC # BLD AUTO: 0 10*3/UL
NRBC BLD AUTO-RTO: 0 /100
PLATELET # BLD AUTO: 343 10E9/L (ref 150–450)
POTASSIUM SERPL-SCNC: 2.7 MMOL/L (ref 3.4–5.3)
PROT SERPL-MCNC: 6.9 G/DL (ref 6.8–8.8)
RBC # BLD AUTO: 4.91 10E12/L (ref 3.8–5.2)
SALICYLATES SERPL-MCNC: <2 MG/DL
SODIUM SERPL-SCNC: 134 MMOL/L (ref 133–144)
TSH SERPL DL<=0.005 MIU/L-ACNC: 1.64 MU/L (ref 0.4–4)
WBC # BLD AUTO: 13.5 10E9/L (ref 4–11)

## 2020-09-04 PROCEDURE — 25000128 H RX IP 250 OP 636: Performed by: NURSE PRACTITIONER

## 2020-09-04 PROCEDURE — 12000000 ZZH R&B MED SURG/OB

## 2020-09-04 PROCEDURE — 93005 ELECTROCARDIOGRAM TRACING: CPT

## 2020-09-04 PROCEDURE — 85025 COMPLETE CBC W/AUTO DIFF WBC: CPT | Performed by: NURSE PRACTITIONER

## 2020-09-04 PROCEDURE — 99223 1ST HOSP IP/OBS HIGH 75: CPT | Mod: AI | Performed by: INTERNAL MEDICINE

## 2020-09-04 PROCEDURE — 99285 EMERGENCY DEPT VISIT HI MDM: CPT

## 2020-09-04 PROCEDURE — 80329 ANALGESICS NON-OPIOID 1 OR 2: CPT | Performed by: NURSE PRACTITIONER

## 2020-09-04 PROCEDURE — 80053 COMPREHEN METABOLIC PANEL: CPT | Performed by: NURSE PRACTITIONER

## 2020-09-04 PROCEDURE — 25000132 ZZH RX MED GY IP 250 OP 250 PS 637: Performed by: NURSE PRACTITIONER

## 2020-09-04 PROCEDURE — 93010 ELECTROCARDIOGRAM REPORT: CPT | Performed by: INTERNAL MEDICINE

## 2020-09-04 PROCEDURE — 36415 COLL VENOUS BLD VENIPUNCTURE: CPT | Performed by: NURSE PRACTITIONER

## 2020-09-04 PROCEDURE — 99284 EMERGENCY DEPT VISIT MOD MDM: CPT | Mod: Z6 | Performed by: NURSE PRACTITIONER

## 2020-09-04 PROCEDURE — 83735 ASSAY OF MAGNESIUM: CPT | Performed by: NURSE PRACTITIONER

## 2020-09-04 PROCEDURE — 80320 DRUG SCREEN QUANTALCOHOLS: CPT | Performed by: NURSE PRACTITIONER

## 2020-09-04 PROCEDURE — 84443 ASSAY THYROID STIM HORMONE: CPT | Performed by: NURSE PRACTITIONER

## 2020-09-04 RX ORDER — ONDANSETRON 4 MG/1
4 TABLET, ORALLY DISINTEGRATING ORAL ONCE
Status: COMPLETED | OUTPATIENT
Start: 2020-09-04 | End: 2020-09-04

## 2020-09-04 RX ORDER — POTASSIUM CHLORIDE 750 MG/1
10 TABLET, EXTENDED RELEASE ORAL 2 TIMES DAILY WITH MEALS
Status: DISCONTINUED | OUTPATIENT
Start: 2020-09-05 | End: 2020-09-04

## 2020-09-04 RX ORDER — MAGNESIUM OXIDE 400 MG/1
400 TABLET ORAL DAILY
Status: DISCONTINUED | OUTPATIENT
Start: 2020-09-04 | End: 2020-09-04

## 2020-09-04 RX ORDER — POTASSIUM CHLORIDE 7.45 MG/ML
10 INJECTION INTRAVENOUS
Status: DISCONTINUED | OUTPATIENT
Start: 2020-09-04 | End: 2020-09-07

## 2020-09-04 RX ORDER — LACTOBACILLUS RHAMNOSUS GG 10B CELL
1 CAPSULE ORAL 2 TIMES DAILY
Status: DISCONTINUED | OUTPATIENT
Start: 2020-09-04 | End: 2020-09-11 | Stop reason: HOSPADM

## 2020-09-04 RX ORDER — GABAPENTIN 300 MG/1
900 CAPSULE ORAL 3 TIMES DAILY
Status: DISCONTINUED | OUTPATIENT
Start: 2020-09-04 | End: 2020-09-04

## 2020-09-04 RX ORDER — ONDANSETRON 2 MG/ML
4 INJECTION INTRAMUSCULAR; INTRAVENOUS EVERY 6 HOURS PRN
Status: DISCONTINUED | OUTPATIENT
Start: 2020-09-04 | End: 2020-09-11 | Stop reason: HOSPADM

## 2020-09-04 RX ORDER — NALOXONE HYDROCHLORIDE 0.4 MG/ML
.1-.4 INJECTION, SOLUTION INTRAMUSCULAR; INTRAVENOUS; SUBCUTANEOUS
Status: DISCONTINUED | OUTPATIENT
Start: 2020-09-04 | End: 2020-09-11 | Stop reason: HOSPADM

## 2020-09-04 RX ORDER — ACETAMINOPHEN 325 MG/1
650 TABLET ORAL EVERY 4 HOURS PRN
Status: DISCONTINUED | OUTPATIENT
Start: 2020-09-04 | End: 2020-09-11 | Stop reason: HOSPADM

## 2020-09-04 RX ORDER — FOLIC ACID 1 MG/1
1 TABLET ORAL DAILY
Status: DISCONTINUED | OUTPATIENT
Start: 2020-09-05 | End: 2020-09-04

## 2020-09-04 RX ORDER — VANCOMYCIN HYDROCHLORIDE 250 MG/1
250 CAPSULE ORAL 4 TIMES DAILY
Status: DISCONTINUED | OUTPATIENT
Start: 2020-09-04 | End: 2020-09-04

## 2020-09-04 RX ORDER — POTASSIUM CHLORIDE 1500 MG/1
20-40 TABLET, EXTENDED RELEASE ORAL
Status: DISCONTINUED | OUTPATIENT
Start: 2020-09-04 | End: 2020-09-07

## 2020-09-04 RX ORDER — TRAZODONE HYDROCHLORIDE 50 MG/1
50 TABLET, FILM COATED ORAL
Status: DISCONTINUED | OUTPATIENT
Start: 2020-09-04 | End: 2020-09-05

## 2020-09-04 RX ORDER — POTASSIUM CL/LIDO/0.9 % NACL 10MEQ/0.1L
10 INTRAVENOUS SOLUTION, PIGGYBACK (ML) INTRAVENOUS
Status: DISCONTINUED | OUTPATIENT
Start: 2020-09-04 | End: 2020-09-04

## 2020-09-04 RX ORDER — BACLOFEN 10 MG/1
10 TABLET ORAL EVERY 8 HOURS PRN
Status: DISCONTINUED | OUTPATIENT
Start: 2020-09-04 | End: 2020-09-11 | Stop reason: HOSPADM

## 2020-09-04 RX ORDER — LIDOCAINE 40 MG/G
CREAM TOPICAL
Status: DISCONTINUED | OUTPATIENT
Start: 2020-09-04 | End: 2020-09-11 | Stop reason: HOSPADM

## 2020-09-04 RX ORDER — HYDROXYZINE HYDROCHLORIDE 25 MG/1
50 TABLET, FILM COATED ORAL 2 TIMES DAILY PRN
Status: DISCONTINUED | OUTPATIENT
Start: 2020-09-04 | End: 2020-09-04

## 2020-09-04 RX ORDER — LOPERAMIDE HYDROCHLORIDE 2 MG/1
2 TABLET ORAL 4 TIMES DAILY PRN
Status: DISCONTINUED | OUTPATIENT
Start: 2020-09-04 | End: 2020-09-04

## 2020-09-04 RX ORDER — PROMETHAZINE HYDROCHLORIDE 25 MG/ML
25 INJECTION INTRAMUSCULAR; INTRAVENOUS
Status: DISCONTINUED | OUTPATIENT
Start: 2020-09-04 | End: 2020-09-04

## 2020-09-04 RX ORDER — VANCOMYCIN HYDROCHLORIDE 125 MG/1
125 CAPSULE ORAL 4 TIMES DAILY
Status: DISCONTINUED | OUTPATIENT
Start: 2020-09-05 | End: 2020-09-11 | Stop reason: HOSPADM

## 2020-09-04 RX ORDER — SODIUM CHLORIDE, SODIUM LACTATE, POTASSIUM CHLORIDE, CALCIUM CHLORIDE 600; 310; 30; 20 MG/100ML; MG/100ML; MG/100ML; MG/100ML
INJECTION, SOLUTION INTRAVENOUS CONTINUOUS
Status: DISCONTINUED | OUTPATIENT
Start: 2020-09-04 | End: 2020-09-06

## 2020-09-04 RX ORDER — MIRTAZAPINE 30 MG/1
30 TABLET, FILM COATED ORAL AT BEDTIME
Status: DISCONTINUED | OUTPATIENT
Start: 2020-09-04 | End: 2020-09-04

## 2020-09-04 RX ORDER — POTASSIUM CHLORIDE 20MEQ/15ML
40 LIQUID (ML) ORAL ONCE
Status: DISCONTINUED | OUTPATIENT
Start: 2020-09-04 | End: 2020-09-04

## 2020-09-04 RX ORDER — FAMOTIDINE 20 MG/1
20 TABLET, FILM COATED ORAL 2 TIMES DAILY
Status: DISCONTINUED | OUTPATIENT
Start: 2020-09-04 | End: 2020-09-04

## 2020-09-04 RX ORDER — POTASSIUM CHLORIDE 1.5 G/1.58G
20-40 POWDER, FOR SOLUTION ORAL
Status: DISCONTINUED | OUTPATIENT
Start: 2020-09-04 | End: 2020-09-07

## 2020-09-04 RX ORDER — MAGNESIUM SULFATE HEPTAHYDRATE 40 MG/ML
2 INJECTION, SOLUTION INTRAVENOUS DAILY PRN
Status: DISCONTINUED | OUTPATIENT
Start: 2020-09-04 | End: 2020-09-09

## 2020-09-04 RX ORDER — HYDROXYZINE HYDROCHLORIDE 10 MG/1
30-50 TABLET, FILM COATED ORAL EVERY 4 HOURS PRN
Status: DISCONTINUED | OUTPATIENT
Start: 2020-09-04 | End: 2020-09-05

## 2020-09-04 RX ORDER — TRAZODONE HYDROCHLORIDE 100 MG/1
100 TABLET ORAL AT BEDTIME
Status: DISCONTINUED | OUTPATIENT
Start: 2020-09-04 | End: 2020-09-05

## 2020-09-04 RX ORDER — POTASSIUM CL/LIDO/0.9 % NACL 10MEQ/0.1L
10 INTRAVENOUS SOLUTION, PIGGYBACK (ML) INTRAVENOUS
Status: DISCONTINUED | OUTPATIENT
Start: 2020-09-04 | End: 2020-09-07

## 2020-09-04 RX ORDER — ESCITALOPRAM OXALATE 10 MG/1
10 TABLET ORAL DAILY
Status: DISCONTINUED | OUTPATIENT
Start: 2020-09-05 | End: 2020-09-05

## 2020-09-04 RX ORDER — MAGNESIUM SULFATE HEPTAHYDRATE 40 MG/ML
4 INJECTION, SOLUTION INTRAVENOUS EVERY 4 HOURS PRN
Status: DISCONTINUED | OUTPATIENT
Start: 2020-09-04 | End: 2020-09-09

## 2020-09-04 RX ORDER — PROMETHAZINE HYDROCHLORIDE 25 MG/1
25 TABLET ORAL EVERY 6 HOURS PRN
Status: DISCONTINUED | OUTPATIENT
Start: 2020-09-04 | End: 2020-09-04

## 2020-09-04 RX ORDER — ONDANSETRON 4 MG/1
4 TABLET, ORALLY DISINTEGRATING ORAL EVERY 6 HOURS PRN
Status: DISCONTINUED | OUTPATIENT
Start: 2020-09-04 | End: 2020-09-11 | Stop reason: HOSPADM

## 2020-09-04 RX ADMIN — ONDANSETRON 4 MG: 4 TABLET, ORALLY DISINTEGRATING ORAL at 19:33

## 2020-09-04 RX ADMIN — VANCOMYCIN HYDROCHLORIDE 250 MG: 250 CAPSULE ORAL at 20:27

## 2020-09-04 ASSESSMENT — ENCOUNTER SYMPTOMS
WEAKNESS: 0
DIARRHEA: 1
ABDOMINAL PAIN: 1
ARTHRALGIAS: 0
HEADACHES: 0
BLOOD IN STOOL: 0
LIGHT-HEADEDNESS: 0
FEVER: 0
SHORTNESS OF BREATH: 0
CHILLS: 1
NAUSEA: 1
FREQUENCY: 0
MYALGIAS: 0
COUGH: 0
DYSURIA: 0
DIZZINESS: 0
CONSTIPATION: 0
VOMITING: 1
HEMATURIA: 0
PALPITATIONS: 0

## 2020-09-04 ASSESSMENT — MIFFLIN-ST. JEOR: SCORE: 1318.13

## 2020-09-05 ENCOUNTER — APPOINTMENT (OUTPATIENT)
Dept: GENERAL RADIOLOGY | Facility: HOSPITAL | Age: 30
DRG: 372 | End: 2020-09-05
Attending: PSYCHIATRY & NEUROLOGY
Payer: MEDICARE

## 2020-09-05 LAB
ALBUMIN SERPL-MCNC: 2.8 G/DL (ref 3.4–5)
ALP SERPL-CCNC: 53 U/L (ref 40–150)
ALT SERPL W P-5'-P-CCNC: 16 U/L (ref 0–50)
ANION GAP SERPL CALCULATED.3IONS-SCNC: 10 MMOL/L (ref 3–14)
AST SERPL W P-5'-P-CCNC: 19 U/L (ref 0–45)
BASOPHILS # BLD AUTO: 0 10E9/L (ref 0–0.2)
BASOPHILS NFR BLD AUTO: 0.2 %
BILIRUB SERPL-MCNC: 0.6 MG/DL (ref 0.2–1.3)
BUN SERPL-MCNC: 2 MG/DL (ref 7–30)
C DIFF TOX B STL QL: NEGATIVE
CALCIUM SERPL-MCNC: 7.8 MG/DL (ref 8.5–10.1)
CHLORIDE SERPL-SCNC: 108 MMOL/L (ref 94–109)
CO2 SERPL-SCNC: 19 MMOL/L (ref 20–32)
CREAT SERPL-MCNC: 0.31 MG/DL (ref 0.52–1.04)
CRP SERPL-MCNC: 18.2 MG/L (ref 0–8)
DIFFERENTIAL METHOD BLD: ABNORMAL
EOSINOPHIL # BLD AUTO: 0.5 10E9/L (ref 0–0.7)
EOSINOPHIL NFR BLD AUTO: 3.8 %
ERYTHROCYTE [DISTWIDTH] IN BLOOD BY AUTOMATED COUNT: 13.7 % (ref 10–15)
GFR SERPL CREATININE-BSD FRML MDRD: >90 ML/MIN/{1.73_M2}
GLUCOSE SERPL-MCNC: 88 MG/DL (ref 70–99)
HCT VFR BLD AUTO: 36.8 % (ref 35–47)
HGB BLD-MCNC: 13 G/DL (ref 11.7–15.7)
IMM GRANULOCYTES # BLD: 0.1 10E9/L (ref 0–0.4)
IMM GRANULOCYTES NFR BLD: 0.6 %
LYMPHOCYTES # BLD AUTO: 1.5 10E9/L (ref 0.8–5.3)
LYMPHOCYTES NFR BLD AUTO: 11.7 %
MAGNESIUM SERPL-MCNC: 2.7 MG/DL (ref 1.6–2.3)
MCH RBC QN AUTO: 31.4 PG (ref 26.5–33)
MCHC RBC AUTO-ENTMCNC: 35.3 G/DL (ref 31.5–36.5)
MCV RBC AUTO: 89 FL (ref 78–100)
MONOCYTES # BLD AUTO: 1.1 10E9/L (ref 0–1.3)
MONOCYTES NFR BLD AUTO: 8.4 %
NEUTROPHILS # BLD AUTO: 9.4 10E9/L (ref 1.6–8.3)
NEUTROPHILS NFR BLD AUTO: 75.3 %
NRBC # BLD AUTO: 0 10*3/UL
NRBC BLD AUTO-RTO: 0 /100
PHOSPHATE SERPL-MCNC: 2.9 MG/DL (ref 2.5–4.5)
PLATELET # BLD AUTO: 306 10E9/L (ref 150–450)
POTASSIUM SERPL-SCNC: 3.7 MMOL/L (ref 3.4–5.3)
POTASSIUM SERPL-SCNC: 3.8 MMOL/L (ref 3.4–5.3)
PROT SERPL-MCNC: 5.6 G/DL (ref 6.8–8.8)
RBC # BLD AUTO: 4.14 10E12/L (ref 3.8–5.2)
SODIUM SERPL-SCNC: 137 MMOL/L (ref 133–144)
SPECIMEN SOURCE: NORMAL
WBC # BLD AUTO: 12.4 10E9/L (ref 4–11)

## 2020-09-05 PROCEDURE — 25000128 H RX IP 250 OP 636

## 2020-09-05 PROCEDURE — 87493 C DIFF AMPLIFIED PROBE: CPT | Performed by: INTERNAL MEDICINE

## 2020-09-05 PROCEDURE — 25800030 ZZH RX IP 258 OP 636: Performed by: INTERNAL MEDICINE

## 2020-09-05 PROCEDURE — 36415 COLL VENOUS BLD VENIPUNCTURE: CPT | Performed by: INTERNAL MEDICINE

## 2020-09-05 PROCEDURE — 25000132 ZZH RX MED GY IP 250 OP 250 PS 637: Performed by: INTERNAL MEDICINE

## 2020-09-05 PROCEDURE — 85025 COMPLETE CBC W/AUTO DIFF WBC: CPT | Performed by: INTERNAL MEDICINE

## 2020-09-05 PROCEDURE — 02HV33Z INSERTION OF INFUSION DEVICE INTO SUPERIOR VENA CAVA, PERCUTANEOUS APPROACH: ICD-10-PCS | Performed by: SURGERY

## 2020-09-05 PROCEDURE — 84100 ASSAY OF PHOSPHORUS: CPT | Performed by: INTERNAL MEDICINE

## 2020-09-05 PROCEDURE — 83735 ASSAY OF MAGNESIUM: CPT | Performed by: INTERNAL MEDICINE

## 2020-09-05 PROCEDURE — 40000986 XR CHEST PORT 1 VW: Mod: TC

## 2020-09-05 PROCEDURE — 99233 SBSQ HOSP IP/OBS HIGH 50: CPT | Mod: 25 | Performed by: INTERNAL MEDICINE

## 2020-09-05 PROCEDURE — 25000132 ZZH RX MED GY IP 250 OP 250 PS 637

## 2020-09-05 PROCEDURE — 12000000 ZZH R&B MED SURG/OB

## 2020-09-05 PROCEDURE — 84132 ASSAY OF SERUM POTASSIUM: CPT | Performed by: INTERNAL MEDICINE

## 2020-09-05 PROCEDURE — 25000132 ZZH RX MED GY IP 250 OP 250 PS 637: Performed by: NURSE PRACTITIONER

## 2020-09-05 PROCEDURE — 86140 C-REACTIVE PROTEIN: CPT | Performed by: INTERNAL MEDICINE

## 2020-09-05 PROCEDURE — 36556 INSERT NON-TUNNEL CV CATH: CPT | Performed by: SURGERY

## 2020-09-05 PROCEDURE — 80053 COMPREHEN METABOLIC PANEL: CPT | Performed by: INTERNAL MEDICINE

## 2020-09-05 PROCEDURE — 25000128 H RX IP 250 OP 636: Performed by: INTERNAL MEDICINE

## 2020-09-05 RX ORDER — MAGNESIUM SULFATE HEPTAHYDRATE 40 MG/ML
INJECTION, SOLUTION INTRAVENOUS
Status: COMPLETED
Start: 2020-09-05 | End: 2020-09-05

## 2020-09-05 RX ORDER — LORAZEPAM 2 MG/ML
0.5 INJECTION INTRAMUSCULAR EVERY 6 HOURS PRN
Status: DISCONTINUED | OUTPATIENT
Start: 2020-09-05 | End: 2020-09-08

## 2020-09-05 RX ORDER — LIDOCAINE 40 MG/G
CREAM TOPICAL
Status: DISCONTINUED | OUTPATIENT
Start: 2020-09-05 | End: 2020-09-09

## 2020-09-05 RX ORDER — BACLOFEN 10 MG/1
TABLET ORAL
Status: COMPLETED
Start: 2020-09-05 | End: 2020-09-05

## 2020-09-05 RX ORDER — MIRTAZAPINE 15 MG/1
15 TABLET, FILM COATED ORAL AT BEDTIME
Status: DISCONTINUED | OUTPATIENT
Start: 2020-09-05 | End: 2020-09-11 | Stop reason: HOSPADM

## 2020-09-05 RX ORDER — LAMOTRIGINE 25 MG/1
25 TABLET ORAL DAILY
Status: DISCONTINUED | OUTPATIENT
Start: 2020-09-05 | End: 2020-09-10

## 2020-09-05 RX ADMIN — SODIUM CHLORIDE, POTASSIUM CHLORIDE, SODIUM LACTATE AND CALCIUM CHLORIDE: 600; 310; 30; 20 INJECTION, SOLUTION INTRAVENOUS at 01:01

## 2020-09-05 RX ADMIN — Medication 10 MEQ: at 04:15

## 2020-09-05 RX ADMIN — VANCOMYCIN HYDROCHLORIDE 125 MG: 125 CAPSULE ORAL at 21:50

## 2020-09-05 RX ADMIN — LORAZEPAM 0.5 MG: 2 INJECTION INTRAMUSCULAR; INTRAVENOUS at 01:35

## 2020-09-05 RX ADMIN — ACETAMINOPHEN 650 MG: 325 TABLET, FILM COATED ORAL at 05:17

## 2020-09-05 RX ADMIN — MAGNESIUM SULFATE HEPTAHYDRATE 4 G: 40 INJECTION, SOLUTION INTRAVENOUS at 01:05

## 2020-09-05 RX ADMIN — Medication 10 MEQ: at 02:59

## 2020-09-05 RX ADMIN — MIRTAZAPINE 15 MG: 15 TABLET, FILM COATED ORAL at 21:50

## 2020-09-05 RX ADMIN — SODIUM CHLORIDE, POTASSIUM CHLORIDE, SODIUM LACTATE AND CALCIUM CHLORIDE: 600; 310; 30; 20 INJECTION, SOLUTION INTRAVENOUS at 08:27

## 2020-09-05 RX ADMIN — LORAZEPAM 0.5 MG: 2 INJECTION INTRAMUSCULAR; INTRAVENOUS at 08:27

## 2020-09-05 RX ADMIN — MAGNESIUM SULFATE IN WATER 4 G: 40 INJECTION, SOLUTION INTRAVENOUS at 01:05

## 2020-09-05 RX ADMIN — LORAZEPAM 0.5 MG: 2 INJECTION INTRAMUSCULAR; INTRAVENOUS at 22:47

## 2020-09-05 RX ADMIN — SODIUM CHLORIDE, POTASSIUM CHLORIDE, SODIUM LACTATE AND CALCIUM CHLORIDE: 600; 310; 30; 20 INJECTION, SOLUTION INTRAVENOUS at 16:45

## 2020-09-05 RX ADMIN — LORAZEPAM 0.5 MG: 2 INJECTION INTRAMUSCULAR; INTRAVENOUS at 16:42

## 2020-09-05 RX ADMIN — Medication 1 CAPSULE: at 21:50

## 2020-09-05 RX ADMIN — Medication 10 MEQ: at 06:13

## 2020-09-05 RX ADMIN — ACETAMINOPHEN 650 MG: 325 TABLET, FILM COATED ORAL at 10:54

## 2020-09-05 RX ADMIN — RIVAROXABAN 20 MG: 20 TABLET, FILM COATED ORAL at 08:27

## 2020-09-05 RX ADMIN — Medication 10 MEQ: at 13:05

## 2020-09-05 RX ADMIN — Medication 10 MEQ: at 05:12

## 2020-09-05 RX ADMIN — LAMOTRIGINE 25 MG: 25 TABLET ORAL at 08:46

## 2020-09-05 RX ADMIN — VANCOMYCIN HYDROCHLORIDE 125 MG: 125 CAPSULE ORAL at 16:40

## 2020-09-05 RX ADMIN — Medication 1 CAPSULE: at 08:27

## 2020-09-05 RX ADMIN — Medication 10 MEQ: at 01:03

## 2020-09-05 RX ADMIN — BACLOFEN 10 MG: 10 TABLET ORAL at 05:22

## 2020-09-05 RX ADMIN — Medication 10 MEQ: at 10:54

## 2020-09-05 RX ADMIN — VANCOMYCIN HYDROCHLORIDE 125 MG: 125 CAPSULE ORAL at 13:05

## 2020-09-05 RX ADMIN — ESCITALOPRAM OXALATE 10 MG: 10 TABLET ORAL at 08:27

## 2020-09-05 RX ADMIN — VANCOMYCIN HYDROCHLORIDE 125 MG: 125 CAPSULE ORAL at 08:27

## 2020-09-05 RX ADMIN — Medication 10 MEQ: at 02:00

## 2020-09-05 RX ADMIN — ONDANSETRON 4 MG: 2 INJECTION INTRAMUSCULAR; INTRAVENOUS at 01:37

## 2020-09-05 ASSESSMENT — ACTIVITIES OF DAILY LIVING (ADL)
ADLS_ACUITY_SCORE: 15.5
ADLS_ACUITY_SCORE: 15.5
ADLS_ACUITY_SCORE: 16.5
ADLS_ACUITY_SCORE: 16.5
ADLS_ACUITY_SCORE: 15.5
ADLS_ACUITY_SCORE: 16.5

## 2020-09-05 NOTE — ED NOTES
Report called to RITESH May. Pt to acute care at this time with UA and security. Belongings sent with patient.

## 2020-09-05 NOTE — ED NOTES
Pt refuses IV to be placed. Spoke with patient regarding risks of low potassium, pt verbalizes understanding and continues to refuse. Also refusing any PO medications except PO Vanco. Pt states she is aware of the risks and does not wish to take these medications.

## 2020-09-05 NOTE — PLAN OF CARE
Face to face report given with opportunity to observe patient.    Report given to Jasmyn Smith RN   9/5/2020  7:08 AM

## 2020-09-05 NOTE — PLAN OF CARE
"99.2. VSS. /60   Pulse 85   Temp 99.3  F (37.4  C) (Tympanic)   Resp 18   Ht 1.6 m (5' 3\")   Wt 63.9 kg (140 lb 14 oz)   SpO2 97%   BMI 24.95 kg/m  . Reports pain in neck/central line region- bruising noted. Administered Tylenol. Pt denies having thoughts of hurting herself this morning, but states that the thoughts do come and go. 1:1 present at bedside. Behaviors have been appropriate- needs encouragement to participate in most cares. Appears calm- sleeping for most of the shift. However, requesting Ativan frequently- encouraged to use other coping skills for anxiety. Stool sample collected and sent to lab- C-diff resulted as Negative. Pt has tolerated PO medications this shift without c/o nausea. Vanco administered x2.  She did refuse to try Potassium. IV potassium replacement administered for 3.7 result. Assessment as charted in flowsheet. Up to BSC, but also was incontinent of bowel and bladder. Skin intact- blanchable redness on buttocks, bruising noted throughout. No falls this shift. Pt continues to refuse COVID swab- given mask and was informed it needs to be worn at all times due to 1:1 present in room. Call light with 1:1 due to ligature risk, but able to make needs known to 1:1.     Face to face report given with opportunity to observe patient.    Report given to Brandy CRAFT RN/ Roberta Woods   9/5/2020  2:56 PM      "

## 2020-09-05 NOTE — PROCEDURES
REPORT OF OPERATION  DATE OF PROCEDURE: 9/5/2020    PATIENT: Maggie Case    SURGERY PREFORMED: Placement of Non-tunneled Central Venous Catheter.    PREOPERATIVE DIAGNOSIS: C diff colitis, Need for central venous access.    POSTOPERATIVE DIAGNOSIS: Same    SURGEON: Angela Cabrera DO    ASSISTANTS: None    ANESTHESIA: Local    COMPLICATIONS: None apparent    TRANSFUSIONS: None    TISSUE TO PATHOLOGY: None    FINDINGS: Tip of catheter in superior vena cava via the right subclavian vein    INDICATIONS: This is a 30 year old female with need of central venous access secondary to c diff colitis.  This was performed at the patients bedside.     DESCRIPTIONS OF PROCEDURE IN DETAIL: After consent was obtained the patient was heidy in the supine position.  The patient was identified and the correct patient was confirmed. The patient was sterilely prepped and draped in the usual fashion.  A time out was preformed verifying the correct patient and the correct procedure.  The entire team was in agreement.  All necessary equipment and supplies were in the room.    After identification of landmarks, the area was anesthetized with local anesthesia.   The right subclavian vein was then cannulized with return of dark venous blood and the guide wire was inserted and positioned into the superior vena cava.  Ultrasound was not utilized.  After dilation the catheter passed over the wire and positioned in the superior vena cave. The wire was removed, the catheter was secured to the skin with 2-0 silk sutures.  The catheter was flushed with NS and was found to flush easily.  Hemostasis was assured.  Sterile dressings were applied.  The patient tolerating the procedure well.    Post procedural chest xray showed the tip of the catheter in good position with no evidence of pneumothorax    Angela Cabrera DO on 9/5/2020 at 1:02 AM

## 2020-09-05 NOTE — PROGRESS NOTES
"[facility]    Hospitalist Progress Note    Date of Service (when I saw the patient): 09/05/2020    Assessment & Plan   Maggie Case is a 30 year old female with PMH significant of ASD, DVT, PE, paradoxical CVA, hemorrhagic CVA, hypercoaguable state, recurrent C-diff, medication no-compliance, hypoKalemia and hypomagnesemia, presents to ED with Abdominal pain, nausea, and stated that she wants to hurt herself. She was discharged (left AMA 9.3.2010) after staying 9 days at our hospital. Was treated for C-diff. Stay was complicated with medication and order non-compliance.      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 First time she was diagnosed 2017. Partially due to non-compliance. Will need longer treatment and taper. May benefit from fecal transplant. Non-toxic.    Plan: continue Vancomycin 125 mg po qid               Enteric isolation.     Active Problems:    Hypokalemia    Assessment: with QT prolongation. Recurrent. Refused to take K by mouth (bad taste).     Plan:  central line started (refused peripheral IV)              Monitor K and magnesium.        Left hemiparesis (H)    Assessment: this chronic condition. Upper extremity affected more than LE.     Plan: fall precautions              PT evaluation       Chronic anticoagulation    Assessment: due to coagulopathy with negative extensive diagnostic w/u at [place]    Plan: continue xarelto       Suicidal ideation    Assessment: her plan was to \"slit her throat\".  Has had passive ideation in the past, recently admitted to psych 7/2020    Plan: Bedside sitter              Placed on 72 hour hold   Spoke to Michell on 9/5/20 at 0830, psych will not physically see patient on medical floor, will manage psych medications from afar.  Readdress with them when patient is medically stabilized to discharge from acute care.       Hypomagnesemia    Assessment: recurrent. Present on admission.     Plan: replace " and monitor.      Metabolic acidosis  Assessment; present on admission, believe due to GI loss  Plan: improved with treatment of C-diff and IV hydration              Monitor.     QT prolongation  Assessment: present on admission and due to electrolyte imbalance.   Plan: replace electrolytes and monitor     DVT Prophylaxis: continue xarelto  Code Status: Full Code     Disposition: Expected discharge in a few days once infection treated, electrolyte imbalance corrected, psych evaluation, likely inpatient psych admission after c diff colitis improves.    Eleuterio Anderson MD      Interval History   Patient seen at bedside.  Denies any improvement in her symptoms currently, but appears in no acute distress.  Withdrawn, quiet.  Denies additional BMs overnight, denies n/v.    -Data reviewed today: I reviewed all new labs and imaging results over the last 24 hours. I personally reviewed imaging reports.    Physical Exam   Temp: 98.1  F (36.7  C) Temp src: Tympanic BP: 105/67 Pulse: 89   Resp: 16 SpO2: 95 % O2 Device: None (Room air)    Vitals:    09/04/20 2305   Weight: 63.9 kg (140 lb 14 oz)     Vital Signs with Ranges  Temp:  [97  F (36.1  C)-98.6  F (37  C)] 98.1  F (36.7  C)  Pulse:  [] 89  Resp:  [16] 16  BP: (105-118)/(67-78) 105/67  SpO2:  [95 %-98 %] 95 %    Intake/Output Summary (Last 24 hours) at 9/5/2020 0754  Last data filed at 9/5/2020 0521  Gross per 24 hour   Intake 1048 ml   Output --   Net 1048 ml       CVC Triple Lumen 09/05/20 Right Subclavian (Active)   Site Assessment WDL 09/05/20 0400   External Cath Length (cm) 2 cm 09/04/20 0000   Dressing Intervention Chlorhexidine sponge;Transparent 09/05/20 0400   Lumen A - Color WHITE 09/05/20 0400   Lumen A - Status infusing 09/05/20 0400   Lumen B - Color BLUE 09/05/20 0400   Lumen B - Status saline locked 09/05/20 0400   Lumen C - Color BROWN 09/05/20 0400   Lumen C - Status saline locked 09/05/20 0400   Number of days: 0     Line/device assessment(s)  completed for medical necessity    Constitutional - AA, NAD  HEENT - atraumatic, normocephalic  Neck - supple, no masses, no JVD  CVS - S1 S2 RRR, no murmurs, rubs, gallops  Respiratory - CTA b/l  GI - soft, tender to palpation in lower quadrants, no guarding, no rebound, ND, hypoactive bowel sounds, no organomegaly  Musculoskeletal - no LE edema, no lesions  Neuro - oriented x 3, no gross focal deficits    Medications     lactated ringers 125 mL/hr at 09/05/20 0101     - MEDICATION INSTRUCTIONS -         escitalopram  10 mg Oral Daily     lactobacillus rhamnosus (GG)  1 capsule Oral BID     rivaroxaban ANTICOAGULANT  20 mg Oral QAM     sodium chloride (PF)  10 mL Intracatheter Q8H     traZODone  100 mg Oral At Bedtime     vancomycin  125 mg Oral 4x Daily       Data   Recent Labs   Lab 09/05/20  0530 09/04/20  1948 09/02/20  0511  09/01/20  0534   WBC 12.4* 13.5*  --   --  7.7   HGB 13.0 15.5  --   --  12.0   MCV 89 93  --   --  89    343  --   --  272    134 136  --  138   POTASSIUM 3.8 2.7* 3.8   < > 3.3*   CHLORIDE 108 102 109  --  109   CO2 19* 15* 17*  --  20   BUN 2* 3* <1*  --  2*   CR 0.31* 0.38* 0.37*  --  0.36*   ANIONGAP 10 17* 10  --  9   AVINASH 7.8* 8.9 8.3*  --  7.9*   GLC 88 103* 84  --  83   ALBUMIN 2.8* 3.4  --   --   --    PROTTOTAL 5.6* 6.9  --   --   --    BILITOTAL 0.6 0.6  --   --   --    ALKPHOS 53 95  --   --   --    ALT 16 21  --   --   --    AST 19 24  --   --   --     < > = values in this interval not displayed.     Lactic Acid   Date Value Ref Range Status   08/27/2020 2.3 (H) 0.7 - 2.0 mmol/L Final     Comment:     Significant value called to and read back by  MAGDALENA HATHAWAY AT 0539 ON 08/27/2020 BY AMB     05/23/2020 1.4 0.7 - 2.0 mmol/L Final   05/22/2020 2.7 (H) 0.7 - 2.0 mmol/L Final     Comment:     Significant value called to and read back by  MARKEL SHIELDS AT 1614 ON 05/22/2020 BY AMB         Recent Results (from the past 24 hour(s))   XR Chest Port 1 View    Narrative     PROCEDURE:  XR CHEST PORT 1 VW    HISTORY:  central line placement.     COMPARISON:  None.    FINDINGS:   The cardiac silhouette is normal in size. The pulmonary vasculature is  normal.  The lungs are clear. No pleural effusion or pneumothorax.  There is a central venous catheter with its tip in the right atrium      Impression    IMPRESSION:  No acute cardiopulmonary disease.      MD Eleuterio CARO MD

## 2020-09-05 NOTE — ED TRIAGE NOTES
Pt recently discharged from acute care due to C-Diff. Pt states she just got home yesterday and she contacted police today stating she felt suicidal. Police contacted EMS who transported pt here. Pt states she would slit her throat with a knife if she had access to one. Is calm and cooperative at this time. Pt has a hx of stroke, L side affected. Is c/o abdominal pain and nausea. Security paged for 1:1 patient watch.

## 2020-09-05 NOTE — ED NOTES
DATE:  9/4/2020   TIME OF RECEIPT FROM LAB:  2010  LAB TEST:  potassium  LAB VALUE:  2.7  RESULTS GIVEN WITH READ-BACK TO (PROVIDER):  Tiffanie Cline CNP  TIME LAB VALUE REPORTED TO PROVIDER:   2015

## 2020-09-05 NOTE — H&P
[facility]    History and Physical  Hospitalist       Date of Admission:  9/4/2020    Assessment & Plan   Maggie Case is a 30 year old female with PMH significant of ASD, DVT, PE, paradoxical CVA, hemorrhagic CVA, hypercoaguable state, recurrent C-diff, medication no-compliance, hypoKalemia and hypomagnesemia, presents to ED with Abdominal pain, nausea, and stated that she wants to hurt herself. She was discharged (left AMA 9.3.2010) after staying 9 days at our hospital. Was treated for C-diff. Stay was complicated with medication and order non-compliance.     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 First time she was diagnosed 2017. Partially due to non-compliance. Will need longer treatment and taper. May benefit from fecal transplant. Non-toxic. 3 BM today, today, but WBC increased and abdomen tender. No doubt, the infection is active.     Plan: continue Vancomycin 125 mg po qid (the only medication she agreed to take PO).    Admit to medical floor.    Enteric isolation.    Active Problems:    Hypokalemia    Assessment: with QT prolongation. Recurrent. Refused to take K by mouth (bad taste).     Plan: Will get central line (refused peripheral IV)   Start K replacement protocol. Most likely due to oral intake, metabolic acidois   Monitor K and magnesium.       Left hemiparesis (H)    Assessment: this chronic condition. Upper extremity affected more than LE.     Plan: fall precautions   PT evaluation in AM      Chronic anticoagulation    Assessment: due to coagulopathy with negative extensive diagnostic w/u at [place]    Plan: continue xarelto      Suicidal ideation    Assessment: difficult to proof opposite. Psychiatry consulted and they will follow her     Plan: Bedside sitter, psychiatry consult in AM   Placed on hold.      Hypomagnesemia    Assessment: recurrent. Present on admission.     Plan: replace and monitor.     Metabolic  acidosis  Assessment; present on admission, believe due to GI loss  Plan; treat C-diff   Monitor.    QT prolongation  Assessment: present on admission and due to electrolyte imbalance.   Plan: replace electrolytes and monitor on telemetry.      DVT Prophylaxis: continue eliquis  Code Status: Full Code    Disposition: Expected discharge in a few days once infection treated, electrolyte imbalance corrected, safe discharge plan created. Care plan should be created. This individual is high risk for readmission for psychiatric and medical problems.     Abrahan Flowersas    Primary Care Physician   Chapo Flores    Chief Complaint abdominal pain, suicidal ideation.    Reason for Admission: c-diff colitis, electrolyte imbalance, suicidal ideation.     Admission status: impatient.     History is obtained from the patient, electronic health record and emergency department physician    History of Present Illness   Maggie Case is a 30 year old female who presents with PMH significant of ASD, DVT, PE, paradoxical CVA, hemorrhagic CVA, hypercoaguable state, recurrent C-diff, medication no-compliance, hypoKalemia and hypomagnesemia, presents to ED with Abdominal pain, nausea, and stated that she wants to hurt herself. She was discharged (left AMA 9.3.2010) after staying 9 days at our hospital. Was treated for C-diff. Stay was complicated with medication and order non-compliance.   9.3.2020 she left AMA. When home, she continued to have abdominal pain, nausea, decreased oral intake (was able to drink fluids). Continued to have diarrhea.   She returned to ED and stated that she is suicidal. Psychiatry consulted and plan was to admit to behavioral unit. Plan was changed to admit to medical floor when became obvious that psychiatry unit is not able to take care diarrhea patient (not able to provide hand hygiene to personal and patient). Behavior unit people will follow her when on medical floor.     14 elements of ROS obtained.  She complaints weakness, nausea, abdominal discomfort, and anxiety. The rest is -ve.     ED events and diagnostic workup: WBC, chemistry, urinalysis, Chest XR, EKG. Refused oral K and magnesium replacement and peripheral line placement.     ED treatment: KCl IV 10 mEQ x 2.     Past Medical History    I have reviewed this patient's medical history and updated it with pertinent information if needed.   Past Medical History:   Diagnosis Date     Anemia      Anxiety      Chemical dependency (H)      Chronic diarrhea      Lactose intolerance      Myalgia      OCD (obsessive compulsive disorder)      Pulmonary embolism (H) 05/06/16     Stroke (H) 02/2018     Vitamin B12 deficiency    coagulopathy with negative diagnostic w/u at [facility].  CVA  Colitis  Nephrolithiasis.     Past Surgical History   I have reviewed this patient's surgical history and updated it with pertinent information if needed.  Past Surgical History:   Procedure Laterality Date     CLOSED REDUCTION, PERCUTANEOUS PINNING LOWER EXTREMITY, COMBINED Right 4/6/2016    Procedure: COMBINED CLOSED REDUCTION, PERCUTANEOUS PINNING LOWER EXTREMITY;  Surgeon: Dexter Jones MD;  Location: [facility]     COLONOSCOPY       CRANIECTOMY Right 2/18/2018    Procedure: CRANIECTOMY;  RIGHT HEMICRANIECTOMY;  Surgeon: Emeterio Mesa MD;  Location: [facility] OR     CRANIOPLASTY Right 5/24/2018    Procedure: CRANIOPLASTY;  Right Cranioplasty ;  Surgeon: Emeterio Mesa MD;  Location: [facility] OR     UPPER GI ENDOSCOPY         Prior to Admission Medications   Prior to Admission Medications   Prescriptions Last Dose Informant Patient Reported? Taking?   CHANTIX CONTINUING MONTH ASHLEY 1 MG tablet More than a month at Unknown time  Yes No   Sig: Take 1 mg by mouth 2 times daily    XARELTO 20 MG TABS tablet Unknown at Unknown time  Yes No   Sig: Take 20 mg by mouth every morning with breakfast.   acetaminophen (TYLENOL) 325 MG tablet 9/3/2020 at Unknown time  No Yes   Sig: Take 1-2  tablets (325-650 mg) by mouth every 6 hours as needed for mild pain   baclofen (LIORESAL) 10 MG tablet Past Week at Unknown time  Yes Yes   Sig: Take 10 mg by mouth every 8 hours as needed    benzocaine (ORAJEL MAXIMUM STRENGTH) 20 % GEL gel   Yes No   Sig: Take by mouth 2 times daily as needed for moderate pain   escitalopram (LEXAPRO) 10 MG tablet 9/3/2020 at Unknown time  No Yes   Sig: Take 1 tablet (10 mg) by mouth daily   famotidine (PEPCID) 20 MG tablet Past Month at Unknown time  Yes Yes   Sig: Take 20 mg by mouth 2 times daily    folic acid (FOLVITE) 1 MG tablet   Yes No   Sig: Take 1 mg by mouth daily    gabapentin (NEURONTIN) 300 MG capsule Past Month at Unknown time  Yes Yes   Sig: Take 900 mg by mouth 3 times daily   hydrOXYzine (ATARAX) 50 MG tablet 9/4/2020 at Unknown time  No Yes   Sig: Take 1 tablet (50 mg) by mouth 2 times daily as needed for anxiety   hydrocortisone 2.5 % cream   Yes No   Sig: Apply topically 2 times daily to scaly, cracked hands.   lactobacillus rhamnosus, GG, (CULTURELL) capsule 9/4/2020 at Unknown time  No Yes   Sig: Take 1 capsule by mouth 2 times daily   lamoTRIgine (LAMICTAL) 25 MG tablet Past Month at Unknown time  Yes Yes   Sig: Take 25 mg by mouth daily    lidocaine (XYLOCAINE) 2 % solution   Yes No   loperamide (IMODIUM A-D) 2 MG tablet   Yes No   Sig: Take 2 mg by mouth 4 times daily as needed for diarrhea    medroxyPROGESTERone (DEPO-PROVERA) 150 MG/ML IM injection   Yes No   Sig: Inject 150 mg into the muscle every 3 months   mirtazapine (REMERON) 30 MG tablet 9/3/2020 at Unknown time  Yes Yes   Sig: Take 30 mg by mouth At Bedtime   nicotine (NICODERM CQ) 7 MG/24HR 24 hr patch   Yes No   Sig: Apply 1 patch topically daily    potassium chloride ER (K-TAB/KLOR-CON) 10 MEQ CR tablet More than a month at Unknown time  Yes No   Sig: Take 10 mEq by mouth 2 times daily (with meals)    promethazine (PHENERGAN) 25 MG tablet More than a month at Unknown time  Yes No   Sig: Take  25 mg by mouth every 6 hours as needed    traZODone (DESYREL) 100 MG tablet 9/3/2020 at Unknown time  Yes Yes   Sig: Take 100 mg by mouth At Bedtime   vancomycin (VANCOCIN) 250 MG capsule 9/4/2020 at Unknown time  No Yes   Sig: Take 1 capsule (250 mg) by mouth 4 times daily      Facility-Administered Medications: None     Allergies   Allergies   Allergen Reactions     Amoxicillin Other (See Comments)     Headaches     Levaquin [Levofloxacin] Swelling     Lithium      Diabetes insipidus      Naproxen Other (See Comments)     Reaction: Headaches     Nickel      Tramadol      Sulindac Rash       Social History   I have reviewed this patient's social history and updated it with pertinent information if needed. Maggie Case  reports that she has been smoking cigarettes. She has a 14.00 pack-year smoking history. She has never used smokeless tobacco. She reports current alcohol use. She reports current drug use. Frequency: 7.00 times per week. Drugs: Marijuana and Methamphetamines.    Family History   I have reviewed this patient's family history and updated it with pertinent information if needed.   Family History   Problem Relation Age of Onset     Depression Mother      Migraines Maternal Half-Sister        Review of Systems   All 14 Systems were reviewed and found to be negative except what's mentioned in HPI.    Physical Exam   Temp: 98.6  F (37  C) Temp src: Oral BP: 106/74 Pulse: 84   Resp: 16 SpO2: 96 % O2 Device: None (Room air)    Vital Signs with Ranges  Temp:  [97  F (36.1  C)-98.6  F (37  C)] 98.6  F (37  C)  Pulse:  [] 84  Resp:  [16] 16  BP: (105-118)/(74-78) 106/74  SpO2:  [96 %-98 %] 96 %  140 lbs 13.98 oz    Physical exam:   Constitutional: well developed, well nourished, not in distress.   HEENT: atraumatic, normocephalic. MM dry.  Neck: supple, no masses, no bruits, no LAD.   CVS: regular, S1, S2, no S3, no murmurs, rubs or gallops. No edema.  RESPIRATORY: symmetrical respirations, no  accessory muscle use, clear vesicular BS bilaterally.  GI: soft, diffusely tender, not distended, no HSM, no pulsatile masses. BS present.   : no CVA tenderness. Bladder not palpable.  MS: normal muscle bulk, no fasciculations, no sarcopenia.  NEUROLOGICAL: left hemiplegia, LUE > LLE  PSYCHIATRIC: awake, oriented x 3, calm, cooperative. Flat affect.  SKIN: warm, well perfused. No rash.   HEMATOLOGIC/LYMPHATIC: no petechia, no ecchymoses, no lymphadenopathy.     Goals of care were discussed with the patient and family which included but not limited to anticipated treatment course during the current hospitalization, recovery from current event, discharge planning and transitions of care responsibilities and expected outcomes. Code status was addressed on admission as well. End of life care discussion not done.    Data   Data reviewed today:  I personally reviewed: blood work, chest XR, EKG and urinary test results.     See below radiology report.   Recent Labs   Lab 09/04/20  1948 09/02/20  0511 09/01/20  1359 09/01/20  0534 08/31/20  0533   WBC 13.5*  --   --  7.7 8.6   HGB 15.5  --   --  12.0 12.0   MCV 93  --   --  89 89     --   --  272 258    136  --  138 138   POTASSIUM 2.7* 3.8 4.0 3.3* 3.4   CHLORIDE 102 109  --  109 109   CO2 15* 17*  --  20 21   BUN 3* <1*  --  2* 1*   CR 0.38* 0.37*  --  0.36* 0.30*   ANIONGAP 17* 10  --  9 8   AVINASH 8.9 8.3*  --  7.9* 7.7*   * 84  --  83 85   ALBUMIN 3.4  --   --   --  2.5*   PROTTOTAL 6.9  --   --   --  5.2*   BILITOTAL 0.6  --   --   --  0.6   ALKPHOS 95  --   --   --  74   ALT 21  --   --   --  19   AST 24  --   --   --  30       No results found for this or any previous visit (from the past 24 hour(s)).

## 2020-09-05 NOTE — PLAN OF CARE
When going over pt med rec, she states that she has not taken any of her medications today, has not checked her blood sugar, and has not eaten all day.     States that she is happy to be here because she does not want to be back on her own at home. When asked where she anticipates to discharge she states that she wants to go to a group home or nursing home if a group home is not available, but denies feeling unsafe at home.

## 2020-09-05 NOTE — ED NOTES
Spoke with Kristin at [facility] to inform her that our psych unit is no longer accepting patient due to a recent c diff diagnosis so patient will require placement elsewhere. Kristin states that no psych unit will accept a c diff positive patient due to they do not have sinks to wash their hands so patient will need to be admitted to a medical unit with psych consult until c diff has cleared and then she can transfer to a psych unit. BEBO Moreno, and KATIE Anne, informed of this.

## 2020-09-05 NOTE — ED PROVIDER NOTES
"  History     Chief Complaint   Patient presents with     Abdominal Pain     Suicidal     HPI     Maggie Case is a 30 year old female who presents ambulatory for concerns of suicidal ideations. \"I do not want to be here. I want to kill myself and one of these times I'm going to actually do it.\" She actually was discharged AMA from medical floor yesterday. She was being treated for c. Difficile colitis. She currently reports abdominal pain improved. Continues to have nausea and diarrhea although she states the nausea is due to her anxiety. Took vancomycin twice today. She admits to noncompliance with potassium supplementation and actually states that she will not take oral potassium and will only take it through a line due to the taste. She denies fever, cough, dyspnea, chest pain, urinary symptoms.         Allergies:  Allergies   Allergen Reactions     Amoxicillin Other (See Comments)     Headaches     Levaquin [Levofloxacin] Swelling     Lithium      Diabetes insipidus      Naproxen Other (See Comments)     Reaction: Headaches     Nickel      Tramadol      Sulindac Rash       Problem List:    Patient Active Problem List    Diagnosis Date Noted     Intractable nausea and vomiting 08/26/2020     Priority: Medium     Suicidal ideation 07/10/2020     Priority: Medium     Chronic left shoulder pain 06/24/2020     Priority: Medium     Anxiety and depression 06/15/2020     Priority: Medium     Acute urinary tract infection 05/23/2020     Priority: Medium     Acute pyelonephritis 05/22/2020     Priority: Medium     Bipolar disorder, in partial remission, most recent episode depressed (H) 05/01/2020     Priority: Medium     Incontinence of urine in female 04/22/2020     Priority: Medium     Muscle weakness 02/04/2020     Priority: Medium     Chronic pain of left knee 02/04/2020     Priority: Medium     Spasticity as late effect of cerebrovascular accident (CVA) 12/30/2019     Priority: Medium     Neuropathy 01/28/2019 "     Priority: Medium     Colitis, collagenous 01/16/2019     Priority: Medium     Borderline personality disorder (H) 12/08/2018     Priority: Medium     Irritable bowel syndrome with diarrhea 12/07/2018     Priority: Medium     Tobacco use disorder 11/28/2018     Priority: Medium     History of DVT (deep vein thrombosis) 11/27/2018     Priority: Medium     History of nonadherence to medical treatment 11/27/2018     Priority: Medium     Chronic hypokalemia 11/27/2018     Priority: Medium     Depression with suicidal ideation 09/19/2018     Priority: Medium     Generalized anxiety disorder 09/16/2018     Priority: Medium     Clostridium difficile colitis 08/15/2018     Priority: Medium     Bilateral plantar wart 07/27/2018     Priority: Medium     Nephrolithiasis 07/27/2018     Priority: Medium     Chemical dependency (H) 07/16/2018     Priority: Medium     Calculus of lower urinary tract 07/15/2018     Priority: Medium     History of CVA (cerebrovascular accident) 07/15/2018     Priority: Medium     Left hemiparesis (H) 07/15/2018     Priority: Medium     Chronic anticoagulation 07/15/2018     Priority: Medium     History of cranioplasty 05/24/2018     Priority: Medium     Traumatic brain injury, without loss of consciousness, sequela (H) 05/12/2018     Priority: Medium     Cerebrovascular accident (CVA) due to occlusion of right carotid artery (H) 03/26/2018     Priority: Medium     Overview:   She suffered a massive right-sided CVA in mid-February, 2018. She ended up with severe cerebral edema and required a craniotomy. She is left with significant residual left-sided hemiparesis. She had her cranial defect repaired on 5/24/18       Acute blood loss anemia 03/01/2018     Priority: Medium     Dysphagia 03/01/2018     Priority: Medium     History of alcohol abuse 03/01/2018     Priority: Medium     Hilar lymphadenopathy 03/01/2018     Priority: Medium     Overview:   bilateral       Personal history of PE (pulmonary  embolism) 03/01/2018     Priority: Medium     Overview:   In 2015.  Discontinued anticoagulation 1/2017 due to inconsistent use of anticoagulation and lack of thrombus on CT)       PFO (patent foramen ovale) 03/01/2018     Priority: Medium     Acute right MCA stroke (H) 03/01/2018     Priority: Medium     Acute ischemic stroke (H) 02/17/2018     Priority: Medium     Influenza B 05/01/2017     Priority: Medium     Opacity of lung on imaging study 05/01/2017     Priority: Medium     Community acquired pneumonia 05/01/2017     Priority: Medium     C. difficile colitis 05/01/2017     Priority: Medium     Sepsis (H) 04/28/2017     Priority: Medium     Pulmonary nodule seen on imaging study 03/21/2017     Priority: Medium     OCD (obsessive compulsive disorder) 01/11/2017     Priority: Medium     Pyelonephritis 01/05/2017     Priority: Medium     Cervical high risk HPV (human papillomavirus) test positive 08/11/2016     Priority: Medium     Overview:   Hx LGSIL in August of 2016       Other pulmonary embolism without acute cor pulmonale, unspecified chronicity (H) 05/18/2016     Priority: Medium     Acute chest pain 05/06/2016     Priority: Medium     Leukocytosis 05/06/2016     Priority: Medium     Lung mass 05/06/2016     Priority: Medium     SOB (shortness of breath) 05/06/2016     Priority: Medium     Other vitamin B12 deficiency anemia 01/05/2016     Priority: Medium     Vitamin D deficiency 01/05/2016     Priority: Medium     Iron deficiency anemia, unspecified iron deficiency anemia type 12/28/2015     Priority: Medium     Acute upper GI bleed 06/18/2013     Priority: Medium     Hypokalemia 06/18/2013     Priority: Medium     Acute cystitis 06/18/2013     Priority: Medium     Anxiety state 03/25/2013     Priority: Medium     Muscle pain 07/30/2009     Priority: Medium     Overview:   IMO Update 10/11       Cervicalgia 06/16/2009     Priority: Medium     Overview:   IMO Update 10/11       Low back pain 06/16/2009      Priority: Medium     Overview:   IMO Update 10/11       Pain in joint, lower leg 2009     Priority: Medium     Diarrhea 2008     Priority: Medium     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     Priority: Medium        Past Medical History:    Past Medical History:   Diagnosis Date     Anemia      Anxiety      Chemical dependency (H)      Chronic diarrhea      Lactose intolerance      Myalgia      OCD (obsessive compulsive disorder)      Pulmonary embolism (H) 16     Stroke (H) 2018     Vitamin B12 deficiency        Past Surgical History:    Past Surgical History:   Procedure Laterality Date     CLOSED REDUCTION, PERCUTANEOUS PINNING LOWER EXTREMITY, COMBINED Right 2016    Procedure: COMBINED CLOSED REDUCTION, PERCUTANEOUS PINNING LOWER EXTREMITY;  Surgeon: Dexter Jones MD;  Location: [facility]     COLONOSCOPY       CRANIECTOMY Right 2018    Procedure: CRANIECTOMY;  RIGHT HEMICRANIECTOMY;  Surgeon: Emeterio Mesa MD;  Location: [facility]     CRANIOPLASTY Right 2018    Procedure: CRANIOPLASTY;  Right Cranioplasty ;  Surgeon: Emeterio Mesa MD;  Location: [facility] OR     UPPER GI ENDOSCOPY         Family History:    Family History   Problem Relation Age of Onset     Depression Mother      Migraines Maternal Half-Sister        Social History:  Marital Status:  Single [1]  Social History     Tobacco Use     Smoking status: Current Every Day Smoker     Packs/day: 2.00     Years: 7.00     Pack years: 14.00     Types: Cigarettes     Last attempt to quit: 2018     Years since quittin.0     Smokeless tobacco: Never Used   Substance Use Topics     Alcohol use: Yes     Alcohol/week: 0.0 standard drinks     Drug use: Yes     Frequency: 7.0 times per week     Types: Marijuana, Methamphetamines     Comment: hx of marijuana and meth use        Medications:    acetaminophen (TYLENOL) 325 MG tablet  baclofen (LIORESAL) 10 MG tablet  escitalopram  (LEXAPRO) 10 MG tablet  famotidine (PEPCID) 20 MG tablet  gabapentin (NEURONTIN) 300 MG capsule  hydrOXYzine (ATARAX) 50 MG tablet  lactobacillus rhamnosus, GG, (CULTURELL) capsule  lamoTRIgine (LAMICTAL) 25 MG tablet  mirtazapine (REMERON) 30 MG tablet  traZODone (DESYREL) 100 MG tablet  vancomycin (VANCOCIN) 250 MG capsule  benzocaine (ORAJEL MAXIMUM STRENGTH) 20 % GEL gel  CHANTIX CONTINUING MONTH ASHLEY 1 MG tablet  folic acid (FOLVITE) 1 MG tablet  hydrocortisone 2.5 % cream  lidocaine (XYLOCAINE) 2 % solution  loperamide (IMODIUM A-D) 2 MG tablet  medroxyPROGESTERone (DEPO-PROVERA) 150 MG/ML IM injection  nicotine (NICODERM CQ) 7 MG/24HR 24 hr patch  potassium chloride ER (K-TAB/KLOR-CON) 10 MEQ CR tablet  promethazine (PHENERGAN) 25 MG tablet  XARELTO 20 MG TABS tablet          Review of Systems   Constitutional: Positive for chills. Negative for fever.   Respiratory: Negative for cough and shortness of breath.    Cardiovascular: Negative for chest pain and palpitations.   Gastrointestinal: Positive for abdominal pain (LLQ), diarrhea, nausea and vomiting (relates this to anxiety). Negative for blood in stool and constipation.   Genitourinary: Negative for dysuria, frequency and hematuria.   Musculoskeletal: Negative for arthralgias and myalgias.   Skin: Negative for rash.   Neurological: Negative for dizziness, weakness, light-headedness and headaches.       Physical Exam   BP: 118/78  Pulse: 100  Temp: 97  F (36.1  C)  Resp: 16  SpO2: 98 %      Physical Exam  Constitutional:       Appearance: She is not toxic-appearing or diaphoretic.      Comments: Sitting on edge of bed vomiting, dry heaving   Cardiovascular:      Rate and Rhythm: Normal rate and regular rhythm.      Heart sounds: S1 normal and S2 normal. No murmur. No friction rub. No gallop.    Pulmonary:      Effort: Pulmonary effort is normal.      Breath sounds: Normal breath sounds.   Abdominal:      General: Bowel sounds are normal. There is no  distension.      Palpations: Abdomen is soft.      Tenderness: There is abdominal tenderness in the left lower quadrant. There is no guarding or rebound.   Musculoskeletal:      Right lower leg: No edema.      Left lower leg: No edema.   Skin:     General: Skin is warm and dry.      Capillary Refill: Capillary refill takes less than 2 seconds.   Neurological:      Mental Status: She is alert and oriented to person, place, and time.      GCS: GCS eye subscore is 4. GCS verbal subscore is 5. GCS motor subscore is 6.   Psychiatric:         Mood and Affect: Affect is flat.         Speech: Speech normal.         Behavior: Behavior is withdrawn. Behavior is cooperative.         Thought Content: Thought content includes suicidal ideation. Thought content does not include homicidal ideation. Thought content includes suicidal plan. Thought content does not include homicidal plan.       ED Course     ED Course as of Sep 04 2303   Fri Sep 04, 2020   2036 Patient adamant she will not take oral potassium. Risks of hypokalemia discussed including cardiac arrest-death.   Tiffanie Cline CNP on 9/4/2020 at 8:37 PM        2120 Nursing reports patient is refusing peripheral line for potassium replacement.   Tiffanie Cline CNP on 9/4/2020 at 9:20 PM        2126 Spoke with patient again regarding options of potassium replacement including oral solution, NG tube with oral solution, PIV with replacement. She refuses all these modalities. She is her own decision maker and has had longstanding hypokalemia with noncompliance in taking supplement. She is repeatedly informed of the risks of hypokalemia including sudden death. There is concern that these decision could be related to underlying mental illness.  Tiffanie Cline CNP on 9/4/2020 at 9:28 PM            Procedures      Results for orders placed or performed during the hospital encounter of 09/04/20 (from the past 24 hour(s))   CBC with platelets differential   Result Value Ref  Range    WBC 13.5 (H) 4.0 - 11.0 10e9/L    RBC Count 4.91 3.8 - 5.2 10e12/L    Hemoglobin 15.5 11.7 - 15.7 g/dL    Hematocrit 45.5 35.0 - 47.0 %    MCV 93 78 - 100 fl    MCH 31.6 26.5 - 33.0 pg    MCHC 34.1 31.5 - 36.5 g/dL    RDW 13.7 10.0 - 15.0 %    Platelet Count 343 150 - 450 10e9/L    Diff Method Automated Method     % Neutrophils 74.4 %    % Lymphocytes 15.2 %    % Monocytes 6.9 %    % Eosinophils 3.0 %    % Basophils 0.2 %    % Immature Granulocytes 0.3 %    Nucleated RBCs 0 0 /100    Absolute Neutrophil 10.0 (H) 1.6 - 8.3 10e9/L    Absolute Lymphocytes 2.0 0.8 - 5.3 10e9/L    Absolute Monocytes 0.9 0.0 - 1.3 10e9/L    Absolute Eosinophils 0.4 0.0 - 0.7 10e9/L    Absolute Basophils 0.0 0.0 - 0.2 10e9/L    Abs Immature Granulocytes 0.0 0 - 0.4 10e9/L    Absolute Nucleated RBC 0.0    Comprehensive metabolic panel   Result Value Ref Range    Sodium 134 133 - 144 mmol/L    Potassium 2.7 (LL) 3.4 - 5.3 mmol/L    Chloride 102 94 - 109 mmol/L    Carbon Dioxide 15 (L) 20 - 32 mmol/L    Anion Gap 17 (H) 3 - 14 mmol/L    Glucose 103 (H) 70 - 99 mg/dL    Urea Nitrogen 3 (L) 7 - 30 mg/dL    Creatinine 0.38 (L) 0.52 - 1.04 mg/dL    GFR Estimate >90 >60 mL/min/[1.73_m2]    GFR Estimate If Black >90 >60 mL/min/[1.73_m2]    Calcium 8.9 8.5 - 10.1 mg/dL    Bilirubin Total 0.6 0.2 - 1.3 mg/dL    Albumin 3.4 3.4 - 5.0 g/dL    Protein Total 6.9 6.8 - 8.8 g/dL    Alkaline Phosphatase 95 40 - 150 U/L    ALT 21 0 - 50 U/L    AST 24 0 - 45 U/L   TSH with free T4 reflex   Result Value Ref Range    TSH 1.64 0.40 - 4.00 mU/L   Alcohol ethyl   Result Value Ref Range    Ethanol g/dL <0.01 0.01 g/dL   Salicylate level   Result Value Ref Range    Salicylate Level <2 mg/dL   Acetaminophen level   Result Value Ref Range    Acetaminophen Level <2 (L) 10 - 20 mg/L   Magnesium   Result Value Ref Range    Magnesium 1.5 (L) 1.6 - 2.3 mg/dL       Medications   promethazine (PHENERGAN) intraMUSCULAR injection 25 mg (has no administration in time  range)   potassium chloride (KAYCIEL) solution 40 mEq (40 mEq Oral Not Given 9/4/20 2028)   vancomycin (VANCOCIN) capsule 250 mg (250 mg Oral Given 9/4/20 2027)   potassium chloride 10 mEq in 100 mL intermittent infusion with 10 mg lidocaine (has no administration in time range)   magnesium oxide (MAG-OX) tablet 400 mg (400 mg Oral Not Given 9/4/20 2127)   baclofen (LIORESAL) tablet 10 mg (has no administration in time range)   benzocaine (ORAJEL MAXIMUM STRENGTH) 20 % gel (has no administration in time range)   escitalopram (LEXAPRO) tablet 10 mg (has no administration in time range)   famotidine (PEPCID) tablet 20 mg (has no administration in time range)   folic acid (FOLVITE) tablet 1 mg (has no administration in time range)   gabapentin (NEURONTIN) capsule 900 mg (has no administration in time range)   hydrOXYzine (ATARAX) tablet 50 mg (has no administration in time range)   lactobacillus rhamnosus (GG) (CULTURELL) capsule 1 capsule (has no administration in time range)   loperamide (IMODIUM A-D) tablet 2 mg (has no administration in time range)   mirtazapine (REMERON) tablet 30 mg (has no administration in time range)   nicotine (NICODERM CQ) 7 MG/24HR 24 hr patch 1 patch (has no administration in time range)   potassium chloride ER (K-TAB/KLOR-CON) CR tablet 10 mEq (has no administration in time range)   promethazine (PHENERGAN) tablet 25 mg (has no administration in time range)   vancomycin (VANCOCIN) capsule 250 mg (has no administration in time range)   rivaroxaban ANTICOAGULANT (XARELTO) tablet 20 mg (has no administration in time range)   traZODone (DESYREL) tablet 100 mg (has no administration in time range)   ondansetron (ZOFRAN-ODT) ODT tab 4 mg (4 mg Oral Given 9/4/20 1933)       Assessments & Plan (with Medical Decision Making)     I have reviewed the nursing notes.    I have reviewed the findings, diagnosis, plan and need for follow up with the patient.  (A04.72) Clostridium difficile colitis   (primary encounter diagnosis)  (F33.9) Episode of recurrent major depressive disorder, unspecified depression episode severity (H)  (R45.851) Suicidal ideation  (F31.9) Bipolar disorder (H)  (R11.0) Nausea  (E87.6) Chronic hypokalemia  30-year old female presents ambulatory for concerns of suicidal ideations with intent to harm herself by cutting her throat with a knife. She states that she does not feel safe at home due to thoughts of harming herself. She was discharged AMA yesterday after inpatient hospitalization for c difficile colitis. There is some concern regarding compliance with oral vancomycin treatment but she does accept dose in the ED. Risks of untreated c. Difficile colitis discussed. She declines oral, NG tube with oral, and peripheral IV replacement of potassium. Per review of chart her PCP has referred her to nephrology regarding chronic hypokalemia and recommendation was for daily potassium supplementation. I did ask what her long term plan would be regarding this considering she could die with low potassium yet refuses any kind of replacement except through a central line which I discussed risks of central line placement. There is some concern that she is making these poor decisions and being noncompliance due to mental health issues, suicidal ideations. Consulted with inpatient behavioral health provider who accepts patient for admission; however, later unable to accept due to c.diff. Patient accepted by hospitalist with plan for behavioral health provider to consult. Patient placed on 72 hour hold given concerns for safety.   DEC assessment complete with recommendation for inpatient behavioral health.     Tiffanie Cline, CNP          New Prescriptions    No medications on file       Final diagnoses:   Episode of recurrent major depressive disorder, unspecified depression episode severity (H)   Suicidal ideation   Bipolar disorder (H)   C. difficile colitis   Hypokalemia   Hypomagnesemia        9/4/2020   [facility]     Tiffanie Cline, CNP  09/04/20 [id number]

## 2020-09-05 NOTE — PLAN OF CARE
"Pt A&Ox3. Makes needs known. Pt pleasant and cooperative. Central line placed R) subclavian. C/o abd discomfort - requested and received Ativan 0.5mg IV 0135 and Zofran 4mg IV at 0137 with relief. No vomiting. No stools. Barrier cream to florentino area, pt reports has had rash since C-Diff. Informed pt need a urine sample, pt has been incontinent of urine. Enteric precautions in place. Sitter at bedside. Electrolytes being replaced. Pt contracts for safety.   Pt inc of urine. Will update MD regarding order for U Tox as unable to get unless straight cathed. Pt c/o 10/10 central line area discomfort. Absence of nonverbal pain indicators, pt lying in bed. Instructed pt to reposition in bed. Medicated with Bacoflen 10mg and Tyl 650mg (po).   0616 - Pt lying in bed. Asked if having central line pain, pt has eyes closed and shook head \"no\" Sitter at bedside.     "

## 2020-09-05 NOTE — PLAN OF CARE
[facility] Inpatient Admission Note:    Patient admitted to 3218/3218-1 at approximately 2308 via cart accompanied by transport tech 7 security personnell from emergency room . Report received from Yadira OLIVAS RN in SBAR format via telephone. Patient transferred to bed via self.. Patient is alert and oriented X 3, reports pain; rates at 6 on 0-10 scale.  Patient oriented to room, unit, hourly rounding, and plan of care. Explained admission packet and patient handbook with patient bill of rights brochure. Will continue to monitor and document as needed.     Inpatient Nursing criteria listed below was met:    Health care directives status obtained and documented: Yes    Care Everywhere authorization obtained No    MRSA swab completed for patient 65 years and older: N/A    Patient identifies a surrogate decision maker: Yes If yes, who: dontrell (mother) Contact Information:[phone]    Skin issues/needs documented: Yes    Isolation Patient: yes Education given, correct sign in place and documentation row added to PCS:  Yes    Fall Prevention Yes: Care plan updated, education given and documented, sticker and magnet in place: Yes    Care Plan initiated: Yes    Education Documented (including assessment): Yes    Patient has discharge needs : Yes If yes, please explain:Placement, denies feeling unsafe in current environment but states she does not want to go back, wants to go back to a group home or SNF

## 2020-09-06 LAB
ANION GAP SERPL CALCULATED.3IONS-SCNC: 6 MMOL/L (ref 3–14)
BUN SERPL-MCNC: <1 MG/DL (ref 7–30)
CALCIUM SERPL-MCNC: 8 MG/DL (ref 8.5–10.1)
CHLORIDE SERPL-SCNC: 108 MMOL/L (ref 94–109)
CO2 SERPL-SCNC: 24 MMOL/L (ref 20–32)
CREAT SERPL-MCNC: 0.36 MG/DL (ref 0.52–1.04)
ERYTHROCYTE [DISTWIDTH] IN BLOOD BY AUTOMATED COUNT: 13.8 % (ref 10–15)
GFR SERPL CREATININE-BSD FRML MDRD: >90 ML/MIN/{1.73_M2}
GLUCOSE SERPL-MCNC: 82 MG/DL (ref 70–99)
HCT VFR BLD AUTO: 31.8 % (ref 35–47)
HGB BLD-MCNC: 10.9 G/DL (ref 11.7–15.7)
MAGNESIUM SERPL-MCNC: 1.6 MG/DL (ref 1.6–2.3)
MCH RBC QN AUTO: 31.1 PG (ref 26.5–33)
MCHC RBC AUTO-ENTMCNC: 34.3 G/DL (ref 31.5–36.5)
MCV RBC AUTO: 91 FL (ref 78–100)
PLATELET # BLD AUTO: 246 10E9/L (ref 150–450)
POTASSIUM SERPL-SCNC: 3.5 MMOL/L (ref 3.4–5.3)
RBC # BLD AUTO: 3.5 10E12/L (ref 3.8–5.2)
SODIUM SERPL-SCNC: 138 MMOL/L (ref 133–144)
WBC # BLD AUTO: 6.2 10E9/L (ref 4–11)

## 2020-09-06 PROCEDURE — 12000000 ZZH R&B MED SURG/OB

## 2020-09-06 PROCEDURE — 99233 SBSQ HOSP IP/OBS HIGH 50: CPT | Performed by: INTERNAL MEDICINE

## 2020-09-06 PROCEDURE — 25000128 H RX IP 250 OP 636: Performed by: INTERNAL MEDICINE

## 2020-09-06 PROCEDURE — 80048 BASIC METABOLIC PNL TOTAL CA: CPT | Performed by: INTERNAL MEDICINE

## 2020-09-06 PROCEDURE — 36415 COLL VENOUS BLD VENIPUNCTURE: CPT | Performed by: INTERNAL MEDICINE

## 2020-09-06 PROCEDURE — 25800030 ZZH RX IP 258 OP 636: Performed by: INTERNAL MEDICINE

## 2020-09-06 PROCEDURE — 25000132 ZZH RX MED GY IP 250 OP 250 PS 637: Performed by: INTERNAL MEDICINE

## 2020-09-06 PROCEDURE — 85027 COMPLETE CBC AUTOMATED: CPT | Performed by: INTERNAL MEDICINE

## 2020-09-06 PROCEDURE — 25000128 H RX IP 250 OP 636

## 2020-09-06 PROCEDURE — 83735 ASSAY OF MAGNESIUM: CPT | Performed by: INTERNAL MEDICINE

## 2020-09-06 PROCEDURE — 25000132 ZZH RX MED GY IP 250 OP 250 PS 637: Performed by: NURSE PRACTITIONER

## 2020-09-06 RX ORDER — NICOTINE 21 MG/24HR
1 PATCH, TRANSDERMAL 24 HOURS TRANSDERMAL DAILY
Status: DISCONTINUED | OUTPATIENT
Start: 2020-09-06 | End: 2020-09-11 | Stop reason: HOSPADM

## 2020-09-06 RX ORDER — MAGNESIUM SULFATE HEPTAHYDRATE 40 MG/ML
INJECTION, SOLUTION INTRAVENOUS
Status: COMPLETED
Start: 2020-09-06 | End: 2020-09-06

## 2020-09-06 RX ADMIN — MAGNESIUM SULFATE IN WATER 4 G: 40 INJECTION, SOLUTION INTRAVENOUS at 06:28

## 2020-09-06 RX ADMIN — Medication 10 MEQ: at 06:24

## 2020-09-06 RX ADMIN — Medication 10 MEQ: at 07:30

## 2020-09-06 RX ADMIN — ONDANSETRON 4 MG: 4 TABLET, ORALLY DISINTEGRATING ORAL at 09:45

## 2020-09-06 RX ADMIN — Medication 1 CAPSULE: at 21:54

## 2020-09-06 RX ADMIN — MAGNESIUM SULFATE HEPTAHYDRATE 4 G: 40 INJECTION, SOLUTION INTRAVENOUS at 06:28

## 2020-09-06 RX ADMIN — MIRTAZAPINE 15 MG: 15 TABLET, FILM COATED ORAL at 21:54

## 2020-09-06 RX ADMIN — LORAZEPAM 0.5 MG: 2 INJECTION INTRAMUSCULAR; INTRAVENOUS at 05:38

## 2020-09-06 RX ADMIN — LORAZEPAM 0.5 MG: 2 INJECTION INTRAMUSCULAR; INTRAVENOUS at 11:39

## 2020-09-06 RX ADMIN — BACLOFEN 10 MG: 10 TABLET ORAL at 09:46

## 2020-09-06 RX ADMIN — LAMOTRIGINE 25 MG: 25 TABLET ORAL at 09:44

## 2020-09-06 RX ADMIN — VANCOMYCIN HYDROCHLORIDE 125 MG: 125 CAPSULE ORAL at 16:21

## 2020-09-06 RX ADMIN — NICOTINE 1 PATCH: 14 PATCH, EXTENDED RELEASE TRANSDERMAL at 18:11

## 2020-09-06 RX ADMIN — VANCOMYCIN HYDROCHLORIDE 125 MG: 125 CAPSULE ORAL at 09:45

## 2020-09-06 RX ADMIN — VANCOMYCIN HYDROCHLORIDE 125 MG: 125 CAPSULE ORAL at 21:54

## 2020-09-06 RX ADMIN — RIVAROXABAN 20 MG: 20 TABLET, FILM COATED ORAL at 09:44

## 2020-09-06 RX ADMIN — LORAZEPAM 0.5 MG: 2 INJECTION INTRAMUSCULAR; INTRAVENOUS at 23:59

## 2020-09-06 RX ADMIN — SODIUM CHLORIDE, POTASSIUM CHLORIDE, SODIUM LACTATE AND CALCIUM CHLORIDE: 600; 310; 30; 20 INJECTION, SOLUTION INTRAVENOUS at 00:38

## 2020-09-06 RX ADMIN — ACETAMINOPHEN 650 MG: 325 TABLET, FILM COATED ORAL at 00:47

## 2020-09-06 RX ADMIN — Medication 1 CAPSULE: at 09:45

## 2020-09-06 RX ADMIN — BACLOFEN 10 MG: 10 TABLET ORAL at 00:47

## 2020-09-06 RX ADMIN — LORAZEPAM 0.5 MG: 2 INJECTION INTRAMUSCULAR; INTRAVENOUS at 18:07

## 2020-09-06 ASSESSMENT — ACTIVITIES OF DAILY LIVING (ADL)
ADLS_ACUITY_SCORE: 16.5
ADLS_ACUITY_SCORE: 15
ADLS_ACUITY_SCORE: 16.5
ADLS_ACUITY_SCORE: 16

## 2020-09-06 NOTE — PLAN OF CARE
A&Ox3. Makes needs known. Flat affect. Medicated with baclofen 10mg (po) and Tylenol 650mg (po) for c/o abd discomfort, rates discomfort 5/10. No emesis - tolerating oral medications.No loose stools. Central line white port infusing ,brown and blue S.L. flushed every 8hrs. Dressing clean dry and intact. 1:1 at bedside. Pt denies SI, has no plan, contracts for safety.   0538 - Ativan 0.5mg IV x1 given for c/o anxiety.   0645 - K+ and Mag replacement initiated.

## 2020-09-06 NOTE — PROGRESS NOTES
"CLINICAL NUTRITION SERVICES  -  ASSESSMENT NOTE    Maggie Case : Admission Nutrition Risk Screen - new/uncontrolled diabetes    NUTRITION RECOMMENDATIONS  - Ensure clear TID  - Consider nutrition support if intake does not improve      30 yof admitted for colitis due to clostridium difficile. Pt has a hx of B12 deficiency, lactose intolerance, strock, pulmonary embolism, chronic diarrhea, chemical dependency, anemia, anxiety, and OCD. No hx of DM noted, last A1C was 5.8 in 2018. Pt was recently admitted 8/26/20 and she had essentially no intake just over a week. Clear liquids only. No weight was documented last admission to assess weight changes. Noted an approximate 30lb weight loss in the last 2 months. May need to consider nutrition support if pt's intake does not improve. Noted probiotic is ordered.    Diet Order: Regular  Intake:0%    Height: 5' 3\"  Weight: 140 lbs 13.98 oz  Body mass index is 24.95 kg/m .  Weight Status:  Normal BMI  IBW: 52.4 kg (115 lb 8.3 oz)  Weight History: 17% (30lb) weight loss in 3 months (6/10/20- 9/4/20)  Wt Readings from Last 10 Encounters:   09/04/20 08/13/20 63.9 kg (140 lb 14 oz)  77.1 kg (170 lb)* estimated?   07/22/20 81.6 kg (180 lb) estimated?   07/03/20  06/10/20 80.7 kg (178 lb)  77.2 kg (170 lb 3.1 oz)*   05/22/20 73.3 kg (161 lb 9.6 oz)   11/05/18 55.3 kg (122 lb)   09/18/18 56.2 kg (124 lb)   08/13/18 54.2 kg (119 lb 7.8 oz)   07/30/18 61.2 kg (135 lb)   07/15/18 61.8 kg (136 lb 3.9 oz)   06/24/18 63.5 kg (140 lb)   * weight from care everywhere    Estimated Energy Needs:6647-7100 kcals (30-35 Kcal/Kg)   Estimated Protein Needs: 75-95 grams protein 1.2- 1.5 g pro/Kg)    MALNUTRITION:  % Weight Loss:  > 7.5% in 3 months (severe malnutrition)  % Intake:  </= 50% for >/= 1 month (severe malnutrition)    Malnutrition Diagnosis: Severe malnutrition  In Context of:  Chronic illness or disease    NUTRITION DIAGNOSIS:  Malnutrition related to inadequate oral intake as " evidenced by 17% weight loss in 3 months    MONITORING AND EVALUATION:  RD will monitor intake, weight, labs

## 2020-09-06 NOTE — PLAN OF CARE
Face to face report given with opportunity to observe patient.    Report given to RITESH Moreno RN   9/5/2020  11:16 PM

## 2020-09-06 NOTE — PHARMACY
[facility]    Pharmacy      Antimicrobial Stewardship Note     Current antimicrobial therapy:  Anti-infectives (From now, onward)    Start     Dose/Rate Route Frequency Ordered Stop    09/05/20 0900  vancomycin (VANCOCIN) capsule 125 mg      125 mg Oral 4 TIMES DAILY 09/04/20 2327 09/16/20 0858          Indication: Colitis due to Clostridium difficile    Days of Therapy: 3 days this stay but was previously admitted.     Pertinent labs:  Creatinine   Creatinine   Date Value Ref Range Status   09/06/2020 0.36 (L) 0.52 - 1.04 mg/dL Final   09/05/2020 0.31 (L) 0.52 - 1.04 mg/dL Final   09/04/2020 0.38 (L) 0.52 - 1.04 mg/dL Final     WBC   WBC   Date Value Ref Range Status   09/06/2020 6.2 4.0 - 11.0 10e9/L Final   09/05/2020 12.4 (H) 4.0 - 11.0 10e9/L Final   09/04/2020 13.5 (H) 4.0 - 11.0 10e9/L Final     Procalcitonin   Procalcitonin   Date Value Ref Range Status   02/25/2018 0.23 ng/ml Final     Comment:     0.05-0.24 ng/ml Low risk of systemic bacterial infection. Local bacterial   infection possible.  Recommendation: Assess other clinical features of   infection. Discourage antibiotics unless strong clinical suspicion for serious   infection.       CRP   CRP Inflammation   Date Value Ref Range Status   09/05/2020 18.2 (H) 0.0 - 8.0 mg/L Final   08/26/2020 26.3 (H) 0.0 - 8.0 mg/L Final   05/12/2020 32.6 (H) 0.0 - 8.0 mg/L Final       Culture Results:              Recommendations/Interventions:  1. None at this time.    Joey Busch RPH  September 6, 2020

## 2020-09-06 NOTE — PLAN OF CARE
"Afebrile. BP soft throughout the shift. BP 98/61 (BP Location: Right arm)   Pulse 87   Temp 98.9  F (37.2  C) (Tympanic)   Resp 18   Ht 1.6 m (5' 3\")   Wt 63.9 kg (140 lb 14 oz)   SpO2 97%   BMI 24.95 kg/m  . A&O. Reports generalized pain- received Baclofen this morning with adequate results. Pt is withdrawn this shift, appears sad. Denies any suicidal thoughts. Encouraged to get up out of bed and sit in the chair- refused. Encouraged liquids/food throughout the shift by 1:1 with poor results. Pt incontinent of bowel and bladder this shift. She is reddened in groin folds, but refuses to let staff apply barrier cream. Skin intact. Assessment as charted. 1:1 in place. Call light with 1:1- able to make needs known.     Face to face report given with opportunity to observe patient.    Report given to Karly Woods   9/6/2020  3:49 PM   "

## 2020-09-06 NOTE — PLAN OF CARE
Patient is alert and oriented, VSS, slow to respond, flat affect. Did not get up from bed and has been in bed all day, appears unmotivated. Patient demanded her ativan was increased this shift, notified doctor of this request and he stated to try nonpharma interventions alongside other PRN meds which patient declined. Patient has called every hour since last admin of Ativan at about 1645 requesting more. VSS, pressures a little low, could be position of patient in bed as she is laying on her side and refusing to lay on back for blood pressures. States the suicidal thoughts come and go but denies a plan, 1:1 sitter at bedside and inpatient mental health guidelines being followed per policy. No BMs this shift, incontinent of urine.

## 2020-09-06 NOTE — PROGRESS NOTES
"[facility]    Hospitalist Progress Note    Date of Service (when I saw the patient): 09/06/2020    Assessment & Plan   Maggie Case is a 30 year old female with PMH significant of ASD, DVT, PE, paradoxical CVA, hemorrhagic CVA, hypercoaguable state, recurrent C-diff, medication no-compliance, hypoKalemia and hypomagnesemia, presents to ED with Abdominal pain, nausea, and stated that she wants to hurt herself. She was discharged (left AMA 9.3.2010) after staying 9 days at our hospital. Was treated for C-diff. Stay was complicated with medication and order non-compliance.      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 First time she was diagnosed 2017. Partially due to non-compliance. Will need longer treatment and taper. May benefit from fecal transplant. Non-toxic.    Plan: continue Vancomycin 125 mg po qid, treat as recurrence, abx for 10 days until 9/15/20.              Enteric isolation not necessary as she is no longer having active diarrhea.  Tested negative 9/5/20.     Active Problems:    Hypokalemia    Assessment: with QT prolongation. Recurrent. Intermittently refusing oral replacement    Plan:  central line               K and Mg stable.       Left hemiparesis (H)    Assessment: this chronic condition. Upper extremity affected more than LE.     Plan: fall precautions              PT evaluation       Chronic anticoagulation    Assessment: due to coagulopathy with negative extensive diagnostic w/u at [facility]    Plan: continue xarelto       Suicidal ideation    Assessment: her plan was to \"slit her throat\".  Has had passive ideation in the past, recently admitted to psych 7/2020    Plan: Bedside sitter              Placed on 72 hour hold   Spoke to [facility] on 9/5/20 at 0830, psych will not physically see patient on medical floor, will manage psych medications from afar.  Readdress with them when patient is medically stabilized to discharge from acute " care.       Hypomagnesemia    Assessment: recurrent. Present on admission.     Plan: replace and monitor.      Metabolic acidosis  Assessment; present on admission, believe due to GI loss  Plan: improved with treatment of C-diff and IV hydration              Monitor.     QT prolongation  Assessment: present on admission and due to electrolyte imbalance.   Plan: replace electrolytes and monitor     DVT Prophylaxis: continue xarelto  Code Status: Full Code     Disposition: Expected discharge in 1-2 days once infection treated, electrolyte imbalance corrected, psych evaluation, likely inpatient psych admission after c diff colitis improves.    Eleuterio Anderson MD      Interval History   Patient seen at bedside. Withdrawn, quiet.  Acknowledges improvement in diarrhea, no additional BMs after one yesterday AM which tested negative for c diff.  Denies additional BMs overnight, denies n/v.    -Data reviewed today: I reviewed all new labs and imaging results over the last 24 hours. I personally reviewed imaging reports.    Physical Exam   Temp: 99.4  F (37.4  C) Temp src: Tympanic BP: 105/71 Pulse: 82   Resp: 18 SpO2: 97 % O2 Device: None (Room air)    Vitals:    09/04/20 2305   Weight: 63.9 kg (140 lb 14 oz)     Vital Signs with Ranges  Temp:  [98.7  F (37.1  C)-99.4  F (37.4  C)] 99.4  F (37.4  C)  Pulse:  [82] 82  Resp:  [16-18] 18  BP: ()/(47-71) 105/71  SpO2:  [96 %-97 %] 97 %      Intake/Output Summary (Last 24 hours) at 9/6/2020 0903  Last data filed at 9/6/2020 0527  Gross per 24 hour   Intake 2659 ml   Output --   Net 2659 ml       CVC Triple Lumen 09/05/20 Right Subclavian (Active)   Site Assessment WDL 09/05/20 2100   External Cath Length (cm) 2 cm 09/04/20 0000   Dressing Intervention Chlorhexidine sponge;Transparent 09/06/20 0046   Lumen A - Color WHITE 09/06/20 0705   Lumen A - Status infusing 09/06/20 0705   Lumen B - Color BLUE 09/06/20 0705   Lumen B - Status infusing 09/06/20 0705   Lumen C - Color  BROWN 09/06/20 0705   Lumen C - Status saline locked 09/06/20 0705   Number of days: 1     Line/device assessment(s) completed for medical necessity    Constitutional - AA, NAD, withdrawn  HEENT - atraumatic, normocephalic  Neck - supple, no masses, no JVD  CVS - S1 S2 RRR, no murmurs, rubs, gallops  Respiratory - CTA b/l  GI - soft, tender to palpation in lower quadrants, no guarding, no rebound, ND, hypoactive bowel sounds, no organomegaly  Musculoskeletal - no LE edema, no lesions  Neuro - oriented x 3, no gross focal deficits  Psych - withdrawn, flat affect    Medications     - MEDICATION INSTRUCTIONS -         lactobacillus rhamnosus (GG)  1 capsule Oral BID     lamoTRIgine  25 mg Oral Daily     mirtazapine  15 mg Oral At Bedtime     rivaroxaban ANTICOAGULANT  20 mg Oral QAM     sodium chloride (PF)  10 mL Intracatheter Q8H     vancomycin  125 mg Oral 4x Daily       Data   Recent Labs   Lab 09/06/20  0526 09/05/20  0933 09/05/20  0530 09/04/20  1948   WBC 6.2  --  12.4* 13.5*   HGB 10.9*  --  13.0 15.5   MCV 91  --  89 93     --  306 343     --  137 134   POTASSIUM 3.5 3.7 3.8 2.7*   CHLORIDE 108  --  108 102   CO2 24  --  19* 15*   BUN <1*  --  2* 3*   CR 0.36*  --  0.31* 0.38*   ANIONGAP 6  --  10 17*   AVINASH 8.0*  --  7.8* 8.9   GLC 82  --  88 103*   ALBUMIN  --   --  2.8* 3.4   PROTTOTAL  --   --  5.6* 6.9   BILITOTAL  --   --  0.6 0.6   ALKPHOS  --   --  53 95   ALT  --   --  16 21   AST  --   --  19 24     Lactic Acid   Date Value Ref Range Status   08/27/2020 2.3 (H) 0.7 - 2.0 mmol/L Final     Comment:     Significant value called to and read back by  MAGDALENA HATHAWAY AT 0539 ON 08/27/2020 BY AMB     05/23/2020 1.4 0.7 - 2.0 mmol/L Final   05/22/2020 2.7 (H) 0.7 - 2.0 mmol/L Final     Comment:     Significant value called to and read back by  MARKEL SHIELDS AT 1614 ON 05/22/2020 BY AMB         No results found for this or any previous visit (from the past 24 hour(s)).    Eleuterio Anderson MD

## 2020-09-06 NOTE — PLAN OF CARE
Face to face report given with opportunity to observe patient.    Report given to Jasmyn Smith RN   9/6/2020  7:05 AM

## 2020-09-07 LAB
ANION GAP SERPL CALCULATED.3IONS-SCNC: 6 MMOL/L (ref 3–14)
BUN SERPL-MCNC: <1 MG/DL (ref 7–30)
CALCIUM SERPL-MCNC: 8.4 MG/DL (ref 8.5–10.1)
CHLORIDE SERPL-SCNC: 107 MMOL/L (ref 94–109)
CO2 SERPL-SCNC: 24 MMOL/L (ref 20–32)
CREAT SERPL-MCNC: 0.37 MG/DL (ref 0.52–1.04)
ERYTHROCYTE [DISTWIDTH] IN BLOOD BY AUTOMATED COUNT: 13.6 % (ref 10–15)
GFR SERPL CREATININE-BSD FRML MDRD: >90 ML/MIN/{1.73_M2}
GLUCOSE SERPL-MCNC: 87 MG/DL (ref 70–99)
HCT VFR BLD AUTO: 36.1 % (ref 35–47)
HGB BLD-MCNC: 12.3 G/DL (ref 11.7–15.7)
MAGNESIUM SERPL-MCNC: 2 MG/DL (ref 1.6–2.3)
MCH RBC QN AUTO: 31.4 PG (ref 26.5–33)
MCHC RBC AUTO-ENTMCNC: 34.1 G/DL (ref 31.5–36.5)
MCV RBC AUTO: 92 FL (ref 78–100)
PLATELET # BLD AUTO: 284 10E9/L (ref 150–450)
POTASSIUM SERPL-SCNC: 3.4 MMOL/L (ref 3.4–5.3)
RBC # BLD AUTO: 3.92 10E12/L (ref 3.8–5.2)
SODIUM SERPL-SCNC: 137 MMOL/L (ref 133–144)
WBC # BLD AUTO: 6.3 10E9/L (ref 4–11)

## 2020-09-07 PROCEDURE — 85027 COMPLETE CBC AUTOMATED: CPT | Performed by: INTERNAL MEDICINE

## 2020-09-07 PROCEDURE — 25000132 ZZH RX MED GY IP 250 OP 250 PS 637: Performed by: INTERNAL MEDICINE

## 2020-09-07 PROCEDURE — 12000000 ZZH R&B MED SURG/OB

## 2020-09-07 PROCEDURE — 83735 ASSAY OF MAGNESIUM: CPT | Performed by: INTERNAL MEDICINE

## 2020-09-07 PROCEDURE — 25000132 ZZH RX MED GY IP 250 OP 250 PS 637: Performed by: NURSE PRACTITIONER

## 2020-09-07 PROCEDURE — 36415 COLL VENOUS BLD VENIPUNCTURE: CPT | Performed by: INTERNAL MEDICINE

## 2020-09-07 PROCEDURE — 99233 SBSQ HOSP IP/OBS HIGH 50: CPT | Performed by: INTERNAL MEDICINE

## 2020-09-07 PROCEDURE — 99222 1ST HOSP IP/OBS MODERATE 55: CPT | Performed by: NURSE PRACTITIONER

## 2020-09-07 PROCEDURE — 25000128 H RX IP 250 OP 636: Performed by: INTERNAL MEDICINE

## 2020-09-07 PROCEDURE — 80048 BASIC METABOLIC PNL TOTAL CA: CPT | Performed by: INTERNAL MEDICINE

## 2020-09-07 RX ADMIN — MIRTAZAPINE 15 MG: 15 TABLET, FILM COATED ORAL at 20:29

## 2020-09-07 RX ADMIN — ACETAMINOPHEN 650 MG: 325 TABLET, FILM COATED ORAL at 11:25

## 2020-09-07 RX ADMIN — ACETAMINOPHEN 650 MG: 325 TABLET, FILM COATED ORAL at 20:28

## 2020-09-07 RX ADMIN — LORAZEPAM 0.5 MG: 2 INJECTION INTRAMUSCULAR; INTRAVENOUS at 06:11

## 2020-09-07 RX ADMIN — VANCOMYCIN HYDROCHLORIDE 125 MG: 125 CAPSULE ORAL at 20:29

## 2020-09-07 RX ADMIN — VANCOMYCIN HYDROCHLORIDE 125 MG: 125 CAPSULE ORAL at 12:27

## 2020-09-07 RX ADMIN — ONDANSETRON 4 MG: 4 TABLET, ORALLY DISINTEGRATING ORAL at 18:15

## 2020-09-07 RX ADMIN — LAMOTRIGINE 25 MG: 25 TABLET ORAL at 10:21

## 2020-09-07 RX ADMIN — NICOTINE 1 PATCH: 14 PATCH, EXTENDED RELEASE TRANSDERMAL at 08:59

## 2020-09-07 RX ADMIN — VANCOMYCIN HYDROCHLORIDE 125 MG: 125 CAPSULE ORAL at 17:13

## 2020-09-07 RX ADMIN — Medication 1 CAPSULE: at 20:29

## 2020-09-07 RX ADMIN — Medication 1 CAPSULE: at 10:22

## 2020-09-07 RX ADMIN — BACLOFEN 10 MG: 10 TABLET ORAL at 20:29

## 2020-09-07 RX ADMIN — VANCOMYCIN HYDROCHLORIDE 125 MG: 125 CAPSULE ORAL at 10:22

## 2020-09-07 RX ADMIN — LORAZEPAM 0.5 MG: 2 INJECTION INTRAMUSCULAR; INTRAVENOUS at 12:27

## 2020-09-07 RX ADMIN — RIVAROXABAN 20 MG: 20 TABLET, FILM COATED ORAL at 10:21

## 2020-09-07 RX ADMIN — LORAZEPAM 0.5 MG: 2 INJECTION INTRAMUSCULAR; INTRAVENOUS at 18:31

## 2020-09-07 ASSESSMENT — ACTIVITIES OF DAILY LIVING (ADL)
ADLS_ACUITY_SCORE: 16

## 2020-09-07 NOTE — PLAN OF CARE
Pt pleasant cooperative,contracts for safety. Flat affect. Sitter at bedside. Central line drsg clean dry and intact. All lumens S.L. flushes without difficulty. Denies pain. Ativan 0.5mg IV x2 per request for anxiety. Pt chooses not to discuss anxiety. Will need potassium replaced, discussed with patient to take oral k+ at breakfast verses IV as pt unable to take IV when discharge.

## 2020-09-07 NOTE — PHARMACY
[facility]    Pharmacy      Antimicrobial Stewardship Note     Current antimicrobial therapy:  Anti-infectives (From now, onward)    Start     Dose/Rate Route Frequency Ordered Stop    09/05/20 0900  vancomycin (VANCOCIN) capsule 125 mg      125 mg Oral 4 TIMES DAILY 09/04/20 2327 09/16/20 0858          Indication: C diff     Days of Therapy: Difficult to determine since the patient did not consistently or consecutively receive antibiotic therapy due to refusing multiple doses (see below) and leaving AMA                Pertinent labs:  Creatinine   Creatinine   Date Value Ref Range Status   09/07/2020 0.37 (L) 0.52 - 1.04 mg/dL Final   09/06/2020 0.36 (L) 0.52 - 1.04 mg/dL Final   09/05/2020 0.31 (L) 0.52 - 1.04 mg/dL Final     WBC   WBC   Date Value Ref Range Status   09/07/2020 6.3 4.0 - 11.0 10e9/L Final   09/06/2020 6.2 4.0 - 11.0 10e9/L Final   09/05/2020 12.4 (H) 4.0 - 11.0 10e9/L Final     Procalcitonin   Procalcitonin   Date Value Ref Range Status   02/25/2018 0.23 ng/ml Final     Comment:     0.05-0.24 ng/ml Low risk of systemic bacterial infection. Local bacterial   infection possible.  Recommendation: Assess other clinical features of   infection. Discourage antibiotics unless strong clinical suspicion for serious   infection.       CRP   CRP Inflammation   Date Value Ref Range Status   09/05/2020 18.2 (H) 0.0 - 8.0 mg/L Final   08/26/2020 26.3 (H) 0.0 - 8.0 mg/L Final   05/12/2020 32.6 (H) 0.0 - 8.0 mg/L Final       Culture Results:   9/5: C diff, negative  8/27: C diff, positive     Recommendations/Interventions:  1. Most recent C diff on 9/5 came back negative but likely due to incomplete treatment and potential for false negative, provider would like to give for 10 more days until 9/15/20. Patient refused this AM dose. Will need to continue to monitor.    Miranda Parkinson RPH  September 7, 2020

## 2020-09-07 NOTE — PLAN OF CARE
Pt is alert and oriented. Slept on and off most of the day, stating she didn't get to sleep last night. VSS on room air. Tylenol for intermittent aching to central line. Pt refused to get out of bed, offered multiple times throughout the day. Incontinent of bowel and bladder. Only one loose stool this shift. Central line saline locked but flushed well with blood return noted. Still receiving PRN IV ativan, pt starts requesting approximately one hour prior to being able to receive. Oral zofran given this evening as pt stated she is feeling nauseous. Flat affect and irritable throughout the day. One to one remains in place for pt safety.     Face to face report given with opportunity to observe patient.    Report given to RITESH Lima RN   9/7/2020  6:49 PM

## 2020-09-07 NOTE — CONSULTS
"  [facility]  Psychiatric Progress Note      Impression:     Consult from [address].  Spoke with Dr. Anderson in regard to assessing patient for admission to Behavioral Health Unit.    Met with patient who is sitting up in her bed with food trays in front of her.  She is eating a bit while we talk.  Patient reports not feeling good physically for quite some time, this is her second admission to Medical floor for C. difficile for which she is being treated for currently - see medical notes.  Patient admits to passive suicidal ideation, stating \"Yeah, I don't want to be here\" - asked to clarify if meaning here at the hospital or here as in the world, she replies \"In the world\".  No plan discussed.  She reports not being able to get out of bed due to weakness, nauseous \"most of the time\", and is incontinent of both bowel and urine - having diarrhea this morning.  Asked patient how she functions at home, she reports not eating due to lack of appetite, non-compliant with medications, and does not have any help at home.  She reports living with a roommate who \"does his own thing\".             Diagnoses:   Major Depressive Disorder vs Bipolar Disorder  Generalized Anxiety  -See list of medical diagnosis-         Plan:     -Patient is not appropriate to transfer to Behavioral Health Unit at this time due to ongoing medical issues, incontinence, immobility, and recent/current C.diff   - will continue to monitor Lamictal and Mirtazapine and will be available for additional consults as needed  -Will need additional assessment if status changes, to determine appropriateness for admission at that time  -Recommend continue treatment in hospital or nursing home placement.    Attestation:  Patient has been seen and evaluated by me,  WOO Esparza CNP          Interim History:   The patient's care was discussed with the treatment team and chart notes were reviewed.          Medications:     Current " Facility-Administered Medications Ordered in Epic   Medication Dose Route Frequency Last Rate Last Dose     acetaminophen (TYLENOL) tablet 650 mg  650 mg Oral Q4H PRN   650 mg at 09/06/20 0047     baclofen (LIORESAL) tablet 10 mg  10 mg Oral Q8H PRN   10 mg at 09/06/20 0946     lactobacillus rhamnosus (GG) (CULTURELL) capsule 1 capsule  1 capsule Oral BID   1 capsule at 09/07/20 1022     lamoTRIgine (LaMICtal) tablet 25 mg  25 mg Oral Daily   25 mg at 09/07/20 1021     lidocaine (LMX4) kit   Topical Q1H PRN         lidocaine (LMX4) kit   Topical Q1H PRN         lidocaine 1 % 0.1-1 mL  0.1-1 mL Other Q1H PRN         lidocaine 1 % 0.1-1 mL  0.1-1 mL Other Q1H PRN         LORazepam (ATIVAN) injection 0.5 mg  0.5 mg Intravenous Q6H PRN   0.5 mg at 09/07/20 0611     magnesium sulfate 2 g in water intermittent infusion  2 g Intravenous Daily PRN         magnesium sulfate 4 g in 100 mL sterile water (premade)  4 g Intravenous Q4H PRN   4 g at 09/06/20 0628     melatonin tablet 1 mg  1 mg Oral At Bedtime PRN         mirtazapine (REMERON) tablet 15 mg  15 mg Oral At Bedtime   15 mg at 09/06/20 2154     naloxone (NARCAN) injection 0.1-0.4 mg  0.1-0.4 mg Intravenous Q2 Min PRN         nicotine (NICODERM CQ) 14 MG/24HR 24 hr patch 1 patch  1 patch Transdermal Daily   1 patch at 09/07/20 0859     nicotine Patch in Place   Transdermal Q8H         ondansetron (ZOFRAN-ODT) ODT tab 4 mg  4 mg Oral Q6H PRN   4 mg at 09/06/20 0945    Or     ondansetron (ZOFRAN) injection 4 mg  4 mg Intravenous Q6H PRN   4 mg at 09/05/20 0137     Patient is already receiving anticoagulation with heparin, enoxaparin (LOVENOX), warfarin (COUMADIN)  or other anticoagulant medication   Does not apply Continuous PRN         rivaroxaban ANTICOAGULANT (XARELTO) tablet 20 mg  20 mg Oral QAM   20 mg at 09/07/20 1021     sodium chloride (PF) 0.9% PF flush 10 mL  10 mL Intracatheter Q8H   10 mL at 09/07/20 0612     sodium chloride (PF) 0.9% PF flush 10-20 mL   "10-20 mL Intracatheter q1 min prn   10 mL at 09/06/20 1624     vancomycin (VANCOCIN) capsule 125 mg  125 mg Oral 4x Daily   125 mg at 09/07/20 1022     No current Epic-ordered outpatient medications on file.              10 point ROS negative see [facility]       Allergies:     Allergies   Allergen Reactions     Amoxicillin Other (See Comments)     Headaches     Levaquin [Levofloxacin] Swelling     Lithium      Diabetes insipidus      Naproxen Other (See Comments)     Reaction: Headaches     Nickel      Tramadol      Sulindac Rash            Psychiatric Examination:   BP 96/54   Pulse 80   Temp 98.2  F (36.8  C) (Tympanic)   Resp 16   Ht 1.6 m (5' 3\")   Wt 63.9 kg (140 lb 14 oz)   SpO2 95%   BMI 24.95 kg/m    Weight is 140 lbs 13.98 oz  Body mass index is 24.95 kg/m .    Appearance:  awake, alert  Attitude:  cooperative and guarded  Eye Contact:  good  Mood:  depressed  Affect:  intensity is flat  Speech:  clear, coherent  Psychomotor Behavior:  no evidence of tardive dyskinesia, dystonia, or tics  Thought Process:  linear  Associations:  no loose associations  Thought Content:  passive suicidal ideation present  Insight:  limited  Judgment:  limited  Oriented to:  time, person, and place  Attention Span and Concentration:  fair  Recent and Remote Memory:  fair  Fund of Knowledge: appropriate  Muscle Strength and Tone: c/o weakness, not getting out of bed  Gait and Station: Not observed    "

## 2020-09-07 NOTE — PLAN OF CARE
Spoke with central intake regarding pt. Per central intake, [facility] unable to accept due to pt immobility, appetite loss, and incontinence.

## 2020-09-07 NOTE — PROGRESS NOTES
Referrals:  [facility]- no beds other than [facility], [facility] unable to [facility] her  [facility]- no beds   [facility]- no d/t their current milieu  [facility]- no beds   [facility]- call back after 4pm to check on bed availability  [facility]- no holds  [facility] [place]- screening

## 2020-09-07 NOTE — PLAN OF CARE
Pt refusing medications at this time, stating to try again in one hour. States she cannot just wake up and take medications. Breakfast ordered at this time. 1:1 remains in place for pt safety.

## 2020-09-07 NOTE — PROGRESS NOTES
"[facility]    Hospitalist Progress Note    Date of Service (when I saw the patient): 09/07/2020    Assessment & Plan   Maggie Case is a 30 year old female with PMH significant of ASD, DVT, PE, paradoxical CVA, hemorrhagic CVA, hypercoaguable state, recurrent C-diff, medication no-compliance, hypoKalemia and hypomagnesemia, presents to ED with Abdominal pain, nausea, and stated that she wants to hurt herself. She was discharged (left AMA 9.3.2010) after staying 9 days at our hospital. Was treated for C-diff. Stay was complicated with medication and order non-compliance.      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 First time she was diagnosed 2017. Partially due to non-compliance. Will need longer treatment and taper. May benefit from fecal transplant. Non-toxic.    Plan: continue Vancomycin 125 mg po qid, treat as recurrence, abx for 10 days until 9/15/20.              Enteric isolation not necessary as she is no longer having active diarrhea.  Tested negative 9/5/20.     Active Problems:    Hypokalemia    Assessment: with QT prolongation. Recurrent. Intermittently refusing oral replacement    Plan:  central line               K and Mg stable.       Left hemiparesis (H)    Assessment: this chronic condition. Upper extremity affected more than LE.     Plan: fall precautions              PT evaluation       Chronic anticoagulation    Assessment: due to coagulopathy with negative extensive diagnostic w/u at [place]    Plan: continue xarelto       Suicidal ideation    Assessment: her plan was to \"slit her throat\".  Has had passive ideation in the past, recently admitted to psych 7/2020    Plan: Bedside sitter              Placed on 72 hour hold   Spoke to Michell on 9/7, feels that patient is not appropriate for 5th floor.  She will do consultation today.       Hypomagnesemia    Assessment: recurrent. Present on admission.     Plan: replace and monitor. "      Metabolic acidosis  Assessment; present on admission, believe due to GI loss  Plan: improved with treatment of C-diff and IV hydration              Monitor.     QT prolongation  Assessment: present on admission and due to electrolyte imbalance.   Plan: replace electrolytes and monitor     DVT Prophylaxis: continue xarelto  Code Status: Full Code     Disposition: Medically stable for discharge from acute care at this time, psych evaluation for suicidal ideation.    Eleuterio Anderson MD      Interval History   Patient seen at bedside. Withdrawn, quiet.  Denies new symptoms overnight., denies diarrhea.    -Data reviewed today: I reviewed all new labs and imaging results over the last 24 hours. I personally reviewed imaging reports.    Physical Exam   Temp: 98.2  F (36.8  C) Temp src: Tympanic BP: 96/54 Pulse: 80   Resp: 16 SpO2: 95 % O2 Device: None (Room air)    Vitals:    09/04/20 2305   Weight: 63.9 kg (140 lb 14 oz)     Vital Signs with Ranges  Temp:  [98.2  F (36.8  C)-98.9  F (37.2  C)] 98.2  F (36.8  C)  Pulse:  [80-92] 80  Resp:  [16-18] 16  BP: (89-98)/(48-61) 96/54  SpO2:  [95 %-97 %] 95 %        Intake/Output Summary (Last 24 hours) at 9/7/2020 1003  Last data filed at 9/6/2020 1807  Gross per 24 hour   Intake 300 ml   Output --   Net 300 ml       CVC Triple Lumen 09/05/20 Right Subclavian (Active)   Site Assessment WDL 09/05/20 2100   External Cath Length (cm) 2 cm 09/04/20 0000   Dressing Intervention Chlorhexidine sponge;Transparent 09/06/20 1625   Lumen A - Color WHITE 09/07/20 0900   Lumen A - Status saline locked 09/07/20 0900   Lumen B - Color BLUE 09/07/20 0900   Lumen B - Status saline locked 09/07/20 0900   Lumen C - Color BROWN 09/07/20 0900   Lumen C - Status saline locked 09/07/20 0900   Number of days: 2     Line/device assessment(s) completed for medical necessity    Constitutional - AA, NAD, withdrawn  HEENT - atraumatic, normocephalic  Neck - supple, no masses, no JVD  CVS - S1 S2 RRR, no  murmurs, rubs, gallops  Respiratory - CTA b/l  GI - soft, tender to palpation in lower quadrants, no guarding, no rebound, ND, hypoactive bowel sounds, no organomegaly  Musculoskeletal - no LE edema, no lesions  Neuro - oriented x 3, no gross focal deficits  Psych - withdrawn, flat affect    Medications     - MEDICATION INSTRUCTIONS -         lactobacillus rhamnosus (GG)  1 capsule Oral BID     lamoTRIgine  25 mg Oral Daily     mirtazapine  15 mg Oral At Bedtime     nicotine  1 patch Transdermal Daily     nicotine   Transdermal Q8H     rivaroxaban ANTICOAGULANT  20 mg Oral QAM     sodium chloride (PF)  10 mL Intracatheter Q8H     vancomycin  125 mg Oral 4x Daily       Data   Recent Labs   Lab 09/07/20  0541 09/06/20  0526 09/05/20  0933 09/05/20  0530 09/04/20  1948   WBC 6.3 6.2  --  12.4* 13.5*   HGB 12.3 10.9*  --  13.0 15.5   MCV 92 91  --  89 93    246  --  306 343    138  --  137 134   POTASSIUM 3.4 3.5 3.7 3.8 2.7*   CHLORIDE 107 108  --  108 102   CO2 24 24  --  19* 15*   BUN <1* <1*  --  2* 3*   CR 0.37* 0.36*  --  0.31* 0.38*   ANIONGAP 6 6  --  10 17*   AVINASH 8.4* 8.0*  --  7.8* 8.9   GLC 87 82  --  88 103*   ALBUMIN  --   --   --  2.8* 3.4   PROTTOTAL  --   --   --  5.6* 6.9   BILITOTAL  --   --   --  0.6 0.6   ALKPHOS  --   --   --  53 95   ALT  --   --   --  16 21   AST  --   --   --  19 24     Lactic Acid   Date Value Ref Range Status   08/27/2020 2.3 (H) 0.7 - 2.0 mmol/L Final     Comment:     Significant value called to and read back by  MAGDALENA HATHAWAY AT 0539 ON 08/27/2020 BY AMB     05/23/2020 1.4 0.7 - 2.0 mmol/L Final   05/22/2020 2.7 (H) 0.7 - 2.0 mmol/L Final     Comment:     Significant value called to and read back by  MARKEL SHIELDS AT 1614 ON 05/22/2020 BY AMB         No results found for this or any previous visit (from the past 24 hour(s)).    Eleuterio Anderson MD

## 2020-09-08 PROCEDURE — 25000128 H RX IP 250 OP 636: Performed by: INTERNAL MEDICINE

## 2020-09-08 PROCEDURE — 25000132 ZZH RX MED GY IP 250 OP 250 PS 637: Performed by: NURSE PRACTITIONER

## 2020-09-08 PROCEDURE — 25000132 ZZH RX MED GY IP 250 OP 250 PS 637: Performed by: INTERNAL MEDICINE

## 2020-09-08 PROCEDURE — 99231 SBSQ HOSP IP/OBS SF/LOW 25: CPT | Performed by: NURSE PRACTITIONER

## 2020-09-08 PROCEDURE — 99233 SBSQ HOSP IP/OBS HIGH 50: CPT | Performed by: INTERNAL MEDICINE

## 2020-09-08 PROCEDURE — 12000000 ZZH R&B MED SURG/OB

## 2020-09-08 RX ORDER — LORAZEPAM 0.5 MG/1
0.5 TABLET ORAL 3 TIMES DAILY PRN
Status: DISCONTINUED | OUTPATIENT
Start: 2020-09-08 | End: 2020-09-08

## 2020-09-08 RX ORDER — LORAZEPAM 0.5 MG/1
0.5 TABLET ORAL 2 TIMES DAILY PRN
Status: DISCONTINUED | OUTPATIENT
Start: 2020-09-08 | End: 2020-09-11 | Stop reason: HOSPADM

## 2020-09-08 RX ORDER — HYDROXYZINE HYDROCHLORIDE 25 MG/1
50 TABLET, FILM COATED ORAL EVERY 6 HOURS PRN
Status: DISCONTINUED | OUTPATIENT
Start: 2020-09-08 | End: 2020-09-11 | Stop reason: HOSPADM

## 2020-09-08 RX ADMIN — MIRTAZAPINE 15 MG: 15 TABLET, FILM COATED ORAL at 22:58

## 2020-09-08 RX ADMIN — HYDROXYZINE HYDROCHLORIDE 50 MG: 25 TABLET ORAL at 17:03

## 2020-09-08 RX ADMIN — VANCOMYCIN HYDROCHLORIDE 125 MG: 125 CAPSULE ORAL at 16:19

## 2020-09-08 RX ADMIN — VANCOMYCIN HYDROCHLORIDE 125 MG: 125 CAPSULE ORAL at 10:49

## 2020-09-08 RX ADMIN — NICOTINE 1 PATCH: 14 PATCH, EXTENDED RELEASE TRANSDERMAL at 10:47

## 2020-09-08 RX ADMIN — LORAZEPAM 0.5 MG: 2 INJECTION INTRAMUSCULAR; INTRAVENOUS at 00:10

## 2020-09-08 RX ADMIN — Medication 1 CAPSULE: at 10:49

## 2020-09-08 RX ADMIN — HYDROXYZINE HYDROCHLORIDE 50 MG: 25 TABLET ORAL at 22:57

## 2020-09-08 RX ADMIN — LORAZEPAM 0.5 MG: 0.5 TABLET ORAL at 10:56

## 2020-09-08 RX ADMIN — Medication 1 MG: at 22:57

## 2020-09-08 RX ADMIN — ACETAMINOPHEN 650 MG: 325 TABLET, FILM COATED ORAL at 16:19

## 2020-09-08 RX ADMIN — VANCOMYCIN HYDROCHLORIDE 125 MG: 125 CAPSULE ORAL at 22:58

## 2020-09-08 RX ADMIN — LAMOTRIGINE 25 MG: 25 TABLET ORAL at 10:49

## 2020-09-08 RX ADMIN — VANCOMYCIN HYDROCHLORIDE 125 MG: 125 CAPSULE ORAL at 13:41

## 2020-09-08 RX ADMIN — RIVAROXABAN 20 MG: 20 TABLET, FILM COATED ORAL at 10:49

## 2020-09-08 RX ADMIN — LORAZEPAM 0.5 MG: 2 INJECTION INTRAMUSCULAR; INTRAVENOUS at 05:56

## 2020-09-08 RX ADMIN — Medication 1 CAPSULE: at 22:57

## 2020-09-08 ASSESSMENT — ACTIVITIES OF DAILY LIVING (ADL)
ADLS_ACUITY_SCORE: 16

## 2020-09-08 NOTE — PLAN OF CARE
A&Ox4. Makes needs known. Pleasant/cooperative. Contracts for safety. No thoughts harming or hurting self. 1:1 sitter. Central line - lumens S.L. drsg clean dry and intact. T 100.1, denies pain. Tylenol 650mg (po) for temperature. Baclofen 10mg (po) at 2028 for muscle spasms. Follow up temp 98.5.  Ativan 0.5mg IV at 0010 for c/o anxiety.

## 2020-09-08 NOTE — PHARMACY
[facility]    Pharmacy      Antimicrobial Stewardship Note     Current antimicrobial therapy:  Anti-infectives (From now, onward)    Start     Dose/Rate Route Frequency Ordered Stop    09/05/20 0900  vancomycin (VANCOCIN) capsule 125 mg      125 mg Oral 4 TIMES DAILY 09/04/20 2327 09/16/20 0858          Indication: C diff      Days of Therapy: Difficult to determine since the patient did not consistently or consecutively receive antibiotic therapy due to refusing multiple doses (see below) and leaving AMA          Pertinent labs:  Creatinine   Creatinine   Date Value Ref Range Status   09/07/2020 0.37 (L) 0.52 - 1.04 mg/dL Final   09/06/2020 0.36 (L) 0.52 - 1.04 mg/dL Final   09/05/2020 0.31 (L) 0.52 - 1.04 mg/dL Final     WBC   WBC   Date Value Ref Range Status   09/07/2020 6.3 4.0 - 11.0 10e9/L Final   09/06/2020 6.2 4.0 - 11.0 10e9/L Final   09/05/2020 12.4 (H) 4.0 - 11.0 10e9/L Final     Procalcitonin   Procalcitonin   Date Value Ref Range Status   02/25/2018 0.23 ng/ml Final     Comment:     0.05-0.24 ng/ml Low risk of systemic bacterial infection. Local bacterial   infection possible.  Recommendation: Assess other clinical features of   infection. Discourage antibiotics unless strong clinical suspicion for serious   infection.       CRP   CRP Inflammation   Date Value Ref Range Status   09/05/2020 18.2 (H) 0.0 - 8.0 mg/L Final   08/26/2020 26.3 (H) 0.0 - 8.0 mg/L Final   05/12/2020 32.6 (H) 0.0 - 8.0 mg/L Final       Culture Results:   9/5: C diff, negative  8/27: C diff, positive      Recommendations/Interventions:  1. Most recent C diff on 9/5 came back negative but likely due to incomplete treatment and potential for false negative, provider would like to give for 9 more days until 9/15/20.     Miranda Parkinson [facility]  September 8, 2020

## 2020-09-08 NOTE — PROGRESS NOTES
"Spoke with Dr. Mcclellan regarding updated status. Psychiatric consultation completed by WOO Cade, CNP on 9/7/2020 with recommendations for management of lamictal and mirtazapine, additional assessment if status changes, and ongoing treatment in the hospital or nursing home placement. Dr. Mcclellan would like a statement indicating whether the patient is safe for discharge or not from a psychiatric stand-point, or if she need psychiatric placement. The patient has made passive SI, \"I don't want to be here,\" statements, and does have a significant history of mental health issues; however, there was no indication of intent or plan. Historically, she has only had one previous suicide attempt.    I did speak with Maggie, who denied suicidal ideation today. When asked about her feelings on needing inpatient psychiatric care, she stated, \"Some days I think I need it. I just don't have enough support.\"  During her last admission to the behavioral health unit, a more supportive living environment, such as group home placement, was recommended, as Maggie does not appear to have enough outpatient supports in order to maintain her medical or mental health stability. Maggie stated that she would like this, but her , Halima Ruiz, out of [facility], has not done anything to move this process forward. Maggie reported that given the choice, she would prefer to go home versus inpatient mental health. She does not feel that it would help her situation, and denied having any intent to harm herself. Although maintaining her medical stability outside of the hospital may be challenging, given Maggie's tendency to be non-compliant with ongoing treatments, this provider does not feel she warrants inpatient mental health at this time.     Please contact me with any further concerns, or if I can offer any additional support.    WOO Casillas, CNP  Behavioral Health  "

## 2020-09-08 NOTE — PROGRESS NOTES
"[facility]    Hospitalist Progress Note    Date of Service (when I saw the patient): 09/08/2020    Assessment & Plan     Maggie Case is a 30 year old female with PMH significant of ASD, DVT, PE, paradoxical CVA, hemorrhagic CVA, hypercoaguable state, recurrent C-diff, medication no-compliance, hypoKalemia and hypomagnesemia, presents to ED with Abdominal pain, nausea, and stated that she wants to hurt herself. She was discharged (left AMA 9.3.2010) after staying 9 days at our hospital. Was treated for C-diff. Stay was complicated with medication and order non-compliance.      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 First time she was diagnosed 2017. Partially due to non-compliance. Will need longer treatment and taper. May benefit from fecal transplant. Non-toxic.    Plan: continue Vancomycin 125 mg po qid, treat as recurrence, abx for 10 days until 9/15/20.              Enteric isolation not necessary as she is no longer having active diarrhea.  Tested negative 9/5/20.     Active Problems:    Hypokalemia    Assessment: with QT prolongation. Recurrent. Intermittently refusing oral replacement    Plan:  central line               K and Mg stable.       Left hemiparesis (H)    Assessment: this chronic condition. Upper extremity affected more than LE.     Plan: fall precautions              PT evaluation       Chronic anticoagulation    Assessment: due to coagulopathy with negative extensive diagnostic w/u at [place]    Plan: continue xarelto       Psych: Suicidal ideation and subsequent catatonia     Assessment: her plan was to \"slit her throat\".  Has had passive ideation in the past, recently admitted to psych 7/2020.  Anxiety/depression.    Plan: Bedside sitter              Placed on 72 hour hold   Spoke to Michell on 9/7, feels that patient is not appropriate for 5th floor, however did not clear her from psychiatric standpoint for discharge concerning " her suicidal ideation.  Working on alternative psych placement for her.       Hypomagnesemia    Assessment: recurrent. Present on admission.     Plan: replace and monitor.      Metabolic acidosis  Assessment; present on admission, believe due to GI loss  Plan: improved with treatment of C-diff and IV hydration              Monitor.     QT prolongation  Assessment: present on admission and due to electrolyte imbalance.   Plan: replace electrolytes and monitor     DVT Prophylaxis: continue xarelto  Code Status: Full Code     Disposition: Medically stable for discharge from acute care at this time, psych evaluation did not clear her for suicidal ideation.  Needs inpatient psych unit with medical capabilities.    Eleuterio Anderson MD      Interval History   Patient seen at bedside. Withdrawn.  Does not answer questions today.    -Data reviewed today: I reviewed all new labs and imaging results over the last 24 hours. I personally reviewed imaging reports.    Physical Exam   Temp: 97.5  F (36.4  C) Temp src: Tympanic BP: (!) 83/55(pt lying on left side, refuses to reposition to back) Pulse: 94   Resp: 16 SpO2: 95 % O2 Device: None (Room air)    Vitals:    09/04/20 2305   Weight: 63.9 kg (140 lb 14 oz)     Vital Signs with Ranges  Temp:  [97.5  F (36.4  C)-100.1  F (37.8  C)] 97.5  F (36.4  C)  Pulse:  [94] 94  Resp:  [16] 16  BP: (83-87)/(55-57) 83/55  SpO2:  [95 %-97 %] 95 %      Intake/Output Summary (Last 24 hours) at 9/8/2020 1030  Last data filed at 9/7/2020 1729  Gross per 24 hour   Intake 600 ml   Output --   Net 600 ml       CVC Triple Lumen 09/05/20 Right Subclavian (Active)   Site Assessment WDL 09/05/20 2100   External Cath Length (cm) 2 cm 09/04/20 0000   Dressing Intervention Chlorhexidine sponge;Transparent 09/06/20 1625   Lumen A - Color WHITE 09/08/20 0900   Lumen A - Status saline locked 09/08/20 0000   Lumen B - Color BLUE 09/08/20 0900   Lumen B - Status saline locked 09/08/20 0900   Lumen C - Color BROWN  09/08/20 0900   Lumen C - Status saline locked 09/08/20 0900   Number of days: 3     Line/device assessment(s) completed for medical necessity    Constitutional - AA, NAD, withdrawn  HEENT - atraumatic, normocephalic  Neck - supple, no masses, no JVD  CVS - S1 S2 RRR, no murmurs, rubs, gallops  Respiratory - CTA b/l  GI - soft, tender to palpation in lower quadrants, no guarding, no rebound, ND, hypoactive bowel sounds, no organomegaly  Musculoskeletal - no LE edema, no lesions  Neuro - no cooperative  Psych - withdrawn, flat affect    Medications     - MEDICATION INSTRUCTIONS -         lactobacillus rhamnosus (GG)  1 capsule Oral BID     lamoTRIgine  25 mg Oral Daily     mirtazapine  15 mg Oral At Bedtime     nicotine  1 patch Transdermal Daily     nicotine   Transdermal Q8H     rivaroxaban ANTICOAGULANT  20 mg Oral QAM     sodium chloride (PF)  10 mL Intracatheter Q8H     vancomycin  125 mg Oral 4x Daily       Data   Recent Labs   Lab 09/07/20  0541 09/06/20  0526 09/05/20  0933 09/05/20  0530 09/04/20  1948   WBC 6.3 6.2  --  12.4* 13.5*   HGB 12.3 10.9*  --  13.0 15.5   MCV 92 91  --  89 93    246  --  306 343    138  --  137 134   POTASSIUM 3.4 3.5 3.7 3.8 2.7*   CHLORIDE 107 108  --  108 102   CO2 24 24  --  19* 15*   BUN <1* <1*  --  2* 3*   CR 0.37* 0.36*  --  0.31* 0.38*   ANIONGAP 6 6  --  10 17*   AVINASH 8.4* 8.0*  --  7.8* 8.9   GLC 87 82  --  88 103*   ALBUMIN  --   --   --  2.8* 3.4   PROTTOTAL  --   --   --  5.6* 6.9   BILITOTAL  --   --   --  0.6 0.6   ALKPHOS  --   --   --  53 95   ALT  --   --   --  16 21   AST  --   --   --  19 24     Lactic Acid   Date Value Ref Range Status   08/27/2020 2.3 (H) 0.7 - 2.0 mmol/L Final     Comment:     Significant value called to and read back by  MAGDALENA HATHAWAY AT 0539 ON 08/27/2020 BY AMB     05/23/2020 1.4 0.7 - 2.0 mmol/L Final   05/22/2020 2.7 (H) 0.7 - 2.0 mmol/L Final     Comment:     Significant value called to and read back by  MARKEL SHIELDS AT  1614 ON 05/22/2020 BY DOMINGO         No results found for this or any previous visit (from the past 24 hour(s)).    Eleuterio Anderson MD

## 2020-09-08 NOTE — PLAN OF CARE
VSS on room air. Pt refusing to get up today. Central line saline locked. Pt got one dose of oral ativan, ativan then discontinued. Pt requesting to speak with provider after ativan discontinued, let Dr. Anderson know. Psych consult today, pt removed from hold. One to one removed at approximately 1:20 PM. Pt contracts for safety. Oral vanco for C.Diff continues.    Face to face report given with opportunity to observe patient.    Report given to RITESH Nava RN   9/8/2020  1:26 PM

## 2020-09-08 NOTE — PLAN OF CARE
Face to face report given with opportunity to observe patient.  Report given to Radha AWAD.    Yadira Smith RN  9/8/2020, 7:26 AM

## 2020-09-08 NOTE — PLAN OF CARE
Took over care of pt at this time.   1310   1340  Req ativan. Pt aware that ativan not an option any longer. Was agreeable then to taking po Vanco. Refused lunch.  Taking sips of tea. Flush to CL X 3 lines. Remains in Enteric Iso. Offered pt playing cards but declined. Nicotene patch in place. States she smokes 2 ppd.

## 2020-09-08 NOTE — PLAN OF CARE
"BP 92/56 (BP Location: Right arm)   Pulse 86   Temp 98.5  F (36.9  C) (Tympanic)   Resp 16   Ht 1.6 m (5' 3\")   Wt 63.9 kg (140 lb 14 oz)   SpO2 97%   BMI 24.95 kg/m      Pt alert and oriented upon initial assessment. Very withdrawn but compliant to requests. C/O ABD pain 5/10. Tylenol given 1x.  Several times asked for Ativan which was discontinued earlier on account of no PTA use, switched to Atarax which uses PTA.   clear, satting 97% RA. HR regular. BS active, no loose stools this shift. Continent t/o evening. Belongings returned to pt bedside as hold is now complete. No falls or injuries this shift. Will continue to monitor.     Face to face report given with opportunity to observe patient.    Report given to RITESH Salazar RN   9/8/2020  6:55 PM      "

## 2020-09-08 NOTE — PROGRESS NOTES
Assessment completed by visit with Maggie.    LOC: alert and oriented, pleasant, conversational, makes good eye contact when speaking    Dx: colitis /w c.dif, suicidal  Chronic Disease Management: hx cva, anxiety, depression, PTSD, OCD, borderline personality, hx substance use    Lives with: a 64 year old friend Rudi Hammond  She confirms that the address and phone we have listed are accurate and current.  Living at:  [facility] in [place]  Transportation: YES Via [employer] or with friends    Primary PCP: Chapo Flores  Insurance:  Medicare and MA    Support System:  Parents Eric and Pawan are available if needed, roommate Rudi  Homecare/PCA: no home care services, has PCA services through [employer] (ph [phone]), has not had an actual PCA for a couple of months and is unsure when the start date of the new one will be. Message left at [employer]. In the past she has had 32.5 hours per week of services.   /[facility] Services:   Her [facility]  is Kellen Ruiz (ph [phone] ext [phone]), message was left requesting a return call from her  [facility]: NO      How was the [facility] notification completed: na    Health Care Directive: none  Guardian: none  POA: not asked    [facility]armacy: [facility]  Meds management: she manages her meds herself and denies any difficulty doing so; says she remembers to take them and refill them, understands them, and takes them as directed.    Adequate Resources for needs (housing, utilities, food/med): YES  Household chores: Rudi does most  Work/community/social activity: not asked   ADLs: independent with dressing, wears slip on shoes. She is incontinent of B&B at times and so wears pull ups which she has been changing herself. She only rarely bathes b/c she needs assist of the PCA (which she doesn't currently have) to do so.   Ambulation:she does not walk, but can transfer herself and shuffle her feet a couple steps if needed. She otherwise spends her time in her  "w/c.  Falls: denies falls  Nutrition: gets rides from friends to grocery shop, buys foods that are easy to prepare.   Sleep: not asked; has a hospital bed at home    Equipment used: ramp, commode over the toilet, shower chair      Oxygen supplier: na      Does the supplier have valid oxygen orders: na    Mental health: she has a history of anxiety, depression, PTSD, OCD, and borderline personality disorder. She was treated on the [facility] unit in July 2020. This current med-surg admission includes suicidal ideation. This morning and again this afternoon, she tells this writer that she still feels that way and wants to address her mental health. In the past she has worked with Ishan Vaca and [facility] and she is willing to continue to do that. She says she otherwise has no mental health providers, that noone currently manages any behavioral health meds.   Substance abuse: smokes, denies alcohol consumption, uses marijuana \"sometimes\" for her anxiety  Exposure to violence/abuse: denies any current abuse or violence  Stressors: none other     Able to Return to Prior Living Arrangements: her preference is to be somewhere where she has help with her personal care    Choice of Vendor: she understands that her choices are very limited and may be far away    Barriers: to be determined; at this time it is difficult to find an environment that can provide both the personal care and the mental health care she needs    GUSTABO: medium    Plan: she is willing to go wherever appropriate care can be found; this includes facilities that are far away. She is fearful of returning home as she feels she is too weak to care for herself and does not have a PCA. She says she also remains suicidal and wants her mental health addressed.     Referrals:  [facility]- screening    "

## 2020-09-08 NOTE — PLAN OF CARE
Pt requesting morning medication at this time. Medication given and updated pt on plan of care about looking for placement and switching to oral ativan. Pt upset stating her anxiety is not better not knowing where she is going. Pt requesting to go home and questioning this RN why she can't go home. Reminded pt she is on a 72 hour hold. Pt requesting to speak with MD and . Told  and MD of pt request.

## 2020-09-09 ENCOUNTER — TELEPHONE (OUTPATIENT)
Dept: BEHAVIORAL HEALTH | Facility: OTHER | Age: 30
End: 2020-09-09

## 2020-09-09 ENCOUNTER — TELEPHONE (OUTPATIENT)
Dept: BEHAVIORAL HEALTH | Facility: CLINIC | Age: 30
End: 2020-09-09

## 2020-09-09 PROCEDURE — 25000132 ZZH RX MED GY IP 250 OP 250 PS 637: Performed by: INTERNAL MEDICINE

## 2020-09-09 PROCEDURE — 25000128 H RX IP 250 OP 636: Performed by: INTERNAL MEDICINE

## 2020-09-09 PROCEDURE — 12000000 ZZH R&B MED SURG/OB

## 2020-09-09 PROCEDURE — 99232 SBSQ HOSP IP/OBS MODERATE 35: CPT | Performed by: INTERNAL MEDICINE

## 2020-09-09 PROCEDURE — 25000132 ZZH RX MED GY IP 250 OP 250 PS 637: Performed by: NURSE PRACTITIONER

## 2020-09-09 RX ADMIN — Medication 1 CAPSULE: at 20:56

## 2020-09-09 RX ADMIN — NICOTINE 1 PATCH: 14 PATCH, EXTENDED RELEASE TRANSDERMAL at 10:06

## 2020-09-09 RX ADMIN — HYDROXYZINE HYDROCHLORIDE 50 MG: 25 TABLET ORAL at 16:26

## 2020-09-09 RX ADMIN — MIRTAZAPINE 15 MG: 15 TABLET, FILM COATED ORAL at 20:56

## 2020-09-09 RX ADMIN — VANCOMYCIN HYDROCHLORIDE 125 MG: 125 CAPSULE ORAL at 13:50

## 2020-09-09 RX ADMIN — HYDROXYZINE HYDROCHLORIDE 50 MG: 25 TABLET ORAL at 22:41

## 2020-09-09 RX ADMIN — ONDANSETRON 4 MG: 4 TABLET, ORALLY DISINTEGRATING ORAL at 09:13

## 2020-09-09 RX ADMIN — VANCOMYCIN HYDROCHLORIDE 125 MG: 125 CAPSULE ORAL at 20:56

## 2020-09-09 RX ADMIN — VANCOMYCIN HYDROCHLORIDE 125 MG: 125 CAPSULE ORAL at 10:06

## 2020-09-09 RX ADMIN — ONDANSETRON 4 MG: 4 TABLET, ORALLY DISINTEGRATING ORAL at 16:26

## 2020-09-09 RX ADMIN — Medication 1 CAPSULE: at 10:06

## 2020-09-09 RX ADMIN — HYDROXYZINE HYDROCHLORIDE 50 MG: 25 TABLET ORAL at 10:12

## 2020-09-09 RX ADMIN — LAMOTRIGINE 25 MG: 25 TABLET ORAL at 10:06

## 2020-09-09 RX ADMIN — Medication 1 MG: at 22:41

## 2020-09-09 RX ADMIN — RIVAROXABAN 20 MG: 20 TABLET, FILM COATED ORAL at 10:06

## 2020-09-09 RX ADMIN — VANCOMYCIN HYDROCHLORIDE 125 MG: 125 CAPSULE ORAL at 16:26

## 2020-09-09 ASSESSMENT — ACTIVITIES OF DAILY LIVING (ADL)
ADLS_ACUITY_SCORE: 15.5
ADLS_ACUITY_SCORE: 16.5

## 2020-09-09 NOTE — PHARMACY-MEDICATION REGIMEN REVIEW
[facility]    Pharmacy      Antimicrobial Stewardship Note     Current antimicrobial therapy:  Anti-infectives (From now, onward)    Start     Dose/Rate Route Frequency Ordered Stop    09/05/20 0900  vancomycin (VANCOCIN) capsule 125 mg      125 mg Oral 4 TIMES DAILY 09/04/20 2327 09/16/20 0858          Indication: C diff      Days of Therapy: Difficult to determine since the patient did not consistently or consecutively receive antibiotic therapy due to refusing multiple doses (see below) and leaving AMA      Pertinent labs:  Creatinine   Creatinine   Date Value Ref Range Status   09/07/2020 0.37 (L) 0.52 - 1.04 mg/dL Final   09/06/2020 0.36 (L) 0.52 - 1.04 mg/dL Final   09/05/2020 0.31 (L) 0.52 - 1.04 mg/dL Final     WBC   WBC   Date Value Ref Range Status   09/07/2020 6.3 4.0 - 11.0 10e9/L Final   09/06/2020 6.2 4.0 - 11.0 10e9/L Final   09/05/2020 12.4 (H) 4.0 - 11.0 10e9/L Final     Procalcitonin   Procalcitonin   Date Value Ref Range Status   02/25/2018 0.23 ng/ml Final     Comment:     0.05-0.24 ng/ml Low risk of systemic bacterial infection. Local bacterial   infection possible.  Recommendation: Assess other clinical features of   infection. Discourage antibiotics unless strong clinical suspicion for serious   infection.       CRP   CRP Inflammation   Date Value Ref Range Status   09/05/2020 18.2 (H) 0.0 - 8.0 mg/L Final   08/26/2020 26.3 (H) 0.0 - 8.0 mg/L Final   05/12/2020 32.6 (H) 0.0 - 8.0 mg/L Final       Culture Results:   (9/4/20) C. Diff = negative    (8/27/20) C. Diff = positive    Recent Antibiotics:         Recommendations/Interventions:  1. Most recent C diff on 9/5 came back negative but likely due to incomplete treatment and potential for false negative, provider would like to give through 9/15/20.      Refugio Teixeira, [facility]  September 9, 2020

## 2020-09-09 NOTE — PLAN OF CARE
"Nighttime BP 88/53 MAP of 62- patient is left side lying with right arm above heart- patient refusing to have BP rechecked on different extremity and yelled \"It's always low\". Patient asymptomatic with BP. Told patient we will have to recheck it later.   "

## 2020-09-09 NOTE — PLAN OF CARE
"Reason for hospital stay: colitis due to cdiff  Most recent vitals: /70   Pulse 82   Temp 98.4  F (36.9  C) (Tympanic)   Resp 14   Ht 1.6 m (5' 3\")   Wt 63.9 kg (140 lb 14 oz)   SpO2 97%   BMI 24.95 kg/m    Pain Management:  reports chronic pain to abdomen declines need for pain medication t/o shift reported as \"its okay\", intermittent nausea- medicated with zofran odt with good result, fair appetite.   LOC:  alert, flat affect  GI:  patient was agreeable to taking oral vanco at a later time d/t nausea.  Reports having bowel movement this morning when was assisted to the commode with 1 staff.   IVF:  central line intact- positive blood return to all 3 lumens and flushed without difficulty.   ABX:  po vanco as scheduled- taking medications as prescribed.   Mental health- has denied suicidal ideation, but affect is flat and not forth coming with conversation, last seen by  yesterday declines further mental health inpatient admission at this time.    approx 1330- case management spoke with patient regarding discharge planning, per case management patient reported that she is having feelings of hurting herself if she were to go home at this time.  Case Management is updating hospitalist.   Patient assisted into chair this afternoon per her request,  cooperative with plan of care, flat affect but pleasant and appreciative to staff.   Patient has been calling and asking to use the commode- is getting up with assist of 1 to the commode has had 2 continent small loose stools this shift- 1 early this morning and 1 small around 1300.  Denies further needs at this time.    1515- last update from case management awaiting call back from central intake.     Face to face report given with opportunity to observe patient.    Report given to Marguerite Montiel RN   9/9/2020  3:58 PM        "

## 2020-09-09 NOTE — PLAN OF CARE
Face to face report given with opportunity to observe patient.    Report given to Jennifer Pate RN   9/9/2020  6:58 AM

## 2020-09-09 NOTE — TELEPHONE ENCOUNTER
Update:     5:50PM- Dayana from [facility] called to confirm that a doc to doc was completed for Pt.  [facility] accepted Pt for [facility].  On call provider is aware.     5:58PM- April called to confirm that [facility] did not accept Pt.  Will review tomorrow morning.

## 2020-09-09 NOTE — TELEPHONE ENCOUNTER
S: 2:30 PM  Call from MORAIMA Vela at [phone] requesting a  bed for a 29 YO F w/ SI    B:  Hx of anxiety, depression, OCD, PTSD, BPD, and substance abuse.   Hx of CVA in 2018.  Patient hospitalized this time  for colitis, pos. C. Diff, and verbalization that she was going to hurt herself.    Patient is now medically cleared, including Infection Control, and medically  is at baseline.  She continues to endorse SI if she were to be discharged home.  Dr. Anderson  is patient's [phone].  Medical unit number is [phone].      A:  Voluntary    R:  Patient cleared and ready for behavioral bed placement: Yes
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
## 2020-09-09 NOTE — TELEPHONE ENCOUNTER
[facility]  @FLOW([id number])@      Social Work Care Navigator Note      Patient: Maggie Case  Date: September 9, 2020  Preferred Name: Maggie    Previous PHQ-9: No flowsheet data found.  Previous DIANNA-7: No flowsheet data found.  SEYMOUR LEVEL:  No flowsheet data found.    Preferred Contact: @ELIEZER([phone]@  Type of Contact Today: Phone call (patient / identified key support person reached)      Data: (subjective / Objective):  Patient Goals Areas: @FLOW([id number])@  Patient Stated Goals: @FLOW([id number])@  Recent ED/IP Admission or Discharge?   None  Recent ED/IP Admission or Discharge?   [place] IP Admission date: 09/04/2020    Patient Goals:  No data recorded      [facility] Core Service Provided:  Comprehensive Care Management: utilized the electronic medical record / patient registry to identify and support patient's health conditions / needs more effectively   Care Coordination: provided care management services/referrals necessary to ensure patient and their identified supports have access to medical, behavioral health, pharmacology and recovery support services.  Ensured that patient's care is integrated across all settings and services.   Health and Wellness Promotion    Current Stressors / Issues / Care Plan Objective Addressed Today:  SI    Intervention:  Motivational Interviewing: Expressed Empathy/Understanding, Supported Autonomy, Collaboration, Evocation, Permission to raise concern or advise and Open-ended questions   Target Behavior(s): NA    Assessment: (Progress on Goals / Homework):  Pt texted writer on 9/04 that she was experiencing suicidal thoughts.  Writer was out of the office from 09/03 - 09/09.  Upon returning to the office noted pt had been admitted to the hospital for recurrent cdiff and was on a hold for suicidal ideation.  Did reach out to pt via text offer support and encourage her to reach out with any concerns.  Pt stated she was still in the hospital and would reach out  upon discharge.    Will remain available.    Plan: (Homework, other):  Patient was encouraged to continue to seek condition-related information and education.      Scheduled a Phone follow up appointment with HERNAN VILLALOBOS in 1 week     Patient has set self-identified goals and will monitor progress until the next appointment.   RE Ayon, Social Work Care Coordinator   RE Ayon  Social Work Care Coordinator  [facility]  [phone] option 2 or [phone]

## 2020-09-09 NOTE — PLAN OF CARE
Patient was irritable before bed but slept t/o the night. Atarax given at bedtime. No stools- Vanco continues. No other issues noted.

## 2020-09-09 NOTE — PROGRESS NOTES
"[facility]    Hospitalist Progress Note    Date of Service (when I saw the patient): 09/09/2020    Assessment & Plan     Maggie Case is a 30 year old female with PMH significant of ASD, DVT, PE, paradoxical CVA, hemorrhagic CVA, hypercoaguable state, recurrent C-diff, medication no-compliance, hypoKalemia and hypomagnesemia, presents to ED with Abdominal pain, nausea, and stated that she wants to hurt herself. She was discharged (left AMA 9.3.2010) after staying 9 days at our hospital. Was treated for C-diff. Stay was complicated with medication and order non-compliance.      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 First time she was diagnosed 2017. Partially due to non-compliance. Will need longer treatment and taper. May benefit from fecal transplant. Non-toxic.    Plan: continue Vancomycin 125 mg po qid, treat as recurrence, abx for 10 days until 9/15/20.              Enteric isolation not necessary as she is no longer having active diarrhea.  Tested negative 9/5/20.     Active Problems:    Hypokalemia    Assessment: with QT prolongation. Recurrent. Intermittently refusing oral replacement    Plan:  central line until discharge               K and Mg stable.       Left hemiparesis (H)    Assessment: this chronic condition. Upper extremity affected more than LE.     Plan: fall precautions       Chronic anticoagulation    Assessment: due to coagulopathy with negative extensive diagnostic w/u at [place]    Plan: continue xarelto       Psych: Suicidal ideation and subsequent catatonia, borderline personality disorder.    Assessment: her plan was to \"slit her throat\".  Has had passive ideation in the past, recently admitted to psych 7/2020.  Anxiety/depression.    Plan: Psych cleared her for discharge from acute care from a mental health standpoint.  Working with social work for safe discharge plan.       Hypomagnesemia    Assessment: recurrent. Present " on admission.     Plan: replace and monitor.      Metabolic acidosis  Assessment; present on admission, believe due to GI loss  Plan: improved with treatment of C-diff and IV hydration              Resolved.     QT prolongation  Assessment: present on admission and due to electrolyte imbalance.   Plan: electrolytes replaced     DVT Prophylaxis: continue xarelto  Code Status: Full Code     Disposition: Medically and psychiatrically stable for discharge from acute care.     Eleuterio Anderson MD      Interval History   Patient seen at bedside. Withdrawn, but does answers some questions today, more awake and interactive.  C/o not being able to sleep last night due to the noise.  No further diarrhea.  No acute events overnight, no new symptoms.    -Data reviewed today: I reviewed all new labs and imaging results over the last 24 hours. I personally reviewed imaging reports.    Physical Exam   Temp: 98.4  F (36.9  C) Temp src: Tympanic BP: 104/70 Pulse: 82   Resp: 14 SpO2: 97 % O2 Device: None (Room air)    Vitals:    09/04/20 2305   Weight: 63.9 kg (140 lb 14 oz)     Vital Signs with Ranges  Temp:  [98  F (36.7  C)-98.5  F (36.9  C)] 98.4  F (36.9  C)  Pulse:  [70-86] 82  Resp:  [14-16] 14  BP: ()/(53-70) 104/70  SpO2:  [95 %-97 %] 97 %      Intake/Output Summary (Last 24 hours) at 9/9/2020 1112  Last data filed at 9/9/2020 0857  Gross per 24 hour   Intake 60 ml   Output 100 ml   Net -40 ml         CVC Triple Lumen 09/05/20 Right Subclavian (Active)   Site Assessment WDL 09/08/20 2304   External Cath Length (cm) 2 cm 09/04/20 0000   Dressing Intervention Chlorhexidine sponge;Transparent 09/08/20 2304   Lumen A - Color WHITE 09/08/20 2304   Lumen A - Status blood return noted;saline locked 09/08/20 2304   Lumen B - Color BLUE 09/08/20 2304   Lumen B - Status blood return noted;saline locked 09/08/20 2304   Lumen C - Color BROWN 09/08/20 2304   Lumen C - Status blood return noted;saline locked 09/08/20 2304   Number of  days: 4     Line/device assessment(s) completed for medical necessity    Constitutional - AA, NAD, withdrawn  HEENT - atraumatic, normocephalic  Neck - supple, no masses, no JVD  CVS - S1 S2 RRR, no murmurs, rubs, gallops  Respiratory - CTA b/l  GI - soft, tender to palpation in lower quadrants, no guarding, no rebound, ND, hypoactive bowel sounds, no organomegaly  Musculoskeletal - no LE edema, no lesions  Neuro - no cooperative  Psych - withdrawn, flat affect    Medications     - MEDICATION INSTRUCTIONS -         lactobacillus rhamnosus (GG)  1 capsule Oral BID     lamoTRIgine  25 mg Oral Daily     mirtazapine  15 mg Oral At Bedtime     nicotine  1 patch Transdermal Daily     nicotine   Transdermal Q8H     rivaroxaban ANTICOAGULANT  20 mg Oral QAM     sodium chloride (PF)  10 mL Intracatheter Q8H     vancomycin  125 mg Oral 4x Daily       Data   Recent Labs   Lab 09/07/20  0541 09/06/20  0526 09/05/20  0933 09/05/20  0530 09/04/20  1948   WBC 6.3 6.2  --  12.4* 13.5*   HGB 12.3 10.9*  --  13.0 15.5   MCV 92 91  --  89 93    246  --  306 343    138  --  137 134   POTASSIUM 3.4 3.5 3.7 3.8 2.7*   CHLORIDE 107 108  --  108 102   CO2 24 24  --  19* 15*   BUN <1* <1*  --  2* 3*   CR 0.37* 0.36*  --  0.31* 0.38*   ANIONGAP 6 6  --  10 17*   AVINASH 8.4* 8.0*  --  7.8* 8.9   GLC 87 82  --  88 103*   ALBUMIN  --   --   --  2.8* 3.4   PROTTOTAL  --   --   --  5.6* 6.9   BILITOTAL  --   --   --  0.6 0.6   ALKPHOS  --   --   --  53 95   ALT  --   --   --  16 21   AST  --   --   --  19 24     Lactic Acid   Date Value Ref Range Status   08/27/2020 2.3 (H) 0.7 - 2.0 mmol/L Final     Comment:     Significant value called to and read back by  MAGDALENA HATHAWAY AT 0539 ON 08/27/2020 BY AMB     05/23/2020 1.4 0.7 - 2.0 mmol/L Final   05/22/2020 2.7 (H) 0.7 - 2.0 mmol/L Final     Comment:     Significant value called to and read back by  MARKEL SHIELDS AT 1614 ON 05/22/2020 BY AMB         No results found for this or any  previous visit (from the past 24 hour(s)).    Eleuterio Anderson MD

## 2020-09-10 PROCEDURE — 25000132 ZZH RX MED GY IP 250 OP 250 PS 637: Performed by: INTERNAL MEDICINE

## 2020-09-10 PROCEDURE — 99232 SBSQ HOSP IP/OBS MODERATE 35: CPT | Performed by: INTERNAL MEDICINE

## 2020-09-10 PROCEDURE — 12000000 ZZH R&B MED SURG/OB

## 2020-09-10 PROCEDURE — 25000132 ZZH RX MED GY IP 250 OP 250 PS 637: Performed by: NURSE PRACTITIONER

## 2020-09-10 RX ORDER — LAMOTRIGINE 25 MG/1
50 TABLET ORAL DAILY
Status: DISCONTINUED | OUTPATIENT
Start: 2020-09-11 | End: 2020-09-11 | Stop reason: HOSPADM

## 2020-09-10 RX ADMIN — VANCOMYCIN HYDROCHLORIDE 125 MG: 125 CAPSULE ORAL at 16:58

## 2020-09-10 RX ADMIN — VANCOMYCIN HYDROCHLORIDE 125 MG: 125 CAPSULE ORAL at 09:24

## 2020-09-10 RX ADMIN — Medication 1 CAPSULE: at 09:11

## 2020-09-10 RX ADMIN — Medication 1 CAPSULE: at 21:21

## 2020-09-10 RX ADMIN — NICOTINE 1 PATCH: 14 PATCH, EXTENDED RELEASE TRANSDERMAL at 09:11

## 2020-09-10 RX ADMIN — RIVAROXABAN 20 MG: 20 TABLET, FILM COATED ORAL at 09:11

## 2020-09-10 RX ADMIN — Medication 1 MG: at 21:21

## 2020-09-10 RX ADMIN — VANCOMYCIN HYDROCHLORIDE 125 MG: 125 CAPSULE ORAL at 13:52

## 2020-09-10 RX ADMIN — HYDROXYZINE HYDROCHLORIDE 50 MG: 25 TABLET ORAL at 23:31

## 2020-09-10 RX ADMIN — VANCOMYCIN HYDROCHLORIDE 125 MG: 125 CAPSULE ORAL at 21:21

## 2020-09-10 RX ADMIN — HYDROXYZINE HYDROCHLORIDE 50 MG: 25 TABLET ORAL at 09:11

## 2020-09-10 RX ADMIN — MIRTAZAPINE 15 MG: 15 TABLET, FILM COATED ORAL at 21:21

## 2020-09-10 RX ADMIN — HYDROXYZINE HYDROCHLORIDE 50 MG: 25 TABLET ORAL at 16:58

## 2020-09-10 RX ADMIN — LAMOTRIGINE 25 MG: 25 TABLET ORAL at 09:11

## 2020-09-10 ASSESSMENT — ACTIVITIES OF DAILY LIVING (ADL)
ADLS_ACUITY_SCORE: 15.5
ADLS_ACUITY_SCORE: 15

## 2020-09-10 NOTE — PLAN OF CARE
VSS, afebrile, HRR, lungs clear, bowels active. Pt is alert and oriented x 4, makes her needs known, and calls appropriately. Flat affect, slightly withdrawn this shift.   Face to face report given with opportunity to observe patient.    Report given to RITESH Granados and RITESH Nava RN   9/10/2020  3:16 PM

## 2020-09-10 NOTE — PROGRESS NOTES
"[facility]  Psychiatric Progress Note      Impression:     I have met yusuf on past hospitalization via telemed. I was told that yseterday when hospitalist spoke with her about discharging she reported she would likely hurt herself if she were to go home. I met with her to discuss depression and suicidal thoughts. She was very tearful while telling me she was very lonely and feeling hopeless. She stated \"I cant go back home. I have no one. I will do something to hurt myself\" when asked if she was suicidal she said she was having suicidal thoughts though did not have an active plan. Her affect, tearfulness, and thoughts of self harm are very concerning and she is likely also very impulsive due to low IQ and brain injury. She states if she was at a group home darron would feel safe but \"I would still have suicidal thoughts but I wouldn't act on it because I would feel better with people around me\". darron is on lamictal and is only on 25 mg. She has been on this for a few months and would benefit from an increase and continued titration. It is very concerning she has poor follow through with medical care including leaving AMA and refusing potassium replacement. She was in special education as a child and then aquired brain injury. I do not believe darron understands the ramifications of her poor follow through with medical care and her history of leaving AMA. I have recommended guardianship though I am unable to complete these forms told to me by [facility]. There is a possibility of filling out a MI/DD petition as this may get the guardianship process started. I do believe for long term management she will require guardianship. Without a gaurdian I believe she will continue to sabatoge placements and leave AMA and this is largely likely due to her intellectual disability.       Educated regarding medication indications, risks, benefits, side effects, contraindications and possible interactions. Verbally " expressed understanding.        Diagnoses:     Mdd, recurrent, severe    Intellectual disability, likely moderate (special education as child) unknown if any IQ testing completed.     Borderline personality disorder traits though likely secondary to her intellectual disability and inability to learn new coping skills.               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    Attestation:  Patient has been seen and evaluated by me,  Latoya Ramirze NP          Interim History:   The patient's care was discussed with the treatment team and chart notes were reviewed.            Medications:     Prescription Medications as of 9/10/2020       Rx Number Disp Refills Start End Last Dispensed Date Next Fill Date Owning Pharmacy    acetaminophen (TYLENOL) 325 MG tablet  1 Bottle 0 9/3/2020    [id number] - [place], [place] - [address]    Sig: Take 1-2 tablets (325-650 mg) by mouth every 6 hours as needed for mild pain    Class: E-Prescribe    Route: Oral    baclofen (LIORESAL) 10 MG tablet        [id number] - [place], [place] - [address]    Sig: Take 10 mg by mouth every 8 hours as needed     Class: Historical    Route: Oral    benzocaine (ORAJEL MAXIMUM STRENGTH) 20 % GEL gel        [id number] - [place], [place] - [address]    Sig: Take by mouth 2 times daily as needed for moderate pain    Class: Historical    Route: Mouth/Throat    CHANTIX CONTINUING MONTH ASHLEY 1 MG tablet    6/10/2020    [facility] - [place], [place] - [address]    Sig: Take 1 mg by mouth 2 times daily     Class: Historical    Route: Oral    escitalopram (LEXAPRO) 10 MG tablet  30 tablet 0 7/23/2020    [id number] - [place], [place] - [address]    Sig: Take 1 tablet (10 mg) by mouth daily    Class: E-Prescribe    Route: Oral    famotidine (PEPCID) 20 MG tablet    6/10/2020    [facility]  [address] [place] [address], [place] [id number]    Sig: Take 20 mg by mouth 2 times daily     Class: Historical    Route: Oral    folic acid (FOLVITE) 1 MG tablet    8/19/2020    [facility] [address] [place] [address], [place] [id number]    Sig: Take 1 mg by mouth daily     Class: Historical    Route: Oral    gabapentin (NEURONTIN) 300 MG capsule        [facility] [address] [place] [address], [place] [address]    Sig: Take 900 mg by mouth 3 times daily    Class: Historical    Route: Oral    hydrocortisone 2.5 % cream    4/30/2020    [facility] [address] [place] [address], [place] [address]    Sig: Apply topically 2 times daily to scaly, cracked hands.    Class: Historical    Route: Topical    hydrOXYzine (ATARAX) 50 MG tablet  30 tablet 0 7/22/2020    [facility] [address] [place] [address], [place] [address]    Sig: Take 1 tablet (50 mg) by mouth 2 times daily as needed for anxiety    Class: E-Prescribe    Route: Oral    lactobacillus rhamnosus, GG, (CULTURELL) capsule  60 capsule 0 9/3/2020    [facility] [address] [place] [address], [place] [address]    Sig: Take 1 capsule by mouth 2 times daily    Class: Local Print    Route: Oral    lamoTRIgine (LAMICTAL) 25 MG tablet    8/4/2020    [facility] [address] [place] [address], [place] [id number]    Sig: Take 25 mg by mouth daily     Class: Historical    Route: Oral    lidocaine (XYLOCAINE) 2 % solution    7/30/2020    [facility] [address] [place] [address], [place] [id number]    Class: Historical    loperamide (IMODIUM A-D) 2 MG tablet        [facility] [address] [place] [address], [place] [phone]    Sig: Take 2 mg by mouth 4 times daily as needed for diarrhea     Class: Historical    Route: Oral    medroxyPROGESTERone (DEPO-PROVERA) 150 MG/ML IM injection            Sig: Inject 150 mg into the muscle every 3 months    Class: Historical    Route: Intramuscular    mirtazapine (REMERON) 30 MG tablet    8/13/2020    [facility] [address] [place] [address], [place] [id number]    Sig: Take 30 mg by mouth At Bedtime    Class: Historical    Route: Oral    nicotine (NICODERM CQ) 7 MG/24HR 24 hr patch    1/7/2020        Sig: Apply 1 patch topically daily     Class: Historical    Route: Topical    potassium chloride ER (K-TAB/KLOR-CON) 10 MEQ CR tablet    8/4/2020    [id number] - [place], [place] - [address]    Sig: Take 10 mEq by mouth 2 times daily (with meals)     Class: Historical    Route: Oral    promethazine (PHENERGAN) 25 MG tablet    8/20/2020    [id number] - [place], [place] - [address]    Sig: Take 25 mg by mouth every 6 hours as needed     Class: Historical    Route: Oral    traZODone (DESYREL) 100 MG tablet    8/13/2020    [id number] - [place], [place] - [address]    Sig: Take 100 mg by mouth At Bedtime    Class: Historical    Route: Oral    vancomycin (VANCOCIN) 250 MG capsule  40 capsule 0 9/3/2020    [id number] - [place], [place] [id number]    Sig: Take 1 capsule (250 mg) by mouth 4 times daily    Class: E-Prescribe    Route: Oral    XARELTO 20 MG TABS tablet   11 7/5/2018        Sig: Take 20 mg by mouth every morning with breakfast.    Class: Historical    Route: Oral      Hospital Medications as of 9/10/2020       Dose Frequency Start End    acetaminophen (TYLENOL) tablet 650 mg 650 mg EVERY 4 HOURS PRN 9/4/2020     Admin Instructions: Alternate ibuprofen (if ordered) with acetaminophen.<BR>Maximum acetaminophen dose from all sources = 75 mg/kg/day not to exceed 4 grams/day.    Class: E-Prescribe    Route: Oral    baclofen (LIORESAL) tablet 10 mg 10 mg EVERY 8 HOURS PRN 9/4/2020     Class: E-Prescribe    Route: Oral    hydrOXYzine (ATARAX) tablet 50 mg 50 mg EVERY 6 HOURS PRN 9/8/2020     Class: E-Prescribe    Route: Oral    lactobacillus rhamnosus (GG) (CULTURELL) capsule 1 capsule 1 capsule 2 TIMES DAILY 9/4/2020     Admin Instructions: Administer at least 2 hours before or after oral  "antibiotics. Capsules may be opened.    Class: E-Prescribe    Route: Oral    lamoTRIgine (LaMICtal) tablet 25 mg 25 mg DAILY 9/5/2020     Class: E-Prescribe    Route: Oral    lidocaine (LMX4) kit  EVERY 1 HOUR PRN 9/4/2020     Admin Instructions: Do NOT give if patient has a history of allergy to any local anesthetic or any \"korey\" product. <BR>Apply at least 30 minutes prior to VAD insertion or port access. In divided doses as needed for size of site for insertion with MAX Dose:  2.5 g (  of 5 g tube)    Class: E-Prescribe    Route: Topical    lidocaine 1 % 0.1-1 mL 0.1-1 mL EVERY 1 HOUR PRN 9/4/2020     Admin Instructions: Do NOT give if patient has a history of allergy to any local anesthetic or any \"korey\" product. MAX dose 1 mL subcutaneous OR intradermal in divided doses as needed for VAD insertion.    Class: E-Prescribe    Route: Other    LORazepam (ATIVAN) tablet 0.5 mg 0.5 mg 2 TIMES DAILY PRN 9/8/2020     Class: E-Prescribe    Route: Oral    melatonin tablet 1 mg 1 mg AT BEDTIME PRN 9/4/2020     Admin Instructions: Do not give unless at least 6 hours of uninterrupted sleep is expected.    Class: E-Prescribe    Route: Oral    mirtazapine (REMERON) tablet 15 mg 15 mg AT BEDTIME 9/5/2020     Class: E-Prescribe    Route: Oral    naloxone (NARCAN) injection 0.1-0.4 mg 0.1-0.4 mg EVERY 2 MIN PRN 9/4/2020     Admin Instructions: For respiratory rate LESS than or EQUAL to 8.  Partial reversal dose:  0.1 mg titrated q 2 minutes for Analgesia Side Effects Monitoring Sedation Level of 3 (frequently drowsy, arousable, drifts to sleep during conversation).Full reversal dose:  0.4 mg bolus for Analgesia Side Effects Monitoring Sedation Level of 4 (somnolent, minimal or no response to stimulation).<BR>For ordered IV doses 0.1-2mg give IVP. Give each 0.4mg over 15 seconds in emergency situations. For non-emergent situations further dilute in 9mL of NS to facilitate titration of response.    Class: E-Prescribe    Route: " "Intravenous    nicotine (NICODERM CQ) 14 MG/24HR 24 hr patch 1 patch 1 patch DAILY 9/6/2020     Admin Instructions: Reminder: Remove previous patch before applying new patch.    Class: E-Prescribe    Route: Transdermal    nicotine Patch in Place  EVERY 8 HOURS 9/6/2020     Admin Instructions: Chart every shift, confirming that patch is still in place on patient (no barcode scan needed). See patch order for dose information.    Class: E-Prescribe    Route: Transdermal    ondansetron (ZOFRAN) injection 4 mg 4 mg EVERY 6 HOURS PRN 9/4/2020     Admin Instructions: This is Step 1 of nausea and vomiting management.  If nausea not resolved in 15 minutes, go to Step 2 prochlorperazine (COMPAZINE).<BR>Irritant. For ordered IV doses 0.1-4 mg, give IV Push undiluted over 2-5 minutes.    Class: E-Prescribe    Route: Intravenous    Linked Group 1:  \"Or\" Linked Group Details        ondansetron (ZOFRAN-ODT) ODT tab 4 mg 4 mg EVERY 6 HOURS PRN 9/4/2020     Admin Instructions: This is Step 1 of nausea and vomiting management.  If nausea not resolved in 15 minutes, go to Step 2 prochlorperazine (COMPAZINE). Do not push through foil backing. Peel back foil and gently remove. Place on tongue immediately. Administration with liquid unnecessary<BR>With dry hands, peel back foil backing and gently remove tablet. Do not push oral disintegrating tablet through foil backing. Administer immediately on tongue and oral disintegrating tablet dissolves in seconds, then swallow with saliva. Liquid not required.    Class: E-Prescribe    Route: Oral    Linked Group 1:  \"Or\" Linked Group Details        Patient is already receiving anticoagulation with heparin, enoxaparin (LOVENOX), warfarin (COUMADIN)  or other anticoagulant medication  CONTINUOUS PRN 9/4/2020     Class: E-Prescribe    Route: Does not apply    rivaroxaban ANTICOAGULANT (XARELTO) tablet 20 mg 20 mg EVERY MORNING 9/5/2020     Class: E-Prescribe    Route: Oral    sodium chloride (PF) " "0.9% PF flush 10 mL 10 mL EVERY 8 HOURS 9/5/2020     Admin Instructions: And Q1H PRN, to lock CVC - Open Ended (Tunneled and Non-Tunneled) dormant lumen.    Class: E-Prescribe    Route: Intracatheter    sodium chloride (PF) 0.9% PF flush 10-20 mL 10-20 mL EVERY 1 MIN PRN 9/5/2020     Admin Instructions: to flush CVC - Open Ended (Tunneled and Non-Tunneled).<BR>10 mL post IV meds; 20 mL post blood draw.    Class: E-Prescribe    Route: Intracatheter    vancomycin (VANCOCIN) capsule 125 mg 125 mg 4 TIMES DAILY 9/5/2020 9/16/2020    Class: E-Prescribe    Route: Oral                     Allergies:     Allergies   Allergen Reactions     Amoxicillin Other (See Comments)     Headaches     Levaquin [Levofloxacin] Swelling     Lithium      Diabetes insipidus      Naproxen Other (See Comments)     Reaction: Headaches     Nickel      Tramadol      Sulindac Rash            Psychiatric Examination:   /70 (BP Location: Right arm)   Pulse 92   Temp 97.7  F (36.5  C) (Tympanic)   Resp 16   Ht 1.6 m (5' 3\")   Wt 63.9 kg (140 lb 14 oz)   SpO2 97%   BMI 24.95 kg/m    Weight is 140 lbs 13.98 oz  Body mass index is 24.95 kg/m .    Appearance:  awake, alert, dressed in hospital scrubs, appeared as age stated and poorly groomed  Attitude:  cooperative  Eye Contact:  fair  Mood:  depressed  Affect:  appropriate and in normal range and constricted mobility  Speech:  clear, coherent and paucity of speech  Psychomotor Behavior:  no evidence of tardive dyskinesia, dystonia, or tics  Thought Process:  concrete depressed thoughts of self harm  Associations:  no loose associations  Thought Content:  passive suicidal ideation present  Insight:  limited  Judgment:  poor  Oriented to:  time, person, and place  Attention Span and Concentration:  limited  Recent and Remote Memory:  fair  Fund of Knowledge: delayed, likely low IQ Moderate intellectual disabiltiy  Muscle Strength and Tone: atrophy  Gait and Station: not observed was sitting " in chair. Stand by assist.               Plan:     Increase lamictal   Refer for med-psych bed  I did place call to her  and left my cell phone number.

## 2020-09-10 NOTE — TELEPHONE ENCOUNTER
S:  9/10/20 9 AM  Patient medically cleared and awaiting transfer to IP MH.      R:  Called and spoke to Jeet LAGUNAS regarding patient going to [facility].  Dr. Rinaldi is reviewing and Intake will be notified of outcome.    R:  9:20 AM  Call from Jeet, per Dr. Rinaldi, [facility] is unable to take due to staffing needs and because there is concern that patient does not meet IP MH criteria.  DOC to DOC completed w/ patient's attending.  Jeet will update Intake with updates.  Intake will leave patient on the adult work list.    R:  12:45 PM  April Patterson called after doing a psych consult w/ patient.  She feels patient would not be safe returning home as she is still verbalizing she would hurt herself. She is trying to reach patient's outside CM to discuss options and will call Intake w/ updates.   [facility] unable to take patient  because they are unable to staff. Noted in patient's EMR, she  has a central line and has been incontinent of urine and loose stools which she cannot manage without assistance. Writer will update Intake supervisor of patient's status.    R:  1:15 PM  Call from Anne GRIMALDO on medical unit in [place] [phone] requesting update on patient's transfer.  Writer informed her of psych consult outcome and that  a transfer would be unlikely today.     S:  9/11/20 8AM  Patient in [place] on a medical unit awaiting IP MH placement.     R:  Writer reviewing options and will begin working on placement.    R:  10 AM  Spoke w/ Jeet NP and she reports that pt still hs a central line and is saying she is not suicidal and wants to leave the hospital.  Patient tentatively removed from adult wait list once O.K.'d by leadership.  Will notify [facility] once confirmed.    R:  3 PM  Patient discharged to home.

## 2020-09-10 NOTE — PLAN OF CARE
"Afebrile. VSS /72 (BP Location: Right arm)   Pulse 88   Temp 98  F (36.7  C) (Tympanic)   Resp 16   Ht 1.6 m (5' 3\")   Wt 63.9 kg (140 lb 14 oz)   SpO2 97%   BMI 24.95 kg/m   Denies pain. A&O x4. Behaviors have been appropriate throughout the shift. Denies any suicidal ideations. Flat affect. Assessment as charted. Reports  continuous abd discomfort with nausea- also reports diarrhea this shift though no occurences. Incontinent of bladder this shift. Central line intact- blue and white port flushes with blood return, brown flushes without blood return. Bed alarms active. No falls this shift. Call light within reach.    Face to face report given with opportunity to observe patient.    Report given to Adan Woods   9/10/2020  7:33 AM      "

## 2020-09-10 NOTE — PROGRESS NOTES
"[facility]    Hospitalist Progress Note    Date of Service (when I saw the patient): 09/10/2020    Assessment & Plan     Maggie Case is a 30 year old female with PMH significant of ASD, DVT, PE, paradoxical CVA, hemorrhagic CVA, hypercoaguable state, recurrent C-diff, medication no-compliance, hypoKalemia and hypomagnesemia, presents to ED with Abdominal pain, nausea, and stated that she wants to hurt herself. She was discharged (left AMA 9.3.2010) after staying 9 days at our hospital. Was treated for C-diff. Stay was complicated with medication and order non-compliance.      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 First time she was diagnosed 2017. Partially due to non-compliance. Will need longer treatment and taper. May benefit from fecal transplant. Non-toxic.    Plan: continue Vancomycin 125 mg po qid, treat as recurrence, abx for 10 days until 9/15/20.  Tested negative 9/5/20.     Active Problems:    Hypokalemia    Assessment: with QT prolongation. Recurrent. Intermittently refusing oral replacement    Plan:  central line until discharge               K and Mg stable.       Left hemiparesis (H)    Assessment: chronic condition. Upper extremity affected more than LE.     Plan: fall precautions       Chronic anticoagulation    Assessment: due to coagulopathy with negative extensive diagnostic testing at the [facility]    Plan: continue xarelto       Psych: Suicidal ideation and subsequent catatonia, borderline personality disorder.    Assessment: her plan was to \"slit her throat\".  Has had passive ideation in the past, recently admitted to psych 7/2020.  Anxiety/depression.    Plan: Psych cleared her for discharge from acute care from a mental health standpoint.  Working with social work for safe discharge plan.       Hypomagnesemia    Assessment: recurrent. Present on admission.     Plan: replace and monitor.      Metabolic acidosis  Assessment; " present on admission, believe due to GI loss  Plan: improved with treatment of C-diff and IV hydration              Resolved.     QT prolongation  Assessment: present on admission and due to electrolyte imbalance.   Plan: electrolytes replaced     DVT Prophylaxis: continue xarelto  Code Status: Full Code     Disposition: Medically stable for discharge from acute care, working with case management and behavioral health on safe discharge.     Eleuterio Andersno MD      Interval History   Patient seen at bedside. Withdrawn, but does answers questions today, more awake and interactive.  No acute events overnight, no new symptoms.  Did c/o of loose stool and was incontinent of urine per staff.  Standby assist of 1.    -Data reviewed today: I reviewed all new labs and imaging results over the last 24 hours. I personally reviewed imaging reports.    Physical Exam   Temp: 97.7  F (36.5  C) Temp src: Tympanic BP: 104/70 Pulse: 92   Resp: 16 SpO2: 97 % O2 Device: None (Room air)    Vitals:    09/04/20 2305   Weight: 63.9 kg (140 lb 14 oz)     Vital Signs with Ranges  Temp:  [97.7  F (36.5  C)-98.5  F (36.9  C)] 97.7  F (36.5  C)  Pulse:  [83-92] 92  Resp:  [16-18] 16  BP: ()/(54-72) 104/70  SpO2:  [96 %-99 %] 97 %      Intake/Output Summary (Last 24 hours) at 9/10/2020 1206  Last data filed at 9/9/2020 1935  Gross per 24 hour   Intake 580 ml   Output 50 ml   Net 530 ml         CVC Triple Lumen 09/05/20 Right Subclavian (Active)   Site Assessment WDL 09/10/20 0503   External Cath Length (cm) 2 cm 09/04/20 0000   Dressing Intervention Chlorhexidine sponge;Transparent 09/10/20 0503   Lumen A - Color WHITE 09/10/20 0503   Lumen A - Status blood return noted;saline locked 09/10/20 0503   Lumen B - Color BLUE 09/10/20 0503   Lumen B - Status blood return noted;saline locked 09/10/20 0503   Lumen C - Color BROWN 09/10/20 0503   Lumen C - Status saline locked 09/10/20 0503   Number of days: 5     Line/device assessment(s) completed  for medical necessity    Constitutional - AA, NAD, withdrawn  HEENT - atraumatic, normocephalic  Neck - supple, no masses, no JVD  CVS - S1 S2 RRR, no murmurs, rubs, gallops  Respiratory - CTA b/l  GI - soft, tender to palpation in lower quadrants, no guarding, no rebound, ND, hypoactive bowel sounds, no organomegaly  Musculoskeletal - no LE edema, no lesions  Neuro - no cooperative  Psych - withdrawn, flat affect      Medications     - MEDICATION INSTRUCTIONS -         lactobacillus rhamnosus (GG)  1 capsule Oral BID     lamoTRIgine  25 mg Oral Daily     mirtazapine  15 mg Oral At Bedtime     nicotine  1 patch Transdermal Daily     nicotine   Transdermal Q8H     rivaroxaban ANTICOAGULANT  20 mg Oral QAM     sodium chloride (PF)  10 mL Intracatheter Q8H     vancomycin  125 mg Oral 4x Daily       Data   Recent Labs   Lab 09/07/20  0541 09/06/20  0526 09/05/20  0933 09/05/20  0530 09/04/20  1948   WBC 6.3 6.2  --  12.4* 13.5*   HGB 12.3 10.9*  --  13.0 15.5   MCV 92 91  --  89 93    246  --  306 343    138  --  137 134   POTASSIUM 3.4 3.5 3.7 3.8 2.7*   CHLORIDE 107 108  --  108 102   CO2 24 24  --  19* 15*   BUN <1* <1*  --  2* 3*   CR 0.37* 0.36*  --  0.31* 0.38*   ANIONGAP 6 6  --  10 17*   AVINASH 8.4* 8.0*  --  7.8* 8.9   GLC 87 82  --  88 103*   ALBUMIN  --   --   --  2.8* 3.4   PROTTOTAL  --   --   --  5.6* 6.9   BILITOTAL  --   --   --  0.6 0.6   ALKPHOS  --   --   --  53 95   ALT  --   --   --  16 21   AST  --   --   --  19 24     Lactic Acid   Date Value Ref Range Status   08/27/2020 2.3 (H) 0.7 - 2.0 mmol/L Final     Comment:     Significant value called to and read back by  MAGDALENA HATHAWAY AT 0539 ON 08/27/2020 BY DOMINGO     05/23/2020 1.4 0.7 - 2.0 mmol/L Final   05/22/2020 2.7 (H) 0.7 - 2.0 mmol/L Final     Comment:     Significant value called to and read back by  MARKEL SHIELDS AT 1614 ON 05/22/2020 BY AMB         No results found for this or any previous visit (from the past 24 hour(s)).    Eleuterio  Soco Anderson MD

## 2020-09-10 NOTE — PLAN OF CARE
Patient alert and oriented, calls appropriately. Very flat and appears sad this shift. States she misses her daughter and feel like she can't deal with not seeing her, became tearful. Tried to comfort patient but patient just asked for her Atarax and then told me she would be okay and stated I didn't have to stay with her. VSS, denies pain this shift.        Report given to RITESH King

## 2020-09-10 NOTE — PROGRESS NOTES
Received update from Latoya Ramirez  NP and her recommendations.  She advised that she is awaiting a call back from Maggie's , Kellen Ruiz in regards to her recommendations for Maggie for long term management.  She also discussed a medical/psych unit.      Spoke with Sharron with Central Intake, she is awaiting to speak with her supervisor in regards options for Maggie.  Additional information sent to Astrid after speaking with Rebekah about a facility in the [facility] that works with complex individuals.  Additional information sent to Celio Garcia.

## 2020-09-11 VITALS
SYSTOLIC BLOOD PRESSURE: 94 MMHG | BODY MASS INDEX: 24.96 KG/M2 | RESPIRATION RATE: 18 BRPM | HEART RATE: 82 BPM | WEIGHT: 140.87 LBS | HEIGHT: 63 IN | TEMPERATURE: 98.9 F | OXYGEN SATURATION: 97 % | DIASTOLIC BLOOD PRESSURE: 61 MMHG

## 2020-09-11 PROCEDURE — 99239 HOSP IP/OBS DSCHRG MGMT >30: CPT | Performed by: INTERNAL MEDICINE

## 2020-09-11 PROCEDURE — 25000132 ZZH RX MED GY IP 250 OP 250 PS 637: Performed by: INTERNAL MEDICINE

## 2020-09-11 PROCEDURE — 25000132 ZZH RX MED GY IP 250 OP 250 PS 637: Performed by: NURSE PRACTITIONER

## 2020-09-11 RX ORDER — LAMOTRIGINE 25 MG/1
50 TABLET ORAL DAILY
Qty: 60 TABLET | Refills: 0 | Status: SHIPPED | OUTPATIENT
Start: 2020-09-12 | End: 2020-10-12

## 2020-09-11 RX ORDER — VANCOMYCIN HYDROCHLORIDE 125 MG/1
125 CAPSULE ORAL 4 TIMES DAILY
Qty: 16 CAPSULE | Refills: 0 | Status: SHIPPED | OUTPATIENT
Start: 2020-09-11 | End: 2020-09-15

## 2020-09-11 RX ADMIN — RIVAROXABAN 20 MG: 20 TABLET, FILM COATED ORAL at 09:02

## 2020-09-11 RX ADMIN — VANCOMYCIN HYDROCHLORIDE 125 MG: 125 CAPSULE ORAL at 09:02

## 2020-09-11 RX ADMIN — LAMOTRIGINE 50 MG: 25 TABLET ORAL at 09:02

## 2020-09-11 RX ADMIN — Medication 1 CAPSULE: at 09:02

## 2020-09-11 RX ADMIN — HYDROXYZINE HYDROCHLORIDE 50 MG: 25 TABLET ORAL at 09:02

## 2020-09-11 RX ADMIN — VANCOMYCIN HYDROCHLORIDE 125 MG: 125 CAPSULE ORAL at 12:18

## 2020-09-11 RX ADMIN — NICOTINE 1 PATCH: 14 PATCH, EXTENDED RELEASE TRANSDERMAL at 09:02

## 2020-09-11 ASSESSMENT — ACTIVITIES OF DAILY LIVING (ADL)
ADLS_ACUITY_SCORE: 15
ADLS_ACUITY_SCORE: 16.5

## 2020-09-11 NOTE — PLAN OF CARE
Patient discharged at 2:32 PM via wheel chair accompanied by  staff. Prescriptions sent to patients preferred pharmacy. All belongings sent with patient.     Discharge instructions reviewed with Maggie. Listed belongings gathered and returned to patient. Sent home with pt      Patient discharged to Home.   Report called to NA    Core Measures and Vaccines  Core Measures applicable during stay: No. If yes, state diagnosis: NA  Pneumonia and Influenza given prior to discharge, if indicated: N/A    Surgical Patient   Surgical Procedures during stay: NO  Did patient receive discharge instruction on wound care and recognition of infection symptoms? N/A    MISC  Follow up appointment made:  No  Home and hospital aquired medications returned to patient: Yes  Patient reports pain was well managed at discharge: Yes

## 2020-09-11 NOTE — PLAN OF CARE
Pt unable to be accepted to [facility] due to needing a higher level of care than they can provide. MD updated. Trying to get ahold of psych to see if its safe for pt to leave AMA.

## 2020-09-11 NOTE — PLAN OF CARE
Talked with April from psych and she stated pt can be discharged home and does not need to sign out AMA. Dr. Anderson updated and is communicating with April on discharge plans.

## 2020-09-11 NOTE — PLAN OF CARE
April from psych saw pt and plan is for possible discharge to [facility] Awaiting on call from nurse for nurse to nurse report.

## 2020-09-11 NOTE — PROGRESS NOTES
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 difficile colitis [A04.72]  Pain in gums [K06.8]  Severe episode of recurrent major depressive disorder, without psychotic features (H) [F33.2]  Insomnia, unspecified type [G47.00]  Episode of recurrent major depressive disorder, unspecified depression episode severity (H) [F33.9]  Diarrhea, unspecified type [R19.7]    GUSTABO: medium    Discharge Date: 9/11/2020    Patient / Family response to discharge plan: she understands and agrees, says she wants to go home. Remains willing to work with Ishan Vaca and SILVANO Stout    Follow-Up Appt: No future appointments. [facility] is no longer open on Fridays.    Other Providers (Care Coordinator, [facility], PCA services etc): No    Discharge Disposition: home via [employer], says she has her keys and that Rudi is at home right now.     [facility] [phone]    Zita Adams CM RN

## 2020-09-11 NOTE — PLAN OF CARE
Patient is alert and oriented, calls appropriatly. Hostile toward staff at the beginning of the shift but has been okay the rest of the shift. Up to commode with standby assist and belt, PRN atarax given twice this shift for anxiety, patient claims this helps but doesn't relieve it. Poor appetite, ordered a meal and didn't eat any of it, stated she didn't like the taste.

## 2020-09-11 NOTE — PROGRESS NOTES
bryn was not accepted by [facility] due to mobility issues and they felt she was too complex as they do not have staff to care for the needs they felt she had. She has had no significant acute change in her ADL's at and primarily cares for herself with pca assistance. She denies any suicidal thoughts and no longer wants to stay in in the hospital. There is no reason to place her on a hold at this time.

## 2020-09-11 NOTE — PROGRESS NOTES
"[facility]    Hospitalist Progress Note    Date of Service (when I saw the patient): 09/11/2020    Assessment & Plan     Maggie Case is a 30 year old female with PMH significant of ASD, DVT, PE, paradoxical CVA, hemorrhagic CVA, hypercoaguable state, recurrent C-diff, medication no-compliance, hypoKalemia and hypomagnesemia, presents to ED with Abdominal pain, nausea, and stated that she wants to hurt herself. She was discharged (left AMA 9.3.2010) after staying 9 days at our hospital. Was treated for C-diff. Stay was complicated with medication and order non-compliance.      Principal Problem:      Colitis due to Clostridium difficile    Assessment: Recurrent. First time she was diagnosed 2017. Partially due to non-compliance. Will need full length of treatment. May benefit from fecal transplant. Non-toxic.    Plan: continue Vancomycin 125 mg po qid, never had full length of treatment that was not interrupted by non-compliance, abx for 10 days until at least 9/15/20.  Tested negative for C diff on 9/5/20 (may be false negative).     Active Problems:    Hypokalemia    Assessment: with QT prolongation. Recurrent. Intermittently refusing oral replacement    Plan:  central line discontinued 9/11               K and Mg stable.       Left hemiparesis (H)    Assessment: chronic condition. Upper extremity affected more than LE.     Plan: fall precautions       Chronic anticoagulation    Assessment: due to coagulopathy [facility]    Plan: continuing xarelto       Psych: Anxiety/depression with suicidal ideation and subsequent catatonia, borderline personality disorder.  She has been more interactive in recent days.    Assessment: her plan was to \"slit her throat\".  Has had passive ideation in the past, recently admitted to psych 7/2020.      Plan: Psych is currently following her, April James consuting.  Working with social work for safe discharge plan, " possible med/psyc bed placement.       Hypomagnesemia    Assessment: recurrent. Present on admission.     Plan: replaced.      Metabolic acidosis  Assessment; present on admission, believe due to GI loss  Plan: improved with treatment of C-diff and IV hydration              Resolved.     QT prolongation  Assessment: present on admission and due to electrolyte imbalance.   Plan: electrolytes replaced     DVT Prophylaxis: continue xarelto  Code Status: Full Code     Disposition: Medically stable for discharge from acute care, working with case management and behavioral health on safe discharge.     Eleuterio Anderson MD      Interval History   Patient seen at bedside. Withdrawn, but does answering questions today, awake and interactive. Says that she cannot sleep well in her current room due to the proximity to the nurses station.  Does have 2-3 loose stools a day, but she has chronic loose stools.    -Data reviewed today: I reviewed all new labs and imaging results over the last 24 hours. I personally reviewed imaging reports.    Physical Exam   Temp: 98.9  F (37.2  C) Temp src: Tympanic BP: 94/61 Pulse: 82   Resp: 18 SpO2: 97 % O2 Device: None (Room air)    Vitals:    09/04/20 2305   Weight: 63.9 kg (140 lb 14 oz)     Vital Signs with Ranges  Temp:  [97.9  F (36.6  C)-98.9  F (37.2  C)] 98.9  F (37.2  C)  Pulse:  [77-82] 82  Resp:  [14-18] 18  BP: (94-97)/(57-61) 94/61  SpO2:  [97 %-98 %] 97 %      Intake/Output Summary (Last 24 hours) at 9/11/2020 1010  Last data filed at 9/10/2020 1245  Gross per 24 hour   Intake 480 ml   Output --   Net 480 ml         CVC Triple Lumen 09/05/20 Right Subclavian (Active)   Site Assessment WDL 09/11/20 0911   External Cath Length (cm) 2 cm 09/04/20 0000   Dressing Intervention Chlorhexidine sponge;Transparent 09/11/20 0911   Lumen A - Color WHITE 09/11/20 0911   Lumen A - Status blood return noted;saline locked 09/11/20 0911   Lumen B - Color BLUE 09/11/20 0911   Lumen B - Status blood  return noted;saline locked 09/11/20 0911   Lumen C - Color BROWN 09/10/20 2300   Lumen C - Status other (see comment) 09/10/20 2300   Number of days: 6     Line/device assessment(s) completed for medical necessity    Constitutional - AA, NAD, withdrawn  HEENT - atraumatic, normocephalic  Neck - supple, no masses, no JVD  CVS - S1 S2 RRR, no murmurs, rubs, gallops  Respiratory - CTA b/l  GI - soft, consistently tender to palpation in lower right quadrant, but no guarding, no rebound, ND, hypoactive bowel sounds  Musculoskeletal - no LE edema, no lesions  Neuro - residual hemiplegia 2/2 CVA  Psych - withdrawn, flat affect      Medications     - MEDICATION INSTRUCTIONS -         lactobacillus rhamnosus (GG)  1 capsule Oral BID     lamoTRIgine  50 mg Oral Daily     mirtazapine  15 mg Oral At Bedtime     nicotine  1 patch Transdermal Daily     nicotine   Transdermal Q8H     rivaroxaban ANTICOAGULANT  20 mg Oral QAM     sodium chloride (PF)  10 mL Intracatheter Q8H     vancomycin  125 mg Oral 4x Daily       Data   Recent Labs   Lab 09/07/20  0541 09/06/20  0526 09/05/20  0933 09/05/20  0530 09/04/20  1948   WBC 6.3 6.2  --  12.4* 13.5*   HGB 12.3 10.9*  --  13.0 15.5   MCV 92 91  --  89 93    246  --  306 343    138  --  137 134   POTASSIUM 3.4 3.5 3.7 3.8 2.7*   CHLORIDE 107 108  --  108 102   CO2 24 24  --  19* 15*   BUN <1* <1*  --  2* 3*   CR 0.37* 0.36*  --  0.31* 0.38*   ANIONGAP 6 6  --  10 17*   AVINASH 8.4* 8.0*  --  7.8* 8.9   GLC 87 82  --  88 103*   ALBUMIN  --   --   --  2.8* 3.4   PROTTOTAL  --   --   --  5.6* 6.9   BILITOTAL  --   --   --  0.6 0.6   ALKPHOS  --   --   --  53 95   ALT  --   --   --  16 21   AST  --   --   --  19 24     Lactic Acid   Date Value Ref Range Status   08/27/2020 2.3 (H) 0.7 - 2.0 mmol/L Final     Comment:     Significant value called to and read back by  MAGDALENA HATHAWAY AT 0539 ON 08/27/2020 BY DOMINGO     05/23/2020 1.4 0.7 - 2.0 mmol/L Final   05/22/2020 2.7 (H) 0.7 - 2.0  mmol/L Final     Comment:     Significant value called to and read back by  MARKEL SHIELDS AT 1614 ON 05/22/2020 BY AMB         No results found for this or any previous visit (from the past 24 hour(s)).    Eleuterio Anderson MD

## 2020-09-11 NOTE — PROGRESS NOTES
"[facility]  Psychiatric Progress Note      Impression:   Nursing called me and told me that she was asking to be discharged and did not want to stay in the hospital any longer.  I then went and met with her and discussed this.  Yesterday she had told me that she was having thoughts of hurting herself if she were to be alone.  She states \"I always will have the thoughts of hurting myself but my depression is much better when I am not alone\" she is not tearful today like she was yesterday.  I asked her what changed yesterday from today making her want to leave the hospital as she was considering going to the psychiatric unit and we are waiting on acceptance.  She states I love my daughter and I do not want to do that to myself because it would hurt her\".    We then discussed her possibly going to a crisis center for a bit longer which could give the Lamictal some time to be a more therapeutic dose to help with some of her impulsivity and depression.  She has never been to a crisis center.  She had her phone in her hand so I had her look into [facility] in [facility].  After watching their virtual tour she stated she would be more than willing to go to this facility.  I did tell her I was unsure if they would accept her due to her mobility though I would call and speak with the staff there.  If they are not able to accept her we discussed the possibility of her going home and she did states she would be able to maintain her safety.  She is trying to figure out when her new PCA is going to start working.  She states that last month she lost her PCA and I do believe this was a large contributor to her increase in depression and feelings of hopelessness. I have not yet heard back from her  and did leave a message yesterday. Maggie has been making phone calls to find out when her new PCA is going to start. Staff state that she has been more verbal over the past couple of days than " they have seen her in the past which is improvement. No symptoms of vern or psychosis present.       Educated regarding medication indications, risks, benefits, side effects, contraindications and possible interactions. Verbally expressed understanding.        Diagnoses:   Mdd, recurrent severe    Intellectual disabiltiy, likely moderate    Borderline persontily disorder traits though likely secondary to intellectual disability and inability to learn new coping skills.   Attestation:  Patient has been seen and evaluated by me,  Latoya Ramirez NP          Interim History:   The patient's care was discussed with the treatment team and chart notes were reviewed.            Medications:     Prescription Medications as of 9/11/2020       Rx Number Disp Refills Start End Last Dispensed Date Next Fill Date Owning Pharmacy    acetaminophen (TYLENOL) 325 MG tablet  1 Bottle 0 9/3/2020    [id number] - [place], [place] - [address]    Sig: Take 1-2 tablets (325-650 mg) by mouth every 6 hours as needed for mild pain    Class: E-Prescribe    Route: Oral    baclofen (LIORESAL) 10 MG tablet        [id number] - [place], [place] - [address]    Sig: Take 10 mg by mouth every 8 hours as needed     Class: Historical    Route: Oral    benzocaine (ORAJEL MAXIMUM STRENGTH) 20 % GEL gel        [id number] - [place], [place] - [address]    Sig: Take by mouth 2 times daily as needed for moderate pain    Class: Historical    Route: Mouth/Throat    CHANTIX CONTINUING MONTH ASHLEY 1 MG tablet    6/10/2020    [id number] - [place], [place] - [address]    Sig: Take 1 mg by mouth 2 times daily     Class: Historical    Route: Oral    escitalopram (LEXAPRO) 10 MG tablet  30 tablet 0 7/23/2020    [id number] - [place], [place] - [address]    Sig: Take 1 tablet (10 mg) by mouth daily    Class: E-Prescribe    Route: Oral    famotidine (PEPCID) 20 MG tablet     6/10/2020    [facility] #[facility] [place], [place] [address]    Sig: Take 20 mg by mouth 2 times daily     Class: Historical    Route: Oral    folic acid (FOLVITE) 1 MG tablet    8/19/2020    [facility] [address] [place] [address], [place] [address]    Sig: Take 1 mg by mouth daily     Class: Historical    Route: Oral    gabapentin (NEURONTIN) 300 MG capsule        [facility] [address] [place] [address], [place] [address]    Sig: Take 900 mg by mouth 3 times daily    Class: Historical    Route: Oral    hydrocortisone 2.5 % cream    4/30/2020    [facility] [address] [place] [address], [place] [address]    Sig: Apply topically 2 times daily to scaly, cracked hands.    Class: Historical    Route: Topical    hydrOXYzine (ATARAX) 50 MG tablet  30 tablet 0 7/22/2020    [facility] [address] [place] [address], [place] [address]    Sig: Take 1 tablet (50 mg) by mouth 2 times daily as needed for anxiety    Class: E-Prescribe    Route: Oral    lactobacillus rhamnosus, GG, (CULTURELL) capsule  60 capsule 0 9/3/2020    [facility] [address] [place] [address], [place] [address]    Sig: Take 1 capsule by mouth 2 times daily    Class: Local Print    Route: Oral    lamoTRIgine (LAMICTAL) 25 MG tablet    8/4/2020    [facility] [address] [place] [address], [place] [address]    Sig: Take 25 mg by mouth daily     Class: Historical    Route: Oral    lidocaine (XYLOCAINE) 2 % solution    7/30/2020    [facility] [address] [place] [address], [place] [address]    Class: Historical    loperamide (IMODIUM A-D) 2 MG tablet        [facility] [address] [place] [address], [place] [address]    Sig: Take 2 mg by mouth 4 times daily as needed for diarrhea     Class: Historical    Route: Oral    medroxyPROGESTERone (DEPO-PROVERA) 150 MG/ML IM injection            Sig: Inject 150 mg into the muscle every 3 months    Class: Historical    Route: Intramuscular    mirtazapine (REMERON) 30 MG tablet    8/13/2020    [id number] - [place], [place] - [address]    Sig: Take 30 mg by mouth At Bedtime    Class: Historical    Route: Oral    nicotine (NICODERM CQ) 7 MG/24HR 24 hr patch    1/7/2020        Sig: Apply 1 patch topically daily     Class: Historical    Route: Topical    potassium chloride ER (K-TAB/KLOR-CON) 10 MEQ CR tablet    8/4/2020    [facility] - [place], [place] - [address]    Sig: Take 10 mEq by mouth 2 times daily (with meals)     Class: Historical    Route: Oral    promethazine (PHENERGAN) 25 MG tablet    8/20/2020    [facility] - [place], [place] [id number]    Sig: Take 25 mg by mouth every 6 hours as needed     Class: Historical    Route: Oral    traZODone (DESYREL) 100 MG tablet    8/13/2020    [facility] - [place], [place] [id number]    Sig: Take 100 mg by mouth At Bedtime    Class: Historical    Route: Oral    vancomycin (VANCOCIN) 250 MG capsule  40 capsule 0 9/3/2020    [id number] - [place], [place] [id number]    Sig: Take 1 capsule (250 mg) by mouth 4 times daily    Class: E-Prescribe    Route: Oral    XARELTO 20 MG TABS tablet   11 7/5/2018        Sig: Take 20 mg by mouth every morning with breakfast.    Class: Historical    Route: Oral      Hospital Medications as of 9/11/2020       Dose Frequency Start End    acetaminophen (TYLENOL) tablet 650 mg 650 mg EVERY 4 HOURS PRN 9/4/2020     Admin Instructions: Alternate ibuprofen (if ordered) with acetaminophen.<BR>Maximum acetaminophen dose from all sources = 75 mg/kg/day not to exceed 4 grams/day.    Class: E-Prescribe    Route: Oral    baclofen (LIORESAL) tablet 10 mg 10 mg EVERY 8 HOURS PRN 9/4/2020     Class: E-Prescribe    Route: Oral    hydrOXYzine (ATARAX) tablet 50 mg 50 mg EVERY 6 HOURS PRN 9/8/2020     Class: E-Prescribe    Route: Oral    lactobacillus rhamnosus (GG) (CULTURELL) capsule 1 capsule 1 capsule 2 TIMES DAILY 9/4/2020     Admin Instructions: Administer at least  "2 hours before or after oral antibiotics. Capsules may be opened.    Class: E-Prescribe    Route: Oral    lamoTRIgine (LaMICtal) tablet 50 mg 50 mg DAILY 9/11/2020     Class: E-Prescribe    Route: Oral    lidocaine (LMX4) kit  EVERY 1 HOUR PRN 9/4/2020     Admin Instructions: Do NOT give if patient has a history of allergy to any local anesthetic or any \"korey\" product. <BR>Apply at least 30 minutes prior to VAD insertion or port access. In divided doses as needed for size of site for insertion with MAX Dose:  2.5 g (  of 5 g tube)    Class: E-Prescribe    Route: Topical    lidocaine 1 % 0.1-1 mL 0.1-1 mL EVERY 1 HOUR PRN 9/4/2020     Admin Instructions: Do NOT give if patient has a history of allergy to any local anesthetic or any \"korey\" product. MAX dose 1 mL subcutaneous OR intradermal in divided doses as needed for VAD insertion.    Class: E-Prescribe    Route: Other    LORazepam (ATIVAN) tablet 0.5 mg 0.5 mg 2 TIMES DAILY PRN 9/8/2020     Class: E-Prescribe    Route: Oral    melatonin tablet 1 mg 1 mg AT BEDTIME PRN 9/4/2020     Admin Instructions: Do not give unless at least 6 hours of uninterrupted sleep is expected.    Class: E-Prescribe    Route: Oral    mirtazapine (REMERON) tablet 15 mg 15 mg AT BEDTIME 9/5/2020     Class: E-Prescribe    Route: Oral    naloxone (NARCAN) injection 0.1-0.4 mg 0.1-0.4 mg EVERY 2 MIN PRN 9/4/2020     Admin Instructions: For respiratory rate LESS than or EQUAL to 8.  Partial reversal dose:  0.1 mg titrated q 2 minutes for Analgesia Side Effects Monitoring Sedation Level of 3 (frequently drowsy, arousable, drifts to sleep during conversation).Full reversal dose:  0.4 mg bolus for Analgesia Side Effects Monitoring Sedation Level of 4 (somnolent, minimal or no response to stimulation).<BR>For ordered IV doses 0.1-2mg give IVP. Give each 0.4mg over 15 seconds in emergency situations. For non-emergent situations further dilute in 9mL of NS to facilitate titration of response.    " "Class: E-Prescribe    Route: Intravenous    nicotine (NICODERM CQ) 14 MG/24HR 24 hr patch 1 patch 1 patch DAILY 9/6/2020     Admin Instructions: Reminder: Remove previous patch before applying new patch.    Class: E-Prescribe    Route: Transdermal    nicotine Patch in Place  EVERY 8 HOURS 9/6/2020     Admin Instructions: Chart every shift, confirming that patch is still in place on patient (no barcode scan needed). See patch order for dose information.    Class: E-Prescribe    Route: Transdermal    ondansetron (ZOFRAN) injection 4 mg 4 mg EVERY 6 HOURS PRN 9/4/2020     Admin Instructions: This is Step 1 of nausea and vomiting management.  If nausea not resolved in 15 minutes, go to Step 2 prochlorperazine (COMPAZINE).<BR>Irritant. For ordered IV doses 0.1-4 mg, give IV Push undiluted over 2-5 minutes.    Class: E-Prescribe    Route: Intravenous    Linked Group 1:  \"Or\" Linked Group Details        ondansetron (ZOFRAN-ODT) ODT tab 4 mg 4 mg EVERY 6 HOURS PRN 9/4/2020     Admin Instructions: This is Step 1 of nausea and vomiting management.  If nausea not resolved in 15 minutes, go to Step 2 prochlorperazine (COMPAZINE). Do not push through foil backing. Peel back foil and gently remove. Place on tongue immediately. Administration with liquid unnecessary<BR>With dry hands, peel back foil backing and gently remove tablet. Do not push oral disintegrating tablet through foil backing. Administer immediately on tongue and oral disintegrating tablet dissolves in seconds, then swallow with saliva. Liquid not required.    Class: E-Prescribe    Route: Oral    Linked Group 1:  \"Or\" Linked Group Details        Patient is already receiving anticoagulation with heparin, enoxaparin (LOVENOX), warfarin (COUMADIN)  or other anticoagulant medication  CONTINUOUS PRN 9/4/2020     Class: E-Prescribe    Route: Does not apply    rivaroxaban ANTICOAGULANT (XARELTO) tablet 20 mg 20 mg EVERY MORNING 9/5/2020     Class: E-Prescribe    Route: " "Oral    sodium chloride (PF) 0.9% PF flush 10 mL 10 mL EVERY 8 HOURS 9/5/2020     Admin Instructions: And Q1H PRN, to lock CVC - Open Ended (Tunneled and Non-Tunneled) dormant lumen.    Class: E-Prescribe    Route: Intracatheter    sodium chloride (PF) 0.9% PF flush 10-20 mL 10-20 mL EVERY 1 MIN PRN 9/5/2020     Admin Instructions: to flush CVC - Open Ended (Tunneled and Non-Tunneled).<BR>10 mL post IV meds; 20 mL post blood draw.    Class: E-Prescribe    Route: Intracatheter    vancomycin (VANCOCIN) capsule 125 mg 125 mg 4 TIMES DAILY 9/5/2020 9/16/2020    Class: E-Prescribe    Route: Oral                       Allergies:     Allergies   Allergen Reactions     Amoxicillin Other (See Comments)     Headaches     Levaquin [Levofloxacin] Swelling     Lithium      Diabetes insipidus      Naproxen Other (See Comments)     Reaction: Headaches     Nickel      Tramadol      Sulindac Rash            Psychiatric Examination:   BP 94/61 (BP Location: Right arm)   Pulse 82   Temp 98.9  F (37.2  C) (Tympanic)   Resp 18   Ht 1.6 m (5' 3\")   Wt 63.9 kg (140 lb 14 oz)   SpO2 97%   BMI 24.95 kg/m    Weight is 140 lbs 13.98 oz  Body mass index is 24.95 kg/m .    Appearance:  awake, alert, adequately groomed and dressed in hospital scrubs  Attitude:  cooperative  Eye Contact:  good  Mood:  better  Affect:  intensity is flat  Speech:  clear, coherent  Psychomotor Behavior:  no evidence of tardive dyskinesia, dystonia, or tics  Thought Process:  logical though concrete  Associations:  no loose associations  Thought Content:  no evidence of suicidal ideation or homicidal ideation, no evidence of psychotic thought, no auditory hallucinations present and no visual hallucinations present  Insight:  limited  Judgment:  limited  Oriented to:  time, person, and place  Attention Span and Concentration:  limited  Recent and Remote Memory:  fair  Fund of Knowledge: delayed  Muscle Strength and Tone: normal  Gait and Station: stand by " assist           Labs:     Results for orders placed or performed during the hospital encounter of 09/04/20   XR Chest Port 1 View     Status: None    Narrative    PROCEDURE:  XR CHEST PORT 1 VW    HISTORY:  central line placement.     COMPARISON:  None.    FINDINGS:   The cardiac silhouette is normal in size. The pulmonary vasculature is  normal.  The lungs are clear. No pleural effusion or pneumothorax.  There is a central venous catheter with its tip in the right atrium      Impression    IMPRESSION:  No acute cardiopulmonary disease.      GEETHA JACOBS MD   CBC with platelets differential     Status: Abnormal   Result Value Ref Range    WBC 13.5 (H) 4.0 - 11.0 10e9/L    RBC Count 4.91 3.8 - 5.2 10e12/L    Hemoglobin 15.5 11.7 - 15.7 g/dL    Hematocrit 45.5 35.0 - 47.0 %    MCV 93 78 - 100 fl    MCH 31.6 26.5 - 33.0 pg    MCHC 34.1 31.5 - 36.5 g/dL    RDW 13.7 10.0 - 15.0 %    Platelet Count 343 150 - 450 10e9/L    Diff Method Automated Method     % Neutrophils 74.4 %    % Lymphocytes 15.2 %    % Monocytes 6.9 %    % Eosinophils 3.0 %    % Basophils 0.2 %    % Immature Granulocytes 0.3 %    Nucleated RBCs 0 0 /100    Absolute Neutrophil 10.0 (H) 1.6 - 8.3 10e9/L    Absolute Lymphocytes 2.0 0.8 - 5.3 10e9/L    Absolute Monocytes 0.9 0.0 - 1.3 10e9/L    Absolute Eosinophils 0.4 0.0 - 0.7 10e9/L    Absolute Basophils 0.0 0.0 - 0.2 10e9/L    Abs Immature Granulocytes 0.0 0 - 0.4 10e9/L    Absolute Nucleated RBC 0.0    Comprehensive metabolic panel     Status: Abnormal   Result Value Ref Range    Sodium 134 133 - 144 mmol/L    Potassium 2.7 (LL) 3.4 - 5.3 mmol/L    Chloride 102 94 - 109 mmol/L    Carbon Dioxide 15 (L) 20 - 32 mmol/L    Anion Gap 17 (H) 3 - 14 mmol/L    Glucose 103 (H) 70 - 99 mg/dL    Urea Nitrogen 3 (L) 7 - 30 mg/dL    Creatinine 0.38 (L) 0.52 - 1.04 mg/dL    GFR Estimate >90 >60 mL/min/[1.73_m2]    GFR Estimate If Black >90 >60 mL/min/[1.73_m2]    Calcium 8.9 8.5 - 10.1 mg/dL    Bilirubin Total  0.6 0.2 - 1.3 mg/dL    Albumin 3.4 3.4 - 5.0 g/dL    Protein Total 6.9 6.8 - 8.8 g/dL    Alkaline Phosphatase 95 40 - 150 U/L    ALT 21 0 - 50 U/L    AST 24 0 - 45 U/L   TSH with free T4 reflex     Status: None   Result Value Ref Range    TSH 1.64 0.40 - 4.00 mU/L   Alcohol ethyl     Status: None   Result Value Ref Range    Ethanol g/dL <0.01 0.01 g/dL   Salicylate level     Status: None   Result Value Ref Range    Salicylate Level <2 mg/dL   Acetaminophen level     Status: Abnormal   Result Value Ref Range    Acetaminophen Level <2 (L) 10 - 20 mg/L   Magnesium     Status: Abnormal   Result Value Ref Range    Magnesium 1.5 (L) 1.6 - 2.3 mg/dL   Comprehensive metabolic panel     Status: Abnormal   Result Value Ref Range    Sodium 137 133 - 144 mmol/L    Potassium 3.8 3.4 - 5.3 mmol/L    Chloride 108 94 - 109 mmol/L    Carbon Dioxide 19 (L) 20 - 32 mmol/L    Anion Gap 10 3 - 14 mmol/L    Glucose 88 70 - 99 mg/dL    Urea Nitrogen 2 (L) 7 - 30 mg/dL    Creatinine 0.31 (L) 0.52 - 1.04 mg/dL    GFR Estimate >90 >60 mL/min/[1.73_m2]    GFR Estimate If Black >90 >60 mL/min/[1.73_m2]    Calcium 7.8 (L) 8.5 - 10.1 mg/dL    Bilirubin Total 0.6 0.2 - 1.3 mg/dL    Albumin 2.8 (L) 3.4 - 5.0 g/dL    Protein Total 5.6 (L) 6.8 - 8.8 g/dL    Alkaline Phosphatase 53 40 - 150 U/L    ALT 16 0 - 50 U/L    AST 19 0 - 45 U/L   CBC with platelets differential     Status: Abnormal   Result Value Ref Range    WBC 12.4 (H) 4.0 - 11.0 10e9/L    RBC Count 4.14 3.8 - 5.2 10e12/L    Hemoglobin 13.0 11.7 - 15.7 g/dL    Hematocrit 36.8 35.0 - 47.0 %    MCV 89 78 - 100 fl    MCH 31.4 26.5 - 33.0 pg    MCHC 35.3 31.5 - 36.5 g/dL    RDW 13.7 10.0 - 15.0 %    Platelet Count 306 150 - 450 10e9/L    Diff Method Automated Method     % Neutrophils 75.3 %    % Lymphocytes 11.7 %    % Monocytes 8.4 %    % Eosinophils 3.8 %    % Basophils 0.2 %    % Immature Granulocytes 0.6 %    Nucleated RBCs 0 0 /100    Absolute Neutrophil 9.4 (H) 1.6 - 8.3 10e9/L     Absolute Lymphocytes 1.5 0.8 - 5.3 10e9/L    Absolute Monocytes 1.1 0.0 - 1.3 10e9/L    Absolute Eosinophils 0.5 0.0 - 0.7 10e9/L    Absolute Basophils 0.0 0.0 - 0.2 10e9/L    Abs Immature Granulocytes 0.1 0 - 0.4 10e9/L    Absolute Nucleated RBC 0.0    Magnesium     Status: Abnormal   Result Value Ref Range    Magnesium 2.7 (H) 1.6 - 2.3 mg/dL   CRP inflammation     Status: Abnormal   Result Value Ref Range    CRP Inflammation 18.2 (H) 0.0 - 8.0 mg/L   Phosphorus     Status: None   Result Value Ref Range    Phosphorus 2.9 2.5 - 4.5 mg/dL   Potassium     Status: None   Result Value Ref Range    Potassium 3.7 3.4 - 5.3 mmol/L   Basic metabolic panel     Status: Abnormal   Result Value Ref Range    Sodium 138 133 - 144 mmol/L    Potassium 3.5 3.4 - 5.3 mmol/L    Chloride 108 94 - 109 mmol/L    Carbon Dioxide 24 20 - 32 mmol/L    Anion Gap 6 3 - 14 mmol/L    Glucose 82 70 - 99 mg/dL    Urea Nitrogen <1 (L) 7 - 30 mg/dL    Creatinine 0.36 (L) 0.52 - 1.04 mg/dL    GFR Estimate >90 >60 mL/min/[1.73_m2]    GFR Estimate If Black >90 >60 mL/min/[1.73_m2]    Calcium 8.0 (L) 8.5 - 10.1 mg/dL   CBC with platelets     Status: Abnormal   Result Value Ref Range    WBC 6.2 4.0 - 11.0 10e9/L    RBC Count 3.50 (L) 3.8 - 5.2 10e12/L    Hemoglobin 10.9 (L) 11.7 - 15.7 g/dL    Hematocrit 31.8 (L) 35.0 - 47.0 %    MCV 91 78 - 100 fl    MCH 31.1 26.5 - 33.0 pg    MCHC 34.3 31.5 - 36.5 g/dL    RDW 13.8 10.0 - 15.0 %    Platelet Count 246 150 - 450 10e9/L   Magnesium     Status: None   Result Value Ref Range    Magnesium 1.6 1.6 - 2.3 mg/dL   Basic metabolic panel     Status: Abnormal   Result Value Ref Range    Sodium 137 133 - 144 mmol/L    Potassium 3.4 3.4 - 5.3 mmol/L    Chloride 107 94 - 109 mmol/L    Carbon Dioxide 24 20 - 32 mmol/L    Anion Gap 6 3 - 14 mmol/L    Glucose 87 70 - 99 mg/dL    Urea Nitrogen <1 (L) 7 - 30 mg/dL    Creatinine 0.37 (L) 0.52 - 1.04 mg/dL    GFR Estimate >90 >60 mL/min/[1.73_m2]    GFR Estimate If Black >90  ">60 mL/min/[1.73_m2]    Calcium 8.4 (L) 8.5 - 10.1 mg/dL   CBC with platelets     Status: None   Result Value Ref Range    WBC 6.3 4.0 - 11.0 10e9/L    RBC Count 3.92 3.8 - 5.2 10e12/L    Hemoglobin 12.3 11.7 - 15.7 g/dL    Hematocrit 36.1 35.0 - 47.0 %    MCV 92 78 - 100 fl    MCH 31.4 26.5 - 33.0 pg    MCHC 34.1 31.5 - 36.5 g/dL    RDW 13.6 10.0 - 15.0 %    Platelet Count 284 150 - 450 10e9/L   Magnesium     Status: None   Result Value Ref Range    Magnesium 2.0 1.6 - 2.3 mg/dL   Psychiatry IP Consult: MDD, bipolar disorder, suicidal ideations; Consultant may enter orders: Yes; Patient to be seen: ASAP; Call back #: [phone]; Requesting provider? Hospitalist (if different from attending physician)     Status: None ()    Narrative    Michell Juárez, WOO CNP     9/7/2020 10:51 AM    [facility]  Psychiatric Progress Note      Impression:     Consult from [address].  Spoke with Dr. Anderson in regard to   assessing patient for admission to Behavioral Health Unit.    Met with patient who is sitting up in her bed with food trays in   front of her.  She is eating a bit while we talk.  Patient   reports not feeling good physically for quite some time, this is   her second admission to Medical floor for C. difficile for which   she is being treated for currently - see medical notes.  Patient   admits to passive suicidal ideation, stating \"Yeah, I don't want   to be here\" - asked to clarify if meaning here at the hospital or   here as in the world, she replies \"In the world\".  No plan   discussed.  She reports not being able to get out of bed due to   weakness, nauseous \"most of the time\", and is incontinent of both   bowel and urine - having diarrhea this morning.  Asked patient   how she functions at home, she reports not eating due to lack of   appetite, non-compliant with medications, and does not have any   help at home.  She reports living with a roommate who \"does his   own thing\".          "    Diagnoses:   Major Depressive Disorder vs Bipolar Disorder  Generalized Anxiety  -See list of medical diagnosis-         Plan:     -Patient is not appropriate to transfer to Behavioral Health Unit   at this time due to ongoing medical issues, incontinence,   immobility, and recent/current C.diff   - will continue to monitor Lamictal and Mirtazapine and will be   available for additional consults as needed  -Will need additional assessment if status changes, to determine   appropriateness for admission at that time  -Recommend continue treatment in hospital or nursing home   placement.    Attestation:  Patient has been seen and evaluated by me,  WOO Esparza CNP          Interim History:   The patient's care was discussed with the treatment team and   chart notes were reviewed.          Medications:     Current Facility-Administered Medications Ordered in Epic   Medication Dose Route Frequency Last Rate Last Dose     acetaminophen (TYLENOL) tablet 650 mg  650 mg Oral Q4H PRN     650 mg at 09/06/20 0047     baclofen (LIORESAL) tablet 10 mg  10 mg Oral Q8H PRN   10 mg at   09/06/20 0946     lactobacillus rhamnosus (GG) (CULTURELL) capsule 1 capsule  1   capsule Oral BID   1 capsule at 09/07/20 1022     lamoTRIgine (LaMICtal) tablet 25 mg  25 mg Oral Daily   25 mg   at 09/07/20 1021     lidocaine (LMX4) kit   Topical Q1H PRN         lidocaine (LMX4) kit   Topical Q1H PRN         lidocaine 1 % 0.1-1 mL  0.1-1 mL Other Q1H PRN         lidocaine 1 % 0.1-1 mL  0.1-1 mL Other Q1H PRN         LORazepam (ATIVAN) injection 0.5 mg  0.5 mg Intravenous Q6H PRN     0.5 mg at 09/07/20 0611     magnesium sulfate 2 g in water intermittent infusion  2 g   Intravenous Daily PRN         magnesium sulfate 4 g in 100 mL sterile water (premade)  4 g   Intravenous Q4H PRN   4 g at 09/06/20 0628     melatonin tablet 1 mg  1 mg Oral At Bedtime PRN         mirtazapine (REMERON) tablet 15 mg  15 mg Oral At Bedtime   15   mg  "at 09/06/20 2154     naloxone (NARCAN) injection 0.1-0.4 mg  0.1-0.4 mg Intravenous   Q2 Min PRN         nicotine (NICODERM CQ) 14 MG/24HR 24 hr patch 1 patch  1 patch   Transdermal Daily   1 patch at 09/07/20 0859     nicotine Patch in Place   Transdermal Q8H         ondansetron (ZOFRAN-ODT) ODT tab 4 mg  4 mg Oral Q6H PRN   4 mg   at 09/06/20 0945    Or     ondansetron (ZOFRAN) injection 4 mg  4 mg Intravenous Q6H PRN     4 mg at 09/05/20 0137     Patient is already receiving anticoagulation with heparin,   enoxaparin (LOVENOX), warfarin (COUMADIN)  or other anticoagulant   medication   Does not apply Continuous PRN         rivaroxaban ANTICOAGULANT (XARELTO) tablet 20 mg  20 mg Oral   QAM   20 mg at 09/07/20 1021     sodium chloride (PF) 0.9% PF flush 10 mL  10 mL Intracatheter   Q8H   10 mL at 09/07/20 0612     sodium chloride (PF) 0.9% PF flush 10-20 mL  10-20 mL   Intracatheter q1 min prn   10 mL at 09/06/20 1624     vancomycin (VANCOCIN) capsule 125 mg  125 mg Oral 4x Daily     125 mg at 09/07/20 1022     No current Epic-ordered outpatient medications on file.              10 point ROS negative see H&P Medical       Allergies:     Allergies   Allergen Reactions     Amoxicillin Other (See Comments)     Headaches     Levaquin [Levofloxacin] Swelling     Lithium      Diabetes insipidus      Naproxen Other (See Comments)     Reaction: Headaches     Nickel      Tramadol      Sulindac Rash            Psychiatric Examination:   BP 96/54   Pulse 80   Temp 98.2  F (36.8  C) (Tympanic)   Resp   16   Ht 1.6 m (5' 3\")   Wt 63.9 kg (140 lb 14 oz)   SpO2 95%     BMI 24.95 kg/m    Weight is 140 lbs 13.98 oz  Body mass index is 24.95 kg/m .    Appearance:  awake, alert  Attitude:  cooperative and guarded  Eye Contact:  good  Mood:  depressed  Affect:  intensity is flat  Speech:  clear, coherent  Psychomotor Behavior:  no evidence of tardive dyskinesia,   dystonia, or tics  Thought Process:  linear  Associations:  no " loose associations  Thought Content:  passive suicidal ideation present  Insight:  limited  Judgment:  limited  Oriented to:  time, person, and place  Attention Span and Concentration:  fair  Recent and Remote Memory:  fair  Fund of Knowledge: appropriate  Muscle Strength and Tone: c/o weakness, not getting out of bed  Gait and Station: Not observed     Clostridium difficile toxin B PCR     Status: None    Specimen: Feces   Result Value Ref Range    Specimen Description Feces     C Diff Toxin B PCR Negative NEG^Negative                                Plan for this encounter/Med Changes/Labs:     lamictal was increase to 50 mg yesterday      Was refferred to [facility] and I have spoke with staff there to see if they can take her as I felt her mobility (needing stand by assist would be an issue for them). They are going to have nurse to nurse to discuss medical questinos to see if they can accept her and she is very interested in this.

## 2020-09-11 NOTE — PLAN OF CARE
Pt requesting to be discharged home. Notified Dr. Anderson and he said pt is unsafe to be discharged at this time. Notified pt she could sign out AMA. April from psych updated that pt may try to leave AMA and she said she will come talk with Maggie.    Pt also requested for central line to be removed and this was OK'd by Dr. Anderson.

## 2020-09-11 NOTE — DISCHARGE SUMMARY
[facility]    Discharge Summary  Hospitalist    Date of Admission:  9/4/2020  Date of Discharge:  9/11/2020  Discharging Provider: Eleuterio Anderson MD  Date of Service (when I saw the patient): 09/11/20    Discharge Diagnoses   Principal Problem:    Colitis due to Clostridium difficile (8/15/2018)  Active Problems:    Hypokalemia (6/18/2013)    Left hemiparesis (H) (7/15/2018)    Chronic anticoagulation (7/15/2018)    Suicidal ideation (7/10/2020)    Hypomagnesemia (9/4/2020)    Current severe episode of major depressive disorder (H) (9/4/2020)      History of Present Illness   Maggie Case is an 30 year old female who presented with abdominal pain, passive suicidal ideation.  Please see admission H+P for additional details.    Hospital Course   Maggie Case was admitted on 9/4/2020.  30-year-old female with a past medical history significant for hemorrhagic CVA, paradoxical CVA with ASD, DVT, PE, hypercoagulable status now on Xarelto chronically, major depression with suicidal ideation in the past, borderline personality disorder who left this hospital AMA on 9/3/2020 for C. difficile colitis.  She presented with abdominal pain and diarrhea.  She was intermittently noncompliant with vancomycin, not having completed treatment.  She was placed on p.o. vancomycin for admission with gradual improvement in her symptoms.  She does have chronic diarrhea at baseline, she states that she has loose stools about 2-3 times a day.  Of note, she did test negative for C. difficile on 9/5/2020.  She may benefit from a fecal transplant in the future if she has a recurrence.  She was never systemically toxic during this admission.  She did however have severe hypokalemia and hypomagnesemia, which are both replaced.  She has progressed from a symptomatic standpoint to be safe to discharge from acute care for medical reasons.    Psychiatrically however, the patient exhibited suicidal ideation, albeit passive.  She  expressed more than once that she would be unsafe to be alone at home.  Therefore, she was evaluated by behavioral health for possible admittance to the fifth floor.  They were unable to accommodate her due to her many physical needs.  Search was then expanded to other psychiatric units, which also failed to yield the bed for her.  Attempt was made to place her in a crisis bed, but again her physical needs prevented this.  She at this point had been evaluated by 2 independent behavioral health providers, and currently she has been cleared from a psychiatric standpoint to be discharged from hospital setting.  Today on interview she is bright, cheerful, joking.  She denies any active suicidal thoughts, and states that she has an action plan for when she gets low, involving her PCA as well as calling for help when she feels down.  We will now discharge her from the acute care setting with p.o. vancomycin until 9/15/2020.  Her bowel movements seem to have returned to her baseline state.  Of note, she did test negative for C. difficile on 9/5/2020.  Her Lamictal was increased from 25 mg to 50 mg daily, and we will send her prescription for that.  She will continue her rivaroxaban as prescribed previously.    Eleuterio Anderson MD      Significant Results and Procedures   See below.    Pending Results   These results will be followed up by Chapo Flores    Unresulted Labs Ordered in the Past 30 Days of this Admission     No orders found from 8/5/2020 to 9/5/2020.          Code Status   Full Code       Primary Care Physician   Chapo Flores    Physical Exam   Temp: 98.9  F (37.2  C) Temp src: Tympanic BP: 94/61 Pulse: 82   Resp: 18 SpO2: 97 % O2 Device: None (Room air)    Vitals:    09/04/20 2305   Weight: 63.9 kg (140 lb 14 oz)     Vital Signs with Ranges  Temp:  [97.9  F (36.6  C)-98.9  F (37.2  C)] 98.9  F (37.2  C)  Pulse:  [77-82] 82  Resp:  [14-18] 18  BP: (94-97)/(57-61) 94/61  SpO2:  [97 %-98 %] 97 %  I/O last 3  completed shifts:  In: 480 [P.O.:480]  Out: -     Constitutional: AA, NAD, interactive  Eyes: PERRLA, no injection, no icterus  HEENT: atraumatic, normocephalic  Respiratory: CTA b/l  Cardiovascular: S1 S2 RRR  GI: soft, NT, ND, + bowel sounds   Lymph/Hematologic: no palpable lymphadenopathy  Skin: no rashes, no lesions  Musculoskeletal: No edema, good tone, no deformities  Neurologic: oriented x 3, no focal deficits  Psychiatric: appropriate affect today    Discharge Disposition   Discharged to home  Condition at discharge: Stable    Consultations This Hospital Stay   PSYCHIATRY IP CONSULT  SOCIAL WORK IP CONSULT    Time Spent on this Encounter   IEleuterio MD, personally saw the patient today and spent greater than 30 minutes discharging this patient.    Discharge Orders      Reason for your hospital stay    c diff colitis     Follow-up and recommended labs and tests     Follow up with primary care provider, Chapo Flores, within 7 days for hospital follow- up.  No follow up labs or test are needed.     Activity    Your activity upon discharge: activity as tolerated     Full Code     Diet    Follow this diet upon discharge: Orders Placed This Encounter      Regular Diet Adult     Discharge Medications   Current Discharge Medication List      CONTINUE these medications which have CHANGED    Details   lamoTRIgine (LAMICTAL) 25 MG tablet Take 2 tablets (50 mg) by mouth daily  Qty: 60 tablet, Refills: 0    Associated Diagnoses: Colitis due to Clostridium difficile      vancomycin (VANCOCIN) 125 MG capsule Take 1 capsule (125 mg) by mouth 4 times daily for 4 days  Qty: 16 capsule, Refills: 0    Associated Diagnoses: Colitis due to Clostridium difficile         CONTINUE these medications which have NOT CHANGED    Details   acetaminophen (TYLENOL) 325 MG tablet Take 1-2 tablets (325-650 mg) by mouth every 6 hours as needed for mild pain  Qty: 1 Bottle, Refills: 0    Associated Diagnoses: Low back pain with  sciatica, sciatica laterality unspecified, unspecified back pain laterality, unspecified chronicity; Muscle pain      baclofen (LIORESAL) 10 MG tablet Take 10 mg by mouth every 8 hours as needed       famotidine (PEPCID) 20 MG tablet Take 20 mg by mouth 2 times daily       gabapentin (NEURONTIN) 300 MG capsule Take 900 mg by mouth 3 times daily      hydrOXYzine (ATARAX) 50 MG tablet Take 1 tablet (50 mg) by mouth 2 times daily as needed for anxiety  Qty: 30 tablet, Refills: 0    Associated Diagnoses: Bipolar disorder, in partial remission, most recent episode depressed (H)      lactobacillus rhamnosus, GG, (CULTURELL) capsule Take 1 capsule by mouth 2 times daily  Qty: 60 capsule, Refills: 0    Associated Diagnoses: C. difficile colitis      mirtazapine (REMERON) 30 MG tablet Take 30 mg by mouth At Bedtime      traZODone (DESYREL) 100 MG tablet Take 100 mg by mouth At Bedtime      benzocaine (ORAJEL MAXIMUM STRENGTH) 20 % GEL gel Take by mouth 2 times daily as needed for moderate pain      CHANTIX CONTINUING MONTH ASHLEY 1 MG tablet Take 1 mg by mouth 2 times daily       escitalopram (LEXAPRO) 10 MG tablet Take 1 tablet (10 mg) by mouth daily  Qty: 30 tablet, Refills: 0    Associated Diagnoses: Bipolar disorder, in partial remission, most recent episode depressed (H)      folic acid (FOLVITE) 1 MG tablet Take 1 mg by mouth daily       hydrocortisone 2.5 % cream Apply topically 2 times daily to scaly, cracked hands.      lidocaine (XYLOCAINE) 2 % solution       loperamide (IMODIUM A-D) 2 MG tablet Take 2 mg by mouth 4 times daily as needed for diarrhea       medroxyPROGESTERone (DEPO-PROVERA) 150 MG/ML IM injection Inject 150 mg into the muscle every 3 months      nicotine (NICODERM CQ) 7 MG/24HR 24 hr patch Apply 1 patch topically daily       potassium chloride ER (K-TAB/KLOR-CON) 10 MEQ CR tablet Take 10 mEq by mouth 2 times daily (with meals)       promethazine (PHENERGAN) 25 MG tablet Take 25 mg by mouth every 6  hours as needed       XARELTO 20 MG TABS tablet Take 20 mg by mouth every morning with breakfast.  Refills: 11           Allergies   Allergies   Allergen Reactions     Amoxicillin Other (See Comments)     Headaches     Levaquin [Levofloxacin] Swelling     Lithium      Diabetes insipidus      Naproxen Other (See Comments)     Reaction: Headaches     Nickel      Tramadol      Sulindac Rash     Data   Most Recent 3 CBC's:  Recent Labs   Lab Test 09/07/20  0541 09/06/20  0526 09/05/20  0530   WBC 6.3 6.2 12.4*   HGB 12.3 10.9* 13.0   MCV 92 91 89    246 306      Most Recent 3 BMP's:  Recent Labs   Lab Test 09/07/20  0541 09/06/20  0526 09/05/20  0933 09/05/20  0530    138  --  137   POTASSIUM 3.4 3.5 3.7 3.8   CHLORIDE 107 108  --  108   CO2 24 24  --  19*   BUN <1* <1*  --  2*   CR 0.37* 0.36*  --  0.31*   ANIONGAP 6 6  --  10   AVINASH 8.4* 8.0*  --  7.8*   GLC 87 82  --  88     Most Recent 2 LFT's:  Recent Labs   Lab Test 09/05/20  0530 09/04/20  1948   AST 19 24   ALT 16 21   ALKPHOS 53 95   BILITOTAL 0.6 0.6     Most Recent INR's and Anticoagulation Dosing History:  Anticoagulation Dose History     Recent Dosing and Labs Latest Ref Rng & Units 2/22/2018 3/1/2018 4/17/2018 4/23/2018 5/24/2018 6/24/2018 7/15/2018    INR 0.80 - 1.20 1.13 0.96 4.08(H) 2.20(H) 1.14 8.43(HH) 1.04        Most Recent 3 Troponin's:  Recent Labs   Lab Test 08/13/18  1036 04/23/18  1757 04/17/18  1810   TROPI <0.015 <0.015 <0.015     Most Recent Cholesterol Panel:  Recent Labs   Lab Test 02/17/18  1752   CHOL 98   LDL 39   HDL 38*   TRIG 105     Most Recent 6 Bacteria Isolates From Any Culture (See EPIC Reports for Culture Details):  Recent Labs   Lab Test 08/27/20  0229 08/26/20  1543 05/22/20  1901 05/08/20  1510 09/19/18  1937 07/16/18  0430   CULT No Salmonella, Shigella, Campylobacter, E. coli O157, Aeromonas, or Plesiomonas isolated. No growth after 6 days 50,000 to 100,000 colonies/mL  Escherichia coli  * >100,000  colonies/mL  Escherichia coli  * 50,000 to 100,000 colonies/mL  Escherichia coli  * No Salmonella, Shigella, Campylobacter, E. coli O157, Aeromonas, or Plesiomonas isolated.  No Yersinia enterocolitica isolated       Most Recent TSH, T4 and A1c Labs:  Recent Labs   Lab Test 09/04/20 1948 02/17/18  1752   TSH 1.64   < >  --    A1C  --   --  5.8    < > = values in this interval not displayed.     Results for orders placed or performed during the hospital encounter of 09/04/20   XR Chest Port 1 View    Narrative    PROCEDURE:  XR CHEST PORT 1 VW    HISTORY:  central line placement.     COMPARISON:  None.    FINDINGS:   The cardiac silhouette is normal in size. The pulmonary vasculature is  normal.  The lungs are clear. No pleural effusion or pneumothorax.  There is a central venous catheter with its tip in the right atrium      Impression    IMPRESSION:  No acute cardiopulmonary disease.      GEETHA JACOBS MD

## 2020-09-11 NOTE — PLAN OF CARE
Pt A&O x4, VSS on RA. Denies pain. Up SBA. Pt denies any suicidal ideation to this writer. Wanted to leave AMA, providers were able to discharge her home with a plan in place. No other significant events.

## 2020-09-11 NOTE — PLAN OF CARE
VSS, denies pain ex to CVC which was rated 10/10, site does not appear infectious, pain medications declined, encouraged removal of line, pt immediately responded that it should not be removed and she needs it while she's here. LS CTA bilat. Ambulates SBA to BSC, voiding yellow urine. CVC SL'd. Call light w/in reach, making needs known, appears to be resting in comfort much of shift.     Face to face report given with opportunity to observe patient.    Report given to Fei Chase RN   9/11/2020  7:09 AM

## 2020-09-12 ENCOUNTER — TELEPHONE (OUTPATIENT)
Dept: CASE MANAGEMENT | Facility: HOSPITAL | Age: 30
End: 2020-09-12

## 2020-09-12 NOTE — TELEPHONE ENCOUNTER
Care Transitions focused note:      Attempted to call and check in with Maggie.  Not able to reach received message advising of calling restrictions.

## 2020-09-13 ENCOUNTER — TELEPHONE (OUTPATIENT)
Dept: CASE MANAGEMENT | Facility: HOSPITAL | Age: 30
End: 2020-09-13

## 2020-09-13 NOTE — TELEPHONE ENCOUNTER
Care Transitions focused note:        Checked in with Maggie.  She states that she is doing well and feels safe at home at this time.  Writer encouraged her to reach out to RE Stout with [facility] if she has any questions or concerns.

## 2020-09-14 ENCOUNTER — TELEPHONE (OUTPATIENT)
Dept: BEHAVIORAL HEALTH | Facility: OTHER | Age: 30
End: 2020-09-14

## 2020-09-14 ENCOUNTER — TELEPHONE (OUTPATIENT)
Dept: CASE MANAGEMENT | Facility: HOSPITAL | Age: 30
End: 2020-09-14
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
## 2020-09-15 ENCOUNTER — TELEPHONE (OUTPATIENT)
Dept: BEHAVIORAL HEALTH | Facility: OTHER | Age: 30
End: 2020-09-15

## 2020-09-15 ENCOUNTER — TELEPHONE (OUTPATIENT)
Dept: CASE MANAGEMENT | Facility: HOSPITAL | Age: 30
End: 2020-09-15
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
## 2020-09-15 NOTE — TELEPHONE ENCOUNTER
Maggie Case, was discharged to home on 09/11/2022 from [facility]. I spoke today with her regarding the patient's discharge.   She indicates she has receive(d) sufficient information upon discharge. Medications were reviewed in full on discharge, including: Medications to be started; medications to be stopped; medications to be continued from preadmission and any side effects. Prescriptions were received at discharge and were able to be filled. Medications are being taken as prescribed.   She indicates they understand they need to schedule an appointment with their PCP.  She did not have any questions regarding discharge instructions or condition.  Suggestions to improve service: none  She was informed they may receive a survey in the mail from the hospital. Asked if they would kindly complete the survey in order for [facility] to know if services fully met patient needs.

## 2020-09-18 ENCOUNTER — TELEPHONE (OUTPATIENT)
Dept: BEHAVIORAL HEALTH | Facility: OTHER | Age: 30
End: 2020-09-18

## 2020-09-18 NOTE — TELEPHONE ENCOUNTER
[facility]  @FLOW([id number])@      Social Work Care Navigator Note      Patient: Maggie Case  Date: September 18, 2020  Preferred Name: Maggie    Previous PHQ-9: No flowsheet data found.  Previous DIANNA-7: No flowsheet data found.  SEYMOUR LEVEL:  No flowsheet data found.    Preferred Contact: @ELIEZER([id number]@  Type of Contact Today: Phone call (patient / identified key support person reached)      Data: (subjective / Objective):  Patient Goals Areas: @FLOW([id number])@  Patient Stated Goals: @FLOW([id number])@  Recent ED/IP Admission or Discharge?   None  Recent ED/IP Admission or Discharge?   None    Patient Goals:  No data recorded      [facility] Core Service Provided:  Comprehensive Care Management: utilized the electronic medical record / patient registry to identify and support patient's health conditions / needs more effectively   Care Coordination: provided care management services/referrals necessary to ensure patient and their identified supports have access to medical, behavioral health, pharmacology and recovery support services.  Ensured that patient's care is integrated across all settings and services.   Health and Wellness Promotion    Current Stressors / Issues / Care Plan Objective Addressed Today:  illness    Intervention:  Motivational Interviewing: Expressed Empathy/Understanding, Supported Autonomy, Collaboration, Evocation, Permission to raise concern or advise and Open-ended questions   Target Behavior(s): NA    Assessment: (Progress on Goals / Homework):  Received text this am from pt stating she was ill.  She stated she was unable to keep any food down and barely any liquids.  Advised pt if she was unable to keep liquids down she should be seen.  Pt stated she had contacted her parents to see if they could bring her in.  Advised pt to call 911 if she got worse.  Reminded pt of her PCA intake today which she confirmed and advised it was at noon.    *Followed up with pt this pm.  She  stated meeting went well and she was feeling better.  She verified she would be spending time with parents this weekend.  Reminded pt writer would be out until 9/23 and will remain available.    Plan: (Homework, other):  Patient was encouraged to continue to seek condition-related information and education.      Scheduled a Phone follow up appointment with HERNAN VILLALOBOS in 1 week     Patient has set self-identified goals and will monitor progress until the next appointment. RE Ayon, Social Work Care Coordinator       RE Ayon  Social Work Care Coordinator  [facility]  [phone] option 2 or [phone]

## 2020-09-21 ENCOUNTER — ANESTHESIA EVENT (OUTPATIENT)
Dept: EMERGENCY MEDICINE | Facility: HOSPITAL | Age: 30
End: 2020-09-21
Payer: MEDICARE

## 2020-09-21 ENCOUNTER — APPOINTMENT (OUTPATIENT)
Dept: ULTRASOUND IMAGING | Facility: HOSPITAL | Age: 30
End: 2020-09-21
Attending: NURSE PRACTITIONER
Payer: MEDICARE

## 2020-09-21 ENCOUNTER — HOSPITAL ENCOUNTER (EMERGENCY)
Facility: HOSPITAL | Age: 30
Discharge: HOME | End: 2020-09-21
Attending: NURSE PRACTITIONER | Admitting: NURSE PRACTITIONER
Payer: MEDICARE

## 2020-09-21 ENCOUNTER — ANESTHESIA (OUTPATIENT)
Dept: EMERGENCY MEDICINE | Facility: HOSPITAL | Age: 30
End: 2020-09-21
Payer: MEDICARE

## 2020-09-21 VITALS
HEART RATE: 95 BPM | SYSTOLIC BLOOD PRESSURE: 118 MMHG | TEMPERATURE: 99 F | RESPIRATION RATE: 15 BRPM | DIASTOLIC BLOOD PRESSURE: 73 MMHG | OXYGEN SATURATION: 100 %

## 2020-09-21 DIAGNOSIS — E87.6 HYPOKALEMIA: ICD-10-CM

## 2020-09-21 DIAGNOSIS — R11.2 NAUSEA AND VOMITING: ICD-10-CM

## 2020-09-21 DIAGNOSIS — Z72.0 TOBACCO ABUSE: ICD-10-CM

## 2020-09-21 DIAGNOSIS — F12.10 MARIJUANA ABUSE: ICD-10-CM

## 2020-09-21 LAB
ALBUMIN SERPL-MCNC: 3.8 G/DL (ref 3.4–5)
ALP SERPL-CCNC: 120 U/L (ref 40–150)
ALT SERPL W P-5'-P-CCNC: 42 U/L (ref 0–50)
ANION GAP SERPL CALCULATED.3IONS-SCNC: 10 MMOL/L (ref 3–14)
AST SERPL W P-5'-P-CCNC: 64 U/L (ref 0–45)
BASOPHILS # BLD AUTO: 0 10E9/L (ref 0–0.2)
BASOPHILS NFR BLD AUTO: 0.3 %
BILIRUB DIRECT SERPL-MCNC: 0.4 MG/DL (ref 0–0.2)
BILIRUB SERPL-MCNC: 1 MG/DL (ref 0.2–1.3)
BUN SERPL-MCNC: 11 MG/DL (ref 7–30)
CALCIUM SERPL-MCNC: 9.6 MG/DL (ref 8.5–10.1)
CHLORIDE SERPL-SCNC: 99 MMOL/L (ref 94–109)
CO2 SERPL-SCNC: 25 MMOL/L (ref 20–32)
CREAT SERPL-MCNC: 0.47 MG/DL (ref 0.52–1.04)
DIFFERENTIAL METHOD BLD: ABNORMAL
EOSINOPHIL # BLD AUTO: 0.2 10E9/L (ref 0–0.7)
EOSINOPHIL NFR BLD AUTO: 1.5 %
ERYTHROCYTE [DISTWIDTH] IN BLOOD BY AUTOMATED COUNT: 13.3 % (ref 10–15)
GFR SERPL CREATININE-BSD FRML MDRD: >90 ML/MIN/{1.73_M2}
GLUCOSE SERPL-MCNC: 106 MG/DL (ref 70–99)
HCT VFR BLD AUTO: 46.9 % (ref 35–47)
HGB BLD-MCNC: 16.3 G/DL (ref 11.7–15.7)
IMM GRANULOCYTES # BLD: 0.1 10E9/L (ref 0–0.4)
IMM GRANULOCYTES NFR BLD: 0.5 %
LACTATE BLD-SCNC: 1.5 MMOL/L (ref 0.7–2)
LYMPHOCYTES # BLD AUTO: 1.7 10E9/L (ref 0.8–5.3)
LYMPHOCYTES NFR BLD AUTO: 16.3 %
MAGNESIUM SERPL-MCNC: 2.2 MG/DL (ref 1.6–2.3)
MCH RBC QN AUTO: 30.8 PG (ref 26.5–33)
MCHC RBC AUTO-ENTMCNC: 34.8 G/DL (ref 31.5–36.5)
MCV RBC AUTO: 89 FL (ref 78–100)
MONOCYTES # BLD AUTO: 0.6 10E9/L (ref 0–1.3)
MONOCYTES NFR BLD AUTO: 6.1 %
NEUTROPHILS # BLD AUTO: 7.8 10E9/L (ref 1.6–8.3)
NEUTROPHILS NFR BLD AUTO: 75.3 %
NRBC # BLD AUTO: 0 10*3/UL
NRBC BLD AUTO-RTO: 0 /100
PLATELET # BLD AUTO: 353 10E9/L (ref 150–450)
POTASSIUM SERPL-SCNC: 2.3 MMOL/L (ref 3.4–5.3)
POTASSIUM SERPL-SCNC: 3.6 MMOL/L (ref 3.4–5.3)
PROT SERPL-MCNC: 7.4 G/DL (ref 6.8–8.8)
RBC # BLD AUTO: 5.3 10E12/L (ref 3.8–5.2)
SODIUM SERPL-SCNC: 134 MMOL/L (ref 133–144)
WBC # BLD AUTO: 10.3 10E9/L (ref 4–11)

## 2020-09-21 PROCEDURE — 99284 EMERGENCY DEPT VISIT MOD MDM: CPT | Mod: Z6 | Performed by: NURSE PRACTITIONER

## 2020-09-21 PROCEDURE — 96361 HYDRATE IV INFUSION ADD-ON: CPT

## 2020-09-21 PROCEDURE — 37000011 ZZH ANESTHESIA WARD SERVICE: Performed by: NURSE ANESTHETIST, CERTIFIED REGISTERED

## 2020-09-21 PROCEDURE — 25000128 H RX IP 250 OP 636: Performed by: NURSE PRACTITIONER

## 2020-09-21 PROCEDURE — 93005 ELECTROCARDIOGRAM TRACING: CPT

## 2020-09-21 PROCEDURE — 96375 TX/PRO/DX INJ NEW DRUG ADDON: CPT

## 2020-09-21 PROCEDURE — 96365 THER/PROPH/DIAG IV INF INIT: CPT

## 2020-09-21 PROCEDURE — 84132 ASSAY OF SERUM POTASSIUM: CPT | Performed by: NURSE PRACTITIONER

## 2020-09-21 PROCEDURE — 80048 BASIC METABOLIC PNL TOTAL CA: CPT | Performed by: NURSE PRACTITIONER

## 2020-09-21 PROCEDURE — 93010 ELECTROCARDIOGRAM REPORT: CPT | Performed by: INTERNAL MEDICINE

## 2020-09-21 PROCEDURE — 83735 ASSAY OF MAGNESIUM: CPT | Performed by: NURSE PRACTITIONER

## 2020-09-21 PROCEDURE — 76705 ECHO EXAM OF ABDOMEN: CPT | Mod: TC

## 2020-09-21 PROCEDURE — 36415 COLL VENOUS BLD VENIPUNCTURE: CPT | Performed by: NURSE PRACTITIONER

## 2020-09-21 PROCEDURE — 96366 THER/PROPH/DIAG IV INF ADDON: CPT

## 2020-09-21 PROCEDURE — 85025 COMPLETE CBC W/AUTO DIFF WBC: CPT | Performed by: NURSE PRACTITIONER

## 2020-09-21 PROCEDURE — 83605 ASSAY OF LACTIC ACID: CPT | Performed by: NURSE PRACTITIONER

## 2020-09-21 PROCEDURE — 99285 EMERGENCY DEPT VISIT HI MDM: CPT | Mod: 25

## 2020-09-21 PROCEDURE — 25800030 ZZH RX IP 258 OP 636: Performed by: NURSE PRACTITIONER

## 2020-09-21 PROCEDURE — 80076 HEPATIC FUNCTION PANEL: CPT | Performed by: NURSE PRACTITIONER

## 2020-09-21 RX ORDER — LORAZEPAM 2 MG/ML
1 INJECTION INTRAMUSCULAR ONCE
Status: COMPLETED | OUTPATIENT
Start: 2020-09-21 | End: 2020-09-21

## 2020-09-21 RX ORDER — POTASSIUM CL/LIDO/0.9 % NACL 10MEQ/0.1L
10 INTRAVENOUS SOLUTION, PIGGYBACK (ML) INTRAVENOUS
Status: DISPENSED | OUTPATIENT
Start: 2020-09-21 | End: 2020-09-21

## 2020-09-21 RX ORDER — ONDANSETRON 2 MG/ML
4 INJECTION INTRAMUSCULAR; INTRAVENOUS ONCE
Status: COMPLETED | OUTPATIENT
Start: 2020-09-21 | End: 2020-09-21

## 2020-09-21 RX ADMIN — LORAZEPAM 1 MG: 2 INJECTION INTRAMUSCULAR; INTRAVENOUS at 15:26

## 2020-09-21 RX ADMIN — Medication 10 MEQ: at 18:35

## 2020-09-21 RX ADMIN — ONDANSETRON 4 MG: 2 INJECTION INTRAMUSCULAR; INTRAVENOUS at 14:58

## 2020-09-21 RX ADMIN — Medication 10 MEQ: at 15:26

## 2020-09-21 RX ADMIN — SODIUM CHLORIDE 1000 ML: 9 INJECTION, SOLUTION INTRAVENOUS at 14:58

## 2020-09-21 RX ADMIN — Medication 10 MEQ: at 16:53

## 2020-09-21 ASSESSMENT — ENCOUNTER SYMPTOMS
PALPITATIONS: 0
SORE THROAT: 0
CONSTIPATION: 0
CHILLS: 0
VOMITING: 1
COLOR CHANGE: 0
RHINORRHEA: 0
DIARRHEA: 1
FEVER: 0
ABDOMINAL PAIN: 1
NAUSEA: 1
HEADACHES: 0
WOUND: 0
COUGH: 0
SHORTNESS OF BREATH: 0

## 2020-09-21 NOTE — ED PROVIDER NOTES
"  History     Chief Complaint   Patient presents with     Nausea & Vomiting     \"started 1 week ago\"      HPI     Maggie Case is a 30 year old female who presents on stretcher via EMS for concerns of nausea and vomiting which has been ongoing since recent hospital discharge. She   Denies recent fever, chills, chest pain, palpitations, cough, dyspnea, diarrhea, constipation, hematochezia, melena, urinary symptoms including dysuria, urinary frequency, decreased urinary output. She was recently treated for c.diff and denies having multiple stools, liquid stools, mucus or blood in stools. She does have \"a little bit\" of intermittent abdominal cramping. Last BM was this morning, has been having 1 daily bowel movement since recent hospital discharge.     She does admit that she has not been taking her potassium.         Allergies:  Allergies   Allergen Reactions     Amoxicillin Other (See Comments)     Headaches     Levaquin [Levofloxacin] Swelling     Lithium      Diabetes insipidus      Naproxen Other (See Comments)     Reaction: Headaches     Nickel      Tramadol      Sulindac Rash       Problem List:    Patient Active Problem List    Diagnosis Date Noted     Hypomagnesemia 09/04/2020     Priority: Medium     Current severe episode of major depressive disorder (H) 09/04/2020     Priority: Medium     Intractable nausea and vomiting 08/26/2020     Priority: Medium     Suicidal ideation 07/10/2020     Priority: Medium     Chronic left shoulder pain 06/24/2020     Priority: Medium     Anxiety and depression 06/15/2020     Priority: Medium     Acute urinary tract infection 05/23/2020     Priority: Medium     Acute pyelonephritis 05/22/2020     Priority: Medium     Bipolar disorder, in partial remission, most recent episode depressed (H) 05/01/2020     Priority: Medium     Incontinence of urine in female 04/22/2020     Priority: Medium     Muscle weakness 02/04/2020     Priority: Medium     Chronic pain of left knee " 02/04/2020     Priority: Medium     Spasticity as late effect of cerebrovascular accident (CVA) 12/30/2019     Priority: Medium     Neuropathy 01/28/2019     Priority: Medium     Colitis, collagenous 01/16/2019     Priority: Medium     Borderline personality disorder (H) 12/08/2018     Priority: Medium     Irritable bowel syndrome with diarrhea 12/07/2018     Priority: Medium     Tobacco use disorder 11/28/2018     Priority: Medium     History of DVT (deep vein thrombosis) 11/27/2018     Priority: Medium     History of nonadherence to medical treatment 11/27/2018     Priority: Medium     Chronic hypokalemia 11/27/2018     Priority: Medium     Depression with suicidal ideation 09/19/2018     Priority: Medium     Generalized anxiety disorder 09/16/2018     Priority: Medium     Colitis due to Clostridium difficile 08/15/2018     Priority: Medium     Bilateral plantar wart 07/27/2018     Priority: Medium     Nephrolithiasis 07/27/2018     Priority: Medium     Chemical dependency (H) 07/16/2018     Priority: Medium     Calculus of lower urinary tract 07/15/2018     Priority: Medium     History of CVA (cerebrovascular accident) 07/15/2018     Priority: Medium     Left hemiparesis (H) 07/15/2018     Priority: Medium     Chronic anticoagulation 07/15/2018     Priority: Medium     History of cranioplasty 05/24/2018     Priority: Medium     Traumatic brain injury, without loss of consciousness, sequela (H) 05/12/2018     Priority: Medium     Cerebrovascular accident (CVA) due to occlusion of right carotid artery (H) 03/26/2018     Priority: Medium     Overview:   She suffered a massive right-sided CVA in mid-February, 2018. She ended up with severe cerebral edema and required a craniotomy. She is left with significant residual left-sided hemiparesis. She had her cranial defect repaired on 5/24/18       Acute blood loss anemia 03/01/2018     Priority: Medium     Dysphagia 03/01/2018     Priority: Medium     History of alcohol  abuse 03/01/2018     Priority: Medium     Hilar lymphadenopathy 03/01/2018     Priority: Medium     Overview:   bilateral       Personal history of PE (pulmonary embolism) 03/01/2018     Priority: Medium     Overview:   In 2015.  Discontinued anticoagulation 1/2017 due to inconsistent use of anticoagulation and lack of thrombus on CT)       PFO (patent foramen ovale) 03/01/2018     Priority: Medium     Acute right MCA stroke (H) 03/01/2018     Priority: Medium     Acute ischemic stroke (H) 02/17/2018     Priority: Medium     Influenza B 05/01/2017     Priority: Medium     Opacity of lung on imaging study 05/01/2017     Priority: Medium     Community acquired pneumonia 05/01/2017     Priority: Medium     C. difficile colitis 05/01/2017     Priority: Medium     Sepsis (H) 04/28/2017     Priority: Medium     Pulmonary nodule seen on imaging study 03/21/2017     Priority: Medium     OCD (obsessive compulsive disorder) 01/11/2017     Priority: Medium     Pyelonephritis 01/05/2017     Priority: Medium     Cervical high risk HPV (human papillomavirus) test positive 08/11/2016     Priority: Medium     Overview:   Hx LGSIL in August of 2016       Other pulmonary embolism without acute cor pulmonale, unspecified chronicity (H) 05/18/2016     Priority: Medium     Acute chest pain 05/06/2016     Priority: Medium     Leukocytosis 05/06/2016     Priority: Medium     Lung mass 05/06/2016     Priority: Medium     SOB (shortness of breath) 05/06/2016     Priority: Medium     Other vitamin B12 deficiency anemia 01/05/2016     Priority: Medium     Vitamin D deficiency 01/05/2016     Priority: Medium     Iron deficiency anemia, unspecified iron deficiency anemia type 12/28/2015     Priority: Medium     Acute upper GI bleed 06/18/2013     Priority: Medium     Hypokalemia 06/18/2013     Priority: Medium     Acute cystitis 06/18/2013     Priority: Medium     Anxiety state 03/25/2013     Priority: Medium     Muscle pain 07/30/2009      Priority: Medium     Overview:   IMO Update 10/11       Cervicalgia 2009     Priority: Medium     Overview:   IMO Update 10/11       Low back pain 2009     Priority: Medium     Overview:   IMO Update 10/11       Pain in joint, lower leg 2009     Priority: Medium     Diarrhea 2008     Priority: Medium     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     Priority: Medium        Past Medical History:    Past Medical History:   Diagnosis Date     Anemia      Anxiety      Chemical dependency (H)      Chronic diarrhea      Lactose intolerance      Myalgia      OCD (obsessive compulsive disorder)      Pulmonary embolism (H) 16     Stroke (H) 2018     Vitamin B12 deficiency        Past Surgical History:    Past Surgical History:   Procedure Laterality Date     CLOSED REDUCTION, PERCUTANEOUS PINNING LOWER EXTREMITY, COMBINED Right 2016    Procedure: COMBINED CLOSED REDUCTION, PERCUTANEOUS PINNING LOWER EXTREMITY;  Surgeon: Dexter Jones MD;  Location: [facility]     COLONOSCOPY       CRANIECTOMY Right 2018    Procedure: CRANIECTOMY;  RIGHT HEMICRANIECTOMY;  Surgeon: Emeterio eMsa MD;  Location: [facility] OR     CRANIOPLASTY Right 2018    Procedure: CRANIOPLASTY;  Right Cranioplasty ;  Surgeon: Emeterio Mesa MD;  Location: [facility] OR     UPPER GI ENDOSCOPY         Family History:    Family History   Problem Relation Age of Onset     Depression Mother      Migraines Maternal Half-Sister        Social History:  Marital Status:  Single [1]  Social History     Tobacco Use     Smoking status: Current Every Day Smoker     Packs/day: 2.00     Years: 7.00     Pack years: 14.00     Types: Cigarettes     Last attempt to quit: 2018     Years since quittin.1     Smokeless tobacco: Never Used   Substance Use Topics     Alcohol use: Yes     Alcohol/week: 0.0 standard drinks     Drug use: Yes     Frequency: 7.0 times per week     Types: Marijuana,  "Methamphetamines     Comment: hx of marijuana and meth use        Medications:    acetaminophen (TYLENOL) 325 MG tablet  baclofen (LIORESAL) 10 MG tablet  benzocaine (ORAJEL MAXIMUM STRENGTH) 20 % GEL gel  CHANTIX CONTINUING MONTH ASHLEY 1 MG tablet  escitalopram (LEXAPRO) 10 MG tablet  famotidine (PEPCID) 20 MG tablet  folic acid (FOLVITE) 1 MG tablet  gabapentin (NEURONTIN) 300 MG capsule  hydrocortisone 2.5 % cream  hydrOXYzine (ATARAX) 50 MG tablet  lactobacillus rhamnosus, GG, (CULTURELL) capsule  lamoTRIgine (LAMICTAL) 25 MG tablet  lidocaine (XYLOCAINE) 2 % solution  loperamide (IMODIUM A-D) 2 MG tablet  medroxyPROGESTERone (DEPO-PROVERA) 150 MG/ML IM injection  mirtazapine (REMERON) 30 MG tablet  nicotine (NICODERM CQ) 7 MG/24HR 24 hr patch  potassium chloride ER (K-TAB/KLOR-CON) 10 MEQ CR tablet  promethazine (PHENERGAN) 25 MG tablet  traZODone (DESYREL) 100 MG tablet  XARELTO 20 MG TABS tablet          Review of Systems   Constitutional: Negative for chills and fever.   HENT: Negative for ear pain, rhinorrhea and sore throat.    Respiratory: Negative for cough and shortness of breath.    Cardiovascular: Negative for chest pain and palpitations.   Gastrointestinal: Positive for abdominal pain (\"a little bit\"), diarrhea (\"I always have diarrhea\" 1 stool daily, no liquid stools), nausea and vomiting. Negative for constipation.   Skin: Negative for color change, rash and wound.   Neurological: Negative for headaches.       Physical Exam   BP: 125/97  Pulse: (!) 128  Temp: 99  F (37.2  C)  Resp: 18  SpO2: 97 %      Physical Exam  Constitutional:       General: She is not in acute distress.  Cardiovascular:      Rate and Rhythm: Normal rate and regular rhythm.      Heart sounds: S1 normal and S2 normal. No murmur. No friction rub. No gallop.    Pulmonary:      Effort: Pulmonary effort is normal.      Breath sounds: Normal breath sounds.   Abdominal:      General: Bowel sounds are normal. There is no distension.     "  Palpations: Abdomen is soft.      Tenderness: There is abdominal tenderness in the right upper quadrant. There is no right CVA tenderness, left CVA tenderness, guarding or rebound.   Skin:     General: Skin is warm and dry.      Capillary Refill: Capillary refill takes less than 2 seconds.   Neurological:      Mental Status: She is alert and oriented to person, place, and time.   Psychiatric:         Mood and Affect: Mood normal. Mood is not anxious. Affect is not flat or tearful.         Speech: Speech normal.         Behavior: Behavior normal. Behavior is cooperative.         Thought Content: Thought content does not include suicidal ideation.         ED Course     ED Course as of Sep 21 2157   Mon Sep 21, 2020   2044 Patient updated on potassium re-check and plan for discharge. She states she has no where to go because she does not feel safe at home due to dog feces on the floor, no running water. This has been a longstanding living environment for her and prior to this she was homeless. Discussed with nursing potential shelter placement.  Tiffanie Cline CNP on 9/21/2020 at 8:45 PM          Procedures       Results for orders placed or performed during the hospital encounter of 09/21/20 (from the past 24 hour(s))   Basic metabolic panel   Result Value Ref Range    Sodium 134 133 - 144 mmol/L    Potassium 2.3 (LL) 3.4 - 5.3 mmol/L    Chloride 99 94 - 109 mmol/L    Carbon Dioxide 25 20 - 32 mmol/L    Anion Gap 10 3 - 14 mmol/L    Glucose 106 (H) 70 - 99 mg/dL    Urea Nitrogen 11 7 - 30 mg/dL    Creatinine 0.47 (L) 0.52 - 1.04 mg/dL    GFR Estimate >90 >60 mL/min/[1.73_m2]    GFR Estimate If Black >90 >60 mL/min/[1.73_m2]    Calcium 9.6 8.5 - 10.1 mg/dL   CBC with platelets differential   Result Value Ref Range    WBC 10.3 4.0 - 11.0 10e9/L    RBC Count 5.30 (H) 3.8 - 5.2 10e12/L    Hemoglobin 16.3 (H) 11.7 - 15.7 g/dL    Hematocrit 46.9 35.0 - 47.0 %    MCV 89 78 - 100 fl    MCH 30.8 26.5 - 33.0 pg    MCHC 34.8  31.5 - 36.5 g/dL    RDW 13.3 10.0 - 15.0 %    Platelet Count 353 150 - 450 10e9/L    Diff Method Automated Method     % Neutrophils 75.3 %    % Lymphocytes 16.3 %    % Monocytes 6.1 %    % Eosinophils 1.5 %    % Basophils 0.3 %    % Immature Granulocytes 0.5 %    Nucleated RBCs 0 0 /100    Absolute Neutrophil 7.8 1.6 - 8.3 10e9/L    Absolute Lymphocytes 1.7 0.8 - 5.3 10e9/L    Absolute Monocytes 0.6 0.0 - 1.3 10e9/L    Absolute Eosinophils 0.2 0.0 - 0.7 10e9/L    Absolute Basophils 0.0 0.0 - 0.2 10e9/L    Abs Immature Granulocytes 0.1 0 - 0.4 10e9/L    Absolute Nucleated RBC 0.0    Hepatic panel   Result Value Ref Range    Bilirubin Direct 0.4 (H) 0.0 - 0.2 mg/dL    Bilirubin Total 1.0 0.2 - 1.3 mg/dL    Albumin 3.8 3.4 - 5.0 g/dL    Protein Total 7.4 6.8 - 8.8 g/dL    Alkaline Phosphatase 120 40 - 150 U/L    ALT 42 0 - 50 U/L    AST 64 (H) 0 - 45 U/L   Lactic acid whole blood   Result Value Ref Range    Lactic Acid 1.5 0.7 - 2.0 mmol/L   Magnesium   Result Value Ref Range    Magnesium 2.2 1.6 - 2.3 mg/dL   Abdomen US, limited (RUQ only)    Narrative    PROCEDURES: US ABDOMEN LIMITED    HISTORY: nausea, vomiting, RUQ tenderness, nausea, vomiting, RUQ  tenderness    TECHNIQUE: Grayscale ultrasound of the upper abdomen was performed.    COMPARISON: none     FINDINGS:    MEASUREMENTS:   Liver length:  18 cm.   Common duct diameter:  0.2 cm.    LIVER: The liver is free of masses or biliary ductal enlargement    GALLBLADDER: Gallstones are seen within the gallbladder    ABDOMINAL AORTA AND IVC: The abdominal aorta is normally tapering. The  inferior vena cava is patent.    PANCREAS: Portions of the head and body of the pancreas were imaged  and the visualized portions appear normal.    Right KIDNEY: The right kidney is normal in size. There is no  hydronephrosis. The cyst seen in the right kidney on a CT scan from  August 2020 was not visualized on this ultrasound exam.        Impression    IMPRESSION:     Cholelithiasis  "without gallbladder wall thickening or biliary ductal  enlargement.    GEETHA JACOBS MD   Potassium   Result Value Ref Range    Potassium 3.6 3.4 - 5.3 mmol/L       Medications   potassium chloride 10 mEq in 100 mL intermittent infusion with 10 mg lidocaine (0 mEq Intravenous Stopped 9/21/20 2006)   0.9% sodium chloride BOLUS (1,000 mLs Intravenous New Bag 9/21/20 1458)   ondansetron (ZOFRAN) injection 4 mg (4 mg Intravenous Given 9/21/20 1458)   LORazepam (ATIVAN) injection 1 mg (1 mg Intravenous Given 9/21/20 1526)       Assessments & Plan (with Medical Decision Making)     I have reviewed the nursing notes.    I have reviewed the findings, diagnosis, plan and need for follow up with the patient.  (R11.2) Nausea and vomiting  (E87.6) Hypokalemia  (Z72.0) Tobacco abuse  (F12.10) Marijuana abuse  30-year old female presents on stretcher via EMS for concerns of nausea and vomiting since recent hospital discharge. No new medications. She completed course of antibiotics for c. Difficile and symptoms overall resolved except for \"A little bit\" of intermittent abdominal cramping. She does report she has been able to keep fluids down. Work-up as above. Hypokalemia with potassium of 2.3- this is ongoing chronic issue for patient and she does not take oral potassium knowing risks of sudden cardiac death related to hypokalemia. She is given potassium supplementation via IV - three 10 mEq bags, 1 liter of normal saline. She is given zofran in the ED for nausea and able to eat and drink while receiving potassium supplementation. Potassium increased to 3.6. Discussed with her at this time there is no indication for admission. Her hypokalemia is a chronic, ongoing issue and she does not take potassium supplementation. Risk of sudden cardiac death due to hypokalemia and not taking potassium supplement. Magnesium is normal.   Recommend:  - Ensure you are drinking plenty of fluids  - Eat a light diet  - Take potassium supplement " as instructed by primary care provider  - Consider cessation of tobacco and marijuana. Marijuana use can cause cyclical vomiting syndrome and the only resolution is stopping use.   - Continue to follow with  regarding living conditions, placement, etc.         RETURN TO THE ED WITH NEW OR WORSENING SYMPTOMS.    FOLLOW-UP WITH YOUR PRIMARY CARE PROVIDER IN 5-7 DAYS.      Tiffanie Cline CNP    New Prescriptions    No medications on file       Final diagnoses:   Nausea and vomiting   Hypokalemia   Tobacco abuse   Marijuana abuse       9/21/2020   [facility]     Tiffanie Cline CNP  09/21/20 [id number]

## 2020-09-21 NOTE — ED NOTES
Pt resting on cart, eating granola bars and drinking tea with her mom, new bag of potassium infusing, pt tolerating at 75ml per hour,vss.

## 2020-09-21 NOTE — DISCHARGE INSTRUCTIONS
"(R11.2) Nausea and vomiting  (E87.6) Hypokalemia  (Z72.0) Tobacco abuse  (F12.10) Marijuana abuse  30-year old female presents on stretcher via EMS for concerns of nausea and vomiting since recent hospital discharge. No new medications. She completed course of antibiotics for c. Difficile and symptoms overall resolved except for \"A little bit\" of intermittent abdominal cramping. She does report she has been able to keep fluids down. Work-up as above. Hypokalemia with potassium of 2.3- this is ongoing chronic issue for patient and she does not take oral potassium knowing risks of sudden cardiac death related to hypokalemia. She is given potassium supplementation via IV - three 10 mEq bags, 1 liter of normal saline. She is given zofran in the ED for nausea and able to eat and drink while receiving potassium supplementation. Potassium increased to 3.6. Discussed with her at this time there is no indication for admission. Her hypokalemia is a chronic, ongoing issue and she does not take potassium supplementation. Risk of sudden cardiac death due to hypokalemia and not taking potassium supplement. Magnesium is normal.   Recommend:  - Ensure you are drinking plenty of fluids  - Eat a light diet  - Take potassium supplement as instructed by primary care provider  - Consider cessation of tobacco and marijuana. Marijuana use can cause cyclical vomiting syndrome and the only resolution is stopping use.   - Continue to follow with  regarding living conditions, placement, etc.         RETURN TO THE ED WITH NEW OR WORSENING SYMPTOMS.    FOLLOW-UP WITH YOUR PRIMARY CARE PROVIDER IN 5-7 DAYS.      Tiffanie Cline CNP      Results for orders placed or performed during the hospital encounter of 09/21/20   Abdomen US, limited (RUQ only)     Status: None    Narrative    PROCEDURES: US ABDOMEN LIMITED    HISTORY: nausea, vomiting, RUQ tenderness, nausea, vomiting, RUQ  tenderness    TECHNIQUE: Grayscale ultrasound of " the upper abdomen was performed.    COMPARISON: none     FINDINGS:    MEASUREMENTS:   Liver length:  18 cm.   Common duct diameter:  0.2 cm.    LIVER: The liver is free of masses or biliary ductal enlargement    GALLBLADDER: Gallstones are seen within the gallbladder    ABDOMINAL AORTA AND IVC: The abdominal aorta is normally tapering. The  inferior vena cava is patent.    PANCREAS: Portions of the head and body of the pancreas were imaged  and the visualized portions appear normal.    Right KIDNEY: The right kidney is normal in size. There is no  hydronephrosis. The cyst seen in the right kidney on a CT scan from  August 2020 was not visualized on this ultrasound exam.        Impression    IMPRESSION:     Cholelithiasis without gallbladder wall thickening or biliary ductal  enlargement.    GEETHA JACOBS MD   Basic metabolic panel     Status: Abnormal   Result Value Ref Range    Sodium 134 133 - 144 mmol/L    Potassium 2.3 (LL) 3.4 - 5.3 mmol/L    Chloride 99 94 - 109 mmol/L    Carbon Dioxide 25 20 - 32 mmol/L    Anion Gap 10 3 - 14 mmol/L    Glucose 106 (H) 70 - 99 mg/dL    Urea Nitrogen 11 7 - 30 mg/dL    Creatinine 0.47 (L) 0.52 - 1.04 mg/dL    GFR Estimate >90 >60 mL/min/[1.73_m2]    GFR Estimate If Black >90 >60 mL/min/[1.73_m2]    Calcium 9.6 8.5 - 10.1 mg/dL   CBC with platelets differential     Status: Abnormal   Result Value Ref Range    WBC 10.3 4.0 - 11.0 10e9/L    RBC Count 5.30 (H) 3.8 - 5.2 10e12/L    Hemoglobin 16.3 (H) 11.7 - 15.7 g/dL    Hematocrit 46.9 35.0 - 47.0 %    MCV 89 78 - 100 fl    MCH 30.8 26.5 - 33.0 pg    MCHC 34.8 31.5 - 36.5 g/dL    RDW 13.3 10.0 - 15.0 %    Platelet Count 353 150 - 450 10e9/L    Diff Method Automated Method     % Neutrophils 75.3 %    % Lymphocytes 16.3 %    % Monocytes 6.1 %    % Eosinophils 1.5 %    % Basophils 0.3 %    % Immature Granulocytes 0.5 %    Nucleated RBCs 0 0 /100    Absolute Neutrophil 7.8 1.6 - 8.3 10e9/L    Absolute Lymphocytes 1.7 0.8 - 5.3  10e9/L    Absolute Monocytes 0.6 0.0 - 1.3 10e9/L    Absolute Eosinophils 0.2 0.0 - 0.7 10e9/L    Absolute Basophils 0.0 0.0 - 0.2 10e9/L    Abs Immature Granulocytes 0.1 0 - 0.4 10e9/L    Absolute Nucleated RBC 0.0    Hepatic panel     Status: Abnormal   Result Value Ref Range    Bilirubin Direct 0.4 (H) 0.0 - 0.2 mg/dL    Bilirubin Total 1.0 0.2 - 1.3 mg/dL    Albumin 3.8 3.4 - 5.0 g/dL    Protein Total 7.4 6.8 - 8.8 g/dL    Alkaline Phosphatase 120 40 - 150 U/L    ALT 42 0 - 50 U/L    AST 64 (H) 0 - 45 U/L   Lactic acid whole blood     Status: None   Result Value Ref Range    Lactic Acid 1.5 0.7 - 2.0 mmol/L   Magnesium     Status: None   Result Value Ref Range    Magnesium 2.2 1.6 - 2.3 mg/dL   Potassium     Status: None   Result Value Ref Range    Potassium 3.6 3.4 - 5.3 mmol/L
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
## 2020-09-21 NOTE — ANESTHESIA POSTPROCEDURE EVALUATION
Patient: Maggie Case    * No procedures listed *    Diagnosis:* No pre-op diagnosis entered *  Diagnosis Additional Information: No value filed.    Anesthesia Type:  No value filed.    Note:  Anesthesia Post Evaluation    Last vitals:  Vitals:    09/21/20 1358 09/21/20 1410   BP: 125/97 (!) 118/104   Pulse: (!) 128 118   Resp: 18    Temp: 99  F (37.2  C)    SpO2: 97%          Electronically Signed By: WOO Amaya CRNA  September 21, 2020  2:57 PM

## 2020-09-21 NOTE — ED NOTES
Pt has had her ultra sound done in room, iv potassium infusing mom at bedside, pt drinking her own sweet tea that she brought in , no nausea or emesis here.

## 2020-09-21 NOTE — ED NOTES
DATE:  9/21/2020   TIME OF RECEIPT FROM LAB:  9766  LAB TEST:  potassium  LAB VALUE:  2.3  RESULTS GIVEN WITH READ-BACK TO (PROVIDER):  Tiffanie Cline CNP  TIME LAB VALUE REPORTED TO PROVIDER:   0444

## 2020-09-21 NOTE — ED NOTES
Patient resting in bed eating and drinking without any problems. Pt mothers is concerned and upset if pateitn is discharged.  Tried to talk with patients mother that we did replaced the potassium with the IV bags.  Pt mother still upset and informed her I will updated the provider. Néstor ruiz

## 2020-09-21 NOTE — ED AVS SNAPSHOT
[facility]  [address]  [place] [address]  Phone:  [phone]                                    Maggie Case   MRN: [id number]    Department:  [facility]   Date of Visit:  9/21/2020           After Visit Summary Signature Page    I have received my discharge instructions, and my questions have been answered. I have discussed any challenges I see with this plan with the nurse or doctor.    ..........................................................................................................................................  Patient/Patient Representative Signature      ..........................................................................................................................................  Patient Representative Print Name and Relationship to Patient    ..................................................               ................................................  Date                                   Time    ..........................................................................................................................................  Reviewed by Signature/Title    ...................................................              ..............................................  Date                                               Time          22EPIC Rev 08/18

## 2020-09-22 NOTE — ED NOTES
Provider has been back in to speak with pt and her mom, pt's mom had pt's dd on speaker phone also. Provider discussed with them what has been going on and that she will speak with hospitalist about pt or try to get a hold of  if there is one on call tonight. Pt has been resting on cart, eating off dinner tray with no nausea, emesis or abd pain.

## 2020-09-22 NOTE — ED NOTES
Went in to talk with patient about options suggested by provider. Informed her that options would be discharged to home, going to shelter in [place], or [facility] in [place].  She calls her Dad and same information is given to him. He states that [facility] would be a good place to start. Informed them I would call and see if bed is available.  Talked with [facility] and they advise that they can do a phone screening and to call them back so they can talk to patient.

## 2020-09-22 NOTE — ED NOTES
Discharge instructions gone over with patient and she states understanding. Patient is then discharged in stable condition, per her wheelchair, with friend.

## 2020-09-22 NOTE — ED NOTES
I talked with Polina and they state they are unable to accept her due to her needing some help with cares. She calls her Father and decision is that she would like to go home instead of shelter. Provider updated.

## 2020-09-24 ENCOUNTER — TELEPHONE (OUTPATIENT)
Dept: BEHAVIORAL HEALTH | Facility: OTHER | Age: 30
End: 2020-09-24

## 2020-09-25 NOTE — TELEPHONE ENCOUNTER
[facility]  @FLOW([id number])@      Social Work Care Navigator Note      Patient: Maggie Case  Date: September 25, 2020  Preferred Name: Maggie    Previous PHQ-9: No flowsheet data found.  Previous DIANNA-7: No flowsheet data found.  SEYMOUR LEVEL:  No flowsheet data found.    Preferred Contact: @ELIEZER([id number])@  Type of Contact Today: Phone call (not reached/unavailable)      Data: (subjective / Objective):  Patient Goals Areas: @FLOW([id number])@  Patient Stated Goals: @FLOW([id number])@  Recent ED/IP Admission or Discharge?   None  Received VM from pt asking for return call. Attempted to reach patient, but was unsuccessful.  Plan to attempt again.  RE Ayon LSW  Social Work Care Coordinator  [facility]  [phone] option 2 or [phone]

## 2020-10-02 ENCOUNTER — TELEPHONE (OUTPATIENT)
Dept: BEHAVIORAL HEALTH | Facility: OTHER | Age: 30
End: 2020-10-02

## 2020-10-06 NOTE — TELEPHONE ENCOUNTER
[facility]  @FLOW([id number])@      Social Work Care Navigator Note      Patient: Maggie Case  Date: October 6, 2020  Preferred Name: Maggie    Previous PHQ-9: No flowsheet data found.  Previous DIANNA-7: No flowsheet data found.  SEYMOUR LEVEL:  No flowsheet data found.    Preferred Contact: @ELIEZER([id number])@  Type of Contact Today: Phone call (not reached/unavailable)      Data: (subjective / Objective):  Patient Goals Areas: @FLOW([id number])@  Patient Stated Goals: @FLOW([id number])@  Recent ED/IP Admission or Discharge?   None    Received call from Jennifer at [facility].  She advised pt was currently in patient at their facility.  Assisted in providing background information for where pt was at with housing and ensured they had contact information for Kellen DE LA GARZA .  They confirmed they did and were looking at starting a petition for guardianship.  They advised they would keep writer updated.    Will remain available.    RE Ayon  Social Work Care Coordinator  [facility]  [phone] option 2 or [phone]

## 2020-10-07 ENCOUNTER — TELEPHONE (OUTPATIENT)
Dept: BEHAVIORAL HEALTH | Facility: OTHER | Age: 30
End: 2020-10-07

## 2020-10-15 NOTE — TELEPHONE ENCOUNTER
[facility]  @FLOW([id number])@      Social Work Care Navigator Note      Patient: Maggie Case  Date: October 15, 2020  Preferred Name: Maggie    Previous PHQ-9: No flowsheet data found.  Previous DIANNA-7: No flowsheet data found.  SEYMOUR LEVEL:  No flowsheet data found.    Preferred Contact: @ELIEZER([id number])@  Type of Contact Today: Phone call (patient / identified key support person reached)      Data: (subjective / Objective):  Patient Goals Areas: @FLOW([id number])@  Patient Stated Goals: @FLOW([id number])@  Recent ED/IP Admission or Discharge?   None  Recent ED/IP Admission or Discharge?   None    Patient Goals:  No data recorded      [facility] Core Service Provided:  Comprehensive Care Management: utilized the electronic medical record / patient registry to identify and support patient's health conditions / needs more effectively   Care Transitions: focused on the coordinated and seamless movement of patient between or within different levels of care or settings  Care Coordination: provided care management services/referrals necessary to ensure patient and their identified supports have access to medical, behavioral health, pharmacology and recovery support services.  Ensured that patient's care is integrated across all settings and services.   Health and Wellness Promotion    Current Stressors / Issues / Care Plan Objective Addressed Today:  hospitalization    Intervention:  Motivational Interviewing: Expressed Empathy/Understanding, Supported Autonomy, Collaboration, Evocation, Permission to raise concern or advise and Open-ended questions   Target Behavior(s): NA    Assessment: (Progress on Goals / Homework):  Reached out to pt to see how things are going. Pt stated she was still hospitalized in [facility]. They have stated they will not release her until a safe discharge plan is found.  They are continuing to work with her CADI  - Kellen Ruiz, to find available housing.    She denied  having any concerns for writer at this time.  Writer will remain available.    Plan: (Homework, other):  Patient was encouraged to continue to seek condition-related information and education.      Scheduled a Phone follow up appointment with HERNAN VILLALOBOS in 1 week     Patient has set self-identified goals and will monitor progress until the next appointment.    RE Ayon, Social Work Care Coordinator     APARNA AyonW, LSW

## 2020-10-31 NOTE — PROGRESS NOTES
Pt appearing to be uncooroprative with neuro exams. Patient somnolent, arouses to voice, vigorous stimulation.  Family in room. States patient is stubborn at baseline. Able to follow commands. Patient refusing to speak. Able to nod yes to questions. Patient stated name, where shew as at, and the year after 25 minutes of repeated encouragement from nursing and family. MD notified.    Normal for race

## 2020-11-04 ENCOUNTER — TELEPHONE (OUTPATIENT)
Dept: BEHAVIORAL HEALTH | Facility: OTHER | Age: 30
End: 2020-11-04

## 2020-11-05 NOTE — TELEPHONE ENCOUNTER
[facility]  @FLOW([id number])@      Social Work Care Navigator Note      Patient: Maggie Case  Date: November 5, 2020  Preferred Name: Maggie    Previous PHQ-9: No flowsheet data found.  Previous DIANNA-7: No flowsheet data found.  SEYMOUR LEVEL:  No flowsheet data found.    Preferred Contact: @ELIEZER([id number])@  Type of Contact Today: Phone call (patient / identified key support person reached)      Data: (subjective / Objective):  Patient Goals Areas: @FLOW([id number])@  Patient Stated Goals: @FLOW([id number])@  Recent ED/IP Admission or Discharge?   None  Recent ED/IP Admission or Discharge?   None    Patient Goals:  No data recorded      [facility] Core Service Provided:  Comprehensive Care Management: utilized the electronic medical record / patient registry to identify and support patient's health conditions / needs more effectively   Care Coordination: provided care management services/referrals necessary to ensure patient and their identified supports have access to medical, behavioral health, pharmacology and recovery support services.  Ensured that patient's care is integrated across all settings and services.   Health and Wellness Promotion  Individual and Family Support: aimed to help clients reduce barriers to achieving goals, increase health literacy and knowledge about chronic condition(s), increase self-efficacy skills, and improve health outcomes    Current Stressors / Issues / Care Plan Objective Addressed Today:  None noted    Intervention:  Motivational Interviewing: Expressed Empathy/Understanding, Supported Autonomy, Collaboration, Evocation, Permission to raise concern or advise and Open-ended questions   Target Behavior(s): NA    Assessment: (Progress on Goals / Homework):  Writer contacted pt to see how things are going.  She stated it has been rough.  She is still in the hospital and has been since September.  She recently got a lung infection while inpatient, but is starting to improve  from this.  They are denying releasing her until they have a safe discharge plan for the pt.  They are having extensive difficulties finding a group home for pt to go to.      Advised pt to continue being in touch with writer.  Will remain available.     Plan: (Homework, other):  Patient was encouraged to continue to seek condition-related information and education.      Scheduled a Phone follow up appointment with HERNAN VILLALOBOS in 4 weeks     Patient has set self-identified goals and will monitor progress until the next appointment.     RE Ayon, Social Work Care Coordinator       RE Ayon  Social Work Care Coordinator  [facility]  [phone] option 2 or [phone]

## 2020-12-15 ENCOUNTER — TELEPHONE (OUTPATIENT)
Dept: BEHAVIORAL HEALTH | Facility: OTHER | Age: 30
End: 2020-12-15

## 2020-12-18 NOTE — TELEPHONE ENCOUNTER
[facility]  @FLOW([id number])@      Social Work Care Navigator Note      Patient: Maggie Case  Date: December 18, 2020  Preferred Name: Maggie    Previous PHQ-9: No flowsheet data found.  Previous DIANNA-7: No flowsheet data found.  SEYMOUR LEVEL:  No flowsheet data found.    Preferred Contact: @ELIEZER([id number])@  Type of Contact Today: Phone call (patient / identified key support person reached)      Data: (subjective / Objective):  Patient Goals Areas: @FLOW([id number])@  Patient Stated Goals: @FLOW([id number])@  Recent ED/IP Admission or Discharge?   None  Recent ED/IP Admission or Discharge?   None    Patient Goals:  No data recorded      [facility] Core Service Provided:  Comprehensive Care Management: utilized the electronic medical record / patient registry to identify and support patient's health conditions / needs more effectively   Care Coordination: provided care management services/referrals necessary to ensure patient and their identified supports have access to medical, behavioral health, pharmacology and recovery support services.  Ensured that patient's care is integrated across all settings and services.   Health and Wellness Promotion    Current Stressors / Issues / Care Plan Objective Addressed Today:  Guardianship, placement    Intervention:  Motivational Interviewing: Expressed Empathy/Understanding, Supported Autonomy, Collaboration, Evocation, Permission to raise concern or advise and Open-ended questions   Target Behavior(s): NA    Assessment: (Progress on Goals / Homework):  Writer reached out to pt to see how things are going.  She stated things have been crazy.  Pt is still in patient at [facility], but has been transferred back to [place] due to malnutrition.  They did successfully establish pt's father as her guardian.  They are looking a few potential placement opportunities in [place], but pt was unsure where these were at.  Advised her to keep writer updated if there was anything she  could do to help.    Will continue to remain available.    Plan: (Homework, other):  Patient was encouraged to continue to seek condition-related information and education.      Scheduled a Phone follow up appointment with HERNAN VILLALOBOS in 1 week     Patient has set self-identified goals and will monitor progress until the next appointment.   RE Ayon, Social Work Care Coordinator       Referrals/other:NA  [unfilled]      RE Ayon  Social Work Care Coordinator  [facility]  [phone] option 2 or [phone]

## 2021-01-01 ENCOUNTER — TELEPHONE (OUTPATIENT)
Dept: BEHAVIORAL HEALTH | Facility: OTHER | Age: 31
End: 2021-01-01

## 2021-01-01 ENCOUNTER — DOCUMENTATION ONLY (OUTPATIENT)
Dept: BEHAVIORAL HEALTH | Facility: OTHER | Age: 31
End: 2021-01-01

## 2021-01-01 ENCOUNTER — VIRTUAL VISIT (OUTPATIENT)
Dept: BEHAVIORAL HEALTH | Facility: OTHER | Age: 31
End: 2021-01-01
Attending: SOCIAL WORKER
Payer: MEDICARE

## 2021-01-01 DIAGNOSIS — F41.0 PANIC DISORDER WITHOUT AGORAPHOBIA: ICD-10-CM

## 2021-01-01 DIAGNOSIS — F33.2 SEVERE RECURRENT MAJOR DEPRESSION WITHOUT PSYCHOTIC FEATURES (H): Primary | ICD-10-CM

## 2021-01-01 PROCEDURE — 90791 PSYCH DIAGNOSTIC EVALUATION: CPT | Mod: 95 | Performed by: SOCIAL WORKER

## 2021-01-25 ENCOUNTER — TELEPHONE (OUTPATIENT)
Dept: BEHAVIORAL HEALTH | Facility: OTHER | Age: 31
End: 2021-01-25

## 2021-01-29 NOTE — TELEPHONE ENCOUNTER
[facility]  @FLOW([id number])@      Social Work Care Navigator Note      Patient: Maggie Case  Date: January 29, 2021  Preferred Name: Maggie    Previous PHQ-9: No flowsheet data found.  Previous DIANNA-7: No flowsheet data found.  SEYMOUR LEVEL:  No flowsheet data found.    Preferred Contact: @ELIEZER([id number])@  Type of Contact Today: Phone call (patient / identified key support person reached)      Data: (subjective / Objective):  Patient Goals Areas: @FLOW([id number])@  Patient Stated Goals: @FLOW([id number])@  Recent ED/IP Admission or Discharge?   None  Recent ED/IP Admission or Discharge?   None    Patient Goals:  No data recorded      [facility] Core Service Provided:  Comprehensive Care Management: utilized the electronic medical record / patient registry to identify and support patient's health conditions / needs more effectively   Care Coordination: provided care management services/referrals necessary to ensure patient and their identified supports have access to medical, behavioral health, pharmacology and recovery support services.  Ensured that patient's care is integrated across all settings and services.   Health and Wellness Promotion    Current Stressors / Issues / Care Plan Objective Addressed Today:  NA    Intervention:  Motivational Interviewing: Expressed Empathy/Understanding, Supported Autonomy, Collaboration, Evocation, Permission to raise concern or advise and Open-ended questions   Target Behavior(s): NA    Assessment: (Progress on Goals / Homework):  Writer reached out to pt to see how things are going.  She stated she is currently at [facility] and is making rapid improvements.  She stated the plan is for her to go to [facility] in [place] until she is able to return home.  Discussed whether pt would like to stay in [facility]/is planning on returning to the area or if she would like to be disenrolled.  She asked writer to keep her in [facility].  Will plan to keep pt in [facility] pending  further decisions regarding pt's long-term plan.    Plan: (Homework, other):  Patient was encouraged to continue to seek condition-related information and education.      Scheduled a Phone follow up appointment with HERNAN VILLALOBOS in 1 week     Patient has set self-identified goals and will monitor progress until the next appointment.   RE Ayon, Social Work Care Coordinator       Referrals/other:NA  [unfilled]      RE Ayon  Social Work Care Coordinator  [facility]  [phone] option 2 or [phone]

## 2021-02-25 ENCOUNTER — TELEPHONE (OUTPATIENT)
Dept: BEHAVIORAL HEALTH | Facility: OTHER | Age: 31
End: 2021-02-25

## 2021-02-25 DIAGNOSIS — R69 DIAGNOSIS DEFERRED: Primary | ICD-10-CM

## 2021-03-06 NOTE — TELEPHONE ENCOUNTER
[facility]  @FLOW([id number])@      Social Work Care Navigator Note      Patient: Maggie Case  Date: February 25, 2021  Preferred Name: Maggie    Previous PHQ-9: No flowsheet data found.  Previous DIANNA-7: No flowsheet data found.  SEYMOUR LEVEL:  No flowsheet data found.    Preferred Contact: @ELIEZER([id number])@  Type of Contact Today: Phone call (patient / identified key support person reached)      Data: (subjective / Objective):  Patient Goals Areas: @FLOW([id number])@  Patient Stated Goals: @FLOW([id number])@  Recent ED/IP Admission or Discharge?   None  Recent ED/IP Admission or Discharge?   None    Patient Goals:  No data recorded      [facility] Core Service Provided:  Comprehensive Care Management: utilized the electronic medical record / patient registry to identify and support patient's health conditions / needs more effectively   Care Coordination: provided care management services/referrals necessary to ensure patient and their identified supports have access to medical, behavioral health, pharmacology and recovery support services.  Ensured that patient's care is integrated across all settings and services.   Health and Wellness Promotion    Current Stressors / Issues / Care Plan Objective Addressed Today:  NA    Intervention:  Motivational Interviewing: Expressed Empathy/Understanding, Supported Autonomy, Collaboration, Evocation, Permission to raise concern or advise and Open-ended questions   Target Behavior(s): NA    Assessment: (Progress on Goals / Homework):  Writer reached out to pt to see how things were going.  Pt advised she was still at [facility].  She was no longer moving to [place] as her parents did not like the facility when toured.  She denied needing any other assistance and will continue to remain available.    Plan: (Homework, other):  Patient was encouraged to continue to seek condition-related information and education.      Scheduled a Phone follow up appointment with HERNAN VILLALOBOS in  4 weeks     Patient has set self-identified goals and will monitor progress until the next appointment.   RE Ayon, Social Work Care Coordinator       Referrals/other:RADHA  [unfilled]      RE Ayon  Social Work Care Coordinator  [facility]  [phone] option 2 or [phone]

## 2021-03-25 ENCOUNTER — TELEPHONE (OUTPATIENT)
Dept: BEHAVIORAL HEALTH | Facility: OTHER | Age: 31
End: 2021-03-25

## 2021-03-30 NOTE — TELEPHONE ENCOUNTER
[facility]  @FLOW([id number])@      Social Work Care Navigator Note      Patient: Maggie Case  Date: March 30, 2021  Preferred Name: Maggie    Previous PHQ-9: No flowsheet data found.  Previous DIANNA-7: No flowsheet data found.  SEYMOUR LEVEL:  No flowsheet data found.    Preferred Contact: @ELIEZER([id number])@  Type of Contact Today: Phone call (patient / identified key support person reached)      Data: (subjective / Objective):  Patient Goals Areas: @FLOW([id number])@  Patient Stated Goals: @FLOW([id number])@  Recent ED/IP Admission or Discharge?   None  Recent ED/IP Admission or Discharge?   None    Patient Goals:  No data recorded      [facility] Core Service Provided:  Comprehensive Care Management: utilized the electronic medical record / patient registry to identify and support patient's health conditions / needs more effectively   Care Coordination: provided care management services/referrals necessary to ensure patient and their identified supports have access to medical, behavioral health, pharmacology and recovery support services.  Ensured that patient's care is integrated across all settings and services.   Health and Wellness Promotion  Individual and Family Support: aimed to help clients reduce barriers to achieving goals, increase health literacy and knowledge about chronic condition(s), increase self-efficacy skills, and improve health outcomes    Current Stressors / Issues / Care Plan Objective Addressed Today:  Living situation    Intervention:  Motivational Interviewing: Expressed Empathy/Understanding, Supported Autonomy, Collaboration, Evocation, Permission to raise concern or advise and Open-ended questions   Target Behavior(s): NA    Assessment: (Progress on Goals / Homework):  Writer contacted pt to see how things are going.  Pt stated she is out of [facility] and is currently at [facility] in [place].  She stated they are actively working at placement in the [facility]  area.     Discussed with pt if she was still interested in [facility] services being in the [facility].  Pt stated she appreciates the check ins and would like to continue with [facility] as she plans on returning to [place].    Will continue to remain available.     Plan: (Homework, other):  Patient was encouraged to continue to seek condition-related information and education.      Scheduled a Phone follow up appointment with HERNAN VILLALOBOS in 4 weeks     Patient has set self-identified goals and will monitor progress until the next appointment.   RE Ayon, Social Work Care Coordinator             Referrals/other:NA  [unfilled]    RE Ayon  Social Work Care Coordinator  [facility]  [phone] option 2 or [phone]

## 2021-04-20 NOTE — TELEPHONE ENCOUNTER
[facility]  @FLOW([id number])@      Social Work Care Navigator Note      Patient: Maggie Case  Date: April 20, 2021  Preferred Name: Maggie    Previous PHQ-9: No flowsheet data found.  Previous DIANNA-7: No flowsheet data found.  SEYMOUR LEVEL:  No flowsheet data found.    Preferred Contact: @ELIEZER([phone])@  Type of Contact Today: Phone call (patient / identified key support person reached)      Data: (subjective / Objective):  Patient Goals Areas: @FLOW([id number])@  Patient Stated Goals: @FLOW([id number])@  Recent ED/IP Admission or Discharge?   None  Recent ED/IP Admission or Discharge?   None    Patient Goals:  No data recorded      [facility] Core Service Provided:  Comprehensive Care Management: utilized the electronic medical record / patient registry to identify and support patient's health conditions / needs more effectively   Care Coordination: provided care management services/referrals necessary to ensure patient and their identified supports have access to medical, behavioral health, pharmacology and recovery support services.  Ensured that patient's care is integrated across all settings and services.   Health and Wellness Promotion    Current Stressors / Issues / Care Plan Objective Addressed Today:  DA needed    Intervention:  Motivational Interviewing: Expressed Empathy/Understanding, Supported Autonomy, Collaboration, Evocation, Permission to raise concern or advise and Open-ended questions   Target Behavior(s): NA    Assessment: (Progress on Goals / Homework):  Writer contacted pt to see how things are going.  Pt stated she is ok, but wanting to move from current facility.  Pt states she is still at [facility], but is working with them to establish placement closer to home.  Advised pt that a diagnostic assessment was needed for continued program eligiblity.  Pt stated this would be fine, but it would need to be a phone appt as she is currently in [place].     Scheduled pt for phone DA with  ALISA Rooney on 5/3 at 9am and will remain available.    Plan: (Homework, other):  Patient was encouraged to continue to seek condition-related information and education.      Scheduled a Phone follow up appointment with HERNAN VILLALOBOS in 1 week     Patient has set self-identified goals and will monitor progress until the next appointment.    RE Ayon, Social Work Care Coordinator       Referrals/other:NA  [unfilled]    RE Ayon  Social Work Care Coordinator  [facility]  [phone] option 2 or [phone]
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
## 2021-05-08 NOTE — TELEPHONE ENCOUNTER
[facility]  @FLOW([id number])@      Social Work Care Navigator Note      Patient: Maggie Case  Date: May 4, 2021  Preferred Name: Maggie    Previous PHQ-9: No flowsheet data found.  Previous DIANNA-7: No flowsheet data found.  SEYMOUR LEVEL:  No flowsheet data found.    Preferred Contact: @ELIEZER([id number])@  Type of Contact Today: Phone call (patient / identified key support person reached)      Data: (subjective / Objective):  Patient Goals Areas: @FLOW([id number])@  Patient Stated Goals: @FLOW([id number])@  Recent ED/IP Admission or Discharge?   None  Recent ED/IP Admission or Discharge?   None    Patient Goals:  No data recorded      [facility] Core Service Provided:  Comprehensive Care Management: utilized the electronic medical record / patient registry to identify and support patient's health conditions / needs more effectively   Care Coordination: provided care management services/referrals necessary to ensure patient and their identified supports have access to medical, behavioral health, pharmacology and recovery support services.  Ensured that patient's care is integrated across all settings and services.   Health and Wellness Promotion  Referral to Community and Social Support Services: Assisted in scheduling an appointment to a referral / service (see plan section)    Current Stressors / Issues / Care Plan Objective Addressed Today:  therapy    Intervention:  Motivational Interviewing: Expressed Empathy/Understanding, Supported Autonomy, Collaboration, Evocation, Permission to raise concern or advise and Open-ended questions   Target Behavior(s): Explored thoughts and readiness to participate in individual therapy    Assessment: (Progress on Goals / Homework):  Writer received message from Mana Rooney that pt mentioned during DA she would like to get established with therapy again.  Reached out to writer and offered either phone sessions with Mana Rooney or assistance getting setup with provider in  [place]. Pt stated she would like to work with ALISA Rooney.  Arranged pt for appt on May 17th at 11am.      Will continue to remain available.    Plan: (Homework, other):  Patient was encouraged to continue to seek condition-related information and education.      Scheduled a Phone follow up appointment with HERNAN VILLALOBOS in 4 weeks     Patient has set self-identified goals and will monitor progress until the next appointment.   RE Ayon, Social Work Care Coordinator       Referrals/other:therapy - Nevin CUELLAR May 17th at 11am  [unfilled]    RE Ayon  Social Work Care Coordinator  [facility] [place]  [phone] option 2 or [phone]

## 2021-05-18 NOTE — CONFIDENTIAL NOTE
"11:00-11:15am    Spoke with client briefly over the phone. Discussed her desire to start individual therapy. She states that she is planning on staying in [place] long term and that her parents are planning on moving to [place] as well. Talked about her needs for therapy and she states that she \"needs help walking\". Therapist provided some psycho- education around therapy and possible mental health goals. Therapist pointed out that client may benefit more from seeing a therapist in person, as opposed to long term phone sessions. Client agreed. Therapist said that  will reach out to her with recommendations for therapists in the area; agreed to meet with client in the interim if she needs immediate support. She agreed to this plan.    ALISA Leslie   "

## 2021-05-31 NOTE — PLAN OF CARE
Pt refusing to get out of bed or reposition in bed at this time. Does not want breakfast or meds. Requesting this RN leave her alone. Pt states she will put on light when she is ready to take her medications.    Subcutaneous lump on the flexural aspect of her left medial forearm, measuring about 2 x 4 cm, onset after her forearm was traumatized by a dog leash got wrapped around her arm on August 9, 2012 days ago.  I believe this lump reflects either a collection of extravasated serum or a hematoma, although I do not see the characteristic black and blue from leaked blood.    Her muscular and neurologic function is entirely intact in her left upper extremity.  She is left-handed.  The lump is not particular tender.  I see no signs of infection.  It is well demarcated and easily movable.  Is probably embedded in the subcutaneous fat, not involving muscles or tendons.    I told her that I expect this lump to resolve on its own over the next couple of weeks.  She should avoid any pressure or trauma to it.  Keeping her arm elevated might help speed up resolution.  If it is tender, she can apply a little bit of ice or cool compresses.  If ibuprofen makes her arm feel better, she can use that.    I asked her to call or come back if the arm becomes more painful, swollen, or if there are signs of infection such as fever, redness.    Due for screening mammogram, order placed.

## 2021-06-15 NOTE — TELEPHONE ENCOUNTER
"[facility]  @FLOW([id number])@      Social Work Care Navigator Note      Patient: Maggie Case  Date: Roxi 15, 2021  Preferred Name: Maggie    Previous PHQ-9: No flowsheet data found.  Previous DIANNA-7: No flowsheet data found.  SEYMOUR LEVEL:  No flowsheet data found.    Preferred Contact: @ELIEZER([id number])@  Type of Contact Today: Phone call (patient / identified key support person reached)      Data: (subjective / Objective):  Patient Goals Areas: @FLOW([id number])@  Patient Stated Goals: @FLOW([id number])@  Recent ED/IP Admission or Discharge?   None  Recent ED/IP Admission or Discharge?   None    Patient Goals:  No data recorded      [facility] Core Service Provided:  Comprehensive Care Management: utilized the electronic medical record / patient registry to identify and support patient's health conditions / needs more effectively   Care Coordination: provided care management services/referrals necessary to ensure patient and their identified supports have access to medical, behavioral health, pharmacology and recovery support services.  Ensured that patient's care is integrated across all settings and services.   Health and Wellness Promotion    Current Stressors / Issues / Care Plan Objective Addressed Today:  New housing    Intervention:  Motivational Interviewing: Expressed Empathy/Understanding, Supported Autonomy, Collaboration, Evocation, Permission to raise concern or advise and Open-ended questions   Target Behavior(s): NA    Assessment: (Progress on Goals / Homework):  Writer contacted pt to see how things were going.  Pt stated she moved to a different [facility] in [place].  Pt stated she does not like it there \"it's unorganized, dirty, and it smells\".  Advised writer would reach out to her BI worker to update regarding pt's desire to move. LVM for Kellen Ruiz, pt's BI waiver , and will remain available.    Also discussed counseling with pt, but she stated she would like to " find new housing first.    Plan: (Homework, other):  Patient was encouraged to continue to seek condition-related information and education.      Scheduled a Phone follow up appointment with HERNAN VILLALOBOS in 2 weeks     Patient has set self-identified goals and will monitor progress until the next appointment.   RE Ayon, Social Work Care Coordinator       Referrals/other:NA  [unfilled]    RE Ayon  Social Work Care Coordinator  [facility]  [phone] option 2 or [phone]

## 2021-07-06 NOTE — TELEPHONE ENCOUNTER
[facility]  @FLOW([id number])@      Community Health Worker Note      Patient: Maggie Case  Date: July 6, 2021  Preferred Name: Maggie    Previous PHQ-9: No flowsheet data found.  Previous DIANNA-7: No flowsheet data found.  SEYMOUR LEVEL:  No flowsheet data found.    Preferred Contact: @ELIEZER([phone])@  Type of Contact Today: Phone call (patient / identified key support person reached)      Data: (subjective / Objective):  Patient Goals Areas: @FLOW([id number])@  Patient Stated Goals: @FLOW([id number])@  Recent ED/IP Admission or Discharge?   None  Recent ED/IP Admission or Discharge?   None    Patient Goals:  No data recorded      [facility] Core Service Provided:  Care Coordination: provided care management services/referrals necessary to ensure patient and their identified supports have access to medical, behavioral health, pharmacology and recovery support services.  Ensured that patient's care is integrated across all settings and services.     Current Stressors / Issues / Care Plan Objective Addressed Today:    Checking in with patient    Intervention:  Motivational Interviewing: Expressed Empathy/Understanding and Open-ended questions   Target Behavior(s): N/A    Assessment: (Progress on Goals / Homework):    CHW connected with patient to check in, patient states that they had a good weekend with family. CHW encouraged patient to reach out if they need anything.    Plan: (Homework, other):  Patient was encouraged to continue to seek condition-related information and education.      Scheduled a Phone follow up appointment with CHW in 3 weeks     Patient has set self-identified goals and will monitor progress until the next appointment.     APARNA PintoW, Community Health Worker           Referrals/other:

## 2021-08-03 NOTE — TELEPHONE ENCOUNTER
[facility]  @FLOW([id number])@      Social Work Care Navigator Note      Patient: Maggie Case  Date: August 3, 2021  Preferred Name: Maggie    Previous PHQ-9: No flowsheet data found.  Previous DIANNA-7: No flowsheet data found.  SEYMOUR LEVEL:  No flowsheet data found.    Preferred Contact: @ELIEZER([id number])@  Type of Contact Today: Phone call (patient / identified key support person reached)      Data: (subjective / Objective):  Patient Goals Areas: @FLOW([id number])@  Patient Stated Goals: @FLOW([id number])@  Recent ED/IP Admission or Discharge?   None  Recent ED/IP Admission or Discharge?   None    Patient Goals:  No data recorded      [facility] Core Service Provided:  Comprehensive Care Management: utilized the electronic medical record / patient registry to identify and support patient's health conditions / needs more effectively   Care Coordination: provided care management services/referrals necessary to ensure patient and their identified supports have access to medical, behavioral health, pharmacology and recovery support services.  Ensured that patient's care is integrated across all settings and services.   Health and Wellness Promotion    Current Stressors / Issues / Care Plan Objective Addressed Today:  housing    Intervention:  Motivational Interviewing: Expressed Empathy/Understanding, Supported Autonomy, Collaboration, Evocation, Permission to raise concern or advise and Open-ended questions   Target Behavior(s): NA    Assessment: (Progress on Goals / Homework):  Writer contacted pt to see how things are going.  Pt stated things were not going well at all.  She wants out of her current [facility] ASAP.  Contacted pt's , Tristan Madden ([phone]) with [facility]  He advised that pt would be unable to move anwhere anytime soon due to staff shortages and limited availability.    Updated pt who confirmed she wanted to to stay in [place].  Will consider disenrolling next month if pt is still looking at  [place] long-term.      Plan: (Homework, other):  Patient was encouraged to continue to seek condition-related information and education.      Scheduled a Phone follow up appointment with HERNAN VILLALOBOS in 2 weeks     Patient has set self-identified goals and will monitor progress until the next appointment.   RE Ayon, Social Work Care Coordinator       Referrals/other:RE Oconnell  Social Work Care Coordinator  [facility]  [phone] option 2 or [phone]

## 2021-08-17 NOTE — ED PROVIDER NOTES
History     Chief Complaint   Patient presents with     Foot Pain     bilateral x12 days. no injury but states she fell down stairs yesterday     HPI  Maggie Case is a 28 year old female with the above hx.  She was being wheeled down stairs when her aides, lost control of the W/C with her feet being plantarflexed forward under the chair causing her pain now 24 hours later right foot worse than left and some mid back discomfort as well.  She claims lower extremity neuropathy that is making her pain worse.  She has CD with opioid abuse hx and is a frequent ED user from her [facility] LTC facility.     Problem List:    Patient Active Problem List    Diagnosis Date Noted     Clostridium difficile colitis 08/15/2018     Priority: Medium     Chemical dependency (H) 07/16/2018     Priority: Medium     Calculus of lower urinary tract 07/15/2018     Priority: Medium     History of CVA (cerebrovascular accident) 07/15/2018     Priority: Medium     Left hemiparesis (H) 07/15/2018     Priority: Medium     Chronic anticoagulation 07/15/2018     Priority: Medium     History of cranioplasty 05/24/2018     Priority: Medium     Acute ischemic stroke (H) 02/17/2018     Priority: Medium     Influenza B 05/01/2017     Priority: Medium     Opacity of lung on imaging study 05/01/2017     Priority: Medium     Community acquired pneumonia 05/01/2017     Priority: Medium     C. difficile colitis 05/01/2017     Priority: Medium     Sepsis (H) 04/28/2017     Priority: Medium     Pyelonephritis 01/05/2017     Priority: Medium     Acute chest pain 05/06/2016     Priority: Medium     Leukocytosis 05/06/2016     Priority: Medium     Lung mass 05/06/2016     Priority: Medium     SOB (shortness of breath) 05/06/2016     Priority: Medium     Acute upper GI bleed 06/18/2013     Priority: Medium     Hypokalemia 06/18/2013     Priority: Medium     Acute cystitis 06/18/2013     Priority: Medium     Anxiety state 03/25/2013     Priority: Medium  Muscle pain 07/30/2009     Priority: Medium     Overview:   IMO Update 10/11       Cervicalgia 06/16/2009     Priority: Medium     Overview:   IMO Update 10/11       Low back pain 06/16/2009     Priority: Medium     Overview:   IMO Update 10/11       Pain in joint, lower leg 05/01/2009     Priority: Medium     Diarrhea 04/17/2008     Priority: Medium     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     Priority: Medium        Past Medical History:    Past Medical History:   Diagnosis Date     Anemia      Anxiety      Chemical dependency (H)      Chronic diarrhea      Lactose intolerance      Myalgia      OCD (obsessive compulsive disorder)      Pulmonary embolism (H) 05/06/16     Stroke (H) 02/2018     Vitamin B12 deficiency        Past Surgical History:    Past Surgical History:   Procedure Laterality Date     CLOSED REDUCTION, PERCUTANEOUS PINNING LOWER EXTREMITY, COMBINED Right 4/6/2016    Procedure: COMBINED CLOSED REDUCTION, PERCUTANEOUS PINNING LOWER EXTREMITY;  Surgeon: Dexter Jones MD;  Location: [facility]     COLONOSCOPY       CRANIECTOMY Right 2/18/2018    Procedure: CRANIECTOMY;  RIGHT HEMICRANIECTOMY;  Surgeon: Emeterio Mesa MD;  Location: [facility] OR     CRANIOPLASTY Right 5/24/2018    Procedure: CRANIOPLASTY;  Right Cranioplasty ;  Surgeon: Emeterio Mesa MD;  Location: [facility] OR     UPPER GI ENDOSCOPY         Family History:    Family History   Problem Relation Age of Onset     Depression Mother      Migraines Maternal Half-Sister        Social History:  Marital Status:  Single [1]  Social History   Substance Use Topics     Smoking status: Former Smoker     Packs/day: 0.25     Years: 7.00     Types: Cigarettes     Quit date: 8/2/2018     Smokeless tobacco: Never Used     Alcohol use No        Medications:      HYDROcodone-acetaminophen (NORCO) 5-325 MG per tablet   Acetaminophen (TYLENOL PO)   Acetaminophen (TYLENOL PO)   cyanocobalamin (VITAMIN  B-12) 1000  "MCG tablet   Dextromethorphan-guaiFENesin  MG/5ML syrup   DULOXETINE HCL PO   ferrous sulfate (IRON) 325 (65 Fe) MG tablet   GABAPENTIN PO   GABAPENTIN PO   loperamide (IMODIUM) 2 MG capsule   nystatin (MYCOSTATIN) 439325 UNIT/ML suspension   Ondansetron HCl (ZOFRAN PO)   prochlorperazine (COMPAZINE) 25 MG Suppository   prochlorperazine (COMPAZINE) 5 MG tablet   TRAZODONE HCL PO   vancomycin (FIRVANQ) 50 MG/ML SOLR   XARELTO 20 MG TABS tablet         Review of Systems   Constitutional: Positive for activity change.   HENT: Negative.    Respiratory: Negative.    Gastrointestinal: Negative.    Musculoskeletal: Positive for arthralgias and back pain.   Neurological: Positive for weakness.     Physical Exam   BP: 127/93  Heart Rate: 92  Temp: 97.9  F (36.6  C)  Resp: 16  SpO2: 97 %    Physical Exam   Constitutional: She is oriented to person, place, and time. She appears well-developed. She appears distressed.   Talkative cognitively intact female with right hemicraniotomy defect but fixated on her foot \"neuralgias\"   HENT:   Head: Normocephalic and atraumatic.   Eyes: Conjunctivae are normal. Pupils are equal, round, and reactive to light.   Neck: Normal range of motion. Neck supple.   Cardiovascular: Normal rate.    Pulmonary/Chest: Effort normal and breath sounds normal. She exhibits tenderness.   Minor midthoracic tenderness   Abdominal: Soft. Bowel sounds are normal. She exhibits no distension. There is no tenderness.   Musculoskeletal: Normal range of motion. She exhibits edema, tenderness and deformity.   Right foot has swelling, ecchymosis and tenderness but with intact pulses, no crepitations.  Left foot is not tender to palpation and passive ROM.    Neurological: She is alert and oriented to person, place, and time.   Left sided spasticity and clonus with near hemiplegic weakness.   Skin: Skin is warm.     ED Course     ED Course     Procedures               Critical Care time:  none           "     Results for orders placed or performed during the hospital encounter of 09/17/18 (from the past 24 hour(s))   XR Foot Port Left 3 Views    Narrative    Exam: XR FOOT PORT LT 3 VW     History:Female, age 28 years, severe plantarflexion of right forefoot;      Comparison:  None    Technique: Three views are submitted.    Findings: Bones mildly osteopenic. No evidence of an acute or healing  fracture. Mild soft tissue swelling of the forefoot. No evidence of  dislocation.           Impression    Impression:  1.  No distinct evidence of acute or subacute bony abnormality.    2.  Generalized osteopenia and mild plantar/forefoot soft tissue  swelling.    ANNE PETERSON MD       Medications - No data to display    Assessments & Plan (with Medical Decision Making)   Maggie had careless aides handling her W/C with her sustaining a right forefoot sprain with no fracture.  Single percocet 5/325 given in the ED along with #5 hydrocodone noted below along with advice for ice and moist heat packs thereafter.    I have reviewed the nursing notes.    I have reviewed the findings, diagnosis, plan and need for follow up with the patient.     New Prescriptions    HYDROCODONE-ACETAMINOPHEN (NORCO) 5-325 MG PER TABLET    Take 1 tablet by mouth nightly as needed for pain       Final diagnoses:   Foot sprain, right, initial encounter   Contusion of right foot, initial encounter       9/17/2018   [facility]     Gabriel Whitley MD  09/17/18 1355

## 2021-08-24 NOTE — TELEPHONE ENCOUNTER
[facility]  @FLOW([id number])@      Social Work Care Navigator Note      Patient: Maggie Case  Date: August 13, 2021  Preferred Name: Maggie    Previous PHQ-9: No flowsheet data found.  Previous DIANNA-7: No flowsheet data found.  SEYMOUR LEVEL:  No flowsheet data found.    Preferred Contact: @ELIEZER([phone])@  Type of Contact Today: Phone call (patient / identified key support person reached)      Data: (subjective / Objective):  Patient Goals Areas: @FLOW([id number])@  Patient Stated Goals: @FLOW([id number])@  Recent ED/IP Admission or Discharge?   None  Recent ED/IP Admission or Discharge?   None    Patient Goals:  No data recorded      [facility] Core Service Provided:  Comprehensive Care Management: utilized the electronic medical record / patient registry to identify and support patient's health conditions / needs more effectively   Care Coordination: provided care management services/referrals necessary to ensure patient and their identified supports have access to medical, behavioral health, pharmacology and recovery support services.  Ensured that patient's care is integrated across all settings and services.   Health and Wellness Promotion    Current Stressors / Issues / Care Plan Objective Addressed Today:  NA    Intervention:  Motivational Interviewing: Expressed Empathy/Understanding, Supported Autonomy, Collaboration, Evocation, Permission to raise concern or advise and Open-ended questions   Target Behavior(s): NA    Assessment: (Progress on Goals / Homework):  Writer contacted pt to see how things are going. Pt stated she is really struggling and needs to move ASAP from her current [facility].  Advised writer had spoke to [facility], but unfortunately no places are available due to pt's needs and staffing shortages.  Advised writer will update pt if any openings are available, pt wants to stay in [place] if possible and thanked writer for the assistance.      Updated pt that we will need to disenroll her  due to staying in [place] and not having providers at [place]; was attempting to get pt established with providers in [place], but pt has since declined due to current living situation.  Will await response.    Plan: (Homework, other):  Patient was encouraged to continue to seek condition-related information and education.      Scheduled a Phone follow up appointment with HERANN VILLALOBOS in 2 weeks     Patient has set self-identified goals and will monitor progress until the next appointment.   RE Ayon, Social Work Care Coordinator       Referrals/other:NA  [unfilled]    RE Ayon  Social Work Care Coordinator  [facility]  [phone] option 2 or [phone]

## 2021-10-01 NOTE — TELEPHONE ENCOUNTER
[facility]  @FLOW([id number])@      Social Work Care Navigator Note      Patient: Maggie Case  Date: September 30, 2021  Preferred Name: Maggie    Previous PHQ-9: No flowsheet data found.  Previous DIANNA-7: No flowsheet data found.  SEYMOUR LEVEL:  No flowsheet data found.    Preferred Contact: @ELIEZER([id number]@  Type of Contact Today: Phone call (patient / identified key support person reached)      Data: (subjective / Objective):  Patient Goals Areas: @FLOW([id number])@  Patient Stated Goals: @FLOW([id number])@  Recent ED/IP Admission or Discharge?   None  Recent ED/IP Admission or Discharge?   None    Patient Goals:  No data recorded      [facility] Core Service Provided:  Comprehensive Care Management: utilized the electronic medical record / patient registry to identify and support patient's health conditions / needs more effectively   Care Coordination: provided care management services/referrals necessary to ensure patient and their identified supports have access to medical, behavioral health, pharmacology and recovery support services.  Ensured that patient's care is integrated across all settings and services.   Health and Wellness Promotion    Current Stressors / Issues / Care Plan Objective Addressed Today:  NA    Intervention:  Motivational Interviewing: Expressed Empathy/Understanding, Supported Autonomy, Collaboration, Evocation, Permission to raise concern or advise and Open-ended questions   Target Behavior(s): NA    Assessment: (Progress on Goals / Homework):  Contacted pt to see how things are going.  Pt was upset as she is still at the [facility] which she does not like.  Discussed again how pt's [facility] is working to get her moved, but unfortunately finding places right now is very difficult due to staffing.  Discussed pt's long-term plans and she advised she was still planning on staying in [place]. Discussed eligibility for [facility] and how we would need to disenroll if pt stayed in [place].  While  disappointed, pt expressed understanding.  Offered to assist with referrals to other MH services in [place], but pt denied as her main focus is the move currently.  Will plan to disenroll in October.    Plan: (Homework, other):  Patient was encouraged to continue to seek condition-related information and education.      Will disenroll in October.     Patient has set self-identified goals and will monitor progress until the next appointment.     RE Ayon, Social Work Care Coordinator     Referrals/other:NA  [unfilled]    RE Ayon  Social Work Care Coordinator  [facility]  [phone] option 2 or [phone]

## 2021-10-14 NOTE — PROGRESS NOTES
Disenrolling pt today's date due to moving out of the area and no longer having [facility] provider.    Mayra Randall [facility]  Social Work Care Coordinator  [facility] [place]  [phone] option 2 or [phone]

## 2022-01-20 NOTE — PLAN OF CARE
"Face to face shift report received from RITESH Ivan.       Problem: Adult Behavioral Health Plan of Care  Goal: Patient-Specific Goal (Individualization)  Description: Pt will attend 50 % of unit programming.   Pt will be compliant with medications  Pt will sleep 6-8 hours per night   Pt will eat at least 50% of meals.  Okay to have shoe laces on tennis shoes as long as 1:1 is ordered per NP.  Pt may use commode.      7/13/2020 0933 by Yadira Prado RN  Outcome: Improving  Note: Remains on 1:1 continuous observation for fall risk. Requested and received Hydroxyzine 100 mg for anxiety and 0904.  1156: Requested and received Benzocaine Gel for mouth pain.  1230: requested and received Tylenol 975mg for 9/10 mouth pain and headache. Also received Magic Mouthwash at this time for mouth pain. Pt also requested and received Ativan 0.5mg for 10/10 anxiety d/t \"bad memories\" but would not share any more than that.          Problem: Fall Injury Risk  Goal: Absence of Fall and Fall-Related Injury  Description: Pt will remain free from falls.  Pt on 1:1 r/t fall risk.   Pt will use wheelchair and transfer belt during stay.   Pt will wear appropriate footwear.  Okay to have shoe laces on tennis shoes as long as 1:1 is ordered per NP.  7/13/2020 0933 by Yadira Prado RN  Outcome: Improving  Remains on 1:1 continuous observation for fall risk.      Problem: Suicide Risk  Goal: Absence of Self-Harm  Description: Will remain without harm while in hospital.  7/13/2020 0933 by Yadira Prado RN  Outcome: Improving  Denies suicidal ideation. Free from self harm or injury.     Face to face end of shift report to be communicated to oncoming RN.     " [Joint Pain] : joint pain [Negative] : Heme/Lymph

## 2022-02-08 NOTE — PLAN OF CARE
Patient has visitor at this time and has no c/o pain or discomfort at this time. She has ambulated the unit without incident or issue with her visitor. Will continue to monitor.    Yes

## 2022-02-11 NOTE — IP AVS SNAPSHOT
MRN:[id number]                      After Visit Summary   8/13/2018    Maggie Case    MRN: [id number]           Thank you!     Thank you for choosing [facility] for your care. Our goal is always to provide you with excellent care. Hearing back from our patients is one way we can continue to improve our services. Please take a few minutes to complete the written survey that you may receive in the mail after you visit with us. Thank you!        Patient Information     Date Of Birth          1990        About your hospital stay     You were admitted on:  August 13, 2018 You last received care in the:  [facility]    You were discharged on:  August 17, 2018        Reason for your hospital stay       Thist is a 28 year old woman with a history significant for ASD, DVT/PE and paradoxical, hemorrhagic CVA which required craniotomy.  She presented to ED with vomiting, fatigue, and chest pain, with emesis for the 3 days and has not been able to keep anything down.  She denies any fevers or chills.  Evaluation showed hypokalemia and positive toxin for C. difficile (recent treatment for C. difficile colitis July/2018).  She has been treated with rivarobaxan but did not take this for the last several days before admission because of her symptoms.  She responded to usual treatment for hypokalemia and resuming treatment for possible C. difficile infection with oral vancomycin.  With persistent encouragement she has been able to take adequate oral fluid over the past several days.  Nausea is not been an issue over the past day or so but did require treatment including with Compazine during this admission.                  Who to Call     For medical emergencies, please call [phone].  For non-urgent questions about your medical care, please call your primary care provider or clinic, [phone]          Attending Provider     Provider Specialty    Lucy Rebolledo PA-C Emergency Medicine    Frank,  Gurvinder KELSEY MD Internal Medicine    Emeterio Barba MD Internal Medicine       Primary Care Provider Office Phone # Fax #    Chapo SOSA MD Mark [phone]      After Care Instructions     Activity - Up with nursing assistance           Additional Discharge Instructions       Encourage oral fluids, at least 500 mL per 8 hour shift during the day  Enteric precautions per institution policy for C difficile infection            Advance Diet as Tolerated       Follow this diet upon discharge: Orders Placed This Encounter      Regular Diet Adult  Ensure clear or other nutritional supplement            Fall precautions           General info for SNF       Length of Stay Estimate: Long Term Care  Condition at Discharge: Improving  Level of care:skilled   Rehabilitation Potential: Fair  Admission H&P remains valid and up-to-date: Yes  Recent Chemotherapy: N/A  Use Nursing Home Standing Orders: Yes            Intake and output       Every shift            Mantoux instructions       Give two-step Mantoux (PPD) Per Facility Policy Yes                  Follow-up Appointments     Follow Up and recommended labs and tests       Follow up with Nursing home physician or primary care provider in 4-7 days. BMP in 1 week to evaluate potassium and renal function                  Additional Services     Nutrition Services Adult IP Consult       Reason:  Difficulty tolerating oral food and fluid                  Pending Results     No orders found from 8/11/2018 to 8/14/2018.            Statement of Approval     Ordered          08/17/18 1315  I have reviewed and agree with all the recommendations and orders detailed in this document.  EFFECTIVE NOW     Approved and electronically signed by:  Emeterio Barba MD             Admission Information     Date & Time Provider Department Dept. Phone    8/13/2018 Emeterio Barba MD [facility] [phone]      Your Vitals Were     Blood Pressure Pulse Temperature  "Respirations Height Weight    115/87 (BP Location: Right arm) 88 97.6  F (36.4  C) (Temporal) 16 1.6 m (5' 3\") 54.2 kg (119 lb 7.8 oz)    Pulse Oximetry BMI (Body Mass Index)                98% 21.17 kg/m2          [employer] Information     [employer] lets you send messages to your doctor, view your test results, renew your prescriptions, schedule appointments and more. To sign up, go to [email or web address] . Click on \"Log in\" on the left side of the screen, which will take you to the Welcome page. Then click on \"Sign up Now\" on the right side of the page.     You will be asked to enter the access code listed below, as well as some personal information. Please follow the directions to create your username and password.     Your access code is: [email or web address]  Expires: 2018  7:51 PM     Your access code will  in 90 days. If you need help or a new code, please call your [place] clinic or [phone].        Care EveryWhere ID     This is your Care EveryWhere ID. This could be used by other organizations to access your [place] medical records  [id number]        Equal Access to Services     MYLES FLAHERTY AH: Griselda abramso Sopia, waaxda luqadaha, qaybta kaalmada adeegyada, justin astorga. So [facility] [phone].    ATENCIÓN: Si habla español, tiene a montalvo disposición servicios gratuitos de asistencia lingüística. Llame al [phone].    We comply with applicable federal civil rights laws and [place] laws. We do not discriminate on the basis of race, color, national origin, age, disability, sex, sexual orientation, or gender identity.               Review of your medicines      START taking        Dose / Directions    prochlorperazine 25 MG Suppository   Commonly known as:  COMPAZINE        Dose:  25 mg   Place 1 suppository (25 mg) rectally every 12 hours as needed for nausea or vomiting   Quantity:  60 suppository   Refills:  0       prochlorperazine 5 MG tablet   Commonly " known as:  COMPAZINE        Dose:  5-10 mg   Take 1-2 tablets (5-10 mg) by mouth every 6 hours as needed for nausea or vomiting   Quantity:  90 tablet   Refills:  0       vancomycin 50 MG/ML Solr   Commonly known as:  FIRVANQ   Indication:  cdiff colitis        125 mg PO QID x 7 days, then 125 mg PO BID x 7 days, then 125 mg PO daily x 7 days, then 125 mg PO every other day x 28 days.   Quantity:  140 mL   Refills:  0         CONTINUE these medicines which have NOT CHANGED        Dose / Directions    cyanocobalamin 1000 MCG tablet   Commonly known as:  vitamin  B-12        Dose:  1000 mcg   Take 1,000 mcg by mouth daily   Refills:  0       Dextromethorphan-guaiFENesin  MG/5ML syrup        Dose:  5 mL   Take 5 mLs by mouth every 4 hours as needed for cough   Refills:  0       DULOXETINE HCL PO        Dose:  60 mg   Take 60 mg by mouth daily   Refills:  0       ferrous sulfate 325 (65 Fe) MG tablet   Commonly known as:  IRON        Take by mouth daily (with breakfast)   Refills:  0       * GABAPENTIN PO        Dose:  600 mg   Take 600 mg by mouth At Bedtime   Refills:  0       * GABAPENTIN PO        Dose:  300 mg   Take 300 mg by mouth 2 times daily 300mg AM and Noon   Refills:  0       loperamide 2 MG capsule   Commonly known as:  IMODIUM        Dose:  2 mg   Take 2 mg by mouth 4 times daily as needed for diarrhea   Refills:  0       nystatin 208552 UNIT/ML suspension   Commonly known as:  MYCOSTATIN        Dose:  465288 Units   Take 5 mLs (500,000 Units) by mouth 4 times daily   Quantity:  280 mL   Refills:  0       TRAZODONE HCL PO        Dose:  100 mg   Take 100 mg by mouth At Bedtime   Refills:  0       * TYLENOL PO        Dose:  650 mg   Take 650 mg by mouth every 4 hours as needed for mild pain or fever   Refills:  0       * TYLENOL PO        Dose:  650 mg   Take 650 mg by mouth 4 times daily   Refills:  0       XARELTO 20 MG Tabs tablet   Generic drug:  rivaroxaban ANTICOAGULANT        Dose:  20 mg    Take 20 mg by mouth every morning   Refills:  11       ZOFRAN PO        Dose:  4 mg   Take 4 mg by mouth every 8 hours as needed for nausea or vomiting   Refills:  0       * Notice:  This list has 4 medication(s) that are the same as other medications prescribed for you. Read the directions carefully, and ask your doctor or other care provider to review them with you.         Where to get your medicines      Some of these will need a paper prescription and others can be bought over the counter. Ask your nurse if you have questions.     You don't need a prescription for these medications     prochlorperazine 25 MG Suppository    prochlorperazine 5 MG tablet    vancomycin 50 MG/ML Solr                Protect others around you: Learn how to safely use, store and throw away your medicines at www.disposemymeds.org.        ANTIBIOTIC INSTRUCTION     You've Been Prescribed an Antibiotic - Now What?  Your healthcare team thinks that you or your loved one might have an infection. Some infections can be treated with antibiotics, which are powerful, life-saving drugs. Like all medications, antibiotics have side effects and should only be used when necessary. There are some important things you should know about your antibiotic treatment.      Your healthcare team may run tests before you start taking an antibiotic.    Your team may take samples (e.g., from your blood, urine or other areas) to run tests to look for bacteria. These test can be important to determine if you need an antibiotic at all and, if you do, which antibiotic will work best.      Within a few days, your healthcare team might change or even stop your antibiotic.    Your team may start you on an antibiotic while they are working to find out what is making you sick.    Your team might change your antibiotic because test results show that a different antibiotic would be better to treat your infection.    In some cases, once your team has more information,  they learn that you do not need an antibiotic at all. They may find out that you don't have an infection, or that the antibiotic you're taking won't work against your infection. For example, an infection caused by a virus can't be treated with antibiotics. Staying on an antibiotic when you don't need it is more likely to be harmful than helpful.      You may experience side effects from your antibiotic.    Like all medications, antibiotics have side effects. Some of these can be serious.    Let you healthcare team know if you have any known allergies when you are admitted to the hospital.    One significant side effect of nearly all antibiotics is the risk of severe and sometimes deadly diarrhea caused by Clostridium difficile (C. Difficile). This occurs when a person takes antibiotics because some good germs are destroyed. Antibiotic use allows C. diificile to take over, putting patients at high risk for this serious infection.    As a patient or caregiver, it is important to understand your or your loved one's antibiotic treatment. It is especially important for caregivers to speak up when patients can't speak for themselves. Here are some important questions to ask your healthcare team.    What infection is this antibiotic treating and how do you know I have that infection?    What side effects might occur from this antibiotic?    How long will I need to take this antibiotic?    Is it safe to take this antibiotic with other medications or supplements (e.g., vitamins) that I am taking?     Are there any special directions I need to know about taking this antibiotic? For example, should I take it with food?    How will I be monitored to know whether my infection is responding to the antibiotic?    What tests may help to make sure the right antibiotic is prescribed for me?      Information provided by:  www.cdc.gov/getsmart  U.S. Department of Health and Human Services  Centers for disease Control and  Prevention  National Center for Emerging and Zoonotic Infectious Diseases  Division of Healthcare Quality Promotion             Medication List: This is a list of all your medications and when to take them. Check marks below indicate your daily home schedule. Keep this list as a reference.      Medications           Morning Afternoon Evening Bedtime As Needed    cyanocobalamin 1000 MCG tablet   Commonly known as:  vitamin  B-12   Take 1,000 mcg by mouth daily                                Dextromethorphan-guaiFENesin  MG/5ML syrup   Take 5 mLs by mouth every 4 hours as needed for cough                                DULOXETINE HCL PO   Take 60 mg by mouth daily   Last time this was given:  60 mg on 8/17/2018  8:21 AM                                ferrous sulfate 325 (65 Fe) MG tablet   Commonly known as:  IRON   Take by mouth daily (with breakfast)                                * GABAPENTIN PO   Take 600 mg by mouth At Bedtime   Last time this was given:  300 mg on 8/17/2018  2:20 PM                                * GABAPENTIN PO   Take 300 mg by mouth 2 times daily 300mg AM and Noon   Last time this was given:  300 mg on 8/17/2018  2:20 PM                                loperamide 2 MG capsule   Commonly known as:  IMODIUM   Take 2 mg by mouth 4 times daily as needed for diarrhea                                nystatin 347815 UNIT/ML suspension   Commonly known as:  MYCOSTATIN   Take 5 mLs (500,000 Units) by mouth 4 times daily                                prochlorperazine 25 MG Suppository   Commonly known as:  COMPAZINE   Place 1 suppository (25 mg) rectally every 12 hours as needed for nausea or vomiting                                prochlorperazine 5 MG tablet   Commonly known as:  COMPAZINE   Take 1-2 tablets (5-10 mg) by mouth every 6 hours as needed for nausea or vomiting   Last time this was given:  10 mg on 8/16/2018  9:19 PM                                TRAZODONE HCL PO   Take 100 mg by  mouth At Bedtime   Last time this was given:  100 mg on 8/16/2018  9:19 PM                                * TYLENOL PO   Take 650 mg by mouth every 4 hours as needed for mild pain or fever   Last time this was given:  650 mg on 8/16/2018  6:05 PM                                * TYLENOL PO   Take 650 mg by mouth 4 times daily   Last time this was given:  650 mg on 8/16/2018  6:05 PM                                vancomycin 50 MG/ML Solr   Commonly known as:  FIRVANQ   125 mg PO QID x 7 days, then 125 mg PO BID x 7 days, then 125 mg PO daily x 7 days, then 125 mg PO every other day x 28 days.   Last time this was given:  125 mg on 8/17/2018  2:20 PM                                XARELTO 20 MG Tabs tablet   Take 20 mg by mouth every morning   Last time this was given:  20 mg on 8/17/2018  8:21 AM   Generic drug:  rivaroxaban ANTICOAGULANT                                ZOFRAN PO   Take 4 mg by mouth every 8 hours as needed for nausea or vomiting                                * Notice:  This list has 4 medication(s) that are the same as other medications prescribed for you. Read the directions carefully, and ask your doctor or other care provider to review them with you.              More Information        Discharge Instructions for Hypokalemia  You have been diagnosed with hypokalemia. This means you have a low level of potassium in your blood. Potassium helps your nerve and muscle cells work as they should. These cells include the cells in your heart. A low level of potassium in the blood can cause serious problems, such as abnormal heart rhythms and even heart attack.  Diet changes  Eat more potassium-rich foods:    Bananas    Oranges and orange juice    Tomatoes, tomato sauce, and tomato juice    Leafy green vegetables, such as spinach, kale, salad greens, collards, and chard    Melons (all kinds)    Pomegranates    Peas    Beans    Potatoes    Sweet potatoes    Avocados, including guacamole    Vegetable  juices, such as V8    Fruit juices    All nuts and seeds    Fish, including tuna, halibut, salmon, cod, snapper, manisha, swordfish, and perch    Milk, including fat-free, low-fat, whole, chocolate, and buttermilk    Soy milk  Other home care    Take a potassium supplement as directed by your healthcare provider.    After strenuous exercise or any activity that causes you to sweat a lot, grab a beverage high in potassium. This includes chocolate milk, coconut water, orange juice, or low-sodium vegetable juices.    Be sure to eat foods or drink fluids that contain potassium if you are having diarrhea or vomiting.    Have your potassium levels checked regularly.    Take all medicines exactly as directed.    Tell your healthcare provider about all prescription and over-the-counter medicines you are taking. This includes herbal products.    Avoid foods that are high in salt. Avoid canned and prepared foods that are high in salt.  Follow-up    Make a follow-up appointment as directed by our staff.    Keep all follow-up appointments. Your healthcare provider needs to monitor your condition closely.     When to call your healthcare provider  Call your provider right away or go to the emergency room if you have any of the following:    Vomiting    Fatigue    Diarrhea    Rapid, irregular heartbeat    Shortness of breath    Chest pain    Muscle cramps, spasms, or twitching    Weakness    Paralysis   Date Last Reviewed: 6/1/2017 2000-2017 [employer]. [address] [place] [address]. All rights reserved. This information is not intended as a substitute for professional medical care. Always follow your healthcare professional's instructions.                Hypokalemia  Hypokalemia means a low level of potassium in the blood. This most often occurs in people who take water pills (diuretics). It can also occur because of severe vomiting or diarrhea. You may also have it if you take laxatives for long  English periods of time. It sometimes happens if you have low magnesium (hypomagnesemia). If you have this, your healthcare provider will treat the low magnesium first.  A mild case of hypokalemia usually causes no symptoms. It is only found with blood testing. More severe potassium loss causes overall weakness, muscle or abdominal cramps, rapid or irregular heartbeats (heart palpitations), low blood pressure, and muscle weakness.   Home care    Take any potassium supplements as prescribed.    Eat foods rich in potassium. The highest amount is found in avocado, baked potatoes, spinach, cantaloupe, cod, halibut, salmon, and scallops. White, red, or rubio beans are also very good sources. A modest amount of potassium is found in orange juice, bananas, carrots, and tomato juice.    If you take certain types of diuretics, you will also need to take potassium supplements. If you take a diuretic, discuss potassium supplements with your doctor.  Follow-up care  Follow up with your healthcare provider for a repeat blood test within the next week, or as advised by our staff.  When to seek medical advice  Call your healthcare provider right away if any of the following occur:    Increased weakness, fatigue, or muscle cramps    Dizziness  Call 911  Call 911 if any of the following occur:    Irregular heartbeat, extra beats, or very fast heart rate    Loss of consciousness  Date Last Reviewed: 7/1/2017 2000-2017 [employer]. [address] [place] [address]. All rights reserved. This information is not intended as a substitute for professional medical care. Always follow your healthcare professional's instructions.                What Is C. diff?  C. diff is an infection caused by Clostridium difficile (C. diff) bacteria. These are germs that live in the part of your belly called your colon, or large intestine. They don't usually cause problems, but if the normal balance of good and bad bacteria in your colon  changes, C. diff bacteria can grow out of control and lead to infection. This can harm your colon and cause diarrhea and belly pain.  What are the symptoms of C. diff?  Some people with C. diff have no symptoms, but they can still pass the infection to others. Symptoms can include:    Watery diarrhea    Fever    Belly pain and cramping    Nausea and vomiting    Loss of appetite and weight loss   Who is most likely to get C. diff?  Anyone can get C. diff. But you are more likely to get the infection if you:    Are ages 65 and older    Are taking antibiotics    Have a weak immune system because of other health problems    Have inflammatory bowel disease    Have had C. diff before    Have had gastrointestinal (GI) surgery    Work or are a patient in a hospital, clinic, or nursing home  After treatment, C. diff can come back in about 1 in 4 people. If C. diff does come back, you are at higher risk for infection again in the future.   How can I lower my chance of getting C. diff again?  Take antibiotics only when you really need them. Antibiotics don't help treat illnesses caused by viruses, such as colds and the flu. Don't ask for antibiotics from your doctor if he or she says they won't work.  When you are given antibiotics, take them exactly as your doctor tells you to. Don't take more or less than the amount prescribed (the dosage). Also don't take them for a shorter or longer time than your doctor tells you to, even if you feel better.  How can I stop the spread of C. diff?  C. diff can easily spread to other people in your home or workplace. The germs can remain on your hands after using the bathroom, then spread to any person, surface, or object you touch. Here's how to not spread C. diff to other people:    Practice good handwashing. This is especially important after using the bathroom and before eating. Here's what to do: Wet your hands, scrub them with soap for 30 to 40 seconds, then rinse well and  dry.    Wash your clothes, bed sheets, and towels in separate loads. Use hot water. Use both detergent and chlorine bleach.     Use chlorine bleach-based products to disinfect surfaces you touch often, such as table tops, light switches, door knobs, and toilet seats.    Remind others to wear gloves and to wash their hands if assisting you in the bathroom.  Don't use alcohol-based hand . They don't work against C. diff.   Date Last Reviewed: 8/1/2017 2000-2017 [employer]. [address] [place] [address]. All rights reserved. This information is not intended as a substitute for professional medical care. Always follow your healthcare professional's instructions.                Prochlorperazine tablets  Brand Name: Compazine  What is this medicine?  PROCHLORPERAZINE (proe klor PER a zeen) helps to control severe nausea and vomiting. This medicine is also used to treat schizophrenia. It can also help patients who experience anxiety that is not due to psychological illness.  How should I use this medicine?  Take this medicine by mouth with a glass of water. Follow the directions on the prescription label. Take your doses at regular intervals. Do not take your medicine more often than directed. Do not stop taking this medicine suddenly. This can cause nausea, vomiting, and dizziness. Ask your doctor or health care professional for advice.  Talk to your pediatrician regarding the use of this medicine in children. Special care may be needed. While this drug may be prescribed for children as young as 2 years for selected conditions, precautions do apply.  What side effects may I notice from receiving this medicine?  Side effects that you should report to your doctor or health care professional as soon as possible:    blurred vision    breast enlargement in men or women    breast milk in women who are not breast-feeding    chest pain, fast or irregular heartbeat    confusion,  restlessness    dark yellow or brown urine    difficulty breathing or swallowing    dizziness or fainting spells    drooling, shaking, movement difficulty (shuffling walk) or rigidity    fever, chills, sore throat    involuntary or uncontrollable movements of the eyes, mouth, head, arms, and legs    seizures    stomach area pain    unusually weak or tired    unusual bleeding or bruising    yellowing of skin or eyes  Side effects that usually do not require medical attention (report to your doctor or health care professional if they continue or are bothersome):    difficulty passing urine    difficulty sleeping    headache    sexual dysfunction    skin rash, or itching  What may interact with this medicine?  Do not take this medicine with any of the following medications:    amoxapine    antidepressants like citalopram, escitalopram, fluoxetine, paroxetine, and sertraline    deferoxamine    dofetilide    maprotiline    tricyclic antidepressants like amitriptyline, clomipramine, imipramine, nortiptyline and others  This medicine may also interact with the following medications:    lithium    medicines for pain    phenytoin    propranolol    warfarin  What if I miss a dose?  If you miss a dose, take it as soon as you can. If it is almost time for your next dose, take only that dose. Do not take double or extra doses.  Where should I keep my medicine?  Keep out of the reach of children.  Store at room temperature between 15 and 30 degrees C (59 and 86 degrees F). Protect from light. Throw away any unused medicine after the expiration date.  What should I tell my health care provider before I take this medicine?  They need to know if you have any of these conditions:    blood disorders or disease    dementia    liver disease or jaundice    Parkinson's disease    uncontrollable movement disorder    an unusual or allergic reaction to prochlorperazine, other medicines, foods, dyes, or preservatives    pregnant or trying to  get pregnant    breast-feeding  What should I watch for while using this medicine?  Visit your doctor or health care professional for regular checks on your progress.  You may get drowsy or dizzy. Do not drive, use machinery, or do anything that needs mental alertness until you know how this medicine affects you. Do not stand or sit up quickly, especially if you are an older patient. This reduces the risk of dizzy or fainting spells. Alcohol may interfere with the effect of this medicine. Avoid alcoholic drinks.  This medicine can reduce the response of your body to heat or cold. Dress warm in cold weather and stay hydrated in hot weather. If possible, avoid extreme temperatures like saunas, hot tubs, very hot or cold showers, or activities that can cause dehydration such as vigorous exercise.  This medicine can make you more sensitive to the sun. Keep out of the sun. If you cannot avoid being in the sun, wear protective clothing and use sunscreen. Do not use sun lamps or tanning beds/booths.  Your mouth may get dry. Chewing sugarless gum or sucking hard candy, and drinking plenty of water may help. Contact your doctor if the problem does not go away or is severe.  NOTE:This sheet is a summary. It may not cover all possible information. If you have questions about this medicine, talk to your doctor, pharmacist, or health care provider. Copyright  2018 [employer]                Prochlorperazine suppositories  Brand Names: Compazine, Compro  What is this medicine?  PROCHLORPERAZINE (proe klor PER a gildaen) helps to control severe nausea and vomiting.  How should I use this medicine?  This medicine is for rectal use only. Do not take by mouth. Wash your hands before and after use. Take off the foil wrapping. Wet the tip of the suppository with cold tap water to make it easier to use. Lie on your side with your lower leg straightened out and your upper leg bent forward toward your stomach. Lift upper buttock to expose the  rectal area. Apply gentle pressure to insert the suppository completely into the rectum, pointed end first. Hold buttocks together for a few seconds. Remain lying down for about 15 minutes to avoid having the suppository come out. Do not use more often than directed.  Talk to your pediatrician regarding the use of this medicine in children. Special care may be needed. While this medicine may be prescribed for children as young as 2 years for selected conditions, precautions do apply.  What side effects may I notice from receiving this medicine?  Side effects that you should report to your doctor or health care professional as soon as possible:    blurred vision    breast enlargement in men or women    breast milk in women who are not breast-feeding    chest pain, fast or irregular heartbeat    confusion, restlessness    dark yellow or brown urine    difficulty breathing or swallowing    dizziness or fainting spells    drooling, shaking, movement difficulty (shuffling walk) or rigidity    fever, chills, sore throat    involuntary or uncontrollable movements of the eyes, mouth, head, arms, and legs    seizures    stomach area pain    unusually weak or tired    unusual bleeding or bruising    yellowing of skin or eyes  Side effects that usually do not require medical attention (report to your doctor or health care professional if they continue or are bothersome):    difficulty passing urine    difficulty sleeping    headache    sexual dysfunction    skin rash, or itching  What may interact with this medicine?  Do not take this medicine with any of the following medications:    amoxapine    antidepressants like citalopram, escitalopram, fluoxetine,paroxetine, and sertraline    deferoxamine    dofetilide    maprotiline    tricyclic antidepressants like amitriptyline, clomipramine, imipramine, nortiptyline and others  This medicine may also interact with the following medications:    lithium    medicines for  pain    phenytoin    propranolol    warfarin  What if I miss a dose?  If you miss a dose, use it as soon as you can. If it is almost time for your next dose, use only that dose. Do not use double or extra doses.  Where should I keep my medicine?  Keep out of the reach of children.  Store at room temperature between 15 and 30 degrees C (59 and 86 degrees F). Throw away any unused medicine after the expiration date.  What should I tell my health care provider before I take this medicine?  They need to know if you have any of these conditions:    blood disorders or disease    dementia    liver disease or jaundice    Parkinson's disease    uncontrollable movement disorder    an unusual or allergic reaction to prochlorperazine, other medicines, foods, dyes, or preservatives    pregnant or trying to get pregnant    breast-feeding  What should I watch for while using this medicine?  Visit your doctor or health care professional for regular checks on your progress.  You may get drowsy or dizzy. Do not drive, use machinery, or do anything that needs mental alertness until you know how this medicine affects you. Do not stand or sit up quickly, especially if you are an older patient. This reduces the risk of dizzy or fainting spells. Alcohol may interfere with the effect of this medicine. Avoid alcoholic drinks.  This medicine can reduce the response of your body to heat or cold. Dress warm in cold weather and stay hydrated in hot weather. If possible, avoid extreme temperatures like saunas, hot tubs, very hot or cold showers, or activities that can cause dehydration such as vigorous exercise.  This medicine can make you more sensitive to the sun. Keep out of the sun. If you cannot avoid being in the sun, wear protective clothing and use sunscreen. Do not use sun lamps or tanning beds/booths.  Your mouth may get dry. Chewing sugarless gum or sucking hard candy, and drinking plenty of water may help. Contact your doctor if the  problem does not go away or is severe.  NOTE:This sheet is a summary. It may not cover all possible information. If you have questions about this medicine, talk to your doctor, pharmacist, or health care provider. Copyright  2018 [employer]                Vancomycin capsules  Brand Name: Vancocin  What is this medicine?  VANCOMYCIN (van koe MYE sin) is a glycopeptide antibiotic. It is used to treat certain kinds of bacterial infections in the bowel. It will not work for colds, flu, or other viral infections.  How should I use this medicine?  Take this medicine by mouth with a glass of water. Follow the directions on the prescription label. Take your medicine at regular intervals. Do not take your medicine more often than directed. Take all of your medicine as directed even if you think you are better. Do not skip doses or stop your medicine early.  Talk to your pediatrician regarding the use of this medicine in children. Special care may be needed.  What side effects may I notice from receiving this medicine?  Side effects that you should report to your doctor or health care professional as soon as possible:    allergic reactions like skin rash, itching or hives, swelling of the face, lips, or tongue    breathing difficulty    change in amount, color of urine    change in hearing    dizziness    fever, infection    redness, blistering, peeling or loosening of the skin, including inside the mouth    unusual bleeding or bruising    unusually weak or tired  Side effects that usually do not require medical attention (report to your doctor or health care professional if they continue or are bothersome):    nausea, vomiting    stomach cramps  What may interact with this medicine?    birth control pills    cholestyramine    colestipol    vancomycin injection    What if I miss a dose?  If you miss a dose, take it as soon as you can. If it is almost time for your next dose, take only that dose. Do not take double or extra  "doses.  Where should I keep my medicine?  Keep out of the reach of children.  Store at room temperature between 15 and 30 degrees C (59 and 86 degrees F). Throw away any unused medicine after the expiration date.  What should I tell my health care provider before I take this medicine?  They need to know if you have any of these conditions:    bowel, intestines, stomach disease    kidney disease    an unusual or allergic reaction to vancomycin, other medicines, foods, dyes, or preservatives    pregnant or trying to get pregnant    breast-feeding  What should I watch for while using this medicine?  Tell your doctor or health care professional if your symptoms do not improve or if you get new symptoms.  Avoid taking this medicine within 3 or 4 hours of taking cholestyramine or colestipol.  NOTE:This sheet is a summary. It may not cover all possible information. If you have questions about this medicine, talk to your doctor, pharmacist, or health care provider. Copyright  2018 [employer]                 * VOMITING [6yr-Adult]  Vomiting is a common symptom that may be due to different causes. These include gastroenteritis (\"stomach-flu\"), food poisoning and gastritis. There are other more serious causes of vomiting which may be hard to diagnose early in the illness. Therefore, it is important to watch for the warning signs listed below.  The main danger from repeated vomiting is \"dehydration\". This is due to excess loss of water and minerals from the body. When this occurs, body fluids must be replaced.`  HOME CARE:      If symptoms are severe, rest at home for the next 24 hours.    You may use acetaminophen (Tylenol) 650-1000 mg every 6 hours to control fever, unless another medicine was prescribed. [ NOTE : If you have chronic liver disease, talk with your doctor before using acetaminophen.] (Aspirin should never be used in anyone under 18 years of age who is ill with a fever. It may cause severe liver damage.)    Avoid " tobacco and alcohol use, which may worsen your symptoms.    If medicines for vomiting were prescribed, take as directed.  DURING THE FIRST 12-24 HOURS follow the diet below. Try to take frequent small sips even if you vomit occasionally:    FRUIT JUICES: Apple, grape juice, clear fruit drinks, electrolyte replacement and sports drinks.    BEVERAGES: Sport drinks such as Gatorade, soft drinks without caffeine; mineral water (plain or flavored), decaffeinated tea and coffee.    SOUPS: Clear broth, consommé and bouillon    DESSERTS: Plain gelatin (Jell-O), popsicles and fruit juice bars.  DURING THE NEXT 24 HOURS you may add the following to the above:    Hot cereal, plain toast, bread, rolls, crackers    Plain noodles, rice, mashed potatoes, chicken noodle or rice soup    Unsweetened canned fruit (avoid pineapple), bananas    Avoid dairy products    Limit caffeine and chocolate. No spices or seasonings except salt.  DURING THE NEXT 24 HOURS  Slowly go back to a normal diet, as you feel better and your symptoms lessen.  FOLLOW UP with your doctor as advised if you are not improving over the next 2-3 days.  GET PROMPT MEDICAL ATTENTION if any of the following occur:    Constant abdominal pain that stays in the same spot or gets worse    Continued vomiting (unable to keep liquids down) for 24 hours    Frequent diarrhea (more than 5 times a day); blood (red or black color) in diarrhea    No urine output for 12 hours or extreme thirst    Weakness, dizziness or fainting    Unusually drowsy or confused    Fever over 101.0  F (38.3  C) for more than 3 days    Yellow color of the eyes or skin    4569-3303 [employer]. [address] [place] [id number]. All rights reserved. This information is not intended as a substitute for professional medical care. Always follow your healthcare professional's instructions.  This information has been modified by your health care provider with permission from the  publisher.

## 2022-04-05 NOTE — PROGRESS NOTES
" 02/19/18 1538   Quick Adds   Type of Visit Initial PT Evaluation   Living Environment   Lives With other (see comments)  (roommates)   Living Arrangements other (see comments)  (per family pt stays with different friends,no permanent home)   Living Environment Comment Family reports pt has a 8yo daughter with whom she has shared custody.  Family reports that daughter assume caretakes roles for pt 2/2 pt's alcohol and pill problems.   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  Per pt's father, pt \"parties\" a lot and that her friends are not helpful in terms of sobriety.   Functional Level Prior   Ambulation 0-->independent   Transferring 0-->independent   Toileting 0-->independent   Bathing 0-->independent   Dressing 0-->independent   Eating 0-->independent   Communication 0-->understands/communicates without difficulty   Swallowing 0-->swallows foods/liquids without difficulty   Cognition 0 - no cognition issues reported   Fall history within last six months no   General Information   Onset of Illness/Injury or Date of Surgery - Date 02/17/18   Referring Physician Silas Riley MD   Patient/Family Goals Statement unable to state   Pertinent History of Current Problem (include personal factors and/or comorbidities that impact the POC) 27 year old female admitted on 2/17/2018 with 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 PMH:  anxiety, anemia, a pulmonary embolism (2015 Discontinued anti-coagulation 1/2017 after inconsistent use and lack of thrombus seen on CT). She presents to the [facility] ICU via medical air flight direct transfer from  [place]    Precautions/Limitations fall precautions;swallowing precautions  (craniotomy; R cranial flap - helmet for all  OOB) "   General Observations pt with minimal verbal engagement, eyes closed much of time.  Family questions if this is behavioral vs true attention/language deficits   Cognitive Status Examination   Level of Consciousness lethargic/somnolent   Follows Commands and Answers Questions 25% of the time;unable to follow multi-step instructions   Personal Safety and Judgment impaired;at risk behaviors demonstrated   Cognitive Comment Pt received pain meds within 10mins of PT, potential sedative side effect.  Unable to assess vision or cognition.   Pain Assessment   Patient Currently in Pain No   Integumentary/Edema   Integumentary/Edema Comments no edema noted in any extremity.  helmet covering crani incision   Posture    Posture Comments sitting in midline in recliner chair, unable to assess further 2/2 poor participation   Range of Motion (ROM)   ROM Comment WFL all extremities   Strength   Strength Comments did not participate in MMT exam.  pt moving RUE/LE spontaneously against gravity.  0/5 in LUE and LLE at time of this exam, however pt when has been more alert other providers have scored her 1/5 on L hemibody.   Bed Mobility   Bed Mobility Comments To be assess, pt greeted up in chair and remained there   Transfer Skills   Transfer Comments Total A   Gait   Gait Comments Total A   Balance   Balance Comments Unable to assess 2/2 low participation/somnolence   Sensory Examination   Sensory Perception Comments Unable to assess 2/2 low participation/somnolence   Coordination   Coordination Comments Unable to assess 2/2 low participation/somnolence   Muscle Tone   Muscle Tone Comments 3 beat clonus L ankle on quick stretch   General Therapy Interventions   Planned Therapy Interventions balance training;bed mobility training;cognition;gait training;motor coordination training;neuromuscular re-education;ROM;strengthening;stretching;transfer training;home program guidelines;progressive activity/exercise   Clinical Impression  "  Criteria for Skilled Therapeutic Intervention yes, treatment indicated   PT Diagnosis impaired functional mobility   Influenced by the following impairments weakness, impaired arousal, impaired communication   Functional limitations due to impairments decreased (I) bed mobility, transfers, gait, ADL   Clinical Presentation Evolving/Changing   Clinical Presentation Rationale Requiring close monitoring in ICU   Clinical Decision Making (Complexity) Moderate complexity   Therapy Frequency` daily   Predicted Duration of Therapy Intervention (days/wks) 3/5/2018   Anticipated Discharge Disposition Acute Rehabilitation Facility   Risk & Benefits of therapy have been explained Yes   Patient, Family & other staff in agreement with plan of care Yes   [facility] AM-PAC  \"6 Clicks\" V.2 Basic Mobility Inpatient Short Form   1. Turning from your back to your side while in a flat bed without using bedrails? 1 - Total   2. Moving from lying on your back to sitting on the side of a flat bed without using bedrails? 1 - Total   3. Moving to and from a bed to a chair (including a wheelchair)? 1 - Total   4. Standing up from a chair using your arms (e.g., wheelchair, or bedside chair)? 1 - Total   5. To walk in hospital room? 1 - Total   6. Climbing 3-5 steps with a railing? 1 - Total   Basic Mobility Raw Score (Score out of 24.Lower scores equate to lower levels of function) 6   Total Evaluation Time   Total Evaluation Time (Minutes) 5     " supervision

## 2022-04-19 NOTE — TELEPHONE ENCOUNTER
[facility]  @FLOW([id number])@      Social Work Care Navigator Note      Patient: Maggie Case  Date: August 7, 2020  Preferred Name: Maggie    Previous PHQ-9: No flowsheet data found.  Previous DIANNA-7: No flowsheet data found.  SEYMOUR LEVEL:  No flowsheet data found.    Preferred Contact: @[email or web address]([phone]@  Type of Contact Today: Phone call (patient / identified key support person reached)      Data: (subjective / Objective):  Patient Goals Areas: @[email or web address]([id number]@  Patient Stated Goals: @FLOW([id number])@  Recent ED/IP Admission or Discharge?   None  Recent ED/IP Admission or Discharge?   None    Patient Goals:  No data recorded      [facility] Core Service Provided:  Comprehensive Care Management: utilized the electronic medical record / patient registry to identify and support patient's health conditions / needs more effectively   Care Coordination: provided care management services/referrals necessary to ensure patient and their identified supports have access to medical, behavioral health, pharmacology and recovery support services.  Ensured that patient's care is integrated across all settings and services.   Health and Wellness Promotion    Current Stressors / Issues / Care Plan Objective Addressed Today:  Housing    Intervention:  Motivational Interviewing: Expressed Empathy/Understanding, Supported Autonomy, Collaboration, Evocation, Permission to raise concern or advise and Open-ended questions   Target Behavior(s): Explored current social supports and reinforced opportunities to increase engagement and Explored patient's perception of how alcohol and / or drugs influences mood    Assessment: (Progress on Goals / Homework):  Writer reached out to pt to see how things were going.  She advised the SPDAT assessment went well, but had not heard anything since.  She did advise she had a missed call from Bushra, housing worker, and had called back and [facility].  She is currently agreeing to stay in her current  apartment until a new place is found.    Discussed rule 25 assessment and pt has not yet contacted the county.  Regave her the number ([phone]) and encouraged her to reach out.    Writer contacted Kellen ([facility] ) again to check on status of group home placement.  WIll remain available.    Plan: (Homework, other):  Patient was encouraged to continue to seek condition-related information and education.      Scheduled a Phone follow up appointment with HERNAN VILLALOBOS in 1 week     Patient has set self-identified goals and will monitor progress until the next appointment.   RE Ayon, Social Work Care Coordinator       RE Ayon  Social Work Care Coordinator  [facility]  [phone] option 2 or [phone]         Jay Jay

## 2022-05-02 NOTE — IP AVS SNAPSHOT
"` `           [address] [facility]: [phone]                                              INTERAGENCY TRANSFER FORM - NURSING   2018                    Hospital Admission Date: 2018  NADIRNILE GARCIA   : 1990  Sex: Female        Attending Provider: Jose Amaya MD     Allergies:  Amoxicillin, Levaquin [Levofloxacin], Naproxen, Nickel, Tramadol, Sulindac    Infection:  None   Service:  NEURO ICU    Ht:  1.626 m (5' 4\")   Wt:  68.6 kg (151 lb 3.8 oz)   Admission Wt:  65.8 kg (145 lb 1 oz)    BMI:  25.96 kg/m 2   BSA:  1.76 m 2            Patient PCP Information     Provider PCP Type    Chapo Flores MD General      Current Code Status     Date Active Code Status Order ID Comments User Context       Prior      Code Status History     Date Active Date Inactive Code Status Order ID Comments User Context    3/1/2018 12:04 PM  Full Code [id number]  Yeison Crawley MD Outpatient    2018  3:20 PM 3/1/2018 12:04 PM Full Code [id number]  Reinier Shepherd MD Inpatient    2017  9:02 AM 2018  3:20 PM Full Code [id number]  Delmi Nicole NP Outpatient    2017 10:21 PM 2017  9:02 AM Full Code [id number]  Chester Cool MD Inpatient    2017  2:15 PM 2017 10:21 PM Full Code [id number]  Joey Quan DO Outpatient    2017  5:08 PM 2017  2:15 PM Full Code [id number]  Chester Cool MD Inpatient    2016  8:22 AM 2017  5:08 PM Full Code [id number]  Eleuterio Anderson MD Outpatient    2016 11:58 PM 2016  8:22 AM Full Code [id number]  Boo Méndez MD Inpatient    2013  8:27 PM 2013 12:05 AM Full Code [id number]  Brandy Livingston RN Inpatient      Advance Directives        Scanned docmt in ACP Activity?           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    Pain in joint, lower leg Medium  5/1/2009    Cervicalgia Medium  6/16/2009    Low back pain Medium  6/16/2009    Muscle pain Medium  7/30/2009    Anxiety state Medium  3/25/2013    Acute upper GI bleed Medium  6/18/2013    Hypokalemia Medium  6/18/2013    Acute cystitis Medium  6/18/2013    Acute chest pain Medium  5/6/2016    Leukocytosis Medium  5/6/2016    Lung mass Medium  5/6/2016    SOB (shortness of breath) Medium  5/6/2016    Pyelonephritis Medium  1/5/2017    Sepsis (H) Medium  4/28/2017    Influenza B Medium  5/1/2017    Opacity of lung on imaging study Medium  5/1/2017    Community acquired pneumonia Medium  5/1/2017    C. difficile colitis Medium  5/1/2017      Immunizations     Name Date      HepB 09/22/03     Influenza (IIV3) PF 10/13/08     Influenza Vaccine IM 3yrs+ 4 Valent IIV4 defer-02/28/18     Deferral:       Pneumococcal 23 valent 05/01/17     TD (ADULT, 7+) 09/22/03          END      ASSESSMENT     Discharge Profile Flowsheet     EXPECTED DISCHARGE     F/U Appointment Brochure Provided  -- (n/a) 04/29/17 1821    Expected Discharge Date  02/25/18 02/26/18 0833   SKIN      DISCHARGE NEEDS ASSESSMENT     Inspection of bony prominences  Full 03/01/18 1045    Concerns To Be Addressed  financial/insurance concerns 01/06/17 1558   Full except areas not inspected   Sacrum;Coccyx 02/21/18 2015    Concerns Comments  Currently working with Sandra Alatorre for additional supports 05/09/16 1158   Inspection under devices  Full 03/01/18 1045    Equipment Currently Used at Home  none 04/29/17 1821   Skin WDL  ex;characteristics 03/01/18 1045    Key Recommendations  F/U with PCP 04/29/17 1821   Skin Color/Characteristics  bruised (ecchymotic) 03/01/18 1045    GASTROINTESTINAL (ADULT,PEDIATRIC,OB)     Skin Temperature  warm 03/01/18 1045    GI WDL  ex;GI symptoms 03/01/18  "1045   Skin Moisture  dry 03/01/18 1045    All Quadrants Bowel Sounds  audible and normoactive 03/01/18 1045   Skin Elasticity  quick return to original state 03/01/18 1045    Last Bowel Movement  03/01/18 03/01/18 1045   Skin Integrity  bruise(s);drain/device(s);incision(s);scab(s);scar(s);tattoo(s) 03/01/18 1045    GI Signs/Symptoms  fecal incontinence 03/01/18 1045   SAFETY      Passing flatus  yes 03/01/18 1045   Safety WDL  WDL 03/01/18 1045    COMMUNICATION ASSESSMENT     Safety Factors  upper side rails raised x 2, lower side rail raised x 1;bed in low position;wheels locked;call light in reach;ID band on 03/01/18 1045    Patient's communication style  spoken language (English or Bilingual) 01/30/18 0853   Aspiration Risk Screen  swallowing difficulty;tube feeding;reduced alertness 03/01/18 1045    FINAL RESOURCES     Airway Safety Measures  all equipment/monitors on and audible;manual resuscitator at bedside;suction at bedside;suction equipment;oxygen flowmeter 03/01/18 1045    PAS Number  -- (n/a) 04/29/17 1821   Safety Equipment  oxygen flowmeter;manual resuscitator at bedside;suction regulator;suction equipment 03/01/18 1045    [facility] Line Referral Placed  -- (n/a) 04/29/17 1821   All Alarms  alarm(s) activated and audible 03/01/18 1045                 Assessment WDL (Within Defined Limits) Definitions           Safety WDL     Effective: 09/28/15    Row Information: <b>WDL Definition:</b> Bed in low position, wheels locked; call light in reach; upper side rails up x 2; ID band on<br> <font color=\"gray\"><i>Item=AS safety wdl>>List=AS safety wdl>>Version=F14</i></font>      Skin WDL     Effective: 09/28/15    Row Information: <b>WDL Definition:</b> Warm; dry; intact; elastic; without discoloration; pressure points without redness<br> <font color=\"gray\"><i>Item=AS skin wdl>>List=AS skin wdl>>Version=F14</i></font>      Vitals     Vital Signs Flowsheet     COMMENTS     Vocalization (extubated " "patients)  0 02/20/18 0943    Comments  PT 02/28/18 2155   Muscle Tension  0 02/20/18 0943    VITAL SIGNS     Total  1 02/20/18 0943    Temp  98.6  F (37  C) 03/01/18 1203   ANALGESIA SIDE EFFECTS MONITORING      Temp src  Oral 03/01/18 1203   Side Effects Monitoring: Respiratory Quality  R 03/01/18 0922    Resp  16 03/01/18 1203   Side Effects Monitoring: Respiratory Depth  N 03/01/18 0922    Pulse  86 03/01/18 1203   Side Effects Monitoring: Sedation Level  S 03/01/18 0922    Heart Rate  70 03/01/18 0424   HEIGHT AND WEIGHT      Pulse/Heart Rate Source  Monitor 03/01/18 1203   Height  1.626 m (5' 4\") 02/21/18 1227    BP  114/74 03/01/18 1203   Weight  68.6 kg (151 lb 3.8 oz) 03/01/18 0737    BP Location  Right arm 03/01/18 1203   Weight Method  Bed scale 03/01/18 0737    OXYGEN THERAPY     POSITIONING      SpO2  97 % 03/01/18 1203   Body Position  supine, head elevated;supine, legs elevated;weight shift assistance provided;upper extremity elevated, right;upper extremity elevated, left 03/01/18 1045    O2 Device  None (Room air) 03/01/18 1203   Head of Bed (HOB)  HOB at 30-45 degrees 03/01/18 1045    FiO2 (%)  40 % 02/18/18 2349   Positioning/Transfer Devices  mechanical lift utilized;pillows;in use 03/01/18 1045    Oxygen Delivery  2 LPM 02/19/18 0203   Chair  Recline and up in chair 03/01/18 1045    PAIN/COMFORT     DAILY CARE      Patient Currently in Pain  sleeping: patient not able to self report 03/01/18 0922   Activity Management  activity adjusted per tolerance;activity encouraged;dorsiflexion, plantar flexion encouraged;plantar flexion encouraged;up in chair 03/01/18 1045    Preferred Pain Scale  CAPA (Clinically Aligned Pain Assessment) ([facility] Adults Only) 03/01/18 0922   Activity Assistance Provided  assistance, 2 people;lift team assistance 03/01/18 1045    Patient's Stated Pain Goal  No pain 02/24/18 1254   Assistive Device Utilized  mechanical lift 03/01/18 1045    0-10 Pain Scale  " 10 02/23/18 1325   ECG      Pain Location  Leg 03/01/18 0803   ECG Rhythm  Normal sinus rhythm 02/28/18 1711    Pain Orientation  Left 03/01/18 0803   Ectopy  None 02/26/18 0906    Pain Descriptors  Patient unable to describe (vocalizes pain w/positioning and states LLE) 03/01/18 0803   Lead Monitored  Lead II 02/28/18 1711    Pain Management Interventions  analgesia administered;around-the-clock dosing utilized;diversional activity encouraged;diversional activity provided;pain plan reviewed with patient/caregiver;pillow support provided;premedicated for activity;prescribed exercises encouraged;relaxation techniques promoted;unnecessary movement minimized 03/01/18 0922   Rhythm Comment  ST Segment Normal 02/24/18 1254    Pain Intervention(s)  Medication (See eMAR);Heat applied;Repositioned;Relaxation technique;Rest 03/01/18 0922   EKG MONITORING      Response to Interventions  Absence of nonverbal indicators of pain 03/01/18 0922   Cardiac Regularity  Regular 02/17/18 1405    CLINICALLY ALIGNED PAIN ASSESSMENT (CAPA) ([facility] [place] [facility] ADULTS ONLY)     Cardiac Rhythm  NSR 02/17/18 1405    Comfort  comfortably manageable 03/01/18 0922   GIL COMA SCALE      Change in Pain  getting better 03/01/18 0922   Best Eye Response  4-->(E4) spontaneous 03/01/18 1045    Pain Control  fully effective 03/01/18 0922   Best Motor Response  6-->(M6) obeys commands 03/01/18 1045    Functioning  can do most things, but pain gets in the way of some 03/01/18 0922   Best Verbal Response  5-->(V5) oriented 03/01/18 1045    Sleep  awake with occasional pain 03/01/18 0922   [place] Coma Scale Score  15 03/01/18 1045    CRITICAL-CARE PAIN OBSERVATION TOOL (CPOT)     DRUG CALCULATION WEIGHT      Facial Expression  0 02/20/18 0943   Drug Calculation Weight  -- 02/17/18 1436    Body Movements  1 02/20/18 0943   RESPIRATORY MONITORING      Compliance w/ventilator (intubated patients)  Extubated 02/20/18 0943   Respiratory Monitoring  (EtCO2)  30 mmHg 02/19/18 0106            Patient Lines/Drains/Airways Status    Active LINES/DRAINS/AIRWAYS     Name: Placement date: Placement time: Site: Days: Last dressing change:    Gastrostomy/Enterostomy Gastrostomy LUQ 1 18 fr 02/26/18   0919   LUQ   3     Peripheral IV 02/26/18 Right Hand 02/26/18   1649   Hand   2     Incision/Surgical Site 02/18/18 Right Head 02/18/18 2122    10             Patient Lines/Drains/Airways Status    Active PICC/CVC     None            Intake/Output Detail Report     Date Intake           Output         Net    Shift P.O. I.V. NG/GT IV Piggyback Enteral Blood Components Total Urine Emesis/NG output Drains Other Blood Total       Day 02/28/18 0000 - 02/28/18 0659 -- -- 90 -- 280 -- 370 -- -- -- -- -- -- 370    Nguyen 02/28/18 0700 - 02/28/18 1459 400 -- 60 -- 300 -- 760 -- -- -- -- -- -- 760    Noc 02/28/18 1500 - 02/28/18 2359 60 -- 120 -- 320 -- 500 -- -- -- -- -- -- 500    Day 03/01/18 0000 - 03/01/18 0659 -- -- 90 -- 280 -- 370 -- -- -- -- -- -- 370    Nguyen 03/01/18 0700 - 03/01/18 1459 -- -- 120 -- 160 -- 280 -- -- -- 200 -- 200 80      Last Void/BM       Most Recent Value    Urine Occurrence 1 at 03/01/2018 0400    Stool Occurrence 1 at 03/01/2018 0400      Case Management/Discharge Planning     Case Management/Discharge Planning Flowsheet     REFERRAL INFORMATION     DISCHARGE PLANNING      Admission Type  inpatient 02/26/18 1615   Key Recommendations  F/U with PCP 04/29/17 1821    Arrived From  home or self-care 04/29/17 1821   FINAL RESOURCES      [facility] 04/29/17 1821   Equipment Currently Used at Home  none 04/29/17 1821    Primary Care MD Name  Dr Flores 04/29/17 1821   PAS Number  -- (n/a) 04/29/17 1821    LIVING ENVIRONMENT     [facility] Line Referral Placed  -- (n/a) 04/29/17 1821    Lives With  other (see comments) (roommates) 02/19/18 1540   F/U Appointment Brochure Provided  -- (n/a) 04/29/17 1821    Living Arrangements   other (see comments) (per family pt stays with different friends,no permanent home) 02/19/18 1540   MH/BH CAREGIVER      COPING/STRESS     Filed Complexity Screen Score  7 02/26/18 1616    Major Change/Loss/Stressor  medical condition/diagnosis 02/19/18 1415   ABUSE RISK SCREEN      EXPECTED DISCHARGE     QUESTION TO PATIENT:  Has a member of your family or a partner(now or in the past) intimidated, hurt, manipulated, or controlled you in any way?  patient declined to answer or is unable to answer 02/19/18 1414    Expected Discharge Date  02/25/18 02/26/18 0833   QUESTION TO PATIENT: Do you feel safe going back to the place where you are living?  patient declined to answer or is unable to answer 02/19/18 1414    ASSESSMENT/CONCERNS TO BE ADDRESSED     OBSERVATION: Is there reason to believe there has been maltreatment of a vulnerable adult (ie. Physical/Sexual/Emotional abuse, self neglect, lack of adequate food, shelter, medical care, or financial exploitation)?  no 02/19/18 1414    Concerns To Be Addressed  financial/insurance concerns 01/06/17 1558   OTHER      Concerns Comments  Currently working with Sandra Alatorre for additional supports 05/09/16 1158   Are you depressed or being treated for depression?  Yes 02/19/18 1414             none

## 2022-08-18 NOTE — ED NOTES
Bed: ED04  Expected date:   Expected time:   Means of arrival:   Comments:  [facility]   Detail Level: Detailed Lesion Type: Abscess Method: 11 blade Curette: No Anesthesia Type: 0.5% lidocaine with 1:200,000 epinephrine and a 1:10 solution of 8.4% sodium bicarbonate Size Of Lesion In Cm (Optional But May Be Required For Some Insurances): 0 Wound Care: Petrolatum Dressing: dry sterile dressing Epidermal Sutures: 4-0 Ethilon Epidermal Closure: simple interrupted Suture Text: The incision was partially closed with Preparation Text: The area was prepped in the usual clean fashion. Curette Text (Optional): After the contents were expressed a curette was used to partially remove the cyst wall. Consent was obtained and risks were reviewed including but not limited to delayed wound healing, infection, need for multiple I and D's, and pain. Render Postcare In Note?: Yes Post-Care Instructions: I reviewed with the patient in detail post-care instructions. Patient should keep wound covered and call the office should any redness, pain, swelling or worsening occur.

## 2023-08-21 NOTE — UTILIZATION REVIEW
Admission Status; Secondary Review Determination    Under the authority of the Utilization Management Committee, the utilization review process indicated a secondary review on the above patient. The review outcome is based on review of the medical records, discussions with staff, and applying clinical experience noted on the date of the review.    (x) Inpatient Status Appropriate - This patient's medical care is consistent with medical management for inpatient care and reasonable inpatient medical practice.    RATIONALE FOR DETERMINATION: 30-year-old female who presents with severe nausea and vomiting for 4 days associated with left lower quadrant abdominal pain.  Patient has history of prior stroke with significant left-sided paresis, history of significant depression and chemical dependency, ongoing anticoagulation.  Patient now presents with signs and symptoms of sepsis with tachycardia, white blood count of 20,000, lactic acidosis, as well as severe persistent hypokalemia.  The severity of patient's illness will require several days of acute management including significant IV fluids, electrolyte repletion in addition to antibiotics.    At the time of admission with the information available to the attending physician more than 2 nights Hospital complex care was anticipated, based on patient risk of adverse outcome if treated as outpatient and complex care required. Inpatient admission is appropriate based on the Medicare guidelines.    This document was produced using voice recognition software    The information on this document is developed by the utilization review team in order for the business office to ensure compliance. This only denotes the appropriateness of proper admission status and does not reflect the quality of care rendered.    The definitions of Inpatient Status and Observation Status used in making the determination above are those provided in the CMS Coverage Manual, Chapter 1 and Chapter  No longer indicated.   6, section 70.4.    Sincerely,    Perez Noble MD  Utilization Review  Physician Advisor  [facility].

## 2024-01-26 NOTE — PROGRESS NOTES
"Talked with Maggie this morning. We talked about the limited availability of behavioral health facilities that can work with her without a covid test, with her c.dif history, and with her limited participation in self cares. We talked about the search for a BH bed being geographically far, [place] [facility]. She continues to refuse a covid test; and says she will not have a covid test even if it means accessing a BH bed closer to home. Says   \"I can get out of here on Thursday, right?\", and we talked about leaving sooner if a BH bed is found, and that we will search longer than Thursday if needed as well. She is aware that her hold expires on Thursday. She voices that she still feels somewhat suicidal and does want to go somewhere to address it. She is willing to do so even if the bed is found after the hold expires.     Referrals:  [facility]- no beds other than [facility], [facility] is unable to accept her  [facility]- no beds  [facility]- no d/t their current milieu  [facility]- no beds   [facility]- will not screen her without first having a negative covid  [facility]- no holds  [facility] [place]- no d/t c.dif  [facility] [place]- no beds now, check back after 11am  [facility]- no holds; unable to accept her d/t lack of participation in her own cares  [facility]- not able to provider her personal care needs (is not, and is not attached to a \"medical\" facility)  [facility]- no due to her lack of participation in self cares             " [No, patient denies ideation or behavior] : No, patient denies ideation or behavior [Depressed mood/Anhedonia] : depressed mood/anhedonia [Hopelessness or despair] : hopelessness or despair [Triggering events leading to humiliation, shame, and/or despair] : triggering events leading to humiliation, shame, and/or despair (e.g. loss of relationship, financial or health status) (real or anticipated) [Supportive social network of family or friends] : supportive social network of family or friends [Positive therapeutic relationships] : positive therapeutic relationships [Frustration tolerance] : frustration tolerance [FreeTextEntry8] : Safety Plan reviewed [FreeTextEntry9] : Pt. reports he will follow safety plan.  [No] : No [Moderate acute suicide risk] : Moderate acute suicide risk [Yes] : Safety Plan completed/updated (for individuals at risk): Yes

## 2024-02-13 NOTE — ED NOTES
DATE:  5/8/2020   TIME OF RECEIPT FROM LAB:  1752  LAB TEST:  potasium  LAB VALUE:  2.7  RESULTS GIVEN WITH READ-BACK TO (PROVIDER):  Jeramie Slaughter PA  TIME LAB VALUE REPORTED TO PROVIDER:   5913     Dr. Bruno's office called reporting that patient was seen yesterday and had repeat labs completed. Patient remains to have black loose stools and feeling lightheaded. Also having heartburn. Dr. Bruno is asking when patient can be seen by you for further evaluation and possible EGD?

## 2024-12-08 NOTE — PROGRESS NOTES
[facility]    Hospitalist Progress Note    Date of Service (when I saw the patient): 04/29/2017    Assessment & Plan   Maggie Case is a 27 year old female who was admitted on 4/28/2017.     1. Sepsis (fever, tachycardia) secondary to influenza B and CDAD/C diff colitis  -continue tamiflu  -started on oral vanco  -probiotic  -ADAT    2. Hypokalemia; secondary to GI losses; continue replacement protocol  3. Hx of Anxiety   4. Hx of chronic diarrhea  5. Hypocalcemia; Ca 7.5 this AM; will replace  6. Hypomagnesemia; continue replacement protocol  7. Borderline macrocytic anemia; anemia of critical illness; dilutional hgb down to 11.0 today. Hx of B12 def and WHIT noted. Will need to resume supplements prior to discharge      Hospital Summary  Maggie Case is a 27 year old female with past medical history noted below. The patient has a chronic history of diarrhea. She has a history of Anxiety, chronic diarrhea, B12 deficiency/Iron deficiency anemia, PE  However starting on Monday the patient developed nausea, vomiting, and worsening of her baseline diarrhea; with multiple liquid stools per day. She has had generalized abdominal pain, cough, weakness and fatigue. She presented to ED today where notable workup included potassium of 2.6 and tested positive for influenza B. She continues to have abdominal pain which she states has worsened since arrival to ED. Found to be positive for Cdiff.      DVT Prophylaxis: Enoxaparin (Lovenox) SQ  Code Status: Full Code    Disposition: Expected discharge in 2-3 days once diarrhea improves.    Chester Dastrange    Interval History   Found to have cdiff overnight; started on vanco  Small emesis early this morning but requesting to ADAT  Abdominal pain improving  Currently denies SOB or cough    -Data reviewed today: I reviewed all new labs and imaging results over the last 24 hours. I personally reviewed today's labs  Physical Exam   Temp: 97.3  F (36.3  C) Temp src:  Tympanic BP: 130/70 Pulse: 90 Heart Rate: 75 Resp: 18 SpO2: 100 % O2 Device: None (Room air)    Vitals:    04/28/17 2232   Weight: 63.5 kg (139 lb 15.9 oz)     Vital Signs with Ranges  Temp:  [97.3  F (36.3  C)-100.3  F (37.9  C)] 97.3  F (36.3  C)  Pulse:  [] 90  Heart Rate:  [] 75  Resp:  [16-20] 18  BP: ()/(57-87) 130/70  SpO2:  [96 %-100 %] 100 %  I/O last 3 completed shifts:  In: 1509 [P.O.:840; I.V.:669]  Out: 400 [Urine:300; Stool:100]    Peripheral IV 04/28/17 Right Hand (Active)   Site Assessment [facility] 4/29/2017  8:28 AM   Line Status Infusing 4/29/2017  8:28 AM   Phlebitis Scale 0-->no symptoms 4/29/2017  8:28 AM   Infiltration Scale 0 4/29/2017  8:28 AM   Infiltration Site Treatment Method  None 4/29/2017  8:28 AM   Extravasation? No 4/29/2017  8:28 AM   Number of days:1       Peripheral IV 04/29/17 Left Lower forearm (Active)   Site Assessment [facility] 4/29/2017  8:28 AM   Line Status Infusing 4/29/2017  8:28 AM   Phlebitis Scale 0-->no symptoms 4/29/2017  8:28 AM   Infiltration Scale 0 4/29/2017  8:28 AM   Number of days:0            Constitutional: sleeping; no acute distress  Eyes: EOMI  HEENT: NCAT.   Respiratory: CTAB  Cardiovascular: RRR Normal s1. s2  GI: generalized tenderness in all four quadrants no rebound or rigidity  Skin: warm, dry, no rash on face  Musculoskeletal: age appropriate muscle mass  Neurologic: AOX3 cranial nerves intact  Psychiatric: anxious    Medications     NaCl       dextrose 5% and 0.45% NaCl + KCl 20 mEq/L 100 mL/hr at 04/29/17 0828       cefTRIAXone in d5w         vancomycin  125 mg Oral 4x Daily     vancomycin         saccharomyces boulardii  250 mg Oral BID     sodium chloride (PF)  3 mL Intracatheter Q8H     oseltamivir  75 mg Oral BID     cefTRIAXone  1 g Intravenous Q24H       Data     Recent Labs  Lab 04/29/17  0618 04/28/17  1940   WBC 5.0 7.9   HGB 11.0* 13.6   MCV 97 94    250    139   POTASSIUM 3.5 2.6*   CHLORIDE 107 102   CO2 23 26    BUN 6* 7   CR 0.47* 0.49*   ANIONGAP 10 11   AVINASH 7.5* 8.3*   GLC 91 98   ALBUMIN  --  2.8*   PROTTOTAL  --  7.3   BILITOTAL  --  0.3   ALKPHOS  --  101   ALT  --  17   AST  --  22       No results found for this or any previous visit (from the past 24 hour(s)).     Chester Cool MD   supervision

## (undated) DEVICE — GLOVE PROTEXIS MICRO 7.5  2D73PM75

## (undated) DEVICE — PREP SKIN SCRUB TRAY 4461A

## (undated) DEVICE — BUR BALL ANSPACH FLUTED CARBIDE STD LGNTH 7MM S-7B-C-G1

## (undated) DEVICE — LINEN TOWEL PACK X6 WHITE 5487

## (undated) DEVICE — ESU CORD BIPOLAR GREEN 10-4000

## (undated) DEVICE — DRAPE CRANIOTOMY W/POUCH 9450

## (undated) DEVICE — SUCTION MANIFOLD DORNOCH ULTRA CART UL-CL500

## (undated) DEVICE — PREP POVIDONE IODINE SCRUB 7.5% 120ML

## (undated) DEVICE — DRAIN JACKSON PRATT 10MM FLAT 4/4 PERF SU130-1311

## (undated) DEVICE — PAD CHUX UNDERPAD 23X24" 7136

## (undated) DEVICE — DRSG ADAPTIC 3X16"  6114

## (undated) DEVICE — SPONGE SURGIFOAM 100 1974

## (undated) DEVICE — SOL RINGERS LACTATED 1000ML BAG 07953-09

## (undated) DEVICE — ESU PENCIL W/ROCKER SWITCH BLADE HOLSTER E2350HDB

## (undated) DEVICE — BONE WAX 2.5GM W31G

## (undated) DEVICE — DRSG GAUZE 4X4" TRAY 6939

## (undated) DEVICE — SU VICRYL 2-0 CT-2 CR 8X18" J726D

## (undated) DEVICE — SPONGE COTTONOID 1X3" 20-10S

## (undated) DEVICE — SU ETHILON 3-0 PS-1 18" 1663H

## (undated) DEVICE — DRSG KERLIX 4 1/2"X4YDS ROLL 6715

## (undated) DEVICE — STPL SKIN 35W 059037

## (undated) DEVICE — ESU ELEC BLADE 2.75" COATED/INSULATED E1455

## (undated) DEVICE — STRAP UNIVERSAL POSITIONING 2-PIECE 4X47X76" 91-287

## (undated) DEVICE — PREP POVIDONE IODINE SOLUTION 10% 120ML

## (undated) DEVICE — TUBING SUCTION 10'X3/16" N510

## (undated) DEVICE — CLIP RANEY

## (undated) DEVICE — PIN SKULL MAYFIELD ADULT TITANIUM 3/PK A1120

## (undated) DEVICE — PACK CRANIOTOMY

## (undated) DEVICE — SPONGE COTTONOID 1/2X1/2" 20-04S

## (undated) DEVICE — SPONGE RAY-TEC 4X8" 7318

## (undated) DEVICE — DRAIN JACKSON PRATT RESERVOIR 100ML SU130-1305

## (undated) DEVICE — CRANIOTOME ADULT ANSPACH A-CRN

## (undated) DEVICE — PREP CHLORAPREP CLEAR 3ML 260400

## (undated) DEVICE — DECANTER BAG 2002S

## (undated) DEVICE — SPONGE COTTONOID 1/2X1 1/2" 20-06S

## (undated) DEVICE — DRAPE SHEET REV FOLD 3/4 9349

## (undated) DEVICE — SYR 10ML LL W/O NDL 302995

## (undated) DEVICE — PERFORATOR 14MM CODMAN

## (undated) DEVICE — ESU GROUND PAD ADULT W/CORD E7507

## (undated) DEVICE — STPL SKIN 35W APPOSE 8886803712

## (undated) DEVICE — DRAPE SHEET MED 44X70" 9355

## (undated) DEVICE — PITCHER STERILE 1000ML  SSK9004A

## (undated) DEVICE — Device

## (undated) DEVICE — RX BACITRACIN OINTMENT 0.9G 1/32OZ CUR001109

## (undated) DEVICE — DRSG MEDIPORE 3 1/2X13 3/4" 3573

## (undated) DEVICE — PREP CHLORAPREP ORANGE 3ML  260415

## (undated) DEVICE — SOL NACL 0.9% IRRIG 1000ML BOTTLE 2F7124

## (undated) DEVICE — SOL WATER IRRIG 1000ML BOTTLE 2F7114

## (undated) DEVICE — LINEN TOWEL PACK X30 5481

## (undated) DEVICE — DRSG PRIMAPORE 03 1/8X6" 66000318

## (undated) RX ORDER — ESMOLOL HYDROCHLORIDE 10 MG/ML
INJECTION INTRAVENOUS
Status: DISPENSED
Start: 2018-05-24

## (undated) RX ORDER — HYDROMORPHONE HCL/0.9% NACL/PF 0.2MG/0.2
SYRINGE (ML) INTRAVENOUS
Status: DISPENSED
Start: 2018-05-24

## (undated) RX ORDER — ONDANSETRON 2 MG/ML
INJECTION INTRAMUSCULAR; INTRAVENOUS
Status: DISPENSED
Start: 2018-05-24

## (undated) RX ORDER — PROPOFOL 10 MG/ML
INJECTION, EMULSION INTRAVENOUS
Status: DISPENSED
Start: 2018-05-24

## (undated) RX ORDER — LABETALOL HYDROCHLORIDE 5 MG/ML
INJECTION, SOLUTION INTRAVENOUS
Status: DISPENSED
Start: 2018-05-24

## (undated) RX ORDER — DEXAMETHASONE SODIUM PHOSPHATE 4 MG/ML
INJECTION, SOLUTION INTRA-ARTICULAR; INTRALESIONAL; INTRAMUSCULAR; INTRAVENOUS; SOFT TISSUE
Status: DISPENSED
Start: 2018-05-24

## (undated) RX ORDER — LABETALOL HYDROCHLORIDE 5 MG/ML
INJECTION, SOLUTION INTRAVENOUS
Status: DISPENSED
Start: 2018-02-18

## (undated) RX ORDER — CLINDAMYCIN PHOSPHATE 900 MG/50ML
INJECTION, SOLUTION INTRAVENOUS
Status: DISPENSED
Start: 2018-05-24

## (undated) RX ORDER — PHENYLEPHRINE HCL IN 0.9% NACL 1 MG/10 ML
SYRINGE (ML) INTRAVENOUS
Status: DISPENSED
Start: 2018-05-24

## (undated) RX ORDER — GLYCOPYRROLATE 0.2 MG/ML
INJECTION, SOLUTION INTRAMUSCULAR; INTRAVENOUS
Status: DISPENSED
Start: 2018-05-24

## (undated) RX ORDER — FENTANYL CITRATE 50 UG/ML
INJECTION, SOLUTION INTRAMUSCULAR; INTRAVENOUS
Status: DISPENSED
Start: 2018-05-24

## (undated) RX ORDER — FENTANYL CITRATE 50 UG/ML
INJECTION, SOLUTION INTRAMUSCULAR; INTRAVENOUS
Status: DISPENSED
Start: 2018-02-26

## (undated) RX ORDER — LIDOCAINE HYDROCHLORIDE 10 MG/ML
INJECTION, SOLUTION EPIDURAL; INFILTRATION; INTRACAUDAL; PERINEURAL
Status: DISPENSED
Start: 2018-02-26

## (undated) RX ORDER — SODIUM CHLORIDE 9 MG/ML
INJECTION, SOLUTION INTRAVENOUS
Status: DISPENSED
Start: 2018-05-24

## (undated) RX ORDER — BACITRACIN 50000 [IU]/1
INJECTION, POWDER, FOR SOLUTION INTRAMUSCULAR
Status: DISPENSED
Start: 2018-05-24

## (undated) RX ORDER — CEFAZOLIN SODIUM 2 G/100ML
INJECTION, SOLUTION INTRAVENOUS
Status: DISPENSED
Start: 2018-02-26

## (undated) RX ORDER — ROCURONIUM BROMIDE 50 MG/5 ML
SYRINGE (ML) INTRAVENOUS
Status: DISPENSED
Start: 2018-05-24

## (undated) RX ORDER — SODIUM CHLORIDE 9 MG/ML
INJECTION, SOLUTION INTRAVENOUS
Status: DISPENSED
Start: 2018-02-18

## (undated) RX ORDER — HYDROMORPHONE HYDROCHLORIDE 1 MG/ML
INJECTION, SOLUTION INTRAMUSCULAR; INTRAVENOUS; SUBCUTANEOUS
Status: DISPENSED
Start: 2018-05-24

## (undated) RX ORDER — ONDANSETRON 2 MG/ML
INJECTION INTRAMUSCULAR; INTRAVENOUS
Status: DISPENSED
Start: 2018-02-18

## (undated) RX ORDER — DEXAMETHASONE SODIUM PHOSPHATE 4 MG/ML
INJECTION, SOLUTION INTRA-ARTICULAR; INTRALESIONAL; INTRAMUSCULAR; INTRAVENOUS; SOFT TISSUE
Status: DISPENSED
Start: 2018-02-18

## (undated) RX ORDER — PHENYLEPHRINE HCL IN 0.9% NACL 1 MG/10 ML
SYRINGE (ML) INTRAVENOUS
Status: DISPENSED
Start: 2018-02-18

## (undated) RX ORDER — LIDOCAINE HYDROCHLORIDE 20 MG/ML
INJECTION, SOLUTION EPIDURAL; INFILTRATION; INTRACAUDAL; PERINEURAL
Status: DISPENSED
Start: 2018-05-24

## (undated) RX ORDER — EPHEDRINE SULFATE 50 MG/ML
INJECTION, SOLUTION INTRAMUSCULAR; INTRAVENOUS; SUBCUTANEOUS
Status: DISPENSED
Start: 2018-05-24

## (undated) RX ORDER — BUPIVACAINE HYDROCHLORIDE AND EPINEPHRINE 5; 5 MG/ML; UG/ML
INJECTION, SOLUTION EPIDURAL; INTRACAUDAL; PERINEURAL
Status: DISPENSED
Start: 2018-05-24

## (undated) RX ORDER — FENTANYL CITRATE 50 UG/ML
INJECTION, SOLUTION INTRAMUSCULAR; INTRAVENOUS
Status: DISPENSED
Start: 2018-02-18

## (undated) RX ORDER — ESMOLOL HYDROCHLORIDE 10 MG/ML
INJECTION INTRAVENOUS
Status: DISPENSED
Start: 2018-02-18